# Patient Record
Sex: MALE | Race: BLACK OR AFRICAN AMERICAN | Employment: FULL TIME | URBAN - METROPOLITAN AREA
[De-identification: names, ages, dates, MRNs, and addresses within clinical notes are randomized per-mention and may not be internally consistent; named-entity substitution may affect disease eponyms.]

---

## 2017-01-09 ENCOUNTER — OFFICE VISIT (OUTPATIENT)
Dept: FAMILY MEDICINE CLINIC | Age: 57
End: 2017-01-09

## 2017-01-09 VITALS
WEIGHT: 294.4 LBS | SYSTOLIC BLOOD PRESSURE: 122 MMHG | OXYGEN SATURATION: 98 % | BODY MASS INDEX: 34.91 KG/M2 | RESPIRATION RATE: 20 BRPM | DIASTOLIC BLOOD PRESSURE: 80 MMHG | TEMPERATURE: 98.4 F | HEART RATE: 82 BPM

## 2017-01-09 DIAGNOSIS — I10 ESSENTIAL HYPERTENSION, BENIGN: ICD-10-CM

## 2017-01-09 DIAGNOSIS — K21.9 GASTROESOPHAGEAL REFLUX DISEASE, ESOPHAGITIS PRESENCE NOT SPECIFIED: ICD-10-CM

## 2017-01-09 DIAGNOSIS — M1A.09X0 IDIOPATHIC CHRONIC GOUT OF MULTIPLE SITES WITHOUT TOPHUS: ICD-10-CM

## 2017-01-09 DIAGNOSIS — N18.30 CRI (CHRONIC RENAL INSUFFICIENCY), STAGE 3 (MODERATE) (HCC): ICD-10-CM

## 2017-01-09 DIAGNOSIS — R07.89 OTHER CHEST PAIN: Primary | ICD-10-CM

## 2017-01-09 PROCEDURE — 99214 OFFICE O/P EST MOD 30 MIN: CPT | Performed by: FAMILY MEDICINE

## 2017-01-09 RX ORDER — RANITIDINE 150 MG/1
150 TABLET ORAL 2 TIMES DAILY
Qty: 60 TABLET | Refills: 5 | Status: SHIPPED | OUTPATIENT
Start: 2017-01-09 | End: 2017-07-03 | Stop reason: SDUPTHER

## 2017-01-09 RX ORDER — OXYCODONE HYDROCHLORIDE AND ACETAMINOPHEN 5; 325 MG/1; MG/1
1 TABLET ORAL EVERY 6 HOURS PRN
Qty: 90 TABLET | Refills: 0 | Status: SHIPPED | OUTPATIENT
Start: 2017-01-09 | End: 2017-02-15 | Stop reason: SDUPTHER

## 2017-01-18 ENCOUNTER — OFFICE VISIT (OUTPATIENT)
Dept: FAMILY MEDICINE CLINIC | Age: 57
End: 2017-01-18

## 2017-01-18 VITALS
SYSTOLIC BLOOD PRESSURE: 96 MMHG | TEMPERATURE: 97.8 F | BODY MASS INDEX: 34.82 KG/M2 | RESPIRATION RATE: 20 BRPM | HEART RATE: 88 BPM | DIASTOLIC BLOOD PRESSURE: 68 MMHG | WEIGHT: 293.6 LBS

## 2017-01-18 DIAGNOSIS — N18.30 CRI (CHRONIC RENAL INSUFFICIENCY), STAGE 3 (MODERATE) (HCC): ICD-10-CM

## 2017-01-18 DIAGNOSIS — K57.92 ACUTE DIVERTICULITIS: Primary | ICD-10-CM

## 2017-01-18 DIAGNOSIS — R73.9 HYPERGLYCEMIA: ICD-10-CM

## 2017-01-18 DIAGNOSIS — R10.84 ABDOMINAL PAIN, DIFFUSE: ICD-10-CM

## 2017-01-18 DIAGNOSIS — I10 ESSENTIAL HYPERTENSION, BENIGN: ICD-10-CM

## 2017-01-18 PROCEDURE — 99214 OFFICE O/P EST MOD 30 MIN: CPT | Performed by: FAMILY MEDICINE

## 2017-01-18 RX ORDER — AMOXICILLIN AND CLAVULANATE POTASSIUM 875; 125 MG/1; MG/1
1 TABLET, FILM COATED ORAL 2 TIMES DAILY
Qty: 20 TABLET | Refills: 0 | Status: SHIPPED | OUTPATIENT
Start: 2017-01-18 | End: 2017-01-28

## 2017-02-09 ENCOUNTER — TELEPHONE (OUTPATIENT)
Dept: FAMILY MEDICINE CLINIC | Age: 57
End: 2017-02-09

## 2017-02-15 ENCOUNTER — OFFICE VISIT (OUTPATIENT)
Dept: FAMILY MEDICINE CLINIC | Age: 57
End: 2017-02-15

## 2017-02-15 VITALS
DIASTOLIC BLOOD PRESSURE: 84 MMHG | TEMPERATURE: 98.5 F | BODY MASS INDEX: 34.01 KG/M2 | RESPIRATION RATE: 20 BRPM | HEART RATE: 92 BPM | SYSTOLIC BLOOD PRESSURE: 120 MMHG | WEIGHT: 286.8 LBS

## 2017-02-15 DIAGNOSIS — I10 ESSENTIAL HYPERTENSION, BENIGN: Primary | ICD-10-CM

## 2017-02-15 DIAGNOSIS — M1A.09X0 IDIOPATHIC CHRONIC GOUT OF MULTIPLE SITES WITHOUT TOPHUS: ICD-10-CM

## 2017-02-15 DIAGNOSIS — M25.511 CHRONIC PAIN IN RIGHT SHOULDER: ICD-10-CM

## 2017-02-15 DIAGNOSIS — N18.30 CRI (CHRONIC RENAL INSUFFICIENCY), STAGE 3 (MODERATE) (HCC): ICD-10-CM

## 2017-02-15 DIAGNOSIS — R73.9 HYPERGLYCEMIA: ICD-10-CM

## 2017-02-15 DIAGNOSIS — G89.29 CHRONIC PAIN IN RIGHT SHOULDER: ICD-10-CM

## 2017-02-15 PROCEDURE — 99214 OFFICE O/P EST MOD 30 MIN: CPT | Performed by: FAMILY MEDICINE

## 2017-02-15 RX ORDER — OMEPRAZOLE 40 MG/1
40 CAPSULE, DELAYED RELEASE ORAL DAILY
Qty: 30 CAPSULE | Refills: 5 | Status: SHIPPED | OUTPATIENT
Start: 2017-02-15 | End: 2017-09-05 | Stop reason: SDUPTHER

## 2017-02-15 RX ORDER — OXYCODONE HYDROCHLORIDE AND ACETAMINOPHEN 5; 325 MG/1; MG/1
1 TABLET ORAL EVERY 6 HOURS PRN
Qty: 90 TABLET | Refills: 0 | Status: SHIPPED | OUTPATIENT
Start: 2017-02-15 | End: 2017-03-13 | Stop reason: SDUPTHER

## 2017-03-07 LAB
ANION GAP SERPL CALCULATED.3IONS-SCNC: 18 MMOL/L (ref 3–16)
BUN BLDV-MCNC: 20 MG/DL (ref 7–20)
CALCIUM SERPL-MCNC: 8.9 MG/DL (ref 8.3–10.6)
CHLORIDE BLD-SCNC: 98 MMOL/L (ref 99–110)
CO2: 24 MMOL/L (ref 21–32)
CREAT SERPL-MCNC: 1.4 MG/DL (ref 0.9–1.3)
GFR AFRICAN AMERICAN: >60
GFR NON-AFRICAN AMERICAN: 52
GLUCOSE BLD-MCNC: 144 MG/DL (ref 70–99)
POTASSIUM SERPL-SCNC: 3.7 MMOL/L (ref 3.5–5.1)
SODIUM BLD-SCNC: 140 MMOL/L (ref 136–145)
URIC ACID, SERUM: 9.7 MG/DL (ref 3.5–7.2)

## 2017-03-08 LAB
ESTIMATED AVERAGE GLUCOSE: 154.2 MG/DL
HBA1C MFR BLD: 7 %

## 2017-03-09 DIAGNOSIS — M25.511 CHRONIC PAIN IN RIGHT SHOULDER: ICD-10-CM

## 2017-03-09 DIAGNOSIS — G89.29 CHRONIC PAIN IN RIGHT SHOULDER: ICD-10-CM

## 2017-03-13 ENCOUNTER — OFFICE VISIT (OUTPATIENT)
Dept: FAMILY MEDICINE CLINIC | Age: 57
End: 2017-03-13

## 2017-03-13 VITALS
WEIGHT: 289.9 LBS | BODY MASS INDEX: 34.38 KG/M2 | TEMPERATURE: 97.5 F | RESPIRATION RATE: 20 BRPM | SYSTOLIC BLOOD PRESSURE: 116 MMHG | DIASTOLIC BLOOD PRESSURE: 74 MMHG | OXYGEN SATURATION: 93 % | HEART RATE: 91 BPM

## 2017-03-13 DIAGNOSIS — I10 ESSENTIAL HYPERTENSION, BENIGN: ICD-10-CM

## 2017-03-13 DIAGNOSIS — G89.29 CHRONIC RIGHT SHOULDER PAIN: ICD-10-CM

## 2017-03-13 DIAGNOSIS — F10.10 ALCOHOL USE DISORDER, MILD, ABUSE: ICD-10-CM

## 2017-03-13 DIAGNOSIS — M1A.09X0 IDIOPATHIC CHRONIC GOUT OF MULTIPLE SITES WITHOUT TOPHUS: ICD-10-CM

## 2017-03-13 DIAGNOSIS — M75.41 IMPINGEMENT SYNDROME, SHOULDER, RIGHT: ICD-10-CM

## 2017-03-13 DIAGNOSIS — N18.30 CRI (CHRONIC RENAL INSUFFICIENCY), STAGE 3 (MODERATE) (HCC): ICD-10-CM

## 2017-03-13 DIAGNOSIS — M25.511 CHRONIC RIGHT SHOULDER PAIN: ICD-10-CM

## 2017-03-13 DIAGNOSIS — E11.9 TYPE 2 DIABETES MELLITUS WITHOUT COMPLICATION, WITHOUT LONG-TERM CURRENT USE OF INSULIN (HCC): Primary | ICD-10-CM

## 2017-03-13 DIAGNOSIS — E78.1 HYPERTRIGLYCERIDEMIA: ICD-10-CM

## 2017-03-13 PROCEDURE — 99214 OFFICE O/P EST MOD 30 MIN: CPT | Performed by: FAMILY MEDICINE

## 2017-03-13 RX ORDER — ALLOPURINOL 300 MG/1
300 TABLET ORAL DAILY
Qty: 30 TABLET | Refills: 5 | Status: SHIPPED | OUTPATIENT
Start: 2017-03-13 | End: 2017-10-08 | Stop reason: SDUPTHER

## 2017-03-13 RX ORDER — COLCHICINE 0.6 MG/1
0.6 TABLET ORAL DAILY PRN
Qty: 30 TABLET | Refills: 5 | Status: SHIPPED | OUTPATIENT
Start: 2017-03-13 | End: 2017-04-24 | Stop reason: SDUPTHER

## 2017-03-13 RX ORDER — OXYCODONE HYDROCHLORIDE AND ACETAMINOPHEN 5; 325 MG/1; MG/1
1 TABLET ORAL EVERY 6 HOURS PRN
Qty: 90 TABLET | Refills: 0 | Status: SHIPPED | OUTPATIENT
Start: 2017-03-13 | End: 2017-07-03 | Stop reason: SDUPTHER

## 2017-03-15 ENCOUNTER — TELEPHONE (OUTPATIENT)
Dept: FAMILY MEDICINE CLINIC | Age: 57
End: 2017-03-15

## 2017-03-27 ENCOUNTER — TELEPHONE (OUTPATIENT)
Dept: FAMILY MEDICINE CLINIC | Age: 57
End: 2017-03-27

## 2017-04-17 ENCOUNTER — TELEPHONE (OUTPATIENT)
Dept: FAMILY MEDICINE CLINIC | Age: 57
End: 2017-04-17

## 2017-04-24 DIAGNOSIS — M1A.09X0 IDIOPATHIC CHRONIC GOUT OF MULTIPLE SITES WITHOUT TOPHUS: ICD-10-CM

## 2017-04-24 RX ORDER — COLCHICINE 0.6 MG/1
0.6 TABLET ORAL DAILY PRN
Qty: 30 TABLET | Refills: 5 | Status: SHIPPED | OUTPATIENT
Start: 2017-04-24 | End: 2018-01-01 | Stop reason: SDUPTHER

## 2017-07-03 ENCOUNTER — TELEPHONE (OUTPATIENT)
Dept: FAMILY MEDICINE CLINIC | Age: 57
End: 2017-07-03

## 2017-07-03 ENCOUNTER — OFFICE VISIT (OUTPATIENT)
Dept: FAMILY MEDICINE CLINIC | Age: 57
End: 2017-07-03

## 2017-07-03 VITALS
BODY MASS INDEX: 35.55 KG/M2 | RESPIRATION RATE: 16 BRPM | WEIGHT: 299.8 LBS | DIASTOLIC BLOOD PRESSURE: 67 MMHG | HEART RATE: 102 BPM | SYSTOLIC BLOOD PRESSURE: 107 MMHG | OXYGEN SATURATION: 94 %

## 2017-07-03 DIAGNOSIS — R10.13 ABDOMINAL PAIN, CHRONIC, EPIGASTRIC: Primary | ICD-10-CM

## 2017-07-03 DIAGNOSIS — M79.89 LEG SWELLING: ICD-10-CM

## 2017-07-03 DIAGNOSIS — M1A.09X0 IDIOPATHIC CHRONIC GOUT OF MULTIPLE SITES WITHOUT TOPHUS: ICD-10-CM

## 2017-07-03 DIAGNOSIS — K42.9 UMBILICAL HERNIA WITHOUT OBSTRUCTION AND WITHOUT GANGRENE: ICD-10-CM

## 2017-07-03 DIAGNOSIS — N18.30 CRI (CHRONIC RENAL INSUFFICIENCY), STAGE 3 (MODERATE) (HCC): ICD-10-CM

## 2017-07-03 DIAGNOSIS — G89.29 CHRONIC RIGHT SHOULDER PAIN: ICD-10-CM

## 2017-07-03 DIAGNOSIS — E11.9 TYPE 2 DIABETES MELLITUS WITHOUT COMPLICATION, WITHOUT LONG-TERM CURRENT USE OF INSULIN (HCC): ICD-10-CM

## 2017-07-03 DIAGNOSIS — M25.511 CHRONIC RIGHT SHOULDER PAIN: ICD-10-CM

## 2017-07-03 DIAGNOSIS — G89.29 ABDOMINAL PAIN, CHRONIC, EPIGASTRIC: Primary | ICD-10-CM

## 2017-07-03 DIAGNOSIS — K40.90 INGUINAL HERNIA, LEFT: ICD-10-CM

## 2017-07-03 DIAGNOSIS — I10 ESSENTIAL HYPERTENSION, BENIGN: ICD-10-CM

## 2017-07-03 PROCEDURE — 99214 OFFICE O/P EST MOD 30 MIN: CPT | Performed by: FAMILY MEDICINE

## 2017-07-03 RX ORDER — OLMESARTAN MEDOXOMIL 40 MG/1
TABLET ORAL
Qty: 30 TABLET | Refills: 5 | Status: SHIPPED | OUTPATIENT
Start: 2017-07-03 | End: 2018-10-11 | Stop reason: SDUPTHER

## 2017-07-03 RX ORDER — OXYCODONE HYDROCHLORIDE AND ACETAMINOPHEN 5; 325 MG/1; MG/1
1 TABLET ORAL EVERY 6 HOURS PRN
Qty: 90 TABLET | Refills: 0 | Status: SHIPPED | OUTPATIENT
Start: 2017-07-03 | End: 2017-08-24 | Stop reason: SDUPTHER

## 2017-07-03 RX ORDER — NIFEDIPINE 90 MG/1
90 TABLET, FILM COATED, EXTENDED RELEASE ORAL DAILY
Qty: 30 TABLET | Refills: 5 | Status: SHIPPED | OUTPATIENT
Start: 2017-07-03 | End: 2018-10-11 | Stop reason: SDUPTHER

## 2017-07-03 RX ORDER — CARVEDILOL 6.25 MG/1
6.25 TABLET ORAL 2 TIMES DAILY
Qty: 60 TABLET | Refills: 5 | Status: SHIPPED | OUTPATIENT
Start: 2017-07-03 | End: 2018-10-11 | Stop reason: SDUPTHER

## 2017-07-03 RX ORDER — CHLORTHALIDONE 25 MG/1
25 TABLET ORAL DAILY
Qty: 30 TABLET | Refills: 5 | Status: SHIPPED | OUTPATIENT
Start: 2017-07-03 | End: 2018-10-11 | Stop reason: SDUPTHER

## 2017-07-03 RX ORDER — RANITIDINE 150 MG/1
150 TABLET ORAL 2 TIMES DAILY
Qty: 60 TABLET | Refills: 5 | Status: SHIPPED | OUTPATIENT
Start: 2017-07-03 | End: 2018-02-07 | Stop reason: SDUPTHER

## 2017-07-03 RX ORDER — TERAZOSIN 2 MG/1
CAPSULE ORAL
Qty: 30 CAPSULE | Refills: 5 | Status: SHIPPED | OUTPATIENT
Start: 2017-07-03 | End: 2018-10-11 | Stop reason: SDUPTHER

## 2017-07-14 ENCOUNTER — OFFICE VISIT (OUTPATIENT)
Dept: FAMILY MEDICINE CLINIC | Age: 57
End: 2017-07-14

## 2017-07-14 VITALS
SYSTOLIC BLOOD PRESSURE: 130 MMHG | DIASTOLIC BLOOD PRESSURE: 82 MMHG | HEART RATE: 99 BPM | OXYGEN SATURATION: 97 % | WEIGHT: 300 LBS | HEIGHT: 77 IN | BODY MASS INDEX: 35.42 KG/M2

## 2017-07-14 DIAGNOSIS — N18.30 CRI (CHRONIC RENAL INSUFFICIENCY), STAGE 3 (MODERATE) (HCC): ICD-10-CM

## 2017-07-14 DIAGNOSIS — M1A.09X0 IDIOPATHIC CHRONIC GOUT OF MULTIPLE SITES WITHOUT TOPHUS: ICD-10-CM

## 2017-07-14 DIAGNOSIS — G47.33 OSA (OBSTRUCTIVE SLEEP APNEA): ICD-10-CM

## 2017-07-14 DIAGNOSIS — I10 ESSENTIAL HYPERTENSION, BENIGN: ICD-10-CM

## 2017-07-14 DIAGNOSIS — M79.672 FOOT PAIN, LEFT: Primary | ICD-10-CM

## 2017-07-14 DIAGNOSIS — E11.9 TYPE 2 DIABETES MELLITUS WITHOUT COMPLICATION, WITHOUT LONG-TERM CURRENT USE OF INSULIN (HCC): ICD-10-CM

## 2017-07-14 PROCEDURE — 99214 OFFICE O/P EST MOD 30 MIN: CPT | Performed by: FAMILY MEDICINE

## 2017-07-14 RX ORDER — METHYLPREDNISOLONE 4 MG/1
TABLET ORAL
Qty: 21 TABLET | Refills: 0 | Status: SHIPPED | OUTPATIENT
Start: 2017-07-14 | End: 2017-08-24

## 2017-08-24 ENCOUNTER — OFFICE VISIT (OUTPATIENT)
Dept: FAMILY MEDICINE CLINIC | Age: 57
End: 2017-08-24

## 2017-08-24 ENCOUNTER — HOSPITAL ENCOUNTER (OUTPATIENT)
Dept: OTHER | Age: 57
Discharge: HOME | End: 2017-08-24
Attending: FAMILY MEDICINE | Admitting: FAMILY MEDICINE

## 2017-08-24 VITALS
SYSTOLIC BLOOD PRESSURE: 142 MMHG | HEART RATE: 108 BPM | OXYGEN SATURATION: 93 % | TEMPERATURE: 97.4 F | DIASTOLIC BLOOD PRESSURE: 86 MMHG | WEIGHT: 304.6 LBS | BODY MASS INDEX: 36.12 KG/M2

## 2017-08-24 DIAGNOSIS — M1A.09X0 IDIOPATHIC CHRONIC GOUT OF MULTIPLE SITES WITHOUT TOPHUS: ICD-10-CM

## 2017-08-24 DIAGNOSIS — R10.13 EPIGASTRIC ABDOMINAL PAIN: ICD-10-CM

## 2017-08-24 DIAGNOSIS — E11.9 TYPE 2 DIABETES MELLITUS WITHOUT COMPLICATION, WITHOUT LONG-TERM CURRENT USE OF INSULIN (HCC): ICD-10-CM

## 2017-08-24 DIAGNOSIS — K62.5 BRBPR (BRIGHT RED BLOOD PER RECTUM): ICD-10-CM

## 2017-08-24 DIAGNOSIS — K64.9 BLEEDING HEMORRHOIDS: ICD-10-CM

## 2017-08-24 DIAGNOSIS — R42 DIZZINESS: ICD-10-CM

## 2017-08-24 DIAGNOSIS — N18.30 CRI (CHRONIC RENAL INSUFFICIENCY), STAGE 3 (MODERATE) (HCC): Primary | ICD-10-CM

## 2017-08-24 DIAGNOSIS — I10 ESSENTIAL HYPERTENSION, BENIGN: ICD-10-CM

## 2017-08-24 LAB
A/G RATIO: 1.6 (ref 1.1–2.2)
ALBUMIN SERPL-MCNC: 4.3 G/DL (ref 3.4–5)
ALP BLD-CCNC: 56 U/L (ref 40–129)
ALT SERPL-CCNC: 23 U/L (ref 10–40)
ANION GAP SERPL CALCULATED.3IONS-SCNC: 15 MMOL/L (ref 3–16)
AST SERPL-CCNC: 21 U/L (ref 15–37)
BILIRUB SERPL-MCNC: 0.3 MG/DL (ref 0–1)
BUN BLDV-MCNC: 22 MG/DL (ref 7–20)
CALCIUM SERPL-MCNC: 8.8 MG/DL (ref 8.3–10.6)
CHLORIDE BLD-SCNC: 100 MMOL/L (ref 99–110)
CHOLESTEROL, TOTAL: 207 MG/DL (ref 0–199)
CO2: 27 MMOL/L (ref 21–32)
CREAT SERPL-MCNC: 1.6 MG/DL (ref 0.9–1.3)
CREATININE URINE: 140 MG/DL (ref 39–259)
ESTIMATED AVERAGE GLUCOSE: 151.3 MG/DL
GFR AFRICAN AMERICAN: 54
GFR NON-AFRICAN AMERICAN: 45
GLOBULIN: 2.7 G/DL
GLUCOSE BLD-MCNC: 101 MG/DL (ref 70–99)
HBA1C MFR BLD: 6.9 %
HCT VFR BLD CALC: 40.9 % (ref 40.5–52.5)
HDLC SERPL-MCNC: 47 MG/DL (ref 40–60)
HEMOGLOBIN: 13.4 G/DL (ref 13.5–17.5)
LDL CHOLESTEROL CALCULATED: 131 MG/DL
MCH RBC QN AUTO: 27.6 PG (ref 26–34)
MCHC RBC AUTO-ENTMCNC: 32.8 G/DL (ref 31–36)
MCV RBC AUTO: 84.3 FL (ref 80–100)
MICROALBUMIN UR-MCNC: 6.5 MG/DL
MICROALBUMIN/CREAT UR-RTO: 46.4 MG/G (ref 0–30)
PDW BLD-RTO: 14.6 % (ref 12.4–15.4)
PLATELET # BLD: 169 K/UL (ref 135–450)
PMV BLD AUTO: 9.1 FL (ref 5–10.5)
POTASSIUM SERPL-SCNC: 3.9 MMOL/L (ref 3.5–5.1)
RBC # BLD: 4.85 M/UL (ref 4.2–5.9)
SODIUM BLD-SCNC: 142 MMOL/L (ref 136–145)
TOTAL PROTEIN: 7 G/DL (ref 6.4–8.2)
TRIGL SERPL-MCNC: 145 MG/DL (ref 0–150)
VLDLC SERPL CALC-MCNC: 29 MG/DL
WBC # BLD: 5.3 K/UL (ref 4–11)

## 2017-08-24 PROCEDURE — 99214 OFFICE O/P EST MOD 30 MIN: CPT | Performed by: FAMILY MEDICINE

## 2017-08-24 RX ORDER — OXYCODONE HYDROCHLORIDE AND ACETAMINOPHEN 5; 325 MG/1; MG/1
1 TABLET ORAL EVERY 6 HOURS PRN
Qty: 90 TABLET | Refills: 0 | Status: SHIPPED | OUTPATIENT
Start: 2017-08-24 | End: 2017-10-09 | Stop reason: SDUPTHER

## 2017-08-30 ENCOUNTER — TELEPHONE (OUTPATIENT)
Dept: FAMILY MEDICINE CLINIC | Age: 57
End: 2017-08-30

## 2017-08-31 ENCOUNTER — OFFICE VISIT (OUTPATIENT)
Dept: BARIATRICS/WEIGHT MGMT | Age: 57
End: 2017-08-31

## 2017-08-31 ENCOUNTER — TELEPHONE (OUTPATIENT)
Dept: FAMILY MEDICINE CLINIC | Age: 57
End: 2017-08-31

## 2017-08-31 VITALS
DIASTOLIC BLOOD PRESSURE: 68 MMHG | BODY MASS INDEX: 36.18 KG/M2 | SYSTOLIC BLOOD PRESSURE: 118 MMHG | WEIGHT: 306.4 LBS | HEIGHT: 77 IN

## 2017-08-31 DIAGNOSIS — K40.20 NON-RECURRENT BILATERAL INGUINAL HERNIA WITHOUT OBSTRUCTION OR GANGRENE: ICD-10-CM

## 2017-08-31 DIAGNOSIS — K21.9 GASTROESOPHAGEAL REFLUX DISEASE WITHOUT ESOPHAGITIS: Primary | ICD-10-CM

## 2017-08-31 DIAGNOSIS — K42.9 UMBILICAL HERNIA WITHOUT OBSTRUCTION AND WITHOUT GANGRENE: ICD-10-CM

## 2017-08-31 DIAGNOSIS — E66.01 MORBID OBESITY DUE TO EXCESS CALORIES (HCC): ICD-10-CM

## 2017-08-31 PROCEDURE — 99204 OFFICE O/P NEW MOD 45 MIN: CPT | Performed by: SURGERY

## 2017-08-31 RX ORDER — ATORVASTATIN CALCIUM 20 MG/1
20 TABLET, FILM COATED ORAL DAILY
Qty: 30 TABLET | Refills: 5 | Status: SHIPPED | OUTPATIENT
Start: 2017-08-31 | End: 2018-03-08 | Stop reason: SDUPTHER

## 2017-08-31 RX ORDER — DICYCLOMINE HYDROCHLORIDE 10 MG/1
10 CAPSULE ORAL 2 TIMES DAILY
Qty: 2 CAPSULE | Refills: 0 | Status: SHIPPED | OUTPATIENT
Start: 2017-08-31 | End: 2018-07-02

## 2017-09-05 RX ORDER — OMEPRAZOLE 40 MG/1
CAPSULE, DELAYED RELEASE ORAL
Qty: 30 CAPSULE | Refills: 5 | Status: SHIPPED | OUTPATIENT
Start: 2017-09-05 | End: 2018-03-08 | Stop reason: SDUPTHER

## 2017-09-08 DIAGNOSIS — K21.9 GASTROESOPHAGEAL REFLUX DISEASE, ESOPHAGITIS PRESENCE NOT SPECIFIED: Primary | ICD-10-CM

## 2017-09-15 ENCOUNTER — HOSPITAL ENCOUNTER (OUTPATIENT)
Dept: ENDOSCOPY | Age: 57
Discharge: HOME | End: 2017-09-15
Attending: SURGERY | Admitting: SURGERY

## 2017-09-15 VITALS
HEART RATE: 81 BPM | TEMPERATURE: 97.2 F | WEIGHT: 302 LBS | HEIGHT: 77 IN | RESPIRATION RATE: 16 BRPM | DIASTOLIC BLOOD PRESSURE: 87 MMHG | SYSTOLIC BLOOD PRESSURE: 147 MMHG | BODY MASS INDEX: 35.66 KG/M2 | OXYGEN SATURATION: 98 %

## 2017-09-15 PROCEDURE — 43239 EGD BIOPSY SINGLE/MULTIPLE: CPT | Performed by: SURGERY

## 2017-09-15 RX ORDER — SUCRALFATE 1 G/1
1 TABLET ORAL 4 TIMES DAILY
Qty: 120 TABLET | Refills: 3 | Status: SHIPPED | OUTPATIENT
Start: 2017-09-15 | End: 2018-07-02

## 2017-10-08 DIAGNOSIS — M1A.09X0 IDIOPATHIC CHRONIC GOUT OF MULTIPLE SITES WITHOUT TOPHUS: ICD-10-CM

## 2017-10-09 ENCOUNTER — OFFICE VISIT (OUTPATIENT)
Dept: FAMILY MEDICINE CLINIC | Age: 57
End: 2017-10-09

## 2017-10-09 VITALS
SYSTOLIC BLOOD PRESSURE: 120 MMHG | DIASTOLIC BLOOD PRESSURE: 77 MMHG | WEIGHT: 302 LBS | OXYGEN SATURATION: 94 % | HEART RATE: 96 BPM | BODY MASS INDEX: 35.81 KG/M2

## 2017-10-09 DIAGNOSIS — E11.9 TYPE 2 DIABETES MELLITUS WITHOUT COMPLICATION, WITHOUT LONG-TERM CURRENT USE OF INSULIN (HCC): ICD-10-CM

## 2017-10-09 DIAGNOSIS — I10 ESSENTIAL HYPERTENSION, BENIGN: ICD-10-CM

## 2017-10-09 DIAGNOSIS — F10.10 ALCOHOL USE DISORDER, MILD, ABUSE: ICD-10-CM

## 2017-10-09 DIAGNOSIS — S80.811A ABRASION OF RIGHT LEG, INITIAL ENCOUNTER: Primary | ICD-10-CM

## 2017-10-09 DIAGNOSIS — K29.80 DUODENITIS: ICD-10-CM

## 2017-10-09 DIAGNOSIS — M25.511 CHRONIC PAIN OF BOTH SHOULDERS: ICD-10-CM

## 2017-10-09 DIAGNOSIS — M25.512 CHRONIC PAIN OF BOTH SHOULDERS: ICD-10-CM

## 2017-10-09 DIAGNOSIS — G89.29 CHRONIC PAIN OF BOTH SHOULDERS: ICD-10-CM

## 2017-10-09 DIAGNOSIS — M1A.09X0 IDIOPATHIC CHRONIC GOUT OF MULTIPLE SITES WITHOUT TOPHUS: ICD-10-CM

## 2017-10-09 DIAGNOSIS — N18.30 CRI (CHRONIC RENAL INSUFFICIENCY), STAGE 3 (MODERATE) (HCC): ICD-10-CM

## 2017-10-09 DIAGNOSIS — G47.33 OSA (OBSTRUCTIVE SLEEP APNEA): ICD-10-CM

## 2017-10-09 PROCEDURE — 99214 OFFICE O/P EST MOD 30 MIN: CPT | Performed by: FAMILY MEDICINE

## 2017-10-09 RX ORDER — ALLOPURINOL 300 MG/1
300 TABLET ORAL DAILY
Qty: 30 TABLET | Refills: 5 | Status: SHIPPED | OUTPATIENT
Start: 2017-10-09 | End: 2018-04-21 | Stop reason: SDUPTHER

## 2017-10-09 RX ORDER — OXYCODONE HYDROCHLORIDE AND ACETAMINOPHEN 5; 325 MG/1; MG/1
1 TABLET ORAL EVERY 6 HOURS PRN
Qty: 90 TABLET | Refills: 0 | Status: SHIPPED | OUTPATIENT
Start: 2017-10-09 | End: 2017-11-03 | Stop reason: SDUPTHER

## 2017-10-09 ASSESSMENT — PATIENT HEALTH QUESTIONNAIRE - PHQ9
2. FEELING DOWN, DEPRESSED OR HOPELESS: 0
SUM OF ALL RESPONSES TO PHQ QUESTIONS 1-9: 0
SUM OF ALL RESPONSES TO PHQ9 QUESTIONS 1 & 2: 0
1. LITTLE INTEREST OR PLEASURE IN DOING THINGS: 0

## 2017-10-09 NOTE — PROGRESS NOTES
Prescriptions   Medication Sig Dispense Refill    oxyCODONE-acetaminophen (PERCOCET) 5-325 MG per tablet Take 1 tablet by mouth every 6 hours as needed for Pain . Earliest Fill Date: 10/9/17 90 tablet 0    sucralfate (CARAFATE) 1 GM tablet Take 1 tablet by mouth 4 times daily 120 tablet 3    omeprazole (PRILOSEC) 40 MG delayed release capsule TAKE 1 CAPSULE BY MOUTH DAILY 30 capsule 5    dicyclomine (BENTYL) 10 MG capsule Take 1 capsule by mouth 2 times daily 2 capsule 0    atorvastatin (LIPITOR) 20 MG tablet Take 1 tablet by mouth daily 30 tablet 5    hydrocortisone (ANUSOL-HC) 2.5 % rectal cream Place rectally 2 times daily. 1 Tube 2    carvedilol (COREG) 6.25 MG tablet Take 1 tablet by mouth 2 times daily 60 tablet 5    chlorthalidone (HYGROTON) 25 MG tablet Take 1 tablet by mouth daily 30 tablet 5    NIFEdipine (ADALAT CC) 90 MG extended release tablet Take 1 tablet by mouth daily 30 tablet 5    olmesartan (BENICAR) 40 MG tablet TAKE 1 TABLET BY MOUTH EVERY DAY 30 tablet 5    ranitidine (ZANTAC) 150 MG tablet Take 1 tablet by mouth 2 times daily 60 tablet 5    terazosin (HYTRIN) 2 MG capsule TAKE 1 CAPSULE BY MOUTH EVERY EVENING 30 capsule 5    colchicine (COLCRYS) 0.6 MG tablet Take 1 tablet by mouth daily as needed for Pain 30 tablet 5    aspirin 81 MG tablet Take 81 mg by mouth daily      allopurinol (ZYLOPRIM) 300 MG tablet TAKE 1 TABLET BY MOUTH DAILY 30 tablet 5     No current facility-administered medications for this visit. ASSESSMENT / PLAN:    1. Idiopathic chronic gout of multiple sites without tophus  Stable w/o flare of sx  - oxyCODONE-acetaminophen (PERCOCET) 5-325 MG per tablet; Take 1 tablet by mouth every 6 hours as needed for Pain . Earliest Fill Date: 10/9/17  Dispense: 90 tablet; Refill: 0  Controlled Substances Monitoring:     Attestation: The Prescription Monitoring Report for this patient was reviewed today.  Ambreen Schofield MD)  Documentation: Possible medication side effects, risk of tolerance and/or dependence, and alternative treatments discussed., Obtaining appropriate analgesic effect of treatment., No signs of potential drug abuse or diversion identified. Raza Rodrigues MD)       2. Abrasion of right leg, initial encounter  S/p injury  Exam shows moderate swelling, tx with rest, elevation, ice to area  Hold NSAIDS d/t CRI    3. Type 2 diabetes mellitus without complication, without long-term current use of insulin ([address]  Working on improving diet/exercise  Refer to DM education for eval  Due f/u bloodwork 6 weeks  - Diabetic Education-[facility]    4. Duodenitis  Resolving with PPI therapy and carafate  Reviewed results of EGD  F/u with GI as scheduled, likely will need f/u EGD to ensure resolution    5. CELSA (obstructive sleep apnea)  Never sought treatment despite severe CELSA on prior study  Weight has increased since  Aware risk of untreated CELSA  Agreeable to set up f/u appt with Dr. Jeremias Romeo, likely will need to consider Ralf Meneses MD    6. Essential hypertension, benign  blood pressure stable    7. CRI (chronic renal insufficiency), stage 3 (moderate)  Stable w/o flare  Reviewed recent bloodwork     8. Chronic pain of both shoulders  Exam c/w rotator cuff inflammation w/o tear  Monitor with range of motion exercises, Exercise handout given. Instructed on use, benefits. 9. Alcohol use disorder, mild, abuse  Doing well to decrease usage, encourage to quit completely           Follow-up appointment:   Call or return to clinic prn if these symptoms worsen or fail to improve as anticipated. / 6wks/prn    Discussed use, benefit, and side effects of all prescribed medications. Barriers to medication compliance addressed. All patient questions answered. Pt voiced understanding. When applicable, patient's outside records were reviewed through [facility].   The patient has signed appropriate paperworks/consents. Dragon dictation software was used for parts of this progress note. All attempts were made to correct any errors and ensure accuracy.

## 2017-10-12 ENCOUNTER — TELEPHONE (OUTPATIENT)
Dept: FAMILY MEDICINE CLINIC | Age: 57
End: 2017-10-12

## 2017-10-12 NOTE — TELEPHONE ENCOUNTER
Pt's wife called stating the foot is swollen along with leg and that he will be headed to a medical facility to have it checked. He is in [place] and would like to know which facility would be best to go to so that there is some compatibility with systems and record transfer would be easier. Pts wife would like a call back at [phone] Please advise. Thanks.

## 2017-10-13 NOTE — TELEPHONE ENCOUNTER
Spoke to Ayesha Mark, pt is working in [address] the next 10 days and his leg and foot are swollen.  Pt. Already plans on going to hospital today in [address] and she will have them fax us the records

## 2017-10-17 ENCOUNTER — TELEPHONE (OUTPATIENT)
Dept: FAMILY MEDICINE CLINIC | Age: 57
End: 2017-10-17

## 2017-10-18 NOTE — TELEPHONE ENCOUNTER
ON CALL NOTE  Time of call:  8:10 pm  Informant:  Wife of patient    Reason for call: At ER at [facility] in [place]. Works for [employer]. Is in [place] right now. Went first to urgent care. Informed that the swelling and infection was beyond their care. Wife is concerned. She spoke to [facility] AT [address] [place] [address] and she understands that orthopedics has been consulted. She is contemplating finding an airplane ticket to fly [facility] to [place]. Advice given:    --I informed Deb Khan there is not much I can do. I explained to her that perhaps orthopedics is being consulted to evaluate the wound and see if bone is involved. --I encouraged her to call back her  and have the doctors in [facility] ([facility]) chart in [place] if they have questions about recent tests.         Vik Lozano [address]  [facility]

## 2017-10-20 ENCOUNTER — OFFICE VISIT (OUTPATIENT)
Dept: FAMILY MEDICINE CLINIC | Age: 57
End: 2017-10-20

## 2017-10-20 VITALS
HEIGHT: 77 IN | TEMPERATURE: 97.8 F | SYSTOLIC BLOOD PRESSURE: 107 MMHG | HEART RATE: 89 BPM | DIASTOLIC BLOOD PRESSURE: 70 MMHG | BODY MASS INDEX: 36.42 KG/M2 | OXYGEN SATURATION: 96 % | RESPIRATION RATE: 17 BRPM | WEIGHT: 308.5 LBS

## 2017-10-20 DIAGNOSIS — M79.604 LEG PAIN, RIGHT: ICD-10-CM

## 2017-10-20 DIAGNOSIS — E11.9 TYPE 2 DIABETES MELLITUS WITHOUT COMPLICATION, WITHOUT LONG-TERM CURRENT USE OF INSULIN (HCC): ICD-10-CM

## 2017-10-20 DIAGNOSIS — S80.11XA HEMATOMA OF RIGHT LOWER EXTREMITY, INITIAL ENCOUNTER: Primary | ICD-10-CM

## 2017-10-20 PROCEDURE — 99214 OFFICE O/P EST MOD 30 MIN: CPT | Performed by: FAMILY MEDICINE

## 2017-10-20 NOTE — PROGRESS NOTES
lower extremity, initial encounter  Reviewed results from ER and results of ultrasound  C/w hematoma. No evidence of compartment syndrome  Will tx with rest, elevation, warm compress  D/w pt/wife that this will improve albeit slowly. Monitor  Case d/w ortho, they agree that with sx for 1 wks and no skin irritation, ok to monitor    2. Type 2 diabetes mellitus without complication, without long-term current use of insulin (HCC)  Stable, due for f/u bloodwork 3-4 wks    3. Leg pain, right  As above, monitor with current therapy           Follow-up appointment:   Call or return to clinic prn if these symptoms worsen or fail to improve as anticipated. Discussed use, benefit, and side effects of all prescribed medications. Barriers to medication compliance addressed. All patient questions answered. Pt voiced understanding. When applicable, patient's outside records were [facility]. The patient has signed appropriate paperworks/consents. Dragon dictation software was used for parts of this progress note. All attempts were made to correct any errors and ensure accuracy.

## 2017-11-03 ENCOUNTER — OFFICE VISIT (OUTPATIENT)
Dept: FAMILY MEDICINE CLINIC | Age: 57
End: 2017-11-03

## 2017-11-03 VITALS
DIASTOLIC BLOOD PRESSURE: 85 MMHG | HEART RATE: 103 BPM | TEMPERATURE: 98.3 F | SYSTOLIC BLOOD PRESSURE: 131 MMHG | BODY MASS INDEX: 36.24 KG/M2 | OXYGEN SATURATION: 94 % | RESPIRATION RATE: 14 BRPM | WEIGHT: 305.6 LBS

## 2017-11-03 DIAGNOSIS — G89.29 CHRONIC PAIN OF RIGHT LOWER EXTREMITY: ICD-10-CM

## 2017-11-03 DIAGNOSIS — M79.604 CHRONIC PAIN OF RIGHT LOWER EXTREMITY: ICD-10-CM

## 2017-11-03 DIAGNOSIS — M1A.09X0 IDIOPATHIC CHRONIC GOUT OF MULTIPLE SITES WITHOUT TOPHUS: ICD-10-CM

## 2017-11-03 DIAGNOSIS — S90.01XD TRAUMATIC HEMATOMA OF RIGHT ANKLE, SUBSEQUENT ENCOUNTER: Primary | ICD-10-CM

## 2017-11-03 DIAGNOSIS — M79.89 RIGHT LEG SWELLING: ICD-10-CM

## 2017-11-03 LAB
A/G RATIO: 1.7 (ref 1.1–2.2)
ALBUMIN SERPL-MCNC: 4.8 G/DL (ref 3.4–5)
ALP BLD-CCNC: 73 U/L (ref 40–129)
ALT SERPL-CCNC: 34 U/L (ref 10–40)
ANION GAP SERPL CALCULATED.3IONS-SCNC: 13 MMOL/L (ref 3–16)
AST SERPL-CCNC: 22 U/L (ref 15–37)
BILIRUB SERPL-MCNC: 0.3 MG/DL (ref 0–1)
BUN BLDV-MCNC: 17 MG/DL (ref 7–20)
CALCIUM SERPL-MCNC: 9.8 MG/DL (ref 8.3–10.6)
CHLORIDE BLD-SCNC: 99 MMOL/L (ref 99–110)
CO2: 32 MMOL/L (ref 21–32)
CREAT SERPL-MCNC: 1.6 MG/DL (ref 0.9–1.3)
D DIMER: 261 NG/ML DDU (ref 0–229)
GFR AFRICAN AMERICAN: 54
GFR NON-AFRICAN AMERICAN: 44
GLOBULIN: 2.9 G/DL
GLUCOSE BLD-MCNC: 107 MG/DL (ref 70–99)
HCT VFR BLD CALC: 39.5 % (ref 40.5–52.5)
HEMOGLOBIN: 13 G/DL (ref 13.5–17.5)
MCH RBC QN AUTO: 27.9 PG (ref 26–34)
MCHC RBC AUTO-ENTMCNC: 33 G/DL (ref 31–36)
MCV RBC AUTO: 84.7 FL (ref 80–100)
PDW BLD-RTO: 14.2 % (ref 12.4–15.4)
PLATELET # BLD: 225 K/UL (ref 135–450)
PMV BLD AUTO: 8.6 FL (ref 5–10.5)
POTASSIUM SERPL-SCNC: 3.8 MMOL/L (ref 3.5–5.1)
RBC # BLD: 4.66 M/UL (ref 4.2–5.9)
SODIUM BLD-SCNC: 144 MMOL/L (ref 136–145)
TOTAL PROTEIN: 7.7 G/DL (ref 6.4–8.2)
WBC # BLD: 6.8 K/UL (ref 4–11)

## 2017-11-03 PROCEDURE — 99214 OFFICE O/P EST MOD 30 MIN: CPT | Performed by: FAMILY MEDICINE

## 2017-11-03 RX ORDER — FUROSEMIDE 20 MG/1
20 TABLET ORAL DAILY
Qty: 30 TABLET | Refills: 3 | Status: SHIPPED | OUTPATIENT
Start: 2017-11-03 | End: 2018-03-08 | Stop reason: SDUPTHER

## 2017-11-03 RX ORDER — OXYCODONE HYDROCHLORIDE AND ACETAMINOPHEN 5; 325 MG/1; MG/1
1 TABLET ORAL EVERY 6 HOURS PRN
Qty: 90 TABLET | Refills: 0 | Status: SHIPPED | OUTPATIENT
Start: 2017-11-03 | End: 2017-12-04 | Stop reason: SDUPTHER

## 2017-11-03 NOTE — PROGRESS NOTES
Here for f/u of foot pain, swelling and hematoma. Pt has had complicated course after injury where he was in the hospital in [place] to r/o compartment syndrome. Pt did have negative ultrasound x 2. Pt did come here for f/u and we discussed case with ortho, who agreed with conservative therapy. Pt does feel that it is getting better, but 3-4 x a day gets a random 'shock' of pain into foot. Sx quick and gone in a few second, does not linger. Sx seemed to start over the past few days. Sx for3-4d, noticed when pain in foot decreased and swelling decreased. Pt does continue to have moderate swelling, although is better. No fever, chills, but can feel warm. Pt can ambulate ok, does feel walking better overall. Pt currently keeping area elevated, iced and that has been helpful. Sx would flare after being on feet for a few hours. Minimal neuropathy. Pt taking usual pain meds and has been helpful. No chest pain, shortness of breath      Except as noted above in the history of present illness, the review of systems is  negative for headache, vision changes, chest pain, shortness of breath, abdominal pain, urinary sx, bowel changes. Past medical, surgical, and social history reviewed and updated  Medications and allergies reviewed and updated         O: /85   Pulse 103   Temp 98.3 °F (36.8 °C) (Oral)   Resp 14   Wt (!) 305 lb 9.6 oz (138.6 kg)   SpO2 94%   BMI 36.24 kg/m²   GEN: No acute distress, cooperative, well nourished, alert. HEENT: PEERLA, EOMI , normocephalic/atraumatic, nares and oropharynx clear. Mucus membranes normal, Tympanic membranes clear bilaterally. Neck: soft, supple, no thyromegaly, mass, no Lymphadenopathy  CV: Regular rate and rhythm, no murmur, rubs, gallops. No edema. Resp: Clear to auscultation bilaterally good air entry bilaterally  No crackles, wheeze. Breathing comfortably. Ext: RLE with improved RLE swelling, no calf pain and no palpable cords. reviewed today. Jose Zamora MD)  Documentation: Possible medication side effects, risk of tolerance and/or dependence, and alternative treatments discussed., Obtaining appropriate analgesic effect of treatment., No signs of potential drug abuse or diversion identified. Jose Zamora MD)   - oxyCODONE-acetaminophen (PERCOCET) 5-325 MG per tablet; Take 1 tablet by mouth every 6 hours as needed for Pain . Earliest Fill Date: 11/3/17  Dispense: 90 tablet; Refill: 0    2. Traumatic hematoma of right ankle, subsequent encounter  Improving with symptomatic tx  Cont to monitor with rest/elevation as discussed  Pt does feel moderately improved from prior. Will monitor    3. Chronic pain of right lower extremity  D/t resolving hematoma  No evidence of DVT, will check bloodwork as below to ensure  Suspect mild increase in DDimer from hematoma  - CBC  - Comprehensive Metabolic Panel  - D-DIMER, QUANTITATIVE    4. Right leg swelling  As above  - Comprehensive Metabolic Panel  - D-DIMER, QUANTITATIVE           Follow-up appointment:   Pending bloodwork results    Discussed use, benefit, and side effects of all prescribed medications. Barriers to medication compliance addressed. All patient questions answered. Pt voiced understanding. When applicable, patient's outside records were reviewed through [facility]. The patient has signed appropriate paperworks/consents. Dragon dictation software was used for parts of this progress note. All attempts were made to correct any errors and ensure accuracy.

## 2017-11-09 ENCOUNTER — OFFICE VISIT (OUTPATIENT)
Dept: BARIATRICS/WEIGHT MGMT | Age: 57
End: 2017-11-09

## 2017-11-09 VITALS
HEART RATE: 91 BPM | WEIGHT: 310.4 LBS | BODY MASS INDEX: 36.65 KG/M2 | SYSTOLIC BLOOD PRESSURE: 170 MMHG | HEIGHT: 77 IN | DIASTOLIC BLOOD PRESSURE: 126 MMHG

## 2017-11-09 DIAGNOSIS — K21.9 GASTROESOPHAGEAL REFLUX DISEASE WITHOUT ESOPHAGITIS: ICD-10-CM

## 2017-11-09 DIAGNOSIS — K40.20 NON-RECURRENT BILATERAL INGUINAL HERNIA WITHOUT OBSTRUCTION OR GANGRENE: ICD-10-CM

## 2017-11-09 DIAGNOSIS — K42.9 UMBILICAL HERNIA WITHOUT OBSTRUCTION AND WITHOUT GANGRENE: ICD-10-CM

## 2017-11-09 DIAGNOSIS — K26.9 DUODENAL ULCER DISEASE: Primary | ICD-10-CM

## 2017-11-09 PROCEDURE — 99214 OFFICE O/P EST MOD 30 MIN: CPT | Performed by: SURGERY

## 2017-11-09 NOTE — Clinical Note
Seems like his GERD has improved significantly with alcohol cessation and dietary modifications. Not interested in having his umbilical/inguinal hernias repaired at this time. Thanks!   Valorie Jean

## 2017-11-09 NOTE — PROGRESS NOTES
negative  Hematological and Lymphatic ROS: negative  Endocrine ROS: negative  Respiratory ROS: negative  Cardiovascular ROS: negative  Gastrointestinal ROS: Heartburn  Genito-Urinary ROS: negative  Musculoskeletal ROS: Right lower extremity pain  Skin ROS: negative        Physical Exam   Constitutional: Patient is oriented to person, place, and time. Vital signs are normal. Patient  appears well-developed and well-nourished. Patient  is active and cooperative. Non-toxic appearance. No distress. HENT:   Head: Normocephalic and atraumatic. Head is without laceration. Right Ear: External ear normal. No lacerations. No drainage, swelling or tenderness. Left Ear: External ear normal. No lacerations. No drainage, swelling or tenderness. Nose: Nose normal. No nose lacerations or nasal deformity. Mouth/Throat: Uvula is midline, oropharynx is clear and moist and mucous membranes are normal. No oropharyngeal exudate. Eyes: Conjunctivae, EOM and lids are normal. Pupils are equal, round, and reactive to light. Right eye exhibits no discharge. No foreign body present in the right eye. Left eye exhibits no discharge. No foreign body present in the left eye. No scleral icterus. Neck: Trachea normal and normal range of motion. Neck supple. No JVD present. No tracheal tenderness present. Carotid bruit is not present. No rigidity. No tracheal deviation and no edema present. No thyromegaly present. Cardiovascular: Normal rate, regular rhythm, normal heart sounds, intact distal pulses and normal pulses. Pulmonary/Chest: Effort normal and breath sounds normal. No stridor. No respiratory distress. Patient  has no wheezes. Patient has no rales. Patient exhibits no tenderness and no crepitus. Abdominal: Soft. Normal appearance and bowel sounds are normal. Patient exhibits no distension, no abdominal bruit, no ascites and no mass. There is no hepatosplenomegaly. There is no tenderness.  There is no rigidity, no rebound, no guarding and no CVA tenderness. Hernia confirmed  in the ventral/inguinal area. Musculoskeletal: Normal range of motion. Patient exhibits right lower extremity erythema and tenderness, but no fluctuance. Lymphadenopathy:        Head (right side): No submental, no submandibular, no preauricular, no posterior auricular and no occipital adenopathy present. Head (left side): No submental, no submandibular, no preauricular, no posterior auricular and no occipital adenopathy present. Patient  has no cervical adenopathy. Right: No supraclavicular adenopathy present. Left: No supraclavicular adenopathy present. Neurological: Patient is alert and oriented to person, place, and time. Patient has normal strength. Coordination and gait normal. GCS eye subscore is 4. GCS verbal subscore is 5. GCS motor subscore is 6. Skin: Skin is warm and dry. No abrasion and no rash noted. Patient  is not diaphoretic. No cyanosis or erythema. Psychiatric: Patient has a normal mood and affect. speech is normal and behavior is normal. Cognition and memory are normal.           A/P:  Stevie An is a very pleasant 60 y.o. male with persistent but improving GERD. To reduce Heartburn/Reflux:  1- Small frequent meals  2- Avoid carbonated beverages, caffeine and spicy food, Do not use straw. 3- No meals after 6 PM  4- Head of bed up more than 30 degrees  5- Weight loss     Patient does not wish to have abdominal wall hernias repaired at this time. Told what signs/symptoms to look out for that would indicate obstruction. I spent about 30 minutes on this consultation, with >50% time spent face-to-face counseling and coordinating care.     Mell Edmondson,

## 2017-11-10 ENCOUNTER — TELEPHONE (OUTPATIENT)
Dept: BARIATRICS/WEIGHT MGMT | Age: 57
End: 2017-11-10

## 2017-11-10 NOTE — TELEPHONE ENCOUNTER
I just saw in office in yesterday and he seemed fine  His stomach pain had been improving  Nothing else I can offer for pain  Needs to see PCP or go to ED

## 2017-11-29 ENCOUNTER — OFFICE VISIT (OUTPATIENT)
Dept: BARIATRICS/WEIGHT MGMT | Age: 57
End: 2017-11-29

## 2017-11-29 VITALS
HEIGHT: 77 IN | HEART RATE: 99 BPM | BODY MASS INDEX: 36.14 KG/M2 | SYSTOLIC BLOOD PRESSURE: 104 MMHG | WEIGHT: 306.1 LBS | DIASTOLIC BLOOD PRESSURE: 74 MMHG

## 2017-11-29 DIAGNOSIS — K26.9 DUODENAL ULCER: Primary | ICD-10-CM

## 2017-11-29 PROCEDURE — 99212 OFFICE O/P EST SF 10 MIN: CPT | Performed by: SURGERY

## 2017-12-04 ENCOUNTER — OFFICE VISIT (OUTPATIENT)
Dept: FAMILY MEDICINE CLINIC | Age: 57
End: 2017-12-04

## 2017-12-04 VITALS
OXYGEN SATURATION: 93 % | SYSTOLIC BLOOD PRESSURE: 136 MMHG | BODY MASS INDEX: 36.4 KG/M2 | TEMPERATURE: 96.9 F | DIASTOLIC BLOOD PRESSURE: 77 MMHG | HEART RATE: 103 BPM | WEIGHT: 307 LBS

## 2017-12-04 DIAGNOSIS — S80.11XD HEMATOMA OF RIGHT LOWER EXTREMITY, SUBSEQUENT ENCOUNTER: ICD-10-CM

## 2017-12-04 DIAGNOSIS — G89.29 CHRONIC PAIN OF BOTH SHOULDERS: Primary | ICD-10-CM

## 2017-12-04 DIAGNOSIS — R10.13 EPIGASTRIC ABDOMINAL PAIN: ICD-10-CM

## 2017-12-04 DIAGNOSIS — M25.511 CHRONIC PAIN OF BOTH SHOULDERS: Primary | ICD-10-CM

## 2017-12-04 DIAGNOSIS — M1A.09X0 IDIOPATHIC CHRONIC GOUT OF MULTIPLE SITES WITHOUT TOPHUS: ICD-10-CM

## 2017-12-04 DIAGNOSIS — M25.512 CHRONIC PAIN OF BOTH SHOULDERS: Primary | ICD-10-CM

## 2017-12-04 DIAGNOSIS — K26.9 DUODENAL ULCER DISEASE: ICD-10-CM

## 2017-12-04 DIAGNOSIS — I10 ESSENTIAL HYPERTENSION, BENIGN: ICD-10-CM

## 2017-12-04 DIAGNOSIS — N18.30 CHRONIC RENAL IMPAIRMENT, STAGE 3 (MODERATE) (HCC): ICD-10-CM

## 2017-12-04 DIAGNOSIS — E11.9 TYPE 2 DIABETES MELLITUS WITHOUT COMPLICATION, WITHOUT LONG-TERM CURRENT USE OF INSULIN (HCC): ICD-10-CM

## 2017-12-04 PROCEDURE — 99214 OFFICE O/P EST MOD 30 MIN: CPT | Performed by: FAMILY MEDICINE

## 2017-12-04 RX ORDER — OXYCODONE HYDROCHLORIDE AND ACETAMINOPHEN 5; 325 MG/1; MG/1
1 TABLET ORAL EVERY 6 HOURS PRN
Qty: 90 TABLET | Refills: 0 | Status: SHIPPED | OUTPATIENT
Start: 2017-12-04 | End: 2018-01-15 | Stop reason: SDUPTHER

## 2017-12-04 NOTE — PROGRESS NOTES
colchicine (COLCRYS) 0.6 MG tablet Take 1 tablet by mouth daily as needed for Pain 30 tablet 5    aspirin 81 MG tablet Take 81 mg by mouth daily      furosemide (LASIX) 20 MG tablet Take 1 tablet by mouth daily 30 tablet 3     No current facility-administered medications for this visit. Lab Results   Component Value Date    CREATININE 1.3 11/10/2017    BUN 18 11/10/2017     (L) 11/10/2017    K 3.4 (L) 11/10/2017    CL 94 (L) 11/10/2017    CO2 29 11/10/2017           ASSESSMENT / PLAN:    1. Idiopathic chronic gout of multiple sites without tophus  Chronic sx w/o flare of sx  Will continue to monitor with symptomatic therapy, percocet prn  Controlled Substances Monitoring:     Attestation: The Prescription Monitoring Report for this patient was reviewed today. Jimbo Moore MD)  Documentation: Possible medication side effects, risk of tolerance and/or dependence, and alternative treatments discussed., Obtaining appropriate analgesic effect of treatment., No signs of potential drug abuse or diversion identified. Jimbo Moore MD)   - oxyCODONE-acetaminophen (PERCOCET) 5-325 MG per tablet; Take 1 tablet by mouth every 6 hours as needed for Pain . Earliest Fill Date: 12/4/17  Dispense: 90 tablet; Refill: 0    2. Hematoma of right lower extremity, subsequent encounter  Persistent sx, with moderate discomfort  Neg homans  Will check doppler RLE  Management pending results. - US DOPPLER VENOUS LEG RIGHT    3. Chronic renal impairment, stage 3 (moderate)  Stable w/o flare  Reviewed recent bloodwork     4. Chronic pain of both shoulders  Exam and hx c/w persistent rotator cuff pathology  Did not start range of motion exercises as discussed  Given handout on exercises and will monitor with range of motion exercises, f/u persistent or increased sx  Consider imaging for persistent or increased sx    5. Essential hypertension, benign  Stable @ goal    6.  Type 2 diabetes mellitus without complication, without long-term current use of insulin ([facility] [address]  Monitoring with diet/exercise and will recheck bloodwork 3 months    7. Duodenal ulcer disease  S/p EGD 9/2017, cont PPI therapy  F/u with GI    8. Epigastric abdominal pain  Persistent sx, will f/u with GI  Reviewed eval by dr. Varsha Tesfaye           Follow-up appointment:   Call or return to clinic prn if these symptoms worsen or fail to improve as anticipated. Discussed use, benefit, and side effects of all prescribed medications. Barriers to medication compliance addressed. All patient questions answered. Pt voiced understanding. When applicable, patient's outside records were reviewed through [facility]. The patient has signed appropriate paperworks/consents. Dragon dictation software was used for parts of this progress note. All attempts were made to correct any errors and ensure accuracy.

## 2017-12-05 ENCOUNTER — TELEPHONE (OUTPATIENT)
Dept: FAMILY MEDICINE CLINIC | Age: 57
End: 2017-12-05

## 2017-12-05 DIAGNOSIS — M79.661 RIGHT CALF PAIN: Primary | ICD-10-CM

## 2017-12-05 DIAGNOSIS — M79.89 CALF SWELLING: ICD-10-CM

## 2017-12-05 NOTE — TELEPHONE ENCOUNTER
Dennise from [address] [place] [address] called stating the dx Hematoma of right lower extremity, subsequent encounter S80.11XD does not qualify for [facility] order for US Doppler Venous Leg Right. They are requesting a new order with an updated dx code. Please advise. Thanks. Please call Ed Wright when the new order is in [facility] [address]. Thanks.   [phone]

## 2017-12-06 ENCOUNTER — HOSPITAL ENCOUNTER (OUTPATIENT)
Dept: VASCULAR LAB | Age: 57
Discharge: HOME | End: 2017-12-06
Attending: FAMILY MEDICINE | Admitting: FAMILY MEDICINE

## 2017-12-06 DIAGNOSIS — S80.11XD CONTUSION OF RIGHT LOWER LEG, SUBSEQUENT ENCOUNTER: ICD-10-CM

## 2017-12-06 DIAGNOSIS — M79.661 RIGHT CALF PAIN: ICD-10-CM

## 2017-12-06 DIAGNOSIS — M79.89 CALF SWELLING: Primary | ICD-10-CM

## 2017-12-07 ENCOUNTER — HOSPITAL ENCOUNTER (OUTPATIENT)
Dept: DIABETES SERVICES | Age: 57
Discharge: HOME | End: 2017-12-31
Attending: FAMILY MEDICINE | Admitting: FAMILY MEDICINE

## 2017-12-07 DIAGNOSIS — E11.9 TYPE 2 DIABETES MELLITUS WITHOUT COMPLICATIONS (HCC): ICD-10-CM

## 2017-12-07 RX ORDER — CYCLOBENZAPRINE HCL 10 MG
10 TABLET ORAL 3 TIMES DAILY PRN
COMMUNITY
End: 2018-07-02 | Stop reason: SDUPTHER

## 2017-12-07 ASSESSMENT — PATIENT HEALTH QUESTIONNAIRE - PHQ9
SUM OF ALL RESPONSES TO PHQ QUESTIONS 1-9: 0
1. LITTLE INTEREST OR PLEASURE IN DOING THINGS: 0
SUM OF ALL RESPONSES TO PHQ9 QUESTIONS 1 & 2: 0
2. FEELING DOWN, DEPRESSED OR HOPELESS: 0

## 2017-12-14 ENCOUNTER — HOSPITAL ENCOUNTER (OUTPATIENT)
Dept: ENDOSCOPY | Age: 57
Discharge: HOME | End: 2017-12-14
Attending: INTERNAL MEDICINE | Admitting: INTERNAL MEDICINE

## 2017-12-14 DIAGNOSIS — E11.9 TYPE 2 DIABETES MELLITUS WITHOUT COMPLICATIONS (HCC): ICD-10-CM

## 2017-12-14 LAB
ANION GAP SERPL CALCULATED.3IONS-SCNC: 13 MMOL/L (ref 3–16)
BUN BLDV-MCNC: 19 MG/DL (ref 7–20)
CALCIUM SERPL-MCNC: 9.1 MG/DL (ref 8.3–10.6)
CHLORIDE BLD-SCNC: 98 MMOL/L (ref 99–110)
CO2: 26 MMOL/L (ref 21–32)
CREAT SERPL-MCNC: 1.4 MG/DL (ref 0.9–1.3)
GFR AFRICAN AMERICAN: >60
GFR NON-AFRICAN AMERICAN: 52
GLUCOSE BLD-MCNC: 152 MG/DL (ref 70–99)
POTASSIUM SERPL-SCNC: 3.9 MMOL/L (ref 3.5–5.1)
SODIUM BLD-SCNC: 137 MMOL/L (ref 136–145)

## 2017-12-14 NOTE — ANESTHESIA POST-OP
Anesthesia Post-op Note    Patient: Marla Glover  MRN: [id number]  YOB: 1960  Date of evaluation: 12/14/2017  Time:  11:05 AM     Procedure(s) Performed:     Last Vitals: There were no vitals taken for this visit.     Johny Phase I:      Johny Phase II:      Anesthesia Post Evaluation    Final anesthesia type: MAC and TIVA  Patient location during evaluation: PACU  Level of consciousness: awake and alert  Complications: no  Respiratory status: acceptable  Hydration status: euvolemic        Carolynn Freire MD  11:05 AM

## 2017-12-14 NOTE — ANESTHESIA PRE-OP
BY MOUTH EVERY EVENING 7/3/17   Amena Reyes MD   colchicine (COLCRYS) 0.6 MG tablet Take 1 tablet by mouth daily as needed for Pain 4/24/17   Amena Reyes MD   aspirin 81 MG tablet Take 81 mg by mouth daily    Historical Provider, MD       Current medications:    Current Outpatient Prescriptions   Medication Sig Dispense Refill    cyclobenzaprine (FLEXERIL) 10 MG tablet Take 10 mg by mouth 3 times daily as needed for Muscle spasms      oxyCODONE-acetaminophen (PERCOCET) 5-325 MG per tablet Take 1 tablet by mouth every 6 hours as needed for Pain . Earliest Fill Date: 12/4/17 90 tablet 0    furosemide (LASIX) 20 MG tablet Take 1 tablet by mouth daily 30 tablet 3    allopurinol (ZYLOPRIM) 300 MG tablet TAKE 1 TABLET BY MOUTH DAILY 30 tablet 5    sucralfate (CARAFATE) 1 GM tablet Take 1 tablet by mouth 4 times daily 120 tablet 3    omeprazole (PRILOSEC) 40 MG delayed release capsule TAKE 1 CAPSULE BY MOUTH DAILY 30 capsule 5    dicyclomine (BENTYL) 10 MG capsule Take 1 capsule by mouth 2 times daily 2 capsule 0    atorvastatin (LIPITOR) 20 MG tablet Take 1 tablet by mouth daily 30 tablet 5    hydrocortisone (ANUSOL-HC) 2.5 % rectal cream Place rectally 2 times daily.  1 Tube 2    carvedilol (COREG) 6.25 MG tablet Take 1 tablet by mouth 2 times daily 60 tablet 5    chlorthalidone (HYGROTON) 25 MG tablet Take 1 tablet by mouth daily 30 tablet 5    NIFEdipine (ADALAT CC) 90 MG extended release tablet Take 1 tablet by mouth daily 30 tablet 5    olmesartan (BENICAR) 40 MG tablet TAKE 1 TABLET BY MOUTH EVERY DAY 30 tablet 5    ranitidine (ZANTAC) 150 MG tablet Take 1 tablet by mouth 2 times daily 60 tablet 5    terazosin (HYTRIN) 2 MG capsule TAKE 1 CAPSULE BY MOUTH EVERY EVENING 30 capsule 5    colchicine (COLCRYS) 0.6 MG tablet Take 1 tablet by mouth daily as needed for Pain 30 tablet 5    aspirin 81 MG tablet Take 81 mg by mouth daily       No current facility-administered medications for this encounter. Allergies:  No Known Allergies    Problem List:    Patient Active Problem List   Diagnosis Code    Atrial fibrillation (HCC) I48.91    CRI (chronic renal insufficiency) N18.9    Gout M10.9    Erectile dysfunction N52.9    Elevated PSA R97.20    Essential hypertension, benign I10    Headache R51    Diverticulosis K57.90    Arthralgia of multiple joints L55.69    Umbilical hernia without obstruction and without gangrene K42.9    Knee pain, bilateral M25.561, M25.562    Alcohol use disorder, mild, abuse F10.10    Type 2 diabetes mellitus without complication, without long-term current use of insulin ([facility]) E11.9    CELSA (obstructive sleep apnea) G47.33    Duodenal ulcer disease K26.9    Duodenitis K29.80       Past Medical History:        Diagnosis Date    Arthralgia of multiple joints 10/27/2015    Arthritis     Atrial fibrillation (HCC)     CRI (chronic renal insufficiency)     Diverticulosis     Elevated PSA     Erectile dysfunction     Gout     Gout     Hemrrhoid NOS w/ complication NEC     History of MRSA infection     Hypertension     CELSA (obstructive sleep apnea) 7/14/2017    Type 2 diabetes mellitus without complication, without long-term current use of insulin ([facility] [address] 6/63/4074    Umbilical hernia without obstruction and without gangrene 2/2/2016       Past Surgical History:        Procedure Laterality Date    COLONOSCOPY      DILATATION, ESOPHAGUS      HEMICOLECTOMY      UPPER GASTROINTESTINAL ENDOSCOPY  5/28/16    biopsies, multiple small gastric ulcers    UPPER GASTROINTESTINAL ENDOSCOPY  09/12/2016       Social History:    Social History   Substance Use Topics    Smoking status: Never Smoker    Smokeless tobacco: Never Used    Alcohol use 6.0 oz/week     12 Standard drinks or equivalent per week      Comment: occ                                Counseling given: Not Answered      Vital Signs (Current): There were no vitals filed for this visit.

## 2017-12-15 ENCOUNTER — TELEPHONE (OUTPATIENT)
Dept: FAMILY MEDICINE CLINIC | Age: 57
End: 2017-12-15

## 2017-12-15 DIAGNOSIS — M79.89 LEG SWELLING: Primary | ICD-10-CM

## 2017-12-18 ENCOUNTER — OFFICE VISIT (OUTPATIENT)
Dept: SLEEP MEDICINE | Age: 57
End: 2017-12-18

## 2017-12-18 ENCOUNTER — HOSPITAL ENCOUNTER (OUTPATIENT)
Dept: NUCLEAR MEDICINE | Age: 57
Discharge: HOME | End: 2017-12-18
Attending: INTERNAL MEDICINE | Admitting: INTERNAL MEDICINE

## 2017-12-18 VITALS
SYSTOLIC BLOOD PRESSURE: 127 MMHG | WEIGHT: 310 LBS | DIASTOLIC BLOOD PRESSURE: 83 MMHG | HEIGHT: 77 IN | HEART RATE: 90 BPM | BODY MASS INDEX: 36.6 KG/M2 | OXYGEN SATURATION: 96 %

## 2017-12-18 DIAGNOSIS — G47.33 OBSTRUCTIVE SLEEP APNEA SYNDROME: Primary | ICD-10-CM

## 2017-12-18 DIAGNOSIS — E11.9 TYPE 2 DIABETES MELLITUS WITHOUT COMPLICATION, WITHOUT LONG-TERM CURRENT USE OF INSULIN (HCC): Chronic | ICD-10-CM

## 2017-12-18 DIAGNOSIS — R10.84 GENERALIZED ABDOMINAL PAIN: ICD-10-CM

## 2017-12-18 DIAGNOSIS — E66.9 NON MORBID OBESITY, UNSPECIFIED OBESITY TYPE: Chronic | ICD-10-CM

## 2017-12-18 DIAGNOSIS — R10.9 ABDOMINAL PAIN: ICD-10-CM

## 2017-12-18 DIAGNOSIS — I10 ESSENTIAL HYPERTENSION, BENIGN: Chronic | ICD-10-CM

## 2017-12-18 DIAGNOSIS — I48.91 ATRIAL FIBRILLATION, UNSPECIFIED TYPE (HCC): Chronic | ICD-10-CM

## 2017-12-18 PROCEDURE — 99214 OFFICE O/P EST MOD 30 MIN: CPT | Performed by: INTERNAL MEDICINE

## 2017-12-18 RX ADMIN — Medication 500 MICRO CURIE: at 09:55

## 2017-12-18 ASSESSMENT — SLEEP AND FATIGUE QUESTIONNAIRES
HOW LIKELY ARE YOU TO NOD OFF OR FALL ASLEEP IN A CAR, WHILE STOPPED FOR A FEW MINUTES IN TRAFFIC: 0
HOW LIKELY ARE YOU TO NOD OFF OR FALL ASLEEP WHILE SITTING QUIETLY AFTER LUNCH WITHOUT ALCOHOL: 0
HOW LIKELY ARE YOU TO NOD OFF OR FALL ASLEEP WHILE SITTING AND TALKING TO SOMEONE: 0
HOW LIKELY ARE YOU TO NOD OFF OR FALL ASLEEP WHILE SITTING AND READING: 0
HOW LIKELY ARE YOU TO NOD OFF OR FALL ASLEEP WHILE SITTING INACTIVE IN A PUBLIC PLACE: 0
HOW LIKELY ARE YOU TO NOD OFF OR FALL ASLEEP WHEN YOU ARE A PASSENGER IN A CAR FOR AN HOUR WITHOUT A BREAK: 0
HOW LIKELY ARE YOU TO NOD OFF OR FALL ASLEEP WHILE WATCHING TV: 1
HOW LIKELY ARE YOU TO NOD OFF OR FALL ASLEEP WHILE LYING DOWN TO REST IN THE AFTERNOON WHEN CIRCUMSTANCES PERMIT: 1
ESS TOTAL SCORE: 2

## 2017-12-18 ASSESSMENT — ENCOUNTER SYMPTOMS
SINUS PRESSURE: 0
STRIDOR: 0
ABDOMINAL DISTENTION: 0
PHOTOPHOBIA: 0
APNEA: 1
NAUSEA: 0
CHEST TIGHTNESS: 0
EYE PAIN: 0
SHORTNESS OF BREATH: 0
ABDOMINAL PAIN: 0

## 2017-12-18 NOTE — LETTER
[facility] Sleep Medicine  [address] [place] [id number]  Phone: [phone]  Fax: [phone]    December 18, 2017       Patient: Katerin Ortega   MR Number: [id number]   YOB: 1960   Date of Visit: 12/18/2017       Floresita Stephens was seen for a follow up visit today. Here is my assessment and plan as well as an attached copy of his visit today:      ASSESSMENT:  Ernie Soria was seen today for sleep apnea. Diagnoses and all orders for this visit:    Obstructive sleep apnea syndrome  Comments:  New problem, needs Tx    Atrial fibrillation, unspecified type ([address]    Essential hypertension, benign    Type 2 diabetes mellitus without complication, without long-term current use of insulin (HCC)    Non morbid obesity, unspecified obesity type        Plan:     Reviewed sleep study with patient as well as the pros and cons of multiple treatment options. At this time he would like to proceed with CPAP titration which is the most effective treatment for CELSA but his insurance company will not allow a titration till a trial of Auto CPAP despite it being medically indicated and the standard care. Instructed him not to drive when drowsy and unless he has worn his machine at least 4 hrs the night before. Continue medications per his PCP and other physicians. Will send an order to his DME company of choice for AutoCPAP trial (Pmin-8 Pmax-18 Flex-3 with heated humidification and mask of choice) per his insurance company. If he fails AutoCPAP trial then will need a formal in-lab titration. 8 wk follow up in the office. The chronic medical conditions listed are directly related to the primary diagnosis listed above. The management of the primary diagnosis affects the secondary diagnosis and vice versa. Cont meds for a fib, HTN, and DM. If you have questions or concerns, please do not hesitate to call me. I look forward to following Jorden along with you.     Sincerely,

## 2017-12-18 NOTE — PROGRESS NOTES
palpitations and leg swelling. Gastrointestinal: Negative for abdominal distention, abdominal pain and nausea. Genitourinary: Positive for frequency. Musculoskeletal: Negative for neck pain and neck stiffness. Neurological: Negative for seizures, light-headedness and headaches. Psychiatric/Behavioral: Positive for sleep disturbance. Vitals:  Weight BMI   Wt Readings from Last 3 Encounters:   12/18/17 (!) 310 lb (140.6 kg)   12/12/17 (!) 307 lb (139.3 kg)   12/04/17 (!) 307 lb (139.3 kg)    Body mass index is 36.76 kg/m². BP HR SaO2   BP Readings from Last 3 Encounters:   12/18/17 127/83   12/04/17 136/77   11/29/17 104/74    Pulse Readings from Last 3 Encounters:   12/18/17 90   12/04/17 103   11/29/17 99    SpO2 Readings from Last 3 Encounters:   12/18/17 96%   12/04/17 93%   11/10/17 99%        Assessment:      1. Obstructive sleep apnea syndrome      New problem, needs Tx   2. Atrial fibrillation, unspecified type ([address] Stable    3. Essential hypertension, benign Stable    4. Type 2 diabetes mellitus without complication, without long-term current use of insulin (HCC) Stable    5. Non morbid obesity, unspecified obesity type Stable           Plan:      Reviewed sleep study with patient as well as the pros and cons of multiple treatment options. At this time he would like to proceed with CPAP titration which is the most effective treatment for CELSA but his insurance company will not allow a titration till a trial of Auto CPAP despite it being medically indicated and the standard care. Instructed him not to drive when drowsy and unless he has worn his machine at least 4 hrs the night before. Continue medications per his PCP and other physicians. Will send an order to his DME company of choice for AutoCPAP trial (Pmin-8 Pmax-18 Flex-3 with heated humidification and mask of choice) per his insurance company. If he fails AutoCPAP trial then will need a formal in-lab titration.   8 wk follow up in the office. The chronic medical conditions listed are directly related to the primary diagnosis listed above. The management of the primary diagnosis affects the secondary diagnosis and vice versa. Cont meds for a fib, HTN, and DM.          Merly Nettles MD, Dayami Diop, [facility]  Medical Director  [id number] [facility]

## 2018-01-01 ENCOUNTER — HOSPITAL ENCOUNTER (OUTPATIENT)
Dept: OTHER | Age: 58
Discharge: HOME | End: 2018-01-31
Attending: FAMILY MEDICINE | Admitting: FAMILY MEDICINE

## 2018-01-15 ENCOUNTER — OFFICE VISIT (OUTPATIENT)
Dept: FAMILY MEDICINE CLINIC | Age: 58
End: 2018-01-15

## 2018-01-15 VITALS
TEMPERATURE: 98.3 F | BODY MASS INDEX: 36.76 KG/M2 | SYSTOLIC BLOOD PRESSURE: 113 MMHG | OXYGEN SATURATION: 95 % | WEIGHT: 310 LBS | DIASTOLIC BLOOD PRESSURE: 80 MMHG | HEART RATE: 101 BPM

## 2018-01-15 DIAGNOSIS — G89.29 CHRONIC PAIN OF BOTH KNEES: ICD-10-CM

## 2018-01-15 DIAGNOSIS — G89.29 CHRONIC PAIN OF BOTH SHOULDERS: Primary | ICD-10-CM

## 2018-01-15 DIAGNOSIS — E11.9 TYPE 2 DIABETES MELLITUS WITHOUT COMPLICATION, WITHOUT LONG-TERM CURRENT USE OF INSULIN (HCC): ICD-10-CM

## 2018-01-15 DIAGNOSIS — I10 ESSENTIAL HYPERTENSION, BENIGN: ICD-10-CM

## 2018-01-15 DIAGNOSIS — M25.561 CHRONIC PAIN OF BOTH KNEES: ICD-10-CM

## 2018-01-15 DIAGNOSIS — M25.511 CHRONIC PAIN OF BOTH SHOULDERS: Primary | ICD-10-CM

## 2018-01-15 DIAGNOSIS — M25.512 CHRONIC PAIN OF BOTH SHOULDERS: Primary | ICD-10-CM

## 2018-01-15 DIAGNOSIS — N18.30 CHRONIC RENAL IMPAIRMENT, STAGE 3 (MODERATE) (HCC): ICD-10-CM

## 2018-01-15 DIAGNOSIS — M25.562 CHRONIC PAIN OF BOTH KNEES: ICD-10-CM

## 2018-01-15 DIAGNOSIS — M25.50 ARTHRALGIA OF MULTIPLE JOINTS: ICD-10-CM

## 2018-01-15 DIAGNOSIS — M1A.09X0 IDIOPATHIC CHRONIC GOUT OF MULTIPLE SITES WITHOUT TOPHUS: ICD-10-CM

## 2018-01-15 DIAGNOSIS — G47.33 OSA (OBSTRUCTIVE SLEEP APNEA): ICD-10-CM

## 2018-01-15 DIAGNOSIS — M79.89 RIGHT LEG SWELLING: ICD-10-CM

## 2018-01-15 PROCEDURE — 99214 OFFICE O/P EST MOD 30 MIN: CPT | Performed by: FAMILY MEDICINE

## 2018-01-15 RX ORDER — OXYCODONE HYDROCHLORIDE AND ACETAMINOPHEN 5; 325 MG/1; MG/1
1 TABLET ORAL EVERY 6 HOURS PRN
Qty: 90 TABLET | Refills: 0 | Status: SHIPPED | OUTPATIENT
Start: 2018-01-15 | End: 2018-02-21 | Stop reason: SDUPTHER

## 2018-01-15 NOTE — PROGRESS NOTES
capsule 5    aspirin 81 MG tablet Take 81 mg by mouth daily       No current facility-administered medications for this visit. ASSESSMENT / PLAN:    1. Chronic pain of both shoulders  Persistent sx with failure of conservative therapy  Discussed tx options. Will monitor with range of motion exercises and refer to Dr. Cazares Rater for eval  Management pending results.   - Angeline Burton MD (Orthopedic Surgery)    2. Type 2 diabetes mellitus without complication, without long-term current use of insulin (HCC)  Doing well with lifestyle, mildly limited due to recent knee issues and leg issues  Check f/u bloodwork/urine as below  Management pending results. - Hemoglobin A1C; Future  - Comprehensive Metabolic Panel; Future  - CBC; Future  - Lipid Panel; Future  - Microalbumin / Creatinine Urine Ratio; Future    3. Chronic pain of both knees  Monitor with range of motion exercise, f/u with ortho    4. CELSA (obstructive sleep apnea)  Cont CPAP therapy    5. Chronic renal impairment, stage 3 (moderate)  Stable, await results of bloodwork as above    6. Essential hypertension, benign  blood pressure stable @ goal    7. Right leg swelling  Hematoma s/p trauma  Improving, continue symptomatic tx  Neg scans/dopplers in the past    8. Arthralgia of multiple joints  Await eval by ortho    9. Class 2 obesity with serious comorbidity and body mass index (BMI) of 36.0 to 36.9 in adult, unspecified obesity type  Monitor with attempt at improvement in diet/exercise as tolerated    10. Idiopathic chronic gout of multiple sites without tophus  Controlled Substances Monitoring:     Attestation: The Prescription Monitoring Report for this patient was reviewed today.  Airam Bai MD)  Documentation: Possible medication side effects, risk of tolerance and/or dependence, and alternative treatments discussed., Obtaining appropriate analgesic effect of treatment., No signs of potential drug abuse or diversion

## 2018-01-17 ENCOUNTER — HOSPITAL ENCOUNTER (OUTPATIENT)
Dept: OTHER | Age: 58
Discharge: HOME | End: 2018-01-17
Attending: FAMILY MEDICINE | Admitting: FAMILY MEDICINE

## 2018-01-17 DIAGNOSIS — E11.9 TYPE 2 DIABETES MELLITUS WITHOUT COMPLICATION, WITHOUT LONG-TERM CURRENT USE OF INSULIN (HCC): ICD-10-CM

## 2018-01-17 LAB
A/G RATIO: 1.6 (ref 1.1–2.2)
ALBUMIN SERPL-MCNC: 4.5 G/DL (ref 3.4–5)
ALP BLD-CCNC: 62 U/L (ref 40–129)
ALT SERPL-CCNC: 21 U/L (ref 10–40)
ANION GAP SERPL CALCULATED.3IONS-SCNC: 12 MMOL/L (ref 3–16)
AST SERPL-CCNC: 24 U/L (ref 15–37)
BILIRUB SERPL-MCNC: 0.3 MG/DL (ref 0–1)
BUN BLDV-MCNC: 21 MG/DL (ref 7–20)
CALCIUM SERPL-MCNC: 9 MG/DL (ref 8.3–10.6)
CHLORIDE BLD-SCNC: 99 MMOL/L (ref 99–110)
CHOLESTEROL, TOTAL: 133 MG/DL (ref 0–199)
CO2: 30 MMOL/L (ref 21–32)
CREAT SERPL-MCNC: 1.6 MG/DL (ref 0.9–1.3)
CREATININE URINE: 184.9 MG/DL (ref 39–259)
ESTIMATED AVERAGE GLUCOSE: 151.3 MG/DL
GFR AFRICAN AMERICAN: 54
GFR NON-AFRICAN AMERICAN: 44
GLOBULIN: 2.8 G/DL
GLUCOSE BLD-MCNC: 100 MG/DL (ref 70–99)
HBA1C MFR BLD: 6.9 %
HCT VFR BLD CALC: 40.4 % (ref 40.5–52.5)
HDLC SERPL-MCNC: 51 MG/DL (ref 40–60)
HEMOGLOBIN: 13.1 G/DL (ref 13.5–17.5)
LDL CHOLESTEROL CALCULATED: 64 MG/DL
MCH RBC QN AUTO: 27.3 PG (ref 26–34)
MCHC RBC AUTO-ENTMCNC: 32.3 G/DL (ref 31–36)
MCV RBC AUTO: 84.6 FL (ref 80–100)
MICROALBUMIN UR-MCNC: 7.7 MG/DL
MICROALBUMIN/CREAT UR-RTO: 41.6 MG/G (ref 0–30)
PDW BLD-RTO: 15 % (ref 12.4–15.4)
PLATELET # BLD: 189 K/UL (ref 135–450)
PMV BLD AUTO: 8.6 FL (ref 5–10.5)
POTASSIUM SERPL-SCNC: 3.9 MMOL/L (ref 3.5–5.1)
RBC # BLD: 4.78 M/UL (ref 4.2–5.9)
SODIUM BLD-SCNC: 141 MMOL/L (ref 136–145)
TOTAL PROTEIN: 7.3 G/DL (ref 6.4–8.2)
TRIGL SERPL-MCNC: 90 MG/DL (ref 0–150)
VLDLC SERPL CALC-MCNC: 18 MG/DL
WBC # BLD: 5.7 K/UL (ref 4–11)

## 2018-01-31 ENCOUNTER — OFFICE VISIT (OUTPATIENT)
Dept: ORTHOPEDIC SURGERY | Age: 58
End: 2018-01-31

## 2018-01-31 VITALS
BODY MASS INDEX: 36.6 KG/M2 | DIASTOLIC BLOOD PRESSURE: 81 MMHG | WEIGHT: 310 LBS | HEIGHT: 77 IN | HEART RATE: 88 BPM | SYSTOLIC BLOOD PRESSURE: 125 MMHG

## 2018-01-31 DIAGNOSIS — M25.512 LEFT SHOULDER PAIN, UNSPECIFIED CHRONICITY: ICD-10-CM

## 2018-01-31 DIAGNOSIS — M54.2 NECK PAIN: ICD-10-CM

## 2018-01-31 DIAGNOSIS — M25.511 RIGHT SHOULDER PAIN, UNSPECIFIED CHRONICITY: Primary | ICD-10-CM

## 2018-01-31 PROCEDURE — 99214 OFFICE O/P EST MOD 30 MIN: CPT | Performed by: ORTHOPAEDIC SURGERY

## 2018-01-31 RX ORDER — MELOXICAM 15 MG/1
TABLET ORAL
Qty: 30 TABLET | Refills: 3 | Status: SHIPPED | OUTPATIENT
Start: 2018-01-31 | End: 2018-05-29 | Stop reason: SDUPTHER

## 2018-01-31 NOTE — PROGRESS NOTES
Review of Systems   Respiratory:        Difficulty breathing     Cardiovascular: Positive for leg swelling. HBP     Musculoskeletal: Positive for joint pain. Neurological: Positive for dizziness. Chronic shoulder pain     All other systems reviewed and are negative.

## 2018-02-02 RX ORDER — CHLORTHALIDONE 25 MG/1
TABLET ORAL
Qty: 30 TABLET | Refills: 5 | Status: SHIPPED | OUTPATIENT
Start: 2018-02-02 | End: 2018-07-02

## 2018-02-02 RX ORDER — OLMESARTAN MEDOXOMIL 40 MG/1
TABLET ORAL
Qty: 30 TABLET | Refills: 5 | Status: SHIPPED | OUTPATIENT
Start: 2018-02-02 | End: 2018-07-02

## 2018-02-02 RX ORDER — CARVEDILOL 6.25 MG/1
6.25 TABLET ORAL 2 TIMES DAILY
Qty: 60 TABLET | Refills: 5 | Status: SHIPPED | OUTPATIENT
Start: 2018-02-02 | End: 2018-07-02

## 2018-02-02 RX ORDER — NIFEDIPINE 90 MG/1
TABLET, FILM COATED, EXTENDED RELEASE ORAL
Qty: 30 TABLET | Refills: 5 | Status: SHIPPED | OUTPATIENT
Start: 2018-02-02 | End: 2018-07-02

## 2018-02-05 NOTE — PROGRESS NOTES
Chief Complaint    Shoulder Pain (NP, bilateral shoulder pain)      History of Present Illness:  Lynwood Aase is a 59 y.o. male here for consultation, evaluation of his bilateral shoulder pain. He is right hand dominant and reports that his left side is more painful than his right shoulder. He has been having bilateral shoulder pain for almost a year and it gotten worse over the last 8-9 months. He reports that his range of motion has decreased and he has trouble with overhead movements. His biggest complaint is the pain and discomfort which also wakes him up at nighttime. He denies any specific injuries to his shoulders. He denies any numbness or tingling going down his arms. He reports having stiffness in his neck but not necessarily pain. Thus far he has treated his shoulders with cryotherapy and over-the-counter anti-inflammatories. Otherwise he denies having any injections, physical therapy or an MRI performed. Medical History:    Review of Systems   Respiratory:        Difficulty breathing     Cardiovascular: Positive for leg swelling. HBP     Musculoskeletal: Positive for joint pain. Neurological: Positive for dizziness. Chronic shoulder pain     All other systems reviewed and are negative. Patient's medications, allergies, past medical, surgical, social and family histories were reviewed and updated as appropriate.     Past Medical History:   Diagnosis Date    Arthralgia of multiple joints 10/27/2015    Arthritis     Atrial fibrillation (HCC)     CRI (chronic renal insufficiency)     Diverticulosis     Elevated PSA     Erectile dysfunction     Gout     Gout     Hemrrhoid NOS w/ complication NEC     History of MRSA infection     Hypertension     CLESA (obstructive sleep apnea) 7/14/2017    Type 2 diabetes mellitus without complication, without long-term current use of insulin ([facility] [address] 0/10/8524    Umbilical hernia without obstruction and without gangrene olmesartan (BENICAR) 40 MG tablet TAKE 1 TABLET BY MOUTH EVERY DAY 30 tablet 5    ranitidine (ZANTAC) 150 MG tablet Take 1 tablet by mouth 2 times daily 60 tablet 5    terazosin (HYTRIN) 2 MG capsule TAKE 1 CAPSULE BY MOUTH EVERY EVENING 30 capsule 5    aspirin 81 MG tablet Take 81 mg by mouth daily       No current facility-administered medications on file prior to visit. Review of Systems  A 14 point review of systems was completed by the patient on 1/31/18 and is available in the media section of the scanned medical record and was reviewed on 1/31/18. The review is negative with the exception of those things mentioned in the HPI and Past Medical History    Vital Signs:  Vitals:    01/31/18 0915   BP: 125/81   Pulse: 88       General Exam:   Constitutional: Patient is adequately groomed with no evidence of malnutrition  DTRs: Deep tendon reflexes are intact  Mental Status: The patient is oriented to time, place and person. The patient's mood and affect are appropriate. Lymphatic: The lymphatic examination bilaterally reveals all areas to be without enlargement or induration. Vascular: Examination reveals no swelling or calf tenderness. Peripheral pulses are palpable and 2+. Neurological: The patient has good coordination. There is no weakness or sensory deficit. Right Shoulder Examination: Right hand dominant    Inspection:  No gross deformities. No signs of infection. No effusion    Palpation:  He has tenderness over the rotator cuff, a.c. joint. He has no tenderness over the posterior joint line. He has mild subacromial crepitus present. Active Range of Motion: Forward elevation 120° with pain past 90°, abduction of 110°, internal rotation to the back is to the to L3    Passive Range of Motion: Forward elevation to 145°, external rotation to 40°    Strength:  4/5 external rotation with resistance, 4/5 supraspinatus.     Special Tests:  He has anterior shoulder pain with crossarm

## 2018-02-07 RX ORDER — RANITIDINE 150 MG/1
TABLET ORAL
Qty: 60 TABLET | Refills: 4 | Status: SHIPPED | OUTPATIENT
Start: 2018-02-07 | End: 2018-08-02 | Stop reason: SDUPTHER

## 2018-02-15 ENCOUNTER — HOSPITAL ENCOUNTER (OUTPATIENT)
Dept: OTHER | Age: 58
Discharge: HOME | End: 2018-02-28

## 2018-02-15 ENCOUNTER — HOSPITAL ENCOUNTER (OUTPATIENT)
Dept: DIABETES SERVICES | Age: 58
Discharge: HOME | End: 2018-02-16

## 2018-02-15 DIAGNOSIS — E11.9 TYPE 2 DIABETES MELLITUS WITHOUT COMPLICATIONS (HCC): ICD-10-CM

## 2018-02-21 ENCOUNTER — OFFICE VISIT (OUTPATIENT)
Dept: FAMILY MEDICINE CLINIC | Age: 58
End: 2018-02-21

## 2018-02-21 VITALS
WEIGHT: 307.5 LBS | BODY MASS INDEX: 36.46 KG/M2 | HEART RATE: 93 BPM | RESPIRATION RATE: 20 BRPM | OXYGEN SATURATION: 98 % | TEMPERATURE: 97.4 F | DIASTOLIC BLOOD PRESSURE: 100 MMHG | SYSTOLIC BLOOD PRESSURE: 140 MMHG

## 2018-02-21 DIAGNOSIS — N18.30 CHRONIC RENAL IMPAIRMENT, STAGE 3 (MODERATE) (HCC): ICD-10-CM

## 2018-02-21 DIAGNOSIS — I10 ESSENTIAL HYPERTENSION, BENIGN: ICD-10-CM

## 2018-02-21 DIAGNOSIS — M1A.09X0 IDIOPATHIC CHRONIC GOUT OF MULTIPLE SITES WITHOUT TOPHUS: ICD-10-CM

## 2018-02-21 DIAGNOSIS — M79.89 RIGHT LEG SWELLING: Primary | ICD-10-CM

## 2018-02-21 DIAGNOSIS — E11.9 TYPE 2 DIABETES MELLITUS WITHOUT COMPLICATION, WITHOUT LONG-TERM CURRENT USE OF INSULIN (HCC): ICD-10-CM

## 2018-02-21 DIAGNOSIS — F43.9 STRESS: ICD-10-CM

## 2018-02-21 DIAGNOSIS — K64.9 BLEEDING HEMORRHOIDS: ICD-10-CM

## 2018-02-21 PROCEDURE — 99214 OFFICE O/P EST MOD 30 MIN: CPT | Performed by: FAMILY MEDICINE

## 2018-02-21 RX ORDER — OXYCODONE HYDROCHLORIDE AND ACETAMINOPHEN 5; 325 MG/1; MG/1
1 TABLET ORAL EVERY 6 HOURS PRN
Qty: 90 TABLET | Refills: 0 | Status: SHIPPED | OUTPATIENT
Start: 2018-02-21 | End: 2018-03-26 | Stop reason: SDUPTHER

## 2018-03-01 ENCOUNTER — HOSPITAL ENCOUNTER (OUTPATIENT)
Dept: OTHER | Age: 58
Discharge: HOME | End: 2018-03-31

## 2018-03-26 ENCOUNTER — OFFICE VISIT (OUTPATIENT)
Dept: FAMILY MEDICINE CLINIC | Age: 58
End: 2018-03-26

## 2018-03-26 VITALS
SYSTOLIC BLOOD PRESSURE: 128 MMHG | BODY MASS INDEX: 37.09 KG/M2 | RESPIRATION RATE: 16 BRPM | OXYGEN SATURATION: 96 % | TEMPERATURE: 98 F | DIASTOLIC BLOOD PRESSURE: 78 MMHG | WEIGHT: 312.8 LBS | HEART RATE: 100 BPM

## 2018-03-26 DIAGNOSIS — N18.30 CHRONIC RENAL IMPAIRMENT, STAGE 3 (MODERATE) (HCC): ICD-10-CM

## 2018-03-26 DIAGNOSIS — M1A.09X0 IDIOPATHIC CHRONIC GOUT OF MULTIPLE SITES WITHOUT TOPHUS: ICD-10-CM

## 2018-03-26 DIAGNOSIS — M25.561 CHRONIC PAIN OF BOTH KNEES: ICD-10-CM

## 2018-03-26 DIAGNOSIS — M25.562 CHRONIC PAIN OF BOTH KNEES: ICD-10-CM

## 2018-03-26 DIAGNOSIS — G89.29 CHRONIC PAIN OF BOTH KNEES: ICD-10-CM

## 2018-03-26 DIAGNOSIS — E11.9 TYPE 2 DIABETES MELLITUS WITHOUT COMPLICATION, WITHOUT LONG-TERM CURRENT USE OF INSULIN (HCC): ICD-10-CM

## 2018-03-26 DIAGNOSIS — G47.33 OSA (OBSTRUCTIVE SLEEP APNEA): ICD-10-CM

## 2018-03-26 DIAGNOSIS — M25.562 ACUTE PAIN OF LEFT KNEE: Primary | ICD-10-CM

## 2018-03-26 DIAGNOSIS — I10 ESSENTIAL HYPERTENSION, BENIGN: ICD-10-CM

## 2018-03-26 PROCEDURE — 99214 OFFICE O/P EST MOD 30 MIN: CPT | Performed by: FAMILY MEDICINE

## 2018-03-26 RX ORDER — OXYCODONE HYDROCHLORIDE AND ACETAMINOPHEN 5; 325 MG/1; MG/1
1 TABLET ORAL EVERY 6 HOURS PRN
Qty: 90 TABLET | Refills: 0 | Status: SHIPPED | OUTPATIENT
Start: 2018-03-26 | End: 2018-07-02 | Stop reason: SDUPTHER

## 2018-03-26 NOTE — PROGRESS NOTES
auscultation bilaterally good air entry bilaterally  No crackles, wheeze. Breathing comfortably. musc: full range of motion bilateral lower extremities. L knee without tender to palpation. No pain with varus/valgus strain. Negative anterior/posterior drawer. No instability          Current Outpatient Prescriptions   Medication Sig Dispense Refill    oxyCODONE-acetaminophen (PERCOCET) 5-325 MG per tablet Take 1 tablet by mouth every 6 hours as needed for Pain for up to 30 days. Earliest Fill Date: 3/26/18 90 tablet 0    omeprazole (PRILOSEC) 40 MG delayed release capsule TAKE 1 CAPSULE BY MOUTH DAILY 30 capsule 5    atorvastatin (LIPITOR) 20 MG tablet TAKE 1 TABLET BY MOUTH EVERY DAY 30 tablet 5    furosemide (LASIX) 20 MG tablet TAKE 1 TABLET BY MOUTH DAILY 30 tablet 5    hydrocortisone (ANUSOL-HC) 2.5 % rectal cream Place rectally 2 times daily.  1 Tube 2    ranitidine (ZANTAC) 150 MG tablet TAKE 1 TABLET BY MOUTH TWICE A DAY 60 tablet 4    meloxicam (MOBIC) 15 MG tablet 1 po qday 30 tablet 3    colchicine (COLCRYS) 0.6 MG tablet TAKE 1 TABLET BY MOUTH DAILY AS NEEDED FOR PAIN 30 tablet 5    cyclobenzaprine (FLEXERIL) 10 MG tablet Take 10 mg by mouth 3 times daily as needed for Muscle spasms      allopurinol (ZYLOPRIM) 300 MG tablet TAKE 1 TABLET BY MOUTH DAILY 30 tablet 5    sucralfate (CARAFATE) 1 GM tablet Take 1 tablet by mouth 4 times daily 120 tablet 3    dicyclomine (BENTYL) 10 MG capsule Take 1 capsule by mouth 2 times daily 2 capsule 0    carvedilol (COREG) 6.25 MG tablet Take 1 tablet by mouth 2 times daily 60 tablet 5    chlorthalidone (HYGROTON) 25 MG tablet Take 1 tablet by mouth daily 30 tablet 5    NIFEdipine (ADALAT CC) 90 MG extended release tablet Take 1 tablet by mouth daily 30 tablet 5    olmesartan (BENICAR) 40 MG tablet TAKE 1 TABLET BY MOUTH EVERY DAY 30 tablet 5    terazosin (HYTRIN) 2 MG capsule TAKE 1 CAPSULE BY MOUTH EVERY EVENING 30 capsule 5    aspirin 81 MG

## 2018-04-01 ENCOUNTER — HOSPITAL ENCOUNTER (OUTPATIENT)
Dept: OTHER | Age: 58
Discharge: HOME | End: 2018-04-30
Attending: FAMILY MEDICINE | Admitting: FAMILY MEDICINE

## 2018-04-05 ENCOUNTER — HOSPITAL ENCOUNTER (OUTPATIENT)
Dept: DIABETES SERVICES | Age: 58
Discharge: HOME | End: 2018-04-06
Admitting: FAMILY MEDICINE

## 2018-04-05 ENCOUNTER — HOSPITAL ENCOUNTER (OUTPATIENT)
Dept: DIABETES SERVICES | Age: 58
Discharge: HOME | End: 2018-03-31
Admitting: FAMILY MEDICINE

## 2018-04-05 DIAGNOSIS — E11.9 TYPE 2 DIABETES MELLITUS WITHOUT COMPLICATION, WITHOUT LONG-TERM CURRENT USE OF INSULIN (HCC): Primary | ICD-10-CM

## 2018-04-09 ENCOUNTER — TELEPHONE (OUTPATIENT)
Dept: FAMILY MEDICINE CLINIC | Age: 58
End: 2018-04-09

## 2018-04-21 DIAGNOSIS — M1A.09X0 IDIOPATHIC CHRONIC GOUT OF MULTIPLE SITES WITHOUT TOPHUS: ICD-10-CM

## 2018-04-23 RX ORDER — ALLOPURINOL 300 MG/1
300 TABLET ORAL DAILY
Qty: 30 TABLET | Refills: 5 | Status: SHIPPED | OUTPATIENT
Start: 2018-04-23 | End: 2018-10-27 | Stop reason: SDUPTHER

## 2018-05-01 ENCOUNTER — HOSPITAL ENCOUNTER (OUTPATIENT)
Dept: OTHER | Age: 58
Discharge: HOME | End: 2018-05-31
Attending: FAMILY MEDICINE | Admitting: FAMILY MEDICINE

## 2018-05-01 ENCOUNTER — OFFICE VISIT (OUTPATIENT)
Dept: FAMILY MEDICINE CLINIC | Age: 58
End: 2018-05-01

## 2018-05-01 ENCOUNTER — HOSPITAL ENCOUNTER (OUTPATIENT)
Dept: OTHER | Age: 58
Discharge: HOME | End: 2018-05-01
Attending: FAMILY MEDICINE | Admitting: FAMILY MEDICINE

## 2018-05-01 VITALS
DIASTOLIC BLOOD PRESSURE: 78 MMHG | WEIGHT: 303.5 LBS | TEMPERATURE: 96.6 F | RESPIRATION RATE: 20 BRPM | SYSTOLIC BLOOD PRESSURE: 131 MMHG | OXYGEN SATURATION: 96 % | BODY MASS INDEX: 35.99 KG/M2 | HEART RATE: 101 BPM

## 2018-05-01 DIAGNOSIS — E11.9 TYPE 2 DIABETES MELLITUS WITHOUT COMPLICATION, WITHOUT LONG-TERM CURRENT USE OF INSULIN (HCC): ICD-10-CM

## 2018-05-01 DIAGNOSIS — M79.672 LEFT FOOT PAIN: ICD-10-CM

## 2018-05-01 DIAGNOSIS — M1A.09X0 IDIOPATHIC CHRONIC GOUT OF MULTIPLE SITES WITHOUT TOPHUS: ICD-10-CM

## 2018-05-01 DIAGNOSIS — M79.672 LEFT FOOT PAIN: Primary | ICD-10-CM

## 2018-05-01 DIAGNOSIS — I10 ESSENTIAL HYPERTENSION, BENIGN: ICD-10-CM

## 2018-05-01 DIAGNOSIS — N18.30 CHRONIC RENAL IMPAIRMENT, STAGE 3 (MODERATE) (HCC): ICD-10-CM

## 2018-05-01 LAB
CHOLESTEROL, TOTAL: 139 MG/DL (ref 0–199)
CREATININE URINE: 209.3 MG/DL (ref 39–259)
ESTIMATED AVERAGE GLUCOSE: 142.7 MG/DL
HBA1C MFR BLD: 6.6 %
HDLC SERPL-MCNC: 63 MG/DL (ref 40–60)
LDL CHOLESTEROL CALCULATED: 58 MG/DL
MICROALBUMIN UR-MCNC: 97.6 MG/DL
MICROALBUMIN/CREAT UR-RTO: 466.3 MG/G (ref 0–30)
TRIGL SERPL-MCNC: 92 MG/DL (ref 0–150)
URIC ACID, SERUM: 6.1 MG/DL (ref 3.5–7.2)
VLDLC SERPL CALC-MCNC: 18 MG/DL

## 2018-05-01 PROCEDURE — 99214 OFFICE O/P EST MOD 30 MIN: CPT | Performed by: FAMILY MEDICINE

## 2018-05-01 RX ORDER — OXYCODONE HYDROCHLORIDE AND ACETAMINOPHEN 5; 325 MG/1; MG/1
1 TABLET ORAL EVERY 6 HOURS PRN
Qty: 90 TABLET | Refills: 0 | Status: SHIPPED | OUTPATIENT
Start: 2018-05-01 | End: 2018-07-02 | Stop reason: SDUPTHER

## 2018-05-01 RX ORDER — PREDNISONE 20 MG/1
TABLET ORAL
Qty: 18 TABLET | Refills: 0 | Status: SHIPPED | OUTPATIENT
Start: 2018-05-01 | End: 2018-05-11

## 2018-05-03 ENCOUNTER — TELEPHONE (OUTPATIENT)
Dept: ORTHOPEDIC SURGERY | Age: 58
End: 2018-05-03

## 2018-05-04 RX ORDER — LISINOPRIL 10 MG/1
10 TABLET ORAL DAILY
Qty: 30 TABLET | Refills: 5 | Status: SHIPPED | OUTPATIENT
Start: 2018-05-04 | End: 2018-10-11 | Stop reason: SDUPTHER

## 2018-05-09 ENCOUNTER — OFFICE VISIT (OUTPATIENT)
Dept: ORTHOPEDIC SURGERY | Age: 58
End: 2018-05-09

## 2018-05-09 VITALS
HEART RATE: 97 BPM | HEIGHT: 77 IN | BODY MASS INDEX: 35.78 KG/M2 | SYSTOLIC BLOOD PRESSURE: 184 MMHG | WEIGHT: 303 LBS | DIASTOLIC BLOOD PRESSURE: 118 MMHG

## 2018-05-09 DIAGNOSIS — M25.511 RIGHT SHOULDER PAIN, UNSPECIFIED CHRONICITY: Primary | ICD-10-CM

## 2018-05-09 DIAGNOSIS — M75.121 COMPLETE TEAR OF RIGHT ROTATOR CUFF: ICD-10-CM

## 2018-05-09 PROCEDURE — 99213 OFFICE O/P EST LOW 20 MIN: CPT | Performed by: ORTHOPAEDIC SURGERY

## 2018-05-30 RX ORDER — MELOXICAM 15 MG/1
TABLET ORAL
Qty: 30 TABLET | Refills: 3 | Status: SHIPPED | OUTPATIENT
Start: 2018-05-30 | End: 2018-09-30 | Stop reason: SDUPTHER

## 2018-06-20 ENCOUNTER — TELEPHONE (OUTPATIENT)
Dept: FAMILY MEDICINE CLINIC | Age: 58
End: 2018-06-20

## 2018-07-02 ENCOUNTER — OFFICE VISIT (OUTPATIENT)
Dept: FAMILY MEDICINE CLINIC | Age: 58
End: 2018-07-02

## 2018-07-02 VITALS
BODY MASS INDEX: 35.95 KG/M2 | SYSTOLIC BLOOD PRESSURE: 119 MMHG | TEMPERATURE: 97.5 F | WEIGHT: 303.2 LBS | DIASTOLIC BLOOD PRESSURE: 70 MMHG | OXYGEN SATURATION: 94 % | HEART RATE: 91 BPM | RESPIRATION RATE: 14 BRPM

## 2018-07-02 DIAGNOSIS — M79.672 BILATERAL FOOT PAIN: Primary | ICD-10-CM

## 2018-07-02 DIAGNOSIS — Z12.11 SCREEN FOR COLON CANCER: ICD-10-CM

## 2018-07-02 DIAGNOSIS — M1A.09X0 IDIOPATHIC CHRONIC GOUT OF MULTIPLE SITES WITHOUT TOPHUS: ICD-10-CM

## 2018-07-02 DIAGNOSIS — R10.9 RIGHT FLANK PAIN: ICD-10-CM

## 2018-07-02 DIAGNOSIS — N18.30 CHRONIC RENAL IMPAIRMENT, STAGE 3 (MODERATE) (HCC): ICD-10-CM

## 2018-07-02 DIAGNOSIS — I10 ESSENTIAL HYPERTENSION, BENIGN: ICD-10-CM

## 2018-07-02 DIAGNOSIS — M79.671 BILATERAL FOOT PAIN: Primary | ICD-10-CM

## 2018-07-02 DIAGNOSIS — E11.9 TYPE 2 DIABETES MELLITUS WITHOUT COMPLICATION, WITHOUT LONG-TERM CURRENT USE OF INSULIN (HCC): ICD-10-CM

## 2018-07-02 LAB
BILIRUBIN, POC: NORMAL
BLOOD URINE, POC: NORMAL
CLARITY, POC: CLEAR
COLOR, POC: NORMAL
GLUCOSE URINE, POC: NORMAL
KETONES, POC: NORMAL
LEUKOCYTE EST, POC: NORMAL
NITRITE, POC: NORMAL
PH, POC: 5
PROTEIN, POC: NORMAL
SPECIFIC GRAVITY, POC: 1.01
UROBILINOGEN, POC: NORMAL

## 2018-07-02 PROCEDURE — 81002 URINALYSIS NONAUTO W/O SCOPE: CPT | Performed by: FAMILY MEDICINE

## 2018-07-02 PROCEDURE — 99214 OFFICE O/P EST MOD 30 MIN: CPT | Performed by: FAMILY MEDICINE

## 2018-07-02 RX ORDER — CYCLOBENZAPRINE HCL 10 MG
10 TABLET ORAL 3 TIMES DAILY PRN
Qty: 30 TABLET | Refills: 5 | Status: SHIPPED | OUTPATIENT
Start: 2018-07-02 | End: 2018-10-11 | Stop reason: SDUPTHER

## 2018-07-02 RX ORDER — OXYCODONE HYDROCHLORIDE AND ACETAMINOPHEN 5; 325 MG/1; MG/1
1 TABLET ORAL EVERY 6 HOURS PRN
Qty: 90 TABLET | Refills: 0 | Status: SHIPPED | OUTPATIENT
Start: 2018-08-01 | End: 2018-10-11 | Stop reason: SDUPTHER

## 2018-07-02 RX ORDER — OXYCODONE HYDROCHLORIDE AND ACETAMINOPHEN 5; 325 MG/1; MG/1
1 TABLET ORAL EVERY 6 HOURS PRN
Qty: 90 TABLET | Refills: 0 | Status: SHIPPED | OUTPATIENT
Start: 2018-07-02 | End: 2018-08-01

## 2018-07-02 RX ORDER — OXYCODONE HYDROCHLORIDE AND ACETAMINOPHEN 5; 325 MG/1; MG/1
1 TABLET ORAL EVERY 6 HOURS PRN
Qty: 90 TABLET | Refills: 0 | Status: SHIPPED | OUTPATIENT
Start: 2018-08-31 | End: 2018-09-30

## 2018-07-02 NOTE — PROGRESS NOTES
lower extremities. No erythema, no tender to palpation to feet bilaterally. 2+ pulses bilateral lower extremities. Neg straight leg-raise bilaterally. Minimal tender to palpation lumbar paraspinal R lumbar. Neg straight leg-raise bilaterally          Current Outpatient Prescriptions   Medication Sig Dispense Refill    cyclobenzaprine (FLEXERIL) 10 MG tablet Take 1 tablet by mouth 3 times daily as needed for Muscle spasms 30 tablet 5    [START ON 8/1/2018] oxyCODONE-acetaminophen (PERCOCET) 5-325 MG per tablet Take 1 tablet by mouth every 6 hours as needed for Pain for up to 60 days. [facility] Date: 8/1/18 90 tablet 0    [START ON 8/31/2018] oxyCODONE-acetaminophen (PERCOCET) 5-325 MG per tablet Take 1 tablet by mouth every 6 hours as needed for Pain for up to 30 days. [facility] Date: 8/31/18 90 tablet 0    oxyCODONE-acetaminophen (PERCOCET) 5-325 MG per tablet Take 1 tablet by mouth every 6 hours as needed for Pain for up to 30 days. [facility] Date: 7/2/18 90 tablet 0    meloxicam (MOBIC) 15 MG tablet TAKE 1 TABLET BY MOUTH EVERYDAY 30 tablet 3    lisinopril (PRINIVIL;ZESTRIL) 10 MG tablet Take 1 tablet by mouth daily 30 tablet 5    allopurinol (ZYLOPRIM) 300 MG tablet TAKE 1 TABLET BY MOUTH DAILY 30 tablet 5    omeprazole (PRILOSEC) 40 MG delayed release capsule TAKE 1 CAPSULE BY MOUTH DAILY 30 capsule 5    atorvastatin (LIPITOR) 20 MG tablet TAKE 1 TABLET BY MOUTH EVERY DAY 30 tablet 5    hydrocortisone (ANUSOL-HC) 2.5 % rectal cream Place rectally 2 times daily.  1 Tube 2    ranitidine (ZANTAC) 150 MG tablet TAKE 1 TABLET BY MOUTH TWICE A DAY 60 tablet 4    colchicine (COLCRYS) 0.6 MG tablet TAKE 1 TABLET BY MOUTH DAILY AS NEEDED FOR PAIN 30 tablet 5    carvedilol (COREG) 6.25 MG tablet Take 1 tablet by mouth 2 times daily 60 tablet 5    chlorthalidone (HYGROTON) 25 MG tablet Take 1 tablet by mouth daily 30 tablet 5    NIFEdipine (ADALAT CC) 90 MG extended release tablet Take 1 tablet by mouth daily 30 tablet 5    olmesartan (BENICAR) 40 MG tablet TAKE 1 TABLET BY MOUTH EVERY DAY 30 tablet 5    terazosin (HYTRIN) 2 MG capsule TAKE 1 CAPSULE BY MOUTH EVERY EVENING 30 capsule 5    aspirin 81 MG tablet Take 81 mg by mouth daily       No current facility-administered medications for this visit. Lab Results   Component Value Date    LABA1C 6.6 05/01/2018     Lab Results   Component Value Date    .7 05/01/2018        Lab Results   Component Value Date    CREATININE 1.6 (H) 01/17/2018    BUN 21 (H) 01/17/2018     01/17/2018    K 3.9 01/17/2018    CL 99 01/17/2018    CO2 30 01/17/2018         Lab Results   Component Value Date    WBC 5.7 01/17/2018    HGB 13.1 (L) 01/17/2018    HCT 40.4 (L) 01/17/2018    MCV 84.6 01/17/2018     01/17/2018           ASSESSMENT / PLAN:    1. Idiopathic chronic gout of multiple sites without tophus  Stable w/o flare of sx  Cont current therapy as below  See [facility] 5/2018  Pt aware of need for every 3 month medication followup appointments, and that medication refills for benzodiazepines, narcotics and/or stimulants will only be given at appointment. - oxyCODONE-acetaminophen (PERCOCET) 5-325 MG per tablet; Take 1 tablet by mouth every 6 hours as needed for Pain for up to 60 days. Louetta Marines Date: 8/1/18  Dispense: 90 tablet; Refill: 0  - oxyCODONE-acetaminophen (PERCOCET) 5-325 MG per tablet; Take 1 tablet by mouth every 6 hours as needed for Pain for up to 30 days. [facility] Date: 8/31/18  Dispense: 90 tablet; Refill: 0  - oxyCODONE-acetaminophen (PERCOCET) 5-325 MG per tablet; Take 1 tablet by mouth every 6 hours as needed for Pain for up to 30 days. Louetta [facility] Date: 7/2/18  Dispense: 90 tablet; Refill: 0    2. Bilateral foot pain  Muscular, resolved  Encouraged     3.  Right flank pain  Muscular, sx improving  Cont symptomatic tx, and monitor for increased or breakthru sx  UA with minimal/trace blood, otherwise negative  -

## 2018-07-03 ENCOUNTER — TELEPHONE (OUTPATIENT)
Dept: FAMILY MEDICINE CLINIC | Age: 58
End: 2018-07-03

## 2018-08-02 RX ORDER — RANITIDINE 150 MG/1
TABLET ORAL
Qty: 60 TABLET | Refills: 4 | Status: SHIPPED | OUTPATIENT
Start: 2018-08-02 | End: 2018-12-17 | Stop reason: SDUPTHER

## 2018-08-14 ENCOUNTER — HOSPITAL ENCOUNTER (OUTPATIENT)
Dept: CT IMAGING | Age: 58
Discharge: HOME | End: 2018-08-14
Attending: INTERNAL MEDICINE | Admitting: INTERNAL MEDICINE

## 2018-08-14 DIAGNOSIS — R10.9 ABDOMINAL PAIN, UNSPECIFIED ABDOMINAL LOCATION: ICD-10-CM

## 2018-09-17 ENCOUNTER — OFFICE VISIT (OUTPATIENT)
Dept: FAMILY MEDICINE CLINIC | Age: 58
End: 2018-09-17

## 2018-09-17 VITALS
OXYGEN SATURATION: 94 % | SYSTOLIC BLOOD PRESSURE: 144 MMHG | HEART RATE: 106 BPM | RESPIRATION RATE: 18 BRPM | DIASTOLIC BLOOD PRESSURE: 96 MMHG

## 2018-09-17 DIAGNOSIS — E11.9 TYPE 2 DIABETES MELLITUS WITHOUT COMPLICATION, WITHOUT LONG-TERM CURRENT USE OF INSULIN (HCC): ICD-10-CM

## 2018-09-17 DIAGNOSIS — M77.12 LATERAL EPICONDYLITIS OF LEFT ELBOW: ICD-10-CM

## 2018-09-17 DIAGNOSIS — R07.89 CHEST WALL PAIN: ICD-10-CM

## 2018-09-17 DIAGNOSIS — R20.0 NUMBNESS OF LEFT HAND: ICD-10-CM

## 2018-09-17 DIAGNOSIS — I10 ESSENTIAL HYPERTENSION, BENIGN: ICD-10-CM

## 2018-09-17 DIAGNOSIS — E11.9 TYPE 2 DIABETES MELLITUS WITHOUT COMPLICATION, WITHOUT LONG-TERM CURRENT USE OF INSULIN (HCC): Primary | ICD-10-CM

## 2018-09-17 LAB
A/G RATIO: 1.8 (ref 1.1–2.2)
ALBUMIN SERPL-MCNC: 4.5 G/DL (ref 3.4–5)
ALP BLD-CCNC: 72 U/L (ref 40–129)
ALT SERPL-CCNC: 22 U/L (ref 10–40)
ANION GAP SERPL CALCULATED.3IONS-SCNC: 14 MMOL/L (ref 3–16)
AST SERPL-CCNC: 21 U/L (ref 15–37)
BILIRUB SERPL-MCNC: <0.2 MG/DL (ref 0–1)
BUN BLDV-MCNC: 17 MG/DL (ref 7–20)
CALCIUM SERPL-MCNC: 9.3 MG/DL (ref 8.3–10.6)
CHLORIDE BLD-SCNC: 99 MMOL/L (ref 99–110)
CHOLESTEROL, TOTAL: 136 MG/DL (ref 0–199)
CO2: 28 MMOL/L (ref 21–32)
CREAT SERPL-MCNC: 1.5 MG/DL (ref 0.9–1.3)
GFR AFRICAN AMERICAN: 58
GFR NON-AFRICAN AMERICAN: 48
GLOBULIN: 2.5 G/DL
GLUCOSE BLD-MCNC: 129 MG/DL (ref 70–99)
HCT VFR BLD CALC: 40.1 % (ref 40.5–52.5)
HDLC SERPL-MCNC: 42 MG/DL (ref 40–60)
HEMOGLOBIN: 13 G/DL (ref 13.5–17.5)
LDL CHOLESTEROL CALCULATED: 53 MG/DL
MCH RBC QN AUTO: 27.4 PG (ref 26–34)
MCHC RBC AUTO-ENTMCNC: 32.5 G/DL (ref 31–36)
MCV RBC AUTO: 84.4 FL (ref 80–100)
PDW BLD-RTO: 14.7 % (ref 12.4–15.4)
PLATELET # BLD: 222 K/UL (ref 135–450)
PMV BLD AUTO: 8.6 FL (ref 5–10.5)
POTASSIUM SERPL-SCNC: 3.6 MMOL/L (ref 3.5–5.1)
RBC # BLD: 4.75 M/UL (ref 4.2–5.9)
SODIUM BLD-SCNC: 141 MMOL/L (ref 136–145)
TOTAL PROTEIN: 7 G/DL (ref 6.4–8.2)
TRIGL SERPL-MCNC: 205 MG/DL (ref 0–150)
VLDLC SERPL CALC-MCNC: 41 MG/DL
WBC # BLD: 5.9 K/UL (ref 4–11)

## 2018-09-17 PROCEDURE — 99214 OFFICE O/P EST MOD 30 MIN: CPT | Performed by: FAMILY MEDICINE

## 2018-09-17 PROCEDURE — 93000 ELECTROCARDIOGRAM COMPLETE: CPT | Performed by: FAMILY MEDICINE

## 2018-09-17 ASSESSMENT — PATIENT HEALTH QUESTIONNAIRE - PHQ9
2. FEELING DOWN, DEPRESSED OR HOPELESS: 0
1. LITTLE INTEREST OR PLEASURE IN DOING THINGS: 0
SUM OF ALL RESPONSES TO PHQ QUESTIONS 1-9: 0
SUM OF ALL RESPONSES TO PHQ9 QUESTIONS 1 & 2: 0
SUM OF ALL RESPONSES TO PHQ QUESTIONS 1-9: 0

## 2018-09-17 NOTE — PROGRESS NOTES
Here for eval of some tingling in to L hand thumb and 2 middle fingers, blood pressure, chest pressure    Sx have been going on and off for a month, random during the day. Sx are only on L hand no sx on R side. Sx are totally random, not associate with activity. Pt not noticing any issues related to exertion. No exertional chest pain, shortness of breath. No sx with night, but usually during the day. Not dropping anything. Pt is R handed. range of motion neck is fine, no issues with decrease in range of motion and no pain with activity. No chest pain or shoulder pain issues. Pt here for follow up of blood pressure. Pt states doing fair with adherence to therapy and feels well. Does miss every other day, on average. Pt just does not like taking pills. No issues of chest pain, shortness of breath. No vision changes, headache, swelling in legs. Pt did notice a knot show up on hand as well in distal hand. Except as noted above in the history of present illness, the review of systems is  negative for headache, vision changes, chest pain, shortness of breath, abdominal pain, urinary sx, bowel changes. Past medical, surgical, and social history reviewed and updated  Medications and allergies reviewed and updated         O: BP (!) 144/96   Pulse 106   Resp 18   SpO2 94%   GEN: No acute distress, cooperative, well nourished, alert. HEENT: PEERLA, EOMI , normocephalic/atraumatic, nares and oropharynx clear. Mucus membranes normal, Tympanic membranes clear bilaterally. Neck: soft, supple, no thyromegaly, mass, no Lymphadenopathy  CV: Regular rate and rhythm, no murmur, rubs, gallops. No edema. Resp: Clear to auscultation bilaterally good air entry bilaterally  No crackles, wheeze. Breathing comfortably. Ext: .full range of motion bilateral upper extremities, bilateral lower extremities. Mild tender to palpation to L elbow over lateral epicondyle. Strength 5/5.   Neg tinels/phalens 2 diabetes mellitus without complication, without long-term current use of insulin (HCC)  Pt states he was not aware of dx of diabetes mellitus despite telling him of elevation of blood sugars and dx of diabetes mellitus for over 1 year. Pt with wife, acknowledges dx and aware of risk. Cont to monitor with diet/exercise and check f/u bloodwork as below  Management pending results. - EKG 12 Lead  - CBC; Future  - Comprehensive Metabolic Panel; Future  - Lipid Panel; Future  - Microalbumin / Creatinine Urine Ratio; Future    2. Essential hypertension, benign  blood pressure elevated, pt admits to poor adherence to meds as well as diet/exercise  Aware risk of persistent elevation of blood sugars   Will monitor    3. Chest wall pain  Exam nonfocal, asymptomatic currently  EKG unchanged  - EKG 12 Lead    4. Numbness of left hand  Suspect d/t some mild tennis elbow. Monitor with range of motion exercises, tylenol and f/u increased sx. Consider EMG  Exercise handout given. Instructed on use, benefits. 5. Lateral epicondylitis of left elbow  As above           Follow-up appointment:   Call or return to clinic prn if these symptoms worsen or fail to improve as anticipated. / pending bloodwork results    Discussed use, benefit, and side effects of all prescribed medications. Barriers to medication compliance addressed. All patient questions answered. Pt voiced understanding. When applicable, patient's [facility] The patient has signed appropriate paperworks/consents. Dragon dictation software was used for parts of this progress note. All attempts were made to correct any errors and ensure accuracy.

## 2018-09-18 LAB
CREATININE URINE: 23.7 MG/DL (ref 39–259)
MICROALBUMIN UR-MCNC: 8.2 MG/DL
MICROALBUMIN/CREAT UR-RTO: 346 MG/G (ref 0–30)

## 2018-09-19 DIAGNOSIS — E11.9 TYPE 2 DIABETES MELLITUS WITHOUT COMPLICATION, WITHOUT LONG-TERM CURRENT USE OF INSULIN (HCC): Primary | ICD-10-CM

## 2018-09-19 LAB
ESTIMATED AVERAGE GLUCOSE: 157.1 MG/DL
HBA1C MFR BLD: 7.1 %

## 2018-10-01 RX ORDER — MELOXICAM 15 MG/1
TABLET ORAL
Qty: 30 TABLET | Refills: 2 | Status: SHIPPED | OUTPATIENT
Start: 2018-10-01 | End: 2018-12-19 | Stop reason: SDUPTHER

## 2018-10-01 RX ORDER — OMEPRAZOLE 40 MG/1
CAPSULE, DELAYED RELEASE ORAL
Qty: 30 CAPSULE | Refills: 5 | Status: SHIPPED | OUTPATIENT
Start: 2018-10-01 | End: 2019-04-15 | Stop reason: SDUPTHER

## 2018-10-01 RX ORDER — ATORVASTATIN CALCIUM 20 MG/1
TABLET, FILM COATED ORAL
Qty: 30 TABLET | Refills: 5 | Status: SHIPPED | OUTPATIENT
Start: 2018-10-01 | End: 2018-10-11 | Stop reason: SDUPTHER

## 2018-10-11 ENCOUNTER — OFFICE VISIT (OUTPATIENT)
Dept: FAMILY MEDICINE CLINIC | Age: 58
End: 2018-10-11
Payer: COMMERCIAL

## 2018-10-11 VITALS
SYSTOLIC BLOOD PRESSURE: 146 MMHG | WEIGHT: 302.2 LBS | RESPIRATION RATE: 12 BRPM | TEMPERATURE: 97.5 F | HEART RATE: 96 BPM | DIASTOLIC BLOOD PRESSURE: 92 MMHG | BODY MASS INDEX: 35.84 KG/M2 | OXYGEN SATURATION: 97 %

## 2018-10-11 DIAGNOSIS — I10 ESSENTIAL HYPERTENSION, BENIGN: ICD-10-CM

## 2018-10-11 DIAGNOSIS — E11.21 TYPE 2 DIABETES MELLITUS WITH DIABETIC NEPHROPATHY, WITHOUT LONG-TERM CURRENT USE OF INSULIN (HCC): ICD-10-CM

## 2018-10-11 DIAGNOSIS — M1A.09X0 IDIOPATHIC CHRONIC GOUT OF MULTIPLE SITES WITHOUT TOPHUS: Primary | ICD-10-CM

## 2018-10-11 DIAGNOSIS — E11.21 DIABETIC NEPHROPATHY ASSOCIATED WITH TYPE 2 DIABETES MELLITUS (HCC): ICD-10-CM

## 2018-10-11 DIAGNOSIS — N18.30 CHRONIC RENAL IMPAIRMENT, STAGE 3 (MODERATE) (HCC): ICD-10-CM

## 2018-10-11 PROBLEM — E11.9 TYPE 2 DIABETES MELLITUS WITHOUT COMPLICATION, WITHOUT LONG-TERM CURRENT USE OF INSULIN (HCC): Status: RESOLVED | Noted: 2017-03-13 | Resolved: 2018-10-11

## 2018-10-11 PROCEDURE — 99214 OFFICE O/P EST MOD 30 MIN: CPT | Performed by: FAMILY MEDICINE

## 2018-10-11 RX ORDER — LISINOPRIL 10 MG/1
10 TABLET ORAL DAILY
Qty: 30 TABLET | Refills: 5 | Status: SHIPPED | OUTPATIENT
Start: 2018-10-11 | End: 2019-03-14 | Stop reason: SDUPTHER

## 2018-10-11 RX ORDER — CYCLOBENZAPRINE HCL 10 MG
10 TABLET ORAL 3 TIMES DAILY PRN
Qty: 30 TABLET | Refills: 5 | Status: SHIPPED | OUTPATIENT
Start: 2018-10-11 | End: 2019-04-17 | Stop reason: SDUPTHER

## 2018-10-11 RX ORDER — TERAZOSIN 2 MG/1
CAPSULE ORAL
Qty: 30 CAPSULE | Refills: 5 | Status: SHIPPED | OUTPATIENT
Start: 2018-10-11 | End: 2019-05-01 | Stop reason: SDUPTHER

## 2018-10-11 RX ORDER — ATORVASTATIN CALCIUM 20 MG/1
TABLET, FILM COATED ORAL
Qty: 30 TABLET | Refills: 5 | Status: SHIPPED | OUTPATIENT
Start: 2018-10-11 | End: 2020-02-26

## 2018-10-11 RX ORDER — OXYCODONE HYDROCHLORIDE AND ACETAMINOPHEN 5; 325 MG/1; MG/1
1 TABLET ORAL EVERY 6 HOURS PRN
Qty: 90 TABLET | Refills: 0 | Status: SHIPPED | OUTPATIENT
Start: 2018-10-11 | End: 2018-11-08 | Stop reason: SDUPTHER

## 2018-10-11 RX ORDER — CARVEDILOL 6.25 MG/1
6.25 TABLET ORAL 2 TIMES DAILY
Qty: 60 TABLET | Refills: 5 | Status: SHIPPED | OUTPATIENT
Start: 2018-10-11 | End: 2019-05-01 | Stop reason: SDUPTHER

## 2018-10-11 RX ORDER — OLMESARTAN MEDOXOMIL 40 MG/1
TABLET ORAL
Qty: 30 TABLET | Refills: 5 | Status: SHIPPED | OUTPATIENT
Start: 2018-10-11 | End: 2019-05-01 | Stop reason: SDUPTHER

## 2018-10-11 RX ORDER — NIFEDIPINE 90 MG/1
90 TABLET, FILM COATED, EXTENDED RELEASE ORAL DAILY
Qty: 30 TABLET | Refills: 5 | Status: SHIPPED | OUTPATIENT
Start: 2018-10-11 | End: 2019-05-01 | Stop reason: SDUPTHER

## 2018-10-11 RX ORDER — CHLORTHALIDONE 25 MG/1
25 TABLET ORAL DAILY
Qty: 30 TABLET | Refills: 5 | Status: SHIPPED | OUTPATIENT
Start: 2018-10-11 | End: 2019-05-01 | Stop reason: SDUPTHER

## 2018-10-11 NOTE — PROGRESS NOTES
6 hours as needed for Pain for up to 60 days. Luisito Muta Date: 10/11/18 90 tablet 0    cyclobenzaprine (FLEXERIL) 10 MG tablet Take 1 tablet by mouth 3 times daily as needed for Muscle spasms 30 tablet 5    lisinopril (PRINIVIL;ZESTRIL) 10 MG tablet Take 1 tablet by mouth daily 30 tablet 5    carvedilol (COREG) 6.25 MG tablet Take 1 tablet by mouth 2 times daily 60 tablet 5    chlorthalidone (HYGROTON) 25 MG tablet Take 1 tablet by mouth daily 30 tablet 5    NIFEdipine (ADALAT CC) 90 MG extended release tablet Take 1 tablet by mouth daily 30 tablet 5    olmesartan (BENICAR) 40 MG tablet TAKE 1 TABLET BY MOUTH EVERY DAY 30 tablet 5    terazosin (HYTRIN) 2 MG capsule TAKE 1 CAPSULE BY MOUTH EVERY EVENING 30 capsule 5    omeprazole (PRILOSEC) 40 MG delayed release capsule TAKE 1 CAPSULE BY MOUTH DAILY 30 capsule 5    meloxicam (MOBIC) 15 MG tablet TAKE 1 TABLET BY MOUTH EVERYDAY 30 tablet 2    ranitidine (ZANTAC) 150 MG tablet TAKE 1 TABLET BY MOUTH TWICE A DAY 60 tablet 4    allopurinol (ZYLOPRIM) 300 MG tablet TAKE 1 TABLET BY MOUTH DAILY 30 tablet 5    hydrocortisone (ANUSOL-HC) 2.5 % rectal cream Place rectally 2 times daily. 1 Tube 2    colchicine (COLCRYS) 0.6 MG tablet TAKE 1 TABLET BY MOUTH DAILY AS NEEDED FOR PAIN 30 tablet 5    aspirin 81 MG tablet Take 81 mg by mouth daily       No current facility-administered medications for this visit. .  Lab Results   Component Value Date    LABA1C 7.1 09/17/2018     Lab Results   Component Value Date    .1 09/17/2018          ASSESSMENT / PLAN:    1. Idiopathic chronic gout of multiple sites without tophus  chroinc stable sx w/o flare  Cont symptomatic tx and cont percocet QID prn  refills given as below  See [facility]  - oxyCODONE-acetaminophen (PERCOCET) 5-325 MG per tablet; Take 1 tablet by mouth every 6 hours as needed for Pain for up to 60 days. Luisito Muta Date: 10/11/18  Dispense: 90 tablet; Refill: 0    2.  Chronic renal

## 2018-10-27 DIAGNOSIS — M1A.09X0 IDIOPATHIC CHRONIC GOUT OF MULTIPLE SITES WITHOUT TOPHUS: ICD-10-CM

## 2018-10-29 RX ORDER — LISINOPRIL 10 MG/1
TABLET ORAL
Qty: 30 TABLET | Refills: 5 | Status: SHIPPED | OUTPATIENT
Start: 2018-10-29 | End: 2019-03-14

## 2018-10-29 RX ORDER — ALLOPURINOL 300 MG/1
300 TABLET ORAL DAILY
Qty: 30 TABLET | Refills: 5 | Status: SHIPPED | OUTPATIENT
Start: 2018-10-29 | End: 2019-04-15 | Stop reason: SDUPTHER

## 2018-11-08 ENCOUNTER — OFFICE VISIT (OUTPATIENT)
Dept: FAMILY MEDICINE CLINIC | Age: 58
End: 2018-11-08
Payer: COMMERCIAL

## 2018-11-08 DIAGNOSIS — R06.02 SOB (SHORTNESS OF BREATH): ICD-10-CM

## 2018-11-08 DIAGNOSIS — M1A.09X0 IDIOPATHIC CHRONIC GOUT OF MULTIPLE SITES WITHOUT TOPHUS: ICD-10-CM

## 2018-11-08 DIAGNOSIS — E11.21 DIABETIC NEPHROPATHY ASSOCIATED WITH TYPE 2 DIABETES MELLITUS (HCC): Primary | ICD-10-CM

## 2018-11-08 DIAGNOSIS — M77.11 LATERAL EPICONDYLITIS OF RIGHT ELBOW: ICD-10-CM

## 2018-11-08 DIAGNOSIS — N18.30 CHRONIC RENAL IMPAIRMENT, STAGE 3 (MODERATE) (HCC): ICD-10-CM

## 2018-11-08 DIAGNOSIS — I10 ESSENTIAL HYPERTENSION, BENIGN: ICD-10-CM

## 2018-11-08 PROCEDURE — 99214 OFFICE O/P EST MOD 30 MIN: CPT | Performed by: FAMILY MEDICINE

## 2018-11-08 RX ORDER — OXYCODONE HYDROCHLORIDE AND ACETAMINOPHEN 5; 325 MG/1; MG/1
1 TABLET ORAL EVERY 6 HOURS PRN
Qty: 90 TABLET | Refills: 0 | Status: SHIPPED | OUTPATIENT
Start: 2018-11-08 | End: 2018-12-14 | Stop reason: SDUPTHER

## 2018-11-10 VITALS
SYSTOLIC BLOOD PRESSURE: 132 MMHG | DIASTOLIC BLOOD PRESSURE: 82 MMHG | BODY MASS INDEX: 35.34 KG/M2 | TEMPERATURE: 96.9 F | HEART RATE: 104 BPM | WEIGHT: 298 LBS | OXYGEN SATURATION: 95 % | RESPIRATION RATE: 12 BRPM

## 2018-12-12 ENCOUNTER — OFFICE VISIT (OUTPATIENT)
Dept: FAMILY MEDICINE CLINIC | Age: 58
End: 2018-12-12
Payer: COMMERCIAL

## 2018-12-12 ENCOUNTER — TELEPHONE (OUTPATIENT)
Dept: FAMILY MEDICINE CLINIC | Age: 58
End: 2018-12-12

## 2018-12-12 VITALS
WEIGHT: 299.2 LBS | DIASTOLIC BLOOD PRESSURE: 73 MMHG | BODY MASS INDEX: 35.48 KG/M2 | OXYGEN SATURATION: 97 % | HEART RATE: 105 BPM | SYSTOLIC BLOOD PRESSURE: 118 MMHG | RESPIRATION RATE: 16 BRPM | TEMPERATURE: 97.5 F

## 2018-12-12 DIAGNOSIS — M54.50 CHRONIC BILATERAL LOW BACK PAIN WITHOUT SCIATICA: ICD-10-CM

## 2018-12-12 DIAGNOSIS — G89.29 CHRONIC BILATERAL LOW BACK PAIN WITHOUT SCIATICA: ICD-10-CM

## 2018-12-12 DIAGNOSIS — M77.11 LATERAL EPICONDYLITIS OF RIGHT ELBOW: Primary | ICD-10-CM

## 2018-12-12 DIAGNOSIS — M25.561 PATELLOFEMORAL ARTHRALGIA OF RIGHT KNEE: ICD-10-CM

## 2018-12-12 PROCEDURE — 99214 OFFICE O/P EST MOD 30 MIN: CPT | Performed by: FAMILY MEDICINE

## 2018-12-12 RX ORDER — OXYCODONE HYDROCHLORIDE AND ACETAMINOPHEN 5; 325 MG/1; MG/1
1 TABLET ORAL EVERY 6 HOURS PRN
Qty: 90 TABLET | Refills: 0 | Status: CANCELLED | OUTPATIENT
Start: 2018-12-12 | End: 2019-02-10

## 2018-12-12 NOTE — TELEPHONE ENCOUNTER
X 10 days, pain in arm, back, shoulder, screaming out in pain  Cold sweat a couple times, pain takes his breath away  Started in elbow on/off, has progressed to shoulder  No known cause   Spouse denies nausea, indigestion, heart burn, abdominal pain, fatigue, lightheaded or dizziness  appt scheduled for today

## 2018-12-12 NOTE — PROGRESS NOTES
tablet by mouth 2 times daily 60 tablet 5    chlorthalidone (HYGROTON) 25 MG tablet Take 1 tablet by mouth daily 30 tablet 5    NIFEdipine (ADALAT CC) 90 MG extended release tablet Take 1 tablet by mouth daily 30 tablet 5    olmesartan (BENICAR) 40 MG tablet TAKE 1 TABLET BY MOUTH EVERY DAY 30 tablet 5    terazosin (HYTRIN) 2 MG capsule TAKE 1 CAPSULE BY MOUTH EVERY EVENING 30 capsule 5    omeprazole (PRILOSEC) 40 MG delayed release capsule TAKE 1 CAPSULE BY MOUTH DAILY 30 capsule 5    meloxicam (MOBIC) 15 MG tablet TAKE 1 TABLET BY MOUTH EVERYDAY 30 tablet 2    ranitidine (ZANTAC) 150 MG tablet TAKE 1 TABLET BY MOUTH TWICE A DAY 60 tablet 4    hydrocortisone (ANUSOL-HC) 2.5 % rectal cream Place rectally 2 times daily.  1 Tube 2    colchicine (COLCRYS) 0.6 MG tablet TAKE 1 TABLET BY MOUTH DAILY AS NEEDED FOR PAIN 30 tablet 5    aspirin 81 MG tablet Take 81 mg by mouth daily          No Known Allergies    Patient Active Problem List   Diagnosis    Atrial fibrillation (HCC)    CRI (chronic renal insufficiency)    Gout    Erectile dysfunction    Elevated PSA    Essential hypertension, benign    Headache    Diverticulosis    Arthralgia of multiple joints    Umbilical hernia without obstruction and without gangrene    Knee pain, bilateral    Alcohol use disorder, mild, abuse    CELSA (obstructive sleep apnea)    Duodenal ulcer disease    Diabetic nephropathy associated with type 2 diabetes mellitus ([facility] [address]    Type 2 diabetes mellitus with diabetic nephropathy, without long-term current use of insulin (HCC)       Past Medical History:   Diagnosis Date    Arthralgia of multiple joints 10/27/2015    Arthritis     Atrial fibrillation ([address]     CRI (chronic renal insufficiency)     Diabetic nephropathy associated with type 2 diabetes mellitus ([facility] [address] 10/11/2018    Diverticulosis     Elevated PSA     Erectile dysfunction     Gout     Gout     Hemrrhoid NOS w/ complication NEC     History of MRSA

## 2018-12-12 NOTE — PATIENT INSTRUCTIONS
Patient Education   Patient Education        Low Back Pain: Exercises  Your Care Instructions  Here are some examples of typical rehabilitation exercises for your condition. Start each exercise slowly. Ease off the exercise if you start to have pain. Your doctor or physical therapist will tell you when you can start these exercises and which ones will work best for you. How to do the exercises  Press-up    1. Lie on your stomach, supporting your body with your forearms. 2. Press your elbows down into the floor to raise your upper back. As you do this, relax your stomach muscles and allow your back to arch without using your back muscles. As your press up, do not let your hips or pelvis come off the floor. 3. Hold for 15 to 30 seconds, then relax. 4. Repeat 2 to 4 times. Alternate arm and leg (bird dog) exercise    1. Start on the floor, on your hands and knees. 2. Tighten your belly muscles. 3. Raise one leg off the floor, and hold it straight out behind you. Be careful not to let your hip drop down, because that will twist your trunk. 4. Hold for about 6 seconds, then lower your leg and switch to the other leg. 5. Repeat 8 to 12 times on each leg. 6. Over time, work up to holding for 10 to 30 seconds each time. 7. If you feel stable and secure with your leg raised, try raising the opposite arm straight out in front of you at the same time. Knee-to-chest exercise    1. Lie on your back with your knees bent and your feet flat on the floor. 2. Bring one knee to your chest, keeping the other foot flat on the floor (or keeping the other leg straight, whichever feels better on your lower back). 3. Keep your lower back pressed to the floor. Hold for at least 15 to 30 seconds. 4. Relax, and lower the knee to the starting position. 5. Repeat with the other leg. Repeat 2 to 4 times with each leg.   6. To get more stretch, put your other leg flat on the floor while pulling your knee to your chest.    Curl-ups    1. Lie on the floor on your back with your knees bent at a 90-degree angle. Your feet should be flat on the floor, about 12 inches from your buttocks. 2. Cross your arms over your chest. If this bothers your neck, try putting your hands behind your neck (not your head), with your elbows spread apart. 3. Slowly tighten your belly muscles and raise your shoulder blades off the floor. 4. Keep your head in line with your body, and do not press your chin to your chest.  5. Hold this position for 1 or 2 seconds, then slowly lower yourself back down to the floor. 6. Repeat 8 to 12 times. Pelvic tilt exercise    1. Lie on your back with your knees bent. 2. \"Brace\" your stomach. This means to tighten your muscles by pulling in and imagining your belly button moving toward your spine. You should feel like your back is pressing to the floor and your hips and pelvis are rocking back. 3. Hold for about 6 seconds while you breathe smoothly. 4. Repeat 8 to 12 times. Heel dig bridging    1. Lie on your back with both knees bent and your ankles bent so that only your heels are digging into the floor. Your knees should be bent about 90 degrees. 2. Then push your heels into the floor, squeeze your buttocks, and lift your hips off the floor until your shoulders, hips, and knees are all in a straight line. 3. Hold for about 6 seconds as you continue to breathe normally, and then slowly lower your hips back down to the floor and rest for up to 10 seconds. 4. Do 8 to 12 repetitions. Hamstring stretch in doorway    1. Lie on your back in a doorway, with one leg through the open door. 2. Slide your leg up the wall to straighten your knee. You should feel a gentle stretch down the back of your leg. 3. Hold the stretch for at least 15 to 30 seconds. Do not arch your back, point your toes, or bend either knee. Keep one heel touching the floor and the other heel touching the wall.   4. Repeat with your ball between your knees. Then slowly slide down the wall until your knees are bent about 20 to 30 degrees. 3. Tighten your thigh muscles by squeezing the ball between your knees. Hold that position for about 10 seconds, then stop squeezing. Rest for up to 10 seconds between repetitions. 4. Repeat 8 to 12 times. Follow-up care is a key part of your treatment and safety. Be sure to make and go to all appointments, and call your doctor if you are having problems. It's also a good idea to know your test results and keep a list of the medicines you take. Where can you learn more? Go to [email or web address] and sign in to your [employer] account. Enter A404 in the Laser View box to learn more about \"Patellofemoral Pain Syndrome (Runner's Knee): Exercises. \"     If you do not have an account, please click on the \"Sign Up Now\" link. Current as of: November 29, 2017  Content Version: 11.8  © 3091-6421 [employer], [employer]. Care instructions adapted under license by [facility] ([facility]). If you have questions about a medical condition or this instruction, always ask your healthcare professional. [place] [address] any warranty or liability for your use of this information.

## 2018-12-14 DIAGNOSIS — M1A.09X0 IDIOPATHIC CHRONIC GOUT OF MULTIPLE SITES WITHOUT TOPHUS: ICD-10-CM

## 2018-12-14 RX ORDER — OXYCODONE HYDROCHLORIDE AND ACETAMINOPHEN 5; 325 MG/1; MG/1
1 TABLET ORAL EVERY 6 HOURS PRN
Qty: 90 TABLET | Refills: 0 | Status: SHIPPED | OUTPATIENT
Start: 2018-12-14 | End: 2019-01-18

## 2018-12-17 RX ORDER — RANITIDINE 150 MG/1
TABLET ORAL
Qty: 60 TABLET | Refills: 4 | Status: SHIPPED | OUTPATIENT
Start: 2018-12-17 | End: 2019-06-18 | Stop reason: SDUPTHER

## 2018-12-20 RX ORDER — MELOXICAM 15 MG/1
TABLET ORAL
Qty: 30 TABLET | Refills: 2 | Status: SHIPPED | OUTPATIENT
Start: 2018-12-20 | End: 2019-03-16 | Stop reason: SDUPTHER

## 2019-01-03 ENCOUNTER — OFFICE VISIT (OUTPATIENT)
Dept: PULMONOLOGY | Age: 59
End: 2019-01-03
Payer: COMMERCIAL

## 2019-01-03 VITALS
SYSTOLIC BLOOD PRESSURE: 155 MMHG | HEART RATE: 102 BPM | BODY MASS INDEX: 35.19 KG/M2 | OXYGEN SATURATION: 97 % | WEIGHT: 298 LBS | DIASTOLIC BLOOD PRESSURE: 105 MMHG | HEIGHT: 77 IN

## 2019-01-03 DIAGNOSIS — E11.21 TYPE 2 DIABETES MELLITUS WITH DIABETIC NEPHROPATHY, WITHOUT LONG-TERM CURRENT USE OF INSULIN (HCC): Chronic | ICD-10-CM

## 2019-01-03 DIAGNOSIS — G47.33 OSA (OBSTRUCTIVE SLEEP APNEA): Primary | Chronic | ICD-10-CM

## 2019-01-03 DIAGNOSIS — E66.9 NON MORBID OBESITY, UNSPECIFIED OBESITY TYPE: ICD-10-CM

## 2019-01-03 DIAGNOSIS — I48.91 ATRIAL FIBRILLATION, UNSPECIFIED TYPE (HCC): Chronic | ICD-10-CM

## 2019-01-03 DIAGNOSIS — I10 ESSENTIAL HYPERTENSION, BENIGN: Chronic | ICD-10-CM

## 2019-01-03 PROCEDURE — 99214 OFFICE O/P EST MOD 30 MIN: CPT | Performed by: INTERNAL MEDICINE

## 2019-01-03 ASSESSMENT — SLEEP AND FATIGUE QUESTIONNAIRES
HOW LIKELY ARE YOU TO NOD OFF OR FALL ASLEEP WHILE SITTING INACTIVE IN A PUBLIC PLACE: 0
HOW LIKELY ARE YOU TO NOD OFF OR FALL ASLEEP WHILE LYING DOWN TO REST IN THE AFTERNOON WHEN CIRCUMSTANCES PERMIT: 1
HOW LIKELY ARE YOU TO NOD OFF OR FALL ASLEEP IN A CAR, WHILE STOPPED FOR A FEW MINUTES IN TRAFFIC: 0
HOW LIKELY ARE YOU TO NOD OFF OR FALL ASLEEP WHILE SITTING QUIETLY AFTER LUNCH WITHOUT ALCOHOL: 0
HOW LIKELY ARE YOU TO NOD OFF OR FALL ASLEEP WHEN YOU ARE A PASSENGER IN A CAR FOR AN HOUR WITHOUT A BREAK: 1
HOW LIKELY ARE YOU TO NOD OFF OR FALL ASLEEP WHILE SITTING AND TALKING TO SOMEONE: 0
HOW LIKELY ARE YOU TO NOD OFF OR FALL ASLEEP WHILE SITTING AND READING: 0
ESS TOTAL SCORE: 3
HOW LIKELY ARE YOU TO NOD OFF OR FALL ASLEEP WHILE WATCHING TV: 1

## 2019-01-03 ASSESSMENT — ENCOUNTER SYMPTOMS
CHEST TIGHTNESS: 0
PHOTOPHOBIA: 0
STRIDOR: 0
EYE PAIN: 0
SINUS PRESSURE: 0
SHORTNESS OF BREATH: 1

## 2019-01-18 ENCOUNTER — HOSPITAL ENCOUNTER (EMERGENCY)
Age: 59
Discharge: HOME | End: 2019-01-18

## 2019-01-18 VITALS
SYSTOLIC BLOOD PRESSURE: 182 MMHG | TEMPERATURE: 98.8 F | RESPIRATION RATE: 14 BRPM | BODY MASS INDEX: 35.19 KG/M2 | OXYGEN SATURATION: 100 % | HEART RATE: 100 BPM | WEIGHT: 298 LBS | HEIGHT: 77 IN | DIASTOLIC BLOOD PRESSURE: 106 MMHG

## 2019-01-18 DIAGNOSIS — Z91.199 MEDICALLY NONCOMPLIANT: ICD-10-CM

## 2019-01-18 DIAGNOSIS — M1A.09X0 IDIOPATHIC CHRONIC GOUT OF MULTIPLE SITES WITHOUT TOPHUS: ICD-10-CM

## 2019-01-18 DIAGNOSIS — I10 ACCELERATED HYPERTENSION: ICD-10-CM

## 2019-01-18 DIAGNOSIS — M25.521 RIGHT ELBOW PAIN: ICD-10-CM

## 2019-01-18 DIAGNOSIS — G89.29 CHRONIC RIGHT SHOULDER PAIN: Primary | ICD-10-CM

## 2019-01-18 DIAGNOSIS — M25.511 CHRONIC RIGHT SHOULDER PAIN: Primary | ICD-10-CM

## 2019-01-18 PROCEDURE — 99282 EMERGENCY DEPT VISIT SF MDM: CPT

## 2019-01-18 PROCEDURE — 6370000000 HC RX 637 (ALT 250 FOR IP): Performed by: PHYSICIAN ASSISTANT

## 2019-01-18 RX ORDER — LIDOCAINE 4 G/G
1 PATCH TOPICAL ONCE
Status: DISCONTINUED | OUTPATIENT
Start: 2019-01-18 | End: 2019-01-18 | Stop reason: HOSPADM

## 2019-01-18 RX ORDER — FUROSEMIDE 20 MG/1
20 TABLET ORAL 2 TIMES DAILY
COMMUNITY
End: 2020-05-05 | Stop reason: ALTCHOICE

## 2019-01-18 RX ORDER — OXYCODONE HYDROCHLORIDE AND ACETAMINOPHEN 5; 325 MG/1; MG/1
1 TABLET ORAL EVERY 6 HOURS PRN
Qty: 7 TABLET | Refills: 0 | Status: SHIPPED | OUTPATIENT
Start: 2019-01-18 | End: 2019-01-21

## 2019-01-18 RX ORDER — LOSARTAN POTASSIUM 25 MG/1
100 TABLET ORAL DAILY
Status: DISCONTINUED | OUTPATIENT
Start: 2019-01-18 | End: 2019-01-18 | Stop reason: HOSPADM

## 2019-01-18 RX ORDER — NIFEDIPINE 30 MG/1
90 TABLET, EXTENDED RELEASE ORAL ONCE
Status: COMPLETED | OUTPATIENT
Start: 2019-01-18 | End: 2019-01-18

## 2019-01-18 RX ORDER — CARVEDILOL 3.12 MG/1
6.25 TABLET ORAL ONCE
Status: COMPLETED | OUTPATIENT
Start: 2019-01-18 | End: 2019-01-18

## 2019-01-18 RX ORDER — LIDOCAINE 50 MG/G
1 PATCH TOPICAL DAILY
Qty: 30 PATCH | Refills: 0 | Status: SHIPPED | OUTPATIENT
Start: 2019-01-18 | End: 2020-02-23

## 2019-01-18 RX ORDER — OLMESARTAN MEDOXOMIL 20 MG/1
40 TABLET ORAL ONCE
Status: DISCONTINUED | OUTPATIENT
Start: 2019-01-18 | End: 2019-01-18 | Stop reason: CLARIF

## 2019-01-18 RX ORDER — OXYCODONE HYDROCHLORIDE AND ACETAMINOPHEN 5; 325 MG/1; MG/1
1 TABLET ORAL ONCE
Status: COMPLETED | OUTPATIENT
Start: 2019-01-18 | End: 2019-01-18

## 2019-01-18 RX ORDER — SUCRALFATE 1 G/1
1 TABLET ORAL 4 TIMES DAILY
COMMUNITY
End: 2019-03-14

## 2019-01-18 RX ORDER — FUROSEMIDE 40 MG/1
20 TABLET ORAL ONCE
Status: COMPLETED | OUTPATIENT
Start: 2019-01-18 | End: 2019-01-18

## 2019-01-18 RX ADMIN — LOSARTAN POTASSIUM 100 MG: 25 TABLET ORAL at 17:51

## 2019-01-18 RX ADMIN — FUROSEMIDE 20 MG: 40 TABLET ORAL at 17:51

## 2019-01-18 RX ADMIN — OXYCODONE AND ACETAMINOPHEN 1 TABLET: 5; 325 TABLET ORAL at 17:51

## 2019-01-18 RX ADMIN — NIFEDIPINE 90 MG: 30 TABLET, FILM COATED, EXTENDED RELEASE ORAL at 17:51

## 2019-01-18 RX ADMIN — CARVEDILOL 6.25 MG: 3.12 TABLET, FILM COATED ORAL at 17:51

## 2019-01-18 ASSESSMENT — ENCOUNTER SYMPTOMS
COUGH: 0
WHEEZING: 0
COLOR CHANGE: 0
DIARRHEA: 0
CONSTIPATION: 0
VOMITING: 0
ABDOMINAL DISTENTION: 0
NAUSEA: 0
BACK PAIN: 0
STRIDOR: 0
SHORTNESS OF BREATH: 0
ABDOMINAL PAIN: 0

## 2019-01-18 ASSESSMENT — PAIN DESCRIPTION - LOCATION: LOCATION: ARM

## 2019-01-18 ASSESSMENT — PAIN SCALES - GENERAL
PAINLEVEL_OUTOF10: 7
PAINLEVEL_OUTOF10: 7

## 2019-01-18 ASSESSMENT — PAIN DESCRIPTION - ORIENTATION: ORIENTATION: RIGHT

## 2019-01-22 ENCOUNTER — TELEPHONE (OUTPATIENT)
Dept: FAMILY MEDICINE CLINIC | Age: 59
End: 2019-01-22

## 2019-01-25 ENCOUNTER — OFFICE VISIT (OUTPATIENT)
Dept: FAMILY MEDICINE CLINIC | Age: 59
End: 2019-01-25
Payer: COMMERCIAL

## 2019-01-25 VITALS
HEART RATE: 102 BPM | OXYGEN SATURATION: 96 % | HEIGHT: 77 IN | WEIGHT: 302 LBS | SYSTOLIC BLOOD PRESSURE: 132 MMHG | BODY MASS INDEX: 35.66 KG/M2 | TEMPERATURE: 98.1 F | DIASTOLIC BLOOD PRESSURE: 84 MMHG

## 2019-01-25 DIAGNOSIS — E11.21 TYPE 2 DIABETES MELLITUS WITH DIABETIC NEPHROPATHY, WITHOUT LONG-TERM CURRENT USE OF INSULIN (HCC): Chronic | ICD-10-CM

## 2019-01-25 DIAGNOSIS — G89.29 CHRONIC RIGHT SHOULDER PAIN: ICD-10-CM

## 2019-01-25 DIAGNOSIS — M25.511 CHRONIC RIGHT SHOULDER PAIN: ICD-10-CM

## 2019-01-25 DIAGNOSIS — Z12.5 SCREENING FOR PROSTATE CANCER: ICD-10-CM

## 2019-01-25 DIAGNOSIS — M1A.09X0 IDIOPATHIC CHRONIC GOUT OF MULTIPLE SITES WITHOUT TOPHUS: ICD-10-CM

## 2019-01-25 DIAGNOSIS — M25.521 RIGHT ELBOW PAIN: Primary | ICD-10-CM

## 2019-01-25 LAB
A/G RATIO: 2 (ref 1.1–2.2)
ALBUMIN SERPL-MCNC: 4.8 G/DL (ref 3.4–5)
ALP BLD-CCNC: 76 U/L (ref 40–129)
ALT SERPL-CCNC: 25 U/L (ref 10–40)
ANION GAP SERPL CALCULATED.3IONS-SCNC: 14 MMOL/L (ref 3–16)
AST SERPL-CCNC: 21 U/L (ref 15–37)
BILIRUB SERPL-MCNC: <0.2 MG/DL (ref 0–1)
BUN BLDV-MCNC: 23 MG/DL (ref 7–20)
CALCIUM SERPL-MCNC: 8.9 MG/DL (ref 8.3–10.6)
CHLORIDE BLD-SCNC: 100 MMOL/L (ref 99–110)
CHOLESTEROL, TOTAL: 155 MG/DL (ref 0–199)
CO2: 25 MMOL/L (ref 21–32)
CREAT SERPL-MCNC: 1.5 MG/DL (ref 0.8–1.3)
GFR AFRICAN AMERICAN: 58
GFR NON-AFRICAN AMERICAN: 48
GLOBULIN: 2.4 G/DL
GLUCOSE BLD-MCNC: 176 MG/DL (ref 70–99)
HDLC SERPL-MCNC: 50 MG/DL (ref 40–60)
LDL CHOLESTEROL CALCULATED: 74 MG/DL
POTASSIUM SERPL-SCNC: 3.8 MMOL/L (ref 3.5–5.1)
PROSTATE SPECIFIC ANTIGEN: 6.66 NG/ML (ref 0–4)
SODIUM BLD-SCNC: 139 MMOL/L (ref 136–145)
TOTAL PROTEIN: 7.2 G/DL (ref 6.4–8.2)
TRIGL SERPL-MCNC: 155 MG/DL (ref 0–150)
VLDLC SERPL CALC-MCNC: 31 MG/DL

## 2019-01-25 PROCEDURE — 99214 OFFICE O/P EST MOD 30 MIN: CPT | Performed by: FAMILY MEDICINE

## 2019-01-25 PROCEDURE — 20605 DRAIN/INJ JOINT/BURSA W/O US: CPT | Performed by: FAMILY MEDICINE

## 2019-01-25 RX ORDER — OXYCODONE HYDROCHLORIDE AND ACETAMINOPHEN 5; 325 MG/1; MG/1
1 TABLET ORAL EVERY 6 HOURS PRN
Qty: 90 TABLET | Refills: 0 | Status: SHIPPED | OUTPATIENT
Start: 2019-01-25 | End: 2019-03-14 | Stop reason: SDUPTHER

## 2019-01-26 LAB
ESTIMATED AVERAGE GLUCOSE: 148.5 MG/DL
HBA1C MFR BLD: 6.8 %

## 2019-01-28 DIAGNOSIS — R97.20 ELEVATED PSA: Primary | ICD-10-CM

## 2019-01-28 RX ORDER — DOXYCYCLINE HYCLATE 100 MG
100 TABLET ORAL 2 TIMES DAILY
Qty: 20 TABLET | Refills: 0 | Status: SHIPPED | OUTPATIENT
Start: 2019-01-28 | End: 2019-02-07

## 2019-03-14 ENCOUNTER — OFFICE VISIT (OUTPATIENT)
Dept: FAMILY MEDICINE CLINIC | Age: 59
End: 2019-03-14
Payer: MEDICARE

## 2019-03-14 VITALS
RESPIRATION RATE: 20 BRPM | OXYGEN SATURATION: 95 % | SYSTOLIC BLOOD PRESSURE: 154 MMHG | TEMPERATURE: 97.3 F | DIASTOLIC BLOOD PRESSURE: 88 MMHG | HEART RATE: 96 BPM | BODY MASS INDEX: 35.81 KG/M2 | WEIGHT: 302 LBS

## 2019-03-14 DIAGNOSIS — G44.52 NEW DAILY PERSISTENT HEADACHE: Primary | ICD-10-CM

## 2019-03-14 DIAGNOSIS — E11.21 TYPE 2 DIABETES MELLITUS WITH DIABETIC NEPHROPATHY, WITHOUT LONG-TERM CURRENT USE OF INSULIN (HCC): ICD-10-CM

## 2019-03-14 DIAGNOSIS — I10 ESSENTIAL HYPERTENSION, BENIGN: ICD-10-CM

## 2019-03-14 DIAGNOSIS — R97.20 ELEVATED PSA: ICD-10-CM

## 2019-03-14 DIAGNOSIS — M1A.09X0 IDIOPATHIC CHRONIC GOUT OF MULTIPLE SITES WITHOUT TOPHUS: ICD-10-CM

## 2019-03-14 LAB — PROSTATE SPECIFIC ANTIGEN: 6.36 NG/ML (ref 0–4)

## 2019-03-14 PROCEDURE — 99214 OFFICE O/P EST MOD 30 MIN: CPT | Performed by: FAMILY MEDICINE

## 2019-03-14 RX ORDER — BUTALBITAL, ACETAMINOPHEN AND CAFFEINE 50; 325; 40 MG/1; MG/1; MG/1
1 TABLET ORAL EVERY 6 HOURS PRN
Qty: 30 TABLET | Refills: 0 | Status: SHIPPED | OUTPATIENT
Start: 2019-03-14 | End: 2019-05-01 | Stop reason: SDUPTHER

## 2019-03-14 RX ORDER — LISINOPRIL 20 MG/1
20 TABLET ORAL DAILY
Qty: 30 TABLET | Refills: 5 | Status: SHIPPED | OUTPATIENT
Start: 2019-03-14 | End: 2019-08-24 | Stop reason: SDUPTHER

## 2019-03-14 RX ORDER — OXYCODONE HYDROCHLORIDE AND ACETAMINOPHEN 5; 325 MG/1; MG/1
1 TABLET ORAL EVERY 6 HOURS PRN
Qty: 90 TABLET | Refills: 0 | Status: SHIPPED | OUTPATIENT
Start: 2019-03-14 | End: 2019-05-01 | Stop reason: SDUPTHER

## 2019-03-17 ENCOUNTER — APPOINTMENT (OUTPATIENT)
Dept: CT IMAGING | Age: 59
End: 2019-03-17
Payer: MEDICARE

## 2019-03-17 ENCOUNTER — HOSPITAL ENCOUNTER (EMERGENCY)
Age: 59
Discharge: HOME | End: 2019-03-17
Attending: EMERGENCY MEDICINE
Payer: MEDICARE

## 2019-03-17 ENCOUNTER — APPOINTMENT (OUTPATIENT)
Dept: GENERAL RADIOLOGY | Age: 59
End: 2019-03-17
Payer: MEDICARE

## 2019-03-17 VITALS
BODY MASS INDEX: 35.57 KG/M2 | WEIGHT: 300 LBS | TEMPERATURE: 98.8 F | HEART RATE: 97 BPM | RESPIRATION RATE: 18 BRPM | SYSTOLIC BLOOD PRESSURE: 190 MMHG | OXYGEN SATURATION: 95 % | DIASTOLIC BLOOD PRESSURE: 109 MMHG

## 2019-03-17 DIAGNOSIS — R10.13 ABDOMINAL PAIN, EPIGASTRIC: ICD-10-CM

## 2019-03-17 DIAGNOSIS — R07.9 CHEST PAIN, UNSPECIFIED TYPE: Primary | ICD-10-CM

## 2019-03-17 DIAGNOSIS — R91.1 LUNG NODULE: ICD-10-CM

## 2019-03-17 LAB
A/G RATIO: 1.4 (ref 1.1–2.2)
ALBUMIN SERPL-MCNC: 4.4 G/DL (ref 3.4–5)
ALP BLD-CCNC: 83 U/L (ref 40–129)
ALT SERPL-CCNC: 27 U/L (ref 10–40)
ANION GAP SERPL CALCULATED.3IONS-SCNC: 13 MMOL/L (ref 3–16)
APTT: 34.2 SEC (ref 26–36)
AST SERPL-CCNC: 27 U/L (ref 15–37)
BASOPHILS ABSOLUTE: 0 K/UL (ref 0–0.2)
BASOPHILS RELATIVE PERCENT: 0.5 %
BILIRUB SERPL-MCNC: 0.3 MG/DL (ref 0–1)
BUN BLDV-MCNC: 18 MG/DL (ref 7–20)
CALCIUM SERPL-MCNC: 8.8 MG/DL (ref 8.3–10.6)
CHLORIDE BLD-SCNC: 98 MMOL/L (ref 99–110)
CO2: 26 MMOL/L (ref 21–32)
CREAT SERPL-MCNC: 1.4 MG/DL (ref 0.8–1.3)
EKG ATRIAL RATE: 104 BPM
EKG DIAGNOSIS: NORMAL
EKG P AXIS: 55 DEGREES
EKG P-R INTERVAL: 146 MS
EKG Q-T INTERVAL: 398 MS
EKG QRS DURATION: 104 MS
EKG QTC CALCULATION (BAZETT): 523 MS
EKG R AXIS: -46 DEGREES
EKG T AXIS: 61 DEGREES
EKG VENTRICULAR RATE: 104 BPM
EOSINOPHILS ABSOLUTE: 0.3 K/UL (ref 0–0.6)
EOSINOPHILS RELATIVE PERCENT: 5.1 %
GFR AFRICAN AMERICAN: >60
GFR NON-AFRICAN AMERICAN: 52
GLOBULIN: 3.1 G/DL
GLUCOSE BLD-MCNC: 175 MG/DL (ref 70–99)
HCT VFR BLD CALC: 43 % (ref 40.5–52.5)
HEMOGLOBIN: 13.6 G/DL (ref 13.5–17.5)
INR BLD: 0.99 (ref 0.86–1.14)
LIPASE: 29 U/L (ref 13–60)
LYMPHOCYTES ABSOLUTE: 1.7 K/UL (ref 1–5.1)
LYMPHOCYTES RELATIVE PERCENT: 31.8 %
MCH RBC QN AUTO: 27.6 PG (ref 26–34)
MCHC RBC AUTO-ENTMCNC: 31.7 G/DL (ref 31–36)
MCV RBC AUTO: 87.1 FL (ref 80–100)
MONOCYTES ABSOLUTE: 0.4 K/UL (ref 0–1.3)
MONOCYTES RELATIVE PERCENT: 8.3 %
NEUTROPHILS ABSOLUTE: 3 K/UL (ref 1.7–7.7)
NEUTROPHILS RELATIVE PERCENT: 54.3 %
PDW BLD-RTO: 15.4 % (ref 12.4–15.4)
PLATELET # BLD: 165 K/UL (ref 135–450)
PMV BLD AUTO: 8.3 FL (ref 5–10.5)
POTASSIUM SERPL-SCNC: 3.3 MMOL/L (ref 3.5–5.1)
PROTHROMBIN TIME: 11.3 SEC (ref 9.8–13)
RBC # BLD: 4.93 M/UL (ref 4.2–5.9)
SODIUM BLD-SCNC: 137 MMOL/L (ref 136–145)
TOTAL PROTEIN: 7.5 G/DL (ref 6.4–8.2)
TROPONIN: <0.01 NG/ML
TROPONIN: <0.01 NG/ML
WBC # BLD: 5.4 K/UL (ref 4–11)

## 2019-03-17 PROCEDURE — 71045 X-RAY EXAM CHEST 1 VIEW: CPT

## 2019-03-17 PROCEDURE — 96374 THER/PROPH/DIAG INJ IV PUSH: CPT

## 2019-03-17 PROCEDURE — 93010 ELECTROCARDIOGRAM REPORT: CPT | Performed by: INTERNAL MEDICINE

## 2019-03-17 PROCEDURE — 74174 CTA ABD&PLVS W/CONTRAST: CPT

## 2019-03-17 PROCEDURE — 83690 ASSAY OF LIPASE: CPT

## 2019-03-17 PROCEDURE — 2580000003 HC RX 258: Performed by: EMERGENCY MEDICINE

## 2019-03-17 PROCEDURE — 71260 CT THORAX DX C+: CPT

## 2019-03-17 PROCEDURE — 6360000004 HC RX CONTRAST MEDICATION: Performed by: EMERGENCY MEDICINE

## 2019-03-17 PROCEDURE — 99285 EMERGENCY DEPT VISIT HI MDM: CPT

## 2019-03-17 PROCEDURE — 2500000003 HC RX 250 WO HCPCS: Performed by: EMERGENCY MEDICINE

## 2019-03-17 PROCEDURE — 84484 ASSAY OF TROPONIN QUANT: CPT

## 2019-03-17 PROCEDURE — 85610 PROTHROMBIN TIME: CPT

## 2019-03-17 PROCEDURE — 80053 COMPREHEN METABOLIC PANEL: CPT

## 2019-03-17 PROCEDURE — 6370000000 HC RX 637 (ALT 250 FOR IP): Performed by: EMERGENCY MEDICINE

## 2019-03-17 PROCEDURE — 85025 COMPLETE CBC W/AUTO DIFF WBC: CPT

## 2019-03-17 PROCEDURE — 93005 ELECTROCARDIOGRAM TRACING: CPT | Performed by: EMERGENCY MEDICINE

## 2019-03-17 PROCEDURE — 96361 HYDRATE IV INFUSION ADD-ON: CPT

## 2019-03-17 PROCEDURE — 85730 THROMBOPLASTIN TIME PARTIAL: CPT

## 2019-03-17 RX ORDER — 0.9 % SODIUM CHLORIDE 0.9 %
1000 INTRAVENOUS SOLUTION INTRAVENOUS ONCE
Status: COMPLETED | OUTPATIENT
Start: 2019-03-17 | End: 2019-03-17

## 2019-03-17 RX ADMIN — IOPAMIDOL 100 ML: 755 INJECTION, SOLUTION INTRAVENOUS at 09:50

## 2019-03-17 RX ADMIN — FAMOTIDINE 20 MG: 10 INJECTION, SOLUTION INTRAVENOUS at 06:04

## 2019-03-17 RX ADMIN — SODIUM CHLORIDE 1000 ML: 9 INJECTION, SOLUTION INTRAVENOUS at 06:04

## 2019-03-17 RX ADMIN — LIDOCAINE HYDROCHLORIDE: 20 SOLUTION ORAL; TOPICAL at 07:54

## 2019-03-17 ASSESSMENT — PAIN SCALES - GENERAL: PAINLEVEL_OUTOF10: 5

## 2019-03-17 ASSESSMENT — HEART SCORE: ECG: 0

## 2019-03-18 LAB
EKG ATRIAL RATE: 103 BPM
EKG DIAGNOSIS: NORMAL
EKG P AXIS: 43 DEGREES
EKG P-R INTERVAL: 166 MS
EKG Q-T INTERVAL: 374 MS
EKG QRS DURATION: 104 MS
EKG QTC CALCULATION (BAZETT): 489 MS
EKG R AXIS: -38 DEGREES
EKG T AXIS: 44 DEGREES
EKG VENTRICULAR RATE: 103 BPM

## 2019-03-18 PROCEDURE — 93010 ELECTROCARDIOGRAM REPORT: CPT | Performed by: INTERNAL MEDICINE

## 2019-03-21 ENCOUNTER — OFFICE VISIT (OUTPATIENT)
Dept: PULMONOLOGY | Age: 59
End: 2019-03-21
Payer: MEDICARE

## 2019-03-21 VITALS
DIASTOLIC BLOOD PRESSURE: 70 MMHG | WEIGHT: 302.4 LBS | HEIGHT: 76 IN | OXYGEN SATURATION: 97 % | HEART RATE: 97 BPM | SYSTOLIC BLOOD PRESSURE: 112 MMHG | BODY MASS INDEX: 36.83 KG/M2

## 2019-03-21 DIAGNOSIS — E11.21 TYPE 2 DIABETES MELLITUS WITH DIABETIC NEPHROPATHY, WITHOUT LONG-TERM CURRENT USE OF INSULIN (HCC): Chronic | ICD-10-CM

## 2019-03-21 DIAGNOSIS — I10 ESSENTIAL HYPERTENSION, BENIGN: Chronic | ICD-10-CM

## 2019-03-21 DIAGNOSIS — I48.91 ATRIAL FIBRILLATION, UNSPECIFIED TYPE (HCC): Chronic | ICD-10-CM

## 2019-03-21 DIAGNOSIS — E66.9 NON MORBID OBESITY, UNSPECIFIED OBESITY TYPE: Chronic | ICD-10-CM

## 2019-03-21 DIAGNOSIS — G47.33 OSA (OBSTRUCTIVE SLEEP APNEA): Primary | Chronic | ICD-10-CM

## 2019-03-21 PROCEDURE — G8417 CALC BMI ABV UP PARAM F/U: HCPCS | Performed by: NURSE PRACTITIONER

## 2019-03-21 PROCEDURE — 3017F COLORECTAL CA SCREEN DOC REV: CPT | Performed by: NURSE PRACTITIONER

## 2019-03-21 PROCEDURE — 99214 OFFICE O/P EST MOD 30 MIN: CPT | Performed by: NURSE PRACTITIONER

## 2019-03-21 PROCEDURE — G8484 FLU IMMUNIZE NO ADMIN: HCPCS | Performed by: NURSE PRACTITIONER

## 2019-03-21 PROCEDURE — 3044F HG A1C LEVEL LT 7.0%: CPT | Performed by: NURSE PRACTITIONER

## 2019-03-21 PROCEDURE — 1036F TOBACCO NON-USER: CPT | Performed by: NURSE PRACTITIONER

## 2019-03-21 PROCEDURE — G8427 DOCREV CUR MEDS BY ELIG CLIN: HCPCS | Performed by: NURSE PRACTITIONER

## 2019-03-21 PROCEDURE — 2022F DILAT RTA XM EVC RTNOPTHY: CPT | Performed by: NURSE PRACTITIONER

## 2019-03-21 RX ORDER — MELOXICAM 15 MG/1
TABLET ORAL
Qty: 30 TABLET | Refills: 2 | Status: SHIPPED | OUTPATIENT
Start: 2019-03-21 | End: 2019-05-01 | Stop reason: SDUPTHER

## 2019-03-21 ASSESSMENT — ENCOUNTER SYMPTOMS
COUGH: 0
RHINORRHEA: 0
APNEA: 0
ABDOMINAL DISTENTION: 0
SINUS PRESSURE: 0
ABDOMINAL PAIN: 0
SHORTNESS OF BREATH: 0
EYE REDNESS: 0
EYE PAIN: 0

## 2019-03-21 ASSESSMENT — SLEEP AND FATIGUE QUESTIONNAIRES
HOW LIKELY ARE YOU TO NOD OFF OR FALL ASLEEP WHEN YOU ARE A PASSENGER IN A CAR FOR AN HOUR WITHOUT A BREAK: 1
HOW LIKELY ARE YOU TO NOD OFF OR FALL ASLEEP WHILE SITTING QUIETLY AFTER LUNCH WITHOUT ALCOHOL: 1
HOW LIKELY ARE YOU TO NOD OFF OR FALL ASLEEP IN A CAR, WHILE STOPPED FOR A FEW MINUTES IN TRAFFIC: 0
HOW LIKELY ARE YOU TO NOD OFF OR FALL ASLEEP WHILE LYING DOWN TO REST IN THE AFTERNOON WHEN CIRCUMSTANCES PERMIT: 1
HOW LIKELY ARE YOU TO NOD OFF OR FALL ASLEEP WHILE SITTING INACTIVE IN A PUBLIC PLACE: 1
HOW LIKELY ARE YOU TO NOD OFF OR FALL ASLEEP WHILE WATCHING TV: 1
HOW LIKELY ARE YOU TO NOD OFF OR FALL ASLEEP WHILE SITTING AND TALKING TO SOMEONE: 0
HOW LIKELY ARE YOU TO NOD OFF OR FALL ASLEEP WHILE SITTING AND READING: 1
ESS TOTAL SCORE: 6

## 2019-04-15 DIAGNOSIS — M1A.09X0 IDIOPATHIC CHRONIC GOUT OF MULTIPLE SITES WITHOUT TOPHUS: ICD-10-CM

## 2019-04-15 RX ORDER — OMEPRAZOLE 40 MG/1
CAPSULE, DELAYED RELEASE ORAL
Qty: 30 CAPSULE | Refills: 5 | Status: SHIPPED | OUTPATIENT
Start: 2019-04-15 | End: 2019-10-30 | Stop reason: SDUPTHER

## 2019-04-15 RX ORDER — ATORVASTATIN CALCIUM 20 MG/1
TABLET, FILM COATED ORAL
Qty: 30 TABLET | Refills: 5 | Status: SHIPPED | OUTPATIENT
Start: 2019-04-15 | End: 2019-11-11 | Stop reason: SDUPTHER

## 2019-04-15 RX ORDER — ALLOPURINOL 300 MG/1
300 TABLET ORAL DAILY
Qty: 30 TABLET | Refills: 4 | Status: SHIPPED | OUTPATIENT
Start: 2019-04-15 | End: 2019-09-26 | Stop reason: SDUPTHER

## 2019-04-17 RX ORDER — CYCLOBENZAPRINE HCL 10 MG
TABLET ORAL
Qty: 30 TABLET | Refills: 0 | Status: SHIPPED | OUTPATIENT
Start: 2019-04-17 | End: 2019-05-01 | Stop reason: SDUPTHER

## 2019-04-18 DIAGNOSIS — M1A.09X0 IDIOPATHIC CHRONIC GOUT OF MULTIPLE SITES WITHOUT TOPHUS: ICD-10-CM

## 2019-04-18 RX ORDER — COLCHICINE 0.6 MG/1
0.6 TABLET ORAL DAILY PRN
Qty: 30 TABLET | Refills: 5 | Status: SHIPPED | OUTPATIENT
Start: 2019-04-18 | End: 2019-11-11 | Stop reason: SDUPTHER

## 2019-04-18 NOTE — TELEPHONE ENCOUNTER
Spoke with pharmacist, patient has no current refills.   Medication pending   Please advise  Thank you

## 2019-04-18 NOTE — TELEPHONE ENCOUNTER
Mr Isiah Duarte called wanting to get his prescription refilled for :       colchicine (COLCRYS) 0.6 MG tablet    Last ov 3/14/19    [id number]- [place], [address] - [place] [phone]

## 2019-04-26 ENCOUNTER — TELEPHONE (OUTPATIENT)
Dept: PAIN MANAGEMENT | Age: 59
End: 2019-04-26

## 2019-04-30 NOTE — TELEPHONE ENCOUNTER
Received APPROVAL for Olmesartan Medoxomil 40MG OR TABS through 04/29/2020. Please advise patient. Thank you.

## 2019-05-01 ENCOUNTER — OFFICE VISIT (OUTPATIENT)
Dept: FAMILY MEDICINE CLINIC | Age: 59
End: 2019-05-01
Payer: MEDICAID

## 2019-05-01 VITALS
DIASTOLIC BLOOD PRESSURE: 78 MMHG | WEIGHT: 298 LBS | OXYGEN SATURATION: 93 % | SYSTOLIC BLOOD PRESSURE: 132 MMHG | HEART RATE: 104 BPM | RESPIRATION RATE: 8 BRPM | HEIGHT: 76 IN | BODY MASS INDEX: 36.29 KG/M2 | TEMPERATURE: 98.2 F

## 2019-05-01 DIAGNOSIS — G47.33 OSA (OBSTRUCTIVE SLEEP APNEA): ICD-10-CM

## 2019-05-01 DIAGNOSIS — M1A.09X0 IDIOPATHIC CHRONIC GOUT OF MULTIPLE SITES WITHOUT TOPHUS: ICD-10-CM

## 2019-05-01 DIAGNOSIS — L91.8 INFLAMED SKIN TAG: ICD-10-CM

## 2019-05-01 DIAGNOSIS — N18.30 CHRONIC RENAL IMPAIRMENT, STAGE 3 (MODERATE) (HCC): ICD-10-CM

## 2019-05-01 DIAGNOSIS — E11.21 DIABETIC NEPHROPATHY ASSOCIATED WITH TYPE 2 DIABETES MELLITUS (HCC): ICD-10-CM

## 2019-05-01 DIAGNOSIS — Z12.11 SCREEN FOR COLON CANCER: ICD-10-CM

## 2019-05-01 DIAGNOSIS — E66.9 NON MORBID OBESITY, UNSPECIFIED OBESITY TYPE: ICD-10-CM

## 2019-05-01 DIAGNOSIS — K26.9 DUODENAL ULCER DISEASE: ICD-10-CM

## 2019-05-01 DIAGNOSIS — I10 ESSENTIAL HYPERTENSION, BENIGN: ICD-10-CM

## 2019-05-01 DIAGNOSIS — E11.21 TYPE 2 DIABETES MELLITUS WITH DIABETIC NEPHROPATHY, WITHOUT LONG-TERM CURRENT USE OF INSULIN (HCC): Primary | ICD-10-CM

## 2019-05-01 DIAGNOSIS — E11.21 TYPE 2 DIABETES MELLITUS WITH DIABETIC NEPHROPATHY, WITHOUT LONG-TERM CURRENT USE OF INSULIN (HCC): ICD-10-CM

## 2019-05-01 LAB
A/G RATIO: 1.8 (ref 1.1–2.2)
ALBUMIN SERPL-MCNC: 4.4 G/DL (ref 3.4–5)
ALP BLD-CCNC: 79 U/L (ref 40–129)
ALT SERPL-CCNC: 31 U/L (ref 10–40)
ANION GAP SERPL CALCULATED.3IONS-SCNC: 13 MMOL/L (ref 3–16)
AST SERPL-CCNC: 24 U/L (ref 15–37)
BILIRUB SERPL-MCNC: <0.2 MG/DL (ref 0–1)
BUN BLDV-MCNC: 17 MG/DL (ref 7–20)
CALCIUM SERPL-MCNC: 8.6 MG/DL (ref 8.3–10.6)
CHLORIDE BLD-SCNC: 98 MMOL/L (ref 99–110)
CHOLESTEROL, TOTAL: 145 MG/DL (ref 0–199)
CO2: 27 MMOL/L (ref 21–32)
CREAT SERPL-MCNC: 1.8 MG/DL (ref 0.8–1.3)
CREATININE URINE: 137.5 MG/DL (ref 39–259)
GFR AFRICAN AMERICAN: 47
GFR NON-AFRICAN AMERICAN: 39
GLOBULIN: 2.5 G/DL
GLUCOSE BLD-MCNC: 192 MG/DL (ref 70–99)
HDLC SERPL-MCNC: 55 MG/DL (ref 40–60)
LDL CHOLESTEROL CALCULATED: 72 MG/DL
MICROALBUMIN UR-MCNC: 32.3 MG/DL
MICROALBUMIN/CREAT UR-RTO: 234.9 MG/G (ref 0–30)
POTASSIUM SERPL-SCNC: 3.6 MMOL/L (ref 3.5–5.1)
SODIUM BLD-SCNC: 138 MMOL/L (ref 136–145)
TOTAL PROTEIN: 6.9 G/DL (ref 6.4–8.2)
TRIGL SERPL-MCNC: 88 MG/DL (ref 0–150)
TSH SERPL DL<=0.05 MIU/L-ACNC: 1.11 UIU/ML (ref 0.27–4.2)
URIC ACID, SERUM: 6.8 MG/DL (ref 3.5–7.2)
VLDLC SERPL CALC-MCNC: 18 MG/DL

## 2019-05-01 PROCEDURE — 3017F COLORECTAL CA SCREEN DOC REV: CPT | Performed by: FAMILY MEDICINE

## 2019-05-01 PROCEDURE — G8417 CALC BMI ABV UP PARAM F/U: HCPCS | Performed by: FAMILY MEDICINE

## 2019-05-01 PROCEDURE — 1036F TOBACCO NON-USER: CPT | Performed by: FAMILY MEDICINE

## 2019-05-01 PROCEDURE — 3044F HG A1C LEVEL LT 7.0%: CPT | Performed by: FAMILY MEDICINE

## 2019-05-01 PROCEDURE — G8427 DOCREV CUR MEDS BY ELIG CLIN: HCPCS | Performed by: FAMILY MEDICINE

## 2019-05-01 PROCEDURE — 99214 OFFICE O/P EST MOD 30 MIN: CPT | Performed by: FAMILY MEDICINE

## 2019-05-01 PROCEDURE — 2022F DILAT RTA XM EVC RTNOPTHY: CPT | Performed by: FAMILY MEDICINE

## 2019-05-01 RX ORDER — CYCLOBENZAPRINE HCL 10 MG
TABLET ORAL
Qty: 30 TABLET | Refills: 0 | Status: SHIPPED | OUTPATIENT
Start: 2019-05-01 | End: 2019-10-08 | Stop reason: SDUPTHER

## 2019-05-01 RX ORDER — MELOXICAM 15 MG/1
TABLET ORAL
Qty: 30 TABLET | Refills: 2 | Status: SHIPPED | OUTPATIENT
Start: 2019-05-01 | End: 2019-10-09 | Stop reason: SDUPTHER

## 2019-05-01 RX ORDER — CHLORTHALIDONE 25 MG/1
25 TABLET ORAL DAILY
Qty: 30 TABLET | Refills: 5 | Status: SHIPPED | OUTPATIENT
Start: 2019-05-01 | End: 2019-11-11 | Stop reason: SDUPTHER

## 2019-05-01 RX ORDER — TERAZOSIN 2 MG/1
CAPSULE ORAL
Qty: 30 CAPSULE | Refills: 5 | Status: SHIPPED | OUTPATIENT
Start: 2019-05-01 | End: 2019-11-11 | Stop reason: SDUPTHER

## 2019-05-01 RX ORDER — CARVEDILOL 6.25 MG/1
6.25 TABLET ORAL 2 TIMES DAILY
Qty: 60 TABLET | Refills: 5 | Status: SHIPPED | OUTPATIENT
Start: 2019-05-01 | End: 2019-11-11 | Stop reason: SDUPTHER

## 2019-05-01 RX ORDER — OLMESARTAN MEDOXOMIL 40 MG/1
TABLET ORAL
Qty: 30 TABLET | Refills: 5 | Status: SHIPPED | OUTPATIENT
Start: 2019-05-01 | End: 2019-05-21

## 2019-05-01 RX ORDER — BUTALBITAL, ACETAMINOPHEN AND CAFFEINE 50; 325; 40 MG/1; MG/1; MG/1
1 TABLET ORAL EVERY 6 HOURS PRN
Qty: 30 TABLET | Refills: 0 | Status: SHIPPED | OUTPATIENT
Start: 2019-05-01 | End: 2019-07-08 | Stop reason: SDUPTHER

## 2019-05-01 RX ORDER — OXYCODONE HYDROCHLORIDE AND ACETAMINOPHEN 5; 325 MG/1; MG/1
1 TABLET ORAL EVERY 6 HOURS PRN
Qty: 90 TABLET | Refills: 0 | Status: SHIPPED | OUTPATIENT
Start: 2019-05-11 | End: 2019-05-01 | Stop reason: SDUPTHER

## 2019-05-01 RX ORDER — SULFAMETHOXAZOLE AND TRIMETHOPRIM 800; 160 MG/1; MG/1
1 TABLET ORAL 2 TIMES DAILY
Qty: 14 TABLET | Refills: 0 | Status: SHIPPED | OUTPATIENT
Start: 2019-05-01 | End: 2019-05-08

## 2019-05-01 RX ORDER — NIFEDIPINE 90 MG/1
90 TABLET, FILM COATED, EXTENDED RELEASE ORAL DAILY
Qty: 30 TABLET | Refills: 5 | Status: SHIPPED | OUTPATIENT
Start: 2019-05-01 | End: 2019-11-11 | Stop reason: SDUPTHER

## 2019-05-01 RX ORDER — OXYCODONE HYDROCHLORIDE AND ACETAMINOPHEN 5; 325 MG/1; MG/1
1 TABLET ORAL EVERY 6 HOURS PRN
Qty: 90 TABLET | Refills: 0 | Status: SHIPPED | OUTPATIENT
Start: 2019-05-01 | End: 2019-06-06 | Stop reason: SDUPTHER

## 2019-05-02 LAB
ESTIMATED AVERAGE GLUCOSE: 159.9 MG/DL
HBA1C MFR BLD: 7.2 %

## 2019-05-03 ENCOUNTER — TELEPHONE (OUTPATIENT)
Dept: PAIN MANAGEMENT | Age: 59
End: 2019-05-03

## 2019-05-20 NOTE — TELEPHONE ENCOUNTER
Spoke to Shine at pharmacy.   All doses of olmesartan are on back order  Alternatives are losartan, valsartan and lisinopril    Please advise

## 2019-05-21 ENCOUNTER — TELEPHONE (OUTPATIENT)
Dept: FAMILY MEDICINE CLINIC | Age: 59
End: 2019-05-21

## 2019-05-21 RX ORDER — LOSARTAN POTASSIUM 100 MG/1
100 TABLET ORAL DAILY
Qty: 30 TABLET | Refills: 5 | Status: SHIPPED | OUTPATIENT
Start: 2019-05-21 | End: 2019-11-11 | Stop reason: SDUPTHER

## 2019-05-21 NOTE — TELEPHONE ENCOUNTER
Insurance is requiring prior auth to be done. Per pharmacist since they are in the same class of medication.   Will send to prior authorization department to start a PA     Fyi sent to the physician as well

## 2019-05-21 NOTE — TELEPHONE ENCOUNTER
He is taking both  Insurance should pay for it regardless  He has very tough-to-treat blood pressure and would be high risk to stop  thx

## 2019-05-29 NOTE — TELEPHONE ENCOUNTER
Received DENIAL for Losartan Potassium 100MG tablets. Denial letter attached. Please advise patient. Thank you.

## 2019-06-06 ENCOUNTER — OFFICE VISIT (OUTPATIENT)
Dept: FAMILY MEDICINE CLINIC | Age: 59
End: 2019-06-06
Payer: MEDICAID

## 2019-06-06 ENCOUNTER — NURSE TRIAGE (OUTPATIENT)
Dept: OTHER | Facility: CLINIC | Age: 59
End: 2019-06-06

## 2019-06-06 ENCOUNTER — HOSPITAL ENCOUNTER (OUTPATIENT)
Dept: VASCULAR LAB | Age: 59
Discharge: HOME | End: 2019-06-06
Payer: MEDICAID

## 2019-06-06 ENCOUNTER — TELEPHONE (OUTPATIENT)
Dept: FAMILY MEDICINE CLINIC | Age: 59
End: 2019-06-06

## 2019-06-06 VITALS
BODY MASS INDEX: 36.46 KG/M2 | OXYGEN SATURATION: 96 % | SYSTOLIC BLOOD PRESSURE: 152 MMHG | DIASTOLIC BLOOD PRESSURE: 100 MMHG | RESPIRATION RATE: 20 BRPM | HEART RATE: 111 BPM | WEIGHT: 299.5 LBS | TEMPERATURE: 97.9 F

## 2019-06-06 DIAGNOSIS — M1A.09X0 IDIOPATHIC CHRONIC GOUT OF MULTIPLE SITES WITHOUT TOPHUS: ICD-10-CM

## 2019-06-06 DIAGNOSIS — Z12.11 SCREEN FOR COLON CANCER: ICD-10-CM

## 2019-06-06 DIAGNOSIS — M25.522 LEFT ELBOW PAIN: ICD-10-CM

## 2019-06-06 DIAGNOSIS — M79.89 RIGHT LEG SWELLING: ICD-10-CM

## 2019-06-06 DIAGNOSIS — E11.21 TYPE 2 DIABETES MELLITUS WITH DIABETIC NEPHROPATHY, WITHOUT LONG-TERM CURRENT USE OF INSULIN (HCC): Primary | ICD-10-CM

## 2019-06-06 DIAGNOSIS — M79.661 RIGHT CALF PAIN: ICD-10-CM

## 2019-06-06 PROCEDURE — G8427 DOCREV CUR MEDS BY ELIG CLIN: HCPCS | Performed by: FAMILY MEDICINE

## 2019-06-06 PROCEDURE — 1036F TOBACCO NON-USER: CPT | Performed by: FAMILY MEDICINE

## 2019-06-06 PROCEDURE — 2022F DILAT RTA XM EVC RTNOPTHY: CPT | Performed by: FAMILY MEDICINE

## 2019-06-06 PROCEDURE — 93971 EXTREMITY STUDY: CPT

## 2019-06-06 PROCEDURE — G8417 CALC BMI ABV UP PARAM F/U: HCPCS | Performed by: FAMILY MEDICINE

## 2019-06-06 PROCEDURE — 3045F PR MOST RECENT HEMOGLOBIN A1C LEVEL 7.0-9.0%: CPT | Performed by: FAMILY MEDICINE

## 2019-06-06 PROCEDURE — 99214 OFFICE O/P EST MOD 30 MIN: CPT | Performed by: FAMILY MEDICINE

## 2019-06-06 PROCEDURE — 3017F COLORECTAL CA SCREEN DOC REV: CPT | Performed by: FAMILY MEDICINE

## 2019-06-06 RX ORDER — OXYCODONE HYDROCHLORIDE AND ACETAMINOPHEN 5; 325 MG/1; MG/1
1 TABLET ORAL EVERY 6 HOURS PRN
Qty: 90 TABLET | Refills: 0 | Status: SHIPPED | OUTPATIENT
Start: 2019-06-06 | End: 2019-09-03 | Stop reason: SDUPTHER

## 2019-06-06 NOTE — PROGRESS NOTES
Here for f/u and recheck of some leg pain/cramping    Pt was up in [place] a few weeks for work, and when he was up there, noted some fullness and discomfort around the knee. After being inactive, has to work to bend. When he walks for the first few steps, gets pain in the area and pain down into the calf. Pt does have sx for a few weeks. No injury or trauma. Pt does get some on and off swelling, relates to prior trauma 1 year ago when he hit leg with headboard. No chest pain shortness of breath with sx. Pt with some discomfort to L elbow. Sx occurrs randomly when she puts down on a table. Gets stabbing pain intermittently. .range of motion is normal and feels superficial in elbow. Does have some intermittent tingling in hand but not at same time. Pts strength is normal, and not dropping anything. Sx occur for a brief second and then go away. No sx on R. No weakness. Pt here for follow up of blood pressure. Pt states doing great with adherence to therapy and feels well. No issues of chest pain, shortness of breath. No vision changes, headache, swelling in legs. Has not taken meds for the past 2-3d with recent travel      Except as noted above in the history of present illness, the review of systems is  negative for headache, vision changes, chest pain, shortness of breath, abdominal pain, urinary sx, bowel changes. Past medical, surgical, and social history reviewed and updated  Medications and allergies reviewed and updated         O: BP (!) 152/100   Pulse 111   Temp 97.9 °F (36.6 °C) (Oral)   Resp 20   Wt 299 lb 8 oz (135.9 kg)   SpO2 96%   BMI 36.46 kg/m²   GEN: No acute distress, cooperative, well nourished, alert. HEENT: PEERLA, EOMI , normocephalic/atraumatic, nares and oropharynx clear. Mucous membranes normal, Tympanic membranes clear bilaterally. Neck: soft, supple, no thyromegaly, mass, no Lymphadenopathy  CV: Regular rate and rhythm, no murmur, rubs, gallops. No edema. Resp: Clear to auscultation bilaterally good air entry bilaterally  No crackles, wheeze. Breathing comfortably. Ext: RLE with swelling from foot to knee. Pain with range of motion, + calf pain w/ no homans. No palpable mass. Fullness behind ear. No erythema  full range of motion bilateral upper extremities, no tender to palpation or restriction in range of motion L elbow,  Hands/wrist      Current Outpatient Medications   Medication Sig Dispense Refill    oxyCODONE-acetaminophen (PERCOCET) 5-325 MG per tablet Take 1 tablet by mouth every 6 hours as needed for Pain for up to 30 days.  90 tablet 0    losartan (COZAAR) 100 MG tablet Take 1 tablet by mouth daily 30 tablet 5    butalbital-acetaminophen-caffeine (FIORICET, ESGIC) -40 MG per tablet Take 1 tablet by mouth every 6 hours as needed for Headaches 30 tablet 0    carvedilol (COREG) 6.25 MG tablet Take 1 tablet by mouth 2 times daily 60 tablet 5    chlorthalidone (HYGROTON) 25 MG tablet Take 1 tablet by mouth daily 30 tablet 5    cyclobenzaprine (FLEXERIL) 10 MG tablet TAKE 1 TABLET BY MOUTH THREE TIMES DAILY AS NEEDED FOR MUSCLE SPASMS 30 tablet 0    meloxicam (MOBIC) 15 MG tablet TAKE 1 TABLET BY MOUTH EVERYDAY 30 tablet 2    NIFEdipine (ADALAT CC) 90 MG extended release tablet Take 1 tablet by mouth daily 30 tablet 5    terazosin (HYTRIN) 2 MG capsule TAKE 1 CAPSULE BY MOUTH EVERY EVENING 30 capsule 5    colchicine (COLCRYS) 0.6 MG tablet Take 1 tablet by mouth daily as needed for Pain 30 tablet 5    omeprazole (PRILOSEC) 40 MG delayed release capsule TAKE 1 CAPSULE BY MOUTH DAILY 30 capsule 5    allopurinol (ZYLOPRIM) 300 MG tablet TAKE 1 TABLET BY MOUTH DAILY 30 tablet 4    lisinopril (PRINIVIL;ZESTRIL) 20 MG tablet Take 1 tablet by mouth daily 30 tablet 5    ranitidine (ZANTAC) 150 MG tablet TAKE 1 TABLET BY MOUTH TWICE A DAY 60 tablet 4    atorvastatin (LIPITOR) 20 MG tablet TAKE 1 TABLET BY MOUTH EVERY DAY 30 tablet 5  hydrocortisone (ANUSOL-HC) 2.5 % rectal cream Place rectally 2 times daily. 1 Tube 2    aspirin 81 MG tablet Take 81 mg by mouth daily      atorvastatin (LIPITOR) 20 MG tablet TAKE 1 TABLET BY MOUTH EVERY DAY 30 tablet 5    furosemide (LASIX) 20 MG tablet Take 20 mg by mouth 2 times daily      lidocaine (LIDODERM) 5 % Place 1 patch onto the skin daily 12 hours on, 12 hours off. 30 patch 0    Elastic Bandages & Supports (TENNIS ELBOW STRAP/SUPPORT PAD) MISC 1 Units by Does not apply route daily 1 each 0     No current facility-administered medications for this visit. ASSESSMENT / PLAN:    1. Idiopathic chronic gout of multiple sites without tophus  Stable w/o flare  Cont prn percocet  See [facility]  Pt aware of need for every 3 month medication followup appointments, and that medication refills for benzodiazepines, narcotics and/or stimulants will only be given at appointment. - oxyCODONE-acetaminophen (PERCOCET) 5-325 MG per tablet; Take 1 tablet by mouth every 6 hours as needed for Pain for up to 30 days. Dispense: 90 tablet; Refill: 0    2. Right leg swelling  Suspect bakers cyst but with risk factors for DVT  Check STAT doppler, Management pending results. - US DOPPLER VENOUS LEG RIGHT    3. Right calf pain  As above  - US DOPPLER VENOUS LEG RIGHT    4. Screen for colon cancer  Due colonoscopy, pt aware, working on setting up    5. Left elbow pain  Muscular  Exam nonofocal  The patient is reassured that these symptoms do not appear to represent a serious or threatening condition. 6. Type 2 diabetes mellitus with diabetic nephropathy, without long-term current use of insulin (HCC)  Mildly increased a1c, ok to monitor with diet/exercise attempts. Monitor and recheck 3 months           Follow-up appointment:   Call or return to clinic prn if these symptoms worsen or fail to improve as anticipated. Discussed use, benefit, and side effects of all prescribed medications.   Barriers to medication compliance addressed. All patient questions answered. Pt voiced understanding. When applicable, patient's outside [facility]. The patient has signed appropriate paperworks/consents.

## 2019-06-10 ENCOUNTER — TELEPHONE (OUTPATIENT)
Dept: FAMILY MEDICINE CLINIC | Age: 59
End: 2019-06-10

## 2019-06-10 DIAGNOSIS — M79.661 RIGHT CALF PAIN: ICD-10-CM

## 2019-06-10 DIAGNOSIS — M79.89 RIGHT LEG SWELLING: Primary | ICD-10-CM

## 2019-06-10 NOTE — TELEPHONE ENCOUNTER
Please advise?   I see a referral in chart to Dr Gia Harris for shoulder but do not see a referral for leg swelling

## 2019-06-18 RX ORDER — RANITIDINE 150 MG/1
TABLET ORAL
Qty: 60 TABLET | Refills: 5 | Status: SHIPPED | OUTPATIENT
Start: 2019-06-18 | End: 2019-12-23

## 2019-07-09 RX ORDER — BUTALBITAL, ACETAMINOPHEN AND CAFFEINE 50; 325; 40 MG/1; MG/1; MG/1
1 TABLET ORAL EVERY 6 HOURS PRN
Qty: 30 TABLET | Refills: 1 | Status: SHIPPED | OUTPATIENT
Start: 2019-07-09 | End: 2019-10-18 | Stop reason: SDUPTHER

## 2019-08-12 RX ORDER — CYCLOBENZAPRINE HCL 10 MG
TABLET ORAL
Qty: 30 TABLET | Refills: 5 | Status: SHIPPED | OUTPATIENT
Start: 2019-08-12 | End: 2020-02-23

## 2019-08-26 RX ORDER — LISINOPRIL 20 MG/1
TABLET ORAL
Qty: 30 TABLET | Refills: 5 | Status: SHIPPED | OUTPATIENT
Start: 2019-08-26 | End: 2020-03-16

## 2019-08-27 ENCOUNTER — TELEPHONE (OUTPATIENT)
Dept: FAMILY MEDICINE CLINIC | Age: 59
End: 2019-08-27

## 2019-08-27 NOTE — TELEPHONE ENCOUNTER
Pt is having headaches. He will be in town on 08/31 and requesting an appt? Can you please contact pt with scheduling information?

## 2019-09-03 ENCOUNTER — OFFICE VISIT (OUTPATIENT)
Dept: FAMILY MEDICINE CLINIC | Age: 59
End: 2019-09-03
Payer: MEDICAID

## 2019-09-03 VITALS
SYSTOLIC BLOOD PRESSURE: 128 MMHG | WEIGHT: 298 LBS | BODY MASS INDEX: 37.05 KG/M2 | HEART RATE: 112 BPM | OXYGEN SATURATION: 97 % | DIASTOLIC BLOOD PRESSURE: 82 MMHG | HEIGHT: 75 IN

## 2019-09-03 DIAGNOSIS — E11.21 TYPE 2 DIABETES MELLITUS WITH DIABETIC NEPHROPATHY, WITHOUT LONG-TERM CURRENT USE OF INSULIN (HCC): Primary | ICD-10-CM

## 2019-09-03 DIAGNOSIS — E11.21 TYPE 2 DIABETES MELLITUS WITH DIABETIC NEPHROPATHY, WITHOUT LONG-TERM CURRENT USE OF INSULIN (HCC): ICD-10-CM

## 2019-09-03 DIAGNOSIS — Z13.6 SCREENING, ISCHEMIC HEART DISEASE: ICD-10-CM

## 2019-09-03 DIAGNOSIS — I10 ESSENTIAL HYPERTENSION, BENIGN: ICD-10-CM

## 2019-09-03 DIAGNOSIS — M1A.09X0 IDIOPATHIC CHRONIC GOUT OF MULTIPLE SITES WITHOUT TOPHUS: ICD-10-CM

## 2019-09-03 DIAGNOSIS — R06.02 SOB (SHORTNESS OF BREATH): ICD-10-CM

## 2019-09-03 DIAGNOSIS — N18.30 CHRONIC RENAL IMPAIRMENT, STAGE 3 (MODERATE) (HCC): ICD-10-CM

## 2019-09-03 LAB
A/G RATIO: 1.7 (ref 1.1–2.2)
ALBUMIN SERPL-MCNC: 4.3 G/DL (ref 3.4–5)
ALP BLD-CCNC: 79 U/L (ref 40–129)
ALT SERPL-CCNC: 48 U/L (ref 10–40)
ANION GAP SERPL CALCULATED.3IONS-SCNC: 15 MMOL/L (ref 3–16)
AST SERPL-CCNC: 50 U/L (ref 15–37)
BILIRUB SERPL-MCNC: 0.3 MG/DL (ref 0–1)
BUN BLDV-MCNC: 21 MG/DL (ref 7–20)
CALCIUM SERPL-MCNC: 8.8 MG/DL (ref 8.3–10.6)
CHLORIDE BLD-SCNC: 103 MMOL/L (ref 99–110)
CHOLESTEROL, TOTAL: 174 MG/DL (ref 0–199)
CO2: 25 MMOL/L (ref 21–32)
CREAT SERPL-MCNC: 1.8 MG/DL (ref 0.8–1.3)
GFR AFRICAN AMERICAN: 47
GFR NON-AFRICAN AMERICAN: 39
GLOBULIN: 2.5 G/DL
GLUCOSE BLD-MCNC: 115 MG/DL (ref 70–99)
HDLC SERPL-MCNC: 59 MG/DL (ref 40–60)
LDL CHOLESTEROL CALCULATED: 78 MG/DL
POTASSIUM SERPL-SCNC: 3.8 MMOL/L (ref 3.5–5.1)
SODIUM BLD-SCNC: 143 MMOL/L (ref 136–145)
TOTAL PROTEIN: 6.8 G/DL (ref 6.4–8.2)
TRIGL SERPL-MCNC: 187 MG/DL (ref 0–150)
TSH SERPL DL<=0.05 MIU/L-ACNC: 1.89 UIU/ML (ref 0.27–4.2)
VLDLC SERPL CALC-MCNC: 37 MG/DL

## 2019-09-03 PROCEDURE — 1036F TOBACCO NON-USER: CPT | Performed by: FAMILY MEDICINE

## 2019-09-03 PROCEDURE — 3045F PR MOST RECENT HEMOGLOBIN A1C LEVEL 7.0-9.0%: CPT | Performed by: FAMILY MEDICINE

## 2019-09-03 PROCEDURE — 99214 OFFICE O/P EST MOD 30 MIN: CPT | Performed by: FAMILY MEDICINE

## 2019-09-03 PROCEDURE — 3017F COLORECTAL CA SCREEN DOC REV: CPT | Performed by: FAMILY MEDICINE

## 2019-09-03 PROCEDURE — 2022F DILAT RTA XM EVC RTNOPTHY: CPT | Performed by: FAMILY MEDICINE

## 2019-09-03 PROCEDURE — G8417 CALC BMI ABV UP PARAM F/U: HCPCS | Performed by: FAMILY MEDICINE

## 2019-09-03 PROCEDURE — G8427 DOCREV CUR MEDS BY ELIG CLIN: HCPCS | Performed by: FAMILY MEDICINE

## 2019-09-03 RX ORDER — OXYCODONE HYDROCHLORIDE AND ACETAMINOPHEN 5; 325 MG/1; MG/1
1 TABLET ORAL EVERY 6 HOURS PRN
Qty: 90 TABLET | Refills: 0 | Status: SHIPPED | OUTPATIENT
Start: 2019-09-03 | End: 2019-11-11 | Stop reason: SDUPTHER

## 2019-09-03 ASSESSMENT — PATIENT HEALTH QUESTIONNAIRE - PHQ9
SUM OF ALL RESPONSES TO PHQ QUESTIONS 1-9: 0
SUM OF ALL RESPONSES TO PHQ9 QUESTIONS 1 & 2: 0
2. FEELING DOWN, DEPRESSED OR HOPELESS: 0
SUM OF ALL RESPONSES TO PHQ QUESTIONS 1-9: 0
1. LITTLE INTEREST OR PLEASURE IN DOING THINGS: 0

## 2019-09-04 LAB
ESTIMATED AVERAGE GLUCOSE: 125.5 MG/DL
HBA1C MFR BLD: 6 %

## 2019-09-26 DIAGNOSIS — M1A.09X0 IDIOPATHIC CHRONIC GOUT OF MULTIPLE SITES WITHOUT TOPHUS: ICD-10-CM

## 2019-09-27 RX ORDER — ALLOPURINOL 300 MG/1
300 TABLET ORAL DAILY
Qty: 30 TABLET | Refills: 4 | Status: SHIPPED | OUTPATIENT
Start: 2019-09-27 | End: 2020-03-16

## 2019-10-08 ENCOUNTER — TELEPHONE (OUTPATIENT)
Dept: FAMILY MEDICINE CLINIC | Age: 59
End: 2019-10-08

## 2019-10-08 RX ORDER — CYCLOBENZAPRINE HCL 10 MG
TABLET ORAL
Qty: 90 TABLET | Refills: 0 | Status: SHIPPED | OUTPATIENT
Start: 2019-10-08 | End: 2020-02-23

## 2019-10-09 RX ORDER — MELOXICAM 15 MG/1
TABLET ORAL
Qty: 30 TABLET | Refills: 5 | Status: SHIPPED | OUTPATIENT
Start: 2019-10-09 | End: 2020-02-27

## 2019-10-18 RX ORDER — BUTALBITAL, ACETAMINOPHEN AND CAFFEINE 50; 325; 40 MG/1; MG/1; MG/1
1 TABLET ORAL EVERY 6 HOURS PRN
Qty: 30 TABLET | Refills: 1 | Status: SHIPPED | OUTPATIENT
Start: 2019-10-18 | End: 2020-01-03 | Stop reason: SDUPTHER

## 2019-10-29 ENCOUNTER — TELEPHONE (OUTPATIENT)
Dept: FAMILY MEDICINE CLINIC | Age: 59
End: 2019-10-29

## 2019-10-30 RX ORDER — ATORVASTATIN CALCIUM 20 MG/1
TABLET, FILM COATED ORAL
Qty: 30 TABLET | Refills: 5 | Status: SHIPPED | OUTPATIENT
Start: 2019-10-30 | End: 2020-02-26

## 2019-10-30 RX ORDER — OMEPRAZOLE 40 MG/1
CAPSULE, DELAYED RELEASE ORAL
Qty: 30 CAPSULE | Refills: 5 | Status: SHIPPED | OUTPATIENT
Start: 2019-10-30 | End: 2020-06-08

## 2019-11-11 ENCOUNTER — OFFICE VISIT (OUTPATIENT)
Dept: FAMILY MEDICINE CLINIC | Age: 59
End: 2019-11-11
Payer: MEDICAID

## 2019-11-11 VITALS
BODY MASS INDEX: 34.83 KG/M2 | TEMPERATURE: 97.6 F | HEART RATE: 110 BPM | OXYGEN SATURATION: 96 % | RESPIRATION RATE: 10 BRPM | WEIGHT: 295 LBS | HEIGHT: 77 IN | DIASTOLIC BLOOD PRESSURE: 94 MMHG | SYSTOLIC BLOOD PRESSURE: 148 MMHG

## 2019-11-11 DIAGNOSIS — R10.9 RIGHT FLANK PAIN: ICD-10-CM

## 2019-11-11 DIAGNOSIS — E11.21 DIABETIC NEPHROPATHY ASSOCIATED WITH TYPE 2 DIABETES MELLITUS (HCC): Primary | ICD-10-CM

## 2019-11-11 DIAGNOSIS — E11.21 TYPE 2 DIABETES MELLITUS WITH DIABETIC NEPHROPATHY, WITHOUT LONG-TERM CURRENT USE OF INSULIN (HCC): ICD-10-CM

## 2019-11-11 DIAGNOSIS — N18.30 CHRONIC RENAL IMPAIRMENT, STAGE 3 (MODERATE) (HCC): ICD-10-CM

## 2019-11-11 DIAGNOSIS — M1A.09X0 IDIOPATHIC CHRONIC GOUT OF MULTIPLE SITES WITHOUT TOPHUS: ICD-10-CM

## 2019-11-11 DIAGNOSIS — I10 ESSENTIAL HYPERTENSION, BENIGN: ICD-10-CM

## 2019-11-11 DIAGNOSIS — G89.29 CHRONIC RIGHT SHOULDER PAIN: ICD-10-CM

## 2019-11-11 DIAGNOSIS — M25.511 CHRONIC RIGHT SHOULDER PAIN: ICD-10-CM

## 2019-11-11 DIAGNOSIS — Z12.11 SCREEN FOR COLON CANCER: ICD-10-CM

## 2019-11-11 LAB
BILIRUBIN, POC: NEGATIVE
BLOOD URINE, POC: ABNORMAL
CLARITY, POC: CLEAR
COLOR, POC: YELLOW
GLUCOSE URINE, POC: NEGATIVE
KETONES, POC: NEGATIVE
LEUKOCYTE EST, POC: NEGATIVE
NITRITE, POC: NEGATIVE
PH, POC: 6
PROTEIN, POC: ABNORMAL
SPECIFIC GRAVITY, POC: 1.02
UROBILINOGEN, POC: 0.2

## 2019-11-11 PROCEDURE — G8484 FLU IMMUNIZE NO ADMIN: HCPCS | Performed by: FAMILY MEDICINE

## 2019-11-11 PROCEDURE — G8427 DOCREV CUR MEDS BY ELIG CLIN: HCPCS | Performed by: FAMILY MEDICINE

## 2019-11-11 PROCEDURE — 2022F DILAT RTA XM EVC RTNOPTHY: CPT | Performed by: FAMILY MEDICINE

## 2019-11-11 PROCEDURE — 3044F HG A1C LEVEL LT 7.0%: CPT | Performed by: FAMILY MEDICINE

## 2019-11-11 PROCEDURE — G8417 CALC BMI ABV UP PARAM F/U: HCPCS | Performed by: FAMILY MEDICINE

## 2019-11-11 PROCEDURE — 99214 OFFICE O/P EST MOD 30 MIN: CPT | Performed by: FAMILY MEDICINE

## 2019-11-11 PROCEDURE — 1036F TOBACCO NON-USER: CPT | Performed by: FAMILY MEDICINE

## 2019-11-11 PROCEDURE — 3017F COLORECTAL CA SCREEN DOC REV: CPT | Performed by: FAMILY MEDICINE

## 2019-11-11 PROCEDURE — 81002 URINALYSIS NONAUTO W/O SCOPE: CPT | Performed by: FAMILY MEDICINE

## 2019-11-11 RX ORDER — OXYCODONE HYDROCHLORIDE AND ACETAMINOPHEN 5; 325 MG/1; MG/1
1 TABLET ORAL EVERY 6 HOURS PRN
Qty: 90 TABLET | Refills: 0 | Status: SHIPPED | OUTPATIENT
Start: 2019-11-11 | End: 2020-02-26 | Stop reason: SDUPTHER

## 2019-11-11 RX ORDER — METHYLPREDNISOLONE 4 MG/1
TABLET ORAL
Qty: 21 TABLET | Refills: 0 | Status: SHIPPED | OUTPATIENT
Start: 2019-11-11 | End: 2020-02-26

## 2019-11-11 RX ORDER — CHLORTHALIDONE 25 MG/1
25 TABLET ORAL DAILY
Qty: 30 TABLET | Refills: 5 | Status: SHIPPED | OUTPATIENT
Start: 2019-11-11 | End: 2020-05-05 | Stop reason: ALTCHOICE

## 2019-11-11 RX ORDER — NIFEDIPINE 90 MG/1
90 TABLET, FILM COATED, EXTENDED RELEASE ORAL DAILY
Qty: 30 TABLET | Refills: 5 | Status: SHIPPED | OUTPATIENT
Start: 2019-11-11 | End: 2020-08-27 | Stop reason: SDUPTHER

## 2019-11-11 RX ORDER — TERAZOSIN 2 MG/1
CAPSULE ORAL
Qty: 30 CAPSULE | Refills: 5 | Status: SHIPPED | OUTPATIENT
Start: 2019-11-11 | End: 2020-08-25 | Stop reason: SDUPTHER

## 2019-11-11 RX ORDER — COLCHICINE 0.6 MG/1
0.6 TABLET ORAL DAILY PRN
Qty: 30 TABLET | Refills: 5 | Status: SHIPPED | OUTPATIENT
Start: 2019-11-11 | End: 2020-05-09 | Stop reason: SDUPTHER

## 2019-11-11 RX ORDER — ATORVASTATIN CALCIUM 20 MG/1
TABLET, FILM COATED ORAL
Qty: 30 TABLET | Refills: 5 | Status: SHIPPED | OUTPATIENT
Start: 2019-11-11 | End: 2020-12-21

## 2019-11-11 RX ORDER — CARVEDILOL 6.25 MG/1
6.25 TABLET ORAL 2 TIMES DAILY
Qty: 60 TABLET | Refills: 5 | Status: SHIPPED | OUTPATIENT
Start: 2019-11-11 | End: 2020-08-26 | Stop reason: SDUPTHER

## 2019-11-11 RX ORDER — LOSARTAN POTASSIUM 100 MG/1
100 TABLET ORAL DAILY
Qty: 30 TABLET | Refills: 5 | Status: SHIPPED | OUTPATIENT
Start: 2019-11-11 | End: 2020-08-27 | Stop reason: SDUPTHER

## 2019-11-25 ENCOUNTER — TELEPHONE (OUTPATIENT)
Dept: FAMILY MEDICINE CLINIC | Age: 59
End: 2019-11-25

## 2019-11-27 ENCOUNTER — TELEPHONE (OUTPATIENT)
Dept: FAMILY MEDICINE CLINIC | Age: 59
End: 2019-11-27

## 2019-12-23 RX ORDER — RANITIDINE 150 MG/1
TABLET ORAL
Qty: 60 TABLET | Refills: 0 | Status: SHIPPED | OUTPATIENT
Start: 2019-12-23 | End: 2020-02-06

## 2020-01-03 RX ORDER — BUTALBITAL, ACETAMINOPHEN AND CAFFEINE 50; 325; 40 MG/1; MG/1; MG/1
1 TABLET ORAL EVERY 6 HOURS PRN
Qty: 30 TABLET | Refills: 1 | Status: SHIPPED | OUTPATIENT
Start: 2020-01-03 | End: 2020-03-24

## 2020-02-07 ENCOUNTER — TELEPHONE (OUTPATIENT)
Dept: ORTHOPEDIC SURGERY | Age: 60
End: 2020-02-07

## 2020-02-23 ENCOUNTER — HOSPITAL ENCOUNTER (EMERGENCY)
Age: 60
Discharge: HOME | End: 2020-02-23
Attending: EMERGENCY MEDICINE
Payer: MEDICAID

## 2020-02-23 ENCOUNTER — APPOINTMENT (OUTPATIENT)
Dept: GENERAL RADIOLOGY | Age: 60
End: 2020-02-23
Payer: MEDICAID

## 2020-02-23 ENCOUNTER — APPOINTMENT (OUTPATIENT)
Dept: CT IMAGING | Age: 60
End: 2020-02-23
Payer: MEDICAID

## 2020-02-23 VITALS
HEART RATE: 87 BPM | DIASTOLIC BLOOD PRESSURE: 83 MMHG | SYSTOLIC BLOOD PRESSURE: 149 MMHG | RESPIRATION RATE: 17 BRPM | OXYGEN SATURATION: 94 % | TEMPERATURE: 98.2 F

## 2020-02-23 LAB
A/G RATIO: 1.4 (ref 1.1–2.2)
ALBUMIN SERPL-MCNC: 3.9 G/DL (ref 3.4–5)
ALP BLD-CCNC: 87 U/L (ref 40–129)
ALT SERPL-CCNC: 24 U/L (ref 10–40)
ANION GAP SERPL CALCULATED.3IONS-SCNC: 11 MMOL/L (ref 3–16)
AST SERPL-CCNC: 22 U/L (ref 15–37)
BASOPHILS ABSOLUTE: 0 K/UL (ref 0–0.2)
BASOPHILS RELATIVE PERCENT: 0.7 %
BILIRUB SERPL-MCNC: 0.3 MG/DL (ref 0–1)
BUN BLDV-MCNC: 22 MG/DL (ref 7–20)
CALCIUM SERPL-MCNC: 8.3 MG/DL (ref 8.3–10.6)
CHLORIDE BLD-SCNC: 102 MMOL/L (ref 99–110)
CO2: 24 MMOL/L (ref 21–32)
CREAT SERPL-MCNC: 2.2 MG/DL (ref 0.8–1.3)
EOSINOPHILS ABSOLUTE: 0.1 K/UL (ref 0–0.6)
EOSINOPHILS RELATIVE PERCENT: 2.4 %
GFR AFRICAN AMERICAN: 37
GFR NON-AFRICAN AMERICAN: 31
GLOBULIN: 2.7 G/DL
GLUCOSE BLD-MCNC: 151 MG/DL (ref 70–99)
HCT VFR BLD CALC: 37.4 % (ref 40.5–52.5)
HEMOGLOBIN: 12.3 G/DL (ref 13.5–17.5)
LIPASE: 86 U/L (ref 13–60)
LYMPHOCYTES ABSOLUTE: 1.1 K/UL (ref 1–5.1)
LYMPHOCYTES RELATIVE PERCENT: 30.2 %
MCH RBC QN AUTO: 28.6 PG (ref 26–34)
MCHC RBC AUTO-ENTMCNC: 32.9 G/DL (ref 31–36)
MCV RBC AUTO: 87 FL (ref 80–100)
MONOCYTES ABSOLUTE: 0.3 K/UL (ref 0–1.3)
MONOCYTES RELATIVE PERCENT: 7.1 %
NEUTROPHILS ABSOLUTE: 2.2 K/UL (ref 1.7–7.7)
NEUTROPHILS RELATIVE PERCENT: 59.6 %
PDW BLD-RTO: 14.3 % (ref 12.4–15.4)
PLATELET # BLD: 151 K/UL (ref 135–450)
PMV BLD AUTO: 8.8 FL (ref 5–10.5)
POTASSIUM SERPL-SCNC: 3.5 MMOL/L (ref 3.5–5.1)
RBC # BLD: 4.3 M/UL (ref 4.2–5.9)
SODIUM BLD-SCNC: 137 MMOL/L (ref 136–145)
TOTAL PROTEIN: 6.6 G/DL (ref 6.4–8.2)
TROPONIN: <0.01 NG/ML
WBC # BLD: 3.7 K/UL (ref 4–11)

## 2020-02-23 PROCEDURE — 85025 COMPLETE CBC W/AUTO DIFF WBC: CPT

## 2020-02-23 PROCEDURE — 84484 ASSAY OF TROPONIN QUANT: CPT

## 2020-02-23 PROCEDURE — 99284 EMERGENCY DEPT VISIT MOD MDM: CPT

## 2020-02-23 PROCEDURE — 74176 CT ABD & PELVIS W/O CONTRAST: CPT

## 2020-02-23 PROCEDURE — 36415 COLL VENOUS BLD VENIPUNCTURE: CPT

## 2020-02-23 PROCEDURE — 83690 ASSAY OF LIPASE: CPT

## 2020-02-23 PROCEDURE — 80053 COMPREHEN METABOLIC PANEL: CPT

## 2020-02-23 PROCEDURE — 93005 ELECTROCARDIOGRAM TRACING: CPT | Performed by: PHYSICIAN ASSISTANT

## 2020-02-23 PROCEDURE — 71046 X-RAY EXAM CHEST 2 VIEWS: CPT

## 2020-02-23 PROCEDURE — 6370000000 HC RX 637 (ALT 250 FOR IP): Performed by: PHYSICIAN ASSISTANT

## 2020-02-23 PROCEDURE — 73030 X-RAY EXAM OF SHOULDER: CPT

## 2020-02-23 RX ORDER — HYDROCODONE BITARTRATE AND ACETAMINOPHEN 5; 325 MG/1; MG/1
1 TABLET ORAL ONCE
Status: COMPLETED | OUTPATIENT
Start: 2020-02-23 | End: 2020-02-23

## 2020-02-23 RX ORDER — LIDOCAINE 50 MG/G
1 PATCH TOPICAL DAILY
Qty: 30 PATCH | Refills: 0 | Status: SHIPPED | OUTPATIENT
Start: 2020-02-23 | End: 2020-05-09

## 2020-02-23 RX ORDER — CYCLOBENZAPRINE HCL 10 MG
10 TABLET ORAL ONCE
Status: COMPLETED | OUTPATIENT
Start: 2020-02-23 | End: 2020-02-23

## 2020-02-23 RX ORDER — CYCLOBENZAPRINE HCL 10 MG
10 TABLET ORAL 3 TIMES DAILY PRN
Qty: 20 TABLET | Refills: 0 | Status: SHIPPED | OUTPATIENT
Start: 2020-02-23 | End: 2020-05-09

## 2020-02-23 RX ORDER — HYDROCODONE BITARTRATE AND ACETAMINOPHEN 5; 325 MG/1; MG/1
1 TABLET ORAL EVERY 6 HOURS PRN
Qty: 10 TABLET | Refills: 0 | Status: SHIPPED | OUTPATIENT
Start: 2020-02-23 | End: 2020-02-26

## 2020-02-23 RX ADMIN — CYCLOBENZAPRINE 10 MG: 10 TABLET, FILM COATED ORAL at 12:23

## 2020-02-23 RX ADMIN — HYDROCODONE BITARTRATE AND ACETAMINOPHEN 1 TABLET: 5; 325 TABLET ORAL at 12:23

## 2020-02-23 ASSESSMENT — PAIN SCALES - GENERAL
PAINLEVEL_OUTOF10: 9
PAINLEVEL_OUTOF10: 9

## 2020-02-23 ASSESSMENT — ENCOUNTER SYMPTOMS
COUGH: 0
SHORTNESS OF BREATH: 0
WHEEZING: 0
ABDOMINAL DISTENTION: 0
BACK PAIN: 1
CONSTIPATION: 0
NAUSEA: 0
STRIDOR: 0
VOMITING: 0
DIARRHEA: 0
ABDOMINAL PAIN: 0
COLOR CHANGE: 0

## 2020-02-23 NOTE — ED PROVIDER NOTES
[facility]        Pt Name: Destiny Parsons  MRN: [id number]  [place] 1960  Date of evaluation: 2/23/2020  Provider: Carolina Hernadez PA-C  PCP: Louise Quintanilla MD    This patient was seen and evaluated by the attending physician Dr Dannielle Muniz       Chief Complaint   Patient presents with    Flank Pain     pt c/o pain to his right shoulder. pt also having right flank pain that radiates across the entire lower part of back. pt denies any cp or sob.  Shoulder Pain       HISTORY OF PRESENT ILLNESS   (Location, Timing/Onset, Context/Setting, Quality, Duration, Modifying Factors, Severity, Associated Signs and Symptoms)  Note limiting factors. Destiny Parsons is a 62 y.o. male who presents to the emergency department complaining of chronic right shoulder pain and chronic right low back pain for 3 to 4 months. He is unsure if the 2 are related. Earlier today, he reports that the flank pain feels different and now encompasses the entire low back. He denies chest pain, shortness of breath, palpitations, hemoptysis, abdominal pain or distention, nausea, vomiting, diarrhea, constipation or acute urinary symptoms. Does not report any fever or chills or recent instrumentation of the spine. He did see orthopedist once for the right shoulder several months ago and was placed on medication which did not help much. His wife became concerned about the right shoulder pain and just wanted to make sure that it was not cardiac. Nursing Notes were all reviewed and agreed with or any disagreements were addressed in the HPI. REVIEW OF SYSTEMS    (2-9 systems for level 4, 10 or more for level 5)     Review of Systems   Constitutional: Negative for chills and fever. Eyes: Negative for visual disturbance. Respiratory: Negative for cough, shortness of breath, wheezing and stridor.     Cardiovascular: Negative for chest pain, palpitations and leg swelling. Gastrointestinal: Negative for abdominal distention, abdominal pain, constipation, diarrhea, nausea and vomiting. Endocrine: Negative. Genitourinary: Positive for flank pain. Negative for decreased urine volume, difficulty urinating, discharge, dysuria, frequency, hematuria, penile pain, penile swelling, scrotal swelling, testicular pain and urgency. Musculoskeletal: Positive for arthralgias, back pain and myalgias. Negative for gait problem, joint swelling, neck pain and neck stiffness. Skin: Negative for color change, pallor, rash and wound. Neurological: Negative for dizziness, tremors, seizures, syncope, facial asymmetry, speech difficulty, weakness, light-headedness, numbness and headaches. Psychiatric/Behavioral: Negative for confusion. All other systems reviewed and are negative. Positives and Pertinent negatives as per HPI. Except as noted above in the ROS, all other systems were reviewed and negative.        PAST MEDICAL HISTORY     Past Medical History:   Diagnosis Date    Arthralgia of multiple joints 10/27/2015    Arthritis     [facility]rial fibrillation [facility]     CRI (chronic renal insufficiency)     Diabetic nephropathy associated with type 2 diabetes mellitus ([facility] [address] 10/11/2018    Diverticulosis     Elevated PSA     Erectile dysfunction     Gout     Gout     Hemrrhoid NOS w/ complication NEC     History of MRSA infection     Hypertension     CELSA (obstructive sleep apnea) 7/14/2017    Type 2 diabetes mellitus without complication, without long-term current use of insulin ([address] 4/87/3460    Umbilical hernia without obstruction and without gangrene 2/2/2016         SURGICAL HISTORY     Past Surgical History:   Procedure Laterality Date    COLONOSCOPY      DILATATION, ESOPHAGUS      HEMICOLECTOMY      UPPER GASTROINTESTINAL ENDOSCOPY  5/28/16    biopsies, multiple small gastric ulcers    UPPER GASTROINTESTINAL ENDOSCOPY range of motion (increased pain with abduction) and tenderness. He exhibits no bony tenderness, no swelling, no effusion, no crepitus and no deformity. Right elbow: Normal.     Right wrist: Normal.      Cervical back: Normal.      Thoracic back: Normal.      Lumbar back: He exhibits decreased range of motion, tenderness (bilateral musculature, right greater than the left) and spasm. He exhibits no bony tenderness, no swelling, no edema, no deformity and no laceration. Right upper arm: Normal.      Right forearm: Normal.      Right hand: Normal. Normal sensation noted. Normal strength noted. Comments: No midline vertebral tenderness or step-off deformity. Negative straight leg raise. No saddle paresthesia or foot drop. Able to heel and toe walk without difficulty or deficit. 5/5 strength in all four extremities without focal weakness, paresthesia or radiculopathy   Skin:     General: Skin is warm and dry. Capillary Refill: Capillary refill takes less than 2 seconds. Coloration: Skin is not jaundiced or pale. Findings: No bruising, erythema or lesion. Neurological:      General: No focal deficit present. Mental Status: He is alert and oriented to person, place, and time. Cranial Nerves: No cranial nerve deficit. Sensory: No sensory deficit. Motor: No weakness.       Gait: Gait normal.      Deep Tendon Reflexes: Reflexes normal.   Psychiatric:         Mood and Affect: Mood normal.         Behavior: Behavior normal.         DIAGNOSTIC RESULTS   LABS:    Labs Reviewed   CBC WITH AUTO DIFFERENTIAL - Abnormal; Notable for the following components:       Result Value    WBC 3.7 (*)     Hemoglobin 12.3 (*)     Hematocrit 37.4 (*)     All other components within normal limits    Narrative:     Performed at:  [facility] [address], [place], [facility] [address]   Phone [phone]   COMPREHENSIVE METABOLIC PANEL - Abnormal; Notable for the following components:    Glucose 151 (*)     BUN 22 (*)     CREATININE 2.2 (*)     GFR Non- 31 (*)     GFR  37 (*)     All other components within normal limits    Narrative:     Performed at:  [facility] [address] [facility], [facility] [employer]   Phone [phone]   LIPASE - Abnormal; Notable for the following components:    Lipase 86.0 (*)     All other components within normal limits    Narrative:     Performed at:  [facility] [address] [facility], [address] [employer]   Phone [phone]   TROPONIN    Narrative:     Performed at:  [facility] [address] [facility], [facility] [employer]   Phone [phone]   URINE RT REFLEX TO CULTURE       All other labs were within normal range or not returned as of this dictation. EKG: All EKG's are interpreted by the Emergency Department Physician in the absence of a cardiologist.  Please see their note for interpretation of EKG. RADIOLOGY:   Non-plain film images such as CT, Ultrasound and MRI are read by the radiologist. Plain radiographic images are visualized and preliminarily interpreted by the ED Provider with the below findings:        Interpretation per the Radiologist below, if available at the time of this note:    XR CHEST STANDARD (2 VW)   Final Result   No active cardiopulmonary disease         XR SHOULDER RIGHT (MIN 2 VIEWS)   Final Result   No acute abnormality. CT ABDOMEN PELVIS WO CONTRAST Additional Contrast? None   Final Result   Diverticulosis.                   PROCEDURES   Unless otherwise noted below, none     Procedures    CRITICAL CARE TIME   N/A    CONSULTS:  None      EMERGENCY DEPARTMENT COURSE and DIFFERENTIAL DIAGNOSIS/MDM:   Vitals:    Vitals:    02/23/20 1126 02/23/20 1128   BP: (!) 146/89    Pulse: 87    Resp: 17    Temp:  98.2 °F (36.8 °C)   SpO2: 97%        Patient was given the following medications:  Medications   HYDROcodone-acetaminophen (NORCO) 5-325 MG per tablet 1 tablet (1 tablet Oral Given 2/23/20 1223)   cyclobenzaprine (FLEXERIL) tablet 10 mg (10 mg Oral Given 2/23/20 1223)       This patient presents to the emergency department with complaints of chronic right shoulder pain and chronic right low back pain. He has worsening low back pain on both sides now. He has no radicular symptoms at this time. Abdomen is soft and nontender without pulsatile mass or CVA tenderness. He does get some improvement with medication here. X-rays and CT scan appear stable. He does have mild worsening of his chronic renal insufficiency. Advised to drink more fluids and follow-up with PCP regarding this finding. He was given strict return precautions.   My suspicion is low for pneumonia, pneumothorax, avascular necrosis,subungual hematoma, paronychia, eponychia, felon, flexor tenosynovitis,  thoracic outlet obstruction, SVC syndrome, foreign body, tendon rupture, compartment syndrome, acute fracture, dislocation, DVT, arterial compromise or occlusion, limb ischemia, gout, septic joint, abscess, cellulitis, osteomyelitis, ACS, PE, myocarditis, pericarditis, endocarditis, acute pulmonary edema, pleural effusion, pericardial effusion, cardiac tamponade, cardiomyopathy, CHF exacerbation, thoracic aortic dissection, esophageal rupture, other life-threatening arrhythmia, hypertensive urgency or emergency, hemothorax, pulmonary contusion, subcutaneous emphysema, flail chest, pneumo mediastinum, rib fracture, spine fracture or dislocation, epidural abscess or hematoma, discitis, meningitis, encephalitis, transverse myelitis,  shingles, cauda equina, central cord syndrome,acute surgical abdomen, obstruction, perforation, abscess, mesenteric ischemia, AAA, dissection, cholecystitis, cholangitis, pancreatitis, appendicitis, C. diff colitis, diverticulitis, volvulus, incarcerated hernia, necrotizing fasciitis,testicular torsion, epididymitis, varicocele, hydrocele, orchitis, incarcerated hernia, Radha gangrene, pyelonephritis, perinephric abscess, kidney stone, urosepsis, fistula, intussusception, or other concerning pathology. FINAL IMPRESSION      1. Chronic right shoulder pain    2. Bilateral low back pain without sciatica, unspecified chronicity    3. Chronic renal failure, unspecified CKD stage          DISPOSITION/PLAN   DISPOSITION Decision To Discharge 02/23/2020 01:44:37 PM      PATIENT REFERREDTO:  Cliff Proctor MD  [place] [address]  [phone]    In 3 days      [facility] Emergency Department  [address] [facility]  [phone]    If symptoms worsen    Juliet Rowell MD  [address] [facility], [address] [facility] [address]  [phone]      for nephrology consultation    see your orthopedist            DISCHARGE MEDICATIONS:  New Prescriptions    CYCLOBENZAPRINE (FLEXERIL) 10 MG TABLET    Take 1 tablet by mouth 3 times daily as needed for Muscle spasms    HYDROCODONE-ACETAMINOPHEN (NORCO) 5-325 MG PER TABLET    Take 1 tablet by mouth every 6 hours as needed for Pain for up to 3 days. LIDOCAINE (LIDODERM) 5 %    Place 1 patch onto the skin daily 12 hours on, 12 hours off. DISCONTINUED MEDICATIONS:  Discontinued Medications    CYCLOBENZAPRINE (FLEXERIL) 10 MG TABLET    TAKE 1 TABLET BY MOUTH THREE TIMES A DAY AS NEEDED FOR MUSCLE SPASMS    CYCLOBENZAPRINE (FLEXERIL) 10 MG TABLET    TAKE 1 TABLET BY MOUTH THREE TIMES DAILY AS NEEDED FOR MUSCLE SPASMS    LIDOCAINE (LIDODERM) 5 %    Place 1 patch onto the skin daily 12 hours on, 12 hours off.               (Please note that portions of this note were completed with a voice recognition program.  Efforts were made to edit the dictations but occasionally words are mis-transcribed.)    Allyson Hurley PA-C (electronically signed)           Alli Weinberg Brianda Espinal PA-C  02/23/20 CARLITO Conte  02/23/20 1405

## 2020-02-24 LAB
EKG ATRIAL RATE: 89 BPM
EKG DIAGNOSIS: NORMAL
EKG P AXIS: 49 DEGREES
EKG P-R INTERVAL: 154 MS
EKG Q-T INTERVAL: 402 MS
EKG QRS DURATION: 100 MS
EKG QTC CALCULATION (BAZETT): 489 MS
EKG R AXIS: -42 DEGREES
EKG T AXIS: 120 DEGREES
EKG VENTRICULAR RATE: 89 BPM

## 2020-02-24 PROCEDURE — 93010 ELECTROCARDIOGRAM REPORT: CPT | Performed by: INTERNAL MEDICINE

## 2020-02-26 ENCOUNTER — HOSPITAL ENCOUNTER (OUTPATIENT)
Dept: GENERAL RADIOLOGY | Age: 60
Discharge: HOME | End: 2020-02-26
Payer: MEDICAID

## 2020-02-26 ENCOUNTER — OFFICE VISIT (OUTPATIENT)
Dept: FAMILY MEDICINE CLINIC | Age: 60
End: 2020-02-26
Payer: MEDICAID

## 2020-02-26 ENCOUNTER — HOSPITAL ENCOUNTER (OUTPATIENT)
Age: 60
Discharge: HOME | End: 2020-02-26
Payer: MEDICAID

## 2020-02-26 VITALS
OXYGEN SATURATION: 96 % | BODY MASS INDEX: 35.19 KG/M2 | HEIGHT: 76 IN | SYSTOLIC BLOOD PRESSURE: 132 MMHG | TEMPERATURE: 98.3 F | HEART RATE: 100 BPM | RESPIRATION RATE: 12 BRPM | DIASTOLIC BLOOD PRESSURE: 89 MMHG | WEIGHT: 289 LBS

## 2020-02-26 DIAGNOSIS — E11.21 TYPE 2 DIABETES MELLITUS WITH DIABETIC NEPHROPATHY, WITHOUT LONG-TERM CURRENT USE OF INSULIN (HCC): ICD-10-CM

## 2020-02-26 DIAGNOSIS — N18.30 CHRONIC RENAL IMPAIRMENT, STAGE 3 (MODERATE) (HCC): ICD-10-CM

## 2020-02-26 LAB
A/G RATIO: 2 (ref 1.1–2.2)
ALBUMIN SERPL-MCNC: 4.3 G/DL (ref 3.4–5)
ALP BLD-CCNC: 82 U/L (ref 40–129)
ALT SERPL-CCNC: 22 U/L (ref 10–40)
ANION GAP SERPL CALCULATED.3IONS-SCNC: 12 MMOL/L (ref 3–16)
AST SERPL-CCNC: 18 U/L (ref 15–37)
BILIRUB SERPL-MCNC: <0.2 MG/DL (ref 0–1)
BUN BLDV-MCNC: 19 MG/DL (ref 7–20)
CALCIUM SERPL-MCNC: 8.6 MG/DL (ref 8.3–10.6)
CHLORIDE BLD-SCNC: 104 MMOL/L (ref 99–110)
CHOLESTEROL, TOTAL: 173 MG/DL (ref 0–199)
CO2: 27 MMOL/L (ref 21–32)
CREAT SERPL-MCNC: 2.3 MG/DL (ref 0.8–1.3)
ESTIMATED AVERAGE GLUCOSE: 142.7 MG/DL
GFR AFRICAN AMERICAN: 35
GFR NON-AFRICAN AMERICAN: 29
GLOBULIN: 2.1 G/DL
GLUCOSE BLD-MCNC: 138 MG/DL (ref 70–99)
HBA1C MFR BLD: 6.6 %
HDLC SERPL-MCNC: 60 MG/DL (ref 40–60)
LDL CHOLESTEROL CALCULATED: 89 MG/DL
POTASSIUM SERPL-SCNC: 3.7 MMOL/L (ref 3.5–5.1)
SODIUM BLD-SCNC: 143 MMOL/L (ref 136–145)
TOTAL PROTEIN: 6.4 G/DL (ref 6.4–8.2)
TRIGL SERPL-MCNC: 121 MG/DL (ref 0–150)
VLDLC SERPL CALC-MCNC: 24 MG/DL

## 2020-02-26 PROCEDURE — G8427 DOCREV CUR MEDS BY ELIG CLIN: HCPCS | Performed by: FAMILY MEDICINE

## 2020-02-26 PROCEDURE — G8484 FLU IMMUNIZE NO ADMIN: HCPCS | Performed by: FAMILY MEDICINE

## 2020-02-26 PROCEDURE — 72100 X-RAY EXAM L-S SPINE 2/3 VWS: CPT

## 2020-02-26 PROCEDURE — 99214 OFFICE O/P EST MOD 30 MIN: CPT | Performed by: FAMILY MEDICINE

## 2020-02-26 PROCEDURE — 1036F TOBACCO NON-USER: CPT | Performed by: FAMILY MEDICINE

## 2020-02-26 PROCEDURE — 2022F DILAT RTA XM EVC RTNOPTHY: CPT | Performed by: FAMILY MEDICINE

## 2020-02-26 PROCEDURE — G8417 CALC BMI ABV UP PARAM F/U: HCPCS | Performed by: FAMILY MEDICINE

## 2020-02-26 PROCEDURE — 3046F HEMOGLOBIN A1C LEVEL >9.0%: CPT | Performed by: FAMILY MEDICINE

## 2020-02-26 PROCEDURE — 3017F COLORECTAL CA SCREEN DOC REV: CPT | Performed by: FAMILY MEDICINE

## 2020-02-26 RX ORDER — OXYCODONE HYDROCHLORIDE AND ACETAMINOPHEN 5; 325 MG/1; MG/1
1 TABLET ORAL EVERY 8 HOURS PRN
Qty: 90 TABLET | Refills: 0 | Status: SHIPPED | OUTPATIENT
Start: 2020-02-26 | End: 2020-07-19 | Stop reason: SDUPTHER

## 2020-02-26 ASSESSMENT — PATIENT HEALTH QUESTIONNAIRE - PHQ9
SUM OF ALL RESPONSES TO PHQ QUESTIONS 1-9: 0
2. FEELING DOWN, DEPRESSED OR HOPELESS: 0
1. LITTLE INTEREST OR PLEASURE IN DOING THINGS: 0
SUM OF ALL RESPONSES TO PHQ9 QUESTIONS 1 & 2: 0
SUM OF ALL RESPONSES TO PHQ QUESTIONS 1-9: 0

## 2020-02-26 NOTE — PROGRESS NOTES
Here for f/u of some persistent lower back pain and side pain. Pt was evaluated in ER a few days ago due to some increased flank and lower back. Pt went over to [facility], and was evaluated, did have some xrays done and CT abd/pelvis, as well as CXR. xrays looked normal and CT was fine with exception of diverticulosis. Of note, his creatinine level was a little bit higher than prior at 2.2. Pt is not having any urinary sx, no hesitancy, stream seems ok and no change in urine output. Pt did see ortho in the past for shoulder pain, that was a few years ago. Pt does want to get back in to see him, but he is traveling frequently for work. Pt is in [place], back and forth but will be down there for up to 6 weeks. Pt has a new job as he was let go from prior job, and is working with new company now. Pt does enjoy the new job, but at this time is traveling frequently. Pt here for follow up of blood pressure. Pt states doing great with adherence to therapy and feels well. No issues of chest pain, shortness of breath. No vision changes, headache, swelling in legs. Except as noted above in the history of present illness, the review of systems is  negative for headache, vision changes, chest pain, shortness of breath, abdominal pain, urinary sx, bowel changes. Past medical, surgical, and social history reviewed and updated  Medications and allergies reviewed and updated         O: /89   Pulse 100   Temp 98.3 °F (36.8 °C) (Oral)   Resp 12   Ht 6' 4\" (1.93 m)   Wt 289 lb (131.1 kg)   SpO2 96%   BMI 35.18 kg/m²   GEN: No acute distress, cooperative, well nourished, alert. HEENT: PEERLA, EOMI , normocephalic/atraumatic, nares and oropharynx clear. Mucous membranes normal, Tympanic membranes clear bilaterally. Neck: soft, supple, no thyromegaly, mass, no Lymphadenopathy  CV: Regular rate and rhythm, no murmur, rubs, gallops. No edema.   Resp: Clear to auscultation bilaterally lidocaine (LIDODERM) 5 % Place 1 patch onto the skin daily 12 hours on, 12 hours off. (Patient not taking: Reported on 2/26/2020) 30 patch 0     No current facility-administered medications for this visit. ASSESSMENT / PLAN:    1. Idiopathic chronic gout of multiple sites without tophus  Stable w/o flare of sx  Cont symptomatic tx, allopurinol, colchicine and prn percocet  refills given as below  See CSM  - oxyCODONE-acetaminophen (PERCOCET) 5-325 MG per tablet; Take 1 tablet by mouth every 8 hours as needed for Pain for up to 30 days. Dispense: 90 tablet; Refill: 0    2. Chronic renal impairment, stage 3 (moderate) ([facility])  Increase in creatinine to 2.2 in ER  Will check bloodwork today, likely will need to stop mobic  CT shows normal kidneys  - Comprehensive Metabolic Panel; Future    3. Essential hypertension, benign  blood pressure stable @ goal, controlled    4. Type 2 diabetes mellitus with diabetic nephropathy, without long-term current use of insulin ([facility])  - Hemoglobin A1C; Future  - Lipid Panel; Future    5. Atrial fibrillation, unspecified type ([address]  In NSR  monitor    6. Right flank pain  Suspect related to degenerative disc disease lumbar spine  Check xray, Management pending results. CT negative in ER  - XR LUMBAR SPINE (2-3 VIEWS); Future    7. Chronic right shoulder pain  Encouraged f/u with ortho  Xray in ER normal    8. DDD (degenerative disc disease), lumbar  As above, check xray  Management pending results. - XR LUMBAR SPINE (2-3 VIEWS); Future           Follow-up appointment:   Pending results of bloodwork / xray    Discussed use, benefit, and side effects of all prescribed medications. Barriers to medication compliance addressed. All patient questions answered. Pt voiced understanding. When applicable, patient's outside records were reviewed through [facility]. The patient has signed appropriate paperworks/consents.

## 2020-02-27 ENCOUNTER — TELEPHONE (OUTPATIENT)
Dept: ORTHOPEDIC SURGERY | Age: 60
End: 2020-02-27

## 2020-02-29 NOTE — TELEPHONE ENCOUNTER
Received APPROVAL for oxyCODONE-Acetaminophen 5-325MG tablets from 02/28/2020 through 05/28/2020. Approval letter attached. Please advise patient. Thank you.

## 2020-03-26 RX ORDER — BUTALBITAL, ACETAMINOPHEN AND CAFFEINE 50; 325; 40 MG/1; MG/1; MG/1
1 TABLET ORAL EVERY 6 HOURS PRN
Qty: 30 TABLET | Refills: 1 | Status: SHIPPED | OUTPATIENT
Start: 2020-03-26 | End: 2020-05-09

## 2020-04-21 ENCOUNTER — TELEPHONE (OUTPATIENT)
Dept: FAMILY MEDICINE CLINIC | Age: 60
End: 2020-04-21

## 2020-04-24 ENCOUNTER — NURSE TRIAGE (OUTPATIENT)
Dept: OTHER | Facility: CLINIC | Age: 60
End: 2020-04-24

## 2020-04-24 NOTE — TELEPHONE ENCOUNTER
Reason for Disposition   Caller has cancelled the call before the first contact    Protocols used: NO CONTACT OR DUPLICATE CONTACT CALL-ADULT-    Received call from [facility]. [facility] called to have me triage patient who is in [facility] with abdominal pain. Caller was not on the line when transferred. Attempted to return call to patient with no answer. Call soft transferred to [address] to schedule appointment. Please do not reply to the triage nurse through this encounter. Any subsequent communication should be directly with the patient.

## 2020-05-07 ENCOUNTER — VIRTUAL VISIT (OUTPATIENT)
Dept: FAMILY MEDICINE CLINIC | Age: 60
End: 2020-05-07
Payer: MEDICAID

## 2020-05-07 PROCEDURE — 3044F HG A1C LEVEL LT 7.0%: CPT | Performed by: FAMILY MEDICINE

## 2020-05-07 PROCEDURE — 99214 OFFICE O/P EST MOD 30 MIN: CPT | Performed by: FAMILY MEDICINE

## 2020-05-07 PROCEDURE — 3017F COLORECTAL CA SCREEN DOC REV: CPT | Performed by: FAMILY MEDICINE

## 2020-05-07 PROCEDURE — 2022F DILAT RTA XM EVC RTNOPTHY: CPT | Performed by: FAMILY MEDICINE

## 2020-05-07 PROCEDURE — G8427 DOCREV CUR MEDS BY ELIG CLIN: HCPCS | Performed by: FAMILY MEDICINE

## 2020-05-07 RX ORDER — OXYCODONE HYDROCHLORIDE AND ACETAMINOPHEN 5; 325 MG/1; MG/1
1 TABLET ORAL EVERY 6 HOURS PRN
Qty: 90 TABLET | Refills: 0 | Status: SHIPPED | OUTPATIENT
Start: 2020-05-07 | End: 2020-09-12 | Stop reason: SDUPTHER

## 2020-05-07 NOTE — PROGRESS NOTES
Alcohol/week: 10.0 standard drinks     Types: 12 Standard drinks or equivalent per week     Comment: occ    Drug use: No        Current Outpatient Medications   Medication Sig Dispense Refill    oxyCODONE-acetaminophen (PERCOCET) 5-325 MG per tablet Take 1 tablet by mouth every 6 hours as needed for Pain for up to 30 days. 90 tablet 0    butalbital-acetaminophen-caffeine (FIORICET, ESGIC) -40 MG per tablet TAKE 1 TABLET BY MOUTH EVERY 6 HOURS AS NEEDED FOR HEADACHES 30 tablet 1    allopurinol (ZYLOPRIM) 300 MG tablet TAKE 1 TABLET BY MOUTH DAILY 30 tablet 5    cyclobenzaprine (FLEXERIL) 10 MG tablet Take 1 tablet by mouth 3 times daily as needed for Muscle spasms 20 tablet 0    lidocaine (LIDODERM) 5 % Place 1 patch onto the skin daily 12 hours on, 12 hours off. (Patient not taking: Reported on 2/26/2020) 30 patch 0    losartan (COZAAR) 100 MG tablet Take 1 tablet by mouth daily 30 tablet 5    carvedilol (COREG) 6.25 MG tablet Take 1 tablet by mouth 2 times daily 60 tablet 5    NIFEdipine (ADALAT CC) 90 MG extended release tablet Take 1 tablet by mouth daily 30 tablet 5    terazosin (HYTRIN) 2 MG capsule TAKE 1 CAPSULE BY MOUTH EVERY EVENING 30 capsule 5    atorvastatin (LIPITOR) 20 MG tablet TAKE 1 TABLET BY MOUTH EVERY DAY 30 tablet 5    colchicine (COLCRYS) 0.6 MG tablet Take 1 tablet by mouth daily as needed for Pain 30 tablet 5    omeprazole (PRILOSEC) 40 MG delayed release capsule TAKE 1 CAPSULE BY MOUTH DAILY 30 capsule 5    Elastic Bandages & Supports (TENNIS ELBOW STRAP/SUPPORT PAD) MISC 1 Units by Does not apply route daily 1 each 0    hydrocortisone (ANUSOL-HC) 2.5 % rectal cream Place rectally 2 times daily. 1 Tube 2    aspirin 81 MG tablet Take 81 mg by mouth daily       No current facility-administered medications for this visit.          No Known Allergies     PHYSICAL EXAMINATION:    All boxes checked are findings on exam, empty boxes are negative or not evaluated during the examination  Limitation in exam due to use of video conferencing software, virtual visits    Vital Signs: (As obtained by patient/caregiver or practitioner observation)  BP (!) 142/82   Pulse 72   Temp 97.4 °F (36.3 °C)   Resp 12      Constitutional: [x] Appears well-developed and well-nourished [x] No apparent distress      [] Abnormal-   Mental status  [x] Alert and awake  [x] Oriented to person/place/time []Able to follow commands      Eyes:  EOM    []  Normal  [] Abnormal-  Sclera  []  Normal  [] Abnormal -         Discharge []  None visible  [] Abnormal -    HENT:   [x] Normocephalic, atraumatic. [] Abnormal   [] Mouth/Throat: Mucous membranes are moist.     External Ears [] Normal  [] Abnormal-     Neck: [x] No visualized mass     Pulmonary/Chest: [x] Respiratory effort normal.  [x] No visualized signs of difficulty breathing or respiratory distress        [] Abnormal-      Musculoskeletal:   [] Normal gait with no signs of ataxia         [] Normal range of motion of neck        [] Abnormal-       Neurological:        [] No Facial Asymmetry (Cranial nerve 7 motor function) (limited exam to video visit)          [] No gaze palsy        [] Abnormal-         Skin:        [] No significant exanthematous lesions or discoloration noted on facial skin         [] Abnormal-            Psychiatric:       [x] Normal Affect [x] No Hallucinations        [] Abnormal-     Other pertinent observable physical exam findings-     Due to this being a TeleHealth encounter, evaluation of the following organ systems is limited: Vitals/Constitutional/EENT/Resp/CV/GI//MS/Neuro/Skin/Heme-Lymph-Imm. ASSESSMENT/PLAN:      1. Chronic renal impairment, stage 3 (moderate) (HCC)  Reviewed results of nephrology consult  Appreciate input  Hold all NSAIDs   Additional bloodwork/imaging ordered  Management pending results.      2. Type 2 diabetes mellitus with diabetic nephropathy, without long-term current use of insulin

## 2020-05-08 VITALS
DIASTOLIC BLOOD PRESSURE: 82 MMHG | SYSTOLIC BLOOD PRESSURE: 142 MMHG | RESPIRATION RATE: 12 BRPM | TEMPERATURE: 97.4 F | HEART RATE: 72 BPM

## 2020-05-09 ENCOUNTER — HOSPITAL ENCOUNTER (EMERGENCY)
Age: 60
Discharge: HOME | End: 2020-05-09
Attending: EMERGENCY MEDICINE
Payer: COMMERCIAL

## 2020-05-09 ENCOUNTER — APPOINTMENT (OUTPATIENT)
Dept: GENERAL RADIOLOGY | Age: 60
End: 2020-05-09
Payer: COMMERCIAL

## 2020-05-09 VITALS
HEART RATE: 92 BPM | TEMPERATURE: 98.1 F | SYSTOLIC BLOOD PRESSURE: 169 MMHG | DIASTOLIC BLOOD PRESSURE: 108 MMHG | BODY MASS INDEX: 34.69 KG/M2 | WEIGHT: 285 LBS | OXYGEN SATURATION: 96 % | RESPIRATION RATE: 16 BRPM

## 2020-05-09 PROCEDURE — 99283 EMERGENCY DEPT VISIT LOW MDM: CPT

## 2020-05-09 PROCEDURE — 73030 X-RAY EXAM OF SHOULDER: CPT

## 2020-05-09 RX ORDER — HYDROCODONE BITARTRATE AND ACETAMINOPHEN 5; 325 MG/1; MG/1
1 TABLET ORAL EVERY 6 HOURS PRN
Qty: 10 TABLET | Refills: 0 | Status: SHIPPED | OUTPATIENT
Start: 2020-05-09 | End: 2020-05-12

## 2020-05-09 RX ORDER — COLCHICINE 0.6 MG/1
0.6 TABLET ORAL DAILY PRN
Qty: 30 TABLET | Refills: 0 | Status: SHIPPED | OUTPATIENT
Start: 2020-05-09 | End: 2021-05-28 | Stop reason: ALTCHOICE

## 2020-05-09 ASSESSMENT — PAIN DESCRIPTION - ORIENTATION: ORIENTATION: LEFT

## 2020-05-09 ASSESSMENT — PAIN SCALES - GENERAL: PAINLEVEL_OUTOF10: 10

## 2020-05-09 ASSESSMENT — PAIN DESCRIPTION - LOCATION: LOCATION: SHOULDER

## 2020-07-06 ENCOUNTER — HOSPITAL ENCOUNTER (OUTPATIENT)
Age: 60
Discharge: HOME | End: 2020-07-06
Payer: COMMERCIAL

## 2020-07-06 ENCOUNTER — HOSPITAL ENCOUNTER (OUTPATIENT)
Dept: ULTRASOUND IMAGING | Age: 60
Discharge: HOME | End: 2020-07-06
Payer: COMMERCIAL

## 2020-07-06 LAB
ALBUMIN SERPL-MCNC: 4 G/DL (ref 3.4–5)
ANION GAP SERPL CALCULATED.3IONS-SCNC: 15 MMOL/L (ref 3–16)
BUN BLDV-MCNC: 33 MG/DL (ref 7–20)
CALCIUM SERPL-MCNC: 7.7 MG/DL (ref 8.3–10.6)
CHLORIDE BLD-SCNC: 102 MMOL/L (ref 99–110)
CO2: 24 MMOL/L (ref 21–32)
CREAT SERPL-MCNC: 3.6 MG/DL (ref 0.8–1.3)
CREATININE URINE: 204.1 MG/DL (ref 39–259)
GFR AFRICAN AMERICAN: 21
GFR NON-AFRICAN AMERICAN: 17
GLUCOSE BLD-MCNC: 101 MG/DL (ref 70–99)
MICROALBUMIN UR-MCNC: 227.1 MG/DL
MICROALBUMIN/CREAT UR-RTO: 1112.7 MG/G (ref 0–30)
PHOSPHORUS: 3.6 MG/DL (ref 2.5–4.9)
POTASSIUM SERPL-SCNC: 3.2 MMOL/L (ref 3.5–5.1)
SODIUM BLD-SCNC: 141 MMOL/L (ref 136–145)
URIC ACID, SERUM: 6.6 MG/DL (ref 3.5–7.2)

## 2020-07-06 PROCEDURE — 82570 ASSAY OF URINE CREATININE: CPT

## 2020-07-06 PROCEDURE — 83883 ASSAY NEPHELOMETRY NOT SPEC: CPT

## 2020-07-06 PROCEDURE — 76770 US EXAM ABDO BACK WALL COMP: CPT

## 2020-07-06 PROCEDURE — 36415 COLL VENOUS BLD VENIPUNCTURE: CPT

## 2020-07-06 PROCEDURE — 84244 ASSAY OF RENIN: CPT

## 2020-07-06 PROCEDURE — 84550 ASSAY OF BLOOD/URIC ACID: CPT

## 2020-07-06 PROCEDURE — 80069 RENAL FUNCTION PANEL: CPT

## 2020-07-06 PROCEDURE — 82088 ASSAY OF ALDOSTERONE: CPT

## 2020-07-06 PROCEDURE — 82043 UR ALBUMIN QUANTITATIVE: CPT

## 2020-07-07 LAB
KAPPA, FREE LIGHT CHAINS, SERUM: 44.24 MG/L (ref 3.3–19.4)
KAPPA/LAMBDA RATIO: 1.9 (ref 0.26–1.65)
KAPPA/LAMBDA TEST COMMENT: ABNORMAL
LAMBDA, FREE LIGHT CHAINS, SERUM: 23.26 MG/L (ref 5.71–26.3)

## 2020-07-08 LAB — ALDOSTERONE: 7.5 NG/DL

## 2020-07-09 LAB — RENIN ACTIVITY: 2.9 NG/ML/HR

## 2020-07-17 ENCOUNTER — TELEPHONE (OUTPATIENT)
Dept: FAMILY MEDICINE CLINIC | Age: 60
End: 2020-07-17

## 2020-07-17 NOTE — TELEPHONE ENCOUNTER
Requested Prescriptions     Pending Prescriptions Disp Refills    oxyCODONE-acetaminophen (PERCOCET) 5-325 MG per tablet 90 tablet 0     Sig: Take 1 tablet by mouth every 8 hours as needed for Pain for up to 30 days.      Last ov 5/7/20

## 2020-07-17 NOTE — TELEPHONE ENCOUNTER
Medication name:oxyCODONE-acetaminophen (PERCOCET) 5-325 MG per tablet       Medication dose:  Frequency:Take 1 tablet by mouth every 6 hours as needed for Pain for up to 30 days  Quantity[de-identified] 90 tablet  Pharmacy name:[facility] [address] Marilyn number:  Last OV:  Last Labs:

## 2020-07-19 RX ORDER — OXYCODONE HYDROCHLORIDE AND ACETAMINOPHEN 5; 325 MG/1; MG/1
1 TABLET ORAL EVERY 8 HOURS PRN
Qty: 90 TABLET | Refills: 0 | Status: SHIPPED | OUTPATIENT
Start: 2020-07-19 | End: 2020-08-18

## 2020-07-23 ENCOUNTER — TELEPHONE (OUTPATIENT)
Dept: FAMILY MEDICINE CLINIC | Age: 60
End: 2020-07-23

## 2020-07-23 NOTE — TELEPHONE ENCOUNTER
----- Message from Bao Marte sent at 7/23/2020 11:49 AM EDT -----  Subject: Message to Provider    QUESTIONS  Information for Provider? Patient would like to discuss urgent results   from the kidney doctor. ---------------------------------------------------------------------------  --------------  Cliff ENGLE  What is the best way for the office to contact you? OK to leave message on   voicemail  Preferred Call Back Phone Number? [phone]  ---------------------------------------------------------------------------  --------------  SCRIPT ANSWERS  Relationship to Patient? Other  Representative Name? Alexis Oquendo  Is the Representative on the appropriate HIPAA document in Epic?  Yes

## 2020-07-23 NOTE — TELEPHONE ENCOUNTER
Please advise    Pt is wanting pcp to call and discuss urgent results from Kidney doctor. (Charlie). Results are in pt chart from 7/6/20. Can you call pt or would you like him to schedule an appt.  First available is  7/27/20

## 2020-07-24 ENCOUNTER — TELEPHONE (OUTPATIENT)
Dept: FAMILY MEDICINE CLINIC | Age: 60
End: 2020-07-24

## 2020-07-24 NOTE — TELEPHONE ENCOUNTER
----- Message from Ariadne Baeza sent at 7/24/2020  9:31 AM EDT -----  Subject: Message to Provider    QUESTIONS  Information for Provider? wanting to speak with pcp regarding kidney   issues  ---------------------------------------------------------------------------  --------------  CALL BACK INFO  What is the best way for the office to contact you? OK to leave message on   voicemail  Preferred Call Back Phone Number? [phone]  ---------------------------------------------------------------------------  --------------  SCRIPT ANSWERS  Relationship to Patient? Other  Representative Name? wanda  Is the Representative on the appropriate HIPAA document in Epic?  Yes

## 2020-07-27 NOTE — TELEPHONE ENCOUNTER
Amanda Sandoval said Kelley Gustafson has lost about 30 pounds in the last 3 months. She is really concerned about him. please call alea [phone]

## 2020-08-25 RX ORDER — TERAZOSIN 5 MG/1
5 CAPSULE ORAL NIGHTLY
Qty: 60 CAPSULE | Refills: 5 | Status: SHIPPED | OUTPATIENT
Start: 2020-08-25 | End: 2020-09-04 | Stop reason: SDUPTHER

## 2020-08-26 RX ORDER — CARVEDILOL 12.5 MG/1
12.5 TABLET ORAL 2 TIMES DAILY
Qty: 60 TABLET | Refills: 5 | Status: SHIPPED | OUTPATIENT
Start: 2020-08-26 | End: 2020-09-04 | Stop reason: SDUPTHER

## 2020-08-26 RX ORDER — FUROSEMIDE 20 MG/1
20 TABLET ORAL DAILY
Qty: 60 TABLET | Refills: 3 | Status: SHIPPED | OUTPATIENT
Start: 2020-08-26 | End: 2020-08-26

## 2020-08-26 RX ORDER — CARVEDILOL 12.5 MG/1
12.5 TABLET ORAL 2 TIMES DAILY
Qty: 60 TABLET | Refills: 5 | Status: SHIPPED | OUTPATIENT
Start: 2020-08-26 | End: 2020-08-26

## 2020-08-26 RX ORDER — FUROSEMIDE 20 MG/1
20 TABLET ORAL DAILY
Qty: 60 TABLET | Refills: 3 | Status: SHIPPED | OUTPATIENT
Start: 2020-08-26 | End: 2020-08-27 | Stop reason: SDUPTHER

## 2020-08-27 RX ORDER — NIFEDIPINE 90 MG/1
90 TABLET, FILM COATED, EXTENDED RELEASE ORAL DAILY
Qty: 30 TABLET | Refills: 5 | Status: SHIPPED | OUTPATIENT
Start: 2020-08-27 | End: 2020-09-04 | Stop reason: SDUPTHER

## 2020-08-27 RX ORDER — FUROSEMIDE 20 MG/1
20 TABLET ORAL DAILY
Qty: 60 TABLET | Refills: 3 | Status: SHIPPED | OUTPATIENT
Start: 2020-08-27 | End: 2020-09-04 | Stop reason: ALTCHOICE

## 2020-08-27 RX ORDER — LOSARTAN POTASSIUM 100 MG/1
100 TABLET ORAL DAILY
Qty: 30 TABLET | Refills: 5 | Status: SHIPPED | OUTPATIENT
Start: 2020-08-27 | End: 2020-09-04 | Stop reason: SDUPTHER

## 2020-09-04 ENCOUNTER — OFFICE VISIT (OUTPATIENT)
Dept: FAMILY MEDICINE CLINIC | Age: 60
End: 2020-09-04
Payer: COMMERCIAL

## 2020-09-04 VITALS
HEIGHT: 76 IN | BODY MASS INDEX: 34.58 KG/M2 | OXYGEN SATURATION: 97 % | DIASTOLIC BLOOD PRESSURE: 106 MMHG | HEART RATE: 93 BPM | RESPIRATION RATE: 18 BRPM | WEIGHT: 284 LBS | TEMPERATURE: 97.6 F | SYSTOLIC BLOOD PRESSURE: 168 MMHG

## 2020-09-04 DIAGNOSIS — N18.4 CKD (CHRONIC KIDNEY DISEASE) STAGE 4, GFR 15-29 ML/MIN (HCC): ICD-10-CM

## 2020-09-04 DIAGNOSIS — I10 ESSENTIAL HYPERTENSION, BENIGN: ICD-10-CM

## 2020-09-04 DIAGNOSIS — E11.21 TYPE 2 DIABETES MELLITUS WITH DIABETIC NEPHROPATHY, WITHOUT LONG-TERM CURRENT USE OF INSULIN (HCC): ICD-10-CM

## 2020-09-04 DIAGNOSIS — R97.20 ELEVATED PSA: ICD-10-CM

## 2020-09-04 DIAGNOSIS — N18.30 CHRONIC RENAL IMPAIRMENT, STAGE 3 (MODERATE) (HCC): ICD-10-CM

## 2020-09-04 LAB
A/G RATIO: 2.2 (ref 1.1–2.2)
ALBUMIN SERPL-MCNC: 4.1 G/DL (ref 3.4–5)
ALP BLD-CCNC: 85 U/L (ref 40–129)
ALT SERPL-CCNC: 16 U/L (ref 10–40)
ANION GAP SERPL CALCULATED.3IONS-SCNC: 13 MMOL/L (ref 3–16)
AST SERPL-CCNC: 20 U/L (ref 15–37)
BILIRUB SERPL-MCNC: <0.2 MG/DL (ref 0–1)
BUN BLDV-MCNC: 29 MG/DL (ref 7–20)
CALCIUM SERPL-MCNC: 8 MG/DL (ref 8.3–10.6)
CHLORIDE BLD-SCNC: 107 MMOL/L (ref 99–110)
CHOLESTEROL, TOTAL: 172 MG/DL (ref 0–199)
CO2: 23 MMOL/L (ref 21–32)
CREAT SERPL-MCNC: 3.8 MG/DL (ref 0.8–1.3)
GFR AFRICAN AMERICAN: 20
GFR NON-AFRICAN AMERICAN: 16
GLOBULIN: 1.9 G/DL
GLUCOSE BLD-MCNC: 111 MG/DL (ref 70–99)
HCT VFR BLD CALC: 33.4 % (ref 40.5–52.5)
HDLC SERPL-MCNC: 47 MG/DL (ref 40–60)
HEMOGLOBIN: 11.1 G/DL (ref 13.5–17.5)
LDL CHOLESTEROL CALCULATED: 92 MG/DL
MCH RBC QN AUTO: 28 PG (ref 26–34)
MCHC RBC AUTO-ENTMCNC: 33.1 G/DL (ref 31–36)
MCV RBC AUTO: 84.6 FL (ref 80–100)
PDW BLD-RTO: 14.6 % (ref 12.4–15.4)
PHOSPHORUS: 3.5 MG/DL (ref 2.5–4.9)
PLATELET # BLD: 154 K/UL (ref 135–450)
PMV BLD AUTO: 8.9 FL (ref 5–10.5)
POTASSIUM SERPL-SCNC: 4.1 MMOL/L (ref 3.5–5.1)
RBC # BLD: 3.95 M/UL (ref 4.2–5.9)
SODIUM BLD-SCNC: 143 MMOL/L (ref 136–145)
TOTAL PROTEIN: 6 G/DL (ref 6.4–8.2)
TRIGL SERPL-MCNC: 164 MG/DL (ref 0–150)
VLDLC SERPL CALC-MCNC: 33 MG/DL
WBC # BLD: 3.9 K/UL (ref 4–11)

## 2020-09-04 PROCEDURE — 99214 OFFICE O/P EST MOD 30 MIN: CPT | Performed by: FAMILY MEDICINE

## 2020-09-04 PROCEDURE — 2022F DILAT RTA XM EVC RTNOPTHY: CPT | Performed by: FAMILY MEDICINE

## 2020-09-04 PROCEDURE — G8427 DOCREV CUR MEDS BY ELIG CLIN: HCPCS | Performed by: FAMILY MEDICINE

## 2020-09-04 PROCEDURE — 3017F COLORECTAL CA SCREEN DOC REV: CPT | Performed by: FAMILY MEDICINE

## 2020-09-04 PROCEDURE — 1036F TOBACCO NON-USER: CPT | Performed by: FAMILY MEDICINE

## 2020-09-04 PROCEDURE — 3044F HG A1C LEVEL LT 7.0%: CPT | Performed by: FAMILY MEDICINE

## 2020-09-04 PROCEDURE — G8417 CALC BMI ABV UP PARAM F/U: HCPCS | Performed by: FAMILY MEDICINE

## 2020-09-04 NOTE — PROGRESS NOTES
Here for f/u and recheck of blood pressure, CRI. Pt states that there has been a lot of stress at work, works for a [employer] and it is challenging. Pt does feel he is micromanaged significantly, and it is very stressful. Pt does feel that he can manage at this time. Pt was doing some counseling with his wife and that was helpful. Pt does struggle due to frequent travel    Pt following with dr. Latesha Weston for issues related to CRI and progressive increase in creatinine. Pt did have formal evaluation with them and most recent testing was 7/2020. Pt is doing ok to stay adherent to his blood pressure meds. Pt is going to see dr. Lucas Bentley today for f/u. Pt does urinate ok, does urinate more frequently at night. No decrease UOP. Pt here for follow up of blood pressure. Pt states doing great with adherence to therapy and feels well. No issues of chest pain, shortness of breath. No vision changes, headache, swelling in legs. Except as noted above in the history of present illness, the review of systems is  negative for headache, vision changes, chest pain, shortness of breath, abdominal pain, urinary sx, bowel changes. Past medical, surgical, and social history reviewed and updated  Medications and allergies reviewed and updated         O: BP (!) 168/106 (Site: Right Upper Arm, Position: Sitting, Cuff Size: Medium Adult)   Pulse 93   Temp 97.6 °F (36.4 °C) (Temporal)   Resp 18   Ht 6' 4\" (1.93 m)   Wt 284 lb (128.8 kg)   SpO2 97%   BMI 34.57 kg/m²   GEN: No acute distress, cooperative, well nourished, alert. HEENT: PEERLA, EOMI , normocephalic/atraumatic, nares and oropharynx clear. Mucous membranes normal, Tympanic membranes clear bilaterally. Neck: soft, supple, no thyromegaly, mass, no Lymphadenopathy  CV: Regular rate and rhythm, no murmur, rubs, gallops. No edema. Resp: Clear to auscultation bilaterally good air entry bilaterally  No crackles, wheeze.  Breathing order in may, has not set up  reprinted    5. DDD (degenerative disc disease), lumbar  Stable w/o flare    6. Chronic abdominal pain  Negative CT scan 5/2020  Refer GI for f/u and colonoscopy   - KATINA Coffey MD, Gastroenterology, [facility]    7. Urinary frequency  psa trended up  Was referred to urology but didn't set up  Check f/u psa and consider referral accordingly  Management pending results. 8. Elevated PSA  - PSA, Prostatic Specific Antigen; Future    9. Screen for colon cancer  - KATINA Coffey MD, Gastroenterology, [facility]           Follow-up appointment:   Pending bloodwork eval    Discussed use, benefit, and side effects of all prescribed medications. Barriers to medication compliance addressed. All patient questions answered. Pt voiced understanding. When applicable, patient's outside records were reviewed through [facility]. The patient has signed appropriate paperworks/consents.

## 2020-09-05 ENCOUNTER — TELEPHONE (OUTPATIENT)
Dept: FAMILY MEDICINE CLINIC | Age: 60
End: 2020-09-05

## 2020-09-05 LAB
CREATININE URINE: 183.3 MG/DL (ref 39–259)
ESTIMATED AVERAGE GLUCOSE: 157.1 MG/DL
HBA1C MFR BLD: 7.1 %
MICROALBUMIN UR-MCNC: 250.7 MG/DL
MICROALBUMIN/CREAT UR-RTO: 1367.7 MG/G (ref 0–30)
PROSTATE SPECIFIC ANTIGEN: 9.99 NG/ML (ref 0–4)

## 2020-09-05 NOTE — TELEPHONE ENCOUNTER
Wife want mri of shoulder changed to stat so he can get this done on weekend  (out of town during week)--canceled your test and reordered(not sure it will be done anyway)

## 2020-09-08 ENCOUNTER — TELEPHONE (OUTPATIENT)
Dept: FAMILY MEDICINE CLINIC | Age: 60
End: 2020-09-08

## 2020-09-08 DIAGNOSIS — M25.511 CHRONIC RIGHT SHOULDER PAIN: Primary | ICD-10-CM

## 2020-09-08 DIAGNOSIS — G89.29 CHRONIC RIGHT SHOULDER PAIN: Primary | ICD-10-CM

## 2020-09-11 ENCOUNTER — TELEPHONE (OUTPATIENT)
Dept: FAMILY MEDICINE CLINIC | Age: 60
End: 2020-09-11

## 2020-09-11 NOTE — TELEPHONE ENCOUNTER
Requested Prescriptions     Pending Prescriptions Disp Refills    oxyCODONE-acetaminophen (PERCOCET) 5-325 MG per tablet 90 tablet 0     Sig: Take 1 tablet by mouth every 6 hours as needed for Pain for up to 30 days.      Last ov 9/4/20  Last labs 9/4/20

## 2020-09-12 RX ORDER — OXYCODONE HYDROCHLORIDE AND ACETAMINOPHEN 5; 325 MG/1; MG/1
1 TABLET ORAL EVERY 6 HOURS PRN
Qty: 90 TABLET | Refills: 0 | Status: SHIPPED | OUTPATIENT
Start: 2020-09-12 | End: 2020-11-20 | Stop reason: SDUPTHER

## 2020-09-15 ENCOUNTER — TELEPHONE (OUTPATIENT)
Dept: FAMILY MEDICINE CLINIC | Age: 60
End: 2020-09-15

## 2020-09-15 NOTE — TELEPHONE ENCOUNTER
----- Message from Sandy Henley sent at 9/14/2020 11:22 AM EDT -----  Subject: Message to Provider    QUESTIONS  Information for Provider? Patient wife Carlo Han called in and stated that   the MRI was denied. They would like to know what the next steps should be   to ensure that he can get the procedure done. She states that he is in a   lot of pain.   ---------------------------------------------------------------------------  --------------  CALL BACK INFO  What is the best way for the office to contact you? OK to leave message on   voicemail  Preferred Call Back Phone Number? [phone]  ---------------------------------------------------------------------------  --------------  SCRIPT ANSWERS  Relationship to Patient? Other  Representative Name? Talon Laboy   Is the Representative on the appropriate HIPAA document in Epic?  Yes

## 2020-09-15 NOTE — TELEPHONE ENCOUNTER
Pt wife advise pt will be out of town for a month or two. Pt wife was speaking to ins company as we were on the phone. Pt wife also stated she will probably be giving  a call.

## 2020-09-15 NOTE — TELEPHONE ENCOUNTER
Pt wife would like referral to Yun Mondragon in Urology.  Ghanshyam #[phone]  Office #[phone]

## 2020-09-18 ENCOUNTER — TELEPHONE (OUTPATIENT)
Dept: FAMILY MEDICINE CLINIC | Age: 60
End: 2020-09-18

## 2020-09-18 NOTE — TELEPHONE ENCOUNTER
Per patient's wife, patient noticed two days ago that he had multiple bruises on one of his arms. Patient has not fallen. Patient is not taking a blood thinner. Patient started taking nifedipine and carvedilol about a month and a half ago - no other medication changes. No apparent explanation for bruising. Patient could be available for a virtual visit if needed (he is in [facility] currently).      Please contact patient @ # [phone]

## 2020-09-21 RX ORDER — LISINOPRIL 20 MG/1
TABLET ORAL
Qty: 30 TABLET | Refills: 5 | Status: SHIPPED | OUTPATIENT
Start: 2020-09-21 | End: 2021-04-01 | Stop reason: ALTCHOICE

## 2020-10-16 ENCOUNTER — TELEPHONE (OUTPATIENT)
Dept: FAMILY MEDICINE CLINIC | Age: 60
End: 2020-10-16

## 2020-11-18 NOTE — TELEPHONE ENCOUNTER
Patient calling requesting medication refill, please advise.      : 1 tablet  Route: Oral  Frequency: EVERY 6 HOURS PRN for Pain    Note to Pharmacy:    Reduce doses taken as pain becomes manageable         Dispense Quantity: 90 tablet  Refills: 0  Fills remaining: --             Sig: Take 1 tablet by mouth every 6 hours as needed for Pain for up to 30 days.            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

## 2020-11-20 RX ORDER — OXYCODONE HYDROCHLORIDE AND ACETAMINOPHEN 5; 325 MG/1; MG/1
1 TABLET ORAL EVERY 6 HOURS PRN
Qty: 90 TABLET | Refills: 0 | Status: SHIPPED | OUTPATIENT
Start: 2020-11-20 | End: 2021-01-25 | Stop reason: SDUPTHER

## 2020-11-24 ENCOUNTER — TELEPHONE (OUTPATIENT)
Dept: FAMILY MEDICINE CLINIC | Age: 60
End: 2020-11-24

## 2020-11-24 ENCOUNTER — OFFICE VISIT (OUTPATIENT)
Dept: FAMILY MEDICINE CLINIC | Age: 60
End: 2020-11-24
Payer: COMMERCIAL

## 2020-11-24 VITALS
TEMPERATURE: 97 F | OXYGEN SATURATION: 97 % | HEIGHT: 76 IN | HEART RATE: 70 BPM | SYSTOLIC BLOOD PRESSURE: 113 MMHG | BODY MASS INDEX: 35.73 KG/M2 | DIASTOLIC BLOOD PRESSURE: 70 MMHG | WEIGHT: 293.4 LBS

## 2020-11-24 DIAGNOSIS — N18.4 CHRONIC RENAL IMPAIRMENT, STAGE 4 (SEVERE) (HCC): ICD-10-CM

## 2020-11-24 DIAGNOSIS — I10 ESSENTIAL HYPERTENSION, BENIGN: Chronic | ICD-10-CM

## 2020-11-24 DIAGNOSIS — M79.89 LEG SWELLING: ICD-10-CM

## 2020-11-24 LAB
ANION GAP SERPL CALCULATED.3IONS-SCNC: 13 MMOL/L (ref 3–16)
BUN BLDV-MCNC: 38 MG/DL (ref 7–20)
CALCIUM SERPL-MCNC: 8.1 MG/DL (ref 8.3–10.6)
CHLORIDE BLD-SCNC: 103 MMOL/L (ref 99–110)
CO2: 25 MMOL/L (ref 21–32)
CREAT SERPL-MCNC: 3.4 MG/DL (ref 0.8–1.3)
GFR AFRICAN AMERICAN: 22
GFR NON-AFRICAN AMERICAN: 18
GLUCOSE BLD-MCNC: 113 MG/DL (ref 70–99)
HCT VFR BLD CALC: 35 % (ref 40.5–52.5)
HEMOGLOBIN: 11.5 G/DL (ref 13.5–17.5)
MCH RBC QN AUTO: 28.4 PG (ref 26–34)
MCHC RBC AUTO-ENTMCNC: 32.8 G/DL (ref 31–36)
MCV RBC AUTO: 86.5 FL (ref 80–100)
PDW BLD-RTO: 14.5 % (ref 12.4–15.4)
PLATELET # BLD: 155 K/UL (ref 135–450)
PMV BLD AUTO: 8.8 FL (ref 5–10.5)
POTASSIUM SERPL-SCNC: 3.9 MMOL/L (ref 3.5–5.1)
PRO-BNP: 359 PG/ML (ref 0–124)
RBC # BLD: 4.04 M/UL (ref 4.2–5.9)
SODIUM BLD-SCNC: 141 MMOL/L (ref 136–145)
WBC # BLD: 4.4 K/UL (ref 4–11)

## 2020-11-24 PROCEDURE — G8484 FLU IMMUNIZE NO ADMIN: HCPCS | Performed by: FAMILY MEDICINE

## 2020-11-24 PROCEDURE — 1036F TOBACCO NON-USER: CPT | Performed by: FAMILY MEDICINE

## 2020-11-24 PROCEDURE — G8417 CALC BMI ABV UP PARAM F/U: HCPCS | Performed by: FAMILY MEDICINE

## 2020-11-24 PROCEDURE — 3051F HG A1C>EQUAL 7.0%<8.0%: CPT | Performed by: FAMILY MEDICINE

## 2020-11-24 PROCEDURE — 2022F DILAT RTA XM EVC RTNOPTHY: CPT | Performed by: FAMILY MEDICINE

## 2020-11-24 PROCEDURE — 99214 OFFICE O/P EST MOD 30 MIN: CPT | Performed by: FAMILY MEDICINE

## 2020-11-24 PROCEDURE — G8427 DOCREV CUR MEDS BY ELIG CLIN: HCPCS | Performed by: FAMILY MEDICINE

## 2020-11-24 PROCEDURE — 3017F COLORECTAL CA SCREEN DOC REV: CPT | Performed by: FAMILY MEDICINE

## 2020-11-24 NOTE — PROGRESS NOTES
Here for f/u and recheck of some bilateral lower extremities swelling. Pt states that when he is on his feet, sx seem to be worse. Pt is usually sitting in a desk a lot at work, and in the AM is improved. Pt does get some discomfort when they swell. pts tates that in the past few weeks to 1 month, pt did suffer a fall while at work (in [place]). Pt went to urgent care for eval and was evaluated, did have some xrays and was told nothing was injured. Pt went back due to increased swelling and they were not concerned. It is being processed through workers comp and is waiting to see their physician. Pt is UTD on f/u with nephrology. Pts last visit was 9/2020 and the bloodwork showed increase in creaetinine at 3.8. Pt has not had any changes in meds at this time. Pt is currently off the lasix as far as he knows. Breathing seems to be ok, no shortness of breath or wheezing. No PND symptoms. Pt here for follow up of blood pressure. Pt states doing great with adherence to therapy and feels well. No issues of chest pain, shortness of breath. No vision changes, headache, swelling in legs. Except as noted above in the history of present illness, the review of systems is  negative for headache, vision changes, chest pain, shortness of breath, abdominal pain, urinary sx, bowel changes. Past medical, surgical, and social history reviewed and updated  Medications and allergies reviewed and updated         O: /70   Pulse 70   Temp 97 °F (36.1 °C) (Temporal)   Ht 6' 4\" (1.93 m)   Wt 293 lb 6.4 oz (133.1 kg)   SpO2 97%   BMI 35.71 kg/m²   GEN: No acute distress, cooperative, well nourished, alert. HEENT: PEERLA, EOMI , normocephalic/atraumatic, nares and oropharynx clear. Mucous membranes normal, Tympanic membranes clear bilaterally. Neck: soft, supple, no thyromegaly, mass, no Lymphadenopathy  CV: Regular rate and rhythm, no murmur, rubs, gallops. No edema.   Resp: Clear to auscultation bilaterally good air entry bilaterally  No crackles, wheeze. Breathing comfortably. Ext: 1-2+ edema bilateral lower extremities, pitting. Trace discomfort with pressure. No calf pain bilaterally, neg homans. Current Outpatient Medications   Medication Sig Dispense Refill    oxyCODONE-acetaminophen (PERCOCET) 5-325 MG per tablet Take 1 tablet by mouth every 6 hours as needed for Pain for up to 30 days. 90 tablet 0    lisinopril (PRINIVIL;ZESTRIL) 20 MG tablet TAKE 1 TABLET BY MOUTH EVERY DAY 30 tablet 5    losartan (COZAAR) 100 MG tablet Take 1 tablet by mouth daily 30 tablet 5    NIFEdipine (ADALAT CC) 90 MG extended release tablet Take 1 tablet by mouth daily 30 tablet 5    carvedilol (COREG) 12.5 MG tablet Take 1 tablet by mouth 2 times daily 60 tablet 5    terazosin (HYTRIN) 5 MG capsule Take 1 capsule by mouth nightly TAKE 1 CAPSULE BY MOUTH EVERY EVENING 60 capsule 5    omeprazole (PRILOSEC) 40 MG delayed release capsule Take 1 capsule by mouth daily 30 capsule 5    allopurinol (ZYLOPRIM) 300 MG tablet Take 1 tablet by mouth daily 30 tablet 5    furosemide (LASIX) 20 MG tablet Take 1 tablet by mouth daily 60 tablet 3    colchicine (COLCRYS) 0.6 MG tablet Take 1 tablet by mouth daily as needed for Pain 30 tablet 0    atorvastatin (LIPITOR) 20 MG tablet TAKE 1 TABLET BY MOUTH EVERY DAY 30 tablet 5    aspirin 81 MG tablet Take 81 mg by mouth daily       No current facility-administered medications for this visit. ASSESSMENT / PLAN:    1. Leg swelling  Pt states he is off lasix, but list says he is taking. Will check @ home and check bloodwork as below, doppler  Suspect related to CRI vs venous insufficiency  Hold diuretic at this time pending results of bloodwork  Check doppler bilateral lower extremities  - Brain Natriuretic Peptide; Future  - Basic Metabolic Panel; Future  - CBC; Future  - US DOPPLER VENOUS LEGS B/L    2.  Essential hypertension, benign  blood pressure stable, check f/u bloodwork for:  - Brain Natriuretic Peptide; Future  - Basic Metabolic Panel; Future  - CBC; Future    3. Chronic renal impairment, stage 4 (severe) (HCC)  Due f/u  Will call with update on meds he is currently taking  Check bloodwork as below  - Brain Natriuretic Peptide; Future  - Basic Metabolic Panel; Future  - CBC; Future    4. Type 2 diabetes mellitus with diabetic nephropathy, without long-term current use of insulin (HCC)  Stable @ goal    5. Elevated PSA  Has not seen urology, aware need to get set up  PSA was up at almost 10 with last check    6. Chronic right shoulder pain  - MRI SHOULDER RIGHT WO CONTRAST; Future           Follow-up appointment:   Pending bloodwork / imaging    Discussed use, benefit, and side effects of all prescribed medications. Barriers to medication compliance addressed. All patient questions answered. Pt voiced understanding. When applicable, patient's outside records were [facility]. The patient has signed appropriate paperworks/consents.

## 2020-11-24 NOTE — TELEPHONE ENCOUNTER
----- Message from Hao Beltran sent at 11/24/2020  1:51 PM EST -----  Subject: Message to Provider    QUESTIONS  Information for Provider? pt wife Deliliah Councilman is calling in to talk to nurse   to get something resolved she said and needs a call back as soon as   possible [phone] pt works out of town please call to further assist   ---------------------------------------------------------------------------  --------------  [address]  What is the best way for the office to contact you? OK to leave message on   voicemail  Preferred Call Back Phone Number? [phone]  ---------------------------------------------------------------------------  --------------  SCRIPT ANSWERS  Relationship to Patient? Other  Representative Name? wife  Is the Representative on the appropriate HIPAA document in Epic?  Yes

## 2020-11-29 ENCOUNTER — APPOINTMENT (OUTPATIENT)
Dept: CT IMAGING | Age: 60
End: 2020-11-29
Payer: COMMERCIAL

## 2020-11-29 ENCOUNTER — APPOINTMENT (OUTPATIENT)
Dept: GENERAL RADIOLOGY | Age: 60
End: 2020-11-29
Payer: COMMERCIAL

## 2020-11-29 ENCOUNTER — HOSPITAL ENCOUNTER (EMERGENCY)
Age: 60
Discharge: HOME | End: 2020-11-29
Attending: EMERGENCY MEDICINE
Payer: COMMERCIAL

## 2020-11-29 VITALS
HEIGHT: 77 IN | DIASTOLIC BLOOD PRESSURE: 95 MMHG | OXYGEN SATURATION: 96 % | WEIGHT: 300 LBS | BODY MASS INDEX: 35.42 KG/M2 | HEART RATE: 87 BPM | TEMPERATURE: 97.7 F | SYSTOLIC BLOOD PRESSURE: 152 MMHG | RESPIRATION RATE: 21 BRPM

## 2020-11-29 LAB
A/G RATIO: 1.6 (ref 1.1–2.2)
ALBUMIN SERPL-MCNC: 4.4 G/DL (ref 3.4–5)
ALP BLD-CCNC: 97 U/L (ref 40–129)
ALT SERPL-CCNC: 16 U/L (ref 10–40)
ANION GAP SERPL CALCULATED.3IONS-SCNC: 12 MMOL/L (ref 3–16)
AST SERPL-CCNC: 17 U/L (ref 15–37)
BASOPHILS ABSOLUTE: 0 K/UL (ref 0–0.2)
BASOPHILS RELATIVE PERCENT: 0.7 %
BILIRUB SERPL-MCNC: <0.2 MG/DL (ref 0–1)
BILIRUBIN URINE: NEGATIVE
BLOOD, URINE: ABNORMAL
BUN BLDV-MCNC: 34 MG/DL (ref 7–20)
CALCIUM SERPL-MCNC: 8.3 MG/DL (ref 8.3–10.6)
CHLORIDE BLD-SCNC: 101 MMOL/L (ref 99–110)
CLARITY: CLEAR
CO2: 24 MMOL/L (ref 21–32)
COLOR: YELLOW
CREAT SERPL-MCNC: 3.3 MG/DL (ref 0.8–1.3)
EKG ATRIAL RATE: 87 BPM
EKG ATRIAL RATE: 87 BPM
EKG DIAGNOSIS: NORMAL
EKG DIAGNOSIS: NORMAL
EKG P AXIS: 41 DEGREES
EKG P AXIS: 50 DEGREES
EKG P-R INTERVAL: 148 MS
EKG P-R INTERVAL: 154 MS
EKG Q-T INTERVAL: 394 MS
EKG Q-T INTERVAL: 400 MS
EKG QRS DURATION: 114 MS
EKG QRS DURATION: 98 MS
EKG QTC CALCULATION (BAZETT): 474 MS
EKG QTC CALCULATION (BAZETT): 481 MS
EKG R AXIS: -22 DEGREES
EKG R AXIS: -29 DEGREES
EKG T AXIS: 33 DEGREES
EKG T AXIS: 35 DEGREES
EKG VENTRICULAR RATE: 87 BPM
EKG VENTRICULAR RATE: 87 BPM
EOSINOPHILS ABSOLUTE: 0.1 K/UL (ref 0–0.6)
EOSINOPHILS RELATIVE PERCENT: 3 %
EPITHELIAL CELLS, UA: 0 /HPF (ref 0–5)
GFR AFRICAN AMERICAN: 23
GFR NON-AFRICAN AMERICAN: 19
GLOBULIN: 2.8 G/DL
GLUCOSE BLD-MCNC: 118 MG/DL (ref 70–99)
GLUCOSE URINE: NEGATIVE MG/DL
HCT VFR BLD CALC: 34.8 % (ref 40.5–52.5)
HEMOGLOBIN: 11.3 G/DL (ref 13.5–17.5)
HYALINE CASTS: 0 /LPF (ref 0–8)
KETONES, URINE: NEGATIVE MG/DL
LACTIC ACID: 0.9 MMOL/L (ref 0.4–2)
LEUKOCYTE ESTERASE, URINE: NEGATIVE
LIPASE: 38 U/L (ref 13–60)
LYMPHOCYTES ABSOLUTE: 1.4 K/UL (ref 1–5.1)
LYMPHOCYTES RELATIVE PERCENT: 30.1 %
MAGNESIUM: 1.8 MG/DL (ref 1.8–2.4)
MCH RBC QN AUTO: 28 PG (ref 26–34)
MCHC RBC AUTO-ENTMCNC: 32.4 G/DL (ref 31–36)
MCV RBC AUTO: 86.6 FL (ref 80–100)
MICROSCOPIC EXAMINATION: YES
MONOCYTES ABSOLUTE: 0.4 K/UL (ref 0–1.3)
MONOCYTES RELATIVE PERCENT: 9.2 %
NEUTROPHILS ABSOLUTE: 2.7 K/UL (ref 1.7–7.7)
NEUTROPHILS RELATIVE PERCENT: 57 %
NITRITE, URINE: NEGATIVE
PDW BLD-RTO: 14.6 % (ref 12.4–15.4)
PH UA: 6 (ref 5–8)
PLATELET # BLD: 161 K/UL (ref 135–450)
PMV BLD AUTO: 8.2 FL (ref 5–10.5)
POTASSIUM REFLEX MAGNESIUM: 3.5 MMOL/L (ref 3.5–5.1)
PROTEIN UA: 100 MG/DL
RBC # BLD: 4.02 M/UL (ref 4.2–5.9)
RBC UA: 0 /HPF (ref 0–4)
SODIUM BLD-SCNC: 137 MMOL/L (ref 136–145)
SPECIFIC GRAVITY UA: 1.01 (ref 1–1.03)
TOTAL PROTEIN: 7.2 G/DL (ref 6.4–8.2)
TROPONIN: <0.01 NG/ML
TROPONIN: <0.01 NG/ML
URINE REFLEX TO CULTURE: ABNORMAL
URINE TYPE: ABNORMAL
UROBILINOGEN, URINE: 1 E.U./DL
WBC # BLD: 4.7 K/UL (ref 4–11)
WBC UA: 1 /HPF (ref 0–5)

## 2020-11-29 PROCEDURE — 71046 X-RAY EXAM CHEST 2 VIEWS: CPT

## 2020-11-29 PROCEDURE — 85025 COMPLETE CBC W/AUTO DIFF WBC: CPT

## 2020-11-29 PROCEDURE — 96374 THER/PROPH/DIAG INJ IV PUSH: CPT

## 2020-11-29 PROCEDURE — 93010 ELECTROCARDIOGRAM REPORT: CPT | Performed by: INTERNAL MEDICINE

## 2020-11-29 PROCEDURE — 84484 ASSAY OF TROPONIN QUANT: CPT

## 2020-11-29 PROCEDURE — 36415 COLL VENOUS BLD VENIPUNCTURE: CPT

## 2020-11-29 PROCEDURE — 96375 TX/PRO/DX INJ NEW DRUG ADDON: CPT

## 2020-11-29 PROCEDURE — 81001 URINALYSIS AUTO W/SCOPE: CPT

## 2020-11-29 PROCEDURE — 74176 CT ABD & PELVIS W/O CONTRAST: CPT

## 2020-11-29 PROCEDURE — 6370000000 HC RX 637 (ALT 250 FOR IP): Performed by: EMERGENCY MEDICINE

## 2020-11-29 PROCEDURE — 80053 COMPREHEN METABOLIC PANEL: CPT

## 2020-11-29 PROCEDURE — 83735 ASSAY OF MAGNESIUM: CPT

## 2020-11-29 PROCEDURE — 6360000002 HC RX W HCPCS: Performed by: EMERGENCY MEDICINE

## 2020-11-29 PROCEDURE — 99283 EMERGENCY DEPT VISIT LOW MDM: CPT

## 2020-11-29 PROCEDURE — 2500000003 HC RX 250 WO HCPCS: Performed by: EMERGENCY MEDICINE

## 2020-11-29 PROCEDURE — 83605 ASSAY OF LACTIC ACID: CPT

## 2020-11-29 PROCEDURE — 83690 ASSAY OF LIPASE: CPT

## 2020-11-29 PROCEDURE — 93005 ELECTROCARDIOGRAM TRACING: CPT | Performed by: EMERGENCY MEDICINE

## 2020-11-29 RX ORDER — KETOROLAC TROMETHAMINE 30 MG/ML
15 INJECTION, SOLUTION INTRAMUSCULAR; INTRAVENOUS ONCE
Status: COMPLETED | OUTPATIENT
Start: 2020-11-29 | End: 2020-11-29

## 2020-11-29 RX ORDER — PANTOPRAZOLE SODIUM 40 MG/1
40 TABLET, DELAYED RELEASE ORAL
Status: DISCONTINUED | OUTPATIENT
Start: 2020-11-29 | End: 2020-11-29 | Stop reason: HOSPADM

## 2020-11-29 RX ADMIN — KETOROLAC TROMETHAMINE 15 MG: 30 INJECTION, SOLUTION INTRAMUSCULAR at 07:56

## 2020-11-29 RX ADMIN — LIDOCAINE HYDROCHLORIDE: 20 SOLUTION ORAL; TOPICAL at 05:44

## 2020-11-29 RX ADMIN — FAMOTIDINE 20 MG: 10 INJECTION INTRAVENOUS at 05:44

## 2020-11-29 RX ADMIN — PANTOPRAZOLE SODIUM 40 MG: 40 TABLET, DELAYED RELEASE ORAL at 06:29

## 2020-11-29 ASSESSMENT — PAIN DESCRIPTION - LOCATION: LOCATION: ABDOMEN

## 2020-11-29 ASSESSMENT — PAIN SCALES - GENERAL
PAINLEVEL_OUTOF10: 10
PAINLEVEL_OUTOF10: 10

## 2020-11-29 ASSESSMENT — PAIN DESCRIPTION - ONSET: ONSET: AWAKENED FROM SLEEP

## 2020-11-29 ASSESSMENT — PAIN DESCRIPTION - FREQUENCY: FREQUENCY: CONTINUOUS

## 2020-11-29 NOTE — ED NOTES
Pt's wife came out of room x 2 and spoke with an x-ray tech and another RN stating he now has chest pain. Dr. Erlinda Ruano made aware. See new orders.      Dirk Singh, [address] [facility]  11/29/20 [id number]

## 2020-11-29 NOTE — ED NOTES
PT TO RADIOLOGY AT [facility], [address] [place] [address]  11/29/20 [id number]

## 2020-11-29 NOTE — ED NOTES
PT PLACED CALL LIGHT ON, PT CO NEW CHEST PAIN LOCATED ON THE UPPER RIGHT SIDE. PT STATES THE PAIN IS DIFFERENT THAN THE \"STOMACH PAIN, THIS FEELS LIKE SOMEONE IS PINCHING ME\". SPOKE WITH PROVIDER REGARDING PTS NEW PAIN. NO NEW ORDERS AT THIS TIME. WILL CONTINUE TO MONITOR.       Jailyn Grove RN  11/29/20 [address], RN  11/29/20 [id number]

## 2020-11-29 NOTE — ED NOTES
Pt stating he continues to have right shoulder/arm pain as reported by the night shift nurse during handoff. Dr. Lesli Padilla aware and has discussed it with the pt.      Itzel Muir [facility]  11/29/20 9437

## 2020-11-29 NOTE — ED PROVIDER NOTES
[address] [facility]        Pt Name: Kim Sanchez  MRN: [id number]  [place] 1960  Date of evaluation: 11/29/2020  Provider: Lacey Kaba MD  PCP: Arley Paige MD    This patient was seen and evaluated by the attending physician Lacey Kaba MD.      [address] [place] [address]       Chief Complaint   Patient presents with    Abdominal Pain     pt in from home c/o abd pain. pt states he has had abd pain for a while but tonight it is much worse        HISTORY OF PRESENT ILLNESS   (Location/Symptom, Timing/Onset, Context/Setting, Quality, Duration, Modifying Factors, Severity)  Note limiting factors. Kim Sanchez is a 62 y.o. male who was signed out to me this morning is epigastric nominal pain complaining of some right upper quadrant and right upper chest wall pain. States GI cocktail and Protonix for stomach pain out denies having chest pains. No dyspnea no palpitations. Distant history of A. fib 20 years ago. History tachycardia diabetes and some chronic renal insufficiency. Nursing Notes were all reviewed and agreed with or any disagreements were addressed  in the HPI. REVIEW OF SYSTEMS    (2-9 systems for level 4, 10 or more for level 5)     Review of Systems    Positives and Pertinent negatives as per HPI. Except as noted abovein the ROS, all other systems were reviewed and negative.        PAST MEDICAL HISTORY     Past Medical History:   Diagnosis Date    Arthralgia of multiple joints 10/27/2015    Arthritis     Atrial fibrillation ([address]     CRI (chronic renal insufficiency)     Diabetic nephropathy associated with type 2 diabetes mellitus ([address] 10/11/2018    Diverticulosis     Elevated PSA     Erectile dysfunction     Gout     Gout     Hemrrhoid NOS w/ complication NEC     History of MRSA infection     Hypertension     CELSA (obstructive sleep apnea) 7/14/2017    Type 2 diabetes mellitus without complication, without long-term current use of insulin ([facility] 1/62/8450    Umbilical hernia without obstruction and without gangrene 2/2/2016         SURGICAL HISTORY     Past Surgical History:   Procedure Laterality Date    COLONOSCOPY      DILATATION, ESOPHAGUS      HEMICOLECTOMY      UPPER GASTROINTESTINAL ENDOSCOPY  5/28/16    biopsies, multiple small gastric ulcers    UPPER GASTROINTESTINAL ENDOSCOPY  09/12/2016         CURRENTMEDICATIONS       Previous Medications    ALLOPURINOL (ZYLOPRIM) 300 MG TABLET    Take 1 tablet by mouth daily    ASPIRIN 81 MG TABLET    Take 81 mg by mouth daily    ATORVASTATIN (LIPITOR) 20 MG TABLET    TAKE 1 TABLET BY MOUTH EVERY DAY    CARVEDILOL (COREG) 12.5 MG TABLET    Take 1 tablet by mouth 2 times daily    COLCHICINE (COLCRYS) 0.6 MG TABLET    Take 1 tablet by mouth daily as needed for Pain    FUROSEMIDE (LASIX) 20 MG TABLET    Take 1 tablet by mouth daily    LISINOPRIL (PRINIVIL;ZESTRIL) 20 MG TABLET    TAKE 1 TABLET BY MOUTH EVERY DAY    LOSARTAN (COZAAR) 100 MG TABLET    Take 1 tablet by mouth daily    NIFEDIPINE (ADALAT CC) 90 MG EXTENDED RELEASE TABLET    Take 1 tablet by mouth daily    OMEPRAZOLE (PRILOSEC) 40 MG DELAYED RELEASE CAPSULE    Take 1 capsule by mouth daily    OXYCODONE-ACETAMINOPHEN (PERCOCET) 5-325 MG PER TABLET    Take 1 tablet by mouth every 6 hours as needed for Pain for up to 30 days. TERAZOSIN (HYTRIN) 5 MG CAPSULE    Take 1 capsule by mouth nightly TAKE 1 CAPSULE BY MOUTH EVERY EVENING         ALLERGIES     Patient has no known allergies.     FAMILYHISTORY       Family History   Problem Relation Age of Onset    Hypertension Other     Breast Cancer Mother     Colon Cancer Father     Diabetes Maternal Grandmother     Coronary Art Dis Brother           SOCIAL HISTORY       Social History     Socioeconomic History    Marital status:      Spouse name: None    Number of children: 5    Years of education: None    Highest education level: None   Occupational History    Occupation: Senior Field Cost Admin. Employer: [employer]   Social Needs    Financial resource strain: None    Food insecurity     Worry: None     Inability: None    Transportation needs     Medical: None     Non-medical: None   Tobacco Use    Smoking status: Never Smoker    Smokeless tobacco: Never Used   Substance and Sexual Activity    Alcohol use: Yes     Alcohol/week: 10.0 standard drinks     Types: 12 Standard drinks or equivalent per week     Comment: occ    Drug use: No    Sexual activity: Yes     Partners: Female   Lifestyle    Physical activity     Days per week: None     Minutes per session: None    Stress: None   Relationships    Social connections     Talks on phone: None     Gets together: None     Attends Voodoo service: None     Active member of club or organization: None     Attends meetings of clubs or organizations: None     Relationship status: None    Intimate partner violence     Fear of current or ex partner: None     Emotionally abused: None     Physically abused: None     Forced sexual activity: None   Other Topics Concern    None   Social History Narrative    ** Merged History Encounter **            SCREENINGS             PHYSICAL EXAM    (up to 7 for level 4, 8 or more for level 5)     ED Triage Vitals [11/29/20 0419]   BP Temp Temp src Pulse Resp SpO2 Height Weight   (!) 172/74 97.7 °F (36.5 °C) -- 97 18 98 % 6' 5\" (1.956 m) 300 lb (136.1 kg)       Physical Exam     General Appearance:  Alert, cooperative, no distress, appears stated age. Head:  Normocephalic, without obvious abnormality, atraumatic. Eyes:  conjunctiva/corneas clear, EOM's intact. Sclera anicteric. ENT: Mucous membranes moist.   Neck: Supple, symmetrical, trachea midline, no adenopathy. No jugular venous distention. Lungs:   No Respiratory Distress.    Chest Wall:  Ataumatic   Heart:  RRR no m/c/g/r   Abdomen:   Soft, NT, ND Extremities:  Full range of motion. Pulses: Symmetric x4   Skin:  No rashes or lesions to exposed skin. Neurologic: Alert and oriented X 3. Motor grossly normal.  Speech clear.           DIAGNOSTIC RESULTS   LABS:    Labs Reviewed   CBC WITH AUTO DIFFERENTIAL - Abnormal; Notable for the following components:       Result Value    RBC 4.02 (*)     Hemoglobin 11.3 (*)     Hematocrit 34.8 (*)     All other components within normal limits    Narrative:     Performed at:  [facility]  [address] [employer],  [place], [address]   Phone [phone]   COMPREHENSIVE METABOLIC PANEL W/ REFLEX TO MG FOR LOW K - Abnormal; Notable for the following components:    Glucose 118 (*)     BUN 34 (*)     CREATININE 3.3 (*)     GFR Non- 19 (*)     GFR  23 (*)     All other components within normal limits    Narrative:     Performed at:  [facility] [address] [employer],  [place], [address]   Phone [phone]   URINE RT REFLEX TO CULTURE - Abnormal; Notable for the following components:    Blood, Urine SMALL (*)     Protein,  (*)     All other components within normal limits    Narrative:     Performed at:  [facility] [address] [employer],  [place], [address]   Phone [phone]   LIPASE    Narrative:     Performed at:  [facility]  [address] [employer],  [place], [address]   Phone [phone]   LACTIC ACID, PLASMA    Narrative:     Performed at:  [facility] [address] [employer],  [place], [address]   Phone [phone]   TROPONIN    Narrative:     Performed at:  [facility] [address] [employer],  [place], [address]   Phone [phone]   MAGNESIUM    Narrative:     Performed at:  [facility] [address] [employer],  [place], [address]   Phone [phone]   MICROSCOPIC URINALYSIS    Narrative:     Performed at:  [facility]  [address] [facility]  [place], [address] [facility]   Phone [phone]   TROPONIN    Narrative:     Performed at:  [facility]  [address] [facility]  [place], [address]   Phone [phone]       All other labs were within normal range or not returned as of this dictation. EKG: All EKG's are interpreted by the Emergency Department Physician in the absence of a cardiologist.  Please see their note for interpretation of EKG. EKG was reviewed by myself. Dated today. Nonischemic. Sinus rhythm. Rate 87. No change from prior. EKG #2 reviewed by myself. Dated today. Unchanged #1. RADIOLOGY:   Non-plain film images such as CT, Ultrasound and MRI are read by the radiologist. Plain radiographic images are visualized andpreliminarily interpreted by the  ED Provider with the below findings:        Interpretation perthe Radiologist below, if available at the time of this note:    CT ABDOMEN PELVIS WO CONTRAST Additional Contrast? None   Final Result   No acute abdominopelvic findings. No upper abdominal from a colby changes. Colonic diverticulosis. XR CHEST (2 VW)   Final Result   No acute cardiopulmonary findings. Ct Abdomen Pelvis Wo Contrast Additional Contrast? None    Result Date: 11/29/2020  EXAMINATION: CT OF THE ABDOMEN AND PELVIS WITHOUT CONTRAST 11/29/2020 5:39 am TECHNIQUE: CT of the abdomen and pelvis was performed without the administration of intravenous contrast. Multiplanar reformatted images are provided for review. Dose modulation, iterative reconstruction, and/or weight based adjustment of the mA/kV was utilized to reduce the radiation dose to as low as reasonably achievable.  COMPARISON: CT abdomen pelvis 02/23/2020 HISTORY: ORDERING SYSTEM PROVIDED HISTORY: EPIGASTRIC AB PAIN TECHNOLOGIST PROVIDED HISTORY: Reason for exam:->EPIGASTRIC AB PAIN Additional Contrast?->None Reason for Exam: Abdominal Pain (pt in from home c/o abd pain. pt states he has had abd pain for a while but tonight it is much worse ) Acuity: Acute Type of Exam: Initial FINDINGS: LOWER CHEST Lung bases: Clear lung bases. Normal included heart and pericardium. ABDOMEN Liver: Normal liver size, contour and parenchymal attenuation. Scattered hepatic hypodensities, not substantially changed, some too small to characterize and others compatible with cysts. Gallbladder: Normal. Biliary: No intra or extrahepatic bile duct dilatation. Pancreas: Normal. Spleen: Normal. Adrenals: Stable benign adenomatous hypertrophy of the left adrenal gland. Normal right adrenal gland, possibly with a myelolipoma, volume averaged but unchanged. These do not require imaging follow up. Kidneys: Kidneys are symmetric in size. No hydronephrosis or nephrolithiasis. GI Tract: Normal distal esophagus, stomach and duodenal sweep. No apparent bowel wall thickening or obstruction. Normal appendix. Diffuse colonic diverticulosis. Peritoneum/Retroperitoneum: No enlarged lymph nodes, free air or free fluid. Vascular: Normal caliber abdominal aorta and IVC. PELVIS Genitourinary: Normal urinary bladder. Normal reproductive organs. Other: No free fluid. No enlarged lymph nodes. MUSCULOSKELETAL Bones and Soft Tissues: Fat containing inguinal hernias and wide-based fat containing umbilical hernia. No acute soft tissue or osseous abnormality. No acute abdominopelvic findings. No upper abdominal from a colby changes. Colonic diverticulosis. Xr Chest (2 Vw)    Result Date: 11/29/2020  EXAMINATION: TWO XRAY VIEWS OF THE CHEST 11/29/2020 5:29 am COMPARISON: Chest radiographs 02/23/2020, 06/13/2019 HISTORY: ORDERING SYSTEM PROVIDED HISTORY: EPIGASTRIC PAIN TECHNOLOGIST PROVIDED HISTORY: Reason for exam:->EPIGASTRIC PAIN FINDINGS: No pneumothorax, pleural effusion or consolidative airspace disease.   Normal heart size and mediastinal contours. No acute osseous abnormality. No acute cardiopulmonary findings. PROCEDURES   Unless otherwise noted below, none     Procedures    CRITICAL CARE TIME   N/A    CONSULTS:  None      EMERGENCY DEPARTMENT COURSE and DIFFERENTIAL DIAGNOSIS/MDM:   Vitals:    Vitals:    11/29/20 0500 11/29/20 0600 11/29/20 0630 11/29/20 0730   BP: (!) 157/76 (!) 157/83 (!) 171/84 (!) 144/80   Pulse: 89 91 85 87   Resp: 15 23 18 17   Temp:       SpO2: 97% 97% 95% 97%   Weight:       Height:           Patient was given thefollowing medications:  Medications   pantoprazole (PROTONIX) tablet 40 mg (40 mg Oral Given 11/29/20 0629)   famotidine (PEPCID) injection 20 mg (20 mg Intravenous Given 11/29/20 0544)   aluminum & magnesium hydroxide-simethicone (MAALOX) 30 mL, lidocaine viscous hcl (XYLOCAINE) 5 mL (GI COCKTAIL) ( Oral Given 11/29/20 0544)   ketorolac (TORADOL) injection 15 mg (15 mg Intravenous Given 11/29/20 0756)       76-year-old male with right upper quadrant abdominal and chest pain. Benign work-up so far. I will check 1 more cardiac enzyme on given the chest pain after GI cocktail. 2nd cardiac enzyme normal.  Repeat EKG was unchanged from #1  Given above, gestalt for noncardiac. DC on PPI. FINAL IMPRESSION      1.  Abdominal pain, epigastric          DISPOSITION/PLAN   DISPOSITION Decision To Discharge 11/29/2020 08:10:56 AM      PATIENT REFERREDTO:  Marcos Mccoy MD  [address] [place] [address]          [place] [address]  [address]  [phone]    Schedule an appointment as soon as possible for a visit         DISCHARGE MEDICATIONS:  New Prescriptions    No medications on file       DISCONTINUED MEDICATIONS:  Discontinued Medications    No medications on file              (Please note that portions ofthis note were completed with a voice recognition program.  Efforts were made to edit the dictations but occasionally

## 2020-11-29 NOTE — ED PROVIDER NOTES
[address] [facility]  eMERGENCY dEPARTMENTeNCOUnter      Pt Name: Natanael Swift  MRN: [id number]  [place] 1960  Date of evaluation: 11/29/2020  Provider: Elton Xie MD    CHIEF COMPLAINT       Chief Complaint   Patient presents with    Abdominal Pain     pt in from home c/o abd pain. pt states he has had abd pain for a while but tonight it is much worse          HISTORY OF PRESENT ILLNESS   (Location/Symptom, Timing/Onset,Context/Setting, Quality, Duration, Modifying Factors, Severity)  Note limiting factors. Natanael Swift is a 62 y.o. male who presents to the emergency department for acute onset of epigastric abdominal pain with no radiation that started approximately 2 hours prior to being seen. He has had this pain in the past but it has not been this severe. It is usually associated with drinking. He did have some beers yesterday. No nausea no vomiting. No changes to his stools. No chest pain. No shortness of breath. Patient has had this pain for several years. He has seen gastroenterology and he did scopes and nothing was found. He states that he takes omeprazole daily but he did not take any this morning. Pain is rated at a 10. Standing up and walking does help his symptoms. Nursing notes were reviewed. REVIEW OF SYSTEMS    (2-9 systems for level 4, 10 or more for level 5)     Review of Systems    Positive and pertinent negative as per HPI. Except as noted above in the ROS, all other systems were reviewed and were negative.     PAST MEDICAL HISTORY     Past Medical History:   Diagnosis Date    Arthralgia of multiple joints 10/27/2015    Arthritis     Atrial fibrillation ([address]     CRI (chronic renal insufficiency)     Diabetic nephropathy associated with type 2 diabetes mellitus ([address] 10/11/2018    Diverticulosis     Elevated PSA     Erectile dysfunction     Gout     Gout     Hemrrhoid NOS w/ complication NEC     History of MRSA infection     Hypertension     CELSA (obstructive sleep apnea) 7/14/2017    Type 2 diabetes mellitus without complication, without long-term current use of insulin ([facility] 7/55/4943    Umbilical hernia without obstruction and without gangrene 2/2/2016         SURGICALHISTORY       Past Surgical History:   Procedure Laterality Date    COLONOSCOPY      DILATATION, ESOPHAGUS      HEMICOLECTOMY      UPPER GASTROINTESTINAL ENDOSCOPY  5/28/16    biopsies, multiple small gastric ulcers    UPPER GASTROINTESTINAL ENDOSCOPY  09/12/2016         CURRENT MEDICATIONS       Previous Medications    ALLOPURINOL (ZYLOPRIM) 300 MG TABLET    Take 1 tablet by mouth daily    ASPIRIN 81 MG TABLET    Take 81 mg by mouth daily    ATORVASTATIN (LIPITOR) 20 MG TABLET    TAKE 1 TABLET BY MOUTH EVERY DAY    CARVEDILOL (COREG) 12.5 MG TABLET    Take 1 tablet by mouth 2 times daily    COLCHICINE (COLCRYS) 0.6 MG TABLET    Take 1 tablet by mouth daily as needed for Pain    FUROSEMIDE (LASIX) 20 MG TABLET    Take 1 tablet by mouth daily    LISINOPRIL (PRINIVIL;ZESTRIL) 20 MG TABLET    TAKE 1 TABLET BY MOUTH EVERY DAY    LOSARTAN (COZAAR) 100 MG TABLET    Take 1 tablet by mouth daily    NIFEDIPINE (ADALAT CC) 90 MG EXTENDED RELEASE TABLET    Take 1 tablet by mouth daily    OMEPRAZOLE (PRILOSEC) 40 MG DELAYED RELEASE CAPSULE    Take 1 capsule by mouth daily    OXYCODONE-ACETAMINOPHEN (PERCOCET) 5-325 MG PER TABLET    Take 1 tablet by mouth every 6 hours as needed for Pain for up to 30 days. TERAZOSIN (HYTRIN) 5 MG CAPSULE    Take 1 capsule by mouth nightly TAKE 1 CAPSULE BY MOUTH EVERY EVENING       ALLERGIES     Patient has no known allergies.     FAMILY HISTORY       Family History   Problem Relation Age of Onset    Hypertension Other     Breast Cancer Mother     Colon Cancer Father     Diabetes Maternal Grandmother     Coronary Art Dis Brother           SOCIAL HISTORY       Social History     Socioeconomic History    Marital status:      Spouse name: None    Number of children: 5    Years of education: None    Highest education level: None   Occupational History    Occupation: Senior Field Cost Admin. Employer: [employer]   Social Needs    Financial resource strain: None    Food insecurity     Worry: None     Inability: None    Transportation needs     Medical: None     Non-medical: None   Tobacco Use    Smoking status: Never Smoker    Smokeless tobacco: Never Used   Substance and Sexual Activity    Alcohol use: Yes     Alcohol/week: 10.0 standard drinks     Types: 12 Standard drinks or equivalent per week     Comment: occ    Drug use: No    Sexual activity: Yes     Partners: Female   Lifestyle    Physical activity     Days per week: None     Minutes per session: None    Stress: None   Relationships    Social connections     Talks on phone: None     Gets together: None     Attends Caodaism service: None     Active member of club or organization: None     Attends meetings of clubs or organizations: None     Relationship status: None    Intimate partner violence     Fear of current or ex partner: None     Emotionally abused: None     Physically abused: None     Forced sexual activity: None   Other Topics Concern    None   Social History Narrative    ** Merged History Encounter **            SCREENINGS             PHYSICAL EXAM    (up to 7 for level 4, 8 or more for level 5)     ED Triage Vitals [11/29/20 0419]   BP Temp Temp src Pulse Resp SpO2 Height Weight   (!) 172/74 97.7 °F (36.5 °C) -- 97 18 98 % 6' 5\" (1.956 m) 300 lb (136.1 kg)       Physical Exam  Vitals signs and nursing note reviewed. Constitutional:       Appearance: Normal appearance. He is well-developed. He is not ill-appearing. HENT:      Head: Normocephalic and atraumatic. Right Ear: External ear normal.      Left Ear: External ear normal.      Nose: Nose normal.   Eyes:      General: No scleral icterus.         Right eye: No discharge. Left eye: No discharge. Conjunctiva/sclera: Conjunctivae normal.   Neck:      Musculoskeletal: Neck supple. Cardiovascular:      Rate and Rhythm: Normal rate and regular rhythm. Heart sounds: Normal heart sounds. Pulmonary:      Effort: Pulmonary effort is normal. No respiratory distress. Breath sounds: Normal breath sounds. No wheezing or rales. Abdominal:      General: Bowel sounds are normal. There is no distension. Palpations: Abdomen is soft. Tenderness: There is abdominal tenderness in the epigastric area. There is no guarding or rebound. Hernia: A hernia is present. Hernia is present in the umbilical area. Skin:     Coloration: Skin is not pale. Neurological:      Mental Status: He is alert. Psychiatric:         Mood and Affect: Mood normal.         Behavior: Behavior normal.             DIAGNOSTIC RESULTS     EKG: All EKG's are interpreted by the Emergency Department Physician who either signs or Co-signs this chart in the absence of a cardiologist.    12 lead EKG shows normal sinus rhythm at a rate of 87 bpm, NJ interval QTC normal.  Widened QRS. No acute ischemic findings. No significant changes when compared to the February 2020. RADIOLOGY:   Non-plain film images such as CT, Ultrasound and MRI are read by the radiologist. Plain radiographic images are visualized and preliminarily interpreted by the emergency physician with the below findings:        Interpretation per the Radiologist below, if available at the time of this note:    CT ABDOMEN PELVIS WO CONTRAST Additional Contrast? None   Final Result   No acute abdominopelvic findings. No upper abdominal from a colby changes. Colonic diverticulosis. XR CHEST (2 VW)   Final Result   No acute cardiopulmonary findings.                ED BEDSIDE ULTRASOUND:   Performed by ED Physician - none    LABS:  Labs Reviewed   CBC WITH AUTO DIFFERENTIAL - Abnormal; Notable for the following components:       Result Value    RBC 4.02 (*)     Hemoglobin 11.3 (*)     Hematocrit 34.8 (*)     All other components within normal limits    Narrative:     Performed at:  [facility] [address]. [place]   Phone [phone]   COMPREHENSIVE METABOLIC PANEL W/ REFLEX TO MG FOR LOW K - Abnormal; Notable for the following components:    Glucose 118 (*)     BUN 34 (*)     CREATININE 3.3 (*)     GFR Non- 19 (*)     GFR  23 (*)     All other components within normal limits    Narrative:     Performed at:  [facility] [address], [place]   Phone [phone]   URINE RT REFLEX TO CULTURE - Abnormal; Notable for the following components:    Blood, Urine SMALL (*)     Protein,  (*)     All other components within normal limits    Narrative:     Performed at:  [facility] [address] [place], [place]   Phone [phone]   LIPASE    Narrative:     Performed at:  [facility] [address] [place], [place]   Phone [phone]   LACTIC ACID, PLASMA    Narrative:     Performed at:  [facility] [address], [place]   Phone [phone]   TROPONIN    Narrative:     Performed at:  [facility] [address], [place]   Phone [phone]   MAGNESIUM    Narrative:     Performed at:  [facility] [address], [place]   Phone [phone]   MICROSCOPIC URINALYSIS    Narrative:     Performed at:  [facility] [address], [place]   Phone [phone]       All other labs were within normal range or not returned as of this dictation.     EMERGENCY DEPARTMENT COURSE and DIFFERENTIAL DIAGNOSIS/MDM: Vitals:    Vitals:    11/29/20 0419 11/29/20 0500 11/29/20 0600   BP: (!) 172/74 (!) 157/76 (!) 157/83   Pulse: 97 89 91   Resp: 18 15 23   Temp: 97.7 °F (36.5 °C)     SpO2: 98% 97% 97%   Weight: 300 lb (136.1 kg)     Height: 6' 5\" (1.956 m)         Adult male who comes in with epigastric abdominal pain. Patient's chart is reviewed he did have upper GI endoscopy in 2017 and 2018 that does not show any abnormality however his PCP listed duodenal ulcers in 2019 at a diagnosis uncertain whether the patient had any additional studies that showed this. Patient is given a GI cocktail and Pepcid. Laboratory studies and CT scan ordered. Laboratory studies showed no acute process. He does have redemonstration of his chronic kidney disease. CT scan shows no acute intra-abdominal pelvic process. The patient is reassessed after the medications and his pain is trending down but still not resolved. Patient has mild tenderness but improved on palpation. He is given pantoprazole here in the emergency room. Based on history physical exam and diagnostic work-up as well as chart review I have little suspicion for serious or life-threatening conditions. This is discussed with the patient and his wife. I recommend follow-up with his primary care provider with regards to his kidney disease and a referral is again given for gastroenterology with regards to his epigastric pain. Patient is counseled against alcohol use as this tends to be a trigger for his symptoms. Patient expresses understanding and is agreeable with the treatment plan. CRITICAL CARE TIME   None       CONSULTS:  None    PROCEDURES:  Unless otherwise noted above, none     Procedures    FINAL IMPRESSION      1.  Abdominal pain, epigastric          DISPOSITION/PLAN   DISPOSITION Discharge - Pending Orders Complete 11/29/2020 06:19:27 AM      PATIENT REFERREDTO:  Lisa Holland MD  [address] [place] [address] [address] [facility]. [address]  [address]  [phone]    Schedule an appointment as soon as possible for a visit         DISCHARGEMEDICATIONS:  New Prescriptions    No medications on file          (Please note that portions of this note were completed with a voice recognition program.  Efforts were made to edit the dictations but occasionally words are mis-transcribed.)    Meet Alvarez MD (electronically signed)  Attending Emergency Physician        Meet Alvarez MD  11/29/20 5168

## 2020-12-04 ENCOUNTER — TELEPHONE (OUTPATIENT)
Dept: FAMILY MEDICINE CLINIC | Age: 60
End: 2020-12-04

## 2020-12-04 NOTE — TELEPHONE ENCOUNTER
Amanda Sandoval sent a message through [employer] on her chart. Please advise      Dr. Kaylie Marks here is a list of my  Krista Smith ) medications you needed so that you may coordinate and get his meds straightened out I pray!!    ** All of these medications are to be taken in the AM **   1. Omeprazole magnesium (prilosec OTC oral ( 40 mg)   2. Furosemide Oral ( 20 mg)   3. Asprin ( 81 mg)   4. Nifedipine Oral ( 90 mg)   5. Carvedilol Oral (12.5 mg)   6. Allopurinol (300 mg)   7. Oxycodone HCI ( 5-325 As needed   8. Atorvastain ( 20mg)   9. Lisinopril ( 20 mg)   10. Terazosin (5 mg)   11. Losartan ( 100 mg )     My  does not have a mychart set up yet.  Please forward info to me. Billy Muhammadaw you so much Dr. Kaylie Marks!!!

## 2020-12-07 NOTE — TELEPHONE ENCOUNTER
Verify he is taking lasix 20mg qd and if so, we could try to increase that and see if it helps swelling.   We would need to keep a close eye on his creatinine level, so if he does increase we will need f/u bloodwork in 1 week

## 2020-12-08 RX ORDER — FUROSEMIDE 20 MG/1
20 TABLET ORAL DAILY
Qty: 60 TABLET | Refills: 3 | Status: SHIPPED | OUTPATIENT
Start: 2020-12-08 | End: 2020-12-10 | Stop reason: SDUPTHER

## 2020-12-08 NOTE — TELEPHONE ENCOUNTER
Requested Prescriptions     Pending Prescriptions Disp Refills    furosemide (LASIX) 20 MG tablet 60 tablet 3     Sig: Take 1 tablet by mouth daily   Arturo Fleischer  [phone] (home)   Pt stated he has not taking Lasix 20 mg in quite a while and does not have none left can you please send in to pharmacy. Please advise.

## 2020-12-10 RX ORDER — FUROSEMIDE 20 MG/1
40 TABLET ORAL DAILY
Qty: 60 TABLET | Refills: 5 | Status: SHIPPED | OUTPATIENT
Start: 2020-12-10 | End: 2021-11-30 | Stop reason: ALTCHOICE

## 2020-12-10 NOTE — TELEPHONE ENCOUNTER
Jorden's spouse called stating Jose Muñiz went to the [facility] in [facility] and his script for Lasix was not there. I confirmed the address with her. She wants to know if the script can be sent in or called in again. Please advise. Thanks.         Medication name: Lasix  Medication dose: 20 mg  Frequency: 20 mg  Quantity: Take 1 tablet by mouth daily   Pharmacy name: [facility] [address] [phone] - F [phone]    Last ov: 11/24/2020  Last labs: 11/24/2020      Jose Muñiz [phone] (home)

## 2020-12-16 ENCOUNTER — TELEPHONE (OUTPATIENT)
Dept: FAMILY MEDICINE CLINIC | Age: 60
End: 2020-12-16

## 2020-12-16 NOTE — TELEPHONE ENCOUNTER
Pt wife Mike Brunolloyd want some advise on if Rosey Matias ok to travel by plane to [place] on 12/21. His legs are swollen from his calf to his toes. He is taking lasix and the swelling has gone down some.  Please call [phone] (H Unknown

## 2020-12-21 RX ORDER — ATORVASTATIN CALCIUM 20 MG/1
TABLET, FILM COATED ORAL
Qty: 30 TABLET | Refills: 5 | Status: SHIPPED | OUTPATIENT
Start: 2020-12-21 | End: 2021-03-16 | Stop reason: SDUPTHER

## 2021-01-04 ENCOUNTER — HOSPITAL ENCOUNTER (OUTPATIENT)
Dept: MRI IMAGING | Age: 61
Discharge: HOME | End: 2021-01-04
Payer: COMMERCIAL

## 2021-01-04 DIAGNOSIS — M25.511 CHRONIC RIGHT SHOULDER PAIN: ICD-10-CM

## 2021-01-04 DIAGNOSIS — G89.29 CHRONIC RIGHT SHOULDER PAIN: ICD-10-CM

## 2021-01-04 PROCEDURE — 73221 MRI JOINT UPR EXTREM W/O DYE: CPT

## 2021-01-04 RX ORDER — OMEPRAZOLE 40 MG/1
CAPSULE, DELAYED RELEASE ORAL
Qty: 30 CAPSULE | Refills: 2 | OUTPATIENT
Start: 2021-01-04

## 2021-01-06 DIAGNOSIS — G89.29 CHRONIC RIGHT SHOULDER PAIN: Primary | ICD-10-CM

## 2021-01-06 DIAGNOSIS — M25.511 CHRONIC RIGHT SHOULDER PAIN: Primary | ICD-10-CM

## 2021-01-06 DIAGNOSIS — M75.121 NONTRAUMATIC COMPLETE TEAR OF RIGHT ROTATOR CUFF: ICD-10-CM

## 2021-01-25 DIAGNOSIS — M1A.09X0 IDIOPATHIC CHRONIC GOUT OF MULTIPLE SITES WITHOUT TOPHUS: ICD-10-CM

## 2021-01-25 RX ORDER — OMEPRAZOLE 40 MG/1
CAPSULE, DELAYED RELEASE ORAL
Qty: 30 CAPSULE | Refills: 5 | Status: SHIPPED | OUTPATIENT
Start: 2021-01-25 | End: 2021-07-19

## 2021-01-25 NOTE — TELEPHONE ENCOUNTER
Requested Prescriptions     Pending Prescriptions Disp Refills    omeprazole (PRILOSEC) 40 MG delayed release capsule [Pharmacy Med Name: OMEPRAZOLE DR 40 MG CAPSULE] 30 capsule 5     Sig: TAKE 1 CAPSULE BY MOUTH EVERY DAY     Last ov 11/24/20  Last labs 11/29/20

## 2021-01-26 RX ORDER — OXYCODONE HYDROCHLORIDE AND ACETAMINOPHEN 5; 325 MG/1; MG/1
1 TABLET ORAL EVERY 6 HOURS PRN
Qty: 90 TABLET | Refills: 0 | Status: SHIPPED | OUTPATIENT
Start: 2021-01-26 | End: 2021-01-27 | Stop reason: SDUPTHER

## 2021-01-27 RX ORDER — OXYCODONE HYDROCHLORIDE AND ACETAMINOPHEN 5; 325 MG/1; MG/1
1 TABLET ORAL EVERY 6 HOURS PRN
Qty: 90 TABLET | Refills: 0 | Status: SHIPPED | OUTPATIENT
Start: 2021-01-27 | End: 2021-04-13 | Stop reason: SDUPTHER

## 2021-01-27 NOTE — TELEPHONE ENCOUNTER
Patient calling stating this medication has been sent over to the incorrect pharmacy he requested this to go to     [address], [place] [phone]  Please advise.

## 2021-01-27 NOTE — TELEPHONE ENCOUNTER
Please advise   New rx pending with correct pharmacy   I will call and cancel current rx with [facility] gonsalo elizalde

## 2021-02-26 ENCOUNTER — OFFICE VISIT (OUTPATIENT)
Dept: FAMILY MEDICINE CLINIC | Age: 61
End: 2021-02-26
Payer: COMMERCIAL

## 2021-02-26 VITALS
WEIGHT: 285.4 LBS | SYSTOLIC BLOOD PRESSURE: 144 MMHG | TEMPERATURE: 98.3 F | BODY MASS INDEX: 33.84 KG/M2 | OXYGEN SATURATION: 96 % | DIASTOLIC BLOOD PRESSURE: 78 MMHG | RESPIRATION RATE: 14 BRPM | HEART RATE: 82 BPM

## 2021-02-26 DIAGNOSIS — N18.4 CHRONIC RENAL IMPAIRMENT, STAGE 4 (SEVERE) (HCC): ICD-10-CM

## 2021-02-26 DIAGNOSIS — M79.89 LEG SWELLING: ICD-10-CM

## 2021-02-26 DIAGNOSIS — R97.20 ELEVATED PSA: ICD-10-CM

## 2021-02-26 DIAGNOSIS — M75.121 NONTRAUMATIC COMPLETE TEAR OF RIGHT ROTATOR CUFF: ICD-10-CM

## 2021-02-26 DIAGNOSIS — G89.29 CHRONIC RIGHT SHOULDER PAIN: ICD-10-CM

## 2021-02-26 DIAGNOSIS — I10 ESSENTIAL HYPERTENSION, BENIGN: Chronic | ICD-10-CM

## 2021-02-26 DIAGNOSIS — E11.21 TYPE 2 DIABETES MELLITUS WITH DIABETIC NEPHROPATHY, WITHOUT LONG-TERM CURRENT USE OF INSULIN (HCC): Chronic | ICD-10-CM

## 2021-02-26 DIAGNOSIS — E11.21 DIABETIC NEPHROPATHY ASSOCIATED WITH TYPE 2 DIABETES MELLITUS (HCC): ICD-10-CM

## 2021-02-26 DIAGNOSIS — R06.09 DOE (DYSPNEA ON EXERTION): Primary | ICD-10-CM

## 2021-02-26 DIAGNOSIS — R79.89 ELEVATED BRAIN NATRIURETIC PEPTIDE (BNP) LEVEL: ICD-10-CM

## 2021-02-26 DIAGNOSIS — M25.511 CHRONIC RIGHT SHOULDER PAIN: ICD-10-CM

## 2021-02-26 LAB
HCT VFR BLD CALC: 37.4 % (ref 40.5–52.5)
HEMOGLOBIN: 12.2 G/DL (ref 13.5–17.5)
MCH RBC QN AUTO: 28.1 PG (ref 26–34)
MCHC RBC AUTO-ENTMCNC: 32.6 G/DL (ref 31–36)
MCV RBC AUTO: 86.3 FL (ref 80–100)
PDW BLD-RTO: 14.5 % (ref 12.4–15.4)
PLATELET # BLD: 166 K/UL (ref 135–450)
PMV BLD AUTO: 9 FL (ref 5–10.5)
RBC # BLD: 4.33 M/UL (ref 4.2–5.9)
WBC # BLD: 4.9 K/UL (ref 4–11)

## 2021-02-26 PROCEDURE — G8484 FLU IMMUNIZE NO ADMIN: HCPCS | Performed by: FAMILY MEDICINE

## 2021-02-26 PROCEDURE — G8417 CALC BMI ABV UP PARAM F/U: HCPCS | Performed by: FAMILY MEDICINE

## 2021-02-26 PROCEDURE — G8427 DOCREV CUR MEDS BY ELIG CLIN: HCPCS | Performed by: FAMILY MEDICINE

## 2021-02-26 PROCEDURE — 3044F HG A1C LEVEL LT 7.0%: CPT | Performed by: FAMILY MEDICINE

## 2021-02-26 PROCEDURE — 99214 OFFICE O/P EST MOD 30 MIN: CPT | Performed by: FAMILY MEDICINE

## 2021-02-26 PROCEDURE — 2022F DILAT RTA XM EVC RTNOPTHY: CPT | Performed by: FAMILY MEDICINE

## 2021-02-26 PROCEDURE — 3017F COLORECTAL CA SCREEN DOC REV: CPT | Performed by: FAMILY MEDICINE

## 2021-02-26 PROCEDURE — 1036F TOBACCO NON-USER: CPT | Performed by: FAMILY MEDICINE

## 2021-02-26 ASSESSMENT — PATIENT HEALTH QUESTIONNAIRE - PHQ9
SUM OF ALL RESPONSES TO PHQ QUESTIONS 1-9: 0

## 2021-02-26 NOTE — PROGRESS NOTES
Here for f/u and recheck of chronic pain issues, shoulder pain issues and some issues of bilateral lower extremity edema. Pt states that he has noted some issues of shortness of breath with exertion. When he goes to airport, a lot of walking causes him to get shortness of breath. Feels winded but no shortness of breath. Pt doesn't stop but feels that he could. Pt states sx have been going on for the past few months, and seems to be present most of the time. No issues with going up flight of stairs. Can lay flat w/o any issues of shortness of breath, PND. No issues with breathing at rest.  Did have doppler that was negative for DVT 11/2020. Pt did get in to see dr. Baudilio Morrison for his chroinc shoulder pain. Pt was given cortisone injection and they did discuss surgery, but pt is limited in terms of timing, as he is working in [address] for the time being    pts last visit with nephrology was 9/2020 and things were stable, with moderate creatinine elevation at 3.3. Except as noted above in the history of present illness, the review of systems is  negative for headache, vision changes, chest pain, abdominal pain, urinary sx, bowel changes. Past medical, surgical, and social history reviewed and updated  Medications and allergies reviewed and updated         O: BP (!) 144/78   Pulse 82   Temp 98.3 °F (36.8 °C) (Temporal)   Resp 14   Wt 285 lb 6.4 oz (129.5 kg)   SpO2 96%   BMI 33.84 kg/m²   GEN: No acute distress, cooperative, well nourished, alert. HEENT: PEERLA, EOMI , normocephalic/atraumatic, nares and oropharynx clear. Mucous membranes normal, Tympanic membranes clear bilaterally. Neck: soft, supple, no thyromegaly, mass, no Lymphadenopathy  CV: Regular rate and rhythm, no murmur, rubs, gallops. No edema. Resp: Clear to auscultation bilaterally good air entry bilaterally  No crackles, wheeze. Breathing comfortably.        Current Outpatient Medications   Medication Sig Dispense Refill  omeprazole (PRILOSEC) 40 MG delayed release capsule TAKE 1 CAPSULE BY MOUTH EVERY DAY 30 capsule 5    atorvastatin (LIPITOR) 20 MG tablet TAKE 1 TABLET BY MOUTH EVERY DAY 30 tablet 5    furosemide (LASIX) 20 MG tablet Take 2 tablets by mouth daily 60 tablet 5    lisinopril (PRINIVIL;ZESTRIL) 20 MG tablet TAKE 1 TABLET BY MOUTH EVERY DAY 30 tablet 5    losartan (COZAAR) 100 MG tablet Take 1 tablet by mouth daily 30 tablet 5    NIFEdipine (ADALAT CC) 90 MG extended release tablet Take 1 tablet by mouth daily 30 tablet 5    carvedilol (COREG) 12.5 MG tablet Take 1 tablet by mouth 2 times daily 60 tablet 5    terazosin (HYTRIN) 5 MG capsule Take 1 capsule by mouth nightly TAKE 1 CAPSULE BY MOUTH EVERY EVENING 60 capsule 5    allopurinol (ZYLOPRIM) 300 MG tablet Take 1 tablet by mouth daily 30 tablet 5    colchicine (COLCRYS) 0.6 MG tablet Take 1 tablet by mouth daily as needed for Pain 30 tablet 0    aspirin 81 MG tablet Take 81 mg by mouth daily       No current facility-administered medications for this visit. ASSESSMENT / PLAN:    1. BECKMAN (dyspnea on exertion)  Persistent sx severity  Did have order for echo d/t shortness of breath and elev bnp, but never set up  Check stress echo  Management pending results. - ECHO Stress Test    2. Elevated brain natriuretic peptide (BNP) level  As above  - ECHO Stress Test    3. Type 2 diabetes mellitus with diabetic nephropathy, without long-term current use of insulin (HCC)  Stable @ goal, controlled  Check bloodwork for:  - CBC; Future  - Basic Metabolic Panel; Future  - Hemoglobin A1C; Future    4. Leg swelling  As above  Check echo    5. Chronic right shoulder pain  Reviewed MRI showing rotator cuff tear  Set for surgery  F/u with ortho    6. Nontraumatic complete tear of right rotator cuff  F/u with ortho    7. Chronic renal impairment, stage 4 (severe) ([address]  Cont f/u with nephrology, encouraged adherence to med therapy    8.  Essential hypertension,

## 2021-02-27 LAB
ANION GAP SERPL CALCULATED.3IONS-SCNC: 9 MMOL/L (ref 3–16)
BUN BLDV-MCNC: 34 MG/DL (ref 7–20)
CALCIUM SERPL-MCNC: 8.7 MG/DL (ref 8.3–10.6)
CHLORIDE BLD-SCNC: 102 MMOL/L (ref 99–110)
CO2: 27 MMOL/L (ref 21–32)
CREAT SERPL-MCNC: 3.6 MG/DL (ref 0.8–1.3)
ESTIMATED AVERAGE GLUCOSE: 142.7 MG/DL
GFR AFRICAN AMERICAN: 21
GFR NON-AFRICAN AMERICAN: 17
GLUCOSE BLD-MCNC: 111 MG/DL (ref 70–99)
HBA1C MFR BLD: 6.6 %
POTASSIUM SERPL-SCNC: 4.5 MMOL/L (ref 3.5–5.1)
PROSTATE SPECIFIC ANTIGEN: 9.15 NG/ML (ref 0–4)
SODIUM BLD-SCNC: 138 MMOL/L (ref 136–145)

## 2021-03-16 ENCOUNTER — TELEPHONE (OUTPATIENT)
Dept: FAMILY MEDICINE CLINIC | Age: 61
End: 2021-03-16

## 2021-03-16 RX ORDER — ATORVASTATIN CALCIUM 20 MG/1
TABLET, FILM COATED ORAL
Qty: 30 TABLET | Refills: 5 | Status: SHIPPED | OUTPATIENT
Start: 2021-03-16 | End: 2021-03-24 | Stop reason: SDUPTHER

## 2021-03-16 NOTE — TELEPHONE ENCOUNTER
Last OV: 2/26/2021  Last labs: 2/26/2021  Next appointment: None scheduled (follow-up pending echo stress test)

## 2021-03-16 NOTE — TELEPHONE ENCOUNTER
Please refill:     atorvastatin (LIPITOR) 20 MG tablet [[id number]]     Order Details  Dose, Route, Frequency: As Directed   Dispense Quantity: 30 tablet Refills: 5          Sig: TAKE 1 TABLET BY MOUTH EVERY DAY         Start Date: 12/21/20 End Date: --   Written Date: 12/21/20 Expiration Date: 12/21/21   Original Order:  atorvastatin (LIPITOR) 20 MG tablet [[id number]]   Providers    Authorizing Provider: Anne-Marie Milton MD NPI: [id number]   Ordering User:  Anne-Marie Milton MD        Pharmacy    [facility]/pharmacy [facility] [address] [phone] - F [phone]      Last OV: 2/26/2021  Last labs: 2/26/2021  Next appointment: None scheduled (follow-up pending echo stress test)     Call patient at # [phone] if there are any problems with refill.

## 2021-03-24 RX ORDER — ATORVASTATIN CALCIUM 20 MG/1
TABLET, FILM COATED ORAL
Qty: 30 TABLET | Refills: 1 | Status: SHIPPED | OUTPATIENT
Start: 2021-03-24 | End: 2021-05-28 | Stop reason: SDUPTHER

## 2021-03-24 NOTE — TELEPHONE ENCOUNTER
Requested Prescriptions     Pending Prescriptions Disp Refills    atorvastatin (LIPITOR) 20 MG tablet 30 tablet 5     Sig: TAKE 1 TABLET BY MOUTH EVERY DAY     Prescription was sent to [facility] 3/16/21.   Pt is in [facility] and needs prescription sent to [facility] [address], [place] [phone]    Prescription is pending to correct pharmacy

## 2021-03-24 NOTE — TELEPHONE ENCOUNTER
Pt needs medication refill of lipitor. Please advise. Thanks.  (Written by Annette Pretty)        Medication name: Lipitor  Medication dose: 20 MG  Frequency: TAKE 1 TABLET BY MOUTH EVERY DAY  Quantity: 200 Edgar name:[facility]/PHARMACY [address] [phone] - F [phone]    Last ov: 2/26/2021  Last labs: 2/26/2021

## 2021-04-12 DIAGNOSIS — M1A.09X0 IDIOPATHIC CHRONIC GOUT OF MULTIPLE SITES WITHOUT TOPHUS: ICD-10-CM

## 2021-04-12 NOTE — TELEPHONE ENCOUNTER
Pt called, requesting pain medication be sent to the [facility] in [facility]. Please advise. Thanks.       Medication name:oxyCODONE-acetaminophen (PERCOCET)  Medication dose:5-325 MG   Quantity:90 tablet  Frequency:Take 1 tablet by mouth every 6 hours as needed for Pain for up to 30 days      Pharmacy name:[facility]/pharmacy [facility] [place] [address] [place] [phone]   [address] [place] [address]   Phone:  [phone]  Fax:  [phone]  Last ov: 2/26/2021  Last lab: 2/26/2021

## 2021-04-12 NOTE — TELEPHONE ENCOUNTER
Requested Prescriptions     Pending Prescriptions Disp Refills    oxyCODONE-acetaminophen (PERCOCET) 5-325 MG per tablet 90 tablet 0     Sig: Take 1 tablet by mouth every 6 hours as needed for Pain for up to 30 days.      Last ov: 2/26/2021  Last lab: 2/26/2021

## 2021-04-13 RX ORDER — OXYCODONE HYDROCHLORIDE AND ACETAMINOPHEN 5; 325 MG/1; MG/1
1 TABLET ORAL EVERY 6 HOURS PRN
Qty: 90 TABLET | Refills: 0 | Status: SHIPPED | OUTPATIENT
Start: 2021-04-13 | End: 2021-05-28 | Stop reason: SDUPTHER

## 2021-05-06 RX ORDER — LISINOPRIL 20 MG/1
TABLET ORAL
Qty: 30 TABLET | Refills: 5 | Status: SHIPPED | OUTPATIENT
Start: 2021-05-06 | End: 2021-09-02 | Stop reason: ALTCHOICE

## 2021-05-06 NOTE — TELEPHONE ENCOUNTER
Requested Prescriptions     Pending Prescriptions Disp Refills    lisinopril (PRINIVIL;ZESTRIL) 20 MG tablet [Pharmacy Med Name: LISINOPRIL 20 MG TABLET] 30 tablet 5     Sig: TAKE 1 TABLET BY MOUTH EVERY DAY     Last ov 2/26/21  Last labs 2/26/21

## 2021-05-28 ENCOUNTER — HOSPITAL ENCOUNTER (OUTPATIENT)
Dept: NON INVASIVE DIAGNOSTICS | Age: 61
Discharge: HOME | End: 2021-05-28
Payer: COMMERCIAL

## 2021-05-28 ENCOUNTER — OFFICE VISIT (OUTPATIENT)
Dept: FAMILY MEDICINE CLINIC | Age: 61
End: 2021-05-28
Payer: COMMERCIAL

## 2021-05-28 VITALS
HEART RATE: 81 BPM | OXYGEN SATURATION: 98 % | RESPIRATION RATE: 16 BRPM | DIASTOLIC BLOOD PRESSURE: 100 MMHG | BODY MASS INDEX: 34.25 KG/M2 | WEIGHT: 288.8 LBS | SYSTOLIC BLOOD PRESSURE: 152 MMHG | TEMPERATURE: 98.2 F

## 2021-05-28 DIAGNOSIS — N18.4 CHRONIC RENAL IMPAIRMENT, STAGE 4 (SEVERE) (HCC): ICD-10-CM

## 2021-05-28 DIAGNOSIS — R79.89 ELEVATED BRAIN NATRIURETIC PEPTIDE (BNP) LEVEL: ICD-10-CM

## 2021-05-28 DIAGNOSIS — E11.21 TYPE 2 DIABETES MELLITUS WITH DIABETIC NEPHROPATHY, WITHOUT LONG-TERM CURRENT USE OF INSULIN (HCC): Chronic | ICD-10-CM

## 2021-05-28 DIAGNOSIS — M79.89 LEG SWELLING: ICD-10-CM

## 2021-05-28 DIAGNOSIS — M1A.09X0 IDIOPATHIC CHRONIC GOUT OF MULTIPLE SITES WITHOUT TOPHUS: ICD-10-CM

## 2021-05-28 DIAGNOSIS — I10 ESSENTIAL HYPERTENSION, BENIGN: Primary | Chronic | ICD-10-CM

## 2021-05-28 DIAGNOSIS — R06.02 SOB (SHORTNESS OF BREATH): ICD-10-CM

## 2021-05-28 LAB
HCT VFR BLD CALC: 38.8 % (ref 40.5–52.5)
HEMOGLOBIN: 12.7 G/DL (ref 13.5–17.5)
LV EF: 48 %
LVEF MODALITY: NORMAL
MCH RBC QN AUTO: 28.4 PG (ref 26–34)
MCHC RBC AUTO-ENTMCNC: 32.7 G/DL (ref 31–36)
MCV RBC AUTO: 87.1 FL (ref 80–100)
PDW BLD-RTO: 14.8 % (ref 12.4–15.4)
PLATELET # BLD: 155 K/UL (ref 135–450)
PMV BLD AUTO: 9 FL (ref 5–10.5)
RBC # BLD: 4.45 M/UL (ref 4.2–5.9)
WBC # BLD: 4.8 K/UL (ref 4–11)

## 2021-05-28 PROCEDURE — 1036F TOBACCO NON-USER: CPT | Performed by: FAMILY MEDICINE

## 2021-05-28 PROCEDURE — G8427 DOCREV CUR MEDS BY ELIG CLIN: HCPCS | Performed by: FAMILY MEDICINE

## 2021-05-28 PROCEDURE — G8417 CALC BMI ABV UP PARAM F/U: HCPCS | Performed by: FAMILY MEDICINE

## 2021-05-28 PROCEDURE — 3017F COLORECTAL CA SCREEN DOC REV: CPT | Performed by: FAMILY MEDICINE

## 2021-05-28 PROCEDURE — 3044F HG A1C LEVEL LT 7.0%: CPT | Performed by: FAMILY MEDICINE

## 2021-05-28 PROCEDURE — 2022F DILAT RTA XM EVC RTNOPTHY: CPT | Performed by: FAMILY MEDICINE

## 2021-05-28 PROCEDURE — 99214 OFFICE O/P EST MOD 30 MIN: CPT | Performed by: FAMILY MEDICINE

## 2021-05-28 PROCEDURE — 93306 TTE W/DOPPLER COMPLETE: CPT

## 2021-05-28 RX ORDER — ATORVASTATIN CALCIUM 20 MG/1
TABLET, FILM COATED ORAL
Qty: 30 TABLET | Refills: 5 | Status: SHIPPED | OUTPATIENT
Start: 2021-05-28 | End: 2021-11-30 | Stop reason: SDUPTHER

## 2021-05-28 RX ORDER — OXYCODONE HYDROCHLORIDE AND ACETAMINOPHEN 5; 325 MG/1; MG/1
1 TABLET ORAL EVERY 6 HOURS PRN
Qty: 90 TABLET | Refills: 0 | Status: SHIPPED | OUTPATIENT
Start: 2021-05-28 | End: 2021-08-05 | Stop reason: SDUPTHER

## 2021-05-28 RX ORDER — OXYCODONE HYDROCHLORIDE AND ACETAMINOPHEN 5; 325 MG/1; MG/1
1 TABLET ORAL EVERY 6 HOURS PRN
COMMUNITY
Start: 2020-09-12 | End: 2021-10-01 | Stop reason: SDUPTHER

## 2021-05-28 RX ORDER — OXYCODONE HYDROCHLORIDE AND ACETAMINOPHEN 5; 325 MG/1; MG/1
1 TABLET ORAL EVERY 6 HOURS PRN
Status: CANCELLED | OUTPATIENT
Start: 2021-05-28

## 2021-05-28 SDOH — ECONOMIC STABILITY: FOOD INSECURITY: WITHIN THE PAST 12 MONTHS, THE FOOD YOU BOUGHT JUST DIDN'T LAST AND YOU DIDN'T HAVE MONEY TO GET MORE.: NEVER TRUE

## 2021-05-28 NOTE — PROGRESS NOTES
Here for f/u and recheck of CRI, blood pressure    Pt here for follow up of blood pressure. Pt states doing great with adherence to therapy and feels well. No issues of chest pain, shortness of breath. No vision changes, headache, swelling in legs. Pt states that he has not taken his blood pressure meds today. Pt does want to get COVID vaccine and wants guidance on to which vaccine he can get. Pt working with dr. Tammie Bran for f/u of his chronic shoulder issues. Pt did get cortisone injection and overall did well, was told that down the road will need to consider surgery. Pt wants to defer/delay surgery to the end of the year d/t work issues. Pt does have desire to consider group home after surgery. Pt does get frustrated a lot by work, by the people he works with and also with the travel. He is currently in [place] most of the time    Pt does continue to get some issues of bilateral lower extremities swelling. Sx come and go, and is better by the next AM.  No chest pain, shortness of breath. Pt is not on his feet as much, usually sitting @ [place] most of the time. Sx are L > R. No issues of shortness of breath, no chest pain. Except as noted above in the history of present illness, the review of systems is  negative for headache, vision changes, chest pain, shortness of breath, abdominal pain, urinary sx, bowel changes. Past medical, surgical, and social history reviewed and updated  Medications and allergies reviewed and updated        O: BP (!) 152/100   Pulse 81   Temp 98.2 °F (36.8 °C) (Temporal)   Resp 16   Wt 288 lb 12.8 oz (131 kg)   SpO2 98%   BMI 34.25 kg/m²   GEN: No acute distress, cooperative, well nourished, alert. HEENT: PEERLA, EOMI , normocephalic/atraumatic, nares and oropharynx clear. Mucous membranes normal, Tympanic membranes clear bilaterally. Neck: soft, supple, no thyromegaly, mass, no Lymphadenopathy  CV: Regular rate and rhythm, no murmur, rubs, gallops.  No edema.  Resp: Clear to auscultation bilaterally good air entry bilaterally  No crackles, wheeze. Breathing comfortably. Psych: mood stable, No suicidal thoughts or ideation   Ext: bilateral lower extremities swelling w/o calf pain, no pitting. Neurovascularly intact        ASSESSMENT / PLAN:    1. Idiopathic chronic gout of multiple sites without tophus  Stable w/o progressive sx  Cont prn percocet, use intermittent  See CSM  Pt aware of need for every 3 month medication followup appointments, and that medication refills for benzodiazepines, narcotics and/or stimulants will only be given at appointment. - oxyCODONE-acetaminophen (PERCOCET) 5-325 MG per tablet; Take 1 tablet by mouth every 6 hours as needed for Pain for up to 30 days. Dispense: 90 tablet; Refill: 0    2. Essential hypertension, benign  blood pressure elevated, continues to struggle with adherence to meds  Aware risk of persistent elevation of blood pressure and complications, including progressive renal dz    3. Type 2 diabetes mellitus with diabetic nephropathy, without long-term current use of insulin ([facility])  - CBC; Future  - Comprehensive Metabolic Panel; Future  - Hemoglobin A1C; Future  - PTH, INTACT; Future  - Microalbumin / Creatinine Urine Ratio; Future    4. Chronic renal impairment, stage 4 (severe) ([facility])  Aware risk of persistent issues with elevation of blood pressure and not taking meds consistently  F/u with nephrology  Check bloodwork as below  - ECHO Complete 2D W Doppler W Color  - CBC; Future  - Comprehensive Metabolic Panel; Future  - Hemoglobin A1C; Future  - PTH, INTACT; Future  - Microalbumin / Creatinine Urine Ratio; Future    5. Leg swelling  Suspect d/t chronic venous insufficiency and CRI, check echo  Management pending results. - ECHO Complete 2D W Doppler W Color    6. Elevated brain natriuretic peptide (BNP) level  Check echo  Management pending results. - ECHO Complete 2D W Doppler W Color    7.  SOB (shortness of breath)  Persistent sx, exam nonfocal  EKG normal, check echo  Management pending results. - ECHO Complete 2D W Doppler W Color           Follow-up appointment:   Pending bloodwork, echo results    Discussed use, benefit, and side effects of all prescribed medications. Barriers to medication compliance addressed. All patient questions answered. Pt voiced understanding. When applicable, patient's outside records were reviewed through [facility]. The patient has signed appropriate paperworks/consents.

## 2021-05-29 LAB
A/G RATIO: 1.8 (ref 1.1–2.2)
ALBUMIN SERPL-MCNC: 4.6 G/DL (ref 3.4–5)
ALP BLD-CCNC: 98 U/L (ref 40–129)
ALT SERPL-CCNC: 14 U/L (ref 10–40)
ANION GAP SERPL CALCULATED.3IONS-SCNC: 14 MMOL/L (ref 3–16)
AST SERPL-CCNC: 14 U/L (ref 15–37)
BILIRUB SERPL-MCNC: 0.3 MG/DL (ref 0–1)
BUN BLDV-MCNC: 27 MG/DL (ref 7–20)
CALCIUM SERPL-MCNC: 8.6 MG/DL (ref 8.3–10.6)
CHLORIDE BLD-SCNC: 104 MMOL/L (ref 99–110)
CO2: 26 MMOL/L (ref 21–32)
CREAT SERPL-MCNC: 2.9 MG/DL (ref 0.8–1.3)
CREATININE URINE: 153.3 MG/DL (ref 39–259)
ESTIMATED AVERAGE GLUCOSE: 139.9 MG/DL
GFR AFRICAN AMERICAN: 27
GFR NON-AFRICAN AMERICAN: 22
GLOBULIN: 2.5 G/DL
GLUCOSE BLD-MCNC: 82 MG/DL (ref 70–99)
HBA1C MFR BLD: 6.5 %
MICROALBUMIN UR-MCNC: 126.7 MG/DL
MICROALBUMIN/CREAT UR-RTO: 826.5 MG/G (ref 0–30)
PARATHYROID HORMONE INTACT: 446.1 PG/ML (ref 14–72)
POTASSIUM SERPL-SCNC: 4.1 MMOL/L (ref 3.5–5.1)
SODIUM BLD-SCNC: 144 MMOL/L (ref 136–145)
TOTAL PROTEIN: 7.1 G/DL (ref 6.4–8.2)

## 2021-07-14 ENCOUNTER — TELEPHONE (OUTPATIENT)
Dept: FAMILY MEDICINE CLINIC | Age: 61
End: 2021-07-14

## 2021-07-14 NOTE — TELEPHONE ENCOUNTER
Patient would like to know if he can get a script for [employer] sent to [facility] [address], [place] [facility] [phone]

## 2021-07-19 RX ORDER — OMEPRAZOLE 40 MG/1
CAPSULE, DELAYED RELEASE ORAL
Qty: 30 CAPSULE | Refills: 3 | Status: SHIPPED | OUTPATIENT
Start: 2021-07-19 | End: 2021-12-16

## 2021-07-19 NOTE — TELEPHONE ENCOUNTER
Requested Prescriptions     Pending Prescriptions Disp Refills    omeprazole (PRILOSEC) 40 MG delayed release capsule [Pharmacy Med Name: OMEPRAZOLE DR 40 MG CAPSULE] 30 capsule 3     Sig: TAKE 1 CAPSULE BY MOUTH EVERY DAY     Last ov 5/28/21  Last labs 5/28/21

## 2021-08-04 ENCOUNTER — TELEPHONE (OUTPATIENT)
Dept: FAMILY MEDICINE CLINIC | Age: 61
End: 2021-08-04

## 2021-08-04 DIAGNOSIS — M1A.09X0 IDIOPATHIC CHRONIC GOUT OF MULTIPLE SITES WITHOUT TOPHUS: ICD-10-CM

## 2021-08-04 NOTE — TELEPHONE ENCOUNTER
Requested Prescriptions     Pending Prescriptions Disp Refills    oxyCODONE-acetaminophen (PERCOCET) 5-325 MG per tablet 90 tablet 0     Sig: Take 1 tablet by mouth every 6 hours as needed for Pain for up to 30 days.      Last ov 5/28/21

## 2021-08-04 NOTE — TELEPHONE ENCOUNTER
Medication name:oxyCODONE-acetaminophen (PERCOCET) 5-325 MG per tablet       Medication dose:  Frequency:Take 1 tablet by mouth every 6 hours as needed.   Quantity:90 tablets  Pharmacy name:/[facility]   [address] [address]   Phone:  [phone]  Fax:  [phone]  Pharmacy number:  Last OV:5/28/21  Last Labs:  PATIENT IS LIVING IN [facility] CURRENTLY SAYS HE WILL BE HOME NEXT WEEK I have reviewed and confirmed nurses' notes for patient's medications, allergies, medical history, and surgical history.

## 2021-08-05 RX ORDER — OXYCODONE HYDROCHLORIDE AND ACETAMINOPHEN 5; 325 MG/1; MG/1
1 TABLET ORAL EVERY 6 HOURS PRN
Qty: 90 TABLET | Refills: 0 | Status: SHIPPED | OUTPATIENT
Start: 2021-08-05 | End: 2021-09-02

## 2021-08-16 ENCOUNTER — OFFICE VISIT (OUTPATIENT)
Dept: FAMILY MEDICINE CLINIC | Age: 61
End: 2021-08-16
Payer: COMMERCIAL

## 2021-08-16 VITALS
SYSTOLIC BLOOD PRESSURE: 138 MMHG | TEMPERATURE: 97.1 F | HEART RATE: 86 BPM | WEIGHT: 287.6 LBS | OXYGEN SATURATION: 98 % | RESPIRATION RATE: 12 BRPM | DIASTOLIC BLOOD PRESSURE: 72 MMHG | BODY MASS INDEX: 34.1 KG/M2

## 2021-08-16 DIAGNOSIS — M79.89 LEG SWELLING: ICD-10-CM

## 2021-08-16 DIAGNOSIS — I10 ESSENTIAL HYPERTENSION, BENIGN: Primary | Chronic | ICD-10-CM

## 2021-08-16 DIAGNOSIS — N52.9 ERECTILE DYSFUNCTION, UNSPECIFIED ERECTILE DYSFUNCTION TYPE: ICD-10-CM

## 2021-08-16 DIAGNOSIS — R79.89 ELEVATED BRAIN NATRIURETIC PEPTIDE (BNP) LEVEL: ICD-10-CM

## 2021-08-16 DIAGNOSIS — N18.4 CHRONIC RENAL IMPAIRMENT, STAGE 4 (SEVERE) (HCC): ICD-10-CM

## 2021-08-16 DIAGNOSIS — I50.22 CHRONIC SYSTOLIC CONGESTIVE HEART FAILURE (HCC): ICD-10-CM

## 2021-08-16 DIAGNOSIS — E11.21 TYPE 2 DIABETES MELLITUS WITH DIABETIC NEPHROPATHY, WITHOUT LONG-TERM CURRENT USE OF INSULIN (HCC): Chronic | ICD-10-CM

## 2021-08-16 PROCEDURE — 1036F TOBACCO NON-USER: CPT | Performed by: FAMILY MEDICINE

## 2021-08-16 PROCEDURE — G8417 CALC BMI ABV UP PARAM F/U: HCPCS | Performed by: FAMILY MEDICINE

## 2021-08-16 PROCEDURE — 99214 OFFICE O/P EST MOD 30 MIN: CPT | Performed by: FAMILY MEDICINE

## 2021-08-16 PROCEDURE — 2022F DILAT RTA XM EVC RTNOPTHY: CPT | Performed by: FAMILY MEDICINE

## 2021-08-16 PROCEDURE — G8427 DOCREV CUR MEDS BY ELIG CLIN: HCPCS | Performed by: FAMILY MEDICINE

## 2021-08-16 PROCEDURE — 3044F HG A1C LEVEL LT 7.0%: CPT | Performed by: FAMILY MEDICINE

## 2021-08-16 PROCEDURE — 3017F COLORECTAL CA SCREEN DOC REV: CPT | Performed by: FAMILY MEDICINE

## 2021-08-16 RX ORDER — SILDENAFIL 100 MG/1
100 TABLET, FILM COATED ORAL PRN
Qty: 30 TABLET | Refills: 3 | Status: SHIPPED | OUTPATIENT
Start: 2021-08-16 | End: 2022-09-01 | Stop reason: SDUPTHER

## 2021-08-16 NOTE — PROGRESS NOTES
Here for f/u and recheck of bilateral lower extremities. Pt states that he has been doing ok, dealing with some moderate stress related to some health issues in family. Pt is managing the stress ok at this time. Pt is leaving for [place] tomorrow and anticipates will be down there for a few weeks. Pt has noted some bilateral lower extremities swelling, L > R. Sx are worse when he is more active but sttaes that he is sitting down a lot and still gets sx. Sx are not painful unless it gets tight, but otherwise just present. No issues of chest pain, shortness of breath. Pt has not noticed any exertional chest pain. Pt did have echo done at hospital 5/2021. Pt here for follow up of blood pressure. Pt states doing great with adherence to therapy and feels well. No issues of chest pain, shortness of breath. No vision changes, headache, swelling in legs. Except as noted above in the history of present illness, the review of systems is  negative for headache, vision changes, chest pain, shortness of breath, abdominal pain, urinary sx, bowel changes. Past medical, surgical, and social history reviewed and updated  Medications and allergies reviewed and updated        O: /72   Pulse 86   Temp 97.1 °F (36.2 °C) (Temporal)   Resp 12   Wt 287 lb 9.6 oz (130.5 kg)   SpO2 98%   BMI 34.10 kg/m²   GEN: No acute distress, cooperative, well nourished, alert. HEENT: PEERLA, EOMI , normocephalic/atraumatic, nares and oropharynx clear. Mucous membranes normal, Tympanic membranes clear bilaterally. Neck: soft, supple, no thyromegaly, mass, no Lymphadenopathy  CV: Regular rate and rhythm, no murmur, rubs, gallops. No edema. Resp: Clear to auscultation bilaterally good air entry bilaterally  No crackles, wheeze. Breathing comfortably.    Psych: mood stable, No suicidal thoughts or ideation   Ext: 1-2+ pitting edema bilateral lower extremities       Current Outpatient Medications   Medication Sig Dispense Refill    sildenafil (VIAGRA) 100 MG tablet Take 1 tablet by mouth as needed for Erectile Dysfunction 30 tablet 3    oxyCODONE-acetaminophen (PERCOCET) 5-325 MG per tablet Take 1 tablet by mouth every 6 hours as needed for Pain for up to 30 days. 90 tablet 0    omeprazole (PRILOSEC) 40 MG delayed release capsule TAKE 1 CAPSULE BY MOUTH EVERY DAY 30 capsule 3    oxyCODONE-acetaminophen (PERCOCET) 5-325 MG per tablet Take 1 tablet by mouth every 6 hours as needed.  atorvastatin (LIPITOR) 20 MG tablet TAKE 1 TABLET BY MOUTH EVERY DAY 30 tablet 5    lisinopril (PRINIVIL;ZESTRIL) 20 MG tablet TAKE 1 TABLET BY MOUTH EVERY DAY 30 tablet 5    NIFEdipine (ADALAT CC) 90 MG extended release tablet TAKE 1 TABLET BY MOUTH EVERY DAY 30 tablet 5    metoprolol succinate (TOPROL XL) 50 MG extended release tablet Take 1 tablet by mouth daily 30 tablet 3    losartan (COZAAR) 100 MG tablet Take 1 tablet by mouth daily 30 tablet 5    allopurinol (ZYLOPRIM) 300 MG tablet Take 1 tablet by mouth daily 30 tablet 5    furosemide (LASIX) 20 MG tablet Take 2 tablets by mouth daily 60 tablet 5    terazosin (HYTRIN) 5 MG capsule Take 1 capsule by mouth nightly TAKE 1 CAPSULE BY MOUTH EVERY EVENING 60 capsule 5    aspirin 81 MG tablet Take 81 mg by mouth daily       No current facility-administered medications for this visit. ASSESSMENT / PLAN:    1. Essential hypertension, benign  blood pressure stable @ goal, doing well    2. Chronic renal impairment, stage 4 (severe) (HCC)  Due f/u bloodwork  Check bmp  Management pending results. Cont f/u with dr. Glenna Rizzo    3. Elevated brain natriuretic peptide (BNP) level  Reviewed echo showing decrease in EF 45%  Did not see cardiology as was recommended  Check f/u bloodwork as below  Refer cardiology for eval  - Comprehensive Metabolic Panel; Future  - Brain Natriuretic Peptide; Future  - TSH without Reflex; Future  - CBC; Future    4.  Leg swelling  Suspect d/t CHF  Cont lasix, hold increase d/t hx of CRI  Await f/u bloodwork as above    5. Type 2 diabetes mellitus with diabetic nephropathy, without long-term current use of insulin (HCC)  Stable @ goal    6. Chronic systolic congestive heart failure ([address]  As above  - Comprehensive Metabolic Panel; Future  - Brain Natriuretic Peptide; Future  - TSH without Reflex; Future  - CBC; Future  - Yolanda Gramajo MD, Cardiology, [facility]    7. Erectile dysfunction, unspecified erectile dysfunction type  viagra 100mg 1/2 to 1 tab qd prn  Discussed use, benefit, and side effects of prescribed medications. Barriers to medication compliance addressed. All patient questions answered. Pt voiced understanding. Follow-up appointment:   Pending bloodwork, cardiology eval    Discussed use, benefit, and side effects of all prescribed medications. Barriers to medication compliance addressed. All patient questions answered. Pt voiced understanding. When applicable, patient's outside records were reviewed through [facility]. The patient has signed appropriate paperworks/consents.

## 2021-09-02 ENCOUNTER — OFFICE VISIT (OUTPATIENT)
Dept: CARDIOLOGY CLINIC | Age: 61
End: 2021-09-02
Payer: COMMERCIAL

## 2021-09-02 VITALS
HEART RATE: 84 BPM | HEIGHT: 77 IN | DIASTOLIC BLOOD PRESSURE: 68 MMHG | WEIGHT: 286.8 LBS | BODY MASS INDEX: 33.86 KG/M2 | SYSTOLIC BLOOD PRESSURE: 120 MMHG

## 2021-09-02 DIAGNOSIS — I50.22 CHRONIC SYSTOLIC HEART FAILURE (HCC): ICD-10-CM

## 2021-09-02 DIAGNOSIS — I10 ESSENTIAL HYPERTENSION, BENIGN: Primary | ICD-10-CM

## 2021-09-02 PROCEDURE — 1036F TOBACCO NON-USER: CPT | Performed by: INTERNAL MEDICINE

## 2021-09-02 PROCEDURE — 93000 ELECTROCARDIOGRAM COMPLETE: CPT | Performed by: INTERNAL MEDICINE

## 2021-09-02 PROCEDURE — 99204 OFFICE O/P NEW MOD 45 MIN: CPT | Performed by: INTERNAL MEDICINE

## 2021-09-02 PROCEDURE — G8427 DOCREV CUR MEDS BY ELIG CLIN: HCPCS | Performed by: INTERNAL MEDICINE

## 2021-09-02 PROCEDURE — 3017F COLORECTAL CA SCREEN DOC REV: CPT | Performed by: INTERNAL MEDICINE

## 2021-09-02 PROCEDURE — G8417 CALC BMI ABV UP PARAM F/U: HCPCS | Performed by: INTERNAL MEDICINE

## 2021-09-02 ASSESSMENT — ENCOUNTER SYMPTOMS
WHEEZING: 0
SHORTNESS OF BREATH: 0
FACIAL SWELLING: 0
ABDOMINAL DISTENTION: 0
CHEST TIGHTNESS: 0
COLOR CHANGE: 0
BLOOD IN STOOL: 0
ABDOMINAL PAIN: 0
EYE DISCHARGE: 0
COUGH: 0
VOMITING: 0
BACK PAIN: 0

## 2021-09-02 NOTE — ASSESSMENT & PLAN NOTE
New diagnosis, likely has combined diastolic and systolic LV dysfunction given longstanding hypertension and CKD. Discontinue losartan. Start intermediate dose Entresto. (Discussed with nephrology)  Recheck renal panel a few days after.   No prior ischemic work-up, given risk factors plan for stress test.

## 2021-09-02 NOTE — PROGRESS NOTES
[facility]     Outpatient Cardiology         Chief Complaint   Patient presents with    Congestive Heart Failure    Edema     BLLE for past 6-7 months    Bleeding/Bruising     bruises easily, on ASA 81 mg       HPI     Jorden Freeman a 56 y.o. male here to establish care for HFrEF. Referred by PCP. Hx of HTN, DM II, Afib now in normal sinus rhythm, CKD stage 4, CELSA. Chronic systolic heart failure, this is a new diagnosis, mildly reduced LV function, no prior ischemic work-up. He does complain of chronic lower extremity edema although no PND orthopnea. He is tolerating well his current medications including losartan and metoprolol. He also has chronic kidney disease stage IV, I personally discussed with the nephrologist today if it is okay to switch him to [facility]. A. fib, happened many years back, not on anticoagulation. Today normal sinus rhythm. Hypertension, elevated today we will need to adjust medications accordingly.     PMH  Past Medical History:   Diagnosis Date    Arthralgia of multiple joints 10/27/2015    Arthritis     Atrial fibrillation (HCC)     Chronic systolic congestive heart failure ([address] 8/16/2021    CRI (chronic renal insufficiency)     Diabetic nephropathy associated with type 2 diabetes mellitus ([address] 10/11/2018    Diverticulosis     Elevated PSA     Erectile dysfunction     Gout     Gout     Hemrrhoid NOS w/ complication NEC     History of MRSA infection     Hypertension     CELSA (obstructive sleep apnea) 7/14/2017    Type 2 diabetes mellitus without complication, without long-term current use of insulin ([address] 0/66/4930    Umbilical hernia without obstruction and without gangrene 2/2/2016       PSH  Past Surgical History:   Procedure Laterality Date    COLONOSCOPY      DILATATION, ESOPHAGUS      HEMICOLECTOMY      UPPER GASTROINTESTINAL ENDOSCOPY  5/28/16    biopsies, multiple small gastric ulcers    UPPER GASTROINTESTINAL ENDOSCOPY Provider, MD        Review of Systems   Constitutional: Negative for activity change, appetite change, diaphoresis, fatigue, fever and unexpected weight change. HENT: Negative for congestion, facial swelling, mouth sores and nosebleeds. Eyes: Negative for discharge and visual disturbance. Respiratory: Negative for cough, chest tightness, shortness of breath and wheezing. Cardiovascular: Negative for chest pain, palpitations and leg swelling. Gastrointestinal: Negative for abdominal distention, abdominal pain, blood in stool and vomiting. Endocrine: Negative for cold intolerance, heat intolerance and polyuria. Genitourinary: Negative for difficulty urinating, dysuria, frequency and hematuria. Musculoskeletal: Negative for back pain, joint swelling, myalgias and neck pain. Skin: Negative for color change, pallor and rash. Allergic/Immunologic: Negative for immunocompromised state. Neurological: Negative for dizziness, syncope, weakness, light-headedness, numbness and headaches. Hematological: Negative for adenopathy. Does not bruise/bleed easily. Psychiatric/Behavioral: Negative for behavioral problems, confusion, decreased concentration and suicidal ideas. The patient is not nervous/anxious. Vitals:    09/02/21 1346   BP: 120/68   Pulse: 84    Weight: 286 lb 12.8 oz (130.1 kg)       Vitals:    09/02/21 1346   BP: 120/68   Site: Left Upper Arm   Position: Sitting   Cuff Size: Medium Adult   Pulse: 84   Weight: 286 lb 12.8 oz (130.1 kg)   Height: 6' 5\" (1.956 m)       BP Readings from Last 3 Encounters:   09/02/21 120/68   08/16/21 138/72   05/28/21 (!) 152/100       Wt Readings from Last 3 Encounters:   09/02/21 286 lb 12.8 oz (130.1 kg)   08/16/21 287 lb 9.6 oz (130.5 kg)   05/28/21 288 lb 12.8 oz (131 kg)       Physical Exam  Constitutional:       General: He is not in acute distress. Appearance: He is well-developed. He is not diaphoretic.    HENT:      Head: Normocephalic and atraumatic. Eyes:      Pupils: Pupils are equal, round, and reactive to light. Neck:      Thyroid: No thyromegaly. Vascular: No JVD. Cardiovascular:      Rate and Rhythm: Normal rate and regular rhythm. Chest Wall: PMI is not displaced. Heart sounds: Normal heart sounds, S1 normal and S2 normal. No murmur heard. No friction rub. No gallop. Pulmonary:      Effort: Pulmonary effort is normal. No respiratory distress. Breath sounds: Normal breath sounds. No stridor. No wheezing or rales. Chest:      Chest wall: No tenderness. Abdominal:      General: Bowel sounds are normal. There is no distension. Palpations: Abdomen is soft. Tenderness: There is no abdominal tenderness. There is no guarding or rebound. Musculoskeletal:         General: No tenderness. Normal range of motion. Cervical back: Normal range of motion. Lymphadenopathy:      Cervical: No cervical adenopathy. Skin:     General: Skin is warm and dry. Findings: No erythema or rash. Neurological:      Mental Status: He is alert and oriented to person, place, and time. Coordination: Coordination normal.   Psychiatric:         Behavior: Behavior normal.         Thought Content:  Thought content normal.         Judgment: Judgment normal.         Labs:       Lab Results   Component Value Date    WBC 4.6 08/16/2021    HGB 13.0 (L) 08/16/2021    HCT 38.7 (L) 08/16/2021    MCV 86.1 08/16/2021     08/16/2021     Lab Results   Component Value Date     08/16/2021    K 3.6 08/16/2021     08/16/2021    CO2 25 08/16/2021    BUN 24 (H) 08/16/2021    CREATININE 3.3 (H) 08/16/2021    GLUCOSE 141 (H) 08/16/2021    CALCIUM 8.0 (L) 08/16/2021    PROT 7.1 08/16/2021    LABALBU 4.5 08/16/2021    BILITOT 0.3 08/16/2021    ALKPHOS 94 08/16/2021    AST 16 08/16/2021    ALT 15 08/16/2021    LABGLOM 19 (A) 08/16/2021    GFRAA 23 (A) 08/16/2021    AGRATIO 1.7 08/16/2021    GLOB 2.6 08/16/2021         Lab Results   Component Value Date    CHOL 172 09/04/2020    CHOL 173 02/26/2020    CHOL 174 09/03/2019     Lab Results   Component Value Date    TRIG 164 (H) 09/04/2020    TRIG 121 02/26/2020    TRIG 187 (H) 09/03/2019     Lab Results   Component Value Date    HDL 47 09/04/2020    HDL 60 02/26/2020    HDL 59 09/03/2019     Lab Results   Component Value Date    LDLCALC 92 09/04/2020    LDLCALC 89 02/26/2020    LDLCALC 78 09/03/2019     Lab Results   Component Value Date    LABVLDL 33 09/04/2020    LABVLDL 24 02/26/2020    LABVLDL 37 09/03/2019     Lab Results   Component Value Date    CHOLHDLRATIO 3.8 04/14/2011       Lab Results   Component Value Date    INR 0.99 03/17/2019    INR 1.08 05/28/2016    PROTIME 11.3 03/17/2019    PROTIME 12.3 05/28/2016       The 10-year ASCVD risk score (Hector Batista et al., 2013) is: 22.6%    Values used to calculate the score:      Age: 56 years      Sex: Male      Is Non- : Yes      Diabetic: Yes      Tobacco smoker: No      Systolic Blood Pressure: 540 mmHg      Is BP treated: Yes      HDL Cholesterol: 47 mg/dL      Total Cholesterol: 172 mg/dL      Imaging:       Last ECG (if available): Personally interpreted  Normal sinus rhythm, IVCD. Last Monitor/Holter    Last Stress (if available):    Last Cath (if available):    Last TTE/MONY(if available): 5/28/21  Summary   Left ventricular cavity size is normal. There is moderate concentric left   ventricular hypertrophy. Overall left ventricular systolic function appears   borderline mildly reduced with an ejection fraction of 45-50%. Normal diastolic function. Global strain is -9.4%. Estimated pulmonary artery systolic pressure is at 30 mmHg assuming a right   atrial pressure of 3 mmHg. The right ventricle is normal in size and function. Last CMR  (if available):      Assessment / Plan:     Essential hypertension, benign  Elevated, will adjust medications, start Entresto DC losartan.     Chronic systolic congestive heart failure (HCC)  New diagnosis, likely has combined diastolic and systolic LV dysfunction given longstanding hypertension and CKD. Discontinue losartan. Start intermediate dose Entresto. (Discussed with nephrology)  Recheck renal panel a few days after. No prior ischemic work-up, given risk factors plan for stress test.      Follow up in 1 months. I had the opportunity to review the clinical symptoms and presentation of Jorden Woods. Patient's allergies and medications were reviewed and updated. Patient's past medical, surgical, social and family history were reviewed and updated. Patient's testing including laboratory, ECGs, monitor, imaging (TTE,MONY,CMR,cath) were reviewed. Tobacco use was discussed with the patient and educated on the negative effects. I have asked the patient to not utilize these agents. All questions and concerns were addressed to the patient/family. Alternatives to my treatment were discussed. The note was completed using EMR. Every effort wasmade to ensure accuracy; however, inadvertent computerized transcription errors may be present. Thank you for allowing me to participate in thecare or [employer].  Yamilet Quintero MD, [facility], [facility]

## 2021-09-03 ENCOUNTER — TELEPHONE (OUTPATIENT)
Dept: CARDIOLOGY CLINIC | Age: 61
End: 2021-09-03

## 2021-10-01 DIAGNOSIS — M1A.09X0 IDIOPATHIC CHRONIC GOUT OF MULTIPLE SITES WITHOUT TOPHUS: ICD-10-CM

## 2021-10-01 DIAGNOSIS — N18.4 CHRONIC RENAL IMPAIRMENT, STAGE 4 (SEVERE) (HCC): Primary | ICD-10-CM

## 2021-10-01 NOTE — TELEPHONE ENCOUNTER
Called to request we send his medication to a [facility] in [place].    Last ov: 08/16/2021  Last labs: 08/16/2021      Requested Prescriptions     Pending Prescriptions Disp Refills    oxyCODONE-acetaminophen (PERCOCET) 5-325 MG per tablet 90 tablet 0     Sig: Take 1 tablet by mouth every 6 hours as needed for up to 30 days.

## 2021-10-01 NOTE — TELEPHONE ENCOUNTER
Called to request we send his medication to a [facility] in [place].        Medication name: Oxycodone-acetaminophen (percocet)  Medication dose: 5-325mg  Frequency: Take 1 tablet by mouth every 6 hours as needed  Quantity: 1 (?)  Pharmacy name: [id number] [address] - [address] [phone] - F [phone]  Last ov: 08/16/2021  Last labs: 08/16/2021

## 2021-10-02 RX ORDER — OXYCODONE HYDROCHLORIDE AND ACETAMINOPHEN 5; 325 MG/1; MG/1
1 TABLET ORAL EVERY 8 HOURS PRN
Qty: 90 TABLET | Refills: 0 | Status: SHIPPED | OUTPATIENT
Start: 2021-10-02 | End: 2021-11-30 | Stop reason: SDUPTHER

## 2021-10-11 NOTE — TELEPHONE ENCOUNTER
Requested Prescriptions     Pending Prescriptions Disp Refills    sacubitril-valsartan (ENTRESTO) 49-51 MG per tablet 60 tablet 3     Sig: Take 1 tablet by mouth 2 times daily     Last ov 8/16/2021  Last labs 8/16/21

## 2021-10-11 NOTE — TELEPHONE ENCOUNTER
Patient would like a script for [facility] sent to [id number]- [place] [address] [facility] [phone]. .he s given samples by Dr Kade Thomas never had a script in the computer

## 2021-10-26 ENCOUNTER — TELEPHONE (OUTPATIENT)
Dept: FAMILY MEDICINE CLINIC | Age: 61
End: 2021-10-26

## 2021-10-26 NOTE — TELEPHONE ENCOUNTER
Pt spouse called in stating he needs a new CPAP machine - this is older and it has stopped working in the last few days. Please advise, thank you!

## 2021-10-26 NOTE — TELEPHONE ENCOUNTER
If he hasn't had recent sleep study, he should get in touch with pulmonary Dr. Barbra Caballero ([phone]  to get sleep study set up  Thoughts?

## 2021-10-27 NOTE — TELEPHONE ENCOUNTER
[facility] received a call from:    Name of Caller: Lorna Perjosy     Relationship to patient:Spouse    Organization name: N/a    Best contact [phone]    Reason for call: Pts wife Duey Myron called and was following up on this encounter. She said she didn't get a message or a call and she has been calling over all day and will try again later.

## 2021-10-29 ENCOUNTER — TELEPHONE (OUTPATIENT)
Dept: FAMILY MEDICINE CLINIC | Age: 61
End: 2021-10-29

## 2021-10-29 NOTE — TELEPHONE ENCOUNTER
Prior authorization for Entresto 49 mg-51 mg tablet has been scanned in and sent to the PA Department via .

## 2021-11-01 NOTE — TELEPHONE ENCOUNTER
Submitted PA for Entresto 49-51MG tablets, Key: [id number]. Medication has been DENIED. See attached denial letter. Please notify patient. Thank you.

## 2021-11-03 NOTE — TELEPHONE ENCOUNTER
Entresto 49-51MG tablets medication has been DENIED by pt insurance.  See attached denial letter    Please advise  Thanks

## 2021-11-09 ENCOUNTER — VIRTUAL VISIT (OUTPATIENT)
Dept: PULMONOLOGY | Age: 61
End: 2021-11-09
Payer: COMMERCIAL

## 2021-11-09 DIAGNOSIS — I50.22 CHRONIC SYSTOLIC CONGESTIVE HEART FAILURE (HCC): ICD-10-CM

## 2021-11-09 DIAGNOSIS — G47.33 OSA (OBSTRUCTIVE SLEEP APNEA): Primary | Chronic | ICD-10-CM

## 2021-11-09 DIAGNOSIS — E66.9 NON MORBID OBESITY, UNSPECIFIED OBESITY TYPE: Chronic | ICD-10-CM

## 2021-11-09 DIAGNOSIS — I48.0 PAROXYSMAL ATRIAL FIBRILLATION (HCC): Chronic | ICD-10-CM

## 2021-11-09 DIAGNOSIS — E11.21 TYPE 2 DIABETES MELLITUS WITH DIABETIC NEPHROPATHY, WITHOUT LONG-TERM CURRENT USE OF INSULIN (HCC): Chronic | ICD-10-CM

## 2021-11-09 DIAGNOSIS — I10 ESSENTIAL HYPERTENSION, BENIGN: Chronic | ICD-10-CM

## 2021-11-09 PROCEDURE — 99214 OFFICE O/P EST MOD 30 MIN: CPT | Performed by: NURSE PRACTITIONER

## 2021-11-09 ASSESSMENT — SLEEP AND FATIGUE QUESTIONNAIRES
HOW LIKELY ARE YOU TO NOD OFF OR FALL ASLEEP WHILE SITTING AND READING: 0
HOW LIKELY ARE YOU TO NOD OFF OR FALL ASLEEP WHEN YOU ARE A PASSENGER IN A CAR FOR AN HOUR WITHOUT A BREAK: 0
HOW LIKELY ARE YOU TO NOD OFF OR FALL ASLEEP IN A CAR, WHILE STOPPED FOR A FEW MINUTES IN TRAFFIC: 0
HOW LIKELY ARE YOU TO NOD OFF OR FALL ASLEEP WHILE LYING DOWN TO REST IN THE AFTERNOON WHEN CIRCUMSTANCES PERMIT: 0
HOW LIKELY ARE YOU TO NOD OFF OR FALL ASLEEP WHILE SITTING QUIETLY AFTER LUNCH WITHOUT ALCOHOL: 0
HOW LIKELY ARE YOU TO NOD OFF OR FALL ASLEEP WHILE WATCHING TV: 0
HOW LIKELY ARE YOU TO NOD OFF OR FALL ASLEEP WHILE SITTING INACTIVE IN A PUBLIC PLACE: 0
ESS TOTAL SCORE: 0
HOW LIKELY ARE YOU TO NOD OFF OR FALL ASLEEP WHILE SITTING AND TALKING TO SOMEONE: 0

## 2021-11-09 NOTE — PROGRESS NOTES
Nely Gaxiola MD, FAASM, [facility]  Rupa Choi, MSN, RN, [address] [address]  Dept: [phone]  Dept Fax: [phone]  Loc: [phone]    Subjective:     Patient ID: Jen Keita is a 56 y.o. male. Chief Complaint   Patient presents with    Sleep Apnea       HPI:     Sleep Medicine Telephone Visit    Pursuant to the emergency declaration under the [address] [place] [address], [facility] waiver authority and the [employer] Act this Telephone Visit was insisted, with patient's consent, to reduce the patient's risk of exposure to COVID-19 and provide continuity of care for an established patient. Services were provided through a synchronous discussion over a telephone chat to substitute for in-person clinic visit, and coded as such. While patient is at home. Machine Modem/Download Info:                                        PAP Mask  Mask Type: Nasal mask  Clinically Relevant Leak: No     [place] - Total score: 0    Follow-up :     Last Visit : March 2019      Subjective Health Changes: None        Over Night Oximetry: [] Yes  [] No  [x] NA [] WNL   Using O2: [] Yes  [] No  [x] NA   Patient is compliant with the machine  [x] Yes  [] No [] Per patient (machine quit 2 weeks)   Feeling rested when using the machine   [x] Yes  [] No     Pressure is comfortable with inspiration and expiration  [x] Yes  [] No   ([x] NA   [] Feeling of suffocation  [] Feeling like not enough air    [] To much pressure)     Noticed changes in pressure  [x] NA  [] Yes    [] No     Mask is fitting well  [x] Yes  [] No   Noting Mask Air Leak  [] Yes  [x] No   Having painful Aerophagia  [] Yes  [x] No   Nocturia   0-5  per night.    Having  HA upon waking  [] Yes  [x] No   Dry mouth upon waking   Dry Nose  Dry Eyes  [x] Yes  [] No   Congestion upon waking   [] Yes [x] No    Nose Bleeds  [] Yes  [x] No   Using Sleep Aides  [x] NA  [] OTC  [] Per our office   [] Per another provider   Understands how to change humidification and/or tubing temperature for comfort while at home  [x] Yes  [] No     Difficulties falling asleep  [] Yes  [x] No   Difficulties staying asleep  [] Yes  [x] No   Approximate time to bed  10-11pm   Approximate wake time  5:30-6am   Taking Naps  no   If taking naps usual length  [x] NA   If taking naps using the machine [x] NA  [] Yes    [] No    [] With and With out    Drowsy when driving  [] Yes  [x] No     Does patient carry a DOT/CDL  [] Yes  [x] No     Does patient carry FAA/[employer] License   [] Yes  [x] No      Any concerns noted with the machine at this time  [x] Yes  [] No    Patient ordered a new machine:   Concerns with the machine:     loss of power or shutting off    Won't power on  Increased symptoms include but are not limited to   Dryness  Frequent waking  Waking un refreshed  Daytime sleepiness   Machine is greater then 11years old    Yes  Patient was benefiting from use prior to the start of the malfunctioning of the machine. Assessment/Plan:   1. CELSA (obstructive sleep apnea)  Assessment & Plan:  After downloading data and reviewing  Reviewed compliance download with pt. Supplies and parts as needed for his machine. These are medically necessary. Continue medications per his PCP and other physicians. Limit caffeine use after 3pm.    The chronic medical conditions listed are directly related to the primary diagnosis listed above. The management of the primary diagnosis affects the secondary diagnosis and vice versa    Patient is complaint encouraged to maintain compliance to aide on controlling other stated healthcare concerns. 2. Type 2 diabetes mellitus with diabetic nephropathy, without long-term current use of insulin (HCC)  Assessment & Plan:  Chronic- stable.       After speaking with patient:    Agree with current plan, and would agree to continue this plan per prescribing and managing physician. 3. Non morbid obesity, unspecified obesity type  Assessment & Plan:  Patient encouraged to work on maintaining a healthy weight per height. Achievable with diet restriction/modifications and exercise (may consult primary care if unsure of any restrictions or concerns). Weight management directly correlates to risk of control and maintenance of Obstructive Sleep Apnea. 4. Essential hypertension, benign  Assessment & Plan:  Chronic- stable. After speaking with patient:    Agree with current plan, and would agree to continue this plan per prescribing and managing physician. 5. Chronic systolic congestive heart failure (HCC)  Assessment & Plan:  Chronic- stable. After speaking with patient:    Agree with current plan, and would agree to continue this plan per prescribing and managing physician. 6. Paroxysmal atrial fibrillation (HCC)  Assessment & Plan:  Chronic- stable. After speaking with patient:    Agree with current plan, and would agree to continue this plan per prescribing and managing physician. - After pulling data and reviewing it   - Reviewed compliance download with patient    -Medically necessary supplies and parts as needed for his machine.   - Continue medications per his primary care provider and other physicians.   - Encouraged to limit caffeine use after 3pm.    - Encouraged him to work on weight loss through diet and exercise  - Educated not to drive when feeling sleepy   - Patient using [employer]  - Education on  Obtaining a new machine   Office locations  Paying for supplies  Compliance  Follow up  The risk of undercontrolling Obstructive sleep apnea  After speaking to the patient, patient is currently stable.  We will continue with the current machine settings  New machine ordered: Machine pressure settings Pmin 10 Pmax 20      PATIENT FREQUENTLY INTERRUPTS AND IS INTERRUPTED BY FAMILY IN THE BACK GROUND OF THE PHONE      Education given to spouse and patient on multiple occassions, prior to patient ending the call     - 21 min spent with the patient, prepping for the appointment time and education     The chronic medical conditions listed are directly related to the primary diagnosis listed above. The management of the primary diagnosis affects the secondary diagnosis and vice versa.     - Will follow up in off in 3 months     Electronically signed by SPIKE Wyatt, RN, CNP on 11/9/2021 at 2:35 PM

## 2021-11-09 NOTE — PROGRESS NOTES
Diagnosis: [x] CELSA (G47.33) [] CSA (G47.31) [] Apnea (G47.30)   Length of Need: [x] 12 Months [] 99 Months [] Other:   Machine (TERRENCE!): [] Respironics Dream Station   2   Auto [x] ResMed AirSense     Auto 11 [] Other:     [x]  CPAP () [] Bilevel ()   Mode: [x] Auto [] Spontaneous    Mode: [] Auto [] Spontaneous        APAP - Settings  Pressure Min: 10 cmH2O  Pressure Max: 20 cmH2O                Comfort Settings: Ramp Pressure: 5 cmH2O  Ramp time: 15 min  Flex/EPR - 3 full time                                  For ResMed Bileve (TiMax-4 sec   TiMin- 0.2 sec)     Humidifier: [] Heated ()        [x] Water chamber replacement ()/ 1 per 6 months        Mask:   [x] Nasal () /1 per 3 months [] Full Face () /1 per 3 months   [x] Patient choice -Size and fit mask [] Patient Choice - Size and fit mask   [] Dispense: [] Dispense:   [x] Headgear () / 1 per 3 months [] Headgear () / 1 per 3 months   [x] Replacement Nasal Cushion ()/2 per month [] Interface Replacement ()/1 per month   [] Replacement Nasal Pillows ()/2 per month         Tubing: [x] Heated ()/1 per 3 months    [] Standard ()/1 per 3 months [] Other:           Filters: [x] Non-disposable ()/1 per 6 months     [x] Ultra-Fine, Disposable ()/2 per month        Miscellaneous: [] Chin Strap ()/ 1 per 6 months [] O2 bleed-in:        LPM   [] Oxymetry on CPAP/Bilevel []  Other:         Start Order Date: 11/09/21    MEDICAL JUSTIFICATION:  I, the undersigned, certify that the above prescribed supplies are medically necessary for this patients wellbeing. In my opinion, the supplies are both reasonable and necessary in reference to accepted standards of medicalpractice in treatment of this patients condition.     Danyel Dougherty CNP     NPI: [id number]      Order Signed Date: 11/09/21    Ceci Hearn Chuck  1960  [address]  [phone] (home)   [phone] (mobile)      Insurance Info (confirm with patient if correct):  Payor/Plan Subscr  Sex Relation Sub.  Ins. ID Effective Group Num

## 2021-11-30 ENCOUNTER — TELEPHONE (OUTPATIENT)
Dept: PULMONOLOGY | Age: 61
End: 2021-11-30

## 2021-11-30 ENCOUNTER — OFFICE VISIT (OUTPATIENT)
Dept: FAMILY MEDICINE CLINIC | Age: 61
End: 2021-11-30
Payer: COMMERCIAL

## 2021-11-30 VITALS
BODY MASS INDEX: 33.42 KG/M2 | HEART RATE: 111 BPM | RESPIRATION RATE: 14 BRPM | SYSTOLIC BLOOD PRESSURE: 142 MMHG | DIASTOLIC BLOOD PRESSURE: 88 MMHG | OXYGEN SATURATION: 98 % | TEMPERATURE: 97.5 F | WEIGHT: 281.8 LBS

## 2021-11-30 DIAGNOSIS — I50.22 CHRONIC SYSTOLIC CONGESTIVE HEART FAILURE (HCC): ICD-10-CM

## 2021-11-30 DIAGNOSIS — G47.33 OSA (OBSTRUCTIVE SLEEP APNEA): Chronic | ICD-10-CM

## 2021-11-30 DIAGNOSIS — R97.20 ELEVATED PSA: ICD-10-CM

## 2021-11-30 DIAGNOSIS — M1A.09X0 IDIOPATHIC CHRONIC GOUT OF MULTIPLE SITES WITHOUT TOPHUS: Primary | ICD-10-CM

## 2021-11-30 DIAGNOSIS — E11.21 TYPE 2 DIABETES MELLITUS WITH DIABETIC NEPHROPATHY, WITHOUT LONG-TERM CURRENT USE OF INSULIN (HCC): Chronic | ICD-10-CM

## 2021-11-30 DIAGNOSIS — G89.29 CHRONIC RIGHT SHOULDER PAIN: ICD-10-CM

## 2021-11-30 DIAGNOSIS — R25.2 LEG CRAMPS: ICD-10-CM

## 2021-11-30 DIAGNOSIS — M75.121 NONTRAUMATIC COMPLETE TEAR OF RIGHT ROTATOR CUFF: ICD-10-CM

## 2021-11-30 DIAGNOSIS — F43.9 STRESS: ICD-10-CM

## 2021-11-30 DIAGNOSIS — I10 ESSENTIAL HYPERTENSION, BENIGN: Chronic | ICD-10-CM

## 2021-11-30 DIAGNOSIS — M25.511 CHRONIC RIGHT SHOULDER PAIN: ICD-10-CM

## 2021-11-30 DIAGNOSIS — N18.4 CHRONIC RENAL IMPAIRMENT, STAGE 4 (SEVERE) (HCC): ICD-10-CM

## 2021-11-30 LAB
A/G RATIO: 1.6 (ref 1.1–2.2)
ALBUMIN SERPL-MCNC: 4.4 G/DL (ref 3.4–5)
ALP BLD-CCNC: 89 U/L (ref 40–129)
ALT SERPL-CCNC: 17 U/L (ref 10–40)
ANION GAP SERPL CALCULATED.3IONS-SCNC: 18 MMOL/L (ref 3–16)
AST SERPL-CCNC: 16 U/L (ref 15–37)
BILIRUB SERPL-MCNC: 0.3 MG/DL (ref 0–1)
BUN BLDV-MCNC: 41 MG/DL (ref 7–20)
CALCIUM SERPL-MCNC: 6.2 MG/DL (ref 8.3–10.6)
CHLORIDE BLD-SCNC: 99 MMOL/L (ref 99–110)
CHOLESTEROL, TOTAL: 216 MG/DL (ref 0–199)
CO2: 25 MMOL/L (ref 21–32)
CREAT SERPL-MCNC: 5 MG/DL (ref 0.8–1.3)
GFR AFRICAN AMERICAN: 14
GFR NON-AFRICAN AMERICAN: 12
GLUCOSE BLD-MCNC: 141 MG/DL (ref 70–99)
HCT VFR BLD CALC: 39.5 % (ref 40.5–52.5)
HDLC SERPL-MCNC: 59 MG/DL (ref 40–60)
HEMOGLOBIN: 12.6 G/DL (ref 13.5–17.5)
LDL CHOLESTEROL CALCULATED: 129 MG/DL
MAGNESIUM: 1.5 MG/DL (ref 1.8–2.4)
MCH RBC QN AUTO: 27.8 PG (ref 26–34)
MCHC RBC AUTO-ENTMCNC: 32 G/DL (ref 31–36)
MCV RBC AUTO: 87.1 FL (ref 80–100)
PDW BLD-RTO: 14 % (ref 12.4–15.4)
PHOSPHORUS: 3.4 MG/DL (ref 2.5–4.9)
PLATELET # BLD: 199 K/UL (ref 135–450)
PMV BLD AUTO: 8.2 FL (ref 5–10.5)
POTASSIUM SERPL-SCNC: 3.4 MMOL/L (ref 3.5–5.1)
PRO-BNP: 1043 PG/ML (ref 0–124)
PROSTATE SPECIFIC ANTIGEN: 20.25 NG/ML (ref 0–4)
RBC # BLD: 4.54 M/UL (ref 4.2–5.9)
SEDIMENTATION RATE, ERYTHROCYTE: 32 MM/HR (ref 0–20)
SODIUM BLD-SCNC: 142 MMOL/L (ref 136–145)
TOTAL CK: 1360 U/L (ref 39–308)
TOTAL PROTEIN: 7.2 G/DL (ref 6.4–8.2)
TRIGL SERPL-MCNC: 142 MG/DL (ref 0–150)
VLDLC SERPL CALC-MCNC: 28 MG/DL
WBC # BLD: 6 K/UL (ref 4–11)

## 2021-11-30 PROCEDURE — 3044F HG A1C LEVEL LT 7.0%: CPT | Performed by: FAMILY MEDICINE

## 2021-11-30 PROCEDURE — 1036F TOBACCO NON-USER: CPT | Performed by: FAMILY MEDICINE

## 2021-11-30 PROCEDURE — 3017F COLORECTAL CA SCREEN DOC REV: CPT | Performed by: FAMILY MEDICINE

## 2021-11-30 PROCEDURE — G8427 DOCREV CUR MEDS BY ELIG CLIN: HCPCS | Performed by: FAMILY MEDICINE

## 2021-11-30 PROCEDURE — G8417 CALC BMI ABV UP PARAM F/U: HCPCS | Performed by: FAMILY MEDICINE

## 2021-11-30 PROCEDURE — 81002 URINALYSIS NONAUTO W/O SCOPE: CPT | Performed by: FAMILY MEDICINE

## 2021-11-30 PROCEDURE — 99214 OFFICE O/P EST MOD 30 MIN: CPT | Performed by: FAMILY MEDICINE

## 2021-11-30 PROCEDURE — 2022F DILAT RTA XM EVC RTNOPTHY: CPT | Performed by: FAMILY MEDICINE

## 2021-11-30 PROCEDURE — G8484 FLU IMMUNIZE NO ADMIN: HCPCS | Performed by: FAMILY MEDICINE

## 2021-11-30 RX ORDER — METOPROLOL SUCCINATE 50 MG/1
TABLET, EXTENDED RELEASE ORAL
Qty: 30 TABLET | Refills: 3 | Status: SHIPPED | OUTPATIENT
Start: 2021-11-30 | End: 2022-01-25 | Stop reason: SDUPTHER

## 2021-11-30 RX ORDER — ALLOPURINOL 300 MG/1
TABLET ORAL
Qty: 30 TABLET | Refills: 5 | Status: SHIPPED | OUTPATIENT
Start: 2021-11-30 | End: 2022-01-25 | Stop reason: ALTCHOICE

## 2021-11-30 RX ORDER — NIFEDIPINE 90 MG/1
TABLET, FILM COATED, EXTENDED RELEASE ORAL
Qty: 30 TABLET | Refills: 5 | Status: SHIPPED | OUTPATIENT
Start: 2021-11-30 | End: 2022-01-25 | Stop reason: ALTCHOICE

## 2021-11-30 RX ORDER — ATORVASTATIN CALCIUM 20 MG/1
TABLET, FILM COATED ORAL
Qty: 30 TABLET | Refills: 5 | Status: SHIPPED | OUTPATIENT
Start: 2021-11-30 | End: 2022-01-25 | Stop reason: SDUPTHER

## 2021-11-30 RX ORDER — OXYCODONE HYDROCHLORIDE AND ACETAMINOPHEN 5; 325 MG/1; MG/1
1 TABLET ORAL EVERY 8 HOURS PRN
Qty: 90 TABLET | Refills: 0 | Status: SHIPPED | OUTPATIENT
Start: 2021-11-30 | End: 2022-01-10 | Stop reason: SDUPTHER

## 2021-11-30 RX ORDER — TERAZOSIN 5 MG/1
5 CAPSULE ORAL NIGHTLY
Qty: 60 CAPSULE | Refills: 5 | Status: SHIPPED | OUTPATIENT
Start: 2021-11-30 | End: 2022-01-25 | Stop reason: ALTCHOICE

## 2021-11-30 NOTE — PROGRESS NOTES
Here for f/u and recheck of chroinc low back pain, blood pressure, CRI. Pt is dealing with some moderate stressors, related to his wife's medical issues. Pt is out of town  Frequently for work, and his wife ended up getting admitted with COVID infection with complications of acute CVA. Pt was out of town for work at [employer] time. Pt is now back in town for a little bit and did talk with wife and daughter in terms of plan going forward. Pt does have plan to go back to [place] for now, and in the next few weeks anticipates that he wants to start with some FMLA. Pt did talk with wife about his plan. Pt states that he was completely unprepared for this and is managing as well as he can. Pt does have some feeling of mild depression. Pt does have chronic issues with his shoulder and is working with dr. Rubina Gonzalez and they are considering surgery down the road. Pt was told that his treatment was going to need to be surgical in nature. Pt does get cortisone injections that temporarily help sx but eventually will need to have operated on. He is trying to plan that based on his schedule. Pt has been having some significant cramps in legs, hands. Sx are not related to exertion, can be random. Sx are in muscles moreso than joints    Pt here for follow up of blood pressure. Pt states doing great with adherence to therapy and feels well. No issues of chest pain, shortness of breath. No vision changes, headache, swelling in legs. Except as noted above in the history of present illness, the review of systems is  negative for headache, vision changes, chest pain, shortness of breath, abdominal pain, urinary sx, bowel changes.     Past medical, surgical, and social history reviewed and updated  Medications and allergies reviewed and updated      O: BP (!) 142/88   Pulse 111   Temp 97.5 °F (36.4 °C) (Temporal)   Resp 14   Wt 281 lb 12.8 oz (127.8 kg)   SpO2 98%   BMI 33.42 kg/m²   GEN: No acute distress, cooperative, well nourished, alert. HEENT: PEERLA, EOMI , normocephalic/atraumatic, nares and oropharynx clear. Mucous membranes normal, Tympanic membranes clear bilaterally. Neck: soft, supple, no thyromegaly, mass, no Lymphadenopathy  CV: Regular rate and rhythm, no murmur, rubs, gallops. No edema. Resp: Clear to auscultation bilaterally good air entry bilaterally  No crackles, wheeze. Breathing comfortably. Psych: mood stable, No suicidal thoughts or ideation  Moderate stressors      Current Outpatient Medications   Medication Sig Dispense Refill    allopurinol (ZYLOPRIM) 300 MG tablet TAKE 1 TABLET BY MOUTH EVERY DAY 30 tablet 5    sacubitril-valsartan (ENTRESTO) 49-51 MG per tablet Take 1 tablet by mouth 2 times daily 60 tablet 3    terazosin (HYTRIN) 5 MG capsule Take 1 capsule by mouth nightly TAKE 1 CAPSULE BY MOUTH EVERY EVENING 60 capsule 5    metoprolol succinate (TOPROL XL) 50 MG extended release tablet TAKE 1 TABLET BY MOUTH EVERY DAY 30 tablet 3    atorvastatin (LIPITOR) 20 MG tablet TAKE 1 TABLET BY MOUTH EVERY DAY 30 tablet 5    NIFEdipine (ADALAT CC) 90 MG extended release tablet TAKE 1 TABLET BY MOUTH EVERY DAY 30 tablet 5    oxyCODONE-acetaminophen (PERCOCET) 5-325 MG per tablet Take 1 tablet by mouth every 8 hours as needed for Pain for up to 30 days. 90 tablet 0    sildenafil (VIAGRA) 100 MG tablet Take 1 tablet by mouth as needed for Erectile Dysfunction 30 tablet 3    omeprazole (PRILOSEC) 40 MG delayed release capsule TAKE 1 CAPSULE BY MOUTH EVERY DAY 30 capsule 3    aspirin 81 MG tablet Take 81 mg by mouth daily      furosemide (LASIX) 20 MG tablet Take 2 tablets by mouth daily (Patient not taking: Reported on 11/30/2021) 60 tablet 5     No current facility-administered medications for this visit. ASSESSMENT / PLAN:    1.  Idiopathic chronic gout of multiple sites without tophus  Stable w/o flare  Cont allopurinol and prn percocet  Use intermittent  See CSM  - allopurinol (ZYLOPRIM) 300 MG tablet; TAKE 1 TABLET BY MOUTH EVERY DAY  Dispense: 30 tablet; Refill: 5  - oxyCODONE-acetaminophen (PERCOCET) 5-325 MG per tablet; Take 1 tablet by mouth every 8 hours as needed for Pain for up to 30 days. Dispense: 90 tablet; Refill: 0    2. Type 2 diabetes mellitus with diabetic nephropathy, without long-term current use of insulin (HCC)  Stable, due f/u bloodwork  Check as below  - CBC; Future  - Comprehensive Metabolic Panel; Future  - Hemoglobin A1C; Future    3. Essential hypertension, benign  Blood pressure doing ok, stable  Has had chronic issues with high blood pressure  F/u with nephrology  Check bloodwork for cmp, lipids  - Lipid Panel; Future    4. Chronic systolic congestive heart failure (HCC)  - Brain Natriuretic Peptide; Future    5. Chronic renal impairment, stage 4 (severe) (HCC)  F/uw with dr. Coretta Mclain  Encouraged adherence to meds  Check f/u bloodwork   - POCT Urinalysis no Micro  - Microalbumin / Creatinine Urine Ratio; Future    6. Chronic right shoulder pain  F/u with ortho  Eventually will need to have shoulder surgery    7. Nontraumatic complete tear of right rotator cuff  F/u with ortho    8. Elevated PSA  Did not get in to see urology  Check f/u psa  Management pending results. - PSA, Prostatic Specific Antigen; Future    9. Stress  Moderate stressors  Agree with need for FMLA, goal to start 12/20    10. Leg cramps  Exam nonfocal  Check bloodwork   - CK; Future  - MAGNESIUM; Future  - PHOSPHORUS; Future  - Sedimentation Rate; Future    11. CELSA (obstructive sleep apnea)  Cont CPAP therapy  F/u with pulmonary           Follow-up appointment:   Pending bloodwork results    Discussed use, benefit, and side effects of all prescribed medications. Barriers to medication compliance addressed. All patient questions answered. Pt voiced understanding. When applicable, patient's outside records were reviewed through [facility].   The patient has signed appropriate paperworks/consents.

## 2021-11-30 NOTE — TELEPHONE ENCOUNTER
Pt in with unit given to him from his daughter. . Pressures were set as ordered on ResMed unit @ P min 10 cmH2O  and P,max @20 cmH2O. New unit has been ordered for pt and F/U scheduled.

## 2021-11-30 NOTE — PROGRESS NOTES
Diagnosis: [x] CELSA (G47.33) [] CSA (G47.31) [] Apnea (G47.30)   Length of Need: [x] 12 Months [] 99 Months [] Other:   Machine (TERRENCE!): [] Respironics Dream Station   2   Auto [x] ResMed AirSense     Auto 11 [] Other:     [x]  CPAP () [] Bilevel ()   Mode: [x] Auto [] Spontaneous    Mode: [] Auto [] Spontaneous        APAP - Settings  Pressure Min: 10 cmH2O  Pressure Max: 20 cmH2O                Comfort Settings: Ramp Pressure: 5 cmH2O  Ramp time: 15 min  Flex/EPR - 3 full time                                  For ResMed Bileve (TiMax-4 sec   TiMin- 0.2 sec)     Humidifier: [] Heated ()        [x] Water chamber replacement ()/ 1 per 6 months        Mask:   [] Nasal () /1 per 3 months [] Full Face () /1 per 3 months   [] Patient choice -Size and fit mask [] Patient Choice - Size and fit mask   [] Dispense: [] Dispense:   [] Headgear () / 1 per 3 months [] Headgear () / 1 per 3 months   [] Replacement Nasal Cushion ()/2 per month [] Interface Replacement ()/1 per month   [] Replacement Nasal Pillows ()/2 per month         Tubing: [x] Heated ()/1 per 3 months    [] Standard ()/1 per 3 months [] Other:           Filters: [x] Non-disposable ()/1 per 6 months     [x] Ultra-Fine, Disposable ()/2 per month        Miscellaneous: [] Chin Strap ()/ 1 per 6 months [] O2 bleed-in:        LPM   [] Oxymetry on CPAP/Bilevel []  Other:         Start Order Date: 11/30/21    MEDICAL JUSTIFICATION:  I, the undersigned, certify that the above prescribed supplies are medically necessary for this patients wellbeing. In my opinion, the supplies are both reasonable and necessary in reference to accepted standards of medicalpractice in treatment of this patients condition.     Artis Becerra CNP     NPI: [id number]      Order Signed Date: 11/30/21    Libby Wilson Woods  1960  [address]  [phone] ([place])   [phone] (mobile)      Insurance Info (confirm with patient if correct):  Payor/Plan Subscr  Sex Relation Sub.  Ins. ID Effective Group Num

## 2021-11-30 NOTE — TELEPHONE ENCOUNTER
Spoke with Rosalina and  the unit would  come on but would not stay on. New order was sent today. Spouse to bring unit given to her by her daughter to see if it can be set for pt.

## 2021-11-30 NOTE — TELEPHONE ENCOUNTER
Orders for a new machine have been sent to DME patient will need to call back for follow up 31-90 days after obtaining the new unit

## 2021-11-30 NOTE — TELEPHONE ENCOUNTER
Patient's wife called and said patient's unit quit working. She could not specify what wasn't working, but he isn't using it due to that and is snoring, gasping, stops breathing, and has been talking in his sleep. She would like a new machine ordered for patient. Please call patient [phone].

## 2021-11-30 NOTE — PATIENT INSTRUCTIONS
1) get bloodwork done when able to do so  2) call HR department at work and ask about paperwork for [facility] for wife's illness.   Send that paperwork to me and let me know the starting date that you will be off (likely 12/20)

## 2021-12-01 LAB
CREATININE URINE: 216.7 MG/DL (ref 39–259)
ESTIMATED AVERAGE GLUCOSE: 142.7 MG/DL
HBA1C MFR BLD: 6.6 %
MICROALBUMIN UR-MCNC: 753.9 MG/DL
MICROALBUMIN/CREAT UR-RTO: 3479 MG/G (ref 0–30)

## 2021-12-06 ENCOUNTER — FOLLOWUP TELEPHONE ENCOUNTER (OUTPATIENT)
Dept: NEPHROLOGY | Age: 61
End: 2021-12-06

## 2021-12-06 NOTE — TELEPHONE ENCOUNTER
Pt had Polina   Asked him to stop entresto   Dec lasix     D/w Dr Coleen Lewis notified me about the labs       Hernandez Wilson MD

## 2021-12-09 ENCOUNTER — TELEPHONE (OUTPATIENT)
Dept: FAMILY MEDICINE CLINIC | Age: 61
End: 2021-12-09

## 2021-12-09 NOTE — TELEPHONE ENCOUNTER
PT called stating he was having severe cramps still and states he would like to know if the labs have showed what he should be doing. States the cramps are still no better.      Please advise, thank you

## 2021-12-16 ENCOUNTER — TELEPHONE (OUTPATIENT)
Dept: PULMONOLOGY | Age: 61
End: 2021-12-16

## 2021-12-16 RX ORDER — OMEPRAZOLE 40 MG/1
CAPSULE, DELAYED RELEASE ORAL
Qty: 30 CAPSULE | Refills: 1 | Status: SHIPPED | OUTPATIENT
Start: 2021-12-16

## 2021-12-16 NOTE — TELEPHONE ENCOUNTER
Patient's wife called and wanted to speak to [facility]. She wants to get another machine for her 's recalled machine. I explained that they are not available from the DMEs for patient's on the recall list, but she still wanted to talk to [facility].   [phone]

## 2021-12-16 NOTE — TELEPHONE ENCOUNTER
Requested Prescriptions     Pending Prescriptions Disp Refills    omeprazole (PRILOSEC) 40 MG delayed release capsule [Pharmacy Med Name: OMEPRAZOLE DR 40 MG CAPSULE] 30 capsule 1     Sig: TAKE 1 CAPSULE BY MOUTH EVERY DAY     11/30/2021  Last ov 11/30/2021

## 2021-12-16 NOTE — TELEPHONE ENCOUNTER
Spoke with pt and spouse letting them know that due to the unit pt is using being on recall insurance will not pay or a new unit. Pt has been using a unit that was given to him from his daughter and is has stopped working. DME was left a message asking if the code on the unit could be fixed. No response at this time from DME. Spouse is concerned about pt's issues he is having by not using pap therapy. Dr. Angela Zambrano notified.

## 2021-12-16 NOTE — TELEPHONE ENCOUNTER
Pt spouse called in stating pt is stil have severe cramping not only in his abdomen now - eyes are even cramping. Dr. Janak Canada changed some of his medication but pt spouse says she needs to find out about the cramping.      Please advise, thank you

## 2021-12-17 ENCOUNTER — TELEPHONE (OUTPATIENT)
Dept: PULMONOLOGY | Age: 61
End: 2021-12-17

## 2021-12-17 NOTE — TELEPHONE ENCOUNTER
Appears Naz did write for a new machine but sent to [facility]. We can also try sending to [address] [place] [address] to see who gets a machine first.  His old machine may be over 11years old.     Breanna Hidalgo MD

## 2021-12-17 NOTE — TELEPHONE ENCOUNTER
Pt's wife, Ethan Salinas, called request to speak with Loly Cruz. She states she has some questions for her.   Please call her at [phone]

## 2021-12-21 ENCOUNTER — APPOINTMENT (OUTPATIENT)
Dept: MRI IMAGING | Age: 61
DRG: 682 | End: 2021-12-21
Payer: COMMERCIAL

## 2021-12-21 ENCOUNTER — APPOINTMENT (OUTPATIENT)
Dept: GENERAL RADIOLOGY | Age: 61
DRG: 682 | End: 2021-12-21
Payer: COMMERCIAL

## 2021-12-21 ENCOUNTER — APPOINTMENT (OUTPATIENT)
Dept: CT IMAGING | Age: 61
DRG: 682 | End: 2021-12-21
Payer: COMMERCIAL

## 2021-12-21 ENCOUNTER — APPOINTMENT (OUTPATIENT)
Dept: ULTRASOUND IMAGING | Age: 61
DRG: 682 | End: 2021-12-21
Payer: COMMERCIAL

## 2021-12-21 ENCOUNTER — HOSPITAL ENCOUNTER (INPATIENT)
Age: 61
LOS: 3 days | Discharge: HOME | DRG: 682 | End: 2021-12-24
Attending: EMERGENCY MEDICINE | Admitting: INTERNAL MEDICINE
Payer: COMMERCIAL

## 2021-12-21 DIAGNOSIS — N17.9 ACUTE RENAL FAILURE, UNSPECIFIED ACUTE RENAL FAILURE TYPE (HCC): Primary | ICD-10-CM

## 2021-12-21 DIAGNOSIS — E83.51 HYPOCALCEMIA: ICD-10-CM

## 2021-12-21 PROBLEM — N18.30 ACUTE RENAL FAILURE SUPERIMPOSED ON STAGE 3 CHRONIC KIDNEY DISEASE (HCC): Status: ACTIVE | Noted: 2021-12-21

## 2021-12-21 PROBLEM — E11.9 DMII (DIABETES MELLITUS, TYPE 2) (HCC): Status: ACTIVE | Noted: 2021-12-21

## 2021-12-21 PROBLEM — E66.01 MORBID OBESITY DUE TO EXCESS CALORIES (HCC): Status: ACTIVE | Noted: 2019-01-03

## 2021-12-21 PROBLEM — E78.5 HYPERLIPIDEMIA: Status: ACTIVE | Noted: 2021-12-21

## 2021-12-21 PROBLEM — R25.2 MUSCLE CRAMPS: Status: ACTIVE | Noted: 2021-12-21

## 2021-12-21 PROBLEM — E83.42 HYPOMAGNESEMIA: Status: ACTIVE | Noted: 2021-12-21

## 2021-12-21 PROBLEM — R29.90 STROKE-LIKE SYMPTOMS: Status: ACTIVE | Noted: 2021-12-21

## 2021-12-21 PROBLEM — E87.6 HYPOKALEMIA: Status: ACTIVE | Noted: 2021-12-21

## 2021-12-21 LAB
AMPHETAMINE SCREEN, URINE: ABNORMAL
ANION GAP SERPL CALCULATED.3IONS-SCNC: 15 MMOL/L (ref 3–16)
BACTERIA: ABNORMAL /HPF
BARBITURATE SCREEN URINE: ABNORMAL
BASE EXCESS VENOUS: 2.9 MMOL/L (ref -2–3)
BASOPHILS ABSOLUTE: 0 K/UL (ref 0–0.2)
BASOPHILS RELATIVE PERCENT: 0.7 %
BENZODIAZEPINE SCREEN, URINE: ABNORMAL
BILIRUBIN URINE: NEGATIVE
BLOOD, URINE: ABNORMAL
BUN BLDV-MCNC: 45 MG/DL (ref 7–20)
CALCIUM SERPL-MCNC: 5.5 MG/DL (ref 8.3–10.6)
CANNABINOID SCREEN URINE: ABNORMAL
CARBOXYHEMOGLOBIN: 0.8 % (ref 0–1.5)
CHLORIDE BLD-SCNC: 100 MMOL/L (ref 99–110)
CLARITY: CLEAR
CO2: 25 MMOL/L (ref 21–32)
COCAINE METABOLITE SCREEN URINE: ABNORMAL
COLOR: YELLOW
CORTISOL TOTAL: 6.1 UG/DL
CREAT SERPL-MCNC: 6.4 MG/DL (ref 0.8–1.3)
EKG ATRIAL RATE: 84 BPM
EKG DIAGNOSIS: NORMAL
EKG P AXIS: 49 DEGREES
EKG P-R INTERVAL: 152 MS
EKG Q-T INTERVAL: 422 MS
EKG QRS DURATION: 90 MS
EKG QTC CALCULATION (BAZETT): 498 MS
EKG R AXIS: -2 DEGREES
EKG T AXIS: 36 DEGREES
EKG VENTRICULAR RATE: 84 BPM
EOSINOPHILS ABSOLUTE: 0.2 K/UL (ref 0–0.6)
EOSINOPHILS RELATIVE PERCENT: 3.2 %
GFR AFRICAN AMERICAN: 11
GFR NON-AFRICAN AMERICAN: 9
GLUCOSE BLD-MCNC: 134 MG/DL (ref 70–99)
GLUCOSE URINE: NEGATIVE MG/DL
HCO3 VENOUS: 28.8 MMOL/L (ref 24–28)
HCT VFR BLD CALC: 33.1 % (ref 40.5–52.5)
HEMOGLOBIN, VEN, REDUCED: 9.5 %
HEMOGLOBIN: 10.9 G/DL (ref 13.5–17.5)
KETONES, URINE: NEGATIVE MG/DL
LEUKOCYTE ESTERASE, URINE: NEGATIVE
LV EF: 48 %
LVEF MODALITY: NORMAL
LYMPHOCYTES ABSOLUTE: 1.4 K/UL (ref 1–5.1)
LYMPHOCYTES RELATIVE PERCENT: 27.8 %
Lab: ABNORMAL
MAGNESIUM: 1.3 MG/DL (ref 1.8–2.4)
MCH RBC QN AUTO: 28.6 PG (ref 26–34)
MCHC RBC AUTO-ENTMCNC: 33.1 G/DL (ref 31–36)
MCV RBC AUTO: 86.2 FL (ref 80–100)
METHADONE SCREEN, URINE: ABNORMAL
METHEMOGLOBIN VENOUS: 0.4 % (ref 0–1.5)
MICROSCOPIC EXAMINATION: YES
MONOCYTES ABSOLUTE: 0.4 K/UL (ref 0–1.3)
MONOCYTES RELATIVE PERCENT: 8.6 %
NEUTROPHILS ABSOLUTE: 3 K/UL (ref 1.7–7.7)
NEUTROPHILS RELATIVE PERCENT: 59.7 %
NITRITE, URINE: NEGATIVE
O2 SAT, VEN: 90 %
OPIATE SCREEN URINE: ABNORMAL
OSMOLALITY URINE: 334 MOSM/KG (ref 390–1070)
OXYCODONE URINE: POSITIVE
PCO2, VEN: 49.1 MMHG (ref 41–51)
PDW BLD-RTO: 13.5 % (ref 12.4–15.4)
PH UA: 6.5
PH UA: 6.5 (ref 5–8)
PH VENOUS: 7.38 (ref 7.35–7.45)
PHENCYCLIDINE SCREEN URINE: ABNORMAL
PHOSPHORUS: 4.7 MG/DL (ref 2.5–4.9)
PLATELET # BLD: 167 K/UL (ref 135–450)
PMV BLD AUTO: 8.7 FL (ref 5–10.5)
PO2, VEN: 58.9 MMHG (ref 25–40)
POTASSIUM REFLEX MAGNESIUM: 3.4 MMOL/L (ref 3.5–5.1)
PRO-BNP: 1017 PG/ML (ref 0–124)
PROPOXYPHENE SCREEN: ABNORMAL
PROTEIN UA: >=300 MG/DL
RBC # BLD: 3.83 M/UL (ref 4.2–5.9)
RBC UA: ABNORMAL /HPF (ref 0–4)
SODIUM BLD-SCNC: 140 MMOL/L (ref 136–145)
SODIUM URINE: 59 MMOL/L
SPECIFIC GRAVITY UA: 1.02 (ref 1–1.03)
TCO2 CALC VENOUS: 30 MMOL/L
TROPONIN: 0.04 NG/ML
URIC ACID, SERUM: 7.3 MG/DL (ref 3.5–7.2)
URINE TYPE: ABNORMAL
UROBILINOGEN, URINE: 0.2 E.U./DL
WBC # BLD: 5 K/UL (ref 4–11)
WBC UA: ABNORMAL /HPF (ref 0–5)

## 2021-12-21 PROCEDURE — 99285 EMERGENCY DEPT VISIT HI MDM: CPT

## 2021-12-21 PROCEDURE — 84443 ASSAY THYROID STIM HORMONE: CPT

## 2021-12-21 PROCEDURE — 80048 BASIC METABOLIC PNL TOTAL CA: CPT

## 2021-12-21 PROCEDURE — 99223 1ST HOSP IP/OBS HIGH 75: CPT | Performed by: HOSPITALIST

## 2021-12-21 PROCEDURE — APPNB60 APP NON BILLABLE TIME 46-60 MINS: Performed by: NURSE PRACTITIONER

## 2021-12-21 PROCEDURE — 83735 ASSAY OF MAGNESIUM: CPT

## 2021-12-21 PROCEDURE — 6360000002 HC RX W HCPCS: Performed by: EMERGENCY MEDICINE

## 2021-12-21 PROCEDURE — 84300 ASSAY OF URINE SODIUM: CPT

## 2021-12-21 PROCEDURE — 87205 SMEAR GRAM STAIN: CPT

## 2021-12-21 PROCEDURE — 84550 ASSAY OF BLOOD/URIC ACID: CPT

## 2021-12-21 PROCEDURE — 82306 VITAMIN D 25 HYDROXY: CPT

## 2021-12-21 PROCEDURE — 76770 US EXAM ABDO BACK WALL COMP: CPT

## 2021-12-21 PROCEDURE — 70551 MRI BRAIN STEM W/O DYE: CPT

## 2021-12-21 PROCEDURE — 6370000000 HC RX 637 (ALT 250 FOR IP): Performed by: INTERNAL MEDICINE

## 2021-12-21 PROCEDURE — 6360000002 HC RX W HCPCS: Performed by: PSYCHIATRY & NEUROLOGY

## 2021-12-21 PROCEDURE — 83036 HEMOGLOBIN GLYCOSYLATED A1C: CPT

## 2021-12-21 PROCEDURE — 6360000002 HC RX W HCPCS: Performed by: INTERNAL MEDICINE

## 2021-12-21 PROCEDURE — 70544 MR ANGIOGRAPHY HEAD W/O DYE: CPT

## 2021-12-21 PROCEDURE — 82803 BLOOD GASES ANY COMBINATION: CPT

## 2021-12-21 PROCEDURE — 93005 ELECTROCARDIOGRAM TRACING: CPT | Performed by: EMERGENCY MEDICINE

## 2021-12-21 PROCEDURE — 6370000000 HC RX 637 (ALT 250 FOR IP): Performed by: HOSPITALIST

## 2021-12-21 PROCEDURE — C8929 TTE W OR WO FOL WCON,DOPPLER: HCPCS

## 2021-12-21 PROCEDURE — 81001 URINALYSIS AUTO W/SCOPE: CPT

## 2021-12-21 PROCEDURE — 1200000000 HC SEMI PRIVATE

## 2021-12-21 PROCEDURE — 70450 CT HEAD/BRAIN W/O DYE: CPT

## 2021-12-21 PROCEDURE — 71045 X-RAY EXAM CHEST 1 VIEW: CPT

## 2021-12-21 PROCEDURE — 83935 ASSAY OF URINE OSMOLALITY: CPT

## 2021-12-21 PROCEDURE — 82533 TOTAL CORTISOL: CPT

## 2021-12-21 PROCEDURE — 80061 LIPID PANEL: CPT

## 2021-12-21 PROCEDURE — 84484 ASSAY OF TROPONIN QUANT: CPT

## 2021-12-21 PROCEDURE — 84100 ASSAY OF PHOSPHORUS: CPT

## 2021-12-21 PROCEDURE — 82570 ASSAY OF URINE CREATININE: CPT

## 2021-12-21 PROCEDURE — 83880 ASSAY OF NATRIURETIC PEPTIDE: CPT

## 2021-12-21 PROCEDURE — 2580000003 HC RX 258: Performed by: EMERGENCY MEDICINE

## 2021-12-21 PROCEDURE — 83516 IMMUNOASSAY NONANTIBODY: CPT

## 2021-12-21 PROCEDURE — 70547 MR ANGIOGRAPHY NECK W/O DYE: CPT

## 2021-12-21 PROCEDURE — 80307 DRUG TEST PRSMV CHEM ANLYZR: CPT

## 2021-12-21 PROCEDURE — 85025 COMPLETE CBC W/AUTO DIFF WBC: CPT

## 2021-12-21 PROCEDURE — 36415 COLL VENOUS BLD VENIPUNCTURE: CPT

## 2021-12-21 RX ORDER — POLYETHYLENE GLYCOL 3350 17 G/17G
17 POWDER, FOR SOLUTION ORAL DAILY PRN
Status: DISCONTINUED | OUTPATIENT
Start: 2021-12-21 | End: 2021-12-24 | Stop reason: HOSPADM

## 2021-12-21 RX ORDER — ATORVASTATIN CALCIUM 40 MG/1
80 TABLET, FILM COATED ORAL NIGHTLY
Status: DISCONTINUED | OUTPATIENT
Start: 2021-12-21 | End: 2021-12-24 | Stop reason: HOSPADM

## 2021-12-21 RX ORDER — ACETAMINOPHEN 325 MG/1
650 TABLET ORAL EVERY 4 HOURS PRN
Status: DISCONTINUED | OUTPATIENT
Start: 2021-12-21 | End: 2021-12-24 | Stop reason: HOSPADM

## 2021-12-21 RX ORDER — METOPROLOL SUCCINATE 50 MG/1
1 TABLET, EXTENDED RELEASE ORAL DAILY
Status: DISCONTINUED | OUTPATIENT
Start: 2021-12-21 | End: 2021-12-21

## 2021-12-21 RX ORDER — ASPIRIN 300 MG/1
300 SUPPOSITORY RECTAL DAILY
Status: DISCONTINUED | OUTPATIENT
Start: 2021-12-21 | End: 2021-12-24 | Stop reason: HOSPADM

## 2021-12-21 RX ORDER — METOPROLOL SUCCINATE 100 MG/1
100 TABLET, EXTENDED RELEASE ORAL DAILY
Status: DISCONTINUED | OUTPATIENT
Start: 2021-12-21 | End: 2021-12-24 | Stop reason: HOSPADM

## 2021-12-21 RX ORDER — LORAZEPAM 2 MG/ML
1 INJECTION INTRAMUSCULAR
Status: COMPLETED | OUTPATIENT
Start: 2021-12-21 | End: 2021-12-21

## 2021-12-21 RX ORDER — NIFEDIPINE 30 MG/1
90 TABLET, FILM COATED, EXTENDED RELEASE ORAL DAILY
Status: DISCONTINUED | OUTPATIENT
Start: 2021-12-21 | End: 2021-12-24 | Stop reason: HOSPADM

## 2021-12-21 RX ORDER — ALLOPURINOL 100 MG/1
100 TABLET ORAL DAILY
Status: DISCONTINUED | OUTPATIENT
Start: 2021-12-22 | End: 2021-12-24 | Stop reason: HOSPADM

## 2021-12-21 RX ORDER — POTASSIUM CHLORIDE 1.5 G/1.77G
40 POWDER, FOR SOLUTION ORAL ONCE
Status: COMPLETED | OUTPATIENT
Start: 2021-12-21 | End: 2021-12-21

## 2021-12-21 RX ORDER — ASPIRIN 81 MG/1
81 TABLET ORAL DAILY
Status: DISCONTINUED | OUTPATIENT
Start: 2021-12-21 | End: 2021-12-24 | Stop reason: HOSPADM

## 2021-12-21 RX ORDER — DOXAZOSIN MESYLATE 4 MG/1
4 TABLET ORAL DAILY
Status: DISCONTINUED | OUTPATIENT
Start: 2021-12-21 | End: 2021-12-24 | Stop reason: HOSPADM

## 2021-12-21 RX ORDER — HEPARIN SODIUM 5000 [USP'U]/ML
5000 INJECTION, SOLUTION INTRAVENOUS; SUBCUTANEOUS EVERY 8 HOURS SCHEDULED
Status: DISCONTINUED | OUTPATIENT
Start: 2021-12-21 | End: 2021-12-24 | Stop reason: HOSPADM

## 2021-12-21 RX ORDER — ONDANSETRON 2 MG/ML
4 INJECTION INTRAMUSCULAR; INTRAVENOUS EVERY 6 HOURS PRN
Status: DISCONTINUED | OUTPATIENT
Start: 2021-12-21 | End: 2021-12-24 | Stop reason: HOSPADM

## 2021-12-21 RX ORDER — ALLOPURINOL 300 MG/1
1 TABLET ORAL DAILY
Status: DISCONTINUED | OUTPATIENT
Start: 2021-12-21 | End: 2021-12-21

## 2021-12-21 RX ORDER — PANTOPRAZOLE SODIUM 40 MG/1
40 TABLET, DELAYED RELEASE ORAL
Status: DISCONTINUED | OUTPATIENT
Start: 2021-12-22 | End: 2021-12-24 | Stop reason: HOSPADM

## 2021-12-21 RX ORDER — LORAZEPAM 2 MG/ML
1 INJECTION INTRAMUSCULAR ONCE
Status: COMPLETED | OUTPATIENT
Start: 2021-12-21 | End: 2021-12-21

## 2021-12-21 RX ORDER — DEXTROSE MONOHYDRATE 25 G/50ML
50 INJECTION, SOLUTION INTRAVENOUS ONCE
Status: DISCONTINUED | OUTPATIENT
Start: 2021-12-21 | End: 2021-12-21

## 2021-12-21 RX ORDER — COLCHICINE 0.6 MG/1
0.6 TABLET ORAL DAILY PRN
COMMUNITY
End: 2022-01-10 | Stop reason: SDUPTHER

## 2021-12-21 RX ORDER — ONDANSETRON 4 MG/1
4 TABLET, ORALLY DISINTEGRATING ORAL EVERY 8 HOURS PRN
Status: DISCONTINUED | OUTPATIENT
Start: 2021-12-21 | End: 2021-12-24 | Stop reason: HOSPADM

## 2021-12-21 RX ORDER — MAGNESIUM SULFATE IN WATER 40 MG/ML
2000 INJECTION, SOLUTION INTRAVENOUS ONCE
Status: COMPLETED | OUTPATIENT
Start: 2021-12-21 | End: 2021-12-21

## 2021-12-21 RX ADMIN — LORAZEPAM 1 MG: 2 INJECTION INTRAMUSCULAR; INTRAVENOUS at 18:52

## 2021-12-21 RX ADMIN — METOPROLOL SUCCINATE 100 MG: 100 TABLET, EXTENDED RELEASE ORAL at 21:46

## 2021-12-21 RX ADMIN — ASPIRIN 81 MG: 81 TABLET, COATED ORAL at 17:41

## 2021-12-21 RX ADMIN — CALCIUM GLUCONATE 1000 MG: 98 INJECTION, SOLUTION INTRAVENOUS at 06:49

## 2021-12-21 RX ADMIN — POTASSIUM CHLORIDE 40 MEQ: 1.5 FOR SOLUTION ORAL at 10:11

## 2021-12-21 RX ADMIN — HEPARIN SODIUM 5000 UNITS: 5000 INJECTION INTRAVENOUS; SUBCUTANEOUS at 17:52

## 2021-12-21 RX ADMIN — MAGNESIUM SULFATE HEPTAHYDRATE 2000 MG: 2 INJECTION, SOLUTION INTRAVENOUS at 10:05

## 2021-12-21 RX ADMIN — ATORVASTATIN CALCIUM 80 MG: 40 TABLET, FILM COATED ORAL at 21:05

## 2021-12-21 RX ADMIN — LORAZEPAM 1 MG: 2 INJECTION INTRAMUSCULAR; INTRAVENOUS at 11:29

## 2021-12-21 ASSESSMENT — PAIN SCALES - GENERAL
PAINLEVEL_OUTOF10: 7
PAINLEVEL_OUTOF10: 7

## 2021-12-21 ASSESSMENT — PAIN DESCRIPTION - LOCATION: LOCATION: FOOT;HEAD

## 2021-12-21 ASSESSMENT — PAIN DESCRIPTION - ONSET: ONSET: ON-GOING

## 2021-12-21 ASSESSMENT — PAIN DESCRIPTION - FREQUENCY: FREQUENCY: CONTINUOUS

## 2021-12-21 ASSESSMENT — PAIN DESCRIPTION - ORIENTATION: ORIENTATION: LEFT

## 2021-12-21 ASSESSMENT — PAIN DESCRIPTION - PAIN TYPE: TYPE: ACUTE PAIN

## 2021-12-21 NOTE — PROGRESS NOTES
Home medications were reviewed. Current list is up to date. Patient states he takes all of the pills in the morning, even the evening pills because it's easy to remember that way. Patient was educated about the importance of taking medications as prescribed.

## 2021-12-21 NOTE — H&P
Hospital Medicine  History and Physical    PCP: Benjamin Hamilton MD  Patient Name: Meghan Celestin    Date of Service: Pt seen/examined on 12/21/21 and admitted to Inpatient with expected LOS greater than two midnights due to medical therapy    CHIEF COMPLAINT:  Pt's wife called EMS because he was slurring his words, she had a concern for possible CVA. Pt c/o headache, fatigue and bilateral leg swelling. HISTORY OF PRESENT ILLNESS: Pt is an 56y.o. year-old male with a history of hypertension, hyperlipidemia, diabetes mellitus, chronic systolic congestive heart failure, atrial fibrillation, CKD 3 and morbid obesity. He presents to the emergency room for evaluation after his wife states that he was slurring his words. She was concerned that he was having a possible stroke. I spoke with her at bedside. She states that his CPAP machine quit working and when she called the company she found out that it was on recall. They have been waiting a prolonged period of time for the machine to be replaced. Patient also reports that he has had severe muscle cramping. He was found to have multiple electrolyte deficiencies including hypomagnesemia, hypocalcemia and hypokalemia. In the emergency room at CT of his head had no acute findings. A CTA of his head and neck w/wo contrast could not be performed due to his renal failure so an MRA of his head and neck have been ordered. During the course of his evaluation he was found to have worsening renal failure. He is being admitted for further evaluation and treatment.       Past Medical History:        Diagnosis Date    Arthralgia of multiple joints 10/27/2015    Arthritis     Atrial fibrillation (HCC)     Chronic kidney disease     stage 4    Chronic systolic congestive heart failure ([address] 8/16/2021    CRI (chronic renal insufficiency)     Diabetic nephropathy associated with type 2 diabetes mellitus ([address] 10/11/2018    Diverticulosis     Elevated PSA     Erectile dysfunction     Gout     Gout     Hemrrhoid NOS w/ complication NEC     History of MRSA infection     Hypertension     CELSA (obstructive sleep apnea) 07/14/2017    uses C-pap machine    Type 2 diabetes mellitus without complication, without long-term current use of insulin ([facility] 3/85/8822    Umbilical hernia without obstruction and without gangrene 2/2/2016       Past Surgical History:        Procedure Laterality Date    COLONOSCOPY      DILATATION, ESOPHAGUS      HEMICOLECTOMY      UPPER GASTROINTESTINAL ENDOSCOPY  5/28/16    biopsies, multiple small gastric ulcers    UPPER GASTROINTESTINAL ENDOSCOPY  09/12/2016       Allergies:  Patient has no known allergies. Medications Prior to Admission:    Prior to Admission medications    Medication Sig Start Date End Date Taking? Authorizing Provider   omeprazole (PRILOSEC) 40 MG delayed release capsule TAKE 1 CAPSULE BY MOUTH EVERY DAY 12/16/21   Svetlana Sanchez MD   allopurinol (ZYLOPRIM) 300 MG tablet TAKE 1 TABLET BY MOUTH EVERY DAY 11/30/21   Svetlana Sanchez MD   terazosin (HYTRIN) 5 MG capsule Take 1 capsule by mouth nightly TAKE 1 CAPSULE BY MOUTH EVERY EVENING 11/30/21   Svetlana Sanchez MD   metoprolol succinate (TOPROL XL) 50 MG extended release tablet TAKE 1 TABLET BY MOUTH EVERY DAY 11/30/21   Svetlana Sanchez MD   atorvastatin (LIPITOR) 20 MG tablet TAKE 1 TABLET BY MOUTH EVERY DAY 11/30/21   Svetlana Sanchez MD   NIFEdipine (ADALAT CC) 90 MG extended release tablet TAKE 1 TABLET BY MOUTH EVERY DAY 11/30/21   Svetlana Sanchez MD   oxyCODONE-acetaminophen (PERCOCET) 5-325 MG per tablet Take 1 tablet by mouth every 8 hours as needed for Pain for up to 30 days.  11/30/21 12/30/21  Svetlana Sanchez MD   sildenafil (VIAGRA) 100 MG tablet Take 1 tablet by mouth as needed for Erectile Dysfunction 8/16/21   Svetlana Sanchez MD   aspirin 81 MG tablet Take 81 mg by mouth daily    Historical Provider, MD       Family History:       Problem Relation insight    CBC:   Recent Labs     12/21/21 0449   WBC 5.0   HGB 10.9*        BMP:    Recent Labs     12/21/21 0449      K 3.4*      CO2 25   BUN 45*   CREATININE 6.4*   GLUCOSE 134*     Troponin:   Recent Labs     12/21/21 0449   TROPONINI 0.04*     PT/INR:  No results found for: PTINR  U/A:    Lab Results   Component Value Date    LEUKOCYTESUR Negative 11/29/2020    NITRITE Negative 11/11/2019    RBCUA 0 11/29/2020    SPECGRAV 1.011 11/29/2020    UROBILINOGEN 1.0 11/29/2020    BILIRUBINUR Negative 11/29/2020    BILIRUBINUR Negative 11/11/2019    BLOODU SMALL 11/29/2020    GLUCOSEU Negative 11/29/2020    PROTEINU 100 11/29/2020         RAD:   I have independently reviewed and interpreted the imaging studies below and based my recommendations to the patient on those findings. CT Head WO Contrast    Result Date: 12/21/2021  CT head without contrast HISTORY: Slurred speech COMPARISON: 12/30/2010 CONTRAST:  none  TECHNIQUE: Individualized dose optimization technique was used in order to meet ALARA standards for radiation dose reduction. In addition to vendor specific dose reduction algorithms, the dose reduction techniques vary based on the specific scanner utilized but frequently include automated exposure control, adjustment of the mA and/or kV according to patient size, and use of iterative reconstruction technique. COMMENTS: There is mild periventricular microangiopathy. There is no evidence of acute territorial infarction, hemorrhage, hydrocephalus, or mass. Paranasal sinuses are clear. Mild right mastoid fluid. No osseous destruction. No acute intracranial abnormality. Right mastoid disease. XR CHEST PORTABLE    Result Date: 12/21/2021  Chest portable 0459 HISTORY: Dyspnea     Cardiomegaly. Clear lungs.         EKG:   Read by ER in the absence of a Cardiologist shows normal sinus rhythm, normal axis, prolonged QT       Assessment:   Principal Problem:    Acute renal failure superimposed on stage 3 chronic kidney disease ([facility]  Active Problems:    Atrial fibrillation ([facility]    Essential hypertension, benign    Morbid obesity due to excess calories (HCC)    Chronic systolic congestive heart failure (HCC)    Stroke-like symptoms    DMII (diabetes mellitus, type 2) (HCC)    Hyperlipidemia    Hypocalcemia    Hypomagnesemia    Hypokalemia    Muscle cramps  Resolved Problems:    * No resolved hospital problems. *      Plan:       Acute renal failure superimposed on stage 3 chronic kidney disease ([facility] - the cause of acute decompensation of his renal function is unclear. We will check renal studies and consult his nephrologist.  · Urinalysis with Micro  · Urine Culture  · FEna (Urine Sodium, Urine Creatinine)  · Cortisol (total)  · Uric Acid  · TSH with Reflex   · Urine Osmolality  · Renal ultrasound    Hypokalemia - replace Potassium and recheck in the am    Hypomagnesemia - replace magnesium    Hypocalcemia - ER ordered Calcium replacement  - I will check a 25 hydroxy Vitamin D level    Muscle cramps - likely associated with his electrolyte deficiencies. Monitor for improvement with replacement of electrolytes    Atrial fibrillation (HCC) - currently in NSR    CHF - chronic systolic dysfunction. A 2D Echocardiogram on 05/28/2021 shows an EF of 45-50%. Monitor strict I&Os and daily weights. Stroke-like symptoms which include slurred speech and per his wife, \"talking out of his head when he sleeps. \"  This may be related to poor sleep associated with lack of his needed CPAP. However, we will complete a stroke work-up.   · CT of the head is negative for acute findings  · CTA of the Head and Neck w/wo contrast can not be completed due to renal failure  · MRA of the Head and Neck have been ordered  · MRI of the brain has been ordered  · Echocardiogram with bubble study ordered  · Stroke risk factors panel: A1C, lipid panel, EKG  · Monitor for arrhythmias on Telemetry  · Serial neurological checks will be preformed  · Aspirin is being given  · Will start high-dose statins and check LDL  · Lipid profile ordered  · PT/OT has been consulted  · No obvious dysphagia, so swallow screen by nursing before diet and oral medications started  · Neurology has been consulted    Diabetes mellitus II - Controlled without need for aggressive monitoring or intervention    Essential (primary) hypertension - continue home meds and monitor blood pressure    Hyperlipidemia - No current evidence of Rhabdomyolysis or other adverse effects. Continue statin therapy while in the hospital    Morbid obesity due to excess calories (Body mass index is 35.4 kg/m². ) - Complicating assessment and treatment. Placing patient at risk for multiple co-morbidities as well as early death and contributing to the patient's presentation.  on weight loss when appropriate. DVT Prophylaxis: Heparin  Diet: No diet orders on file  Code Status: Prior  (Advanced care planning has been discussed with patient and/or responsible family member and is reflected in the code status.  Further orders associated with this have been entered if appropriate)    Disposition: Anticipate that patient will remain in the hospital for 2 to 3+ days depending on further evaluation and clinical course    Please note that over 50 minutes was spent in evaluating the patient, review of records and results, discussion with staff/family, etc.    Jorden Bojorquez MD

## 2021-12-21 NOTE — CONSULTS
Consult received  Full note to follow    Anita Guzmán MD  12/21/2021    [facility] [address]  Office : [phone]  Fax :[phone]

## 2021-12-21 NOTE — PROGRESS NOTES
Received report. Patient returned form echo to room C29. Oriented to room. Cardiac monitor resumed. Patient alert and oriented. Reporting dizziness when standing. Speech is raspy and slightly slurred. Wife states this is not his norm. Wife at bedside. Lunch ordered. Awaiting speech eval. Prior to eating.

## 2021-12-21 NOTE — ED NOTES
MRI called and states pt needs Ativan or something to complete the scan.  Admitting team paged     Morena Shepherd RN  12/21/21 [id number]

## 2021-12-21 NOTE — PROGRESS NOTES
Attempted to scan patient, pt squeezed ball when first going into MRI. Patient was very claustrophobic-- waiting for meds to be ordered by doctor but patient refused and did not want to do MRI even with meds. MALDONADO Garces made aware and patient sent back to ER.

## 2021-12-21 NOTE — CONSULTS
Coronary Art Dis Brother      Social History     Tobacco Use   Smoking Status Never Smoker   Smokeless Tobacco Never Used     Social History     Substance and Sexual Activity   Drug Use No     Social History     Substance and Sexual Activity   Alcohol Use Yes    Comment: drinks etoh on weekends- a 12 pk or so       ROS:  Constitutional- No weight loss or fevers  Eyes- No diplopia. No photophobia. Ears/nose/throat- No dysphagia. No Dysarthria  Cardiovascular- No palpitations. No chest pain  Respiratory- No dyspnea. No Cough  Gastrointestinal- No Abdominal pain. No Vomiting. Genitourinary- No incontinence. No urinary retention  Musculoskeletal- No myalgia. No arthralgia  Skin- No rash. No easy bruising. Psychiatric- No depression. No anxiety  Endocrine- No diabetes. No thyroid issues. Hematologic- No bleeding difficulty. No fatigue  Neurologic- No weakness. No Headache. Exam:  Blood pressure (!) 175/111, pulse 87, temperature 97.8 °F (36.6 °C), temperature source Oral, resp. rate 14, height 6' 5\" (1.956 m), weight 298 lb 8 oz (135.4 kg), SpO2 95 %. Constitutional    Vital signs: BP, HR, and RR reviewed   General Alert, no distress, well-nourished  Eyes: unable to visualize fundi   Cardiovascular: pulses symmetric in all 4 extremities. No peripheral edema. Psychiatric: cooperative with examination, no  psychotic behavior noted.   Neurologic  Mental status:   orientation to person and place, month, and year   General fund of knowledge grossly intact   Memory grossly intact   Attention intact as able to attend well to the exam   Language fluent in conversation   Comprehension intact; follows simple commands and  Cranial nerves:   CN2: Visual Fields full w/o extinction on confrontational testing  CN 3,4,6: extraocular muscles intact, pupils equal round and reactive  CN5: facial sensation symmetric   CN7: face symmetric with raspy dysarthria  CN8: hearing grossly intact  CN9: palate elevated symmetrically  CN11: trap full strength on shoulder shrug  CN12: tongue midline with protrusion  Strength: No prontator drift. Good strength in all 4 extremities   Deep tendon reflexes: normal in all 4 extremities  Sensory: light touch intact in all 4 extremities. No sensory extinction on double simultaneous stimulation  Cerebellar/coordination: finger nose finger normal without ataxia  Tone: normal in all 4 extremities  Gait: deferred for safety    Labs  A1c 6.6%   mg/dL  Creatinine 6.4 mg/dL    Studies  Echo  Normal left ventricle size with moderate concentric left ventricular   hypertrophy. Overall left ventricular systolic function appears mildly reduced with an   ejection fraction of 45-50%. Diastolic filling parameters suggest grade I diastolic dysfunction. A bubble study was performed and fails to show evidence of right to left   shunting. MR-A neck: 1. No evidence of flow limiting arterial occlusive disease in the neck. Study is degraded by patient motion. MR-A head: 1. Motion degraded images. No definite large vessel intracranial arterial occlusive disease detected. Scheduled Medications   doxazosin  4 mg Oral Daily    [START ON 12/22/2021] pantoprazole  40 mg Oral QAM AC    NIFEdipine  1 tablet Oral Daily    metoprolol succinate  1 tablet Oral Daily    allopurinol  1 tablet Oral Daily    aspirin  81 mg Oral Daily    Or    aspirin  300 mg Rectal Daily    atorvastatin  80 mg Oral Nightly    heparin (porcine)  5,000 Units SubCUTAneous 3 times per day     Impression:  Nora Tran is a 56 y.o. male who presented with acute onset of raspy voice, dysarthria, dizziness and imbalance most worrisome for medullary stroke. MR-A and echo unrevealing. Recommendations:  - MRI brain in 48-72 hours as strokes in posterior circulation can be DWI negative in 1st 72 hours  - NPO until passes swallow eval - d/w ER RN.   - Allow BP to autoregulate, treat SBP > 220 with labetalol   - Continue asa.  May consider DAPT with 30 days of Plavix 75 mg daily - will discuss with attending neurologist  - Continue statin, euglycemia  - Telemetry monitoring    A copy of this note was provided for Dr Michelle Clement MD. Assessment and planning including emergent interventions necessary were discussed with RN 3:00 PM     Tomasa Ocampo NP  Neurology and Neurocritical care  [facility] Neurology line: [phone]  [employer]: [facility] Neurology & Neurocritical Care NPs    I spent 60 minutes in the care of this patient. Over 50% of that time was in face-to-face counseling regarding disease process, diagnostic testing, preventative measures, and answering patient and family questions.

## 2021-12-21 NOTE — ED NOTES
Pt back from MRI, will go to echo.  Pt unable to provide enough urine for urine specimen at this time     Lawrence Thapa RN  12/21/21 [id number]

## 2021-12-21 NOTE — PROGRESS NOTES
Diagnosis: [x]? CELSA (G47.33) []? CSA (G47.31) []? Apnea (G47.30)   Length of Need: [x]? 12 Months []? 99 Months []? Other:   Machine (TERRENCE!): []? Respironics Dream Station   2   Auto [x]? ResMed AirSense     Auto 11 []? Other:      [x]? CPAP () []? Bilevel ()   Mode: [x]? Auto []? Spontaneous    Mode: []? Auto []? Spontaneous        APAP - Settings  Pressure Min: 10 cmH2O  Pressure Max: 20 cmH2O                 Comfort Settings: Ramp Pressure: 5 cmH2O  Ramp time: 15 min  Flex/EPR - 3 full time                                  For ResMed Bileve (TiMax-4 sec   TiMin- 0.2 sec)      Humidifier: []? Heated ()        [x]? Water chamber replacement ()/ 1 per 6 months         Mask:   []? Nasal () /1 per 3 months []? Full Face () /1 per 3 months   []? Patient choice -Size and fit mask []? Patient Choice - Size and fit mask   []? Dispense: []? Dispense:   []? Headgear () / 1 per 3 months []? Headgear () / 1 per 3 months   []? Replacement Nasal Cushion ()/2 per month []? Interface Replacement ()/1 per month   []? Replacement Nasal Pillows ()/2 per month           Tubing: [x]? Heated ()/1 per 3 months    []? Standard ()/1 per 3 months []? Other:            Filters: [x]? Non-disposable ()/1 per 6 months     [x]? Ultra-Fine, Disposable ()/2 per month               Miscellaneous: []? Chin Strap ()/ 1 per 6 months []? O2 bleed-in:        LPM   []? Oxymetry on CPAP/Bilevel []? Other:         Above order is copied from pulmonology note/order on 11/30/2021 written by Rakan Solomon CNP. Patient is in need of a CPAP to use in house during his hospital stay.

## 2021-12-21 NOTE — ED PROVIDER NOTES
[address] [facility]          ATTENDING PHYSICIAN NOTE       Date of evaluation: 12/21/2021    Chief Complaint     Headache (forehead headache ), Fatigue (states not sleeping well for last 3 months or more d/t c-pap machine recalled and not using it), and Leg Swelling (bilateral- left having gout flare up)      History of Present Illness     Osmar Menjivar is a 56 y.o. male who presents to the ED today due to concern for slurred speech, lethargy and feeling unwell. Pt's wife states she has noted that he has been unsteady on his feet for the last few days and she has had more trouble understanding his speech. Pt states he feels fine, reports his L ankle has been bothering him and states this is typical of his gout flares. Denies recent N/V/D, cough or URI symptoms, reports no abdominal pain. Reports some nocturnal urinary frequency but denies urgency or sensation of incomplete voiding. States he has been compliant with all medications except his CPAP which he has not worn in several months. Review of Systems     Review of Systems  All systems were reviewed with the patient/family and negative except as otherwise documented in the HPI. Past Medical, Surgical, Family, and Social History     He has a past medical history of Arthralgia of multiple joints, Arthritis, Atrial fibrillation ([address] Chronic systolic congestive heart failure ([address] CRI (chronic renal insufficiency), Diabetic nephropathy associated with type 2 diabetes mellitus ([address] Diverticulosis, Elevated PSA, Erectile dysfunction, Gout, Gout, Hemrrhoid NOS w/ complication NEC, History of MRSA infection, Hypertension, CELSA (obstructive sleep apnea), Type 2 diabetes mellitus without complication, without long-term current use of insulin ([address] and Umbilical hernia without obstruction and without gangrene. He has a past surgical history that includes hemicolectomy;  Upper gastrointestinal endoscopy (5/28/16); Colonoscopy; Dilatation, esophagus; and Upper gastrointestinal endoscopy (09/12/2016). His family history includes Breast Cancer in his mother; Colon Cancer in his father; Coronary Art Dis in his brother; Diabetes in his maternal grandmother; Hypertension in an other family member. He reports that he has never smoked. He has never used smokeless tobacco. He reports current alcohol use. He reports that he does not use drugs. Medications     Previous Medications    ALLOPURINOL (ZYLOPRIM) 300 MG TABLET    TAKE 1 TABLET BY MOUTH EVERY DAY    ASPIRIN 81 MG TABLET    Take 81 mg by mouth daily    ATORVASTATIN (LIPITOR) 20 MG TABLET    TAKE 1 TABLET BY MOUTH EVERY DAY    METOPROLOL SUCCINATE (TOPROL XL) 50 MG EXTENDED RELEASE TABLET    TAKE 1 TABLET BY MOUTH EVERY DAY    NIFEDIPINE (ADALAT CC) 90 MG EXTENDED RELEASE TABLET    TAKE 1 TABLET BY MOUTH EVERY DAY    OMEPRAZOLE (PRILOSEC) 40 MG DELAYED RELEASE CAPSULE    TAKE 1 CAPSULE BY MOUTH EVERY DAY    OXYCODONE-ACETAMINOPHEN (PERCOCET) 5-325 MG PER TABLET    Take 1 tablet by mouth every 8 hours as needed for Pain for up to 30 days. SILDENAFIL (VIAGRA) 100 MG TABLET    Take 1 tablet by mouth as needed for Erectile Dysfunction    TERAZOSIN (HYTRIN) 5 MG CAPSULE    Take 1 capsule by mouth nightly TAKE 1 CAPSULE BY MOUTH EVERY EVENING       Allergies     He has No Known Allergies. Physical Exam     INITIAL VITALS: BP: (!) 150/83, Temp: 97.6 °F (36.4 °C), Pulse: 87, Resp: 13, SpO2: 98 %   Physical Exam  Constitutional:  Well nourished elderly male, sitting up on the strethcer in no distress   Eyes:  Sclera anicteric. PERRLA, EOMI  HEENT:  Normocephalic, atraumatic, oropharynx moist.   Neck: normal range of motion, supple, no jvd.   Respiratory:  Even and non-labored, no distress, cta bilat, no wheezes   Cardiovascular:  Extremities warm, well perfused, no m/r/g, equal pulses in all extremities  GI:  Soft, nondistended, non-tender   Musculoskeletal:  L>R bilat pitting edema to the calf, L ankle is warm to the touch, not red, normal active ROM, no deformities. Skin:  No rash, no lesions, no pallor   Neurologic:  Awake and A&Ox4, symmetrical smile and facial muscle movements, reports normal sensation. Speech is perhaps slightly slurred, no appreciable expressive or receptive aphasia is noted. Moving all extremities purposefully and with grossly normal coordination. Psychiatric:  Normal mood. Behavior appropriate.       Diagnostic Results     EKG   Normal sinus rhythm, normal axis, prolonged QT   RADIOLOGY:  CT Head WO Contrast    (Results Pending)   XR CHEST PORTABLE    (Results Pending)       LABS:   Results for orders placed or performed during the hospital encounter of 12/21/21   CBC Auto Differential   Result Value Ref Range    WBC 5.0 4.0 - 11.0 K/uL    RBC 3.83 (L) 4.20 - 5.90 M/uL    Hemoglobin 10.9 (L) 13.5 - 17.5 g/dL    Hematocrit 33.1 (L) 40.5 - 52.5 %    MCV 86.2 80.0 - 100.0 fL    MCH 28.6 26.0 - 34.0 pg    MCHC 33.1 31.0 - 36.0 g/dL    RDW 13.5 12.4 - 15.4 %    Platelets 280 195 - 918 K/uL    MPV 8.7 5.0 - 10.5 fL    Neutrophils % 59.7 %    Lymphocytes % 27.8 %    Monocytes % 8.6 %    Eosinophils % 3.2 %    Basophils % 0.7 %    Neutrophils Absolute 3.0 1.7 - 7.7 K/uL    Lymphocytes Absolute 1.4 1.0 - 5.1 K/uL    Monocytes Absolute 0.4 0.0 - 1.3 K/uL    Eosinophils Absolute 0.2 0.0 - 0.6 K/uL    Basophils Absolute 0.0 0.0 - 0.2 K/uL   Basic Metabolic Panel w/ Reflex to MG   Result Value Ref Range    Sodium 140 136 - 145 mmol/L    Potassium reflex Magnesium 3.4 (L) 3.5 - 5.1 mmol/L    Chloride 100 99 - 110 mmol/L    CO2 25 21 - 32 mmol/L    Anion Gap 15 3 - 16    Glucose 134 (H) 70 - 99 mg/dL    BUN 45 (H) 7 - 20 mg/dL    CREATININE 6.4 (HH) 0.8 - 1.3 mg/dL    GFR Non-African American 9 (A) >60    GFR  11 (A) >60    Calcium 5.5 (LL) 8.3 - 10.6 mg/dL   Blood Gas, Venous   Result Value Ref Range    pH, Gallo 7.376 7.350 - 7.450    pCO2, Gallo 49.1 41.0 - 51.0 mmHg    pO2, Gallo 58.9 (H) 25.0 - 40.0 mmHg    HCO3, Venous 28.8 (H) 24.0 - 28.0 mmol/L    Base Excess, Gallo 2.9 -2.0 - 3.0 mmol/L    O2 Sat, Gallo 90 Not established %    Carboxyhemoglobin 0.8 0.0 - 1.5 %    MetHgb, Gallo 0.4 0.0 - 1.5 %    TC02 (Calc), Gallo 30 mmol/L    Hemoglobin, Gallo, Reduced 9.50 %   Troponin   Result Value Ref Range    Troponin 0.04 (H) <0.01 ng/mL   Brain Natriuretic Peptide   Result Value Ref Range    Pro-BNP 1,017 (H) 0 - 124 pg/mL   Magnesium   Result Value Ref Range    Magnesium 1.30 (L) 1.80 - 2.40 mg/dL       ED BEDSIDE ULTRASOUND:  Bladder volume of 100cc    RECENT VITALS:  BP: (!) 150/83,Temp: 97.6 °F (36.4 °C), Pulse: 87, Resp: 13, SpO2: 98 %     Procedures         ED Course     Nursing Notes, Past Medical Hx, Past Surgical Hx, Social Hx,Allergies, and Family Hx were reviewed. patient was given the following medications:  Orders Placed This Encounter   Medications    calcium gluconate 1,000 mg in dextrose 5 % 100 mL IVPB    insulin regular (HUMULIN R;NOVOLIN R) injection 5 Units    dextrose 50 % IV solution       CONSULTS:  None    MEDICAL DECISIONMAKING / ASSESSMENT / Milena Bradshaw is a 56 y.o. male with a past medical history of A. fib, CKD, type 2 diabetes, congestive heart failure and gout who presents to the emergency room today due to concern for slurred speech and lethargy. On arrival the patient is hypertensive with otherwise normal vital signs and afebrile. On arrival patient's presenting NIH stroke scale is perhaps 1 for some barely perceptible dysarthria which is been present for several days putting him well outside any window for TPA or endovascular therapy. Overall my suspicion is more for other medical ability to treat his overall lethargy and so IV access was established and screening labs were sent to evaluate for hypercarbia infectious or metabolic derangements that might be causing his current symptoms.   He has some left ankle pain but reports a history of gout with similar symptoms, no exam findings immediately concerning for septic arthritis, he is afebrile he displays normal active range of motion of the right ankle without substantial discomfort. Screening labs are overall unremarkable, patient does have an acute elevation in his creatinine concerning for acute renal failure as well as hypocalcemia which could be contributing to his lethargy and confusion. Patient calcium was supplemented given his prolonged QT on EKG. At this time I discussed the patient's case with the hospitalist and he will be admitted to the telemetry service for ongoing monitoring and further work-up. He remained hemodynamically stable for duration of my care. Clinical Impression     1. Acute renal failure, unspecified acute renal failure type ([address]    2. Hypocalcemia        Disposition     PATIENT REFERRED TO:  No follow-up provider specified.     DISCHARGE MEDICATIONS:  New Prescriptions    No medications on file       DISPOSITION Decision To Admit 12/21/2021 05:44:24 AM       Jermaine Gonzalez MD  12/22/21 9080

## 2021-12-22 LAB
ANION GAP SERPL CALCULATED.3IONS-SCNC: 11 MMOL/L (ref 3–16)
BASOPHILS ABSOLUTE: 0 K/UL (ref 0–0.2)
BASOPHILS RELATIVE PERCENT: 0.7 %
BUN BLDV-MCNC: 42 MG/DL (ref 7–20)
CALCIUM SERPL-MCNC: 5.9 MG/DL (ref 8.3–10.6)
CHLORIDE BLD-SCNC: 103 MMOL/L (ref 99–110)
CO2: 26 MMOL/L (ref 21–32)
CREAT SERPL-MCNC: 5.8 MG/DL (ref 0.8–1.3)
CREATININE URINE: 117.5 MG/DL (ref 39–259)
CREATININE URINE: 88.8 MG/DL (ref 39–259)
EOSINOPHIL,URINE: NORMAL
EOSINOPHILS ABSOLUTE: 0.1 K/UL (ref 0–0.6)
EOSINOPHILS RELATIVE PERCENT: 2.8 %
GFR AFRICAN AMERICAN: 12
GFR NON-AFRICAN AMERICAN: 10
GLUCOSE BLD-MCNC: 89 MG/DL (ref 70–99)
HCT VFR BLD CALC: 31.3 % (ref 40.5–52.5)
HEMOGLOBIN: 10.6 G/DL (ref 13.5–17.5)
LYMPHOCYTES ABSOLUTE: 1.1 K/UL (ref 1–5.1)
LYMPHOCYTES RELATIVE PERCENT: 28.2 %
MCH RBC QN AUTO: 29.1 PG (ref 26–34)
MCHC RBC AUTO-ENTMCNC: 33.8 G/DL (ref 31–36)
MCV RBC AUTO: 86 FL (ref 80–100)
MONOCYTES ABSOLUTE: 0.4 K/UL (ref 0–1.3)
MONOCYTES RELATIVE PERCENT: 9.8 %
NEUTROPHILS ABSOLUTE: 2.3 K/UL (ref 1.7–7.7)
NEUTROPHILS RELATIVE PERCENT: 58.5 %
PDW BLD-RTO: 13.2 % (ref 12.4–15.4)
PLATELET # BLD: 157 K/UL (ref 135–450)
PMV BLD AUTO: 8.6 FL (ref 5–10.5)
POTASSIUM REFLEX MAGNESIUM: 3.6 MMOL/L (ref 3.5–5.1)
PROTEIN PROTEIN: 463 MG/DL
PROTEIN/CREAT RATIO: 5.2 MG/DL
RBC # BLD: 3.63 M/UL (ref 4.2–5.9)
SODIUM BLD-SCNC: 140 MMOL/L (ref 136–145)
SODIUM URINE: 75 MMOL/L
URIC ACID, SERUM: 7.3 MG/DL (ref 3.5–7.2)
VITAMIN D 25-HYDROXY: 5.9 NG/ML
WBC # BLD: 3.9 K/UL (ref 4–11)

## 2021-12-22 PROCEDURE — 36415 COLL VENOUS BLD VENIPUNCTURE: CPT

## 2021-12-22 PROCEDURE — 6370000000 HC RX 637 (ALT 250 FOR IP): Performed by: INTERNAL MEDICINE

## 2021-12-22 PROCEDURE — 84550 ASSAY OF BLOOD/URIC ACID: CPT

## 2021-12-22 PROCEDURE — 6370000000 HC RX 637 (ALT 250 FOR IP): Performed by: HOSPITALIST

## 2021-12-22 PROCEDURE — 85025 COMPLETE CBC W/AUTO DIFF WBC: CPT

## 2021-12-22 PROCEDURE — 94660 CPAP INITIATION&MGMT: CPT

## 2021-12-22 PROCEDURE — 99232 SBSQ HOSP IP/OBS MODERATE 35: CPT

## 2021-12-22 PROCEDURE — 84300 ASSAY OF URINE SODIUM: CPT

## 2021-12-22 PROCEDURE — 92523 SPEECH SOUND LANG COMPREHEN: CPT

## 2021-12-22 PROCEDURE — 6360000002 HC RX W HCPCS: Performed by: INTERNAL MEDICINE

## 2021-12-22 PROCEDURE — 97165 OT EVAL LOW COMPLEX 30 MIN: CPT

## 2021-12-22 PROCEDURE — 97535 SELF CARE MNGMENT TRAINING: CPT

## 2021-12-22 PROCEDURE — 80048 BASIC METABOLIC PNL TOTAL CA: CPT

## 2021-12-22 PROCEDURE — 1200000000 HC SEMI PRIVATE

## 2021-12-22 PROCEDURE — 84156 ASSAY OF PROTEIN URINE: CPT

## 2021-12-22 PROCEDURE — 82306 VITAMIN D 25 HYDROXY: CPT

## 2021-12-22 PROCEDURE — 97161 PT EVAL LOW COMPLEX 20 MIN: CPT

## 2021-12-22 PROCEDURE — 97530 THERAPEUTIC ACTIVITIES: CPT

## 2021-12-22 PROCEDURE — 97116 GAIT TRAINING THERAPY: CPT

## 2021-12-22 PROCEDURE — 82570 ASSAY OF URINE CREATININE: CPT

## 2021-12-22 PROCEDURE — 99232 SBSQ HOSP IP/OBS MODERATE 35: CPT | Performed by: HOSPITALIST

## 2021-12-22 PROCEDURE — 2580000003 HC RX 258: Performed by: HOSPITALIST

## 2021-12-22 RX ORDER — SODIUM CHLORIDE 9 MG/ML
INJECTION, SOLUTION INTRAVENOUS CONTINUOUS
Status: ACTIVE | OUTPATIENT
Start: 2021-12-22 | End: 2021-12-22

## 2021-12-22 RX ORDER — LOPERAMIDE HYDROCHLORIDE 2 MG/1
2 CAPSULE ORAL
Status: ACTIVE | OUTPATIENT
Start: 2021-12-22 | End: 2021-12-22

## 2021-12-22 RX ORDER — TRAMADOL HYDROCHLORIDE 50 MG/1
50 TABLET ORAL EVERY 6 HOURS PRN
Status: DISCONTINUED | OUTPATIENT
Start: 2021-12-22 | End: 2021-12-24 | Stop reason: HOSPADM

## 2021-12-22 RX ADMIN — HEPARIN SODIUM 5000 UNITS: 5000 INJECTION INTRAVENOUS; SUBCUTANEOUS at 09:23

## 2021-12-22 RX ADMIN — HEPARIN SODIUM 5000 UNITS: 5000 INJECTION INTRAVENOUS; SUBCUTANEOUS at 19:23

## 2021-12-22 RX ADMIN — METOPROLOL SUCCINATE 100 MG: 100 TABLET, EXTENDED RELEASE ORAL at 07:09

## 2021-12-22 RX ADMIN — ASPIRIN 81 MG: 81 TABLET, COATED ORAL at 07:09

## 2021-12-22 RX ADMIN — ACETAMINOPHEN 650 MG: 325 TABLET ORAL at 09:23

## 2021-12-22 RX ADMIN — PANTOPRAZOLE SODIUM 40 MG: 40 TABLET, DELAYED RELEASE ORAL at 06:13

## 2021-12-22 RX ADMIN — DOXAZOSIN 4 MG: 4 TABLET ORAL at 07:09

## 2021-12-22 RX ADMIN — NIFEDIPINE 90 MG: 30 TABLET, EXTENDED RELEASE ORAL at 07:09

## 2021-12-22 RX ADMIN — ALLOPURINOL 100 MG: 100 TABLET ORAL at 07:09

## 2021-12-22 RX ADMIN — HEPARIN SODIUM 5000 UNITS: 5000 INJECTION INTRAVENOUS; SUBCUTANEOUS at 02:48

## 2021-12-22 RX ADMIN — SODIUM CHLORIDE: 9 INJECTION, SOLUTION INTRAVENOUS at 13:50

## 2021-12-22 RX ADMIN — ATORVASTATIN CALCIUM 80 MG: 40 TABLET, FILM COATED ORAL at 20:44

## 2021-12-22 RX ADMIN — TRAMADOL HYDROCHLORIDE 50 MG: 50 TABLET, COATED ORAL at 20:45

## 2021-12-22 ASSESSMENT — PAIN SCALES - GENERAL
PAINLEVEL_OUTOF10: 3
PAINLEVEL_OUTOF10: 0
PAINLEVEL_OUTOF10: 7
PAINLEVEL_OUTOF10: 0
PAINLEVEL_OUTOF10: 7

## 2021-12-22 ASSESSMENT — PAIN DESCRIPTION - LOCATION: LOCATION: HEAD

## 2021-12-22 ASSESSMENT — PAIN DESCRIPTION - PAIN TYPE: TYPE: ACUTE PAIN

## 2021-12-22 ASSESSMENT — PAIN DESCRIPTION - ORIENTATION: ORIENTATION: MID

## 2021-12-22 NOTE — TELEPHONE ENCOUNTER
Patient is now in [facility]. and wife is wanting to know if his machine can be expedited due to his issue could be related to not using machine. Settings from old machine and newly ordered machine faxed to [facility] for machine use in hospital.  Please call wife at [phone].

## 2021-12-22 NOTE — CONSULTS
Other     Breast Cancer Mother     Colon Cancer Father     Diabetes Maternal Grandmother     Coronary Art Dis Brother         reports that he has never smoked. He has never used smokeless tobacco. He reports current alcohol use. He reports that he does not use drugs. Medications: Allergies:  Patient has no known allergies.     Scheduled Meds:   doxazosin  4 mg Oral Daily    [START ON 12/22/2021] pantoprazole  40 mg Oral QAM AC    NIFEdipine  1 tablet Oral Daily    metoprolol succinate  1 tablet Oral Daily    allopurinol  1 tablet Oral Daily    aspirin  81 mg Oral Daily    Or    aspirin  300 mg Rectal Daily    atorvastatin  80 mg Oral Nightly    heparin (porcine)  5,000 Units SubCUTAneous 3 times per day     Continuous Infusions:    Labs:  CBC:   Recent Labs     12/21/21  0449   WBC 5.0   HGB 10.9*        Ca/Mg/Phos:   Recent Labs     12/21/21  0449   CALCIUM 5.5*   MG 1.30*     UA:  Recent Labs     12/21/21  1834   COLORU Yellow   CLARITYU Clear   GLUCOSEU Negative   BILIRUBINUR Negative   KETUA Negative   SPECGRAV 1.020   BLOODU MODERATE*   PHUR 6.5  6.5   PROTEINU >=300*   UROBILINOGEN 0.2   NITRU Negative   LEUKOCYTESUR Negative   LABMICR YES   URINETYPE NotGiven      Urine Microscopic:   Recent Labs     12/21/21  1834   BACTERIA Rare*   WBCUA None seen   RBCUA 3-4     Urine Chemistry:   Recent Labs     12/21/21  1834   NAUR 59         ROS:     All systems reviewed and negative except as in HPI    Objective:     Vitals: BP (!) 187/118   Pulse 88   Temp 97.5 °F (36.4 °C) (Oral)   Resp 16   Ht 6' 5\" (1.956 m)   Wt 298 lb 8 oz (135.4 kg)   SpO2 97%   BMI 35.40 kg/m²    Wt Readings from Last 3 Encounters:   12/21/21 298 lb 8 oz (135.4 kg)   11/30/21 283 lb 3.2 oz (128.5 kg)   11/30/21 281 lb 12.8 oz (127.8 kg)      24HR INTAKE/OUTPUT:      Intake/Output Summary (Last 24 hours) at 12/21/2021 2014  Last data filed at 12/21/2021 1200  Gross per 24 hour   Intake 150 ml   Output --   Net 150 ml     Constitutional:  awake, NAD  HEENT:  MMM, No icterus  Neck: no bruits, No JVD  Cardiovascular:  reg rhythm  Respiratory: CTA, no crackles  Abdomen:  +BS, soft, NT, ND  Ext: trace lower extremity edema  Psychiatric: mood and affect appropriate  Skin: dry/intact  CNS: alert, no agitation    IMAGING:  MRI brain without contrast   Final Result      1. No acute intracranial abnormality. 2.  Mild chronic small vessel ischemic disease. 3.  Small right mastoid effusion representing retained secretions versus inflammatory debris. MRA NECK WO CONTRAST   Final Result   1. No evidence of flow limiting arterial occlusive disease in the neck. Study is degraded by patient motion. MR angiography head findings:      Images are degraded by patient motion. The anterior circulation demonstrates patency of the bilateral internal carotid arteries as well as the bilateral anterior and middle cerebral arteries. Posterior circulation demonstrates patency of the distal vertebral arteries and the basilar artery as well as bilateral posterior cerebral arteries. IMPRESSION:   1. Motion degraded images. No definite large vessel intracranial arterial occlusive disease detected. MRA HEAD WO CONTRAST   Final Result   1. No evidence of flow limiting arterial occlusive disease in the neck. Study is degraded by patient motion. MR angiography head findings:      Images are degraded by patient motion. The anterior circulation demonstrates patency of the bilateral internal carotid arteries as well as the bilateral anterior and middle cerebral arteries. Posterior circulation demonstrates patency of the distal vertebral arteries and the basilar artery as well as bilateral posterior cerebral arteries. IMPRESSION:   1. Motion degraded images. No definite large vessel intracranial arterial occlusive disease detected. US RENAL COMPLETE   Final Result   Impression:   1.   Markedly increased renal echogenicity consistent with medical renal disease. 2.  No hydronephrosis. XR CHEST PORTABLE   Final Result      Cardiomegaly. Clear lungs. CT Head WO Contrast   Final Result      No acute intracranial abnormality. Right mastoid disease. Assessment :     1. ANTONIO on CKD4  -Non-Oliguric  -Baseline creat: 2.9-3.3 Now 6.4  -UA: prot +++  -renal US : no hydro  -Volume: Hypervolemic   -Electrolytes: Hypokalemia and Hypomagnesemia  -Acid-Base: respiratory acidosis, AGMA and Metabolic alkalosis    Recent Labs     12/21/21  0449   BUN 45*   CREATININE 6.4*     Recent Labs     12/21/21  0449      K 3.4*   CO2 25   MG 1.30*         2. HTN  -Blood pressure high    BP Readings from Last 1 Encounters:   12/21/21 (!) 187/118       3. DM    4. CHF  - TTE (2021): LVEF 45-50%, G1DD    Plan:     - optimize BP meds  - UPC  - dec Allopurinol  - diuretics based on vol status  - replace K, Mg  - 25 Vit D  - no absolute indication of RRT tonight, but pt likely has CKD progression  - Monitor BMP    -Monitor I/O, UOP  -Maintain MAP>65  -Avoid nephrotoxin, if able. -Dose meds to current eGFR    Thank you for allowing us to participate in care of [address] [place] [address] . We will continue to follow. Feel free to contact me with any questions.       Rashad Farah MD  12/21/2021    [facility] [address] [place] [address] S  Office : [phone]  Fax :[phone]

## 2021-12-22 NOTE — PROGRESS NOTES
Occupational Therapy   Occupational Therapy Initial Assessment and Treatment Note   Date: 2021   Patient Name: Jes Yadav  MRN: [id number]     : 1960    Date of Service: 2021    Discharge Recommendations:Jorden Woods scored a  on the AM-PAC ADL Inpatient form. At this time, no further OT is recommended upon discharge. Recommend patient returns to prior setting with prior services. OT Equipment Recommendations  Equipment Needed: No    Assessment   Assessment: Pt from home with spouse ( pt is her caregiver after recent CVA). Pt demo independent with functional mobility / transfers and simple ADLs. Pt edu on home safety and self care ( relaxation / stress relief etc). No acute OT needs. No DME indicated. Will sign off from OT services. Pt plans for home with family at d/c. Prognosis: Good  Decision Making: Low Complexity  OT Education: OT Role;Plan of Care;IADL Safety  Patient Education: verb understanding  No Skilled OT: Safe to return home; No OT goals identified; Independent with ADL's  REQUIRES OT FOLLOW UP: No  Activity Tolerance  Activity Tolerance: Patient Tolerated treatment well  Activity Tolerance: Minimal activity in room / bathroom - no BECKMAN  Safety Devices  Safety Devices in place: Yes  Type of devices: Call light within reach;Nurse notified; Bed alarm in place; Left in bed (pt denies sitting up in chair -)           Patient Diagnosis(es): The primary encounter diagnosis was Acute renal failure, unspecified acute renal failure type ([address] A diagnosis of Hypocalcemia was also pertinent to this visit.      has a past medical history of Arthralgia of multiple joints, Arthritis, Atrial fibrillation (HCC), Chronic kidney disease, Chronic systolic congestive heart failure ([address] CRI (chronic renal insufficiency), Diabetic nephropathy associated with type 2 diabetes mellitus ([address] Diverticulosis, Elevated PSA, Erectile dysfunction, Gout, Gout, Hemrrhoid NOS w/ complication NEC, History of MRSA infection, Hypertension, CELSA (obstructive sleep apnea), Type 2 diabetes mellitus without complication, without long-term current use of insulin ([facility] and Umbilical hernia without obstruction and without gangrene. has a past surgical history that includes hemicolectomy; Upper gastrointestinal endoscopy (5/28/16); Colonoscopy; Dilatation, esophagus; and Upper gastrointestinal endoscopy (09/12/2016). Restrictions  Position Activity Restriction  Other position/activity restrictions: up as tolerated    Subjective   General  Chart Reviewed: Yes  Additional Pertinent Hx: Admit 12/21 with Hyperkalemia/ ARF with concerns for slurred speech         CT head - neg, Cxray -CM, MRI Brain /Neck - Mild chronic small vessel ischemic disease, no flow abnormality                                                                                  PMHX: A. fib, CKD, type 2 diabetes, congestive heart failure and gout  Family / Caregiver Present: No  Diagnosis: Hyperkalemia / ARF  Subjective  Subjective: \" I feel better \" pt reports wife made him come to get checked out.   \" I guess my kidneys are flared up' pt found resting in bed and agreeable for OOB/OT eval and tx  Patient Currently in Pain: Yes (L ankle / wrist pain-- Gout related -- not rated -- RN aware)  Vital Signs  Patient Currently in Pain: Yes (L ankle / wrist pain-- Gout related -- not rated -- RN aware)    Social/Functional History  Social/Functional History  Lives With: Spouse  Type of Home: House  Home Layout: Multi-level (bi-level; 8-9 steps with rail up or down; bed/bath upstairs)  Home Access: Stairs to enter with rails  Entrance Stairs - Number of Steps: 2 MICHAEL  Bathroom Shower/Tub: Tub/Shower unit  Bathroom Equipment: Shower chair  Home Equipment:  (none)  ADL Assistance: Independent  Homemaking Assistance: Independent  Ambulation Assistance: Independent  Transfer Assistance: Independent  Active : Yes  Occupation: Full time ( gout type ) -  AROM - WFL  RUE AROM (degrees)  RUE AROM : WFL  Right Hand AROM (degrees)  Right Hand AROM: WFL        Hand Dominance  Hand Dominance: Right     Plan D/c Acute OT services      AM-PAC Score  AM-PAC Inpatient Daily Activity Raw Score: 24 (12/22/21 1458)  AM-PAC Inpatient ADL T-Scale Score : 57.54 (12/22/21 1458)  ADL Inpatient CMS 0-100% Score: 0 (12/22/21 1458)  ADL Inpatient CMS G-Code Modifier : CH (12/22/21 1458)    Therapy Time   Individual Concurrent Group Co-treatment   Time In 1438         Time Out 1502         Minutes 24              Timed Code Treatment Minutes:   10 mins     Total Treatment Minutes:  24 mins       Rich Cons, OT

## 2021-12-22 NOTE — PROGRESS NOTES
Stroke Admission    I agree as the admission nurse that I have completed a thorough stroke assessment and completed the admission on the patient. ALL assessment areas listed below have been addressed and completed. Presentation: TIA    Handoff assessment completed with Daniela Mosqueda. Current NIHSS 1.     [x]   Education Assessment  [x]   Individualized Stroke/TIA Education template added, including patient specific risk factors: Hypertension  [x]   Individualized Stroke/TIA Care Plan template added  [x]   Bedside swallow screen completed using the [facility] Protocol, and documented PRIOR to any PO meds, food or drink: Pass  [x]   VTE Prophylaxis: SCDs ordered/addressed; SCDs: On           (As a reminder, ASA, Plavix and TPA are not VTE prophylaxis.)  [x]   Stroke education booklet given, and education initiated with patient and/or caregiver      Nurse eSignature: Electronically signed by Madison Ruggiero RN on 12/21/21 at 8:06 PM EST

## 2021-12-22 NOTE — PROGRESS NOTES
Office: [phone]       Fax: [phone]      Nephrology Progress Note        Patient's Name: María Perrin Date: 12/21/2021  Date of Visit: 12/22/2021    Reason for Consult:   ANTONIO  Requesting Physician:  Macrina Metz MD  PCP: Yoana Jensen MD    Chief Complaint:  slurring     History of Present Illness:      Jossie Pinto is a 56 y.o. male with PMHx of hypertension, CKD, gout, atrial fibrillation, DM, CHF, CELSA who was hospitalized on 12/21/2021 with complaints of slurred speech, dizziness  No recent diarrhea, nausea or vomiting   Denies dysuria  Creat up from baseline  NSAID use: Denies history of NSAID use  IV contrast: None recent   Home meds reviewed         Interval History :    Serum cr trending down  Sitting in bed , not in acute distress  No diarrhea or vomiting  No fever          Past Medical History:   Diagnosis Date    Arthralgia of multiple joints 10/27/2015    Arthritis     Atrial fibrillation ([address]     Chronic kidney disease     stage 4    Chronic systolic congestive heart failure ([address] 8/16/2021    CRI (chronic renal insufficiency)     Diabetic nephropathy associated with type 2 diabetes mellitus ([address] 10/11/2018    Diverticulosis     Elevated PSA     Erectile dysfunction     Gout     Gout     Hemrrhoid NOS w/ complication NEC     History of MRSA infection     Hypertension     CELSA (obstructive sleep apnea) 07/14/2017    uses C-pap machine    Type 2 diabetes mellitus without complication, without long-term current use of insulin ([address] 9/75/1383    Umbilical hernia without obstruction and without gangrene 2/2/2016       Past Surgical History:   Procedure Laterality Date    COLONOSCOPY      DILATATION, ESOPHAGUS      HEMICOLECTOMY      UPPER GASTROINTESTINAL ENDOSCOPY  5/28/16    biopsies, multiple small gastric ulcers    UPPER GASTROINTESTINAL ENDOSCOPY  09/12/2016       Family History   Problem Relation Age of Onset    Hypertension Other     Breast Cancer Mother     Colon Cancer Father     Diabetes Maternal Grandmother     Coronary Art Dis Brother         reports that he has never smoked. He has never used smokeless tobacco. He reports current alcohol use. He reports that he does not use drugs. Medications: Allergies:  Patient has no known allergies.     Scheduled Meds:   doxazosin  4 mg Oral Daily    pantoprazole  40 mg Oral QAM AC    NIFEdipine  90 mg Oral Daily    aspirin  81 mg Oral Daily    Or    aspirin  300 mg Rectal Daily    atorvastatin  80 mg Oral Nightly    heparin (porcine)  5,000 Units SubCUTAneous 3 times per day    allopurinol  100 mg Oral Daily    metoprolol succinate  100 mg Oral Daily     Continuous Infusions:   sodium chloride         Labs:  CBC:   Recent Labs     12/21/21  0449 12/22/21  0726   WBC 5.0 3.9*   HGB 10.9* 10.6*    157     Ca/Mg/Phos:   Recent Labs     12/21/21  0449 12/22/21  0726   CALCIUM 5.5* 5.9*   MG 1.30*  --    PHOS 4.7  --      UA:  Recent Labs     12/21/21  1834   COLORU Yellow   CLARITYU Clear   GLUCOSEU Negative   BILIRUBINUR Negative   KETUA Negative   SPECGRAV 1.020   BLOODU MODERATE*   PHUR 6.5  6.5   PROTEINU >=300*   UROBILINOGEN 0.2   NITRU Negative   LEUKOCYTESUR Negative   LABMICR YES   URINETYPE NotGiven      Urine Microscopic:   Recent Labs     12/21/21  1834   BACTERIA Rare*   WBCUA None seen   RBCUA 3-4     Urine Chemistry:   Recent Labs     12/21/21  1834 12/22/21  1035   NAUR 59 75         ROS:     All systems reviewed and negative except as in HPI    Objective:     Vitals: /75   Pulse 91   Temp 98.2 °F (36.8 °C) (Oral)   Resp 18   Ht 6' 5\" (1.956 m)   Wt 298 lb 8 oz (135.4 kg)   SpO2 95%   BMI 35.40 kg/m²    Wt Readings from Last 3 Encounters:   12/21/21 298 lb 8 oz (135.4 kg)   11/30/21 283 lb 3.2 oz (128.5 kg) 11/30/21 281 lb 12.8 oz (127.8 kg)      24HR INTAKE/OUTPUT:    No intake or output data in the 24 hours ending 12/22/21 1301  Constitutional:  awake, NAD  HEENT:  MMM, No icterus  Neck: no bruits, No JVD  Cardiovascular:  reg rhythm  Respiratory: CTA, no crackles  Abdomen:  +BS, soft, NT, ND  Ext: trace lower extremity edema  Psychiatric: mood and affect appropriate  Skin: dry/intact  CNS: alert, no agitation    IMAGING:  MRI brain without contrast   Final Result      1. No acute intracranial abnormality. 2.  Mild chronic small vessel ischemic disease. 3.  Small right mastoid effusion representing retained secretions versus inflammatory debris. MRA NECK WO CONTRAST   Final Result   1. No evidence of flow limiting arterial occlusive disease in the neck. Study is degraded by patient motion. MR angiography head findings:      Images are degraded by patient motion. The anterior circulation demonstrates patency of the bilateral internal carotid arteries as well as the bilateral anterior and middle cerebral arteries. Posterior circulation demonstrates patency of the distal vertebral arteries and the basilar artery as well as bilateral posterior cerebral arteries. IMPRESSION:   1. Motion degraded images. No definite large vessel intracranial arterial occlusive disease detected. MRA HEAD WO CONTRAST   Final Result   1. No evidence of flow limiting arterial occlusive disease in the neck. Study is degraded by patient motion. MR angiography head findings:      Images are degraded by patient motion. The anterior circulation demonstrates patency of the bilateral internal carotid arteries as well as the bilateral anterior and middle cerebral arteries. Posterior circulation demonstrates patency of the distal vertebral arteries and the basilar artery as well as bilateral posterior cerebral arteries. IMPRESSION:   1. Motion degraded images.  No definite large vessel intracranial arterial occlusive disease detected. US RENAL COMPLETE   Final Result   Impression:   1. Markedly increased renal echogenicity consistent with medical renal disease. 2.  No hydronephrosis. XR CHEST PORTABLE   Final Result      Cardiomegaly. Clear lungs. CT Head WO Contrast   Final Result      No acute intracranial abnormality. Right mastoid disease. Assessment :     1. ANTONIO on CKD4  -Non-Oliguric  -Baseline creat: 2.9-3.3 Now 6.4  -UA: prot +++  -renal US : no hydro  -Volume: Hypervolemic   -Electrolytes: Hypokalemia and Hypomagnesemia  -Acid-Base: respiratory acidosis, AGMA and Metabolic alkalosis    Recent Labs     12/21/21  0449 12/22/21  0726   BUN 45* 42*   CREATININE 6.4* 5.8*     Recent Labs     12/21/21  0449 12/22/21  0726    140   K 3.4* 3.6   CO2 25 26   MG 1.30*  --          2. HTN  -Blood pressure high    BP Readings from Last 1 Encounters:   12/22/21 130/75       3. DM    4. CHF  - TTE (2021): LVEF 45-50%, G1DD    Plan:     - ANTONIO on CKD improving  - Gentle IV hydration with IVF NS upto 1 L on 12/22  - optimize BP meds  - UPC  - dec Allopurinol  - diuretics based on vol status  - replace K, Mg  - 25 Vit D  - no absolute indication of RRT tonight, but pt likely has CKD progression  - Monitor BMP    -Monitor I/O, UOP  -Maintain MAP>65  -Avoid nephrotoxin, if able. -Dose meds to current eGFR    Thank you for allowing us to participate in care of [address] [place] [address] . We will continue to follow. Feel free to contact me with any questions.       Seth Gaona MD  12/22/2021    [facility] [address] [place] [address]  Office : [phone]  Fax :[phone]

## 2021-12-22 NOTE — PLAN OF CARE
Completed speech evaluation. Please refer to EMR. [place], [place], [address] [facility], [phone]  Pg.  # [id number]

## 2021-12-22 NOTE — CARE COORDINATION
Case Management Assessment           Initial Evaluation                Date / Time of Evaluation: 12/22/2021 2:57 PM                 Assessment Completed by: ZURI Ford    Patient Name: Jodi Mckeon     YOB: 1960  Diagnosis: Hyperkalemia [E87.5]  Acute renal failure, unspecified acute renal failure type ([facility] [N17.9]  Acute renal failure superimposed on stage 3 chronic kidney disease, unspecified acute renal failure type, unspecified whether stage 3a or 3b CKD ([address] [N17.9, N18.30]     Date / Time: 12/21/2021  4:17 AM    Patient Admission Status: Inpatient    If patient is discharged prior to next notation, then this note serves as note for discharge by case management.      Current PCP: Julian Brown MD  Clinic Patient: No    Chart Reviewed: Yes  Patient/ Family Interviewed: Yes    Initial assessment completed at bedside with: Patient     Hospitalization in the last 30 days: No    Emergency Contacts:  Extended Emergency Contact Information  Primary Emergency Contact: Omari Daugherty  Address: [address] Phone: [phone]  Mobile Phone: [phone]  Relation: Spouse    Advance Directives:   Code Status: Full [address]: No    Financial:  Payor: Leisa Shepard / Plan: Sher Disla / Product Type: *No Product type* /     Pre-cert required for SNF: Yes    Pharmacy:    [facility] [address] [place], [place] [id number] - F [phone]  [address] [place] [address]  Phone: [phone] Fax: [address] [phone]  [address] [place] [id number]  Phone: [phone] Fax: [phone]    [id number] Roge Valentine [address] [facility]  Orville Klein [address]  Phone: [phone] Fax: [phone]    [facility] [place] [place] [phone]  Sadia Winslow [address]  Phone: [phone] Fax: [phone]    [id number] [id number] [facility], [address] - . [address] [phone] Fabricio Garcia [phone]  [address] [place] [address] [address] [address]  Phone: [phone] Fax: [phone]      Potential assistance Purchasing Medications: Potential Assistance Purchasing Medications: No  Does Patient want to participate in local refill/ meds to beds program?:      Meds To Beds General Rules:  1. Can ONLY be done Monday- Friday between 8:30am-5pm  2. Prescription(s) must be in pharmacy by 3pm to be filled same day  3. Copy of patient's insurance/ prescription drug card and patient face sheet must be sent along with the prescription(s)  4. Cost of Rx cannot be added to hospital bill. If financial assistance is needed, please contact unit  or ;  or  CANNOT provide pharmacy voucher for patients co-pays  5.  Patients can then  the prescription on their way out of the hospital at discharge, or pharmacy can deliver to the bedside if staff is available. (payment due at time of pick-up or delivery - cash, check, or card accepted)     Able to afford home medications/ co-pay costs: Yes    ADLS:  Support Systems: Family Members,Spouse/Significant Other    PT AM-PAC: 22 /24  OT AM-PAC:   /24    Housing:  Home Environment: Home with spouse   Steps: yes     Plans to RETURN to current housing: Yes  Barrier(s) to RETURNING to current housing: None     Home Care Information:  Currently ACTIVE with [address]: No      Durable Medical Equipment:  DME Provider: None   Equipment: N/A     Home Oxygen and Respiratory Equipment:  Has HOME OXYGEN prior to admission: No    Dialysis:  Active with HD/PD prior to admission: No    DISCHARGE PLAN:  Disposition: Home    Transportation PLAN for discharge: family     Factors facilitating achievement of predicted outcomes: Family support, Motivated, Cooperative and Pleasant    Barriers to discharge: Medical Clearance     Additional Case Management Notes:   SW met with patient at bedside on this date. Patient is from home where he is independent at baseline. Patient does not use any DME. Patient is the primary caregiver for his wife who had a stroke about 2 months ago. Patient was able to work with therapy today. Per therapy team, he is cleared to go home with no recommendations. Patient denies any needs at this time. SW provided contact information to patient/family and placed information on white board in room should needs arise. No other needs noted at this time. SW to sign off at this time. Reconsult should needs arise.      The Plan for Transition of Care is related to the following treatment goals Hyperkalemia [E87.5]  Acute renal failure, unspecified acute renal failure type ([address] [N17.9]  Acute renal failure superimposed on stage 3 chronic kidney disease, unspecified acute renal failure type, unspecified whether stage 3a or 3b CKD ([address] [N17.9, N18.30]      The Patient and/or patient representative was provided with a choice of provider and agrees with the discharge plan Yes      Care Transition patient: No    ZURI Chapman  Social Work  [facility]  Ph: [phone]

## 2021-12-22 NOTE — PROGRESS NOTES
Initial Chief Complaint/Reason for Consult: \"stroke like symptoms, slurred speech\"    Interval History:  Patient states that he is feeling well today. States he feels his speech is back to normal, but he did not note the changes in his speech on presentation. History of Present Illness:  Winter Greenberg is a 56 y.o. male who presents with acute onset of severe speech abnormalities noted by his wife at 2 AM. His symptoms have continued into today. Associated with this he felt imbalanced and dizzy (not vertiginous, however). He has never had this happen before.     He has hypertension and hyperlipidemia. He has borderline diabetes. He has no history of smoking. He is COVID-19 Vaccinated. Has CPAP for CELSA but it is broken. Review of Systems:   Constitutional- No weight loss or fevers   Eyes- No diplopia. No photophobia. Ears/nose/throat- No dysphagia. No Dysarthria   Cardiovascular- No palpitations. No chest pain   Respiratory- No dyspnea. No Cough   Gastrointestinal- No Abdominal pain. No Vomiting. Genitourinary- No incontinence. No urinary retention   Musculoskeletal- No myalgia. No arthralgia   Skin- No rash. No easy bruising. Psychiatric- No depression. No anxiety   Endocrine- No diabetes. No thyroid issues. Hematologic- No bleeding difficulty. No fatigue   Neurologic- No weakness. No Headache.     Current Medications:    Current Facility-Administered Medications:     0.9 % sodium chloride infusion, , IntraVENous, Continuous, Mariana Linares MD    doxazosin (CARDURA) tablet 4 mg, 4 mg, Oral, Daily, Genevieve Pollard MD, 4 mg at 12/22/21 0709    pantoprazole (PROTONIX) tablet 40 mg, 40 mg, Oral, QAM AC, Genevieve Pollard MD, 40 mg at 12/22/21 4266    NIFEdipine (ADALAT CC) extended release tablet 90 mg, 90 mg, Oral, Daily, Genevieve Pollard MD, 90 mg at 12/22/21 0709    ondansetron (ZOFRAN-ODT) disintegrating tablet 4 mg, 4 mg, Oral, Q8H PRN **OR** ondansetron (ZOFRAN) injection 4 mg, 4 mg, IntraVENous, Q6H PRN, Chris Boyd MD    polyethylene glycol [facility]) packet 17 g, 17 g, Oral, Daily PRN, Chris Boyd MD    aspirin EC tablet 81 mg, 81 mg, Oral, Daily, 81 mg at 12/22/21 0709 **OR** aspirin suppository 300 mg, 300 mg, Rectal, Daily, Chris Boyd MD    acetaminophen (TYLENOL) tablet 650 mg, 650 mg, Oral, Q4H PRN, Chris Boyd MD, 650 mg at 12/22/21 3249    atorvastatin (LIPITOR) tablet 80 mg, 80 mg, Oral, Nightly, Chris Boyd MD, 80 mg at 12/21/21 2105    heparin (porcine) injection 5,000 Units, 5,000 Units, SubCUTAneous, 3 times per day, Chris Boyd MD, 5,000 Units at 12/22/21 6168    allopurinol (ZYLOPRIM) tablet 100 mg, 100 mg, Oral, Daily, Angela Bustillo MD, 100 mg at 12/22/21 0709    metoprolol succinate (TOPROL XL) extended release tablet 100 mg, 100 mg, Oral, Daily, Angela Bustillo MD, 100 mg at 12/22/21 0709      Physical Exam  Constitutional  /75   Pulse 91   Temp 98.2 °F (36.8 °C) (Oral)   Resp 18   Ht 6' 5\" (1.956 m)   Wt 298 lb 8 oz (135.4 kg)   SpO2 95%   BMI 35.40 kg/m²     General: Alert, no distress, well-nourished  Respiratory: Respirations unlabored, no accessory muscle use  Cardiovascular: Rate regular. Warm and well perfused  Psychiatric: cooperative with examination, no  psychotic behavior noted.     Neurologic  Mental status:   orientation to person, place, time and situation   Attention intact as able to attend well to the exam     Language fluent in conversation   Comprehension intact; follows simple commands    Cranial nerves:   CN2: Visual Fields full w/o extinction on confrontational testing,   CN 3,4,6: pupils equal and reactive to light, extraocular muscles intact  CN5: facial sensation symmetric   CN7:face symmetric with some raspy dysarthria  CN8: hearing symmetric to voice  CN9: palate elevated symmetrically  CN11: trap full strength on shoulder shrug  CN12: tongue midline with protrusion    Motor Exam:  No pronator drift, full strength in all extremities    Sensory: light touch intact and symmetric in all 4 extremities. No sensory extinction on double simultaneous stimulation  Cerebellar/coordination: finger nose finger normal without ataxia  Gait: deferred for safety    Images:   CT Head WO Contrast:  Impression       No acute intracranial abnormality.       Right mastoid disease. MRA Head and Neck WO Contrast:  Impression   1. No evidence of flow limiting arterial occlusive disease in the neck. Study is degraded by patient motion.       MR angiography head findings:       Images are degraded by patient motion.       The anterior circulation demonstrates patency of the bilateral internal carotid arteries as well as the bilateral anterior and middle cerebral arteries.       Posterior circulation demonstrates patency of the distal vertebral arteries and the basilar artery as well as bilateral posterior cerebral arteries.       IMPRESSION:   1. Motion degraded images. No definite large vessel intracranial arterial occlusive disease detected. MRI Brain WO Contrast:  Impression       1.  No acute intracranial abnormality. 2.  Mild chronic small vessel ischemic disease. 3.  Small right mastoid effusion representing retained secretions versus inflammatory debris. Pertinent Labs:   A1c 6.6%  LDL: 129  Cr: 5.8      Assessment:  Jen Keita is a 56 y.o. male who presented with acute onset of raspy voice, dysarthria, dizziness and imbalance most worrisome for medullary stroke. MR-A and echo unrevealing. MRI with no acute findings.      Plan:  - PT/OT/SLP  - Continue aspirin  - Continue statin  - Recommend ENT consult  - Would recommend repeating MRI if no ENT cause of raspy voice, no stroke seen on initial MRI but posterior circulation strokes can be DWI negative in the first 72 hours  - Telemetry while inpatient, notify neurology of any afib  - Would recommend long term cardiac monitor at discharge    Piotr Dry, APRN-CNP  Neurology & Neurocritical Care   Neurology Line: [phone]  [employer]: [facility] NPs  12/22/21

## 2021-12-22 NOTE — PLAN OF CARE
Problem: Falls - Risk of:  Goal: Will remain free from falls  Description: Will remain free from falls  12/22/2021 0916 by Vale Villagran RN  Outcome: Ongoing  Pt is a high fall risk. High fall precautions are in place: nonskid socks, yellow blanket, fall wristband, call light in reach, bed is locked and in lowest position, bed alarm engaged. Problem: Pain:  Goal: Pain level will decrease  Description: Pain level will decrease  12/22/2021 0916 by Vale Villagran RN  Outcome: Ongoing  Pt reports mild HA pain. Will administer PRN tylenol. Will continue to assess pain on 0-10 scale for appropriate pain management.

## 2021-12-22 NOTE — PROGRESS NOTES
Physical Therapy    Facility/Department: [facility] 5T ORTHO/NEURO  Initial Assessment / treatment / discharge    NAME: Carlos A Garcia  : 1960  MRN: [id number]    Date of Service: 2021    Discharge Recommendations: Carlos A Garcia scored a  on the AM-PAC short mobility form. At this time, no further PT is recommended upon discharge. Recommend patient returns to prior setting with prior services. PT Equipment Recommendations  Equipment Needed: No    Assessment   Assessment: Pt is 56 y.o. male admit from home with slurred speech and fatigue. 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 Pt is typically very indep and states his gait quality today is near his baseline - only limitation is L ankle pain which he attributes to gout flare. No acute PT needs. Pt denies concerns with returning home. Will sign off. Prognosis: Good  Decision Making: Low Complexity  PT Education: PT Role  Patient Education: pt demos understanding  REQUIRES PT FOLLOW UP: No       Patient Diagnosis(es): The primary encounter diagnosis was Acute renal failure, unspecified acute renal failure type ([address] A diagnosis of Hypocalcemia was also pertinent to this visit. has a past medical history of Arthralgia of multiple joints, Arthritis, Atrial fibrillation (HCC), Chronic kidney disease, Chronic systolic congestive heart failure ([address] CRI (chronic renal insufficiency), Diabetic nephropathy associated with type 2 diabetes mellitus ([address] Diverticulosis, Elevated PSA, Erectile dysfunction, Gout, Gout, Hemrrhoid NOS w/ complication NEC, History of MRSA infection, Hypertension, CELSA (obstructive sleep apnea), Type 2 diabetes mellitus without complication, without long-term current use of insulin ([address] and Umbilical hernia without obstruction and without gangrene. has a past surgical history that includes hemicolectomy; Upper gastrointestinal endoscopy (16);  Colonoscopy; Dilatation, esophagus; and Upper gastrointestinal endoscopy (09/12/2016). Restrictions  Position Activity Restriction  Other position/activity restrictions: up as tolerated  Vision/Hearing  Vision: Impaired  Vision Exceptions: Wears glasses at all times (relies heavily on glasses)  Hearing: Within functional limits     Subjective  General  Chart Reviewed: Yes  Additional Pertinent Hx: Admit 12/21 with slurred speech, HA, fatigue; head MRI: (-); neuro consulted; PMHx: arthritis, CHF, DM, HTN, a-fib, CKD, gout, hemicolectomy  Referring Practitioner: Tosha Warren MD  Diagnosis: hyperkalemia  Subjective  Subjective: Pt found supine in bed. Alert and agreeable to PT. Speech slightly slurred per conversation. Pt report 5/10 pain L wrist and ankle which he attributes to gout flare. RN aware. Orientation  Orientation  Overall Orientation Status: Within Normal Limits  Social/Functional History  Social/Functional History  Lives With: Spouse  Type of Home: House  Home Layout: Multi-level (bi-level; 8-9 steps with rail up or down; bed/bath upstairs)  Home Access: Stairs to enter with rails  Entrance Stairs - Number of Steps: 2 MICHAEL  Bathroom Shower/Tub: Tub/Shower unit  Bathroom Equipment: Shower chair  Home Equipment:  (none)  ADL Assistance: Independent  Homemaking Assistance: Independent  Ambulation Assistance: Independent  Transfer Assistance: Independent  Active : Yes  Occupation: Full time employment  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 Pt off work for a few weeks to care for his wife. Pt's dtr had a CVA June 2021 and remains hospitalized.   Cognition        Objective          AROM RLE (degrees)  RLE AROM: WFL  AROM LLE (degrees)  LLE AROM : WFL           Bed mobility  Supine to Sit: Supervision (HOB flat)  Scooting: Supervision  Transfers  Sit to Stand: Supervision  Stand to sit: Supervision  Ambulation  Ambulation?: Yes  Ambulation 1  Device: No Device  Assistance: Stand by assistance  Quality of Gait: increased lateral sway and slightly decreased stance time L LE which pt attributes to gout flare, appropriate sara, steady overall  Distance: 150 ft  Stairs/Curb  Stairs?: Yes (negotiated 1 flight of stairs with SBA; glasses fell off when descending and pt sat down on steps 2* unable to proceed without glasses - glasses retrieved and pt able to continue)     Balance  Sitting - Static: Good  Sitting - Dynamic: Good  Standing - Static: Good  Standing - Dynamic: Good      Treatment included gait and transfer training, pt education.   Plan   Plan  Times per week: d/c acute PT  Safety Devices  Type of devices: Call light within reach,Chair alarm in place,Nurse notified,Gait belt,Left in chair    G-Code       OutComes Score                                                  AM-PAC Score  AM-PAC Inpatient Mobility Raw Score : 22 (12/22/21 1132)  AM-PAC Inpatient T-Scale Score : 53.28 (12/22/21 1132)  Mobility Inpatient CMS 0-100% Score: 20.91 (12/22/21 1132)  Mobility Inpatient CMS G-Code Modifier : CJ (12/22/21 1132)          Goals          Therapy Time   Individual Concurrent Group Co-treatment   Time In 1027         Time Out 1107         Minutes 40                 Timed Code Treatment Minutes:  25    Total Treatment Minutes:  [id number] [address]  [id number]

## 2021-12-22 NOTE — PROGRESS NOTES
Speech Language Pathology  Facility/Department: [facility] 5T ORTHO/NEURO  Initial Speech/Language/Cognitive Assessment    NAME: Soraya Mcgarry  : 1960   MRN: [id number]  ADMISSION DATE: 2021  ADMITTING DIAGNOSIS: has Atrial fibrillation ([address] CRI (chronic renal insufficiency); Gout; Erectile dysfunction; Elevated PSA; Essential hypertension, benign; Headache; Diverticulosis; Arthralgia of multiple joints; Umbilical hernia without obstruction and without gangrene; Knee pain, bilateral; Alcohol use disorder, mild, abuse; CELSA (obstructive sleep apnea); Duodenal ulcer disease; Diabetic nephropathy associated with type 2 diabetes mellitus ([address] Type 2 diabetes mellitus with diabetic nephropathy, without long-term current use of insulin ([address] Lateral epicondylitis of right elbow; Chronic bilateral low back pain without sciatica; Morbid obesity due to excess calories ([address] Chronic systolic congestive heart failure ([address] Acute renal failure superimposed on stage 3 chronic kidney disease ([address] Stroke-like symptoms; DMII (diabetes mellitus, type 2) ([address] Hyperlipidemia; Hypocalcemia; Hypomagnesemia; Hypokalemia; and Muscle cramps on their problem list.  DATE ONSET: 2021    Date of Eval: 2021   Evaluating Therapist: MARVEL Kam    Recent Head CT (2021)  Impression       No acute intracranial abnormality.       Right mastoid disease. Recent MRI Brain: (2021)  Impression       1.  No acute intracranial abnormality. 2.  Mild chronic small vessel ischemic disease. 3.  Small right mastoid effusion representing retained secretions versus inflammatory debris.      Primary Complaint: pt endorsing fatigue and stress    Pain:  Pain Assessment  Pain Assessment: 0-10  Pain Level: 3  Patient's Stated Pain Goal: No pain  Pain Type: Acute pain  Pain Location: Head (headache)  Pain Orientation: Mid  Pain Descriptors: Discomfort,Headache,Aching,Burning  Pain Frequency: Continuous  Pain Onset: On-going    Assessment:  Cognitive Diagnosis: Cognitive-linguistic impairment  Speech Diagnosis: Dysphonia  Communication Diagnosis: Reduced speech intelligibility   Diagnosis: Patient presents with moderate cognitive-linguistic impairment, scoring 19/30 on [address] ([place]) Examination; specific areas of difficulty noted include short term recall, attention, and math calculations. Receptive/expressive language appeared grossly WFL with only single instance word finding difficulty noted at conversational level at start of session. Speech disturbance noted, with some speech hesitancy; however, primary component appears to be a dysphonia vs dysarthria, with voice sounding strained/strangled/hoarse. Per patient report, his wife thought his talking sounded different, but he himself hadn't/hasn't noticed any change. 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 All of these factors have been contributing to poor sleep, which was further exacerbated by ongoing issues with his CPAP machine. Pt tearful; provided emotional support. Per imaging, no acute CVA noted, but mild chronic small vessel ischemic disease, which could potentially be contributing to his cognitive-linguistic difficulties. However given multiple sources of extreme stress and fatigue (with session ending this am so patient could rest), suspect those may be contributing to cognitive linguistic difficulties as well as possible dysphonia. Recommend speech follow-up/re-assess. Also consider ENT consult for voice. Dysphagia note:  Per chart review, pt report, and nursing, pt has been tolerating regular texture diet well w/o complication. Recent chest xray clear. Recommend continue diet as tolerated. Recommendations:  Requires SLP Intervention: Yes  Duration/Frequency of Treatment: 3-4x/wk for LOS  D/C Recommendations: Home with intermittent assistance  Referral To: ENT (dysphonia - strained, strangled, hoarse voice)    Plan:   Goals:  Short-term Goals  Timeframe for Short-term Goals: 1-2 wks or LOS  Goal 1: Patient will participate in further assessment of cognitive-linguistic skills and speech/voice. Goal 2: Patient will demonstrate improved recall with 90% accuracy given min cues for graded tasks. Goal 3: Patient will complete problem solving/math calculation tasks with 90% accuracy given min cues. Patient/family involved in developing goals and treatment plan: patient    Subjective:   Previous level of function and limitations: Independent  General  Chart Reviewed: Yes  Family / Caregiver Present: No  General Comment  Comments: Per admitting H&P (12/22/2021): 'Pt is an 56y.o. year-old male with a history of hypertension, hyperlipidemia, diabetes mellitus, chronic systolic congestive heart failure, atrial fibrillation, CKD 3 and morbid obesity. He presents to the emergency room for evaluation after his wife states that he was slurring his words. She was concerned that he was having a possible stroke. I spoke with her at bedside. She states that his CPAP machine quit working and when she called the company she found out that it was on recall. They have been waiting a prolonged period of time for the machine to be replaced. Patient also reports that he has had severe muscle cramping. He was found to have multiple electrolyte deficiencies including hypomagnesemia, hypocalcemia and hypokalemia. In the emergency room at CT of his head had no acute findings. A CTA of his head and neck w/wo contrast could not be performed due to his renal failure so an MRA of his head and neck have been ordered.   During the course of his evaluation he was found to have worsening renal failure. He is being admitted for further evaluation and treatment.'  Subjective  Subjective: Received pt awake, alert, resting in bed, on room air. 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 Vision  Vision: Impaired  Vision Exceptions: Wears glasses at all times  Hearing  Hearing: Within functional limits     Objective:  Oral/Motor  Oral Motor: Within functional limits    Auditory Comprehension  Comprehension: Within Functional Limits    Expression  Primary Mode of Expression: Verbal    Verbal Expression  Verbal Expression: Exceptions to functional limits  Initiation: Mild   Conversation: Mild    Motor Speech  Motor Speech: Exceptions to [facility]  Intelligibility: Mild    Pragmatics/Social Functioning  Pragmatics: Within functional limits    Cognition:   Orientation  Overall Orientation Status: Within Functional Limits  Attention  Attention: Exceptions to [facility]  Alternating Attention: Mild  Sustained Attention: Mild  Memory  Memory: Exceptions to [facility]  Short-term Memory: Moderate  Working Memory: Mild  Problem Solving  Problem Solving: Exceptions to [facility]  Managing Finances: Moderate  Numeric Reasoning  Numeric Reasoning: Exceptions to [facility]   Calculations: Moderate  Money Management: Moderate    Additional Assessments:  Voice Evaluation  Vocal Quality: Exceptions to [facility]  Hoarse: Mild  Spastic: Moderate  Dysphonic: Moderate     Prognosis:  Speech Therapy Prognosis  Prognosis: Fair  Individuals consulted  Consulted and agree with results and recommendations: Patient    Education:  Patient Education: Educated pt to purpose of visit, role of SLP, results, recommendations.   Patient Education Response: Coco understanding;Needs reinforcement  Safety Devices in place: Yes  Type of devices: All fall risk precautions in place; Bed alarm in place;Call light within reach    Therapy Time:   Individual Concurrent Group Co-treatment   Time In 0840         Time Out 0920         Minutes 40            Timed Code Treatment Minutes: 0 Minutes  Total Treatment Time: 4865 [address]  12/22/2021 12:59 PM

## 2021-12-22 NOTE — PROGRESS NOTES
Hospitalist Progress Note      PCP: Julián Ruiz MD    Date of Admission: 12/21/2021    Chief Complaint: Weakness, slurred speech    Hospital Course: Presented with slurred speech. MRI ruled out CVA. Diagnosed with acute renal failure. Already has CKD stage III. Being followed by nephrology. Creatinine still elevated at 5.8, less than yesterday 6.4. Patient feels subjectively better. Subjective: No chest pain, no shortness of breath, no nausea or vomiting. Medications:  Reviewed    Infusion Medications    sodium chloride       Scheduled Medications    doxazosin  4 mg Oral Daily    pantoprazole  40 mg Oral QAM AC    NIFEdipine  90 mg Oral Daily    aspirin  81 mg Oral Daily    Or    aspirin  300 mg Rectal Daily    atorvastatin  80 mg Oral Nightly    heparin (porcine)  5,000 Units SubCUTAneous 3 times per day    allopurinol  100 mg Oral Daily    metoprolol succinate  100 mg Oral Daily     PRN Meds: ondansetron **OR** ondansetron, polyethylene glycol, acetaminophen    No intake or output data in the 24 hours ending 12/22/21 1410    Physical Exam Performed:    /75   Pulse 91   Temp 98.2 °F (36.8 °C) (Oral)   Resp 18   Ht 6' 5\" (1.956 m)   Wt 298 lb 8 oz (135.4 kg)   SpO2 95%   BMI 35.40 kg/m²     General appearance: No apparent distress, appears stated age and cooperative. HEENT: Pupils equal, round, and reactive to light. Conjunctivae/corneas clear. Neck: Supple, with full range of motion. No jugular venous distention. Trachea midline. Respiratory:  Normal respiratory effort. Clear to auscultation, bilaterally without Rales/Wheezes/Rhonchi. Cardiovascular: Regular rate and rhythm with normal S1/S2 without murmurs, rubs or gallops. Abdomen: Soft, non-tender, non-distended with normal bowel sounds. Musculoskeletal: No clubbing, cyanosis or edema bilaterally. Full range of motion without deformity.   Skin: Skin color, texture, turgor normal.  No rashes or occlusive disease detected. MRA HEAD WO CONTRAST   Final Result   1. No evidence of flow limiting arterial occlusive disease in the neck. Study is degraded by patient motion. MR angiography head findings:      Images are degraded by patient motion. The anterior circulation demonstrates patency of the bilateral internal carotid arteries as well as the bilateral anterior and middle cerebral arteries. Posterior circulation demonstrates patency of the distal vertebral arteries and the basilar artery as well as bilateral posterior cerebral arteries. IMPRESSION:   1. Motion degraded images. No definite large vessel intracranial arterial occlusive disease detected. US RENAL COMPLETE   Final Result   Impression:   1. Markedly increased renal echogenicity consistent with medical renal disease. 2.  No hydronephrosis. XR CHEST PORTABLE   Final Result      Cardiomegaly. Clear lungs. CT Head WO Contrast   Final Result      No acute intracranial abnormality. Right mastoid disease. Assessment/Plan:    Active Hospital Problems    Diagnosis     Acute renal failure superimposed on stage 3 chronic kidney disease ([facility] [N17.9, N18.30]     Stroke-like symptoms [R29.90]     DMII (diabetes mellitus, type 2) ([facility] [E11.9]     Hyperlipidemia [E78.5]     Hypocalcemia [E83.51]     Hypomagnesemia [E83.42]     Hypokalemia [E87.6]     Muscle cramps [R25.2]     Chronic systolic congestive heart failure (HCC) [I50.22]     Morbid obesity due to excess calories ([facility] [E66.01]     Essential hypertension, benign [I10]     Atrial fibrillation (HCC) [I48.91]      PLAN:    Acute renal failure on CKD stage III  Admitted with creatinine of 6.4.   Today's creatinine is down to 5.8, still elevated  Nephrology following  Hydration as indicated  Monitor basic metabolic panel  Not on nephrotoxins    Slurred speech  Expressive aphasia secondary to CVA was preliminarily diagnosed. However, patient's MRI of the brain is unremarkable. No stroke. Slurred speech most likely was secondary to uremic encephalopathy caused by acute kidney injury. Dyslipidemia  Continue statin at home dose. No muscle pains or aches. Hypertension  Currently controlled. Continue same management. Blood pressure stable    Chronic systolic congestive heart failure  Current problem is hypovolemia. Monitor. If indicated, IV fluids to be given cautiously under nephrology guidance. Discussed with the patient. Questions answered    DVT Prophylaxis: Subcutaneous heparin  Diet: ADULT DIET;  Regular  Code Status: Full Code    PT/OT Eval Status: Ordered    Dispo -inpatient stay, unclear duration, pending improvement in renal function    Taylor Mueller MD

## 2021-12-22 NOTE — PROGRESS NOTES
Patient admitted to room 5521. Patient is AAOX4 . VSS. Patient has trouble finding words at this time. Patient does have dizziness when standing. Patient states he is dealing with gout in his left leg and left upper arm. Patient is resting in bed at this time with all fall precautions in place.  Will continue to monitor

## 2021-12-22 NOTE — PLAN OF CARE
Problem: Falls - Risk of:  Goal: Will remain free from falls  Description: Will remain free from falls  Outcome: Ongoing   Patient is at risk for falls. Patient is in bed with bed alarm on, fall risk ID, Nonskid socks, Bed in lowest position. Call light and bedside table are within reach. Patient calls out approprietly. Will continue to monitor. Problem: Pain:  Goal: Pain level will decrease  Description: Pain level will decrease  Outcome: Ongoing   Patient complains of pain, patient medicated per mar, will continue to monitor.

## 2021-12-23 ENCOUNTER — TELEPHONE (OUTPATIENT)
Dept: PULMONOLOGY | Age: 61
End: 2021-12-23

## 2021-12-23 ENCOUNTER — APPOINTMENT (OUTPATIENT)
Dept: MRI IMAGING | Age: 61
DRG: 682 | End: 2021-12-23
Payer: COMMERCIAL

## 2021-12-23 LAB
ANION GAP SERPL CALCULATED.3IONS-SCNC: 13 MMOL/L (ref 3–16)
BASOPHILS ABSOLUTE: 0 K/UL (ref 0–0.2)
BASOPHILS RELATIVE PERCENT: 0.6 %
BUN BLDV-MCNC: 49 MG/DL (ref 7–20)
C DIFF TOXIN/ANTIGEN: NORMAL
CALCIUM SERPL-MCNC: 5.6 MG/DL (ref 8.3–10.6)
CHLORIDE BLD-SCNC: 105 MMOL/L (ref 99–110)
CHOLESTEROL, TOTAL: 160 MG/DL (ref 0–199)
CO2: 22 MMOL/L (ref 21–32)
CREAT SERPL-MCNC: 6.5 MG/DL (ref 0.8–1.3)
EOSINOPHILS ABSOLUTE: 0.1 K/UL (ref 0–0.6)
EOSINOPHILS RELATIVE PERCENT: 2.8 %
ESTIMATED AVERAGE GLUCOSE: 142.7 MG/DL
GFR AFRICAN AMERICAN: 11
GFR NON-AFRICAN AMERICAN: 9
GLUCOSE BLD-MCNC: 92 MG/DL (ref 70–99)
HBA1C MFR BLD: 6.6 %
HCT VFR BLD CALC: 30 % (ref 40.5–52.5)
HDLC SERPL-MCNC: 41 MG/DL (ref 40–60)
HEMOGLOBIN: 10 G/DL (ref 13.5–17.5)
LDL CHOLESTEROL CALCULATED: 91 MG/DL
LYMPHOCYTES ABSOLUTE: 1.3 K/UL (ref 1–5.1)
LYMPHOCYTES RELATIVE PERCENT: 29.8 %
MAGNESIUM: 1.4 MG/DL (ref 1.8–2.4)
MCH RBC QN AUTO: 28.7 PG (ref 26–34)
MCHC RBC AUTO-ENTMCNC: 33.2 G/DL (ref 31–36)
MCV RBC AUTO: 86.3 FL (ref 80–100)
MONOCYTES ABSOLUTE: 0.4 K/UL (ref 0–1.3)
MONOCYTES RELATIVE PERCENT: 9.4 %
NEUTROPHILS ABSOLUTE: 2.5 K/UL (ref 1.7–7.7)
NEUTROPHILS RELATIVE PERCENT: 57.4 %
PDW BLD-RTO: 13.3 % (ref 12.4–15.4)
PLATELET # BLD: 146 K/UL (ref 135–450)
PMV BLD AUTO: 8.4 FL (ref 5–10.5)
POTASSIUM REFLEX MAGNESIUM: 3.5 MMOL/L (ref 3.5–5.1)
RBC # BLD: 3.48 M/UL (ref 4.2–5.9)
SODIUM BLD-SCNC: 140 MMOL/L (ref 136–145)
TRIGL SERPL-MCNC: 141 MG/DL (ref 0–150)
TSH REFLEX: 2.05 UIU/ML (ref 0.27–4.2)
VLDLC SERPL CALC-MCNC: 28 MG/DL
WBC # BLD: 4.3 K/UL (ref 4–11)

## 2021-12-23 PROCEDURE — 80048 BASIC METABOLIC PNL TOTAL CA: CPT

## 2021-12-23 PROCEDURE — 6370000000 HC RX 637 (ALT 250 FOR IP): Performed by: INTERNAL MEDICINE

## 2021-12-23 PROCEDURE — 6360000002 HC RX W HCPCS: Performed by: INTERNAL MEDICINE

## 2021-12-23 PROCEDURE — 87449 NOS EACH ORGANISM AG IA: CPT

## 2021-12-23 PROCEDURE — 2580000003 HC RX 258: Performed by: INTERNAL MEDICINE

## 2021-12-23 PROCEDURE — 1200000000 HC SEMI PRIVATE

## 2021-12-23 PROCEDURE — 99222 1ST HOSP IP/OBS MODERATE 55: CPT | Performed by: OTOLARYNGOLOGY

## 2021-12-23 PROCEDURE — 70551 MRI BRAIN STEM W/O DYE: CPT

## 2021-12-23 PROCEDURE — 85025 COMPLETE CBC W/AUTO DIFF WBC: CPT

## 2021-12-23 PROCEDURE — 87324 CLOSTRIDIUM AG IA: CPT

## 2021-12-23 PROCEDURE — 6360000002 HC RX W HCPCS: Performed by: HOSPITALIST

## 2021-12-23 PROCEDURE — 83735 ASSAY OF MAGNESIUM: CPT

## 2021-12-23 PROCEDURE — 99232 SBSQ HOSP IP/OBS MODERATE 35: CPT | Performed by: HOSPITALIST

## 2021-12-23 PROCEDURE — 99232 SBSQ HOSP IP/OBS MODERATE 35: CPT

## 2021-12-23 PROCEDURE — 36415 COLL VENOUS BLD VENIPUNCTURE: CPT

## 2021-12-23 PROCEDURE — 6370000000 HC RX 637 (ALT 250 FOR IP): Performed by: HOSPITALIST

## 2021-12-23 PROCEDURE — 94660 CPAP INITIATION&MGMT: CPT

## 2021-12-23 PROCEDURE — 6360000002 HC RX W HCPCS

## 2021-12-23 PROCEDURE — 31575 DIAGNOSTIC LARYNGOSCOPY: CPT | Performed by: OTOLARYNGOLOGY

## 2021-12-23 RX ORDER — MAGNESIUM SULFATE IN WATER 40 MG/ML
2000 INJECTION, SOLUTION INTRAVENOUS ONCE
Status: COMPLETED | OUTPATIENT
Start: 2021-12-23 | End: 2021-12-23

## 2021-12-23 RX ORDER — LORAZEPAM 2 MG/ML
1 INJECTION INTRAMUSCULAR
Status: COMPLETED | OUTPATIENT
Start: 2021-12-23 | End: 2021-12-23

## 2021-12-23 RX ADMIN — HEPARIN SODIUM 5000 UNITS: 5000 INJECTION INTRAVENOUS; SUBCUTANEOUS at 18:05

## 2021-12-23 RX ADMIN — NIFEDIPINE 90 MG: 30 TABLET, EXTENDED RELEASE ORAL at 09:31

## 2021-12-23 RX ADMIN — METOPROLOL SUCCINATE 100 MG: 100 TABLET, EXTENDED RELEASE ORAL at 09:34

## 2021-12-23 RX ADMIN — HEPARIN SODIUM 5000 UNITS: 5000 INJECTION INTRAVENOUS; SUBCUTANEOUS at 05:02

## 2021-12-23 RX ADMIN — PANTOPRAZOLE SODIUM 40 MG: 40 TABLET, DELAYED RELEASE ORAL at 09:41

## 2021-12-23 RX ADMIN — ASPIRIN 81 MG: 81 TABLET, COATED ORAL at 09:34

## 2021-12-23 RX ADMIN — LORAZEPAM 1 MG: 2 INJECTION INTRAMUSCULAR; INTRAVENOUS at 20:27

## 2021-12-23 RX ADMIN — DOXAZOSIN 4 MG: 4 TABLET ORAL at 09:34

## 2021-12-23 RX ADMIN — ATORVASTATIN CALCIUM 80 MG: 40 TABLET, FILM COATED ORAL at 20:09

## 2021-12-23 RX ADMIN — ALLOPURINOL 100 MG: 100 TABLET ORAL at 09:34

## 2021-12-23 RX ADMIN — TRAMADOL HYDROCHLORIDE 50 MG: 50 TABLET, COATED ORAL at 09:41

## 2021-12-23 RX ADMIN — HEPARIN SODIUM 5000 UNITS: 5000 INJECTION INTRAVENOUS; SUBCUTANEOUS at 09:34

## 2021-12-23 RX ADMIN — MAGNESIUM SULFATE HEPTAHYDRATE 2000 MG: 2 INJECTION, SOLUTION INTRAVENOUS at 13:09

## 2021-12-23 RX ADMIN — CALCIUM GLUCONATE 1000 MG: 98 INJECTION, SOLUTION INTRAVENOUS at 11:50

## 2021-12-23 ASSESSMENT — PAIN SCALES - GENERAL
PAINLEVEL_OUTOF10: 5
PAINLEVEL_OUTOF10: 4
PAINLEVEL_OUTOF10: 0
PAINLEVEL_OUTOF10: 0
PAINLEVEL_OUTOF10: 3

## 2021-12-23 ASSESSMENT — PAIN DESCRIPTION - DESCRIPTORS: DESCRIPTORS: ACHING;DISCOMFORT;BURNING

## 2021-12-23 ASSESSMENT — PAIN DESCRIPTION - LOCATION: LOCATION: LEG

## 2021-12-23 ASSESSMENT — PAIN DESCRIPTION - PAIN TYPE: TYPE: ACUTE PAIN

## 2021-12-23 ASSESSMENT — PAIN - FUNCTIONAL ASSESSMENT: PAIN_FUNCTIONAL_ASSESSMENT: PREVENTS OR INTERFERES SOME ACTIVE ACTIVITIES AND ADLS

## 2021-12-23 ASSESSMENT — PAIN DESCRIPTION - FREQUENCY: FREQUENCY: INTERMITTENT

## 2021-12-23 ASSESSMENT — PAIN DESCRIPTION - ONSET: ONSET: ON-GOING

## 2021-12-23 ASSESSMENT — PAIN DESCRIPTION - PROGRESSION: CLINICAL_PROGRESSION: GRADUALLY WORSENING

## 2021-12-23 NOTE — PROGRESS NOTES
non-distended with normal bowel sounds. Musculoskeletal: No clubbing, cyanosis or edema bilaterally. Full range of motion without deformity. Skin: Skin color, texture, turgor normal.  No rashes or lesions. Neurologic:  Neurovascularly intact without any focal sensory/motor deficits. Cranial nerves: II-XII intact, grossly non-focal.  Psychiatric: Alert and oriented, thought content appropriate, normal insight  Capillary Refill: Brisk,3 seconds, normal   Peripheral Pulses: +2 palpable, equal bilaterally     I examined the patient today (12/23/21). Physical exam is similar to yesterday (12/22). Labs:   Recent Labs     12/21/21 0449 12/22/21  0726 12/23/21  0631   WBC 5.0 3.9* 4.3   HGB 10.9* 10.6* 10.0*   HCT 33.1* 31.3* 30.0*    157 146     Recent Labs     12/21/21 0449 12/22/21 0726 12/23/21  0631    140 140   K 3.4* 3.6 3.5    103 105   CO2 25 26 22   BUN 45* 42* 49*   CREATININE 6.4* 5.8* 6.5*   CALCIUM 5.5* 5.9* 5.6*   PHOS 4.7  --   --      No results for input(s): AST, ALT, BILIDIR, BILITOT, ALKPHOS in the last 72 hours. No results for input(s): INR in the last 72 hours. Recent Labs     12/21/21 0449   TROPONINI 0.04*       Urinalysis:      Lab Results   Component Value Date    NITRU Negative 12/21/2021    WBCUA None seen 12/21/2021    BACTERIA Rare 12/21/2021    RBCUA 3-4 12/21/2021    BLOODU MODERATE 12/21/2021    SPECGRAV 1.020 12/21/2021    GLUCOSEU Negative 12/21/2021       Radiology:  MRI brain without contrast   Final Result      1. No acute intracranial abnormality. 2.  Mild chronic small vessel ischemic disease. 3.  Small right mastoid effusion representing retained secretions versus inflammatory debris. MRA NECK WO CONTRAST   Final Result   1. No evidence of flow limiting arterial occlusive disease in the neck. Study is degraded by patient motion. MR angiography head findings:      Images are degraded by patient motion.       The anterior circulation demonstrates patency of the bilateral internal carotid arteries as well as the bilateral anterior and middle cerebral arteries. Posterior circulation demonstrates patency of the distal vertebral arteries and the basilar artery as well as bilateral posterior cerebral arteries. IMPRESSION:   1. Motion degraded images. No definite large vessel intracranial arterial occlusive disease detected. MRA HEAD WO CONTRAST   Final Result   1. No evidence of flow limiting arterial occlusive disease in the neck. Study is degraded by patient motion. MR angiography head findings:      Images are degraded by patient motion. The anterior circulation demonstrates patency of the bilateral internal carotid arteries as well as the bilateral anterior and middle cerebral arteries. Posterior circulation demonstrates patency of the distal vertebral arteries and the basilar artery as well as bilateral posterior cerebral arteries. IMPRESSION:   1. Motion degraded images. No definite large vessel intracranial arterial occlusive disease detected. US RENAL COMPLETE   Final Result   Impression:   1. Markedly increased renal echogenicity consistent with medical renal disease. 2.  No hydronephrosis. XR CHEST PORTABLE   Final Result      Cardiomegaly. Clear lungs. CT Head WO Contrast   Final Result      No acute intracranial abnormality. Right mastoid disease.                     Assessment/Plan:    Active Hospital Problems    Diagnosis     Acute renal failure superimposed on stage 3 chronic kidney disease ([address] [N17.9, N18.30]     Stroke-like symptoms [R29.90]     DMII (diabetes mellitus, type 2) ([address] [E11.9]     Hyperlipidemia [E78.5]     Hypocalcemia [E83.51]     Hypomagnesemia [E83.42]     Hypokalemia [E87.6]     Muscle cramps [R25.2]     Chronic systolic congestive heart failure (HCC) [I50.22]     Morbid obesity due to excess calories ([address] [E66.01]     Essential hypertension, benign [I10]     Atrial fibrillation (HCC) [I48.91]      PLAN:    Acute renal failure on CKD stage III  Creatinine 6.4 on admission, 6.5 today. Nephrology following  Hydration as indicated  Monitor basic metabolic panel  Not on nephrotoxins  Could be progression of patient's underlying chronic kidney disease. No indication of emergency dialysis, but may need dialysis eventually. Slurred speech  Expressive aphasia secondary to CVA was preliminarily diagnosed. However, patient's MRI of the brain is unremarkable. No stroke. Slurred speech most likely was secondary to acute metabolic encephalopathy caused by acute kidney injury. Mental status better today. Also noted some hoarseness, evaluated by ENT and everything was found unremarkable. Dyslipidemia  Continue statin at home dose. No muscle pains or aches. Continue same    Hypertension  Acceptable blood pressure. Hypotension would be more worrisome than current mild elevation of the blood pressure. Chronic systolic congestive heart failure  Current problem is hypovolemia. Monitor. If indicated, IV fluids to be given cautiously under nephrology guidance. Discussed with nephrology. IV fluids to be given today. Discussed with the patient. Questions answered    DVT Prophylaxis: Subcutaneous heparin  Diet: ADULT DIET;  Regular; Low Fat/Low Chol/High Fiber/2 gm Na  Code Status: Full Code    PT/OT Eval Status: Ordered    Dispo -inpatient stay, unclear duration, pending improvement in renal function or institution of dialysis plans    Reji Avila MD

## 2021-12-23 NOTE — PROGRESS NOTES
Pt is A&O, VSS, pain is being managed with pain medicine per MAR. NIH is 1, no any neuro changes. All fall precaution is in place. Call light is within the reach.

## 2021-12-23 NOTE — PROGRESS NOTES
No acute events today. NIH remains stable. VS WDL. Pt SBA to BR and ambulating with steady gait. Call light is in reach. Chair alarm is engaged.

## 2021-12-23 NOTE — PROGRESS NOTES
Physician Progress Note      PATIENTBlease Left  CSN #:                  [id number]  :                       1960  ADMIT DATE:       2021 4:17 AM  DISCH DATE:  RESPONDING  PROVIDER #:        Keyla Long MD          QUERY TEXT:    Pt admitted with  and has encephalopathy documented. If possible, please   document in progress notes and discharge summary further specificity regarding   the type of encephalopathy:    The medical record reflects the following:  Risk Factors: 55 y/o with ANTONIO on CKD  Clinical Indicators:  PN: \"Presented with slurred speech. MRI ruled out   CVA; Slurred speech  Expressive aphasia secondary to CVA was preliminarily diagnosed. However,   patient's MRI of the brain is unremarkable. No stroke. Slurred speech most   likely was secondary to uremic encephalopathy caused by acute kidney injury. \"  Treatment: Lab monitoring, Renal consult , Bed alarm, safety checks, Gentle IV   hydration with IVF NS upto 1 L on   Options provided:  -- Acute Metabolic encephalopathy due to ANTONIO  -- Other - I will add my own diagnosis  -- Disagree - Not applicable / Not valid  -- Disagree - Clinically unable to determine / Unknown  -- Refer to Clinical Documentation Reviewer    PROVIDER RESPONSE TEXT:    This patient has Acute metabolic encephalopathy due to ANTONIO.     Query created by: Griselda Harvey on 2021 12:41 PM      Electronically signed by:  Keyla Long MD 2021 12:43 PM

## 2021-12-23 NOTE — PROGRESS NOTES
Initial Chief Complaint/Reason for Consult: \"stroke like symptoms, slurred speech\"     Interval History:  Patient reports feeling well today. I spoke to his wife and she states that while his speech is not as slurred as it was, his voice still sounds strained.      History of Present Illness:  Mary Woods is a 56 y. o. male who presents with acute onset of severe speech abnormalities noted by his wife at 2 AM. His symptoms have continued into today. Associated with this he felt imbalanced and dizzy (not vertiginous, however). He has never had this happen before.     He has hypertension and hyperlipidemia. He has borderline diabetes. He has no history of smoking. He is COVID-19 Vaccinated. Has CPAP for CELSA but it is broken. Review of Systems:   Constitutional- No weight loss or fevers   Eyes- No diplopia. No photophobia. Ears/nose/throat- No dysphagia. No Dysarthria   Cardiovascular- No palpitations. No chest pain   Respiratory- No dyspnea. No Cough   Gastrointestinal- No Abdominal pain. No Vomiting. Genitourinary- No incontinence. No urinary retention   Musculoskeletal- No myalgia. No arthralgia   Skin- No rash. No easy bruising. Psychiatric- No depression. No anxiety   Endocrine- No diabetes. No thyroid issues. Hematologic- No bleeding difficulty. No fatigue   Neurologic- No weakness. No Headache.     Current Medications:    Current Facility-Administered Medications:     calcium gluconate 1,000 mg in dextrose 5 % 100 mL IVPB, 1,000 mg, IntraVENous, Once, Jason Kwon MD    traMADol (ULTRAM) tablet 50 mg, 50 mg, Oral, Q6H PRN, Mckayla Velazquez MD, 50 mg at 12/23/21 0941    doxazosin (CARDURA) tablet 4 mg, 4 mg, Oral, Daily, Mary Kothari MD, 4 mg at 12/23/21 0934    pantoprazole (PROTONIX) tablet 40 mg, 40 mg, Oral, QAM AC, Mary Kothari MD, 40 mg at 12/23/21 0941    NIFEdipine (ADALAT CC) extended release tablet 90 mg, 90 mg, Oral, Daily, Mary Kothari MD, 90 mg at 12/23/21 0931   dysarthria  CN8: hearing symmetric to voice  CN9: palate elevated symmetrically  CN11: trap full strength on shoulder shrug  CN12: tongue midline with protrusion     Motor Exam:  No pronator drift, full strength in all extremities     Sensory: light touch intact and symmetric in all 4 extremities. No sensory extinction on double simultaneous stimulation  Cerebellar/coordination: finger nose finger normal without ataxia  Gait: deferred for safety     Images:   CT Head WO Contrast:  Impression       No acute intracranial abnormality.       Right mastoid disease.      MRA Head and Neck WO Contrast:  Impression   1. No evidence of flow limiting arterial occlusive disease in the neck. Study is degraded by patient motion.       MR angiography head findings:       Images are degraded by patient motion.       The anterior circulation demonstrates patency of the bilateral internal carotid arteries as well as the bilateral anterior and middle cerebral arteries.       Posterior circulation demonstrates patency of the distal vertebral arteries and the basilar artery as well as bilateral posterior cerebral arteries.       IMPRESSION:   1. Motion degraded images. No definite large vessel intracranial arterial occlusive disease detected.      MRI Brain WO Contrast:  Impression       1.  No acute intracranial abnormality. 2.  Mild chronic small vessel ischemic disease. 3.  Small right mastoid effusion representing retained secretions versus inflammatory debris.         Pertinent Labs:   A1c 6.6%  LDL: 129  Cr: 6.5        Assessment:  Jorden Woods is a 56 y. o. male who presented with acute onset of raspy voice, dysarthria, dizziness and imbalance most worrisome for medullary stroke. MR-A and echo unrevealing. MRI with no acute findings.  ENT consulted for hoarse/strained voice, found some mild vocal cord edema but no abnormal pathology to explain symptoms.     Plan:  - PT/OT/SLP  - Continue aspirin  - Continue statin  - Plan to repeat MRI Brain WO contrast for possibility of posterior circulation stroke that was DWI negative on first MRI  - Telemetry while inpatient, notify neurology of any afib  - Would recommend long term cardiac monitor at discharge        MIGUEL Hernandez-CNP  Neurology & Neurocritical Care   Neurology Line: [phone]  [employer]: [facility] NPs  12/23/21

## 2021-12-23 NOTE — PLAN OF CARE
Problem: Falls - Risk of:  Goal: Will remain free from falls  Description: Will remain free from falls  Outcome: Ongoing   Fall risk precaution in place. Bed is locked and in lowest position. 2/4 side rails are up. Call light with in reach. Fall risk bracelet in place, non slip socks on.frequent check on patient. free from falls at this time. will continue to monitor.        Problem: Pain:  Goal: Pain level will decrease  Description: Pain level will decrease  Outcome: Ongoing     Problem: HEMODYNAMIC STATUS  Goal: Patient has stable vital signs and fluid balance  Outcome: Ongoing     Problem: ACTIVITY INTOLERANCE/IMPAIRED MOBILITY  Goal: Mobility/activity is maintained at optimum level for patient  Outcome: Ongoing     Problem: COMMUNICATION IMPAIRMENT  Goal: Ability to express needs and understand communication  Outcome: Ongoing     Problem: OXYGENATION/RESPIRATORY FUNCTION  Goal: Patient will maintain patent airway  Outcome: Ongoing     Problem: FLUID AND ELECTROLYTE IMBALANCE  Goal: Fluid and electrolyte balance are achieved/maintained  Outcome: Ongoing

## 2021-12-23 NOTE — TELEPHONE ENCOUNTER
Patient's wife called. She said she spoke w/the  and she would like the order sent to [facility]. I faxed the order and related documents.

## 2021-12-23 NOTE — PROGRESS NOTES
GASTROINTESTINAL ENDOSCOPY  5/28/16    biopsies, multiple small gastric ulcers    UPPER GASTROINTESTINAL ENDOSCOPY  09/12/2016       Family History   Problem Relation Age of Onset    Hypertension Other     Breast Cancer Mother     Colon Cancer Father     Diabetes Maternal Grandmother     Coronary Art Dis Brother         reports that he has never smoked. He has never used smokeless tobacco. He reports current alcohol use. He reports that he does not use drugs. Medications: Allergies:  Patient has no known allergies.     Scheduled Meds:   doxazosin  4 mg Oral Daily    pantoprazole  40 mg Oral QAM AC    NIFEdipine  90 mg Oral Daily    aspirin  81 mg Oral Daily    Or    aspirin  300 mg Rectal Daily    atorvastatin  80 mg Oral Nightly    heparin (porcine)  5,000 Units SubCUTAneous 3 times per day    allopurinol  100 mg Oral Daily    metoprolol succinate  100 mg Oral Daily     Continuous Infusions:      Labs:  CBC:   Recent Labs     12/21/21 0449 12/22/21  0726 12/23/21  0631   WBC 5.0 3.9* 4.3   HGB 10.9* 10.6* 10.0*    157 146     Ca/Mg/Phos:   Recent Labs     12/21/21 0449 12/22/21  0726 12/23/21  0631   CALCIUM 5.5* 5.9* 5.6*   MG 1.30*  --  1.40*   PHOS 4.7  --   --      UA:  Recent Labs     12/21/21  1834   COLORU Yellow   CLARITYU Clear   GLUCOSEU Negative   BILIRUBINUR Negative   KETUA Negative   SPECGRAV 1.020   BLOODU MODERATE*   PHUR 6.5  6.5   PROTEINU >=300*   UROBILINOGEN 0.2   NITRU Negative   LEUKOCYTESUR Negative   LABMICR YES   URINETYPE NotGiven      Urine Microscopic:   Recent Labs     12/21/21  1834   BACTERIA Rare*   WBCUA None seen   RBCUA 3-4     Urine Chemistry:   Recent Labs     12/21/21  1834 12/22/21  0816 12/22/21  1035   LABCREA 117.5 88.8  --    PROTEINUR  --  463.00*  --    NAUR 59  --  75         ROS:     All systems reviewed and negative except as in HPI    Objective:     Vitals: /64   Pulse 87   Temp 97.6 °F (36.4 °C) (Oral)   Resp 16   Ht 6' 5\" (1.956 m)   Wt 298 lb 8 oz (135.4 kg)   SpO2 96%   BMI 35.40 kg/m²    Wt Readings from Last 3 Encounters:   12/21/21 298 lb 8 oz (135.4 kg)   11/30/21 283 lb 3.2 oz (128.5 kg)   11/30/21 281 lb 12.8 oz (127.8 kg)      24HR INTAKE/OUTPUT:      Intake/Output Summary (Last 24 hours) at 12/23/2021 1544  Last data filed at 12/23/2021 1014  Gross per 24 hour   Intake 822 ml   Output --   Net 822 ml     Constitutional:  awake, NAD  HEENT:  MMM, No icterus  Neck: no bruits, No JVD  Cardiovascular:  reg rhythm  Respiratory: CTA, no crackles  Abdomen:  +BS, soft, NT, ND  Ext: trace lower extremity edema  Psychiatric: mood and affect appropriate  Skin: dry/intact  CNS: alert, no agitation    IMAGING:  MRI brain without contrast   Final Result      1. No acute intracranial abnormality. 2.  Mild chronic small vessel ischemic disease. 3.  Small right mastoid effusion representing retained secretions versus inflammatory debris. MRA NECK WO CONTRAST   Final Result   1. No evidence of flow limiting arterial occlusive disease in the neck. Study is degraded by patient motion. MR angiography head findings:      Images are degraded by patient motion. The anterior circulation demonstrates patency of the bilateral internal carotid arteries as well as the bilateral anterior and middle cerebral arteries. Posterior circulation demonstrates patency of the distal vertebral arteries and the basilar artery as well as bilateral posterior cerebral arteries. IMPRESSION:   1. Motion degraded images. No definite large vessel intracranial arterial occlusive disease detected. MRA HEAD WO CONTRAST   Final Result   1. No evidence of flow limiting arterial occlusive disease in the neck. Study is degraded by patient motion. MR angiography head findings:      Images are degraded by patient motion.       The anterior circulation demonstrates patency of the bilateral internal carotid arteries as well as the bilateral anterior and middle cerebral arteries. Posterior circulation demonstrates patency of the distal vertebral arteries and the basilar artery as well as bilateral posterior cerebral arteries. IMPRESSION:   1. Motion degraded images. No definite large vessel intracranial arterial occlusive disease detected. US RENAL COMPLETE   Final Result   Impression:   1. Markedly increased renal echogenicity consistent with medical renal disease. 2.  No hydronephrosis. XR CHEST PORTABLE   Final Result      Cardiomegaly. Clear lungs. CT Head WO Contrast   Final Result      No acute intracranial abnormality. Right mastoid disease. MRI BRAIN WO CONTRAST    (Results Pending)       Assessment :     1. ANTONIO on CKD4  -Non-Oliguric  -Baseline creat: 2.9-3.3 Now 6.4  -UA: prot +++  -renal US : no hydro  -Volume: Hypervolemic   -Electrolytes: Hypokalemia and Hypomagnesemia  -Acid-Base: respiratory acidosis, AGMA and Metabolic alkalosis    Recent Labs     12/21/21  0449 12/22/21  0726 12/23/21  0631   BUN 45* 42* 49*   CREATININE 6.4* 5.8* 6.5*     Recent Labs     12/21/21  0449 12/22/21  0726 12/23/21  0631    140 140   K 3.4* 3.6 3.5   CO2 25 26 22   MG 1.30*  --  1.40*         2. HTN  -Blood pressure high    BP Readings from Last 1 Encounters:   12/23/21 115/64       3. DM    4. CHF  - TTE (2021): LVEF 45-50%, G1DD    Plan:     - ANTONIO on CKD  : seems progression of underlying CKD      - Gentle IV hydration with IVF NS upto 1 L on 12/22  - optimize BP meds  - UPC  - dec Allopurinol  - diuretics based on vol status  - replace K, Mg  - 25 Vit D  - no absolute indication of RRT tonight, but pt likely has CKD progression  - Monitor BMP    Replace Mg iv    -Monitor I/O, UOP  -Maintain MAP>65  -Avoid nephrotoxin, if able. -Dose meds to current eGFR    Thank you for allowing us to participate in care of [address] [place] [address] . We will continue to follow.   Feel free to contact me with any questions.       Dana Williamson MD  12/23/2021    [facility]  [address]  Office : [phone]  Fax :[phone]

## 2021-12-23 NOTE — PROGRESS NOTES
Pt had explosive diarrhea earlier this shift x2. MD updated. Waiting to send c-diff culture with next BM. Pt's wife would also appreciate assistance with obtaining a home CPAP machine for . VSS Will continue with current plan of care.

## 2021-12-23 NOTE — CARE COORDINATION
SW received a phone call from patient's from wife. Patient has a broken or non-work [employer] machine at home (They were recalled in April). She was asking for help. On getting a CPAP. [facility] was called they have plenty of CPAP machines in stock and if proper testing and order submitted from patient's provider they can assist.      Dr. Rosalino Larios is Pulmonologist.     SW placed call to wife and provided update. She will reach out to Dr. Siddiqi Resides office today.      Florentino Poole, MSW, LSW  [facility] Work   Ph: [phone]

## 2021-12-23 NOTE — CONSULTS
BARRON RIVAS M.D. Consult Note      CHIEF COMPLAINT: Hoarseness    HISTORY OF PRESENT ILLNESS:      The patient is a 56 y.o. male who presented to the emergency department with some slurred speech. He was admitted for evaluation of a CVA without any evidence of this on imaging of his head neck. He was incidentally found to be in worsening renal failure. There was a concern that the patient's voice had changed although the patient states that he thinks his voice has been all right. He has no upper airway concerns that he swallows well. Current Medications:   Current Facility-Administered Medications: calcium gluconate 1,000 mg in dextrose 5 % 100 mL IVPB, 1,000 mg, IntraVENous, Once  magnesium sulfate 2000 mg in 50 mL IVPB premix, 2,000 mg, IntraVENous, Once  traMADol (ULTRAM) tablet 50 mg, 50 mg, Oral, Q6H PRN  doxazosin (CARDURA) tablet 4 mg, 4 mg, Oral, Daily  pantoprazole (PROTONIX) tablet 40 mg, 40 mg, Oral, QAM AC  NIFEdipine (ADALAT CC) extended release tablet 90 mg, 90 mg, Oral, Daily  ondansetron (ZOFRAN-ODT) disintegrating tablet 4 mg, 4 mg, Oral, Q8H PRN **OR** ondansetron (ZOFRAN) injection 4 mg, 4 mg, IntraVENous, Q6H PRN  polyethylene glycol (GLYCOLAX) packet 17 g, 17 g, Oral, Daily PRN  aspirin EC tablet 81 mg, 81 mg, Oral, Daily **OR** aspirin suppository 300 mg, 300 mg, Rectal, Daily  acetaminophen (TYLENOL) tablet 650 mg, 650 mg, Oral, Q4H PRN  atorvastatin (LIPITOR) tablet 80 mg, 80 mg, Oral, Nightly  heparin (porcine) injection 5,000 Units, 5,000 Units, SubCUTAneous, 3 times per day  allopurinol (ZYLOPRIM) tablet 100 mg, 100 mg, Oral, Daily  metoprolol succinate (TOPROL XL) extended release tablet 100 mg, 100 mg, Oral, Daily  Allergies:  Patient has no known allergies.     Social History     Tobacco Use   Smoking Status Never Smoker   Smokeless Tobacco Never Used     Social History     Substance and Sexual Activity   Alcohol Use Yes    Comment: drinks etoh on weekends- a 12 pk or so       Current Facility-Administered Medications:     calcium gluconate 1,000 mg in dextrose 5 % 100 mL IVPB, 1,000 mg, IntraVENous, Once, Stoney Hensley MD    magnesium sulfate 2000 mg in 50 mL IVPB premix, 2,000 mg, IntraVENous, Once, Feng Moreno MD    traMADol (ULTRAM) tablet 50 mg, 50 mg, Oral, Q6H PRN, Viji Burroughs MD, 50 mg at 12/23/21 0941    doxazosin (CARDURA) tablet 4 mg, 4 mg, Oral, Daily, Melonie Garcia MD, 4 mg at 12/23/21 0934    pantoprazole (PROTONIX) tablet 40 mg, 40 mg, Oral, QAM AC, Melonie Garcia MD, 40 mg at 12/23/21 0941    NIFEdipine (ADALAT CC) extended release tablet 90 mg, 90 mg, Oral, Daily, Melonie Garcia MD, 90 mg at 12/23/21 0931    ondansetron (ZOFRAN-ODT) disintegrating tablet 4 mg, 4 mg, Oral, Q8H PRN **OR** ondansetron (ZOFRAN) injection 4 mg, 4 mg, IntraVENous, Q6H PRN, Melonie Garcia MD    polyethylene glycol [facility]) packet 17 g, 17 g, Oral, Daily PRN, Melonie Garcia MD    aspirin EC tablet 81 mg, 81 mg, Oral, Daily, 81 mg at 12/23/21 0934 **OR** aspirin suppository 300 mg, 300 mg, Rectal, Daily, Melonie Garcia MD    acetaminophen (TYLENOL) tablet 650 mg, 650 mg, Oral, Q4H PRN, Melonie Garcia MD, 650 mg at 12/22/21 0163    atorvastatin (LIPITOR) tablet 80 mg, 80 mg, Oral, Nightly, Melonie Garcia MD, 80 mg at 12/22/21 2044    heparin (porcine) injection 5,000 Units, 5,000 Units, SubCUTAneous, 3 times per day, Melonie Garcia MD, 5,000 Units at 12/23/21 0934    allopurinol (ZYLOPRIM) tablet 100 mg, 100 mg, Oral, Daily, Scotty Pugh MD, 100 mg at 12/23/21 2041    metoprolol succinate (TOPROL XL) extended release tablet 100 mg, 100 mg, Oral, Daily, Scotty Pugh MD, 100 mg at 12/23/21 1017  Past Medical History:   Diagnosis Date    Arthralgia of multiple joints 10/27/2015    Arthritis     Atrial fibrillation (HCC)     Chronic kidney disease     stage 4    Chronic systolic congestive heart failure ([facility] [address] 8/16/2021    CRI (chronic renal insufficiency)     Diabetic nephropathy associated with type 2 diabetes mellitus ([address] 10/11/2018    Diverticulosis     Elevated PSA     Erectile dysfunction     Gout     Gout     Hemrrhoid NOS w/ complication NEC     History of MRSA infection     Hypertension     CELSA (obstructive sleep apnea) 07/14/2017    uses C-pap machine    Type 2 diabetes mellitus without complication, without long-term current use of insulin ([facility] [address] 3/90/6412    Umbilical hernia without obstruction and without gangrene 2/2/2016     Past Surgical History:   Procedure Laterality Date    COLONOSCOPY      DILATATION, ESOPHAGUS      HEMICOLECTOMY      UPPER GASTROINTESTINAL ENDOSCOPY  5/28/16    biopsies, multiple small gastric ulcers    UPPER GASTROINTESTINAL ENDOSCOPY  09/12/2016       FAMILY HISTORY:  Reviewed. No significant family history. REVIEW OF SYSTEMS: All pertinent positive and negative findings have been reviewed in HPI. Otherwise, all all systems are reviewed and are negative. PHYSICAL EXAM:    VITALS:  BP (!) 157/88   Pulse 86   Temp 98.5 °F (36.9 °C) (Oral)   Resp 16   Ht 6' 5\" (1.956 m)   Wt 298 lb 8 oz (135.4 kg)   SpO2 96%   BMI 35.40 kg/m²   WELL DEVELOPED WELL NOURISHED. NO PERIPHERAL EDEMA. NO ACUTE RESPIRATORY DISTRESS. ORIENTED X 3.  VOICE: Normal  HEARING BY CONFRONTATION IS NORMAL. HEAD AND FACE: Normal  EXTERNAL EARS AND NOSE : Normal  EXTRAOCULAR MUSCLES:  Intact  FACIAL MUSCLES: Normal strength  FACE PALPATION: Normal  OTOSCOPY: Normal tympanic membranes and middle ears  INTRANASAL: Septum midline. meati clear. Turbinates normal.  LIPS, TEETH, GINGIVA: Normal  BUCCAL MUCOSA: Normal  PHARYNX: Normal  NECK: No masses or adenopathy  SALIVARY GLANDS: Normal  THYROID: Normal  NASOPHARYNX, HYPOPHARYNX, LARYNX: As the patient has symptoms suggestive of disease in the larynx or hypopharynx, fiberoptic laryngoscopy is performed.   FIBEROPTIC LARYNGOSCOPY:  Nares topically anaesthetized with lidocaine spray. Fiberoptic scope passed per naris into nasopharynx and hypopharyrnx and larynx visualized. Normal tongue base                                                          Normal epiglottis                                                          Edema of the true vocal cords. Normal pyriform sinuses  IMPRESSION: No pathology of the vocal cords, specifically vocal cord mobility is good on both sides. He has no lesions of the cords. He has mild vocal cord edema. No ENT intervention is indicated. RECOMMENDATIONS: We will be available for follow-up as needed.

## 2021-12-24 VITALS
DIASTOLIC BLOOD PRESSURE: 83 MMHG | HEART RATE: 83 BPM | RESPIRATION RATE: 16 BRPM | SYSTOLIC BLOOD PRESSURE: 158 MMHG | WEIGHT: 298.5 LBS | BODY MASS INDEX: 35.25 KG/M2 | HEIGHT: 77 IN | OXYGEN SATURATION: 98 % | TEMPERATURE: 97.7 F

## 2021-12-24 LAB
ANION GAP SERPL CALCULATED.3IONS-SCNC: 14 MMOL/L (ref 3–16)
BASOPHILS ABSOLUTE: 0 K/UL (ref 0–0.2)
BASOPHILS RELATIVE PERCENT: 0.6 %
BUN BLDV-MCNC: 45 MG/DL (ref 7–20)
CALCIUM SERPL-MCNC: 6.1 MG/DL (ref 8.3–10.6)
CHLORIDE BLD-SCNC: 101 MMOL/L (ref 99–110)
CO2: 23 MMOL/L (ref 21–32)
CREAT SERPL-MCNC: 6 MG/DL (ref 0.8–1.3)
EOSINOPHILS ABSOLUTE: 0.1 K/UL (ref 0–0.6)
EOSINOPHILS RELATIVE PERCENT: 3.1 %
GFR AFRICAN AMERICAN: 12
GFR NON-AFRICAN AMERICAN: 10
GLUCOSE BLD-MCNC: 94 MG/DL (ref 70–99)
HCT VFR BLD CALC: 30.2 % (ref 40.5–52.5)
HEMOGLOBIN: 10 G/DL (ref 13.5–17.5)
LYMPHOCYTES ABSOLUTE: 1.4 K/UL (ref 1–5.1)
LYMPHOCYTES RELATIVE PERCENT: 32.2 %
MAGNESIUM: 1.6 MG/DL (ref 1.8–2.4)
MCH RBC QN AUTO: 28.5 PG (ref 26–34)
MCHC RBC AUTO-ENTMCNC: 33 G/DL (ref 31–36)
MCV RBC AUTO: 86.2 FL (ref 80–100)
MONOCYTES ABSOLUTE: 0.4 K/UL (ref 0–1.3)
MONOCYTES RELATIVE PERCENT: 9 %
NEUTROPHILS ABSOLUTE: 2.3 K/UL (ref 1.7–7.7)
NEUTROPHILS RELATIVE PERCENT: 55.1 %
PDW BLD-RTO: 13.3 % (ref 12.4–15.4)
PLATELET # BLD: 147 K/UL (ref 135–450)
PMV BLD AUTO: 8.6 FL (ref 5–10.5)
POTASSIUM REFLEX MAGNESIUM: 3.5 MMOL/L (ref 3.5–5.1)
RBC # BLD: 3.51 M/UL (ref 4.2–5.9)
SODIUM BLD-SCNC: 138 MMOL/L (ref 136–145)
WBC # BLD: 4.3 K/UL (ref 4–11)

## 2021-12-24 PROCEDURE — 6370000000 HC RX 637 (ALT 250 FOR IP): Performed by: INTERNAL MEDICINE

## 2021-12-24 PROCEDURE — 6360000002 HC RX W HCPCS: Performed by: INTERNAL MEDICINE

## 2021-12-24 PROCEDURE — 6360000002 HC RX W HCPCS: Performed by: HOSPITALIST

## 2021-12-24 PROCEDURE — 99232 SBSQ HOSP IP/OBS MODERATE 35: CPT | Performed by: HOSPITALIST

## 2021-12-24 PROCEDURE — 94660 CPAP INITIATION&MGMT: CPT

## 2021-12-24 PROCEDURE — 83735 ASSAY OF MAGNESIUM: CPT

## 2021-12-24 PROCEDURE — 80048 BASIC METABOLIC PNL TOTAL CA: CPT

## 2021-12-24 PROCEDURE — 6370000000 HC RX 637 (ALT 250 FOR IP): Performed by: HOSPITALIST

## 2021-12-24 PROCEDURE — 85025 COMPLETE CBC W/AUTO DIFF WBC: CPT

## 2021-12-24 PROCEDURE — 36415 COLL VENOUS BLD VENIPUNCTURE: CPT

## 2021-12-24 RX ORDER — MAGNESIUM SULFATE IN WATER 40 MG/ML
2000 INJECTION, SOLUTION INTRAVENOUS ONCE
Status: COMPLETED | OUTPATIENT
Start: 2021-12-24 | End: 2021-12-24

## 2021-12-24 RX ADMIN — ASPIRIN 81 MG: 81 TABLET, COATED ORAL at 08:40

## 2021-12-24 RX ADMIN — TRAMADOL HYDROCHLORIDE 50 MG: 50 TABLET, COATED ORAL at 09:19

## 2021-12-24 RX ADMIN — PANTOPRAZOLE SODIUM 40 MG: 40 TABLET, DELAYED RELEASE ORAL at 08:39

## 2021-12-24 RX ADMIN — DOXAZOSIN 4 MG: 4 TABLET ORAL at 08:39

## 2021-12-24 RX ADMIN — HEPARIN SODIUM 5000 UNITS: 5000 INJECTION INTRAVENOUS; SUBCUTANEOUS at 03:16

## 2021-12-24 RX ADMIN — NIFEDIPINE 90 MG: 30 TABLET, EXTENDED RELEASE ORAL at 08:40

## 2021-12-24 RX ADMIN — HEPARIN SODIUM 5000 UNITS: 5000 INJECTION INTRAVENOUS; SUBCUTANEOUS at 08:40

## 2021-12-24 RX ADMIN — MAGNESIUM SULFATE HEPTAHYDRATE 2000 MG: 2 INJECTION, SOLUTION INTRAVENOUS at 09:48

## 2021-12-24 RX ADMIN — ALLOPURINOL 100 MG: 100 TABLET ORAL at 08:40

## 2021-12-24 RX ADMIN — METOPROLOL SUCCINATE 100 MG: 100 TABLET, EXTENDED RELEASE ORAL at 08:39

## 2021-12-24 ASSESSMENT — PAIN DESCRIPTION - FREQUENCY: FREQUENCY: INTERMITTENT

## 2021-12-24 ASSESSMENT — PAIN DESCRIPTION - DESCRIPTORS: DESCRIPTORS: ACHING;DISCOMFORT;BURNING

## 2021-12-24 ASSESSMENT — PAIN DESCRIPTION - ONSET: ONSET: ON-GOING

## 2021-12-24 ASSESSMENT — PAIN DESCRIPTION - ORIENTATION: ORIENTATION: LEFT

## 2021-12-24 ASSESSMENT — PAIN SCALES - GENERAL
PAINLEVEL_OUTOF10: 5
PAINLEVEL_OUTOF10: 3

## 2021-12-24 ASSESSMENT — PAIN DESCRIPTION - LOCATION: LOCATION: FOOT

## 2021-12-24 ASSESSMENT — PAIN - FUNCTIONAL ASSESSMENT: PAIN_FUNCTIONAL_ASSESSMENT: ACTIVITIES ARE NOT PREVENTED

## 2021-12-24 ASSESSMENT — PAIN DESCRIPTION - PAIN TYPE: TYPE: ACUTE PAIN

## 2021-12-24 ASSESSMENT — PAIN DESCRIPTION - PROGRESSION: CLINICAL_PROGRESSION: GRADUALLY WORSENING

## 2021-12-24 NOTE — CONSULTS
Clinical Pharmacy Progress Note  Medication History     Admit Date: 12/21    Asked to verify home medications by Dr. Shurki Isaac. List of of current medications patient is taking is complete. Home Medication list in EPIC updated to reflect changes noted below. Source of information: RN med rec with pt and wife, Mikki Bonilla history, PCP notes    Changes made to medication list:   · RN completed med rec with patient and wife. Med rec completed matches patient's fill history and PCP notes exactly. No changes made to med rec. Complete Home Medication List:  Current Outpatient Medications   Medication Instructions    allopurinol (ZYLOPRIM) 300 MG tablet TAKE 1 TABLET BY MOUTH EVERY DAY    aspirin 81 mg, Oral, DAILY    atorvastatin (LIPITOR) 20 MG tablet TAKE 1 TABLET BY MOUTH EVERY DAY    colchicine (COLCRYS) 0.6 mg, Oral, DAILY PRN    metoprolol succinate (TOPROL XL) 50 MG extended release tablet TAKE 1 TABLET BY MOUTH EVERY DAY    NIFEdipine (ADALAT CC) 90 MG extended release tablet TAKE 1 TABLET BY MOUTH EVERY DAY    omeprazole (PRILOSEC) 40 MG delayed release capsule TAKE 1 CAPSULE BY MOUTH EVERY DAY    oxyCODONE-acetaminophen (PERCOCET) 5-325 MG per tablet 1 tablet, Oral, EVERY 8 HOURS PRN    sildenafil (VIAGRA) 100 mg, Oral, PRN    terazosin (HYTRIN) 5 mg, Oral, NIGHTLY, TAKE 1 CAPSULE BY MOUTH EVERY EVENING     Please call with questions!     Yony Kaur, PharmD  Main Pharmacy: [id number]  12/24/2021 11:08 AM

## 2021-12-24 NOTE — DISCHARGE SUMMARY
Discharge Summary    Date:12/24/2021        Patient Name:Jorden Woods     YOB: 1960     Age:61 y.o. Admit Date:12/21/2021   Admission Condition:poor   Discharged Condition:good  Discharge Date: 12/24/21    Discharge Diagnoses   Principal Problem:    Acute renal failure superimposed on stage 3 chronic kidney disease (HCC)  Active Problems:    Atrial fibrillation (HCC)    Essential hypertension, benign    Morbid obesity due to excess calories (HCC)    Chronic systolic congestive heart failure (HCC)    Stroke-like symptoms    DMII (diabetes mellitus, type 2) (HCC)    Hyperlipidemia    Hypocalcemia    Hypomagnesemia    Hypokalemia    Muscle cramps  Resolved Problems:    * No resolved hospital problems. [facility] Stay   Narrative of Hospital Course:     Acute renal failure on CKD stage III  Creatinine 6.4 on admission, 6.0 today. Nephrology following  Discussed with the nephrology, they are okay with the discharge with outpatient follow-up.       Slurred speech  Expressive aphasia secondary to CVA was preliminarily diagnosed. However, patient's MRI of the brain is unremarkable. No stroke. Slurred speech most likely was secondary to acute metabolic encephalopathy caused by acute kidney injury. Mental status better today. Also noted some hoarseness, evaluated by ENT and everything was found unremarkable.     Dyslipidemia  Continue statin at home dose. No muscle pains or aches. Continue same     Hypertension  Acceptable blood pressure. Hypotension would be more worrisome than current mild elevation of the blood pressure.     Chronic systolic congestive heart failure  Discussed with nephrology, they have suggested Lasix as needed, discussed with the patient, he has a prescription of Lasix with him. On dc Patient denies any CP,SOB,Denies any nausea, vomiting, abdominal pain. Denies any diarrhea. Patient denies any palpitations, lightheadedness, orthopnea, PND.   Patient doesn't appear to be in any distress on discharge . Physical exam   Vitals:    12/24/21 0317 12/24/21 0634 12/24/21 0835 12/24/21 1032   BP: (!) 152/98 (!) 159/100 (!) 167/98 (!) 158/83   Pulse: 80 79 88 83   Resp: 16 18  16   Temp: 97.8 °F (36.6 °C) 97.8 °F (36.6 °C)  97.7 °F (36.5 °C)   TempSrc: Oral Oral  Oral   SpO2:  99%  98%   Weight:       Height:               Physical Exam  Constitutional:       Appearance: Normal appearance. HENT:      Head: Normocephalic and atraumatic. Cardiovascular:      Rate and Rhythm: Normal rate and regular rhythm. Pulses: Normal pulses. Heart sounds: Normal heart sounds. Pulmonary:      Effort: Pulmonary effort is normal.      Breath sounds: Normal breath sounds. Abdominal:      General: Abdomen is flat. Palpations: Abdomen is soft. Musculoskeletal:         General: Normal range of motion. Skin:     General: Skin is warm and dry. Capillary Refill: Capillary refill takes less than 2 seconds. Neurological:      General: No focal deficit present. Mental Status: He is alert and oriented to person, place, and time. Psychiatric:         Mood and Affect: Mood normal.         Behavior: Behavior normal.           Consultants:   IP CONSULT TO HOSPITALIST  IP CONSULT TO NEPHROLOGY  IP CONSULT TO NEUROLOGY  IP CONSULT TO RESPIRATORY CARE  IP CONSULT TO OTOLARYNGOLOGY  IP CONSULT TO PHARMACY    Time Spent on Discharge:  45 minutes were spent in patient examination, evaluation, counseling as well as medication reconciliation, prescriptions for required medications, discharge plan and follow up.       Surgeries/Procedures Performed:   none        Significant Diagnostic Studies:   Recent Labs:  CBC:   Recent Labs     12/22/21  0726 12/23/21  0631 12/24/21  0607   WBC 3.9* 4.3 4.3   HGB 10.6* 10.0* 10.0*   HCT 31.3* 30.0* 30.2*   MCV 86.0 86.3 86.2    146 147     BMP:   Recent Labs     12/22/21  0726 12/23/21  0631 12/24/21  0607    140 138   K 3.6 3.5 3.5 middle cerebral arteries. Posterior circulation demonstrates patency of the distal vertebral arteries and the basilar artery as well as bilateral posterior cerebral arteries. IMPRESSION:   1. Motion degraded images. No definite large vessel intracranial arterial occlusive disease detected. US RENAL COMPLETE   Final Result   Impression:   1. Markedly increased renal echogenicity consistent with medical renal disease. 2.  No hydronephrosis. XR CHEST PORTABLE   Final Result      Cardiomegaly. Clear lungs. CT Head WO Contrast   Final Result      No acute intracranial abnormality. Right mastoid disease. Discharge Plan   Disposition: Home    Provider Follow-Up:   Franco Redmond MD  [address] [place] [address]  [phone]    Schedule an appointment as soon as possible for a visit  follow up        Hospital/Incidental Findings Requiring Follow-Up:  MRI BRAIN WO CONTRAST   Final Result      1. No acute intracranial abnormality. MRI brain without contrast   Final Result      1. No acute intracranial abnormality. 2.  Mild chronic small vessel ischemic disease. 3.  Small right mastoid effusion representing retained secretions versus inflammatory debris. MRA NECK WO CONTRAST   Final Result   1. No evidence of flow limiting arterial occlusive disease in the neck. Study is degraded by patient motion. MR angiography head findings:      Images are degraded by patient motion. The anterior circulation demonstrates patency of the bilateral internal carotid arteries as well as the bilateral anterior and middle cerebral arteries. Posterior circulation demonstrates patency of the distal vertebral arteries and the basilar artery as well as bilateral posterior cerebral arteries. IMPRESSION:   1. Motion degraded images. No definite large vessel intracranial arterial occlusive disease detected.       MRA HEAD WO CONTRAST Final Result   1. No evidence of flow limiting arterial occlusive disease in the neck. Study is degraded by patient motion. MR angiography head findings:      Images are degraded by patient motion. The anterior circulation demonstrates patency of the bilateral internal carotid arteries as well as the bilateral anterior and middle cerebral arteries. Posterior circulation demonstrates patency of the distal vertebral arteries and the basilar artery as well as bilateral posterior cerebral arteries. IMPRESSION:   1. Motion degraded images. No definite large vessel intracranial arterial occlusive disease detected. US RENAL COMPLETE   Final Result   Impression:   1. Markedly increased renal echogenicity consistent with medical renal disease. 2.  No hydronephrosis. XR CHEST PORTABLE   Final Result      Cardiomegaly. Clear lungs. CT Head WO Contrast   Final Result      No acute intracranial abnormality. Right mastoid disease. Discharge Medications         Medication List      CONTINUE taking these medications    allopurinol 300 MG tablet  Commonly known as: ZYLOPRIM  TAKE 1 TABLET BY MOUTH EVERY DAY     aspirin 81 MG tablet     atorvastatin 20 MG tablet  Commonly known as: LIPITOR  TAKE 1 TABLET BY MOUTH EVERY DAY     colchicine 0.6 MG tablet  Commonly known as: COLCRYS     metoprolol succinate 50 MG extended release tablet  Commonly known as: TOPROL XL  TAKE 1 TABLET BY MOUTH EVERY DAY     NIFEdipine 90 MG extended release tablet  Commonly known as: ADALAT CC  TAKE 1 TABLET BY MOUTH EVERY DAY     omeprazole 40 MG delayed release capsule  Commonly known as: PRILOSEC  TAKE 1 CAPSULE BY MOUTH EVERY DAY     oxyCODONE-acetaminophen 5-325 MG per tablet  Commonly known as: PERCOCET  Take 1 tablet by mouth every 8 hours as needed for Pain for up to 30 days.      sildenafil 100 MG tablet  Commonly known as: Viagra  Take 1 tablet by mouth as needed for Erectile Dysfunction     terazosin 5 MG capsule  Commonly known as: HYTRIN  Take 1 capsule by mouth nightly TAKE 1 CAPSULE BY MOUTH EVERY EVENING            Electronically signed by Fleta Claude, MD on 12/24/21 at 1:31 PM EST

## 2021-12-24 NOTE — PROGRESS NOTES
Initial Chief Complaint/Reason for Consult: \"stroke like symptoms, slurred speech\"     Interval History:  Patient reports feeling well today. I spoke to his wife and she states that while his speech is not as slurred as it was, his voice still sounds strained.      History of Present Illness:  Lenward Curling Price is a 56 y. o. male who presents with acute onset of severe speech abnormalities noted by his wife at 2 AM. His symptoms have continued into today. Associated with this he felt imbalanced and dizzy (not vertiginous, however). He has never had this happen before.     He has hypertension and hyperlipidemia. He has borderline diabetes. He has no history of smoking. He is COVID-19 Vaccinated. Has CPAP for CELSA but it is broken.       Current Medications:    Current Facility-Administered Medications:     magnesium sulfate 2000 mg in 50 mL IVPB premix, 2,000 mg, IntraVENous, Once, Jessica Lester MD, Last Rate: 25 mL/hr at 12/24/21 0948, 2,000 mg at 12/24/21 0948    traMADol (ULTRAM) tablet 50 mg, 50 mg, Oral, Q6H PRN, Emilie Yusuf MD, 50 mg at 12/24/21 0919    doxazosin (CARDURA) tablet 4 mg, 4 mg, Oral, Daily, Lorena Sims MD, 4 mg at 12/24/21 0839    pantoprazole (PROTONIX) tablet 40 mg, 40 mg, Oral, QAM AC, Lorena Sims MD, 40 mg at 12/24/21 0839    NIFEdipine (ADALAT CC) extended release tablet 90 mg, 90 mg, Oral, Daily, Lorena Sims MD, 90 mg at 12/24/21 0840    ondansetron (ZOFRAN-ODT) disintegrating tablet 4 mg, 4 mg, Oral, Q8H PRN **OR** ondansetron (ZOFRAN) injection 4 mg, 4 mg, IntraVENous, Q6H PRN, Lorena Sims MD    polyethylene glycol [facility]) packet 17 g, 17 g, Oral, Daily PRN, Lorena Sims MD    aspirin EC tablet 81 mg, 81 mg, Oral, Daily, 81 mg at 12/24/21 0840 **OR** aspirin suppository 300 mg, 300 mg, Rectal, Daily, Lorena Sims MD    acetaminophen (TYLENOL) tablet 650 mg, 650 mg, Oral, Q4H PRN, Lorena Sims MD, 650 mg at 12/22/21 0923    atorvastatin (LIPITOR) tablet 80 mg, 80 mg, Oral, Nightly, Ronny Mitchell MD, 80 mg at 12/23/21 2009    heparin (porcine) injection 5,000 Units, 5,000 Units, SubCUTAneous, 3 times per day, Ronny Mitchell MD, 5,000 Units at 12/24/21 0840    allopurinol (ZYLOPRIM) tablet 100 mg, 100 mg, Oral, Daily, Jessica Padilla MD, 100 mg at 12/24/21 0840    metoprolol succinate (TOPROL XL) extended release tablet 100 mg, 100 mg, Oral, Daily, Jessica Padilla MD, 100 mg at 12/24/21 8756         Images:   MRI brain, 12/23/2021:  No acute intracranial abnormality. CT Head WO Contrast:  Impression       No acute intracranial abnormality.       Right mastoid disease.      MRA Head and Neck WO Contrast:  Impression   1. No evidence of flow limiting arterial occlusive disease in the neck. Study is degraded by patient motion.       MR angiography head findings:       Images are degraded by patient motion.       The anterior circulation demonstrates patency of the bilateral internal carotid arteries as well as the bilateral anterior and middle cerebral arteries.       Posterior circulation demonstrates patency of the distal vertebral arteries and the basilar artery as well as bilateral posterior cerebral arteries.       IMPRESSION:   1. Motion degraded images. No definite large vessel intracranial arterial occlusive disease detected.      MRI Brain WO Contrast:  Impression       1.  No acute intracranial abnormality. 2.  Mild chronic small vessel ischemic disease. 3.  Small right mastoid effusion representing retained secretions versus inflammatory debris.         Pertinent Labs:   A1c 6.6%  LDL: 129  Cr: 6.5        Assessment:  Jorden Woods is a 56 y. o. male who presented with acute onset of raspy voice, dysarthria, dizziness and imbalance most worrisome for medullary stroke. MR-A and echo unrevealing. MRI x2 with no acute findings.  ENT consulted for hoarse/strained voice, found some mild vocal cord edema but no abnormal pathology to explain symptoms. Suspect it was secondary to an acute metabolic encephalopathy and ANTONIO.     Plan:  - PT/OT/SLP  - Continue aspirin and statin   - No further inpatient neurologic workup, we will sign off      MIGUEL Mora-CNP  Neurology & Neurocritical Care   Neurology Line: [phone]  [employer]: [facility] Neurology & Neuro Critical Care NPs

## 2021-12-24 NOTE — PROGRESS NOTES
Office: [phone]       Fax: [phone]      Nephrology Progress Note        Patient's Name: Tracey Chan Date: 12/21/2021  Date of Visit: 12/24/2021    Reason for Consult:   ANTONIO  Requesting Physician:  Shekhar Harper MD  PCP: Kaushal Chavarria MD    Chief Complaint:  slurring     History of Present Illness:      Therese Becerra is a 59 y.o. male with PMHx of hypertension, CKD, gout, atrial fibrillation, DM, CHF, CELSA who was hospitalized on 12/21/2021 with complaints of slurred speech, dizziness  No recent diarrhea, nausea or vomiting   Denies dysuria  Creat up from baseline  NSAID use: Denies history of NSAID use  IV contrast: None recent   Home meds reviewed         Interval History :    No sob or chest pain or dysuria or nausea    No symptoms of uremia    Serum cr high , trending down        Mg 1.6  Serum cr trending up , s/p IVF on 12/22  Sitting in bed , not in acute distress  No diarrhea or vomiting  No fever          Past Medical History:   Diagnosis Date    Arthralgia of multiple joints 10/27/2015    Arthritis     Atrial fibrillation ([address]     Chronic kidney disease     stage 4    Chronic systolic congestive heart failure ([address] 8/16/2021    CRI (chronic renal insufficiency)     Diabetic nephropathy associated with type 2 diabetes mellitus ([address] 10/11/2018    Diverticulosis     Elevated PSA     Erectile dysfunction     Gout     Gout     Hemrrhoid NOS w/ complication NEC     History of MRSA infection     Hypertension     CELSA (obstructive sleep apnea) 07/14/2017    uses C-pap machine    Type 2 diabetes mellitus without complication, without long-term current use of insulin ([address] 0/82/8788    Umbilical hernia without obstruction and without gangrene 2/2/2016       Past Surgical History:   Procedure Laterality Date    COLONOSCOPY  DILATATION, ESOPHAGUS      HEMICOLECTOMY      UPPER GASTROINTESTINAL ENDOSCOPY  5/28/16    biopsies, multiple small gastric ulcers    UPPER GASTROINTESTINAL ENDOSCOPY  09/12/2016       Family History   Problem Relation Age of Onset    Hypertension Other     Breast Cancer Mother     Colon Cancer Father     Diabetes Maternal Grandmother     Coronary Art Dis Brother         reports that he has never smoked. He has never used smokeless tobacco. He reports current alcohol use. He reports that he does not use drugs. Medications: Allergies:  Patient has no known allergies.     Scheduled Meds:   doxazosin  4 mg Oral Daily    pantoprazole  40 mg Oral QAM AC    NIFEdipine  90 mg Oral Daily    aspirin  81 mg Oral Daily    Or    aspirin  300 mg Rectal Daily    atorvastatin  80 mg Oral Nightly    heparin (porcine)  5,000 Units SubCUTAneous 3 times per day    allopurinol  100 mg Oral Daily    metoprolol succinate  100 mg Oral Daily     Continuous Infusions:      Labs:  CBC:   Recent Labs     12/22/21  0726 12/23/21  0631 12/24/21  0607   WBC 3.9* 4.3 4.3   HGB 10.6* 10.0* 10.0*    146 147     Ca/Mg/Phos:   Recent Labs     12/22/21  0726 12/23/21  0631 12/24/21  0607   CALCIUM 5.9* 5.6* 6.1*   MG  --  1.40* 1.60*     UA:  Recent Labs     12/21/21  1834   COLORU Yellow   CLARITYU Clear   GLUCOSEU Negative   BILIRUBINUR Negative   KETUA Negative   SPECGRAV 1.020   BLOODU MODERATE*   PHUR 6.5  6.5   PROTEINU >=300*   UROBILINOGEN 0.2   NITRU Negative   LEUKOCYTESUR Negative   LABMICR YES   URINETYPE NotGiven      Urine Microscopic:   Recent Labs     12/21/21  1834   BACTERIA Rare*   WBCUA None seen   RBCUA 3-4     Urine Chemistry:   Recent Labs     12/21/21  1834 12/22/21  0816 12/22/21  1035   LABCREA 117.5 88.8  --    PROTEINUR  --  463.00*  --    NAUR 59  --  75         ROS:     All systems reviewed and negative except as in HPI    Objective:     Vitals: BP (!) 158/83   Pulse 83   Temp 97.7 °F (36.5 °C) (Oral)   Resp 16   Ht 6' 5\" (1.956 m)   Wt 298 lb 8 oz (135.4 kg)   SpO2 98%   BMI 35.40 kg/m²    Wt Readings from Last 3 Encounters:   12/21/21 298 lb 8 oz (135.4 kg)   11/30/21 283 lb 3.2 oz (128.5 kg)   11/30/21 281 lb 12.8 oz (127.8 kg)      24HR INTAKE/OUTPUT:      Intake/Output Summary (Last 24 hours) at 12/24/2021 1439  Last data filed at 12/24/2021 1205  Gross per 24 hour   Intake 480 ml   Output 1 ml   Net 479 ml     Constitutional:  awake, NAD  HEENT:  MMM, No icterus  Neck: no bruits, No JVD  Cardiovascular:  reg rhythm  Respiratory: CTA, no crackles  Abdomen:  +BS, soft, NT, ND  Ext: trace lower extremity edema  Psychiatric: mood and affect appropriate  Skin: dry/intact  CNS: alert, no agitation    IMAGING:  MRI BRAIN WO CONTRAST   Final Result      1. No acute intracranial abnormality. MRI brain without contrast   Final Result      1. No acute intracranial abnormality. 2.  Mild chronic small vessel ischemic disease. 3.  Small right mastoid effusion representing retained secretions versus inflammatory debris. MRA NECK WO CONTRAST   Final Result   1. No evidence of flow limiting arterial occlusive disease in the neck. Study is degraded by patient motion. MR angiography head findings:      Images are degraded by patient motion. The anterior circulation demonstrates patency of the bilateral internal carotid arteries as well as the bilateral anterior and middle cerebral arteries. Posterior circulation demonstrates patency of the distal vertebral arteries and the basilar artery as well as bilateral posterior cerebral arteries. IMPRESSION:   1. Motion degraded images. No definite large vessel intracranial arterial occlusive disease detected. MRA HEAD WO CONTRAST   Final Result   1. No evidence of flow limiting arterial occlusive disease in the neck. Study is degraded by patient motion.       MR angiography head findings:      Images are degraded by patient motion. The anterior circulation demonstrates patency of the bilateral internal carotid arteries as well as the bilateral anterior and middle cerebral arteries. Posterior circulation demonstrates patency of the distal vertebral arteries and the basilar artery as well as bilateral posterior cerebral arteries. IMPRESSION:   1. Motion degraded images. No definite large vessel intracranial arterial occlusive disease detected. US RENAL COMPLETE   Final Result   Impression:   1. Markedly increased renal echogenicity consistent with medical renal disease. 2.  No hydronephrosis. XR CHEST PORTABLE   Final Result      Cardiomegaly. Clear lungs. CT Head WO Contrast   Final Result      No acute intracranial abnormality. Right mastoid disease. Assessment :     1. ANTONIO on CKD4  -Non-Oliguric  -Baseline creat: 2.9-3.3 Now 6.4  -UA: prot +++  -renal US : no hydro  -Volume: Hypervolemic   -Electrolytes: Hypokalemia and Hypomagnesemia  -Acid-Base: respiratory acidosis, AGMA and Metabolic alkalosis    Recent Labs     12/22/21  0726 12/23/21  0631 12/24/21  0607   BUN 42* 49* 45*   CREATININE 5.8* 6.5* 6.0*     Recent Labs     12/22/21  0726 12/23/21  0631 12/24/21  0607    140 138   K 3.6 3.5 3.5   CO2 26 22 23   MG  --  1.40* 1.60*         2. HTN  -Blood pressure high    BP Readings from Last 1 Encounters:   12/24/21 (!) 158/83       3. DM    4.  CHF  - TTE (2021): LVEF 45-50%, G1DD    Plan:     - ANTONIO on CKD  : seems progression of underlying CKD        - patient wants to go home, he agrees to have Follow up with his Nephrologist Dr Allie Perez in 1 week and get BMP lab     recommendation is to use lasix prn only for now     Discussed with hospitalist        - Gentle IV hydration with IVF NS upto 1 L on 12/22  - optimize BP meds  - UPC  - dec Allopurinol  - diuretics based on vol status  - replace K, Mg  - 25 Vit D  - no absolute indication of RRT tonight, but pt likely has CKD progression  - Monitor BMP    Replace Mg iv        -Monitor I/O, UOP  -Maintain MAP>65  -Avoid nephrotoxin, if able. -Dose meds to current eGFR    Thank you for allowing us to participate in care of [address] [place] [address] . We will continue to follow. Feel free to contact me with any questions.       Gil Fernandez MD  12/24/2021    [facility] [address] [place] [address]  Office : [phone]  Fax :[phone]

## 2021-12-24 NOTE — PLAN OF CARE
Problem: Falls - Risk of:  Goal: Will remain free from falls  Description: Will remain free from falls  12/24/2021 0906 by Ravi Ha RN  Outcome: Ongoing   All fall precautions in place. Bed locked and in lowest position with alarm on. Over-bed table and personal belongings within reach. Patient instructed to use call light for assistance. Non-skids socks on. Will continue to monitor. Problem: HEMODYNAMIC STATUS  Goal: Patient has stable vital signs and fluid balance  Outcome: Ongoing   Patient w/ stable vital signs on room air w/ exception to elevated BP. Scheduled BP meds administered. Tolerating PO fluids and voiding w/o difficulties.

## 2021-12-24 NOTE — CARE COORDINATION
Case Management Assessment            Discharge Note                    Date / Time of Note: 12/24/2021 1:44 PM                  Discharge Note Completed by: Bairon Blanton RN    Patient Name: Pete Wilkerson   YOB: 1960  Diagnosis: Hyperkalemia [E87.5]  Acute renal failure, unspecified acute renal failure type [facility]) [N17.9]  Acute renal failure superimposed on stage 3 chronic kidney disease, unspecified acute renal failure type, unspecified whether stage 3a or 3b CKD ([address] [N17.9, N18.30]   Date / Time: 12/21/2021  4:17 AM    Current PCP: Svetlana Sanchez MD  Clinic patient: No    Hospitalization in the last 30 days: No    Advance Directives:  Code Status: Full Code  [place] DNR form completed and on chart: Not Indicated    Financial:  Payor: Tino Thornton / Plan: Woodrow Limon / Product Type: *No Product type* /      Pharmacy:    [id number] [address] - F [phone]  [address] [place] [address]  Phone: [phone] [facility]: [facility] [address] [place] [address]  Phone: [phone] Fax: [phone]    [id number] [phone]  [facility] [place] [address]  Phone: [phone] Fax: [phone]    [id number] [address], [address] [facility] [phone] - f [phone]  [address] [address]  Phone: [phone] Fax: [phone]    [id number] [address] [facility] - [address] [phone] [facility] [place] [phone]  [address] [place] [address]  Phone: [phone] Fax: [phone]      Assistance purchasing medications?: Potential Assistance Purchasing Medications: No  Assistance provided by Case Management: None at this time    Does patient want to participate in local refill/ meds to beds program?:      Meds To Beds General Rules:  1. Can ONLY be done Monday- Friday between 8:30am-5pm  2. Prescription(s) must be in pharmacy by 3pm to be filled same day  3. Copy of patient's insurance/ prescription drug card and patient face sheet must be sent along with the prescription(s)  4. Cost of Rx cannot be added to hospital bill. If financial assistance is needed, please contact unit  or ;  or  CANNOT provide pharmacy voucher for patients co-pays  5. Patients can then  the prescription on their way out of the hospital at discharge, or pharmacy can deliver to the bedside if staff is available. (payment due at time of pick-up or delivery - cash, check, or card accepted)     Able to afford home medications/ co-pay costs: Yes    ADLS:  Current PT AM-PAC Score: 22 /24  Current OT AM-PAC Score: 24 /24      DISCHARGE Disposition: Home- No Services Needed    LOC at discharge: Not Applicable  ELISA Completed: Not Indicated    Notification completed in HENS/PAS?:  Not Applicable    IMM Completed:   Not Indicated    Transportation:  Transportation PLAN for discharge: family   Mode of Transport: Private Car    The Plan for Transition of Care is related to the following treatment goals of Hyperkalemia [E87.5]  Acute renal failure, unspecified acute renal failure type ([address] [N17.9]  Acute renal failure superimposed on stage 3 chronic kidney disease, unspecified acute renal failure type, unspecified whether stage 3a or 3b CKD ([address] [N17.9, N18.30]    The Patient and/or patient representative Jorden and his family were provided with a choice of provider and agrees with the discharge plan Not Indicated    Freedom of choice list was provided with basic dialogue that supports the patient's individualized plan of care/goals and shares the quality data associated with the providers.  Not Indicated    Care Transitions patient: Tammy Chang RN  [facility]  Case Management Department  Ph: [phone]  Fax: [phone]

## 2021-12-24 NOTE — PROGRESS NOTES
Patient discharged. All discharge instructions reviewed with patient and via phone with patient's wife. PIV x1 removed w/o complication. All personal belongings gathered and patient dressed in his own clothing. Patient walked off unit downstairs to wife's vehicle.

## 2021-12-24 NOTE — CARE COORDINATION
SW received a call from Pt's wife requesting for SW to check on CPAP orders. SW called [employer] [phone] to check on the orders and LVM.     Electronically signed by ZURI Kinney, ATIF on 12/24/2021 at 3:38 PM   [phone]

## 2021-12-27 ENCOUNTER — TELEPHONE (OUTPATIENT)
Dept: PULMONOLOGY | Age: 61
End: 2021-12-27

## 2021-12-27 ENCOUNTER — TELEPHONE (OUTPATIENT)
Dept: FAMILY MEDICINE CLINIC | Age: 61
End: 2021-12-27

## 2021-12-27 NOTE — TELEPHONE ENCOUNTER
Returning spouse's call and letting her know the order was faxed to [address] [place] [address] by Yolis Jaime on 12/23/2021

## 2021-12-27 NOTE — TELEPHONE ENCOUNTER
----- Message from Benjamin Senior sent at 12/27/2021  8:40 AM EST -----  Subject: Appointment Request    Reason for Call: Routine Hospital Follow Up    QUESTIONS  Type of Appointment? Established Patient  Reason for appointment request? Available appointments did not meet   patient need  Additional Information for Provider? [facility]   [facility] with Stroke symptoms and discharged on 12/24. Patient screened   green. ---------------------------------------------------------------------------  --------------  Fortino ENGLE  What is the best way for the office to contact you? OK to leave message on   voicemail  Preferred Call Back Phone Number? [phone]  ---------------------------------------------------------------------------  --------------  SCRIPT ANSWERS  Relationship to Patient? Other  Representative Name? Lisa Crespo  Additional information verified (besides Name and Date of Birth)? Address  (Patient requests to see provider urgently. )? No  (Has the patient been discharged from the hospital within 2 business days   AND does not have a Telephone Encounter  Follow Up From [address] [place] [address]   documented in [address]?)? No  Do you have any questions for your primary care provider that need to be   answered prior to your appointment? (Use RN Triage if question pertains to   anything on the red flag list)? No  (Patient needs follow up visit after hospital discharge) Book first   available appointment within 7 days OF DISCHARGE, if no appt, proceed to   book the next available time slot within 14 days OF DISCHARGE AND Send   Message to Provider. [facility] Follow Up appointment cannot be booked   beyond 14 Days and should result in a Message to Provider. ? Yes   Have you been diagnosed with, awaiting test results for, or told that you   are suspected of having COVID-19 (Coronavirus)?  (If patient has tested   negative or was tested as a requirement for work, school, or travel and   not based on symptoms, answer no)? No  Within the past two weeks have you developed any of the following symptoms   (answer no if symptoms have been present longer than 2 weeks or began   more than 2 weeks ago)? Fever or Chills, Cough, Shortness of breath or   difficulty breathing, Loss of taste or smell, Sore throat, Nasal   congestion, Sneezing or runny nose, Fatigue or generalized body aches   (answer no if pain is specific to a body part e.g. back pain), Diarrhea,   Headache? No  Have you had close contact with someone with COVID-19 in the last 14 days? No  (Service Expert  click yes below to proceed with [facility] As Usual   Scheduling)?  Yes

## 2022-01-03 ENCOUNTER — TELEPHONE (OUTPATIENT)
Dept: PULMONOLOGY | Age: 62
End: 2022-01-03

## 2022-01-03 NOTE — TELEPHONE ENCOUNTER
Patient tries to use the machine but we would not recommend it since these are untested machines and a lot of them have popped up in the market that are unknown companies. There is really been no good validation of their protocols and often we cannot even get data out of the machine to see what is doing. Ultimately it is the patient's choice and were happy to send a prescription if they want to go down that route but they would just need to know that we may not be able to support that machine very effectively since it is an unknown brand and often a lot of the supports software is unavailable.     Aditya Dave MD

## 2022-01-03 NOTE — TELEPHONE ENCOUNTER
Patient's wife called because patient still does not have CPAP yet. She found an APAP Resdenti Breeze from the [facility] and can get that in a couple of days. Can patient use this instead of the CPAP we ordered. Please call wife at [phone].

## 2022-01-04 ENCOUNTER — TELEPHONE (OUTPATIENT)
Dept: PULMONOLOGY | Age: 62
End: 2022-01-04

## 2022-01-04 NOTE — TELEPHONE ENCOUNTER
Spouse left a message asking if a different unit can be ordered. LM for spouse to call back. Spouse to be given the names of companies that have the units. Spouse is concerned that pt is deteriorating and she has found a cpap unit she can purchase out of pocket however it can not be set at the ordered pressures.

## 2022-01-06 ENCOUNTER — TELEPHONE (OUTPATIENT)
Dept: INTERNAL MEDICINE CLINIC | Age: 62
End: 2022-01-06

## 2022-01-06 NOTE — TELEPHONE ENCOUNTER
----- Message from Ben Tapia LPN sent at 4/9/0109 10:33 AM EST -----  Subject: Message to Provider    QUESTIONS  Information for Provider? Wife called stating he is not sleeping at all   and would like to know if something could be prescribed, he doesn't have a   cpap do to the recall that he had on it about 4months ago.   ---------------------------------------------------------------------------  --------------  [address]  What is the best way for the office to contact you? OK to leave message on   voicemail  Preferred Call Back Phone Number? [phone]  ---------------------------------------------------------------------------  --------------  SCRIPT ANSWERS  Relationship to Patient? Other  Representative Name? wanda  Is the Representative on the appropriate HIPAA document in Epic?  Yes

## 2022-01-07 NOTE — TELEPHONE ENCOUNTER
There is caution on use of meds with his kidney disease  Has he tried any over the counter therapies such as melatonin

## 2022-01-10 ENCOUNTER — TELEPHONE (OUTPATIENT)
Dept: PULMONOLOGY | Age: 62
End: 2022-01-10

## 2022-01-10 ENCOUNTER — OFFICE VISIT (OUTPATIENT)
Dept: FAMILY MEDICINE CLINIC | Age: 62
End: 2022-01-10
Payer: COMMERCIAL

## 2022-01-10 VITALS
HEART RATE: 100 BPM | TEMPERATURE: 97.7 F | OXYGEN SATURATION: 99 % | SYSTOLIC BLOOD PRESSURE: 130 MMHG | RESPIRATION RATE: 12 BRPM | DIASTOLIC BLOOD PRESSURE: 76 MMHG | BODY MASS INDEX: 33.87 KG/M2 | WEIGHT: 285.6 LBS

## 2022-01-10 DIAGNOSIS — G47.33 OSA (OBSTRUCTIVE SLEEP APNEA): ICD-10-CM

## 2022-01-10 DIAGNOSIS — N18.4 CHRONIC RENAL IMPAIRMENT, STAGE 4 (SEVERE) (HCC): ICD-10-CM

## 2022-01-10 DIAGNOSIS — R97.20 ELEVATED PSA: ICD-10-CM

## 2022-01-10 DIAGNOSIS — M79.10 MYALGIA: ICD-10-CM

## 2022-01-10 DIAGNOSIS — R53.81 MALAISE: ICD-10-CM

## 2022-01-10 DIAGNOSIS — I10 ESSENTIAL HYPERTENSION, BENIGN: Primary | Chronic | ICD-10-CM

## 2022-01-10 DIAGNOSIS — R25.2 LEG CRAMPING: ICD-10-CM

## 2022-01-10 DIAGNOSIS — I48.0 PAROXYSMAL ATRIAL FIBRILLATION (HCC): Chronic | ICD-10-CM

## 2022-01-10 DIAGNOSIS — E11.21 TYPE 2 DIABETES MELLITUS WITH DIABETIC NEPHROPATHY, WITHOUT LONG-TERM CURRENT USE OF INSULIN (HCC): Chronic | ICD-10-CM

## 2022-01-10 DIAGNOSIS — M1A.09X0 IDIOPATHIC CHRONIC GOUT OF MULTIPLE SITES WITHOUT TOPHUS: ICD-10-CM

## 2022-01-10 LAB
C-REACTIVE PROTEIN: 6 MG/L (ref 0–5.1)
CREATININE URINE: 207.2 MG/DL (ref 39–259)
HCT VFR BLD CALC: 32.9 % (ref 40.5–52.5)
HEMOGLOBIN: 11 G/DL (ref 13.5–17.5)
MAGNESIUM: 1.6 MG/DL (ref 1.8–2.4)
MCH RBC QN AUTO: 28.1 PG (ref 26–34)
MCHC RBC AUTO-ENTMCNC: 33.5 G/DL (ref 31–36)
MCV RBC AUTO: 83.8 FL (ref 80–100)
MICROALBUMIN UR-MCNC: 593 MG/DL
MICROALBUMIN/CREAT UR-RTO: 2862 MG/G (ref 0–30)
PDW BLD-RTO: 13.5 % (ref 12.4–15.4)
PHOSPHORUS: 5.8 MG/DL (ref 2.5–4.9)
PLATELET # BLD: 191 K/UL (ref 135–450)
PMV BLD AUTO: 8.4 FL (ref 5–10.5)
RBC # BLD: 3.92 M/UL (ref 4.2–5.9)
SEDIMENTATION RATE, ERYTHROCYTE: 52 MM/HR (ref 0–20)
TOTAL CK: 1520 U/L (ref 39–308)
VITAMIN D 25-HYDROXY: 23 NG/ML
WBC # BLD: 4.6 K/UL (ref 4–11)

## 2022-01-10 PROCEDURE — 2022F DILAT RTA XM EVC RTNOPTHY: CPT | Performed by: FAMILY MEDICINE

## 2022-01-10 PROCEDURE — 3017F COLORECTAL CA SCREEN DOC REV: CPT | Performed by: FAMILY MEDICINE

## 2022-01-10 PROCEDURE — 1111F DSCHRG MED/CURRENT MED MERGE: CPT | Performed by: FAMILY MEDICINE

## 2022-01-10 PROCEDURE — G8427 DOCREV CUR MEDS BY ELIG CLIN: HCPCS | Performed by: FAMILY MEDICINE

## 2022-01-10 PROCEDURE — 1036F TOBACCO NON-USER: CPT | Performed by: FAMILY MEDICINE

## 2022-01-10 PROCEDURE — G8417 CALC BMI ABV UP PARAM F/U: HCPCS | Performed by: FAMILY MEDICINE

## 2022-01-10 PROCEDURE — 99214 OFFICE O/P EST MOD 30 MIN: CPT | Performed by: FAMILY MEDICINE

## 2022-01-10 PROCEDURE — G8484 FLU IMMUNIZE NO ADMIN: HCPCS | Performed by: FAMILY MEDICINE

## 2022-01-10 PROCEDURE — 3046F HEMOGLOBIN A1C LEVEL >9.0%: CPT | Performed by: FAMILY MEDICINE

## 2022-01-10 RX ORDER — OXYCODONE HYDROCHLORIDE AND ACETAMINOPHEN 5; 325 MG/1; MG/1
1 TABLET ORAL EVERY 8 HOURS PRN
Qty: 90 TABLET | Refills: 0 | Status: SHIPPED | OUTPATIENT
Start: 2022-01-10 | End: 2022-02-09 | Stop reason: ALTCHOICE

## 2022-01-10 RX ORDER — COLCHICINE 0.6 MG/1
0.6 TABLET ORAL DAILY PRN
Qty: 30 TABLET | Refills: 5 | Status: SHIPPED | OUTPATIENT
Start: 2022-01-10 | End: 2022-01-25 | Stop reason: SDUPTHER

## 2022-01-10 NOTE — TELEPHONE ENCOUNTER
Spoke  With representative from [employer] ask about expediting order due to issue pt is having. [employer] will need a reason for the unit to be expedited and the serial number from the unit. Spoke with spouse and she will  Call with serial number when she is home.

## 2022-01-10 NOTE — Clinical Note
Hey man. Mr. Mercy Cortes struggling wtihout CPAP therapy. Any advice I can give him on getting a new machine, as his recalled machine has not been replaced yet.   He has a TON going on with renal failure, Afib, DM, CHF    Happy Monday  BP

## 2022-01-10 NOTE — PROGRESS NOTES
Here for f/u and recheck of blood pressure, CRI    Pt states that at this time, he is starting 8 weeks off work to help get his health and his wife's meron. Pt has been off since 12/13 and at this time is staying home to focus on his health. Pt ended up in the hospital for about 3 days due to acute on chronic CKD, ARF. Pt was admitted for concerns of CVA but that workup was negative. Pt did see Dr. Kamari Guzman a few days ago and will continue to avoid NSAIDs, and wants to monitor his blood pressure regularly / consistently. Doing better with adherence to meds at this time. His last creatinine was 6 and GFR is down at 12    Pt here for follow up of blood pressure. Pt states doing great with adherence to therapy and feels well. No issues of chest pain, shortness of breath. No vision changes, headache, swelling in legs. Pt is monitoring closely     Pt does have CPAP machine, but it is broken and has not been able to use for 6 months. His machine was recalled and they have not been able to get replacement yet from the company. Wife has been looking at alternative options for other treatment options, but dr. Arleen Beltran office can not recommend as they can not validate. Sleep continues to be poor. Pt has some diffuse cramping to body, sx are 'all the time' and feels that it is in the muscles. Pt feels that sx are diffuse, in arms/ shoulders. Also can be in fingers, and a lot of sx in legs. Sx are almost all the time. Does not seem to be related to diet, hydration, etc      Except as noted above in the history of present illness, the review of systems is  negative for headache, vision changes, chest pain, shortness of breath, abdominal pain, urinary sx, bowel changes.     Past medical, surgical, and social history reviewed and updated  Medications and allergies reviewed and updated        O: /76   Pulse 100   Temp 97.7 °F (36.5 °C) (Temporal)   Resp 12   Wt 285 lb 9.6 oz (129.5 kg)   SpO2 99% BMI 33.87 kg/m²   GEN: No acute distress, cooperative, well nourished, alert. HEENT: PEERLA, EOMI , normocephalic/atraumatic, nares and oropharynx clear. Mucous membranes normal, Tympanic membranes clear bilaterally. Neck: soft, supple, no thyromegaly, mass, no Lymphadenopathy  CV: Regular rate and rhythm, no murmur, rubs, gallops. No edema. Resp: Clear to auscultation bilaterally good air entry bilaterally  No crackles, wheeze. Breathing comfortably. Psych: mood stable, No suicidal thoughts or ideation   Ext: no clubbing, cyanosis, edema        Current Outpatient Medications   Medication Sig Dispense Refill    colchicine (COLCRYS) 0.6 MG tablet Take 1 tablet by mouth daily as needed for Pain (as needed for pain related to gout) 30 tablet 5    oxyCODONE-acetaminophen (PERCOCET) 5-325 MG per tablet Take 1 tablet by mouth every 8 hours as needed for Pain for up to 30 days. 90 tablet 0    vitamin D (ERGOCALCIFEROL) 1.25 MG (16399 UT) CAPS capsule Take 1 capsule by mouth once a week 12 capsule 1    calcitRIOL (ROCALTROL) 0.25 MCG capsule Take 1 capsule by mouth daily 30 capsule 3    omeprazole (PRILOSEC) 40 MG delayed release capsule TAKE 1 CAPSULE BY MOUTH EVERY DAY 30 capsule 1    allopurinol (ZYLOPRIM) 300 MG tablet TAKE 1 TABLET BY MOUTH EVERY DAY 30 tablet 5    terazosin (HYTRIN) 5 MG capsule Take 1 capsule by mouth nightly TAKE 1 CAPSULE BY MOUTH EVERY EVENING 60 capsule 5    metoprolol succinate (TOPROL XL) 50 MG extended release tablet TAKE 1 TABLET BY MOUTH EVERY DAY 30 tablet 3    atorvastatin (LIPITOR) 20 MG tablet TAKE 1 TABLET BY MOUTH EVERY DAY 30 tablet 5    NIFEdipine (ADALAT CC) 90 MG extended release tablet TAKE 1 TABLET BY MOUTH EVERY DAY 30 tablet 5    sildenafil (VIAGRA) 100 MG tablet Take 1 tablet by mouth as needed for Erectile Dysfunction 30 tablet 3    aspirin 81 MG tablet Take 81 mg by mouth daily       No current facility-administered medications for this visit.          Lab Results   Component Value Date    CREATININE 6.0 (HH) 12/24/2021    BUN 45 (H) 12/24/2021     12/24/2021    K 3.5 12/24/2021     12/24/2021    CO2 23 12/24/2021       Lab Results   Component Value Date    WBC 4.3 12/24/2021    HGB 10.0 (L) 12/24/2021    HCT 30.2 (L) 12/24/2021    MCV 86.2 12/24/2021     12/24/2021           ASSESSMENT / PLAN:    1. Idiopathic chronic gout of multiple sites without tophus  Stable w/o flare  Cont colchicine  Cont percocet as below  - oxyCODONE-acetaminophen (PERCOCET) 5-325 MG per tablet; Take 1 tablet by mouth every 8 hours as needed for Pain for up to 30 days. Dispense: 90 tablet; Refill: 0    2. Essential hypertension, benign  blood pressure stable, doing better with adherence to meds    3. Type 2 diabetes mellitus with diabetic nephropathy, without long-term current use of insulin ([facility])  Doing well, controlled    4. Chronic renal impairment, stage 4 (severe) ([facility])  Progressive sx with creatinine at 6.0  Cont supportive thearpy, f/u with nephrology  - Microalbumin / Creatinine Urine Ratio; Future  - Vitamin D 25 Hydroxy; Future    5. CELSA (obstructive sleep apnea)  Off CPAP, needs to get set back up for machine, but on back order  Will d/w pulm    6. Paroxysmal atrial fibrillation (HCC)  In NSR    7. Malaise  Check bloodwork    8. Leg cramping  - MAGNESIUM; Future  - PHOSPHORUS; Future  - CBC; Future    9. Myalgia  ? PMR  Check bloodwork as below  - Sedimentation Rate; Future  - C-Reactive Protein; Future  - Comprehensive Metabolic Panel; Future  - CK; Future    10. Elevated PSA  Has not seen urology despite sig increase in PSA to 20+  Again encouraged him to call to set up  - Vona Cushing, MD, Urology, [facility]           Follow-up appointment:   Pending bloodwork results    Discussed use, benefit, and side effects of all prescribed medications. Barriers to medication compliance addressed. All patient questions answered. Pt voiced understanding. When applicable, patient's outside records were reviewed through [facility]. The patient has signed appropriate paperworks/consents.

## 2022-01-10 NOTE — PROGRESS NOTES
Please call pts wife and let them know I discussed CPAP with dr. Giovanna Don and he encourages use of the old machine while waiting for new machine to be available.   Better option then getting one online  thanks

## 2022-01-11 ENCOUNTER — APPOINTMENT (OUTPATIENT)
Dept: GENERAL RADIOLOGY | Age: 62
DRG: 660 | End: 2022-01-11
Payer: COMMERCIAL

## 2022-01-11 ENCOUNTER — TELEPHONE (OUTPATIENT)
Dept: ADMINISTRATIVE | Age: 62
End: 2022-01-11

## 2022-01-11 ENCOUNTER — TELEPHONE (OUTPATIENT)
Dept: PULMONOLOGY | Age: 62
End: 2022-01-11

## 2022-01-11 ENCOUNTER — HOSPITAL ENCOUNTER (INPATIENT)
Age: 62
LOS: 7 days | Discharge: HOME | DRG: 660 | End: 2022-01-18
Attending: EMERGENCY MEDICINE | Admitting: INTERNAL MEDICINE
Payer: COMMERCIAL

## 2022-01-11 DIAGNOSIS — R06.02 SHORTNESS OF BREATH: ICD-10-CM

## 2022-01-11 DIAGNOSIS — N18.6 END STAGE RENAL DISEASE (HCC): Primary | ICD-10-CM

## 2022-01-11 PROBLEM — N17.9 AKI (ACUTE KIDNEY INJURY) (HCC): Status: ACTIVE | Noted: 2022-01-11

## 2022-01-11 LAB
A/G RATIO: 1.5 (ref 1.1–2.2)
ALBUMIN SERPL-MCNC: 3.9 G/DL (ref 3.4–5)
ALP BLD-CCNC: 91 U/L (ref 40–129)
ALT SERPL-CCNC: 17 U/L (ref 10–40)
ANION GAP SERPL CALCULATED.3IONS-SCNC: 17 MMOL/L (ref 3–16)
ANION GAP SERPL CALCULATED.3IONS-SCNC: 19 MMOL/L (ref 3–16)
AST SERPL-CCNC: 15 U/L (ref 15–37)
BASOPHILS ABSOLUTE: 0.1 K/UL (ref 0–0.2)
BASOPHILS RELATIVE PERCENT: 0.7 %
BILIRUB SERPL-MCNC: <0.2 MG/DL (ref 0–1)
BUN BLDV-MCNC: 48 MG/DL (ref 7–20)
BUN BLDV-MCNC: 51 MG/DL (ref 7–20)
CALCIUM SERPL-MCNC: 5.6 MG/DL (ref 8.3–10.6)
CALCIUM SERPL-MCNC: 5.6 MG/DL (ref 8.3–10.6)
CHLORIDE BLD-SCNC: 101 MMOL/L (ref 99–110)
CHLORIDE BLD-SCNC: 99 MMOL/L (ref 99–110)
CO2: 22 MMOL/L (ref 21–32)
CO2: 23 MMOL/L (ref 21–32)
CREAT SERPL-MCNC: 7.7 MG/DL (ref 0.8–1.3)
CREAT SERPL-MCNC: 8.4 MG/DL (ref 0.8–1.3)
EKG ATRIAL RATE: 104 BPM
EKG DIAGNOSIS: NORMAL
EKG P AXIS: 55 DEGREES
EKG P-R INTERVAL: 146 MS
EKG Q-T INTERVAL: 396 MS
EKG QRS DURATION: 80 MS
EKG QTC CALCULATION (BAZETT): 520 MS
EKG R AXIS: -24 DEGREES
EKG T AXIS: 51 DEGREES
EKG VENTRICULAR RATE: 104 BPM
EOSINOPHILS ABSOLUTE: 0.2 K/UL (ref 0–0.6)
EOSINOPHILS RELATIVE PERCENT: 3 %
GFR AFRICAN AMERICAN: 8
GFR AFRICAN AMERICAN: 9
GFR NON-AFRICAN AMERICAN: 6
GFR NON-AFRICAN AMERICAN: 7
GLUCOSE BLD-MCNC: 143 MG/DL (ref 70–99)
GLUCOSE BLD-MCNC: 151 MG/DL (ref 70–99)
GLUCOSE BLD-MCNC: 192 MG/DL (ref 70–99)
HCT VFR BLD CALC: 34.5 % (ref 40.5–52.5)
HEMOGLOBIN: 11.3 G/DL (ref 13.5–17.5)
LYMPHOCYTES ABSOLUTE: 1.4 K/UL (ref 1–5.1)
LYMPHOCYTES RELATIVE PERCENT: 19.3 %
MAGNESIUM: 1.4 MG/DL (ref 1.8–2.4)
MCH RBC QN AUTO: 28.4 PG (ref 26–34)
MCHC RBC AUTO-ENTMCNC: 32.8 G/DL (ref 31–36)
MCV RBC AUTO: 86.3 FL (ref 80–100)
MONOCYTES ABSOLUTE: 0.5 K/UL (ref 0–1.3)
MONOCYTES RELATIVE PERCENT: 6.8 %
NEUTROPHILS ABSOLUTE: 5 K/UL (ref 1.7–7.7)
NEUTROPHILS RELATIVE PERCENT: 70.2 %
PDW BLD-RTO: 13.5 % (ref 12.4–15.4)
PERFORMED ON: ABNORMAL
PLATELET # BLD: 205 K/UL (ref 135–450)
PMV BLD AUTO: 8.7 FL (ref 5–10.5)
POTASSIUM REFLEX MAGNESIUM: 3.4 MMOL/L (ref 3.5–5.1)
POTASSIUM SERPL-SCNC: 3.5 MMOL/L (ref 3.5–5.1)
PRO-BNP: 710 PG/ML (ref 0–124)
RBC # BLD: 4 M/UL (ref 4.2–5.9)
SODIUM BLD-SCNC: 140 MMOL/L (ref 136–145)
SODIUM BLD-SCNC: 141 MMOL/L (ref 136–145)
TOTAL PROTEIN: 6.5 G/DL (ref 6.4–8.2)
TROPONIN: 0.04 NG/ML
TROPONIN: 0.04 NG/ML
WBC # BLD: 7.1 K/UL (ref 4–11)

## 2022-01-11 PROCEDURE — 80048 BASIC METABOLIC PNL TOTAL CA: CPT

## 2022-01-11 PROCEDURE — 6360000002 HC RX W HCPCS: Performed by: INTERNAL MEDICINE

## 2022-01-11 PROCEDURE — 73110 X-RAY EXAM OF WRIST: CPT

## 2022-01-11 PROCEDURE — 36415 COLL VENOUS BLD VENIPUNCTURE: CPT

## 2022-01-11 PROCEDURE — 84484 ASSAY OF TROPONIN QUANT: CPT

## 2022-01-11 PROCEDURE — 93005 ELECTROCARDIOGRAM TRACING: CPT | Performed by: EMERGENCY MEDICINE

## 2022-01-11 PROCEDURE — 83036 HEMOGLOBIN GLYCOSYLATED A1C: CPT

## 2022-01-11 PROCEDURE — 71046 X-RAY EXAM CHEST 2 VIEWS: CPT

## 2022-01-11 PROCEDURE — 99284 EMERGENCY DEPT VISIT MOD MDM: CPT

## 2022-01-11 PROCEDURE — 73610 X-RAY EXAM OF ANKLE: CPT

## 2022-01-11 PROCEDURE — 1200000000 HC SEMI PRIVATE

## 2022-01-11 PROCEDURE — 6360000002 HC RX W HCPCS: Performed by: EMERGENCY MEDICINE

## 2022-01-11 PROCEDURE — 6370000000 HC RX 637 (ALT 250 FOR IP): Performed by: INTERNAL MEDICINE

## 2022-01-11 PROCEDURE — 83735 ASSAY OF MAGNESIUM: CPT

## 2022-01-11 PROCEDURE — 6370000000 HC RX 637 (ALT 250 FOR IP): Performed by: EMERGENCY MEDICINE

## 2022-01-11 PROCEDURE — 83880 ASSAY OF NATRIURETIC PEPTIDE: CPT

## 2022-01-11 PROCEDURE — 85025 COMPLETE CBC W/AUTO DIFF WBC: CPT

## 2022-01-11 PROCEDURE — 2580000003 HC RX 258: Performed by: INTERNAL MEDICINE

## 2022-01-11 RX ORDER — ALLOPURINOL 100 MG/1
100 TABLET ORAL DAILY
Status: DISCONTINUED | OUTPATIENT
Start: 2022-01-12 | End: 2022-01-18 | Stop reason: HOSPADM

## 2022-01-11 RX ORDER — NORTRIPTYLINE HYDROCHLORIDE 10 MG/1
10 CAPSULE ORAL NIGHTLY
COMMUNITY
Start: 2021-12-15 | End: 2022-08-31

## 2022-01-11 RX ORDER — METOPROLOL SUCCINATE 50 MG/1
50 TABLET, EXTENDED RELEASE ORAL DAILY
Status: DISCONTINUED | OUTPATIENT
Start: 2022-01-12 | End: 2022-01-18 | Stop reason: HOSPADM

## 2022-01-11 RX ORDER — OXYCODONE HYDROCHLORIDE AND ACETAMINOPHEN 5; 325 MG/1; MG/1
1 TABLET ORAL ONCE
Status: COMPLETED | OUTPATIENT
Start: 2022-01-11 | End: 2022-01-11

## 2022-01-11 RX ORDER — ATORVASTATIN CALCIUM 20 MG/1
20 TABLET, FILM COATED ORAL DAILY
Status: DISCONTINUED | OUTPATIENT
Start: 2022-01-12 | End: 2022-01-18 | Stop reason: HOSPADM

## 2022-01-11 RX ORDER — SODIUM CHLORIDE 0.9 % (FLUSH) 0.9 %
10 SYRINGE (ML) INJECTION EVERY 12 HOURS SCHEDULED
Status: DISCONTINUED | OUTPATIENT
Start: 2022-01-11 | End: 2022-01-18 | Stop reason: HOSPADM

## 2022-01-11 RX ORDER — ONDANSETRON 2 MG/ML
4 INJECTION INTRAMUSCULAR; INTRAVENOUS EVERY 6 HOURS PRN
Status: DISCONTINUED | OUTPATIENT
Start: 2022-01-11 | End: 2022-01-18 | Stop reason: HOSPADM

## 2022-01-11 RX ORDER — OXYCODONE HYDROCHLORIDE AND ACETAMINOPHEN 5; 325 MG/1; MG/1
1 TABLET ORAL EVERY 8 HOURS PRN
Status: DISCONTINUED | OUTPATIENT
Start: 2022-01-11 | End: 2022-01-12

## 2022-01-11 RX ORDER — INSULIN LISPRO 100 [IU]/ML
0-6 INJECTION, SOLUTION INTRAVENOUS; SUBCUTANEOUS NIGHTLY
Status: DISCONTINUED | OUTPATIENT
Start: 2022-01-11 | End: 2022-01-18 | Stop reason: HOSPADM

## 2022-01-11 RX ORDER — INSULIN LISPRO 100 [IU]/ML
0-12 INJECTION, SOLUTION INTRAVENOUS; SUBCUTANEOUS
Status: DISCONTINUED | OUTPATIENT
Start: 2022-01-11 | End: 2022-01-18 | Stop reason: HOSPADM

## 2022-01-11 RX ORDER — DOXAZOSIN MESYLATE 4 MG/1
4 TABLET ORAL DAILY
Refills: 5 | Status: DISCONTINUED | OUTPATIENT
Start: 2022-01-12 | End: 2022-01-18 | Stop reason: HOSPADM

## 2022-01-11 RX ORDER — NICOTINE POLACRILEX 4 MG
15 LOZENGE BUCCAL PRN
Status: DISCONTINUED | OUTPATIENT
Start: 2022-01-11 | End: 2022-01-18 | Stop reason: HOSPADM

## 2022-01-11 RX ORDER — DEXTROSE MONOHYDRATE 25 G/50ML
12.5 INJECTION, SOLUTION INTRAVENOUS PRN
Status: DISCONTINUED | OUTPATIENT
Start: 2022-01-11 | End: 2022-01-14 | Stop reason: CLARIF

## 2022-01-11 RX ORDER — HEPARIN SODIUM 5000 [USP'U]/ML
5000 INJECTION, SOLUTION INTRAVENOUS; SUBCUTANEOUS EVERY 8 HOURS SCHEDULED
Status: DISCONTINUED | OUTPATIENT
Start: 2022-01-11 | End: 2022-01-18 | Stop reason: HOSPADM

## 2022-01-11 RX ORDER — ACETAMINOPHEN 650 MG/1
650 SUPPOSITORY RECTAL EVERY 6 HOURS PRN
Status: DISCONTINUED | OUTPATIENT
Start: 2022-01-11 | End: 2022-01-18 | Stop reason: HOSPADM

## 2022-01-11 RX ORDER — DEXTROSE MONOHYDRATE 50 MG/ML
100 INJECTION, SOLUTION INTRAVENOUS PRN
Status: DISCONTINUED | OUTPATIENT
Start: 2022-01-11 | End: 2022-01-18 | Stop reason: HOSPADM

## 2022-01-11 RX ORDER — MORPHINE SULFATE 2 MG/ML
2 INJECTION, SOLUTION INTRAMUSCULAR; INTRAVENOUS ONCE
Status: COMPLETED | OUTPATIENT
Start: 2022-01-11 | End: 2022-01-11

## 2022-01-11 RX ORDER — ASPIRIN 81 MG/1
81 TABLET, CHEWABLE ORAL DAILY
Status: DISCONTINUED | OUTPATIENT
Start: 2022-01-12 | End: 2022-01-18 | Stop reason: HOSPADM

## 2022-01-11 RX ORDER — NIFEDIPINE 90 MG/1
90 TABLET, EXTENDED RELEASE ORAL DAILY
Status: DISCONTINUED | OUTPATIENT
Start: 2022-01-12 | End: 2022-01-18 | Stop reason: HOSPADM

## 2022-01-11 RX ORDER — CALCITRIOL 0.25 UG/1
0.25 CAPSULE, LIQUID FILLED ORAL DAILY
Status: DISCONTINUED | OUTPATIENT
Start: 2022-01-12 | End: 2022-01-18 | Stop reason: HOSPADM

## 2022-01-11 RX ORDER — SODIUM CHLORIDE 0.9 % (FLUSH) 0.9 %
10 SYRINGE (ML) INJECTION PRN
Status: DISCONTINUED | OUTPATIENT
Start: 2022-01-11 | End: 2022-01-18 | Stop reason: HOSPADM

## 2022-01-11 RX ORDER — ONDANSETRON 4 MG/1
4 TABLET, ORALLY DISINTEGRATING ORAL EVERY 8 HOURS PRN
Status: DISCONTINUED | OUTPATIENT
Start: 2022-01-11 | End: 2022-01-18 | Stop reason: HOSPADM

## 2022-01-11 RX ORDER — ACETAMINOPHEN 325 MG/1
650 TABLET ORAL EVERY 6 HOURS PRN
Status: DISCONTINUED | OUTPATIENT
Start: 2022-01-11 | End: 2022-01-18 | Stop reason: HOSPADM

## 2022-01-11 RX ORDER — SENNA AND DOCUSATE SODIUM 50; 8.6 MG/1; MG/1
1 TABLET, FILM COATED ORAL 2 TIMES DAILY
Status: DISCONTINUED | OUTPATIENT
Start: 2022-01-11 | End: 2022-01-18 | Stop reason: HOSPADM

## 2022-01-11 RX ORDER — SODIUM CHLORIDE 9 MG/ML
25 INJECTION, SOLUTION INTRAVENOUS PRN
Status: DISCONTINUED | OUTPATIENT
Start: 2022-01-11 | End: 2022-01-18 | Stop reason: HOSPADM

## 2022-01-11 RX ADMIN — OXYCODONE HYDROCHLORIDE AND ACETAMINOPHEN 1 TABLET: 5; 325 TABLET ORAL at 15:19

## 2022-01-11 RX ADMIN — MORPHINE SULFATE 2 MG: 2 INJECTION, SOLUTION INTRAMUSCULAR; INTRAVENOUS at 16:36

## 2022-01-11 RX ADMIN — HEPARIN SODIUM 5000 UNITS: 5000 INJECTION INTRAVENOUS; SUBCUTANEOUS at 22:04

## 2022-01-11 RX ADMIN — SENNOSIDES AND DOCUSATE SODIUM 1 TABLET: 50; 8.6 TABLET ORAL at 22:05

## 2022-01-11 RX ADMIN — OXYCODONE HYDROCHLORIDE AND ACETAMINOPHEN 1 TABLET: 5; 325 TABLET ORAL at 22:05

## 2022-01-11 RX ADMIN — SODIUM CHLORIDE, PRESERVATIVE FREE 10 ML: 5 INJECTION INTRAVENOUS at 23:17

## 2022-01-11 ASSESSMENT — PAIN DESCRIPTION - ORIENTATION
ORIENTATION: LEFT
ORIENTATION: LEFT;RIGHT

## 2022-01-11 ASSESSMENT — PAIN SCALES - GENERAL
PAINLEVEL_OUTOF10: 6
PAINLEVEL_OUTOF10: 4
PAINLEVEL_OUTOF10: 10
PAINLEVEL_OUTOF10: 5
PAINLEVEL_OUTOF10: 9
PAINLEVEL_OUTOF10: 10
PAINLEVEL_OUTOF10: 6

## 2022-01-11 ASSESSMENT — ENCOUNTER SYMPTOMS
ABDOMINAL PAIN: 0
EYE DISCHARGE: 0
FACIAL SWELLING: 0
SHORTNESS OF BREATH: 1
CHOKING: 0
EYE PAIN: 0
VOMITING: 0
RHINORRHEA: 0
NAUSEA: 0
DIARRHEA: 0
CHEST TIGHTNESS: 0

## 2022-01-11 ASSESSMENT — PAIN DESCRIPTION - LOCATION
LOCATION: WRIST;FOOT
LOCATION: WRIST

## 2022-01-11 ASSESSMENT — PAIN DESCRIPTION - PAIN TYPE: TYPE: CHRONIC PAIN

## 2022-01-11 ASSESSMENT — PAIN SCALES - WONG BAKER
WONGBAKER_NUMERICALRESPONSE: 2
WONGBAKER_NUMERICALRESPONSE: 6

## 2022-01-11 ASSESSMENT — PAIN DESCRIPTION - DESCRIPTORS: DESCRIPTORS: ACHING

## 2022-01-11 NOTE — TELEPHONE ENCOUNTER
Submitted PA for Colchicine 0.6MG tablets. Key: [id number]. \"Please advise the dispensing pharmacy to contact the [address] for assistance. \"

## 2022-01-11 NOTE — ED TRIAGE NOTES
Patient arrives c/o dizziness and gout pain in his left wrist and right heel. Patient states that his kidney doctor also wanted him to be admitted because: \"My kidney level was 8 last week and my doctor wanted it there or higher this week, but it wasn't. \"

## 2022-01-11 NOTE — H&P
Hospital Medicine History & Physical      PCP: Melodie Meyers MD    Date of Admission: 1/11/2022    Date of Service: 1/11/2022    Pt seen/examined on 1/11/2022    Admitted to Inpatient with expected LOS greater than two midnights due to medical therapy. Chief Complaint:     Chief Complaint   Patient presents with    Dizziness    Abnormal Lab     \"Kidney levels off\"    Gout     L wrist, right heel       History Of Present Illness:      59 y.o. male who presented to [facility]. with abnormal creatinine that began several years ago and has become progressively worse recently. This is now a/w generalized fatigue and occasional dizziness. Cr found to be significantly elevated    Also has L wrist and R heel pain from gout that has been flaring recently. He has CELSA on CPAP but machine is broke and replacement is backordered so not used for 6 months.     Past Medical History:      Reviewed and non-contributory except as noted below      Diagnosis Date    Arthralgia of multiple joints 10/27/2015    Arthritis     Atrial fibrillation (HCC)     Chronic kidney disease     stage 4    Chronic systolic congestive heart failure ([address] 8/16/2021    CRI (chronic renal insufficiency)     Diabetic nephropathy associated with type 2 diabetes mellitus ([address] 10/11/2018    Diverticulosis     Elevated PSA     Erectile dysfunction     Gout     Gout     Hemrrhoid NOS w/ complication NEC     History of MRSA infection     Hypertension     CELSA (obstructive sleep apnea) 07/14/2017    uses C-pap machine    Type 2 diabetes mellitus without complication, without long-term current use of insulin ([address] 8/24/5450    Umbilical hernia without obstruction and without gangrene 2/2/2016       Past Surgical History:      Reviewed and non-contributory except as noted below      Procedure Laterality Date    COLONOSCOPY      DILATATION, ESOPHAGUS      HEMICOLECTOMY      UPPER GASTROINTESTINAL ENDOSCOPY  5/28/16 biopsies, multiple small gastric ulcers    UPPER GASTROINTESTINAL ENDOSCOPY  09/12/2016       Medications Prior to Admission:      Reviewed and non-contributory except as noted below  Prior to Admission medications    Medication Sig Start Date End Date Taking? Authorizing Provider   colchicine (COLCRYS) 0.6 MG tablet Take 1 tablet by mouth daily as needed for Pain (as needed for pain related to gout) 1/10/22   Melodie Meyers MD   oxyCODONE-acetaminophen (PERCOCET) 5-325 MG per tablet Take 1 tablet by mouth every 8 hours as needed for Pain for up to 30 days. 1/10/22 2/9/22  Melodie Meyers MD   vitamin D (ERGOCALCIFEROL) 1.25 MG (24887 UT) CAPS capsule Take 1 capsule by mouth once a week 12/28/21   Hamilton Beltran MD   calcitRIOL (ROCALTROL) 0.25 MCG capsule Take 1 capsule by mouth daily 12/28/21   Hamilton Beltran MD   omeprazole (PRILOSEC) 40 MG delayed release capsule TAKE 1 CAPSULE BY MOUTH EVERY DAY 12/16/21   Melodie Meyers MD   allopurinol (ZYLOPRIM) 300 MG tablet TAKE 1 TABLET BY MOUTH EVERY DAY 11/30/21   Melodie Meyers MD   terazosin (HYTRIN) 5 MG capsule Take 1 capsule by mouth nightly TAKE 1 CAPSULE BY MOUTH EVERY EVENING 11/30/21   Melodie Meyers MD   metoprolol succinate (TOPROL XL) 50 MG extended release tablet TAKE 1 TABLET BY MOUTH EVERY DAY 11/30/21   Melodie Meyers MD   atorvastatin (LIPITOR) 20 MG tablet TAKE 1 TABLET BY MOUTH EVERY DAY 11/30/21   Melodie Meyers MD   NIFEdipine (ADALAT CC) 90 MG extended release tablet TAKE 1 TABLET BY MOUTH EVERY DAY 11/30/21   Melodie Meyers MD   sildenafil (VIAGRA) 100 MG tablet Take 1 tablet by mouth as needed for Erectile Dysfunction 8/16/21   Melodie Meyers MD   aspirin 81 MG tablet Take 81 mg by mouth daily    Historical Provider, MD       Allergies:     Reviewed and non-contributory except as noted below   Patient has no known allergies.     Social History:      Reviewed and non-contributory except as noted below    TOBACCO: reports that he has never smoked. He has never used smokeless tobacco.  ETOH:   reports current alcohol use. Family History:      Reviewed and non-contributory except as noted below        Problem Relation Age of Onset    Hypertension Other     Breast Cancer Mother     Colon Cancer Father     Diabetes Maternal Grandmother     Coronary Art Dis Brother        REVIEW OF SYSTEMS:   Pertinent positives and negatives as noted in the HPI. All other systems reviewed and negative. PHYSICAL EXAM PERFORMED:    BP (!) 157/94   Pulse 98   Temp 98.2 °F (36.8 °C) (Oral)   Resp 16   SpO2 97%     General appearance:  No acute distress, appears stated age  Eyes: Pupils equal, round, reactive to light, conjunctiva/corneas clear  Ears/Nose/Mouth/Throat: No external lesions or scars, hearing intact to voice  Neck: Trachea midline, no masses noted, no thyromegaly  Respiratory:  Non-labored breathing, clear to auscultation bilaterally  Cardiovascular: Regular rate and rhythm, no murmurs, gallops, or rubs  Abdomen: soft, non-tender, non-distended  Musculoskeletal: Warm, well perfused, no cyanosis or edema  Skin: normal color, no wounds noted  Psychiatric: A&Ox4, good insight and judgment    Labs:     Recent Labs     01/10/22  1006 01/11/22  1441   WBC 4.6 7.1   HGB 11.0* 11.3*   HCT 32.9* 34.5*    205     Recent Labs     01/10/22  1006 01/11/22  1441    141   K 3.5 3.4*   CL 99 101   CO2 22 23   BUN 48* 51*   CREATININE 8.4* 7.7*   CALCIUM 5.6* 5.6*   PHOS 5.8*  --      Recent Labs     01/10/22  1006   AST 15   ALT 17   BILITOT <0.2   ALKPHOS 91     No results for input(s): INR in the last 72 hours.   Recent Labs     01/10/22  1006 01/11/22  1441   CKTOTAL 1,520*  --    TROPONINI  --  0.04*       Urinalysis:      Lab Results   Component Value Date    NITRU Negative 12/21/2021    WBCUA None seen 12/21/2021    BACTERIA Rare 12/21/2021    RBCUA 3-4 12/21/2021    BLOODU MODERATE 12/21/2021    SPECGRAV 1.020 12/21/2021    GLUCOSEU Negative 12/21/2021       Radiology:     XR WRIST LEFT (MIN 3 VIEWS)   Final Result      No acute bony pathology      XR ANKLE RIGHT (MIN 3 VIEWS)   Final Result      No acute bony pathology. Heel spur      XR CHEST (2 VW)   Final Result      No acute pulmonary pathology or significant change from the prior exam                      ASSESSMENT:    Active Hospital Problems    Diagnosis Date Noted    ANTONIO (acute kidney injury) ([facility] [address] [N17.9] 01/11/2022       PLAN:    # ANTONIO on CKD  -nephrology consulted for management  -likely to need dialysis soon    # T2DM  -SSI, accuchecks    # HTN  -monitor    # Gout  -reduced dose of allopurinol for worsening kidney function    # CELSA on CPAP  # pAF  # Chronic systolic HF  # HLD  -home management except as above    DVT Prophylaxis: Heparin  Diet: No diet orders on file  Code Status: Prior    PT/OT Eval Status: Ongoing    Dispo: Yevgeniy Archuleta pending clinical improvement    Sabrina Atkinson MD    Thank you Cheryl Ordaz MD for the opportunity to be involved in this patient's care. If you have any questions or concerns please feel free to contact me at [phone].

## 2022-01-11 NOTE — ED PROVIDER NOTES
[address] ENCOUNTER          ATTENDING PHYSICIAN NOTE       Date of evaluation: 1/11/2022    Chief Complaint     Dizziness, Abnormal Lab (\"Kidney levels off\"), and Gout (L wrist, right heel)      History of Present Illness     Darline Field is a 59 y.o. male who presents chief complaint of dizziness, abnormal lab, gout. Patient states he was sent in by his kidney doctors for work-up of his abnormal kidney numbers. On chart review it appears his GFR has been low less than 30 for almost a year, but recently it dropped to less than 10, and it appears from the note that kidney doctor wanted him worked up for this. Patient states he is still urinating but over the last few months has been feeling short of breath particularly with exertion. He is also occasionally been feeling dizzy. Per the note patient has a history of EF of 45 to 50% as well. His wife is concerned because he has been unable to use his CPAP for the last month because his been broken but she is working with the company to get a new CPAP but she does want his \"respiratory problems \"addressed. On review of systems patient also states he feels like he is having gout in his left wrist and right heel. States he has had symptoms similar to this before and it feels like gout. He takes allopurinol daily and for the last few days has been doing colchicine to help with his pain which has been helping but he ran out of his colchicine today. Denies fevers, chest pain, abdominal pain    Review of Systems     Review of Systems   Constitutional: Negative for activity change, appetite change, fatigue and fever. HENT: Negative for ear pain, facial swelling, nosebleeds and rhinorrhea. Eyes: Negative for pain, discharge and visual disturbance. Respiratory: Positive for shortness of breath. Negative for choking and chest tightness. Cardiovascular: Negative for chest pain and palpitations.    Gastrointestinal: Negative for abdominal pain, diarrhea, nausea and vomiting. Endocrine: Negative for cold intolerance, heat intolerance and polyuria. Genitourinary: Negative for dysuria, flank pain and hematuria. Musculoskeletal: Positive for arthralgias. Negative for joint swelling and myalgias. Skin: Negative for rash and wound. Allergic/Immunologic: Negative for environmental allergies. Neurological: Positive for dizziness. Negative for seizures, syncope, weakness and light-headedness. Hematological: Does not bruise/bleed easily. Psychiatric/Behavioral: Negative for confusion and hallucinations. Past Medical, Surgical, Family, and Social History     He has a past medical history of Arthralgia of multiple joints, Arthritis, Atrial fibrillation ([address] Chronic kidney disease, Chronic systolic congestive heart failure ([address] CRI (chronic renal insufficiency), Diabetic nephropathy associated with type 2 diabetes mellitus ([address] Diverticulosis, Elevated PSA, Erectile dysfunction, Gout, Gout, Hemrrhoid NOS w/ complication NEC, History of MRSA infection, Hypertension, CELSA (obstructive sleep apnea), Type 2 diabetes mellitus without complication, without long-term current use of insulin ([facility] [address] and Umbilical hernia without obstruction and without gangrene. He has a past surgical history that includes hemicolectomy; Upper gastrointestinal endoscopy (5/28/16); Colonoscopy; Dilatation, esophagus; and Upper gastrointestinal endoscopy (09/12/2016). His family history includes Breast Cancer in his mother; Colon Cancer in his father; Coronary Art Dis in his brother; Diabetes in his maternal grandmother; Hypertension in an other family member. He reports that he has never smoked. He has never used smokeless tobacco. He reports current alcohol use. He reports that he does not use drugs.     Medications     Previous Medications    ALLOPURINOL (ZYLOPRIM) 300 MG TABLET    TAKE 1 TABLET BY MOUTH EVERY DAY    ASPIRIN 81 MG TABLET    Take 81 mg by mouth daily    ATORVASTATIN (LIPITOR) 20 MG TABLET    TAKE 1 TABLET BY MOUTH EVERY DAY    CALCITRIOL (ROCALTROL) 0.25 MCG CAPSULE    Take 1 capsule by mouth daily    COLCHICINE (COLCRYS) 0.6 MG TABLET    Take 1 tablet by mouth daily as needed for Pain (as needed for pain related to gout)    METOPROLOL SUCCINATE (TOPROL XL) 50 MG EXTENDED RELEASE TABLET    TAKE 1 TABLET BY MOUTH EVERY DAY    NIFEDIPINE (ADALAT CC) 90 MG EXTENDED RELEASE TABLET    TAKE 1 TABLET BY MOUTH EVERY DAY    OMEPRAZOLE (PRILOSEC) 40 MG DELAYED RELEASE CAPSULE    TAKE 1 CAPSULE BY MOUTH EVERY DAY    OXYCODONE-ACETAMINOPHEN (PERCOCET) 5-325 MG PER TABLET    Take 1 tablet by mouth every 8 hours as needed for Pain for up to 30 days. SILDENAFIL (VIAGRA) 100 MG TABLET    Take 1 tablet by mouth as needed for Erectile Dysfunction    TERAZOSIN (HYTRIN) 5 MG CAPSULE    Take 1 capsule by mouth nightly TAKE 1 CAPSULE BY MOUTH EVERY EVENING    VITAMIN D (ERGOCALCIFEROL) 1.25 MG (36640 UT) CAPS CAPSULE    Take 1 capsule by mouth once a week       Allergies     He has No Known Allergies. Physical Exam     INITIAL VITALS: BP: (!) 164/98, Temp: 98.2 °F (36.8 °C), Pulse: (S) 106, Resp: 18, SpO2: 99 %   Physical Exam  Constitutional:       General: He is not in acute distress. Appearance: He is well-developed. He is not diaphoretic. HENT:      Head: Normocephalic and atraumatic. Mouth/Throat:      Pharynx: No oropharyngeal exudate. Eyes:      General:         Right eye: No discharge. Left eye: No discharge. Conjunctiva/sclera: Conjunctivae normal.      Pupils: Pupils are equal, round, and reactive to light. Neck:      Thyroid: No thyromegaly. Vascular: No JVD. Trachea: No tracheal deviation. Cardiovascular:      Rate and Rhythm: Normal rate and regular rhythm. Heart sounds: Normal heart sounds. No murmur heard. No friction rub. No gallop.     Pulmonary:      Effort: Pulmonary effort is normal. No respiratory distress. Breath sounds: Normal breath sounds. No stridor. No wheezing or rales. Chest:      Chest wall: No tenderness. Abdominal:      General: Bowel sounds are normal. There is no distension. Palpations: Abdomen is soft. Tenderness: There is no abdominal tenderness. There is no guarding or rebound. Musculoskeletal:         General: No tenderness or deformity. Normal range of motion. Cervical back: Normal range of motion and neck supple. Comments: Tenderness to palpation of right medial ankle and left wrist ulnar side, decreased range of motion of wrist and ankle. No obvious significant swelling or erythema. Good pulses. Skin:     General: Skin is warm and dry. Capillary Refill: Capillary refill takes less than 2 seconds. Findings: No erythema or rash. Neurological:      General: No focal deficit present. Mental Status: He is alert and oriented to person, place, and time. Cranial Nerves: No cranial nerve deficit. Motor: No abnormal muscle tone. Coordination: Coordination normal.   Psychiatric:         Behavior: Behavior normal.         Diagnostic Results     EKG   Indication: Shortness of breath. Heart rate 104, intervals normal, axis almost left deviated but does not meet criteria yet. No signs of ST elevation or depression, no T wave inversions. Comparison to prior EKG with no changes. Impression: Sinus tachycardia with no active ischemia. RADIOLOGY:  XR WRIST LEFT (MIN 3 VIEWS)   Final Result      No acute bony pathology      XR ANKLE RIGHT (MIN 3 VIEWS)   Final Result      No acute bony pathology.       Heel spur      XR CHEST (2 VW)   Final Result      No acute pulmonary pathology or significant change from the prior exam                      LABS:   Results for orders placed or performed during the hospital encounter of 19/43/42   Basic Metabolic Panel w/ Reflex to MG   Result Value Ref Range    Sodium 141 136 - 145 mmol/L Potassium reflex Magnesium 3.4 (L) 3.5 - 5.1 mmol/L    Chloride 101 99 - 110 mmol/L    CO2 23 21 - 32 mmol/L    Anion Gap 17 (H) 3 - 16    Glucose 143 (H) 70 - 99 mg/dL    BUN 51 (H) 7 - 20 mg/dL    CREATININE 7.7 (HH) 0.8 - 1.3 mg/dL    GFR Non-African American 7 (A) >60    GFR  9 (A) >60    Calcium 5.6 (LL) 8.3 - 10.6 mg/dL   CBC auto differential   Result Value Ref Range    WBC 7.1 4.0 - 11.0 K/uL    RBC 4.00 (L) 4.20 - 5.90 M/uL    Hemoglobin 11.3 (L) 13.5 - 17.5 g/dL    Hematocrit 34.5 (L) 40.5 - 52.5 %    MCV 86.3 80.0 - 100.0 fL    MCH 28.4 26.0 - 34.0 pg    MCHC 32.8 31.0 - 36.0 g/dL    RDW 13.5 12.4 - 15.4 %    Platelets 504 551 - 162 K/uL    MPV 8.7 5.0 - 10.5 fL    Neutrophils % 70.2 %    Lymphocytes % 19.3 %    Monocytes % 6.8 %    Eosinophils % 3.0 %    Basophils % 0.7 %    Neutrophils Absolute 5.0 1.7 - 7.7 K/uL    Lymphocytes Absolute 1.4 1.0 - 5.1 K/uL    Monocytes Absolute 0.5 0.0 - 1.3 K/uL    Eosinophils Absolute 0.2 0.0 - 0.6 K/uL    Basophils Absolute 0.1 0.0 - 0.2 K/uL   Brain Natriuretic Peptide   Result Value Ref Range    Pro- (H) 0 - 124 pg/mL   Troponin   Result Value Ref Range    Troponin 0.04 (H) <0.01 ng/mL   EKG 12 Lead   Result Value Ref Range    Ventricular Rate 104 BPM    Atrial Rate 104 BPM    P-R Interval 146 ms    QRS Duration 80 ms    Q-T Interval 396 ms    QTc Calculation (Bazett) 520 ms    P Axis 55 degrees    R Axis -24 degrees    T Axis 51 degrees    Diagnosis       EKG performed in ER and to be interpreted by ER physician. Confirmed by MD, ER ([id number]),  Vikki Price ([phone]) on 1/11/2022 2:50:28 PM       ED BEDSIDE ULTRASOUND:  none    RECENT VITALS:  BP: (!) 157/94,Temp: 98.2 °F (36.8 °C), Pulse: 98, Resp: 16, SpO2: 97 %     Procedures     none    ED Course     Nursing Notes, Past Medical Hx, Past Surgical Hx, Social Hx,Allergies, and Family Hx were reviewed.     patient was given the following medications:  Orders Placed This Encounter   Medications  oxyCODONE-acetaminophen (PERCOCET) 5-325 MG per tablet 1 tablet    morphine (PF) injection 2 mg       CONSULTS:  IP CONSULT TO NEPHROLOGY  IP CONSULT TO HOSPITALIST    MEDICAL DECISIONMAKING / ASSESSMENT / Tayler Zuniga is a 59 y.o. male who presents with a chief complaint of dizziness, abnormal labs, gout pain. Initial exam reveals a well-appearing male in no acute distress with normal vitals, afebrile. Physical exam remarkable for mild tenderness to palpation in left wrist and right ankle, otherwise normal exam, normal heart and lungs, normal gait from the waiting room all the way back to his room. I was able to speak with patient's nephrologist who wants the patient admitted to try and \"recover some of his GFR \"but the nephrologist thinks that the patient will likely end up on dialysis prior to the end of his admission. Patient's labs with creatinine of 7.7, no findings of hyperkalemia, troponin slightly elevated at 0.04, EKG nonischemic, BNP elevated at 710. Chest x-ray without pulmonary edema. Wrist and ankle x-rays unremarkable. Patient given Percocet with no improvement in pain, therefore morphine ordered. Patient will be admitted to hospitalist for further work-up. Dr. Caitlin Sheffield to follow along for plan      Clinical Impression     1. End stage renal disease ([facility] [address]    2. Shortness of breath        Disposition     PATIENT REFERRED TO:  No follow-up provider specified.     DISCHARGE MEDICATIONS:  New Prescriptions    No medications on file       DISPOSITION  - admit to hospitalist       Nikos Berger MD  01/11/22 8519

## 2022-01-12 PROBLEM — N18.9 ACUTE KIDNEY INJURY SUPERIMPOSED ON CKD (HCC): Status: ACTIVE | Noted: 2021-12-21

## 2022-01-12 LAB
ANION GAP SERPL CALCULATED.3IONS-SCNC: 17 MMOL/L (ref 3–16)
BUN BLDV-MCNC: 52 MG/DL (ref 7–20)
CALCIUM SERPL-MCNC: 5.7 MG/DL (ref 8.3–10.6)
CHLORIDE BLD-SCNC: 99 MMOL/L (ref 99–110)
CO2: 22 MMOL/L (ref 21–32)
CREAT SERPL-MCNC: 8.7 MG/DL (ref 0.8–1.3)
ESTIMATED AVERAGE GLUCOSE: 154.2 MG/DL
GFR AFRICAN AMERICAN: 8
GFR NON-AFRICAN AMERICAN: 6
GLUCOSE BLD-MCNC: 147 MG/DL (ref 70–99)
GLUCOSE BLD-MCNC: 196 MG/DL (ref 70–99)
GLUCOSE BLD-MCNC: 247 MG/DL (ref 70–99)
HBA1C MFR BLD: 7 %
HCT VFR BLD CALC: 33.5 % (ref 40.5–52.5)
HEMOGLOBIN: 10.6 G/DL (ref 13.5–17.5)
MCH RBC QN AUTO: 28 PG (ref 26–34)
MCHC RBC AUTO-ENTMCNC: 31.8 G/DL (ref 31–36)
MCV RBC AUTO: 88.1 FL (ref 80–100)
PDW BLD-RTO: 13.8 % (ref 12.4–15.4)
PERFORMED ON: ABNORMAL
PERFORMED ON: ABNORMAL
PLATELET # BLD: 174 K/UL (ref 135–450)
PMV BLD AUTO: 8.7 FL (ref 5–10.5)
POTASSIUM REFLEX MAGNESIUM: 4.2 MMOL/L (ref 3.5–5.1)
RBC # BLD: 3.8 M/UL (ref 4.2–5.9)
SARS-COV-2, NAAT: NOT DETECTED
SODIUM BLD-SCNC: 138 MMOL/L (ref 136–145)
TROPONIN: 0.03 NG/ML
TROPONIN: 0.03 NG/ML
TROPONIN: 0.04 NG/ML
TROPONIN: 0.04 NG/ML
URIC ACID, SERUM: 6.1 MG/DL (ref 3.5–7.2)
WBC # BLD: 4.1 K/UL (ref 4–11)

## 2022-01-12 PROCEDURE — 97162 PT EVAL MOD COMPLEX 30 MIN: CPT

## 2022-01-12 PROCEDURE — 80048 BASIC METABOLIC PNL TOTAL CA: CPT

## 2022-01-12 PROCEDURE — 6360000002 HC RX W HCPCS: Performed by: INTERNAL MEDICINE

## 2022-01-12 PROCEDURE — 97116 GAIT TRAINING THERAPY: CPT

## 2022-01-12 PROCEDURE — 6370000000 HC RX 637 (ALT 250 FOR IP): Performed by: INTERNAL MEDICINE

## 2022-01-12 PROCEDURE — 2580000003 HC RX 258: Performed by: INTERNAL MEDICINE

## 2022-01-12 PROCEDURE — 84550 ASSAY OF BLOOD/URIC ACID: CPT

## 2022-01-12 PROCEDURE — 2060000000 HC ICU INTERMEDIATE R&B

## 2022-01-12 PROCEDURE — 87635 SARS-COV-2 COVID-19 AMP PRB: CPT

## 2022-01-12 PROCEDURE — 36415 COLL VENOUS BLD VENIPUNCTURE: CPT

## 2022-01-12 PROCEDURE — 97530 THERAPEUTIC ACTIVITIES: CPT

## 2022-01-12 PROCEDURE — 99223 1ST HOSP IP/OBS HIGH 75: CPT | Performed by: INTERNAL MEDICINE

## 2022-01-12 PROCEDURE — 84484 ASSAY OF TROPONIN QUANT: CPT

## 2022-01-12 PROCEDURE — 85027 COMPLETE CBC AUTOMATED: CPT

## 2022-01-12 PROCEDURE — 97165 OT EVAL LOW COMPLEX 30 MIN: CPT

## 2022-01-12 RX ORDER — PREDNISONE 20 MG/1
40 TABLET ORAL ONCE
Status: COMPLETED | OUTPATIENT
Start: 2022-01-12 | End: 2022-01-12

## 2022-01-12 RX ORDER — CYCLOBENZAPRINE HCL 10 MG
5 TABLET ORAL 2 TIMES DAILY PRN
Status: DISCONTINUED | OUTPATIENT
Start: 2022-01-12 | End: 2022-01-18 | Stop reason: HOSPADM

## 2022-01-12 RX ORDER — PREDNISONE 20 MG/1
20 TABLET ORAL DAILY
Status: COMPLETED | OUTPATIENT
Start: 2022-01-12 | End: 2022-01-14

## 2022-01-12 RX ORDER — OXYCODONE HYDROCHLORIDE AND ACETAMINOPHEN 5; 325 MG/1; MG/1
1 TABLET ORAL EVERY 6 HOURS PRN
Status: DISCONTINUED | OUTPATIENT
Start: 2022-01-12 | End: 2022-01-13

## 2022-01-12 RX ORDER — OXYCODONE HYDROCHLORIDE AND ACETAMINOPHEN 5; 325 MG/1; MG/1
1 TABLET ORAL ONCE
Status: COMPLETED | OUTPATIENT
Start: 2022-01-12 | End: 2022-01-12

## 2022-01-12 RX ADMIN — HEPARIN SODIUM 5000 UNITS: 5000 INJECTION INTRAVENOUS; SUBCUTANEOUS at 14:18

## 2022-01-12 RX ADMIN — CYCLOBENZAPRINE 5 MG: 10 TABLET, FILM COATED ORAL at 14:18

## 2022-01-12 RX ADMIN — ASPIRIN 81 MG: 81 TABLET, CHEWABLE ORAL at 08:47

## 2022-01-12 RX ADMIN — ATORVASTATIN CALCIUM 20 MG: 20 TABLET, FILM COATED ORAL at 08:47

## 2022-01-12 RX ADMIN — PREDNISONE 20 MG: 20 TABLET ORAL at 08:49

## 2022-01-12 RX ADMIN — DOXAZOSIN 4 MG: 4 TABLET ORAL at 08:47

## 2022-01-12 RX ADMIN — METOPROLOL SUCCINATE 50 MG: 50 TABLET, FILM COATED, EXTENDED RELEASE ORAL at 08:47

## 2022-01-12 RX ADMIN — PREDNISONE 40 MG: 20 TABLET ORAL at 01:32

## 2022-01-12 RX ADMIN — HEPARIN SODIUM 5000 UNITS: 5000 INJECTION INTRAVENOUS; SUBCUTANEOUS at 21:50

## 2022-01-12 RX ADMIN — HEPARIN SODIUM 5000 UNITS: 5000 INJECTION INTRAVENOUS; SUBCUTANEOUS at 05:48

## 2022-01-12 RX ADMIN — OXYCODONE HYDROCHLORIDE AND ACETAMINOPHEN 1 TABLET: 5; 325 TABLET ORAL at 01:32

## 2022-01-12 RX ADMIN — CALCITRIOL CAPSULES 0.25 MCG 0.25 MCG: 0.25 CAPSULE ORAL at 08:47

## 2022-01-12 RX ADMIN — OXYCODONE HYDROCHLORIDE AND ACETAMINOPHEN 1 TABLET: 5; 325 TABLET ORAL at 16:25

## 2022-01-12 RX ADMIN — SODIUM CHLORIDE, PRESERVATIVE FREE 10 ML: 5 INJECTION INTRAVENOUS at 08:48

## 2022-01-12 RX ADMIN — OXYCODONE HYDROCHLORIDE AND ACETAMINOPHEN 1 TABLET: 5; 325 TABLET ORAL at 08:21

## 2022-01-12 RX ADMIN — ALLOPURINOL 100 MG: 100 TABLET ORAL at 08:47

## 2022-01-12 RX ADMIN — NIFEDIPINE 90 MG: 90 TABLET, FILM COATED, EXTENDED RELEASE ORAL at 08:47

## 2022-01-12 RX ADMIN — OXYCODONE HYDROCHLORIDE AND ACETAMINOPHEN 1 TABLET: 5; 325 TABLET ORAL at 21:50

## 2022-01-12 RX ADMIN — SODIUM CHLORIDE, PRESERVATIVE FREE 10 ML: 5 INJECTION INTRAVENOUS at 21:52

## 2022-01-12 RX ADMIN — SENNOSIDES AND DOCUSATE SODIUM 1 TABLET: 50; 8.6 TABLET ORAL at 08:47

## 2022-01-12 ASSESSMENT — PAIN DESCRIPTION - ONSET
ONSET: ON-GOING

## 2022-01-12 ASSESSMENT — PAIN SCALES - GENERAL
PAINLEVEL_OUTOF10: 4
PAINLEVEL_OUTOF10: 7
PAINLEVEL_OUTOF10: 7
PAINLEVEL_OUTOF10: 4
PAINLEVEL_OUTOF10: 5
PAINLEVEL_OUTOF10: 0
PAINLEVEL_OUTOF10: 4

## 2022-01-12 ASSESSMENT — PAIN DESCRIPTION - ORIENTATION
ORIENTATION: LEFT
ORIENTATION: LEFT
ORIENTATION: RIGHT;LEFT
ORIENTATION: LEFT

## 2022-01-12 ASSESSMENT — PAIN - FUNCTIONAL ASSESSMENT
PAIN_FUNCTIONAL_ASSESSMENT: ACTIVITIES ARE NOT PREVENTED

## 2022-01-12 ASSESSMENT — PAIN DESCRIPTION - LOCATION
LOCATION: WRIST
LOCATION: ARM
LOCATION: WRIST
LOCATION: ARM

## 2022-01-12 ASSESSMENT — PAIN DESCRIPTION - FREQUENCY
FREQUENCY: CONTINUOUS
FREQUENCY: CONTINUOUS
FREQUENCY: INTERMITTENT
FREQUENCY: CONTINUOUS

## 2022-01-12 ASSESSMENT — PAIN DESCRIPTION - PAIN TYPE
TYPE: ACUTE PAIN
TYPE: CHRONIC PAIN
TYPE: ACUTE PAIN

## 2022-01-12 ASSESSMENT — PAIN DESCRIPTION - PROGRESSION
CLINICAL_PROGRESSION: NOT CHANGED

## 2022-01-12 ASSESSMENT — PAIN DESCRIPTION - DESCRIPTORS
DESCRIPTORS: ACHING
DESCRIPTORS: ACHING
DESCRIPTORS: ACHING;CRAMPING
DESCRIPTORS: ACHING

## 2022-01-12 NOTE — PROGRESS NOTES
Occupational Therapy   Occupational Therapy Initial Assessment, Tx, DC  Date: 2022   Patient Name: Fish Castano  MRN: [id number]     : 1960    Date of Service: 2022    Discharge Recommendations: Fish Castano scored a  on the AM-PAC ADL Inpatient form. Current research shows that an AM-PAC score of 18 or greater is typically associated with a discharge to the patient's home setting. Based on the patient's AM-PAC score, and their current ADL deficits, it is recommended that the patient have 2-3 sessions per week of Occupational Therapy at d/c to increase the patient's independence. At this time, this patient demonstrates the endurance and safety to discharge home with (home vs OP services) and a follow up treatment frequency of 2-3x/wk. Please see assessment section for further patient specific details. If patient discharges prior to next session this note will serve as a discharge summary. Please see below for the latest assessment towards goals. 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 Pt admit with SOB and abnormal kidney levels. Pt moving up ad sravanthi this date. Pt moving with SPVN to IND. To and from bathroom, stance at sink with SPVN to IND. Pt with no needs. DC acute OT. Decision Making: Low Complexity  OT Education: OT Role;Plan of Care;ADL Adaptive Strategies;Transfer Training  Patient Education: verb understanding  REQUIRES OT FOLLOW UP: No  Activity Tolerance  Activity Tolerance: Patient Tolerated treatment well  Safety Devices  Safety Devices in place: Yes  Type of devices: All fall risk precautions in place;Call light within reach; Left in bed;Nurse notified           Patient Diagnosis(es): The primary encounter diagnosis was End stage renal disease ([facility] [address] A diagnosis of Shortness of breath was also pertinent to this visit.      has a past medical history of Arthralgia of multiple joints, Arthritis, Atrial fibrillation (HCC), Chronic kidney disease, Chronic systolic congestive heart failure ([facility] [address] CRI (chronic renal insufficiency), Diabetic nephropathy associated with type 2 diabetes mellitus ([facility] [address] Diverticulosis, Elevated PSA, Erectile dysfunction, Gout, Gout, Hemrrhoid NOS w/ complication NEC, History of MRSA infection, Hypertension, CELSA (obstructive sleep apnea), Type 2 diabetes mellitus without complication, without long-term current use of insulin ([facility] [address] and Umbilical hernia without obstruction and without gangrene. has a past surgical history that includes hemicolectomy; Upper gastrointestinal endoscopy (5/28/16); Colonoscopy; Dilatation, esophagus; and Upper gastrointestinal endoscopy (09/12/2016). Restrictions  Position Activity Restriction  Other position/activity restrictions: Up as tolerated    Subjective   General  Chart Reviewed: Yes  Additional Pertinent Hx: 59 y.o. male who presented [facility]. with abnormal creatinine. This is now a/w generalized fatigue and occasional dizziness. .Also has L wrist and R heel pain from gout that has been flaring recently. Referring Practitioner: Pili Ernst MD  Diagnosis: SOB  Subjective  Subjective:  In bed agreeable to session, reporting up ad sravanthi  Patient Currently in Pain: Denies  Vital Signs  Temp: 97.8 °F (36.6 °C)  Temp Source: Oral  Pulse: 96  Heart Rate Source: Monitor  Resp: 16  BP: (!) 152/88  BP Location: Left upper arm  Patient Position: Semi fowlers  Patient Currently in Pain: Denies  Oxygen Therapy  SpO2: 96 %  O2 Device: None (Room air)  Social/Functional History  Social/Functional History  Lives With: Spouse  Type of Home: House (Bi-level home)  Home Layout: Two level,Bed/Bath upstairs  Home Access: Stairs to enter with rails (4-5)  Bathroom Shower/Tub: Tub/Shower unit  Bathroom Toilet: Standard  Bathroom Equipment: Shower chair  Home Equipment:  (None)  ADL Assistance: Independent  Homemaking

## 2022-01-12 NOTE — PLAN OF CARE
Problem: Pain:  Goal: Pain level will decrease  Description: Pain level will decrease  Outcome: Ongoing  Note: Patient now rating pain 4/10 when previously rated pain 7/10. Patient received prn percocet for pain. Reported relief. Problem: OXYGENATION/RESPIRATORY FUNCTION  Goal: Patient will achieve/maintain normal respiratory rate/effort  Description: Respiratory rate and effort will be within normal limits for the patient  Outcome: Ongoing  Note: Patient respiratory rate regular and O2 sat 96% at last check. On room air.

## 2022-01-12 NOTE — PROGRESS NOTES
Physical Therapy    Facility/Department: [facility] [address] SURGERY  Initial Assessment and DISCHARGE    NAME: Sima Rowell  : 1960  MRN: [id number]    Date of Service: 2022    Discharge Recommendations:  Sima Rowell scored a  on the AM-PAC short mobility form. At this time, no further PT is recommended upon discharge. Recommend patient returns to prior setting with prior services. PT Equipment Recommendations  Equipment Needed: No    Assessment   Assessment: Pt from home with shortness of breath. Pt doing well from PT standpoint, demonstrating independence with transfers and steady gait. Pt is up ad sravanthi in room. No further PT needs identified in place. Will sign off and defer continued activity/ambulation to RN staff for remainder of stay. Decision Making: Medium Complexity  Patient Education: Role of PT. Pt verbalized understanding. REQUIRES PT FOLLOW UP: No         Restrictions  Up as tolerated     Vision/Hearing  Vision: Within Functional Limits (wears glasses)  Hearing: Within functional limits       Subjective  Chart Reviewed: Yes  Additional Pertinent Hx: Pt to ED  with dizziness, abnormal lab, and gout (L wrist, R heel). Pt admitted for shortness of breath. CXR (-).  R ankle XR (-). L wrist XR (-). PMH:  CHF, DM, HTN, Afib, CKD, diverticulosis, elevated PSA, gout, CELSA  Diagnosis: Shortness of breath    Subjective  Subjective: Pt found supine in bed. Pt reports no mobility issues and up ad sravanthi. \"You guys must not have much to do if you're seeing me. \"  Pain Screening  Patient Currently in Pain: Denies    Orientation  Within Functional Limits    Social/Functional History  Lives With: Spouse  Type of Home: House (Bi-level home)  Home Layout: Two level,Bed/Bath upstairs  Home Access: Stairs to enter with rails (4-5)  Bathroom Shower/Tub: Tub/Shower unit  Bathroom Toilet: Standard  Bathroom Equipment: Shower chair  Home Equipment:  (None)  ADL Assistance: Independent  Homemaking Assistance: Independent  Ambulation Assistance: Independent  Transfer Assistance: Independent  Active : Yes  Occupation: Full time employment  Additional Comments: Pt currently off work to assist wife due to CVA 2 months ago. Objective  Strength  Strength RLE: WFL  Strength LLE: WFL    Bed mobility  Supine to Sit: Independent  Sit to Supine: Independent     Transfers  Sit to Stand: Independent  Stand to sit:  Independent     Ambulation  Device: No Device  Assistance: Independent  Quality of Gait: Steady gait  Distance: 40ft    Balance  Sitting - Static: Good  Sitting - Dynamic: Good  Standing - Static: Good  Standing - Dynamic: Good    Treatment included gait and transfer training with patient education     Plan: Discharge acute PT      Safety Devices  Type of devices: Bed alarm in place,Call light within reach,Nurse notified      AM-PAC Score  AM-PAC Inpatient Mobility Raw Score : 24 (01/12/22 1101)  AM-PAC Inpatient T-Scale Score : 61.14 (01/12/22 1101)  Mobility Inpatient CMS 0-100% Score: 0 (01/12/22 1101)  Mobility Inpatient CMS G-Code Modifier : CH (01/12/22 1101)      Therapy Time   Individual Concurrent Group Co-treatment   Time In 0952         Time Out 1015         Minutes 23         Timed Code Treatment Minutes:  10  Total Treatment Minutes:  [address], PT

## 2022-01-12 NOTE — PROGRESS NOTES
Attempted to use our Resmed CPAP but the patient stated that it made them feel claustrophobic. Patient's home unit at home has been broken for awhile and said he just wants to try without it.

## 2022-01-12 NOTE — PROGRESS NOTES
Patient A&Ox4. VSS. Patient reporting pain in left wrist and received prn percocet per orders with relief. Patient voiding clear yellow urine. On tele. Patient refused insulin coverage because he says he does not use it at home. Patient said cpap made him feel claustrophobic. On room air during the night and last check was 96%. SCDs on. Bed in lowest setting and call light is within reach. No needs at this time.

## 2022-01-12 NOTE — PROGRESS NOTES
BP elevated. Given home meds this am for BP. A&Ox4. Up independently to BR. tolerating meals. Spoke and updated patient's wife. Patient complaining of muscle spasms/cramps, MD ordered flexeril prn. IV normal saline locked. Door closed for comfort. Spoke to  Respiratory to change out face mask for CPAP at night. Call light within reach. All needs met at this time.  Will cont to  monitor Patient called and stated he would like a callback regarding his biopsy order being placed and to be scheduled.       [phone]

## 2022-01-12 NOTE — CONSULTS
Nephrology Consult Note                                                                                                                                                                                                                                                                                                                                                               Office : [phone]     Fax :[phone]              Patient's Name: Verlee Gottron  1/12/2022    Reason for Consult: ANTONIO   Requesting Physician:  Naima Torres MD      Chief Complaint:  ANTONIO      History of Present Ilness:    59 y.o. male who presented to [facility], [facility]. with abnormal creatinine that began several years ago and has become progressively worse recently.    He was seen in my office and was not feeling well   Has Ac Gout      Also has L wrist and R heel pain from gout that has been flaring recently.     He has CELSA on CPAP     Pt may need RRT during this admission     Past Medical History:   Diagnosis Date    Arthralgia of multiple joints 10/27/2015    Arthritis     Atrial fibrillation (HCC)     Chronic kidney disease     stage 4    Chronic systolic congestive heart failure ([address] 8/16/2021    CRI (chronic renal insufficiency)     Diabetic nephropathy associated with type 2 diabetes mellitus ([address] 10/11/2018    Diverticulosis     Elevated PSA     Erectile dysfunction     Gout     Gout     Hemrrhoid NOS w/ complication NEC     History of MRSA infection     Hypertension     CELSA (obstructive sleep apnea) 07/14/2017    uses C-pap machine    Type 2 diabetes mellitus without complication, without long-term current use of insulin ([address] 8/32/5046    Umbilical hernia without obstruction and without gangrene 2/2/2016       Past Surgical History:   Procedure Laterality Date    COLONOSCOPY      DILATATION, ESOPHAGUS      HEMICOLECTOMY      UPPER GASTROINTESTINAL ENDOSCOPY  5/28/16    biopsies, multiple small gastric ulcers    UPPER GASTROINTESTINAL ENDOSCOPY  09/12/2016       Family History   Problem Relation Age of Onset    Hypertension Other     Breast Cancer Mother     Colon Cancer Father     Diabetes Maternal Grandmother     Coronary Art Dis Brother         reports that he has never smoked. He has never used smokeless tobacco. He reports current alcohol use. He reports that he does not use drugs. Allergies:  Patient has no known allergies.     Current Medications:    predniSONE (DELTASONE) tablet 20 mg, Daily  cyclobenzaprine (FLEXERIL) tablet 5 mg, BID PRN  oxyCODONE-acetaminophen (PERCOCET) 5-325 MG per tablet 1 tablet, Q6H PRN  aspirin chewable tablet 81 mg, Daily  atorvastatin (LIPITOR) tablet 20 mg, Daily  calcitRIOL (ROCALTROL) capsule 0.25 mcg, Daily  metoprolol succinate (TOPROL XL) extended release tablet 50 mg, Daily  NIFEdipine (PROCARDIA XL) extended release tablet 90 mg, Daily  allopurinol (ZYLOPRIM) tablet 100 mg, Daily  doxazosin (CARDURA) tablet 4 mg, Daily  glucose (GLUTOSE) 40 % oral gel 15 g, PRN  dextrose 50 % IV solution, PRN  glucagon (rDNA) injection 1 mg, PRN  dextrose 5 % solution, PRN  sodium chloride flush 0.9 % injection 10 mL, 2 times per day  sodium chloride flush 0.9 % injection 10 mL, PRN  0.9 % sodium chloride infusion, PRN  ondansetron (ZOFRAN-ODT) disintegrating tablet 4 mg, Q8H PRN   Or  ondansetron (ZOFRAN) injection 4 mg, Q6H PRN  sennosides-docusate sodium (SENOKOT-S) 8.6-50 MG tablet 1 tablet, BID  magnesium hydroxide (MILK OF MAGNESIA) 400 MG/5ML suspension 30 mL, Daily PRN  acetaminophen (TYLENOL) tablet 650 mg, Q6H PRN   Or  acetaminophen (TYLENOL) suppository 650 mg, Q6H PRN  heparin (porcine) injection 5,000 Units, 3 times per day  insulin lispro (1 Unit Dial) 0-12 Units, TID WC  insulin lispro (1 Unit Dial) 0-6 Units, Nightly        Review of Systems:   14 point ROS obtained but were negative except mentioned in HPI      Physical exam:     Vitals:  BP (!) 142/94   Pulse 99   Temp 98.1 °F (36.7 °C) (Oral)   Resp 16   Ht 6' 5\" (1.956 m)   Wt 282 lb 3 oz (128 kg)   SpO2 98%   BMI 33.46 kg/m²   Constitutional:  OAA X3 NAD  Skin: no rash, turgor wnl  Heent:  eomi, mmm  Neck: no bruits or jvd noted  Cardiovascular:  S1, S2 without m/r/g  Respiratory: CTA B without w/r/r  Abdomen:  +bs, soft, nt, nd  Ext: ++ lower extremity edema  Psychiatric: mood and affect appropriate  Musculoskeletal:  Rom, muscular strength intact    Data:   Labs:  CBC:   Recent Labs     01/10/22  1006 01/11/22  1441 01/12/22  1124   WBC 4.6 7.1 4.1   HGB 11.0* 11.3* 10.6*    205 174     BMP:    Recent Labs     01/10/22  1006 01/11/22  1441 01/12/22  1124    141 138   K 3.5 3.4* 4.2   CL 99 101 99   CO2 22 23 22   BUN 48* 51* 52*   CREATININE 8.4* 7.7* 8.7*   GLUCOSE 151* 143* 196*     Ca/Mg/Phos:   Recent Labs     01/10/22  1006 01/11/22  1441 01/12/22  1124   CALCIUM 5.6* 5.6* 5.7*   MG 1.60* 1.40*  --    PHOS 5.8*  --   --      Hepatic:   Recent Labs     01/10/22  1006   AST 15   ALT 17   BILITOT <0.2   ALKPHOS 91     Troponin:   Recent Labs     01/12/22  0141 01/12/22  1124 01/12/22  1923   TROPONINI 0.04* 0.03*  0.03* 0.04*     BNP: No results for input(s): BNP in the last 72 hours. Lipids: No results for input(s): CHOL, TRIG, HDL, LDLCALC, LABVLDL in the last 72 hours. ABGs: No results for input(s): PHART, PO2ART, CSK5MPF in the last 72 hours. INR: No results for input(s): INR in the last 72 hours. UA:  Recent Labs     01/10/22  1316   LABMICR 593.00*      Urine Microscopic: No results for input(s): LABCAST, BACTERIA, COMU, HYALCAST, WBCUA, RBCUA, EPIU in the last 72 hours. Urine Culture: No results for input(s): LABURIN in the last 72 hours. Urine Chemistry:   Recent Labs     01/10/22  1316   LABCREA 207.2             IMAGING:  XR WRIST LEFT (MIN 3 VIEWS)   Final Result      No acute bony pathology      XR ANKLE RIGHT (MIN 3 VIEWS)   Final Result      No acute bony pathology. Heel spur      XR CHEST (2 VW)   Final Result      No acute pulmonary pathology or significant change from the prior exam                      Assessment/Plan   1. ANTONIO on CKD 5     2. HTN    3. Anemia    4. Acid- base/ Electrolyte imbalance     5. Gout     6.  CHF     Plan   - renal function slightly better   - if cr better will hold RRT   - if cr worse will start RRT   - labs in am   - Ur studies  - No need for IVF   - Steroids for ac gout     Recommend to dose adjust all medications  based on renal functions  Maintain SBP> 90 mmHg   Daily weights   AVOID NSAIDs  Avoid Nephrotoxins  Monitor Intake/Output  Call if significant decrease in urine output                   Thank you for allowing us to participate in care of Carlos Villanueva MD  Feel free to contact me   [facility] [address]  Office : [phone]  Fax :[phone]

## 2022-01-12 NOTE — PROGRESS NOTES
Progress Note  Admit Date: 1/11/2022       Overnight Events: slept better than at home    CC: F/U for ANTONIO/CKD  Interval History: Pt states he is feeling better today in that he typically has muscle cramps frequently but did not last night. Otherwise feeling well, no complaints.  predniSONE  20 mg Oral Daily    aspirin  81 mg Oral Daily    atorvastatin  20 mg Oral Daily    calcitRIOL  0.25 mcg Oral Daily    metoprolol succinate  50 mg Oral Daily    NIFEdipine  90 mg Oral Daily    allopurinol  100 mg Oral Daily    doxazosin  4 mg Oral Daily    sodium chloride flush  10 mL IntraVENous 2 times per day    sennosides-docusate sodium  1 tablet Oral BID    heparin (porcine)  5,000 Units SubCUTAneous 3 times per day    insulin lispro  0-12 Units SubCUTAneous TID WC    insulin lispro  0-6 Units SubCUTAneous Nightly      PRN Medications: oxyCODONE-acetaminophen, glucose, dextrose, glucagon (rDNA), dextrose, sodium chloride flush, sodium chloride, ondansetron **OR** ondansetron, magnesium hydroxide, acetaminophen **OR** acetaminophen  Diet: ADULT DIET;  Regular; Low Phosphorus (Less than 1000 mg)  Continuous Infusions:   dextrose      sodium chloride       PHYSICAL EXAM:  BP (!) 153/100   Pulse 94   Temp 97.9 °F (36.6 °C) (Oral)   Resp 17   Ht 6' 5\" (1.956 m)   Wt 282 lb 3 oz (128 kg)   SpO2 97%   BMI 33.46 kg/m²   Recent Labs     01/11/22  2156 01/12/22  0822   POCGLU 192* 147*       Intake/Output Summary (Last 24 hours) at 1/12/2022 1056  Last data filed at 1/12/2022 0859  Gross per 24 hour   Intake 970 ml   Output 550 ml   Net 420 ml          General appearance:  No acute distress, appears stated age  Eyes: Pupils equal, round, reactive to light, conjunctiva/corneas clear  Ears/Nose/Mouth/Throat: No external lesions or scars, hearing intact to voice  Neck: Trachea midline, no masses noted, no thyromegaly  Respiratory:  Non-labored breathing, clear to auscultation bilaterally  Cardiovascular: Regular rate and rhythm, no murmurs, gallops, or rubs  Abdomen: soft, non-tender, non-distended  Musculoskeletal: Warm, well perfused, no cyanosis or edema  Skin: normal color, no wounds noted  Psychiatric: A&Ox4, good insight and judgment     LABS:  Recent Labs     01/10/22  1006 01/11/22  1441   WBC 4.6 7.1   HGB 11.0* 11.3*   HCT 32.9* 34.5*    205                                                                    Recent Labs     01/10/22  1006 01/11/22  1441    141   K 3.5 3.4*   CL 99 101   CO2 22 23   BUN 48* 51*   CREATININE 8.4* 7.7*   GLUCOSE 151* 143*     Recent Labs     01/10/22  1006   AST 15   ALT 17   BILITOT <0.2   ALKPHOS 91     Recent Labs     01/11/22  1441 01/11/22  2208 01/12/22  0141   TROPONINI 0.04* 0.04* 0.04*     Assessment & Plan:    Patient Active Problem List:         # ANTONIO on CKD  -nephrology consulted for management  -likely to need dialysis soon     # T2DM  -SSI, accuchecks     # HTN  -monitor     # Gout  -reduced dose of allopurinol for worsening kidney function     # CELSA on CPAP - pt asking if we can try nasal instead of full mask as he was not able to tolelrate last night. # pAF stable, appears to be in SR on exam.   # Chronic systolic HF - compensated monitor. # HLD  -home management except as above     DVT Prophylaxis: Heparin  Diet: No diet orders on file  Code Status: Prior     PT/OT Eval Status: Ongoing     Dispo: Inpt, dispo pending clinical improvement           The patient and / or the family were informed of the results of any tests, a time was given to answer questions, a plan was proposed and they agreed with plan. Disposition: possibly tomorrow if cr stable.    Full Code

## 2022-01-12 NOTE — CARE COORDINATION
Cm following, pt from home with wife plans to Dc home. Nephro consulted Creat 87 ca+ 5.7 trop 0.03 trending labs. Poss need for new HD if creat doesn't trend down.   Electronically signed by Ben Palafox RN on 1/12/2022 at 2:22 [phone]

## 2022-01-13 LAB
ANION GAP SERPL CALCULATED.3IONS-SCNC: 18 MMOL/L (ref 3–16)
BUN BLDV-MCNC: 64 MG/DL (ref 7–20)
CALCIUM SERPL-MCNC: 5.7 MG/DL (ref 8.3–10.6)
CHLORIDE BLD-SCNC: 97 MMOL/L (ref 99–110)
CO2: 19 MMOL/L (ref 21–32)
CREAT SERPL-MCNC: 8.3 MG/DL (ref 0.8–1.3)
GFR AFRICAN AMERICAN: 8
GFR NON-AFRICAN AMERICAN: 7
GLUCOSE BLD-MCNC: 140 MG/DL (ref 70–99)
GLUCOSE BLD-MCNC: 152 MG/DL (ref 70–99)
GLUCOSE BLD-MCNC: 160 MG/DL (ref 70–99)
GLUCOSE BLD-MCNC: 167 MG/DL (ref 70–99)
GLUCOSE BLD-MCNC: 256 MG/DL (ref 70–99)
HCT VFR BLD CALC: 31.1 % (ref 40.5–52.5)
HEMOGLOBIN: 10.2 G/DL (ref 13.5–17.5)
MCH RBC QN AUTO: 28.5 PG (ref 26–34)
MCHC RBC AUTO-ENTMCNC: 32.7 G/DL (ref 31–36)
MCV RBC AUTO: 87 FL (ref 80–100)
PDW BLD-RTO: 13.8 % (ref 12.4–15.4)
PERFORMED ON: ABNORMAL
PLATELET # BLD: 170 K/UL (ref 135–450)
PMV BLD AUTO: 8.6 FL (ref 5–10.5)
POTASSIUM REFLEX MAGNESIUM: 3.9 MMOL/L (ref 3.5–5.1)
RBC # BLD: 3.58 M/UL (ref 4.2–5.9)
SODIUM BLD-SCNC: 134 MMOL/L (ref 136–145)
TOTAL CK: 866 U/L (ref 39–308)
TROPONIN: 0.03 NG/ML
TROPONIN: 0.03 NG/ML
TROPONIN: 0.04 NG/ML
WBC # BLD: 7.9 K/UL (ref 4–11)

## 2022-01-13 PROCEDURE — 6370000000 HC RX 637 (ALT 250 FOR IP): Performed by: INTERNAL MEDICINE

## 2022-01-13 PROCEDURE — 80048 BASIC METABOLIC PNL TOTAL CA: CPT

## 2022-01-13 PROCEDURE — 0HBRXZZ EXCISION OF TOE NAIL, EXTERNAL APPROACH: ICD-10-PCS | Performed by: PODIATRIST

## 2022-01-13 PROCEDURE — 85027 COMPLETE CBC AUTOMATED: CPT

## 2022-01-13 PROCEDURE — 6360000002 HC RX W HCPCS: Performed by: INTERNAL MEDICINE

## 2022-01-13 PROCEDURE — 36415 COLL VENOUS BLD VENIPUNCTURE: CPT

## 2022-01-13 PROCEDURE — 94660 CPAP INITIATION&MGMT: CPT

## 2022-01-13 PROCEDURE — 2060000000 HC ICU INTERMEDIATE R&B

## 2022-01-13 PROCEDURE — 2580000003 HC RX 258: Performed by: INTERNAL MEDICINE

## 2022-01-13 PROCEDURE — 82550 ASSAY OF CK (CPK): CPT

## 2022-01-13 PROCEDURE — 84484 ASSAY OF TROPONIN QUANT: CPT

## 2022-01-13 PROCEDURE — 99233 SBSQ HOSP IP/OBS HIGH 50: CPT | Performed by: INTERNAL MEDICINE

## 2022-01-13 RX ORDER — OXYCODONE HYDROCHLORIDE AND ACETAMINOPHEN 5; 325 MG/1; MG/1
2 TABLET ORAL EVERY 6 HOURS PRN
Status: DISCONTINUED | OUTPATIENT
Start: 2022-01-13 | End: 2022-01-18 | Stop reason: HOSPADM

## 2022-01-13 RX ORDER — CALCIUM GLUCONATE 20 MG/ML
2000 INJECTION, SOLUTION INTRAVENOUS ONCE
Status: COMPLETED | OUTPATIENT
Start: 2022-01-13 | End: 2022-01-13

## 2022-01-13 RX ORDER — CALCIUM ACETATE 667 MG/1
2 CAPSULE ORAL
Status: DISCONTINUED | OUTPATIENT
Start: 2022-01-14 | End: 2022-01-18 | Stop reason: HOSPADM

## 2022-01-13 RX ADMIN — OXYCODONE HYDROCHLORIDE AND ACETAMINOPHEN 1 TABLET: 5; 325 TABLET ORAL at 18:21

## 2022-01-13 RX ADMIN — DOXAZOSIN 4 MG: 4 TABLET ORAL at 08:32

## 2022-01-13 RX ADMIN — OXYCODONE HYDROCHLORIDE AND ACETAMINOPHEN 2 TABLET: 5; 325 TABLET ORAL at 22:07

## 2022-01-13 RX ADMIN — ASPIRIN 81 MG: 81 TABLET, CHEWABLE ORAL at 08:32

## 2022-01-13 RX ADMIN — SODIUM CHLORIDE, PRESERVATIVE FREE 10 ML: 5 INJECTION INTRAVENOUS at 08:34

## 2022-01-13 RX ADMIN — METOPROLOL SUCCINATE 50 MG: 50 TABLET, FILM COATED, EXTENDED RELEASE ORAL at 08:31

## 2022-01-13 RX ADMIN — HEPARIN SODIUM 5000 UNITS: 5000 INJECTION INTRAVENOUS; SUBCUTANEOUS at 13:45

## 2022-01-13 RX ADMIN — HEPARIN SODIUM 5000 UNITS: 5000 INJECTION INTRAVENOUS; SUBCUTANEOUS at 05:29

## 2022-01-13 RX ADMIN — OXYCODONE HYDROCHLORIDE AND ACETAMINOPHEN 1 TABLET: 5; 325 TABLET ORAL at 12:11

## 2022-01-13 RX ADMIN — ALLOPURINOL 100 MG: 100 TABLET ORAL at 08:32

## 2022-01-13 RX ADMIN — CYCLOBENZAPRINE 5 MG: 10 TABLET, FILM COATED ORAL at 13:51

## 2022-01-13 RX ADMIN — PREDNISONE 20 MG: 20 TABLET ORAL at 08:32

## 2022-01-13 RX ADMIN — SODIUM CHLORIDE, PRESERVATIVE FREE 10 ML: 5 INJECTION INTRAVENOUS at 20:13

## 2022-01-13 RX ADMIN — NIFEDIPINE 90 MG: 90 TABLET, FILM COATED, EXTENDED RELEASE ORAL at 08:31

## 2022-01-13 RX ADMIN — ATORVASTATIN CALCIUM 20 MG: 20 TABLET, FILM COATED ORAL at 08:32

## 2022-01-13 RX ADMIN — CALCIUM GLUCONATE 2000 MG: 20 INJECTION, SOLUTION INTRAVENOUS at 10:12

## 2022-01-13 RX ADMIN — CALCITRIOL CAPSULES 0.25 MCG 0.25 MCG: 0.25 CAPSULE ORAL at 08:32

## 2022-01-13 ASSESSMENT — PAIN DESCRIPTION - ORIENTATION
ORIENTATION: DISTAL
ORIENTATION_2: LEFT
ORIENTATION: DISTAL

## 2022-01-13 ASSESSMENT — PAIN DESCRIPTION - PROGRESSION
CLINICAL_PROGRESSION: NOT CHANGED
CLINICAL_PROGRESSION: GRADUALLY IMPROVING
CLINICAL_PROGRESSION: GRADUALLY WORSENING
CLINICAL_PROGRESSION_2: NOT CHANGED
CLINICAL_PROGRESSION: GRADUALLY WORSENING

## 2022-01-13 ASSESSMENT — PAIN DESCRIPTION - ONSET
ONSET: ON-GOING
ONSET: ON-GOING
ONSET_2: ON-GOING
ONSET: PROGRESSIVE
ONSET: ON-GOING

## 2022-01-13 ASSESSMENT — PAIN DESCRIPTION - LOCATION
LOCATION_2: ARM
LOCATION: TOE (COMMENT WHICH ONE)

## 2022-01-13 ASSESSMENT — PAIN DESCRIPTION - INTENSITY: RATING_2: 5

## 2022-01-13 ASSESSMENT — PAIN DESCRIPTION - FREQUENCY
FREQUENCY: CONTINUOUS

## 2022-01-13 ASSESSMENT — PAIN DESCRIPTION - PAIN TYPE
TYPE: ACUTE PAIN
TYPE_2: CHRONIC PAIN

## 2022-01-13 ASSESSMENT — PAIN DESCRIPTION - DESCRIPTORS
DESCRIPTORS: SORE
DESCRIPTORS: SORE
DESCRIPTORS_2: SORE
DESCRIPTORS: SORE
DESCRIPTORS: SORE

## 2022-01-13 ASSESSMENT — PAIN SCALES - GENERAL
PAINLEVEL_OUTOF10: 5
PAINLEVEL_OUTOF10: 3
PAINLEVEL_OUTOF10: 0
PAINLEVEL_OUTOF10: 0
PAINLEVEL_OUTOF10: 5
PAINLEVEL_OUTOF10: 7
PAINLEVEL_OUTOF10: 5
PAINLEVEL_OUTOF10: 5

## 2022-01-13 ASSESSMENT — PAIN DESCRIPTION - DURATION: DURATION_2: INTERMITTENT

## 2022-01-13 ASSESSMENT — PAIN - FUNCTIONAL ASSESSMENT
PAIN_FUNCTIONAL_ASSESSMENT: ACTIVITIES ARE NOT PREVENTED

## 2022-01-13 NOTE — PROGRESS NOTES
Pt requesting pain medication, but his percocet was only ordered q8, not due to be administered yet, and tylenol isn't strong enough. Contacted hospitalist Radha Appiah MD. Orders were placed for q6 percocet and MD gave permission to administer first percocet dose \"now\".

## 2022-01-13 NOTE — PROGRESS NOTES
Progress Note  Admit Date: 1/11/2022       Overnight Events: slept better than at home    CC: F/U for ANTONIO/CKD  Interval History: Pt states he is feeling ok today. Denies SOB, no CP. Emotional about needing to start HD, was hoping he would be able to put it off longer.  predniSONE  20 mg Oral Daily    aspirin  81 mg Oral Daily    atorvastatin  20 mg Oral Daily    calcitRIOL  0.25 mcg Oral Daily    metoprolol succinate  50 mg Oral Daily    NIFEdipine  90 mg Oral Daily    allopurinol  100 mg Oral Daily    doxazosin  4 mg Oral Daily    sodium chloride flush  10 mL IntraVENous 2 times per day    sennosides-docusate sodium  1 tablet Oral BID    heparin (porcine)  5,000 Units SubCUTAneous 3 times per day    insulin lispro  0-12 Units SubCUTAneous TID WC    insulin lispro  0-6 Units SubCUTAneous Nightly      PRN Medications: cyclobenzaprine, oxyCODONE-acetaminophen, glucose, dextrose, glucagon (rDNA), dextrose, sodium chloride flush, sodium chloride, ondansetron **OR** ondansetron, magnesium hydroxide, acetaminophen **OR** acetaminophen  Diet: ADULT DIET;  Regular; Low Phosphorus (Less than 1000 mg)  Continuous Infusions:   dextrose      sodium chloride       PHYSICAL EXAM:  BP (!) 144/90   Pulse 98   Temp 97.8 °F (36.6 °C) (Oral)   Resp 16   Ht 6' 5\" (1.956 m)   Wt 289 lb 1.6 oz (131.1 kg)   SpO2 98%   BMI 34.28 kg/m²   Recent Labs     01/11/22  2156 01/12/22  0822 01/12/22  2112 01/13/22  0655 01/13/22  1203   POCGLU 192* 147* 247* 160* 140*       Intake/Output Summary (Last 24 hours) at 1/13/2022 1555  Last data filed at 1/13/2022 1204  Gross per 24 hour   Intake 1680 ml   Output 400 ml   Net 1280 ml          General appearance:  No acute distress, appears stated age  Eyes: Pupils equal, round, reactive to light, conjunctiva/corneas clear  Ears/Nose/Mouth/Throat: No external lesions or scars, hearing intact to voice  Neck: Trachea midline, no masses noted, no thyromegaly  Respiratory: Non-labored breathing, clear to auscultation bilaterally  Cardiovascular: Regular rate and rhythm, no murmurs, gallops, or rubs  Abdomen: soft, non-tender, non-distended  Musculoskeletal: Warm, well perfused, no cyanosis or edema  Skin: normal color, no wounds noted  Psychiatric: A&Ox4, good insight and judgment     LABS:  Recent Labs     01/11/22  1441 01/12/22  1124 01/13/22  0508   WBC 7.1 4.1 7.9   HGB 11.3* 10.6* 10.2*   HCT 34.5* 33.5* 31.1*    174 170                                                                    Recent Labs     01/11/22  1441 01/12/22  1124 01/13/22  0508    138 134*   K 3.4* 4.2 3.9    99 97*   CO2 23 22 19*   BUN 51* 52* 64*   CREATININE 7.7* 8.7* 8.3*   GLUCOSE 143* 196* 152*     No results for input(s): AST, ALT, ALB, BILITOT, ALKPHOS in the last 72 hours. Recent Labs     01/13/22  0115 01/13/22  0508 01/13/22  1321   TROPONINI 0.03* 0.03* 0.04*     Assessment & Plan:    Patient Active Problem List:         # ANTONIO on CKD  -nephrology consulted for management  -minimal change in cr. Spoke with Dr. Dar Goodson, plan for initiation of HD tomorrow.      # T2DM  -SSI, accuchecks     # HTN  -monitor     # Gout  -reduced dose of allopurinol for worsening kidney function     # CELSA on CPAP - pt asking if we can try nasal instead of full mask as he was not able to tolelrate last night. # pAF stable, appears to be in SR on exam.   # Chronic systolic HF - compensated monitor. # HLD  -home management except as above     #ingrown toenail - pod consulted per nephrology. Appreciate input. DVT Prophylaxis: Heparin  Diet: No diet orders on file  Code Status: Prior     PT/OT Eval Status: Ongoing     Dispo: Inpt, dispo pending clinical improvement           The patient and / or the family were informed of the results of any tests, a time was given to answer questions, a plan was proposed and they agreed with plan. Disposition: possibly tomorrow if cr stable.    Full Code

## 2022-01-13 NOTE — FLOWSHEET NOTE
01/13/22 1123   Encounter Summary   Services provided to: Patient   Referral/Consult From: Rounding   Continue Visiting   (es 1/13)   Complexity of Encounter Low   Length of Encounter 15 minutes   Routine   Type Initial   Assessment Calm; Approachable   Intervention Active listening   Outcome Refused/declined

## 2022-01-13 NOTE — CONSULTS
Department of Podiatry Consult Note  Resident       Reason for Consult: Left ingrown toenail  Requesting Physician:  Yue Wharton MD    CHIEF COMPLAINT: Left second digit nail pain    HISTORY OF PRESENT ILLNESS:                The patient is a 59 y.o. male with significant past medical history as listed below. Podiatry was consulted for left ingrown toenail. Patient reports pain beginning to his left second digit starting yesterday. Rates the pain as 7/10 currently. Denies any trauma to the area. Denies any drainage from the digit. Believes the nail was growing in as his nail is slightly curved. Patient denies fever, chills, nausea, vomiting, shortness of breath, chest pain. Patient has no other pedal complaints at this time. Past Medical History:        Diagnosis Date    Arthralgia of multiple joints 10/27/2015    Arthritis     Atrial fibrillation (HCC)     Chronic kidney disease     stage 4    Chronic systolic congestive heart failure ([address] 8/16/2021    CRI (chronic renal insufficiency)     Diabetic nephropathy associated with type 2 diabetes mellitus ([address] 10/11/2018    Diverticulosis     Elevated PSA     Erectile dysfunction     Gout     Gout     Hemrrhoid NOS w/ complication NEC     History of MRSA infection     Hypertension     CELSA (obstructive sleep apnea) 07/14/2017    uses C-pap machine    Type 2 diabetes mellitus without complication, without long-term current use of insulin ([address] 2/69/6086    Umbilical hernia without obstruction and without gangrene 2/2/2016       Past Surgical History:        Procedure Laterality Date    COLONOSCOPY      DILATATION, ESOPHAGUS      HEMICOLECTOMY      UPPER GASTROINTESTINAL ENDOSCOPY  5/28/16    biopsies, multiple small gastric ulcers    UPPER GASTROINTESTINAL ENDOSCOPY  09/12/2016       Allergies:   Patient has no known allergies. Medications:   Home Meds  No current facility-administered medications on file prior to encounter. Current Outpatient Medications on File Prior to Encounter   Medication Sig Dispense Refill    nortriptyline (PAMELOR) 10 MG capsule nightly       colchicine (COLCRYS) 0.6 MG tablet Take 1 tablet by mouth daily as needed for Pain (as needed for pain related to gout) 30 tablet 5    oxyCODONE-acetaminophen (PERCOCET) 5-325 MG per tablet Take 1 tablet by mouth every 8 hours as needed for Pain for up to 30 days.  90 tablet 0    vitamin D (ERGOCALCIFEROL) 1.25 MG (23801 UT) CAPS capsule Take 1 capsule by mouth once a week 12 capsule 1    calcitRIOL (ROCALTROL) 0.25 MCG capsule Take 1 capsule by mouth daily 30 capsule 3    omeprazole (PRILOSEC) 40 MG delayed release capsule TAKE 1 CAPSULE BY MOUTH EVERY DAY 30 capsule 1    allopurinol (ZYLOPRIM) 300 MG tablet TAKE 1 TABLET BY MOUTH EVERY DAY 30 tablet 5    terazosin (HYTRIN) 5 MG capsule Take 1 capsule by mouth nightly TAKE 1 CAPSULE BY MOUTH EVERY EVENING 60 capsule 5    metoprolol succinate (TOPROL XL) 50 MG extended release tablet TAKE 1 TABLET BY MOUTH EVERY DAY 30 tablet 3    atorvastatin (LIPITOR) 20 MG tablet TAKE 1 TABLET BY MOUTH EVERY DAY 30 tablet 5    NIFEdipine (ADALAT CC) 90 MG extended release tablet TAKE 1 TABLET BY MOUTH EVERY DAY 30 tablet 5    sildenafil (VIAGRA) 100 MG tablet Take 1 tablet by mouth as needed for Erectile Dysfunction 30 tablet 3    aspirin 81 MG tablet Take 81 mg by mouth daily         Current Meds  predniSONE (DELTASONE) tablet 20 mg, Daily  cyclobenzaprine (FLEXERIL) tablet 5 mg, BID PRN  oxyCODONE-acetaminophen (PERCOCET) 5-325 MG per tablet 1 tablet, Q6H PRN  aspirin chewable tablet 81 mg, Daily  atorvastatin (LIPITOR) tablet 20 mg, Daily  calcitRIOL (ROCALTROL) capsule 0.25 mcg, Daily  metoprolol succinate (TOPROL XL) extended release tablet 50 mg, Daily  NIFEdipine (PROCARDIA XL) extended release tablet 90 mg, Daily  allopurinol (ZYLOPRIM) tablet 100 mg, Daily  doxazosin (CARDURA) tablet 4 mg, Daily  glucose b/l.    NEUROLOGIC: Gross and epicritic sensation is diminished b/l. Protective sensation is diminished at all pedal sites b/l. DERMATOLOGIC: Nails 1-5 b/l are thickened and discolored, but within normal limits of length. Pincer deformity noted to the nails 2 through 4 bilateral.  Left second digit medial nail border ingrowing into medial fold. No erythema, fluctuance, crepitus, drainage, or malodor noted. No signs of acute infection noted. Webspaces 1-4 b/l are clean, dry, and intact. No hyperkeratosis noted. No open wounds noted. No subcutaneous nodules, rashes, or other skin lesions noted. MUSCULOSKELETAL: Muscle strength is 5/5 for all pedal groups tested. Tenderness with palpation of the medial border of the left second nail. Ankle joint ROM is decreased in dorsiflexion with the knee extended. No obvious biomechanical abnormalities. IMPRESSION/RECOMMENDATIONS:      -Ingrowing toenail with paranychia, left second digit  -Diabetes mellitus type 2 with peripheral neuropathy    -Patient examined and evaluated at the bedside   -Hypertensive, otherwise VSS. No leukocytosis noted. -CRP 6.0  -Discussed treatment options for ingrown toenail with the patient, including permanent and temporary procedures. We agreed upon a slant back procedure. Educated patient that if slant back procedure was not successful that a total nail avulsion would be necessary. Patient understands and wishes for total nail avulsion to be permanent. Discussed with patient that we do not have all necessities to perform the permanent avulsion in house and that he could follow-up outpatient for a permanent total nail avulsion. Patient understands and wishes to proceed with a slant back procedure. For informed consent, the more common risks, benefits, and alternatives to the procedure were thoroughly discussed with the patient. No guarantees were given regarding the outcome.  Patient desired to have the procedure performed today, and verbal consent was obtained. The digit was prepped with alcohol. Utilizing sterile nail nippers, a slant back was performed of the left second digit medial nail border. No blood loss noted. No open wound noted following removal of distal medial aspect of left second nail. Patient tolerated procedure well. -Antibiotics not indicated from podiatric standpoint  -Weightbearing as tolerated bilateral lower extremity      DISPO: Ingrowing toenail, left second digit medial aspect. Slant back procedure performed today. No signs of infection noted. Will reassess tomorrow. - The patient will be staffed with Dr. Alana Srivastava DPM.    Amor Castillo DPM  01/13/22  12:00 PM    Patient was seen and evaluated at bedside. Agree with residents assessment and treatment plan.   Alana Srivastava DPM

## 2022-01-13 NOTE — PLAN OF CARE
Problem: Pain:  Goal: Control of acute pain  Description: Control of acute pain  Note: Pt continues to complain of pain in left arm. RN contacted MD to get orders for increased frequency of PRN pain medication. Pt resting well after administering medication. Problem: OXYGENATION/RESPIRATORY FUNCTION  Goal: Patient will maintain patent airway  Note: Pt wearing CPAP at night to help manage sleep apnea. Problem: ACTIVITY INTOLERANCE/IMPAIRED MOBILITY  Goal: Mobility/activity is maintained at optimum level for patient  Note: Pt ambulating around room as tolerated. Ortho negative, steady gait. Problem: Falls - Risk of:  Goal: Will remain free from falls  Description: Will remain free from falls  Note: Orthostatic vital signs obtained at start of shift - see flowsheet for details. Pt does not meet criteria for orthostasis. Pt is a Med fall risk. See Luis Eduardo Monson Fall Score and ABCDS Injury Risk assessments. Pt bed is in low position, side rails up, call light and belongings are in reach. Pt encouraged to call for assistance as needed. Will continue with hourly rounds for PO intake, pain needs, toileting and repositioning as needed.

## 2022-01-13 NOTE — PLAN OF CARE
Problem: HEMODYNAMIC STATUS  Goal: Patient has stable vital signs and fluid balance  Outcome: Ongoing     Vitals stable, watching I &O. BUN and crt continue to be elevated,  is talking to pt about dialysis at this time. Problem: Pain:  Description: Pain management should include both nonpharmacologic and pharmacologic interventions. Goal: Control of acute pain  Description: Control of acute pain  Outcome: Ongoing     Pt complaints of acute pain related to gout and ingrown toenail. Pt given PRN medications. Will continue to monitor. Problem: Falls - Risk of:  Goal: Will remain free from falls  Description: Will remain free from falls  Outcome: Ongoing   Orthostatic vital signs obtained at start of shift - see flowsheet for details. Pt does not meet criteria for orthostasis. Pt is a Med fall risk. See Alonna Landsberg Fall Score and ABCDS Injury Risk assessments. - Screening for Orthostasis AND not a [place] Risk per MG/ABCDS: Pt bed is in low position, side rails up, call light and belongings are in reach. Fall risk light is on outside pts room. Pt encouraged to call for assistance as needed. Will continue with hourly rounds for PO intake, pain needs, toileting and repositioning as needed.

## 2022-01-14 ENCOUNTER — APPOINTMENT (OUTPATIENT)
Dept: INTERVENTIONAL RADIOLOGY/VASCULAR | Age: 62
DRG: 660 | End: 2022-01-14
Payer: COMMERCIAL

## 2022-01-14 PROBLEM — I51.9 LV DYSFUNCTION: Status: ACTIVE | Noted: 2022-01-14

## 2022-01-14 PROBLEM — Z01.818 PRE-OP EVALUATION: Status: ACTIVE | Noted: 2022-01-14

## 2022-01-14 LAB
ANION GAP SERPL CALCULATED.3IONS-SCNC: 15 MMOL/L (ref 3–16)
BUN BLDV-MCNC: 71 MG/DL (ref 7–20)
CALCIUM SERPL-MCNC: 5.5 MG/DL (ref 8.3–10.6)
CHLORIDE BLD-SCNC: 100 MMOL/L (ref 99–110)
CO2: 21 MMOL/L (ref 21–32)
CREAT SERPL-MCNC: 8.8 MG/DL (ref 0.8–1.3)
GFR AFRICAN AMERICAN: 7
GFR NON-AFRICAN AMERICAN: 6
GLUCOSE BLD-MCNC: 112 MG/DL (ref 70–99)
GLUCOSE BLD-MCNC: 120 MG/DL (ref 70–99)
GLUCOSE BLD-MCNC: 94 MG/DL (ref 70–99)
HCT VFR BLD CALC: 28.7 % (ref 40.5–52.5)
HEMOGLOBIN: 9.5 G/DL (ref 13.5–17.5)
INR BLD: 0.93 (ref 0.88–1.12)
INR BLD: 1.1 (ref 0.86–1.14)
MCH RBC QN AUTO: 28.4 PG (ref 26–34)
MCHC RBC AUTO-ENTMCNC: 33.1 G/DL (ref 31–36)
MCV RBC AUTO: 85.9 FL (ref 80–100)
PDW BLD-RTO: 13.9 % (ref 12.4–15.4)
PERFORMED ON: ABNORMAL
PERFORMED ON: ABNORMAL
PLATELET # BLD: 163 K/UL (ref 135–450)
PMV BLD AUTO: 8.8 FL (ref 5–10.5)
POTASSIUM REFLEX MAGNESIUM: 3.7 MMOL/L (ref 3.5–5.1)
PROTHROMBIN TIME: 10.5 SEC (ref 9.9–12.7)
RBC # BLD: 3.34 M/UL (ref 4.2–5.9)
SODIUM BLD-SCNC: 136 MMOL/L (ref 136–145)
WBC # BLD: 8.2 K/UL (ref 4–11)

## 2022-01-14 PROCEDURE — 0JH63XZ INSERTION OF TUNNELED VASCULAR ACCESS DEVICE INTO CHEST SUBCUTANEOUS TISSUE AND FASCIA, PERCUTANEOUS APPROACH: ICD-10-PCS | Performed by: INTERNAL MEDICINE

## 2022-01-14 PROCEDURE — 2500000003 HC RX 250 WO HCPCS

## 2022-01-14 PROCEDURE — 02HV33Z INSERTION OF INFUSION DEVICE INTO SUPERIOR VENA CAVA, PERCUTANEOUS APPROACH: ICD-10-PCS | Performed by: INTERNAL MEDICINE

## 2022-01-14 PROCEDURE — 99222 1ST HOSP IP/OBS MODERATE 55: CPT | Performed by: INTERNAL MEDICINE

## 2022-01-14 PROCEDURE — 6370000000 HC RX 637 (ALT 250 FOR IP): Performed by: INTERNAL MEDICINE

## 2022-01-14 PROCEDURE — 85027 COMPLETE CBC AUTOMATED: CPT

## 2022-01-14 PROCEDURE — 77001 FLUOROGUIDE FOR VEIN DEVICE: CPT

## 2022-01-14 PROCEDURE — 85610 PROTHROMBIN TIME: CPT

## 2022-01-14 PROCEDURE — 99233 SBSQ HOSP IP/OBS HIGH 50: CPT | Performed by: INTERNAL MEDICINE

## 2022-01-14 PROCEDURE — 2500000003 HC RX 250 WO HCPCS: Performed by: INTERNAL MEDICINE

## 2022-01-14 PROCEDURE — 2580000003 HC RX 258: Performed by: INTERNAL MEDICINE

## 2022-01-14 PROCEDURE — 94660 CPAP INITIATION&MGMT: CPT

## 2022-01-14 PROCEDURE — 6360000002 HC RX W HCPCS

## 2022-01-14 PROCEDURE — 86704 HEP B CORE ANTIBODY TOTAL: CPT

## 2022-01-14 PROCEDURE — 36558 INSERT TUNNELED CV CATH: CPT

## 2022-01-14 PROCEDURE — 5A1D70Z PERFORMANCE OF URINARY FILTRATION, INTERMITTENT, LESS THAN 6 HOURS PER DAY: ICD-10-PCS | Performed by: INTERNAL MEDICINE

## 2022-01-14 PROCEDURE — 90935 HEMODIALYSIS ONE EVALUATION: CPT

## 2022-01-14 PROCEDURE — C1881 DIALYSIS ACCESS SYSTEM: HCPCS

## 2022-01-14 PROCEDURE — 99152 MOD SED SAME PHYS/QHP 5/>YRS: CPT

## 2022-01-14 PROCEDURE — 6360000002 HC RX W HCPCS: Performed by: INTERNAL MEDICINE

## 2022-01-14 PROCEDURE — 2060000000 HC ICU INTERMEDIATE R&B

## 2022-01-14 PROCEDURE — 87340 HEPATITIS B SURFACE AG IA: CPT

## 2022-01-14 PROCEDURE — 86706 HEP B SURFACE ANTIBODY: CPT

## 2022-01-14 PROCEDURE — C1894 INTRO/SHEATH, NON-LASER: HCPCS

## 2022-01-14 PROCEDURE — 80048 BASIC METABOLIC PNL TOTAL CA: CPT

## 2022-01-14 PROCEDURE — 36415 COLL VENOUS BLD VENIPUNCTURE: CPT

## 2022-01-14 RX ORDER — CALCIUM GLUCONATE 20 MG/ML
2000 INJECTION, SOLUTION INTRAVENOUS ONCE
Status: COMPLETED | OUTPATIENT
Start: 2022-01-14 | End: 2022-01-14

## 2022-01-14 RX ORDER — CALCIUM GLUCONATE 94 MG/ML
2000 INJECTION, SOLUTION INTRAVENOUS ONCE
Status: DISCONTINUED | OUTPATIENT
Start: 2022-01-14 | End: 2022-01-14

## 2022-01-14 RX ADMIN — METOPROLOL SUCCINATE 50 MG: 50 TABLET, FILM COATED, EXTENDED RELEASE ORAL at 07:57

## 2022-01-14 RX ADMIN — NIFEDIPINE 90 MG: 90 TABLET, FILM COATED, EXTENDED RELEASE ORAL at 07:57

## 2022-01-14 RX ADMIN — CALCIUM GLUCONATE 2000 MG: 20 INJECTION, SOLUTION INTRAVENOUS at 09:44

## 2022-01-14 RX ADMIN — ATORVASTATIN CALCIUM 20 MG: 20 TABLET, FILM COATED ORAL at 07:57

## 2022-01-14 RX ADMIN — OXYCODONE HYDROCHLORIDE AND ACETAMINOPHEN 2 TABLET: 5; 325 TABLET ORAL at 07:57

## 2022-01-14 RX ADMIN — OXYCODONE HYDROCHLORIDE AND ACETAMINOPHEN 2 TABLET: 5; 325 TABLET ORAL at 23:54

## 2022-01-14 RX ADMIN — CALCITRIOL CAPSULES 0.25 MCG 0.25 MCG: 0.25 CAPSULE ORAL at 07:57

## 2022-01-14 RX ADMIN — SODIUM CHLORIDE, PRESERVATIVE FREE 10 ML: 5 INJECTION INTRAVENOUS at 08:03

## 2022-01-14 RX ADMIN — SENNOSIDES AND DOCUSATE SODIUM 1 TABLET: 50; 8.6 TABLET ORAL at 07:58

## 2022-01-14 RX ADMIN — OXYCODONE HYDROCHLORIDE AND ACETAMINOPHEN 2 TABLET: 5; 325 TABLET ORAL at 18:00

## 2022-01-14 RX ADMIN — CALCIUM ACETATE 1334 MG: 667 CAPSULE ORAL at 07:57

## 2022-01-14 RX ADMIN — CALCIUM ACETATE 1334 MG: 667 CAPSULE ORAL at 18:00

## 2022-01-14 RX ADMIN — ASPIRIN 81 MG: 81 TABLET, CHEWABLE ORAL at 07:58

## 2022-01-14 RX ADMIN — ALLOPURINOL 100 MG: 100 TABLET ORAL at 07:58

## 2022-01-14 RX ADMIN — PREDNISONE 20 MG: 20 TABLET ORAL at 07:58

## 2022-01-14 RX ADMIN — DOXAZOSIN 4 MG: 4 TABLET ORAL at 07:58

## 2022-01-14 ASSESSMENT — PAIN DESCRIPTION - ONSET
ONSET: ON-GOING
ONSET: ON-GOING
ONSET_2: PROGRESSIVE
ONSET: ON-GOING
ONSET: ON-GOING

## 2022-01-14 ASSESSMENT — PAIN DESCRIPTION - ORIENTATION
ORIENTATION: DISTAL
ORIENTATION_2: RIGHT

## 2022-01-14 ASSESSMENT — PAIN DESCRIPTION - FREQUENCY
FREQUENCY: CONTINUOUS

## 2022-01-14 ASSESSMENT — PAIN - FUNCTIONAL ASSESSMENT
PAIN_FUNCTIONAL_ASSESSMENT: ACTIVITIES ARE NOT PREVENTED

## 2022-01-14 ASSESSMENT — PAIN DESCRIPTION - PAIN TYPE
TYPE: ACUTE PAIN
TYPE_2: SURGICAL
TYPE: SURGICAL PAIN
TYPE: ACUTE PAIN
TYPE: ACUTE PAIN

## 2022-01-14 ASSESSMENT — PAIN DESCRIPTION - INTENSITY: RATING_2: 5

## 2022-01-14 ASSESSMENT — PAIN DESCRIPTION - LOCATION
LOCATION_2: CHEST
LOCATION: TOE (COMMENT WHICH ONE)
LOCATION: TOE (COMMENT WHICH ONE)
LOCATION: CHEST
LOCATION: TOE (COMMENT WHICH ONE)

## 2022-01-14 ASSESSMENT — PAIN SCALES - GENERAL
PAINLEVEL_OUTOF10: 5
PAINLEVEL_OUTOF10: 8
PAINLEVEL_OUTOF10: 2
PAINLEVEL_OUTOF10: 6
PAINLEVEL_OUTOF10: 4
PAINLEVEL_OUTOF10: 8
PAINLEVEL_OUTOF10: 7

## 2022-01-14 ASSESSMENT — PAIN DESCRIPTION - PROGRESSION
CLINICAL_PROGRESSION: NOT CHANGED
CLINICAL_PROGRESSION: NOT CHANGED
CLINICAL_PROGRESSION_2: NOT CHANGED
CLINICAL_PROGRESSION: NOT CHANGED
CLINICAL_PROGRESSION: NOT CHANGED

## 2022-01-14 ASSESSMENT — PAIN DESCRIPTION - DIRECTION: RADIATING_TOWARDS_2: ARM

## 2022-01-14 ASSESSMENT — PAIN DESCRIPTION - DESCRIPTORS
DESCRIPTORS: SORE
DESCRIPTORS: SORE
DESCRIPTORS: ACHING;SORE
DESCRIPTORS: SORE
DESCRIPTORS_2: SORE

## 2022-01-14 ASSESSMENT — PAIN DESCRIPTION - DURATION: DURATION_2: INTERMITTENT

## 2022-01-14 ASSESSMENT — PAIN SCALES - WONG BAKER: WONGBAKER_NUMERICALRESPONSE: 2

## 2022-01-14 NOTE — FLOWSHEET NOTE
Pt tolerated tx. Meds Given: none    Net Fdl removed : 1000 mls    Pre WT: 131 kg  Post WT: 130 kg    R TDC w/good blood flow. Report given to RN.  Copy of tx sheets in chart for scanning into EMR.       01/14/22 1445 01/14/22 1648   Vital Signs   BP (!) 156/82 (!) 162/86   Temp 98.1 °F (36.7 °C) 98.1 °F (36.7 °C)   Pulse 83 81   Resp 16 16   Weight 289 lb (131.1 kg) 288 lb (130.6 kg)   Weight Method Bed scale;Estimated Bed scale

## 2022-01-14 NOTE — PROGRESS NOTES
Podiatric Surgery Daily Progress Note  Jorden TOYIN Chuck      Subjective :   Patient seen and examined this am at the bedside. Patient reports he accidentally kicked the end of the bed as the bed is too short for him and caused pain to the left foot overnight. Patient reports pain was well controlled with oral pain medications and that he is currently not in pain. Patient reports they have ordered a bed extender for him. Patient reports left second digit toenail pain much improved following the slant back procedure yesterday. Patient denies N/V/F/C/SOB. Patient denies calf pain, thigh pain, chest pain. Review of Systems: A 12 point review of symptoms is unremarkable with the exception of the chief complaint. Patient specifically denies nausea, fever, vomiting, chills, shortness of breath, chest pain, abdominal pain, constipation or difficulty urinating. Objective     BP (!) 141/81   Pulse 90   Temp 97.5 °F (36.4 °C) (Oral)   Resp 14   Ht 6' 5\" (1.956 m)   Wt 289 lb 1.6 oz (131.1 kg)   SpO2 95%   BMI 34.28 kg/m²      I/O:    Intake/Output Summary (Last 24 hours) at 1/14/2022 0751  Last data filed at 1/13/2022 1949  Gross per 24 hour   Intake 1440 ml   Output 1500 ml   Net -60 ml              Wt Readings from Last 3 Encounters:   01/13/22 289 lb 1.6 oz (131.1 kg)   01/11/22 282 lb 12.8 oz (128.3 kg)   01/10/22 285 lb 9.6 oz (129.5 kg)       LABS:    Recent Labs     01/13/22  0508 01/14/22  0437   WBC 7.9 8.2   HGB 10.2* 9.5*   HCT 31.1* 28.7*    163        Recent Labs     01/14/22  0437      K 3.7      CO2 21   BUN 71*   CREATININE 8.8*      No results for input(s): PROT, INR, APTT in the last 72 hours. LOWER EXTREMITY EXAMINATION    VASCULAR: DP and PT pulses palpable 1/4 bilateral. CFT is brisk to the digits of the foot b/l. Skin temperature is warm to cool from proximal to distal with no focal calor noted. No edema noted.  No pain with calf compression b/l.     NEUROLOGIC: Gross and epicritic sensation is diminished b/l. Protective sensation is diminished at all pedal sites b/l.     DERMATOLOGIC: Nails 1-5 b/l are thickened and discolored, but within normal limits of length. Pincer deformity noted to the nails 2 through 4 bilateral.    Hyperkeratotic tissue noted to left second digit medial nail fold. No erythema, fluctuance, crepitus, drainage, or malodor noted. No signs of acute infection noted. Webspaces 1-4 b/l are clean, dry, and intact. No hyperkeratosis noted. No open wounds noted. No subcutaneous nodules, rashes, or other skin lesions noted.      MUSCULOSKELETAL: Muscle strength is 5/5 for all pedal groups tested. Tenderness with palpation of the medial border of the left second nail. Ankle joint ROM is decreased in dorsiflexion with the knee extended. No obvious biomechanical abnormalities.        ASSESSMENT/PLAN     -S/p slant back procedure 2/2 ingrowing toenail with paranychia, left second digit  -Diabetes mellitus type 2 with peripheral neuropathy  -History of gout     -Patient examined and evaluated at the bedside this AM  -Hypertensive, otherwise VSS on PAP. No leukocytosis noted. -CRP 6.0  -Antibiotics not indicated from podiatric standpoint  -Weightbearing as tolerated bilateral lower extremity  -Patient with much improved symptoms to left second digit nail following slant back procedure. Discussed with patient to follow-up as an outpatient with Dr. Stacey Verdin for permanent nail avulsion for left second digit. Patient understands and is agreeable with this plan. DISPO: S/p slant back procedure 2/2 ingrowing toenail, left second digit medial aspect. No signs of infection noted. Will sign off at this time. Please reconsult us as needed. Patient examined and evaluated at the bedside with Dr. Stacey Verdin, GUNNAR.    Thank you for the opportunity to take part in this patient's care. Please reconsult us as needed for any foot or ankle issues.     Roxanne Gee, GUNNAR  01/14/22  7:51 AM    Patient was seen and evaluated at bedside. Agree with residents assessment and treatment plan.   Stacey Verdin DPM

## 2022-01-14 NOTE — CONSULTS
Bariatric Surgery  Resident Consult Note    Reason for consult: PD cath placement    Chief Complaint: ANTONIO/CKD    History obtained from: patient/EMR    Nephrologist: Dr. Laureen Montejo    History of Present Illness :    Autry Leventhal is a 59 y.o. male with history of of A-fib, CKD stage IV, T2DM with diabetic nephropathy, umbilical hernia, and hemorrhoidectomy, who presented to [facility] with concern of dizziness, abnormal labs, and gout, and for whom general surgery is consulted for PD catheter placement for initiation of dialysis. He has tunneled HD line that was placed today. He prefers PD in the long term due to his busy work schedule. He lives at home with his wife. Hx of CHF with EF of 45-50%. Stress test was ordered by Dr. Ronny Bang, however pt has not had it done yet. Denies hx of intraabdominal surgery. Past Medical History:        Diagnosis Date    Arthralgia of multiple joints 10/27/2015    Arthritis     Atrial fibrillation (HCC)     Chronic kidney disease     stage 4    Chronic systolic congestive heart failure ([address] 8/16/2021    CRI (chronic renal insufficiency)     Diabetic nephropathy associated with type 2 diabetes mellitus ([address] 10/11/2018    Diverticulosis     Elevated PSA     Erectile dysfunction     Gout     Gout     Hemrrhoid NOS w/ complication NEC     History of MRSA infection     Hypertension     CELSA (obstructive sleep apnea) 07/14/2017    uses C-pap machine    Type 2 diabetes mellitus without complication, without long-term current use of insulin ([address] 9/72/2746    Umbilical hernia without obstruction and without gangrene 2/2/2016       Past Surgical History:           Procedure Laterality Date    COLONOSCOPY      DILATATION, ESOPHAGUS      HEMICOLECTOMY      UPPER GASTROINTESTINAL ENDOSCOPY  5/28/16    biopsies, multiple small gastric ulcers    UPPER GASTROINTESTINAL ENDOSCOPY  09/12/2016       Allergies:  Patient has no known allergies.     Medications:   Home Meds  No current facility-administered medications on file prior to encounter. Current Outpatient Medications on File Prior to Encounter   Medication Sig Dispense Refill    nortriptyline (PAMELOR) 10 MG capsule nightly       colchicine (COLCRYS) 0.6 MG tablet Take 1 tablet by mouth daily as needed for Pain (as needed for pain related to gout) 30 tablet 5    oxyCODONE-acetaminophen (PERCOCET) 5-325 MG per tablet Take 1 tablet by mouth every 8 hours as needed for Pain for up to 30 days.  90 tablet 0    vitamin D (ERGOCALCIFEROL) 1.25 MG (79231 UT) CAPS capsule Take 1 capsule by mouth once a week 12 capsule 1    calcitRIOL (ROCALTROL) 0.25 MCG capsule Take 1 capsule by mouth daily 30 capsule 3    omeprazole (PRILOSEC) 40 MG delayed release capsule TAKE 1 CAPSULE BY MOUTH EVERY DAY 30 capsule 1    allopurinol (ZYLOPRIM) 300 MG tablet TAKE 1 TABLET BY MOUTH EVERY DAY 30 tablet 5    terazosin (HYTRIN) 5 MG capsule Take 1 capsule by mouth nightly TAKE 1 CAPSULE BY MOUTH EVERY EVENING 60 capsule 5    metoprolol succinate (TOPROL XL) 50 MG extended release tablet TAKE 1 TABLET BY MOUTH EVERY DAY 30 tablet 3    atorvastatin (LIPITOR) 20 MG tablet TAKE 1 TABLET BY MOUTH EVERY DAY 30 tablet 5    NIFEdipine (ADALAT CC) 90 MG extended release tablet TAKE 1 TABLET BY MOUTH EVERY DAY 30 tablet 5    sildenafil (VIAGRA) 100 MG tablet Take 1 tablet by mouth as needed for Erectile Dysfunction 30 tablet 3    aspirin 81 MG tablet Take 81 mg by mouth daily       Current Meds  oxyCODONE-acetaminophen (PERCOCET) 5-325 MG per tablet 2 tablet, Q6H PRN  calcium acetate (PHOSLO) capsule 1,334 mg, TID WC  cyclobenzaprine (FLEXERIL) tablet 5 mg, BID PRN  aspirin chewable tablet 81 mg, Daily  atorvastatin (LIPITOR) tablet 20 mg, Daily  calcitRIOL (ROCALTROL) capsule 0.25 mcg, Daily  metoprolol succinate (TOPROL XL) extended release tablet 50 mg, Daily  NIFEdipine (PROCARDIA XL) extended release tablet 90 mg, Daily  allopurinol (ZYLOPRIM) tablet 100 mg, Daily  doxazosin (CARDURA) tablet 4 mg, Daily  glucose (GLUTOSE) 40 % oral gel 15 g, PRN  dextrose 50 % IV solution, PRN  glucagon (rDNA) injection 1 mg, PRN  dextrose 5 % solution, PRN  sodium chloride flush 0.9 % injection 10 mL, 2 times per day  sodium chloride flush 0.9 % injection 10 mL, PRN  0.9 % sodium chloride infusion, PRN  ondansetron (ZOFRAN-ODT) disintegrating tablet 4 mg, Q8H PRN   Or  ondansetron (ZOFRAN) injection 4 mg, Q6H PRN  sennosides-docusate sodium (SENOKOT-S) 8.6-50 MG tablet 1 tablet, BID  magnesium hydroxide (MILK OF MAGNESIA) 400 MG/5ML suspension 30 mL, Daily PRN  acetaminophen (TYLENOL) tablet 650 mg, Q6H PRN   Or  acetaminophen (TYLENOL) suppository 650 mg, Q6H PRN  [Held by provider] heparin (porcine) injection 5,000 Units, 3 times per day  insulin lispro (1 Unit Dial) 0-12 Units, TID WC  insulin lispro (1 Unit Dial) 0-6 Units, Nightly        Family History:   Family History   Problem Relation Age of Onset    Hypertension Other     Breast Cancer Mother     Colon Cancer Father     Diabetes Maternal Grandmother     Coronary Art Dis Brother        Social History:   TOBACCO:   reports that he has never smoked. He has never used smokeless tobacco.  ETOH:   reports current alcohol use. DRUGS:   reports no history of drug use.     Review of Systems:   A 14 point review of systems was conducted, significant findings as noted in HPI      Physical exam:    Vitals:    01/13/22 1949 01/13/22 2211 01/14/22 0450 01/14/22 0753   BP: (!) 149/94 (!) 163/93 (!) 141/81 (!) 140/90   Pulse: 103 97 90 93   Resp: 14 16 14 16   Temp: 98 °F (36.7 °C) 97.5 °F (36.4 °C) 97.5 °F (36.4 °C) 98.1 °F (36.7 °C)   TempSrc: Oral Oral Oral Oral   SpO2: 96% 96% 95% 97%   Weight:       Height:           General appearance: alert, resting in bed  Neuro: A&Ox3, no focal deficits  HENT: trachea midline, no JVD, no LAD  Eyes: PERRLA, no scleral icterus  Chest/Lungs: normal respiratory effort, symmetric chest rise, on RA  Cardiovascular: RRR  Abdomen: obese, non-tender, non-distended. ~3.6 cm umbilical hernia noted, easily reducible. Skin: warm and dry  Extremities: no edema , no cyanosis    Labs:    CBC:   Recent Labs     01/12/22  1124 01/13/22  0508 01/14/22  0437   WBC 4.1 7.9 8.2   HGB 10.6* 10.2* 9.5*   HCT 33.5* 31.1* 28.7*   MCV 88.1 87.0 85.9    170 163     BMP:   Recent Labs     01/12/22  1124 01/13/22  0508 01/14/22  0437    134* 136   K 4.2 3.9 3.7   CL 99 97* 100   CO2 22 19* 21   BUN 52* 64* 71*   CREATININE 8.7* 8.3* 8.8*     PT/INR: No results for input(s): PROTIME, INR in the last 72 hours. APTT: No results for input(s): APTT in the last 72 hours. Liver Profile:  Lab Results   Component Value Date    AST 15 01/10/2022    ALT 17 01/10/2022    BILITOT <0.2 01/10/2022    ALKPHOS 91 01/10/2022     Lab Results   Component Value Date    CHOL 160 12/21/2021    HDL 41 12/21/2021    TRIG 141 12/21/2021     UA:   Lab Results   Component Value Date    NITRITE Negative 11/11/2019    COLORU Yellow 12/21/2021    PHUR 6.5 12/21/2021    PHUR 6.5 12/21/2021    WBCUA None seen 12/21/2021    RBCUA 3-4 12/21/2021    BACTERIA Rare 12/21/2021    CLARITYU Clear 12/21/2021    SPECGRAV 1.020 12/21/2021    LEUKOCYTESUR Negative 12/21/2021    UROBILINOGEN 0.2 12/21/2021    BILIRUBINUR Negative 12/21/2021    BILIRUBINUR Negative 11/11/2019    BLOODU MODERATE 12/21/2021    GLUCOSEU Negative 12/21/2021       Imaging:   XR WRIST LEFT (MIN 3 VIEWS)   Final Result      No acute bony pathology      XR ANKLE RIGHT (MIN 3 VIEWS)   Final Result      No acute bony pathology. Heel spur      XR CHEST (2 VW)   Final Result      No acute pulmonary pathology or significant change from the prior exam                         Assessment/Plan: This is a 59 y.o. male with Hx of ESRD requiring dialysis. Pt prefers PD over HD.  Due to worsening kidney function, THD was inserted and pt will be started on HD per his nephrologist. Will plan for laparoscopic PD cath placement, omentopexy and primary umbilical hernia repair early next week. Will consult cardiology to determine the need for further testing for risk stratification. Hold aspirin. Discussed with Dr. Marla Hernandez pt and his wife.     Jennifer Membreno MD  General Surgery Resident  01/14/22 1:54 PM  [phone]

## 2022-01-14 NOTE — CARE COORDINATION
3:27 PM  Patient will likely now require HD placement outpatient. He had an HD line placed today. He will be started on HD today.  left at [address] [facility] to initiate coordinating HD outpatient. Anticipating no other needs at discharge.

## 2022-01-14 NOTE — PLAN OF CARE
Problem: Pain:  Goal: Control of acute pain  Description: Control of acute pain  Note: Pt complaining of pain in toe on left foot where ingrown toenail procedure was done. New orders for pain medicine placed overnight to better manage discomfort. Problem: OXYGENATION/RESPIRATORY FUNCTION  Goal: Patient will maintain patent airway  Note: Pt wearing CPAP at night to help manage sleep apnea. Problem: ACTIVITY INTOLERANCE/IMPAIRED MOBILITY  Goal: Mobility/activity is maintained at optimum level for patient  Note: Pt ambulating around room as tolerated, steady gait     Problem: Falls - Risk of:  Goal: Will remain free from falls  Description: Will remain free from falls  Note: Orthostatic vital signs obtained at start of shift. Pt is a Med fall risk. See Raymona Hoose Fall Score and ABCDS Injury Risk assessments. Pt bed is in low position, side rails up, call light and belongings are in reach. Pt encouraged to call for assistance as needed. Will continue with hourly rounds for PO intake, pain needs, toileting and repositioning as needed.

## 2022-01-14 NOTE — PROGRESS NOTES
Nephrology  Note                                                                                                                                                                                                                                                                                                                                                               Office : [phone]     Fax :[phone]              Patient's Name: Lazaro Torres  1/13/2022    Reason for Consult: ANTONIO   Requesting Physician:  Issac Hughes MD      Had long discussion with pt and wife multiple times  Need to start HD as GFR very poor     Pt interested in PD long term        Past Medical History:   Diagnosis Date    Arthralgia of multiple joints 10/27/2015    Arthritis     Atrial fibrillation (HCC)     Chronic kidney disease     stage 4    Chronic systolic congestive heart failure ([address] 8/16/2021    CRI (chronic renal insufficiency)     Diabetic nephropathy associated with type 2 diabetes mellitus ([address] 10/11/2018    Diverticulosis     Elevated PSA     Erectile dysfunction     Gout     Gout     Hemrrhoid NOS w/ complication NEC     History of MRSA infection     Hypertension     CELSA (obstructive sleep apnea) 07/14/2017    uses C-pap machine    Type 2 diabetes mellitus without complication, without long-term current use of insulin ([address] 2/99/3567    Umbilical hernia without obstruction and without gangrene 2/2/2016       Past Surgical History:   Procedure Laterality Date    COLONOSCOPY      DILATATION, ESOPHAGUS      HEMICOLECTOMY      UPPER GASTROINTESTINAL ENDOSCOPY  5/28/16    biopsies, multiple small gastric ulcers    UPPER GASTROINTESTINAL ENDOSCOPY  09/12/2016       Family History   Problem Relation Age of Onset    Hypertension Other     Breast Cancer Mother     Colon Cancer Father     Diabetes Maternal Grandmother     Coronary Art Dis Brother         reports that he has never smoked.  He has never used smokeless tobacco. He reports current alcohol use. He reports that he does not use drugs. Allergies:  Patient has no known allergies.     Current Medications:    oxyCODONE-acetaminophen (PERCOCET) 5-325 MG per tablet 2 tablet, Q6H PRN  predniSONE (DELTASONE) tablet 20 mg, Daily  cyclobenzaprine (FLEXERIL) tablet 5 mg, BID PRN  aspirin chewable tablet 81 mg, Daily  atorvastatin (LIPITOR) tablet 20 mg, Daily  calcitRIOL (ROCALTROL) capsule 0.25 mcg, Daily  metoprolol succinate (TOPROL XL) extended release tablet 50 mg, Daily  NIFEdipine (PROCARDIA XL) extended release tablet 90 mg, Daily  allopurinol (ZYLOPRIM) tablet 100 mg, Daily  doxazosin (CARDURA) tablet 4 mg, Daily  glucose (GLUTOSE) 40 % oral gel 15 g, PRN  dextrose 50 % IV solution, PRN  glucagon (rDNA) injection 1 mg, PRN  dextrose 5 % solution, PRN  sodium chloride flush 0.9 % injection 10 mL, 2 times per day  sodium chloride flush 0.9 % injection 10 mL, PRN  0.9 % sodium chloride infusion, PRN  ondansetron (ZOFRAN-ODT) disintegrating tablet 4 mg, Q8H PRN   Or  ondansetron (ZOFRAN) injection 4 mg, Q6H PRN  sennosides-docusate sodium (SENOKOT-S) 8.6-50 MG tablet 1 tablet, BID  magnesium hydroxide (MILK OF MAGNESIA) 400 MG/5ML suspension 30 mL, Daily PRN  acetaminophen (TYLENOL) tablet 650 mg, Q6H PRN   Or  acetaminophen (TYLENOL) suppository 650 mg, Q6H PRN  [Held by provider] heparin (porcine) injection 5,000 Units, 3 times per day  insulin lispro (1 Unit Dial) 0-12 Units, TID WC  insulin lispro (1 Unit Dial) 0-6 Units, Nightly        Review of Systems:   14 point ROS obtained but were negative except mentioned in HPI      Physical exam:     Vitals:  BP (!) 163/93   Pulse 97   Temp 97.5 °F (36.4 °C) (Oral)   Resp 16   Ht 6' 5\" (1.956 m)   Wt 289 lb 1.6 oz (131.1 kg)   SpO2 96%   BMI 34.28 kg/m²   Constitutional:  OAA X3 NAD  Skin: no rash, turgor wnl  Heent:  eomi, mmm  Neck: no bruits or jvd noted  Cardiovascular:  S1, S2 without m/r/g  Respiratory: CTA B without w/r/r  Abdomen:  +bs, soft, nt, nd  Ext: ++ lower extremity edema  Psychiatric: mood and affect appropriate  Musculoskeletal:  Rom, muscular strength intact    Data:   Labs:  CBC:   Recent Labs     01/11/22  1441 01/12/22  1124 01/13/22  0508   WBC 7.1 4.1 7.9   HGB 11.3* 10.6* 10.2*    174 170     BMP:    Recent Labs     01/11/22  1441 01/12/22  1124 01/13/22  0508    138 134*   K 3.4* 4.2 3.9    99 97*   CO2 23 22 19*   BUN 51* 52* 64*   CREATININE 7.7* 8.7* 8.3*   GLUCOSE 143* 196* 152*     Ca/Mg/Phos:   Recent Labs     01/11/22  1441 01/12/22  1124 01/13/22  0508   CALCIUM 5.6* 5.7* 5.7*   MG 1.40*  --   --      Hepatic:   No results for input(s): AST, ALT, ALB, BILITOT, ALKPHOS in the last 72 hours. Troponin:   Recent Labs     01/13/22  0115 01/13/22  0508 01/13/22  1321   TROPONINI 0.03* 0.03* 0.04*     BNP: No results for input(s): BNP in the last 72 hours. Lipids: No results for input(s): CHOL, TRIG, HDL, LDLCALC, LABVLDL in the last 72 hours. ABGs: No results for input(s): PHART, PO2ART, HEP6FJC in the last 72 hours. INR: No results for input(s): INR in the last 72 hours. UA:  No results for input(s): Shemar Brisk, GLUCOSEU, BILIRUBINUR, KETUA, SPECGRAV, BLOODU, PHUR, PROTEINU, UROBILINOGEN, NITRU, LEUKOCYTESUR, Deborah Lade in the last 72 hours. Urine Microscopic: No results for input(s): LABCAST, BACTERIA, COMU, HYALCAST, WBCUA, RBCUA, EPIU in the last 72 hours. Urine Culture: No results for input(s): LABURIN in the last 72 hours. Urine Chemistry:   No results for input(s): Bonne Lincoln, PROTEINUR, NAUR in the last 72 hours. IMAGING:  XR WRIST LEFT (MIN 3 VIEWS)   Final Result      No acute bony pathology      XR ANKLE RIGHT (MIN 3 VIEWS)   Final Result      No acute bony pathology.       Heel spur      XR CHEST (2 VW)   Final Result      No acute pulmonary pathology or significant change from the prior exam Assessment/Plan   1. ANTONIO on CKD 5     2. HTN    3. Anemia    4. Acid- base/ Electrolyte imbalance     5. Gout     6.  CHF     Plan   - renal function poor   - need HD started in am   - [facility] placement     - sx consult for PD cath placement   - labs in am   - Ur studies  - No need for IVF   - Steroids for ac gout     Recommend to dose adjust all medications  based on renal functions  Maintain SBP> 90 mmHg   Daily weights   AVOID NSAIDs  Avoid Nephrotoxins  Monitor Intake/Output  Call if significant decrease in urine output                   Thank you for allowing us to participate in care of Pee Sarkar MD  Feel free to contact me   [facility] [address]  Office : [phone]  Fax :[phone]

## 2022-01-14 NOTE — DISCHARGE INSTR - COC
Continuity of Care Form    Patient Name: Rosey Godinez   :  1960  MRN:  [id number]    Admit date:  2022  Discharge date:  ***    Code Status Order: Full Code   Advance Directives:      Admitting Physician:  Avelina Boyd MD  PCP: Margarita Palma MD    Discharging Nurse: [facility] Unit/Room#: 3616/1026-23  Discharging Unit Phone Number: ***    Emergency Contact:   Extended Emergency Contact Information  Primary Emergency Contact: Tamiko Woods  Address: [address] Phone: [phone]  Mobile Phone: [phone]  Relation: Spouse    Past Surgical History:  Past Surgical History:   Procedure Laterality Date    COLONOSCOPY      DILATATION, ESOPHAGUS      HEMICOLECTOMY      UPPER GASTROINTESTINAL ENDOSCOPY  16    biopsies, multiple small gastric ulcers    UPPER GASTROINTESTINAL ENDOSCOPY  2016       Immunization History:   Immunization History   Administered Date(s) Administered    COVID-19, [place], Primary or Immunocompromised, PF, 100mcg/0.5mL 2021, 2021       Active Problems:  Patient Active Problem List   Diagnosis Code    Atrial fibrillation ([facility] [address] I48.91    CRI (chronic renal insufficiency) N18.9    Gout M10.9    Erectile dysfunction N52.9    Elevated PSA R97.20    Essential hypertension, benign I10    Headache R51.9    Diverticulosis K57.90    Arthralgia of multiple joints Z02.83    Umbilical hernia without obstruction and without gangrene K42.9    Knee pain, bilateral M25.561, M25.562    Alcohol use disorder, mild, abuse F10.10    CELSA (obstructive sleep apnea) G47.33    Duodenal ulcer disease K26.9    Diabetic nephropathy associated with type 2 diabetes mellitus (HCC) E11.21    Type 2 diabetes mellitus with diabetic nephropathy, without long-term current use of insulin (HCC) E11.21    Lateral epicondylitis of right elbow M77.11    Chronic bilateral low back pain without sciatica M54.50, G89.29    Morbid obesity due to excess calories ([facility]) E66.01    Chronic systolic congestive heart failure (HCC) I50.22    Acute kidney injury superimposed on CKD ([facility]) N17.9, N18.9    Stroke-like symptoms R29.90    DMII (diabetes mellitus, type 2) ([facility]) E11.9    Hyperlipidemia E78.5    Hypocalcemia E83.51    Hypomagnesemia E83.42    Hypokalemia E87.6    Muscle cramps R25.2    ANTONIO (acute kidney injury) ([facility] [address] N17.9    Shortness of breath R06.02    Electrolyte imbalance E87.8    Anemia due to stage 5 chronic kidney disease (HCC) N18.5, D63.1    Congestive heart failure (HCC) I50.9       Isolation/Infection:   Isolation            No Isolation          Patient Infection Status       Infection Onset Added Last Indicated Last Indicated By Review Planned Expiration Resolved Resolved By    None active    Resolved    C-diff Rule Out 12/22/21 12/22/21 12/23/21 Clostridium difficile toxin/antigen (Ordered)   12/23/21 Rule-Out Test Resulted            Nurse Assessment:  Last Vital Signs: BP (!) 141/81   Pulse 90   Temp 97.5 °F (36.4 °C) (Oral)   Resp 14   Ht 6' 5\" (1.956 m)   Wt 289 lb 1.6 oz (131.1 kg)   SpO2 95%   BMI 34.28 kg/m²     Last documented pain score (0-10 scale): Pain Level: 5  Last Weight:   Wt Readings from Last 1 Encounters:   01/13/22 289 lb 1.6 oz (131.1 kg)     Mental Status:  {IP PT MENTAL STATUS:94376}    IV Access:  {[facility] ACCESS:640948931}    Nursing Mobility/ADLs:  Walking   {[facility] DME MYHX:816165561}  Transfer  {[facility] DME VXQV:046187026}  Bathing  {[facility] DME RCIS:977990565}  Dressing  {[facility] DME SPRM:310150376}  Toileting  {[facility] DME NLMY:470364814}  Feeding  {[facility] DME FIPH:852231641}  Med Admin  {[facility] DME TJSR:398429135}  Med Delivery   {[facility] MED Delivery:178468264}    Wound Care Documentation and Therapy:        Elimination:  Continence:    Bowel: {YES / DF:96817}  Bladder: {YES / WD:92845}  Urinary Catheter: {Urinary Catheter:698505212}   Colostomy/Ileostomy/Ileal Conduit: {YES / DS:09130}       Date of Last BM: ***    Intake/Output Summary (Last 24 hours) at 2022 0634  Last data filed at 2022 1949  Gross per 24 hour   Intake 1440 ml   Output 1500 ml   Net -60 ml     I/O last 3 completed shifts:   In: 2170 [P.O.:2160; I.V.:10]  Out: 400 [Urine:400]    Safety Concerns:     [address] [employer] Safety Concerns:115290123}    Impairments/Disabilities:      [facility] Impairments/Disabilities:128040905}    Nutrition Therapy:  Current Nutrition Therapy:   [facility] ELISA Diet List:096443660}    Routes of Feeding: {CHP DME Other Feedings:548095553}  Liquids: {Slp liquid thickness:18705}  Daily Fluid Restriction: {CHP DME Yes amt example:563428701}  Last Modified Barium Swallow with Video (Video Swallowing Test): {Done Not Done MFLP:005501300}    Treatments at the Time of Hospital Discharge:   Respiratory Treatments: ***  Oxygen Therapy:  {Therapy; copd oxygen:15362}  Ventilator:    {[facility] Vent DZSK:528071519}    Rehab Therapies: {THERAPEUTIC INTERVENTION:5930183348}  Weight Bearing Status/Restrictions: [facility] Weight Bearin}  Other Medical Equipment (for information only, NOT a DME order):  {EQUIPMENT:530103783}  Other Treatments: ***    Patient's personal belongings (please select all that are sent with patient):  {[facility] DME Belongings:703429928}    RN SIGNATURE:  {Esignature:816909702}    CASE MANAGEMENT/SOCIAL WORK SECTION    Inpatient Status Date: ***    Readmission Risk Assessment Score:  Readmission Risk              Risk of Unplanned Readmission:  27           Discharging to Facility/ Agency   Name:   Address:  Phone:  Fax:    Dialysis Facility (if applicable)   Name:  Address:  Dialysis Schedule:  Phone:  Fax:    / signature: {Esignature:553994216}    PHYSICIAN SECTION    Prognosis: {Prognosis:4467311825}    Condition at Discharge: [address] Patient Condition:851722290}    Rehab Potential (if transferring to Rehab): {Prognosis:9648015775}    Recommended Labs or Other Treatments After Discharge: ***    Podiatry Discharge Instructions  Please check feet daily for skin breakdown and wounds. Please follow-up with Dr. Pete SANDHU within 1 week of hospital discharge for foot care and ingrown toenail. Physician Certification: I certify the above information and transfer of Clifford Jackson  is necessary for the continuing treatment of the diagnosis listed and that he requires {Admit to Appropriate Level of Care:70938} for {GREATER/LESS:744796831} 30 days.      Update Admission H&P: {CHP DME Changes in KEVYO:179424975}    PHYSICIAN SIGNATURE:  {Esignature:164823203}

## 2022-01-14 NOTE — PROGRESS NOTES
Progress Note  Admit Date: 1/11/2022       Overnight Events: slept better than at home    CC: F/U for ANTONIO/CKD  Interval History: Doing well over all, awaiting dialysis catheter            calcium acetate  2 capsule Oral TID     aspirin  81 mg Oral Daily    atorvastatin  20 mg Oral Daily    calcitRIOL  0.25 mcg Oral Daily    metoprolol succinate  50 mg Oral Daily    NIFEdipine  90 mg Oral Daily    allopurinol  100 mg Oral Daily    doxazosin  4 mg Oral Daily    sodium chloride flush  10 mL IntraVENous 2 times per day    sennosides-docusate sodium  1 tablet Oral BID    [Held by provider] heparin (porcine)  5,000 Units SubCUTAneous 3 times per day    insulin lispro  0-12 Units SubCUTAneous TID WC    insulin lispro  0-6 Units SubCUTAneous Nightly      PRN Medications: oxyCODONE-acetaminophen, cyclobenzaprine, glucose, dextrose, glucagon (rDNA), dextrose, sodium chloride flush, sodium chloride, ondansetron **OR** ondansetron, magnesium hydroxide, acetaminophen **OR** acetaminophen  Diet: Diet NPO  Continuous Infusions:   dextrose      sodium chloride       PHYSICAL EXAM:  BP (!) 140/90   Pulse 93   Temp 98.1 °F (36.7 °C) (Oral)   Resp 16   Ht 6' 5\" (1.956 m)   Wt 289 lb 1.6 oz (131.1 kg)   SpO2 97%   BMI 34.28 kg/m²   Recent Labs     01/13/22  0655 01/13/22  1203 01/13/22  1629 01/13/22  2001 01/14/22  0639   POCGLU 160* 140* 167* 256* 112*       Intake/Output Summary (Last 24 hours) at 1/14/2022 1104  Last data filed at 1/13/2022 1949  Gross per 24 hour   Intake 480 ml   Output 1100 ml   Net -620 ml          General appearance:  No acute distress, appears stated age  Eyes: Pupils equal, round, reactive to light, conjunctiva/corneas clear  Ears/Nose/Mouth/Throat: No external lesions or scars, hearing intact to voice  Neck: Trachea midline, no masses noted, no thyromegaly  Respiratory:  Non-labored breathing, clear to auscultation bilaterally  Cardiovascular: Regular rate and rhythm, no murmurs, gallops, or rubs  Abdomen: soft, non-tender, non-distended  Musculoskeletal: Warm, well perfused, no cyanosis or edema  Skin: normal color, no wounds noted  Psychiatric: A&Ox4, good insight and judgment     LABS:  Recent Labs     01/12/22  1124 01/13/22  0508 01/14/22  0437   WBC 4.1 7.9 8.2   HGB 10.6* 10.2* 9.5*   HCT 33.5* 31.1* 28.7*    170 163                                                                    Recent Labs     01/12/22  1124 01/13/22  0508 01/14/22  0437    134* 136   K 4.2 3.9 3.7   CL 99 97* 100   CO2 22 19* 21   BUN 52* 64* 71*   CREATININE 8.7* 8.3* 8.8*   GLUCOSE 196* 152* 94     No results for input(s): AST, ALT, ALB, BILITOT, ALKPHOS in the last 72 hours. Recent Labs     01/13/22  0115 01/13/22  0508 01/13/22  1321   TROPONINI 0.03* 0.03* 0.04*     Assessment & Plan:    Patient Active Problem List:         # ANTONIO on CKD may be going toward ESRD  -nephrology consulted for management  -likely to need dialysis soon  - HD catheter pending  - will need HD today and tomorrow  - discussed with nephro may also consider PD catheter placement     # T2DM  -SSI, accuchecks     # HTN  -monitor     # Gout  -reduced dose of allopurinol for worsening kidney function     # CELSA on CPAP - pt asking if we can try nasal instead of full mask as he was not able to tolelrate last night. # pAF stable, appears to be in SR on exam.   # Chronic systolic HF - compensated monitor. # HLD  -home management except as above     DVT Prophylaxis: Heparin  Diet: No diet orders on file  Code Status: Prior     PT/OT Eval Status: Ongoing     Dispo: Inpt, dispo pending clinical improvement           The patient and / or the family were informed of the results of any tests, a time was given to answer questions, a plan was proposed and they agreed with plan. Disposition: possibly tomorrow if cr stable.    Full Code

## 2022-01-14 NOTE — H&P
Patient:  Lulu Mckeon   :   1960      Relevant clinical history, particularly as it involves the pending procedure, was reviewed and discussed. The procedure including risks, benefits, and alternatives was discussed at length with the patient (or designated family member) and all questions were answered. Informed consent to proceed with the procedure was given. Vital signs were monitored and documented by the Radiology nurse. Targeted physical examination  Heart : regular rate and rhythm  Lungs : clear, breathing easily  Condition : stable    [facility] nurses notes reviewed and agreed. Past Medical History:        Diagnosis Date    Arthralgia of multiple joints 10/27/2015    Arthritis     Atrial fibrillation (HCC)     Chronic kidney disease     stage 4    Chronic systolic congestive heart failure ([address] 2021    CRI (chronic renal insufficiency)     Diabetic nephropathy associated with type 2 diabetes mellitus ([address] 10/11/2018    Diverticulosis     Elevated PSA     Erectile dysfunction     Gout     Gout     Hemrrhoid NOS w/ complication NEC     History of MRSA infection     Hypertension     CELSA (obstructive sleep apnea) 2017    uses C-pap machine    Type 2 diabetes mellitus without complication, without long-term current use of insulin ([facility] [address]     Umbilical hernia without obstruction and without gangrene 2016       Past Surgical History:           Procedure Laterality Date    COLONOSCOPY      DILATATION, ESOPHAGUS      HEMICOLECTOMY      UPPER GASTROINTESTINAL ENDOSCOPY  16    biopsies, multiple small gastric ulcers    UPPER GASTROINTESTINAL ENDOSCOPY  2016       Allergies:  Patient has no known allergies. Medications:   Home Meds  No current facility-administered medications on file prior to encounter.      Current Outpatient Medications on File Prior to Encounter   Medication Sig Dispense Refill    nortriptyline (PAMELOR) 10 MG capsule nightly       colchicine (COLCRYS) 0.6 MG tablet Take 1 tablet by mouth daily as needed for Pain (as needed for pain related to gout) 30 tablet 5    oxyCODONE-acetaminophen (PERCOCET) 5-325 MG per tablet Take 1 tablet by mouth every 8 hours as needed for Pain for up to 30 days.  90 tablet 0    vitamin D (ERGOCALCIFEROL) 1.25 MG (84447 UT) CAPS capsule Take 1 capsule by mouth once a week 12 capsule 1    calcitRIOL (ROCALTROL) 0.25 MCG capsule Take 1 capsule by mouth daily 30 capsule 3    omeprazole (PRILOSEC) 40 MG delayed release capsule TAKE 1 CAPSULE BY MOUTH EVERY DAY 30 capsule 1    allopurinol (ZYLOPRIM) 300 MG tablet TAKE 1 TABLET BY MOUTH EVERY DAY 30 tablet 5    terazosin (HYTRIN) 5 MG capsule Take 1 capsule by mouth nightly TAKE 1 CAPSULE BY MOUTH EVERY EVENING 60 capsule 5    metoprolol succinate (TOPROL XL) 50 MG extended release tablet TAKE 1 TABLET BY MOUTH EVERY DAY 30 tablet 3    atorvastatin (LIPITOR) 20 MG tablet TAKE 1 TABLET BY MOUTH EVERY DAY 30 tablet 5    NIFEdipine (ADALAT CC) 90 MG extended release tablet TAKE 1 TABLET BY MOUTH EVERY DAY 30 tablet 5    sildenafil (VIAGRA) 100 MG tablet Take 1 tablet by mouth as needed for Erectile Dysfunction 30 tablet 3    aspirin 81 MG tablet Take 81 mg by mouth daily         Current Meds  epoetin rae-epbx (RETACRIT) injection 4,000 Units, Q MWF  oxyCODONE-acetaminophen (PERCOCET) 5-325 MG per tablet 2 tablet, Q6H PRN  calcium acetate (PHOSLO) capsule 1,334 mg, TID WC  cyclobenzaprine (FLEXERIL) tablet 5 mg, BID PRN  aspirin chewable tablet 81 mg, Daily  atorvastatin (LIPITOR) tablet 20 mg, Daily  calcitRIOL (ROCALTROL) capsule 0.25 mcg, Daily  metoprolol succinate (TOPROL XL) extended release tablet 50 mg, Daily  NIFEdipine (PROCARDIA XL) extended release tablet 90 mg, Daily  allopurinol (ZYLOPRIM) tablet 100 mg, Daily  doxazosin (CARDURA) tablet 4 mg, Daily  glucose (GLUTOSE) 40 % oral gel 15 g, PRN  dextrose 50 % IV solution, PRN  glucagon (rDNA) injection 1 mg, PRN  dextrose 5 % solution, PRN  sodium chloride flush 0.9 % injection 10 mL, 2 times per day  sodium chloride flush 0.9 % injection 10 mL, PRN  0.9 % sodium chloride infusion, PRN  ondansetron (ZOFRAN-ODT) disintegrating tablet 4 mg, Q8H PRN   Or  ondansetron (ZOFRAN) injection 4 mg, Q6H PRN  sennosides-docusate sodium (SENOKOT-S) 8.6-50 MG tablet 1 tablet, BID  magnesium hydroxide (MILK OF MAGNESIA) 400 MG/5ML suspension 30 mL, Daily PRN  acetaminophen (TYLENOL) tablet 650 mg, Q6H PRN   Or  acetaminophen (TYLENOL) suppository 650 mg, Q6H PRN  [Held by provider] heparin (porcine) injection 5,000 Units, 3 times per day  insulin lispro (1 Unit Dial) 0-12 Units, TID WC  insulin lispro (1 Unit Dial) 0-6 Units, Nightly          ASA 3 - Patient with moderate systemic disease with functional limitations    III (soft palate, base of uvula visible)    Activity:  2 - Able to move 4 extremities voluntarily on command  Respiration:  2 - Able to breathe deeply and cough freely  Circulation:  2 - BP+/- 20mmHg of normal  Consciousness:  2 - Fully awake  Oxygen Saturation (color):  2 - Able to maintain oxygen saturation >92% on room air    Sedation : History of sleep apnea, cautious moderate sedation planned    HPI / Treatment plan : Tunneled dialysis catheter placement

## 2022-01-14 NOTE — CONSULTS
Reason for Consultation/Chief Complaint: Pre OP/ LV dysfunction. History of Present Illness:  Vira George is a 59 y.o. patient whom we were asked to see pre op PD catheter. Hx LV dysfunction. Saw Dr Filippo Ly who recommended stress. He did not obtain. Now in need of PD catheter. Breathing reasonable. No hx chest pain. No pnd or orthopnea. No palp/tachy/syncope. No hx MI. Recently some edema but also has had worsening renal function. Past Medical History:   has a past medical history of Arthralgia of multiple joints, Arthritis, Atrial fibrillation ([address] Chronic kidney disease, Chronic systolic congestive heart failure ([address] CRI (chronic renal insufficiency), Diabetic nephropathy associated with type 2 diabetes mellitus ([address] Diverticulosis, Elevated PSA, Erectile dysfunction, Gout, Gout, Hemrrhoid NOS w/ complication NEC, History of MRSA infection, Hypertension, CELSA (obstructive sleep apnea), Type 2 diabetes mellitus without complication, without long-term current use of insulin ([address] and Umbilical hernia without obstruction and without gangrene. Surgical History:   has a past surgical history that includes Upper gastrointestinal endoscopy (05/28/2016); Colonoscopy; Dilatation, esophagus; Upper gastrointestinal endoscopy (09/12/2016); and IR TUNNELED CVC PLACE WO SQ PORT/PUMP > 5 YEARS (01/14/2022). Social History:   reports that he has never smoked. He has never used smokeless tobacco. He reports current alcohol use. He reports that he does not use drugs. Family History:  No evidence for sudden cardiac death or premature CAD    Home Medications:  Were reviewed and are listed in nursing record. and/or listed below  Prior to Admission medications    Medication Sig Start Date End Date Taking?  Authorizing Provider   nortriptyline (PAMELOR) 10 MG capsule nightly  12/15/21  Yes Historical Provider, MD   colchicine (COLCRYS) 0.6 MG tablet Take 1 tablet by mouth daily as needed for Pain (as needed for pain related to gout) 1/10/22  Yes Deepa Zavala MD   oxyCODONE-acetaminophen (PERCOCET) 5-325 MG per tablet Take 1 tablet by mouth every 8 hours as needed for Pain for up to 30 days. 1/10/22 2/9/22 Yes Deepa Zavala MD   vitamin D (ERGOCALCIFEROL) 1.25 MG (20176 UT) CAPS capsule Take 1 capsule by mouth once a week 12/28/21  Yes Henry Tavares MD   calcitRIOL (ROCALTROL) 0.25 MCG capsule Take 1 capsule by mouth daily 12/28/21  Yes Henry Tavares MD   omeprazole (PRILOSEC) 40 MG delayed release capsule TAKE 1 CAPSULE BY MOUTH EVERY DAY 12/16/21  Yes Deepa Zavala MD   allopurinol (ZYLOPRIM) 300 MG tablet TAKE 1 TABLET BY MOUTH EVERY DAY 11/30/21  Yes Deepa Zavala MD   terazosin (HYTRIN) 5 MG capsule Take 1 capsule by mouth nightly TAKE 1 CAPSULE BY MOUTH EVERY EVENING 11/30/21  Yes Deepa Zavala MD   metoprolol succinate (TOPROL XL) 50 MG extended release tablet TAKE 1 TABLET BY MOUTH EVERY DAY 11/30/21  Yes Deepa Zavala MD   atorvastatin (LIPITOR) 20 MG tablet TAKE 1 TABLET BY MOUTH EVERY DAY 11/30/21  Yes Deepa Zavala MD   NIFEdipine (ADALAT CC) 90 MG extended release tablet TAKE 1 TABLET BY MOUTH EVERY DAY 11/30/21  Yes Deepa Zavala MD   sildenafil (VIAGRA) 100 MG tablet Take 1 tablet by mouth as needed for Erectile Dysfunction 8/16/21  Yes Deepa Zavala MD   aspirin 81 MG tablet Take 81 mg by mouth daily   Yes Historical Provider, MD        Allergies:  Patient has no known allergies. Review of Systems:       · Constitutional: there has been no unanticipated weight loss. There's been no change in energy level, sleep pattern, or activity level. · Eyes: No visual changes or diplopia. No scleral icterus. · ENT: No Headaches, hearing loss or vertigo. No mouth sores or sore throat. · Cardiovascular: No loss of consciousness. No hemoptysis, pleuritic pain, or phlebitis. · Respiratory: No cough or wheezing, no sputum production. No hematemesis. · Gastrointestinal: No abdominal pain, appetite loss, blood in stools. No change in bowel or bladder habits. · Genitourinary: No dysuria, trouble voiding, or hematuria. · Musculoskeletal:  No gait disturbance, weakness or joint complaints. · Integumentary: No rash or pruritis. · All other systems reviewed negative as done.        Physical Examination:    Vitals:    01/14/22 1445   BP: (!) 156/82   Pulse: 83   Resp: 16   Temp: 98.1 °F (36.7 °C)   SpO2: 99%    Weight: 289 lb (131.1 kg)         General Appearance:  Alert, cooperative, no distress, appears stated age   Head:  Normocephalic, without obvious abnormality, atraumatic   Eyes:  PERRL, conjunctiva/corneas clear       Nose: Nares normal, no drainage or sinus tenderness   Throat: Lips, mucosa, and tongue normal   Neck: Supple, symmetrical, trachea midline       Lungs:   Clear to auscultation bilaterally, respirations unlabored   Chest Wall:  No tenderness or deformity   Heart:  Regular rate and rhythm, S1, S2 normal, no murmur, rub or gallop   Abdomen:   Soft, non-tender, bowel sounds active all four quadrants           Extremities: Extremities normal, atraumatic, no cyanosis or edema       Skin: Skin color, texture, turgor normal, no rashes or lesions   Pysch: Normal mood and affect   Neurologic: Normal gross motor and sensory exam.         Labs  CBC:   Lab Results   Component Value Date    WBC 8.2 01/14/2022    RBC 3.34 01/14/2022    HGB 9.5 01/14/2022    HCT 28.7 01/14/2022    MCV 85.9 01/14/2022    RDW 13.9 01/14/2022     01/14/2022     CMP:    Lab Results   Component Value Date     01/14/2022    K 3.7 01/14/2022     01/14/2022    CO2 21 01/14/2022    BUN 71 01/14/2022    CREATININE 8.8 01/14/2022    GFRAA 7 01/14/2022    GFRAA >60 05/24/2013    AGRATIO 1.5 01/10/2022    LABGLOM 6 01/14/2022    LABGLOM 56 04/14/2011    GLUCOSE 94 01/14/2022    PROT 6.5 01/10/2022    PROT 7.5 10/03/2012    CALCIUM 5.5 01/14/2022    BILITOT <0.2 01/10/2022    ALKPHOS 91 01/10/2022    AST 15 01/10/2022    ALT 17 01/10/2022     PT/INR:  No results found for: PTINR  Lab Results   Component Value Date    MQMWFGS 919 (H) 01/13/2022    TROPONINI 0.04 (H) 01/13/2022       EKG:  I have reviewed EKG with the following interpretation:  Impression:  NSR, LAD, NSSTTW changes. Assessment  Patient Active Problem List   Diagnosis    Atrial fibrillation ([address]    CRI (chronic renal insufficiency)    Gout    Erectile dysfunction    Elevated PSA    Essential hypertension, benign    Headache    Diverticulosis    Arthralgia of multiple joints    Umbilical hernia without obstruction and without gangrene    Knee pain, bilateral    Alcohol use disorder, mild, abuse    CELSA (obstructive sleep apnea)    Duodenal ulcer disease    Diabetic nephropathy associated with type 2 diabetes mellitus ([address]    Type 2 diabetes mellitus with diabetic nephropathy, without long-term current use of insulin ([facility]    Lateral epicondylitis of right elbow    Chronic bilateral low back pain without sciatica    Morbid obesity due to excess calories ([facility])    Chronic systolic congestive heart failure (HCC)    Acute kidney injury superimposed on CKD ([facility])    Stroke-like symptoms    DMII (diabetes mellitus, type 2) ([facility])    Hyperlipidemia    Hypocalcemia    Hypomagnesemia    Hypokalemia    Muscle cramps    ANTONIO (acute kidney injury) ([address]    Shortness of breath    Electrolyte imbalance    Anemia due to stage 5 chronic kidney disease (HCC)    Congestive heart failure (HCC)    Pre-op evaluation    LV dysfunction         Plan:    LV dysfunction noted. Dr Hubbard Prost rec stress but never obtained. Will have lexiscan. Prior to surgery. No angina however. Volume status reasonable currently. On dialysis.

## 2022-01-15 LAB
ANION GAP SERPL CALCULATED.3IONS-SCNC: 16 MMOL/L (ref 3–16)
BUN BLDV-MCNC: 56 MG/DL (ref 7–20)
CALCIUM SERPL-MCNC: 6.4 MG/DL (ref 8.3–10.6)
CHLORIDE BLD-SCNC: 98 MMOL/L (ref 99–110)
CO2: 23 MMOL/L (ref 21–32)
CREAT SERPL-MCNC: 7 MG/DL (ref 0.8–1.3)
GFR AFRICAN AMERICAN: 10
GFR NON-AFRICAN AMERICAN: 8
GLUCOSE BLD-MCNC: 148 MG/DL (ref 70–99)
GLUCOSE BLD-MCNC: 92 MG/DL (ref 70–99)
HBV SURFACE AB TITR SER: <3.5 MIU/ML
HCT VFR BLD CALC: 33.5 % (ref 40.5–52.5)
HEMOGLOBIN: 10.9 G/DL (ref 13.5–17.5)
HEPATITIS B SURFACE ANTIGEN INTERPRETATION: NORMAL
LV EF: 65 %
LVEF MODALITY: NORMAL
MCH RBC QN AUTO: 28.2 PG (ref 26–34)
MCHC RBC AUTO-ENTMCNC: 32.6 G/DL (ref 31–36)
MCV RBC AUTO: 86.5 FL (ref 80–100)
PDW BLD-RTO: 13.9 % (ref 12.4–15.4)
PERFORMED ON: ABNORMAL
PLATELET # BLD: 214 K/UL (ref 135–450)
PMV BLD AUTO: 8.2 FL (ref 5–10.5)
POTASSIUM REFLEX MAGNESIUM: 4.1 MMOL/L (ref 3.5–5.1)
RBC # BLD: 3.87 M/UL (ref 4.2–5.9)
SODIUM BLD-SCNC: 137 MMOL/L (ref 136–145)
WBC # BLD: 10.4 K/UL (ref 4–11)

## 2022-01-15 PROCEDURE — 2580000003 HC RX 258: Performed by: INTERNAL MEDICINE

## 2022-01-15 PROCEDURE — 6370000000 HC RX 637 (ALT 250 FOR IP): Performed by: INTERNAL MEDICINE

## 2022-01-15 PROCEDURE — 6360000002 HC RX W HCPCS: Performed by: INTERNAL MEDICINE

## 2022-01-15 PROCEDURE — 80048 BASIC METABOLIC PNL TOTAL CA: CPT

## 2022-01-15 PROCEDURE — 2500000003 HC RX 250 WO HCPCS: Performed by: INTERNAL MEDICINE

## 2022-01-15 PROCEDURE — 93017 CV STRESS TEST TRACING ONLY: CPT

## 2022-01-15 PROCEDURE — A9502 TC99M TETROFOSMIN: HCPCS | Performed by: INTERNAL MEDICINE

## 2022-01-15 PROCEDURE — 99233 SBSQ HOSP IP/OBS HIGH 50: CPT | Performed by: INTERNAL MEDICINE

## 2022-01-15 PROCEDURE — 85027 COMPLETE CBC AUTOMATED: CPT

## 2022-01-15 PROCEDURE — 78452 HT MUSCLE IMAGE SPECT MULT: CPT

## 2022-01-15 PROCEDURE — 2060000000 HC ICU INTERMEDIATE R&B

## 2022-01-15 PROCEDURE — 3430000000 HC RX DIAGNOSTIC RADIOPHARMACEUTICAL: Performed by: INTERNAL MEDICINE

## 2022-01-15 PROCEDURE — 90935 HEMODIALYSIS ONE EVALUATION: CPT

## 2022-01-15 PROCEDURE — 94660 CPAP INITIATION&MGMT: CPT

## 2022-01-15 RX ORDER — SODIUM CHLORIDE 0.9 % (FLUSH) 0.9 %
10 SYRINGE (ML) INJECTION 2 TIMES DAILY
Status: DISCONTINUED | OUTPATIENT
Start: 2022-01-15 | End: 2022-01-18 | Stop reason: HOSPADM

## 2022-01-15 RX ADMIN — CALCIUM ACETATE 1334 MG: 667 CAPSULE ORAL at 16:07

## 2022-01-15 RX ADMIN — OXYCODONE HYDROCHLORIDE AND ACETAMINOPHEN 2 TABLET: 5; 325 TABLET ORAL at 18:33

## 2022-01-15 RX ADMIN — CYCLOBENZAPRINE 5 MG: 10 TABLET, FILM COATED ORAL at 16:07

## 2022-01-15 RX ADMIN — CALCITRIOL CAPSULES 0.25 MCG 0.25 MCG: 0.25 CAPSULE ORAL at 10:52

## 2022-01-15 RX ADMIN — METOPROLOL SUCCINATE 50 MG: 50 TABLET, FILM COATED, EXTENDED RELEASE ORAL at 10:52

## 2022-01-15 RX ADMIN — OXYCODONE HYDROCHLORIDE AND ACETAMINOPHEN 2 TABLET: 5; 325 TABLET ORAL at 12:23

## 2022-01-15 RX ADMIN — DOXAZOSIN 4 MG: 4 TABLET ORAL at 10:52

## 2022-01-15 RX ADMIN — ATORVASTATIN CALCIUM 20 MG: 20 TABLET, FILM COATED ORAL at 10:52

## 2022-01-15 RX ADMIN — ALLOPURINOL 100 MG: 100 TABLET ORAL at 10:53

## 2022-01-15 RX ADMIN — OXYCODONE HYDROCHLORIDE AND ACETAMINOPHEN 2 TABLET: 5; 325 TABLET ORAL at 05:59

## 2022-01-15 RX ADMIN — CALCIUM ACETATE 1334 MG: 667 CAPSULE ORAL at 10:52

## 2022-01-15 RX ADMIN — NIFEDIPINE 90 MG: 90 TABLET, FILM COATED, EXTENDED RELEASE ORAL at 10:52

## 2022-01-15 RX ADMIN — TETROFOSMIN 10 MILLICURIE: 1.38 INJECTION, POWDER, LYOPHILIZED, FOR SOLUTION INTRAVENOUS at 07:47

## 2022-01-15 RX ADMIN — REGADENOSON 0.4 MG: 0.08 INJECTION, SOLUTION INTRAVENOUS at 09:14

## 2022-01-15 RX ADMIN — TETROFOSMIN 30 MILLICURIE: 1.38 INJECTION, POWDER, LYOPHILIZED, FOR SOLUTION INTRAVENOUS at 09:14

## 2022-01-15 RX ADMIN — SODIUM CHLORIDE, PRESERVATIVE FREE 10 ML: 5 INJECTION INTRAVENOUS at 09:15

## 2022-01-15 RX ADMIN — SODIUM CHLORIDE, PRESERVATIVE FREE 10 ML: 5 INJECTION INTRAVENOUS at 07:47

## 2022-01-15 ASSESSMENT — PAIN DESCRIPTION - PROGRESSION
CLINICAL_PROGRESSION: NOT CHANGED

## 2022-01-15 ASSESSMENT — PAIN SCALES - WONG BAKER
WONGBAKER_NUMERICALRESPONSE: 2

## 2022-01-15 ASSESSMENT — PAIN SCALES - GENERAL
PAINLEVEL_OUTOF10: 2
PAINLEVEL_OUTOF10: 6
PAINLEVEL_OUTOF10: 0
PAINLEVEL_OUTOF10: 8
PAINLEVEL_OUTOF10: 0
PAINLEVEL_OUTOF10: 6
PAINLEVEL_OUTOF10: 0
PAINLEVEL_OUTOF10: 2

## 2022-01-15 NOTE — PLAN OF CARE
Problem: Pain:  Description: Pain management should include both nonpharmacologic and pharmacologic interventions. Goal: Control of acute pain  Description: Control of acute pain  Outcome: Ongoing  Pt complaining of pain at surgical site, pain on toe, and pain related to gout. Will continue to give PRN medication, offer non pharm alternatives and continue to monitor. Problem: HEMODYNAMIC STATUS  Goal: Patient has stable vital signs and fluid balance  Outcome: Ongoing     Pt had line placed today for HD, went to dialysis , will go again tomorrow. Pt tolerated ok. VSS back in room. Problem: Falls - Risk of:  Goal: Will remain free from falls  Description: Will remain free from falls  Outcome: Ongoing   Orthostatic vital signs obtained at start of shift - see flowsheet for details. Pt is a Med fall risk. See Tyron Bottoms Fall Score and ABCDS Injury Risk assessments. - Screening for Orthostasis AND not a Steptoe Risk per MG/ABCDS: Pt bed is in low position, side rails up, call light and belongings are in reach. Fall risk light is on outside pts room. Pt encouraged to call for assistance as needed. Will continue with hourly rounds for PO intake, pain needs, toileting and repositioning as needed.

## 2022-01-15 NOTE — PROGRESS NOTES
Coronary Art Dis Brother         reports that he has never smoked. He has never used smokeless tobacco. He reports current alcohol use. He reports that he does not use drugs. Allergies:  Patient has no known allergies.     Current Medications:    sodium chloride flush 0.9 % injection 10 mL, BID  epoetin rae-epbx (RETACRIT) injection 4,000 Units, Q MWF  dextrose bolus (hypoglycemia) 10% 125 mL, PRN   Or  dextrose bolus (hypoglycemia) 10% 250 mL, PRN  oxyCODONE-acetaminophen (PERCOCET) 5-325 MG per tablet 2 tablet, Q6H PRN  calcium acetate (PHOSLO) capsule 1,334 mg, TID WC  cyclobenzaprine (FLEXERIL) tablet 5 mg, BID PRN  [Held by provider] aspirin chewable tablet 81 mg, Daily  atorvastatin (LIPITOR) tablet 20 mg, Daily  calcitRIOL (ROCALTROL) capsule 0.25 mcg, Daily  metoprolol succinate (TOPROL XL) extended release tablet 50 mg, Daily  NIFEdipine (PROCARDIA XL) extended release tablet 90 mg, Daily  allopurinol (ZYLOPRIM) tablet 100 mg, Daily  doxazosin (CARDURA) tablet 4 mg, Daily  glucose (GLUTOSE) 40 % oral gel 15 g, PRN  glucagon (rDNA) injection 1 mg, PRN  dextrose 5 % solution, PRN  sodium chloride flush 0.9 % injection 10 mL, 2 times per day  sodium chloride flush 0.9 % injection 10 mL, PRN  0.9 % sodium chloride infusion, PRN  ondansetron (ZOFRAN-ODT) disintegrating tablet 4 mg, Q8H PRN   Or  ondansetron (ZOFRAN) injection 4 mg, Q6H PRN  sennosides-docusate sodium (SENOKOT-S) 8.6-50 MG tablet 1 tablet, BID  magnesium hydroxide (MILK OF MAGNESIA) 400 MG/5ML suspension 30 mL, Daily PRN  acetaminophen (TYLENOL) tablet 650 mg, Q6H PRN   Or  acetaminophen (TYLENOL) suppository 650 mg, Q6H PRN  [Held by provider] heparin (porcine) injection 5,000 Units, 3 times per day  insulin lispro (1 Unit Dial) 0-12 Units, TID WC  insulin lispro (1 Unit Dial) 0-6 Units, Nightly      Physical exam:     Vitals:  BP (!) 147/83   Pulse 91   Temp 98.4 °F (36.9 °C) (Oral)   Resp 16   Ht 6' 5\" (1.956 m)   Wt 288 lb (130.6 kg) SpO2 100%   BMI 34.15 kg/m²   Constitutional:  OAA X3 NAD  Skin: no rash, turgor wnl  Heent:  eomi, mmm  Neck: no bruits or jvd noted  Cardiovascular:  S1, S2 without m/r/g  Respiratory: CTA B without w/r/r  Abdomen:  +bs, soft, nt, nd  Ext: ++ lower extremity edema  Psychiatric: mood and affect appropriate  Musculoskeletal:  Rom, muscular strength intact    Data:   Labs:  CBC:   Recent Labs     01/13/22  0508 01/14/22  0437 01/15/22  0503   WBC 7.9 8.2 10.4   HGB 10.2* 9.5* 10.9*    163 214     BMP:    Recent Labs     01/13/22  0508 01/14/22  0437 01/15/22  0503   * 136 137   K 3.9 3.7 4.1   CL 97* 100 98*   CO2 19* 21 23   BUN 64* 71* 56*   CREATININE 8.3* 8.8* 7.0*   GLUCOSE 152* 94 92     Ca/Mg/Phos:   Recent Labs     01/13/22  0508 01/14/22  0437 01/15/22  0503   CALCIUM 5.7* 5.5* 6.4*     Hepatic:   No results for input(s): AST, ALT, ALB, BILITOT, ALKPHOS in the last 72 hours. Troponin:   Recent Labs     01/13/22  0115 01/13/22  0508 01/13/22  1321   TROPONINI 0.03* 0.03* 0.04*     BNP: No results for input(s): BNP in the last 72 hours. Lipids: No results for input(s): CHOL, TRIG, HDL, LDLCALC, LABVLDL in the last 72 hours. ABGs: No results for input(s): PHART, PO2ART, ZKW6UER in the last 72 hours. INR:   Recent Labs     01/14/22  1044 01/14/22  1359   INR 1.10 0.93     UA:  No results for input(s): Stephany Ear, GLUCOSEU, BILIRUBINUR, KETUA, SPECGRAV, BLOODU, PHUR, PROTEINU, UROBILINOGEN, NITRU, LEUKOCYTESUR, Vickye Janis in the last 72 hours. Urine Microscopic: No results for input(s): LABCAST, BACTERIA, COMU, HYALCAST, WBCUA, RBCUA, EPIU in the last 72 hours. Urine Culture: No results for input(s): LABURIN in the last 72 hours. Urine Chemistry:   No results for input(s): Garrel [place], PROTEINUR, NAUR in the last 72 hours.           IMAGING:  NM MYOCARDIAL SPECT REST EXERCISE OR RX   Final Result      IR TUNNELED CVC PLACE WO SQ PORT/PUMP > 5 YEARS   Final Result IMPRESSION :      Limited ultrasound examination demonstrates a patent right internal jugular vein. Placement of tunnelled right jugular dialysis catheter. Fluoroscopy time : 0.4 minutes   Number of exposures obtained : 1   Moderate sedation was provided and monitored by an independent trained observer. Moderate sedation start time : 1241   Moderate sedation end time : 1253   Blood loss : minimal (less than 5 cc)   Specimens removed : none               XR WRIST LEFT (MIN 3 VIEWS)   Final Result      No acute bony pathology      XR ANKLE RIGHT (MIN 3 VIEWS)   Final Result      No acute bony pathology. Heel spur      XR CHEST (2 VW)   Final Result      No acute pulmonary pathology or significant change from the prior exam                      Assessment/Plan   1. ANTONIO on CKD 5     2. HTN    3. Anemia    4. Acid- base/ Electrolyte imbalance     5. Gout     6.  CHF     Plan   - renal function poor   - HD today   -TDC placed  - stress test - low risk     - sx consult for PD cath placement   - labs in am   - No need for IVF   - Steroids for ac gout     Recommend to dose adjust all medications  based on renal functions  Maintain SBP> 90 mmHg   Daily weights   AVOID NSAIDs  Avoid Nephrotoxins  Monitor Intake/Output  Call if significant decrease in urine output                   Thank you for allowing us to participate in care of Sharlyn Peabody, MD  Feel free to contact me   [facility] [address]  Office : [phone]  Fax :[phone]

## 2022-01-15 NOTE — PROGRESS NOTES
Nephrology  Note                                                                                                                                                                                                                                                                                                                                                               Office : [phone]     Fax :[phone]              Patient's Name: Verlee Gottron  1/14/2022    Reason for Consult:   ANTONIO   Requesting Physician:  Naima Torres MD      HD today   U[facility]   BP elevated          Past Medical History:   Diagnosis Date    Arthralgia of multiple joints 10/27/2015    Arthritis     Atrial fibrillation (HCC)     Chronic kidney disease     stage 4    Chronic systolic congestive heart failure ([address] 8/16/2021    CRI (chronic renal insufficiency)     Diabetic nephropathy associated with type 2 diabetes mellitus ([address] 10/11/2018    Diverticulosis     Elevated PSA     Erectile dysfunction     Gout     Gout     Hemrrhoid NOS w/ complication NEC     History of MRSA infection     Hypertension     CELSA (obstructive sleep apnea) 07/14/2017    uses C-pap machine    Type 2 diabetes mellitus without complication, without long-term current use of insulin ([facility] 7/02/7638    Umbilical hernia without obstruction and without gangrene 2/2/2016       Past Surgical History:   Procedure Laterality Date    COLONOSCOPY      DILATATION, ESOPHAGUS      IR TUNNELED CATHETER PLACEMENT GREATER THAN 5 YEARS  01/14/2022    IR TUNNELED CATHETER PLACEMENT GREATER THAN 5 YEARS 1/14/2022 [facility]    UPPER GASTROINTESTINAL ENDOSCOPY  05/28/2016    biopsies, multiple small gastric ulcers    UPPER GASTROINTESTINAL ENDOSCOPY  09/12/2016       Family History   Problem Relation Age of Onset    Hypertension Other     Breast Cancer Mother     Colon Cancer Father     Diabetes Maternal Grandmother     Coronary Art Dis Brother reports that he has never smoked. He has never used smokeless tobacco. He reports current alcohol use. He reports that he does not use drugs. Allergies:  Patient has no known allergies.     Current Medications:    epoetin rae-epbx (RETACRIT) injection 4,000 Units, Q MWF  dextrose bolus (hypoglycemia) 10% 125 mL, PRN   Or  dextrose bolus (hypoglycemia) 10% 250 mL, PRN  oxyCODONE-acetaminophen (PERCOCET) 5-325 MG per tablet 2 tablet, Q6H PRN  calcium acetate (PHOSLO) capsule 1,334 mg, TID WC  cyclobenzaprine (FLEXERIL) tablet 5 mg, BID PRN  [Held by provider] aspirin chewable tablet 81 mg, Daily  atorvastatin (LIPITOR) tablet 20 mg, Daily  calcitRIOL (ROCALTROL) capsule 0.25 mcg, Daily  metoprolol succinate (TOPROL XL) extended release tablet 50 mg, Daily  NIFEdipine (PROCARDIA XL) extended release tablet 90 mg, Daily  allopurinol (ZYLOPRIM) tablet 100 mg, Daily  doxazosin (CARDURA) tablet 4 mg, Daily  glucose (GLUTOSE) 40 % oral gel 15 g, PRN  glucagon (rDNA) injection 1 mg, PRN  dextrose 5 % solution, PRN  sodium chloride flush 0.9 % injection 10 mL, 2 times per day  sodium chloride flush 0.9 % injection 10 mL, PRN  0.9 % sodium chloride infusion, PRN  ondansetron (ZOFRAN-ODT) disintegrating tablet 4 mg, Q8H PRN   Or  ondansetron (ZOFRAN) injection 4 mg, Q6H PRN  sennosides-docusate sodium (SENOKOT-S) 8.6-50 MG tablet 1 tablet, BID  magnesium hydroxide (MILK OF MAGNESIA) 400 MG/5ML suspension 30 mL, Daily PRN  acetaminophen (TYLENOL) tablet 650 mg, Q6H PRN   Or  acetaminophen (TYLENOL) suppository 650 mg, Q6H PRN  [Held by provider] heparin (porcine) injection 5,000 Units, 3 times per day  insulin lispro (1 Unit Dial) 0-12 Units, TID WC  insulin lispro (1 Unit Dial) 0-6 Units, Nightly        Review of Systems:   14 point ROS obtained but were negative except mentioned in HPI      Physical exam:     Vitals:  BP (!) 163/92   Pulse 96   Temp 97.7 °F (36.5 °C) (Oral)   Resp 19   Ht 6' 5\" (1.956 m)   Wt demonstrates a patent right internal jugular vein. Placement of tunnelled right jugular dialysis catheter. Fluoroscopy time : 0.4 minutes   Number of exposures obtained : 1   Moderate sedation was provided and monitored by an independent trained observer. Moderate sedation start time : 1241   Moderate sedation end time : 1253   Blood loss : minimal (less than 5 cc)   Specimens removed : none               XR WRIST LEFT (MIN 3 VIEWS)   Final Result      No acute bony pathology      XR ANKLE RIGHT (MIN 3 VIEWS)   Final Result      No acute bony pathology. Heel spur      XR CHEST (2 VW)   Final Result      No acute pulmonary pathology or significant change from the prior exam                  NM MYOCARDIAL SPECT REST EXERCISE OR RX    (Results Pending)       Assessment/Plan   1. ANTONIO on CKD 5     2. HTN    3. Anemia    4. Acid- base/ Electrolyte imbalance     5. Gout     6.  CHF     Plan   - renal function poor   - HD today and in am   -TDC placed    - sx consult for PD cath placement   - labs in am   - Ur studies  - No need for IVF   - Steroids for ac gout     Recommend to dose adjust all medications  based on renal functions  Maintain SBP> 90 mmHg   Daily weights   AVOID NSAIDs  Avoid Nephrotoxins  Monitor Intake/Output  Call if significant decrease in urine output                   Thank you for allowing us to participate in care of Casper Ellis MD  Feel free to contact me   [facility] [address]  Office : [phone]  Fax :[phone]

## 2022-01-15 NOTE — PROGRESS NOTES
Cardiology Consult Service  Daily Progress Note        Admit Date:  1/11/2022  Primary cardiologist: Dr Fransisco Betancourt    Reason for Consultation/Chief Complaint: Preop evaluation for PD catheter placement    Subjective:     Patient is a 77-year-old man with history of HFrEF (EF 45-50%), ESRD on hemodialysis. He is scheduled to have peritoneal dialysis catheter placement early next week. Cardiology was consulted for preop evaluation. Lexiscan 1/15/2022: Normal    Interval history:  Patient reports no complaints. There were no overnight events.     Objective:     Medications:   sodium chloride flush  10 mL IntraVENous BID    epoetin rae-epbx  4,000 Units IntraVENous Q MWF    calcium acetate  2 capsule Oral TID WC    [Held by provider] aspirin  81 mg Oral Daily    atorvastatin  20 mg Oral Daily    calcitRIOL  0.25 mcg Oral Daily    metoprolol succinate  50 mg Oral Daily    NIFEdipine  90 mg Oral Daily    allopurinol  100 mg Oral Daily    doxazosin  4 mg Oral Daily    sodium chloride flush  10 mL IntraVENous 2 times per day    sennosides-docusate sodium  1 tablet Oral BID    [Held by provider] heparin (porcine)  5,000 Units SubCUTAneous 3 times per day    insulin lispro  0-12 Units SubCUTAneous TID WC    insulin lispro  0-6 Units SubCUTAneous Nightly       IV drips:   dextrose      sodium chloride         PRN:  dextrose bolus (hypoglycemia) **OR** dextrose bolus (hypoglycemia), oxyCODONE-acetaminophen, cyclobenzaprine, glucose, glucagon (rDNA), dextrose, sodium chloride flush, sodium chloride, ondansetron **OR** ondansetron, magnesium hydroxide, acetaminophen **OR** acetaminophen    Vitals:    01/15/22 0000 01/15/22 0130 01/15/22 0400 01/15/22 1049   BP: (!) 137/94  (!) 155/100 (!) 147/83   Pulse: 93  88 91   Resp: 14 15 10 16   Temp: 98.2 °F (36.8 °C)  98.4 °F (36.9 °C) 98.4 °F (36.9 °C)   TempSrc: Oral  Oral Oral   SpO2: 100% 98% 100%    Weight:       Height:           Intake/Output Summary (Last 24 hours) at 1/15/2022 1554  Last data filed at 1/15/2022 0000  Gross per 24 hour   Intake 1600 ml   Output 1900 ml   Net -300 ml     I/O last 3 completed shifts: In: 1660 [P.O.:1260]  Out: 3400 [Urine:2000]  Wt Readings from Last 3 Encounters:   01/14/22 288 lb (130.6 kg)   01/11/22 282 lb 12.8 oz (128.3 kg)   01/10/22 285 lb 9.6 oz (129.5 kg)       Admit Wt: Weight: 282 lb (127.9 kg)   Todays Wt: Weight: 288 lb (130.6 kg)    TELEMETRY personally reviewed: Sinus     Physical Exam:         General Appearance:  Alert, cooperative, no distress, appears stated age Appropriate weight   Head:  Normocephalic, without obvious abnormality, atraumatic   Eyes:  PERRL, conjunctiva/corneas clear EOM intact  Ears normal   Throat no lesions       Nose: Nares normal, no drainage or sinus tenderness   Throat: Lips, mucosa, and tongue normal   Neck: Supple, symmetrical, trachea midline, no adenopathy, thyroid: not enlarged, symmetric, no tenderness/mass/nodules, no carotid bruit. Lungs:   Normal respiratory rate, lungs clear to auscultation without any wheezes, rubs or ronchi bilaterally. Chest Wall:  No tenderness or deformity   Heart:  Regular rhythm, rate is controlled, S1, S2 normal, there is no murmur, there is no rub or gallop, cannot assess jvd, no bilateral lower extremity edema   Abdomen:   Soft, non-tender, bowel sounds active all four quadrants,  no masses, no organomegaly       Extremities: Extremities normal, atraumatic, no cyanosis.     Pulses: 2+ and symmetric   Skin: Skin color, texture, turgor normal, no rashes or lesions   Pysch: Normal mood and affect   Neurologic: Normal gross motor and sensory exam.  Cranial nerves intact       Labs:   Recent Labs     01/13/22  0508 01/14/22  0437 01/15/22  0503   * 136 137   K 3.9 3.7 4.1   BUN 64* 71* 56*   CREATININE 8.3* 8.8* 7.0*   CL 97* 100 98*   CO2 19* 21 23   GLUCOSE 152* 94 92   CALCIUM 5.7* 5.5* 6.4*     Recent Labs     01/13/22  0508 01/13/22  7253 01/14/22  0437 01/14/22  0437 01/15/22  0503   WBC 7.9  --  8.2  --  10.4   HGB 10.2*  --  9.5*  --  10.9*   HCT 31.1*  --  28.7*  --  33.5*     --  163  --  214   MCV 87.0   < > 85.9   < > 86.5    < > = values in this interval not displayed. No results for input(s): CHOLTOT, TRIG, HDL, LDL in the last 72 hours. Invalid input(s): [address] [facility] [facility], [facility] Jaden Carmine Jonas  Recent Labs     01/14/22  1044 01/14/22  1359   INR 1.10 0.93     Recent Labs     01/12/22  1923 01/13/22  0115 01/13/22  0508 01/13/22  1321   CKTOTAL  --   --  866*  --    TROPONINI 0.04* 0.03* 0.03* 0.04*     No results for input(s): BNP in the last 72 hours. No results for input(s): NTPROBNP in the last 72 hours. No results for input(s): TSH in the last 72 hours. Imaging:       Assessment & Plan:     1. Chronic HFrEF, not in exacerbation. 2.  ESRD on hemodialysis  3. Diabetes mellitus  4. Hypertension  5. Hyperlipidemia        - Patient is intermediate risk for any perioperative cardiovascular complications (in view of mild systolic heart failure) and may proceed to surgery without any further testing. There are no further recommendations at this time and hence we will now sign off. Patient may follow up in our clinic once discharged from the hospital for further cardiac care. I have spent 35 minutes of face to face time with the patient with more than 50% spent counseling and coordinating care. I have personally reviewed the reports and images of labs, radiological studies, cardiac studies including ECG's and telemetry, current and old medical records. The note was completed using EMR and Dragon dictation system. Every effort was made to ensure accuracy; however, inadvertent computerized transcription errors may be present. All questions and concerns were addressed to the patient/family. Alternatives to my treatment were discussed. Thank you for allowing to us to participate in the [address]. Please call our service with questions.     Jan Dunham MD, [facility], [address]  The [address] [address]  Ph: [phone]  Fax: [phone]

## 2022-01-15 NOTE — PROGRESS NOTES
Hospitalist Progress Note      PCP: Dangelo Kaba MD    Date of Admission: 1/11/2022    Chief Complaint:     Chief Complaint   Patient presents with    Dizziness    Abnormal Lab     \"Kidney levels off\"    Gout     L wrist, right heel       Subjective:  Patient seen and examined at the bedside. No complaints at this time.   No acute events overnight  Tunneled HD line placed yesterday, HD yesterday, will get HD today  Stress test this AM negative  Plan for surgical PD cath placement early next week    PFHS: reviewed as documented 1/11/2022, no changes unless noted above    Medications:  Reviewed    Infusion Medications    dextrose      sodium chloride       Scheduled Medications    sodium chloride flush  10 mL IntraVENous BID    epoetin rae-epbx  4,000 Units IntraVENous Q MWF    calcium acetate  2 capsule Oral TID WC    [Held by provider] aspirin  81 mg Oral Daily    atorvastatin  20 mg Oral Daily    calcitRIOL  0.25 mcg Oral Daily    metoprolol succinate  50 mg Oral Daily    NIFEdipine  90 mg Oral Daily    allopurinol  100 mg Oral Daily    doxazosin  4 mg Oral Daily    sodium chloride flush  10 mL IntraVENous 2 times per day    sennosides-docusate sodium  1 tablet Oral BID    [Held by provider] heparin (porcine)  5,000 Units SubCUTAneous 3 times per day    insulin lispro  0-12 Units SubCUTAneous TID WC    insulin lispro  0-6 Units SubCUTAneous Nightly     PRN Meds: technetium tetrofosmin, regadenoson, dextrose bolus (hypoglycemia) **OR** dextrose bolus (hypoglycemia), oxyCODONE-acetaminophen, cyclobenzaprine, glucose, glucagon (rDNA), dextrose, sodium chloride flush, sodium chloride, ondansetron **OR** ondansetron, magnesium hydroxide, acetaminophen **OR** acetaminophen      Intake/Output Summary (Last 24 hours) at 1/15/2022 0842  Last data filed at 1/15/2022 0000  Gross per 24 hour   Intake 1600 ml   Output 1900 ml   Net -300 ml       Physical Exam    BP (!) 155/100   Pulse 88   Temp 98.4 °F (36.9 °C) (Oral)   Resp 10   Ht 6' 5\" (1.956 m)   Wt 288 lb (130.6 kg)   SpO2 100%   BMI 34.15 kg/m²     General appearance:  No acute distress, appears stated age  Eyes: Pupils equal, round, reactive to light, conjunctiva/corneas clear  Ears/Nose/Mouth/Throat: No external lesions or scars, hearing intact to voice  Neck: Trachea midline, no masses noted, no thyromegaly  Respiratory:  Non-labored breathing, clear to auscultation bilaterally  Cardiovascular: Regular rate and rhythm, no murmurs, gallops, or rubs  Abdomen: soft, non-tender, non-distended  Musculoskeletal: Warm, well perfused, no cyanosis or edema  Skin: Tunneled HD cath in R chest w/ dressing, non-tender, no signs of infection  Psychiatric: A&Ox4, good insight and judgment    Labs:   Recent Labs     01/13/22  0508 01/14/22  0437 01/15/22  0503   WBC 7.9 8.2 10.4   HGB 10.2* 9.5* 10.9*   HCT 31.1* 28.7* 33.5*    163 214     Recent Labs     01/13/22  0508 01/14/22  0437 01/15/22  0503   * 136 137   K 3.9 3.7 4.1   CL 97* 100 98*   CO2 19* 21 23   BUN 64* 71* 56*   CREATININE 8.3* 8.8* 7.0*   CALCIUM 5.7* 5.5* 6.4*     No results for input(s): AST, ALT, BILIDIR, BILITOT, ALKPHOS in the last 72 hours. Recent Labs     01/14/22  1044 01/14/22  1359   INR 1.10 0.93     Recent Labs     01/13/22  0115 01/13/22  0508 01/13/22  1321   CKTOTAL  --  866*  --    TROPONINI 0.03* 0.03* 0.04*       Urinalysis:      Lab Results   Component Value Date    NITRU Negative 12/21/2021    WBCUA None seen 12/21/2021    BACTERIA Rare 12/21/2021    RBCUA 3-4 12/21/2021    BLOODU MODERATE 12/21/2021    SPECGRAV 1.020 12/21/2021    GLUCOSEU Negative 12/21/2021       Radiology:  IR TUNNELED CVC PLACE WO SQ PORT/PUMP > 5 YEARS   Final Result   IMPRESSION :      Limited ultrasound examination demonstrates a patent right internal jugular vein. Placement of tunnelled right jugular dialysis catheter.          Fluoroscopy time : 0.4 minutes   Number of exposures obtained : 1   Moderate sedation was provided and monitored by an independent trained observer. Moderate sedation start time : 1241   Moderate sedation end time : 1253   Blood loss : minimal (less than 5 cc)   Specimens removed : none               XR WRIST LEFT (MIN 3 VIEWS)   Final Result      No acute bony pathology      XR ANKLE RIGHT (MIN 3 VIEWS)   Final Result      No acute bony pathology. Heel spur      XR CHEST (2 VW)   Final Result      No acute pulmonary pathology or significant change from the prior exam                  NM MYOCARDIAL SPECT REST EXERCISE OR RX    (Results Pending)       Assessment/Plan:    Active Hospital Problems    Diagnosis Date Noted    Pre-op evaluation [Z01.818] 01/14/2022    LV dysfunction [I51.9] 01/14/2022    Shortness of breath [R06.02]     Electrolyte imbalance [E87.8]     Anemia due to stage 5 chronic kidney disease ([facility] [N18.5, D63.1]     Congestive heart failure (HCC) [I50.9]     ANTONIO (acute kidney injury) ([facility] [N17.9] 01/11/2022    Acute kidney injury superimposed on CKD ([facility] [N17.9, N18.9] 12/21/2021       Plan:    # ANTONIO on CKD  -tunneled HD cath placed 1/14, HD 1/14, will get 1/15  -next week will get laparoscopic PD cath placement, omentopexy and primary umbilical hernia repair  -stress test negative 1/15    # T2DM  -sugars controlled  -continue current regimen    # HTN    # Gout  -dose reduced allopurinol for ANTONIO    # pAF  -stable    # Chronic systolic HF    # HLD    DVT Prophylaxis: Heparin  Diet: ADULT DIET;  Regular  Code Status: Full Code    PT/OT Eval Status: Ongoing    Dispo: Dotty Fan pending clinical improvement    Jermaine Miguel MD

## 2022-01-16 LAB
ANION GAP SERPL CALCULATED.3IONS-SCNC: 10 MMOL/L (ref 3–16)
BUN BLDV-MCNC: 42 MG/DL (ref 7–20)
CALCIUM SERPL-MCNC: 6.8 MG/DL (ref 8.3–10.6)
CHLORIDE BLD-SCNC: 101 MMOL/L (ref 99–110)
CO2: 24 MMOL/L (ref 21–32)
CREAT SERPL-MCNC: 5.1 MG/DL (ref 0.8–1.3)
GFR AFRICAN AMERICAN: 14
GFR NON-AFRICAN AMERICAN: 12
GLUCOSE BLD-MCNC: 116 MG/DL (ref 70–99)
GLUCOSE BLD-MCNC: 116 MG/DL (ref 70–99)
GLUCOSE BLD-MCNC: 133 MG/DL (ref 70–99)
GLUCOSE BLD-MCNC: 136 MG/DL (ref 70–99)
GLUCOSE BLD-MCNC: 140 MG/DL (ref 70–99)
GLUCOSE BLD-MCNC: 143 MG/DL (ref 70–99)
HCT VFR BLD CALC: 28.6 % (ref 40.5–52.5)
HEMOGLOBIN: 9.5 G/DL (ref 13.5–17.5)
HEPATITIS B CORE TOTAL ANTIBODY: NEGATIVE
MCH RBC QN AUTO: 29 PG (ref 26–34)
MCHC RBC AUTO-ENTMCNC: 33.2 G/DL (ref 31–36)
MCV RBC AUTO: 87.3 FL (ref 80–100)
PDW BLD-RTO: 14.1 % (ref 12.4–15.4)
PERFORMED ON: ABNORMAL
PLATELET # BLD: 174 K/UL (ref 135–450)
PMV BLD AUTO: 7.9 FL (ref 5–10.5)
POTASSIUM REFLEX MAGNESIUM: 3.6 MMOL/L (ref 3.5–5.1)
RBC # BLD: 3.28 M/UL (ref 4.2–5.9)
SODIUM BLD-SCNC: 135 MMOL/L (ref 136–145)
WBC # BLD: 6.8 K/UL (ref 4–11)

## 2022-01-16 PROCEDURE — 6370000000 HC RX 637 (ALT 250 FOR IP): Performed by: INTERNAL MEDICINE

## 2022-01-16 PROCEDURE — 2580000003 HC RX 258: Performed by: INTERNAL MEDICINE

## 2022-01-16 PROCEDURE — 2500000003 HC RX 250 WO HCPCS: Performed by: INTERNAL MEDICINE

## 2022-01-16 PROCEDURE — 99233 SBSQ HOSP IP/OBS HIGH 50: CPT | Performed by: INTERNAL MEDICINE

## 2022-01-16 PROCEDURE — 80048 BASIC METABOLIC PNL TOTAL CA: CPT

## 2022-01-16 PROCEDURE — 2060000000 HC ICU INTERMEDIATE R&B

## 2022-01-16 PROCEDURE — 85027 COMPLETE CBC AUTOMATED: CPT

## 2022-01-16 RX ADMIN — SENNOSIDES AND DOCUSATE SODIUM 1 TABLET: 50; 8.6 TABLET ORAL at 00:26

## 2022-01-16 RX ADMIN — OXYCODONE HYDROCHLORIDE AND ACETAMINOPHEN 2 TABLET: 5; 325 TABLET ORAL at 20:10

## 2022-01-16 RX ADMIN — SODIUM CHLORIDE, PRESERVATIVE FREE 10 ML: 5 INJECTION INTRAVENOUS at 20:11

## 2022-01-16 RX ADMIN — METOPROLOL SUCCINATE 50 MG: 50 TABLET, FILM COATED, EXTENDED RELEASE ORAL at 08:10

## 2022-01-16 RX ADMIN — ATORVASTATIN CALCIUM 20 MG: 20 TABLET, FILM COATED ORAL at 08:10

## 2022-01-16 RX ADMIN — CALCIUM ACETATE 1334 MG: 667 CAPSULE ORAL at 11:20

## 2022-01-16 RX ADMIN — OXYCODONE HYDROCHLORIDE AND ACETAMINOPHEN 2 TABLET: 5; 325 TABLET ORAL at 08:05

## 2022-01-16 RX ADMIN — CYCLOBENZAPRINE 5 MG: 10 TABLET, FILM COATED ORAL at 08:06

## 2022-01-16 RX ADMIN — CALCIUM ACETATE 1334 MG: 667 CAPSULE ORAL at 07:58

## 2022-01-16 RX ADMIN — SODIUM CHLORIDE, PRESERVATIVE FREE 10 ML: 5 INJECTION INTRAVENOUS at 08:15

## 2022-01-16 RX ADMIN — CALCIUM ACETATE 1334 MG: 667 CAPSULE ORAL at 16:31

## 2022-01-16 RX ADMIN — OXYCODONE HYDROCHLORIDE AND ACETAMINOPHEN 2 TABLET: 5; 325 TABLET ORAL at 14:17

## 2022-01-16 RX ADMIN — SENNOSIDES AND DOCUSATE SODIUM 1 TABLET: 50; 8.6 TABLET ORAL at 08:10

## 2022-01-16 RX ADMIN — SODIUM CHLORIDE, PRESERVATIVE FREE 10 ML: 5 INJECTION INTRAVENOUS at 08:14

## 2022-01-16 RX ADMIN — ALLOPURINOL 100 MG: 100 TABLET ORAL at 08:11

## 2022-01-16 RX ADMIN — NIFEDIPINE 90 MG: 90 TABLET, FILM COATED, EXTENDED RELEASE ORAL at 08:10

## 2022-01-16 RX ADMIN — OXYCODONE HYDROCHLORIDE AND ACETAMINOPHEN 2 TABLET: 5; 325 TABLET ORAL at 00:26

## 2022-01-16 RX ADMIN — DOXAZOSIN 4 MG: 4 TABLET ORAL at 08:11

## 2022-01-16 RX ADMIN — CALCITRIOL CAPSULES 0.25 MCG 0.25 MCG: 0.25 CAPSULE ORAL at 08:11

## 2022-01-16 ASSESSMENT — PAIN DESCRIPTION - ONSET
ONSET: ON-GOING

## 2022-01-16 ASSESSMENT — PAIN DESCRIPTION - PROGRESSION
CLINICAL_PROGRESSION: GRADUALLY IMPROVING

## 2022-01-16 ASSESSMENT — PAIN SCALES - GENERAL
PAINLEVEL_OUTOF10: 0
PAINLEVEL_OUTOF10: 0
PAINLEVEL_OUTOF10: 5
PAINLEVEL_OUTOF10: 4
PAINLEVEL_OUTOF10: 5
PAINLEVEL_OUTOF10: 0
PAINLEVEL_OUTOF10: 6
PAINLEVEL_OUTOF10: 7

## 2022-01-16 ASSESSMENT — PAIN DESCRIPTION - DESCRIPTORS
DESCRIPTORS: ACHING;SORE

## 2022-01-16 ASSESSMENT — PAIN DESCRIPTION - ORIENTATION
ORIENTATION: RIGHT

## 2022-01-16 ASSESSMENT — PAIN - FUNCTIONAL ASSESSMENT
PAIN_FUNCTIONAL_ASSESSMENT: ACTIVITIES ARE NOT PREVENTED

## 2022-01-16 ASSESSMENT — PAIN DESCRIPTION - PAIN TYPE
TYPE: SURGICAL PAIN
TYPE: SURGICAL PAIN;CHRONIC PAIN
TYPE: SURGICAL PAIN
TYPE: SURGICAL PAIN

## 2022-01-16 ASSESSMENT — PAIN DESCRIPTION - FREQUENCY
FREQUENCY: CONTINUOUS

## 2022-01-16 ASSESSMENT — PAIN DESCRIPTION - LOCATION
LOCATION: SHOULDER

## 2022-01-16 NOTE — PROGRESS NOTES
PRE-OP NOTE  Department of Surgery      Chief Complaint or Reason for Surgery: PD catheter placement    Procedure: laparoscopic PD catheter placement; umbilical hernia repair  Expected time: 1:00pm; 1/17/22    Plan  1. Diet: NPO at midnight  2. IVF: none  3. Antibiotics: IV Vancomycin OCTOR  4. Labs to be drawn: Type and Screen, INR  5. Anesthesia: to see patient  6. Consent: This procedure has been fully reviewed with the patient and written informed consent has been obtained. 7. Pulmonary: CXR (1/11): No acute pulmonary pathology or significant change from the prior exam  8.  Cardiac: Lexiscan (1/15) reviewed    Elina Valle DO  PGY-1, General Surgery  01/16/22  7:17 AM  [phone]

## 2022-01-16 NOTE — PROGRESS NOTES
Nephrology  Note                                                                                                                                                                                                                                                                                                                                                               Office : [phone]     Fax :[phone]              Patient's Name: Gaurav Guaman  1/16/2022    Reason for Consult:   ANTONIO   Requesting Physician:  Josefina Mcgill MD      HD sat  [facility]   BP better   Stress test - low risk     PD cath placement tomorrow          Past Medical History:   Diagnosis Date    Arthralgia of multiple joints 10/27/2015    Arthritis     Atrial fibrillation (HCC)     Chronic kidney disease     stage 4    Chronic systolic congestive heart failure ([address] 8/16/2021    CRI (chronic renal insufficiency)     Diabetic nephropathy associated with type 2 diabetes mellitus ([address] 10/11/2018    Diverticulosis     Elevated PSA     Erectile dysfunction     Gout     Gout     Hemrrhoid NOS w/ complication NEC     History of MRSA infection     Hypertension     CELSA (obstructive sleep apnea) 07/14/2017    uses C-pap machine    Type 2 diabetes mellitus without complication, without long-term current use of insulin ([facility] 3/74/2569    Umbilical hernia without obstruction and without gangrene 2/2/2016       Past Surgical History:   Procedure Laterality Date    COLONOSCOPY      DILATATION, ESOPHAGUS      IR TUNNELED CATHETER PLACEMENT GREATER THAN 5 YEARS  01/14/2022    IR TUNNELED CATHETER PLACEMENT GREATER THAN 5 YEARS 1/14/2022 [facility]    UPPER GASTROINTESTINAL ENDOSCOPY  05/28/2016    biopsies, multiple small gastric ulcers    UPPER GASTROINTESTINAL ENDOSCOPY  09/12/2016       Family History   Problem Relation Age of Onset    Hypertension Other     Breast Cancer Mother     Colon Cancer Father     Diabetes Maternal Grandmother     Coronary Art Dis Brother         reports that he has never smoked. He has never used smokeless tobacco. He reports current alcohol use. He reports that he does not use drugs. Allergies:  Patient has no known allergies.     Current Medications:    sodium chloride flush 0.9 % injection 10 mL, BID  epoetin rae-epbx (RETACRIT) injection 4,000 Units, Q MWF  dextrose bolus (hypoglycemia) 10% 125 mL, PRN   Or  dextrose bolus (hypoglycemia) 10% 250 mL, PRN  oxyCODONE-acetaminophen (PERCOCET) 5-325 MG per tablet 2 tablet, Q6H PRN  calcium acetate (PHOSLO) capsule 1,334 mg, TID WC  cyclobenzaprine (FLEXERIL) tablet 5 mg, BID PRN  [Held by provider] aspirin chewable tablet 81 mg, Daily  atorvastatin (LIPITOR) tablet 20 mg, Daily  calcitRIOL (ROCALTROL) capsule 0.25 mcg, Daily  metoprolol succinate (TOPROL XL) extended release tablet 50 mg, Daily  NIFEdipine (PROCARDIA XL) extended release tablet 90 mg, Daily  allopurinol (ZYLOPRIM) tablet 100 mg, Daily  doxazosin (CARDURA) tablet 4 mg, Daily  glucose (GLUTOSE) 40 % oral gel 15 g, PRN  glucagon (rDNA) injection 1 mg, PRN  dextrose 5 % solution, PRN  sodium chloride flush 0.9 % injection 10 mL, 2 times per day  sodium chloride flush 0.9 % injection 10 mL, PRN  0.9 % sodium chloride infusion, PRN  ondansetron (ZOFRAN-ODT) disintegrating tablet 4 mg, Q8H PRN   Or  ondansetron (ZOFRAN) injection 4 mg, Q6H PRN  sennosides-docusate sodium (SENOKOT-S) 8.6-50 MG tablet 1 tablet, BID  magnesium hydroxide (MILK OF MAGNESIA) 400 MG/5ML suspension 30 mL, Daily PRN  acetaminophen (TYLENOL) tablet 650 mg, Q6H PRN   Or  acetaminophen (TYLENOL) suppository 650 mg, Q6H PRN  [Held by provider] heparin (porcine) injection 5,000 Units, 3 times per day  insulin lispro (1 Unit Dial) 0-12 Units, TID WC  insulin lispro (1 Unit Dial) 0-6 Units, Nightly      Physical exam:     Vitals:  /89   Pulse 94   Temp 98.1 °F (36.7 °C) (Oral)   Resp 17   Ht 6' 5\" (1.956 m) Wt 288 lb 9.3 oz (130.9 kg)   SpO2 99%   BMI 34.22 kg/m²   Constitutional:  OAA X3 NAD  Skin: no rash, turgor wnl  Heent:  eomi, mmm  Neck: no bruits or jvd noted  Cardiovascular:  S1, S2 without m/r/g  Respiratory: CTA B without w/r/r  Abdomen:  +bs, soft, nt, nd  Ext: ++ lower extremity edema  Psychiatric: mood and affect appropriate  Musculoskeletal:  Rom, muscular strength intact    Data:   Labs:  CBC:   Recent Labs     01/14/22  0437 01/15/22  0503 01/16/22  0524   WBC 8.2 10.4 6.8   HGB 9.5* 10.9* 9.5*    214 174     BMP:    Recent Labs     01/14/22  0437 01/15/22  0503 01/16/22  0523    137 135*   K 3.7 4.1 3.6    98* 101   CO2 21 23 24   BUN 71* 56* 42*   CREATININE 8.8* 7.0* 5.1*   GLUCOSE 94 92 136*     Ca/Mg/Phos:   Recent Labs     01/14/22  0437 01/15/22  0503 01/16/22  0523   CALCIUM 5.5* 6.4* 6.8*     Hepatic:   No results for input(s): AST, ALT, ALB, BILITOT, ALKPHOS in the last 72 hours. Troponin:   Recent Labs     01/13/22  1321   TROPONINI 0.04*     BNP: No results for input(s): BNP in the last 72 hours. Lipids: No results for input(s): CHOL, TRIG, HDL, LDLCALC, LABVLDL in the last 72 hours. ABGs: No results for input(s): PHART, PO2ART, [id number] in the last 72 hours. INR:   Recent Labs     01/14/22  1044 01/14/22  1359   INR 1.10 0.93     UA:  No results for input(s): Robyne Lang, GLUCOSEU, BILIRUBINUR, KETUA, SPECGRAV, BLOODU, PHUR, PROTEINU, UROBILINOGEN, NITRU, LEUKOCYTESUR, Remonia Maiers in the last 72 hours. Urine Microscopic: No results for input(s): LABCAST, BACTERIA, COMU, HYALCAST, WBCUA, RBCUA, EPIU in the last 72 hours. Urine Culture: No results for input(s): LABURIN in the last 72 hours. Urine Chemistry:   No results for input(s): Jerene Query, PROTEINUR, NAUR in the last 72 hours.           IMAGING:  NM MYOCARDIAL SPECT REST EXERCISE OR RX   Final Result      IR TUNNELED CVC PLACE WO SQ PORT/PUMP > 5 YEARS   Final Result   IMPRESSION :      Limited ultrasound examination demonstrates a patent right internal jugular vein. Placement of tunnelled right jugular dialysis catheter. Fluoroscopy time : 0.4 minutes   Number of exposures obtained : 1   Moderate sedation was provided and monitored by an independent trained observer. Moderate sedation start time : 1241   Moderate sedation end time : 1253   Blood loss : minimal (less than 5 cc)   Specimens removed : none               XR WRIST LEFT (MIN 3 VIEWS)   Final Result      No acute bony pathology      XR ANKLE RIGHT (MIN 3 VIEWS)   Final Result      No acute bony pathology. Heel spur      XR CHEST (2 VW)   Final Result      No acute pulmonary pathology or significant change from the prior exam                      Assessment/Plan   1. ANTONIO on CKD 5     2. HTN    3. Anemia    4. Acid- base/ Electrolyte imbalance     5. Gout     6.  CHF     Plan   - renal function poor   - HD sat - next HD Tuesday   -TDC placed  - stress test - low risk     - sx consult for PD cath placement - Monday   - labs in am   - No need for IVF       Recommend to dose adjust all medications  based on renal functions  Maintain SBP> 90 mmHg   Daily weights   AVOID NSAIDs  Avoid Nephrotoxins  Monitor Intake/Output  Call if significant decrease in urine output                   Thank you for allowing us to participate in care of Dorothea Wilkerson MD  Feel free to contact me   [facility] [address]  Office : [phone]  Fax :[phone]

## 2022-01-16 NOTE — PLAN OF CARE
Problem: Serum Glucose Level - Abnormal:  Goal: Ability to maintain appropriate glucose levels has stabilized  Description: Ability to maintain appropriate glucose levels has stabilized  Outcome: Ongoing   Pt continues on blood sugar checks with insulin coverage. Pt refuses insulin when needed. Will monitor.

## 2022-01-16 NOTE — PROGRESS NOTES
Hospitalist Progress Note      PCP: Vera Upton MD    Date of Admission: 1/11/2022    Chief Complaint:     Chief Complaint   Patient presents with    Dizziness    Abnormal Lab     \"Kidney levels off\"    Gout     L wrist, right heel       Subjective:  Patient seen and examined at the bedside. No complaints at this time.   No acute events overnight  Plan for OR tomorrow for lap PD cath placement and umbilical hernia repair    PFHS: reviewed as documented 1/11/2022, no changes unless noted above    Medications:  Reviewed    Infusion Medications    dextrose      sodium chloride       Scheduled Medications    sodium chloride flush  10 mL IntraVENous BID    epoetin rae-epbx  4,000 Units IntraVENous Q MWF    calcium acetate  2 capsule Oral TID WC    [Held by provider] aspirin  81 mg Oral Daily    atorvastatin  20 mg Oral Daily    calcitRIOL  0.25 mcg Oral Daily    metoprolol succinate  50 mg Oral Daily    NIFEdipine  90 mg Oral Daily    allopurinol  100 mg Oral Daily    doxazosin  4 mg Oral Daily    sodium chloride flush  10 mL IntraVENous 2 times per day    sennosides-docusate sodium  1 tablet Oral BID    [Held by provider] heparin (porcine)  5,000 Units SubCUTAneous 3 times per day    insulin lispro  0-12 Units SubCUTAneous TID WC    insulin lispro  0-6 Units SubCUTAneous Nightly     PRN Meds: dextrose bolus (hypoglycemia) **OR** dextrose bolus (hypoglycemia), oxyCODONE-acetaminophen, cyclobenzaprine, glucose, glucagon (rDNA), dextrose, sodium chloride flush, sodium chloride, ondansetron **OR** ondansetron, magnesium hydroxide, acetaminophen **OR** acetaminophen      Intake/Output Summary (Last 24 hours) at 1/16/2022 0951  Last data filed at 1/16/2022 0000  Gross per 24 hour   Intake 740 ml   Output 1600 ml   Net -860 ml       Physical Exam    BP (!) 145/87   Pulse 94   Temp 97.5 °F (36.4 °C) (Oral)   Resp 17   Ht 6' 5\" (1.956 m)   Wt 288 lb 9.3 oz (130.9 kg)   SpO2 100%   BMI 34.22 sedation start time : 1241   Moderate sedation end time : 1253   Blood loss : minimal (less than 5 cc)   Specimens removed : none               XR WRIST LEFT (MIN 3 VIEWS)   Final Result      No acute bony pathology      XR ANKLE RIGHT (MIN 3 VIEWS)   Final Result      No acute bony pathology. Heel spur      XR CHEST (2 VW)   Final Result      No acute pulmonary pathology or significant change from the prior exam                      Assessment/Plan:    Active Hospital Problems    Diagnosis Date Noted    Pre-op evaluation [B15.049] 01/14/2022    LV dysfunction [I51.9] 01/14/2022    Shortness of breath [R06.02]     Electrolyte imbalance [E87.8]     Anemia due to stage 5 chronic kidney disease ([address] [N18.5, D63.1]     Congestive heart failure (HCC) [I50.9]     ANTONIO (acute kidney injury) ([address] [N17.9] 01/11/2022    Acute kidney injury superimposed on CKD ([address] [N17.9, N18.9] 12/21/2021       Plan:    # ANTONIO on CKD  -tunneled HD cath placed 1/14, HD 1/14, will get 1/15  -stress test negative 1/15  -1/17: plan for lap PD cath placement w/ umbilical hernia repair    # T2DM  -sugars controlled  -continue current regimen     # HTN     # Gout  -dose reduced allopurinol for ANTONIO     # pAF  -stable     # Chronic systolic HF     # HLD    DVT Prophylaxis: Heparin  Diet: ADULT DIET;  Regular  Code Status: Full Code    PT/OT Eval Status: Ongoing    Dispo: Mireya Rodriguez pending clinical improvement    Miguel Phillips MD

## 2022-01-16 NOTE — PROGRESS NOTES
Treatment time: 3 hours  Net UF: 1000 ml     Pre weight: 131.8 kg  Post weight:130.9 kg  EDW: ANTONIO kg  Crit Line Used: Yes Ending Profile: A  Refill Present: Yes     01/15/22 2045 01/16/22 0000   Vital Signs   BP (!) 160/89 (!) 151/94   Temp 98.1 °F (36.7 °C) 97.9 °F (36.6 °C)   Pulse 100 92   Resp 18 18   SpO2 100 % 100 %   Height 6' 5\" (1.956 m) 6' 5\" (1.956 m)   Weight 290 lb 9.1 oz (131.8 kg) 288 lb 9.3 oz (130.9 kg)   Weight Method Bed scale Standing scale   Percent Weight Change 0.89 -0.68     Access used: R TDC    Access function: Well with  ml/min     Medications or blood products given: n/a     Regular outpatient schedule: ANTONIO     Summary of response to treatment: Patient tolerated treatment well and without any complications. Patient remained stable throughout entire treatment and upon exiting the dialysis suite via RN transport. [facility] and copy of dialysis treatment record placed in chart, to be scanned into EMR.

## 2022-01-16 NOTE — PLAN OF CARE
Problem: Pain:  Goal: Pain level will decrease  Description: Pain level will decrease  Outcome: Ongoing   Patient verbalizes decrease level of pain from his post surgical site. Problem: OXYGENATION/RESPIRATORY FUNCTION  Goal: Patient will maintain patent airway  Outcome: Ongoing   Patient has no episodes of respiratory distress within the shift. Problem: HEMODYNAMIC STATUS  Goal: Patient has stable vital signs and fluid balance  Outcome: Ongoing  Patient maintained vital signs on acceptable normal limits. Problem: ACTIVITY INTOLERANCE/IMPAIRED MOBILITY  Goal: Mobility/activity is maintained at optimum level for patient  Outcome: Ongoing   Patient tolerated well ambulation independently. Problem: Falls - Risk of:  Goal: Will remain free from falls  Description: Will remain free from falls  Outcome: Ongoing  No occurrence of fall and patient is free from any injury within the shift.        Problem: Fluid Volume:  Goal: Will show no signs or symptoms of fluid imbalance  Description: Will show no signs or symptoms of fluid imbalance  Outcome: Ongoing

## 2022-01-16 NOTE — PLAN OF CARE
Problem: Pain:  Goal: Pain level will decrease  Description: Pain level will decrease  1/16/2022 0247 by Kelly Alarcon RN  Outcome: Ongoing  Note: Pt with complaints of mild pain r/t surgical site for HD catheter placement. Pt given PRN oxycodone x1 for pain. Will continue to monitor. Problem: OXYGENATION/RESPIRATORY FUNCTION  Goal: Patient will maintain patent airway  Outcome: Ongoing  Note: Pt sat % on room air with o2 spot checks. Will continue to monitor. Problem: HEMODYNAMIC STATUS  Goal: Patient has stable vital signs and fluid balance  Outcome: Ongoing     Problem: FLUID AND ELECTROLYTE IMBALANCE  Goal: Fluid and electrolyte balance are achieved/maintained  Outcome: Ongoing     Problem: ACTIVITY INTOLERANCE/IMPAIRED MOBILITY  Goal: Mobility/activity is maintained at optimum level for patient  Outcome: Ongoing  Note: Pt up ad sravanthi. Seen ambulating around in room during this shift. Will continue to encourage ambulation when pt is awake. Problem: Falls - Risk of:  Goal: Will remain free from falls  Description: Will remain free from falls  Outcome: Ongoing  Note: Orthostatic vital signs obtained at start of shift - see flowsheet for details. Pt meets criteria for orthostasis. Pt is a Med fall risk. See Sincere Inches Fall Score and ABCDS Injury Risk assessments. - Screening for Orthostasis AND not a [place] Risk per MG/ABCDS: Pt bed is in low position, side rails up, call light and belongings are in reach. Fall risk light is on outside pts room. Pt encouraged to call for assistance as needed. Will continue with hourly rounds for PO intake, pain needs, toileting and repositioning as needed.        Problem: Coping:  Goal: Ability to cope will improve  Description: Ability to cope will improve  Outcome: Ongoing

## 2022-01-17 ENCOUNTER — ANESTHESIA EVENT (OUTPATIENT)
Dept: OPERATING ROOM | Age: 62
DRG: 660 | End: 2022-01-17
Payer: COMMERCIAL

## 2022-01-17 ENCOUNTER — ANESTHESIA (OUTPATIENT)
Dept: OPERATING ROOM | Age: 62
DRG: 660 | End: 2022-01-17
Payer: COMMERCIAL

## 2022-01-17 ENCOUNTER — TELEPHONE (OUTPATIENT)
Dept: PULMONOLOGY | Age: 62
End: 2022-01-17

## 2022-01-17 VITALS — OXYGEN SATURATION: 98 % | DIASTOLIC BLOOD PRESSURE: 109 MMHG | TEMPERATURE: 97.5 F | SYSTOLIC BLOOD PRESSURE: 164 MMHG

## 2022-01-17 LAB
ABO/RH: NORMAL
ANION GAP SERPL CALCULATED.3IONS-SCNC: 14 MMOL/L (ref 3–16)
ANTIBODY SCREEN: NORMAL
BUN BLDV-MCNC: 53 MG/DL (ref 7–20)
CALCIUM SERPL-MCNC: 6.7 MG/DL (ref 8.3–10.6)
CHLORIDE BLD-SCNC: 102 MMOL/L (ref 99–110)
CO2: 23 MMOL/L (ref 21–32)
CREAT SERPL-MCNC: 5.9 MG/DL (ref 0.8–1.3)
GFR AFRICAN AMERICAN: 12
GFR NON-AFRICAN AMERICAN: 10
GLUCOSE BLD-MCNC: 103 MG/DL (ref 70–99)
GLUCOSE BLD-MCNC: 114 MG/DL (ref 70–99)
GLUCOSE BLD-MCNC: 154 MG/DL (ref 70–99)
GLUCOSE BLD-MCNC: 86 MG/DL (ref 70–99)
GLUCOSE BLD-MCNC: 98 MG/DL (ref 70–99)
HCT VFR BLD CALC: 28.2 % (ref 40.5–52.5)
HEMOGLOBIN: 9.3 G/DL (ref 13.5–17.5)
INR BLD: 0.98 (ref 0.88–1.12)
MAGNESIUM: 1.6 MG/DL (ref 1.8–2.4)
MCH RBC QN AUTO: 28.7 PG (ref 26–34)
MCHC RBC AUTO-ENTMCNC: 33.2 G/DL (ref 31–36)
MCV RBC AUTO: 86.5 FL (ref 80–100)
PDW BLD-RTO: 13.9 % (ref 12.4–15.4)
PERFORMED ON: ABNORMAL
PERFORMED ON: NORMAL
PHOSPHORUS: 5.8 MG/DL (ref 2.5–4.9)
PLATELET # BLD: 164 K/UL (ref 135–450)
PMV BLD AUTO: 8.2 FL (ref 5–10.5)
POTASSIUM REFLEX MAGNESIUM: 4 MMOL/L (ref 3.5–5.1)
PROTHROMBIN TIME: 11.1 SEC (ref 9.9–12.7)
RBC # BLD: 3.26 M/UL (ref 4.2–5.9)
SODIUM BLD-SCNC: 139 MMOL/L (ref 136–145)
WBC # BLD: 6.5 K/UL (ref 4–11)

## 2022-01-17 PROCEDURE — 6360000002 HC RX W HCPCS: Performed by: STUDENT IN AN ORGANIZED HEALTH CARE EDUCATION/TRAINING PROGRAM

## 2022-01-17 PROCEDURE — 6360000002 HC RX W HCPCS: Performed by: ANESTHESIOLOGY

## 2022-01-17 PROCEDURE — 6360000002 HC RX W HCPCS: Performed by: INTERNAL MEDICINE

## 2022-01-17 PROCEDURE — 0WQF4ZZ REPAIR ABDOMINAL WALL, PERCUTANEOUS ENDOSCOPIC APPROACH: ICD-10-PCS | Performed by: SURGERY

## 2022-01-17 PROCEDURE — 2500000003 HC RX 250 WO HCPCS: Performed by: SURGERY

## 2022-01-17 PROCEDURE — 90935 HEMODIALYSIS ONE EVALUATION: CPT

## 2022-01-17 PROCEDURE — 3600000004 HC SURGERY LEVEL 4 BASE: Performed by: SURGERY

## 2022-01-17 PROCEDURE — 94761 N-INVAS EAR/PLS OXIMETRY MLT: CPT

## 2022-01-17 PROCEDURE — 80048 BASIC METABOLIC PNL TOTAL CA: CPT

## 2022-01-17 PROCEDURE — C1750 CATH, HEMODIALYSIS,LONG-TERM: HCPCS | Performed by: SURGERY

## 2022-01-17 PROCEDURE — 49435 INSERT SUBQ EXTEN TO IP CATH: CPT | Performed by: SURGERY

## 2022-01-17 PROCEDURE — 2700000000 HC OXYGEN THERAPY PER DAY

## 2022-01-17 PROCEDURE — 94660 CPAP INITIATION&MGMT: CPT

## 2022-01-17 PROCEDURE — 2500000003 HC RX 250 WO HCPCS: Performed by: NURSE ANESTHETIST, CERTIFIED REGISTERED

## 2022-01-17 PROCEDURE — 7100000000 HC PACU RECOVERY - FIRST 15 MIN: Performed by: SURGERY

## 2022-01-17 PROCEDURE — 6360000002 HC RX W HCPCS: Performed by: NURSE ANESTHETIST, CERTIFIED REGISTERED

## 2022-01-17 PROCEDURE — 6360000002 HC RX W HCPCS: Performed by: SURGERY

## 2022-01-17 PROCEDURE — 83735 ASSAY OF MAGNESIUM: CPT

## 2022-01-17 PROCEDURE — 2580000003 HC RX 258

## 2022-01-17 PROCEDURE — 85027 COMPLETE CBC AUTOMATED: CPT

## 2022-01-17 PROCEDURE — 3600000014 HC SURGERY LEVEL 4 ADDTL 15MIN: Performed by: SURGERY

## 2022-01-17 PROCEDURE — 84100 ASSAY OF PHOSPHORUS: CPT

## 2022-01-17 PROCEDURE — 49653 PR LAP, VENTRAL HERNIA REPAIR,INCARCERATED: CPT | Performed by: SURGERY

## 2022-01-17 PROCEDURE — 2580000003 HC RX 258: Performed by: NURSE ANESTHETIST, CERTIFIED REGISTERED

## 2022-01-17 PROCEDURE — 86901 BLOOD TYPING SEROLOGIC RH(D): CPT

## 2022-01-17 PROCEDURE — 6370000000 HC RX 637 (ALT 250 FOR IP): Performed by: INTERNAL MEDICINE

## 2022-01-17 PROCEDURE — 2580000003 HC RX 258: Performed by: INTERNAL MEDICINE

## 2022-01-17 PROCEDURE — 3700000001 HC ADD 15 MINUTES (ANESTHESIA): Performed by: SURGERY

## 2022-01-17 PROCEDURE — 2500000003 HC RX 250 WO HCPCS: Performed by: INTERNAL MEDICINE

## 2022-01-17 PROCEDURE — 2720000010 HC SURG SUPPLY STERILE: Performed by: SURGERY

## 2022-01-17 PROCEDURE — 49324 LAP INSERT TUNNEL IP CATH: CPT | Performed by: SURGERY

## 2022-01-17 PROCEDURE — 2060000000 HC ICU INTERMEDIATE R&B

## 2022-01-17 PROCEDURE — 36415 COLL VENOUS BLD VENIPUNCTURE: CPT

## 2022-01-17 PROCEDURE — 86900 BLOOD TYPING SEROLOGIC ABO: CPT

## 2022-01-17 PROCEDURE — 0WHG43Z INSERTION OF INFUSION DEVICE INTO PERITONEAL CAVITY, PERCUTANEOUS ENDOSCOPIC APPROACH: ICD-10-PCS | Performed by: SURGERY

## 2022-01-17 PROCEDURE — 85610 PROTHROMBIN TIME: CPT

## 2022-01-17 PROCEDURE — 3700000000 HC ANESTHESIA ATTENDED CARE: Performed by: SURGERY

## 2022-01-17 PROCEDURE — 86850 RBC ANTIBODY SCREEN: CPT

## 2022-01-17 PROCEDURE — 2709999900 HC NON-CHARGEABLE SUPPLY: Performed by: SURGERY

## 2022-01-17 PROCEDURE — 6360000002 HC RX W HCPCS

## 2022-01-17 PROCEDURE — 99233 SBSQ HOSP IP/OBS HIGH 50: CPT | Performed by: INTERNAL MEDICINE

## 2022-01-17 PROCEDURE — 0DQU4ZZ REPAIR OMENTUM, PERCUTANEOUS ENDOSCOPIC APPROACH: ICD-10-PCS | Performed by: SURGERY

## 2022-01-17 PROCEDURE — 7100000001 HC PACU RECOVERY - ADDTL 15 MIN: Performed by: SURGERY

## 2022-01-17 PROCEDURE — 6370000000 HC RX 637 (ALT 250 FOR IP): Performed by: SURGERY

## 2022-01-17 PROCEDURE — 49326 LAP W/OMENTOPEXY ADD-ON: CPT | Performed by: SURGERY

## 2022-01-17 RX ORDER — ONDANSETRON 2 MG/ML
INJECTION INTRAMUSCULAR; INTRAVENOUS PRN
Status: DISCONTINUED | OUTPATIENT
Start: 2022-01-17 | End: 2022-01-17 | Stop reason: SDUPTHER

## 2022-01-17 RX ORDER — MAGNESIUM CARB/ALUMINUM HYDROX 105-160MG
TABLET,CHEWABLE ORAL PRN
Status: DISCONTINUED | OUTPATIENT
Start: 2022-01-17 | End: 2022-01-17 | Stop reason: ALTCHOICE

## 2022-01-17 RX ORDER — HEPARIN SODIUM 1000 [USP'U]/ML
3200 INJECTION, SOLUTION INTRAVENOUS; SUBCUTANEOUS PRN
Status: DISCONTINUED | OUTPATIENT
Start: 2022-01-17 | End: 2022-01-18 | Stop reason: HOSPADM

## 2022-01-17 RX ORDER — DEXAMETHASONE SODIUM PHOSPHATE 4 MG/ML
INJECTION, SOLUTION INTRA-ARTICULAR; INTRALESIONAL; INTRAMUSCULAR; INTRAVENOUS; SOFT TISSUE PRN
Status: DISCONTINUED | OUTPATIENT
Start: 2022-01-17 | End: 2022-01-17 | Stop reason: SDUPTHER

## 2022-01-17 RX ORDER — ONDANSETRON 2 MG/ML
4 INJECTION INTRAMUSCULAR; INTRAVENOUS
Status: DISCONTINUED | OUTPATIENT
Start: 2022-01-17 | End: 2022-01-17 | Stop reason: HOSPADM

## 2022-01-17 RX ORDER — SODIUM CHLORIDE 9 MG/ML
INJECTION, SOLUTION INTRAVENOUS CONTINUOUS PRN
Status: DISCONTINUED | OUTPATIENT
Start: 2022-01-17 | End: 2022-01-17 | Stop reason: SDUPTHER

## 2022-01-17 RX ORDER — SUCCINYLCHOLINE/SOD CL,ISO/PF 200MG/10ML
SYRINGE (ML) INTRAVENOUS PRN
Status: DISCONTINUED | OUTPATIENT
Start: 2022-01-17 | End: 2022-01-17 | Stop reason: SDUPTHER

## 2022-01-17 RX ORDER — 0.9 % SODIUM CHLORIDE 0.9 %
500 INTRAVENOUS SOLUTION INTRAVENOUS
Status: DISCONTINUED | OUTPATIENT
Start: 2022-01-17 | End: 2022-01-17 | Stop reason: HOSPADM

## 2022-01-17 RX ORDER — FENTANYL CITRATE 50 UG/ML
INJECTION, SOLUTION INTRAMUSCULAR; INTRAVENOUS PRN
Status: DISCONTINUED | OUTPATIENT
Start: 2022-01-17 | End: 2022-01-17 | Stop reason: SDUPTHER

## 2022-01-17 RX ORDER — GLYCOPYRROLATE 1 MG/5 ML
SYRINGE (ML) INTRAVENOUS PRN
Status: DISCONTINUED | OUTPATIENT
Start: 2022-01-17 | End: 2022-01-17 | Stop reason: SDUPTHER

## 2022-01-17 RX ORDER — MAGNESIUM SULFATE IN WATER 40 MG/ML
4000 INJECTION, SOLUTION INTRAVENOUS ONCE
Status: COMPLETED | OUTPATIENT
Start: 2022-01-17 | End: 2022-01-17

## 2022-01-17 RX ORDER — HYDROMORPHONE HCL 110MG/55ML
PATIENT CONTROLLED ANALGESIA SYRINGE INTRAVENOUS PRN
Status: DISCONTINUED | OUTPATIENT
Start: 2022-01-17 | End: 2022-01-17 | Stop reason: SDUPTHER

## 2022-01-17 RX ORDER — HEPARIN SODIUM (PORCINE) LOCK FLUSH IV SOLN 100 UNIT/ML 100 UNIT/ML
SOLUTION INTRAVENOUS PRN
Status: DISCONTINUED | OUTPATIENT
Start: 2022-01-17 | End: 2022-01-17 | Stop reason: ALTCHOICE

## 2022-01-17 RX ORDER — BUPIVACAINE HYDROCHLORIDE 5 MG/ML
INJECTION, SOLUTION EPIDURAL; INTRACAUDAL PRN
Status: DISCONTINUED | OUTPATIENT
Start: 2022-01-17 | End: 2022-01-17 | Stop reason: ALTCHOICE

## 2022-01-17 RX ORDER — LIDOCAINE HYDROCHLORIDE 20 MG/ML
INJECTION, SOLUTION INTRAVENOUS PRN
Status: DISCONTINUED | OUTPATIENT
Start: 2022-01-17 | End: 2022-01-17 | Stop reason: SDUPTHER

## 2022-01-17 RX ORDER — ROCURONIUM BROMIDE 10 MG/ML
INJECTION, SOLUTION INTRAVENOUS PRN
Status: DISCONTINUED | OUTPATIENT
Start: 2022-01-17 | End: 2022-01-17 | Stop reason: SDUPTHER

## 2022-01-17 RX ORDER — HYDRALAZINE HYDROCHLORIDE 20 MG/ML
5 INJECTION INTRAMUSCULAR; INTRAVENOUS EVERY 10 MIN PRN
Status: DISCONTINUED | OUTPATIENT
Start: 2022-01-17 | End: 2022-01-17 | Stop reason: HOSPADM

## 2022-01-17 RX ORDER — PROPOFOL 10 MG/ML
INJECTION, EMULSION INTRAVENOUS PRN
Status: DISCONTINUED | OUTPATIENT
Start: 2022-01-17 | End: 2022-01-17 | Stop reason: SDUPTHER

## 2022-01-17 RX ORDER — DIPHENHYDRAMINE HYDROCHLORIDE 50 MG/ML
12.5 INJECTION INTRAMUSCULAR; INTRAVENOUS
Status: DISCONTINUED | OUTPATIENT
Start: 2022-01-17 | End: 2022-01-17 | Stop reason: HOSPADM

## 2022-01-17 RX ORDER — HYDROCODONE BITARTRATE AND ACETAMINOPHEN 5; 325 MG/1; MG/1
1 TABLET ORAL
Status: DISCONTINUED | OUTPATIENT
Start: 2022-01-17 | End: 2022-01-17 | Stop reason: HOSPADM

## 2022-01-17 RX ADMIN — PHENYLEPHRINE HYDROCHLORIDE 150 MCG: 10 INJECTION, SOLUTION INTRAMUSCULAR; INTRAVENOUS; SUBCUTANEOUS at 13:26

## 2022-01-17 RX ADMIN — OXYCODONE HYDROCHLORIDE AND ACETAMINOPHEN 2 TABLET: 5; 325 TABLET ORAL at 23:35

## 2022-01-17 RX ADMIN — OXYCODONE HYDROCHLORIDE AND ACETAMINOPHEN 2 TABLET: 5; 325 TABLET ORAL at 17:26

## 2022-01-17 RX ADMIN — ROCURONIUM BROMIDE 10 MG: 10 INJECTION INTRAVENOUS at 13:04

## 2022-01-17 RX ADMIN — HYDROMORPHONE HYDROCHLORIDE 0.5 MG: 1 INJECTION, SOLUTION INTRAMUSCULAR; INTRAVENOUS; SUBCUTANEOUS at 23:35

## 2022-01-17 RX ADMIN — VANCOMYCIN HYDROCHLORIDE 1000 MG: 10 INJECTION, POWDER, LYOPHILIZED, FOR SOLUTION INTRAVENOUS at 13:16

## 2022-01-17 RX ADMIN — OXYCODONE HYDROCHLORIDE AND ACETAMINOPHEN 2 TABLET: 5; 325 TABLET ORAL at 06:23

## 2022-01-17 RX ADMIN — EPOETIN ALFA-EPBX 4000 UNITS: 4000 INJECTION, SOLUTION INTRAVENOUS; SUBCUTANEOUS at 07:58

## 2022-01-17 RX ADMIN — HYDROMORPHONE HYDROCHLORIDE 0.5 MG: 1 INJECTION, SOLUTION INTRAMUSCULAR; INTRAVENOUS; SUBCUTANEOUS at 15:11

## 2022-01-17 RX ADMIN — HYDROMORPHONE HYDROCHLORIDE 0.4 MG: 2 INJECTION, SOLUTION INTRAMUSCULAR; INTRAVENOUS; SUBCUTANEOUS at 13:40

## 2022-01-17 RX ADMIN — HYDROMORPHONE HYDROCHLORIDE 0.2 MG: 2 INJECTION, SOLUTION INTRAMUSCULAR; INTRAVENOUS; SUBCUTANEOUS at 13:45

## 2022-01-17 RX ADMIN — NIFEDIPINE 90 MG: 90 TABLET, FILM COATED, EXTENDED RELEASE ORAL at 17:26

## 2022-01-17 RX ADMIN — DEXAMETHASONE SODIUM PHOSPHATE 4 MG: 4 INJECTION, SOLUTION INTRAMUSCULAR; INTRAVENOUS at 13:24

## 2022-01-17 RX ADMIN — PROPOFOL 150 MG: 10 INJECTION, EMULSION INTRAVENOUS at 13:04

## 2022-01-17 RX ADMIN — FENTANYL CITRATE 50 MCG: 50 INJECTION, SOLUTION INTRAMUSCULAR; INTRAVENOUS at 13:09

## 2022-01-17 RX ADMIN — ONDANSETRON 4 MG: 2 INJECTION INTRAMUSCULAR; INTRAVENOUS at 13:24

## 2022-01-17 RX ADMIN — PHENYLEPHRINE HYDROCHLORIDE 100 MCG: 10 INJECTION, SOLUTION INTRAMUSCULAR; INTRAVENOUS; SUBCUTANEOUS at 13:21

## 2022-01-17 RX ADMIN — ROCURONIUM BROMIDE 40 MG: 10 INJECTION INTRAVENOUS at 13:13

## 2022-01-17 RX ADMIN — SODIUM CHLORIDE, PRESERVATIVE FREE 10 ML: 5 INJECTION INTRAVENOUS at 10:53

## 2022-01-17 RX ADMIN — ROCURONIUM BROMIDE 5 MG: 10 INJECTION INTRAVENOUS at 13:59

## 2022-01-17 RX ADMIN — SENNOSIDES AND DOCUSATE SODIUM 1 TABLET: 50; 8.6 TABLET ORAL at 20:39

## 2022-01-17 RX ADMIN — METOPROLOL SUCCINATE 50 MG: 50 TABLET, FILM COATED, EXTENDED RELEASE ORAL at 17:27

## 2022-01-17 RX ADMIN — Medication 0.1 MG: at 13:30

## 2022-01-17 RX ADMIN — HEPARIN SODIUM 5000 UNITS: 5000 INJECTION INTRAVENOUS; SUBCUTANEOUS at 23:35

## 2022-01-17 RX ADMIN — SUGAMMADEX 200 MG: 100 INJECTION, SOLUTION INTRAVENOUS at 14:17

## 2022-01-17 RX ADMIN — HEPARIN SODIUM 5000 UNITS: 5000 INJECTION INTRAVENOUS; SUBCUTANEOUS at 06:22

## 2022-01-17 RX ADMIN — FAMOTIDINE 20 MG: 10 INJECTION, SOLUTION INTRAVENOUS at 12:57

## 2022-01-17 RX ADMIN — SODIUM CHLORIDE: 9 INJECTION, SOLUTION INTRAVENOUS at 12:22

## 2022-01-17 RX ADMIN — CALCIUM ACETATE 1334 MG: 667 CAPSULE ORAL at 17:26

## 2022-01-17 RX ADMIN — HYDROMORPHONE HYDROCHLORIDE 0.5 MG: 1 INJECTION, SOLUTION INTRAMUSCULAR; INTRAVENOUS; SUBCUTANEOUS at 15:28

## 2022-01-17 RX ADMIN — PHENYLEPHRINE HYDROCHLORIDE 100 MCG: 10 INJECTION, SOLUTION INTRAMUSCULAR; INTRAVENOUS; SUBCUTANEOUS at 13:29

## 2022-01-17 RX ADMIN — Medication 100 MG: at 13:05

## 2022-01-17 RX ADMIN — SODIUM CHLORIDE, PRESERVATIVE FREE 10 ML: 5 INJECTION INTRAVENOUS at 20:41

## 2022-01-17 RX ADMIN — LIDOCAINE HYDROCHLORIDE 50 MG: 20 INJECTION, SOLUTION INTRAVENOUS at 13:04

## 2022-01-17 RX ADMIN — HYDROMORPHONE HYDROCHLORIDE 0.5 MG: 1 INJECTION, SOLUTION INTRAMUSCULAR; INTRAVENOUS; SUBCUTANEOUS at 20:32

## 2022-01-17 RX ADMIN — FENTANYL CITRATE 50 MCG: 50 INJECTION, SOLUTION INTRAMUSCULAR; INTRAVENOUS at 13:04

## 2022-01-17 RX ADMIN — MAGNESIUM SULFATE HEPTAHYDRATE 4000 MG: 4 INJECTION, SOLUTION INTRAVENOUS at 10:51

## 2022-01-17 RX ADMIN — EPOETIN ALFA-EPBX 4000 UNITS: 4000 INJECTION, SOLUTION INTRAVENOUS; SUBCUTANEOUS at 11:35

## 2022-01-17 ASSESSMENT — PULMONARY FUNCTION TESTS
PIF_VALUE: 27
PIF_VALUE: 23
PIF_VALUE: 25
PIF_VALUE: 18
PIF_VALUE: 17
PIF_VALUE: 21
PIF_VALUE: 1
PIF_VALUE: 21
PIF_VALUE: 17
PIF_VALUE: 24
PIF_VALUE: 27
PIF_VALUE: 27
PIF_VALUE: 23
PIF_VALUE: 15
PIF_VALUE: 22
PIF_VALUE: 27
PIF_VALUE: 1
PIF_VALUE: 28
PIF_VALUE: 22
PIF_VALUE: 21
PIF_VALUE: 21
PIF_VALUE: 27
PIF_VALUE: 21
PIF_VALUE: 23
PIF_VALUE: 27
PIF_VALUE: 25
PIF_VALUE: 21
PIF_VALUE: 27
PIF_VALUE: 1
PIF_VALUE: 27
PIF_VALUE: 23
PIF_VALUE: 22
PIF_VALUE: 1
PIF_VALUE: 9
PIF_VALUE: 20
PIF_VALUE: 22
PIF_VALUE: 23
PIF_VALUE: 15
PIF_VALUE: 23
PIF_VALUE: 21
PIF_VALUE: 15
PIF_VALUE: 22
PIF_VALUE: 23
PIF_VALUE: 15
PIF_VALUE: 21
PIF_VALUE: 20
PIF_VALUE: 27
PIF_VALUE: 21
PIF_VALUE: 27
PIF_VALUE: 22
PIF_VALUE: 22
PIF_VALUE: 23
PIF_VALUE: 15
PIF_VALUE: 8
PIF_VALUE: 27
PIF_VALUE: 15
PIF_VALUE: 23
PIF_VALUE: 5
PIF_VALUE: 21
PIF_VALUE: 27
PIF_VALUE: 28
PIF_VALUE: 1
PIF_VALUE: 23
PIF_VALUE: 21
PIF_VALUE: 23
PIF_VALUE: 28
PIF_VALUE: 27
PIF_VALUE: 15
PIF_VALUE: 1
PIF_VALUE: 26
PIF_VALUE: 20
PIF_VALUE: 28
PIF_VALUE: 18
PIF_VALUE: 26
PIF_VALUE: 25
PIF_VALUE: 16
PIF_VALUE: 23
PIF_VALUE: 15
PIF_VALUE: 28
PIF_VALUE: 25
PIF_VALUE: 20
PIF_VALUE: 24
PIF_VALUE: 15
PIF_VALUE: 20
PIF_VALUE: 22
PIF_VALUE: 27
PIF_VALUE: 1
PIF_VALUE: 22
PIF_VALUE: 27
PIF_VALUE: 15
PIF_VALUE: 21
PIF_VALUE: 27
PIF_VALUE: 0
PIF_VALUE: 16
PIF_VALUE: 21
PIF_VALUE: 20
PIF_VALUE: 20
PIF_VALUE: 1

## 2022-01-17 ASSESSMENT — PAIN DESCRIPTION - FREQUENCY
FREQUENCY: CONTINUOUS

## 2022-01-17 ASSESSMENT — PAIN DESCRIPTION - LOCATION
LOCATION: ABDOMEN
LOCATION: SHOULDER
LOCATION: ABDOMEN
LOCATION: ABDOMEN

## 2022-01-17 ASSESSMENT — PAIN DESCRIPTION - PROGRESSION
CLINICAL_PROGRESSION: NOT CHANGED
CLINICAL_PROGRESSION: GRADUALLY IMPROVING
CLINICAL_PROGRESSION: GRADUALLY IMPROVING
CLINICAL_PROGRESSION: NOT CHANGED

## 2022-01-17 ASSESSMENT — LIFESTYLE VARIABLES: SMOKING_STATUS: 0

## 2022-01-17 ASSESSMENT — PAIN DESCRIPTION - PAIN TYPE
TYPE: ACUTE PAIN
TYPE: CHRONIC PAIN
TYPE_2: SURGICAL
TYPE: SURGICAL PAIN
TYPE: SURGICAL PAIN;ACUTE PAIN

## 2022-01-17 ASSESSMENT — PAIN SCALES - GENERAL
PAINLEVEL_OUTOF10: 6
PAINLEVEL_OUTOF10: 9
PAINLEVEL_OUTOF10: 8
PAINLEVEL_OUTOF10: 10
PAINLEVEL_OUTOF10: 0
PAINLEVEL_OUTOF10: 8
PAINLEVEL_OUTOF10: 10
PAINLEVEL_OUTOF10: 0
PAINLEVEL_OUTOF10: 0
PAINLEVEL_OUTOF10: 8
PAINLEVEL_OUTOF10: 0

## 2022-01-17 ASSESSMENT — PAIN DESCRIPTION - ORIENTATION
ORIENTATION: RIGHT;LEFT;MID
ORIENTATION: RIGHT
ORIENTATION: RIGHT;ANTERIOR;LEFT;MID

## 2022-01-17 ASSESSMENT — PAIN DESCRIPTION - DESCRIPTORS
DESCRIPTORS: ACHING
DESCRIPTORS: ACHING
DESCRIPTORS: ACHING;CONSTANT;DISCOMFORT
DESCRIPTORS: ACHING;CONSTANT;DISCOMFORT

## 2022-01-17 ASSESSMENT — PAIN DESCRIPTION - ONSET
ONSET: ON-GOING

## 2022-01-17 ASSESSMENT — PAIN - FUNCTIONAL ASSESSMENT
PAIN_FUNCTIONAL_ASSESSMENT: PREVENTS OR INTERFERES SOME ACTIVE ACTIVITIES AND ADLS
PAIN_FUNCTIONAL_ASSESSMENT: ACTIVITIES ARE NOT PREVENTED

## 2022-01-17 ASSESSMENT — PAIN SCALES - WONG BAKER
WONGBAKER_NUMERICALRESPONSE: 2
WONGBAKER_NUMERICALRESPONSE: 0

## 2022-01-17 ASSESSMENT — PAIN DESCRIPTION - INTENSITY: RATING_2: 10

## 2022-01-17 NOTE — PROGRESS NOTES
Nephrology  Note                                                                                                                                                                                                                                                                                                                                                               Office : [phone]     Fax :[phone]              Patient's Name: Belle Thakkar  1/17/2022    Reason for Consult:   ANTONIO   Requesting Physician:  José Cheney MD      S/p HD today   [facility]   PD cath placement          Past Medical History:   Diagnosis Date    Arthralgia of multiple joints 10/27/2015    Arthritis     Atrial fibrillation ([address]     Chronic kidney disease     stage 4    Chronic systolic congestive heart failure ([address] 8/16/2021    CRI (chronic renal insufficiency)     Diabetic nephropathy associated with type 2 diabetes mellitus ([address] 10/11/2018    Diverticulosis     Elevated PSA     Erectile dysfunction     Gout     Gout     Hemrrhoid NOS w/ complication NEC     History of MRSA infection     Hypertension     CELSA (obstructive sleep apnea) 07/14/2017    uses C-pap machine    Type 2 diabetes mellitus without complication, without long-term current use of insulin ([address] 9/70/8462    Umbilical hernia without obstruction and without gangrene 2/2/2016       Past Surgical History:   Procedure Laterality Date    COLONOSCOPY      DIALYSIS CATHETER INSERTION N/A 1/17/2022    LAPAROSCOPIC PERITONEAL DIALYSIS CATHETER PLACEMENT and omentopexy performed by Hussain Crook DO at [address] [place] [address], ESOPHAGUS      IR TUNNELED CATHETER PLACEMENT GREATER THAN 5 YEARS  01/14/2022    IR TUNNELED CATHETER PLACEMENT GREATER THAN 5 YEARS 1/14/2022 [facility]    UPPER GASTROINTESTINAL ENDOSCOPY  05/28/2016    biopsies, multiple small gastric ulcers    UPPER GASTROINTESTINAL ENDOSCOPY  09/12/2016    VENTRAL HERNIA REPAIR N/A 1/17/2022    LAPAROSCOPIC incarcerated VENTRAL HERNIA REPAIR performed by Phil Ackerman DO at [facility] OR       Family History   Problem Relation Age of Onset    Hypertension Other     Breast Cancer Mother     Colon Cancer Father     Diabetes Maternal Grandmother     Coronary Art Dis Brother         reports that he has never smoked. He has never used smokeless tobacco. He reports current alcohol use. He reports that he does not use drugs. Allergies:  Patient has no known allergies.     Current Medications:    heparin (porcine) injection 3,200 Units, PRN  sodium chloride flush 0.9 % injection 10 mL, BID  epoetin rae-epbx (RETACRIT) injection 4,000 Units, Q MWF  dextrose bolus (hypoglycemia) 10% 125 mL, PRN   Or  dextrose bolus (hypoglycemia) 10% 250 mL, PRN  oxyCODONE-acetaminophen (PERCOCET) 5-325 MG per tablet 2 tablet, Q6H PRN  calcium acetate (PHOSLO) capsule 1,334 mg, TID WC  cyclobenzaprine (FLEXERIL) tablet 5 mg, BID PRN  [Held by provider] aspirin chewable tablet 81 mg, Daily  atorvastatin (LIPITOR) tablet 20 mg, Daily  calcitRIOL (ROCALTROL) capsule 0.25 mcg, Daily  metoprolol succinate (TOPROL XL) extended release tablet 50 mg, Daily  NIFEdipine (PROCARDIA XL) extended release tablet 90 mg, Daily  allopurinol (ZYLOPRIM) tablet 100 mg, Daily  doxazosin (CARDURA) tablet 4 mg, Daily  glucose (GLUTOSE) 40 % oral gel 15 g, PRN  glucagon (rDNA) injection 1 mg, PRN  dextrose 5 % solution, PRN  sodium chloride flush 0.9 % injection 10 mL, 2 times per day  sodium chloride flush 0.9 % injection 10 mL, PRN  0.9 % sodium chloride infusion, PRN  ondansetron (ZOFRAN-ODT) disintegrating tablet 4 mg, Q8H PRN   Or  ondansetron (ZOFRAN) injection 4 mg, Q6H PRN  sennosides-docusate sodium (SENOKOT-S) 8.6-50 MG tablet 1 tablet, BID  magnesium hydroxide (MILK OF MAGNESIA) 400 MG/5ML suspension 30 mL, Daily PRN  acetaminophen (TYLENOL) tablet 650 mg, Q6H PRN   Or  acetaminophen (TYLENOL) suppository 650 mg, Q6H PRN  heparin (porcine) injection 5,000 Units, 3 times per day  insulin lispro (1 Unit Dial) 0-12 Units, TID WC  insulin lispro (1 Unit Dial) 0-6 Units, Nightly      Physical exam:     Vitals:  BP (!) 162/105   Pulse 94   Temp 98.8 °F (37.1 °C) (Temporal)   Resp 20   Ht 6' 5\" (1.956 m)   Wt 288 lb 5.8 oz (130.8 kg)   SpO2 96%   BMI 34.19 kg/m²   Constitutional:  OAA X3 NAD  Skin: no rash, turgor wnl  Heent:  eomi, mmm  Neck: no bruits or jvd noted  Cardiovascular:  S1, S2 without m/r/g  Respiratory: CTA B without w/r/r  Abdomen:  +bs, soft, nt, nd  Ext: ++ lower extremity edema  Psychiatric: mood and affect appropriate  Musculoskeletal:  Rom, muscular strength intact    Data:   Labs:  CBC:   Recent Labs     01/15/22  0503 01/16/22  0524 01/17/22  0440   WBC 10.4 6.8 6.5   HGB 10.9* 9.5* 9.3*    174 164     BMP:    Recent Labs     01/15/22  0503 01/16/22  0523 01/17/22  0440    135* 139   K 4.1 3.6 4.0   CL 98* 101 102   CO2 23 24 23   BUN 56* 42* 53*   CREATININE 7.0* 5.1* 5.9*   GLUCOSE 92 136* 98     Ca/Mg/Phos:   Recent Labs     01/15/22  0503 01/16/22  0523 01/17/22  0440   CALCIUM 6.4* 6.8* 6.7*   MG  --   --  1.60*   PHOS  --   --  5.8*     Hepatic:   No results for input(s): AST, ALT, ALB, BILITOT, ALKPHOS in the last 72 hours. Troponin:   No results for input(s): TROPONINI in the last 72 hours. BNP: No results for input(s): BNP in the last 72 hours. Lipids: No results for input(s): CHOL, TRIG, HDL, LDLCALC, LABVLDL in the last 72 hours. ABGs: No results for input(s): PHART, PO2ART, [id number] in the last 72 hours. INR:   Recent Labs     01/17/22  0440   INR 0.98     UA:  No results for input(s): COLORU, CLARITYU, GLUCOSEU, BILIRUBINUR, KETUA, SPECGRAV, BLOODU, PHUR, PROTEINU, UROBILINOGEN, NITRU, LEUKOCYTESUR, Kathlynn Latch in the last 72 hours. Urine Microscopic: No results for input(s): LABCAST, BACTERIA, COMU, HYALCAST, WBCUA, RBCUA, EPIU in the last 72 hours.   Urine Culture: No results for input(s): LABURIN in the last 72 hours. Urine Chemistry:   No results for input(s): Eudelia Moots, PROTEINUR, NAUR in the last 72 hours. IMAGING:  NM MYOCARDIAL SPECT REST EXERCISE OR RX   Final Result      IR TUNNELED CVC PLACE WO SQ PORT/PUMP > 5 YEARS   Final Result   IMPRESSION :      Limited ultrasound examination demonstrates a patent right internal jugular vein. Placement of tunnelled right jugular dialysis catheter. Fluoroscopy time : 0.4 minutes   Number of exposures obtained : 1   Moderate sedation was provided and monitored by an independent trained observer. Moderate sedation start time : 1241   Moderate sedation end time : 1253   Blood loss : minimal (less than 5 cc)   Specimens removed : none               XR WRIST LEFT (MIN 3 VIEWS)   Final Result      No acute bony pathology      XR ANKLE RIGHT (MIN 3 VIEWS)   Final Result      No acute bony pathology. Heel spur      XR CHEST (2 VW)   Final Result      No acute pulmonary pathology or significant change from the prior exam                      Assessment/Plan   1. ANTONIO on CKD 5     2. HTN    3. Anemia    4. Acid- base/ Electrolyte imbalance     5. Gout     6.  CHF     Plan   - renal function poor   - HD today   - stress test - low risk     - sx consult for PD cath placement - today   - labs in am   - No need for IVF       Recommend to dose adjust all medications  based on renal functions  Maintain SBP> 90 mmHg   Daily weights   AVOID NSAIDs  Avoid Nephrotoxins  Monitor Intake/Output  Call if significant decrease in urine output                   Thank you for allowing us to participate in care of Sofia Hernandes MD  Feel free to contact me   [facility] [address]  Office : [phone]  Fax :[phone]

## 2022-01-17 NOTE — ANESTHESIA PRE PROCEDURE
Department of Anesthesiology  Preprocedure Note       Name:  Gaurav Guaman   Age:  59 y.o.  :  1960                                          MRN:  [id number]         Date:  2022      Surgeon: Ari Peters): Henri Deshpande DO    Procedure: Procedure(s):  LAPAROSCOPIC PERITONEAL DIALYSIS CATHETER PLACEMENT  LAPAROSCOPIC VENTRAL HERNIA REPAIR    Medications prior to admission:   Prior to Admission medications    Medication Sig Start Date End Date Taking? Authorizing Provider   nortriptyline (PAMELOR) 10 MG capsule nightly  12/15/21  Yes Historical Provider, MD   colchicine (COLCRYS) 0.6 MG tablet Take 1 tablet by mouth daily as needed for Pain (as needed for pain related to gout) 1/10/22  Yes Josefina Mcgill MD   oxyCODONE-acetaminophen (PERCOCET) 5-325 MG per tablet Take 1 tablet by mouth every 8 hours as needed for Pain for up to 30 days.  1/10/22 2/9/22 Yes Josefina Mcgill MD   vitamin D (ERGOCALCIFEROL) 1.25 MG (91602 UT) CAPS capsule Take 1 capsule by mouth once a week 21  Yes Lima Muir MD   calcitRIOL (ROCALTROL) 0.25 MCG capsule Take 1 capsule by mouth daily 21  Yes Lima Muir MD   omeprazole (PRILOSEC) 40 MG delayed release capsule TAKE 1 CAPSULE BY MOUTH EVERY DAY 21  Yes Josefina Mcgill MD   allopurinol (ZYLOPRIM) 300 MG tablet TAKE 1 TABLET BY MOUTH EVERY DAY 21  Yes Josefina Mcgill MD   terazosin (HYTRIN) 5 MG capsule Take 1 capsule by mouth nightly TAKE 1 CAPSULE BY MOUTH EVERY EVENING 21  Yes Josefina Mcgill MD   metoprolol succinate (TOPROL XL) 50 MG extended release tablet TAKE 1 TABLET BY MOUTH EVERY DAY 21  Yes Josefina Mcgill MD   atorvastatin (LIPITOR) 20 MG tablet TAKE 1 TABLET BY MOUTH EVERY DAY 21  Yes Josefina Mcgill MD   NIFEdipine (ADALAT CC) 90 MG extended release tablet TAKE 1 TABLET BY MOUTH EVERY DAY 21  Yes Josefina Mcgill MD   sildenafil (VIAGRA) 100 MG tablet Take 1 tablet by mouth as needed for Erectile Dysfunction 8/16/21  Yes Remberto Razo MD   aspirin 81 MG tablet Take 81 mg by mouth daily   Yes Historical Provider, MD       Current medications:    Current Facility-Administered Medications   Medication Dose Route Frequency Provider Last Rate Last Admin    magnesium sulfate 4000 mg in 100 mL IVPB premix  4,000 mg IntraVENous Once Enrique Larios, DO 25 mL/hr at 01/17/22 1051 4,000 mg at 01/17/22 1051    heparin (porcine) injection 3,200 Units  3,200 Units IntraCATHeter PRN Ailin Machado MD        vancomycin (VANCOCIN) 1,000 mg in dextrose 5 % 250 mL IVPB  1,000 mg IntraVENous On Call to [facility],         sodium chloride flush 0.9 % injection 10 mL  10 mL IntraVENous BID Viridiana Rayo MD   10 mL at 01/17/22 1053    epoetin rae-epbx (RETACRIT) injection 4,000 Units  4,000 Units IntraVENous Q MWF Ailin Machado MD   4,000 Units at 01/17/22 1135    dextrose bolus (hypoglycemia) 10% 125 mL  125 mL IntraVENous PRN Stevie Field MD        Or    dextrose bolus (hypoglycemia) 10% 250 mL  250 mL IntraVENous PRN Stevie Field MD        oxyCODONE-acetaminophen (PERCOCET) 5-325 MG per tablet 2 tablet  2 tablet Oral Q6H PRN Shun Arriola MD   2 tablet at 01/17/22 3961    calcium acetate (PHOSLO) capsule 1,334 mg  2 capsule Oral TID WC Ailin Machado MD   1,334 mg at 01/16/22 1631    cyclobenzaprine (FLEXERIL) tablet 5 mg  5 mg Oral BID PRN Shun Arriola MD   5 mg at 01/16/22 0806    [Held by provider] aspirin chewable tablet 81 mg  81 mg Oral Daily Stevie Field MD   81 mg at 01/14/22 0758    atorvastatin (LIPITOR) tablet 20 mg  20 mg Oral Daily Stevie Field MD   20 mg at 01/16/22 0810    calcitRIOL (ROCALTROL) capsule 0.25 mcg  0.25 mcg Oral Daily Stevie Field MD   0.25 mcg at 01/16/22 0298    metoprolol succinate (TOPROL XL) extended release tablet 50 mg  50 mg Oral Daily Stevie Field MD   50 mg at 01/16/22 0810    NIFEdipine (PROCARDIA XL) extended release tablet 90 mg  90 mg Oral Daily Addie Grigsby MD   90 mg at 01/16/22 0810    allopurinol (ZYLOPRIM) tablet 100 mg  100 mg Oral Daily Addie Grigsby MD   100 mg at 01/16/22 0054    doxazosin (CARDURA) tablet 4 mg  4 mg Oral Daily Addie Grigsby MD   4 mg at 01/16/22 0811    glucose (GLUTOSE) 40 % oral gel 15 g  15 g Oral PRN Addie Grigsby MD        glucagon (rDNA) injection 1 mg  1 mg IntraMUSCular PRN Addie Grigsby MD        dextrose 5 % solution  100 mL/hr IntraVENous PRN Addie Grigsby MD        sodium chloride flush 0.9 % injection 10 mL  10 mL IntraVENous 2 times per day Addie Grigsby MD   10 mL at 01/17/22 1053    sodium chloride flush 0.9 % injection 10 mL  10 mL IntraVENous PRN Addie Grigsby MD        0.9 % sodium chloride infusion  25 mL IntraVENous DAVN Addie Grigsby MD        ondansetron (ZOFRAN-ODT) disintegrating tablet 4 mg  4 mg Oral Q8H PRN Addie Grigsby MD        Or    ondansetron [facility]) injection 4 mg  4 mg IntraVENous Q6H PRN Addie Grigsby MD        sennosides-docusate sodium (SENOKOT-S) 8.6-50 MG tablet 1 tablet  1 tablet Oral BID Addie Grigsby MD   1 tablet at 01/16/22 0810    magnesium hydroxide (MILK OF MAGNESIA) 400 MG/5ML suspension 30 mL  30 mL Oral Daily PRN Addie Grigsby MD        acetaminophen (TYLENOL) tablet 650 mg  650 mg Oral Q6H PRN MD Jp Edwards acetaminophen (TYLENOL) suppository 650 mg  650 mg Rectal Q6H PRN Addie Grigsby MD        heparin (porcine) injection 5,000 Units  5,000 Units SubCUTAneous 3 times per day Addie Grigsby MD   5,000 Units at 01/17/22 0622    insulin lispro (1 Unit Dial) 0-12 Units  0-12 Units SubCUTAneous TID WC Addie Grigsby MD        insulin lispro (1 Unit Dial) 0-6 Units  0-6 Units SubCUTAneous Nightly Addie Grigsby MD           Allergies:  No Known Allergies    Problem List:    Patient Active Problem List   Diagnosis Code    Atrial fibrillation (HCC) I48.91    CRI (chronic renal insufficiency) N18.9    Gout M10.9    Erectile dysfunction N52.9    Elevated PSA R97.20    Essential hypertension, benign I10    Headache R51.9    Diverticulosis K57.90    Arthralgia of multiple joints B86.03    Umbilical hernia without obstruction and without gangrene K42.9    Knee pain, bilateral M25.561, M25.562    Alcohol use disorder, mild, abuse F10.10    CELSA (obstructive sleep apnea) G47.33    Duodenal ulcer disease K26.9    Diabetic nephropathy associated with type 2 diabetes mellitus ([facility]) E11.21    Type 2 diabetes mellitus with diabetic nephropathy, without long-term current use of insulin ([facility]) E11.21    Lateral epicondylitis of right elbow M77.11    Chronic bilateral low back pain without sciatica M54.50, G89.29    Morbid obesity due to excess calories ([facility]) E66.01    Chronic systolic congestive heart failure ([facility]) I50.22    Acute kidney injury superimposed on CKD ([facility]) N17.9, N18.9    Stroke-like symptoms R29.90    DMII (diabetes mellitus, type 2) ([facility]) E11.9    Hyperlipidemia E78.5    Hypocalcemia E83.51    Hypomagnesemia E83.42    Hypokalemia E87.6    Muscle cramps R25.2    ANTONIO (acute kidney injury) ([facility]) N17.9    Shortness of breath R06.02    Electrolyte imbalance E87.8    Anemia due to stage 5 chronic kidney disease ([facility]) N18.5, D63.1    Congestive heart failure ([facility]) I50.9    Pre-op evaluation Z01.818    LV dysfunction I51.9       Past Medical History:        Diagnosis Date    Arthralgia of multiple joints 10/27/2015    Arthritis     Atrial fibrillation (HCC)     Chronic kidney disease     stage 4    Chronic systolic congestive heart failure ([facility] 8/16/2021    CRI (chronic renal insufficiency)     Diabetic nephropathy associated with type 2 diabetes mellitus ([facility] 10/11/2018    Diverticulosis     Elevated PSA     Erectile dysfunction     Gout     Gout     Hemrrhoid NOS w/ complication NEC     History of MRSA infection     Hypertension     CELSA (obstructive sleep apnea) 07/14/2017    uses C-pap machine    Type 2 diabetes mellitus without complication, without long-term current use of insulin ([facility] 7/36/2464    Umbilical hernia without obstruction and without gangrene 2/2/2016       Past Surgical History:        Procedure Laterality Date    COLONOSCOPY      DILATATION, ESOPHAGUS      IR TUNNELED CATHETER PLACEMENT GREATER THAN 5 YEARS  01/14/2022    IR TUNNELED CATHETER PLACEMENT GREATER THAN 5 YEARS 1/14/2022 [facility] SPECIAL PROCEDURES    UPPER GASTROINTESTINAL ENDOSCOPY  05/28/2016    biopsies, multiple small gastric ulcers    UPPER GASTROINTESTINAL ENDOSCOPY  09/12/2016       Social History:    Social History     Tobacco Use    Smoking status: Never Smoker    Smokeless tobacco: Never Used   Substance Use Topics    Alcohol use: Yes     Comment: drinks etoh on weekends- a 12 pk or so                                Counseling given: Not Answered      Vital Signs (Current):   Vitals:    01/17/22 0647 01/17/22 0951 01/17/22 1030 01/17/22 1222   BP: (!) 146/91 (!) 161/85 (!) 164/90 (!) 157/99   Pulse: 96 86 90 88   Resp: 16 16 16 13   Temp: 98.6 °F (37 °C) 98.7 °F (37.1 °C) 98.1 °F (36.7 °C) 97.7 °F (36.5 °C)   TempSrc:   Oral Temporal   SpO2:   97% 96%   Weight: 291 lb 0.1 oz (132 kg) 288 lb 5.8 oz (130.8 kg)     Height:                                                  BP Readings from Last 3 Encounters:   01/17/22 (!) 157/99   01/11/22 (!) 134/90   01/10/22 130/76       NPO Status: Time of last liquid consumption: 0000                        Time of last solid consumption: 0000                        Date of last liquid consumption: 01/17/22                        Date of last solid food consumption: 01/17/22    BMI:   Wt Readings from Last 3 Encounters:   01/17/22 288 lb 5.8 oz (130.8 kg)   01/11/22 282 lb 12.8 oz (128.3 kg)   01/10/22 285 lb 9.6 oz (129.5 kg)     Body mass index is 34.19 kg/m².     CBC:   Lab Results   Component Value Date    WBC 6.5 01/17/2022    RBC 3.26 01/17/2022    HGB 9.3 01/17/2022    HCT 28.2 01/17/2022    MCV 86.5 01/17/2022    RDW 13.9 01/17/2022     01/17/2022       CMP:   Lab Results   Component Value Date     01/17/2022    K 4.0 01/17/2022     01/17/2022    CO2 23 01/17/2022    BUN 53 01/17/2022    CREATININE 5.9 01/17/2022    GFRAA 12 01/17/2022    GFRAA >60 05/24/2013    AGRATIO 1.5 01/10/2022    LABGLOM 10 01/17/2022    LABGLOM 56 04/14/2011    GLUCOSE 98 01/17/2022    PROT 6.5 01/10/2022    PROT 7.5 10/03/2012    CALCIUM 6.7 01/17/2022    BILITOT <0.2 01/10/2022    ALKPHOS 91 01/10/2022    AST 15 01/10/2022    ALT 17 01/10/2022       POC Tests:   Recent Labs     01/17/22  1220   POCGLU 86       Coags:   Lab Results   Component Value Date    PROTIME 11.1 01/17/2022    INR 0.98 01/17/2022    APTT 34.2 03/17/2019       HCG (If Applicable): No results found for: PREGTESTUR, PREGSERUM, HCG, HCGQUANT     ABGs: No results found for: PHART, PO2ART, FHS3LPK, SGS1HUY, BEART, W4BSTTHN     Type & Screen (If Applicable):  No results found for: LABABO, LABRH    Drug/Infectious Status (If Applicable):  No results found for: HIV, HEPCAB    COVID-19 Screening (If Applicable):   Lab Results   Component Value Date    COVID19 Not Detected 01/12/2022           Anesthesia Evaluation  Patient summary reviewed and Nursing notes reviewed no history of anesthetic complications:   Airway: Mallampati: II  TM distance: >3 FB   Neck ROM: full  Mouth opening: > = 3 FB Dental: normal exam         Pulmonary: breath sounds clear to auscultation  (+) sleep apnea: on CPAP,      (-) not a current smoker (never)                           Cardiovascular:  Exercise tolerance: good (>4 METS),   (+) hypertension:, dysrhythmias: atrial fibrillation, CHF:,     (-) past MI    NYHA Classification: II    Rhythm: regular  Rate: normal           Beta Blocker:  Dose within 24 Hrs      ROS comment: Results for orders placed or performed during the hospital encounter of 05/28/21  -Echo Complete:        Result                      Value             Ref Range           Left Ventricular Eject*     48                                    LVEF MODALITY               ECHO                                    Neuro/Psych:               GI/Hepatic/Renal:   (+) renal disease (dialysis since 1/14/22    secondary to htn ): ESRD, morbid obesity     (-) GERD      ROS comment: K+  4.0. Endo/Other:    (+) Diabetes (BS  86)Type II DM, well controlled, , .                 Abdominal:   (+) obese,           Vascular: negative vascular ROS. Other Findings:             Anesthesia Plan      general     ASA 3       Induction: intravenous. MIPS: Postoperative opioids intended and Prophylactic antiemetics administered. Anesthetic plan and risks discussed with patient. Plan discussed with CRNA.     Attending anesthesiologist reviewed and agrees with Preprocedure content              Grace Sapp DO   1/17/2022

## 2022-01-17 NOTE — TELEPHONE ENCOUNTER
Pt's spouse calling to ask about pap set up. Explained to her I have messaged the representative from [employer] again and have not heard back yet today. Spouse to be notified if any new information arrives. Spouse is talking about paying out of pocket for a new unit. Explained to spouse that we can send an order to a DME of her choice however there is a shortage of units and it can still take some time. Spouse to call back with where she would like to have the order sent.

## 2022-01-17 NOTE — FLOWSHEET NOTE
Pt tolerated tx. Meds Given: Retacrit 4,000 units    Net Fld removed: 1000 mls    Pre WT: 132kg  Post WT:130.8 kg (standing scale)  EDW: TBD    R CW TDC w/good blood flow. Report given to RN.  Copy of tx sheets on chart for scanning into EMR.       01/17/22 0647 01/17/22 0951   Vital Signs   BP (!) 146/91 (!) 161/85   Temp 98.6 °F (37 °C) 98.7 °F (37.1 °C)   Pulse 96 86   Resp 16 16   Weight 291 lb 0.1 oz (132 kg) 288 lb 5.8 oz (130.8 kg)   Weight Method Standing scale Standing scale

## 2022-01-17 NOTE — CARE COORDINATION
3:59 PM  [facility]. Faxed referral. They will arrange to see the patient on January 24th at 10am for a dressing change and to initiate PD teachings. Attempted to meet with patient and spouse at bedside, but the patient's spouse was not in the room. Patient's spouse had questions regarding a CPAP machine. Will follow-up with the patient and his spouse later today or tomorrow to answer their questions and review information.

## 2022-01-17 NOTE — FLOWSHEET NOTE
Dr. Angélica Tillman and OR staff at bedside to see. Anesthesia consent signed. Taken to OR per OR staff.

## 2022-01-17 NOTE — PROGRESS NOTES
Patient to pacu 10 s/p LAPAROSCOPIC PERITONEAL DIALYSIS CATHETER PLACEMENT and omentopexy  General   LAPAROSCOPIC incarcerated VENTRAL HERNIA REPAIR, report received from CRNA, reported hemodynamically stable intra op.  All vitals stable upon arrival.

## 2022-01-17 NOTE — BRIEF OP NOTE
Brief Postoperative Note      Patient: Hang Membreno  YOB: 1960  MRN: [id number]    Date of Procedure: 1/17/2022    Pre-Op Diagnosis: ESRD (end stage renal disease) ([facility] [N18.6]    Post-Op Diagnosis: Same, umbilical hernia, incarcerated       Procedure(s):  LAPAROSCOPIC PERITONEAL DIALYSIS CATHETER PLACEMENT and omentopexy  LAPAROSCOPIC incarcerated VENTRAL HERNIA REPAIR    Surgeon(s): Jose Mejia DO    Assistant:  Resident: Henry Thakkar MD    Anesthesia: General    Estimated Blood Loss (mL): Minimal    Complications: None    Specimens:   * No specimens in log *    Implants:  * No implants in log *      Drains: * No LDAs found *    Findings: incarcerated umbilical hernia with omentum, repaired primarily. PD cath functioning well.     Electronically signed by Henry Thakkar MD on 1/17/2022 at 2:42 PM

## 2022-01-17 NOTE — OP NOTE
DATE OF PROCEDURE:  01/17/22     PREOPERATIVE DIAGNOSIS: CKD stage 5 requiring dialysis, Obesity BMI 34.19, Ventral Hernia     POSTOPERATIVE DIAGNOSIS: Same as pre-op     PROCEDURES PERFORMED:  1. Laparoscopic peritoneal dialysis catheter placement. 2. Laparoscopic omentopexy. 3. Subcutaneous Extension  4. Laparoscopic Incarcerated ventral hernia repair     SURGEON: Magalie Ramos DO     ASSISTANT: Madhav     ANESTHESIA:  General endotracheal.     COMPLICATIONS:  None.     CONDITION:  Stable.     EBL: 10 cc     INDICATIONS FOR PROCEDURE:  The patient is a 59year old obese male consented for PD catheter placement/hernia repair. Patient was explained all risks, benefits, alternatives and decided to proceed with operation.     DESCRIPTION OF PROCEDURE:  The patient was brought to the operating suite, laid in supine position with arms outstretched, and after induction of general endotracheal anesthesia; tube was confirmed to be in place with end-tidal CO2, and secured.  Guerrero catheter was placed with clear return of urine.  The patient was prepped and draped in usual sterile manner with Ksenia Getting placed on top of the skin.     A small incision was made at the left midclavicular line using the Veress needle.  Abdomen was entered and pneumoperitoneum established with 12 mm of CO2.  A 5-mm 30-degree camera housed in a 5-mm Optiview trocar was used to enter the abdomen under direct visualization in the left upper quadrant. Once inside the abdomen, an additional 5-mm trocar was placed on the right side.     Attention was then paid to a 7.0HC infra-umbilical hernia with incarcerated omentum that was at risk for getting larger with peritoneal dialysate leaking from it. Decision was made to repair this. Omentum as completely reduced using blunt and sharp technique. Local anesthetic was injected and a separate stab incision made into the hernia defect.  Multiple 0-vicryl sutures were used to repair the hernia using a suture passer device. Tension free closure was accomplished and picture taken after the repair. Attention was paid to the omentum which was seen to be long and draping down into the pelvis. This was grasped with a laparoscopic grasper and brought up to the upper left abdomen above just above umbilicus.  A suture passer device with #0 Vicryl suture was then used to create an omentopexy and picture was taken after this was accomplished.  Hemostasis was maintained.     After this, a 62 cm dual-cuff coil-tipped catheter was then  introduced into the left rectus muscle under direct visualization. Deep cuff was positioned in the rectus muscle.     Due to the patients BMI of 34.19 with large abdomen a subcutaneous extension was then performed and catheter was tunneled superiorly and to the right to give easier access. Titaneum luerlocks were applied.     At that point, 2 liters of fluid was instilled into the abdomen and removed without difficulty.  One last look laparoscopically was performed after catheter was clipped, clamped, and heparin locked. The catheter was seen to be appropriately going down in the pelvis and omentopexy was seen to be hemostatic.  Pneumoperitoneum was evacuated.      The patient tolerated the procedure well and was brought to the postanesthesia care unit in stable condition.     All sponge, needle, and instrument counts were correct x2.     I personally spoke to the patient's family at the end of the procedure.

## 2022-01-17 NOTE — PLAN OF CARE
Problem: Pain:  Goal: Pain level will decrease  Description: Pain level will decrease  1/17/2022 0456 by Kaylyn Sinha RN  Outcome: Ongoing  Note: Pt with complaints of mild pain r/t surgical placement of dialysis catheter. Pt given prn Percocet x1, will continue to monitor. Problem: OXYGENATION/RESPIRATORY FUNCTION  Goal: Patient will maintain patent airway  1/17/2022 0456 by Kaylyn Sinha RN  Outcome: Ongoing     Problem: HEMODYNAMIC STATUS  Goal: Patient has stable vital signs and fluid balance  1/17/2022 0456 by Kaylyn Sinha RN  Outcome: Ongoing     Problem: FLUID AND ELECTROLYTE IMBALANCE  Goal: Fluid and electrolyte balance are achieved/maintained  1/17/2022 0456 by Kaylyn Sinha RN  Outcome: Ongoing     Problem: ACTIVITY INTOLERANCE/IMPAIRED MOBILITY  Goal: Mobility/activity is maintained at optimum level for patient  1/17/2022 0456 by Kaylyn Sinha RN  Outcome: Ongoing     Problem: Falls - Risk of:  Goal: Will remain free from falls  Description: Will remain free from falls  1/17/2022 0456 by Kaylyn Sinha RN  Outcome: Ongoing  Note:  Pt is a Med fall risk. See Marlyce Gander Fall Score and ABCDS Injury Risk assessments. - Screening for Orthostasis AND not a [place] Risk per MG/ABCDS: Pt bed is in low position, side rails up, call light and belongings are in reach. Fall risk light is on outside pts room. Pt encouraged to call for assistance as needed. Will continue with hourly rounds for PO intake, pain needs, toileting and repositioning as needed.        Problem: Coping:  Goal: Ability to cope will improve  Description: Ability to cope will improve  1/17/2022 0456 by Kaylyn Sinha RN  Outcome: Ongoing     Problem: Fluid Volume:  Goal: Will show no signs or symptoms of fluid imbalance  Description: Will show no signs or symptoms of fluid imbalance  1/17/2022 0456 by Kaylyn Sinha RN  Outcome: Ongoing

## 2022-01-17 NOTE — PROGRESS NOTES
Hospitalist Progress Note      PCP: Jeyson Gil MD    Date of Admission: 1/11/2022    Chief Complaint:     Chief Complaint   Patient presents with    Dizziness    Abnormal Lab     \"Kidney levels off\"    Gout     L wrist, right heel       Subjective: No acute events reported overnight. Patient doing okay this morning.   Awaiting PD cath placement today    PFHS: reviewed as documented 1/11/2022, no changes unless noted above    Medications:  Reviewed    Infusion Medications    dextrose      sodium chloride       Scheduled Medications    magnesium sulfate  4,000 mg IntraVENous Once    vancomycin  1,000 mg IntraVENous On Call to OR    sodium chloride flush  10 mL IntraVENous BID    epoetin rae-epbx  4,000 Units IntraVENous Q MWF    calcium acetate  2 capsule Oral TID WC    [Held by provider] aspirin  81 mg Oral Daily    atorvastatin  20 mg Oral Daily    calcitRIOL  0.25 mcg Oral Daily    metoprolol succinate  50 mg Oral Daily    NIFEdipine  90 mg Oral Daily    allopurinol  100 mg Oral Daily    doxazosin  4 mg Oral Daily    sodium chloride flush  10 mL IntraVENous 2 times per day    sennosides-docusate sodium  1 tablet Oral BID    heparin (porcine)  5,000 Units SubCUTAneous 3 times per day    insulin lispro  0-12 Units SubCUTAneous TID WC    insulin lispro  0-6 Units SubCUTAneous Nightly     PRN Meds: heparin (porcine), dextrose bolus (hypoglycemia) **OR** dextrose bolus (hypoglycemia), oxyCODONE-acetaminophen, cyclobenzaprine, glucose, glucagon (rDNA), dextrose, sodium chloride flush, sodium chloride, ondansetron **OR** ondansetron, magnesium hydroxide, acetaminophen **OR** acetaminophen      Intake/Output Summary (Last 24 hours) at 1/17/2022 1246  Last data filed at 1/17/2022 0951  Gross per 24 hour   Intake 1290 ml   Output 1400 ml   Net -110 ml       Physical Exam    BP (!) 157/99   Pulse 88   Temp 97.7 °F (36.5 °C) (Temporal)   Resp 13   Ht 6' 5\" (1.956 m)   Wt 288 lb 5.8 oz (130.8 kg)   SpO2 96%   BMI 34.19 kg/m²     General appearance:  No acute distress, appears stated age  Eyes: Pupils equal, round, reactive to light, conjunctiva/corneas clear  Ears/Nose/Mouth/Throat: No external lesions or scars, hearing intact to voice  Neck: Trachea midline, no masses noted, no thyromegaly  Respiratory:  Non-labored breathing, clear to auscultation bilaterally  Cardiovascular: Regular rate and rhythm, no murmurs, gallops, or rubs  Abdomen: soft, non-tender, non-distended  Musculoskeletal: Warm, well perfused, no cyanosis or edema  Skin: Tunneled HD cath in R chest w/ dressing, non-tender, no signs of infection  Psychiatric: A&Ox4, good insight and judgment    Labs:   Recent Labs     01/15/22  0503 01/16/22  0524 01/17/22  0440   WBC 10.4 6.8 6.5   HGB 10.9* 9.5* 9.3*   HCT 33.5* 28.6* 28.2*    174 164     Recent Labs     01/15/22  0503 01/16/22  0523 01/17/22  0440    135* 139   K 4.1 3.6 4.0   CL 98* 101 102   CO2 23 24 23   BUN 56* 42* 53*   CREATININE 7.0* 5.1* 5.9*   CALCIUM 6.4* 6.8* 6.7*   PHOS  --   --  5.8*     No results for input(s): AST, ALT, BILIDIR, BILITOT, ALKPHOS in the last 72 hours. Recent Labs     01/14/22  1359 01/17/22  0440   INR 0.93 0.98     No results for input(s): Nik Snuffer in the last 72 hours. Urinalysis:      Lab Results   Component Value Date    NITRU Negative 12/21/2021    WBCUA None seen 12/21/2021    BACTERIA Rare 12/21/2021    RBCUA 3-4 12/21/2021    BLOODU MODERATE 12/21/2021    SPECGRAV 1.020 12/21/2021    GLUCOSEU Negative 12/21/2021       Radiology:  NM MYOCARDIAL SPECT REST EXERCISE OR RX   Final Result      IR TUNNELED CVC PLACE WO SQ PORT/PUMP > 5 YEARS   Final Result   IMPRESSION :      Limited ultrasound examination demonstrates a patent right internal jugular vein. Placement of tunnelled right jugular dialysis catheter.          Fluoroscopy time : 0.4 minutes   Number of exposures obtained : 1   Moderate sedation was provided and monitored by an independent trained observer. Moderate sedation start time : 1241   Moderate sedation end time : 1253   Blood loss : minimal (less than 5 cc)   Specimens removed : none               XR WRIST LEFT (MIN 3 VIEWS)   Final Result      No acute bony pathology      XR ANKLE RIGHT (MIN 3 VIEWS)   Final Result      No acute bony pathology. Heel spur      XR CHEST (2 VW)   Final Result      No acute pulmonary pathology or significant change from the prior exam                      Assessment/Plan:    Active Hospital Problems    Diagnosis Date Noted    Pre-op evaluation [F56.396] 01/14/2022    LV dysfunction [I51.9] 01/14/2022    Shortness of breath [R06.02]     Electrolyte imbalance [E87.8]     Anemia due to stage 5 chronic kidney disease ([facility] [N18.5, D63.1]     Congestive heart failure (HCC) [I50.9]     ANTONIO (acute kidney injury) ([facility] [N17.9] 01/11/2022    Acute kidney injury superimposed on CKD ([facility] [address] [facility] [N17.9, N18.9] 12/21/2021      ANTONIO on CKD  -tunneled HD cath placed 1/14, HD 1/14, will get 1/15  -stress test negative 1/15  -1/17: plan for lap PD cath placement w/ umbilical hernia repair  -Nephrology following    T2DM  -sugars controlled  -continue current regimen     HTN  -Continue metoprolol, Procardia, Cardura     Gout  -Continue allopurinol     Paroxysmal atrial fibrillation  -stable     Chronic systolic HF  -Continue home meds    DVT Prophylaxis: Heparin  Diet: Diet NPO  Code Status: Full Code    PT/OT Eval Status: Ongoing    Dispo: PD catheter placement today.   Discharge likely in 1 to 2 days    Deborah Ortiz MD

## 2022-01-17 NOTE — ANESTHESIA POSTPROCEDURE EVALUATION
Department of Anesthesiology  Postprocedure Note    Patient: Nathalie Krishna  MRN: [id number]  YOB: 1960  Date of evaluation: 1/17/2022  Time:  3:34 PM     Procedure Summary     Date: 01/17/22 Room / Location: [facility] [address]    Anesthesia Start: 1300 Anesthesia Stop: 1452    Procedures:       [address] and omentopexy (N/A )      LAPAROSCOPIC incarcerated VENTRAL HERNIA REPAIR (N/A ) Diagnosis:       ESRD (end stage renal disease) ([facility] [address]      (ESRD (end stage renal disease) ([facility] [address] [N18.6])    Surgeons: Blanca Avila DO Responsible Provider: Tania Oliav DO    Anesthesia Type: general ASA Status: 3          Anesthesia Type: general    Johny Phase I: Johny Score: 8    Johny Phase II:      Last vitals: Reviewed and per EMR flowsheets.        Anesthesia Post Evaluation    Patient location during evaluation: PACU  Patient participation: complete - patient participated  Level of consciousness: awake and alert  Pain score: 0  Airway patency: patent  Nausea & Vomiting: no nausea and no vomiting  Complications: no  Cardiovascular status: hemodynamically stable  Respiratory status: acceptable  Hydration status: euvolemic

## 2022-01-18 VITALS
BODY MASS INDEX: 34.05 KG/M2 | OXYGEN SATURATION: 97 % | WEIGHT: 288.36 LBS | HEART RATE: 92 BPM | TEMPERATURE: 97.6 F | RESPIRATION RATE: 16 BRPM | HEIGHT: 77 IN | DIASTOLIC BLOOD PRESSURE: 102 MMHG | SYSTOLIC BLOOD PRESSURE: 127 MMHG

## 2022-01-18 LAB
ALBUMIN SERPL-MCNC: 3.6 G/DL (ref 3.4–5)
ANION GAP SERPL CALCULATED.3IONS-SCNC: 10 MMOL/L (ref 3–16)
BASOPHILS ABSOLUTE: 0 K/UL (ref 0–0.2)
BASOPHILS RELATIVE PERCENT: 0.4 %
BUN BLDV-MCNC: 40 MG/DL (ref 7–20)
CALCIUM SERPL-MCNC: 6.6 MG/DL (ref 8.3–10.6)
CHLORIDE BLD-SCNC: 101 MMOL/L (ref 99–110)
CO2: 22 MMOL/L (ref 21–32)
CREAT SERPL-MCNC: 5.3 MG/DL (ref 0.8–1.3)
EOSINOPHILS ABSOLUTE: 0 K/UL (ref 0–0.6)
EOSINOPHILS RELATIVE PERCENT: 0.4 %
GFR AFRICAN AMERICAN: 13
GFR NON-AFRICAN AMERICAN: 11
GLUCOSE BLD-MCNC: 118 MG/DL (ref 70–99)
GLUCOSE BLD-MCNC: 142 MG/DL (ref 70–99)
HCT VFR BLD CALC: 28.2 % (ref 40.5–52.5)
HEMOGLOBIN: 9.4 G/DL (ref 13.5–17.5)
LYMPHOCYTES ABSOLUTE: 0.6 K/UL (ref 1–5.1)
LYMPHOCYTES RELATIVE PERCENT: 8.2 %
MAGNESIUM: 2.3 MG/DL (ref 1.8–2.4)
MCH RBC QN AUTO: 28.8 PG (ref 26–34)
MCHC RBC AUTO-ENTMCNC: 33.1 G/DL (ref 31–36)
MCV RBC AUTO: 87 FL (ref 80–100)
MONOCYTES ABSOLUTE: 0.6 K/UL (ref 0–1.3)
MONOCYTES RELATIVE PERCENT: 7.2 %
NEUTROPHILS ABSOLUTE: 6.4 K/UL (ref 1.7–7.7)
NEUTROPHILS RELATIVE PERCENT: 83.8 %
PDW BLD-RTO: 14.1 % (ref 12.4–15.4)
PERFORMED ON: ABNORMAL
PHOSPHORUS: 3.2 MG/DL (ref 2.5–4.9)
PLATELET # BLD: 159 K/UL (ref 135–450)
PMV BLD AUTO: 8 FL (ref 5–10.5)
POTASSIUM SERPL-SCNC: 5.3 MMOL/L (ref 3.5–5.1)
RBC # BLD: 3.24 M/UL (ref 4.2–5.9)
SODIUM BLD-SCNC: 133 MMOL/L (ref 136–145)
WBC # BLD: 7.7 K/UL (ref 4–11)

## 2022-01-18 PROCEDURE — 99233 SBSQ HOSP IP/OBS HIGH 50: CPT | Performed by: INTERNAL MEDICINE

## 2022-01-18 PROCEDURE — 6370000000 HC RX 637 (ALT 250 FOR IP): Performed by: INTERNAL MEDICINE

## 2022-01-18 PROCEDURE — 80069 RENAL FUNCTION PANEL: CPT

## 2022-01-18 PROCEDURE — 2500000003 HC RX 250 WO HCPCS: Performed by: INTERNAL MEDICINE

## 2022-01-18 PROCEDURE — 2580000003 HC RX 258: Performed by: INTERNAL MEDICINE

## 2022-01-18 PROCEDURE — 6360000002 HC RX W HCPCS: Performed by: INTERNAL MEDICINE

## 2022-01-18 PROCEDURE — 83735 ASSAY OF MAGNESIUM: CPT

## 2022-01-18 PROCEDURE — 85025 COMPLETE CBC W/AUTO DIFF WBC: CPT

## 2022-01-18 PROCEDURE — 6360000002 HC RX W HCPCS: Performed by: STUDENT IN AN ORGANIZED HEALTH CARE EDUCATION/TRAINING PROGRAM

## 2022-01-18 RX ORDER — CALCIUM ACETATE 667 MG/1
2 CAPSULE ORAL
Qty: 180 CAPSULE | Refills: 0 | Status: SHIPPED | OUTPATIENT
Start: 2022-01-18 | End: 2022-06-10 | Stop reason: ALTCHOICE

## 2022-01-18 RX ADMIN — CALCIUM ACETATE 1334 MG: 667 CAPSULE ORAL at 08:00

## 2022-01-18 RX ADMIN — METOPROLOL SUCCINATE 50 MG: 50 TABLET, FILM COATED, EXTENDED RELEASE ORAL at 08:00

## 2022-01-18 RX ADMIN — CALCIUM ACETATE 1334 MG: 667 CAPSULE ORAL at 15:30

## 2022-01-18 RX ADMIN — DOXAZOSIN 4 MG: 4 TABLET ORAL at 08:01

## 2022-01-18 RX ADMIN — HYDROMORPHONE HYDROCHLORIDE 0.5 MG: 1 INJECTION, SOLUTION INTRAMUSCULAR; INTRAVENOUS; SUBCUTANEOUS at 10:06

## 2022-01-18 RX ADMIN — HYDROMORPHONE HYDROCHLORIDE 0.5 MG: 1 INJECTION, SOLUTION INTRAMUSCULAR; INTRAVENOUS; SUBCUTANEOUS at 02:35

## 2022-01-18 RX ADMIN — NIFEDIPINE 90 MG: 90 TABLET, FILM COATED, EXTENDED RELEASE ORAL at 08:00

## 2022-01-18 RX ADMIN — SODIUM CHLORIDE, PRESERVATIVE FREE 10 ML: 5 INJECTION INTRAVENOUS at 08:07

## 2022-01-18 RX ADMIN — HEPARIN SODIUM 5000 UNITS: 5000 INJECTION INTRAVENOUS; SUBCUTANEOUS at 15:29

## 2022-01-18 RX ADMIN — ALLOPURINOL 100 MG: 100 TABLET ORAL at 08:00

## 2022-01-18 RX ADMIN — CALCIUM ACETATE 1334 MG: 667 CAPSULE ORAL at 12:05

## 2022-01-18 RX ADMIN — HYDROMORPHONE HYDROCHLORIDE 0.25 MG: 1 INJECTION, SOLUTION INTRAMUSCULAR; INTRAVENOUS; SUBCUTANEOUS at 06:39

## 2022-01-18 RX ADMIN — SODIUM CHLORIDE, PRESERVATIVE FREE 10 ML: 5 INJECTION INTRAVENOUS at 08:06

## 2022-01-18 RX ADMIN — SENNOSIDES AND DOCUSATE SODIUM 1 TABLET: 50; 8.6 TABLET ORAL at 08:00

## 2022-01-18 RX ADMIN — HYDROMORPHONE HYDROCHLORIDE 0.5 MG: 1 INJECTION, SOLUTION INTRAMUSCULAR; INTRAVENOUS; SUBCUTANEOUS at 15:30

## 2022-01-18 RX ADMIN — OXYCODONE HYDROCHLORIDE AND ACETAMINOPHEN 2 TABLET: 5; 325 TABLET ORAL at 12:40

## 2022-01-18 RX ADMIN — ATORVASTATIN CALCIUM 20 MG: 20 TABLET, FILM COATED ORAL at 08:01

## 2022-01-18 RX ADMIN — HEPARIN SODIUM 5000 UNITS: 5000 INJECTION INTRAVENOUS; SUBCUTANEOUS at 06:39

## 2022-01-18 RX ADMIN — OXYCODONE HYDROCHLORIDE AND ACETAMINOPHEN 2 TABLET: 5; 325 TABLET ORAL at 06:39

## 2022-01-18 RX ADMIN — CALCITRIOL CAPSULES 0.25 MCG 0.25 MCG: 0.25 CAPSULE ORAL at 08:01

## 2022-01-18 ASSESSMENT — PAIN SCALES - GENERAL
PAINLEVEL_OUTOF10: 7
PAINLEVEL_OUTOF10: 5
PAINLEVEL_OUTOF10: 8
PAINLEVEL_OUTOF10: 7

## 2022-01-18 ASSESSMENT — PAIN DESCRIPTION - PAIN TYPE
TYPE: ACUTE PAIN

## 2022-01-18 ASSESSMENT — PAIN DESCRIPTION - DESCRIPTORS
DESCRIPTORS: BURNING;CONSTANT
DESCRIPTORS: BURNING;CONSTANT

## 2022-01-18 ASSESSMENT — PAIN DESCRIPTION - LOCATION
LOCATION: ABDOMEN
LOCATION: ABDOMEN

## 2022-01-18 ASSESSMENT — PAIN DESCRIPTION - ONSET
ONSET: ON-GOING
ONSET: GRADUAL

## 2022-01-18 ASSESSMENT — PAIN DESCRIPTION - PROGRESSION
CLINICAL_PROGRESSION: NOT CHANGED

## 2022-01-18 ASSESSMENT — PAIN DESCRIPTION - FREQUENCY
FREQUENCY: CONTINUOUS
FREQUENCY: CONTINUOUS

## 2022-01-18 ASSESSMENT — PAIN DESCRIPTION - ORIENTATION
ORIENTATION: INNER
ORIENTATION: INNER

## 2022-01-18 NOTE — PROGRESS NOTES
Department of Surgery:  Post-op Note    CC: Renal failure    Subjective:   Endorses pain at insertion site of catheter, denies nausea or vomiting. Voiding.     Objective:  Anesthesia type: General      I/O    Intra op    Post op     Fluids  400 mL Oral intake     EBL Minimal 0 mL     Urine 0 mL 325 mL     Physical Exam:  Vitals:    01/17/22 1515 01/17/22 1530 01/17/22 1700 01/17/22 2030   BP: (!) 162/95 (!) 160/94 (!) 162/105 (!) 163/92   Pulse: 88 89 94 98   Resp: 12 10 20 19   Temp:   98.8 °F (37.1 °C) 97.8 °F (36.6 °C)   TempSrc:   Temporal Oral   SpO2:   96% 96%   Weight:       Height:           General appearance: alert, no acute distress, grooming appropriate  Chest/Lungs: symmetrical chest ride, normal effort  Cardiovascular: RRR  Abdomen: soft, appropriately tender, non-distended, incisions c/d/i, sterile dressing over catheter in place  Extremities: no edema, no cyanosis  Neuro: A&Ox3, no focal deficits, sensation intact    Assessment and Plan  This is a 59y.o. year old male with a diagnosis of ESRD s/p lap PD catheter insertion  POD #0    Pain management: oxycodone and dilaudid  Cardiovascular: RRR, no issues  Respiratory: extubated, encourage hourly incentive spirometry and deep breathing  FEN:  Fluids: none, Diet: renal  : Urine output is adequate  Wound: local care, sterile dressing to stay in place over catheter  Ambulation: OOB to chair, encourage ambulation  Prophylaxis: SCDs, heparin    Leslie Palomino DO  01/17/22  9:58 PM

## 2022-01-18 NOTE — CARE COORDINATION
Case Management Assessment            Discharge Note                    Date / Time of Note: 1/18/2022 3:43 PM                  Discharge Note Completed by: ATIF Avila    Patient Name: Sima Rowell   YOB: 1960  Diagnosis: Shortness of breath [R06.02]  End stage renal disease ([facility] [address] [N18.6]  ANTONIO (acute kidney injury) ([facility] [address] [facility] [N17.9]   Date / Time: 1/11/2022  2:53 PM    Current PCP: Jeyson Gil MD  Clinic patient: No    Hospitalization in the last 30 days: Yes    Advance Directives:  Code Status: Full Code  [place] DNR form completed and on chart: No    Financial:  Payor: Delfina Goodman / Plan: Richard Reach / Product Type: *No Product type* /      Pharmacy:    [address] - F [phone]  [address] [place] [address]  Phone: [phone] [facility]ax: [facility] [address] [place] [address] 53  [address] [place] [address]  Phone: [phone] Fax: [phone]    [id number] [place] - [address] [facility] [address]  Phone: [phone] Fax: [phone]    [id number]- [address], [address] [facility] [phone] - F [phone]  [address] [place] [address]  Phone: [phone] Fax: [phone]    [id number]- [address] [facility] - . [address] [phone] [address] [phone]  [address] [address]  Phone: [phone] Fax: [phone]      Assistance purchasing medications?: Potential Assistance Purchasing Medications: No  Assistance provided by Case Management: None at this time    Does patient want to participate in local refill/ meds to beds program?:      Meds To Beds General Rules:  1. Can ONLY be done Monday- Friday between 8:30am-5pm  2.  Prescription(s) must be in pharmacy by 3pm to be filled same day  3. Copy of patient's insurance/ prescription drug card and patient face sheet must be sent along with the prescription(s)  4. Cost of Rx cannot be added to hospital bill. If financial assistance is needed, please contact unit  or ;  or  CANNOT provide pharmacy voucher for patients co-pays  5. Patients can then  the prescription on their way out of the hospital at discharge, or pharmacy can deliver to the bedside if staff is available. (payment due at time of pick-up or delivery - cash, check, or card accepted)     Able to afford home medications/ co-pay costs: Yes    ADLS:  Current PT AM-PAC Score: 24 /24  Current OT AM-PAC Score: 24 /24      DISCHARGE Disposition: Home- No Services Needed    LOC at discharge: Not Applicable  ELISA Completed: No    Notification completed in HENS/PAS?:  Not Applicable    IMM Completed:   No    Transportation:  Transportation PLAN for discharge: family   Mode of Transport: Private Car  Reason for medical transport: Not Applicable  Name of [employer]: Not Applicable  Time of Transport: afternoon    Transport form completed: No    Home Care:  [address] ordered at discharge: No  [address]: Not Applicable  Orders faxed: No    Durable Medical Equipment:  DME Provider: none  Equipment obtained during hospitalization: none    Home Oxygen and Respiratory Equipment:  Oxygen needed at discharge?: No  [address]: Not Applicable  Portable tank available for discharge?: No    Dialysis:  Dialysis patient: No    Dialysis Center:  Not Applicable    Hospice Services:  Location: Not Applicable  Agency: Not Applicable    Consents signed: No    Referrals made at [facility] for outpatient continued care:  Not Applicable    Additional CM Notes:  Patient to discharge home with no needs today. Confirmed that patient will follow-up with [facility] for PD.  Contacted Patricia and was informed that the patient remains on the waiting list for a CPAP machine. CPAP machines are currently on backorder and are backordered with several DME companies. Communicated this information to the patient. The Plan for Transition of Care is related to the following treatment goals of Shortness of breath [R06.02]  End stage renal disease (HCC) [N18.6]  ANTONIO (acute kidney injury) ([facility] [address] [facility] [N17.9]    The Patient and/or patient representative Jorden and his family were provided with a choice of provider and agrees with the discharge plan Yes    Freedom of choice list was provided with basic dialogue that supports the patient's individualized plan of care/goals and shares the quality data associated with the providers.  Yes    Care Transitions patient: No    Hawa Pearson [address]   Case Management Department  Ph: [phone]  Fax: [phone]

## 2022-01-18 NOTE — PROGRESS NOTES
Surgery Daily Progress Note      CC: renal failure    SUBJECTIVE:  Pain is improving. Tolerated diet     ROS:   A 14 point review of systems was conducted, significant findings as noted above. All other systems negative. OBJECTIVE:    PHYSICAL EXAM:  Vitals:    01/17/22 1700 01/17/22 2030 01/17/22 2333 01/18/22 0457   BP: (!) 162/105 (!) 163/92 (!) 160/102 (!) 154/91   Pulse: 94 98 79 77   Resp: 20 19 20 14   Temp: 98.8 °F (37.1 °C) 97.8 °F (36.6 °C) 97.5 °F (36.4 °C) 98.6 °F (37 °C)   TempSrc: Temporal Oral Oral Oral   SpO2: 96% 96% 98% 98%   Weight:       Height:           General appearance: alert, no acute distress, grooming appropriate  Chest/Lungs: symmetrical chest ride, normal effort  Cardiovascular: RRR  Abdomen: soft, appropriately tender, non-distended, incisions c/d/i, sterile dressing over catheter in place, slight erythema in the umbilicus, stable. Extremities: no edema, no cyanosis  Neuro: A&Ox3, no focal deficits, sensation intact      ASSESSMENT & PLAN:   This is a 59y.o. year old male with a diagnosis of ESRD s/p lap PD catheter insertion (1/17) POD #1.    - Pt will need PD cath teaching per nephrology, keep sterile dressing until then  - Resume ASA tomorrow    Please call us for further questions or concerns.       Patrick Rajan MD  General Surgery Resident  01/18/22 7:19 AM  [phone]

## 2022-01-18 NOTE — PLAN OF CARE
Problem: Pain:  Goal: Pain level will decrease  Description: Pain level will decrease  1/18/2022 0135 by Chente Parsons RN  Outcome: Ongoing  Note: Pt with complaints of breakthrough abdominal pain 10/10 upon returning from surgery. MD reddy served q3 dilaudid ordered. Pain controlled with PRN dilaudid. Will continue to monitor. Problem: OXYGENATION/RESPIRATORY FUNCTION  Goal: Patient will maintain patent airway  1/18/2022 0135 by Chente Parsons RN  Outcome: Ongoing     Problem: HEMODYNAMIC STATUS  Goal: Patient has stable vital signs and fluid balance  1/18/2022 0135 by Chente Parsons RN  Outcome: Ongoing  Note: Vitals signs stable. Will continue to monitor. Problem: FLUID AND ELECTROLYTE IMBALANCE  Goal: Fluid and electrolyte balance are achieved/maintained  1/18/2022 0135 by Chente Parsons RN  Outcome: Ongoing     Problem: ACTIVITY INTOLERANCE/IMPAIRED MOBILITY  Goal: Mobility/activity is maintained at optimum level for patient  1/18/2022 0135 by Chente Parsons RN  Outcome: Ongoing  Note: Pt with limited movement due to pain in abdomen with movement. Encouraged OOB and ambulation when awake. Will continue to monitor. Problem: Falls - Risk of:  Goal: Will remain free from falls  Description: Will remain free from falls  1/18/2022 0135 by Chente Parsons RN  Outcome: Ongoing  Note: Orthostatic vital signs obtained at start of shift - see flowsheet for details. Pt meets criteria for orthostasis. Pt is a Med fall risk. See Dora Longest Fall Score and ABCDS Injury Risk assessments. - Screening for Orthostasis AND not a [place] Risk per MG/ABCDS: Pt bed is in low position, side rails up, call light and belongings are in reach. Fall risk light is on outside pts room. Pt encouraged to call for assistance as needed. Will continue with hourly rounds for PO intake, pain needs, toileting and repositioning as needed.        Problem: Coping:  Goal: Ability to cope will improve  Description: Ability to cope will improve  1/18/2022 0135 by Anne Marie Banks RN  Outcome: Ongoing     Problem: Serum Glucose Level - Abnormal:  Goal: Ability to maintain appropriate glucose levels has stabilized  Description: Ability to maintain appropriate glucose levels has stabilized  1/18/2022 0135 by Anne Marie Banks RN  Outcome: Ongoing  Note: Pt with slightly elevated glucose levels with [facility] checks. Pt refusing insulin at this time. Will continue to monitor.

## 2022-01-18 NOTE — DISCHARGE SUMMARY
Hospitalist Discharge Summary    Patient ID:  Clifford Jackson  [id number]  59 y.o.  1960    Admit date: 1/11/2022    Discharge date: 1/18/2022    Disposition: home    Admission Diagnoses:   Patient Active Problem List   Diagnosis    Atrial fibrillation ([facility]    CRI (chronic renal insufficiency)    Gout    Erectile dysfunction    Elevated PSA    Essential hypertension, benign    Headache    Diverticulosis    Arthralgia of multiple joints    Umbilical hernia without obstruction and without gangrene    Knee pain, bilateral    Alcohol use disorder, mild, abuse    CELSA (obstructive sleep apnea)    Duodenal ulcer disease    Diabetic nephropathy associated with type 2 diabetes mellitus ([facility]    Type 2 diabetes mellitus with diabetic nephropathy, without long-term current use of insulin ([facility])    Lateral epicondylitis of right elbow    Chronic bilateral low back pain without sciatica    Morbid obesity due to excess calories ([facility])    Chronic systolic congestive heart failure (HCC)    Acute kidney injury superimposed on CKD ([facility]    Stroke-like symptoms    DMII (diabetes mellitus, type 2) ([facility])    Hyperlipidemia    Hypocalcemia    Hypomagnesemia    Hypokalemia    Muscle cramps    ANTONIO (acute kidney injury) ([facility]    Shortness of breath    Electrolyte imbalance    Anemia due to stage 5 chronic kidney disease (HCC)    Congestive heart failure (HCC)    Pre-op evaluation    LV dysfunction       Discharge Diagnoses: Active Problems:    Acute kidney injury superimposed on CKD ([facility])    ANTONIO (acute kidney injury) ([facility]    Shortness of breath    Electrolyte imbalance    Anemia due to stage 5 chronic kidney disease (HCC)    Congestive heart failure (HCC)    Pre-op evaluation    LV dysfunction  Resolved Problems:    * No resolved hospital problems. *      Code Status:  Full Code    Condition:  Stable    Discharge Diet: Diet:  ADULT DIET;  Regular; Low Potassium (Less than 3000 mg/day); Low Phosphorus (Less than 1000 mg)    PCP to do list: Follow-up for improvement of symptoms    Hospital Course: As per HPI:61 y.o. male who presented to [facility]. with abnormal creatinine that began several years ago and has become progressively worse recently. This is now a/w generalized fatigue and occasional dizziness. Cr found to be significantly elevated     Also has L wrist and R heel pain from gout that has been flaring recently.     He has CELSA on CPAP but machine is broke and replacement is backordered so not used for 6 months. Patient was admitted to the hospital following problems were addressed    ANTONIO on CKD  -tunneled HD cath placed 1/14, HD 1/14, will get 1/15  -stress test negative 1/15  -PD catheter was placed and patient has been cleared by nephrology for discharge home     T2DM  -sugars controlled  -continue current regimen     HTN  -Continue metoprolol, Procardia, Cardura     Gout  -Continue allopurinol     Paroxysmal atrial fibrillation  -stable     Chronic systolic HF  -Continue home meds    Discharge Medications:   Current Discharge Medication List      START taking these medications    Details   calcium acetate (PHOSLO) 667 MG CAPS capsule Take 2 capsules by mouth 3 times daily (with meals)  Qty: 180 capsule, Refills: 0           Current Discharge Medication List        Current Discharge Medication List      CONTINUE these medications which have NOT CHANGED    Details   nortriptyline (PAMELOR) 10 MG capsule nightly       colchicine (COLCRYS) 0.6 MG tablet Take 1 tablet by mouth daily as needed for Pain (as needed for pain related to gout)  Qty: 30 tablet, Refills: 5      oxyCODONE-acetaminophen (PERCOCET) 5-325 MG per tablet Take 1 tablet by mouth every 8 hours as needed for Pain for up to 30 days.   Qty: 90 tablet, Refills: 0    Comments: Reduce doses taken as pain becomes manageable  Associated Diagnoses: Idiopathic chronic gout of multiple sites without tophus vitamin D (ERGOCALCIFEROL) 1.25 MG (61618 UT) CAPS capsule Take 1 capsule by mouth once a week  Qty: 12 capsule, Refills: 1      calcitRIOL (ROCALTROL) 0.25 MCG capsule Take 1 capsule by mouth daily  Qty: 30 capsule, Refills: 3      omeprazole (PRILOSEC) 40 MG delayed release capsule TAKE 1 CAPSULE BY MOUTH EVERY DAY  Qty: 30 capsule, Refills: 1      allopurinol (ZYLOPRIM) 300 MG tablet TAKE 1 TABLET BY MOUTH EVERY DAY  Qty: 30 tablet, Refills: 5    Associated Diagnoses: Idiopathic chronic gout of multiple sites without tophus      terazosin (HYTRIN) 5 MG capsule Take 1 capsule by mouth nightly TAKE 1 CAPSULE BY MOUTH EVERY EVENING  Qty: 60 capsule, Refills: 5      metoprolol succinate (TOPROL XL) 50 MG extended release tablet TAKE 1 TABLET BY MOUTH EVERY DAY  Qty: 30 tablet, Refills: 3      atorvastatin (LIPITOR) 20 MG tablet TAKE 1 TABLET BY MOUTH EVERY DAY  Qty: 30 tablet, Refills: 5      NIFEdipine (ADALAT CC) 90 MG extended release tablet TAKE 1 TABLET BY MOUTH EVERY DAY  Qty: 30 tablet, Refills: 5      sildenafil (VIAGRA) 100 MG tablet Take 1 tablet by mouth as needed for Erectile Dysfunction  Qty: 30 tablet, Refills: 3      aspirin 81 MG tablet Take 81 mg by mouth daily    Associated Diagnoses: Type 2 diabetes mellitus without complication, without long-term current use of insulin (HCC)           Current Discharge Medication List              Procedures: PD catheter placement    Assessment on Discharge: Stable, improved     Discharge ROS:  A complete review of systems was asked and negative except for none    Discharge Exam:  /86   Pulse 90   Temp 98.1 °F (36.7 °C) (Oral)   Resp 19   Ht 6' 5\" (1.956 m)   Wt 288 lb 5.8 oz (130.8 kg)   SpO2 100%   BMI 34.19 kg/m²     Gen: NAD  HEENT: NC/AT, moist mucous membranes, no oropharyngeal erythema or exudate  Neck: supple, trachea midline, no anterior cervical or SC LAD  Heart:  Normal s1/s2, RRR, no murmurs, gallops, or rubs.  no leg edema  Lungs:  CTA bilaterally, no wheeze,no rales or rhonchi, no use of accessory muscles  Abd: Dressings in place  Extrem:  No clubbing, cyanosis,  no edema  Skin: no lesion or masses  Psych:  A & O x3  Neuro: grossly intact, moves all four extremities    Pertinent Studies During Hospital Stay:  Radiology:  ECHO Complete With Bubble Study    Result Date: 12/21/2021  Transthoracic Echocardiography Report (TTE)  Demographics   Patient Name       Peter DEAL   Date of Study      12/21/2021         Gender              Male   Patient Number     [id number]         Date of Birth       1960   Visit Number       [id number]          Age                 56 year(s)   Accession Number   [id number]         Room Number         C29   Corporate ID       [id number]           [facility]   Ordering Physician Pal Rizzo MD        Physician           Radha Booth MD  Procedure Type of Study   TTE procedure:ECHOCARDIOGRAM COMPLETE 2D W DOPPLER W COLOR. Procedure Date Date: 12/21/2021 Start: 01:33 PM Study Location: [address] Echo Lab Technical Quality: Adequate visualization Additional Indications:CVA like symptoms. Patient Status: Routine Contrast Medium: Bubble Study. Amount - 10 ml Height: 77 inches Weight: 298.01 pounds BSA: 2.65 m2 BMI: 35.34 kg/m2 BP: 158/101 mmHg  Conclusions   Summary  Normal left ventricle size with moderate concentric left ventricular  hypertrophy. Overall left ventricular systolic function appears mildly reduced with an  ejection fraction of 45-50%. Diastolic filling parameters suggest grade I diastolic dysfunction. A bubble study was performed and fails to show evidence of right to left  shunting.    Signature   ------------------------------------------------------------------  Electronically signed by Radha Booth MD (Interpreting physician) on 12/21/2021 at 02:35 PM  ------------------------------------------------------------------   Findings   Left Ventricle  Normal left ventricle size. There is moderate concentric left ventricular  hypertrophy. Overall left ventricular systolic function appears mildly  reduced with an ejection fraction of 45-50%. Diastolic filling parameters  suggest grade I diastolic dysfunction. Mitral Valve  Mitral valve is structurally normal.  Trace mitral regurgitation is present. No evidence of mitral valve stenosis. Left Atrium  Left atrium is of normal size. A bubble study was performed and fails to show evidence of right to left  shunting. Aortic Valve  The aortic valve appears tricuspid. The aortic valve is structurally normal.  Trace aortic valve regurgitation. No evidence of aortic valve stenosis. Aorta  The aortic root is normal in size. Right Ventricle  Normal right ventricular size and function. TAPSE 2.09 cm  RV S velocity 12.8 cm/s   Tricuspid Valve  Tricuspid valve is structurally normal.  Trivial tricuspid regurgitation. Estimated pulmonary artery systolic pressure is 28 mmHg assuming a right  atrial pressure of 3 mmHg. No evidence of tricuspid stenosis. Right Atrium  The right atrium is mildly dilated. Pulmonic Valve  The pulmonic valve is not well visualized. Mild pulmonic regurgitation present. No evidence of pulmonic valve stenosis. Pericardial Effusion  No evidence of any pericardial effusion. Pleural Effusion  There is no pleural effusion.    Miscellaneous  Inferior vena cava appears normal .  M-Mode/2D Measurements (cm)   LV Diastolic Dimension: 5.82 cm LV Systolic Dimension: 8.30 cm  LV Septum Diastolic: 4.17 cm    LV Septum Systolic: 2.2 cm  LV PW Diastolic: 4.45 cm        LV PW Systolic: 5.44 cm  RV Diastolic Dimension: 8.81 cm AO Root Dimension: 3.3 cm                                  LA Dimension: 5.1 cm                                  LA Area: 23.5 cm2 LA volume/Index: 70.1 ml /26 ml/m2  Doppler Measurements                                 MV Peak E-Wave: 54.8 cm/s                                MV Peak A-Wave: 84.8 cm/s                                MV E/A Ratio: 0.65  TR Velocity:248 cm/s  TR Gradient:24.6 mmHg  Estimated RAP:3 mmHg  Estimated RVSP: 28 mmHg  E' Septal Velocity: 5.22 cm/s MV Deceleration Time: 232 msec  E' Lateral Velocity: 5 cm/s  E/E' ratio: 11  PV Peak Velocity: 128 cm/s  PV Peak Gradient: 6.55 mmHg  Aorta   Aortic Root: 3.3 cm      XR CHEST (2 VW)    Result Date: 1/11/2022  EXAM: PA AND LATERAL CHEST X-RAY ON 1/11/2022 INDICATION: Shortness of breath COMPARISON: Portable chest 12/21/2021 FINDINGS: LIMITATIONS:None LINES/TUBES:None HEART / MEDIASTINUM: Heart is enlarged. The trachea is midline. PLEURAL SPACES: The costophrenic angles are clear. There is no pneumothorax. LUNGS: There is no acute consolidation. BONES / SOFT TISSUES: No significant abnormality. OTHER: None. No acute pulmonary pathology or significant change from the prior exam     XR WRIST LEFT (MIN 3 VIEWS)    Result Date: 1/11/2022  LEFT WRIST ON 1/11/2022 HISTORY: Pain. COMPARISON: None. FINDINGS: 3 views of the left wrist show no soft tissue abnormality. No fracture or acute bony pathology is seen. Alignment is anatomic. Mild degenerative changes are noted at the base of the metacarpals. No acute bony pathology    XR ANKLE RIGHT (MIN 3 VIEWS)    Result Date: 1/11/2022  RIGHT ANKLE ON 1/11/2022 HISTORY: Pain. COMPARISON: Right ankle ON 3/23/2016. FINDINGS: 3 views of the right ankle show mild generalized soft tissue swelling. No fracture or acute bony pathology is seen. Alignment is anatomic. Joint spaces appear normal. Large plantar and Achilles enthesophytes are noted. No acute bony pathology.  Heel spur    CT Head WO Contrast    Result Date: 12/21/2021  CT head without contrast HISTORY: Slurred speech COMPARISON: 12/30/2010 CONTRAST:  none TECHNIQUE: Individualized dose optimization technique was used in order to meet ALARA standards for radiation dose reduction. In addition to vendor specific dose reduction algorithms, the dose reduction techniques vary based on the specific scanner utilized but frequently include automated exposure control, adjustment of the mA and/or kV according to patient size, and use of iterative reconstruction technique. COMMENTS: There is mild periventricular microangiopathy. There is no evidence of acute territorial infarction, hemorrhage, hydrocephalus, or mass. Paranasal sinuses are clear. Mild right mastoid fluid. No osseous destruction. No acute intracranial abnormality. Right mastoid disease. MRA HEAD WO CONTRAST    Result Date: 12/21/2021  MR angiography neck and MR angiography head, both performed without intravenous contrast on 12/21/2021 Clinical history: Stroke symptoms. Comparisons: Head CT on 12/21/2021. PROCEDURE: Axial time-of-flight imaging through the head and neck performed. Imaging is subsequently post process, creating rotating three-dimensional maximum intensity projection images for purposes of MR angiography head and MR angiography neck. No intravenous contrast. MR angiography neck findings: Images are significantly degraded by patient motion. Antegrade flow in the bilateral carotid arteries which appear patent bilaterally. There is patency of the common, internal and external carotid arteries. Antegrade flow in the bilateral vertebral arteries which are symmetric in caliber. Left vertebral artery appears to arise directly from the thoracic aorta between the left common carotid and left subclavian artery origins. 1. No evidence of flow limiting arterial occlusive disease in the neck. Study is degraded by patient motion. MR angiography head findings: Images are degraded by patient motion.  The anterior circulation demonstrates patency of the bilateral internal carotid arteries as well as the bilateral anterior and middle cerebral arteries. Posterior circulation demonstrates patency of the distal vertebral arteries and the basilar artery as well as bilateral posterior cerebral arteries. IMPRESSION: 1. Motion degraded images. No definite large vessel intracranial arterial occlusive disease detected. US RENAL COMPLETE    Result Date: 12/21/2021  Exam: US RENAL COMPLETE Indication: Renal Failure, elevated creatinine Comparison: CT abdomen pelvis 11/29/2020, renal ultrasound 7/6/2020 Findings: Right kidney: Length: 10.6 cm. Markedly increased cortical echogenicity. No hydronephrosis. No mass or cyst. No shadowing renal calculus. Left kidney: Length: 10.6 cm. Markedly increased cortical echogenicity. No hydronephrosis. No renal cyst or mass. No shadowing renal calculus. Urinary Bladder: Normal. Other findings: None. Impression: 1. Markedly increased renal echogenicity consistent with medical renal disease. 2.  No hydronephrosis. XR CHEST PORTABLE    Result Date: 12/21/2021  Chest portable 0459 HISTORY: Dyspnea     Cardiomegaly. Clear lungs. IR TUNNELED CVC PLACE WO SQ PORT/PUMP > 5 YEARS    Result Date: 1/14/2022  Tunnelled dialysis catheter placement History : ESRD PROCEDURE : The patient's right neck and upper chest were prepped and draped in sterile fashion. Full maximum sterile field/barrier technique was followed (with hand hygiene, cap and mask, gloves and sterile gown, as well as broad field sterile drapes). Local disinfection was performed with broad field application of blue dyed chloroprep solution, which was allowed to dry fully before the initiation of the procedure. Sterile field was maintained at all times. Sterile ultrasound probe cover and sterile gel were utilized. Moderate sedation was administered. The right internal jugular vein was accessed under ultrasound guidance with a 21 gauge needle ; a permanent sonographic image of the vein was obtained.  A mandril wire was advanced through the needle into the right atrium. The wire was used to measure appropriate catheter length. A stiff wire was advanced through the exchange dilator into the inferior vena cava. Additional 2% lidocaine with epinephrine was infused into the right chest . A 24 cm (total length) dialysis catheter was tunneled to the access site using blunt dissection. A peel-away sheath was advanced over the wire. The catheter was advanced through the sheath under fluoroscopic guidance and the tips were positioned at the cavoatrial junction. A final frontal chest radiograph was obtained to document catheter position. The hub was sutured to the skin. The access site was sutured with  4-0 Vicryl and Dermabond was applied. Each lumen was flushed with heparin. The patient tolerated the procedure well and there were no complications. IMPRESSION : Limited ultrasound examination demonstrates a patent right internal jugular vein. Placement of tunnelled right jugular dialysis catheter. Fluoroscopy time : 0.4 minutes Number of exposures obtained : 1 Moderate sedation was provided and monitored by an independent trained observer. Moderate sedation start time : 1241 Moderate sedation end time : 1253 Blood loss : minimal (less than 5 cc) Specimens removed : none     MRA NECK WO CONTRAST    Result Date: 12/21/2021  MR angiography neck and MR angiography head, both performed without intravenous contrast on 12/21/2021 Clinical history: Stroke symptoms. Comparisons: Head CT on 12/21/2021. PROCEDURE: Axial time-of-flight imaging through the head and neck performed. Imaging is subsequently post process, creating rotating three-dimensional maximum intensity projection images for purposes of MR angiography head and MR angiography neck. No intravenous contrast. MR angiography neck findings: Images are significantly degraded by patient motion. Antegrade flow in the bilateral carotid arteries which appear patent bilaterally.  There is patency of the common, internal and external carotid arteries. Antegrade flow in the bilateral vertebral arteries which are symmetric in caliber. Left vertebral artery appears to arise directly from the thoracic aorta between the left common carotid and left subclavian artery origins. 1. No evidence of flow limiting arterial occlusive disease in the neck. Study is degraded by patient motion. MR angiography head findings: Images are degraded by patient motion. The anterior circulation demonstrates patency of the bilateral internal carotid arteries as well as the bilateral anterior and middle cerebral arteries. Posterior circulation demonstrates patency of the distal vertebral arteries and the basilar artery as well as bilateral posterior cerebral arteries. IMPRESSION: 1. Motion degraded images. No definite large vessel intracranial arterial occlusive disease detected. MRI BRAIN WO CONTRAST    Result Date: 12/23/2021  PROCEDURE: Magnetic resonance imaging (MRI) of the brain without contrast INDICATION: Hoarse voice, dysarthria, concern for posterior circulation stroke COMPARISON: 12/21/2021 TECHNIQUE: MRI of the brain was performed without contrast according to standard protocol. FINDINGS: No evidence of acute or chronic hemorrhage is identified. No evidence of acute cerebral infarction is seen. The ventricles are of normal size, shape, and morphology. No mass effect or midline shift is seen. Mild periventricular white matter FLAIR hyperintensities likely represent sequelae of chronic small vessel ischemic disease. The corpus callosum and sella appear normal. The posterior fossa, brainstem, and craniocervical junction appear normal. A small right mastoid effusion is redemonstrated. The orbits and paranasal sinuses appear normal. Normal flow voids are demonstrated in the carotid arteries and basilar artery. The calvarium and visualized cervical spine appear normal.     1.  No acute intracranial abnormality.      MRI brain without contrast    Result Date: 12/21/2021  PROCEDURE: Magnetic resonance imaging (MRI) of the brain without contrast INDICATION: Stroke like symptoms COMPARISON: 12/21/2020 TECHNIQUE: MRI of the brain was performed without contrast according to standard protocol. FINDINGS: The examination is mildly degraded due to motion artifact. No evidence of acute or chronic hemorrhage is identified. No evidence of acute cerebral infarction is seen. The ventricles are of normal size, shape, and morphology. No mass effect or midline shift is seen. Mild periventricular white matter FLAIR hyperintensities likely represent sequelae of chronic small vessel ischemic disease. The corpus callosum and sella appear normal. The posterior fossa, brainstem, and craniocervical junction appear normal. The orbits and paranasal sinuses appear normal. There is a small right mastoid effusion. Normal flow voids are demonstrated in the carotid arteries and basilar artery. The calvarium and visualized cervical spine appear normal.     1.  No acute intracranial abnormality. 2.  Mild chronic small vessel ischemic disease. 3.  Small right mastoid effusion representing retained secretions versus inflammatory debris.      NM MYOCARDIAL SPECT REST EXERCISE OR RX    Result Date: 1/15/2022  Cardiac Perfusion Imaging  Demographics   Patient Name       Kuldip DEAL   Date of Study      01/15/2022         Gender               Male   Patient Number     [id number]         Date of Birth        1960   Visit Number       [id number]          Age                  59 year(s)   Accession Number   [id number]         Room Number          1061   Corporate ID       [id number]           NM Technician        Naima Serranoo, MALDONADO Saunders Interpreting         [facility]                                        Physician            Barb Segal MD   Ordering Physician Pee Bañuelos MD  Procedure Procedure Type:   Nuclear Stress Test:NM MYOCARDIAL SPECT REST EXERCISE OR RX   Study location: [facility] - Nuclear Medicine   Indications: Pre-op clearance. Hospital Status: Inpatient. Height: 77 inches Weight: 288 pounds  Conclusions   Summary  There is normal isotope uptake at stress and rest. There is no evidence of  myocardial ischemia or scar. Normal LV size and systolic function. Left ventricular ejection fraction of 65 %. There are no regional wall motion abnormalities. Overall findings represent a low risk scan. Stress Protocols   Resting ECG  Normal sinus rhythm. Normal ECG. Resting HR:89 bpm  Resting BP:161/93 mmHg  Stress Protocol:Pharmacologic - Lexiscan's  Peak HR:111 bpm                  HR/BP product:[id number]  Peak BP:168/92 mmHg  Predicted HR: 157 bpm  % of predicted HR: 71  Test duration: 1 min  Reason for termination:Completed   ECG Findings  Normal sinus rhythm. Normal ECG. Arrhythmias  No significant rhythm abnormality. Symptoms  No chest pain. Complications  Procedure complication was none. Stress Interpretation  Normal EKG response. Imaging Protocols   - One Day   Rest                          Stress   Isotope:Myoview/Tetrofosmin   Isotope: Myoview/Tetrofosmin  Isotope dose:11.9 mCi         Isotope dose:36 mCi  Administration Route:I.V.      Administration Route:I.V.  Date:01/15/2022 00:00         Date:01/15/2022 00:00                                 Technique:      Gated  Imaging Results    Stress ejection    Ejection fraction:65 %    EDV :147 ml    ESV :51 ml    Stroke volume :96 ml    LV mass :174 gr  Medical History  Signatures   ------------------------------------------------------------------  Electronically signed by Mary Anne Patel MD (Interpreting  physician) on 01/15/2022 at 11:45  ------------------------------------------------------------------            Last Labs on Discharge:     Recent Results (from the past 24 hour(s))   TYPE AND SCREEN    Collection Time: 01/17/22 11:58 AM   Result Value Ref Range    ABO/Rh O POS     Antibody Screen NEG    POCT Glucose    Collection Time: 01/17/22 12:20 PM   Result Value Ref Range    POC Glucose 86 70 - 99 mg/dl    Performed on ACCU-CHEK    POCT Glucose    Collection Time: 01/17/22  3:26 PM   Result Value Ref Range    POC Glucose 114 (H) 70 - 99 mg/dl    Performed on ACCU-CHEK    POCT Glucose    Collection Time: 01/17/22 11:31 PM   Result Value Ref Range    POC Glucose 154 (H) 70 - 99 mg/dl    Performed on ACCU-CHEK    CBC Auto Differential    Collection Time: 01/18/22  5:01 AM   Result Value Ref Range    WBC 7.7 4.0 - 11.0 K/uL    RBC 3.24 (L) 4.20 - 5.90 M/uL    Hemoglobin 9.4 (L) 13.5 - 17.5 g/dL    Hematocrit 28.2 (L) 40.5 - 52.5 %    MCV 87.0 80.0 - 100.0 fL    MCH 28.8 26.0 - 34.0 pg    MCHC 33.1 31.0 - 36.0 g/dL    RDW 14.1 12.4 - 15.4 %    Platelets 307 465 - 278 K/uL    MPV 8.0 5.0 - 10.5 fL    Neutrophils % 83.8 %    Lymphocytes % 8.2 %    Monocytes % 7.2 %    Eosinophils % 0.4 %    Basophils % 0.4 %    Neutrophils Absolute 6.4 1.7 - 7.7 K/uL    Lymphocytes Absolute 0.6 (L) 1.0 - 5.1 K/uL    Monocytes Absolute 0.6 0.0 - 1.3 K/uL    Eosinophils Absolute 0.0 0.0 - 0.6 K/uL    Basophils Absolute 0.0 0.0 - 0.2 K/uL   Renal Function Panel    Collection Time: 01/18/22  5:01 AM   Result Value Ref Range    Sodium 133 (L) 136 - 145 mmol/L    Potassium 5.3 (H) 3.5 - 5.1 mmol/L    Chloride 101 99 - 110 mmol/L    CO2 22 21 - 32 mmol/L    Anion Gap 10 3 - 16    Glucose 142 (H) 70 - 99 mg/dL    BUN 40 (H) 7 - 20 mg/dL    CREATININE 5.3 (HH) 0.8 - 1.3 mg/dL    GFR Non- 11 (A) >60    GFR  13 (A) >60    Calcium 6.6 (L) 8.3 - 10.6 mg/dL    Phosphorus 3.2 2.5 - 4.9 mg/dL    Albumin 3.6 3.4 - 5.0 g/dL   Magnesium    Collection Time: 01/18/22  5:01 AM   Result Value Ref Range    Magnesium 2.30 1.80 - 2.40 mg/dL   POCT Glucose    Collection Time: 01/18/22  6:33 AM   Result Value Ref Range    POC Glucose 118 (H) 70 - 99 mg/dl    Performed on ACCU-CHEK          Follow up: with Jona Quiroz MD    Note that over 30 minutes was spent in preparing discharge papers, discussing discharge with patient, medication review, etc.    Thank you Jona Quiroz MD for the opportunity to be involved in this patient's care. If you have any questions or concerns please feel free to contact me at [phone].     Electronically signed by Piotr Silverman MD on 1/18/2022 at 11:48 AM

## 2022-01-18 NOTE — PLAN OF CARE
Problem: Pain:  Goal: Pain level will decrease  Description: Pain level will decrease  Outcome: Ongoing   Pt reported shoulder pain rated at 2 and abdominal pain rated at 10 after surgery. Prn pain meds given as ordered. Pt asleep in response. Problem: OXYGENATION/RESPIRATORY FUNCTION  Goal: Patient will maintain patent airway  Outcome: Ongoing   Pt did not require O2 after return to unit. Pt on room air with stable O2 sats and resp. Problem: HEMODYNAMIC STATUS  Goal: Patient has stable vital signs and fluid balance  Outcome: Ongoing   Pt encouraged to drink fluids after being NPO. Problem: FLUID AND ELECTROLYTE IMBALANCE  Goal: Fluid and electrolyte balance are achieved/maintained  Outcome: Ongoing  Pt received mag replacement this shift. Problem: ACTIVITY INTOLERANCE/IMPAIRED MOBILITY  Goal: Mobility/activity is maintained at optimum level for patient  Outcome: Ongoing   Pt encouraged to get up and walk post op. Pt reminded to splint when coughing and to not engage abdominal muscles. Problem: Falls - Risk of:  Goal: Will remain free from falls  Description: Will remain free from falls  Outcome: Ongoing    - Screening for Orthostasis AND not a [place] Risk per MG/ABCDS: Pt bed is in low position, side rails up, call light and belongings are in reach. Fall risk light is on outside pts room. Pt encouraged to call for assistance as needed. Will continue with hourly rounds for PO intake, pain needs, toileting and repositioning as needed. Problem: Coping:  Goal: Ability to cope will improve  Description: Ability to cope will improve  Outcome: Ongoing     Problem: Fluid Volume:  Goal: Will show no signs or symptoms of fluid imbalance  Description: Will show no signs or symptoms of fluid imbalance  Outcome: Ongoing   Pt shows no signs of fluid imbalance this shift.        Problem: Serum Glucose Level - Abnormal:  Goal: Ability to maintain appropriate glucose levels has stabilized  Description: Ability to maintain appropriate glucose levels has stabilized  Outcome: Ongoing  Pt NPO this shift. Glucose within normal range.

## 2022-01-18 NOTE — PLAN OF CARE
Problem: Pain:  Goal: Pain level will decrease  Description: Pain level will decrease  1/18/2022 1056 by Veronica Rios RN  Note: Clients pain controled with PRN pain meds per MD orders     Problem: Falls - Risk of:  Goal: Will remain free from falls  Description: Will remain free from falls  1/18/2022 1056 by Veronica Rios RN  Note: Orthostatic vital signs obtained at start of shift - see flowsheet for details. Pt does not meet criteria for orthostasis. Pt is a Med fall risk. See Nolon Dago Fall Score and ABCDS Injury Risk assessments.

## 2022-01-18 NOTE — PROGRESS NOTES
Nephrology  Note                                                                                                                                                                                                                                                                                                                                                               Office : [phone]     Fax :[phone]              Patient's Name: Sharon Carlson  1/18/2022    Reason for Consult:   ANTONIO   Requesting Physician:  Melodie Meyers MD      PD cath placed   HD again today          Past Medical History:   Diagnosis Date    Arthralgia of multiple joints 10/27/2015    Arthritis     Atrial fibrillation ([address]     Chronic kidney disease     stage 4    Chronic systolic congestive heart failure ([address] 8/16/2021    CRI (chronic renal insufficiency)     Diabetic nephropathy associated with type 2 diabetes mellitus ([address] 10/11/2018    Diverticulosis     Elevated PSA     Erectile dysfunction     Gout     Gout     Hemrrhoid NOS w/ complication NEC     History of MRSA infection     Hypertension     CELSA (obstructive sleep apnea) 07/14/2017    uses C-pap machine    Type 2 diabetes mellitus without complication, without long-term current use of insulin ([address] 4/00/7036    Umbilical hernia without obstruction and without gangrene 2/2/2016       Past Surgical History:   Procedure Laterality Date    COLONOSCOPY      DIALYSIS CATHETER INSERTION N/A 1/17/2022    LAPAROSCOPIC PERITONEAL DIALYSIS CATHETER PLACEMENT and omentopexy performed by Vishnu Vinson DO at [address] [place] [address], [facility]      IR TUNNELED CATHETER PLACEMENT GREATER THAN 5 YEARS  01/14/2022    IR TUNNELED CATHETER PLACEMENT GREATER THAN 5 YEARS 1/14/2022 [address] SPECIAL PROCEDURES    UPPER GASTROINTESTINAL ENDOSCOPY  05/28/2016    biopsies, multiple small gastric ulcers    UPPER GASTROINTESTINAL ENDOSCOPY  09/12/2016    VENTRAL HERNIA REPAIR N/A 1/17/2022    LAPAROSCOPIC incarcerated VENTRAL HERNIA REPAIR performed by Anel Cook DO at [facility]       Family History   Problem Relation Age of Onset    Hypertension Other     Breast Cancer Mother     Colon Cancer Father     Diabetes Maternal Grandmother     Coronary Art Dis Brother         reports that he has never smoked. He has never used smokeless tobacco. He reports current alcohol use. He reports that he does not use drugs. Allergies:  Patient has no known allergies.     Current Medications:    heparin (porcine) injection 3,200 Units, PRN  HYDROmorphone (DILAUDID) injection 0.25 mg, Q3H PRN   Or  HYDROmorphone (DILAUDID) injection 0.5 mg, Q3H PRN  sodium chloride flush 0.9 % injection 10 mL, BID  epoetin rae-epbx (RETACRIT) injection 4,000 Units, Q MWF  dextrose bolus (hypoglycemia) 10% 125 mL, PRN   Or  dextrose bolus (hypoglycemia) 10% 250 mL, PRN  oxyCODONE-acetaminophen (PERCOCET) 5-325 MG per tablet 2 tablet, Q6H PRN  calcium acetate (PHOSLO) capsule 1,334 mg, TID WC  cyclobenzaprine (FLEXERIL) tablet 5 mg, BID PRN  [Held by provider] aspirin chewable tablet 81 mg, Daily  atorvastatin (LIPITOR) tablet 20 mg, Daily  calcitRIOL (ROCALTROL) capsule 0.25 mcg, Daily  metoprolol succinate (TOPROL XL) extended release tablet 50 mg, Daily  NIFEdipine (PROCARDIA XL) extended release tablet 90 mg, Daily  allopurinol (ZYLOPRIM) tablet 100 mg, Daily  doxazosin (CARDURA) tablet 4 mg, Daily  glucose (GLUTOSE) 40 % oral gel 15 g, PRN  glucagon (rDNA) injection 1 mg, PRN  dextrose 5 % solution, PRN  sodium chloride flush 0.9 % injection 10 mL, 2 times per day  sodium chloride flush 0.9 % injection 10 mL, PRN  0.9 % sodium chloride infusion, PRN  ondansetron (ZOFRAN-ODT) disintegrating tablet 4 mg, Q8H PRN   Or  ondansetron (ZOFRAN) injection 4 mg, Q6H PRN  sennosides-docusate sodium (SENOKOT-S) 8.6-50 MG tablet 1 tablet, BID  magnesium hydroxide (MILK OF MAGNESIA) 400 MG/5ML suspension 30 mL, Daily PRN  acetaminophen (TYLENOL) tablet 650 mg, Q6H PRN   Or  acetaminophen (TYLENOL) suppository 650 mg, Q6H PRN  heparin (porcine) injection 5,000 Units, 3 times per day  insulin lispro (1 Unit Dial) 0-12 Units, TID WC  insulin lispro (1 Unit Dial) 0-6 Units, Nightly      Physical exam:     Vitals:  /86   Pulse 90   Temp 98.1 °F (36.7 °C) (Oral)   Resp 19   Ht 6' 5\" (1.956 m)   Wt 288 lb 5.8 oz (130.8 kg)   SpO2 100%   BMI 34.19 kg/m²   Constitutional:  OAA X3 NAD  Skin: no rash, turgor wnl  Heent:  eomi, mmm  Neck: no bruits or jvd noted  Cardiovascular:  S1, S2 without m/r/g  Respiratory: CTA B without w/r/r  Abdomen:  +bs, soft, nt, nd  Ext: ++ lower extremity edema  Psychiatric: mood and affect appropriate  Musculoskeletal:  Rom, muscular strength intact    Data:   Labs:  CBC:   Recent Labs     01/16/22  0524 01/17/22  0440 01/18/22  0501   WBC 6.8 6.5 7.7   HGB 9.5* 9.3* 9.4*    164 159     BMP:    Recent Labs     01/16/22  0523 01/17/22  0440 01/18/22  0501   * 139 133*   K 3.6 4.0 5.3*    102 101   CO2 24 23 22   BUN 42* 53* 40*   CREATININE 5.1* 5.9* 5.3*   GLUCOSE 136* 98 142*     Ca/Mg/Phos:   Recent Labs     01/16/22  0523 01/17/22  0440 01/18/22  0501   CALCIUM 6.8* 6.7* 6.6*   MG  --  1.60* 2.30   PHOS  --  5.8* 3.2     Hepatic:   No results for input(s): AST, ALT, ALB, BILITOT, ALKPHOS in the last 72 hours. Troponin:   No results for input(s): TROPONINI in the last 72 hours. BNP: No results for input(s): BNP in the last 72 hours. Lipids: No results for input(s): CHOL, TRIG, HDL, LDLCALC, LABVLDL in the last 72 hours. ABGs: No results for input(s): PHART, PO2ART, DKG7ODU in the last 72 hours. INR:   Recent Labs     01/17/22  0440   INR 0.98     UA:  No results for input(s): COLORU, CLARITYU, GLUCOSEU, BILIRUBINUR, KETUA, SPECGRAV, BLOODU, PHUR, PROTEINU, UROBILINOGEN, NITRU, LEUKOCYTESUR, [place] in the last 72 hours.    Urine Microscopic: No results for input(s): LABCAST, BACTERIA, COMU, HYALCAST, WBCUA, RBCUA, EPIU in the last 72 hours. Urine Culture: No results for input(s): LABURIN in the last 72 hours. Urine Chemistry:   No results for input(s): Sandra Morgan, PROTEINUR, NAUR in the last 72 hours. IMAGING:  NM MYOCARDIAL SPECT REST EXERCISE OR RX   Final Result      IR TUNNELED CVC PLACE WO SQ PORT/PUMP > 5 YEARS   Final Result   IMPRESSION :      Limited ultrasound examination demonstrates a patent right internal jugular vein. Placement of tunnelled right jugular dialysis catheter. Fluoroscopy time : 0.4 minutes   Number of exposures obtained : 1   Moderate sedation was provided and monitored by an independent trained observer. Moderate sedation start time : 1241   Moderate sedation end time : 1253   Blood loss : minimal (less than 5 cc)   Specimens removed : none               XR WRIST LEFT (MIN 3 VIEWS)   Final Result      No acute bony pathology      XR ANKLE RIGHT (MIN 3 VIEWS)   Final Result      No acute bony pathology. Heel spur      XR CHEST (2 VW)   Final Result      No acute pulmonary pathology or significant change from the prior exam                      Assessment/Plan   1. ANTONIO on CKD 5     2. HTN    3. Anemia    4. Acid- base/ Electrolyte imbalance     5. Gout     6.  CHF     Plan   - HD today and home today     - PD cath placed   - BP control   - Anemia management   - MBD management       Recommend to dose adjust all medications  based on renal functions  Maintain SBP> 90 mmHg   Daily weights   AVOID NSAIDs  Avoid Nephrotoxins  Monitor Intake/Output  Call if significant decrease in urine output                   Thank you for allowing us to participate in care of Angela Barth MD  Feel free to contact me   [facility] [address]  Office : [phone]  Fax :[phone]

## 2022-01-19 ENCOUNTER — HOSPITAL ENCOUNTER (EMERGENCY)
Age: 62
Discharge: HOME | End: 2022-01-19
Attending: EMERGENCY MEDICINE
Payer: COMMERCIAL

## 2022-01-19 VITALS
RESPIRATION RATE: 18 BRPM | WEIGHT: 288 LBS | DIASTOLIC BLOOD PRESSURE: 89 MMHG | HEIGHT: 77 IN | BODY MASS INDEX: 34 KG/M2 | TEMPERATURE: 97.6 F | OXYGEN SATURATION: 96 % | HEART RATE: 94 BPM | SYSTOLIC BLOOD PRESSURE: 162 MMHG

## 2022-01-19 DIAGNOSIS — R10.9 ABDOMINAL PAIN, UNSPECIFIED ABDOMINAL LOCATION: Primary | ICD-10-CM

## 2022-01-19 LAB
ALBUMIN SERPL-MCNC: 4 G/DL (ref 3.4–5)
ALP BLD-CCNC: 82 U/L (ref 40–129)
ALT SERPL-CCNC: 12 U/L (ref 10–40)
ANION GAP SERPL CALCULATED.3IONS-SCNC: 14 MMOL/L (ref 3–16)
AST SERPL-CCNC: 19 U/L (ref 15–37)
BASOPHILS ABSOLUTE: 0 K/UL (ref 0–0.2)
BASOPHILS RELATIVE PERCENT: 0.2 %
BILIRUB SERPL-MCNC: <0.2 MG/DL (ref 0–1)
BILIRUBIN DIRECT: <0.2 MG/DL (ref 0–0.3)
BILIRUBIN, INDIRECT: NORMAL MG/DL (ref 0–1)
BUN BLDV-MCNC: 47 MG/DL (ref 7–20)
CALCIUM SERPL-MCNC: 6.9 MG/DL (ref 8.3–10.6)
CHLORIDE BLD-SCNC: 100 MMOL/L (ref 99–110)
CO2: 22 MMOL/L (ref 21–32)
CREAT SERPL-MCNC: 6.6 MG/DL (ref 0.8–1.3)
EOSINOPHILS ABSOLUTE: 0.3 K/UL (ref 0–0.6)
EOSINOPHILS RELATIVE PERCENT: 3.5 %
GFR AFRICAN AMERICAN: 10
GFR NON-AFRICAN AMERICAN: 9
GLUCOSE BLD-MCNC: 154 MG/DL (ref 70–99)
HCT VFR BLD CALC: 29.8 % (ref 40.5–52.5)
HEMOGLOBIN: 10 G/DL (ref 13.5–17.5)
LACTIC ACID: 1 MMOL/L (ref 0.4–2)
LIPASE: 56 U/L (ref 13–60)
LYMPHOCYTES ABSOLUTE: 1.4 K/UL (ref 1–5.1)
LYMPHOCYTES RELATIVE PERCENT: 15.8 %
MCH RBC QN AUTO: 29.2 PG (ref 26–34)
MCHC RBC AUTO-ENTMCNC: 33.5 G/DL (ref 31–36)
MCV RBC AUTO: 87 FL (ref 80–100)
MONOCYTES ABSOLUTE: 0.6 K/UL (ref 0–1.3)
MONOCYTES RELATIVE PERCENT: 6.9 %
NEUTROPHILS ABSOLUTE: 6.6 K/UL (ref 1.7–7.7)
NEUTROPHILS RELATIVE PERCENT: 73.6 %
PDW BLD-RTO: 14.2 % (ref 12.4–15.4)
PLATELET # BLD: 186 K/UL (ref 135–450)
PMV BLD AUTO: 8 FL (ref 5–10.5)
POTASSIUM SERPL-SCNC: 4.5 MMOL/L (ref 3.5–5.1)
RBC # BLD: 3.42 M/UL (ref 4.2–5.9)
SODIUM BLD-SCNC: 136 MMOL/L (ref 136–145)
TOTAL PROTEIN: 6.5 G/DL (ref 6.4–8.2)
WBC # BLD: 8.9 K/UL (ref 4–11)

## 2022-01-19 PROCEDURE — 80076 HEPATIC FUNCTION PANEL: CPT

## 2022-01-19 PROCEDURE — 6360000002 HC RX W HCPCS: Performed by: EMERGENCY MEDICINE

## 2022-01-19 PROCEDURE — 85025 COMPLETE CBC W/AUTO DIFF WBC: CPT

## 2022-01-19 PROCEDURE — 83605 ASSAY OF LACTIC ACID: CPT

## 2022-01-19 PROCEDURE — 96374 THER/PROPH/DIAG INJ IV PUSH: CPT

## 2022-01-19 PROCEDURE — 80048 BASIC METABOLIC PNL TOTAL CA: CPT

## 2022-01-19 PROCEDURE — 83690 ASSAY OF LIPASE: CPT

## 2022-01-19 PROCEDURE — 99284 EMERGENCY DEPT VISIT MOD MDM: CPT

## 2022-01-19 PROCEDURE — 6370000000 HC RX 637 (ALT 250 FOR IP): Performed by: EMERGENCY MEDICINE

## 2022-01-19 RX ORDER — OXYCODONE HYDROCHLORIDE 5 MG/1
5 TABLET ORAL EVERY 6 HOURS PRN
Qty: 20 TABLET | Refills: 0 | Status: SHIPPED | OUTPATIENT
Start: 2022-01-19 | End: 2022-01-20

## 2022-01-19 RX ORDER — DIAZEPAM 2 MG/1
2 TABLET ORAL EVERY 6 HOURS PRN
Status: DISCONTINUED | OUTPATIENT
Start: 2022-01-19 | End: 2022-01-19 | Stop reason: HOSPADM

## 2022-01-19 RX ORDER — OXYCODONE HYDROCHLORIDE AND ACETAMINOPHEN 5; 325 MG/1; MG/1
1 TABLET ORAL ONCE
Status: COMPLETED | OUTPATIENT
Start: 2022-01-19 | End: 2022-01-19

## 2022-01-19 RX ORDER — DIAZEPAM 2 MG/1
2 TABLET ORAL EVERY 8 HOURS PRN
Qty: 10 TABLET | Refills: 1 | Status: SHIPPED | OUTPATIENT
Start: 2022-01-19 | End: 2022-01-22

## 2022-01-19 RX ORDER — MORPHINE SULFATE 4 MG/ML
4 INJECTION, SOLUTION INTRAMUSCULAR; INTRAVENOUS ONCE
Status: COMPLETED | OUTPATIENT
Start: 2022-01-19 | End: 2022-01-19

## 2022-01-19 RX ADMIN — MORPHINE SULFATE 4 MG: 4 INJECTION INTRAVENOUS at 06:39

## 2022-01-19 RX ADMIN — OXYCODONE HYDROCHLORIDE AND ACETAMINOPHEN 1 TABLET: 5; 325 TABLET ORAL at 10:24

## 2022-01-19 ASSESSMENT — PAIN SCALES - GENERAL: PAINLEVEL_OUTOF10: 10

## 2022-01-19 ASSESSMENT — ENCOUNTER SYMPTOMS
CONSTIPATION: 0
NAUSEA: 0
VOMITING: 0
DIARRHEA: 0
ABDOMINAL PAIN: 1
RESPIRATORY NEGATIVE: 1
EYES NEGATIVE: 1

## 2022-01-19 ASSESSMENT — PAIN DESCRIPTION - LOCATION: LOCATION: ABDOMEN

## 2022-01-19 NOTE — ED PROVIDER NOTES
15 - 37 U/L    Total Bilirubin <0.2 0.0 - 1.0 mg/dL    Bilirubin, Direct <0.2 0.0 - 0.3 mg/dL    Bilirubin, Indirect see below 0.0 - 1.0 mg/dL   Lipase   Result Value Ref Range    Lipase 56.0 13.0 - 60.0 U/L   Lactic Acid, Plasma   Result Value Ref Range    Lactic Acid 1.0 0.4 - 2.0 mmol/L       RECENT VITALS:  BP: (!) 162/89, Temp: 97.6 °F (36.4 °C), Pulse: 94, Resp: 18, SpO2: 96 %     Procedures     none    ED Course     The patient was given the following medications:  Orders Placed This Encounter   Medications    morphine injection 4 mg    diazePAM (VALIUM) tablet 2 mg    oxyCODONE-acetaminophen (PERCOCET) 5-325 MG per tablet 1 tablet    diazePAM (VALIUM) 2 MG tablet     Sig: Take 1 tablet by mouth every 8 hours as needed for Anxiety (Pain/Spasm) for up to 10 doses. Dispense:  10 tablet     Refill:  1    oxyCODONE (ROXICODONE) 5 MG immediate release tablet     Sig: Take 1 tablet by mouth every 6 hours as needed for Pain for up to 1 day. WARNING:  May cause drowsiness. May impair ability to operate vehicles or machinery. Do not use in combination with alcohol. Dispense:  20 tablet     Refill:  0       CONSULTS:  C/ [address] [facility] / ASSESSMENT / Kelly Melva is a 59 y.o. male who presented with a chief complaint of abdominal pain. Initial exam done by previous provider. I was asked to follow-up on labs and surgery recommendations. Labs with elevated creatinine but normal potassium, normal LFTs and lipase, normal white count of 8.9. Surgery feels that this is likely muscle spasm and superficial skin pain after dialysis catheter placement. They did not feel there was any indication for imaging at this time. They want the patient discharged with Valium as well as oxycodone prescription (instead of the Percocet he was already prescribed) -so that he can take larger doses of Tylenol. Patient discharged home in good condition.       Clinical Impression     1. Abdominal pain, unspecified abdominal location        Disposition     [facility]:  [facility]  [address]  [address]  [phone]  In 2 days  As needed, If symptoms worsen      DISCHARGE MEDICATIONS:  New Prescriptions    DIAZEPAM (VALIUM) 2 MG TABLET    Take 1 tablet by mouth every 8 hours as needed for Anxiety (Pain/Spasm) for up to 10 doses. OXYCODONE (ROXICODONE) 5 MG IMMEDIATE RELEASE TABLET    Take 1 tablet by mouth every 6 hours as needed for Pain for up to 1 day. WARNING:  May cause drowsiness. May impair ability to operate vehicles or machinery. Do not use in combination with alcohol.        DISPOSITION Decision To Discharge 01/19/2022 10:19:21 AM       Joe Srivastava MD  01/19/22 3011

## 2022-01-19 NOTE — ED PROVIDER NOTES
[address] [facility] ENCOUNTER          ATTENDING PHYSICIAN NOTE       Date of evaluation: 1/19/2022    Chief Complaint     Abdominal Pain (post-op pain s/p dialysis port)      History of Present Illness     Clifford Jackson is a 59 y.o. male who presents to the emergency department complaining of abdominal pain. Patient has a history of chronic renal insufficiency that his transition to end-stage renal disease. He had a peritoneal dialysis catheter placed 2 days ago by Dr. Bhargavi Jansen in preparation to start peritoneal dialysis. He also had repair of a ventral hernia performed at that time. He states he was discharged home yesterday but developed worsening abdominal pain since time of discharge. He describes aching pain that starts in the center of his abdomen without radiation. He denies any fevers or chills. He denies any nausea, vomiting, or diarrhea. He states he still does make urine and denies any burning or pain with urination. He is also concerned because he last received hemodialysis 2 days ago and was under the impression he was supposed to have additional dialysis yesterday prior to discharge but this did not happen. He tried taking oxycodone at home without improvement of his symptoms so came to the emergency department. Review of Systems     Review of Systems   Constitutional: Negative. HENT: Negative. Eyes: Negative. Respiratory: Negative. Cardiovascular: Negative. Gastrointestinal: Positive for abdominal pain. Negative for constipation, diarrhea, nausea and vomiting. Genitourinary: Negative. Musculoskeletal: Negative. Neurological: Negative. All other systems reviewed and are negative.       Past Medical, Surgical, Family, and Social History     He has a past medical history of Arthralgia of multiple joints, Arthritis, Atrial fibrillation ([address] Chronic kidney disease, Chronic systolic congestive heart failure ([address] CRI (chronic renal insufficiency), Diabetic nephropathy associated with type 2 diabetes mellitus ([facility] Diverticulosis, Elevated PSA, Erectile dysfunction, Gout, Gout, Hemrrhoid NOS w/ complication NEC, History of MRSA infection, Hypertension, CELAS (obstructive sleep apnea), Type 2 diabetes mellitus without complication, without long-term current use of insulin ([facility] and Umbilical hernia without obstruction and without gangrene. He has a past surgical history that includes Upper gastrointestinal endoscopy (05/28/2016); Colonoscopy; Dilatation, esophagus; Upper gastrointestinal endoscopy (09/12/2016); IR TUNNELED CVC PLACE WO SQ PORT/PUMP > 5 YEARS (01/14/2022); Dialysis Catheter Insertion (N/A, 1/17/2022); and ventral hernia repair (N/A, 1/17/2022). His family history includes Breast Cancer in his mother; Colon Cancer in his father; Coronary Art Dis in his brother; Diabetes in his maternal grandmother; Hypertension in an other family member. He reports that he has never smoked. He has never used smokeless tobacco. He reports current alcohol use. He reports that he does not use drugs.     Medications     Previous Medications    ALLOPURINOL (ZYLOPRIM) 300 MG TABLET    TAKE 1 TABLET BY MOUTH EVERY DAY    ASPIRIN 81 MG TABLET    Take 81 mg by mouth daily    ATORVASTATIN (LIPITOR) 20 MG TABLET    TAKE 1 TABLET BY MOUTH EVERY DAY    CALCITRIOL (ROCALTROL) 0.25 MCG CAPSULE    Take 1 capsule by mouth daily    CALCIUM ACETATE (PHOSLO) 667 MG CAPS CAPSULE    Take 2 capsules by mouth 3 times daily (with meals)    COLCHICINE (COLCRYS) 0.6 MG TABLET    Take 1 tablet by mouth daily as needed for Pain (as needed for pain related to gout)    METOPROLOL SUCCINATE (TOPROL XL) 50 MG EXTENDED RELEASE TABLET    TAKE 1 TABLET BY MOUTH EVERY DAY    NIFEDIPINE (ADALAT CC) 90 MG EXTENDED RELEASE TABLET    TAKE 1 TABLET BY MOUTH EVERY DAY    NORTRIPTYLINE (PAMELOR) 10 MG CAPSULE    nightly     OMEPRAZOLE (PRILOSEC) 40 MG DELAYED RELEASE CAPSULE    TAKE 1 CAPSULE BY MOUTH EVERY DAY    OXYCODONE-ACETAMINOPHEN (PERCOCET) 5-325 MG PER TABLET    Take 1 tablet by mouth every 8 hours as needed for Pain for up to 30 days. SILDENAFIL (VIAGRA) 100 MG TABLET    Take 1 tablet by mouth as needed for Erectile Dysfunction    TERAZOSIN (HYTRIN) 5 MG CAPSULE    Take 1 capsule by mouth nightly TAKE 1 CAPSULE BY MOUTH EVERY EVENING    VITAMIN D (ERGOCALCIFEROL) 1.25 MG (25219 UT) CAPS CAPSULE    Take 1 capsule by mouth once a week       Allergies     He has No Known Allergies. Physical Exam     INITIAL VITALS: BP: (!) 162/89, Temp: 97.6 °F (36.4 °C), Pulse: 94, Resp: 18, SpO2: 96 %   Physical Exam  Vitals and nursing note reviewed. Constitutional:       General: He is not in acute distress. Appearance: He is obese. HENT:      Head: Normocephalic and atraumatic. Mouth/Throat:      Mouth: Mucous membranes are moist.      Pharynx: No oropharyngeal exudate. Eyes:      General: No scleral icterus. Extraocular Movements: Extraocular movements intact. Conjunctiva/sclera: Conjunctivae normal.      Pupils: Pupils are equal, round, and reactive to light. Cardiovascular:      Rate and Rhythm: Normal rate and regular rhythm. Heart sounds: Normal heart sounds. Pulmonary:      Effort: Pulmonary effort is normal.      Breath sounds: Normal breath sounds. No wheezing, rhonchi or rales. Abdominal:      Comments: Patient has protuberant abdomen. He does have a dressing overlying his peritoneal dialysis catheter with no evidence of drainage. He does have mild erythema overlying the umbilicus. Musculoskeletal:         General: Normal range of motion. Cervical back: Normal range of motion and neck supple. Right lower leg: Edema present. Left lower leg: Edema present. Skin:     General: Skin is warm and dry. Neurological:      General: No focal deficit present. Mental Status: He is alert and oriented to person, place, and time. Cranial Nerves: No cranial nerve deficit. Motor: No weakness. Coordination: Coordination normal.         Diagnostic Results     RADIOLOGY:  No orders to display       LABS:   No results found for this visit on 01/19/22. RECENT VITALS:  BP: (!) 162/89,Temp: 97.6 °F (36.4 °C), Pulse: 94, Resp: 18, SpO2: 96 %     Procedures     N/A    ED Course     Nursing Notes, Past Medical Hx, Past Surgical Hx, Social Hx,Allergies, and Family Hx were reviewed. patient was given the following medications:  Orders Placed This Encounter   Medications    morphine injection 4 mg       CONSULTS:  [address] [facility] DECISIONMAKING / ASSESSMENT / Josemanuel Camila is a 59 y.o. male who was recently diagnosed with end-stage renal disease and is in preparation to initiate peritoneal dialysis with dialysis catheter and hernia repair performed 2 days ago presents complaining of abdominal pain. Patient is crampy pain that started after his discharge that did not respond to oxycodone. On arrival, patient is hypertensive but otherwise hemodynamically stable. He has an abdominal binder overlying his hernia repair and the dressing for his peritoneal Dialysis catheter which is clean, dry, and intact. Patient was written for laboratory studies. Consult has been placed to surgery to evaluate his abdomen. At this time, care of the patient will be turned over to the oncoming provider who complete the patient's work-up and disposition pending results of testing and surgical consultation. Clinical Impression     No diagnosis found. Disposition     PATIENT REFERRED TO:  No follow-up provider specified.     DISCHARGE MEDICATIONS:  New Prescriptions    No medications on file       [address] Kelechi Brandt MD  01/19/22 0380

## 2022-01-19 NOTE — ED NOTES
Family requesting update again. Patient informed that multiple calls have been sent out to Surgery team for a patient updated. Patient' wife states the surgery team was in prior to 7AM. Patient and family informed that it likely was the resident that was in to see the patient and pending official plan of care by attending and team at this time.  Patient and family offered apologizes for delay     Michelle Wells RN  01/19/22 8391

## 2022-01-20 ENCOUNTER — TELEPHONE (OUTPATIENT)
Dept: PULMONOLOGY | Age: 62
End: 2022-01-20

## 2022-01-20 LAB
GLUCOSE BLD-MCNC: 131 MG/DL (ref 70–99)
PERFORMED ON: ABNORMAL

## 2022-01-20 NOTE — TELEPHONE ENCOUNTER
Pt called stating that he purchased a machine and would like to get the pressure set by RT Black. Pt asking for return call.

## 2022-01-21 ENCOUNTER — TELEPHONE (OUTPATIENT)
Dept: PULMONOLOGY | Age: 62
End: 2022-01-21

## 2022-01-21 NOTE — PROGRESS NOTES
Physician Progress Note      Kemal Andre  CSN #:                  [id number]  :                       1960  ADMIT DATE:       2022 2:53 PM  [address] DATE:        2022 7:20 PM  RESPONDING  PROVIDER #:        Eloisa Garcia MD          QUERY TEXT:    Patient admitted  with ANTONIO on CKD 5, requiring tunneled HD line placement,   PD catheter placement and hemodialysis. Creatinine trend: 8.4- 5.3, GFR:   8-13. If possible, please document in progress notes if pt has progressed to   ESRD on admission or if ANTONIO on CKD5 is confirmed on admission: The medical record reflects the following:  Risk Factors: NSAID use and HTN uncontrolled  Clinical Indicators: Creatinine trend admission to discharge: 8.4- 5.3, GFR:   8-13. Per Nephrology c/s  \"may need RRT during this admission; ANTONIO on CKD   5\". Urine studies: Microalbumin Creatinine Ratio: 2862 (3479 on 2021),   Microalbumin, Random Urine: 593 (753.9 on 2021). Per Attending  \"ANTONIO   on CKD may be going toward ESRD\". Per Cardiology 1/15 \"ESRD on hemodialysis\"  Treatment: Tunneled HD line placement, PD catheter placement and hemodialysis   started on , Urine studies  Options provided:  -- ANTONIO on CKD Stage 5 GFR<15 confirmed, without ESRD  -- ESRD confirmed present on admission, ANTONIO ruled out  -- Other - I will add my own diagnosis  -- Disagree - Not applicable / Not valid  -- Disagree - Clinically unable to determine / Unknown  -- Refer to Clinical Documentation Reviewer    PROVIDER RESPONSE TEXT:    This patient has ANTONIO on CKD Stage 5 confirmed, without ESRD. Query created by:  Marshall Bush on 2022 1:58 PM      Electronically signed by:  Eloisa Garcia MD 2022 10:33 AM

## 2022-01-25 ENCOUNTER — TELEPHONE (OUTPATIENT)
Dept: FAMILY MEDICINE CLINIC | Age: 62
End: 2022-01-25

## 2022-01-25 NOTE — TELEPHONE ENCOUNTER
Cecilio Mejía called with concerns for Jorden who is experiencing swelling in both legs so much that the swelling is hanging over his shoes. It's been that way for a couple of days. He is currently at dialysis. Since I wasn't able to reach the clinical staff I suggested that she take him to the ER afterwards. Please advise, thanks. Cecilio Mejía can be reached at [phone].

## 2022-02-09 ENCOUNTER — APPOINTMENT (OUTPATIENT)
Dept: GENERAL RADIOLOGY | Age: 62
End: 2022-02-09
Payer: COMMERCIAL

## 2022-02-09 ENCOUNTER — HOSPITAL ENCOUNTER (EMERGENCY)
Age: 62
Discharge: HOME | End: 2022-02-09
Attending: EMERGENCY MEDICINE
Payer: COMMERCIAL

## 2022-02-09 VITALS
WEIGHT: 285 LBS | HEIGHT: 77 IN | SYSTOLIC BLOOD PRESSURE: 184 MMHG | DIASTOLIC BLOOD PRESSURE: 101 MMHG | BODY MASS INDEX: 33.65 KG/M2 | OXYGEN SATURATION: 100 % | RESPIRATION RATE: 19 BRPM | TEMPERATURE: 98.2 F | HEART RATE: 99 BPM

## 2022-02-09 DIAGNOSIS — M25.561 ACUTE PAIN OF RIGHT KNEE: Primary | ICD-10-CM

## 2022-02-09 PROCEDURE — 6370000000 HC RX 637 (ALT 250 FOR IP): Performed by: EMERGENCY MEDICINE

## 2022-02-09 PROCEDURE — 99283 EMERGENCY DEPT VISIT LOW MDM: CPT

## 2022-02-09 PROCEDURE — 73562 X-RAY EXAM OF KNEE 3: CPT

## 2022-02-09 RX ORDER — HYDROCODONE BITARTRATE AND ACETAMINOPHEN 5; 325 MG/1; MG/1
2 TABLET ORAL ONCE
Status: COMPLETED | OUTPATIENT
Start: 2022-02-09 | End: 2022-02-09

## 2022-02-09 RX ORDER — OXYCODONE HYDROCHLORIDE AND ACETAMINOPHEN 5; 325 MG/1; MG/1
1 TABLET ORAL EVERY 6 HOURS PRN
Qty: 16 TABLET | Refills: 0 | Status: SHIPPED | OUTPATIENT
Start: 2022-02-09 | End: 2022-02-13

## 2022-02-09 RX ADMIN — HYDROCODONE BITARTRATE AND ACETAMINOPHEN 2 TABLET: 5; 325 TABLET ORAL at 15:52

## 2022-02-09 ASSESSMENT — PAIN SCALES - GENERAL
PAINLEVEL_OUTOF10: 10
PAINLEVEL_OUTOF10: 10

## 2022-02-09 NOTE — ED PROVIDER NOTES
[address] [facility]  Chief Complaint   Patient presents with    Knee Pain     patinet feel onto right knee on the ice yesterday, today he states that he is in pain and the knee is sore to move     HISTORY  No Kuakini  is a 59 y.o. male who presents to the ED complaining of slipping and falling on the ice landing on his right knee yesterday causing him significant discomfort there. He has some comfort at flexed 90 degree position. Hurts more to fully extend it. Pain is mostly over the anterior knee joint line and over the patella. He denies any posterior knee pain. Denies any shin calf ankle or foot pain on the right, denies any right hip or thigh pain otherwise, denies any left lower extremity injuries or any area anywhere else including his head or neck chest abdomen and pelvis and upper extremities bilaterally. Fall was mechanical, no dizziness or syncope/LOC. No other complaints, modifying factors or associated symptoms. Nursing notes reviewed.    Past Medical History:   Diagnosis Date    Arthralgia of multiple joints 10/27/2015    Arthritis     Atrial fibrillation (HCC)     Chronic kidney disease     stage 4    Chronic systolic congestive heart failure ([address] 8/16/2021    CRI (chronic renal insufficiency)     Diabetic nephropathy associated with type 2 diabetes mellitus ([address] 10/11/2018    Diverticulosis     Elevated PSA     Erectile dysfunction     Gout     Gout     Hemrrhoid NOS w/ complication NEC     History of MRSA infection     Hypertension     CELSA (obstructive sleep apnea) 07/14/2017    uses C-pap machine    Type 2 diabetes mellitus without complication, without long-term current use of insulin ([address] 5/59/1983    Umbilical hernia without obstruction and without gangrene 2/2/2016     Past Surgical History:   Procedure Laterality Date    COLONOSCOPY      DIALYSIS CATHETER INSERTION N/A 1/17/2022 LAPAROSCOPIC PERITONEAL DIALYSIS CATHETER PLACEMENT and omentopexy performed by Pradeep Domingo DO at [facility]      IR TUNNELED CATHETER PLACEMENT GREATER THAN 5 YEARS  01/14/2022    IR TUNNELED CATHETER PLACEMENT GREATER THAN 5 YEARS 1/14/2022 [address] SPECIAL PROCEDURES    UPPER GASTROINTESTINAL ENDOSCOPY  05/28/2016    biopsies, multiple small gastric ulcers    UPPER GASTROINTESTINAL ENDOSCOPY  09/12/2016    VENTRAL HERNIA REPAIR N/A 1/17/2022    LAPAROSCOPIC incarcerated VENTRAL HERNIA REPAIR performed by Pradeep Domingo DO at [address] OR     Family History   Problem Relation Age of Onset    Hypertension Other     Breast Cancer Mother     Colon Cancer Father     Diabetes Maternal Grandmother     Coronary Art Dis Brother      Social History     Socioeconomic History    Marital status:      Spouse name: Not on file    Number of children: 11    Years of education: Not on file    Highest education level: Not on file   Occupational History    Occupation: Senior Field Cost Admin. Employer: [employer]   Tobacco Use    Smoking status: Never Smoker    Smokeless tobacco: Never Used   Vaping Use    Vaping Use: Never used   Substance and Sexual Activity    Alcohol use: Yes     Comment: drinks etoh on weekends- a 12 pk or so    Drug use: No    Sexual activity: Yes     Partners: Female     Comment:    Other Topics Concern    Not on file   Social History Narrative    ** Merged History Encounter **          Social Determinants of Health     Financial Resource Strain: Low Risk     Difficulty of Paying Living Expenses: Not hard at all   Food Insecurity: No Food Insecurity    Worried About Running Out of Food in the Last Year: Never true    Gisselle of Food in the Last Year: Never true   Transportation Needs:     Lack of Transportation (Medical): Not on file    Lack of Transportation (Non-Medical):  Not on file   Physical Activity:     Days of Exercise per Week: Not on file    Minutes of Exercise per Session: Not on file   Stress:     Feeling of Stress : Not on file   Social Connections:     Frequency of Communication with Friends and Family: Not on file    Frequency of Social Gatherings with Friends and Family: Not on file    Attends Religion Services: Not on file    Active Member of Clubs or Organizations: Not on file    Attends Club or Organization Meetings: Not on file    Marital Status: Not on file   Intimate Partner Violence:     Fear of Current or Ex-Partner: Not on file    Emotionally Abused: Not on file    Physically Abused: Not on file    Sexually Abused: Not on file   Housing Stability:     Unable to Pay for Housing in the Last Year: Not on file    Number of [place] in the Last Year: Not on file    Unstable Housing in the Last Year: Not on file     No current facility-administered medications for this encounter. Current Outpatient Medications   Medication Sig Dispense Refill    oxyCODONE-acetaminophen (PERCOCET) 5-325 MG per tablet Take 1 tablet by mouth every 6 hours as needed for Pain (CAUTION: Can cause dizziness, don't drive while taking.) for up to 4 days.  16 tablet 0    NIFEdipine (PROCARDIA XL) 60 MG extended release tablet TAKE 1 TABLET BY MOUTH EVERYDAY AT BEDTIME 30 tablet 0    polyethylene glycol (GLYCOLAX) 17 GM/SCOOP powder Take 17 g by mouth 2 times daily 1530 g 1    bisacodyl (BISACODYL) 5 MG EC tablet Take 1 tablet by mouth daily as needed for Constipation 30 tablet 5    spironolactone (ALDACTONE) 50 MG tablet Take 1 tablet by mouth at bedtime 30 tablet 3    metoprolol succinate (TOPROL XL) 50 MG extended release tablet Take 1 tablet by mouth at bedtime TAKE 1 TABLET BY MOUTH EVERY DAY 30 tablet 3    atorvastatin (LIPITOR) 20 MG tablet TAKE 1 TABLET BY MOUTH EVERY DAY 30 tablet 5    torsemide (DEMADEX) 100 MG tablet Take 1 tablet by mouth daily 30 tablet 3    colchicine (COLCRYS) 0.6 MG tablet Take 1 tablet by mouth daily as needed for Pain (as needed for pain related to gout) 30 tablet 5    vitamin D (ERGOCALCIFEROL) 1.25 MG (78420 UT) CAPS capsule Take 1 capsule by mouth once a week 12 capsule 1    calcitRIOL (ROCALTROL) 0.25 MCG capsule Take 1 capsule by mouth daily 30 capsule 3    calcium acetate (PHOSLO) 667 MG CAPS capsule Take 2 capsules by mouth 3 times daily (with meals) 180 capsule 0    nortriptyline (PAMELOR) 10 MG capsule nightly       omeprazole (PRILOSEC) 40 MG delayed release capsule TAKE 1 CAPSULE BY MOUTH EVERY DAY 30 capsule 1    sildenafil (VIAGRA) 100 MG tablet Take 1 tablet by mouth as needed for Erectile Dysfunction 30 tablet 3    aspirin 81 MG tablet Take 81 mg by mouth daily       No Known Allergies    REVIEW OF SYSTEMS  6 systems reviewed, pertinent positives per HPI otherwise noted to be negative    PHYSICAL EXAM   BP (!) 184/101   Pulse 99   Temp 98.2 °F (36.8 °C) (Oral)   Resp 19   Ht 6' 5\" (1.956 m)   Wt 285 lb (129.3 kg)   SpO2 100%   BMI 33.80 kg/m²    GENERAL APPEARANCE: Awake and alert. Cooperative. No acute distress. HEAD: Normocephalic. Atraumatic. EYES: PERRL. EOM's grossly intact. ENT: Mucous membranes are moist.   NECK: Supple. Normal ROM. CHEST: Equal symmetric chest rise. LUNGS: Breathing is unlabored. Speaking comfortably in full sentences. ABDOMEN: Nondistended, nontender  SKIN: Warm and dry. No acute rashes. NEUROLOGICAL: Alert and oriented. Strength is 5/5 in all extremities and sensation is intact. MUSCULOSKELETAL:  RUE: No tenderness. 2+ radial pulse. Brisk cap refill x5 digits. Sensation and motor function fully intact in the radial, ulnar, and median nerve distribution. Full range of motion of all major joints. Cardinal movements of hand fully intact. No erythema, bruising, or lacerations. Comparments are soft. LUE:  No tenderness. 2+ radial pulse. Brisk cap refill x5 digits.   Sensation and motor function fully intact in the radial, ulnar, and median nerve distribution. Full range of motion of all major joints. Cardinal movements of hand fully intact. No erythema, bruising, or lacerations. Comparments are soft. RLE: Moderate tenderness over the anterior knee mostly over the joint line and less so over the patella. There is some suprapatellar mild tenderness as well. There is no tenderness over the mid or distal shin, and around the calf or popliteal fossa, ankle, foot, thigh or hip. There is no joint laxity, negative anterior and posterior drawer signs. 2+ DP and PT. Sensation and motor function fully intact. Full range of motion of all major joints. No erythema, bruising, or lacerations. Compartments are soft. 2+ patellar reflex. Achilles nontender and intact. Gingerly able to bear weight. No joint swelling or effusions are noted. LLE: No tenderness. 2+ DP and PT. Sensation and motor function fully intact. Full range of motion of all major joints. No erythema, bruising, or lacerations. Compartments are soft. 2+ patellar reflex. Achilles nontender and intact. Able to bear weight. No joint swelling or effusions are noted. RADIOLOGY    XR KNEE RIGHT (3 VIEWS)    Result Date: 2/9/2022  EXAMINATION: THREE XRAY VIEWS OF THE RIGHT KNEE 2/9/2022 3:31 pm COMPARISON: None. HISTORY: ORDERING SYSTEM PROVIDED HISTORY: pain trauma TECHNOLOGIST PROVIDED HISTORY: Reason for exam:->pain trauma Reason for Exam: Knee Pain (ryannet feel onto right knee on the ice yesterday, today he states that he is in pain and the knee is sore to move) FINDINGS: Frontal, lateral, and oblique view radiographs of the rightknee were obtained. Bone mineralization is normal.  The osseous structures are intact without acute fracture or destructive abnormality. Joint relationships are maintained. No significant degenerative findings. No joint effusion. No appreciable soft tissue abnormality. Unremarkable right knee.      ED COURSE/MDM  Differential worsen      This chart was created using Dragon dictation software. Efforts were made by me to ensure accuracy, however some errors may be present due to limitations of this technology.        Leonila Melendez MD  02/09/22 [id number]

## 2022-02-10 ENCOUNTER — TELEPHONE (OUTPATIENT)
Dept: ORTHOPEDIC SURGERY | Age: 62
End: 2022-02-10

## 2022-02-13 PROBLEM — Z01.818 PRE-OP EVALUATION: Status: RESOLVED | Noted: 2022-01-14 | Resolved: 2022-02-13

## 2022-02-18 ENCOUNTER — HOSPITAL ENCOUNTER (OUTPATIENT)
Dept: GENERAL RADIOLOGY | Age: 62
Discharge: HOME | End: 2022-02-18
Payer: COMMERCIAL

## 2022-02-18 ENCOUNTER — HOSPITAL ENCOUNTER (OUTPATIENT)
Age: 62
Discharge: HOME | End: 2022-02-18
Payer: COMMERCIAL

## 2022-02-18 DIAGNOSIS — T85.621A MALPOSITION OF PERITONEAL DIALYSIS CATHETER (HCC): ICD-10-CM

## 2022-02-18 PROCEDURE — 74018 RADEX ABDOMEN 1 VIEW: CPT

## 2022-02-24 ENCOUNTER — OFFICE VISIT (OUTPATIENT)
Dept: FAMILY MEDICINE CLINIC | Age: 62
End: 2022-02-24
Payer: COMMERCIAL

## 2022-02-24 VITALS
SYSTOLIC BLOOD PRESSURE: 120 MMHG | WEIGHT: 271 LBS | DIASTOLIC BLOOD PRESSURE: 80 MMHG | HEART RATE: 111 BPM | OXYGEN SATURATION: 96 % | RESPIRATION RATE: 16 BRPM | BODY MASS INDEX: 32.14 KG/M2 | TEMPERATURE: 97.8 F

## 2022-02-24 DIAGNOSIS — F43.9 STRESS: ICD-10-CM

## 2022-02-24 DIAGNOSIS — R25.2 MUSCLE CRAMPS: ICD-10-CM

## 2022-02-24 DIAGNOSIS — N18.6 ESRD (END STAGE RENAL DISEASE) ON DIALYSIS (HCC): ICD-10-CM

## 2022-02-24 DIAGNOSIS — I50.22 CHRONIC SYSTOLIC CONGESTIVE HEART FAILURE (HCC): ICD-10-CM

## 2022-02-24 DIAGNOSIS — Z99.2 ESRD (END STAGE RENAL DISEASE) ON DIALYSIS (HCC): ICD-10-CM

## 2022-02-24 DIAGNOSIS — E11.21 TYPE 2 DIABETES MELLITUS WITH DIABETIC NEPHROPATHY, WITHOUT LONG-TERM CURRENT USE OF INSULIN (HCC): Primary | Chronic | ICD-10-CM

## 2022-02-24 DIAGNOSIS — I10 ESSENTIAL HYPERTENSION, BENIGN: ICD-10-CM

## 2022-02-24 DIAGNOSIS — E83.42 HYPOMAGNESEMIA: ICD-10-CM

## 2022-02-24 DIAGNOSIS — M25.561 ACUTE PAIN OF RIGHT KNEE: ICD-10-CM

## 2022-02-24 DIAGNOSIS — D63.1 ANEMIA DUE TO STAGE 5 CHRONIC KIDNEY DISEASE (HCC): ICD-10-CM

## 2022-02-24 DIAGNOSIS — M1A.09X0 IDIOPATHIC CHRONIC GOUT OF MULTIPLE SITES WITHOUT TOPHUS: ICD-10-CM

## 2022-02-24 DIAGNOSIS — N18.5 ANEMIA DUE TO STAGE 5 CHRONIC KIDNEY DISEASE (HCC): ICD-10-CM

## 2022-02-24 DIAGNOSIS — E87.6 HYPOKALEMIA: ICD-10-CM

## 2022-02-24 LAB
A/G RATIO: 1.6 (ref 1.1–2.2)
ALBUMIN SERPL-MCNC: 4 G/DL (ref 3.4–5)
ALP BLD-CCNC: 104 U/L (ref 40–129)
ALT SERPL-CCNC: 19 U/L (ref 10–40)
ANION GAP SERPL CALCULATED.3IONS-SCNC: 17 MMOL/L (ref 3–16)
AST SERPL-CCNC: 14 U/L (ref 15–37)
BILIRUB SERPL-MCNC: 0.3 MG/DL (ref 0–1)
BUN BLDV-MCNC: 39 MG/DL (ref 7–20)
CALCIUM SERPL-MCNC: 8 MG/DL (ref 8.3–10.6)
CHLORIDE BLD-SCNC: 96 MMOL/L (ref 99–110)
CO2: 26 MMOL/L (ref 21–32)
CREAT SERPL-MCNC: 7.6 MG/DL (ref 0.8–1.3)
GFR AFRICAN AMERICAN: 9
GFR NON-AFRICAN AMERICAN: 7
GLUCOSE BLD-MCNC: 129 MG/DL (ref 70–99)
HCT VFR BLD CALC: 36 % (ref 40.5–52.5)
HEMOGLOBIN: 11.7 G/DL (ref 13.5–17.5)
MAGNESIUM: 1.6 MG/DL (ref 1.8–2.4)
MCH RBC QN AUTO: 27.8 PG (ref 26–34)
MCHC RBC AUTO-ENTMCNC: 32.5 G/DL (ref 31–36)
MCV RBC AUTO: 85.4 FL (ref 80–100)
PDW BLD-RTO: 14.7 % (ref 12.4–15.4)
PHOSPHORUS: 3.7 MG/DL (ref 2.5–4.9)
PLATELET # BLD: 155 K/UL (ref 135–450)
PMV BLD AUTO: 8.7 FL (ref 5–10.5)
POTASSIUM SERPL-SCNC: 3.3 MMOL/L (ref 3.5–5.1)
RBC # BLD: 4.22 M/UL (ref 4.2–5.9)
SODIUM BLD-SCNC: 139 MMOL/L (ref 136–145)
TOTAL PROTEIN: 6.5 G/DL (ref 6.4–8.2)
WBC # BLD: 4.8 K/UL (ref 4–11)

## 2022-02-24 PROCEDURE — 2022F DILAT RTA XM EVC RTNOPTHY: CPT | Performed by: FAMILY MEDICINE

## 2022-02-24 PROCEDURE — G8427 DOCREV CUR MEDS BY ELIG CLIN: HCPCS | Performed by: FAMILY MEDICINE

## 2022-02-24 PROCEDURE — 99214 OFFICE O/P EST MOD 30 MIN: CPT | Performed by: FAMILY MEDICINE

## 2022-02-24 PROCEDURE — G8484 FLU IMMUNIZE NO ADMIN: HCPCS | Performed by: FAMILY MEDICINE

## 2022-02-24 PROCEDURE — 3051F HG A1C>EQUAL 7.0%<8.0%: CPT | Performed by: FAMILY MEDICINE

## 2022-02-24 PROCEDURE — 1036F TOBACCO NON-USER: CPT | Performed by: FAMILY MEDICINE

## 2022-02-24 PROCEDURE — 3017F COLORECTAL CA SCREEN DOC REV: CPT | Performed by: FAMILY MEDICINE

## 2022-02-24 PROCEDURE — G8417 CALC BMI ABV UP PARAM F/U: HCPCS | Performed by: FAMILY MEDICINE

## 2022-02-24 RX ORDER — OXYCODONE HYDROCHLORIDE AND ACETAMINOPHEN 5; 325 MG/1; MG/1
1 TABLET ORAL EVERY 6 HOURS PRN
Qty: 16 TABLET | Refills: 0 | Status: CANCELLED | OUTPATIENT
Start: 2022-02-24 | End: 2022-02-28

## 2022-02-24 RX ORDER — OXYCODONE HYDROCHLORIDE AND ACETAMINOPHEN 5; 325 MG/1; MG/1
1 TABLET ORAL EVERY 8 HOURS PRN
Qty: 90 TABLET | Refills: 0 | Status: SHIPPED | OUTPATIENT
Start: 2022-02-24 | End: 2022-04-19 | Stop reason: SDUPTHER

## 2022-02-24 NOTE — PROGRESS NOTES
Here for f/u of ESRD, blood pressure    Pt states that he has been doing ok, has been learning to adjust to starting peritoneal dialysis for his ESRD. Pt has been off work from mid/end of December for family issues but now anticipates that he will be progressing to short term disability. Pt feels that he is overwhelmed a bit by everything going on. Pt is doing the dialysis at night, but has had to do manual dialysis as there was some concerns of flow. Pt will be restarted on nightly PD tonight and then will go back and see them again tomorrow. Pt feels that he is managing but is stressed with work and ability to perform, and that he doesn't feel that he is able to resume. Nephrology agrees that he will need to apply for STD    Pt here for follow up of blood pressure. Pt states doing great with adherence to therapy and feels well. No issues of chest pain, shortness of breath. No vision changes, headache, swelling in legs. Pt has had some persistent issues of random cramping around the body, sx are all over body and random. They seem to just randomly show up and is not associated with any activity or related to dialysis. Pt states these sx were before going on dialysis, and does not seem that it is much different afterward. Pt did have some recent bloodwork and his magnesium was low but now improved. Except as noted above in the history of present illness, the review of systems is  negative for headache, vision changes, chest pain, shortness of breath, abdominal pain, urinary sx, bowel changes. Past medical, surgical, and social history reviewed and updated  Medications and allergies reviewed and updated        O: /80   Pulse 111   Temp 97.8 °F (36.6 °C)   Resp 16   Wt 271 lb (122.9 kg)   SpO2 96%   BMI 32.14 kg/m²   GEN: No acute distress, cooperative, well nourished, alert. HEENT: PEERLA, EOMI , normocephalic/atraumatic, nares and oropharynx clear.   Mucous membranes normal, Tympanic membranes clear bilaterally. Neck: soft, supple, no thyromegaly, mass, no Lymphadenopathy  CV: Regular rate and rhythm, no murmur, rubs, gallops. No edema. Resp: Clear to auscultation bilaterally good air entry bilaterally  No crackles, wheeze. Breathing comfortably. Psych: mod stressors, No suicidal thoughts or ideation        Current Outpatient Medications   Medication Sig Dispense Refill    oxyCODONE-acetaminophen (PERCOCET) 5-325 MG per tablet Take 1 tablet by mouth every 8 hours as needed for Pain for up to 30 days.  90 tablet 0    NIFEdipine (PROCARDIA XL) 60 MG extended release tablet TAKE 1 TABLET BY MOUTH EVERYDAY AT BEDTIME 30 tablet 0    polyethylene glycol (GLYCOLAX) 17 GM/SCOOP powder Take 17 g by mouth 2 times daily 1530 g 1    bisacodyl (BISACODYL) 5 MG EC tablet Take 1 tablet by mouth daily as needed for Constipation 30 tablet 5    spironolactone (ALDACTONE) 50 MG tablet Take 1 tablet by mouth at bedtime 30 tablet 3    metoprolol succinate (TOPROL XL) 50 MG extended release tablet Take 1 tablet by mouth at bedtime TAKE 1 TABLET BY MOUTH EVERY DAY 30 tablet 3    atorvastatin (LIPITOR) 20 MG tablet TAKE 1 TABLET BY MOUTH EVERY DAY 30 tablet 5    colchicine (COLCRYS) 0.6 MG tablet Take 1 tablet by mouth daily as needed for Pain (as needed for pain related to gout) 30 tablet 5    vitamin D (ERGOCALCIFEROL) 1.25 MG (81381 UT) CAPS capsule Take 1 capsule by mouth once a week 12 capsule 1    calcitRIOL (ROCALTROL) 0.25 MCG capsule Take 1 capsule by mouth daily 30 capsule 3    nortriptyline (PAMELOR) 10 MG capsule nightly       omeprazole (PRILOSEC) 40 MG delayed release capsule TAKE 1 CAPSULE BY MOUTH EVERY DAY 30 capsule 1    sildenafil (VIAGRA) 100 MG tablet Take 1 tablet by mouth as needed for Erectile Dysfunction 30 tablet 3    aspirin 81 MG tablet Take 81 mg by mouth daily      torsemide (DEMADEX) 100 MG tablet Take 1 tablet by mouth daily (Patient not taking: Reported on 2/24/2022) 30 tablet 3    calcium acetate (PHOSLO) 667 MG CAPS capsule Take 2 capsules by mouth 3 times daily (with meals) 180 capsule 0     No current facility-administered medications for this visit. ASSESSMENT / PLAN:      2. Type 2 diabetes mellitus with diabetic nephropathy, without long-term current use of insulin (HCC)  Stable w/o progressive sx  Cont supportive therapy  Reviewed recent bloodwork     3. Muscle cramps  Chronic, exam nonfocal  Check f/u bloodwork as below  Management pending results. - CBC; Future  - Comprehensive Metabolic Panel; Future  - Magnesium; Future  - Phosphorus; Future    4. Essential hypertension, benign  Stable @ goal  Doing well  Monitor  Check bloodwork  - CBC; Future  - Comprehensive Metabolic Panel; Future  - Magnesium; Future  - Phosphorus; Future    5. Chronic systolic congestive heart failure (HCC)  Stable w/o flare  Cont to monitor weights, especially in light of ESRD/PD statuso    6. ESRD (end stage renal disease) on dialysis [facility])  On PD  F/u with nephrology  - CBC; Future  - Comprehensive Metabolic Panel; Future  - Magnesium; Future  - Phosphorus; Future    7. Anemia due to stage 5 chronic kidney disease (HCC)  stable    8. Hypomagnesemia  Check f/ bloodwork as above    9. Hypokalemia  Check f/u bloodwork     10. Idiopathic chronic gout of multiple sites without tophus  Chronic w/o breakthru sx  Cont prn percocet, use intermittent  See [facility]  - oxyCODONE-acetaminophen (PERCOCET) 5-325 MG per tablet; Take 1 tablet by mouth every 8 hours as needed for Pain for up to 30 days. Dispense: 90 tablet; Refill: 0    11. Stress  Moderate stressors, with increased sx d/t new onset ESRD and dialysis  Consider therapy  Await eval as above  Pt will consider           Follow-up appointment:   Pending bloodwork results    Discussed use, benefit, and side effects of all prescribed medications. Barriers to medication compliance addressed. All patient questions answered.   Pt voiced understanding. When applicable, patient's outside records were reviewed through Wendy. The patient has signed appropriate paperworks/consents.

## 2022-03-08 ENCOUNTER — TELEPHONE (OUTPATIENT)
Dept: FAMILY MEDICINE CLINIC | Age: 62
End: 2022-03-08

## 2022-03-08 DIAGNOSIS — Z11.59 SCREENING FOR VIRAL DISEASE: Primary | ICD-10-CM

## 2022-03-10 RX ORDER — BUSPIRONE HYDROCHLORIDE 5 MG/1
5 TABLET ORAL 2 TIMES DAILY
Qty: 60 TABLET | Refills: 0 | Status: SHIPPED | OUTPATIENT
Start: 2022-03-10 | End: 2022-04-05

## 2022-03-10 NOTE — PROGRESS NOTES
Patient ___ reached   ___X__not reached-preop instructions left on voice bbee____[phone]_________      DATE__3/18/22______ TIME_0900_______ARRIVAL____0730  FEC_____      Nothing to eat or drink after midnight the night before,except for what the prep instructions call for. If you do not have the instructions or do not understand them please contact your doctors office. Follow any instructions your doctors office has given you including what medications to take the AM of your procedure and which ones to hold. You may use your inhalers. If you take a long acting insulin the vega prior please cut the dose in half and take no diabetic medications that AM.Follow specific doctors office instructions regarding blood thinners and if they want you to hold and for how long. If you are on a blood thinner and have no instructions please contact the office and ask. Dress comfortably,bring your insurance card,picture ID,and a complete list of medications, including supplements. You must have a responsible adult to stay with you during the procedure,drive you home and stay with you. [facility] phone number [phone] for any questions. OTHER INTRUCTIONS(if applicable)_________________________________________________________          COVID TESTING          _X__ Covid test to be done 3-5 days prior to scheduled surgery -patient aware they are REQUIRED to bring a copy of the negative result DOS-if they receive a positive result to notify their surgeon. If known- indicate where patient is getting covid test done________________________________________________________________________    ___ [facility] - DOS    ___ Other _____________________________________      Mayelin Watters POLICY(subject to change)    There is a one visitor policy at [facility] for all surgeries and endoscopies. Whether the visitor can stay or will be asked to wait in the car will depend on the current policy and if social distancing can be maintained. The policy is subject to change at any time. Please make sure the visitor has a cell phone that is on,charged and able to accept calls, as this may be the way that the staff communicates with them. Pain management is NO VISITOR policyThe patients ride is expected to remain in the car with a cell phone for communication. If the ride is leaving the hospital grounds please make sure they are back in time for pickup. Have the patient inform the staff on arrival what their rides plans are while the patient is in the facility. At the MAIN there is one visitor allowed. Please note that the visitor policy is subject to change.

## 2022-03-18 ENCOUNTER — ANESTHESIA EVENT (OUTPATIENT)
Dept: ENDOSCOPY | Age: 62
End: 2022-03-18
Payer: COMMERCIAL

## 2022-03-18 ENCOUNTER — ANESTHESIA (OUTPATIENT)
Dept: ENDOSCOPY | Age: 62
End: 2022-03-18
Payer: COMMERCIAL

## 2022-03-18 ENCOUNTER — HOSPITAL ENCOUNTER (OUTPATIENT)
Age: 62
Setting detail: OUTPATIENT SURGERY
Discharge: HOME | End: 2022-03-18
Attending: INTERNAL MEDICINE | Admitting: INTERNAL MEDICINE
Payer: COMMERCIAL

## 2022-03-18 VITALS
OXYGEN SATURATION: 97 % | HEART RATE: 94 BPM | SYSTOLIC BLOOD PRESSURE: 134 MMHG | RESPIRATION RATE: 16 BRPM | TEMPERATURE: 97 F | DIASTOLIC BLOOD PRESSURE: 98 MMHG | WEIGHT: 280 LBS | BODY MASS INDEX: 33.06 KG/M2 | HEIGHT: 77 IN

## 2022-03-18 VITALS
SYSTOLIC BLOOD PRESSURE: 113 MMHG | RESPIRATION RATE: 18 BRPM | OXYGEN SATURATION: 98 % | DIASTOLIC BLOOD PRESSURE: 79 MMHG

## 2022-03-18 LAB
ANION GAP SERPL CALCULATED.3IONS-SCNC: 16 MMOL/L (ref 3–16)
BUN BLDV-MCNC: 34 MG/DL (ref 7–20)
CALCIUM SERPL-MCNC: 8.2 MG/DL (ref 8.3–10.6)
CHLORIDE BLD-SCNC: 97 MMOL/L (ref 99–110)
CO2: 25 MMOL/L (ref 21–32)
CREAT SERPL-MCNC: 7.6 MG/DL (ref 0.8–1.3)
GFR AFRICAN AMERICAN: 9
GFR NON-AFRICAN AMERICAN: 7
GLUCOSE BLD-MCNC: 131 MG/DL (ref 70–99)
GLUCOSE BLD-MCNC: 91 MG/DL (ref 70–99)
PERFORMED ON: NORMAL
POTASSIUM SERPL-SCNC: 3.5 MMOL/L (ref 3.5–5.1)
SODIUM BLD-SCNC: 138 MMOL/L (ref 136–145)

## 2022-03-18 PROCEDURE — 36415 COLL VENOUS BLD VENIPUNCTURE: CPT

## 2022-03-18 PROCEDURE — 6370000000 HC RX 637 (ALT 250 FOR IP): Performed by: INTERNAL MEDICINE

## 2022-03-18 PROCEDURE — 80048 BASIC METABOLIC PNL TOTAL CA: CPT

## 2022-03-18 PROCEDURE — 2709999900 HC NON-CHARGEABLE SUPPLY: Performed by: INTERNAL MEDICINE

## 2022-03-18 PROCEDURE — 7100000010 HC PHASE II RECOVERY - FIRST 15 MIN: Performed by: INTERNAL MEDICINE

## 2022-03-18 PROCEDURE — 7100000011 HC PHASE II RECOVERY - ADDTL 15 MIN: Performed by: INTERNAL MEDICINE

## 2022-03-18 PROCEDURE — 3700000001 HC ADD 15 MINUTES (ANESTHESIA): Performed by: INTERNAL MEDICINE

## 2022-03-18 PROCEDURE — 3609010600 HC COLONOSCOPY POLYPECTOMY SNARE/COLD BIOPSY: Performed by: INTERNAL MEDICINE

## 2022-03-18 PROCEDURE — 3609017100 HC EGD: Performed by: INTERNAL MEDICINE

## 2022-03-18 PROCEDURE — 2500000003 HC RX 250 WO HCPCS: Performed by: NURSE ANESTHETIST, CERTIFIED REGISTERED

## 2022-03-18 PROCEDURE — 2580000003 HC RX 258: Performed by: ANESTHESIOLOGY

## 2022-03-18 PROCEDURE — 3700000000 HC ANESTHESIA ATTENDED CARE: Performed by: INTERNAL MEDICINE

## 2022-03-18 PROCEDURE — 6360000002 HC RX W HCPCS: Performed by: NURSE ANESTHETIST, CERTIFIED REGISTERED

## 2022-03-18 RX ORDER — GLYCOPYRROLATE 0.2 MG/ML
INJECTION INTRAMUSCULAR; INTRAVENOUS PRN
Status: DISCONTINUED | OUTPATIENT
Start: 2022-03-18 | End: 2022-03-18 | Stop reason: SDUPTHER

## 2022-03-18 RX ORDER — LIDOCAINE HYDROCHLORIDE 20 MG/ML
INJECTION, SOLUTION EPIDURAL; INFILTRATION; INTRACAUDAL; PERINEURAL PRN
Status: DISCONTINUED | OUTPATIENT
Start: 2022-03-18 | End: 2022-03-18 | Stop reason: SDUPTHER

## 2022-03-18 RX ORDER — METHOXY POLYETHYLENE GLYCOL-EPOETIN BETA 50 UG/.3ML
INJECTION, SOLUTION INTRAVENOUS
COMMUNITY
End: 2022-08-31

## 2022-03-18 RX ORDER — SODIUM CHLORIDE 9 MG/ML
INJECTION, SOLUTION INTRAVENOUS CONTINUOUS
Status: DISCONTINUED | OUTPATIENT
Start: 2022-03-18 | End: 2022-03-18 | Stop reason: HOSPADM

## 2022-03-18 RX ORDER — PROPOFOL 10 MG/ML
INJECTION, EMULSION INTRAVENOUS PRN
Status: DISCONTINUED | OUTPATIENT
Start: 2022-03-18 | End: 2022-03-18 | Stop reason: SDUPTHER

## 2022-03-18 RX ADMIN — PROPOFOL 30 MG: 10 INJECTION, EMULSION INTRAVENOUS at 09:27

## 2022-03-18 RX ADMIN — PROPOFOL 20 MG: 10 INJECTION, EMULSION INTRAVENOUS at 09:04

## 2022-03-18 RX ADMIN — PROPOFOL 20 MG: 10 INJECTION, EMULSION INTRAVENOUS at 08:56

## 2022-03-18 RX ADMIN — LIDOCAINE HYDROCHLORIDE 50 MG: 20 INJECTION, SOLUTION EPIDURAL; INFILTRATION; INTRACAUDAL; PERINEURAL at 08:54

## 2022-03-18 RX ADMIN — GLYCOPYRROLATE 0.2 MG: 0.2 INJECTION, SOLUTION INTRAMUSCULAR; INTRAVENOUS at 08:54

## 2022-03-18 RX ADMIN — PROPOFOL 20 MG: 10 INJECTION, EMULSION INTRAVENOUS at 09:01

## 2022-03-18 RX ADMIN — PROPOFOL 30 MG: 10 INJECTION, EMULSION INTRAVENOUS at 09:11

## 2022-03-18 RX ADMIN — PROPOFOL 100 MG: 10 INJECTION, EMULSION INTRAVENOUS at 08:54

## 2022-03-18 RX ADMIN — PROPOFOL 20 MG: 10 INJECTION, EMULSION INTRAVENOUS at 09:07

## 2022-03-18 RX ADMIN — PROPOFOL 20 MG: 10 INJECTION, EMULSION INTRAVENOUS at 08:59

## 2022-03-18 RX ADMIN — PROPOFOL 30 MG: 10 INJECTION, EMULSION INTRAVENOUS at 09:17

## 2022-03-18 RX ADMIN — SODIUM CHLORIDE: 9 INJECTION, SOLUTION INTRAVENOUS at 08:30

## 2022-03-18 ASSESSMENT — PAIN SCALES - GENERAL
PAINLEVEL_OUTOF10: 0

## 2022-03-18 ASSESSMENT — PAIN - FUNCTIONAL ASSESSMENT: PAIN_FUNCTIONAL_ASSESSMENT: 0-10

## 2022-03-18 ASSESSMENT — LIFESTYLE VARIABLES: SMOKING_STATUS: 0

## 2022-03-18 NOTE — BRIEF OP NOTE
Brief Postoperative Note - Full Note in Chart Review/Procedures tab       Patient: Yinka Krishnamurthy  YOB: 1960  MRN: [id number]    Date of Procedure: 3/18/2022    Pre-Op Diagnosis: ABDOMINAL PAIN R10.9    Post-Op Diagnosis: Same       Procedure(s):  COLONOSCOPY POLYPECTOMY SNARE/COLD BIOPSY  EGD DIAGNOSTIC ONLY    Surgeon(s):  Rene Kulkarni MD    Assistant:  * No surgical staff found *    Anesthesia: Monitor Anesthesia Care    Estimated Blood Loss (mL): Minimal    Complications: None    Specimens:   ID Type Source Tests Collected by Time Destination   A : Cecal polyp Tissue Colon SURGICAL PATHOLOGY Rene Kulkarni MD 3/18/2022 0914    B : Ascending colon polyps x 2 Tissue Colon SURGICAL PATHOLOGY Rene Kulkarni MD 3/18/2022 0785    C : Transverse colon polyps x 4 Tissue Colon [address] Ewing Galeazzi, MD 3/18/2022 3515    D : Splenic flexure polyps x 3 Tissue Colon [address] Ewing Galeazzi, MD 3/18/2022 0227    E : Descending colon polyps x 1 Tissue Colon [address] Ewing Galeazzi, MD 3/18/2022 3506        Implants:  * No implants in log *      Drains: * No LDAs found *    Findings:  1) Normal Esophagogastroduodenoscopy     2) Moderate pancolonic diverticulosls    3) Eleven polyps 5 mm to 10 mm in size removed with hot and cold snare. Rec:  Resume diet  Continue present treatment. Await pathology results. F/U WITH ME in 3 months  Schedule RUQ US if abd pain occurs/persists.   Colonoscopy in 1 year  Followup with referring physician as previously instucted    Electronically signed by Rene Kulkarni MD on 3/18/2022 at 9:48 AM

## 2022-03-18 NOTE — H&P
[address] [place] [address]   Pre-operative History and Physical    Patient: Darline Field  : 1960  CSN:     History Obtained From: patient and/or guardian. HISTORY OF PRESENT ILLNESS:    The patient is a 59 y.o. male with multiple medical problems, history of colon polyps and epigastric pain here for EGD and colonoscopy.      Past Medical History:        Diagnosis Date    Arthralgia of multiple joints 10/27/2015    Arthritis     Atrial fibrillation (HCC)     Chronic kidney disease     stage 4    Chronic systolic congestive heart failure ([address] 2021    CRI (chronic renal insufficiency)     Diabetic nephropathy associated with type 2 diabetes mellitus ([address] 10/11/2018    Diverticulosis     Elevated PSA     Erectile dysfunction     ESRD (end stage renal disease) on dialysis ([address] 2022    Gout     Gout     Hemrrhoid NOS w/ complication NEC     History of MRSA infection     Hypertension     CELSA (obstructive sleep apnea) 2017    uses C-pap machine    Type 2 diabetes mellitus without complication, without long-term current use of insulin ([facility]     Umbilical hernia without obstruction and without gangrene 2016     Past Surgical History:        Procedure Laterality Date    COLONOSCOPY      CT INSERT PERITONEAL CATHETER      DIALYSIS CATHETER INSERTION N/A 2022    LAPAROSCOPIC PERITONEAL DIALYSIS CATHETER PLACEMENT and omentopexy performed by Susana Pearson DO at [facility], [facility]      IR TUNNELED [facility] 5 YEARS  2022    IR TUNNELED CATHETER PLACEMENT GREATER THAN 5 YEARS 2022 [facility] SPECIAL PROCEDURES    UPPER GASTROINTESTINAL ENDOSCOPY  2016    biopsies, multiple small gastric ulcers    UPPER GASTROINTESTINAL ENDOSCOPY  2016    VENTRAL HERNIA REPAIR N/A 2022    LAPAROSCOPIC incarcerated VENTRAL HERNIA REPAIR performed by Susana Pearson DO at [address]     Medications Prior to Admission:   No current facility-administered medications on file prior to encounter.      Current Outpatient Medications on File Prior to Encounter   Medication Sig Dispense Refill    B Complex-C-Folic Acid (DIALYVITE 780 PO) Take by mouth daily      Hepatitis B Vac Recombinant (ENGERIX-B) 20 MCG/ML INJ Inject 40 mcg into the muscle Every 4 weeks      Methoxy PEG-Epoetin Beta (MIRCERA) 50 MCG/0.3ML SOSY Inject as directed Twice a  month      Iron Sucrose (VENOFER IV) Infuse intravenously every 30 days      NIFEdipine (PROCARDIA XL) 60 MG extended release tablet TAKE 1 TABLET BY MOUTH EVERYDAY AT BEDTIME (Patient taking differently: 90 mg ) 30 tablet 0    bisacodyl (BISACODYL) 5 MG EC tablet Take 1 tablet by mouth daily as needed for Constipation 30 tablet 5    spironolactone (ALDACTONE) 50 MG tablet Take 1 tablet by mouth at bedtime 30 tablet 3    metoprolol succinate (TOPROL XL) 50 MG extended release tablet Take 1 tablet by mouth at bedtime TAKE 1 TABLET BY MOUTH EVERY DAY 30 tablet 3    atorvastatin (LIPITOR) 20 MG tablet TAKE 1 TABLET BY MOUTH EVERY DAY 30 tablet 5    torsemide (DEMADEX) 100 MG tablet Take 1 tablet by mouth daily (Patient not taking: Reported on 2/24/2022) 30 tablet 3    colchicine (COLCRYS) 0.6 MG tablet Take 1 tablet by mouth daily as needed for Pain (as needed for pain related to gout) 30 tablet 5    vitamin D (ERGOCALCIFEROL) 1.25 MG (87423 UT) CAPS capsule Take 1 capsule by mouth once a week 12 capsule 1    calcitRIOL (ROCALTROL) 0.25 MCG capsule Take 1 capsule by mouth daily (Patient taking differently: Take 0.5 mcg by mouth daily ) 30 capsule 3    calcium acetate (PHOSLO) 667 MG CAPS capsule Take 2 capsules by mouth 3 times daily (with meals) 180 capsule 0    nortriptyline (PAMELOR) 10 MG capsule nightly       omeprazole (PRILOSEC) 40 MG delayed release capsule TAKE 1 CAPSULE BY MOUTH EVERY DAY 30 capsule 1    sildenafil (VIAGRA) 100 MG tablet Take 1 tablet by mouth as needed for Erectile Dysfunction 30 tablet 3    aspirin 81 MG tablet Take 81 mg by mouth daily       Allergies:  Patient has no known allergies. Social History:   Social History     Tobacco Use    Smoking status: Never Smoker    Smokeless tobacco: Never Used   Substance Use Topics    Alcohol use: Yes     Comment: drinks etoh on weekends- a 12 pk or so     Family History:   Family History   Problem Relation Age of Onset    Hypertension Other     Breast Cancer Mother     Colon Cancer Father     Diabetes Maternal Grandmother     Coronary Art Dis Brother        PHYSICAL EXAM:      BP (!) 142/102   Pulse 100   Temp 97.3 °F (36.3 °C) (Temporal)   Resp 12   Ht 6' 5\" (1.956 m)   Wt 280 lb (127 kg)   SpO2 100%   BMI 33.20 kg/m²  I        Heart:  RRR,  Normal S1S2    Lungs:  CTA Bilat, normal effort    Abdomen:  ND NT      ASA Grade:  ASA 4 - Patient with severe systemic disease that is a constant threat to life    Mallampati Class:  Class I: Soft palate, uvula, fauces, pillars visible  __________  Class II: Soft palate, uvula, fauces visible  __________   Class III: Soft palate, base of uvula visible  ____X_____  Class IV: Hard palate only visible   __________      ASSESSMENT AND PLAN:    1. Patient is a 59 y.o. male here for EGD and colonoscopy with anesthesia  2. Procedure options, risks and benefits reviewed with patient. Patient expresses understanding.      Jean-Pierre Archuleta, [address] [place] [address]  3/18/2022

## 2022-03-18 NOTE — ANESTHESIA PRE PROCEDURE
Department of Anesthesiology  Preprocedure Note       Name:  Mynor Rodriguez   Age:  59 y.o.  :  1960                                          MRN:  [id number]         Date:  3/18/2022      Surgeon: Aaliyah Gunter):  Dedra Pabon MD    Procedure: Procedure(s):  COLONOSCOPY DIAGNOSTIC  EGD DIAGNOSTIC ONLY    Medications prior to admission:   Prior to Admission medications    Medication Sig Start Date End Date Taking? Authorizing Provider   busPIRone (BUSPAR) 5 MG tablet Take 1 tablet by mouth 2 times daily 3/10/22 4/9/22  Fitz Zhang MD   oxyCODONE-acetaminophen (PERCOCET) 5-325 MG per tablet Take 1 tablet by mouth every 8 hours as needed for Pain for up to 30 days.  2/24/22 3/26/22  Alexa Rowland MD   NIFEdipine (PROCARDIA XL) 60 MG extended release tablet TAKE 1 TABLET BY MOUTH EVERYDAY AT BEDTIME 22   Fitz Zhang MD   bisacodyl (BISACODYL) 5 MG EC tablet Take 1 tablet by mouth daily as needed for Constipation 22   Fitz Zhang MD   spironolactone (ALDACTONE) 50 MG tablet Take 1 tablet by mouth at bedtime 22   Fitz Zhang MD   metoprolol succinate (TOPROL XL) 50 MG extended release tablet Take 1 tablet by mouth at bedtime TAKE 1 TABLET BY MOUTH EVERY DAY 22   Fitz Zhang MD   atorvastatin (LIPITOR) 20 MG tablet TAKE 1 TABLET BY MOUTH EVERY DAY 22   Fitz Zhang MD   torsemide (DEMADEX) 100 MG tablet Take 1 tablet by mouth daily  Patient not taking: Reported on 2022   Fitz Zhang MD   colchicine (COLCRYS) 0.6 MG tablet Take 1 tablet by mouth daily as needed for Pain (as needed for pain related to gout) 22   Fitz Zhang MD   vitamin D (ERGOCALCIFEROL) 1.25 MG (02751 UT) CAPS capsule Take 1 capsule by mouth once a week 22   Fitz Zhang MD   calcitRIOL (ROCALTROL) 0.25 MCG capsule Take 1 capsule by mouth daily 22   Fitz Zhang MD   calcium acetate (PHOSLO) 667 MG CAPS capsule Take 2 capsules by mouth 3 times daily (with meals) 1/18/22 2/17/22  Jyoti Tate MD   nortriptyline [facility]) 10 MG capsule nightly  12/15/21   Historical Provider, MD   omeprazole (PRILOSEC) 40 MG delayed release capsule TAKE 1 CAPSULE BY MOUTH EVERY DAY 12/16/21   Hilda Correa MD   sildenafil (VIAGRA) 100 MG tablet Take 1 tablet by mouth as needed for Erectile Dysfunction 8/16/21   Hilda Correa MD   aspirin 81 MG tablet Take 81 mg by mouth daily    Historical Provider, MD       Current medications:    Current Facility-Administered Medications   Medication Dose Route Frequency Provider Last Rate Last Admin    0.9 % sodium chloride infusion   IntraVENous Continuous Latrice Foster MD           Allergies:  No Known Allergies    Problem List:    Patient Active Problem List   Diagnosis Code    Atrial fibrillation ([facility] I48.91    CRI (chronic renal insufficiency) N18.9    Gout M10.9    Erectile dysfunction N52.9    Elevated PSA R97.20    Essential hypertension, benign I10    Headache R51.9    Diverticulosis K57.90    Arthralgia of multiple joints C42.22    Umbilical hernia without obstruction and without gangrene K42.9    Knee pain, bilateral M25.561, M25.562    Alcohol use disorder, mild, abuse F10.10    CELSA (obstructive sleep apnea) G47.33    Duodenal ulcer disease K26.9    Diabetic nephropathy associated with type 2 diabetes mellitus ([facility]) E11.21    Type 2 diabetes mellitus with diabetic nephropathy, without long-term current use of insulin ([facility]) E11.21    Lateral epicondylitis of right elbow M77.11    Chronic bilateral low back pain without sciatica M54.50, G89.29    Morbid obesity due to excess calories ([facility]) E66.01    Chronic systolic congestive heart failure ([facility]) I50.22    Acute kidney injury superimposed on CKD ([facility]) N17.9, N18.9    Stroke-like symptoms R29.90    DMII (diabetes mellitus, type 2) ([facility]) E11.9    Hyperlipidemia E78.5    Hypocalcemia E83.51    Hypomagnesemia E83.42    Hypokalemia E87.6  Muscle cramps R25.2    ANTONIO (acute kidney injury) ([address] N17.9    Electrolyte imbalance E87.8    Anemia due to stage 5 chronic kidney disease (HCC) N18.5, D63.1    Congestive heart failure (HCC) I50.9    LV dysfunction I51.9    ESRD (end stage renal disease) on dialysis (HCC) N18.6, Z99.2       Past Medical History:        Diagnosis Date    Arthralgia of multiple joints 10/27/2015    Arthritis     Atrial fibrillation (HCC)     Chronic kidney disease     stage 4    Chronic systolic congestive heart failure ([address] 8/16/2021    CRI (chronic renal insufficiency)     Diabetic nephropathy associated with type 2 diabetes mellitus ([address] 10/11/2018    Diverticulosis     Elevated PSA     Erectile dysfunction     ESRD (end stage renal disease) on dialysis ([address] 2/24/2022    Gout     Gout     Hemrrhoid NOS w/ complication NEC     History of MRSA infection     Hypertension     CELSA (obstructive sleep apnea) 07/14/2017    uses C-pap machine    Type 2 diabetes mellitus without complication, without long-term current use of insulin ([facility] 5/74/1722    Umbilical hernia without obstruction and without gangrene 2/2/2016       Past Surgical History:        Procedure Laterality Date    COLONOSCOPY      DIALYSIS CATHETER INSERTION N/A 1/17/2022    LAPAROSCOPIC PERITONEAL DIALYSIS CATHETER PLACEMENT and omentopexy performed by Hussain Crook DO at [address], [facility]      IR TUNNELED [address] 5 YEARS  01/14/2022    IR TUNNELED CATHETER PLACEMENT GREATER THAN 5 YEARS 1/14/2022 [facility] [place] SPECIAL PROCEDURES    UPPER GASTROINTESTINAL ENDOSCOPY  05/28/2016    biopsies, multiple small gastric ulcers    UPPER GASTROINTESTINAL ENDOSCOPY  09/12/2016    VENTRAL HERNIA REPAIR N/A 1/17/2022    LAPAROSCOPIC incarcerated VENTRAL HERNIA REPAIR performed by Hussain Crook DO at [address] History:    Social History     Tobacco Use    Smoking status: Never Smoker    Smokeless tobacco: Never Used   Substance Use Topics    Alcohol use: Yes     Comment: drinks etoh on weekends- a 12 pk or so                                Counseling given: Not Answered      Vital Signs (Current): There were no vitals filed for this visit. BP Readings from Last 3 Encounters:   02/24/22 120/80   02/09/22 (!) 184/101   01/19/22 (!) 162/89       NPO Status:                                                                                 BMI:   Wt Readings from Last 3 Encounters:   02/24/22 271 lb (122.9 kg)   02/09/22 285 lb (129.3 kg)   01/19/22 288 lb (130.6 kg)     There is no height or weight on file to calculate BMI.    CBC:   Lab Results   Component Value Date    WBC 4.8 02/24/2022    RBC 4.22 02/24/2022    HGB 11.7 02/24/2022    HCT 36.0 02/24/2022    MCV 85.4 02/24/2022    RDW 14.7 02/24/2022     02/24/2022       CMP:   Lab Results   Component Value Date     02/24/2022    K 3.3 02/24/2022    K 4.0 01/17/2022    CL 96 02/24/2022    CO2 26 02/24/2022    BUN 39 02/24/2022    CREATININE 7.6 02/24/2022    GFRAA 9 02/24/2022    GFRAA >60 05/24/2013    AGRATIO 1.6 02/24/2022    LABGLOM 7 02/24/2022    LABGLOM 56 04/14/2011    GLUCOSE 129 02/24/2022    PROT 6.5 02/24/2022    PROT 7.5 10/03/2012    CALCIUM 8.0 02/24/2022    BILITOT 0.3 02/24/2022    ALKPHOS 104 02/24/2022    AST 14 02/24/2022    ALT 19 02/24/2022       POC Tests: No results for input(s): POCGLU, POCNA, POCK, POCCL, POCBUN, POCHEMO, POCHCT in the last 72 hours.     Coags:   Lab Results   Component Value Date    PROTIME 11.1 01/17/2022    INR 0.98 01/17/2022    APTT 34.2 03/17/2019       HCG (If Applicable): No results found for: PREGTESTUR, PREGSERUM, HCG, HCGQUANT     ABGs: No results found for: PHART, PO2ART, NED5HTA, GUW8ECT, BEART, O3VTHTDY     Type & Screen (If Applicable):  No results found for: LABABO, LABRH    Drug/Infectious Status (If Applicable):  No results found for: HIV, HEPCAB    COVID-19 Screening (If Applicable):   Lab Results   Component Value Date    COVID19 Not Detected 01/12/2022           Anesthesia Evaluation  Patient summary reviewed and Nursing notes reviewed no history of anesthetic complications:   Airway: Mallampati: III  TM distance: >3 FB   Neck ROM: full  Mouth opening: > = 3 FB Dental: normal exam         Pulmonary:normal exam  breath sounds clear to auscultation  (+) sleep apnea: on CPAP,      (-) COPD, asthma and not a current smoker                           Cardiovascular:    (+) hypertension:, dysrhythmias (remote hx per pt, no current issues): atrial fibrillation, CHF (echo 2021 EF 45-50 gr 1 dd; stable/compensated, no s/s vol overload): systolic, hyperlipidemia    (-) past MI, CAD (neg stress test nml EF 1/2022) and CABG/stent    ECG reviewed  Rhythm: regular  Rate: normal  Echocardiogram reviewed  Stress test reviewed       Beta Blocker:  Dose within 24 Hrs         Neuro/Psych:   (+) neuromuscular disease (chronic lbp):, headaches:, depression/anxiety    (-) seizures, TIA and CVA           GI/Hepatic/Renal:   (+) GERD:, PUD, renal disease (PD at night): ESRD and dialysis,           Endo/Other:    (+) Diabetes (a1c 7)Type II DM, , : arthritis (Gout):., .    (-) hypothyroidism, hyperthyroidism               Abdominal:             Vascular: Other Findings:           Anesthesia Plan      MAC     ASA 4       Induction: intravenous. Anesthetic plan and risks discussed with patient. Plan discussed with CRNA.                   Camacho Nunez MD   3/18/2022

## 2022-03-18 NOTE — ANESTHESIA POSTPROCEDURE EVALUATION
Department of Anesthesiology  Postprocedure Note    Patient: Gaurav Guaman  MRN: [id number]  YOB: 1960  Date of evaluation: 3/18/2022  Time:  9:59 AM     Procedure Summary     Date: 03/18/22 Room / Location: [address] [place] [address]    Anesthesia Start: [address] Anesthesia Stop: 9302    Procedures:       COLONOSCOPY POLYPECTOMY SNARE/COLD BIOPSY (N/A )      EGD DIAGNOSTIC ONLY (N/A Abdomen) Diagnosis: (ABDOMINAL PAIN R10.9)    Surgeons: Shonda Bose MD Responsible Provider: Karen Munguia MD    Anesthesia Type: MAC ASA Status: 4          Anesthesia Type: MAC    Johny Phase I: Johny Score: 10    Johny Phase II: Johny Score: 9    Last vitals: Reviewed and per EMR flowsheets.        Anesthesia Post Evaluation    Patient location during evaluation: PACU  Patient participation: complete - patient participated  Level of consciousness: awake and alert  Airway patency: patent  Nausea & Vomiting: no vomiting and no nausea  Complications: no  Cardiovascular status: hemodynamically stable  Respiratory status: acceptable  Hydration status: stable

## 2022-03-28 ENCOUNTER — TELEPHONE (OUTPATIENT)
Dept: PULMONOLOGY | Age: 62
End: 2022-03-28

## 2022-03-28 NOTE — TELEPHONE ENCOUNTER
Patient's wife called and says they received the new machine and set it up, but it is making a weird bubbling noise and is causing some dry mouth. She would like to know what needs to be changed to fix it.

## 2022-04-04 ENCOUNTER — TELEPHONE (OUTPATIENT)
Dept: BARIATRICS/WEIGHT MGMT | Age: 62
End: 2022-04-04

## 2022-04-04 NOTE — TELEPHONE ENCOUNTER
Marcos Morrissey from Dialysis is calling, concerns for pt PD Cath. Pt is not getting completely drains. Is constantly getting low drain messages. Marcos Morrissey did do Xray. Pt is not constipation, no clog in line. She is not sure if cath needs to be repositioned or an extension added.      Please advise

## 2022-04-04 NOTE — TELEPHONE ENCOUNTER
Spoke to provider, will do a removal and replacement of PD cath on 4/15/22. Called and spoke to dialysis nurse Mustapha Kwon. She stated that she will relay message to pt. .         Called an spoke to pt about his upcoming surgery on 4/15/22

## 2022-04-07 ENCOUNTER — TELEPHONE (OUTPATIENT)
Dept: BARIATRICS/WEIGHT MGMT | Age: 62
End: 2022-04-07

## 2022-04-07 NOTE — PROGRESS NOTES
PRE-OP INSTRUCTIONS FOR THE SURGICAL PATIENT YOU ARE UNABLE TO MAKE CONTACT FOR AN INTERVIEW:        1. Follow all instructions provided to you from your surgeons office, including your ARRIVAL TIME. 2. Enter the MAIN entrance located on [address] and report to the desk. 3. Bring your insurance & photo ID with you. You may also be asked to pay a co-pay, as you may want to bring a check or credit card with you. 4. Leave all other valuables at home. 5. Arrange for someone to drive you home and be with you for the first 24 hours after discharge. 6. Bring your medication list with you day of surgery with doses and frequency listed (including over the counter medications)  7. You must contact your surgeon for Instructions regarding:              - ALL medication instructions, especially if taking blood thinners, aspirin, or diabetic medication.         -Bariatric patients call surgeon if on diabetic medications as some need to be stopped 1 week preop  - IF  there is a change in your physical condition such as a cold, fever, rash, cuts, sores or any other infection, especially near your surgical site. 8. A Pre-op History and Physical for surgery MUST be completed by your Physician or an Urgent Care within 30 days of your procedure date. Please bring a copy with you on the day of your procedure and along with any other testing performed. 9. DO NOT EAT ANYTHING eight hours prior to arrival for surgery. May have up to 8 ounces of water 4 hours prior to arrival for surgery. NOTE: ALL Gastric, Bariatric and Bowel surgery patients MUST follow their surgeon's instructions. 10. No gum, candy, mints, or ice chips day of procedure. 11. Please refrain from drinking alcohol the day before or day of your procedure. 12. Please do not smoke the day of your procedure. 13. Dress in loose, comfortable clothing appropriate for redressing after your procedure.  Do not wear jewelry (including body piercings), make-up, fingernail polish, lotion, powders or metal hairclips. 15. Contacts will need to be removed prior to surgery. You may want to bring your eye glasses to wear immediately before and after surgery. 14. Dentures will need to be removed before your procedure. 13. Bring cases for your glasses, contacts, dentures, or hearing aids to protect them while you are in surgery. 16. If you use a CPAP, please bring it with you on the day of your procedure. 17. Do not shave or wax for 72 hours prior to procedure near your operative site  18. FOR WOMAN OF CHILDBEARING AGE ONLY- please make sure we can collect a urine sample on arrival.     If you have further questions, you may contact your surgeon's office or us at [phone]     Left instructions on patient's voicemail.     Gerson Wright RN.4/7/2022 .11:04 AM

## 2022-04-07 NOTE — TELEPHONE ENCOUNTER
Pt was previously given surgery information details from the medical assistant and his dialysis nurse was notified as well. Received call from PAT that pt is cancelling his surgery because his PD catheter doesn't need replaced. Phone call to pt to verify this. Left message.

## 2022-04-07 NOTE — PROGRESS NOTES
Place patient label inside box (if no patient label, complete below)  Name:  :  MR#:   Chandni Speaks / PROCEDURE  I (we), Gerry Cho  (Patient Name) authorize DR. Kalyan Jenkins  (Provider / Maite Chavez) and/or such assistants as may be selected by him/her, to perform the following operation/procedure(s): ROBOTIC, RECURRENT INCISIONAL HERNIA REPAIR WITH BIOSYNTHETIC MESH;BILATERAL OPEN INGUINAL HERNIA REPAIR    1. Note: If unable to obtain consent prior to an emergent procedure, document the emergent reason in the medical record. This procedure has been explained to my (our) satisfaction and included in the explanation was:  A) The intended benefit, nature, and extent of the procedure to be performed;  B) The significant risks involved and the probability of success;  C) Alternative procedures and methods of treatment;  D) The dangers and probable consequences of such alternatives (including no procedure or treatment); E) The expected consequences of the procedure on my future health;  F) Whether other qualified individuals would be performing important surgical tasks and/or whether  would be present to advise or support the procedure. I (we) understand that there are other risks of infection and other serious complications in the pre-operative/procedural and postoperative/procedural stages of my (our) care. I (we) have asked all of the questions which I (we) thought were important in deciding whether or not to undergo treatment or diagnosis. These questions have been answered to my (our) satisfaction. I (we) understand that no assurance can be given that the procedure will be a success, and no guarantee or warranty of success has been given to me (us).     2. It has been explained to me (us) that during the course of the operation/procedure, unforeseen conditions may be revealed that necessitate extension of the original procedure(s) or different procedure(s) than those set forth in Paragraph 1. I (we) authorize and request that the above-named physician, his/her assistants or his/her designees, perform procedures as necessary and desirable if deemed to be in my (our) best interest.     Revised 8/2/2021                                                                          Page 1 of 2       3. I acknowledge that health care personnel may be observing this procedure for the purpose of medical education or other specified purposes as may be necessary as requested and/or approved by my (our) physician. 4. I (we) consent to the disposal by the hospital Pathologist of the removed tissue, parts or organs in accordance with hospital policy. 5. I do ____ do not ____ consent to the use of a local infiltration pain blocking agent that will be used by my provider/surgical provider to help alleviate pain during my procedure. 6. I do ____ do not ____ consent to an emergent blood transfusion in the case of a life-threatening situation that requires blood components to be administered. This consent is valid for 24 hours from the beginning of the procedure. 7. This patient does ____ or does not ____ currently have a DNR status/order. If DNR order is in place, obtain Addendum to the Surgical Consent for ALL Patients with a DNR Order to address margarita-operative status for limited intervention or DNR suspension.      8. I have read and fully understand the above Consent for Operation/Procedure and that all blanks were completed before I signed the consent.   _____________________________       _____________________      ____/____am/pm  Signature of Patient or legal representative      Printed Name / Relationship            Date / Time   ____________________________       _____________________      ____/____am/pm  Witness to Signature                                    Printed Name                    Date / Time     If patient is unable to sign or is a minor, complete the following)  Patient is a minor, ____ years of age, or unable to sign because:   ______________________________________________________________________________________________    Jannell Outhouse If a phone consent is obtained, consent will be documented by using two health care professionals, each affirming that the consenting party has no questions and gives consent for the procedure discussed with the physician/provider.   _____________________          ____________________       _____/_____am/pm   2nd witness to phone consent        Printed name           Date / Time    Informed Consent:  I have provided the explanation described above in section 1 to the patient and/or legal representative.  I have provided the patient and/or legal representative with an opportunity to ask any questions about the proposed operation/procedure.   ___________________________          ____________________         ____/____am/pm  Provider / Proceduralist                            Printed name            Date / Time  Revised 8/2/2021                                                                      Page 2 of 2

## 2022-04-07 NOTE — PROGRESS NOTES
Patient  states he was told he would not have to replace cath and so states he is not coming on 4/15 for replacement Notified Tiny Isael, at surgeon office, via Teams message.  Shilpi Rodriguez

## 2022-04-12 ENCOUNTER — HOSPITAL ENCOUNTER (EMERGENCY)
Age: 62
Discharge: HOME | End: 2022-04-13
Attending: EMERGENCY MEDICINE
Payer: COMMERCIAL

## 2022-04-12 DIAGNOSIS — Z99.2 PERITONEAL DIALYSIS STATUS (HCC): ICD-10-CM

## 2022-04-12 DIAGNOSIS — K40.21 BILATERAL RECURRENT INGUINAL HERNIA WITHOUT OBSTRUCTION OR GANGRENE: Primary | ICD-10-CM

## 2022-04-12 LAB
BASOPHILS ABSOLUTE: 0 K/UL (ref 0–0.2)
BASOPHILS RELATIVE PERCENT: 0.6 %
EOSINOPHILS ABSOLUTE: 0.3 K/UL (ref 0–0.6)
EOSINOPHILS RELATIVE PERCENT: 4.7 %
HCT VFR BLD CALC: 40.1 % (ref 40.5–52.5)
HEMOGLOBIN: 12.6 G/DL (ref 13.5–17.5)
LACTIC ACID: 0.8 MMOL/L (ref 0.4–2)
LYMPHOCYTES ABSOLUTE: 1.4 K/UL (ref 1–5.1)
LYMPHOCYTES RELATIVE PERCENT: 25 %
MCH RBC QN AUTO: 26.7 PG (ref 26–34)
MCHC RBC AUTO-ENTMCNC: 31.4 G/DL (ref 31–36)
MCV RBC AUTO: 85 FL (ref 80–100)
MONOCYTES ABSOLUTE: 0.6 K/UL (ref 0–1.3)
MONOCYTES RELATIVE PERCENT: 10.7 %
NEUTROPHILS ABSOLUTE: 3.3 K/UL (ref 1.7–7.7)
NEUTROPHILS RELATIVE PERCENT: 59 %
PDW BLD-RTO: 15.9 % (ref 12.4–15.4)
PLATELET # BLD: 192 K/UL (ref 135–450)
PMV BLD AUTO: 8.1 FL (ref 5–10.5)
RBC # BLD: 4.71 M/UL (ref 4.2–5.9)
WBC # BLD: 5.6 K/UL (ref 4–11)

## 2022-04-12 PROCEDURE — 99285 EMERGENCY DEPT VISIT HI MDM: CPT

## 2022-04-12 PROCEDURE — 83605 ASSAY OF LACTIC ACID: CPT

## 2022-04-12 PROCEDURE — 80048 BASIC METABOLIC PNL TOTAL CA: CPT

## 2022-04-12 PROCEDURE — 85025 COMPLETE CBC W/AUTO DIFF WBC: CPT

## 2022-04-12 PROCEDURE — 36415 COLL VENOUS BLD VENIPUNCTURE: CPT

## 2022-04-12 PROCEDURE — 96376 TX/PRO/DX INJ SAME DRUG ADON: CPT

## 2022-04-12 PROCEDURE — 96374 THER/PROPH/DIAG INJ IV PUSH: CPT

## 2022-04-12 RX ORDER — MORPHINE SULFATE 4 MG/ML
4 INJECTION, SOLUTION INTRAMUSCULAR; INTRAVENOUS ONCE
Status: COMPLETED | OUTPATIENT
Start: 2022-04-12 | End: 2022-04-13

## 2022-04-12 ASSESSMENT — PAIN SCALES - GENERAL: PAINLEVEL_OUTOF10: 8

## 2022-04-12 ASSESSMENT — ENCOUNTER SYMPTOMS
EYES NEGATIVE: 1
RESPIRATORY NEGATIVE: 1
GASTROINTESTINAL NEGATIVE: 1

## 2022-04-12 ASSESSMENT — PAIN - FUNCTIONAL ASSESSMENT: PAIN_FUNCTIONAL_ASSESSMENT: 0-10

## 2022-04-12 ASSESSMENT — PAIN DESCRIPTION - LOCATION: LOCATION: OTHER (COMMENT)

## 2022-04-12 ASSESSMENT — PAIN DESCRIPTION - PAIN TYPE: TYPE: ACUTE PAIN

## 2022-04-12 ASSESSMENT — PAIN DESCRIPTION - FREQUENCY: FREQUENCY: CONTINUOUS

## 2022-04-12 NOTE — TELEPHONE ENCOUNTER
Spoke with pt's wife who confirmed they no longer wish to proceed with the scheduled lap PD cath removal and replacement at this time.

## 2022-04-12 NOTE — TELEPHONE ENCOUNTER
Patient's wife LM returning Sofia's call, she said the machine is still having issues, would like to set up a time to bring it into the office to be looked at/adjusted. [phone].

## 2022-04-13 ENCOUNTER — APPOINTMENT (OUTPATIENT)
Dept: CT IMAGING | Age: 62
End: 2022-04-13
Payer: COMMERCIAL

## 2022-04-13 ENCOUNTER — TELEPHONE (OUTPATIENT)
Dept: BARIATRICS/WEIGHT MGMT | Age: 62
End: 2022-04-13

## 2022-04-13 VITALS
OXYGEN SATURATION: 97 % | RESPIRATION RATE: 16 BRPM | TEMPERATURE: 98.4 F | DIASTOLIC BLOOD PRESSURE: 95 MMHG | HEART RATE: 89 BPM | BODY MASS INDEX: 33.06 KG/M2 | HEIGHT: 77 IN | WEIGHT: 280 LBS | SYSTOLIC BLOOD PRESSURE: 141 MMHG

## 2022-04-13 LAB
ANION GAP SERPL CALCULATED.3IONS-SCNC: 17 MMOL/L (ref 3–16)
BUN BLDV-MCNC: 36 MG/DL (ref 7–20)
CALCIUM SERPL-MCNC: 6.1 MG/DL (ref 8.3–10.6)
CHLORIDE BLD-SCNC: 99 MMOL/L (ref 99–110)
CO2: 23 MMOL/L (ref 21–32)
CREAT SERPL-MCNC: 6.1 MG/DL (ref 0.8–1.3)
GFR AFRICAN AMERICAN: 11
GFR NON-AFRICAN AMERICAN: 9
GLUCOSE BLD-MCNC: 126 MG/DL (ref 70–99)
POTASSIUM SERPL-SCNC: 3.3 MMOL/L (ref 3.5–5.1)
SODIUM BLD-SCNC: 139 MMOL/L (ref 136–145)

## 2022-04-13 PROCEDURE — 99284 EMERGENCY DEPT VISIT MOD MDM: CPT | Performed by: INTERNAL MEDICINE

## 2022-04-13 PROCEDURE — 6370000000 HC RX 637 (ALT 250 FOR IP): Performed by: EMERGENCY MEDICINE

## 2022-04-13 PROCEDURE — 6360000002 HC RX W HCPCS: Performed by: EMERGENCY MEDICINE

## 2022-04-13 PROCEDURE — 99284 EMERGENCY DEPT VISIT MOD MDM: CPT | Performed by: SURGERY

## 2022-04-13 PROCEDURE — 74176 CT ABD & PELVIS W/O CONTRAST: CPT

## 2022-04-13 RX ORDER — MORPHINE SULFATE 4 MG/ML
4 INJECTION, SOLUTION INTRAMUSCULAR; INTRAVENOUS ONCE
Status: COMPLETED | OUTPATIENT
Start: 2022-04-13 | End: 2022-04-13

## 2022-04-13 RX ORDER — OXYCODONE HYDROCHLORIDE AND ACETAMINOPHEN 5; 325 MG/1; MG/1
1 TABLET ORAL ONCE
Status: COMPLETED | OUTPATIENT
Start: 2022-04-13 | End: 2022-04-13

## 2022-04-13 RX ORDER — OXYCODONE HYDROCHLORIDE AND ACETAMINOPHEN 5; 325 MG/1; MG/1
1 TABLET ORAL EVERY 6 HOURS PRN
Qty: 12 TABLET | Refills: 0 | Status: SHIPPED | OUTPATIENT
Start: 2022-04-13 | End: 2022-04-16

## 2022-04-13 RX ADMIN — MORPHINE SULFATE 4 MG: 4 INJECTION INTRAVENOUS at 00:11

## 2022-04-13 RX ADMIN — MORPHINE SULFATE 4 MG: 4 INJECTION INTRAVENOUS at 04:28

## 2022-04-13 RX ADMIN — OXYCODONE HYDROCHLORIDE AND ACETAMINOPHEN 1 TABLET: 5; 325 TABLET ORAL at 09:51

## 2022-04-13 ASSESSMENT — PAIN SCALES - GENERAL
PAINLEVEL_OUTOF10: 6
PAINLEVEL_OUTOF10: 4
PAINLEVEL_OUTOF10: 5
PAINLEVEL_OUTOF10: 5

## 2022-04-13 ASSESSMENT — PAIN DESCRIPTION - LOCATION: LOCATION: GROIN

## 2022-04-13 ASSESSMENT — PAIN DESCRIPTION - PAIN TYPE: TYPE: ACUTE PAIN

## 2022-04-13 NOTE — CONSULTS
General Surgery   Resident Consult Note    Reason for consult: Concern for inguinal hernias    Chief Complaint: Scrotal swelling    History obtained from: Patient    History of Present Illness :    Otilia Alfonso is a 59 y.o. male with a history as listed below who presents with scrotal swelling. Patient has ESRD on PD and had PD catheter placed by Dr. Vandana Rubi on 1/17/22. He follows with Dr. Ricardo Kelly. Patient states that he did PD yesterday without any issue. He went to the bathroom last evening and noticed significant scrotal swelling and some discomfort. He had never experienced this before. He recently was having pain with PD and was started on antibiotics yesterday for concern for PD peritonitis. Patient denies any obstructive symptoms such as N/V or obstipation.      Past Medical History:        Diagnosis Date    Arthralgia of multiple joints 10/27/2015    Arthritis     Atrial fibrillation (HCC)     Chronic kidney disease     stage 4    Chronic systolic congestive heart failure ([address] 8/16/2021    CRI (chronic renal insufficiency)     Diabetic nephropathy associated with type 2 diabetes mellitus ([address] 10/11/2018    Diverticulosis     Elevated PSA     Erectile dysfunction     ESRD (end stage renal disease) on dialysis ([address] 2/24/2022    Gout     Gout     Hemrrhoid NOS w/ complication NEC     History of MRSA infection     Hypertension     CELSA (obstructive sleep apnea) 07/14/2017    uses C-pap machine    Type 2 diabetes mellitus without complication, without long-term current use of insulin ([address] 8/67/2146    Umbilical hernia without obstruction and without gangrene 2/2/2016       Past Surgical History:           Procedure Laterality Date    COLONOSCOPY      COLONOSCOPY N/A 3/18/2022    COLONOSCOPY POLYPECTOMY SNARE/COLD BIOPSY performed by Ana Laura Segal MD at [address] [facility] INSERT PERITONEAL CATHETER      DIALYSIS CATHETER INSERTION N/A 1/17/2022    LAPAROSCOPIC PERITONEAL DIALYSIS CATHETER PLACEMENT and omentopexy performed by Regino Mercer DO at [address] [facility], ESOPHAGUS      IR TUNNELED CATHETER PLACEMENT GREATER THAN 5 YEARS  01/14/2022    IR TUNNELED CATHETER PLACEMENT GREATER THAN 5 YEARS 1/14/2022 [facility]    UPPER GASTROINTESTINAL ENDOSCOPY  05/28/2016    biopsies, multiple small gastric ulcers    UPPER GASTROINTESTINAL ENDOSCOPY  09/12/2016    UPPER GASTROINTESTINAL ENDOSCOPY N/A 3/18/2022    EGD DIAGNOSTIC ONLY performed by Anel Bhandari MD at [place] [address] N/A 1/17/2022    LAPAROSCOPIC incarcerated VENTRAL HERNIA REPAIR performed by Regino Mercer DO at [address]:  Patient has no known allergies. Medications:   Home Meds  No current facility-administered medications on file prior to encounter.      Current Outpatient Medications on File Prior to Encounter   Medication Sig Dispense Refill    busPIRone (BUSPAR) 5 MG tablet TAKE 1 TABLET BY MOUTH TWICE A DAY 60 tablet 0    B Complex-C-Folic Acid (DIALYVITE 044 PO) Take by mouth daily      Hepatitis B Vac Recombinant (ENGERIX-B) 20 MCG/ML INJ Inject 40 mcg into the muscle Every 4 weeks      Methoxy PEG-Epoetin Beta (MIRCERA) 50 MCG/0.3ML SOSY Inject as directed Twice a  month      Iron Sucrose (VENOFER IV) Infuse intravenously every 30 days      NIFEdipine (PROCARDIA XL) 60 MG extended release tablet TAKE 1 TABLET BY MOUTH EVERYDAY AT BEDTIME (Patient taking differently: 90 mg ) 30 tablet 0    bisacodyl (BISACODYL) 5 MG EC tablet Take 1 tablet by mouth daily as needed for Constipation 30 tablet 5    spironolactone (ALDACTONE) 50 MG tablet Take 1 tablet by mouth at bedtime 30 tablet 3    metoprolol succinate (TOPROL XL) 50 MG extended release tablet Take 1 tablet by mouth at bedtime TAKE 1 TABLET BY MOUTH EVERY DAY 30 tablet 3    atorvastatin (LIPITOR) 20 MG tablet TAKE 1 TABLET BY MOUTH EVERY DAY 30 tablet 5    torsemide (DEMADEX) 100 MG tablet Take 1 tablet by mouth daily (Patient not taking: Reported on 2/24/2022) 30 tablet 3    colchicine (COLCRYS) 0.6 MG tablet Take 1 tablet by mouth daily as needed for Pain (as needed for pain related to gout) 30 tablet 5    vitamin D (ERGOCALCIFEROL) 1.25 MG (53520 UT) CAPS capsule Take 1 capsule by mouth once a week 12 capsule 1    calcitRIOL (ROCALTROL) 0.25 MCG capsule Take 1 capsule by mouth daily (Patient taking differently: Take 0.5 mcg by mouth daily ) 30 capsule 3    calcium acetate (PHOSLO) 667 MG CAPS capsule Take 2 capsules by mouth 3 times daily (with meals) 180 capsule 0    nortriptyline (PAMELOR) 10 MG capsule nightly       omeprazole (PRILOSEC) 40 MG delayed release capsule TAKE 1 CAPSULE BY MOUTH EVERY DAY 30 capsule 1    sildenafil (VIAGRA) 100 MG tablet Take 1 tablet by mouth as needed for Erectile Dysfunction 30 tablet 3    aspirin 81 MG tablet Take 81 mg by mouth daily       Current Meds  No current facility-administered medications for this encounter. Family History:   Family History   Problem Relation Age of Onset    Hypertension Other     Breast Cancer Mother     Colon Cancer Father     Diabetes Maternal Grandmother     Coronary Art Dis Brother        Social History:   TOBACCO:   reports that he has never smoked. He has never used smokeless tobacco.  ETOH:   reports current alcohol use. DRUGS:   reports no history of drug use. Review of Systems:   A 14 point review of systems was conducted, significant findings as noted in HPI. All other systems negative. Constitutional: Negative for chills and fever. HENT: Negative for congestion, facial swelling, and voice change. Eyes: Negative for photophobia and visual disturbance. Respiratory: Negative for apnea, cough, chest tightness and shortness of breath. Cardiovascular: Negative for chest pain and palpitations. Gastrointestinal: Negative for dysphagia and early satiety.   Genitourinary: Negative for difficulty urinating, dysuria, flank pain, frequency and hematuria. Musculoskeletal: Negative for new gait problem, joint swelling and myalgias. Skin: Negative for color change, pallor and rash. Endocrine: negative for tremors, temperature intolerance or polydipsia. Allergic/Immunologic: Negative for new environmental or food allergies. Neurological: Negative for dizziness, seizures, speech difficulty, numbness. Hematological: Negative for adenopathy. Psychiatric/Behavioral: Negative for agitation and confusion. Physical exam:    Vitals:    04/13/22 0440 04/13/22 0600 04/13/22 0725 04/13/22 0954   BP: (!) 138/92 (!) 158/104 (!) 140/92 (!) 141/95   Pulse: 96 96 95 89   Resp:  16 16 16   Temp:       TempSrc:       SpO2: 94% 96% 95% 97%   Weight:       Height:           General appearance: alert, resting in bed  Neuro: A&Ox3, no focal deficits  HENT: trachea midline, no JVD, no LAD  Eyes: PERRLA, no scleral icterus  Chest/Lungs: CTAB, no crackles/rales, wheezes/rhonchi   Cardiovascular: RRR, no murmurs/gallops/rubs  Abdomen: soft, non-tender, distended, no guarding or rigidity, PD catheter in place with dressing, bilateral scrotal swelling, no obvious hernia defects noted , no overlying skin changes  Skin: warm and dry  Extremities: no edema , no cyanosis    Labs:    CBC:   Recent Labs     04/12/22  2334   WBC 5.6   HGB 12.6*   HCT 40.1*   MCV 85.0        BMP:   Recent Labs     04/12/22  2334      K 3.3*   CL 99   CO2 23   BUN 36*   CREATININE 6.1*     PT/INR: No results for input(s): PROTIME, INR in the last 72 hours. APTT: No results for input(s): APTT in the last 72 hours.   Liver Profile:  Lab Results   Component Value Date    AST 14 02/24/2022    ALT 19 02/24/2022    BILIDIR <0.2 01/19/2022    BILITOT 0.3 02/24/2022    ALKPHOS 104 02/24/2022     Lab Results   Component Value Date    CHOL 160 12/21/2021    HDL 41 12/21/2021    TRIG 141 12/21/2021     UA:   Lab Results   Component Value Date NITRITE Negative 11/11/2019    COLORU Yellow 12/21/2021    PHUR 6.5 12/21/2021    PHUR 6.5 12/21/2021    WBCUA None seen 12/21/2021    RBCUA 3-4 12/21/2021    BACTERIA Rare 12/21/2021    CLARITYU Clear 12/21/2021    SPECGRAV 1.020 12/21/2021    LEUKOCYTESUR Negative 12/21/2021    UROBILINOGEN 0.2 12/21/2021    BILIRUBINUR Negative 12/21/2021    BILIRUBINUR Negative 11/11/2019    BLOODU MODERATE 12/21/2021    GLUCOSEU Negative 12/21/2021       Imaging:   CT ABDOMEN PELVIS WO CONTRAST Additional Contrast? None   Final Result   1. Scrotal edema. Bilateral inguinal hernias containing fat and    ascitic fluid. No bowel involvement. 2.  Small volume ascites. Trace pneumoperitoneum related to the    peritoneal dialysis catheter. 3.  Umbilical hernia. 4.  Bilateral lower lobe pulmonary nodules largest on the left    measuring 8 mm. Consider nonemergent chest CT. Assessment/Plan: This is a 59 y.o. male with ESRD on PD who presents with bilateral scrotal swelling. Patient recently started on antibiotics for concern for PD peritonitis. Patient is afebrile, HDS. Labs with out any evidence of lactic acidosis or leukocytosis. Patient with CT scan from 11/29/2020 showing small fat-containing inguinal hernias.     - No sign of obstruction on CT. Known fat-containing inguinal hernias with some scrotal edema re-demonstrated.   - Patient amenable to continue with HD instead of PD, plan cleared with Nephrology  - OK to be discharged with follow up as outpatient for elective repair of inguinal hernias at a later time    Ceil ,   PGY3, General Surgery  04/13/22

## 2022-04-13 NOTE — ED PROVIDER NOTES
[address] [facility] [address] ENCOUNTER          ATTENDING PHYSICIAN NOTE       Date of evaluation: 4/12/2022    Chief Complaint     Groin Swelling      History of Present Illness     Geeta Escobedo is a 59 y.o. male who presents to the emergency department complaining of swelling to his scrotum. Patient states that approximately an hour prior to presentation he had the urge to go to the bathroom before he hooked himself onto his dialysis machine for peritoneal dialysis. He states when he went to the bathroom he noticed significant swelling to his scrotum. He denies ever having thing like this happen in the past.  He does note pain to the area that he first noticed 2 days ago but was worse this evening. He denies any nausea or vomiting. He states he has had normal bowel movements and has been passing flatus. He denies having anything like this happen him in the past.    Review of Systems     Review of Systems   Constitutional: Negative. HENT: Negative. Eyes: Negative. Respiratory: Negative. Cardiovascular: Negative. Gastrointestinal: Negative. Genitourinary: Positive for scrotal swelling and testicular pain. Musculoskeletal: Negative. Neurological: Negative. All other systems reviewed and are negative.       Past Medical, Surgical, Family, and Social History     He has a past medical history of Arthralgia of multiple joints, Arthritis, Atrial fibrillation ([address] Chronic kidney disease, Chronic systolic congestive heart failure ([address] CRI (chronic renal insufficiency), Diabetic nephropathy associated with type 2 diabetes mellitus ([address] Diverticulosis, Elevated PSA, Erectile dysfunction, ESRD (end stage renal disease) on dialysis ([address] Gout, Gout, Hemrrhoid NOS w/ complication NEC, History of MRSA infection, Hypertension, CELSA (obstructive sleep apnea), Type 2 diabetes mellitus without complication, without long-term current use of insulin ([address] and Umbilical hernia without obstruction and without gangrene. He has a past surgical history that includes Upper gastrointestinal endoscopy (05/28/2016); Colonoscopy; Dilatation, esophagus; Upper gastrointestinal endoscopy (09/12/2016); IR TUNNELED CVC PLACE WO SQ PORT/PUMP > 5 YEARS (01/14/2022); Dialysis Catheter Insertion (N/A, 1/17/2022); ventral hernia repair (N/A, 1/17/2022); CT INSERT PERITONEAL CATHETER; Colonoscopy (N/A, 3/18/2022); and Upper gastrointestinal endoscopy (N/A, 3/18/2022). His family history includes Breast Cancer in his mother; Colon Cancer in his father; Coronary Art Dis in his brother; Diabetes in his maternal grandmother; Hypertension in an other family member. He reports that he has never smoked. He has never used smokeless tobacco. He reports current alcohol use. He reports that he does not use drugs.     Medications     Previous Medications    ASPIRIN 81 MG TABLET    Take 81 mg by mouth daily    ATORVASTATIN (LIPITOR) 20 MG TABLET    TAKE 1 TABLET BY MOUTH EVERY DAY    B COMPLEX-C-FOLIC ACID (DIALYVITE 547 PO)    Take by mouth daily    BISACODYL (BISACODYL) 5 MG EC TABLET    Take 1 tablet by mouth daily as needed for Constipation    BUSPIRONE (BUSPAR) 5 MG TABLET    TAKE 1 TABLET BY MOUTH TWICE A DAY    CALCITRIOL (ROCALTROL) 0.25 MCG CAPSULE    Take 1 capsule by mouth daily    CALCIUM ACETATE (PHOSLO) 667 MG CAPS CAPSULE    Take 2 capsules by mouth 3 times daily (with meals)    COLCHICINE (COLCRYS) 0.6 MG TABLET    Take 1 tablet by mouth daily as needed for Pain (as needed for pain related to gout)    HEPATITIS B VAC RECOMBINANT (ENGERIX-B) 20 MCG/ML INJ    Inject 40 mcg into the muscle Every 4 weeks    IRON SUCROSE (VENOFER IV)    Infuse intravenously every 30 days    METHOXY PEG-EPOETIN BETA (MIRCERA) 50 MCG/0.3ML SOSY    Inject as directed Twice a  month    METOPROLOL SUCCINATE (TOPROL XL) 50 MG EXTENDED RELEASE TABLET    Take 1 tablet by mouth at bedtime TAKE 1 TABLET BY MOUTH EVERY DAY NIFEDIPINE (PROCARDIA XL) 60 MG EXTENDED RELEASE TABLET    TAKE 1 TABLET BY MOUTH EVERYDAY AT BEDTIME    NORTRIPTYLINE (PAMELOR) 10 MG CAPSULE    nightly     OMEPRAZOLE (PRILOSEC) 40 MG DELAYED RELEASE CAPSULE    TAKE 1 CAPSULE BY MOUTH EVERY DAY    SILDENAFIL (VIAGRA) 100 MG TABLET    Take 1 tablet by mouth as needed for Erectile Dysfunction    SPIRONOLACTONE (ALDACTONE) 50 MG TABLET    Take 1 tablet by mouth at bedtime    TORSEMIDE (DEMADEX) 100 MG TABLET    Take 1 tablet by mouth daily    VITAMIN D (ERGOCALCIFEROL) 1.25 MG (69606 UT) CAPS CAPSULE    Take 1 capsule by mouth once a week       Allergies     He has No Known Allergies. Physical Exam     INITIAL VITALS: BP: (!) 168/100, Temp: 98.4 °F (36.9 °C), Pulse: 103, Resp: 16, SpO2: 99 %   Physical Exam  Vitals and nursing note reviewed. Constitutional:       General: He is not in acute distress. Cardiovascular:      Rate and Rhythm: Normal rate and regular rhythm. Heart sounds: Normal heart sounds. Pulmonary:      Effort: Pulmonary effort is normal.      Breath sounds: Normal breath sounds. No wheezing, rhonchi or rales. Abdominal:      General: Bowel sounds are normal.      Palpations: Abdomen is soft. Tenderness: There is no abdominal tenderness. There is no guarding or rebound. Hernia: A hernia is present. Hernia is present in the left inguinal area. Genitourinary:     Penis: Uncircumcised. No phimosis or paraphimosis. Testes: Normal.      Comments: Scrotum is swollen with what appears to be a left umbilical hernia present. There is no significant testicular tenderness on exam.  No evidence of phimosis or paraphimosis on exam.  Neurological:      Mental Status: He is alert. Diagnostic Results     RADIOLOGY:  CT ABDOMEN PELVIS WO CONTRAST Additional Contrast? None   Final Result   1. Scrotal edema. Bilateral inguinal hernias containing fat and    ascitic fluid. No bowel involvement. 2.  Small volume ascites. Trace pneumoperitoneum related to the    peritoneal dialysis catheter. 3.  Umbilical hernia. 4.  Bilateral lower lobe pulmonary nodules largest on the left    measuring 8 mm. Consider nonemergent chest CT. LABS:   Results for orders placed or performed during the hospital encounter of 04/12/22   CBC with Auto Differential   Result Value Ref Range    WBC 5.6 4.0 - 11.0 K/uL    RBC 4.71 4.20 - 5.90 M/uL    Hemoglobin 12.6 (L) 13.5 - 17.5 g/dL    Hematocrit 40.1 (L) 40.5 - 52.5 %    MCV 85.0 80.0 - 100.0 fL    MCH 26.7 26.0 - 34.0 pg    MCHC 31.4 31.0 - 36.0 g/dL    RDW 15.9 (H) 12.4 - 15.4 %    Platelets 617 374 - 879 K/uL    MPV 8.1 5.0 - 10.5 fL    Neutrophils % 59.0 %    Lymphocytes % 25.0 %    Monocytes % 10.7 %    Eosinophils % 4.7 %    Basophils % 0.6 %    Neutrophils Absolute 3.3 1.7 - 7.7 K/uL    Lymphocytes Absolute 1.4 1.0 - 5.1 K/uL    Monocytes Absolute 0.6 0.0 - 1.3 K/uL    Eosinophils Absolute 0.3 0.0 - 0.6 K/uL    Basophils Absolute 0.0 0.0 - 0.2 K/uL   Basic Metabolic Panel   Result Value Ref Range    Sodium 139 136 - 145 mmol/L    Potassium 3.3 (L) 3.5 - 5.1 mmol/L    Chloride 99 99 - 110 mmol/L    CO2 23 21 - 32 mmol/L    Anion Gap 17 (H) 3 - 16    Glucose 126 (H) 70 - 99 mg/dL    BUN 36 (H) 7 - 20 mg/dL    CREATININE 6.1 (HH) 0.8 - 1.3 mg/dL    GFR Non-African American 9 (A) >60    GFR  11 (A) >60    Calcium 6.1 (L) 8.3 - 10.6 mg/dL   Lactic Acid   Result Value Ref Range    Lactic Acid 0.8 0.4 - 2.0 mmol/L     RECENT VITALS:  BP: (!) 138/92,Temp: 98.4 °F (36.9 °C), Pulse: 96, Resp: 16, SpO2: 94 %     Procedures     N/A    ED Course     Nursing Notes, Past Medical Hx, Past Surgical Hx, Social Hx,Allergies, and Family Hx were reviewed.     patient was given the following medications:  Orders Placed This Encounter   Medications    morphine injection 4 mg    morphine injection 4 mg       CONSULTS:  Nicci S Everett Corrales / Mohit Gan / PLAN     Jose Guadalupe Rubi is a 59 y.o. male with history of end-stage renal disease on peritoneal dialysis presents complaining of scrotal pain and swelling. Patient states he noticed it when he was going to the bathroom earlier this evening before cycling his peritoneal dialysis. On arrival, patient is hemodynamically stable. He does have swelling present predominantly in the left inguinal region that is consistent with hernia. I do feel something that is likely bowel on exam.  Laboratory studies are unremarkable. Patient was given pain medication and I did attempt to reduce this without success. General surgery was consulted as a CT scan of the abdomen and pelvis was obtained that does show bilateral fat-containing inguinal hernias along with intraperitoneal fluid in this area. Surgery is going to have discussion with the patient's primary surgeon as well as nephrologist to determine best plan of action. At this time, care of the patient be turned over to the oncoming provider who complete the patient's work-up and disposition pending surgical consultation. Clinical Impression     No diagnosis found. Disposition     PATIENT REFERRED TO:  No follow-up provider specified.     DISCHARGE MEDICATIONS:  New Prescriptions    No medications on file       [id number] Green MD  04/13/22 [id number]

## 2022-04-13 NOTE — CONSULTS
Nephrology Consult Note                                                                                                                                                                                                                                                                                                                                                               Office : [phone]     Fax :[phone]              Patient's Name: Malathi Rossi  9:15 AM  4/13/2022    Reason for Consult:  ESRD   Requesting Physician:  Marlo Duarte MD      Chief Complaint:  Inguinal hernia     History of Present Ilness:    Malathi Rossi is a 59 y.o. male with ESRD  On PD   Came with scrotal swelling   Sx eval noted   Pain 5/10   Swelling better   No N/V/D       Past Medical History:   Diagnosis Date    Arthralgia of multiple joints 10/27/2015    Arthritis     Atrial fibrillation (HCC)     Chronic kidney disease     stage 4    Chronic systolic congestive heart failure ([address] 8/16/2021    CRI (chronic renal insufficiency)     Diabetic nephropathy associated with type 2 diabetes mellitus ([address] 10/11/2018    Diverticulosis     Elevated PSA     Erectile dysfunction     ESRD (end stage renal disease) on dialysis ([address] 2/24/2022    Gout     Gout     Hemrrhoid NOS w/ complication NEC     History of MRSA infection     Hypertension     CELSA (obstructive sleep apnea) 07/14/2017    uses C-pap machine    Type 2 diabetes mellitus without complication, without long-term current use of insulin ([facility] 1/94/7817    Umbilical hernia without obstruction and without gangrene 2/2/2016       Past Surgical History:   Procedure Laterality Date    COLONOSCOPY      COLONOSCOPY N/A 3/18/2022    COLONOSCOPY POLYPECTOMY SNARE/COLD BIOPSY performed by Mg Vale MD at [address] N/A 1/17/2022    [place] and omentopexy performed by Radha Butt DO at [address] [facility], ESOPHAGUS      IR TUNNELED [address] 5 YEARS  01/14/2022    IR TUNNELED CATHETER PLACEMENT GREATER THAN 5 YEARS 1/14/2022 [address] SPECIAL PROCEDURES    UPPER GASTROINTESTINAL ENDOSCOPY  05/28/2016    biopsies, multiple small gastric ulcers    UPPER GASTROINTESTINAL ENDOSCOPY  09/12/2016    UPPER GASTROINTESTINAL ENDOSCOPY N/A 3/18/2022    EGD DIAGNOSTIC ONLY performed by Len Paige MD at [facility] N/A 1/17/2022    LAPAROSCOPIC incarcerated VENTRAL HERNIA REPAIR performed by Radha Butt DO at [address] OR       Family History   Problem Relation Age of Onset    Hypertension Other     Breast Cancer Mother     Colon Cancer Father     Diabetes Maternal Grandmother     Coronary Art Dis Brother         reports that he has never smoked. He has never used smokeless tobacco. He reports current alcohol use. He reports that he does not use drugs. Allergies:  Patient has no known allergies. Current Medications:    No current facility-administered medications for this encounter.       Review of Systems:   14 point ROS obtained but were negative except mentioned in HPI      Physical exam:     Vitals:  BP (!) 140/92   Pulse 95   Temp 98.4 °F (36.9 °C) (Oral)   Resp 16   Ht 6' 5\" (1.956 m)   Wt 280 lb (127 kg)   SpO2 95%   BMI 33.20 kg/m²   Constitutional:  OAA X3 NAD  Skin: no rash, turgor wnl  Heent:  eomi, mmm  Neck: no bruits or jvd noted  Cardiovascular:  S1, S2 without m/r/g  Respiratory: CTA B without w/r/r  Abdomen:  +bs, soft, nt, nd  Ext: + lower extremity edema  Psychiatric: mood and affect appropriate  Musculoskeletal:  Rom, muscular strength intact    Inguinal hernia     Data:   Labs:  CBC:   Recent Labs     04/12/22  2334   WBC 5.6   HGB 12.6*        BMP:    Recent Labs     04/12/22  2334      K 3.3*   CL 99   CO2 23   BUN 36*   CREATININE 6.1*   GLUCOSE 126* Ca/Mg/Phos:   Recent Labs     04/12/22  2334   CALCIUM 6.1*     Hepatic: No results for input(s): AST, ALT, ALB, BILITOT, ALKPHOS in the last 72 hours. Troponin: No results for input(s): TROPONINI in the last 72 hours. BNP: No results for input(s): BNP in the last 72 hours. Lipids: No results for input(s): CHOL, TRIG, HDL, LDLCALC, LABVLDL in the last 72 hours. ABGs: No results for input(s): PHART, PO2ART, DVL5GOS in the last 72 hours. INR: No results for input(s): INR in the last 72 hours. UA:No results for input(s): Darshanena Revering, GLUCOSEU, BILIRUBINUR, Sherian Osgood, BLOODU, PHUR, PROTEINU, UROBILINOGEN, NITRU, LEUKOCYTESUR, LABMICR, URINETYPE in the last 72 hours. Urine Microscopic: No results for input(s): LABCAST, BACTERIA, COMU, HYALCAST, WBCUA, RBCUA, EPIU in the last 72 hours. Urine Culture: No results for input(s): LABURIN in the last 72 hours. Urine Chemistry: No results for input(s): Charlynne Echevarria, PROTEINUR, NAUR in the last 72 hours. IMAGING:  CT ABDOMEN PELVIS WO CONTRAST Additional Contrast? None   Final Result   1. Scrotal edema. Bilateral inguinal hernias containing fat and    ascitic fluid. No bowel involvement. 2.  Small volume ascites. Trace pneumoperitoneum related to the    peritoneal dialysis catheter. 3.  Umbilical hernia. 4.  Bilateral lower lobe pulmonary nodules largest on the left    measuring 8 mm. Consider nonemergent chest CT. Assessment/Plan   1. Inguinal hernia     2. HTN    3. Anemia    4. Acid- base/ Electrolyte imbalance     5.  ESRD     Plan   - Switch to HD for now   - Hernia repair as out pt   - will have to wait 4 weeks   - Rylee Powell to d/c Home today  - Anemia management   - MBD management     Mariana Browne MD                Thank you for allowing us to participate in care of Bishop Carey MD  Feel free to contact me   [facility] [address]  Office : [phone]  Fax :[phone]

## 2022-04-13 NOTE — ED TRIAGE NOTES
Pt came to ED for concern for hernia. Pt states he was going to use the bathroom before he started his peritoneal dialysis and noticed his scrotum was extremely swollen and painful. Pt states he has had symptoms for 2 hours. Pt states the clinic placed him on antibiotics yesterday for peritonitis. Jacque Brown

## 2022-04-13 NOTE — CONSULTS
General Surgery   Resident Consult Note    Reason for consult: Concern for inguinal hernias    Chief Complaint: Scrotal swelling    History obtained from: Patient    History of Present Illness :    Cecil Thomason is a 59 y.o. male with a history as listed below who presents with scrotal swelling. Patient has ESRD on PD and had PD catheter placed by Dr. Bernhard Oppenheim on 1/17/22. He follows with Dr. Gaurav Melvin. Patient states that he did PD yesterday without any issue. He went to the bathroom last evening and noticed significant scrotal swelling and some discomfort. He had never experienced this before. He recently was having pain with PD and was started on antibiotics yesterday for concern for PD peritonitis. Patient denies any obstructive symptoms such as N/V or obstipation.      Past Medical History:        Diagnosis Date    Arthralgia of multiple joints 10/27/2015    Arthritis     Atrial fibrillation (HCC)     Chronic kidney disease     stage 4    Chronic systolic congestive heart failure ([address] 8/16/2021    CRI (chronic renal insufficiency)     Diabetic nephropathy associated with type 2 diabetes mellitus ([address] 10/11/2018    Diverticulosis     Elevated PSA     Erectile dysfunction     ESRD (end stage renal disease) on dialysis ([address] 2/24/2022    Gout     Gout     Hemrrhoid NOS w/ complication NEC     History of MRSA infection     Hypertension     CELSA (obstructive sleep apnea) 07/14/2017    uses C-pap machine    Type 2 diabetes mellitus without complication, without long-term current use of insulin ([address] 5/48/4779    Umbilical hernia without obstruction and without gangrene 2/2/2016       Past Surgical History:           Procedure Laterality Date    COLONOSCOPY      COLONOSCOPY N/A 3/18/2022    COLONOSCOPY POLYPECTOMY SNARE/COLD BIOPSY performed by Stefania Locke MD at [address] [facility] INSERT PERITONEAL CATHETER      DIALYSIS CATHETER INSERTION N/A 1/17/2022    LAPAROSCOPIC PERITONEAL DIALYSIS CATHETER PLACEMENT and omentopexy performed by Jamila Price DO at [facility]      IR TUNNELED CATHETER PLACEMENT GREATER THAN 5 YEARS  01/14/2022    IR TUNNELED CATHETER PLACEMENT GREATER THAN 5 YEARS 1/14/2022 [facility] SPECIAL PROCEDURES    UPPER GASTROINTESTINAL ENDOSCOPY  05/28/2016    biopsies, multiple small gastric ulcers    UPPER GASTROINTESTINAL ENDOSCOPY  09/12/2016    UPPER GASTROINTESTINAL ENDOSCOPY N/A 3/18/2022    EGD DIAGNOSTIC ONLY performed by Agnes Padilla MD at [place] [address] N/A 1/17/2022    LAPAROSCOPIC incarcerated VENTRAL HERNIA REPAIR performed by Jamila Price DO at [address]:  Patient has no known allergies. Medications:   Home Meds  No current facility-administered medications on file prior to encounter.      Current Outpatient Medications on File Prior to Encounter   Medication Sig Dispense Refill    busPIRone (BUSPAR) 5 MG tablet TAKE 1 TABLET BY MOUTH TWICE A DAY 60 tablet 0    B Complex-C-Folic Acid (DIALYVITE 065 PO) Take by mouth daily      Hepatitis B Vac Recombinant (ENGERIX-B) 20 MCG/ML INJ Inject 40 mcg into the muscle Every 4 weeks      Methoxy PEG-Epoetin Beta (MIRCERA) 50 MCG/0.3ML SOSY Inject as directed Twice a  month      Iron Sucrose (VENOFER IV) Infuse intravenously every 30 days      NIFEdipine (PROCARDIA XL) 60 MG extended release tablet TAKE 1 TABLET BY MOUTH EVERYDAY AT BEDTIME (Patient taking differently: 90 mg ) 30 tablet 0    bisacodyl (BISACODYL) 5 MG EC tablet Take 1 tablet by mouth daily as needed for Constipation 30 tablet 5    spironolactone (ALDACTONE) 50 MG tablet Take 1 tablet by mouth at bedtime 30 tablet 3    metoprolol succinate (TOPROL XL) 50 MG extended release tablet Take 1 tablet by mouth at bedtime TAKE 1 TABLET BY MOUTH EVERY DAY 30 tablet 3    atorvastatin (LIPITOR) 20 MG tablet TAKE 1 TABLET BY MOUTH EVERY DAY 30 tablet 5    torsemide (DEMADEX) 100 MG tablet Take 1 tablet by mouth daily (Patient not taking: Reported on 2/24/2022) 30 tablet 3    colchicine (COLCRYS) 0.6 MG tablet Take 1 tablet by mouth daily as needed for Pain (as needed for pain related to gout) 30 tablet 5    vitamin D (ERGOCALCIFEROL) 1.25 MG (16020 UT) CAPS capsule Take 1 capsule by mouth once a week 12 capsule 1    calcitRIOL (ROCALTROL) 0.25 MCG capsule Take 1 capsule by mouth daily (Patient taking differently: Take 0.5 mcg by mouth daily ) 30 capsule 3    calcium acetate (PHOSLO) 667 MG CAPS capsule Take 2 capsules by mouth 3 times daily (with meals) 180 capsule 0    nortriptyline (PAMELOR) 10 MG capsule nightly       omeprazole (PRILOSEC) 40 MG delayed release capsule TAKE 1 CAPSULE BY MOUTH EVERY DAY 30 capsule 1    sildenafil (VIAGRA) 100 MG tablet Take 1 tablet by mouth as needed for Erectile Dysfunction 30 tablet 3    aspirin 81 MG tablet Take 81 mg by mouth daily       Current Meds  No current facility-administered medications for this encounter. Family History:   Family History   Problem Relation Age of Onset    Hypertension Other     Breast Cancer Mother     Colon Cancer Father     Diabetes Maternal Grandmother     Coronary Art Dis Brother        Social History:   TOBACCO:   reports that he has never smoked. He has never used smokeless tobacco.  ETOH:   reports current alcohol use. DRUGS:   reports no history of drug use. Review of Systems:   A 14 point review of systems was conducted, significant findings as noted in HPI. All other systems negative. Constitutional: Negative for chills and fever. HENT: Negative for congestion, facial swelling, and voice change. Eyes: Negative for photophobia and visual disturbance. Respiratory: Negative for apnea, cough, chest tightness and shortness of breath. Cardiovascular: Negative for chest pain and palpitations. Gastrointestinal: Negative for dysphagia and early satiety.   Genitourinary: Negative for difficulty urinating, dysuria, flank pain, frequency and hematuria. Musculoskeletal: Negative for new gait problem, joint swelling and myalgias. Skin: Negative for color change, pallor and rash. Endocrine: negative for tremors, temperature intolerance or polydipsia. Allergic/Immunologic: Negative for new environmental or food allergies. Neurological: Negative for dizziness, seizures, speech difficulty, numbness. Hematological: Negative for adenopathy. Psychiatric/Behavioral: Negative for agitation and confusion. Physical exam:    Vitals:    04/12/22 2215 04/13/22 0000   BP: (!) 168/100 (!) 151/99   Pulse: 103    Resp: 16    Temp: 98.4 °F (36.9 °C)    TempSrc: Oral    SpO2: 99% 96%   Weight: 280 lb (127 kg)    Height: 6' 5\" (1.956 m)        General appearance: alert, resting in bed  Neuro: A&Ox3, no focal deficits  HENT: trachea midline, no JVD, no LAD  Eyes: PERRLA, no scleral icterus  Chest/Lungs: CTAB, no crackles/rales, wheezes/rhonchi   Cardiovascular: RRR, no murmurs/gallops/rubs  Abdomen: soft, non-tender, distended, no guarding or rigidity, PD catheter in place with dressing, bilateral scrotal swelling, no obvious hernia defects noted , no verlying skin changes  Skin: warm and dry  Extremities: no edema , no cyanosis    Labs:    CBC:   Recent Labs     04/12/22  2334   WBC 5.6   HGB 12.6*   HCT 40.1*   MCV 85.0        BMP: No results for input(s): NA, K, CL, CO2, PHOS, BUN, CREATININE, CA in the last 72 hours. PT/INR: No results for input(s): PROTIME, INR in the last 72 hours. APTT: No results for input(s): APTT in the last 72 hours.   Liver Profile:  Lab Results   Component Value Date    AST 14 02/24/2022    ALT 19 02/24/2022    BILIDIR <0.2 01/19/2022    BILITOT 0.3 02/24/2022    ALKPHOS 104 02/24/2022     Lab Results   Component Value Date    CHOL 160 12/21/2021    HDL 41 12/21/2021    TRIG 141 12/21/2021     UA:   Lab Results   Component Value Date    NITRITE Negative 11/11/2019    COLORU Yellow 12/21/2021    PHUR 6.5 12/21/2021    PHUR 6.5 12/21/2021    WBCUA None seen 12/21/2021    RBCUA 3-4 12/21/2021    BACTERIA Rare 12/21/2021    CLARITYU Clear 12/21/2021    SPECGRAV 1.020 12/21/2021    LEUKOCYTESUR Negative 12/21/2021    UROBILINOGEN 0.2 12/21/2021    BILIRUBINUR Negative 12/21/2021    BILIRUBINUR Negative 11/11/2019    BLOODU MODERATE 12/21/2021    GLUCOSEU Negative 12/21/2021       Imaging:   CT ABDOMEN PELVIS WO CONTRAST Additional Contrast? None    (Results Pending)          Assessment/Plan: This is a 59 y.o. male with ESRD on PD who presents with bilateral scrotal swelling. Patient recently started on antibiotics for concern for PD peritonitis. Patient is afebrile, HDS. Labs with out any evidence of lactic acidosis or leukocytosis. Patient with CT scan from 11/29/2020 showing small fat-containing inguinal hernias. Will obtain CT scan and evaluate hernias and for any sign of obstruction.        Rehan Balderas MD PGY-5  04/13/22  12:52 AM  [phone]

## 2022-04-13 NOTE — ED PROVIDER NOTES
[address] [facility] [address] ENCOUNTER          ATTENDING PHYSICIAN NOTE       Date of evaluation: 4/12/2022    ADDENDUM:      Care of this patient was assumed from Dr. Rufino Ann. The patient was seen for Groin Swelling  . The patient's initial evaluation and plan have been discussed with the prior provider who initially evaluated the patient. Nursing Notes, Past Medical Hx, Past Surgical Hx, Social Hx, Allergies, and Family Hx were all reviewed. Diagnostic Results       RADIOLOGY:  CT ABDOMEN PELVIS WO CONTRAST Additional Contrast? None   Final Result   1. Scrotal edema. Bilateral inguinal hernias containing fat and    ascitic fluid. No bowel involvement. 2.  Small volume ascites. Trace pneumoperitoneum related to the    peritoneal dialysis catheter. 3.  Umbilical hernia. 4.  Bilateral lower lobe pulmonary nodules largest on the left    measuring 8 mm. Consider nonemergent chest CT.               LABS:   Results for orders placed or performed during the hospital encounter of 04/12/22   CBC with Auto Differential   Result Value Ref Range    WBC 5.6 4.0 - 11.0 K/uL    RBC 4.71 4.20 - 5.90 M/uL    Hemoglobin 12.6 (L) 13.5 - 17.5 g/dL    Hematocrit 40.1 (L) 40.5 - 52.5 %    MCV 85.0 80.0 - 100.0 fL    MCH 26.7 26.0 - 34.0 pg    MCHC 31.4 31.0 - 36.0 g/dL    RDW 15.9 (H) 12.4 - 15.4 %    Platelets 131 928 - 348 K/uL    MPV 8.1 5.0 - 10.5 fL    Neutrophils % 59.0 %    Lymphocytes % 25.0 %    Monocytes % 10.7 %    Eosinophils % 4.7 %    Basophils % 0.6 %    Neutrophils Absolute 3.3 1.7 - 7.7 K/uL    Lymphocytes Absolute 1.4 1.0 - 5.1 K/uL    Monocytes Absolute 0.6 0.0 - 1.3 K/uL    Eosinophils Absolute 0.3 0.0 - 0.6 K/uL    Basophils Absolute 0.0 0.0 - 0.2 K/uL   Basic Metabolic Panel   Result Value Ref Range    Sodium 139 136 - 145 mmol/L    Potassium 3.3 (L) 3.5 - 5.1 mmol/L    Chloride 99 99 - 110 mmol/L    CO2 23 21 - 32 mmol/L    Anion Gap 17 (H) 3 - 16    Glucose 126 (H) 70 - 99 mg/dL BUN 36 (H) 7 - 20 mg/dL    CREATININE 6.1 (HH) 0.8 - 1.3 mg/dL    GFR Non-African American 9 (A) >60    GFR  11 (A) >60    Calcium 6.1 (L) 8.3 - 10.6 mg/dL   Lactic Acid   Result Value Ref Range    Lactic Acid 0.8 0.4 - 2.0 mmol/L       RECENT VITALS:  BP: (!) 140/92, Temp: 98.4 °F (36.9 °C), Pulse: 95, Resp: 16, SpO2: 95 %     Procedures         ED Course     The patient was given the following medications:  Orders Placed This Encounter   Medications    morphine injection 4 mg    morphine injection 4 mg    oxyCODONE-acetaminophen (PERCOCET) 5-325 MG per tablet 1 tablet    oxyCODONE-acetaminophen (PERCOCET) 5-325 MG per tablet     Sig: Take 1 tablet by mouth every 6 hours as needed for Pain for up to 3 days. Intended supply: 3 days. Take lowest dose possible to manage pain     Dispense:  12 tablet     Refill:  0       CONSULTS:  C/ Max Tellez 19 / ASSESSMENT / Jessica Jairo is a 59 y.o. male with a history of end-stage renal disease on peritoneal dialysis, who presented with groin pain and swelling, and was found to have bilateral inguinal hernias, which the prior ED physician and the general surgery team were unable to reduce. Please see Dr. Munir Carranza initial dictation for details the patient's history, physical examination, and initial emergency department course. The patient's care was given over to me at 07 100 this hour with results of final consultation results from general surgery pending, as they were working on a management plan together with the patient's nephrologist and the surgeon who replaced his peritoneal dialysis catheter. Both Dr. Noah Augustin and Dr. Ernestina Coyle did see and evaluate the patient in the emergency department and have developed a plan for revision of the catheter and repair of the hernias. The patient is a scheduled for surgery 2 days hence, on Friday, April 15.   He has been cleared for discharge in the meantime, and  Teofilo Harman requested that he be given a couple of days of pain medication to see him through until he has his surgical procedure. The patient was given a dose of Percocet in the emergency department prior to discharge, and is being discharged with a 3-day course of Percocet, to follow-up with his surgeon on Friday as scheduled. He was given return precautions for any worsening symptoms or other concerns in the meantime. Clinical Impression     1. Bilateral recurrent inguinal hernia without obstruction or gangrene    2. Peritoneal dialysis status [facility])        Disposition     PATIENT REFERRED TO:  Montrell Goodson DO  [facility] [address] [place] [address]  [phone]    Go on 4/15/2022  your surgery as scheduled      DISCHARGE MEDICATIONS:  New Prescriptions    OXYCODONE-ACETAMINOPHEN (PERCOCET) 5-325 MG PER TABLET    Take 1 tablet by mouth every 6 hours as needed for Pain for up to 3 days. Intended supply: 3 days.  Take lowest dose possible to manage pain       DISPOSITION Decision To Discharge 04/13/2022 09:45:10 AM       Chapis Sandhu MD  04/13/22 0418

## 2022-04-14 ENCOUNTER — TELEPHONE (OUTPATIENT)
Dept: BARIATRICS/WEIGHT MGMT | Age: 62
End: 2022-04-14

## 2022-04-14 ENCOUNTER — ANESTHESIA EVENT (OUTPATIENT)
Dept: OPERATING ROOM | Age: 62
End: 2022-04-14
Payer: COMMERCIAL

## 2022-04-14 NOTE — TELEPHONE ENCOUNTER
Spoke with pt to confirm his 5:45 am arrival for his 7:45 am surgery on 4/15/22. Pt reminded to be NPO.

## 2022-04-15 ENCOUNTER — HOSPITAL ENCOUNTER (OUTPATIENT)
Age: 62
Setting detail: OBSERVATION
Discharge: HOME | End: 2022-04-17
Attending: SURGERY | Admitting: SURGERY
Payer: COMMERCIAL

## 2022-04-15 ENCOUNTER — ANESTHESIA (OUTPATIENT)
Dept: OPERATING ROOM | Age: 62
End: 2022-04-15
Payer: COMMERCIAL

## 2022-04-15 VITALS — SYSTOLIC BLOOD PRESSURE: 183 MMHG | OXYGEN SATURATION: 100 % | TEMPERATURE: 91.4 F | DIASTOLIC BLOOD PRESSURE: 119 MMHG

## 2022-04-15 DIAGNOSIS — K40.21 BILATERAL RECURRENT INGUINAL HERNIA WITHOUT OBSTRUCTION OR GANGRENE: ICD-10-CM

## 2022-04-15 DIAGNOSIS — K43.2 INCISIONAL HERNIA, WITHOUT OBSTRUCTION OR GANGRENE: ICD-10-CM

## 2022-04-15 PROBLEM — K40.20 BILATERAL INGUINAL HERNIA WITHOUT OBSTRUCTION OR GANGRENE: Status: ACTIVE | Noted: 2022-04-15

## 2022-04-15 LAB
A/G RATIO: 1.2 (ref 1.1–2.2)
ALBUMIN SERPL-MCNC: 3.5 G/DL (ref 3.4–5)
ALP BLD-CCNC: 82 U/L (ref 40–129)
ALT SERPL-CCNC: 14 U/L (ref 10–40)
ANION GAP SERPL CALCULATED.3IONS-SCNC: 18 MMOL/L (ref 3–16)
AST SERPL-CCNC: 16 U/L (ref 15–37)
BILIRUB SERPL-MCNC: <0.2 MG/DL (ref 0–1)
BUN BLDV-MCNC: 41 MG/DL (ref 7–20)
CALCIUM IONIZED: 0.69 MMOL/L (ref 1.12–1.32)
CALCIUM SERPL-MCNC: 5.8 MG/DL (ref 8.3–10.6)
CHLORIDE BLD-SCNC: 98 MMOL/L (ref 99–110)
CO2: 22 MMOL/L (ref 21–32)
CREAT SERPL-MCNC: 6.4 MG/DL (ref 0.8–1.3)
GFR AFRICAN AMERICAN: 11
GFR NON-AFRICAN AMERICAN: 9
GLUCOSE BLD-MCNC: 102 MG/DL (ref 70–99)
GLUCOSE BLD-MCNC: 125 MG/DL (ref 70–99)
GLUCOSE BLD-MCNC: 93 MG/DL (ref 70–99)
HCT VFR BLD CALC: 38.6 % (ref 40.5–52.5)
HEMOGLOBIN: 12.3 G/DL (ref 13.5–17.5)
MCH RBC QN AUTO: 27.4 PG (ref 26–34)
MCHC RBC AUTO-ENTMCNC: 31.8 G/DL (ref 31–36)
MCV RBC AUTO: 86.2 FL (ref 80–100)
PDW BLD-RTO: 15.9 % (ref 12.4–15.4)
PERFORMED ON: ABNORMAL
PERFORMED ON: NORMAL
PH VENOUS: 7.36 (ref 7.35–7.45)
PLATELET # BLD: 175 K/UL (ref 135–450)
PMV BLD AUTO: 8.5 FL (ref 5–10.5)
POTASSIUM SERPL-SCNC: 3.2 MMOL/L (ref 3.5–5.1)
RBC # BLD: 4.47 M/UL (ref 4.2–5.9)
SODIUM BLD-SCNC: 138 MMOL/L (ref 136–145)
TOTAL PROTEIN: 6.4 G/DL (ref 6.4–8.2)
WBC # BLD: 9.6 K/UL (ref 4–11)

## 2022-04-15 PROCEDURE — 2580000003 HC RX 258: Performed by: SURGERY

## 2022-04-15 PROCEDURE — 6360000002 HC RX W HCPCS

## 2022-04-15 PROCEDURE — 3600000019 HC SURGERY ROBOT ADDTL 15MIN: Performed by: SURGERY

## 2022-04-15 PROCEDURE — 2580000003 HC RX 258

## 2022-04-15 PROCEDURE — 6360000002 HC RX W HCPCS: Performed by: INTERNAL MEDICINE

## 2022-04-15 PROCEDURE — 36415 COLL VENOUS BLD VENIPUNCTURE: CPT

## 2022-04-15 PROCEDURE — 49657 PR LAP, RECURRENT INCISIONAL HERNIA REPAIR,INCARCERATED: CPT | Performed by: SURGERY

## 2022-04-15 PROCEDURE — 2580000003 HC RX 258: Performed by: INTERNAL MEDICINE

## 2022-04-15 PROCEDURE — A4217 STERILE WATER/SALINE, 500 ML: HCPCS | Performed by: SURGERY

## 2022-04-15 PROCEDURE — 88302 TISSUE EXAM BY PATHOLOGIST: CPT

## 2022-04-15 PROCEDURE — 6360000002 HC RX W HCPCS: Performed by: SURGERY

## 2022-04-15 PROCEDURE — S2900 ROBOTIC SURGICAL SYSTEM: HCPCS | Performed by: SURGERY

## 2022-04-15 PROCEDURE — 80053 COMPREHEN METABOLIC PANEL: CPT

## 2022-04-15 PROCEDURE — 7100000001 HC PACU RECOVERY - ADDTL 15 MIN: Performed by: SURGERY

## 2022-04-15 PROCEDURE — 2580000003 HC RX 258: Performed by: ANESTHESIOLOGY

## 2022-04-15 PROCEDURE — 6370000000 HC RX 637 (ALT 250 FOR IP)

## 2022-04-15 PROCEDURE — 3700000001 HC ADD 15 MINUTES (ANESTHESIA): Performed by: SURGERY

## 2022-04-15 PROCEDURE — 82330 ASSAY OF CALCIUM: CPT

## 2022-04-15 PROCEDURE — 2500000003 HC RX 250 WO HCPCS: Performed by: SURGERY

## 2022-04-15 PROCEDURE — 94150 VITAL CAPACITY TEST: CPT

## 2022-04-15 PROCEDURE — 85027 COMPLETE CBC AUTOMATED: CPT

## 2022-04-15 PROCEDURE — 49325 LAP REVISION PERM IP CATH: CPT | Performed by: SURGERY

## 2022-04-15 PROCEDURE — 2709999900 HC NON-CHARGEABLE SUPPLY: Performed by: SURGERY

## 2022-04-15 PROCEDURE — 3600000009 HC SURGERY ROBOT BASE: Performed by: SURGERY

## 2022-04-15 PROCEDURE — A4216 STERILE WATER/SALINE, 10 ML: HCPCS | Performed by: ANESTHESIOLOGY

## 2022-04-15 PROCEDURE — 49326 LAP W/OMENTOPEXY ADD-ON: CPT | Performed by: SURGERY

## 2022-04-15 PROCEDURE — 2500000003 HC RX 250 WO HCPCS

## 2022-04-15 PROCEDURE — 49505 PRP I/HERN INIT REDUC >5 YR: CPT | Performed by: SURGERY

## 2022-04-15 PROCEDURE — 6360000002 HC RX W HCPCS: Performed by: ANESTHESIOLOGY

## 2022-04-15 PROCEDURE — 96375 TX/PRO/DX INJ NEW DRUG ADDON: CPT

## 2022-04-15 PROCEDURE — C1781 MESH (IMPLANTABLE): HCPCS | Performed by: SURGERY

## 2022-04-15 PROCEDURE — 7100000000 HC PACU RECOVERY - FIRST 15 MIN: Performed by: SURGERY

## 2022-04-15 PROCEDURE — G0378 HOSPITAL OBSERVATION PER HR: HCPCS

## 2022-04-15 PROCEDURE — 3700000000 HC ANESTHESIA ATTENDED CARE: Performed by: SURGERY

## 2022-04-15 DEVICE — MESH HERN W8XL12CM L INGUINAL POLY HYDRPHLC MFIL SELF: Type: IMPLANTABLE DEVICE | Site: INGUINAL | Status: FUNCTIONAL

## 2022-04-15 DEVICE — MESH HERN DIA4.5IN MFIL RESRB RND W/ HYDRGEL BARR SCFLD: Type: IMPLANTABLE DEVICE | Site: INGUINAL | Status: FUNCTIONAL

## 2022-04-15 DEVICE — MESH HERN W3XL4.75IN L PLA PRECUT FLAP STYL PARTIALLY ABSRB: Type: IMPLANTABLE DEVICE | Site: INGUINAL | Status: FUNCTIONAL

## 2022-04-15 RX ORDER — PROPOFOL 10 MG/ML
INJECTION, EMULSION INTRAVENOUS PRN
Status: DISCONTINUED | OUTPATIENT
Start: 2022-04-15 | End: 2022-04-15 | Stop reason: SDUPTHER

## 2022-04-15 RX ORDER — NIFEDIPINE 60 MG/1
60 TABLET, EXTENDED RELEASE ORAL EVERY EVENING
Status: DISCONTINUED | OUTPATIENT
Start: 2022-04-15 | End: 2022-04-15

## 2022-04-15 RX ORDER — DIPHENHYDRAMINE HYDROCHLORIDE 50 MG/ML
12.5 INJECTION INTRAMUSCULAR; INTRAVENOUS
Status: DISCONTINUED | OUTPATIENT
Start: 2022-04-15 | End: 2022-04-15 | Stop reason: HOSPADM

## 2022-04-15 RX ORDER — SODIUM CHLORIDE 0.9 % (FLUSH) 0.9 %
5-40 SYRINGE (ML) INJECTION PRN
Status: DISCONTINUED | OUTPATIENT
Start: 2022-04-15 | End: 2022-04-17 | Stop reason: HOSPADM

## 2022-04-15 RX ORDER — BUSPIRONE HYDROCHLORIDE 5 MG/1
5 TABLET ORAL 2 TIMES DAILY
Status: DISCONTINUED | OUTPATIENT
Start: 2022-04-15 | End: 2022-04-17 | Stop reason: HOSPADM

## 2022-04-15 RX ORDER — FENTANYL CITRATE 50 UG/ML
100 INJECTION, SOLUTION INTRAMUSCULAR; INTRAVENOUS ONCE
Status: COMPLETED | OUTPATIENT
Start: 2022-04-15 | End: 2022-04-15

## 2022-04-15 RX ORDER — SODIUM CHLORIDE 9 MG/ML
25 INJECTION, SOLUTION INTRAVENOUS PRN
Status: DISCONTINUED | OUTPATIENT
Start: 2022-04-15 | End: 2022-04-15 | Stop reason: HOSPADM

## 2022-04-15 RX ORDER — SODIUM CHLORIDE, SODIUM LACTATE, POTASSIUM CHLORIDE, CALCIUM CHLORIDE 600; 310; 30; 20 MG/100ML; MG/100ML; MG/100ML; MG/100ML
INJECTION, SOLUTION INTRAVENOUS CONTINUOUS
Status: DISCONTINUED | OUTPATIENT
Start: 2022-04-15 | End: 2022-04-15

## 2022-04-15 RX ORDER — PANTOPRAZOLE SODIUM 40 MG/1
40 TABLET, DELAYED RELEASE ORAL
Status: DISCONTINUED | OUTPATIENT
Start: 2022-04-16 | End: 2022-04-17 | Stop reason: HOSPADM

## 2022-04-15 RX ORDER — CEFEPIME HYDROCHLORIDE 1 G/1
2000 INJECTION, POWDER, FOR SOLUTION INTRAMUSCULAR; INTRAVENOUS ONCE
Status: DISCONTINUED | OUTPATIENT
Start: 2022-04-15 | End: 2022-04-16

## 2022-04-15 RX ORDER — HEPARIN SODIUM 5000 [USP'U]/ML
5000 INJECTION, SOLUTION INTRAVENOUS; SUBCUTANEOUS EVERY 8 HOURS SCHEDULED
Status: DISCONTINUED | OUTPATIENT
Start: 2022-04-15 | End: 2022-04-17 | Stop reason: HOSPADM

## 2022-04-15 RX ORDER — TORSEMIDE 100 MG/1
100 TABLET ORAL DAILY
Status: DISCONTINUED | OUTPATIENT
Start: 2022-04-15 | End: 2022-04-17 | Stop reason: HOSPADM

## 2022-04-15 RX ORDER — METOPROLOL SUCCINATE 50 MG/1
50 TABLET, EXTENDED RELEASE ORAL NIGHTLY
Status: DISCONTINUED | OUTPATIENT
Start: 2022-04-15 | End: 2022-04-17 | Stop reason: HOSPADM

## 2022-04-15 RX ORDER — SODIUM CHLORIDE 9 MG/ML
INJECTION, SOLUTION INTRAVENOUS PRN
Status: DISCONTINUED | OUTPATIENT
Start: 2022-04-15 | End: 2022-04-15 | Stop reason: HOSPADM

## 2022-04-15 RX ORDER — ESMOLOL HYDROCHLORIDE 10 MG/ML
INJECTION INTRAVENOUS PRN
Status: DISCONTINUED | OUTPATIENT
Start: 2022-04-15 | End: 2022-04-15 | Stop reason: SDUPTHER

## 2022-04-15 RX ORDER — SODIUM CHLORIDE 0.9 % (FLUSH) 0.9 %
5-40 SYRINGE (ML) INJECTION EVERY 12 HOURS SCHEDULED
Status: DISCONTINUED | OUTPATIENT
Start: 2022-04-15 | End: 2022-04-15 | Stop reason: HOSPADM

## 2022-04-15 RX ORDER — HYDRALAZINE HYDROCHLORIDE 20 MG/ML
10 INJECTION INTRAMUSCULAR; INTRAVENOUS
Status: DISCONTINUED | OUTPATIENT
Start: 2022-04-15 | End: 2022-04-15 | Stop reason: HOSPADM

## 2022-04-15 RX ORDER — SODIUM CHLORIDE 9 MG/ML
INJECTION, SOLUTION INTRAVENOUS PRN
Status: DISCONTINUED | OUTPATIENT
Start: 2022-04-15 | End: 2022-04-17 | Stop reason: HOSPADM

## 2022-04-15 RX ORDER — OXYCODONE HYDROCHLORIDE 5 MG/1
5 TABLET ORAL EVERY 6 HOURS PRN
Qty: 28 TABLET | Refills: 0 | Status: SHIPPED | OUTPATIENT
Start: 2022-04-15 | End: 2022-04-22

## 2022-04-15 RX ORDER — MORPHINE SULFATE 2 MG/ML
4 INJECTION, SOLUTION INTRAMUSCULAR; INTRAVENOUS
Status: DISCONTINUED | OUTPATIENT
Start: 2022-04-15 | End: 2022-04-17

## 2022-04-15 RX ORDER — BUPIVACAINE HYDROCHLORIDE 5 MG/ML
INJECTION, SOLUTION EPIDURAL; INTRACAUDAL PRN
Status: DISCONTINUED | OUTPATIENT
Start: 2022-04-15 | End: 2022-04-15 | Stop reason: HOSPADM

## 2022-04-15 RX ORDER — ONDANSETRON 4 MG/1
4 TABLET, ORALLY DISINTEGRATING ORAL EVERY 8 HOURS PRN
Status: DISCONTINUED | OUTPATIENT
Start: 2022-04-15 | End: 2022-04-17 | Stop reason: HOSPADM

## 2022-04-15 RX ORDER — HEPARIN SODIUM (PORCINE) LOCK FLUSH IV SOLN 100 UNIT/ML 100 UNIT/ML
SOLUTION INTRAVENOUS PRN
Status: DISCONTINUED | OUTPATIENT
Start: 2022-04-15 | End: 2022-04-15 | Stop reason: HOSPADM

## 2022-04-15 RX ORDER — TERAZOSIN 5 MG/1
1 CAPSULE ORAL DAILY
Status: ON HOLD | COMMUNITY
Start: 2022-03-22 | End: 2022-06-13 | Stop reason: CLARIF

## 2022-04-15 RX ORDER — NIFEDIPINE 30 MG/1
90 TABLET, FILM COATED, EXTENDED RELEASE ORAL EVERY EVENING
Status: DISCONTINUED | OUTPATIENT
Start: 2022-04-15 | End: 2022-04-17 | Stop reason: HOSPADM

## 2022-04-15 RX ORDER — SODIUM CHLORIDE 0.9 % (FLUSH) 0.9 %
5-40 SYRINGE (ML) INJECTION PRN
Status: DISCONTINUED | OUTPATIENT
Start: 2022-04-15 | End: 2022-04-15 | Stop reason: HOSPADM

## 2022-04-15 RX ORDER — OXYCODONE HYDROCHLORIDE 5 MG/1
10 TABLET ORAL EVERY 4 HOURS PRN
Status: DISCONTINUED | OUTPATIENT
Start: 2022-04-15 | End: 2022-04-17 | Stop reason: HOSPADM

## 2022-04-15 RX ORDER — OXYCODONE HYDROCHLORIDE 5 MG/1
5 TABLET ORAL EVERY 4 HOURS PRN
Status: DISCONTINUED | OUTPATIENT
Start: 2022-04-15 | End: 2022-04-17 | Stop reason: HOSPADM

## 2022-04-15 RX ORDER — SPIRONOLACTONE 50 MG/1
50 TABLET, FILM COATED ORAL NIGHTLY
Status: DISCONTINUED | OUTPATIENT
Start: 2022-04-15 | End: 2022-04-17 | Stop reason: HOSPADM

## 2022-04-15 RX ORDER — MORPHINE SULFATE 2 MG/ML
2 INJECTION, SOLUTION INTRAMUSCULAR; INTRAVENOUS
Status: DISCONTINUED | OUTPATIENT
Start: 2022-04-15 | End: 2022-04-17

## 2022-04-15 RX ORDER — HEPARIN SODIUM 5000 [USP'U]/ML
5000 INJECTION, SOLUTION INTRAVENOUS; SUBCUTANEOUS ONCE
Status: COMPLETED | OUTPATIENT
Start: 2022-04-15 | End: 2022-04-15

## 2022-04-15 RX ORDER — HYDROMORPHONE HCL 110MG/55ML
PATIENT CONTROLLED ANALGESIA SYRINGE INTRAVENOUS PRN
Status: DISCONTINUED | OUTPATIENT
Start: 2022-04-15 | End: 2022-04-15 | Stop reason: SDUPTHER

## 2022-04-15 RX ORDER — GLYCOPYRROLATE 1 MG/5 ML
SYRINGE (ML) INTRAVENOUS PRN
Status: DISCONTINUED | OUTPATIENT
Start: 2022-04-15 | End: 2022-04-15 | Stop reason: SDUPTHER

## 2022-04-15 RX ORDER — PHENYLEPHRINE HYDROCHLORIDE 10 MG/ML
INJECTION INTRAVENOUS PRN
Status: DISCONTINUED | OUTPATIENT
Start: 2022-04-15 | End: 2022-04-15 | Stop reason: SDUPTHER

## 2022-04-15 RX ORDER — MEPERIDINE HYDROCHLORIDE 25 MG/ML
12.5 INJECTION INTRAMUSCULAR; INTRAVENOUS; SUBCUTANEOUS EVERY 5 MIN PRN
Status: DISCONTINUED | OUTPATIENT
Start: 2022-04-15 | End: 2022-04-15 | Stop reason: HOSPADM

## 2022-04-15 RX ORDER — LIDOCAINE HYDROCHLORIDE AND EPINEPHRINE 10; 10 MG/ML; UG/ML
INJECTION, SOLUTION INFILTRATION; PERINEURAL PRN
Status: DISCONTINUED | OUTPATIENT
Start: 2022-04-15 | End: 2022-04-15 | Stop reason: HOSPADM

## 2022-04-15 RX ORDER — NORTRIPTYLINE HYDROCHLORIDE 10 MG/1
10 CAPSULE ORAL NIGHTLY
Status: DISCONTINUED | OUTPATIENT
Start: 2022-04-15 | End: 2022-04-17 | Stop reason: HOSPADM

## 2022-04-15 RX ORDER — DEXMEDETOMIDINE HYDROCHLORIDE 100 UG/ML
INJECTION, SOLUTION INTRAVENOUS PRN
Status: DISCONTINUED | OUTPATIENT
Start: 2022-04-15 | End: 2022-04-15 | Stop reason: SDUPTHER

## 2022-04-15 RX ORDER — FENTANYL CITRATE 50 UG/ML
INJECTION, SOLUTION INTRAMUSCULAR; INTRAVENOUS PRN
Status: DISCONTINUED | OUTPATIENT
Start: 2022-04-15 | End: 2022-04-15 | Stop reason: SDUPTHER

## 2022-04-15 RX ORDER — ONDANSETRON 2 MG/ML
INJECTION INTRAMUSCULAR; INTRAVENOUS PRN
Status: DISCONTINUED | OUTPATIENT
Start: 2022-04-15 | End: 2022-04-15 | Stop reason: SDUPTHER

## 2022-04-15 RX ORDER — ROCURONIUM BROMIDE 10 MG/ML
INJECTION, SOLUTION INTRAVENOUS PRN
Status: DISCONTINUED | OUTPATIENT
Start: 2022-04-15 | End: 2022-04-15 | Stop reason: SDUPTHER

## 2022-04-15 RX ORDER — ATORVASTATIN CALCIUM 20 MG/1
20 TABLET, FILM COATED ORAL DAILY
Status: DISCONTINUED | OUTPATIENT
Start: 2022-04-15 | End: 2022-04-17 | Stop reason: HOSPADM

## 2022-04-15 RX ORDER — ONDANSETRON 2 MG/ML
4 INJECTION INTRAMUSCULAR; INTRAVENOUS
Status: DISCONTINUED | OUTPATIENT
Start: 2022-04-15 | End: 2022-04-15 | Stop reason: HOSPADM

## 2022-04-15 RX ORDER — SODIUM CHLORIDE 0.9 % (FLUSH) 0.9 %
5-40 SYRINGE (ML) INJECTION EVERY 12 HOURS SCHEDULED
Status: DISCONTINUED | OUTPATIENT
Start: 2022-04-15 | End: 2022-04-17 | Stop reason: HOSPADM

## 2022-04-15 RX ORDER — ONDANSETRON 2 MG/ML
4 INJECTION INTRAMUSCULAR; INTRAVENOUS EVERY 6 HOURS PRN
Status: DISCONTINUED | OUTPATIENT
Start: 2022-04-15 | End: 2022-04-17 | Stop reason: HOSPADM

## 2022-04-15 RX ORDER — MAGNESIUM HYDROXIDE 1200 MG/15ML
LIQUID ORAL CONTINUOUS PRN
Status: COMPLETED | OUTPATIENT
Start: 2022-04-15 | End: 2022-04-15

## 2022-04-15 RX ORDER — KETAMINE HYDROCHLORIDE 50 MG/ML
INJECTION, SOLUTION, CONCENTRATE INTRAMUSCULAR; INTRAVENOUS PRN
Status: DISCONTINUED | OUTPATIENT
Start: 2022-04-15 | End: 2022-04-15 | Stop reason: SDUPTHER

## 2022-04-15 RX ORDER — DOCUSATE SODIUM 100 MG/1
100 CAPSULE, LIQUID FILLED ORAL 2 TIMES DAILY
Qty: 20 CAPSULE | Refills: 0 | Status: SHIPPED | OUTPATIENT
Start: 2022-04-15 | End: 2022-08-31

## 2022-04-15 RX ORDER — SODIUM CHLORIDE 9 MG/ML
INJECTION, SOLUTION INTRAVENOUS CONTINUOUS
Status: DISCONTINUED | OUTPATIENT
Start: 2022-04-15 | End: 2022-04-15

## 2022-04-15 RX ADMIN — KETAMINE HYDROCHLORIDE 30 MG: 50 INJECTION, SOLUTION INTRAMUSCULAR; INTRAVENOUS at 08:20

## 2022-04-15 RX ADMIN — ESMOLOL HYDROCHLORIDE 30 MG: 100 INJECTION, SOLUTION INTRAVENOUS at 11:50

## 2022-04-15 RX ADMIN — PROPOFOL 20 MG: 10 INJECTION, EMULSION INTRAVENOUS at 11:29

## 2022-04-15 RX ADMIN — DEXMEDETOMIDINE HYDROCHLORIDE 5 MCG: 100 INJECTION, SOLUTION INTRAVENOUS at 10:42

## 2022-04-15 RX ADMIN — FENTANYL CITRATE 50 MCG: 50 INJECTION, SOLUTION INTRAMUSCULAR; INTRAVENOUS at 08:05

## 2022-04-15 RX ADMIN — CEFAZOLIN SODIUM 3000 MG: 10 INJECTION, POWDER, FOR SOLUTION INTRAVENOUS at 08:11

## 2022-04-15 RX ADMIN — HYDROMORPHONE HYDROCHLORIDE 0.5 MG: 1 INJECTION, SOLUTION INTRAMUSCULAR; INTRAVENOUS; SUBCUTANEOUS at 12:20

## 2022-04-15 RX ADMIN — FENTANYL CITRATE 50 MCG: 50 INJECTION, SOLUTION INTRAMUSCULAR; INTRAVENOUS at 10:42

## 2022-04-15 RX ADMIN — DEXMEDETOMIDINE HYDROCHLORIDE 5 MCG: 100 INJECTION, SOLUTION INTRAVENOUS at 08:31

## 2022-04-15 RX ADMIN — OXYCODONE 10 MG: 5 TABLET ORAL at 16:24

## 2022-04-15 RX ADMIN — HEPARIN SODIUM 5000 UNITS: 5000 INJECTION INTRAVENOUS; SUBCUTANEOUS at 21:54

## 2022-04-15 RX ADMIN — ROCURONIUM BROMIDE 20 MG: 50 INJECTION, SOLUTION INTRAVENOUS at 09:31

## 2022-04-15 RX ADMIN — FENTANYL CITRATE 50 MCG: 50 INJECTION, SOLUTION INTRAMUSCULAR; INTRAVENOUS at 07:58

## 2022-04-15 RX ADMIN — KETAMINE HYDROCHLORIDE 15 MG: 50 INJECTION, SOLUTION INTRAMUSCULAR; INTRAVENOUS at 09:29

## 2022-04-15 RX ADMIN — SODIUM CHLORIDE: 9 INJECTION, SOLUTION INTRAVENOUS at 07:30

## 2022-04-15 RX ADMIN — PROPOFOL 30 MG: 10 INJECTION, EMULSION INTRAVENOUS at 08:02

## 2022-04-15 RX ADMIN — ROCURONIUM BROMIDE 80 MG: 50 INJECTION, SOLUTION INTRAVENOUS at 08:02

## 2022-04-15 RX ADMIN — ATORVASTATIN CALCIUM 20 MG: 20 TABLET, FILM COATED ORAL at 16:24

## 2022-04-15 RX ADMIN — SUGAMMADEX 200 MG: 100 INJECTION, SOLUTION INTRAVENOUS at 11:50

## 2022-04-15 RX ADMIN — HEPARIN SODIUM 5000 UNITS: 5000 INJECTION INTRAVENOUS; SUBCUTANEOUS at 07:21

## 2022-04-15 RX ADMIN — ROCURONIUM BROMIDE 20 MG: 50 INJECTION, SOLUTION INTRAVENOUS at 08:56

## 2022-04-15 RX ADMIN — ONDANSETRON 4 MG: 2 INJECTION INTRAMUSCULAR; INTRAVENOUS at 11:40

## 2022-04-15 RX ADMIN — ALTEPLASE 1 MG: 2.2 INJECTION, POWDER, LYOPHILIZED, FOR SOLUTION INTRAVENOUS at 17:09

## 2022-04-15 RX ADMIN — PROPOFOL 20 MG: 10 INJECTION, EMULSION INTRAVENOUS at 11:34

## 2022-04-15 RX ADMIN — DEXMEDETOMIDINE HYDROCHLORIDE 5 MCG: 100 INJECTION, SOLUTION INTRAVENOUS at 08:28

## 2022-04-15 RX ADMIN — FENTANYL CITRATE 50 MCG: 50 INJECTION, SOLUTION INTRAMUSCULAR; INTRAVENOUS at 10:16

## 2022-04-15 RX ADMIN — SPIRONOLACTONE 50 MG: 50 TABLET ORAL at 20:07

## 2022-04-15 RX ADMIN — PROCHLORPERAZINE EDISYLATE 5 MG: 5 INJECTION, SOLUTION INTRAMUSCULAR; INTRAVENOUS at 12:35

## 2022-04-15 RX ADMIN — ONDANSETRON 4 MG: 2 INJECTION INTRAMUSCULAR; INTRAVENOUS at 07:54

## 2022-04-15 RX ADMIN — PROPOFOL 120 MG: 10 INJECTION, EMULSION INTRAVENOUS at 08:01

## 2022-04-15 RX ADMIN — MORPHINE SULFATE 4 MG: 2 INJECTION, SOLUTION INTRAMUSCULAR; INTRAVENOUS at 18:58

## 2022-04-15 RX ADMIN — DEXMEDETOMIDINE HYDROCHLORIDE 5 MCG: 100 INJECTION, SOLUTION INTRAVENOUS at 10:37

## 2022-04-15 RX ADMIN — ROCURONIUM BROMIDE 15 MG: 50 INJECTION, SOLUTION INTRAVENOUS at 10:41

## 2022-04-15 RX ADMIN — TORSEMIDE 100 MG: 100 TABLET ORAL at 16:24

## 2022-04-15 RX ADMIN — PROPOFOL 20 MG: 10 INJECTION, EMULSION INTRAVENOUS at 08:05

## 2022-04-15 RX ADMIN — MORPHINE SULFATE 4 MG: 2 INJECTION, SOLUTION INTRAMUSCULAR; INTRAVENOUS at 23:11

## 2022-04-15 RX ADMIN — SODIUM CHLORIDE, PRESERVATIVE FREE 10 ML: 5 INJECTION INTRAVENOUS at 20:07

## 2022-04-15 RX ADMIN — BUSPIRONE HYDROCHLORIDE 5 MG: 5 TABLET ORAL at 20:08

## 2022-04-15 RX ADMIN — Medication 0.1 MG: at 08:21

## 2022-04-15 RX ADMIN — HYDROMORPHONE HYDROCHLORIDE 1 MG: 2 INJECTION, SOLUTION INTRAMUSCULAR; INTRAVENOUS; SUBCUTANEOUS at 11:56

## 2022-04-15 RX ADMIN — HYDROMORPHONE HYDROCHLORIDE 1 MG: 2 INJECTION, SOLUTION INTRAMUSCULAR; INTRAVENOUS; SUBCUTANEOUS at 11:54

## 2022-04-15 RX ADMIN — OXYCODONE 10 MG: 5 TABLET ORAL at 20:07

## 2022-04-15 RX ADMIN — DEXMEDETOMIDINE HYDROCHLORIDE 5 MCG: 100 INJECTION, SOLUTION INTRAVENOUS at 10:40

## 2022-04-15 RX ADMIN — VANCOMYCIN HYDROCHLORIDE 1500 MG: 10 INJECTION, POWDER, LYOPHILIZED, FOR SOLUTION INTRAVENOUS at 17:31

## 2022-04-15 RX ADMIN — DEXMEDETOMIDINE HYDROCHLORIDE 5 MCG: 100 INJECTION, SOLUTION INTRAVENOUS at 08:22

## 2022-04-15 RX ADMIN — NIFEDIPINE 90 MG: 30 TABLET, EXTENDED RELEASE ORAL at 17:27

## 2022-04-15 RX ADMIN — DEXMEDETOMIDINE HYDROCHLORIDE 5 MCG: 100 INJECTION, SOLUTION INTRAVENOUS at 08:25

## 2022-04-15 RX ADMIN — METOPROLOL SUCCINATE 50 MG: 50 TABLET, EXTENDED RELEASE ORAL at 20:07

## 2022-04-15 RX ADMIN — PROPOFOL 30 MG: 10 INJECTION, EMULSION INTRAVENOUS at 11:21

## 2022-04-15 RX ADMIN — DEXMEDETOMIDINE HYDROCHLORIDE 5 MCG: 100 INJECTION, SOLUTION INTRAVENOUS at 10:32

## 2022-04-15 RX ADMIN — PHENYLEPHRINE HYDROCHLORIDE 50 MCG: 10 INJECTION INTRAVENOUS at 09:27

## 2022-04-15 RX ADMIN — ROCURONIUM BROMIDE 15 MG: 50 INJECTION, SOLUTION INTRAVENOUS at 09:59

## 2022-04-15 RX ADMIN — FENTANYL CITRATE 50 MCG: 50 INJECTION, SOLUTION INTRAMUSCULAR; INTRAVENOUS at 12:00

## 2022-04-15 RX ADMIN — PHENYLEPHRINE HYDROCHLORIDE 100 MCG: 10 INJECTION INTRAVENOUS at 09:13

## 2022-04-15 RX ADMIN — ALTEPLASE 1 MG: 2.2 INJECTION, POWDER, LYOPHILIZED, FOR SOLUTION INTRAVENOUS at 17:10

## 2022-04-15 RX ADMIN — FENTANYL CITRATE 25 MCG: 50 INJECTION, SOLUTION INTRAMUSCULAR; INTRAVENOUS at 14:15

## 2022-04-15 RX ADMIN — NORTRIPTYLINE HYDROCHLORIDE 10 MG: 10 CAPSULE ORAL at 20:08

## 2022-04-15 RX ADMIN — FENTANYL CITRATE 25 MCG: 50 INJECTION, SOLUTION INTRAMUSCULAR; INTRAVENOUS at 12:35

## 2022-04-15 RX ADMIN — FENTANYL CITRATE 50 MCG: 50 INJECTION, SOLUTION INTRAMUSCULAR; INTRAVENOUS at 11:56

## 2022-04-15 RX ADMIN — HEPARIN SODIUM 5000 UNITS: 5000 INJECTION INTRAVENOUS; SUBCUTANEOUS at 16:24

## 2022-04-15 RX ADMIN — Medication 100 MG: at 08:01

## 2022-04-15 RX ADMIN — KETAMINE HYDROCHLORIDE 10 MG: 50 INJECTION, SOLUTION INTRAMUSCULAR; INTRAVENOUS at 10:20

## 2022-04-15 RX ADMIN — FAMOTIDINE 20 MG: 10 INJECTION, SOLUTION INTRAVENOUS at 07:54

## 2022-04-15 RX ADMIN — PROPOFOL 20 MG: 10 INJECTION, EMULSION INTRAVENOUS at 11:40

## 2022-04-15 RX ADMIN — PHENYLEPHRINE HYDROCHLORIDE 50 MCG: 10 INJECTION INTRAVENOUS at 08:30

## 2022-04-15 ASSESSMENT — PULMONARY FUNCTION TESTS
PIF_VALUE: 28
PIF_VALUE: 28
PIF_VALUE: 21
PIF_VALUE: 21
PIF_VALUE: 22
PIF_VALUE: 21
PIF_VALUE: 22
PIF_VALUE: 21
PIF_VALUE: 22
PIF_VALUE: 28
PIF_VALUE: 21
PIF_VALUE: 1
PIF_VALUE: 20
PIF_VALUE: 23
PIF_VALUE: 22
PIF_VALUE: 22
PIF_VALUE: 21
PIF_VALUE: 22
PIF_VALUE: 23
PIF_VALUE: 22
PIF_VALUE: 28
PIF_VALUE: 22
PIF_VALUE: 21
PIF_VALUE: 0
PIF_VALUE: 22
PIF_VALUE: 22
PIF_VALUE: 21
PIF_VALUE: 22
PIF_VALUE: 23
PIF_VALUE: 22
PIF_VALUE: 27
PIF_VALUE: 28
PIF_VALUE: 21
PIF_VALUE: 22
PIF_VALUE: 24
PIF_VALUE: 0
PIF_VALUE: 21
PIF_VALUE: 22
PIF_VALUE: 28
PIF_VALUE: 21
PIF_VALUE: 21
PIF_VALUE: 24
PIF_VALUE: 29
PIF_VALUE: 28
PIF_VALUE: 22
PIF_VALUE: 22
PIF_VALUE: 23
PIF_VALUE: 22
PIF_VALUE: 21
PIF_VALUE: 29
PIF_VALUE: 21
PIF_VALUE: 23
PIF_VALUE: 22
PIF_VALUE: 21
PIF_VALUE: 22
PIF_VALUE: 28
PIF_VALUE: 21
PIF_VALUE: 21
PIF_VALUE: 29
PIF_VALUE: 21
PIF_VALUE: 22
PIF_VALUE: 25
PIF_VALUE: 22
PIF_VALUE: 0
PIF_VALUE: 28
PIF_VALUE: 28
PIF_VALUE: 23
PIF_VALUE: 24
PIF_VALUE: 28
PIF_VALUE: 21
PIF_VALUE: 22
PIF_VALUE: 26
PIF_VALUE: 28
PIF_VALUE: 22
PIF_VALUE: 21
PIF_VALUE: 21
PIF_VALUE: 23
PIF_VALUE: 22
PIF_VALUE: 21
PIF_VALUE: 22
PIF_VALUE: 28
PIF_VALUE: 22
PIF_VALUE: 21
PIF_VALUE: 22
PIF_VALUE: 22
PIF_VALUE: 21
PIF_VALUE: 22
PIF_VALUE: 21
PIF_VALUE: 28
PIF_VALUE: 15
PIF_VALUE: 22
PIF_VALUE: 22
PIF_VALUE: 23
PIF_VALUE: 21
PIF_VALUE: 22
PIF_VALUE: 28
PIF_VALUE: 29
PIF_VALUE: 24
PIF_VALUE: 28
PIF_VALUE: 27
PIF_VALUE: 21
PIF_VALUE: 22
PIF_VALUE: 21
PIF_VALUE: 22
PIF_VALUE: 23
PIF_VALUE: 16
PIF_VALUE: 21
PIF_VALUE: 24
PIF_VALUE: 38
PIF_VALUE: 24
PIF_VALUE: 22
PIF_VALUE: 21
PIF_VALUE: 22
PIF_VALUE: 21
PIF_VALUE: 28
PIF_VALUE: 29
PIF_VALUE: 28
PIF_VALUE: 22
PIF_VALUE: 28
PIF_VALUE: 22
PIF_VALUE: 28
PIF_VALUE: 21
PIF_VALUE: 22
PIF_VALUE: 21
PIF_VALUE: 27
PIF_VALUE: 24
PIF_VALUE: 27
PIF_VALUE: 22
PIF_VALUE: 29
PIF_VALUE: 22
PIF_VALUE: 21
PIF_VALUE: 22
PIF_VALUE: 22
PIF_VALUE: 28
PIF_VALUE: 21
PIF_VALUE: 29
PIF_VALUE: 27
PIF_VALUE: 1
PIF_VALUE: 24
PIF_VALUE: 22
PIF_VALUE: 15
PIF_VALUE: 22
PIF_VALUE: 22
PIF_VALUE: 0
PIF_VALUE: 21
PIF_VALUE: 29
PIF_VALUE: 21
PIF_VALUE: 21
PIF_VALUE: 22
PIF_VALUE: 25
PIF_VALUE: 21
PIF_VALUE: 21
PIF_VALUE: 27
PIF_VALUE: 28
PIF_VALUE: 22
PIF_VALUE: 21
PIF_VALUE: 28
PIF_VALUE: 28
PIF_VALUE: 22
PIF_VALUE: 21
PIF_VALUE: 23
PIF_VALUE: 21
PIF_VALUE: 1
PIF_VALUE: 21
PIF_VALUE: 27
PIF_VALUE: 23
PIF_VALUE: 28
PIF_VALUE: 28
PIF_VALUE: 29
PIF_VALUE: 28
PIF_VALUE: 15
PIF_VALUE: 28
PIF_VALUE: 23
PIF_VALUE: 21
PIF_VALUE: 22
PIF_VALUE: 21
PIF_VALUE: 22
PIF_VALUE: 28
PIF_VALUE: 21
PIF_VALUE: 22
PIF_VALUE: 22
PIF_VALUE: 2
PIF_VALUE: 21
PIF_VALUE: 22
PIF_VALUE: 21
PIF_VALUE: 22
PIF_VALUE: 28
PIF_VALUE: 28
PIF_VALUE: 19
PIF_VALUE: 27
PIF_VALUE: 28
PIF_VALUE: 8
PIF_VALUE: 22
PIF_VALUE: 1
PIF_VALUE: 22
PIF_VALUE: 27
PIF_VALUE: 24
PIF_VALUE: 23
PIF_VALUE: 24
PIF_VALUE: 23
PIF_VALUE: 28
PIF_VALUE: 21
PIF_VALUE: 27
PIF_VALUE: 22
PIF_VALUE: 21
PIF_VALUE: 21
PIF_VALUE: 22
PIF_VALUE: 26
PIF_VALUE: 28
PIF_VALUE: 22
PIF_VALUE: 22
PIF_VALUE: 28
PIF_VALUE: 24
PIF_VALUE: 21
PIF_VALUE: 24
PIF_VALUE: 22
PIF_VALUE: 22
PIF_VALUE: 1

## 2022-04-15 ASSESSMENT — PAIN SCALES - GENERAL
PAINLEVEL_OUTOF10: 10
PAINLEVEL_OUTOF10: 7
PAINLEVEL_OUTOF10: 9
PAINLEVEL_OUTOF10: 1
PAINLEVEL_OUTOF10: 8
PAINLEVEL_OUTOF10: 9
PAINLEVEL_OUTOF10: 8
PAINLEVEL_OUTOF10: 10
PAINLEVEL_OUTOF10: 4
PAINLEVEL_OUTOF10: 10

## 2022-04-15 ASSESSMENT — PAIN DESCRIPTION - ORIENTATION
ORIENTATION: OTHER (COMMENT)
ORIENTATION: LOWER
ORIENTATION: OTHER (COMMENT)

## 2022-04-15 ASSESSMENT — PAIN DESCRIPTION - ONSET
ONSET: ON-GOING
ONSET: ON-GOING
ONSET: GRADUAL

## 2022-04-15 ASSESSMENT — PAIN DESCRIPTION - DESCRIPTORS
DESCRIPTORS: BURNING
DESCRIPTORS: DULL;SORE
DESCRIPTORS: BURNING

## 2022-04-15 ASSESSMENT — PAIN DESCRIPTION - FREQUENCY
FREQUENCY: CONTINUOUS
FREQUENCY: CONTINUOUS
FREQUENCY: INTERMITTENT

## 2022-04-15 ASSESSMENT — PAIN DESCRIPTION - LOCATION
LOCATION: ABDOMEN
LOCATION: ABDOMEN
LOCATION: GROIN
LOCATION: ABDOMEN;GROIN

## 2022-04-15 ASSESSMENT — PAIN - FUNCTIONAL ASSESSMENT
PAIN_FUNCTIONAL_ASSESSMENT: PREVENTS OR INTERFERES SOME ACTIVE ACTIVITIES AND ADLS
PAIN_FUNCTIONAL_ASSESSMENT: PREVENTS OR INTERFERES SOME ACTIVE ACTIVITIES AND ADLS

## 2022-04-15 ASSESSMENT — PAIN DESCRIPTION - PAIN TYPE
TYPE: SURGICAL PAIN
TYPE: ACUTE PAIN

## 2022-04-15 ASSESSMENT — LIFESTYLE VARIABLES: SMOKING_STATUS: 0

## 2022-04-15 ASSESSMENT — PAIN DESCRIPTION - PROGRESSION
CLINICAL_PROGRESSION: NOT CHANGED
CLINICAL_PROGRESSION: NOT CHANGED

## 2022-04-15 NOTE — H&P
Rahel Macedo    [id number]    [facility] Same Day Surgery Update H & P  Department of General Surgery   Surgical Service   Pre-operative History and Physical  Last H & P within the last 30 days. DIAGNOSIS:   Bilateral recurrent inguinal hernia without obstruction or gangrene [K40.21]  Incisional hernia, without obstruction or gangrene [K43.2]    Procedure(s):  ROBOTIC, RECURRENT INCISIONAL HERNIA REPAIR WITH BIOSYNTHETIC MESH;  BILATERAL OPEN INGUINAL HERNI REPAIR    History obtained from: Patient interview and EHR      HISTORY OF PRESENT ILLNESS:   The patient is a 59 y.o. male with c/o bilateral inguinal hernia and recurrent incisional hernia presents today for repair. Illness Screening: Patient denies fever, chills, worsening cough, or close contact with sick individuals.         Past Medical History:        Diagnosis Date    Arthralgia of multiple joints 10/27/2015    Arthritis     Atrial fibrillation (HCC)     Chronic kidney disease     stage 4    Chronic systolic congestive heart failure ([address] 8/16/2021    CRI (chronic renal insufficiency)     Diabetic nephropathy associated with type 2 diabetes mellitus ([address] 10/11/2018    Diverticulosis     Elevated PSA     Erectile dysfunction     ESRD (end stage renal disease) on dialysis ([address] 2/24/2022    Gout     Gout     Hemrrhoid NOS w/ complication NEC     History of MRSA infection     Hypertension     CELSA (obstructive sleep apnea) 07/14/2017    uses C-pap machine    Type 2 diabetes mellitus without complication, without long-term current use of insulin ([address] 2/50/4399    Umbilical hernia without obstruction and without gangrene 2/2/2016     Past Surgical History:        Procedure Laterality Date    COLONOSCOPY      COLONOSCOPY N/A 3/18/2022    COLONOSCOPY POLYPECTOMY SNARE/COLD BIOPSY performed by Virgilio Rubinstein, MD at [address] [facility] N/A 1/17/2022 LAPAROSCOPIC PERITONEAL DIALYSIS CATHETER PLACEMENT and omentopexy performed by Enedina Chao DO at [address] [facility]      IR TUNNELED CATHETER PLACEMENT GREATER THAN 5 YEARS  01/14/2022    IR TUNNELED CATHETER PLACEMENT GREATER THAN 5 YEARS 1/14/2022 [facility] SPECIAL [facility]EDURES    UPPER GASTROINTESTINAL ENDOSCOPY  05/28/2016    biopsies, multiple small gastric ulcers    UPPER GASTROINTESTINAL ENDOSCOPY  09/12/2016    UPPER GASTROINTESTINAL ENDOSCOPY N/A 3/18/2022    EGD DIAGNOSTIC ONLY performed by Alyse Mcleod MD at [facility] [address] N/A 1/17/2022    LAPAROSCOPIC incarcerated VENTRAL HERNIA REPAIR performed by Enedina Chao DO at [address]       Medications Prior to Admission:      Prior to Admission medications    Medication Sig Start Date End Date Taking? Authorizing Provider   oxyCODONE-acetaminophen (PERCOCET) 5-325 MG per tablet Take 1 tablet by mouth every 6 hours as needed for Pain for up to 3 days. Intended supply: 3 days.  Take lowest dose possible to manage pain 4/13/22 4/16/22  Angela Chirinos MD   busPIRone (BUSPAR) 5 MG tablet TAKE 1 TABLET BY MOUTH TWICE A DAY 4/5/22   Daniela Kolhi MD   B Complex-C-Folic Acid (DIALYVITE 291 PO) Take by mouth daily    Historical Provider, MD   Hepatitis B Vac Recombinant (ENGERIX-B) 20 MCG/ML INJ Inject 40 mcg into the muscle Every 4 weeks    Historical Provider, MD   Methoxy PEG-Epoetin Beta (MIRCERA) 50 MCG/0.3ML SOSY Inject as directed Twice a  month    Historical Provider, MD   Iron Sucrose (VENOFER IV) Infuse intravenously every 30 days    Historical Provider, MD   NIFEdipine (PROCARDIA XL) 60 MG extended release tablet TAKE 1 TABLET BY MOUTH EVERYDAY AT BEDTIME  Patient taking differently: 90 mg  2/1/22   Daniela Kohli MD   bisacodyl (BISACODYL) 5 MG EC tablet Take 1 tablet by mouth daily as needed for Constipation 1/25/22   Daniela Kohli MD   spironolactone (ALDACTONE) 50 MG tablet Take 1 tablet by mouth at bedtime 1/25/22   Christen Betts MD   metoprolol succinate (TOPROL XL) 50 MG extended release tablet Take 1 tablet by mouth at bedtime TAKE 1 TABLET BY MOUTH EVERY DAY 1/25/22   Christen Betts MD   atorvastatin (LIPITOR) 20 MG tablet TAKE 1 TABLET BY MOUTH EVERY DAY 1/25/22   Christen Betts MD   torsemide (DEMADEX) 100 MG tablet Take 1 tablet by mouth daily  Patient not taking: Reported on 2/24/2022 1/25/22   Christen Betts MD   colchicine (COLCRYS) 0.6 MG tablet Take 1 tablet by mouth daily as needed for Pain (as needed for pain related to gout) 1/25/22   Christen Betts MD   vitamin D (ERGOCALCIFEROL) 1.25 MG (82212 UT) CAPS capsule Take 1 capsule by mouth once a week 1/25/22   Christen Betts MD   calcitRIOL (ROCALTROL) 0.25 MCG capsule Take 1 capsule by mouth daily  Patient taking differently: Take 0.5 mcg by mouth daily  1/25/22   Christen Betts MD   calcium acetate (PHOSLO) 667 MG CAPS capsule Take 2 capsules by mouth 3 times daily (with meals) 1/18/22 2/17/22  Whitney Mcardle, MD   nortriptyline (PAMELOR) 10 MG capsule nightly  12/15/21   Historical Provider, MD   omeprazole (PRILOSEC) 40 MG delayed release capsule TAKE 1 CAPSULE BY MOUTH EVERY DAY 12/16/21   Irma Ta MD   sildenafil (VIAGRA) 100 MG tablet Take 1 tablet by mouth as needed for Erectile Dysfunction 8/16/21   Irma Ta MD   aspirin 81 MG tablet Take 81 mg by mouth daily    Historical Provider, MD         Allergies:  Patient has no known allergies.     PHYSICAL EXAM:      BP (!) 166/101   Pulse 99   Temp 98.9 °F (37.2 °C) (Temporal)   Resp 15   Ht 6' 5\" (1.956 m)   Wt 277 lb 8 oz (125.9 kg)   SpO2 98%   BMI 32.91 kg/m²      Airway:  Airway patent with no audible stridor    Heart:  Regular rate and rhythm, No murmur noted    Lungs:  No increased work of breathing, good air exchange, clear to auscultation bilaterally, no crackles or wheezing    Abdomen:  Soft, non-distended, non-tender, no rebound tenderness or guarding, and no masses palpated    ASSESSMENT AND PLAN     Patient is a 59 y.o. male with above specified procedure planned. 1.  The patients history and physical was obtained and signed off by the pre-admission testing department. Patient seen and focused exam done today- no new changes since last physical exam on 4/13/22    2. Access to ancillary services are available per request of the provider.     IMGUEL Hunt - MITA     4/15/2022

## 2022-04-15 NOTE — ANESTHESIA PRE PROCEDURE
MG extended release tablet Take 1 tablet by mouth at bedtime TAKE 1 TABLET BY MOUTH EVERY DAY 1/25/22   Petr Carlson MD   atorvastatin (LIPITOR) 20 MG tablet TAKE 1 TABLET BY MOUTH EVERY DAY 1/25/22   Petr Carlson MD   torsemide (DEMADEX) 100 MG tablet Take 1 tablet by mouth daily  Patient not taking: Reported on 2/24/2022 1/25/22   Petr Carlson MD   colchicine (COLCRYS) 0.6 MG tablet Take 1 tablet by mouth daily as needed for Pain (as needed for pain related to gout)  Patient not taking: Reported on 4/15/2022 1/25/22   Petr Carlson MD   vitamin D (ERGOCALCIFEROL) 1.25 MG (06137 UT) CAPS capsule Take 1 capsule by mouth once a week 1/25/22   Petr Carlson MD   calcitRIOL (ROCALTROL) 0.25 MCG capsule Take 1 capsule by mouth daily  Patient taking differently: Take 0.5 mcg by mouth daily  1/25/22   Petr Carlson MD   calcium acetate (PHOSLO) 667 MG CAPS capsule Take 2 capsules by mouth 3 times daily (with meals) 1/18/22 2/17/22  Thanh Iglesias MD   nortriptyline (PAMELOR) 10 MG capsule nightly  12/15/21   Historical Provider, MD   omeprazole (PRILOSEC) 40 MG delayed release capsule TAKE 1 CAPSULE BY MOUTH EVERY DAY 12/16/21   Edna Quigley MD   sildenafil (VIAGRA) 100 MG tablet Take 1 tablet by mouth as needed for Erectile Dysfunction 8/16/21   Edna Quigley MD   aspirin 81 MG tablet Take 81 mg by mouth daily    Historical Provider, MD       Current medications:    Current Facility-Administered Medications   Medication Dose Route Frequency Provider Last Rate Last Admin    ceFAZolin (ANCEF) 3,000 mg in dextrose 5 % 100 mL IVPB  3,000 mg IntraVENous Once Cheyanne Manley, DO        lactated ringers infusion   IntraVENous Continuous Tano Marcos, DO        lactated ringers infusion   IntraVENous Continuous Jose Oakley MD        sodium chloride flush 0.9 % injection 5-40 mL  5-40 mL IntraVENous 2 times per day Jose Oakley MD        sodium chloride flush 0.9 % injection 5-40 mL  5-40 mL IntraVENous PRN Sofiya Dobson MD        0.9 % sodium chloride infusion  25 mL IntraVENous PRN Sofiya Dobson MD        0.9 % sodium chloride infusion   IntraVENous Continuous Particia Moh, DO 50 mL/hr at 04/15/22 0730 New Bag at 04/15/22 0730       Allergies:  No Known Allergies    Problem List:    Patient Active Problem List   Diagnosis Code    Atrial fibrillation ([facility]) I48.91    CRI (chronic renal insufficiency) N18.9    Gout M10.9    Erectile dysfunction N52.9    Elevated PSA R97.20    Essential hypertension, benign I10    Headache R51.9    Diverticulosis K57.90    Arthralgia of multiple joints O87.41    Umbilical hernia without obstruction and without gangrene K42.9    Knee pain, bilateral M25.561, M25.562    Alcohol use disorder, mild, abuse F10.10    CELSA (obstructive sleep apnea) G47.33    Duodenal ulcer disease K26.9    Diabetic nephropathy associated with type 2 diabetes mellitus ([facility]) E11.21    Type 2 diabetes mellitus with diabetic nephropathy, without long-term current use of insulin ([facility]) E11.21    Lateral epicondylitis of right elbow M77.11    Chronic bilateral low back pain without sciatica M54.50, G89.29    Morbid obesity due to excess calories ([facility]) E66.01    Chronic systolic congestive heart failure ([facility]) I50.22    Acute kidney injury superimposed on CKD ([facility]) N17.9, N18.9    Stroke-like symptoms R29.90    DMII (diabetes mellitus, type 2) ([facility]) E11.9    Hyperlipidemia E78.5    Hypocalcemia E83.51    Hypomagnesemia E83.42    Hypokalemia E87.6    Muscle cramps R25.2    ANTONIO (acute kidney injury) ([facility]) N17.9    Electrolyte imbalance E87.8    Anemia due to stage 5 chronic kidney disease ([facility]) N18.5, D63.1    Congestive heart failure ([facility]) I50.9    LV dysfunction I51.9    ESRD (end stage renal disease) on dialysis ([facility]) N18.6, Z99.2    Bilateral recurrent inguinal hernia without obstruction or gangrene K40.21    Recurrent incisional hernia K43.2       Past Medical History:        Diagnosis Date    Arthralgia of multiple joints 10/27/2015    Arthritis     Atrial fibrillation (HCC)     Chronic kidney disease     stage 4    Chronic systolic congestive heart failure ([facility] 8/16/2021    CRI (chronic renal insufficiency)     Diabetic nephropathy associated with type 2 diabetes mellitus ([address] 10/11/2018    Diverticulosis     Elevated PSA     Erectile dysfunction     ESRD (end stage renal disease) on dialysis ([facility] 2/24/2022    Gout     Gout     Hemrrhoid NOS w/ complication NEC     History of MRSA infection     Hypertension     CELSA (obstructive sleep apnea) 07/14/2017    uses C-pap machine    Type 2 diabetes mellitus without complication, without long-term current use of insulin ([facility] 7/68/6218    Umbilical hernia without obstruction and without gangrene 2/2/2016       Past Surgical History:        Procedure Laterality Date    COLONOSCOPY      COLONOSCOPY N/A 3/18/2022    COLONOSCOPY POLYPECTOMY SNARE/COLD BIOPSY performed by Gil Martinez MD at [address] N/A 1/17/2022    LAPAROSCOPIC PERITONEAL DIALYSIS CATHETER PLACEMENT and omentopexy performed by Debra Lam DO at [address] [facility], ESOPHAGUS      IR TUNNELED [address] 5 YEARS  01/14/2022    IR TUNNELED CATHETER PLACEMENT GREATER THAN 5 YEARS 1/14/2022 [facility] SPECIAL PROCEDURES    UPPER GASTROINTESTINAL ENDOSCOPY  05/28/2016    biopsies, multiple small gastric ulcers    UPPER GASTROINTESTINAL ENDOSCOPY  09/12/2016    UPPER GASTROINTESTINAL ENDOSCOPY N/A 3/18/2022    EGD DIAGNOSTIC ONLY performed by Gil Martinez MD at [place] [address] N/A 1/17/2022    LAPAROSCOPIC incarcerated VENTRAL HERNIA REPAIR performed by Debra Lam DO at [address]  History:    Social History     Tobacco Use    Smoking status: Never Smoker    Smokeless tobacco: Never Used   Substance Use Topics    Alcohol use: Yes     Comment: maybe a beer a weekend                                Counseling given: Not Answered      Vital Signs (Current):   Vitals:    04/07/22 1105 04/15/22 0626   BP:  (!) 166/101   Pulse:  99   Resp:  15   Temp:  98.9 °F (37.2 °C)   TempSrc:  Temporal   SpO2:  98%   Weight: 280 lb (127 kg) 277 lb 8 oz (125.9 kg)   Height: 6' 5\" (1.956 m) 6' 5\" (1.956 m)                                              BP Readings from Last 3 Encounters:   04/15/22 (!) 166/101   04/13/22 (!) 141/95   03/18/22 (!) 134/98       NPO Status: Time of last liquid consumption: 1830                        Time of last solid consumption: 1830                        Date of last liquid consumption: 04/14/22                        Date of last solid food consumption: 04/14/22    BMI:   Wt Readings from Last 3 Encounters:   04/15/22 277 lb 8 oz (125.9 kg)   04/12/22 280 lb (127 kg)   03/18/22 280 lb (127 kg)     Body mass index is 32.91 kg/m².     CBC:   Lab Results   Component Value Date    WBC 5.6 04/12/2022    RBC 4.71 04/12/2022    HGB 12.6 04/12/2022    HCT 40.1 04/12/2022    MCV 85.0 04/12/2022    RDW 15.9 04/12/2022     04/12/2022       CMP:   Lab Results   Component Value Date     04/12/2022    K 3.3 04/12/2022    K 4.0 01/17/2022    CL 99 04/12/2022    CO2 23 04/12/2022    BUN 36 04/12/2022    CREATININE 6.1 04/12/2022    GFRAA 11 04/12/2022    GFRAA >60 05/24/2013    AGRATIO 1.6 02/24/2022    LABGLOM 9 04/12/2022    LABGLOM 56 04/14/2011    GLUCOSE 126 04/12/2022    PROT 6.5 02/24/2022    PROT 7.5 10/03/2012    CALCIUM 6.1 04/12/2022    BILITOT 0.3 02/24/2022    ALKPHOS 104 02/24/2022    AST 14 02/24/2022    ALT 19 02/24/2022       POC Tests:   Recent Labs     04/15/22  0738   POCGLU 93       Coags:   Lab Results   Component Value Date    PROTIME 11.1 01/17/2022    INR 0.98 01/17/2022    APTT 34.2 03/17/2019       HCG (If Applicable): No results found for: PREGTESTUR, PREGSERUM, HCG, HCGQUANT     ABGs: No results found for: PHART, PO2ART, AIH9NLC, BOC1RAJ, BEART, X4KBJYNX     Type & Screen (If Applicable):  No results found for: LABABO, LABRH    Drug/Infectious Status (If Applicable):  No results found for: HIV, HEPCAB    COVID-19 Screening (If Applicable):   Lab Results   Component Value Date    COVID19 Not Detected 01/12/2022           Anesthesia Evaluation  Patient summary reviewed and Nursing notes reviewed no history of anesthetic complications:   Airway: Mallampati: II  TM distance: >3 FB   Neck ROM: full  Mouth opening: > = 3 FB Dental: normal exam         Pulmonary: breath sounds clear to auscultation  (+) sleep apnea: on CPAP,      (-) not a current smoker (never)                           Cardiovascular:  Exercise tolerance: good (>4 METS),   (+) hypertension: moderate, dysrhythmias: atrial fibrillation, CHF: no interval change,       NYHA Classification: II  ECG reviewed  Rhythm: regular  Rate: normal  Echocardiogram reviewed         Beta Blocker:  Dose within 24 Hrs      ROS comment: Results for orders placed or performed during the hospital encounter of 05/28/21  -Echo Complete:        Result                      Value             Ref Range           Left Ventricular Eject*     48                                    LVEF MODALITY               ECHO                                  Neuro/Psych:               GI/Hepatic/Renal:   (+) PUD, renal disease (no peritoneal dialysis today   due to  sx   K+ 3.3 ): ESRD and dialysis, morbid obesity (277 # )          Endo/Other:    (+) DiabetesType II DM, well controlled, , .                 Abdominal:   (+) obese,           Vascular: Other Findings:             Anesthesia Plan      general     ASA 4       Induction: intravenous. MIPS: Postoperative opioids intended and Prophylactic antiemetics administered. Anesthetic plan and risks discussed with patient and spouse.       Plan discussed with CRNA.    Attending anesthesiologist reviewed and agrees with Preprocedure content              Mohit Miguel DO   4/15/2022

## 2022-04-15 NOTE — PROGRESS NOTES
Called out to talk with Dr Shukri Harris about case and what has been given in OR and PACU for pain control    Received order for Fentanyl 25 mcg IV x 4   Compazine 5 mg IV    Unable to give more due to Patient saturation dropping into the low 60's   Dr Shukri Harris suggested calling surgery about case 12:50    Call out to Dr Ata Rowland 12:52  No call back yet

## 2022-04-15 NOTE — PROGRESS NOTES
ROBOTIC, RECURRENT INCISIONAL HERNIA REPAIR WITH BIOSYNTHETIC MESH; LAPAROSCOPIC PERITONEAL DIALYSIS CATHETER REVISION WITH LAPAROSCOPIC OMENTOPEXY  General      Panel 2  General    BILATERAL OPEN INGUINAL HERNI REPAIR     Dr Tracy Ratliff    Current Allergies: Patient has no known allergies. Recent Labs     04/15/22  0738 04/15/22  1215   POCGLU 93 102*       Admitted to PACU bed 16 from OR. Arrived on a stretcher . Attached to PACU monitoring system. Alarms and parameters set. Report received from anesthesia personnel [address] OR staff did not report skin issues that were observed while in OR  No problems reported intraoperatively. Pt arrived with oxygen per non-rebreather face mask with oxygen at 10 liters. Athrombic wraps in place. Removed for discharge    Patient having incredible amount of pain crying out  CRNA gave Dilaudid 2 mg before coming out of OR    Doctors aware of all labs before coming to recovery.

## 2022-04-15 NOTE — PROGRESS NOTES
Patient A&OX4. VSS with exception to higher BP, scheduled nifedipine given. Patient has reported abdominal pain at surgical sites rated 8/10, PRN oxycodone given with benefit, ice pack in place. IVABX infusing per orders. Dialysis nurse came to bedside to place cathflo into PD catheter, expecting to receive PD tomorrow. All patient care needs have been met at this time. Fall precautions in place. Call light and bedside table are within reach if needed. Will continue to monitor.      Electronically signed by Bobie Schwab, RN on 4/15/2022 at 5:49 PM

## 2022-04-15 NOTE — PROGRESS NOTES
Cathflo per protocol both ports. Taped and labeled secure with Cathflo marked. Cathflo to dwell both HD catheter ports overnight per Dr Lori Adkins.

## 2022-04-15 NOTE — PROGRESS NOTES
Pt to \[facility]\"" for ROBOTIC, RECURRENT INCISIONAL HERNIA REPAIR WITH BIOSYNTHETIC MESH. Pt alert and oriented X4; speech clear; breathing easily on RA. Pt reports he walks with steady gait w/o assist. Pt is a dialysis pt and has a right chest vascath which is not working per pt report. Also has a PD catheter which pt states was last used on Monday of this week; Dr. Tracy Ratliff is aware. Despite warming patients arms and many efforts, pt is a difficult stick. This nurse was able to get a #20 IV with brisk blood return in left AC and infusing 0.9% Sodium Chloride; OR aware. Attempted second IV but unsuccessful and OR aware. Heparin 5,000 units given in right upper arm. Dr. Tracy Ratliff stated no BMP necessary so same was canceled. Wife Melissa Harper, [phone]) is present and will be throughout entire procedure. Surgeon and Anestesiology has spoke with pt. Pt now on way to OR with [facility] 3g IVPB.

## 2022-04-15 NOTE — ANESTHESIA POSTPROCEDURE EVALUATION
Department of Anesthesiology  Postprocedure Note    Patient: Hyacinth Rodriguez  MRN: [id number]  YOB: 1960  Date of evaluation: 4/15/2022  Time:  5:16 PM     Procedure Summary     Date: 04/15/22 Room / Location: [address] [place] [address]    Anesthesia Start: 8571 Anesthesia Stop: 1209    Procedures:       ROBOTIC, RECURRENT INCISIONAL HERNIA REPAIR WITH BIOSYNTHETIC MESH; LAPAROSCOPIC PERITONEAL DIALYSIS CATHETER REVISION WITH LAPAROSCOPIC OMENTOPEXY (N/A Abdomen)      BILATERAL OPEN INGUINAL HERNI REPAIR (Bilateral ) Diagnosis:       Bilateral recurrent inguinal hernia without obstruction or gangrene      Incisional hernia, without obstruction or gangrene      (Bilateral recurrent inguinal hernia without obstruction or gangrene [K40.21] Incisional hernia, without obstruction or gangrene [K43.2])    Surgeons: Enedina Chao DO Responsible Provider: Mohit Miguel DO    Anesthesia Type: general ASA Status: 4          Anesthesia Type: general    Johny Phase I: Johny Score: 9    Johny Phase II:      Last vitals: Reviewed and per EMR flowsheets.        Anesthesia Post Evaluation    Patient location during evaluation: PACU  Level of consciousness: awake  Complications: no  Multimodal analgesia pain management approach

## 2022-04-15 NOTE — BRIEF OP NOTE
Brief Postoperative Note      Patient: Ted Groom  YOB: 1960  MRN: [id number]    Date of Procedure: 4/15/2022    Pre-Op Diagnosis: Bilateral recurrent inguinal hernia without obstruction or gangrene [K40.21] Incisional hernia, without obstruction or gangrene [K43.2]    Post-Op Diagnosis: Same       Procedure(s):  ROBOTIC, RECURRENT INCISIONAL HERNIA REPAIR WITH BIOSYNTHETIC MESH; LAPAROSCOPIC PERITONEAL DIALYSIS CATHETER REVISION WITH LAPAROSCOPIC OMENTOPEXY  BILATERAL OPEN INGUINAL HERNI REPAIR    Surgeon(s):   Princess Bey,   Sánchez Munch,   Sánchez MunchDO    Assistant:  Surgical Assistant: Thierry Alfred  Resident: Ankita Nielsen MD    Anesthesia: General    Estimated Blood Loss (mL): less than 50     Complications: None    Specimens:   ID Type Source Tests Collected by Time Destination   A : Incarcerated Hernia Contents Tissue Tissue SURGICAL PATHOLOGY [facility],  4/15/2022 0920    B : RIGHT Cord Contents Tissue Tissue SURGICAL PATHOLOGY [facility],  4/15/2022 1020    C : LEFT Hernia Sac Tissue Tissue SURGICAL PATHOLOGY [facility],  4/15/2022 1052        Implants:  * No implants in log *      Drains:   [REMOVED] Urethral Catheter Straight-tip (Removed)       Findings: fat-containing umbilical hernia; right direct inguinal hernia with hydrocele - sac ligated; left direct and indirect inguinal hernia - sac ligated    Electronically signed by Ankita Nielsen MD on 4/15/2022 at 11:52 AM

## 2022-04-15 NOTE — CARE COORDINATION
Cm following, pt from home ind with wife. Plans to DC home no needs anticipated. POD#0 pend return GI function, pain/nausea control.   Electronically signed by Reji Le RN on 4/15/2022 at 4:50 PM   [phone]

## 2022-04-15 NOTE — PROGRESS NOTES
General Surgery  Post-operative Note      Procedure(s) Performed: bilateral open inguinal hernia repair, robotic recurrent incisional hernia repair with biosynthetic mesh, laparoscopic PD catheter revision with omentopexy    Subjective:   Patient reports is reporting abdominal pain that is not well-controlled and had just received a dose of Roxicodone. Patient denies any nausea or vomiting. Patient is tolerating liquids. Patient resting in bed and has not ambulated. Patient has not voided since surgery. Patient denies any flatus or BMs. Objective:  Anesthesia type: General      I/O    Intra op    Post op     Fluids  0 mL 0 mL     EBL 50 mL 0 mL     Urine 430 mL 0 mL     Vitals:   Vitals:    04/15/22 1515 04/15/22 1530 04/15/22 1604 04/15/22 1618   BP: (!) 154/96  (!) 168/89    Pulse: 103 109 106    Resp: 10  16 16   Temp: 98.8 °F (37.1 °C)  98.5 °F (36.9 °C)    TempSrc:   Oral    SpO2: 99%  98% 98%   Weight:       Height:           Physical Exam:  Post-op vital signs: Tachycardic with some HTN   General appearance: alert, no acute distress, grooming appropriate  Eyes: No scleral icterus, EOM grossly intact  Neck: trachea midline, no JVD, no lymphadenopathy, neck supple  Chest/Lungs: Normal effort with no accessory muscle use on RA; R subclavian tunneled HD line in place with dressing c/d/i  Cardiovascular: RRR, well perfused  Abdomen: Soft, appropriately-tender, incisions c/d/i and well approximated with Dermabond; PD catheter to LUQ with dressing c/d/i  Skin: warm and dry, no rashes;  Extremities: no edema, no cyanosis  Neuro: A&Ox3, no focal deficits, sensation intact    Assessment and Plan  This is a 59y.o. year old male status post bilateral open inguinal hernia repair, robotic recurrent incisional hernia repair with biosynthetic mesh, laparoscopic PD catheter revision with omentopexy secondary to bilateral recurrent inguinal hernia without obstruction, incisional hernia. (4/15) POD0.     Pain management: Morphine pain panel, Roxicodone pain panel,   Cardiovasc: hemodynamically stable, will continue to monitor; home medications restarted  Respiratory:  IS ordered to bedside, encourage hourly IS and deep breathing, oxygen is weaned to RA  Fluids:  SLIV, Diet: General diet; carb control  : Patient remains a void check - will follow up in 4 hours; nephrology on board for HD tomorrow  Ambulation: OOB to chair, encourage ambulation  Prophylaxis: SCDs, SQH  Antibiotics: Ancef given margarita-op; IM Cefepime and IV Vancomycin for HD tomorrow  Wound: Local wound care      Julio Daly DO  PGY1, General Surgery  04/15/22  4:42 PM  [phone]      I have personally examined this patient, reviewed all pertinent labs and radiologic imaging, and agree with the resident's note.     Patient doing well  Resting comfortably  Cathflo to HD catheter    Home after HD tomorrow    Opal Joseph

## 2022-04-15 NOTE — PROGRESS NOTES
PACU Transfer Note    Vitals:    04/15/22 1515   BP: (!) 154/96   Pulse: 103   Resp: 10   Temp: 98.8 °F (37.1 °C)   SpO2: 99%     BP WNL for this patient    In: -   Out: 430 [Urine:430]    Pain assessment:  receiving treatment  Pain Level: 4    Report given to Receiving unit RN.    4/15/2022 3:49 PM

## 2022-04-15 NOTE — PROGRESS NOTES
4 Eyes Admission Assessment     I agree as the admission nurse that 2 RN's have performed a thorough Head to Toe Skin Assessment on the patient. ALL assessment sites listed below have been assessed on admission. Areas assessed by both nurses:   [x]   Head, Face, and Ears   [x]   Shoulders, Back, and Chest  [x]   Arms, Elbows, and Hands   [x]   Coccyx, Sacrum, and Ischium  [x]   Legs, Feet, and Heels        Does the Patient have Skin Breakdown?   No         Roshan Prevention initiated:  No   Wound Care Orders initiated:  No      [facility] nurse consulted for Pressure Injury (Stage 3,4, Unstageable, DTI, NWPT, and Complex wounds) or Roshan score 18 or lower:  No      Nurse 1 eSignature: Electronically signed by Coleman Carroll RN on 4/15/22 at 4:10 PM EDT    **SHARE this note so that the co-signing nurse is able to place an eSignature**    Nurse 2 eSignature: Electronically signed by Naomi Barragan RN on 4/15/22 at 4:28 PM EDT

## 2022-04-16 LAB
ALBUMIN SERPL-MCNC: 3.3 G/DL (ref 3.4–5)
ANION GAP SERPL CALCULATED.3IONS-SCNC: 13 MMOL/L (ref 3–16)
BASOPHILS ABSOLUTE: 0 K/UL (ref 0–0.2)
BASOPHILS RELATIVE PERCENT: 0.4 %
BUN BLDV-MCNC: 44 MG/DL (ref 7–20)
CALCIUM IONIZED: 0.74 MMOL/L (ref 1.12–1.32)
CALCIUM SERPL-MCNC: 5.8 MG/DL (ref 8.3–10.6)
CHLORIDE BLD-SCNC: 97 MMOL/L (ref 99–110)
CO2: 26 MMOL/L (ref 21–32)
CREAT SERPL-MCNC: 7.2 MG/DL (ref 0.8–1.3)
EOSINOPHILS ABSOLUTE: 0 K/UL (ref 0–0.6)
EOSINOPHILS RELATIVE PERCENT: 0.3 %
GFR AFRICAN AMERICAN: 9
GFR NON-AFRICAN AMERICAN: 8
GLUCOSE BLD-MCNC: 148 MG/DL (ref 70–99)
HCT VFR BLD CALC: 35.6 % (ref 40.5–52.5)
HEMOGLOBIN: 11.6 G/DL (ref 13.5–17.5)
LYMPHOCYTES ABSOLUTE: 0.7 K/UL (ref 1–5.1)
LYMPHOCYTES RELATIVE PERCENT: 9.2 %
MAGNESIUM: 1.2 MG/DL (ref 1.8–2.4)
MCH RBC QN AUTO: 27.6 PG (ref 26–34)
MCHC RBC AUTO-ENTMCNC: 32.7 G/DL (ref 31–36)
MCV RBC AUTO: 84.5 FL (ref 80–100)
MONOCYTES ABSOLUTE: 0.5 K/UL (ref 0–1.3)
MONOCYTES RELATIVE PERCENT: 7.2 %
NEUTROPHILS ABSOLUTE: 6.3 K/UL (ref 1.7–7.7)
NEUTROPHILS RELATIVE PERCENT: 82.9 %
PDW BLD-RTO: 15.4 % (ref 12.4–15.4)
PH VENOUS: 7.36 (ref 7.35–7.45)
PHOSPHORUS: 5 MG/DL (ref 2.5–4.9)
PLATELET # BLD: 171 K/UL (ref 135–450)
PMV BLD AUTO: 8.2 FL (ref 5–10.5)
POTASSIUM SERPL-SCNC: 3.1 MMOL/L (ref 3.5–5.1)
RBC # BLD: 4.21 M/UL (ref 4.2–5.9)
SODIUM BLD-SCNC: 136 MMOL/L (ref 136–145)
WBC # BLD: 7.6 K/UL (ref 4–11)

## 2022-04-16 PROCEDURE — 96367 TX/PROPH/DG ADDL SEQ IV INF: CPT

## 2022-04-16 PROCEDURE — 83735 ASSAY OF MAGNESIUM: CPT

## 2022-04-16 PROCEDURE — G0378 HOSPITAL OBSERVATION PER HR: HCPCS

## 2022-04-16 PROCEDURE — 90935 HEMODIALYSIS ONE EVALUATION: CPT | Performed by: INTERNAL MEDICINE

## 2022-04-16 PROCEDURE — 2500000003 HC RX 250 WO HCPCS: Performed by: INTERNAL MEDICINE

## 2022-04-16 PROCEDURE — 85025 COMPLETE CBC W/AUTO DIFF WBC: CPT

## 2022-04-16 PROCEDURE — 2580000003 HC RX 258: Performed by: INTERNAL MEDICINE

## 2022-04-16 PROCEDURE — 36415 COLL VENOUS BLD VENIPUNCTURE: CPT

## 2022-04-16 PROCEDURE — 99223 1ST HOSP IP/OBS HIGH 75: CPT | Performed by: INTERNAL MEDICINE

## 2022-04-16 PROCEDURE — 6370000000 HC RX 637 (ALT 250 FOR IP): Performed by: INTERNAL MEDICINE

## 2022-04-16 PROCEDURE — 99024 POSTOP FOLLOW-UP VISIT: CPT | Performed by: SURGERY

## 2022-04-16 PROCEDURE — 82330 ASSAY OF CALCIUM: CPT

## 2022-04-16 PROCEDURE — 6360000002 HC RX W HCPCS: Performed by: INTERNAL MEDICINE

## 2022-04-16 PROCEDURE — 2580000003 HC RX 258: Performed by: SURGERY

## 2022-04-16 PROCEDURE — 96365 THER/PROPH/DIAG IV INF INIT: CPT

## 2022-04-16 PROCEDURE — 96366 THER/PROPH/DIAG IV INF ADDON: CPT

## 2022-04-16 PROCEDURE — 6360000002 HC RX W HCPCS: Performed by: SURGERY

## 2022-04-16 PROCEDURE — 90935 HEMODIALYSIS ONE EVALUATION: CPT

## 2022-04-16 PROCEDURE — 6370000000 HC RX 637 (ALT 250 FOR IP)

## 2022-04-16 PROCEDURE — 2580000003 HC RX 258

## 2022-04-16 PROCEDURE — 6360000002 HC RX W HCPCS

## 2022-04-16 PROCEDURE — 96376 TX/PRO/DX INJ SAME DRUG ADON: CPT

## 2022-04-16 PROCEDURE — 80069 RENAL FUNCTION PANEL: CPT

## 2022-04-16 RX ORDER — CALCIUM GLUCONATE 20 MG/ML
2000 INJECTION, SOLUTION INTRAVENOUS ONCE
Status: COMPLETED | OUTPATIENT
Start: 2022-04-16 | End: 2022-04-16

## 2022-04-16 RX ORDER — CALCIUM ACETATE 667 MG/1
2 CAPSULE ORAL
Status: DISCONTINUED | OUTPATIENT
Start: 2022-04-16 | End: 2022-04-17 | Stop reason: HOSPADM

## 2022-04-16 RX ORDER — ENALAPRILAT 2.5 MG/2ML
1.25 INJECTION INTRAVENOUS EVERY 6 HOURS PRN
Status: DISCONTINUED | OUTPATIENT
Start: 2022-04-16 | End: 2022-04-17 | Stop reason: HOSPADM

## 2022-04-16 RX ORDER — METHOCARBAMOL 500 MG/1
1000 TABLET, FILM COATED ORAL 4 TIMES DAILY
Status: DISCONTINUED | OUTPATIENT
Start: 2022-04-16 | End: 2022-04-16

## 2022-04-16 RX ORDER — MAGNESIUM SULFATE IN WATER 40 MG/ML
4000 INJECTION, SOLUTION INTRAVENOUS ONCE
Status: COMPLETED | OUTPATIENT
Start: 2022-04-16 | End: 2022-04-16

## 2022-04-16 RX ORDER — ERGOCALCIFEROL 1.25 MG/1
50000 CAPSULE ORAL WEEKLY
Status: DISCONTINUED | OUTPATIENT
Start: 2022-04-16 | End: 2022-04-17 | Stop reason: HOSPADM

## 2022-04-16 RX ORDER — CALCITRIOL 0.25 UG/1
0.5 CAPSULE, LIQUID FILLED ORAL DAILY
Status: DISCONTINUED | OUTPATIENT
Start: 2022-04-16 | End: 2022-04-17 | Stop reason: HOSPADM

## 2022-04-16 RX ORDER — METHOCARBAMOL 500 MG/1
1000 TABLET, FILM COATED ORAL 4 TIMES DAILY
Status: DISCONTINUED | OUTPATIENT
Start: 2022-04-16 | End: 2022-04-17 | Stop reason: HOSPADM

## 2022-04-16 RX ADMIN — SPIRONOLACTONE 50 MG: 50 TABLET ORAL at 20:20

## 2022-04-16 RX ADMIN — MORPHINE SULFATE 4 MG: 2 INJECTION, SOLUTION INTRAMUSCULAR; INTRAVENOUS at 18:41

## 2022-04-16 RX ADMIN — TORSEMIDE 100 MG: 100 TABLET ORAL at 11:38

## 2022-04-16 RX ADMIN — NORTRIPTYLINE HYDROCHLORIDE 10 MG: 10 CAPSULE ORAL at 20:21

## 2022-04-16 RX ADMIN — ERGOCALCIFEROL 50000 UNITS: 1.25 CAPSULE ORAL at 18:06

## 2022-04-16 RX ADMIN — HEPARIN SODIUM 5000 UNITS: 5000 INJECTION INTRAVENOUS; SUBCUTANEOUS at 06:14

## 2022-04-16 RX ADMIN — VANCOMYCIN HYDROCHLORIDE 1000 MG: 10 INJECTION, POWDER, LYOPHILIZED, FOR SOLUTION INTRAVENOUS at 09:37

## 2022-04-16 RX ADMIN — ATORVASTATIN CALCIUM 20 MG: 20 TABLET, FILM COATED ORAL at 11:38

## 2022-04-16 RX ADMIN — BUSPIRONE HYDROCHLORIDE 5 MG: 5 TABLET ORAL at 11:37

## 2022-04-16 RX ADMIN — MORPHINE SULFATE 2 MG: 2 INJECTION, SOLUTION INTRAMUSCULAR; INTRAVENOUS at 06:13

## 2022-04-16 RX ADMIN — MAGNESIUM SULFATE 4000 MG: 4 INJECTION INTRAVENOUS at 11:37

## 2022-04-16 RX ADMIN — CEFEPIME HYDROCHLORIDE 2000 MG: 2 INJECTION, POWDER, FOR SOLUTION INTRAVENOUS at 11:01

## 2022-04-16 RX ADMIN — PANTOPRAZOLE SODIUM 40 MG: 40 TABLET, DELAYED RELEASE ORAL at 06:14

## 2022-04-16 RX ADMIN — HEPARIN SODIUM 5000 UNITS: 5000 INJECTION INTRAVENOUS; SUBCUTANEOUS at 14:21

## 2022-04-16 RX ADMIN — BUSPIRONE HYDROCHLORIDE 5 MG: 5 TABLET ORAL at 20:20

## 2022-04-16 RX ADMIN — OXYCODONE 10 MG: 5 TABLET ORAL at 15:28

## 2022-04-16 RX ADMIN — SODIUM CHLORIDE, PRESERVATIVE FREE 10 ML: 5 INJECTION INTRAVENOUS at 12:12

## 2022-04-16 RX ADMIN — CALCITRIOL CAPSULES 0.25 MCG 0.5 MCG: 0.25 CAPSULE ORAL at 18:06

## 2022-04-16 RX ADMIN — OXYCODONE 10 MG: 5 TABLET ORAL at 11:38

## 2022-04-16 RX ADMIN — NIFEDIPINE 90 MG: 30 TABLET, EXTENDED RELEASE ORAL at 17:04

## 2022-04-16 RX ADMIN — OXYCODONE 10 MG: 5 TABLET ORAL at 20:21

## 2022-04-16 RX ADMIN — MORPHINE SULFATE 4 MG: 2 INJECTION, SOLUTION INTRAMUSCULAR; INTRAVENOUS at 12:25

## 2022-04-16 RX ADMIN — HEPARIN SODIUM 5000 UNITS: 5000 INJECTION INTRAVENOUS; SUBCUTANEOUS at 21:40

## 2022-04-16 RX ADMIN — METHOCARBAMOL TABLETS 1000 MG: 500 TABLET, COATED ORAL at 20:20

## 2022-04-16 RX ADMIN — SODIUM CHLORIDE, PRESERVATIVE FREE 10 ML: 5 INJECTION INTRAVENOUS at 20:20

## 2022-04-16 RX ADMIN — CALCIUM ACETATE 1334 MG: 667 CAPSULE ORAL at 18:28

## 2022-04-16 RX ADMIN — METHOCARBAMOL TABLETS 1000 MG: 500 TABLET, COATED ORAL at 14:21

## 2022-04-16 RX ADMIN — METOPROLOL SUCCINATE 50 MG: 50 TABLET, EXTENDED RELEASE ORAL at 20:20

## 2022-04-16 RX ADMIN — OXYCODONE 10 MG: 5 TABLET ORAL at 02:30

## 2022-04-16 RX ADMIN — CALCIUM GLUCONATE 2000 MG: 98 INJECTION, SOLUTION INTRAVENOUS at 01:36

## 2022-04-16 RX ADMIN — CALCIUM GLUCONATE 2000 MG: 20 INJECTION, SOLUTION INTRAVENOUS at 12:11

## 2022-04-16 ASSESSMENT — PAIN - FUNCTIONAL ASSESSMENT
PAIN_FUNCTIONAL_ASSESSMENT: ACTIVITIES ARE NOT PREVENTED
PAIN_FUNCTIONAL_ASSESSMENT: PREVENTS OR INTERFERES SOME ACTIVE ACTIVITIES AND ADLS
PAIN_FUNCTIONAL_ASSESSMENT: ACTIVITIES ARE NOT PREVENTED

## 2022-04-16 ASSESSMENT — PAIN DESCRIPTION - PAIN TYPE
TYPE: SURGICAL PAIN

## 2022-04-16 ASSESSMENT — PAIN SCALES - GENERAL
PAINLEVEL_OUTOF10: 8
PAINLEVEL_OUTOF10: 6
PAINLEVEL_OUTOF10: 10
PAINLEVEL_OUTOF10: 8
PAINLEVEL_OUTOF10: 7
PAINLEVEL_OUTOF10: 8
PAINLEVEL_OUTOF10: 5
PAINLEVEL_OUTOF10: 9
PAINLEVEL_OUTOF10: 7

## 2022-04-16 ASSESSMENT — PAIN DESCRIPTION - FREQUENCY
FREQUENCY: CONTINUOUS

## 2022-04-16 ASSESSMENT — PAIN DESCRIPTION - ONSET
ONSET: ON-GOING

## 2022-04-16 ASSESSMENT — PAIN DESCRIPTION - DESCRIPTORS
DESCRIPTORS: ACHING

## 2022-04-16 ASSESSMENT — PAIN DESCRIPTION - PROGRESSION
CLINICAL_PROGRESSION: GRADUALLY IMPROVING
CLINICAL_PROGRESSION: GRADUALLY WORSENING
CLINICAL_PROGRESSION: GRADUALLY IMPROVING

## 2022-04-16 ASSESSMENT — PAIN DESCRIPTION - ORIENTATION
ORIENTATION: LOWER;MID;RIGHT;LEFT
ORIENTATION: RIGHT;LEFT;LOWER;MID

## 2022-04-16 ASSESSMENT — PAIN DESCRIPTION - LOCATION
LOCATION: ABDOMEN

## 2022-04-16 NOTE — CONSULTS
Nephrology Consult Note                                                                                                                                                                                                                                                                                                                                                               Office : [phone]     Fax :[phone]              Patient's Name: Nevaeh Ledesma  8:52 AM  4/16/2022    Reason for Consult:  ESRD   Requesting Physician:  Maryuri Arce MD      Chief Complaint:  abd pain     History of Present Ilness:    Nevaeh Ledesma is a 59 y.o. male with ESRD   S/p hernia reapir   Has abd pain   Pain 8/10   Worse with coughing  No ass fever   Pt has PD cath in place     Pt seen on HD carlton well     Past Medical History:   Diagnosis Date    Arthralgia of multiple joints 10/27/2015    Arthritis     Atrial fibrillation ([address]     Chronic kidney disease     stage 4    Chronic systolic congestive heart failure ([address] 8/16/2021    CRI (chronic renal insufficiency)     Diabetic nephropathy associated with type 2 diabetes mellitus ([address] 10/11/2018    Diverticulosis     Elevated PSA     Erectile dysfunction     ESRD (end stage renal disease) on dialysis ([address] 2/24/2022    Gout     Gout     Hemrrhoid NOS w/ complication NEC     History of MRSA infection     Hypertension     CELSA (obstructive sleep apnea) 07/14/2017    uses C-pap machine    Type 2 diabetes mellitus without complication, without long-term current use of insulin ([address] 4/74/6187    Umbilical hernia without obstruction and without gangrene 2/2/2016       Past Surgical History:   Procedure Laterality Date    COLONOSCOPY      COLONOSCOPY N/A 3/18/2022    COLONOSCOPY POLYPECTOMY SNARE/COLD BIOPSY performed by Carmen Gaona MD at [address] N/A 1/17/2022    LAPAROSCOPIC PERITONEAL DIALYSIS CATHETER PLACEMENT and omentopexy performed by Dana Matamoros DO at [facility], ESOPHAGUS     [address] N/A 4/15/2022    ROBOTIC, RECURRENT INCISIONAL HERNIA REPAIR WITH BIOSYNTHETIC MESH; LAPAROSCOPIC PERITONEAL DIALYSIS CATHETER REVISION WITH LAPAROSCOPIC OMENTOPEXY performed by Dana Matamoros DO at [address] Bilateral 4/15/2022    BILATERAL OPEN INGUINAL HERNI REPAIR performed by Dana Matamoros DO at [address] IR TUNNELED [address] 5 YEARS  01/14/2022    IR TUNNELED CATHETER PLACEMENT GREATER THAN 5 YEARS 1/14/2022 [address] SPECIAL PROCEDURES    UPPER GASTROINTESTINAL ENDOSCOPY  05/28/2016    biopsies, multiple small gastric ulcers    UPPER GASTROINTESTINAL ENDOSCOPY  09/12/2016    UPPER GASTROINTESTINAL ENDOSCOPY N/A 3/18/2022    EGD DIAGNOSTIC ONLY performed by Ariel Carlin MD at [facility] [address] N/A 1/17/2022    LAPAROSCOPIC incarcerated VENTRAL HERNIA REPAIR performed by Dana Matamoros DO at [address] OR       Family History   Problem Relation Age of Onset    Hypertension Other     Breast Cancer Mother     Colon Cancer Father     Diabetes Maternal Grandmother     Coronary Art Dis Brother         reports that he has never smoked. He has never used smokeless tobacco. He reports current alcohol use. He reports that he does not use drugs. Allergies:  Patient has no known allergies.     Current Medications:    cefepime (MAXIPIME) 2000 mg IVPB minibag, Once  atorvastatin (LIPITOR) tablet 20 mg, Daily  busPIRone (BUSPAR) tablet 5 mg, BID  torsemide (DEMADEX) tablet 100 mg, Daily  spironolactone (ALDACTONE) tablet 50 mg, Nightly  metoprolol succinate (TOPROL XL) extended release tablet 50 mg, Nightly  nortriptyline (PAMELOR) capsule 10 mg, Nightly  pantoprazole (PROTONIX) tablet 40 mg, QAM AC  sodium chloride flush 0.9 % injection 5-40 mL, 2 times per day  sodium chloride flush 0.9 % injection 5-40 mL, PRN  0.9 % sodium chloride infusion, PRN  ondansetron (ZOFRAN-ODT) disintegrating tablet 4 mg, Q8H PRN   Or  ondansetron (ZOFRAN) injection 4 mg, Q6H PRN  oxyCODONE (ROXICODONE) immediate release tablet 5 mg, Q4H PRN   Or  oxyCODONE (ROXICODONE) immediate release tablet 10 mg, Q4H PRN  morphine (PF) injection 2 mg, Q2H PRN   Or  morphine (PF) injection 4 mg, Q2H PRN  heparin (porcine) injection 5,000 Units, 3 times per day  NIFEdipine (ADALAT CC) extended release tablet 90 mg, QPM        Review of Systems:   14 point ROS obtained but were negative except mentioned in HPI      Physical exam:     Vitals:  BP (!) 148/94   Pulse 99   Temp 99.4 °F (37.4 °C)   Resp 18   Ht 6' 5\" (1.956 m)   Wt 277 lb 9 oz (125.9 kg)   SpO2 91%   BMI 32.91 kg/m²   Constitutional:  OAA X3 NAD  Skin: no rash, turgor wnl  Heent:  eomi, mmm  Neck: no bruits or jvd noted  Cardiovascular:  S1, S2 without m/r/g  Respiratory: CTA B without w/r/r  Abdomen:  +bs, soft, TTP   Ext: +* lower extremity edema  Psychiatric: mood and affect appropriate  Musculoskeletal:  Rom, muscular strength intact    Data:   Labs:  CBC:   Recent Labs     04/15/22  1822 04/16/22  0609   WBC 9.6 7.6   HGB 12.3* 11.6*    171     BMP:    Recent Labs     04/15/22  1823 04/16/22  0609    136   K 3.2* 3.1*   CL 98* 97*   CO2 22 26   BUN 41* 44*   CREATININE 6.4* 7.2*   GLUCOSE 125* 148*     Ca/Mg/Phos:   Recent Labs     04/15/22  1823 04/16/22  0609   CALCIUM 5.8* 5.8*   MG  --  1.20*   PHOS  --  5.0*     Hepatic:   Recent Labs     04/15/22  1823   AST 16   ALT 14   BILITOT <0.2   ALKPHOS 82     Troponin: No results for input(s): TROPONINI in the last 72 hours. BNP: No results for input(s): BNP in the last 72 hours. Lipids: No results for input(s): CHOL, TRIG, HDL, LDLCALC, LABVLDL in the last 72 hours. ABGs: No results for input(s): PHART, PO2ART, CFE9NEL in the last 72 hours. INR: No results for input(s): INR in the last 72 hours.   UA:No results for input(s): Natalya Sarthak, BLOODU, PHUR, PROTEINU, UROBILINOGEN, NITRU, LEUKOCYTESUR, Othelia Meckling in the last 72 hours. Urine Microscopic: No results for input(s): LABCAST, BACTERIA, COMU, HYALCAST, WBCUA, RBCUA, EPIU in the last 72 hours. Urine Culture: No results for input(s): LABURIN in the last 72 hours. Urine Chemistry: No results for input(s): Wilfrido Ozzy, PROTEINUR, NAUR in the last 72 hours. IMAGING:  No orders to display       Assessment/Plan   1. ESRD     2. HTN    3. Anemia    4. Acid- base/ Electrolyte imbalance     5.  Hernia     Plan   - HD today   - UF as carlton   - Bp control   - replace Mag   - add vit D, Calcitriol  - replace K   - pain control                 Thank you for allowing us to participate in care of Lanre Olmos MD  Feel free to contact me   [facility] [address]  Office : [phone]  Fax :[phone]

## 2022-04-16 NOTE — PROGRESS NOTES
Patient alert and oriented x 4. VSS. Patient reporting 8/10 to abdomen and was medicated per orders, patient states pain medication did help. IV antibiotics as ordered. Patient expected to go to hemodialysis tomorrow. Patient has no needs at this time. Bedside table and call light within reach. Bed alarm on. Will continue to monitor.

## 2022-04-16 NOTE — PROGRESS NOTES
Surgery Daily Progress Note      CC: bilateral recurrent inguinal hernia without obstruction, incisional hernia    SUBJECTIVE:  Patient reporting abdominal pain that has improved. Patient denies any nausea or vomiting. Patient is tolerating sips of liquids. AF with some tachycardia but otherwise HDS.     ROS:   A 14 point review of systems was conducted, significant findings as noted above. All other systems negative. OBJECTIVE:    PHYSICAL EXAM:  Vitals:    04/15/22 1618 04/15/22 2003 04/15/22 2338 04/16/22 0235   BP:  (!) 137/92 136/84 (!) 159/95   Pulse:  119 110 106   Resp: 16 16 16 16   Temp:  99.3 °F (37.4 °C) 98.1 °F (36.7 °C) 98.1 °F (36.7 °C)   TempSrc:  Oral Oral Oral   SpO2: 98% 90% 90% 91%   Weight:       Height:           General appearance: alert, no acute distress, grooming appropriate  Eyes: No scleral icterus, EOM grossly intact  Neck: trachea midline, no JVD, no lymphadenopathy, neck supple  Chest/Lungs: Normal effort with no accessory muscle use on RA; R subclavian tunneled HD line in place with dressing c/d/i  Cardiovascular: RRR, well perfused  Abdomen: Soft, appropriately-tender, incisions c/d/i and well approximated with Dermabond; PD catheter to LUQ with dressing c/d/i; abdominal binder in place  Skin: warm and dry, no rashes;  Extremities: no edema, no cyanosis  Neuro: A&Ox3, no focal deficits, sensation intact      ASSESSMENT & PLAN:   This is a 59y.o. year old male status post bilateral open inguinal hernia repair, robotic recurrent incisional hernia repair with biosynthetic mesh, laparoscopic PD catheter revision with omentopexy secondary to bilateral recurrent inguinal hernia without obstruction, incisional hernia.  (4/15) POD1.    - cont general diet  - Cathflo administered to HD ports; plan for HD today  - anticipate discharge home later after dialysis    Guido Wells DO, PGY-1  04/16/22  6:23 AM  [phone]

## 2022-04-16 NOTE — PLAN OF CARE
Problem: Falls - Risk of:  Goal: Will remain free from falls  Outcome: Met This Shift     Problem: Falls - Risk of:  Goal: Absence of physical injury  Outcome: Met This Shift     Problem: Pain:  Goal: Control of acute pain  Outcome: Ongoing

## 2022-04-16 NOTE — FLOWSHEET NOTE
04/16/22 0716 04/16/22 1049   Vital Signs   BP (!) 148/94 (!) 165/102   Temp 99.4 °F (37.4 °C) 99.8 °F (37.7 °C)   Pulse 99 105   Resp 18 18   Weight 277 lb 9 oz (125.9 kg)  --      Treatment time: 3.5 hours  Net UF: 700 ml     Summary of response to treatment: tolerated tx well, no distress noted, Vanc and Cefepime given via HD, MD notified. Copy of dialysis treatment record placed in chart, to be scanned into EMR.

## 2022-04-17 VITALS
HEIGHT: 77 IN | TEMPERATURE: 98.3 F | WEIGHT: 277.56 LBS | OXYGEN SATURATION: 90 % | DIASTOLIC BLOOD PRESSURE: 91 MMHG | BODY MASS INDEX: 32.77 KG/M2 | SYSTOLIC BLOOD PRESSURE: 149 MMHG | HEART RATE: 104 BPM | RESPIRATION RATE: 16 BRPM

## 2022-04-17 LAB
ALBUMIN SERPL-MCNC: 3.2 G/DL (ref 3.4–5)
ANION GAP SERPL CALCULATED.3IONS-SCNC: 15 MMOL/L (ref 3–16)
BASOPHILS ABSOLUTE: 0 K/UL (ref 0–0.2)
BASOPHILS RELATIVE PERCENT: 0.2 %
BUN BLDV-MCNC: 32 MG/DL (ref 7–20)
CALCIUM SERPL-MCNC: 7.7 MG/DL (ref 8.3–10.6)
CHLORIDE BLD-SCNC: 96 MMOL/L (ref 99–110)
CO2: 25 MMOL/L (ref 21–32)
CREAT SERPL-MCNC: 6.2 MG/DL (ref 0.8–1.3)
EOSINOPHILS ABSOLUTE: 0 K/UL (ref 0–0.6)
EOSINOPHILS RELATIVE PERCENT: 0.1 %
GFR AFRICAN AMERICAN: 11
GFR NON-AFRICAN AMERICAN: 9
GLUCOSE BLD-MCNC: 142 MG/DL (ref 70–99)
HCT VFR BLD CALC: 37.3 % (ref 40.5–52.5)
HEMOGLOBIN: 11.9 G/DL (ref 13.5–17.5)
LYMPHOCYTES ABSOLUTE: 0.6 K/UL (ref 1–5.1)
LYMPHOCYTES RELATIVE PERCENT: 6.3 %
MAGNESIUM: 2.2 MG/DL (ref 1.8–2.4)
MCH RBC QN AUTO: 27 PG (ref 26–34)
MCHC RBC AUTO-ENTMCNC: 32 G/DL (ref 31–36)
MCV RBC AUTO: 84.3 FL (ref 80–100)
MONOCYTES ABSOLUTE: 0.9 K/UL (ref 0–1.3)
MONOCYTES RELATIVE PERCENT: 9.7 %
NEUTROPHILS ABSOLUTE: 8.2 K/UL (ref 1.7–7.7)
NEUTROPHILS RELATIVE PERCENT: 83.7 %
PDW BLD-RTO: 15.8 % (ref 12.4–15.4)
PHOSPHORUS: 4.5 MG/DL (ref 2.5–4.9)
PLATELET # BLD: 185 K/UL (ref 135–450)
PMV BLD AUTO: 8.5 FL (ref 5–10.5)
POTASSIUM SERPL-SCNC: 4 MMOL/L (ref 3.5–5.1)
RBC # BLD: 4.42 M/UL (ref 4.2–5.9)
SODIUM BLD-SCNC: 136 MMOL/L (ref 136–145)
WBC # BLD: 9.8 K/UL (ref 4–11)

## 2022-04-17 PROCEDURE — 2580000003 HC RX 258

## 2022-04-17 PROCEDURE — 36415 COLL VENOUS BLD VENIPUNCTURE: CPT

## 2022-04-17 PROCEDURE — 80069 RENAL FUNCTION PANEL: CPT

## 2022-04-17 PROCEDURE — 99233 SBSQ HOSP IP/OBS HIGH 50: CPT | Performed by: INTERNAL MEDICINE

## 2022-04-17 PROCEDURE — 6370000000 HC RX 637 (ALT 250 FOR IP): Performed by: INTERNAL MEDICINE

## 2022-04-17 PROCEDURE — 96376 TX/PRO/DX INJ SAME DRUG ADON: CPT

## 2022-04-17 PROCEDURE — 99024 POSTOP FOLLOW-UP VISIT: CPT | Performed by: SURGERY

## 2022-04-17 PROCEDURE — 2500000003 HC RX 250 WO HCPCS: Performed by: INTERNAL MEDICINE

## 2022-04-17 PROCEDURE — 6360000002 HC RX W HCPCS

## 2022-04-17 PROCEDURE — 6370000000 HC RX 637 (ALT 250 FOR IP): Performed by: STUDENT IN AN ORGANIZED HEALTH CARE EDUCATION/TRAINING PROGRAM

## 2022-04-17 PROCEDURE — 83735 ASSAY OF MAGNESIUM: CPT

## 2022-04-17 PROCEDURE — 6370000000 HC RX 637 (ALT 250 FOR IP)

## 2022-04-17 PROCEDURE — G0378 HOSPITAL OBSERVATION PER HR: HCPCS

## 2022-04-17 PROCEDURE — 85025 COMPLETE CBC W/AUTO DIFF WBC: CPT

## 2022-04-17 RX ORDER — ACETAMINOPHEN 500 MG
1000 TABLET ORAL EVERY 6 HOURS
Status: DISCONTINUED | OUTPATIENT
Start: 2022-04-17 | End: 2022-04-17 | Stop reason: HOSPADM

## 2022-04-17 RX ORDER — METHOCARBAMOL 750 MG/1
750 TABLET, FILM COATED ORAL 3 TIMES DAILY
Qty: 30 TABLET | Refills: 0 | Status: SHIPPED | OUTPATIENT
Start: 2022-04-17 | End: 2022-04-27

## 2022-04-17 RX ORDER — DOCUSATE SODIUM 100 MG/1
100 CAPSULE, LIQUID FILLED ORAL 2 TIMES DAILY
Status: DISCONTINUED | OUTPATIENT
Start: 2022-04-17 | End: 2022-04-17 | Stop reason: HOSPADM

## 2022-04-17 RX ADMIN — ATORVASTATIN CALCIUM 20 MG: 20 TABLET, FILM COATED ORAL at 08:34

## 2022-04-17 RX ADMIN — SODIUM CHLORIDE, PRESERVATIVE FREE 10 ML: 5 INJECTION INTRAVENOUS at 08:35

## 2022-04-17 RX ADMIN — OXYCODONE 5 MG: 5 TABLET ORAL at 01:08

## 2022-04-17 RX ADMIN — MORPHINE SULFATE 2 MG: 2 INJECTION, SOLUTION INTRAMUSCULAR; INTRAVENOUS at 03:32

## 2022-04-17 RX ADMIN — CALCIUM ACETATE 1334 MG: 667 CAPSULE ORAL at 08:34

## 2022-04-17 RX ADMIN — CALCITRIOL CAPSULES 0.25 MCG 0.5 MCG: 0.25 CAPSULE ORAL at 08:34

## 2022-04-17 RX ADMIN — BUSPIRONE HYDROCHLORIDE 5 MG: 5 TABLET ORAL at 08:35

## 2022-04-17 RX ADMIN — PANTOPRAZOLE SODIUM 40 MG: 40 TABLET, DELAYED RELEASE ORAL at 06:28

## 2022-04-17 RX ADMIN — CALCIUM ACETATE 1334 MG: 667 CAPSULE ORAL at 12:42

## 2022-04-17 RX ADMIN — HEPARIN SODIUM 5000 UNITS: 5000 INJECTION INTRAVENOUS; SUBCUTANEOUS at 06:29

## 2022-04-17 RX ADMIN — ACETAMINOPHEN 1000 MG: 500 TABLET ORAL at 06:29

## 2022-04-17 RX ADMIN — METHOCARBAMOL TABLETS 1000 MG: 500 TABLET, COATED ORAL at 12:42

## 2022-04-17 RX ADMIN — METHOCARBAMOL TABLETS 1000 MG: 500 TABLET, COATED ORAL at 08:34

## 2022-04-17 RX ADMIN — DOCUSATE SODIUM 100 MG: 100 CAPSULE, LIQUID FILLED ORAL at 08:34

## 2022-04-17 RX ADMIN — OXYCODONE 10 MG: 5 TABLET ORAL at 08:34

## 2022-04-17 RX ADMIN — OXYCODONE 10 MG: 5 TABLET ORAL at 12:46

## 2022-04-17 RX ADMIN — ACETAMINOPHEN 1000 MG: 500 TABLET ORAL at 11:43

## 2022-04-17 RX ADMIN — TORSEMIDE 100 MG: 100 TABLET ORAL at 08:34

## 2022-04-17 ASSESSMENT — PAIN DESCRIPTION - PROGRESSION
CLINICAL_PROGRESSION: NOT CHANGED
CLINICAL_PROGRESSION: GRADUALLY IMPROVING
CLINICAL_PROGRESSION: NOT CHANGED

## 2022-04-17 ASSESSMENT — PAIN DESCRIPTION - LOCATION
LOCATION: ABDOMEN

## 2022-04-17 ASSESSMENT — PAIN DESCRIPTION - PAIN TYPE
TYPE: SURGICAL PAIN

## 2022-04-17 ASSESSMENT — PAIN DESCRIPTION - ONSET
ONSET: ON-GOING

## 2022-04-17 ASSESSMENT — PAIN SCALES - GENERAL
PAINLEVEL_OUTOF10: 5
PAINLEVEL_OUTOF10: 5
PAINLEVEL_OUTOF10: 7
PAINLEVEL_OUTOF10: 10
PAINLEVEL_OUTOF10: 7
PAINLEVEL_OUTOF10: 5
PAINLEVEL_OUTOF10: 10
PAINLEVEL_OUTOF10: 5

## 2022-04-17 ASSESSMENT — PAIN DESCRIPTION - FREQUENCY
FREQUENCY: CONTINUOUS

## 2022-04-17 ASSESSMENT — PAIN DESCRIPTION - ORIENTATION
ORIENTATION: RIGHT;LEFT;LOWER;MID
ORIENTATION: RIGHT;LEFT;LOWER;MID
ORIENTATION: LOWER

## 2022-04-17 ASSESSMENT — PAIN - FUNCTIONAL ASSESSMENT
PAIN_FUNCTIONAL_ASSESSMENT: PREVENTS OR INTERFERES SOME ACTIVE ACTIVITIES AND ADLS

## 2022-04-17 ASSESSMENT — PAIN DESCRIPTION - DESCRIPTORS
DESCRIPTORS: ACHING
DESCRIPTORS: ACHING
DESCRIPTORS: ACHING;DISCOMFORT

## 2022-04-17 NOTE — PROGRESS NOTES
Nephrology  Note                                                                                                                                                                                                                                                                                                                                                               Office : [phone]     Fax :[phone]              Patient's Name: Stephen Browne  11:48 AM  4/17/2022    Reason for Consult:  ESRD   Requesting Physician:  Nohemi Hook MD      Chief Complaint:  abd pain       S/p Hd yesterday   Pain better       Past Medical History:   Diagnosis Date    Arthralgia of multiple joints 10/27/2015    Arthritis     Atrial fibrillation ([address]     Chronic kidney disease     stage 4    Chronic systolic congestive heart failure ([address] 8/16/2021    CRI (chronic renal insufficiency)     Diabetic nephropathy associated with type 2 diabetes mellitus ([address] 10/11/2018    Diverticulosis     Elevated PSA     Erectile dysfunction     ESRD (end stage renal disease) on dialysis ([address] 2/24/2022    Gout     Gout     Hemrrhoid NOS w/ complication NEC     History of MRSA infection     Hypertension     CELSA (obstructive sleep apnea) 07/14/2017    uses C-pap machine    Type 2 diabetes mellitus without complication, without long-term current use of insulin ([address] 5/85/0373    Umbilical hernia without obstruction and without gangrene 2/2/2016       Past Surgical History:   Procedure Laterality Date    COLONOSCOPY      COLONOSCOPY N/A 3/18/2022    COLONOSCOPY POLYPECTOMY SNARE/COLD BIOPSY performed by Albania Dixon MD at [facility] [address] N/A 1/17/2022    LAPAROSCOPIC PERITONEAL DIALYSIS CATHETER PLACEMENT and omentopexy performed by Joan Vázquez DO at [address] [place] [address] N/A 4/15/2022    ROBOTIC, RECURRENT INCISIONAL HERNIA REPAIR WITH BIOSYNTHETIC MESH; LAPAROSCOPIC PERITONEAL DIALYSIS CATHETER REVISION WITH LAPAROSCOPIC OMENTOPEXY performed by Isidor Babinski, DO at [address] Bilateral 4/15/2022    BILATERAL OPEN INGUINAL HERNI REPAIR performed by Isidor Babinski, DO at [address] IR TUNNELED CATHETER PLACEMENT GREATER THAN 5 YEARS  01/14/2022    IR TUNNELED CATHETER PLACEMENT GREATER THAN 5 YEARS 1/14/2022 [facility] SPECIAL PROCEDURES    UPPER GASTROINTESTINAL ENDOSCOPY  05/28/2016    biopsies, multiple small gastric ulcers    UPPER GASTROINTESTINAL ENDOSCOPY  09/12/2016    UPPER GASTROINTESTINAL ENDOSCOPY N/A 3/18/2022    EGD DIAGNOSTIC ONLY performed by Aman Colunga MD at [facility] N/A 1/17/2022    LAPAROSCOPIC incarcerated VENTRAL HERNIA REPAIR performed by Isidor Babinski, DO at [facility] OR       Family History   Problem Relation Age of Onset    Hypertension Other     Breast Cancer Mother     Colon Cancer Father     Diabetes Maternal Grandmother     Coronary Art Dis Brother         reports that he has never smoked. He has never used smokeless tobacco. He reports current alcohol use. He reports that he does not use drugs. Allergies:  Patient has no known allergies.     Current Medications:    acetaminophen (TYLENOL) tablet 1,000 mg, Q6H  docusate sodium (COLACE) capsule 100 mg, BID  methocarbamol (ROBAXIN) tablet 1,000 mg, 4x Daily  vitamin D (ERGOCALCIFEROL) capsule 50,000 Units, Weekly  calcitRIOL (ROCALTROL) capsule 0.5 mcg, Daily  enalaprilat (VASOTEC) injection 1.25 mg, Q6H PRN  calcium acetate (PHOSLO) capsule 1,334 mg, TID WC  atorvastatin (LIPITOR) tablet 20 mg, Daily  busPIRone (BUSPAR) tablet 5 mg, BID  torsemide (DEMADEX) tablet 100 mg, Daily  spironolactone (ALDACTONE) tablet 50 mg, Nightly  metoprolol succinate (TOPROL XL) extended release tablet 50 mg, Nightly  nortriptyline (PAMELOR) capsule 10 mg, Nightly  pantoprazole (PROTONIX) tablet 40 mg, QAM AC  sodium chloride flush 0.9 % injection 5-40 mL, 2 times per day  sodium chloride flush 0.9 % injection 5-40 mL, PRN  0.9 % sodium chloride infusion, PRN  ondansetron (ZOFRAN-ODT) disintegrating tablet 4 mg, Q8H PRN   Or  ondansetron (ZOFRAN) injection 4 mg, Q6H PRN  oxyCODONE (ROXICODONE) immediate release tablet 5 mg, Q4H PRN   Or  oxyCODONE (ROXICODONE) immediate release tablet 10 mg, Q4H PRN  heparin (porcine) injection 5,000 Units, 3 times per day  NIFEdipine (ADALAT CC) extended release tablet 90 mg, QPM            Physical exam:     Vitals:  BP (!) 149/91   Pulse 104   Temp 98.3 °F (36.8 °C) (Oral)   Resp 16   Ht 6' 5\" (1.956 m)   Wt 277 lb 9 oz (125.9 kg)   SpO2 90%   BMI 32.91 kg/m²   Constitutional:  OAA X3 NAD  Skin: no rash, turgor wnl  Heent:  eomi, mmm  Neck: no bruits or jvd noted  Cardiovascular:  S1, S2 without m/r/g  Respiratory: CTA B without w/r/r  Abdomen:  +bs, soft, TTP   Ext: +* lower extremity edema  Psychiatric: mood and affect appropriate  Musculoskeletal:  Rom, muscular strength intact    Data:   Labs:  CBC:   Recent Labs     04/15/22  1822 04/16/22  0609 04/17/22  0523   WBC 9.6 7.6 9.8   HGB 12.3* 11.6* 11.9*    171 185     BMP:    Recent Labs     04/15/22  1823 04/16/22  0609 04/17/22  0523    136 136   K 3.2* 3.1* 4.0   CL 98* 97* 96*   CO2 22 26 25   BUN 41* 44* 32*   CREATININE 6.4* 7.2* 6.2*   GLUCOSE 125* 148* 142*     Ca/Mg/Phos:   Recent Labs     04/15/22  1823 04/16/22  0609 04/17/22  0523   CALCIUM 5.8* 5.8* 7.7*   MG  --  1.20* 2.20   PHOS  --  5.0* 4.5     Hepatic:   Recent Labs     04/15/22  1823   AST 16   ALT 14   BILITOT <0.2   ALKPHOS 82     Troponin: No results for input(s): TROPONINI in the last 72 hours. BNP: No results for input(s): BNP in the last 72 hours. Lipids: No results for input(s): CHOL, TRIG, HDL, LDLCALC, LABVLDL in the last 72 hours. ABGs: No results for input(s): PHART, PO2ART, UTM9ZWH in the last 72 hours.   INR: No results for input(s): INR in the last 72 hours. UA:No results for input(s): Tram Spatz, GLUCOSEU, BILIRUBINUR, Ladene Birkenhead, BLOODU, PHUR, PROTEINU, UROBILINOGEN, NITRU, LEUKOCYTESUR, LABMICR, URINETYPE in the last 72 hours. Urine Microscopic: No results for input(s): LABCAST, BACTERIA, COMU, HYALCAST, WBCUA, RBCUA, EPIU in the last 72 hours. Urine Culture: No results for input(s): LABURIN in the last 72 hours. Urine Chemistry: No results for input(s): Wilfrido Ozzy, PROTEINUR, NAUR in the last 72 hours. IMAGING:  No orders to display       Assessment/Plan   1. ESRD     2. HTN    3. Anemia    4. Acid- base/ Electrolyte imbalance     5.  Hernia     Plan   - HD TTS   - UF as carlton   - Bp control   - replace Mag   - cont vit D, Calcitriol  - replace K   - pain control                 Thank you for allowing us to participate in care of Lanre Olmos MD  Feel free to contact me   [facility] [address]  Office : [phone]  Fax :[phone]

## 2022-04-17 NOTE — PROGRESS NOTES
Surgery Daily Progress Note      CC: bilateral recurrent inguinal hernia without obstruction, incisional hernia    SUBJECTIVE:  No acute events overnight. Remains afebrile, tachycardic to 112. Post-surgical pain improving. Tolerated HD yesterday without issue. Was able to ambulate yesterday. Feels much better today. ROS:   A 14 point review of systems was conducted, significant findings as noted above. All other systems negative. OBJECTIVE:    PHYSICAL EXAM:  Vitals:    04/16/22 1834 04/16/22 1915 04/17/22 0104 04/17/22 0316   BP: (!) 159/101 (!) 154/95 (!) 155/92 (!) 157/94   Pulse: 112 106 103 101   Resp:  16 16 16   Temp:  100.4 °F (38 °C) 97.8 °F (36.6 °C) 98.3 °F (36.8 °C)   TempSrc:  Oral Oral Oral   SpO2:  90% 91% 93%   Weight:       Height:           General appearance: alert, no acute distress, grooming appropriate  Eyes: No scleral icterus, EOM grossly intact  Neck: trachea midline, no JVD, neck supple  Chest/Lungs: Normal effort with no accessory muscle use on RA; R subclavian tunneled HD line in place with dressing c/d/i  Cardiovascular: Tachycardic, irregularly irregular rhythm, well perfused  Abdomen: Soft, appropriately-tender to palpation, incisions clean/dry/intact; PD catheter to left abdomen with clean/intact dressing; abdominal binder in place  Skin: warm and dry, no rashes  Extremities: no edema, no cyanosis  Neuro: A&Ox3, no focal deficits, sensation intact      ASSESSMENT & PLAN:   Jose Guadalupe Rubi is a 59y.o. year old male status post bilateral open inguinal hernia repair, robotic recurrent incisional hernia repair with biosynthetic mesh, laparoscopic PD catheter revision with omentopexy secondary to bilateral recurrent inguinal hernia without obstruction, incisional hernia. (4/15) POD#2.    - Continue regular diet  - Continue HD per nephrology recommendations; HD yesterday  - Anticipate discharge home today with adequate pain control     Raquel Alcala.  Fifi Haskins MD  PGY-2, General Surgery  04/17/22  6:14 AM  [phone]

## 2022-04-17 NOTE — PROGRESS NOTES
Reviewed discharge instructions. Patient verbalized understanding. Wheelchair transport given to main entrance, where wife waiting to discharge home.

## 2022-04-17 NOTE — PLAN OF CARE
Patient discharge. IV removed. Tolerating general diet. Surgical pain controlled with oral pain medication.

## 2022-04-17 NOTE — CARE COORDINATION
Case Management Assessment            Discharge Note                    Date / Time of Note: 4/17/2022 9:27 AM                  Discharge Note Completed by: Orville Mcgregor RN    Patient Name: Sujit Acuna   YOB: 1960  Diagnosis: Bilateral recurrent inguinal hernia without obstruction or gangrene [K40.21]  Incisional hernia, without obstruction or gangrene [K43.2]  Bilateral inguinal hernia without obstruction or gangrene [K40.20]   Date / Time: 4/15/2022  5:47 AM    Current PCP: Kelsey Alonso MD  Clinic patient: No    Hospitalization in the last 30 days: No    Advance Directives:  Code Status: Full Code  [place] DNR form completed and on chart: Not Indicated    Financial:  Payor: Terrence Sims / Plan: Candelario Henao / Product Type: *No Product type* /      Pharmacy:    [id number] [address] -  [phone]  [address] [place] [address]  Phone: [phone] Fax: [address] [place] [address]  Phone: [phone] Fax: [phone]    [id number] [address] [phone] - f [phone]  [facility] [address]  Phone: [phone] Fax: [phone]    [id number]- [address], [address] [facility] [phone] - F [phone]  [address] [facility] [address]  Phone: [phone] Fax: [phone]    [id number] [address] [facility], [address] - [address]  [address] [phone] Matt [place] [phone]  [address] [place] [address] [address] [address]  Phone: [phone] Fax: [phone]      Assistance purchasing medications?:    Assistance provided by Case Management: None at this time    Does patient want to participate in local refill/ meds to beds program?: Yes    Meds To Beds General Rules:  1.  Can ONLY be done Monday- Friday between 8:30am-5pm  2. Prescription(s) must be in pharmacy by 3pm to be filled same day  3. Copy of patient's insurance/ prescription drug card and patient face sheet must be sent along with the prescription(s)  4. Cost of Rx cannot be added to hospital bill. If financial assistance is needed, please contact unit  or ;  or  CANNOT provide pharmacy voucher for patients co-pays  5.  Patients can then  the prescription on their way out of the hospital at discharge, or pharmacy can deliver to the bedside if staff is available. (payment due at time of pick-up or delivery - cash, check, or card accepted)     Able to afford home medications/ co-pay costs: Yes    ADLS:  Current PT AM-PAC Score:   /24  Current OT AM-PAC Score:   /24      DISCHARGE Disposition: Home- No Services Needed    LOC at discharge: Not Applicable  ELISA Completed: Not Indicated    Notification completed in HENS/PAS?:  Not Applicable    IMM Completed:   Not Indicated    Transportation:  Transportation PLAN for discharge: family  Mode of Transport: [address] [place] [address] Avenue:  [address] ordered at discharge: Not [address]: Not Applicable  Orders faxed: No    Durable Medical Equipment:  DME Provider: none  Equipment obtained during hospitalization:     Home Oxygen and Respiratory Equipment:  Oxygen needed at discharge?: Not [address]: Not Applicable  Portable tank available for discharge?: Not Indicated    Dialysis:  Dialysis patient: No    Dialysis Center:  Not Applicable        Additional CM Notes: Pt form home with wife no needs at DC will follow up OP with surgical team.     The Plan for Transition of Care is related to the following treatment goals of Bilateral recurrent inguinal hernia without obstruction or gangrene [K40.21]  Incisional hernia, without obstruction or gangrene [K43.2]  Bilateral inguinal hernia without obstruction or gangrene [K40.20]    The Patient and/or patient representative Robin Chao and his family were provided with a choice of provider and agrees with the discharge plan Yes    Freedom of choice list was provided with basic dialogue that supports the patient's individualized plan of care/goals and shares the quality data associated with the providers.  Not Indicated    Care Transitions patient: No    Yudelka Baez RN  [facility]  Case Management Department  Ph: [phone]  Fax: [phone]

## 2022-04-17 NOTE — PLAN OF CARE
Problem: Pain:  Goal: Pain level will decrease  4/17/2022 0343 by Sivan Padilla RN  Outcome: Ongoing     Problem: Fluid Volume:  Goal: Will show no signs or symptoms of fluid imbalance  4/17/2022 0343 by Sivan Padilla RN  Outcome: Ongoing     Problem: Falls - Risk of:  Goal: Will remain free from falls  4/17/2022 0343 by Sivan Padilla RN  Outcome: Met This Shift

## 2022-04-17 NOTE — PROGRESS NOTES
Patient returned from dialysis. Alert and oriented, complained of surgical abdominal pain. Oxycodone and morphine given as needed with benefit. Patient made several attempts to stand and walk, but unable to do due to increased pain. Rachel Daying steady used to transfer to chair and then back to bed at end of shift. Surgical incisions glued, meme, cdi. Abdomen rounded, bowel sounds heard. B/p elevated, requested prn, vasotec ordered, but when rechecked it did not meet parameters. Patient tolerating general diet, small amounts. IV LAC saline locked. Call light in reach, report given to pm shift to monitor.

## 2022-04-17 NOTE — PROGRESS NOTES
Patient alert and oriented x 4. VSS. No acute distress noted. Patient back to bed from the chair using a stedy. Patient c/o abdominal pain and was medicated per orders. Surgical incisions glued, VANNESSA, and CDI. Patient tolerating diet. IV flushed per order. Patient has no needs at this time. Bedside table and call light within reach. Will continue to monitor.

## 2022-04-18 ENCOUNTER — TELEPHONE (OUTPATIENT)
Dept: BARIATRICS/WEIGHT MGMT | Age: 62
End: 2022-04-18

## 2022-04-19 ENCOUNTER — OFFICE VISIT (OUTPATIENT)
Dept: FAMILY MEDICINE CLINIC | Age: 62
End: 2022-04-19
Payer: COMMERCIAL

## 2022-04-19 VITALS
BODY MASS INDEX: 33.54 KG/M2 | RESPIRATION RATE: 14 BRPM | DIASTOLIC BLOOD PRESSURE: 70 MMHG | HEART RATE: 110 BPM | TEMPERATURE: 97 F | SYSTOLIC BLOOD PRESSURE: 120 MMHG | OXYGEN SATURATION: 98 % | WEIGHT: 282.8 LBS

## 2022-04-19 DIAGNOSIS — N18.6 ESRD (END STAGE RENAL DISEASE) ON DIALYSIS (HCC): ICD-10-CM

## 2022-04-19 DIAGNOSIS — I50.22 CHRONIC SYSTOLIC CONGESTIVE HEART FAILURE (HCC): ICD-10-CM

## 2022-04-19 DIAGNOSIS — B35.1 ONYCHOMYCOSIS: ICD-10-CM

## 2022-04-19 DIAGNOSIS — K43.2 INCISIONAL HERNIA, WITHOUT OBSTRUCTION OR GANGRENE: ICD-10-CM

## 2022-04-19 DIAGNOSIS — Z99.2 ESRD (END STAGE RENAL DISEASE) ON DIALYSIS (HCC): ICD-10-CM

## 2022-04-19 DIAGNOSIS — I10 ESSENTIAL HYPERTENSION, BENIGN: Chronic | ICD-10-CM

## 2022-04-19 DIAGNOSIS — N18.5 ANEMIA DUE TO STAGE 5 CHRONIC KIDNEY DISEASE (HCC): ICD-10-CM

## 2022-04-19 DIAGNOSIS — K40.20 NON-RECURRENT BILATERAL INGUINAL HERNIA WITHOUT OBSTRUCTION OR GANGRENE: Primary | ICD-10-CM

## 2022-04-19 DIAGNOSIS — M1A.09X0 IDIOPATHIC CHRONIC GOUT OF MULTIPLE SITES WITHOUT TOPHUS: ICD-10-CM

## 2022-04-19 DIAGNOSIS — N18.4 TYPE 2 DIABETES MELLITUS WITH STAGE 4 CHRONIC KIDNEY DISEASE, UNSPECIFIED WHETHER LONG TERM INSULIN USE (HCC): ICD-10-CM

## 2022-04-19 DIAGNOSIS — D63.1 ANEMIA DUE TO STAGE 5 CHRONIC KIDNEY DISEASE (HCC): ICD-10-CM

## 2022-04-19 DIAGNOSIS — E11.22 TYPE 2 DIABETES MELLITUS WITH STAGE 4 CHRONIC KIDNEY DISEASE, UNSPECIFIED WHETHER LONG TERM INSULIN USE (HCC): ICD-10-CM

## 2022-04-19 PROCEDURE — 3051F HG A1C>EQUAL 7.0%<8.0%: CPT | Performed by: FAMILY MEDICINE

## 2022-04-19 PROCEDURE — G8417 CALC BMI ABV UP PARAM F/U: HCPCS | Performed by: FAMILY MEDICINE

## 2022-04-19 PROCEDURE — 99214 OFFICE O/P EST MOD 30 MIN: CPT | Performed by: FAMILY MEDICINE

## 2022-04-19 PROCEDURE — G8427 DOCREV CUR MEDS BY ELIG CLIN: HCPCS | Performed by: FAMILY MEDICINE

## 2022-04-19 PROCEDURE — 2022F DILAT RTA XM EVC RTNOPTHY: CPT | Performed by: FAMILY MEDICINE

## 2022-04-19 PROCEDURE — 1036F TOBACCO NON-USER: CPT | Performed by: FAMILY MEDICINE

## 2022-04-19 PROCEDURE — 3017F COLORECTAL CA SCREEN DOC REV: CPT | Performed by: FAMILY MEDICINE

## 2022-04-19 RX ORDER — OXYCODONE HYDROCHLORIDE AND ACETAMINOPHEN 5; 325 MG/1; MG/1
1 TABLET ORAL EVERY 8 HOURS PRN
Qty: 90 TABLET | Refills: 0 | Status: SHIPPED | OUTPATIENT
Start: 2022-04-19 | End: 2022-05-12

## 2022-04-19 NOTE — PROGRESS NOTES
Here for f/u of ER visit/hospital for abd pain and swelling. Pt noted some significant swelling in groin, with some mild discomfort. Pt ended up going to ER for eval and was eval with CT abd/pelvis showing edema to scrotum and bilateral hernias. Also showed umbilical hernia. Pt was evaluated by surgery and admitted and and due to persistent sx and discomfort, underwent surgery with dr. Carlitos Davey, who did incisional hernia repair and revision of catheter, and bilateral inguinal hernia repair. Pt does continue on Peritoneal dialysis. Pt states that he is adjusting ok to it, and seems it is doing well. Pt doing home kit, does overnight but at this time related to hernia repair. They did feel that his peritoneal dialysis fluid dialysate was cause of his development of inginal hernia. Pt will continue hemodialysis for several weeks until his surgery heals up,      Per pt, they are concerned of her having peritonitis, and was treated with abx and will be tested again at dialysis    Pt states that since being home, he has fallen twice. Pt is getting up and turning, feels that it is related to pain. Pt denies any lightheadedness, dizziness. No chest pain, shortness of breath. Except as noted above in the history of present illness, the review of systems is  negative for headache, vision changes, chest pain, shortness of breath, abdominal pain, urinary sx, bowel changes. Past medical, surgical, and social history reviewed and updated  Medications and allergies reviewed and updated        O: /70   Pulse 110   Temp 97 °F (36.1 °C) (Temporal)   Resp 14   Wt 282 lb 12.8 oz (128.3 kg)   SpO2 98%   BMI 33.54 kg/m²   GEN: No acute distress, cooperative, well nourished, alert. HEENT: PEERLA, EOMI , normocephalic/atraumatic, nares and oropharynx clear. Mucous membranes normal, Tympanic membranes clear bilaterally.   Neck: soft, supple, no thyromegaly, mass, no Lymphadenopathy  CV: Regular rate and rhythm, no murmur, rubs, gallops. No edema. Resp: Clear to auscultation bilaterally good air entry bilaterally  No crackles, wheeze. Breathing comfortably. Psych: mood stable, No suicidal thoughts or ideation   Ext: no clubbing, cyanosis, edema           Current Outpatient Medications   Medication Sig Dispense Refill    oxyCODONE-acetaminophen (PERCOCET) 5-325 MG per tablet Take 1 tablet by mouth every 8 hours as needed for Pain for up to 30 days. 90 tablet 0    methocarbamol (ROBAXIN) 750 MG tablet Take 1 tablet by mouth 3 times daily for 10 days 30 tablet 0    terazosin (HYTRIN) 5 MG capsule Take 1 capsule by mouth daily      oxyCODONE (ROXICODONE) 5 MG immediate release tablet Take 1 tablet by mouth every 6 hours as needed for Pain for up to 7 days. Intended supply: 3 days.  Take lowest dose possible to manage pain 28 tablet 0    docusate sodium (COLACE) 100 MG capsule Take 1 capsule by mouth 2 times daily 20 capsule 0    busPIRone (BUSPAR) 5 MG tablet TAKE 1 TABLET BY MOUTH TWICE A DAY 60 tablet 0    B Complex-C-Folic Acid (DIALYVITE 674 PO) Take 1 tablet by mouth once a week       Methoxy PEG-Epoetin Beta (MIRCERA) 50 MCG/0.3ML SOSY Inject as directed Twice a  month       NIFEdipine (PROCARDIA XL) 60 MG extended release tablet TAKE 1 TABLET BY MOUTH EVERYDAY AT BEDTIME (Patient taking differently: 90 mg ) 30 tablet 0    bisacodyl (BISACODYL) 5 MG EC tablet Take 1 tablet by mouth daily as needed for Constipation 30 tablet 5    spironolactone (ALDACTONE) 50 MG tablet Take 1 tablet by mouth at bedtime 30 tablet 3    metoprolol succinate (TOPROL XL) 50 MG extended release tablet Take 1 tablet by mouth at bedtime TAKE 1 TABLET BY MOUTH EVERY DAY 30 tablet 3    atorvastatin (LIPITOR) 20 MG tablet TAKE 1 TABLET BY MOUTH EVERY DAY 30 tablet 5    torsemide (DEMADEX) 100 MG tablet Take 1 tablet by mouth daily 30 tablet 3    vitamin D (ERGOCALCIFEROL) 1.25 MG (68316 UT) CAPS capsule Take 1 capsule by mouth once a week 12 capsule 1    calcitRIOL (ROCALTROL) 0.25 MCG capsule Take 1 capsule by mouth daily (Patient taking differently: Take 0.5 mcg by mouth daily ) 30 capsule 3    calcium acetate (PHOSLO) 667 MG CAPS capsule Take 2 capsules by mouth 3 times daily (with meals) 180 capsule 0    nortriptyline (PAMELOR) 10 MG capsule nightly       omeprazole (PRILOSEC) 40 MG delayed release capsule TAKE 1 CAPSULE BY MOUTH EVERY DAY 30 capsule 1    sildenafil (VIAGRA) 100 MG tablet Take 1 tablet by mouth as needed for Erectile Dysfunction 30 tablet 3    aspirin 81 MG tablet Take 81 mg by mouth daily      Hepatitis B Vac Recombinant (ENGERIX-B) 20 MCG/ML INJ Inject 40 mcg into the muscle Every 4 weeks (Patient not taking: Reported on 4/15/2022)      Iron Sucrose (VENOFER IV) Infuse intravenously every 30 days (Patient not taking: Reported on 4/15/2022)      colchicine (COLCRYS) 0.6 MG tablet Take 1 tablet by mouth daily as needed for Pain (as needed for pain related to gout) (Patient not taking: Reported on 4/15/2022) 30 tablet 5     No current facility-administered medications for this visit. ASSESSMENT / PLAN:    1. Non-recurrent bilateral inguinal hernia without obstruction or gangrene  Doing well s/p surgery  Cont supportive therapy, lifting restrictions and f/u with dr. Liz Damon    2. Incisional hernia, without obstruction or gangrene  Doing well s/p surgery     3. Onychomycosis  Refer podiatry  - External Referral To Podiatry    4. ESRD (end stage renal disease) on dialysis ([facility]  Cont on dialysis  Currently on HD due to recent abd surgery  F/u with nephrology    5. Anemia due to stage 5 chronic kidney disease ([facility]  Doing well, low-normal hgb  F/u with nephrology    6. Chronic systolic congestive heart failure (HCC)  stable    7. Type 2 diabetes mellitus with stage 4 chronic kidney disease, unspecified whether long term insulin use (HCC)  Stable, cont to monitor    8.  Essential hypertension, benign  blood pressure stable @ goal    9. Idiopathic chronic gout of multiple sites without tophus  Stable w/o flare  Cont prn percocet  Use chronic   See CSM  Pt aware of need for every 3 month medication followup appointments, and that medication refills for benzodiazepines, narcotics and/or stimulants will only be given at appointment. - oxyCODONE-acetaminophen (PERCOCET) 5-325 MG per tablet; Take 1 tablet by mouth every 8 hours as needed for Pain for up to 30 days. Dispense: 90 tablet; Refill: 0           Follow-up appointment:   Call or return to clinic prn if these symptoms worsen or fail to improve as anticipated. Discussed use, benefit, and side effects of all prescribed medications. Barriers to medication compliance addressed. All patient questions answered. Pt voiced understanding. When applicable, patient's outside records were reviewed through [facility]. The patient has signed appropriate paperworks/consents.

## 2022-04-19 NOTE — OP NOTE
DATE OF PROCEDURE:  04/15/2022     PREOPERATIVE DIAGNOSIS:  Recurrent incarcerated incisional hernia, Bilateral inguinal hernias, PD catheter dysfunction     POSTOPERATIVE DIAGNOSES:  Same as pre-op     OPERATION PERFORMED:  1.  Robotic recurrent incarcerated incisional hernia repair with biosynthetic  mesh. 2.  Laparoscopic peritoneal dialysis catheter revision. 3.  Laparoscopic omentopexy  4. Bilateral inguinal hernia repairs     SURGEON:  Radha Butt DO     ASSISTANT:  Luzmaria Stack     OTHER ASSISTANT:  Leslie Haines     ANESTHESIA:  General endotracheal.     COMPLICATIONS:  None.     CONDITION:  Stable.     ESTIMATED BLOOD LOSS:  10 mL.     INDICATION FOR PROCEDURE:  The patient is a 52-year-old gentleman who  had a peritoneal dialysis catheter placed by myself in 2020  approximately two years ago. He at that time had a small ventral hernia  that was repaired primarily with sutures. He developed a recurrence of  his hernia. Also developed scrotal swelling due to bilateral indirect inguinal hernia. He was boarded and consented for repair after  understanding all risks, benefits, and alternatives of the procedure. We discussed use of biosynthetic mesh as the patient has a high risk for  infection due to peritoneal fluid going in and out of the abdominal  cavity.     DESCRIPTION OF PROCEDURE:  The patient was brought to the operating  suite, laid in the supine position with arms outstretched and after  induction of general endotracheal anesthesia, tube was confirmed to be  in place with end-tidal CO2 and secured. A Guerrero catheter was placed  with clear return of urine. The patient was prepped and draped in the  usual sterile manner.     The existing PD catheter was used to establish pneumoperitoneum and this  was established to 12 mm of CO2. A 5-mm 30-degree camera housed in a  5-mm OptiView trocar was used to enter the abdomen under direct  visualization in the left upper quadrant.   Two additional trocars were  placed and the robot was docked.     The small bowel that was incarcerated inside the incisional hernia was  easily reduced using manual manipulation with the assistance of  pneumoperitoneum. Once this was accomplished an approximately 1.5 cm  hernia was appreciated, this incision was closed primarily using #1  Stratafix suture. Once this was accomplished, an 11-cm Phasix ST mesh  trimmed to 9 cm This was placed intraperitoneal and brought up with a  suture passer device to circumferentially give coverage to the hernia  repair, but also being careful not to kink the PD catheter. A 2-0  Stratafix suture was then used circumferentially to fixate the mesh. Several bites from the peritoneum to the abdominal wall to the mesh were  done circumferentially at 3 to 5 mm intervals to ensure that the mesh  was fixated appropriately.       Once this was accomplished, it was evident that the PD catheter was going  into the left paracolic gutter. This was then grasped very gently and  repositioned to the midline and brought to the pelvis below the bladder. This was then fixated by manipulation where the catheter was clearly  seen going down to the pelvis. A 50 mL syringe was used to flush the  catheter multiple times to make sure that no kink or obstruction was  caused by either the mesh or revision of the PD catheter. Catheter  flushed easily and picture was taken both of the catheter revision as  well as the hernia repair. The omentum was seen to be going into the hernia, and omentopexy was done to secure this to abdominal wall. Suture passer device with 0-vicryl suture was used to fixate this to the anterior abdominal wall. Attention was then turned to the right groin, where a transverse incision was  made over the inguinal ligament between the ASIS and pubic tubercle.     Incision was carried down to the skin and subcutaneous tissues so the  external oblique fibers were then identified, grasped and elevated in the  surgical field. They were then incised along the length of their fibers. This then opened up the inguinal canal.  The pubic tubercle, Poupart ligament  and conjoint tendon were all identified circumferentially. The spermatic  cord and its contents were then elevated from the floor of the inguinal  canal.  After elevating the spermatic cord, it was then lassoed using a  Penrose drain. After this, confirm of the vas  deferens, testicular artery and pampiniform plexus was confirmed. There was a large hernia sac, which was then freed from the  spermatic cord and its contents. It was then opened under direct vision and  connected to the abdominal cavity. The hernia sac was then amputated. The sac that was remaining was hemostatic. The suture was then cut, and this then dropped into the abdomen. The mesh patch was then overlaid and tacked to the pubic tubercle,  Poupart ligament and conjoint tendon circumferentially using 0-Ethibond  sutures. The tails of the keyhole mesh were then reapproximated using  0-Ethibond sutures as well. The wound was then irrigated. A Valsalva was  performed. There was no hernia visualized. The spermatid cord was laid into its anatomical position. The external oblique fiber was reapproximated using 0-Ethibond sutures. The wound was once again irrigated. Meron's fascia was closed using 3-0 Vicryl, as well as were the subdermal tissue, and skin was closed using a 4-0 Monocryl and a running subcuticular stitch. Attention was then turned to the left groin, where a transverse incision was  made over the inguinal ligament between the ASIS and pubic tubercle. Incision was carried down to the skin and subcutaneous tissues so the  external oblique fibers were then identified, grasped and elevated in the  surgical field. They were then incised along the length of their fibers.    This then opened up the inguinal canal.  The pubic tubercle, Poupart ligament  and conjoint tendon were all identified circumferentially. The spermatic  cord and its contents were then elevated from the floor of the inguinal  canal.  After elevating the spermatic cord, it was then lassoed using a  Penrose drain. After this, confirm of the vas  deferens, testicular artery and pampiniform plexus was confirmed. There was a large hernia sac, which was then freed from the  spermatic cord and its contents. It was then opened under direct vision and  connected to the abdominal cavity. The hernia sac was then amputated. The sac that was remaining was hemostatic. The suture was then cut, and this then dropped into the abdomen. The mesh patch was then overlaid and tacked to the pubic tubercle,  Poupart ligament and conjoint tendon circumferentially using 0-Ethibond  sutures. The tails of the keyhole mesh were then reapproximated using  0-Ethibond sutures as well. The wound was then irrigated. A Valsalva was  performed. There was no hernia visualized. The spermatid cord was laid into its anatomical position. The external oblique fiber was reapproximated using 0-Ethibond sutures. The wound was once again irrigated. Meron's fascia was closed using 3-0 Vicryl, as well as were the subdermal tissue, and skin was closed using a 4-0 Monocryl and a running subcuticular stitch. The ports were examined and were closed at the  fascial level with 0 Vicryl suture using suture passer device. Pneumoperitoneum was evacuated. Port sites were closed using 4-0  Vicryl suture.   The patient tolerated the procedure well and brought to  the postanesthesia care unit in stable condition.     All sponge, needle, and instrument counts were correct x2.     I personally informed the patient's family of the findings of the  operation.           Braxton Ayoub, DO

## 2022-04-22 ENCOUNTER — TELEPHONE (OUTPATIENT)
Dept: CASE MANAGEMENT | Age: 62
End: 2022-04-22

## 2022-04-22 NOTE — TELEPHONE ENCOUNTER
Dr. Malvin Alfaro,    Pt completed CT Abd/Pelvis during hospitalization at [facility] on 4/13/22. Pt had incidental finding of two pulmonary nodules- 8mm and 6mm. I just wanted to bring to your attention if you thought pt should have follow-up.         Thank you,   Reji ALEXANDRAN, RN   Lung Nodule Navigator  [facility].  [phone]

## 2022-04-25 ENCOUNTER — TELEPHONE (OUTPATIENT)
Dept: BARIATRICS/WEIGHT MGMT | Age: 62
End: 2022-04-25

## 2022-04-25 NOTE — TELEPHONE ENCOUNTER
Spoke to pt wife,this morning at 800am, appt is scheduled for pt. Wife did not let this MA know of any issues stated in this message. She just stated that they needed to speak to [facility] [place] about some stuff. And she would not say. Mery Casper

## 2022-04-25 NOTE — TELEPHONE ENCOUNTER
Pt is having a lot of pain where his incisions are, pain in scrotum and he feels a knot in his incision     His wife will be taking him to a hospital today    Pt had ROBOTIC, [address]; LAPAROSCOPIC PERITONEAL DIALYSIS CATHETER REVISION WITH LAPAROSCOPIC OMENTOPEXY    Pt can be personally reached at [phone]

## 2022-04-25 NOTE — TELEPHONE ENCOUNTER
Spoke to patient   Concerned about inguinal incision being \"hard\"    No fevers/chills/drainage    Explained this is a healing ridge and expected post-operative change    Will see patient in office this Wednesday and may refer to Urology for scrotal swelling based on evaluation

## 2022-04-27 ENCOUNTER — OFFICE VISIT (OUTPATIENT)
Dept: BARIATRICS/WEIGHT MGMT | Age: 62
End: 2022-04-27

## 2022-04-27 VITALS
DIASTOLIC BLOOD PRESSURE: 76 MMHG | HEIGHT: 77 IN | WEIGHT: 273 LBS | SYSTOLIC BLOOD PRESSURE: 128 MMHG | HEART RATE: 102 BPM | BODY MASS INDEX: 32.23 KG/M2

## 2022-04-27 DIAGNOSIS — K40.20 NON-RECURRENT BILATERAL INGUINAL HERNIA WITHOUT OBSTRUCTION OR GANGRENE: ICD-10-CM

## 2022-04-27 DIAGNOSIS — N50.89 SCROTAL SWELLING: Primary | ICD-10-CM

## 2022-04-27 PROCEDURE — 99024 POSTOP FOLLOW-UP VISIT: CPT | Performed by: SURGERY

## 2022-04-27 NOTE — PROGRESS NOTES
Patient doing well overall    Still has scrotal swelling   Incisions C/D/I    S/P bilateral inguinal hernia repair/incisional hernia repair    Elevate scrotum     Will refer to Dr. Candance Long    F/U in 4 weeks    Yudelka Longoria

## 2022-05-02 NOTE — DISCHARGE SUMMARY
Physician Discharge Summary      Patient ID:  Jessica Gregory  [id number]  59 y.o.  1960      Admit date: 4/15/2022        Discharge date and time: 4/17/2022  2:03 PM     Admitting Physician:  Isidor Babinski, DO     Admission Diagnoses: Bilateral recurrent inguinal hernia without obstruction or gangrene [K40.21]  Incisional hernia, without obstruction or gangrene [K43.2]  Bilateral inguinal hernia without obstruction or gangrene [K40.20]      PMH:       Diagnosis Date    Arthralgia of multiple joints 10/27/2015    Arthritis     Atrial fibrillation (HCC)     Chronic kidney disease     stage 4    Chronic systolic congestive heart failure ([facility] 8/16/2021    CRI (chronic renal insufficiency)     Diabetic nephropathy associated with type 2 diabetes mellitus ([facility] 10/11/2018    Diverticulosis     Elevated PSA     Erectile dysfunction     ESRD (end stage renal disease) on dialysis ([facility] 2/24/2022    Gout     Gout     Hemrrhoid NOS w/ complication NEC     History of MRSA infection     Hypertension     CELSA (obstructive sleep apnea) 07/14/2017    uses C-pap machine    Type 2 diabetes mellitus without complication, without long-term current use of insulin ([facility] 7/14/4730    Umbilical hernia without obstruction and without gangrene 2/2/2016        PSH:   Past Surgical History:   Procedure Laterality Date    COLONOSCOPY      COLONOSCOPY N/A 3/18/2022    COLONOSCOPY POLYPECTOMY SNARE/COLD BIOPSY performed by Laurence Arroyo MD at [address] N/A 1/17/2022    LAPAROSCOPIC PERITONEAL DIALYSIS CATHETER PLACEMENT and omentopexy performed by Isidor Babinski, DO at [address] [place] [address], [address] [place] [address] N/A 4/15/2022    ROBOTIC, RECURRENT INCISIONAL HERNIA REPAIR WITH BIOSYNTHETIC MESH; LAPAROSCOPIC PERITONEAL DIALYSIS CATHETER REVISION WITH LAPAROSCOPIC OMENTOPEXY performed by Isidor Babinski, DO at [address] [place] [address]  Bilateral 4/15/2022    BILATERAL OPEN INGUINAL HERNI REPAIR performed by Cheyanne Manley DO at [address] [facility] IR TUNNELED CATHETER PLACEMENT GREATER THAN 5 YEARS  01/14/2022    IR TUNNELED CATHETER PLACEMENT GREATER THAN 5 YEARS 1/14/2022 [facility] SPECIAL PROCEDURES    UPPER GASTROINTESTINAL ENDOSCOPY  05/28/2016    biopsies, multiple small gastric ulcers    UPPER GASTROINTESTINAL ENDOSCOPY  09/12/2016    UPPER GASTROINTESTINAL ENDOSCOPY N/A 3/18/2022    EGD DIAGNOSTIC ONLY performed by Josue Medeiros MD at [place] [address] N/A 1/17/2022    LAPAROSCOPIC incarcerated VENTRAL HERNIA REPAIR performed by Cheyanne Manley DO at [address]: Patient has no known allergies. Admission Condition: good     Discharged Condition: good     Hospital Course: Dennie Ovens is a 59 y.o. male with history of ESRD on PD, CHF, DM, and atrial fibrillation who presented to [facility] on 4/13/22 with a chief complaint of scrotal swelling. This was deemed to be secondary to bilateral inguinal hernias that had progressed since starting his peritoneal dialysis. In desiring to continue PD, he elected to undergo bilateral open inguinal hernia repairs with robotic-assisted laparoscopic incisional hernia repair with PD catheter revision on 4/15. He tolerated the procedure well and following recovery in PACU was transported back to the general surgical floor. He underwent hemodialysis on POD #1 prior to discharge home the following day on 4/17/22.      Discharge Physical Exam:   General appearance: alert, no acute distress, grooming appropriate  Eyes: No scleral icterus, EOM grossly intact  Neck: trachea midline, no JVD, neck supple  Chest/Lungs: Normal effort with no accessory muscle use on RA; R subclavian tunneled HD line in place with dressing c/d/i  Cardiovascular: Tachycardic, irregularly irregular rhythm, well perfused  Abdomen: Soft, appropriately-tender to palpation, incisions clean/dry/intact; PD catheter to left abdomen with clean/intact dressing; abdominal binder in place  Skin: warm and dry, no rashes  Extremities: no edema, no cyanosis  Neuro: A&Ox3, no focal deficits, sensation intact     Consults:  Nephrology - Dr Dewight Severin     Significant Diagnostic Studies:   CT Abdomen/Pelvis - 4/13/22:  1.  Scrotal edema.  Bilateral inguinal hernias containing fat and    ascitic fluid.  No bowel involvement. 2.  Small volume ascites.  Trace pneumoperitoneum related to the    peritoneal dialysis catheter. 3.  Umbilical hernia. 4.  Bilateral lower lobe pulmonary nodules largest on the left    measuring 8 mm.  Consider nonemergent chest CT. Operations/Procedures:   4/15/22 - Dr Joyce Dear - Robotic-assisted recurrent incarcerated incisional hernia repair with biosynthetic mesh, laparoscopic peritoneal dialysis catheter revision, laparoscopic omentopexy, bilateral inguinal hernia repair     Disposition: home     Patient Instructions:   See discharge instruction form. Signed:  [facility].  Criss Pineda MD  PGY-2, General Surgery

## 2022-05-03 NOTE — TELEPHONE ENCOUNTER
Met with pt in infusion for check-in. Used time to hear updates re: finishing treatment including radiation and current plans for her care. Reflected on her treatment process overall and her learning moments. Shared her plan to begin getting back to work, identified areas of worry and betzaida. Reiterated the opportunity to use limited support sessions for survivorship adjustments. Pt shared she still has writer's card and will call if needed. No safety concerns or referrals needed.    Unable to get through to pharmacy will try again tomorrow

## 2022-05-12 ENCOUNTER — APPOINTMENT (OUTPATIENT)
Dept: CT IMAGING | Age: 62
End: 2022-05-12
Payer: COMMERCIAL

## 2022-05-12 ENCOUNTER — HOSPITAL ENCOUNTER (EMERGENCY)
Age: 62
Discharge: HOME | End: 2022-05-13
Attending: EMERGENCY MEDICINE
Payer: COMMERCIAL

## 2022-05-12 ENCOUNTER — TELEPHONE (OUTPATIENT)
Dept: OTHER | Facility: CLINIC | Age: 62
End: 2022-05-12

## 2022-05-12 DIAGNOSIS — K40.21 BILATERAL RECURRENT INGUINAL HERNIA WITHOUT OBSTRUCTION OR GANGRENE: Primary | ICD-10-CM

## 2022-05-12 LAB
ALBUMIN SERPL-MCNC: 4.2 G/DL (ref 3.4–5)
ALP BLD-CCNC: 90 U/L (ref 40–129)
ALT SERPL-CCNC: 13 U/L (ref 10–40)
ANION GAP SERPL CALCULATED.3IONS-SCNC: 15 MMOL/L (ref 3–16)
AST SERPL-CCNC: 14 U/L (ref 15–37)
BASE EXCESS VENOUS: 1.9 MMOL/L (ref -2–3)
BASOPHILS ABSOLUTE: 0.1 K/UL (ref 0–0.2)
BASOPHILS RELATIVE PERCENT: 1.6 %
BILIRUB SERPL-MCNC: <0.2 MG/DL (ref 0–1)
BILIRUBIN DIRECT: <0.2 MG/DL (ref 0–0.3)
BILIRUBIN, INDIRECT: ABNORMAL MG/DL (ref 0–1)
BUN BLDV-MCNC: 16 MG/DL (ref 7–20)
CALCIUM SERPL-MCNC: 8.9 MG/DL (ref 8.3–10.6)
CARBOXYHEMOGLOBIN: 0.9 % (ref 0–1.5)
CHLORIDE BLD-SCNC: 95 MMOL/L (ref 99–110)
CO2: 23 MMOL/L (ref 21–32)
CREAT SERPL-MCNC: 5.7 MG/DL (ref 0.8–1.3)
EOSINOPHILS ABSOLUTE: 0.5 K/UL (ref 0–0.6)
EOSINOPHILS RELATIVE PERCENT: 5.7 %
GFR AFRICAN AMERICAN: 12
GFR NON-AFRICAN AMERICAN: 10
GLUCOSE BLD-MCNC: 162 MG/DL (ref 70–99)
HCO3 VENOUS: 27.2 MMOL/L (ref 24–28)
HCT VFR BLD CALC: 33.2 % (ref 40.5–52.5)
HEMOGLOBIN, VEN, REDUCED: 10 %
HEMOGLOBIN: 10.9 G/DL (ref 13.5–17.5)
LACTIC ACID: 0.7 MMOL/L (ref 0.4–2)
LIPASE: 62 U/L (ref 13–60)
LYMPHOCYTES ABSOLUTE: 1.2 K/UL (ref 1–5.1)
LYMPHOCYTES RELATIVE PERCENT: 14.1 %
MAGNESIUM: 1.7 MG/DL (ref 1.8–2.4)
MCH RBC QN AUTO: 27.4 PG (ref 26–34)
MCHC RBC AUTO-ENTMCNC: 32.7 G/DL (ref 31–36)
MCV RBC AUTO: 83.9 FL (ref 80–100)
METHEMOGLOBIN VENOUS: 0.5 % (ref 0–1.5)
MONOCYTES ABSOLUTE: 0.8 K/UL (ref 0–1.3)
MONOCYTES RELATIVE PERCENT: 9.5 %
NEUTROPHILS ABSOLUTE: 5.9 K/UL (ref 1.7–7.7)
NEUTROPHILS RELATIVE PERCENT: 69.1 %
O2 SAT, VEN: 90 %
PCO2, VEN: 44.8 MMHG (ref 41–51)
PDW BLD-RTO: 16.7 % (ref 12.4–15.4)
PH VENOUS: 7.39 (ref 7.35–7.45)
PLATELET # BLD: 260 K/UL (ref 135–450)
PMV BLD AUTO: 7.8 FL (ref 5–10.5)
PO2, VEN: 58.9 MMHG (ref 25–40)
POTASSIUM REFLEX MAGNESIUM: 3.1 MMOL/L (ref 3.5–5.1)
RBC # BLD: 3.96 M/UL (ref 4.2–5.9)
SODIUM BLD-SCNC: 133 MMOL/L (ref 136–145)
TCO2 CALC VENOUS: 29 MMOL/L
TOTAL PROTEIN: 7.2 G/DL (ref 6.4–8.2)
WBC # BLD: 8.5 K/UL (ref 4–11)

## 2022-05-12 PROCEDURE — 36415 COLL VENOUS BLD VENIPUNCTURE: CPT

## 2022-05-12 PROCEDURE — 83605 ASSAY OF LACTIC ACID: CPT

## 2022-05-12 PROCEDURE — 96376 TX/PRO/DX INJ SAME DRUG ADON: CPT

## 2022-05-12 PROCEDURE — 80048 BASIC METABOLIC PNL TOTAL CA: CPT

## 2022-05-12 PROCEDURE — 99284 EMERGENCY DEPT VISIT MOD MDM: CPT

## 2022-05-12 PROCEDURE — 83690 ASSAY OF LIPASE: CPT

## 2022-05-12 PROCEDURE — 6360000002 HC RX W HCPCS: Performed by: NURSE PRACTITIONER

## 2022-05-12 PROCEDURE — 85025 COMPLETE CBC W/AUTO DIFF WBC: CPT

## 2022-05-12 PROCEDURE — 83735 ASSAY OF MAGNESIUM: CPT

## 2022-05-12 PROCEDURE — 96374 THER/PROPH/DIAG INJ IV PUSH: CPT

## 2022-05-12 PROCEDURE — 82803 BLOOD GASES ANY COMBINATION: CPT

## 2022-05-12 PROCEDURE — 74176 CT ABD & PELVIS W/O CONTRAST: CPT

## 2022-05-12 PROCEDURE — 80076 HEPATIC FUNCTION PANEL: CPT

## 2022-05-12 PROCEDURE — 96375 TX/PRO/DX INJ NEW DRUG ADDON: CPT

## 2022-05-12 RX ORDER — MORPHINE SULFATE 4 MG/ML
4 INJECTION, SOLUTION INTRAMUSCULAR; INTRAVENOUS ONCE
Status: COMPLETED | OUTPATIENT
Start: 2022-05-12 | End: 2022-05-12

## 2022-05-12 RX ORDER — OXYCODONE HYDROCHLORIDE 5 MG/1
5 TABLET ORAL EVERY 6 HOURS PRN
Qty: 15 TABLET | Refills: 0 | Status: SHIPPED | OUTPATIENT
Start: 2022-05-12 | End: 2022-05-17

## 2022-05-12 RX ORDER — ONDANSETRON 2 MG/ML
4 INJECTION INTRAMUSCULAR; INTRAVENOUS ONCE
Status: COMPLETED | OUTPATIENT
Start: 2022-05-12 | End: 2022-05-12

## 2022-05-12 RX ADMIN — ONDANSETRON 4 MG: 2 INJECTION INTRAMUSCULAR; INTRAVENOUS at 21:00

## 2022-05-12 RX ADMIN — MORPHINE SULFATE 4 MG: 4 INJECTION, SOLUTION INTRAMUSCULAR; INTRAVENOUS at 22:40

## 2022-05-12 RX ADMIN — MORPHINE SULFATE 4 MG: 4 INJECTION INTRAVENOUS at 21:01

## 2022-05-12 ASSESSMENT — PAIN SCALES - GENERAL
PAINLEVEL_OUTOF10: 10
PAINLEVEL_OUTOF10: 10

## 2022-05-12 ASSESSMENT — PAIN DESCRIPTION - PAIN TYPE: TYPE: ACUTE PAIN

## 2022-05-12 ASSESSMENT — PAIN - FUNCTIONAL ASSESSMENT: PAIN_FUNCTIONAL_ASSESSMENT: 0-10

## 2022-05-12 ASSESSMENT — PAIN DESCRIPTION - ONSET: ONSET: AWAKENED FROM SLEEP

## 2022-05-12 ASSESSMENT — ENCOUNTER SYMPTOMS
RESPIRATORY NEGATIVE: 1
ABDOMINAL PAIN: 1
NAUSEA: 0
VOMITING: 0
DIARRHEA: 0

## 2022-05-12 ASSESSMENT — PAIN DESCRIPTION - DESCRIPTORS: DESCRIPTORS: BURNING;THROBBING

## 2022-05-12 ASSESSMENT — PAIN DESCRIPTION - LOCATION: LOCATION: ABDOMEN

## 2022-05-12 ASSESSMENT — PAIN DESCRIPTION - ORIENTATION: ORIENTATION: LEFT

## 2022-05-12 ASSESSMENT — PAIN DESCRIPTION - FREQUENCY: FREQUENCY: CONTINUOUS

## 2022-05-13 ENCOUNTER — TELEPHONE (OUTPATIENT)
Dept: BARIATRICS/WEIGHT MGMT | Age: 62
End: 2022-05-13

## 2022-05-13 ENCOUNTER — TELEPHONE (OUTPATIENT)
Dept: OTHER | Facility: CLINIC | Age: 62
End: 2022-05-13

## 2022-05-13 VITALS
SYSTOLIC BLOOD PRESSURE: 141 MMHG | HEART RATE: 84 BPM | DIASTOLIC BLOOD PRESSURE: 90 MMHG | BODY MASS INDEX: 30.7 KG/M2 | RESPIRATION RATE: 17 BRPM | WEIGHT: 260 LBS | HEIGHT: 77 IN | OXYGEN SATURATION: 97 % | TEMPERATURE: 97.4 F

## 2022-05-13 ASSESSMENT — PAIN SCALES - GENERAL: PAINLEVEL_OUTOF10: 3

## 2022-05-13 ASSESSMENT — PAIN DESCRIPTION - LOCATION: LOCATION: ABDOMEN

## 2022-05-13 ASSESSMENT — PAIN - FUNCTIONAL ASSESSMENT: PAIN_FUNCTIONAL_ASSESSMENT: 0-10

## 2022-05-13 NOTE — TELEPHONE ENCOUNTER
[facility] contacted pt regarding ED follow up. Spoke with pt, he stated he called Dr. Jordy Flores office this morning and he slao spoke with Dr. Luisito Gil. Pt stated he will call Dr. Jordy Flores office back to schedule follow up appt.

## 2022-05-13 NOTE — ED PROVIDER NOTES
[address] [facility] [address] ENCOUNTER          NURSE PRACTITIONER NOTE       Date of evaluation: 5/12/2022    Chief Complaint     Abdominal Pain (Patient has hernia surgery 4 weeks ago. Pain is on the left side near the incision site. Patient is a hemo and PD dialysis patient)      History of Present Illness     Gucci Tripp is a 59 y.o. male with past medical history of end-stage renal disease on hemo and peritoneal dialysis, nephropathy, CHF, hypertension, diabetes among others; who presents to the emergency department with a complaint of progressively worsening left lower quadrant/groin pain. Patient had bilateral inguinal hernia repairs on 4/15/2022 with Dr. Mya Alcala, states that he had been doing well. He was experiencing some scrotal swelling, however that has improved with elevation. This morning he woke up with left sided abdominal pain around his incision site, which he states is steadily worsened throughout the day. Not associated with nausea vomiting diarrhea, fevers chills or sweats. Did have a normal bowel movement today. Was able to complete a full round of hemodialysis today. States that he was on hemodialysis versus peritoneal dialysis while he was healing from his inguinal surgery repair, however is scheduled to start peritoneal dialysis again tomorrow. States that he did have his PD catheter flushed at the dialysis center today. Was experiencing this pain prior to this. Does not remember the pain acutely worsening after the catheter was flushed. States it is just been a progressive worsening. Describes it as throbbing pain, 7 out of 10, worse with movement and palpation of the area. Review of Systems     Review of Systems   Constitutional: Negative. HENT: Negative. Respiratory: Negative. Cardiovascular: Negative. Gastrointestinal: Positive for abdominal pain (left lower/inguinal). Negative for diarrhea, nausea and vomiting. Genitourinary: Negative. Musculoskeletal: Negative. Skin: Negative. Allergic/Immunologic: Negative for immunocompromised state. Neurological: Negative for dizziness and headaches. Psychiatric/Behavioral: Negative. Past Medical, Surgical, Family, and Social History     He has a past medical history of Arthralgia of multiple joints, Arthritis, Atrial fibrillation ([address] Chronic kidney disease, Chronic systolic congestive heart failure ([address] CRI (chronic renal insufficiency), Diabetic nephropathy associated with type 2 diabetes mellitus ([facility] Diverticulosis, Elevated PSA, Erectile dysfunction, ESRD (end stage renal disease) on dialysis ([facility] Gout, Gout, Hemrrhoid NOS w/ complication NEC, History of MRSA infection, Hypertension, CELSA (obstructive sleep apnea), Type 2 diabetes mellitus without complication, without long-term current use of insulin ([facility] and Umbilical hernia without obstruction and without gangrene. He has a past surgical history that includes Upper gastrointestinal endoscopy (05/28/2016); Colonoscopy; Dilatation, esophagus; Upper gastrointestinal endoscopy (09/12/2016); IR TUNNELED CVC PLACE WO SQ PORT/PUMP > 5 YEARS (01/14/2022); Dialysis Catheter Insertion (N/A, 1/17/2022); ventral hernia repair (N/A, 1/17/2022); CT INSERT PERITONEAL CATHETER; Colonoscopy (N/A, 3/18/2022); Upper gastrointestinal endoscopy (N/A, 3/18/2022); hernia repair (N/A, 4/15/2022); and hernia repair (Bilateral, 4/15/2022). His family history includes Breast Cancer in his mother; Colon Cancer in his father; Coronary Art Dis in his brother; Diabetes in his maternal grandmother; Hypertension in an other family member. He reports that he has never smoked. He has never used smokeless tobacco. He reports previous alcohol use. He reports that he does not use drugs.     Medications     Previous Medications    ASPIRIN 81 MG TABLET    Take 81 mg by mouth daily    ATORVASTATIN (LIPITOR) 20 MG TABLET    TAKE 1 TABLET BY MOUTH EVERY DAY    B COMPLEX-C-FOLIC ACID (DIALYVITE 953 PO)    Take 1 tablet by mouth once a week     BISACODYL (BISACODYL) 5 MG EC TABLET    Take 1 tablet by mouth daily as needed for Constipation    BUSPIRONE (BUSPAR) 5 MG TABLET    TAKE 1 TABLET BY MOUTH TWICE A DAY    CALCITRIOL (ROCALTROL) 0.25 MCG CAPSULE    Take 1 capsule by mouth daily    CALCIUM ACETATE (PHOSLO) 667 MG CAPS CAPSULE    Take 2 capsules by mouth 3 times daily (with meals)    COLCHICINE (COLCRYS) 0.6 MG TABLET    Take 1 tablet by mouth daily as needed for Pain (as needed for pain related to gout)    DOCUSATE SODIUM (COLACE) 100 MG CAPSULE    Take 1 capsule by mouth 2 times daily    HEPATITIS B VAC RECOMBINANT (ENGERIX-B) 20 MCG/ML INJ    Inject 40 mcg into the muscle Every 4 weeks     IRON SUCROSE (VENOFER IV)    Infuse intravenously every 30 days     METHOXY PEG-EPOETIN BETA (MIRCERA) 50 MCG/0.3ML SOSY    Inject as directed Twice a  month     METOPROLOL SUCCINATE (TOPROL XL) 50 MG EXTENDED RELEASE TABLET    Take 1 tablet by mouth at bedtime TAKE 1 TABLET BY MOUTH EVERY DAY    NIFEDIPINE (PROCARDIA XL) 60 MG EXTENDED RELEASE TABLET    TAKE 1 TABLET BY MOUTH EVERYDAY AT BEDTIME    NORTRIPTYLINE (PAMELOR) 10 MG CAPSULE    nightly     OMEPRAZOLE (PRILOSEC) 40 MG DELAYED RELEASE CAPSULE    TAKE 1 CAPSULE BY MOUTH EVERY DAY    SILDENAFIL (VIAGRA) 100 MG TABLET    Take 1 tablet by mouth as needed for Erectile Dysfunction    SPIRONOLACTONE (ALDACTONE) 50 MG TABLET    Take 1 tablet by mouth at bedtime    TERAZOSIN (HYTRIN) 5 MG CAPSULE    Take 1 capsule by mouth daily    TORSEMIDE (DEMADEX) 100 MG TABLET    Take 1 tablet by mouth daily    VITAMIN D (ERGOCALCIFEROL) 1.25 MG (20384 UT) CAPS CAPSULE    Take 1 capsule by mouth once a week       Allergies     He has No Known Allergies. Physical Exam     INITIAL VITALS: BP: (!) 126/97, Temp: 97.4 °F (36.3 °C), Pulse: 92, Resp: 20, SpO2: 100 % Physical Exam  Vitals and nursing note reviewed.    Constitutional: Appearance: He is well-developed. He is obese. HENT:      Head: Normocephalic and atraumatic. Cardiovascular:      Rate and Rhythm: Normal rate and regular rhythm. Pulses: Normal pulses. Heart sounds: Normal heart sounds. Pulmonary:      Effort: Pulmonary effort is normal. No respiratory distress. Breath sounds: Normal breath sounds. Abdominal:      General: Bowel sounds are normal. There is no distension. Palpations: Abdomen is soft. Tenderness: There is abdominal tenderness (to LLQ, round surgical scar; no fluid shift, no distention, involuntary guarding noted). Musculoskeletal:      Cervical back: Normal range of motion and neck supple. Skin:     General: Skin is warm and dry. Capillary Refill: Capillary refill takes less than 2 seconds. Neurological:      Mental Status: He is alert and oriented to person, place, and time. Psychiatric:         Mood and Affect: Mood normal.         Behavior: Behavior normal.         Diagnostic Results       RADIOLOGY:  CT ABDOMEN PELVIS WO CONTRAST Additional Contrast? None   Final Result      1. Recurrent moderate bilateral inguinal hernias containing fat and extensive stranding. Cannot exclude incarcerated hernias. 2.  Small volume free intraperitoneal air is likely secondary to percutaneous dialysis catheter.              LABS:   Results for orders placed or performed during the hospital encounter of 05/12/22   CBC with Auto Differential   Result Value Ref Range    WBC 8.5 4.0 - 11.0 K/uL    RBC 3.96 (L) 4.20 - 5.90 M/uL    Hemoglobin 10.9 (L) 13.5 - 17.5 g/dL    Hematocrit 33.2 (L) 40.5 - 52.5 %    MCV 83.9 80.0 - 100.0 fL    MCH 27.4 26.0 - 34.0 pg    MCHC 32.7 31.0 - 36.0 g/dL    RDW 16.7 (H) 12.4 - 15.4 %    Platelets 186 066 - 039 K/uL    MPV 7.8 5.0 - 10.5 fL    Neutrophils % 69.1 %    Lymphocytes % 14.1 %    Monocytes % 9.5 %    Eosinophils % 5.7 %    Basophils % 1.6 %    Neutrophils Absolute 5.9 1.7 - 7.7 K/uL Lymphocytes Absolute 1.2 1.0 - 5.1 K/uL    Monocytes Absolute 0.8 0.0 - 1.3 K/uL    Eosinophils Absolute 0.5 0.0 - 0.6 K/uL    Basophils Absolute 0.1 0.0 - 0.2 K/uL   Basic Metabolic Panel w/ Reflex to MG   Result Value Ref Range    Sodium 133 (L) 136 - 145 mmol/L    Potassium reflex Magnesium 3.1 (L) 3.5 - 5.1 mmol/L    Chloride 95 (L) 99 - 110 mmol/L    CO2 23 21 - 32 mmol/L    Anion Gap 15 3 - 16    Glucose 162 (H) 70 - 99 mg/dL    BUN 16 7 - 20 mg/dL    CREATININE 5.7 (HH) 0.8 - 1.3 mg/dL    GFR Non-African American 10 (A) >60    GFR  12 (A) >60    Calcium 8.9 8.3 - 10.6 mg/dL   Hepatic Function Panel   Result Value Ref Range    Total Protein 7.2 6.4 - 8.2 g/dL    Albumin 4.2 3.4 - 5.0 g/dL    Alkaline Phosphatase 90 40 - 129 U/L    ALT 13 10 - 40 U/L    AST 14 (L) 15 - 37 U/L    Total Bilirubin <0.2 0.0 - 1.0 mg/dL    Bilirubin, Direct <0.2 0.0 - 0.3 mg/dL    Bilirubin, Indirect see below 0.0 - 1.0 mg/dL   Lipase   Result Value Ref Range    Lipase 62.0 (H) 13.0 - 60.0 U/L   Magnesium   Result Value Ref Range    Magnesium 1.70 (L) 1.80 - 2.40 mg/dL   Lactic Acid   Result Value Ref Range    Lactic Acid 0.7 0.4 - 2.0 mmol/L   Blood gas, venous (Lab)   Result Value Ref Range    pH, Gallo 7.392 7.350 - 7.450    pCO2, Gallo 44.8 41.0 - 51.0 mmHg    pO2, Gallo 58.9 (H) 25.0 - 40.0 mmHg    HCO3, Venous 27.2 24.0 - 28.0 mmol/L    Base Excess, Gallo 1.9 -2.0 - 3.0 mmol/L    O2 Sat, Gallo 90 Not established %    Carboxyhemoglobin 0.9 0.0 - 1.5 %    MetHgb, Gallo 0.5 0.0 - 1.5 %    TC02 (Calc), Gallo 29 mmol/L    Hemoglobin, Gallo, Reduced 10.00 %         RECENT VITALS:  BP: 134/87, Temp: 97.4 °F (36.3 °C), Pulse: 84, Resp: 17, SpO2: 96 %       ED Course     Nursing Notes, Past Medical Hx, Past Surgical Hx, Social Hx, Allergies, and Family Hx were reviewed.     The patient was given the following medications:  Orders Placed This Encounter   Medications    morphine injection 4 mg    ondansetron (ZOFRAN) injection 4 mg    morphine injection 4 mg    oxyCODONE (ROXICODONE) 5 MG immediate release tablet     Sig: Take 1 tablet by mouth every 6 hours as needed for Pain for up to 5 days. Dispense:  15 tablet     Refill:  0            CONSULTS:  None    MEDICAL DECISION MAKING / ASSESSMENT / Marly Jonathan is a 59 y.o. male who presents with complaints as noted in HPI. Presents to the emerged part with a complaint of left lower quadrant abdominal pain/incisional pain. Patient is approximately 1 month out from bilateral inguinal hernia repairs. He does have significant tenderness to the left lower quadrant, over the incision site, no these hernias palpated however somewhat limited due to patient's pain. He is hemodynamically stable, afebrile, overall well-appearing. Laboratory evaluation including CBC, BMP, LFTs, lipase were obtained; CBC without leukocytosis, stable H&H of 10.9/33.2. BMP with a sodium of 133, potassium 3.1, creatinine of 5.7, GFR of 12 and a stable BUN. Consistent with patient's baseline with known end-stage renal disease. LFTs unremarkable, lipase mildly elevated. Noncon CT of the abdomen and pelvis was done for further evaluation: This shows recurrent moderate bilateral inguinal hernias, left greater than right containing fat and noted extensive stranding. Given non-con study, incarcerated hernias could not be ruled out on CT scan, however VBG and lactic acid noted to be unremarkable making incarcerated hernia unlikely. Patient's pain has been well controlled here, he will be sent home with a short course of oxycodone to take as needed for recurrent pain, will follow up with Dr. Mikael Padron who did his surgery, as well as his nephrologist, Dr. Scott Nicely to discuss peritoneal versus hemodialysis. Patient was given strict return precautions as outlined in the AVS. Patient was agreeable and understanding to this plan of care. This patient was also evaluated by the attending physician.  All care plans were discussed and agreed upon. Clinical Impression     1. Bilateral recurrent inguinal hernia without obstruction or gangrene        Disposition     PATIENT REFERRED TO:  Enedinaleora Chao, [address]  [place] [address]  [phone]      call for follow up appointment    Daniela Kohli MD  [address] [place] [address] Ana Stefanie Hartman Baltazar [place] [address]  [phone]      call for further guidance on peritoneal dialysis    [facility] Emergency Department  [address]  [address]  [phone]    If symptoms worsen      DISCHARGE MEDICATIONS:  New Prescriptions    OXYCODONE (ROXICODONE) 5 MG IMMEDIATE RELEASE TABLET    Take 1 tablet by mouth every 6 hours as needed for Pain for up to 5 days.        DISPOSITION Decision To Discharge 05/12/2022 11:41:18 PM        MIGUEL Coyne - MITA  05/12/22 5140

## 2022-05-13 NOTE — ED NOTES
Pt. In no acute distress. Breathing easy and educated on follow up care and medications. Left ED with family.       Arthur Ricketts RN  05/13/22 2181

## 2022-05-13 NOTE — ED PROVIDER NOTES
ED Attending Attestation Note     Date of evaluation: 5/12/2022    This patient was seen by the advance practice provider. I have seen and examined the patient, agree with the workup, evaluation, management and diagnosis. The care plan has been discussed. My assessment reveals a pleasant 57-year-old male who presents to the emerge department with abdominal pain in the bilateral lower quadrants. Patient states that he recently had bilateral inguinal hernia repairs on 4/15. Earlier today he woke up with pain in his bilateral lower abdomen left greater than right. He states that this is similar to his hernia pain. He denies any fever, nausea, vomiting. The patient denies any chest pain or difficulty breathing. On examination I find an adult male, speaking in complete sentences. No increased work of breathing or accessory muscle use during respiration. Abdomen is soft, nondistended with focal tenderness in the left lower quadrant immediately superior to his incision sites. His wounds are clean dry and intact with no surrounding erythema. We will proceed with laboratory work-up and cross-sectional imaging. Mini Cain MD MPH   Physician.         Jewel Mojica MD  05/12/22 0735

## 2022-05-13 NOTE — TELEPHONE ENCOUNTER
Writer contacted Dr. Dilip Jackson to inform of 30 day readmission risk. Dr. Dilip Jackson informed writer there is No Decision on disposition at this time.     Call Back: If you need to call back to inform of disposition you can contact me at [phone]

## 2022-05-13 NOTE — TELEPHONE ENCOUNTER
Spoke to pt, appt moved up, would like to talk to provider more about the PD Cath. He stated that they are flushing, and that is when it is causing the pain the most.     Not sure which is best for him PD or HD.  Providers are going back and fourth

## 2022-05-13 NOTE — TELEPHONE ENCOUNTER
Pt calling in, s/p hernia and PD cath. . pt went to ER yesterday for side pain. CT was completed, ER told him to follow up with Dr Ruthie Gilmore,  Not sure if another hernia or not, but dialysis did not want him to have a treatment until he saw Brianna Jones,    Please advise. Julio Boss

## 2022-05-15 RX ORDER — ALLOPURINOL 300 MG/1
TABLET ORAL
Qty: 30 TABLET | Refills: 5 | Status: SHIPPED | OUTPATIENT
Start: 2022-05-15 | End: 2022-06-10 | Stop reason: ALTCHOICE

## 2022-05-18 ENCOUNTER — OFFICE VISIT (OUTPATIENT)
Dept: BARIATRICS/WEIGHT MGMT | Age: 62
End: 2022-05-18

## 2022-05-18 VITALS — BODY MASS INDEX: 31.05 KG/M2 | HEIGHT: 77 IN | WEIGHT: 263 LBS

## 2022-05-18 DIAGNOSIS — N18.6 ESRD (END STAGE RENAL DISEASE) (HCC): Primary | ICD-10-CM

## 2022-05-18 PROCEDURE — 99024 POSTOP FOLLOW-UP VISIT: CPT | Performed by: SURGERY

## 2022-05-18 NOTE — PROGRESS NOTES
Patient having minor left sided abd pain   Not associated with flushing PD catheter, but did get worse with PD cath flushin    CT scan reviewed- Consistent with fatty cord    No more scrotal swelling    OK to resume PD as tolerated    Patient will try again and if continued issues may switch to HD and have PD cath removed    Maicol Valentino

## 2022-05-27 ENCOUNTER — APPOINTMENT (OUTPATIENT)
Dept: ULTRASOUND IMAGING | Age: 62
End: 2022-05-27
Payer: COMMERCIAL

## 2022-05-27 ENCOUNTER — HOSPITAL ENCOUNTER (EMERGENCY)
Age: 62
Discharge: HOME | End: 2022-05-27
Attending: EMERGENCY MEDICINE
Payer: COMMERCIAL

## 2022-05-27 VITALS
SYSTOLIC BLOOD PRESSURE: 162 MMHG | RESPIRATION RATE: 16 BRPM | HEIGHT: 77 IN | TEMPERATURE: 98.4 F | WEIGHT: 263.6 LBS | OXYGEN SATURATION: 97 % | HEART RATE: 91 BPM | DIASTOLIC BLOOD PRESSURE: 94 MMHG | BODY MASS INDEX: 31.13 KG/M2

## 2022-05-27 DIAGNOSIS — N43.3 BILATERAL HYDROCELE: Primary | ICD-10-CM

## 2022-05-27 DIAGNOSIS — R10.30 LOWER ABDOMINAL PAIN: ICD-10-CM

## 2022-05-27 LAB
AMORPHOUS: ABNORMAL /HPF
ANION GAP SERPL CALCULATED.3IONS-SCNC: 11 MMOL/L (ref 3–16)
BACTERIA: ABNORMAL /HPF
BASOPHILS ABSOLUTE: 0.1 K/UL (ref 0–0.2)
BASOPHILS RELATIVE PERCENT: 0.9 %
BILIRUBIN URINE: NEGATIVE
BLOOD, URINE: ABNORMAL
BUN BLDV-MCNC: 13 MG/DL (ref 7–20)
CALCIUM SERPL-MCNC: 8.9 MG/DL (ref 8.3–10.6)
CHLORIDE BLD-SCNC: 101 MMOL/L (ref 99–110)
CLARITY: CLEAR
CO2: 25 MMOL/L (ref 21–32)
COLOR: YELLOW
CREAT SERPL-MCNC: 5 MG/DL (ref 0.8–1.3)
EOSINOPHILS ABSOLUTE: 0.2 K/UL (ref 0–0.6)
EOSINOPHILS RELATIVE PERCENT: 2.8 %
GFR AFRICAN AMERICAN: 14
GFR NON-AFRICAN AMERICAN: 12
GLUCOSE BLD-MCNC: 100 MG/DL (ref 70–99)
GLUCOSE URINE: NEGATIVE MG/DL
HCT VFR BLD CALC: 35.9 % (ref 40.5–52.5)
HEMOGLOBIN: 11.8 G/DL (ref 13.5–17.5)
KETONES, URINE: NEGATIVE MG/DL
LEUKOCYTE ESTERASE, URINE: NEGATIVE
LYMPHOCYTES ABSOLUTE: 1.2 K/UL (ref 1–5.1)
LYMPHOCYTES RELATIVE PERCENT: 19.6 %
MCH RBC QN AUTO: 27.6 PG (ref 26–34)
MCHC RBC AUTO-ENTMCNC: 32.7 G/DL (ref 31–36)
MCV RBC AUTO: 84.2 FL (ref 80–100)
MICROSCOPIC EXAMINATION: YES
MONOCYTES ABSOLUTE: 0.5 K/UL (ref 0–1.3)
MONOCYTES RELATIVE PERCENT: 8.3 %
NEUTROPHILS ABSOLUTE: 4.2 K/UL (ref 1.7–7.7)
NEUTROPHILS RELATIVE PERCENT: 68.4 %
NITRITE, URINE: NEGATIVE
PDW BLD-RTO: 18.6 % (ref 12.4–15.4)
PH UA: 6.5 (ref 5–8)
PLATELET # BLD: 194 K/UL (ref 135–450)
PMV BLD AUTO: 7.5 FL (ref 5–10.5)
POTASSIUM REFLEX MAGNESIUM: 3.9 MMOL/L (ref 3.5–5.1)
PROTEIN UA: >=300 MG/DL
RBC # BLD: 4.26 M/UL (ref 4.2–5.9)
RBC UA: ABNORMAL /HPF (ref 0–4)
SODIUM BLD-SCNC: 137 MMOL/L (ref 136–145)
SPECIFIC GRAVITY UA: 1.02 (ref 1–1.03)
URINE TYPE: ABNORMAL
UROBILINOGEN, URINE: 0.2 E.U./DL
WBC # BLD: 6.1 K/UL (ref 4–11)
WBC UA: ABNORMAL /HPF (ref 0–5)

## 2022-05-27 PROCEDURE — 93975 VASCULAR STUDY: CPT

## 2022-05-27 PROCEDURE — 80048 BASIC METABOLIC PNL TOTAL CA: CPT

## 2022-05-27 PROCEDURE — 96376 TX/PRO/DX INJ SAME DRUG ADON: CPT

## 2022-05-27 PROCEDURE — 6370000000 HC RX 637 (ALT 250 FOR IP): Performed by: EMERGENCY MEDICINE

## 2022-05-27 PROCEDURE — 99284 EMERGENCY DEPT VISIT MOD MDM: CPT

## 2022-05-27 PROCEDURE — 6360000002 HC RX W HCPCS: Performed by: EMERGENCY MEDICINE

## 2022-05-27 PROCEDURE — 81001 URINALYSIS AUTO W/SCOPE: CPT

## 2022-05-27 PROCEDURE — 36415 COLL VENOUS BLD VENIPUNCTURE: CPT

## 2022-05-27 PROCEDURE — 96375 TX/PRO/DX INJ NEW DRUG ADDON: CPT

## 2022-05-27 PROCEDURE — 85025 COMPLETE CBC W/AUTO DIFF WBC: CPT

## 2022-05-27 PROCEDURE — 96374 THER/PROPH/DIAG INJ IV PUSH: CPT

## 2022-05-27 PROCEDURE — 76870 US EXAM SCROTUM: CPT

## 2022-05-27 RX ORDER — LIDOCAINE HYDROCHLORIDE 10 MG/ML
5 INJECTION, SOLUTION EPIDURAL; INFILTRATION; INTRACAUDAL; PERINEURAL ONCE
Status: DISCONTINUED | OUTPATIENT
Start: 2022-05-27 | End: 2022-05-27

## 2022-05-27 RX ORDER — MORPHINE SULFATE 4 MG/ML
4 INJECTION, SOLUTION INTRAMUSCULAR; INTRAVENOUS ONCE
Status: COMPLETED | OUTPATIENT
Start: 2022-05-27 | End: 2022-05-27

## 2022-05-27 RX ORDER — LIDOCAINE 4 G/G
1 PATCH TOPICAL DAILY
Status: DISCONTINUED | OUTPATIENT
Start: 2022-05-27 | End: 2022-05-27 | Stop reason: HOSPADM

## 2022-05-27 RX ORDER — OXYCODONE HYDROCHLORIDE 5 MG/1
5 TABLET ORAL EVERY 6 HOURS PRN
Qty: 12 TABLET | Refills: 0 | Status: SHIPPED | OUTPATIENT
Start: 2022-05-27 | End: 2022-05-30

## 2022-05-27 RX ORDER — OXYCODONE HYDROCHLORIDE 5 MG/1
5 TABLET ORAL ONCE
Status: COMPLETED | OUTPATIENT
Start: 2022-05-27 | End: 2022-05-27

## 2022-05-27 RX ORDER — ONDANSETRON 2 MG/ML
4 INJECTION INTRAMUSCULAR; INTRAVENOUS ONCE
Status: COMPLETED | OUTPATIENT
Start: 2022-05-27 | End: 2022-05-27

## 2022-05-27 RX ADMIN — MORPHINE SULFATE 4 MG: 4 INJECTION INTRAVENOUS at 14:58

## 2022-05-27 RX ADMIN — ONDANSETRON 4 MG: 2 INJECTION INTRAMUSCULAR; INTRAVENOUS at 13:16

## 2022-05-27 RX ADMIN — MORPHINE SULFATE 4 MG: 4 INJECTION INTRAVENOUS at 13:15

## 2022-05-27 RX ADMIN — OXYCODONE 5 MG: 5 TABLET ORAL at 17:44

## 2022-05-27 ASSESSMENT — PAIN SCALES - GENERAL
PAINLEVEL_OUTOF10: 10
PAINLEVEL_OUTOF10: 10
PAINLEVEL_OUTOF10: 6
PAINLEVEL_OUTOF10: 6
PAINLEVEL_OUTOF10: 9

## 2022-05-27 ASSESSMENT — PAIN DESCRIPTION - DESCRIPTORS
DESCRIPTORS: SHARP;STABBING
DESCRIPTORS: ACHING

## 2022-05-27 ASSESSMENT — ENCOUNTER SYMPTOMS
DIARRHEA: 0
VOMITING: 0
CONSTIPATION: 0
SHORTNESS OF BREATH: 0
ABDOMINAL PAIN: 1
RESPIRATORY NEGATIVE: 1
EYES NEGATIVE: 1
NAUSEA: 1
COUGH: 0

## 2022-05-27 ASSESSMENT — PAIN DESCRIPTION - ORIENTATION
ORIENTATION: LEFT
ORIENTATION: LEFT;LOWER
ORIENTATION: LEFT;LOWER
ORIENTATION: LEFT

## 2022-05-27 ASSESSMENT — PAIN DESCRIPTION - LOCATION
LOCATION: GROIN;ABDOMEN
LOCATION: ABDOMEN
LOCATION: FLANK
LOCATION: ABDOMEN
LOCATION: ABDOMEN

## 2022-05-27 ASSESSMENT — PAIN - FUNCTIONAL ASSESSMENT: PAIN_FUNCTIONAL_ASSESSMENT: 0-10

## 2022-05-27 ASSESSMENT — PAIN DESCRIPTION - PAIN TYPE: TYPE: ACUTE PAIN

## 2022-05-27 NOTE — CONSULTS
General Surgery   Resident Consult Note    Reason for Consult: Abdominal pain    History of Present Illness:   Saul Goldstein is a 59 y.o. male with history of atrial fibrillation, DM, and ESRD on PD/HD who presents to [facility] ED with continuing left-sided abdominal/suprapubic pain. Since his follow up appointment on 5/18, he reports worsening pain inferior to his left surgical incision that radiates to his groin. Otherwise he denies any chances in symptoms. He is tolerating diet without issue, urinating and passing bowel movements without difficulty. Denies fever, chills.       Past Medical History:        Diagnosis Date    Arthralgia of multiple joints 10/27/2015    Arthritis     Atrial fibrillation (HCC)     Chronic kidney disease     stage 4    Chronic systolic congestive heart failure ([address] 8/16/2021    CRI (chronic renal insufficiency)     Diabetic nephropathy associated with type 2 diabetes mellitus ([address] 10/11/2018    Diverticulosis     Elevated PSA     Erectile dysfunction     ESRD (end stage renal disease) on dialysis ([address] 2/24/2022    Gout     Gout     Hemrrhoid NOS w/ complication NEC     History of MRSA infection     Hypertension     CELSA (obstructive sleep apnea) 07/14/2017    uses C-pap machine    Type 2 diabetes mellitus without complication, without long-term current use of insulin ([address] 3/18/7295    Umbilical hernia without obstruction and without gangrene 2/2/2016       Past Surgical History:        Procedure Laterality Date    COLONOSCOPY      COLONOSCOPY N/A 3/18/2022    COLONOSCOPY POLYPECTOMY SNARE/COLD BIOPSY performed by Viki Gonzales MD at [address] N/A 1/17/2022    LAPAROSCOPIC PERITONEAL DIALYSIS CATHETER PLACEMENT and omentopexy performed by Gaye Bloch, DO at [address] [place] [address] N/A 4/15/2022    ROBOTIC, RECURRENT INCISIONAL HERNIA REPAIR WITH BIOSYNTHETIC MESH; LAPAROSCOPIC PERITONEAL DIALYSIS CATHETER REVISION WITH LAPAROSCOPIC OMENTOPEXY performed by Gaye Bloch, DO at [address]  Bilateral 4/15/2022    BILATERAL OPEN INGUINAL HERNI REPAIR performed by Gaye Bloch, DO at [address] IR TUNNELED [address] 5 YEARS  01/14/2022    IR TUNNELED CATHETER PLACEMENT GREATER THAN 5 YEARS 1/14/2022 [facility] SPECIAL PROCEDURES    UPPER GASTROINTESTINAL ENDOSCOPY  05/28/2016    biopsies, multiple small gastric ulcers    UPPER GASTROINTESTINAL ENDOSCOPY  09/12/2016    UPPER GASTROINTESTINAL ENDOSCOPY N/A 3/18/2022    EGD DIAGNOSTIC ONLY performed by Viki Gonzales MD at [facility] N/A 1/17/2022    LAPAROSCOPIC incarcerated VENTRAL HERNIA REPAIR performed by Gaye Bloch, DO at [address]:  Patient has no known allergies. Medications:   Home Meds  No current facility-administered medications on file prior to encounter.      Current Outpatient Medications on File Prior to Encounter   Medication Sig Dispense Refill    allopurinol (ZYLOPRIM) 300 MG tablet TAKE 1 TABLET BY MOUTH EVERY DAY 30 tablet 5    busPIRone (BUSPAR) 5 MG tablet TAKE 1 TABLET BY MOUTH TWICE A  tablet 1    terazosin (HYTRIN) 5 MG capsule Take 1 capsule by mouth daily       docusate sodium (COLACE) 100 MG capsule Take 1 capsule by mouth 2 times daily 20 capsule 0    B Complex-C-Folic Acid (DIALYVITE 654 PO) Take 1 tablet by mouth once a week       Hepatitis B Vac Recombinant (ENGERIX-B) 20 MCG/ML INJ Inject 40 mcg into the muscle Every 4 weeks       Methoxy PEG-Epoetin Beta (MIRCERA) 50 MCG/0.3ML SOSY Inject as directed Twice a  month       Iron Sucrose (VENOFER IV) Infuse intravenously every 30 days       NIFEdipine (PROCARDIA XL) 60 MG extended release tablet TAKE 1 TABLET BY MOUTH EVERYDAY AT BEDTIME (Patient taking differently: 90 mg ) 30 tablet 0    bisacodyl (BISACODYL) 5 MG EC tablet Take 1 pain.  Genitourinary: Negative. Musculoskeletal: Negative. Skin: Negative. Endocrine: Negative. Allergic/Immunologic: Negative. Neurological: Negative. Hematological: Negative. Psychiatric/Behavioral: Negative. Physical exam:    Vitals:    05/27/22 1459 05/27/22 1500 05/27/22 1530 05/27/22 1600   BP: (!) 153/87 (!) 148/92 (!) 142/89 (!) 143/91   Pulse: 93      Resp: 16      Temp:       TempSrc:       SpO2: 96% 99% 95% 94%   Weight:       Height:           General appearance: Alert, no acute distress, grooming appropriate  HEENT: Normocephalic, atraumatic; EOMI; moist mucous membranes  Neck: Trachea midline, no JVD  Chest/Lungs: Normal inspiratory effort, symmetric chest rise, no accessory muscle use  Cardiovascular: Regular rate and rhythm; perfusing extremities  Abdomen: Soft, non-distended, no guarding/rigidity; bilateral incisions clean/dry/intact; mild tenderness to palpation of the left suprapubic abdomen; no appreciable hernia  Skin: Warm and dry, no rashes  Extremities: No edema, no cyanosis  Neuro: A&Ox3, no gross sensory or motor neuro deficits    Labs:    CBC:   Recent Labs     05/27/22  1321   WBC 6.1   HGB 11.8*   HCT 35.9*   MCV 84.2        BMP:   Recent Labs     05/27/22  1321      K 3.9      CO2 25   BUN 13   CREATININE 5.0*     Liver Profile:   Lab Results   Component Value Date    AST 14 05/12/2022    ALT 13 05/12/2022    BILIDIR <0.2 05/12/2022    BILITOT <0.2 05/12/2022    ALKPHOS 90 05/12/2022     Lab Results   Component Value Date    CHOL 160 12/21/2021    HDL 41 12/21/2021    TRIG 141 12/21/2021     PT/INR: No results for input(s): PROTIME, INR in the last 72 hours. Imaging:   US SCROTUM AND TESTICLES   Final Result   Impression:       No sonographic sequelae of testicular torsion. Complex bilateral hydroceles. Epididymal head cysts bilaterally.             US DUP ABD PEL RETRO SCROT COMPLETE   Final Result   Impression:       No sonographic sequelae of testicular torsion. Complex bilateral hydroceles. Epididymal head cysts bilaterally. Assessment/Plan:  Malathi Rossi is a 59 y.o. male with persisting left-sided suprapubic abdominal pain following bilateral inguinal hernia surgery roughly 1.5 months ago.     - Trial of Tramadol, Lidoderm patch  - Follow up with Dr Dominique Briseno in 2 weeks  - Hold off on restarting PD; continue HD until follow up with Dr Dominique Briseno  - Keep follow up appointment with urologist Dr Carlos Waters  - Patient, plan discussed with surgical team, surgical staff Dr Rachell Glass MD PGY-2  05/27/22  5:14 PM  [phone]

## 2022-05-27 NOTE — ED NOTES
Pt medicated according to orders. Pt identify verified using two patient identifiers. Allergies reviewed prior to medication administration. Pt resp even and unlabored, skin natural in color, no signs of acute distress. Please see MAR for additional information regarding administration.         Ulisses Metcalf RN  05/27/22 2790

## 2022-05-27 NOTE — ED NOTES
Pt medicated according to orders. Pt identify verified using two patient identifiers. Allergies reviewed prior to medication administration. Pt resp even and unlabored, skin natural in color, no signs of acute distress. Please see MAR for additional information regarding administration.         Aliza Gibbons RN  05/27/22 4865

## 2022-05-27 NOTE — ED NOTES
Pt discharged to home with wife. Discharge instructions including 1 prescriptions reviewed with patient. All questions answered, no concerns noted. Pt encouraged to return to emergency department if they have any new or worsening symptoms. Pt alert and oriented, resp even and unlabored, skin natural in color, no signs of acute distress.             Sarahi Xie RN  05/27/22 3414

## 2022-05-27 NOTE — ED PROVIDER NOTES
On  [address] ENCOUNTER          ATTENDING PHYSICIAN NOTE       Date of evaluation: 5/27/2022    Chief Complaint     Post-op Problem (PATIENT HAD HERNIA REPAIR ABOUT A MONTH AGO, YESTERDY STARTED WITH LEFT SIDED ABDOMINAL PAIN INTO GROIN, -N/V, -DYSURIA) and Abdominal Pain      History of Present Illness     Iva Conway is a 59 y.o. male who presents with complaints of worsening/recurrent lower abdominal and groin pain. The patient has had a somewhat complex history of lower abdominal and scrotal discomfort associated with bilateral recurrent inguinal hernias, which has been exacerbated by his peritoneal dialysis. This spring, he has had increasing pain and swelling relating to his inguinal hernias, such that he was placed on hemodialysis for a while, to decrease the pressure on the hernias, and was admitted from April 15 through 17 for surgical repair of the bilateral inguinal hernias. He has had ongoing pain in the lower abdomen and groin, although it did improve somewhat after surgery. He has, however, had significant improvement in his scrotal swelling in the past month. However, he states that as the swelling has improved, the pain and tenderness of his testicles has significantly worsened. He describes the pain as being 10 out of 10 in intensity, sharp, stabbing in character, present in the bilateral testicular areas, particularly when he sits, moves, changes position, or when the scrotal area is palpated, and also present in the low left abdominal quadrant greater than the right low abdomen. He was seen in this emergency department on May 12 for worsening lower abdominal pain, and CT at that time showed recurrent bilateral inguinal hernias with incarcerated fat and some stranding. He followed up with Dr. Norma Awad as an outpatient last week, however, and CT findings were felt to be consistent with fatty cord, and abdominal pain seemed to have improved.   Today the patient states that his pain has significantly worsened starting yesterday, particularly in the left sided abdominal region radiating into the left groin. Review of Systems     Review of Systems   Constitutional: Positive for activity change and appetite change. Negative for fever. HENT: Negative. Eyes: Negative. Respiratory: Negative. Negative for cough and shortness of breath. Cardiovascular: Negative. Negative for chest pain. Gastrointestinal: Positive for abdominal pain and nausea. Negative for constipation, diarrhea and vomiting. Genitourinary: Positive for testicular pain. Negative for difficulty urinating and dysuria. Musculoskeletal: Negative. Skin: Negative. Neurological: Negative. Psychiatric/Behavioral: Negative. Past Medical, Surgical, Family, and Social History     He has a past medical history of Arthralgia of multiple joints, Arthritis, Atrial fibrillation ([address] Chronic kidney disease, Chronic systolic congestive heart failure ([address] CRI (chronic renal insufficiency), Diabetic nephropathy associated with type 2 diabetes mellitus ([address] Diverticulosis, Elevated PSA, Erectile dysfunction, ESRD (end stage renal disease) on dialysis ([address] Gout, Gout, Hemrrhoid NOS w/ complication NEC, History of MRSA infection, Hypertension, CELSA (obstructive sleep apnea), Type 2 diabetes mellitus without complication, without long-term current use of insulin ([address] and Umbilical hernia without obstruction and without gangrene. He has a past surgical history that includes Upper gastrointestinal endoscopy (05/28/2016); Colonoscopy; Dilatation, esophagus; Upper gastrointestinal endoscopy (09/12/2016); IR TUNNELED CVC PLACE WO SQ PORT/PUMP > 5 YEARS (01/14/2022); Dialysis Catheter Insertion (N/A, 1/17/2022); ventral hernia repair (N/A, 1/17/2022); CT INSERT PERITONEAL CATHETER; Colonoscopy (N/A, 3/18/2022);  Upper gastrointestinal endoscopy (N/A, 3/18/2022); hernia repair (N/A, 4/15/2022); and hernia repair (Bilateral, 4/15/2022). His family history includes Breast Cancer in his mother; Colon Cancer in his father; Coronary Art Dis in his brother; Diabetes in his maternal grandmother; Hypertension in an other family member. He reports that he has never smoked. He has never used smokeless tobacco. He reports previous alcohol use. He reports that he does not use drugs.     Medications     Previous Medications    ALLOPURINOL (ZYLOPRIM) 300 MG TABLET    TAKE 1 TABLET BY MOUTH EVERY DAY    ASPIRIN 81 MG TABLET    Take 81 mg by mouth daily    ATORVASTATIN (LIPITOR) 20 MG TABLET    TAKE 1 TABLET BY MOUTH EVERY DAY    B COMPLEX-C-FOLIC ACID (DIALYVITE 525 PO)    Take 1 tablet by mouth once a week     BISACODYL (BISACODYL) 5 MG EC TABLET    Take 1 tablet by mouth daily as needed for Constipation    BUSPIRONE (BUSPAR) 5 MG TABLET    TAKE 1 TABLET BY MOUTH TWICE A DAY    CALCITRIOL (ROCALTROL) 0.25 MCG CAPSULE    Take 1 capsule by mouth daily    CALCIUM ACETATE (PHOSLO) 667 MG CAPS CAPSULE    Take 2 capsules by mouth 3 times daily (with meals)    COLCHICINE (COLCRYS) 0.6 MG TABLET    Take 1 tablet by mouth daily as needed for Pain (as needed for pain related to gout)    DOCUSATE SODIUM (COLACE) 100 MG CAPSULE    Take 1 capsule by mouth 2 times daily    HEPATITIS B VAC RECOMBINANT (ENGERIX-B) 20 MCG/ML INJ    Inject 40 mcg into the muscle Every 4 weeks     IRON SUCROSE (VENOFER IV)    Infuse intravenously every 30 days     METHOXY PEG-EPOETIN BETA (MIRCERA) 50 MCG/0.3ML SOSY    Inject as directed Twice a  month     METOPROLOL SUCCINATE (TOPROL XL) 50 MG EXTENDED RELEASE TABLET    Take 1 tablet by mouth at bedtime TAKE 1 TABLET BY MOUTH EVERY DAY    NIFEDIPINE (PROCARDIA XL) 60 MG EXTENDED RELEASE TABLET    TAKE 1 TABLET BY MOUTH EVERYDAY AT BEDTIME    NORTRIPTYLINE (PAMELOR) 10 MG CAPSULE    nightly     OMEPRAZOLE (PRILOSEC) 40 MG DELAYED RELEASE CAPSULE    TAKE 1 CAPSULE BY MOUTH EVERY DAY    SILDENAFIL (VIAGRA) 100 MG TABLET    Take 1 tablet by mouth as needed for Erectile Dysfunction    SPIRONOLACTONE (ALDACTONE) 50 MG TABLET    Take 1 tablet by mouth at bedtime    TERAZOSIN (HYTRIN) 5 MG CAPSULE    Take 1 capsule by mouth daily     TORSEMIDE (DEMADEX) 100 MG TABLET    Take 1 tablet by mouth daily    VITAMIN D (ERGOCALCIFEROL) 1.25 MG (27677 UT) CAPS CAPSULE    Take 1 capsule by mouth once a week       Allergies     He has No Known Allergies. Physical Exam     INITIAL VITALS: BP: (!) 161/94, Temp: 98.4 °F (36.9 °C), Heart Rate: 98, Resp: 18, SpO2: 100 %     General: Fairly well-nourished, well-developed appearing gentleman. He is very uncomfortable appearing, but in NAD. HEENT: Pupils are equal, round, and reactive to light. Extraocular muscles are intact. Conjunctivae are clear and moist. No redness or drainage from the eyes. Neck: Supple, with full range of motion. Cardiovascular: Normal S1-S2 without murmur rub or gallop. 2+ radial pulses bilaterally. Respiratory: Unlabored breathing with equal chest rise and fall. Lungs are clear to auscultation bilaterally. No adventitious lung sounds heard. Abdomen: Soft and mildly distended. There is tenderness to palpation in the bilateral lower quadrants, much more prominent on the left than the right, without guarding or rebound tenderness. There is notable tenderness to palpation in the groin bilaterally, with palpable bilateral inguinal hernias, very tender to palpation, without overlying skin changes. : Normal-appearing external male genitalia. No significant scrotal edema. The bilateral testicles are quite tender to palpation, without palpable masses. Skin: Warm and dry, without rashes or ecchymoses, lacerations or abrasions. Neuro: Alert and oriented x3. No focal neurologic deficits are noted. Extremities: Warm and well-perfused, without clubbing, cyanosis, or edema. The patient moves all extremities equally. Psych:  The patient's mood and affect are generally within normal limits for their presentation. Diagnostic Results       RADIOLOGY:  US SCROTUM AND TESTICLES   Final Result   Impression:       No sonographic sequelae of testicular torsion. Complex bilateral hydroceles. Epididymal head cysts bilaterally. US DUP ABD PEL RETRO SCROT COMPLETE   Final Result   Impression:       No sonographic sequelae of testicular torsion. Complex bilateral hydroceles. Epididymal head cysts bilaterally.                 LABS:   Results for orders placed or performed during the hospital encounter of 05/27/22   CBC with Auto Differential   Result Value Ref Range    WBC 6.1 4.0 - 11.0 K/uL    RBC 4.26 4.20 - 5.90 M/uL    Hemoglobin 11.8 (L) 13.5 - 17.5 g/dL    Hematocrit 35.9 (L) 40.5 - 52.5 %    MCV 84.2 80.0 - 100.0 fL    MCH 27.6 26.0 - 34.0 pg    MCHC 32.7 31.0 - 36.0 g/dL    RDW 18.6 (H) 12.4 - 15.4 %    Platelets 176 589 - 114 K/uL    MPV 7.5 5.0 - 10.5 fL    Neutrophils % 68.4 %    Lymphocytes % 19.6 %    Monocytes % 8.3 %    Eosinophils % 2.8 %    Basophils % 0.9 %    Neutrophils Absolute 4.2 1.7 - 7.7 K/uL    Lymphocytes Absolute 1.2 1.0 - 5.1 K/uL    Monocytes Absolute 0.5 0.0 - 1.3 K/uL    Eosinophils Absolute 0.2 0.0 - 0.6 K/uL    Basophils Absolute 0.1 0.0 - 0.2 K/uL   Basic Metabolic Panel w/ Reflex to MG   Result Value Ref Range    Sodium 137 136 - 145 mmol/L    Potassium reflex Magnesium 3.9 3.5 - 5.1 mmol/L    Chloride 101 99 - 110 mmol/L    CO2 25 21 - 32 mmol/L    Anion Gap 11 3 - 16    Glucose 100 (H) 70 - 99 mg/dL    BUN 13 7 - 20 mg/dL    CREATININE 5.0 (H) 0.8 - 1.3 mg/dL    GFR Non- 12 (A) >60    GFR  14 (A) >60    Calcium 8.9 8.3 - 10.6 mg/dL   Urinalysis with Microscopic   Result Value Ref Range    Color, UA Yellow Straw/Yellow    Clarity, UA Clear Clear    Glucose, Ur Negative Negative mg/dL    Bilirubin Urine Negative Negative    Ketones, Urine Negative Negative mg/dL Specific Gravity, UA 1.020 1.005 - 1.030    Blood, Urine SMALL (A) Negative    pH, UA 6.5 5.0 - 8.0    Protein, UA >=300 (A) Negative mg/dL    Urobilinogen, Urine 0.2 <2.0 E.U./dL    Nitrite, Urine Negative Negative    Leukocyte Esterase, Urine Negative Negative    Microscopic Examination YES     Urine Type NotGiven        RECENT VITALS:  BP: (!) 153/87, Temp: 98.4 °F (36.9 °C), Heart Rate: 93, Resp: 16, SpO2: 96 %     Procedures       ED Course     Nursing Notes, Past Medical Hx, Past Surgical Hx, Social Hx, Allergies, and Family Hx were reviewed. The patient was given the following medications:  Orders Placed This Encounter   Medications    ondansetron (ZOFRAN) injection 4 mg    morphine injection 4 mg    morphine injection 4 mg       CONSULTS:  IP CONSULT TO Elfego Caro / ANASTASIIA / Donovan Dean is a 59 y.o. male with a somewhat complex surgical abdominal history as described above, who presents with going testicular soreness and tenderness since his scrotal edema has resolved, and recurrence of significant bilateral lower abdominal pain, greater on the left than the right, which feels similar to the pain associated with his recently surgically repaired bilateral inguinal hernias. Patient's serum laboratory evaluation is reassuring. Urinalysis does not show any evidence of infection. Given the patient's bilateral testicular pain and tenderness, scrotal ultrasound was performed, which reveals evidence of bilateral hydroceles, but no other concerning abnormality. This may very well be the source of the patient's scrotal/testicular pain and tenderness, and will need urology follow-up as an outpatient.     However, the more acute return of the lower abdominal pain and tenderness, with recent surgical intervention for inguinal hernias, does warrant discussion with his surgery team, to determine if her current imaging is necessary, and what the appropriate management will be. The patient has been given 2 doses of IV morphine in the emergency department, with only moderate pain control. The patient's care is now being given over the oncoming physician, who will follow up on the result of general surgery consultation, and determine final disposition accordingly. Clinical Impression     1. Bilateral hydrocele    2. Lower abdominal pain        (Please note that portions of this note were completed with voice recognition software.   Efforts were made to edit the dictations but occasionally words are mis-transcribed.)     Mitchel Price MD  05/27/22 [id number]

## 2022-05-27 NOTE — ED PROVIDER NOTES
[address] [facility] [address] ENCOUNTER          ATTENDING PHYSICIAN NOTE       Date of evaluation: 5/27/2022    ADDENDUM:      Care of this patient was assumed from Dr. Jenniffer Rowe. The patient was seen for Post-op Problem (PATIENT HAD HERNIA REPAIR ABOUT A MONTH AGO, YESTERDY STARTED WITH LEFT SIDED ABDOMINAL PAIN INTO GROIN, -N/V, -DYSURIA) and Abdominal Pain  . The patient's initial evaluation and plan have been discussed with the prior provider who initially evaluated the patient. Please see the original HPI and MDM for full details. Nursing Notes, Past Medical Hx, Past Surgical Hx, Social Hx, Allergies, and Family Hx were all reviewed. Diagnostic Results       RADIOLOGY:  US SCROTUM AND TESTICLES   Final Result   Impression:       No sonographic sequelae of testicular torsion. Complex bilateral hydroceles. Epididymal head cysts bilaterally. US DUP ABD PEL RETRO SCROT COMPLETE   Final Result   Impression:       No sonographic sequelae of testicular torsion. Complex bilateral hydroceles. Epididymal head cysts bilaterally.                 LABS:   Results for orders placed or performed during the hospital encounter of 05/27/22   CBC with Auto Differential   Result Value Ref Range    WBC 6.1 4.0 - 11.0 K/uL    RBC 4.26 4.20 - 5.90 M/uL    Hemoglobin 11.8 (L) 13.5 - 17.5 g/dL    Hematocrit 35.9 (L) 40.5 - 52.5 %    MCV 84.2 80.0 - 100.0 fL    MCH 27.6 26.0 - 34.0 pg    MCHC 32.7 31.0 - 36.0 g/dL    RDW 18.6 (H) 12.4 - 15.4 %    Platelets 869 911 - 065 K/uL    MPV 7.5 5.0 - 10.5 fL    Neutrophils % 68.4 %    Lymphocytes % 19.6 %    Monocytes % 8.3 %    Eosinophils % 2.8 %    Basophils % 0.9 %    Neutrophils Absolute 4.2 1.7 - 7.7 K/uL    Lymphocytes Absolute 1.2 1.0 - 5.1 K/uL    Monocytes Absolute 0.5 0.0 - 1.3 K/uL    Eosinophils Absolute 0.2 0.0 - 0.6 K/uL    Basophils Absolute 0.1 0.0 - 0.2 K/uL   Basic Metabolic Panel w/ Reflex to MG   Result Value Ref Range    Sodium 137 136 - 145 mmol/L    Potassium reflex Magnesium 3.9 3.5 - 5.1 mmol/L    Chloride 101 99 - 110 mmol/L    CO2 25 21 - 32 mmol/L    Anion Gap 11 3 - 16    Glucose 100 (H) 70 - 99 mg/dL    BUN 13 7 - 20 mg/dL    CREATININE 5.0 (H) 0.8 - 1.3 mg/dL    GFR Non- 12 (A) >60    GFR  14 (A) >60    Calcium 8.9 8.3 - 10.6 mg/dL   Urinalysis with Microscopic   Result Value Ref Range    Color, UA Yellow Straw/Yellow    Clarity, UA Clear Clear    Glucose, Ur Negative Negative mg/dL    Bilirubin Urine Negative Negative    Ketones, Urine Negative Negative mg/dL    Specific Gravity, UA 1.020 1.005 - 1.030    Blood, Urine SMALL (A) Negative    pH, UA 6.5 5.0 - 8.0    Protein, UA >=300 (A) Negative mg/dL    Urobilinogen, Urine 0.2 <2.0 E.U./dL    Nitrite, Urine Negative Negative    Leukocyte Esterase, Urine Negative Negative    Microscopic Examination YES     Urine Type NotGiven     WBC, UA 0-2 0 - 5 /HPF    RBC, UA 0-2 0 - 4 /HPF    Bacteria, UA Rare (A) None Seen /HPF    Amorphous, UA Rare /HPF       RECENT VITALS:  BP: (!) 162/94, Temp: 98.4 °F (36.9 °C), Heart Rate: 91, Resp: 16, SpO2: 97 %     Procedures   Procedures    ED Course     The patient was given the following medications:  Orders Placed This Encounter   Medications    ondansetron (ZOFRAN) injection 4 mg    morphine injection 4 mg    morphine injection 4 mg    DISCONTD: lidocaine PF 1 % injection 5 mL    oxyCODONE (ROXICODONE) immediate release tablet 5 mg    oxyCODONE (ROXICODONE) 5 MG immediate release tablet     Sig: Take 1 tablet by mouth every 6 hours as needed for Pain for up to 3 days. WARNING:  May cause drowsiness. May impair ability to operate vehicles or machinery. Do not use in combination with alcohol. Dispense:  12 tablet     Refill:  0    DISCONTD: lidocaine 4 % external patch 1 patch       CONSULTS:  C/ Max Tellez [address] / ASSESSMENT / Stephanie Rios is a 59 y.o. male who presenting for abdominal pain after recent bilateral inguinal hernia repair. Please see the original HPI and MDM for full details. Patient pending surgery recommendations at time of signout. General surgery evaluated the patient and did not feel any further imaging or intervention was indicated at this time. They recommended close follow-up with Dr. Dominique Briseno as well as pain management. Patient was prescribed oxycodone and was also given a lidocaine patch on his abdomen. All questions answered to his satisfaction. Encouraged to return if any worsening of pain or other concerns. Discharged in stable condition with written and verbal instructions for which to return to the ED. Clinical Impression     1. Bilateral hydrocele    2. Lower abdominal pain        Disposition     PATIENT REFERRED TO:  No follow-up provider specified. DISCHARGE MEDICATIONS:  Discharge Medication List as of 5/27/2022  6:15 PM      START taking these medications    Details   oxyCODONE (ROXICODONE) 5 MG immediate release tablet Take 1 tablet by mouth every 6 hours as needed for Pain for up to 3 days. WARNING:  May cause drowsiness. May impair ability to operate vehicles or machinery.   Do not use in combination with alcohol., Disp-12 tablet, [id number]             DISPOSITION Decision To Discharge 05/27/2022 05:51:29 PM         Gee Castellanos MD  05/27/22 [id number]

## 2022-05-27 NOTE — ED NOTES
IV placed per orders. Hand hygiene performed prior to placement. Pt tolerated well. IV secured and flushed. Site and dressing clean and dry. Blood drawn, collected and sent to lab per orders. Pt medicated according to orders. Pt identify verified using two patient identifiers. Allergies reviewed prior to medication administration. Pt resp even and unlabored, skin natural in color, no signs of acute distress. Please see MAR for additional information regarding administration. Pt provided with a urinal and asked to provide a urine specimen. US at bedside.       Ulisses Metcalf RN  05/27/22 [id number]

## 2022-06-02 ENCOUNTER — TELEPHONE (OUTPATIENT)
Dept: BARIATRICS/WEIGHT MGMT | Age: 62
End: 2022-06-02

## 2022-06-02 NOTE — TELEPHONE ENCOUNTER
Stef from Dialysis calling in, stating that she is unable to get in fluid in or out of his cath. She has tried some tricks but unable to get fluid in. .    Also, stated that pt complaint of pain from abdomen down to groin, I did let her know that we did and CT and nothing was found    Please advise

## 2022-06-02 NOTE — TELEPHONE ENCOUNTER
I had long discussion with patient    If pain do not use catheter    Can prescribe lidoderm patches as needed

## 2022-06-08 ENCOUNTER — HOSPITAL ENCOUNTER (EMERGENCY)
Age: 62
Discharge: HOME | End: 2022-06-08
Attending: EMERGENCY MEDICINE
Payer: MEDICARE

## 2022-06-08 ENCOUNTER — TELEPHONE (OUTPATIENT)
Dept: FAMILY MEDICINE CLINIC | Age: 62
End: 2022-06-08

## 2022-06-08 ENCOUNTER — APPOINTMENT (OUTPATIENT)
Dept: CT IMAGING | Age: 62
End: 2022-06-08
Payer: MEDICARE

## 2022-06-08 VITALS
SYSTOLIC BLOOD PRESSURE: 157 MMHG | HEIGHT: 77 IN | HEART RATE: 86 BPM | WEIGHT: 280 LBS | RESPIRATION RATE: 16 BRPM | BODY MASS INDEX: 33.06 KG/M2 | DIASTOLIC BLOOD PRESSURE: 92 MMHG | TEMPERATURE: 98.5 F | OXYGEN SATURATION: 98 %

## 2022-06-08 DIAGNOSIS — K40.20 BILATERAL INGUINAL HERNIA WITHOUT OBSTRUCTION OR GANGRENE, RECURRENCE NOT SPECIFIED: ICD-10-CM

## 2022-06-08 DIAGNOSIS — N43.3 HYDROCELE OF TESTIS: Primary | ICD-10-CM

## 2022-06-08 LAB
ANION GAP SERPL CALCULATED.3IONS-SCNC: 15 MMOL/L (ref 3–16)
BACTERIA: ABNORMAL /HPF
BASE EXCESS VENOUS: 2.1 MMOL/L (ref -2–3)
BASOPHILS ABSOLUTE: 0 K/UL (ref 0–0.2)
BASOPHILS RELATIVE PERCENT: 0.8 %
BILIRUBIN URINE: NEGATIVE
BLOOD, URINE: ABNORMAL
BUN BLDV-MCNC: 28 MG/DL (ref 7–20)
CALCIUM SERPL-MCNC: 9.4 MG/DL (ref 8.3–10.6)
CARBOXYHEMOGLOBIN: 0.9 % (ref 0–1.5)
CHLORIDE BLD-SCNC: 103 MMOL/L (ref 99–110)
CLARITY: CLEAR
CO2: 24 MMOL/L (ref 21–32)
COLOR: YELLOW
CREAT SERPL-MCNC: 5.7 MG/DL (ref 0.8–1.3)
EOSINOPHILS ABSOLUTE: 0.3 K/UL (ref 0–0.6)
EOSINOPHILS RELATIVE PERCENT: 6.1 %
EPITHELIAL CELLS, UA: ABNORMAL /HPF (ref 0–5)
GFR AFRICAN AMERICAN: 12
GFR NON-AFRICAN AMERICAN: 10
GLUCOSE BLD-MCNC: 131 MG/DL (ref 70–99)
GLUCOSE URINE: 100 MG/DL
HCO3 VENOUS: 27.8 MMOL/L (ref 24–28)
HCT VFR BLD CALC: 40.1 % (ref 40.5–52.5)
HEMOGLOBIN, VEN, REDUCED: 20.1 %
HEMOGLOBIN: 12.8 G/DL (ref 13.5–17.5)
KETONES, URINE: NEGATIVE MG/DL
LACTIC ACID: 0.7 MMOL/L (ref 0.4–2)
LEUKOCYTE ESTERASE, URINE: NEGATIVE
LYMPHOCYTES ABSOLUTE: 1.3 K/UL (ref 1–5.1)
LYMPHOCYTES RELATIVE PERCENT: 24.5 %
MAGNESIUM: 1.8 MG/DL (ref 1.8–2.4)
MCH RBC QN AUTO: 26.9 PG (ref 26–34)
MCHC RBC AUTO-ENTMCNC: 31.9 G/DL (ref 31–36)
MCV RBC AUTO: 84.1 FL (ref 80–100)
METHEMOGLOBIN VENOUS: 0.3 % (ref 0–1.5)
MICROSCOPIC EXAMINATION: YES
MONOCYTES ABSOLUTE: 0.4 K/UL (ref 0–1.3)
MONOCYTES RELATIVE PERCENT: 6.7 %
NEUTROPHILS ABSOLUTE: 3.3 K/UL (ref 1.7–7.7)
NEUTROPHILS RELATIVE PERCENT: 61.9 %
NITRITE, URINE: NEGATIVE
O2 SAT, VEN: 80 %
PCO2, VEN: 46.6 MMHG (ref 41–51)
PDW BLD-RTO: 17.1 % (ref 12.4–15.4)
PH UA: 7 (ref 5–8)
PH VENOUS: 7.38 (ref 7.35–7.45)
PLATELET # BLD: 171 K/UL (ref 135–450)
PMV BLD AUTO: 7.7 FL (ref 5–10.5)
PO2, VEN: 45.1 MMHG (ref 25–40)
POTASSIUM REFLEX MAGNESIUM: 3.3 MMOL/L (ref 3.5–5.1)
PROTEIN UA: >=300 MG/DL
RBC # BLD: 4.76 M/UL (ref 4.2–5.9)
RBC UA: ABNORMAL /HPF (ref 0–4)
SODIUM BLD-SCNC: 142 MMOL/L (ref 136–145)
SPECIFIC GRAVITY UA: 1.02 (ref 1–1.03)
TCO2 CALC VENOUS: 29 MMOL/L
URINE TYPE: ABNORMAL
UROBILINOGEN, URINE: 0.2 E.U./DL
WBC # BLD: 5.4 K/UL (ref 4–11)
WBC UA: ABNORMAL /HPF (ref 0–5)

## 2022-06-08 PROCEDURE — 85025 COMPLETE CBC W/AUTO DIFF WBC: CPT

## 2022-06-08 PROCEDURE — 96375 TX/PRO/DX INJ NEW DRUG ADDON: CPT

## 2022-06-08 PROCEDURE — 96365 THER/PROPH/DIAG IV INF INIT: CPT

## 2022-06-08 PROCEDURE — 74176 CT ABD & PELVIS W/O CONTRAST: CPT

## 2022-06-08 PROCEDURE — 81001 URINALYSIS AUTO W/SCOPE: CPT

## 2022-06-08 PROCEDURE — 83605 ASSAY OF LACTIC ACID: CPT

## 2022-06-08 PROCEDURE — 99284 EMERGENCY DEPT VISIT MOD MDM: CPT

## 2022-06-08 PROCEDURE — 82803 BLOOD GASES ANY COMBINATION: CPT

## 2022-06-08 PROCEDURE — 36415 COLL VENOUS BLD VENIPUNCTURE: CPT

## 2022-06-08 PROCEDURE — 80048 BASIC METABOLIC PNL TOTAL CA: CPT

## 2022-06-08 PROCEDURE — 6370000000 HC RX 637 (ALT 250 FOR IP): Performed by: STUDENT IN AN ORGANIZED HEALTH CARE EDUCATION/TRAINING PROGRAM

## 2022-06-08 PROCEDURE — 2580000003 HC RX 258: Performed by: STUDENT IN AN ORGANIZED HEALTH CARE EDUCATION/TRAINING PROGRAM

## 2022-06-08 PROCEDURE — 6360000002 HC RX W HCPCS: Performed by: STUDENT IN AN ORGANIZED HEALTH CARE EDUCATION/TRAINING PROGRAM

## 2022-06-08 PROCEDURE — 83735 ASSAY OF MAGNESIUM: CPT

## 2022-06-08 RX ORDER — METHOCARBAMOL 500 MG/1
750 TABLET, FILM COATED ORAL 4 TIMES DAILY
Qty: 42 TABLET | Refills: 0 | Status: SHIPPED | OUTPATIENT
Start: 2022-06-08 | End: 2022-06-15

## 2022-06-08 RX ORDER — LIDOCAINE 4 G/G
1 PATCH TOPICAL ONCE
Status: DISCONTINUED | OUTPATIENT
Start: 2022-06-08 | End: 2022-06-08 | Stop reason: HOSPADM

## 2022-06-08 RX ORDER — TRAMADOL HYDROCHLORIDE 50 MG/1
50 TABLET ORAL EVERY 6 HOURS PRN
Qty: 12 TABLET | Refills: 0 | Status: SHIPPED | OUTPATIENT
Start: 2022-06-08 | End: 2022-06-11

## 2022-06-08 RX ORDER — LIDOCAINE 4 G/G
1 PATCH TOPICAL DAILY
Qty: 7 PATCH | Refills: 0 | Status: SHIPPED | OUTPATIENT
Start: 2022-06-08 | End: 2022-06-10 | Stop reason: ALTCHOICE

## 2022-06-08 RX ORDER — POTASSIUM CHLORIDE 20 MEQ/1
20 TABLET, EXTENDED RELEASE ORAL ONCE
Status: COMPLETED | OUTPATIENT
Start: 2022-06-08 | End: 2022-06-08

## 2022-06-08 RX ORDER — OXYCODONE HYDROCHLORIDE AND ACETAMINOPHEN 5; 325 MG/1; MG/1
1 TABLET ORAL ONCE
Status: COMPLETED | OUTPATIENT
Start: 2022-06-08 | End: 2022-06-08

## 2022-06-08 RX ADMIN — OXYCODONE AND ACETAMINOPHEN 1 TABLET: 5; 325 TABLET ORAL at 14:52

## 2022-06-08 RX ADMIN — METHOCARBAMOL 1000 MG: 100 INJECTION INTRAMUSCULAR; INTRAVENOUS at 16:46

## 2022-06-08 RX ADMIN — POTASSIUM CHLORIDE 20 MEQ: 1500 TABLET, EXTENDED RELEASE ORAL at 16:04

## 2022-06-08 RX ADMIN — HYDROMORPHONE HYDROCHLORIDE 0.25 MG: 1 INJECTION, SOLUTION INTRAMUSCULAR; INTRAVENOUS; SUBCUTANEOUS at 18:16

## 2022-06-08 ASSESSMENT — PAIN DESCRIPTION - LOCATION
LOCATION: ABDOMEN;GROIN

## 2022-06-08 ASSESSMENT — PAIN DESCRIPTION - ORIENTATION
ORIENTATION: LEFT;LOWER
ORIENTATION: LEFT
ORIENTATION: LEFT

## 2022-06-08 ASSESSMENT — ENCOUNTER SYMPTOMS
SHORTNESS OF BREATH: 0
VOMITING: 0
CONSTIPATION: 0
ABDOMINAL PAIN: 1
COUGH: 0
ABDOMINAL DISTENTION: 0
NAUSEA: 0
DIARRHEA: 0

## 2022-06-08 ASSESSMENT — PAIN SCALES - GENERAL
PAINLEVEL_OUTOF10: 10
PAINLEVEL_OUTOF10: 8
PAINLEVEL_OUTOF10: 10

## 2022-06-08 ASSESSMENT — PAIN DESCRIPTION - DESCRIPTORS
DESCRIPTORS: ACHING
DESCRIPTORS: ACHING

## 2022-06-08 ASSESSMENT — PAIN DESCRIPTION - FREQUENCY: FREQUENCY: CONTINUOUS

## 2022-06-08 ASSESSMENT — PAIN - FUNCTIONAL ASSESSMENT: PAIN_FUNCTIONAL_ASSESSMENT: 0-10

## 2022-06-08 ASSESSMENT — PAIN DESCRIPTION - PAIN TYPE: TYPE: ACUTE PAIN

## 2022-06-08 NOTE — CONSULTS
CATHETER      DIALYSIS CATHETER INSERTION N/A 1/17/2022    LAPAROSCOPIC PERITONEAL DIALYSIS CATHETER PLACEMENT and omentopexy performed by Jamila Price DO at [address] [facility], ESOPHAGUS     [address] N/A 4/15/2022    ROBOTIC, RECURRENT INCISIONAL HERNIA REPAIR WITH BIOSYNTHETIC MESH; LAPAROSCOPIC PERITONEAL DIALYSIS CATHETER REVISION WITH LAPAROSCOPIC OMENTOPEXY performed by Jamila Price DO at [address] Bilateral 4/15/2022    BILATERAL OPEN INGUINAL HERNI REPAIR performed by Jamila Price DO at [address] [facility] IR TUNNELED [address] 5 YEARS  01/14/2022    IR TUNNELED CATHETER PLACEMENT GREATER THAN 5 YEARS 1/14/2022 [facility] SPECIAL PROCEDURES    UPPER GASTROINTESTINAL ENDOSCOPY  05/28/2016    biopsies, multiple small gastric ulcers    UPPER GASTROINTESTINAL ENDOSCOPY  09/12/2016    UPPER GASTROINTESTINAL ENDOSCOPY N/A 3/18/2022    EGD DIAGNOSTIC ONLY performed by Xander Valdovinos MD at [facility] [address] N/A 1/17/2022    LAPAROSCOPIC incarcerated VENTRAL HERNIA REPAIR performed by Jamila Price DO at [address]:  Patient has no known allergies. Medications:   Home Meds  No current facility-administered medications on file prior to encounter.      Current Outpatient Medications on File Prior to Encounter   Medication Sig Dispense Refill    allopurinol (ZYLOPRIM) 300 MG tablet TAKE 1 TABLET BY MOUTH EVERY DAY 30 tablet 5    busPIRone (BUSPAR) 5 MG tablet TAKE 1 TABLET BY MOUTH TWICE A  tablet 1    terazosin (HYTRIN) 5 MG capsule Take 1 capsule by mouth daily       docusate sodium (COLACE) 100 MG capsule Take 1 capsule by mouth 2 times daily 20 capsule 0    B Complex-C-Folic Acid (DIALYVITE 988 PO) Take 1 tablet by mouth once a week       Hepatitis B Vac Recombinant (ENGERIX-B) 20 MCG/ML INJ Inject 40 mcg into the muscle Every 4 weeks       Methoxy PEG-Epoetin Beta (MIRCERA) 50 MCG/0.3ML SOSY Inject as directed Twice a month       Iron Sucrose (VENOFER IV) Infuse intravenously every 30 days       NIFEdipine (PROCARDIA XL) 60 MG extended release tablet TAKE 1 TABLET BY MOUTH EVERYDAY AT BEDTIME (Patient taking differently: 90 mg ) 30 tablet 0    bisacodyl (BISACODYL) 5 MG EC tablet Take 1 tablet by mouth daily as needed for Constipation 30 tablet 5    spironolactone (ALDACTONE) 50 MG tablet Take 1 tablet by mouth at bedtime 30 tablet 3    metoprolol succinate (TOPROL XL) 50 MG extended release tablet Take 1 tablet by mouth at bedtime TAKE 1 TABLET BY MOUTH EVERY DAY 30 tablet 3    atorvastatin (LIPITOR) 20 MG tablet TAKE 1 TABLET BY MOUTH EVERY DAY 30 tablet 5    torsemide (DEMADEX) 100 MG tablet Take 1 tablet by mouth daily 30 tablet 3    colchicine (COLCRYS) 0.6 MG tablet Take 1 tablet by mouth daily as needed for Pain (as needed for pain related to gout) 30 tablet 5    vitamin D (ERGOCALCIFEROL) 1.25 MG (48700 UT) CAPS capsule Take 1 capsule by mouth once a week 12 capsule 1    calcitRIOL (ROCALTROL) 0.25 MCG capsule Take 1 capsule by mouth daily (Patient taking differently: Take 0.5 mcg by mouth daily ) 30 capsule 3    calcium acetate (PHOSLO) 667 MG CAPS capsule Take 2 capsules by mouth 3 times daily (with meals) 180 capsule 0    nortriptyline (PAMELOR) 10 MG capsule nightly       omeprazole (PRILOSEC) 40 MG delayed release capsule TAKE 1 CAPSULE BY MOUTH EVERY DAY 30 capsule 1    sildenafil (VIAGRA) 100 MG tablet Take 1 tablet by mouth as needed for Erectile Dysfunction 30 tablet 3    aspirin 81 MG tablet Take 81 mg by mouth daily         Current Meds  No current facility-administered medications for this encounter. Family History:   Family History   Problem Relation Age of Onset    Hypertension Other     Breast Cancer Mother     Colon Cancer Father     Diabetes Maternal Grandmother     Coronary Art Dis Brother        Social History:   TOBACCO:   reports that he has never smoked.  He has never used 05/27/2022    UROBILINOGEN 0.2 05/27/2022    BILIRUBINUR Negative 05/27/2022    BILIRUBINUR Negative 11/11/2019    BLOODU SMALL 05/27/2022    GLUCOSEU Negative 05/27/2022    AMORPHOUS Rare 05/27/2022       Imaging:   No orders to display         Assessment/Plan:  Gucci Tripp is a 59 y.o. male with persisting left-sided suprapubic abdominal pain following bilateral inguinal hernia surgery 2 months ago.     - Patient to follow up with Urology (Dr. Heather De León) for possible pain triggered   - IV robaxin given in the ED with improvement in the past, continue PO robaxin at home  - Lidoderm patches and tramadol for additional relief  - Patient seen with senior resident and discussed with Dr. Jose Luis Garcia DO  06/08/22  3:06 PM

## 2022-06-08 NOTE — ED NOTES
Date of evaluation: 6/8/2022    Chief Complaint   Abdominal Pain (left sided abdominal pain radiates to left groin. has had several visits for same issue, post op hernia repair. followed up with surgery and was told it is just taking extra time to heal. pt states pain comes and goes but today is 10/10.)      Nursing Notes, Past Medical Hx, Past Surgical Hx, Social Hx,Allergies, and Family Hx were reviewed. History of Present Illness     [place] Guadalupe Rubi is a 59 y.o. male hx recent b/l inguinal hernia s/p repair (4/22/22) who presents with L lower abd and groin pain. Was recently here on 5/27 with similar symptoms, was seen by surgery, and found to have bilateral hydroceles which was the suspected etiology of his pain. Was also having some pain with flushing of his PD cath so was recommended to remain on HD for now which he last received yesterday. Developed return of this throbbing LLQ abd pain radiating to his L groin and testicle this morning which prompted him to come in. Denies fevers/chills, bowel/bladder changes. Review of Systems   Review of Systems   Constitutional: Negative for chills, diaphoresis, fatigue and fever. Respiratory: Negative for cough and shortness of breath. Cardiovascular: Negative for chest pain and leg swelling. Gastrointestinal: Positive for abdominal pain. Negative for abdominal distention, constipation, diarrhea, nausea and vomiting. Genitourinary: Negative for difficulty urinating, dysuria, frequency and urgency. Neurological: Negative for dizziness, light-headedness and headaches.          Past Medical, Surgical, Family, and Social History         Diagnosis Date    Arthralgia of multiple joints 10/27/2015    Arthritis     Atrial fibrillation (HCC)     Chronic kidney disease     stage 4    Chronic systolic congestive heart failure ([address] 8/16/2021    CRI (chronic renal insufficiency)     Diabetic nephropathy associated with type 2 diabetes mellitus ([address] 10/11/2018    Diverticulosis     Elevated PSA     Erectile dysfunction     ESRD (end stage renal disease) on dialysis ([facility] 2/24/2022    Gout     Gout     Hemrrhoid NOS w/ complication NEC     History of MRSA infection     Hypertension     CELSA (obstructive sleep apnea) 07/14/2017    uses C-pap machine    Type 2 diabetes mellitus without complication, without long-term current use of insulin ([facility] 9/60/2673    Umbilical hernia without obstruction and without gangrene 2/2/2016         Procedure Laterality Date    COLONOSCOPY      COLONOSCOPY N/A 3/18/2022    COLONOSCOPY POLYPECTOMY SNARE/COLD BIOPSY performed by Gely Blanco MD at [address] N/A 1/17/2022    LAPAROSCOPIC PERITONEAL DIALYSIS CATHETER PLACEMENT and omentopexy performed by Lisa Napier DO at [facility], [address] [place] [address] N/A 4/15/2022    ROBOTIC, RECURRENT INCISIONAL HERNIA REPAIR WITH BIOSYNTHETIC MESH; LAPAROSCOPIC PERITONEAL DIALYSIS CATHETER REVISION WITH LAPAROSCOPIC OMENTOPEXY performed by Lisa Napier DO at [address] [facility] Bilateral 4/15/2022    [address] performed by Lisa Napier DO at [address] IR TUNNELED Eulice Regulus 5 YEARS  01/14/2022    IR TUNNELED CATHETER PLACEMENT GREATER THAN 5 YEARS 1/14/2022 [facility] SPECIAL PROCEDURES    UPPER GASTROINTESTINAL ENDOSCOPY  05/28/2016    biopsies, multiple small gastric ulcers    UPPER GASTROINTESTINAL ENDOSCOPY  09/12/2016    UPPER GASTROINTESTINAL ENDOSCOPY N/A 3/18/2022    EGD DIAGNOSTIC ONLY performed by Gely Blanco MD at [facility] N/A 1/17/2022    LAPAROSCOPIC incarcerated [place] performed by Lisa Napier DO at [address]     His family history includes Breast Cancer in his mother; Colon Cancer in his father; Coronary Art Dis in his brother; Diabetes in his maternal grandmother; Hypertension in an other family member. He reports that he has never smoked. He has never used smokeless tobacco. He reports previous alcohol use. He reports that he does not use drugs.     Medications     Previous Medications    ALLOPURINOL (ZYLOPRIM) 300 MG TABLET    TAKE 1 TABLET BY MOUTH EVERY DAY    ASPIRIN 81 MG TABLET    Take 81 mg by mouth daily    ATORVASTATIN (LIPITOR) 20 MG TABLET    TAKE 1 TABLET BY MOUTH EVERY DAY    B COMPLEX-C-FOLIC ACID (DIALYVITE 169 PO)    Take 1 tablet by mouth once a week     BISACODYL (BISACODYL) 5 MG EC TABLET    Take 1 tablet by mouth daily as needed for Constipation    BUSPIRONE (BUSPAR) 5 MG TABLET    TAKE 1 TABLET BY MOUTH TWICE A DAY    CALCITRIOL (ROCALTROL) 0.25 MCG CAPSULE    Take 1 capsule by mouth daily    CALCIUM ACETATE (PHOSLO) 667 MG CAPS CAPSULE    Take 2 capsules by mouth 3 times daily (with meals)    COLCHICINE (COLCRYS) 0.6 MG TABLET    Take 1 tablet by mouth daily as needed for Pain (as needed for pain related to gout)    DOCUSATE SODIUM (COLACE) 100 MG CAPSULE    Take 1 capsule by mouth 2 times daily    HEPATITIS B VAC RECOMBINANT (ENGERIX-B) 20 MCG/ML INJ    Inject 40 mcg into the muscle Every 4 weeks     IRON SUCROSE (VENOFER IV)    Infuse intravenously every 30 days     METHOXY PEG-EPOETIN BETA (MIRCERA) 50 MCG/0.3ML SOSY    Inject as directed Twice a  month     METOPROLOL SUCCINATE (TOPROL XL) 50 MG EXTENDED RELEASE TABLET    Take 1 tablet by mouth at bedtime TAKE 1 TABLET BY MOUTH EVERY DAY    NIFEDIPINE (PROCARDIA XL) 60 MG EXTENDED RELEASE TABLET    TAKE 1 TABLET BY MOUTH EVERYDAY AT BEDTIME    NORTRIPTYLINE (PAMELOR) 10 MG CAPSULE    nightly     OMEPRAZOLE (PRILOSEC) 40 MG DELAYED RELEASE CAPSULE    TAKE 1 CAPSULE BY MOUTH EVERY DAY    SILDENAFIL (VIAGRA) 100 MG TABLET    Take 1 tablet by mouth as needed for Erectile Dysfunction    SPIRONOLACTONE (ALDACTONE) 50 MG TABLET    Take 1 tablet by mouth at bedtime    TERAZOSIN (HYTRIN) 5 MG CAPSULE    Take 1 capsule by mouth daily     TORSEMIDE (DEMADEX) 100 MG TABLET    Take 1 tablet by mouth daily    VITAMIN D (ERGOCALCIFEROL) 1.25 MG (63904 UT) CAPS CAPSULE    Take 1 capsule by mouth once a week       Allergies     He has No Known Allergies. Physical Exam     INITIAL VITALS: BP (!) 157/92   Pulse 86   Temp 98.5 °F (36.9 °C) (Oral)   Resp 16   Ht 6' 5\" (1.956 m)   Wt 280 lb (127 kg)   SpO2 98%   BMI 33.20 kg/m²      Physical Exam  Constitutional:       General: He is not in acute distress. Appearance: He is well-developed. He is not ill-appearing. Cardiovascular:      Rate and Rhythm: Normal rate and regular rhythm. Heart sounds: Normal heart sounds. Pulmonary:      Effort: Pulmonary effort is normal.      Breath sounds: Normal breath sounds. Abdominal:      General: Abdomen is flat. Bowel sounds are normal.      Palpations: Abdomen is soft. Tenderness: There is abdominal tenderness in the left lower quadrant. There is no guarding or rebound. Comments: Bilateral inguinal hernia incisions healing appropriately   Genitourinary:     Testes:         Right: Tenderness and swelling present. Mass not present. Left: Tenderness and swelling present. Mass not present. Neurological:      General: No focal deficit present. Mental Status: He is alert and oriented to person, place, and time. Diagnostic Results     RADIOLOGY:  CT ABDOMEN PELVIS WO CONTRAST Additional Contrast? None   Final Result      1. Recurrent moderate bilateral inguinal hernias containing fat and extensive stranding, not containing bowel, unchanged compared to 5/12/2022. Cannot exclude incarcerated hernias. 2.  Peritoneal dialysis catheter. 3.  8 mm left lower lobe pulmonary nodule, unchanged compared to 4/13/2022.  Following the Fleischner Society 2017 guidelines for single solid nodules 6-8 mm, if patient is low risk, then CT is suggested at 6-12 months from initial scan with subsequent CT    at 18-24 months. If patient is high risk, then CT is suggested at 6-12 months from initial scan and then at 18-24 months.  Qzxv            LABS:   Labs Reviewed   CBC WITH AUTO DIFFERENTIAL - Abnormal; Notable for the following components:       Result Value    Hemoglobin 12.8 (*)     Hematocrit 40.1 (*)     RDW 17.1 (*)     All other components within normal limits   BASIC METABOLIC PANEL W/ REFLEX TO MG FOR LOW K - Abnormal; Notable for the following components:    Potassium reflex Magnesium 3.3 (*)     Glucose 131 (*)     BUN 28 (*)     CREATININE 5.7 (*)     GFR Non- 10 (*)     GFR  12 (*)     All other components within normal limits    Narrative:     Mariam Gusman tel. [phone],  Chemistry results called to and read back by Sandra Dick RN, 06/08/2022  15:25, by [facility] - Abnormal; Notable for the following components:    Glucose, Ur 100 (*)     Blood, Urine TRACE-INTACT (*)     Protein, UA >=300 (*)     Bacteria, UA Rare (*)     All other components within normal limits   MAGNESIUM    Narrative:     Mariam Gusman tel. [phone],  Chemistry results called to and read back by Sandra Dick RN, 06/08/2022  15:25, by Dorothea Santos   LACTIC ACID   BLOOD GAS, VENOUS       RECENT VITALS:  BP: (!) 157/92, Temp: 98.5 °F (36.9 °C), Heart Rate: 86,Resp: 16     Procedures     None    ED Course     The patient was given the following medications:  Orders Placed This Encounter   Medications    oxyCODONE-acetaminophen (PERCOCET) 5-325 MG per tablet 1 tablet    potassium chloride (KLOR-CON M) extended release tablet 20 mEq    methocarbamol (ROBAXIN) 1,000 mg in dextrose 5 % 100 mL IVPB    lidocaine 4 % external patch 1 patch    HYDROmorphone (DILAUDID) injection 0.25 mg            CONSULTS:  IP CONSULT TO Evangelist Zavaleta is a 59 y.o. male with recent bilateral inguinal hernia repair returning with LLQ, groin, and testicular pain. Suspect this is related to his known hydroceles from prior visit but will want to rule out related post-operative complication. Could potentially have hematoma formation though there is less suspicion for this based on improvement of his scrotal swelling. Low concern for infection given lack of erythema and warmth as well as no systemic symptoms. Basic labs and UA reassuring. Pt seen by general surgery who have very low concern for post-op complication or recurrent hernia. There is also low concern for active urologic infection. This is likely pain related to his hydroceles. Pt given lidoderm patch, robaxin, and opioids. Pain persistent and obtained CT which showed recurrent inguinal hernia not containing bowel stable from prior. Lactate and VBG also wnl. Pt was discharged with lidoderm patch, robaxin, and tramadol with plans for OP urology FU. This patient was also evaluated by the attending physician. All care plans were discussed and agreed upon. Clinical Impression     1.  Hydrocele of testis        Disposition/Plan     PATIENT REFERRED TO:  MD Trudy Romero [facility]  [facility] [address] [place] [address]  [phone]    Schedule an appointment as soon as possible for a visit in 1 week  Testicular pain suspect related to hydrocele      DISCHARGE MEDICATIONS:  New Prescriptions    No medications on file       DISPOSITION         Kasie Hunt MD  Resident  06/08/22 [id number]

## 2022-06-08 NOTE — ED PROVIDER NOTES
ED Attending Attestation Note     Date of evaluation: 6/8/2022    This patient was seen by the resident. I have seen and examined the patient, agree with the workup, evaluation, management and diagnosis. The care plan has been discussed. Patient is a 52-year-old gentleman with history of bilateral inguinal hernia repair on 4/22 presenting with lower abdominal pain radiation into his groin. The patient was recently seen on 5/27 with similar concern. He was found to have bilateral hydroceles thought to be the source of his pain. The patient states that he has had recurrence of his pain that is worse on the left side. He denies any changes in bladder or bowel function. No fevers. On examination find a pleasant adult male, speaking in complete sentences. No increased work of breathing or accessory muscle use during respiration. The patient has mild tenderness to palpation in the left inguinal region. He is noted to have edematous scrotum with normal lie of his testes. He has no tenderness over the epididymis or of the testicles themselves on my examination. No discharge from the meatus. Will discuss with surgery given concern for possible postop complication.     Riccardo Hawkins MD MPH   Physician     Eun Hope MD  06/08/22 [id number]

## 2022-06-08 NOTE — ED NOTES
Pt is alert and oriented times four. Pt here today for left lower abdominal pain that radiates to his left groin. Pt rates his pain a 10 out of 10. Pt denies trouble urinating. Pt has pain with palpation around groin area.       Surinder Mcnamara RN  06/08/22 [id number]

## 2022-06-09 ENCOUNTER — TELEPHONE (OUTPATIENT)
Dept: BARIATRICS/WEIGHT MGMT | Age: 62
End: 2022-06-09

## 2022-06-09 NOTE — TELEPHONE ENCOUNTER
ARTNA for pt to call the office to go over surgery details and time of arrival for his scheduled surgery on 6/13/22.

## 2022-06-10 ENCOUNTER — ANESTHESIA EVENT (OUTPATIENT)
Dept: OPERATING ROOM | Age: 62
DRG: 350 | End: 2022-06-10
Payer: MEDICARE

## 2022-06-10 RX ORDER — SODIUM CHLORIDE, SODIUM LACTATE, POTASSIUM CHLORIDE, CALCIUM CHLORIDE 600; 310; 30; 20 MG/100ML; MG/100ML; MG/100ML; MG/100ML
INJECTION, SOLUTION INTRAVENOUS CONTINUOUS
Status: CANCELLED | OUTPATIENT
Start: 2022-06-10

## 2022-06-10 NOTE — PROGRESS NOTES
Place patient label inside box (if no patient label, complete below)  Name:  :  MR#:     Андрей Sands / PROCEDURE  1. I (we), Kimmy Hanna Authorize DR Celeste Mccord and/or such assistants as may be selected by him/her, to perform the following operation/procedure(s): ROBOTIC RECURRENT BILATERAL INGUINAL HERNIA REPAIR/ PERITONEAL DIALYSIS CATHETER REMOVAL        Note: If unable to obtain consent prior to an emergent procedure, document the emergent reason in the medical record. This procedure has been explained to my (our) satisfaction and included in the explanation was:  A) The intended benefit, nature, and extent of the procedure to be performed;  B) The significant risks involved and the probability of success;  C) Alternative procedures and methods of treatment;  D) The dangers and probable consequences of such alternatives (including no procedure or treatment); E) The expected consequences of the procedure on my future health;  F) Whether other qualified individuals would be performing important surgical tasks and/or whether  would be present to advise or support the procedure. I (we) understand that there are other risks of infection and other serious complications in the pre-operative/procedural and postoperative/procedural stages of my (our) care. I (we) have asked all of the questions which I (we) thought were important in deciding whether or not to undergo treatment or diagnosis. These questions have been answered to my (our) satisfaction. I (we) understand that no assurance can be given that the procedure will be a success, and no guarantee or warranty of success has been given to me (us). 2. It has been explained to me (us) that during the course of the operation/procedure, unforeseen conditions may be revealed that necessitate extension of the original procedure(s) or different procedure(s) than those set forth in Paragraph 1.  I (we) authorize and request that the above-named physician, his/her assistants or his/her designees, perform procedures as necessary and desirable if deemed to be in my (our) best interest.     Revised 8/2/2021                                                                          Page 1 of 2           3. I acknowledge that health care personnel may be observing this procedure for the purpose of medical education or other specified purposes as may be necessary as requested and/or approved by my (our) physician. 4. I (we) consent to the disposal by the hospital Pathologist of the removed tissue, parts or organs in accordance with hospital policy. 5. I do ____ do not ____ consent to the use of a local infiltration pain blocking agent that will be used by my provider/surgical provider to help alleviate pain during my procedure. 6. I do ____ do not ____ consent to an emergent blood transfusion in the case of a life-threatening situation that requires blood components to be administered. This consent is valid for 24 hours from the beginning of the procedure. 7. This patient does ____ or does not ____ currently have a DNR status/order. If DNR order is in place, obtain Addendum to the Surgical Consent for ALL Patients with a DNR Order to address margarita-operative status for limited intervention or DNR suspension.      8. I have read and fully understand the above Consent for Operation/Procedure and that all blanks were completed before I signed the consent.   _____________________________       _____________________      ____/____am/pm  Signature of Patient or legal representative      Printed Name / Relationship            Date / Time   ____________________________       _____________________      ____/____am/pm  Witness to Signature                                    Printed Name                    Date / Time     If patient is unable to sign or is a minor, complete the following)  Patient is a minor, ____ years of age, or unable to sign because:   ______________________________________________________________________________________________    [facility] If a phone consent is obtained, consent will be documented by using two health care professionals, each affirming that the consenting party has no questions and gives consent for the procedure discussed with the physician/provider.   _____________________          ____________________       _____/_____am/pm   2nd witness to phone consent        Printed name           Date / Time    Informed Consent:  I have provided the explanation described above in section 1 to the patient and/or legal representative.  I have provided the patient and/or legal representative with an opportunity to ask any questions about the proposed operation/procedure.   ___________________________          ____________________         ____/____am/pm  Provider / Proceduralist                            Printed name            Date / Time  Revised 8/2/2021                                                                      Page 2 of 2

## 2022-06-10 NOTE — TELEPHONE ENCOUNTER
Spoke with pt. Pt's last dose of 81 ASA was 6/9/22. [id number] Stephanie Jonas per provider to proceed with surgery on 6/13/22. Pt advised he still needs his H&P. Pt mistakenly cancelled his appt with his PCP on 6/13/22. Pt advised to visit any [facility] urgent care to obtain a pre op physical today or tomorrow and fax number to PAT was given if pt unable to get in with PCP. Pt advised of 12:15 pm arrival for his 2:15 pm surgery. Pt reminded to be NPO.

## 2022-06-10 NOTE — PROGRESS NOTES
[facility] PRE-SURGICAL TESTING INSTRUCTIONS                                  PRIOR TO PROCEDURE DATE:        1. PLEASE FOLLOW ANY  GUIDELINES/ INSTRUCTIONS PRIOR TO YOUR PROCEDURE AS ADVISED BY YOUR SURGEON. 2. Arrange for someone to drive you home and be with you for the first 24 hours after discharge for your safety after your procedure for which you received sedation. Ensure it is someone we can share information with regarding your discharge. 3. You must contact your surgeon for instructions IF:   You are taking any blood thinners, aspirin, anti-inflammatory or vitamin E.   There is a change in your physical condition such as a cold, fever, rash, cuts, sores or any other infection, especially near your surgical site. 4. Do not drink alcohol the day before or day of your procedure. 5. A Pre-op History and Physical for surgery MUST be completed by your Physician or Urgent Care within 30 days of your procedure date. Please bring a copy with you on the day of your procedure and along with any other testing performed. THE DAY OF YOUR PROCEDURE:  1. Follow instructions for ARRIVAL TIME as DIRECTED BY YOUR SURGEON. 2. Enter the MAIN entrance from [address] and follow the signs to the free [employer] or [employer] parking (offered free of charge 6am-5pm). 3. Enter the Main Entrance of the hospital (do not enter from the lower level of the parking garage). Upon entrance, check in with the  at the main desk on your left. If no one is available at the desk, proceed into the [facility] Waiting Room and go through the door directly into the [facility]. There is a Check-in desk ACROSS from Room 5 (marked with a sign hanging from the ceiling). The phone number for the surgery center is [phone]. 4. Please call [phone] option #2 option #2 if you have not been preregistered yet.   On the day of your procedure bring your insurance card and photo ID. You will be registered at your bedside once brought back to your room. 5. DO NOT EAT ANYTHING eight hours prior to your arrival for surgery. May have 8 ounces of water 4 hours prior to your arrival for surgery. NOTE: ALL Gastric, Bariatric and Bowel surgery patients MUST follow their surgeon's instructions. 6. MEDICATIONS    Take the following medications with a SMALL sip of water: omeprazole   Bariatric patient's call surgeon if on diabetic medications as some need to be stopped 1 week preop   Use your usual dose of inhalers the morning of surgery. BRING your rescue inhaler with you to hospital.    Anesthesia does NOT want you to take insulin the morning of surgery. They will control your blood sugar while you are at the hospital. Please contact your ordering physician for instructions regarding your insulin the night before your procedure. If you have an insulin pump, please keep it set on basal rate. 7. Do not swallow water when brushing teeth. No gum, candy, mints or ice chips. Refrain from smoking or at least decrease the amount. 8. Dress in loose, comfortable clothing appropriate for redressing after your procedure. Do not wear jewelry (including body piercings), make-up (especially NO eye make-up), fingernail polish (NO toenail polish if foot/leg surgery), lotion, powders or metal hairclips. 9. Dentures, glasses, or contacts will need to be removed before your procedure. Bring cases for your glasses, contacts, dentures, or hearing aids to protect them while you are in surgery. 10. If you use a CPAP, please bring it with you on the day of your procedure. 11. We recommend that valuable personal  belongings such as cash, cell phones, e-tablets or jewelry, be left at home during your stay. The hospital will not be responsible for valuables that are not secured in the hospital safe.  However, if your insurance requires a co-pay, you may want to bring a method of payment, i.e. Check or credit card, if you wish to pay your co-pay the day of surgery. 12. If you are to stay overnight, you may bring a bag with personal items. Please have any large items you may need brought in by your family after your arrival to your hospital room. 15. If you have a Living Will or Durable Power of , please bring a copy on the day of your procedure. 15. With your permission, one family member may accompany you while you are being prepared for surgery. Once you are ready, additional family members may join you. HOW WE KEEP YOU SAFE and WORK TO PREVENT SURGICAL SITE INFECTIONS:  1. Health care workers should always check your ID bracelet to verify your name and birth date. You will be asked many times to state your name, date of birth, and allergies. 2. Health care workers should always clean their hands with soap or alcohol gel before providing care to you. It is okay to ask anyone if they cleaned their hands before they touch you. 3. You will be actively involved in verifying the type of procedure you are having and ensuring the correct surgical site. This will be confirmed multiple times prior to your procedure. Do NOT brian your surgery site UNLESS instructed to by your surgeon. 4. Do not shave or wax for 72 hours prior to procedure near your operative site. Shaving with a razor can irritate your skin and make it easier to develop an infection. On the day of your procedure, any hair that needs to be removed near the surgical site will be clipped by a healthcare worker using a special clippers designed to avoid skin irritation. 5. When you are in the operating room, your surgical site will be cleansed with a special soap, and in most cases, you will be given an antibiotic before the surgery begins. What to expect AFTER YOUR PROCEDURE:  1. Immediately following your procedure, your will be taken to the PACU for the first phase of your recovery.   Your nurse will help you recover from any potential side effects of anesthesia, such as extreme drowsiness, changes in your vital signs or breathing patterns. Nausea, headache, muscle aches, or sore throat may also occur after anesthesia. Your nurse will help you manage these potential side effects. 2. For comfort and safety, arrange to have someone at home with you for the first 24 hours after discharge. 3. You and your family will be given written instructions about your diet, activity, dressing care, medications, and return visits. 4. Once at home, should issues with nausea, pain, or bleeding occur, or should you notice any signs of infection, you should call your surgeon. 5. Always clean your hands before and after caring for your wound. Do not let your family touch your surgery site without cleaning their hands. 6. Narcotic pain medications can cause significant constipation. You may want to add a stool softener to your postoperative medication schedule or speak to your surgeon on how best to manage this SIDE EFFECT. SPECIAL INSTRUCTIONS     Thank you for allowing us to care for you. We strive to exceed your expectations in the delivery of care and service provided to you and your family. If you need to contact the [place] [address] staff for any reason, please call us at [phone]    Instructions reviewed with patient during preadmission testing phone interview.   Kimberli Nicholson RN.6/10/2022 .10:36 AM      ADDITIONAL EDUCATIONAL INFORMATION REVIEWED PER PHONE WITH YOU AND/OR YOUR FAMILY:  No Hibiclens® Bathing Instructions   Yes Antibacterial Soap

## 2022-06-13 ENCOUNTER — HOSPITAL ENCOUNTER (INPATIENT)
Age: 62
LOS: 3 days | Discharge: HOME | DRG: 350 | End: 2022-06-16
Attending: SURGERY | Admitting: SURGERY
Payer: MEDICARE

## 2022-06-13 ENCOUNTER — OFFICE VISIT (OUTPATIENT)
Dept: FAMILY MEDICINE CLINIC | Age: 62
End: 2022-06-13
Payer: MEDICAID

## 2022-06-13 ENCOUNTER — ANESTHESIA (OUTPATIENT)
Dept: OPERATING ROOM | Age: 62
DRG: 350 | End: 2022-06-13
Payer: MEDICARE

## 2022-06-13 VITALS
SYSTOLIC BLOOD PRESSURE: 132 MMHG | RESPIRATION RATE: 12 BRPM | HEART RATE: 72 BPM | BODY MASS INDEX: 31.19 KG/M2 | WEIGHT: 263 LBS | TEMPERATURE: 97.8 F | DIASTOLIC BLOOD PRESSURE: 78 MMHG

## 2022-06-13 DIAGNOSIS — K40.20 NON-RECURRENT BILATERAL INGUINAL HERNIA WITHOUT OBSTRUCTION OR GANGRENE: ICD-10-CM

## 2022-06-13 DIAGNOSIS — Z99.2 ESRD (END STAGE RENAL DISEASE) ON DIALYSIS (HCC): ICD-10-CM

## 2022-06-13 DIAGNOSIS — I10 ESSENTIAL HYPERTENSION, BENIGN: ICD-10-CM

## 2022-06-13 DIAGNOSIS — M1A.09X0 IDIOPATHIC CHRONIC GOUT OF MULTIPLE SITES WITHOUT TOPHUS: ICD-10-CM

## 2022-06-13 DIAGNOSIS — D63.1 ANEMIA DUE TO STAGE 5 CHRONIC KIDNEY DISEASE (HCC): ICD-10-CM

## 2022-06-13 DIAGNOSIS — N18.6 ESRD (END STAGE RENAL DISEASE) ON DIALYSIS (HCC): ICD-10-CM

## 2022-06-13 DIAGNOSIS — K40.21 BILATERAL RECURRENT INGUINAL HERNIA WITHOUT OBSTRUCTION OR GANGRENE: ICD-10-CM

## 2022-06-13 DIAGNOSIS — E11.22 TYPE 2 DIABETES MELLITUS WITH STAGE 4 CHRONIC KIDNEY DISEASE, UNSPECIFIED WHETHER LONG TERM INSULIN USE (HCC): ICD-10-CM

## 2022-06-13 DIAGNOSIS — N18.4 TYPE 2 DIABETES MELLITUS WITH STAGE 4 CHRONIC KIDNEY DISEASE, UNSPECIFIED WHETHER LONG TERM INSULIN USE (HCC): ICD-10-CM

## 2022-06-13 DIAGNOSIS — Z01.810 PREOP CARDIOVASCULAR EXAM: Primary | ICD-10-CM

## 2022-06-13 DIAGNOSIS — I50.22 CHRONIC SYSTOLIC CONGESTIVE HEART FAILURE (HCC): ICD-10-CM

## 2022-06-13 DIAGNOSIS — N18.5 ANEMIA DUE TO STAGE 5 CHRONIC KIDNEY DISEASE (HCC): ICD-10-CM

## 2022-06-13 PROBLEM — Z98.890 STATUS POST BILATERAL HERNIA REPAIR: Status: ACTIVE | Noted: 2022-06-13

## 2022-06-13 PROBLEM — Z87.19 STATUS POST BILATERAL HERNIA REPAIR: Status: ACTIVE | Noted: 2022-06-13

## 2022-06-13 LAB
ANION GAP SERPL CALCULATED.3IONS-SCNC: 16 MMOL/L (ref 3–16)
BUN BLDV-MCNC: 30 MG/DL (ref 7–20)
CALCIUM SERPL-MCNC: 8.7 MG/DL (ref 8.3–10.6)
CHLORIDE BLD-SCNC: 100 MMOL/L (ref 99–110)
CO2: 21 MMOL/L (ref 21–32)
CREAT SERPL-MCNC: 5.7 MG/DL (ref 0.8–1.3)
GFR AFRICAN AMERICAN: 12
GFR NON-AFRICAN AMERICAN: 10
GLUCOSE BLD-MCNC: 101 MG/DL (ref 70–99)
GLUCOSE BLD-MCNC: 108 MG/DL (ref 70–99)
GLUCOSE BLD-MCNC: 97 MG/DL (ref 70–99)
PERFORMED ON: ABNORMAL
PERFORMED ON: NORMAL
POTASSIUM SERPL-SCNC: 6.1 MMOL/L (ref 3.5–5.1)
SODIUM BLD-SCNC: 137 MMOL/L (ref 136–145)

## 2022-06-13 PROCEDURE — 2500000003 HC RX 250 WO HCPCS: Performed by: NURSE ANESTHETIST, CERTIFIED REGISTERED

## 2022-06-13 PROCEDURE — 2580000003 HC RX 258: Performed by: ANESTHESIOLOGY

## 2022-06-13 PROCEDURE — 49422 REMOVE TUNNELED IP CATH: CPT | Performed by: SURGERY

## 2022-06-13 PROCEDURE — 99214 OFFICE O/P EST MOD 30 MIN: CPT | Performed by: FAMILY MEDICINE

## 2022-06-13 PROCEDURE — 3700000001 HC ADD 15 MINUTES (ANESTHESIA): Performed by: SURGERY

## 2022-06-13 PROCEDURE — 7100000000 HC PACU RECOVERY - FIRST 15 MIN: Performed by: SURGERY

## 2022-06-13 PROCEDURE — G0378 HOSPITAL OBSERVATION PER HR: HCPCS

## 2022-06-13 PROCEDURE — 6360000002 HC RX W HCPCS

## 2022-06-13 PROCEDURE — 3051F HG A1C>EQUAL 7.0%<8.0%: CPT | Performed by: FAMILY MEDICINE

## 2022-06-13 PROCEDURE — 88300 SURGICAL PATH GROSS: CPT

## 2022-06-13 PROCEDURE — 1200000000 HC SEMI PRIVATE

## 2022-06-13 PROCEDURE — 2022F DILAT RTA XM EVC RTNOPTHY: CPT | Performed by: FAMILY MEDICINE

## 2022-06-13 PROCEDURE — 3700000000 HC ANESTHESIA ATTENDED CARE: Performed by: SURGERY

## 2022-06-13 PROCEDURE — 96376 TX/PRO/DX INJ SAME DRUG ADON: CPT

## 2022-06-13 PROCEDURE — 3017F COLORECTAL CA SCREEN DOC REV: CPT | Performed by: FAMILY MEDICINE

## 2022-06-13 PROCEDURE — 80048 BASIC METABOLIC PNL TOTAL CA: CPT

## 2022-06-13 PROCEDURE — 2720000010 HC SURG SUPPLY STERILE: Performed by: SURGERY

## 2022-06-13 PROCEDURE — 3600000019 HC SURGERY ROBOT ADDTL 15MIN: Performed by: SURGERY

## 2022-06-13 PROCEDURE — 2580000003 HC RX 258: Performed by: STUDENT IN AN ORGANIZED HEALTH CARE EDUCATION/TRAINING PROGRAM

## 2022-06-13 PROCEDURE — 6360000002 HC RX W HCPCS: Performed by: STUDENT IN AN ORGANIZED HEALTH CARE EDUCATION/TRAINING PROGRAM

## 2022-06-13 PROCEDURE — 2709999900 HC NON-CHARGEABLE SUPPLY: Performed by: SURGERY

## 2022-06-13 PROCEDURE — C1781 MESH (IMPLANTABLE): HCPCS | Performed by: SURGERY

## 2022-06-13 PROCEDURE — 6370000000 HC RX 637 (ALT 250 FOR IP): Performed by: STUDENT IN AN ORGANIZED HEALTH CARE EDUCATION/TRAINING PROGRAM

## 2022-06-13 PROCEDURE — 49651 LAP ING HERNIA REPAIR RECUR: CPT | Performed by: SURGERY

## 2022-06-13 PROCEDURE — S2900 ROBOTIC SURGICAL SYSTEM: HCPCS | Performed by: SURGERY

## 2022-06-13 PROCEDURE — G8417 CALC BMI ABV UP PARAM F/U: HCPCS | Performed by: FAMILY MEDICINE

## 2022-06-13 PROCEDURE — 3600000009 HC SURGERY ROBOT BASE: Performed by: SURGERY

## 2022-06-13 PROCEDURE — 7100000001 HC PACU RECOVERY - ADDTL 15 MIN: Performed by: SURGERY

## 2022-06-13 PROCEDURE — G8427 DOCREV CUR MEDS BY ELIG CLIN: HCPCS | Performed by: FAMILY MEDICINE

## 2022-06-13 PROCEDURE — 6360000002 HC RX W HCPCS: Performed by: ANESTHESIOLOGY

## 2022-06-13 PROCEDURE — 6360000002 HC RX W HCPCS: Performed by: SURGERY

## 2022-06-13 PROCEDURE — 2580000003 HC RX 258: Performed by: NURSE ANESTHETIST, CERTIFIED REGISTERED

## 2022-06-13 PROCEDURE — L0450 TLSO FLEX TRUNK/THOR PRE OTS: HCPCS | Performed by: SURGERY

## 2022-06-13 PROCEDURE — 2500000003 HC RX 250 WO HCPCS: Performed by: SURGERY

## 2022-06-13 PROCEDURE — 2580000003 HC RX 258: Performed by: SURGERY

## 2022-06-13 PROCEDURE — 6360000002 HC RX W HCPCS: Performed by: NURSE ANESTHETIST, CERTIFIED REGISTERED

## 2022-06-13 PROCEDURE — 1036F TOBACCO NON-USER: CPT | Performed by: FAMILY MEDICINE

## 2022-06-13 PROCEDURE — 96374 THER/PROPH/DIAG INJ IV PUSH: CPT

## 2022-06-13 DEVICE — MESH HERN W10XL15CM PET PLA 70% CLLGN 30% GLYC LAP SELF: Type: IMPLANTABLE DEVICE | Site: ABDOMEN | Status: FUNCTIONAL

## 2022-06-13 RX ORDER — SODIUM CHLORIDE 0.9 % (FLUSH) 0.9 %
5-40 SYRINGE (ML) INJECTION PRN
Status: DISCONTINUED | OUTPATIENT
Start: 2022-06-13 | End: 2022-06-16 | Stop reason: HOSPADM

## 2022-06-13 RX ORDER — BUPIVACAINE HYDROCHLORIDE 5 MG/ML
INJECTION, SOLUTION EPIDURAL; INTRACAUDAL PRN
Status: DISCONTINUED | OUTPATIENT
Start: 2022-06-13 | End: 2022-06-13 | Stop reason: HOSPADM

## 2022-06-13 RX ORDER — SODIUM CHLORIDE 9 MG/ML
INJECTION, SOLUTION INTRAVENOUS PRN
Status: DISCONTINUED | OUTPATIENT
Start: 2022-06-13 | End: 2022-06-13 | Stop reason: HOSPADM

## 2022-06-13 RX ORDER — SODIUM CHLORIDE 0.9 % (FLUSH) 0.9 %
5-40 SYRINGE (ML) INJECTION PRN
Status: DISCONTINUED | OUTPATIENT
Start: 2022-06-13 | End: 2022-06-13 | Stop reason: HOSPADM

## 2022-06-13 RX ORDER — PROCHLORPERAZINE EDISYLATE 5 MG/ML
5 INJECTION INTRAMUSCULAR; INTRAVENOUS
Status: DISCONTINUED | OUTPATIENT
Start: 2022-06-13 | End: 2022-06-13 | Stop reason: HOSPADM

## 2022-06-13 RX ORDER — FAMOTIDINE 10 MG/ML
INJECTION, SOLUTION INTRAVENOUS PRN
Status: DISCONTINUED | OUTPATIENT
Start: 2022-06-13 | End: 2022-06-13 | Stop reason: SDUPTHER

## 2022-06-13 RX ORDER — HYDRALAZINE HYDROCHLORIDE 20 MG/ML
10 INJECTION INTRAMUSCULAR; INTRAVENOUS
Status: DISCONTINUED | OUTPATIENT
Start: 2022-06-13 | End: 2022-06-13 | Stop reason: HOSPADM

## 2022-06-13 RX ORDER — LIDOCAINE HYDROCHLORIDE 20 MG/ML
INJECTION, SOLUTION INFILTRATION; PERINEURAL PRN
Status: DISCONTINUED | OUTPATIENT
Start: 2022-06-13 | End: 2022-06-13 | Stop reason: SDUPTHER

## 2022-06-13 RX ORDER — ONDANSETRON 2 MG/ML
4 INJECTION INTRAMUSCULAR; INTRAVENOUS EVERY 6 HOURS PRN
Status: DISCONTINUED | OUTPATIENT
Start: 2022-06-13 | End: 2022-06-16 | Stop reason: HOSPADM

## 2022-06-13 RX ORDER — SODIUM CHLORIDE 9 MG/ML
INJECTION, SOLUTION INTRAVENOUS CONTINUOUS PRN
Status: DISCONTINUED | OUTPATIENT
Start: 2022-06-13 | End: 2022-06-13 | Stop reason: SDUPTHER

## 2022-06-13 RX ORDER — SODIUM CHLORIDE 9 MG/ML
25 INJECTION, SOLUTION INTRAVENOUS PRN
Status: DISCONTINUED | OUTPATIENT
Start: 2022-06-13 | End: 2022-06-16 | Stop reason: HOSPADM

## 2022-06-13 RX ORDER — OXYCODONE HYDROCHLORIDE AND ACETAMINOPHEN 5; 325 MG/1; MG/1
1 TABLET ORAL EVERY 8 HOURS PRN
Qty: 90 TABLET | Refills: 0 | Status: SHIPPED | OUTPATIENT
Start: 2022-06-13 | End: 2022-07-07 | Stop reason: HOSPADM

## 2022-06-13 RX ORDER — SODIUM CHLORIDE 0.9 % (FLUSH) 0.9 %
5-40 SYRINGE (ML) INJECTION EVERY 12 HOURS SCHEDULED
Status: DISCONTINUED | OUTPATIENT
Start: 2022-06-13 | End: 2022-06-13 | Stop reason: HOSPADM

## 2022-06-13 RX ORDER — PROPOFOL 10 MG/ML
INJECTION, EMULSION INTRAVENOUS PRN
Status: DISCONTINUED | OUTPATIENT
Start: 2022-06-13 | End: 2022-06-13 | Stop reason: SDUPTHER

## 2022-06-13 RX ORDER — SODIUM CHLORIDE 9 MG/ML
INJECTION, SOLUTION INTRAVENOUS CONTINUOUS
Status: DISCONTINUED | OUTPATIENT
Start: 2022-06-13 | End: 2022-06-13 | Stop reason: HOSPADM

## 2022-06-13 RX ORDER — HEPARIN SODIUM 5000 [USP'U]/ML
5000 INJECTION, SOLUTION INTRAVENOUS; SUBCUTANEOUS 3 TIMES DAILY
Status: DISCONTINUED | OUTPATIENT
Start: 2022-06-13 | End: 2022-06-16 | Stop reason: HOSPADM

## 2022-06-13 RX ORDER — ONDANSETRON 4 MG/1
4 TABLET, ORALLY DISINTEGRATING ORAL EVERY 8 HOURS PRN
Status: DISCONTINUED | OUTPATIENT
Start: 2022-06-13 | End: 2022-06-16 | Stop reason: HOSPADM

## 2022-06-13 RX ORDER — ONDANSETRON 2 MG/ML
INJECTION INTRAMUSCULAR; INTRAVENOUS PRN
Status: DISCONTINUED | OUTPATIENT
Start: 2022-06-13 | End: 2022-06-13 | Stop reason: SDUPTHER

## 2022-06-13 RX ORDER — BUSPIRONE HYDROCHLORIDE 5 MG/1
5 TABLET ORAL 2 TIMES DAILY
Status: DISCONTINUED | OUTPATIENT
Start: 2022-06-13 | End: 2022-06-16 | Stop reason: HOSPADM

## 2022-06-13 RX ORDER — SODIUM CHLORIDE, SODIUM LACTATE, POTASSIUM CHLORIDE, CALCIUM CHLORIDE 600; 310; 30; 20 MG/100ML; MG/100ML; MG/100ML; MG/100ML
INJECTION, SOLUTION INTRAVENOUS CONTINUOUS
Status: DISCONTINUED | OUTPATIENT
Start: 2022-06-13 | End: 2022-06-13 | Stop reason: HOSPADM

## 2022-06-13 RX ORDER — OXYCODONE HYDROCHLORIDE 5 MG/1
10 TABLET ORAL EVERY 4 HOURS PRN
Status: DISCONTINUED | OUTPATIENT
Start: 2022-06-13 | End: 2022-06-16 | Stop reason: HOSPADM

## 2022-06-13 RX ORDER — ROCURONIUM BROMIDE 10 MG/ML
INJECTION, SOLUTION INTRAVENOUS PRN
Status: DISCONTINUED | OUTPATIENT
Start: 2022-06-13 | End: 2022-06-13 | Stop reason: SDUPTHER

## 2022-06-13 RX ORDER — OXYCODONE HYDROCHLORIDE 5 MG/1
5 TABLET ORAL EVERY 4 HOURS PRN
Status: DISCONTINUED | OUTPATIENT
Start: 2022-06-13 | End: 2022-06-16 | Stop reason: HOSPADM

## 2022-06-13 RX ORDER — ACETAMINOPHEN 500 MG
1000 TABLET ORAL EVERY 8 HOURS SCHEDULED
Status: DISCONTINUED | OUTPATIENT
Start: 2022-06-13 | End: 2022-06-16 | Stop reason: HOSPADM

## 2022-06-13 RX ORDER — ATORVASTATIN CALCIUM 20 MG/1
20 TABLET, FILM COATED ORAL DAILY
Status: DISCONTINUED | OUTPATIENT
Start: 2022-06-14 | End: 2022-06-16 | Stop reason: HOSPADM

## 2022-06-13 RX ORDER — PANTOPRAZOLE SODIUM 40 MG/1
40 TABLET, DELAYED RELEASE ORAL
Status: DISCONTINUED | OUTPATIENT
Start: 2022-06-14 | End: 2022-06-16 | Stop reason: HOSPADM

## 2022-06-13 RX ORDER — DIPHENHYDRAMINE HYDROCHLORIDE 50 MG/ML
12.5 INJECTION INTRAMUSCULAR; INTRAVENOUS
Status: DISCONTINUED | OUTPATIENT
Start: 2022-06-13 | End: 2022-06-13 | Stop reason: HOSPADM

## 2022-06-13 RX ORDER — MAGNESIUM HYDROXIDE 1200 MG/15ML
LIQUID ORAL PRN
Status: DISCONTINUED | OUTPATIENT
Start: 2022-06-13 | End: 2022-06-13 | Stop reason: HOSPADM

## 2022-06-13 RX ORDER — MIDAZOLAM HYDROCHLORIDE 1 MG/ML
INJECTION INTRAMUSCULAR; INTRAVENOUS PRN
Status: DISCONTINUED | OUTPATIENT
Start: 2022-06-13 | End: 2022-06-13 | Stop reason: SDUPTHER

## 2022-06-13 RX ORDER — HYDROMORPHONE HCL 110MG/55ML
PATIENT CONTROLLED ANALGESIA SYRINGE INTRAVENOUS PRN
Status: DISCONTINUED | OUTPATIENT
Start: 2022-06-13 | End: 2022-06-13 | Stop reason: SDUPTHER

## 2022-06-13 RX ORDER — NORTRIPTYLINE HYDROCHLORIDE 10 MG/1
10 CAPSULE ORAL NIGHTLY
Status: DISCONTINUED | OUTPATIENT
Start: 2022-06-13 | End: 2022-06-16 | Stop reason: HOSPADM

## 2022-06-13 RX ORDER — HEPARIN SODIUM 5000 [USP'U]/ML
5000 INJECTION, SOLUTION INTRAVENOUS; SUBCUTANEOUS ONCE
Status: COMPLETED | OUTPATIENT
Start: 2022-06-13 | End: 2022-06-13

## 2022-06-13 RX ORDER — FENTANYL CITRATE 50 UG/ML
INJECTION, SOLUTION INTRAMUSCULAR; INTRAVENOUS PRN
Status: DISCONTINUED | OUTPATIENT
Start: 2022-06-13 | End: 2022-06-13 | Stop reason: SDUPTHER

## 2022-06-13 RX ORDER — SODIUM CHLORIDE, SODIUM LACTATE, POTASSIUM CHLORIDE, AND CALCIUM CHLORIDE .6; .31; .03; .02 G/100ML; G/100ML; G/100ML; G/100ML
IRRIGANT IRRIGATION PRN
Status: DISCONTINUED | OUTPATIENT
Start: 2022-06-13 | End: 2022-06-13 | Stop reason: HOSPADM

## 2022-06-13 RX ORDER — SODIUM CHLORIDE 0.9 % (FLUSH) 0.9 %
5-40 SYRINGE (ML) INJECTION EVERY 12 HOURS SCHEDULED
Status: DISCONTINUED | OUTPATIENT
Start: 2022-06-13 | End: 2022-06-16 | Stop reason: HOSPADM

## 2022-06-13 RX ORDER — ONDANSETRON 2 MG/ML
4 INJECTION INTRAMUSCULAR; INTRAVENOUS
Status: DISCONTINUED | OUTPATIENT
Start: 2022-06-13 | End: 2022-06-13 | Stop reason: HOSPADM

## 2022-06-13 RX ORDER — METOPROLOL SUCCINATE 50 MG/1
50 TABLET, EXTENDED RELEASE ORAL NIGHTLY
Status: DISCONTINUED | OUTPATIENT
Start: 2022-06-13 | End: 2022-06-16 | Stop reason: HOSPADM

## 2022-06-13 RX ADMIN — SODIUM CHLORIDE: 9 INJECTION, SOLUTION INTRAVENOUS at 15:04

## 2022-06-13 RX ADMIN — FENTANYL CITRATE 50 MCG: 50 INJECTION, SOLUTION INTRAMUSCULAR; INTRAVENOUS at 20:21

## 2022-06-13 RX ADMIN — HYDROMORPHONE HYDROCHLORIDE 0.5 MG: 1 INJECTION, SOLUTION INTRAMUSCULAR; INTRAVENOUS; SUBCUTANEOUS at 20:57

## 2022-06-13 RX ADMIN — HYDROMORPHONE HYDROCHLORIDE 0.5 MG: 1 INJECTION, SOLUTION INTRAMUSCULAR; INTRAVENOUS; SUBCUTANEOUS at 21:28

## 2022-06-13 RX ADMIN — FENTANYL CITRATE 50 MCG: 50 INJECTION, SOLUTION INTRAMUSCULAR; INTRAVENOUS at 20:14

## 2022-06-13 RX ADMIN — LIDOCAINE HYDROCHLORIDE 100 MG: 20 INJECTION, SOLUTION INFILTRATION; PERINEURAL at 15:12

## 2022-06-13 RX ADMIN — ONDANSETRON 4 MG: 2 INJECTION INTRAMUSCULAR; INTRAVENOUS at 15:22

## 2022-06-13 RX ADMIN — BUSPIRONE HYDROCHLORIDE 5 MG: 5 TABLET ORAL at 23:22

## 2022-06-13 RX ADMIN — PROPOFOL 100 MG: 10 INJECTION, EMULSION INTRAVENOUS at 15:14

## 2022-06-13 RX ADMIN — HYDROMORPHONE HYDROCHLORIDE 0.5 MG: 1 INJECTION, SOLUTION INTRAMUSCULAR; INTRAVENOUS; SUBCUTANEOUS at 21:05

## 2022-06-13 RX ADMIN — OXYCODONE 10 MG: 5 TABLET ORAL at 22:53

## 2022-06-13 RX ADMIN — HEPARIN SODIUM 5000 UNITS: 5000 INJECTION INTRAVENOUS; SUBCUTANEOUS at 12:46

## 2022-06-13 RX ADMIN — SODIUM CHLORIDE: 9 INJECTION, SOLUTION INTRAVENOUS at 12:41

## 2022-06-13 RX ADMIN — MIDAZOLAM HYDROCHLORIDE 2 MG: 2 INJECTION, SOLUTION INTRAMUSCULAR; INTRAVENOUS at 15:04

## 2022-06-13 RX ADMIN — FENTANYL CITRATE 100 MCG: 50 INJECTION, SOLUTION INTRAMUSCULAR; INTRAVENOUS at 15:04

## 2022-06-13 RX ADMIN — METOPROLOL SUCCINATE 50 MG: 50 TABLET, EXTENDED RELEASE ORAL at 23:21

## 2022-06-13 RX ADMIN — ROCURONIUM BROMIDE 100 MG: 10 INJECTION INTRAVENOUS at 15:14

## 2022-06-13 RX ADMIN — HYDROMORPHONE HYDROCHLORIDE 2 MG: 2 INJECTION, SOLUTION INTRAMUSCULAR; INTRAVENOUS; SUBCUTANEOUS at 15:17

## 2022-06-13 RX ADMIN — SODIUM CHLORIDE: 9 INJECTION, SOLUTION INTRAVENOUS at 12:39

## 2022-06-13 RX ADMIN — SUGAMMADEX 200 MG: 100 INJECTION, SOLUTION INTRAVENOUS at 20:34

## 2022-06-13 RX ADMIN — FAMOTIDINE 20 MG: 10 INJECTION, SOLUTION INTRAVENOUS at 15:22

## 2022-06-13 RX ADMIN — HYDROMORPHONE HYDROCHLORIDE 0.5 MG: 1 INJECTION, SOLUTION INTRAMUSCULAR; INTRAVENOUS; SUBCUTANEOUS at 21:17

## 2022-06-13 RX ADMIN — NORTRIPTYLINE HYDROCHLORIDE 10 MG: 10 CAPSULE ORAL at 23:21

## 2022-06-13 RX ADMIN — SODIUM CHLORIDE, PRESERVATIVE FREE 10 ML: 5 INJECTION INTRAVENOUS at 23:25

## 2022-06-13 RX ADMIN — HEPARIN SODIUM 5000 UNITS: 5000 INJECTION INTRAVENOUS; SUBCUTANEOUS at 23:23

## 2022-06-13 RX ADMIN — ACETAMINOPHEN 1000 MG: 500 TABLET ORAL at 23:20

## 2022-06-13 ASSESSMENT — PAIN DESCRIPTION - FREQUENCY
FREQUENCY: CONTINUOUS

## 2022-06-13 ASSESSMENT — PAIN DESCRIPTION - LOCATION
LOCATION: ABDOMEN

## 2022-06-13 ASSESSMENT — PAIN DESCRIPTION - PAIN TYPE
TYPE: SURGICAL PAIN

## 2022-06-13 ASSESSMENT — PAIN - FUNCTIONAL ASSESSMENT
PAIN_FUNCTIONAL_ASSESSMENT: 0-10
PAIN_FUNCTIONAL_ASSESSMENT: PREVENTS OR INTERFERES SOME ACTIVE ACTIVITIES AND ADLS
PAIN_FUNCTIONAL_ASSESSMENT: PREVENTS OR INTERFERES SOME ACTIVE ACTIVITIES AND ADLS
PAIN_FUNCTIONAL_ASSESSMENT: ACTIVITIES ARE NOT PREVENTED

## 2022-06-13 ASSESSMENT — PAIN DESCRIPTION - DESCRIPTORS
DESCRIPTORS: SHARP;ACHING
DESCRIPTORS: SHARP
DESCRIPTORS: SHARP
DESCRIPTORS: CRAMPING
DESCRIPTORS: SHARP
DESCRIPTORS: THROBBING

## 2022-06-13 ASSESSMENT — PAIN SCALES - GENERAL
PAINLEVEL_OUTOF10: 7
PAINLEVEL_OUTOF10: 10
PAINLEVEL_OUTOF10: 7
PAINLEVEL_OUTOF10: 10
PAINLEVEL_OUTOF10: 8
PAINLEVEL_OUTOF10: 6
PAINLEVEL_OUTOF10: 6
PAINLEVEL_OUTOF10: 7
PAINLEVEL_OUTOF10: 8

## 2022-06-13 ASSESSMENT — LIFESTYLE VARIABLES
HOW OFTEN DO YOU HAVE A DRINK CONTAINING ALCOHOL: NEVER
SMOKING_STATUS: 0

## 2022-06-13 ASSESSMENT — PAIN DESCRIPTION - ORIENTATION
ORIENTATION: MID;LEFT;RIGHT;LOWER
ORIENTATION: MID;LEFT;RIGHT;LOWER
ORIENTATION: MID
ORIENTATION: MID
ORIENTATION: MID;RIGHT;LEFT
ORIENTATION: MID;LEFT;RIGHT

## 2022-06-13 NOTE — ANESTHESIA PRE PROCEDURE
Department of Anesthesiology  Preprocedure Note       Name:  Tilda Snellen   Age:  59 y.o.  :  1960                                          MRN:  [id number]         Date:  2022      Surgeon: Stan Deleon): Opal Joseph DO    Procedure: Procedure(s):  ROBOTIC RECURRENT BILATERAL INGUINAL HERNIA REPAIR/ PERITONEAL DIALYSIS CATHETER REMOVAL    Medications prior to admission:   Prior to Admission medications    Medication Sig Start Date End Date Taking? Authorizing Provider   oxyCODONE-acetaminophen (PERCOCET) 5-325 MG per tablet Take 1 tablet by mouth every 8 hours as needed for Pain for up to 30 days.  22  Ayesha Craven MD   methocarbamol (ROBAXIN) 500 MG tablet Take 1.5 tablets by mouth 4 times daily for 7 days 6/8/22 6/15/22  Wing Kenneth MD   busPIRone (BUSPAR) 5 MG tablet TAKE 1 TABLET BY MOUTH TWICE A DAY 22   Tank Chua MD   terazosin (HYTRIN) 5 MG capsule Take 1 capsule by mouth daily  3/22/22   Historical Provider, MD   docusate sodium (COLACE) 100 MG capsule Take 1 capsule by mouth 2 times daily 4/15/22   Amarilis Omer MD   B Complex-C-Folic Acid (DIALYVITE 395 PO) Take 1 tablet by mouth once a week   Patient not taking: Reported on 6/10/2022    Historical Provider, MD   Methoxy PEG-Epoetin Beta (MIRCERA) 50 MCG/0.3ML SOSY Inject as directed Twice a  month     Historical Provider, MD   Iron Sucrose (VENOFER IV) Infuse intravenously every 30 days     Historical Provider, MD   NIFEdipine (PROCARDIA XL) 60 MG extended release tablet TAKE 1 TABLET BY MOUTH EVERYDAY AT BEDTIME  Patient taking differently: 90 mg  22   Tank Chua MD   bisacodyl (BISACODYL) 5 MG EC tablet Take 1 tablet by mouth daily as needed for Constipation 22   Tank Chua MD   spironolactone (ALDACTONE) 50 MG tablet Take 1 tablet by mouth at bedtime 22   Tank Chua MD   metoprolol succinate (TOPROL XL) 50 MG extended release tablet Take 1 tablet by mouth at bedtime TAKE 1 TABLET BY MOUTH EVERY DAY 1/25/22   Nancy Block MD   atorvastatin (LIPITOR) 20 MG tablet TAKE 1 TABLET BY MOUTH EVERY DAY 1/25/22   Nancy Block MD   nortriptyline (PAMELOR) 10 MG capsule nightly  12/15/21   Historical Provider, MD   omeprazole (PRILOSEC) 40 MG delayed release capsule TAKE 1 CAPSULE BY MOUTH EVERY DAY 12/16/21   Neetu Ospina MD   sildenafil (VIAGRA) 100 MG tablet Take 1 tablet by mouth as needed for Erectile Dysfunction 8/16/21   Neetu Ospina MD   aspirin 81 MG tablet Take 81 mg by mouth daily    Historical Provider, MD       Current medications:    No current outpatient medications on file. No current facility-administered medications for this visit.        Allergies:  No Known Allergies    Problem List:    Patient Active Problem List   Diagnosis Code    Atrial fibrillation ([facility]) I48.91    CRI (chronic renal insufficiency) N18.9    Gout M10.9    Erectile dysfunction N52.9    Elevated PSA R97.20    Essential hypertension, benign I10    Headache R51.9    Diverticulosis K57.90    Arthralgia of multiple joints W92.86    Umbilical hernia without obstruction and without gangrene K42.9    Knee pain, bilateral M25.561, M25.562    Alcohol use disorder, mild, abuse F10.10    CELSA (obstructive sleep apnea) G47.33    Duodenal ulcer disease K26.9    Diabetic nephropathy associated with type 2 diabetes mellitus ([facility]) E11.21    Type 2 diabetes mellitus with diabetic nephropathy, without long-term current use of insulin ([facility]) E11.21    Lateral epicondylitis of right elbow M77.11    Chronic bilateral low back pain without sciatica M54.50, G89.29    Morbid obesity due to excess calories ([facility]) E66.01    Chronic systolic congestive heart failure ([facility]) I50.22    Acute kidney injury superimposed on CKD ([facility]) N17.9, N18.9    Stroke-like symptoms R29.90    DMII (diabetes mellitus, type 2) ([facility]) E11.9    Hyperlipidemia E78.5    Hypocalcemia E83.51    Hypomagnesemia E83.42    Hypokalemia E87.6    Muscle cramps R25.2    ANTONIO (acute kidney injury) ([facility]) N17.9    Electrolyte imbalance E87.8    Anemia due to stage 5 chronic kidney disease (HCC) N18.5, D63.1    Congestive heart failure (HCC) I50.9    LV dysfunction I51.9    ESRD (end stage renal disease) on dialysis ([facility]) N18.6, Z99.2    Bilateral recurrent inguinal hernia without obstruction or gangrene K40.21    Recurrent incisional hernia with incarceration K43.0    Bilateral inguinal hernia without obstruction or gangrene K40.20       Past Medical History:        Diagnosis Date    Arthralgia of multiple joints 10/27/2015    Arthritis     Atrial fibrillation ([facility])     Chronic kidney disease     stage 4    Chronic systolic congestive heart failure ([facility]) 8/16/2021    CRI (chronic renal insufficiency)     Diabetic nephropathy associated with type 2 diabetes mellitus ([facility]) 10/11/2018    Diverticulosis     Elevated PSA     Erectile dysfunction     ESRD (end stage renal disease) on dialysis ([facility]) 2/24/2022    Gout     Gout     Hemrrhoid NOS w/ complication NEC     History of MRSA infection     Hypertension     CELSA (obstructive sleep apnea) 07/14/2017    uses C-pap machine    Type 2 diabetes mellitus without complication, without long-term current use of insulin ([facility]) 5/88/0186    Umbilical hernia without obstruction and without gangrene 2/2/2016       Past Surgical History:        Procedure Laterality Date    COLONOSCOPY      COLONOSCOPY N/A 3/18/2022    COLONOSCOPY POLYPECTOMY SNARE/COLD BIOPSY performed by Nat Zhang MD at [address]      DIALYSIS CATHETER INSERTION N/A 1/17/2022    LAPAROSCOPIC PERITONEAL DIALYSIS CATHETER PLACEMENT and omentopexy [facility] [address] [facility] N/A 4/15/2022    ROBOTIC, RECURRENT INCISIONAL HERNIA REPAIR WITH BIOSYNTHETIC MESH; LAPAROSCOPIC PERITONEAL DIALYSIS CATHETER REVISION WITH LAPAROSCOPIC OMENTOPEXY performed by Yudelka Longoria DO at [address] Bilateral 4/15/2022    BILATERAL OPEN INGUINAL HERNI REPAIR performed by Yudelka Longoria DO at [address] IR TUNNELED CATHETER PLACEMENT GREATER THAN 5 YEARS  01/14/2022    IR TUNNELED CATHETER PLACEMENT GREATER THAN 5 YEARS 1/14/2022 [address] SPECIAL PROCEDURES    UPPER GASTROINTESTINAL ENDOSCOPY  05/28/2016    biopsies, multiple small gastric ulcers    UPPER GASTROINTESTINAL ENDOSCOPY  09/12/2016    UPPER GASTROINTESTINAL ENDOSCOPY N/A 3/18/2022    EGD DIAGNOSTIC ONLY performed by Renae Turpin MD at [facility] [address] N/A 1/17/2022    LAPAROSCOPIC incarcerated VENTRAL HERNIA REPAIR performed by Yudelka Longoria DO at [address] History:    Social History     Tobacco Use    Smoking status: Never Smoker    Smokeless tobacco: Never Used   Substance Use Topics    Alcohol use: Not Currently     Comment: maybe a beer a weekend                                Counseling given: Not Answered      Vital Signs (Current): There were no vitals filed for this visit.                                            BP Readings from Last 3 Encounters:   06/13/22 130/81   06/13/22 132/78   06/08/22 (!) 157/92       NPO Status:                                                                                 BMI:   Wt Readings from Last 3 Encounters:   06/13/22 266 lb 9.6 oz (120.9 kg)   06/13/22 263 lb (119.3 kg)   06/08/22 280 lb (127 kg)     There is no height or weight on file to calculate BMI.    CBC:   Lab Results   Component Value Date    WBC 5.4 06/08/2022    RBC 4.76 06/08/2022    HGB 12.8 06/08/2022    HCT 40.1 06/08/2022    MCV 84.1 06/08/2022    RDW 17.1 06/08/2022     06/08/2022       CMP:   Lab Results   Component Value Date     06/08/2022    K 3.3 06/08/2022     06/08/2022    CO2 24 06/08/2022    BUN 28 06/08/2022    CREATININE 5.7 06/08/2022    GFRAA 12 06/08/2022    GFRAA >60 05/24/2013    AGRATIO 1.2 04/15/2022    LABGLOM 10 06/08/2022    LABGLOM 56 04/14/2011    GLUCOSE 131 06/08/2022    PROT 7.2 05/12/2022    PROT 7.5 10/03/2012    CALCIUM 9.4 06/08/2022    BILITOT <0.2 05/12/2022    ALKPHOS 90 05/12/2022    AST 14 05/12/2022    ALT 13 05/12/2022       POC Tests:   No results for input(s): POCGLU, POCNA, POCK, POCCL, POCBUN, POCHEMO, POCHCT in the last 72 hours.     Coags:   Lab Results   Component Value Date    PROTIME 11.1 01/17/2022    INR 0.98 01/17/2022    APTT 34.2 03/17/2019       HCG (If Applicable): No results found for: PREGTESTUR, PREGSERUM, HCG, HCGQUANT     ABGs: No results found for: PHART, PO2ART, QOY9OTJ, AVN8WAB, BEART, K2TGPQJG     Type & Screen (If Applicable):  No results found for: LABABO, LABRH    Drug/Infectious Status (If Applicable):  No results found for: HIV, HEPCAB    COVID-19 Screening (If Applicable):   Lab Results   Component Value Date    COVID19 Not Detected 01/12/2022           Anesthesia Evaluation  Patient summary reviewed and Nursing notes reviewed no history of anesthetic complications:   Airway: Mallampati: II  TM distance: >3 FB   Neck ROM: full  Mouth opening: > = 3 FB   Dental: normal exam         Pulmonary: breath sounds clear to auscultation  (+) sleep apnea: on CPAP,      (-) not a current smoker (never)                           Cardiovascular:  Exercise tolerance: good (>4 METS),   (+) hypertension: moderate, dysrhythmias: atrial fibrillation, CHF: no interval change, hyperlipidemia      NYHA Classification: II  ECG reviewed  Rhythm: regular  Rate: normal  Echocardiogram reviewed         Beta Blocker:  Dose within 24 Hrs      ROS comment: Results for orders placed or performed during the hospital encounter of 05/28/21  -Echo Complete:        Result                      Value             Ref Range           Left Ventricular Eject*     48                                    LVEF MODALITY               ECHO Neuro/Psych:   (+) headaches:,              ROS comment: Alcohol use GI/Hepatic/Renal:   (+) PUD, renal disease: ESRD and dialysis, morbid obesity          Endo/Other:    (+) DiabetesType II DM, well controlled, , blood dyscrasia: anemia:., electrolyte abnormalities, no malignancy/cancer. (-) hypothyroidism, hyperthyroidism, no malignancy/cancer               Abdominal:             Vascular: Other Findings:             Anesthesia Plan      general     ASA 4       Induction: intravenous. MIPS: Postoperative opioids intended and Prophylactic antiemetics administered. Anesthetic plan and risks discussed with patient. Plan discussed with CRNA.                     Karen Kenney MD   6/13/2022

## 2022-06-13 NOTE — ED PROVIDER NOTES
Date of evaluation: 6/8/2022     Chief Complaint   Abdominal Pain (left sided abdominal pain radiates to left groin. has had several visits for same issue, post op hernia repair. followed up with surgery and was told it is just taking extra time to heal. pt states pain comes and goes but today is 10/10.)        Nursing Notes, Past Medical Hx, Past Surgical Hx, Social Hx,Allergies, and Family Hx were reviewed.     History of Present Illness      Jose Guadalupe Rubi is a 59 y.o. male hx recent b/l inguinal hernia s/p repair (4/22/22) who presents with L lower abd and groin pain. Was recently here on 5/27 with similar symptoms, was seen by surgery, and found to have bilateral hydroceles which was the suspected etiology of his pain. Was also having some pain with flushing of his PD cath so was recommended to remain on HD for now which he last received yesterday. Developed return of this throbbing LLQ abd pain radiating to his L groin and testicle this morning which prompted him to come in. Denies fevers/chills, bowel/bladder changes.     Review of Systems   Review of Systems   Constitutional: Negative for chills, diaphoresis, fatigue and fever. Respiratory: Negative for cough and shortness of breath. Cardiovascular: Negative for chest pain and leg swelling. Gastrointestinal: Positive for abdominal pain. Negative for abdominal distention, constipation, diarrhea, nausea and vomiting. Genitourinary: Negative for difficulty urinating, dysuria, frequency and urgency.    Neurological: Negative for dizziness, light-headedness and headaches.            Past Medical, Surgical, Family, and Social History      Past Medical History             Diagnosis Date    Arthralgia of multiple joints 10/27/2015    Arthritis      Atrial fibrillation (HCC)      Chronic kidney disease       stage 4    Chronic systolic congestive heart failure ([facility] 8/16/2021    CRI (chronic renal insufficiency)      Diabetic nephropathy associated with type 2 diabetes mellitus ([facility] 10/11/2018    Diverticulosis      Elevated PSA      Erectile dysfunction      ESRD (end stage renal disease) on dialysis ([facility] 2/24/2022    Gout      Gout      Hemrrhoid NOS w/ complication NEC      History of MRSA infection      Hypertension      CELSA (obstructive sleep apnea) 07/14/2017     uses C-pap machine    Type 2 diabetes mellitus without complication, without long-term current use of insulin ([facility] 8/43/2820    Umbilical hernia without obstruction and without gangrene 2/2/2016         Past Surgical History             Procedure Laterality Date    COLONOSCOPY        COLONOSCOPY N/A 3/18/2022     COLONOSCOPY POLYPECTOMY SNARE/COLD BIOPSY performed by Levi Gaston MD at [facility] N/A 1/17/2022     LAPAROSCOPIC PERITONEAL DIALYSIS CATHETER PLACEMENT and omentopexy performed by Jose Hart DO at [facility] N/A 4/15/2022     ROBOTIC, RECURRENT INCISIONAL HERNIA REPAIR WITH BIOSYNTHETIC MESH; LAPAROSCOPIC PERITONEAL DIALYSIS CATHETER REVISION WITH LAPAROSCOPIC OMENTOPEXY performed by Jose Hart DO at [address] [place] [address] 4/15/2022     [address] performed by Jose Hart DO at [address] [facility] IR TUNNELED Rosezena Levo 5 YEARS   01/14/2022     IR TUNNELED CATHETER PLACEMENT GREATER THAN 5 YEARS 1/14/2022 [facility] SPECIAL PROCEDURES    UPPER GASTROINTESTINAL ENDOSCOPY   05/28/2016     biopsies, multiple small gastric ulcers    UPPER GASTROINTESTINAL ENDOSCOPY   09/12/2016    UPPER GASTROINTESTINAL ENDOSCOPY N/A 3/18/2022     EGD DIAGNOSTIC ONLY performed by Levi Gaston MD at [place] [address] N/A 1/17/2022     LAPAROSCOPIC incarcerated VENTRAL HERNIA REPAIR performed by Jose Hart DO at [address] [place] [address]  His family history includes Breast Cancer in his mother; Colon Cancer in his father; Coronary Art Dis in his brother; Diabetes in his maternal grandmother; Hypertension in an other family member. He reports that he has never smoked. He has never used smokeless tobacco. He reports previous alcohol use.  He reports that he does not use drugs.     Medications           Previous Medications     ALLOPURINOL (ZYLOPRIM) 300 MG TABLET    TAKE 1 TABLET BY MOUTH EVERY DAY     ASPIRIN 81 MG TABLET    Take 81 mg by mouth daily     ATORVASTATIN (LIPITOR) 20 MG TABLET    TAKE 1 TABLET BY MOUTH EVERY DAY     B COMPLEX-C-FOLIC ACID (DIALYVITE 414 PO)    Take 1 tablet by mouth once a week      BISACODYL (BISACODYL) 5 MG EC TABLET    Take 1 tablet by mouth daily as needed for Constipation     BUSPIRONE (BUSPAR) 5 MG TABLET    TAKE 1 TABLET BY MOUTH TWICE A DAY     CALCITRIOL (ROCALTROL) 0.25 MCG CAPSULE    Take 1 capsule by mouth daily     CALCIUM ACETATE (PHOSLO) 667 MG CAPS CAPSULE    Take 2 capsules by mouth 3 times daily (with meals)     COLCHICINE (COLCRYS) 0.6 MG TABLET    Take 1 tablet by mouth daily as needed for Pain (as needed for pain related to gout)     DOCUSATE SODIUM (COLACE) 100 MG CAPSULE    Take 1 capsule by mouth 2 times daily     HEPATITIS B VAC RECOMBINANT (ENGERIX-B) 20 MCG/ML INJ    Inject 40 mcg into the muscle Every 4 weeks      IRON SUCROSE (VENOFER IV)    Infuse intravenously every 30 days      METHOXY PEG-EPOETIN BETA (MIRCERA) 50 MCG/0.3ML SOSY    Inject as directed Twice a  month      METOPROLOL SUCCINATE (TOPROL XL) 50 MG EXTENDED RELEASE TABLET    Take 1 tablet by mouth at bedtime TAKE 1 TABLET BY MOUTH EVERY DAY     NIFEDIPINE (PROCARDIA XL) 60 MG EXTENDED RELEASE TABLET    TAKE 1 TABLET BY MOUTH EVERYDAY AT BEDTIME     NORTRIPTYLINE (PAMELOR) 10 MG CAPSULE    nightly      OMEPRAZOLE (PRILOSEC) 40 MG DELAYED RELEASE CAPSULE    TAKE 1 CAPSULE BY MOUTH EVERY DAY     SILDENAFIL (VIAGRA) 100 MG TABLET    Take 1 tablet by mouth as needed for Erectile Dysfunction     SPIRONOLACTONE (ALDACTONE) 50 MG TABLET    Take 1 tablet by mouth at bedtime     TERAZOSIN (HYTRIN) 5 MG CAPSULE    Take 1 capsule by mouth daily      TORSEMIDE (DEMADEX) 100 MG TABLET    Take 1 tablet by mouth daily     VITAMIN D (ERGOCALCIFEROL) 1.25 MG (24931 UT) CAPS CAPSULE    Take 1 capsule by mouth once a week         Allergies      He has No Known Allergies.     Physical Exam      INITIAL VITALS: BP (!) 157/92   Pulse 86   Temp 98.5 °F (36.9 °C) (Oral)   Resp 16   Ht 6' 5\" (1.956 m)   Wt 280 lb (127 kg)   SpO2 98%   BMI 33.20 kg/m²       Physical Exam  Constitutional:       General: He is not in acute distress. Appearance: He is well-developed. He is not ill-appearing. Cardiovascular:      Rate and Rhythm: Normal rate and regular rhythm. Heart sounds: Normal heart sounds. Pulmonary:      Effort: Pulmonary effort is normal.      Breath sounds: Normal breath sounds. Abdominal:      General: Abdomen is flat. Bowel sounds are normal.      Palpations: Abdomen is soft. Tenderness: There is abdominal tenderness in the left lower quadrant. There is no guarding or rebound. Comments: Bilateral inguinal hernia incisions healing appropriately   Genitourinary:     Testes:         Right: Tenderness and swelling present. Mass not present. Left: Tenderness and swelling present. Mass not present. Neurological:      General: No focal deficit present. Mental Status: He is alert and oriented to person, place, and time.             Diagnostic Results      RADIOLOGY:  CT ABDOMEN PELVIS WO CONTRAST Additional Contrast? None   Final Result       1. Recurrent moderate bilateral inguinal hernias containing fat and extensive stranding, not containing bowel, unchanged compared to 5/12/2022. Cannot exclude incarcerated hernias. 2.  Peritoneal dialysis catheter. 3.  8 mm left lower lobe pulmonary nodule, unchanged compared to 4/13/2022.  Following the Fleischner Society 2017 guidelines for single solid nodules 6-8 mm, if patient is low risk, then CT is suggested at 6-12 months from initial scan with subsequent CT    at 18-24 months. If patient is high risk, then CT is suggested at 6-12 months from initial scan and then at 18-24 months.  Qzxv                LABS:         Labs Reviewed   CBC WITH AUTO DIFFERENTIAL - Abnormal; Notable for the following components:       Result Value      Hemoglobin 12.8 (*)       Hematocrit 40.1 (*)       RDW 17.1 (*)       All other components within normal limits   BASIC METABOLIC PANEL W/ REFLEX TO MG FOR LOW K - Abnormal; Notable for the following components:     Potassium reflex Magnesium 3.3 (*)       Glucose 131 (*)       BUN 28 (*)       CREATININE 5.7 (*)       GFR Non- 10 (*)       GFR  12 (*)       All other components within normal limits     Narrative:      CALL  Scott GOINS tel. [phone],  Chemistry results called to and read back by Myron Pruett RN, 06/08/2022  15:25, by [address] [place] [address] - Abnormal; Notable for the following components:     Glucose, Ur 100 (*)       Blood, Urine TRACE-INTACT (*)       Protein, UA >=300 (*)       Bacteria, UA Rare (*)       All other components within normal limits   MAGNESIUM     Narrative:      CALL  Chavez  BUSTER tel. [phone],  Chemistry results called to and read back by Myron Pruett RN, 06/08/2022  15:25, by Diane Albarado   LACTIC ACID   BLOOD GAS, VENOUS         RECENT VITALS:  BP: (!) 157/92, Temp: 98.5 °F (36.9 °C), Heart Rate: 86,Resp: 16      Procedures      None     ED Course      The patient was given the following medications:  Encounter Medications        Orders Placed This Encounter   Medications    oxyCODONE-acetaminophen (PERCOCET) 5-325 MG per tablet 1 tablet    potassium chloride (KLOR-CON M) extended release tablet 20 mEq    methocarbamol (ROBAXIN) 1,000 mg in dextrose 5 % 100 mL IVPB    lidocaine 4 % external patch 1 patch  HYDROmorphone (DILAUDID) injection 0.25 mg               CONSULTS:  IP CONSULT TO GENERAL SURGERY     MEDICAL DECISION MAKING      Ted Villa is a 59 y.o. male with recent bilateral inguinal hernia repair returning with LLQ, groin, and testicular pain. Suspect this is related to his known hydroceles from prior visit but will want to rule out related post-operative complication. Could potentially have hematoma formation though there is less suspicion for this based on improvement of his scrotal swelling. Low concern for infection given lack of erythema and warmth as well as no systemic symptoms. Basic labs and UA reassuring. Pt seen by general surgery who have very low concern for post-op complication or recurrent hernia. There is also low concern for active urologic infection. This is likely pain related to his hydroceles. Pt given lidoderm patch, robaxin, and opioids. Pain persistent and obtained CT which showed recurrent inguinal hernia not containing bowel stable from prior. Lactate and VBG also wnl. Pt was discharged with lidoderm patch, robaxin, and tramadol with plans for OP urology FU.     This patient was also evaluated by the attending physician. All care plans were discussed and agreed upon.     Clinical Impression      1.  Hydrocele of testis          Disposition/Plan      PATIENT REFERRED TO:  MD Trudy Salazar 1967  [facility]  [place] [address]  [phone]     Schedule an appointment as soon as possible for a visit in 1 week  Testicular pain suspect related to hydrocele        DISCHARGE MEDICATIONS:  New Prescriptions     No medications on file         DISPOSITION     Home     Camron King MD  Resident  06/08/22 Zina Baez MD  Resident  06/13/22 0100

## 2022-06-13 NOTE — BRIEF OP NOTE
Brief Postoperative Note      Patient: Elver Espinal  YOB: 1960  MRN: [id number]    Date of Procedure: 6/13/2022    Pre-Op Diagnosis: Bilateral recurrent inguinal hernia without obstruction or gangrene [K40.21]    Post-Op Diagnosis: Same       Procedure(s):  ROBOTIC RECURRENT BILATERAL INGUINAL HERNIA REPAIR, LYSIS OF ADHESIONS, PERITONEAL DIALYSIS CATHETER REMOVAL    Surgeon(s): Barbara Herzog DO    Assistant:  Surgical Assistant: Colton Aceves  Resident: Rizwana Valdez MD; Rehana Cortes MD    Anesthesia: General    Estimated Blood Loss (mL):  20 cc    Complications: None      Findings:   Bilateral fat containing inguinal hernias.      Electronically signed by Rizwana Valdez MD on 6/13/2022 at 8:36 PM

## 2022-06-13 NOTE — H&P
Full history and physical exam performed within the last 24 hours. No changes in the interval since H&P completed.     Keshia Johnston, MIGUEL - MITA 6/13/2022

## 2022-06-13 NOTE — PROGRESS NOTES
Subjective:    Chief Complaint:     Deven Worrell is a 59 y.o. male who presents for a preoperative physical examination. He is scheduled to have bilateral inguinal hernia repair and removal of PD catheter  done by Dr. Tracy Ratliff at [facility] today. Pt has been to the ER several times for discomfort in inguinal area, and most recent CT scan done 6/2022 shows bilateral inguinal hernias. Pt has been in touch with dr. Aki Ravi office and they are going to get him set up for bilateral inguinal hernia repair today. Pt did have them done 4/2022 with dr. Tracy Ratliff but need repeat surgery     Pt working with nephrology for ESRD/dialysis. Pt has been on peritoneal dialysis but has transitioned to hemodialysis. Pt will have PD cath removed. Pt working with  transplant clinic for evaluation and consideration of kidney transplant,  Pt currently getting dialysis through catheter in [facility]. Next month, will have some additional testing done at [facility] and then     Pt here for follow up of blood pressure. Pt states doing great with adherence to therapy and feels well. No issues of chest pain, shortness of breath. No vision changes, headache, swelling in legs.        History of Present Illness:          Past Medical History:   Diagnosis Date    Arthralgia of multiple joints 10/27/2015    Arthritis     Atrial fibrillation (HCC)     Chronic kidney disease     stage 4    Chronic systolic congestive heart failure ([address] 8/16/2021    CRI (chronic renal insufficiency)     Diabetic nephropathy associated with type 2 diabetes mellitus ([address] 10/11/2018    Diverticulosis     Elevated PSA     Erectile dysfunction     ESRD (end stage renal disease) on dialysis ([address] 2/24/2022    Gout     Gout     Hemrrhoid NOS w/ complication NEC     History of MRSA infection     Hypertension     CELSA (obstructive sleep apnea) 07/14/2017    uses C-pap machine    Type 2 diabetes mellitus without complication, without long-term current use of insulin ([address] 9/83/9620    Umbilical hernia without obstruction and without gangrene 2/2/2016        Review of patient's past surgical history indicates:     Past Surgical History:   Procedure Laterality Date    COLONOSCOPY      COLONOSCOPY N/A 3/18/2022    COLONOSCOPY POLYPECTOMY SNARE/COLD BIOPSY performed by Gabbi Perez MD at [address] N/A 1/17/2022    LAPAROSCOPIC PERITONEAL DIALYSIS CATHETER PLACEMENT and omentopexy performed by Randall Steiner DO at [facility], [facility] N/A 4/15/2022    ROBOTIC, RECURRENT INCISIONAL HERNIA REPAIR WITH BIOSYNTHETIC MESH; LAPAROSCOPIC PERITONEAL DIALYSIS CATHETER REVISION WITH LAPAROSCOPIC OMENTOPEXY performed by Randall Steiner DO at [address] Bilateral 4/15/2022    BILATERAL OPEN INGUINAL HERNI REPAIR performed by Randall Steiner DO at [address] [facility] IR TUNNELED CATHETER PLACEMENT GREATER THAN 5 YEARS  01/14/2022    IR TUNNELED CATHETER PLACEMENT GREATER THAN 5 YEARS 1/14/2022 [facility] SPECIAL PROCEDURES    UPPER GASTROINTESTINAL ENDOSCOPY  05/28/2016    biopsies, multiple small gastric ulcers    UPPER GASTROINTESTINAL ENDOSCOPY  09/12/2016    UPPER GASTROINTESTINAL ENDOSCOPY N/A 3/18/2022    EGD DIAGNOSTIC ONLY performed by Gabbi Perez MD at [facility] N/A 1/17/2022    LAPAROSCOPIC incarcerated VENTRAL HERNIA REPAIR performed by Randall Steiner DO at [address]                                                   Current Outpatient Medications   Medication Sig Dispense Refill    methocarbamol (ROBAXIN) 500 MG tablet Take 1.5 tablets by mouth 4 times daily for 7 days 42 tablet 0    busPIRone (BUSPAR) 5 MG tablet TAKE 1 TABLET BY MOUTH TWICE A  tablet 1    terazosin (HYTRIN) 5 MG capsule Take 1 capsule by mouth daily       Methoxy PEG-Epoetin Beta (MIRCERA) 50 MCG/0.3ML SOSY Inject as directed Twice a  month       NIFEdipine (PROCARDIA XL) 60 MG extended release tablet TAKE 1 TABLET BY MOUTH EVERYDAY AT BEDTIME (Patient taking differently: 90 mg ) 30 tablet 0    spironolactone (ALDACTONE) 50 MG tablet Take 1 tablet by mouth at bedtime 30 tablet 3    metoprolol succinate (TOPROL XL) 50 MG extended release tablet Take 1 tablet by mouth at bedtime TAKE 1 TABLET BY MOUTH EVERY DAY 30 tablet 3    atorvastatin (LIPITOR) 20 MG tablet TAKE 1 TABLET BY MOUTH EVERY DAY 30 tablet 5    nortriptyline (PAMELOR) 10 MG capsule nightly       omeprazole (PRILOSEC) 40 MG delayed release capsule TAKE 1 CAPSULE BY MOUTH EVERY DAY 30 capsule 1    aspirin 81 MG tablet Take 81 mg by mouth daily      docusate sodium (COLACE) 100 MG capsule Take 1 capsule by mouth 2 times daily 20 capsule 0    B Complex-C-Folic Acid (DIALYVITE 617 PO) Take 1 tablet by mouth once a week  (Patient not taking: Reported on 6/10/2022)      Iron Sucrose (VENOFER IV) Infuse intravenously every 30 days       bisacodyl (BISACODYL) 5 MG EC tablet Take 1 tablet by mouth daily as needed for Constipation 30 tablet 5    sildenafil (VIAGRA) 100 MG tablet Take 1 tablet by mouth as needed for Erectile Dysfunction 30 tablet 3     No current facility-administered medications for this visit.        No Known Allergies    Social History     Tobacco Use    Smoking status: Never Smoker    Smokeless tobacco: Never Used   Vaping Use    Vaping Use: Never used   Substance Use Topics    Alcohol use: Not Currently     Comment: maybe a beer a weekend    Drug use: No        Family History   Problem Relation Age of Onset    Hypertension Other     Breast Cancer Mother     Colon Cancer Father     Diabetes Maternal Grandmother     Coronary Art Dis Brother         Review Of Systems    Skin: no abnormal pigmentation, rash, scaling, itching, masses, hair or nail changes  Eyes: negative  Ears/Nose/Throat: negative  Respiratory: negative  Cardiovascular: negative  Gastrointestinal: negative  Genitourinary: negative  Musculoskeletal: negative  Neurologic: negative  Psychiatric: negative  Hematologic/Lymphatic/Immunologic: negative  Endocrine: negative       Objective: There were no vitals taken for this visit. General appearance - healthy, alert, no distress  Skin - Skin color, texture, turgor normal. No rashes or lesions. Head - Normocephalic. No masses, lesions, tenderness or abnormalities  Eyes - conjunctivae/corneas clear. PERRL, EOM's intact. Ears - External ears normal. Canals clear. TM's normal.  Nose/Sinuses - Nares normal. Septum midline. Mucosa normal. No drainage or sinus tenderness. Oropharynx - Lips, mucosa, and tongue normal. Teeth and gums normal.   Neck - Neck supple. No adenopathy. Thyroid symmetric, normal size,  Back - Back symmetric, no curvature. ROM normal. No CVA tenderness. Lungs - Percussion normal. Good diaphragmatic excursion. Lungs clear  Heart - Regular rate and rhythm, with no rub, murmur or gallop noted. Abdomen - Abdomen soft, non-tender. BS normal. No masses, organomegaly  Extremities - Extremities normal. No deformities, edema, or skin discoloration  Musculoskeletal - Spine ROM normal. Muscular strength intact. Peripheral pulses - radial=4/4,, femoral=4/4, popliteal=4/4, dorsalis pedis=4/4,  Neuro - Gait normal. Reflexes normal and symmetric. Sensation grossly normal.  No focal weakness    EKG: unchanged from previous tracings, normal sinus rhythm, nonspecific ST and T waves changes. Lab Results   Component Value Date    WBC 5.4 06/08/2022    HGB 12.8 (L) 06/08/2022    HCT 40.1 (L) 06/08/2022    MCV 84.1 06/08/2022     06/08/2022       Lab Results   Component Value Date    CREATININE 5.7 (HH) 06/08/2022    BUN 28 (H) 06/08/2022     06/08/2022    K 3.3 (L) 06/08/2022     06/08/2022    CO2 24 06/08/2022         ASSESSMENT / PLAN:    1. Preop cardiovascular exam  Set for surgery  Medically cleared for surgery.       2. Non-recurrent bilateral inguinal hernia without obstruction or gangrene  Reviewed ER visit, imaging  Set for surgery today  Medically cleared for surgery. 3. ESRD (end stage renal disease) on dialysis ([facility] [address]  Cont on hemodilaysis, off PD due to intolerance, fluid buildup in abdomen and pain     4. Anemia due to stage 5 chronic kidney disease ([facility] [address]  Stable  Reviewed bloodwork as above    5. Chronic systolic congestive heart failure (HCC)  Stable w/o flare  Reviewed recent EKG, stress test  [id number] [address] for surgery    6. Type 2 diabetes mellitus with stage 4 chronic kidney disease, unspecified whether long term insulin use (HCC)  stable    7. Essential hypertension, benign  Stable @ goal, doing great           Follow-up appointment:   After surgery  Call or return to clinic prn if these symptoms worsen or fail to improve as anticipated. 3    Discussed use, benefit, and side effects of all prescribed medications. Barriers to medication compliance addressed. All patient questions answered. Pt voiced understanding. When applicable, patient's outside records were reviewed through [facility]. The patient has signed appropriate paperworks/consents. Per encounter diagnoses   He is medically cleared for surgery and anesthesia. Avoid Aspirin, non steroidal anti inflammatory medications, including Motrin, Aleve, Ibuprofen, Advil; multi vitamins, Vitamin E, omega 3 fish oil, and glucosamine chondroitin for the 7 days prior to surgery.

## 2022-06-14 PROBLEM — K40.90 INGUINAL HERNIA, LEFT: Status: ACTIVE | Noted: 2022-06-14

## 2022-06-14 LAB
ANION GAP SERPL CALCULATED.3IONS-SCNC: 15 MMOL/L (ref 3–16)
BASOPHILS ABSOLUTE: 0 K/UL (ref 0–0.2)
BASOPHILS RELATIVE PERCENT: 0.3 %
BUN BLDV-MCNC: 31 MG/DL (ref 7–20)
CALCIUM SERPL-MCNC: 7.8 MG/DL (ref 8.3–10.6)
CHLORIDE BLD-SCNC: 101 MMOL/L (ref 99–110)
CO2: 21 MMOL/L (ref 21–32)
CREAT SERPL-MCNC: 6 MG/DL (ref 0.8–1.3)
EOSINOPHILS ABSOLUTE: 0 K/UL (ref 0–0.6)
EOSINOPHILS RELATIVE PERCENT: 0.4 %
GFR AFRICAN AMERICAN: 12
GFR NON-AFRICAN AMERICAN: 10
GLUCOSE BLD-MCNC: 140 MG/DL (ref 70–99)
HCT VFR BLD CALC: 37.6 % (ref 40.5–52.5)
HEMOGLOBIN: 12 G/DL (ref 13.5–17.5)
LYMPHOCYTES ABSOLUTE: 0.6 K/UL (ref 1–5.1)
LYMPHOCYTES RELATIVE PERCENT: 7.6 %
MAGNESIUM: 1.5 MG/DL (ref 1.8–2.4)
MCH RBC QN AUTO: 26.8 PG (ref 26–34)
MCHC RBC AUTO-ENTMCNC: 31.9 G/DL (ref 31–36)
MCV RBC AUTO: 84.2 FL (ref 80–100)
MONOCYTES ABSOLUTE: 0.4 K/UL (ref 0–1.3)
MONOCYTES RELATIVE PERCENT: 4.7 %
NEUTROPHILS ABSOLUTE: 6.7 K/UL (ref 1.7–7.7)
NEUTROPHILS RELATIVE PERCENT: 87 %
PDW BLD-RTO: 17.3 % (ref 12.4–15.4)
PHOSPHORUS: 3.7 MG/DL (ref 2.5–4.9)
PLATELET # BLD: 197 K/UL (ref 135–450)
PMV BLD AUTO: 8.4 FL (ref 5–10.5)
POTASSIUM REFLEX MAGNESIUM: 3.8 MMOL/L (ref 3.5–5.1)
RBC # BLD: 4.47 M/UL (ref 4.2–5.9)
SODIUM BLD-SCNC: 137 MMOL/L (ref 136–145)
WBC # BLD: 7.7 K/UL (ref 4–11)

## 2022-06-14 PROCEDURE — 90935 HEMODIALYSIS ONE EVALUATION: CPT | Performed by: INTERNAL MEDICINE

## 2022-06-14 PROCEDURE — 8E0W0CZ ROBOTIC ASSISTED PROCEDURE OF TRUNK REGION, OPEN APPROACH: ICD-10-PCS | Performed by: SURGERY

## 2022-06-14 PROCEDURE — 5A1D70Z PERFORMANCE OF URINARY FILTRATION, INTERMITTENT, LESS THAN 6 HOURS PER DAY: ICD-10-PCS | Performed by: INTERNAL MEDICINE

## 2022-06-14 PROCEDURE — 0WPG03Z REMOVAL OF INFUSION DEVICE FROM PERITONEAL CAVITY, OPEN APPROACH: ICD-10-PCS | Performed by: SURGERY

## 2022-06-14 PROCEDURE — 85025 COMPLETE CBC W/AUTO DIFF WBC: CPT

## 2022-06-14 PROCEDURE — 2580000003 HC RX 258: Performed by: STUDENT IN AN ORGANIZED HEALTH CARE EDUCATION/TRAINING PROGRAM

## 2022-06-14 PROCEDURE — 84100 ASSAY OF PHOSPHORUS: CPT

## 2022-06-14 PROCEDURE — 6370000000 HC RX 637 (ALT 250 FOR IP): Performed by: STUDENT IN AN ORGANIZED HEALTH CARE EDUCATION/TRAINING PROGRAM

## 2022-06-14 PROCEDURE — 83735 ASSAY OF MAGNESIUM: CPT

## 2022-06-14 PROCEDURE — 6370000000 HC RX 637 (ALT 250 FOR IP): Performed by: NURSE PRACTITIONER

## 2022-06-14 PROCEDURE — 36415 COLL VENOUS BLD VENIPUNCTURE: CPT

## 2022-06-14 PROCEDURE — 99223 1ST HOSP IP/OBS HIGH 75: CPT | Performed by: INTERNAL MEDICINE

## 2022-06-14 PROCEDURE — 6360000002 HC RX W HCPCS: Performed by: STUDENT IN AN ORGANIZED HEALTH CARE EDUCATION/TRAINING PROGRAM

## 2022-06-14 PROCEDURE — 90935 HEMODIALYSIS ONE EVALUATION: CPT

## 2022-06-14 PROCEDURE — 0YUA0JZ SUPPLEMENT BILATERAL INGUINAL REGION WITH SYNTHETIC SUBSTITUTE, OPEN APPROACH: ICD-10-PCS | Performed by: SURGERY

## 2022-06-14 PROCEDURE — 80048 BASIC METABOLIC PNL TOTAL CA: CPT

## 2022-06-14 PROCEDURE — 1200000000 HC SEMI PRIVATE

## 2022-06-14 RX ORDER — HEPARIN SODIUM 1000 [USP'U]/ML
3200 INJECTION, SOLUTION INTRAVENOUS; SUBCUTANEOUS PRN
Status: DISCONTINUED | OUTPATIENT
Start: 2022-06-14 | End: 2022-06-16 | Stop reason: HOSPADM

## 2022-06-14 RX ORDER — OXYCODONE HYDROCHLORIDE 5 MG/1
5 TABLET ORAL ONCE
Status: COMPLETED | OUTPATIENT
Start: 2022-06-14 | End: 2022-06-14

## 2022-06-14 RX ORDER — LANOLIN ALCOHOL/MO/W.PET/CERES
400 CREAM (GRAM) TOPICAL ONCE
Status: COMPLETED | OUTPATIENT
Start: 2022-06-14 | End: 2022-06-14

## 2022-06-14 RX ADMIN — METOPROLOL SUCCINATE 50 MG: 50 TABLET, EXTENDED RELEASE ORAL at 20:16

## 2022-06-14 RX ADMIN — NORTRIPTYLINE HYDROCHLORIDE 10 MG: 10 CAPSULE ORAL at 20:19

## 2022-06-14 RX ADMIN — HYDROMORPHONE HYDROCHLORIDE 0.5 MG: 1 INJECTION, SOLUTION INTRAMUSCULAR; INTRAVENOUS; SUBCUTANEOUS at 05:12

## 2022-06-14 RX ADMIN — BUSPIRONE HYDROCHLORIDE 5 MG: 5 TABLET ORAL at 20:17

## 2022-06-14 RX ADMIN — BUSPIRONE HYDROCHLORIDE 5 MG: 5 TABLET ORAL at 09:13

## 2022-06-14 RX ADMIN — OXYCODONE HYDROCHLORIDE 5 MG: 5 TABLET ORAL at 12:05

## 2022-06-14 RX ADMIN — PANTOPRAZOLE SODIUM 40 MG: 40 TABLET, DELAYED RELEASE ORAL at 06:38

## 2022-06-14 RX ADMIN — ATORVASTATIN CALCIUM 20 MG: 20 TABLET, FILM COATED ORAL at 09:13

## 2022-06-14 RX ADMIN — HEPARIN SODIUM 5000 UNITS: 5000 INJECTION INTRAVENOUS; SUBCUTANEOUS at 09:14

## 2022-06-14 RX ADMIN — OXYCODONE 10 MG: 5 TABLET ORAL at 03:23

## 2022-06-14 RX ADMIN — OXYCODONE 10 MG: 5 TABLET ORAL at 20:14

## 2022-06-14 RX ADMIN — ACETAMINOPHEN 1000 MG: 500 TABLET ORAL at 16:24

## 2022-06-14 RX ADMIN — OXYCODONE 10 MG: 5 TABLET ORAL at 09:14

## 2022-06-14 RX ADMIN — HEPARIN SODIUM 5000 UNITS: 5000 INJECTION INTRAVENOUS; SUBCUTANEOUS at 20:20

## 2022-06-14 RX ADMIN — HYDROMORPHONE HYDROCHLORIDE 0.5 MG: 1 INJECTION, SOLUTION INTRAMUSCULAR; INTRAVENOUS; SUBCUTANEOUS at 00:09

## 2022-06-14 RX ADMIN — OXYCODONE 10 MG: 5 TABLET ORAL at 16:24

## 2022-06-14 RX ADMIN — Medication 400 MG: at 09:13

## 2022-06-14 RX ADMIN — ACETAMINOPHEN 1000 MG: 500 TABLET ORAL at 20:16

## 2022-06-14 RX ADMIN — SODIUM CHLORIDE, PRESERVATIVE FREE 10 ML: 5 INJECTION INTRAVENOUS at 20:29

## 2022-06-14 RX ADMIN — ACETAMINOPHEN 1000 MG: 500 TABLET ORAL at 06:38

## 2022-06-14 ASSESSMENT — PAIN - FUNCTIONAL ASSESSMENT: PAIN_FUNCTIONAL_ASSESSMENT: ACTIVITIES ARE NOT PREVENTED

## 2022-06-14 ASSESSMENT — PAIN DESCRIPTION - LOCATION
LOCATION: ABDOMEN

## 2022-06-14 ASSESSMENT — PAIN SCALES - GENERAL
PAINLEVEL_OUTOF10: 7
PAINLEVEL_OUTOF10: 10
PAINLEVEL_OUTOF10: 10
PAINLEVEL_OUTOF10: 7
PAINLEVEL_OUTOF10: 10
PAINLEVEL_OUTOF10: 6
PAINLEVEL_OUTOF10: 8
PAINLEVEL_OUTOF10: 7

## 2022-06-14 ASSESSMENT — PAIN DESCRIPTION - ORIENTATION: ORIENTATION: MID

## 2022-06-14 ASSESSMENT — PAIN DESCRIPTION - FREQUENCY: FREQUENCY: CONTINUOUS

## 2022-06-14 ASSESSMENT — PAIN DESCRIPTION - PAIN TYPE: TYPE: SURGICAL PAIN

## 2022-06-14 ASSESSMENT — PAIN DESCRIPTION - DESCRIPTORS: DESCRIPTORS: CRAMPING

## 2022-06-14 NOTE — PROGRESS NOTES
PACU Transfer Note    Vitals:    06/13/22 2145   BP: (!) 157/90   Pulse: 80   Resp: 17   Temp: 97.8 °F (36.6 °C)   SpO2: 100%   Within 20% of pre-op    In: 1295 [I.V.:1295]  Out: 375 [Urine:375]    Pain assessment:  present - adequately treated  Pain Level: 6    Report given to Receiving unit RN - Ari Nguyen.     6/13/2022 9:50 PM

## 2022-06-14 NOTE — PROGRESS NOTES
Bariatric Surgery Daily Progress Note      CC: Bilateral recurrent inguinal hernia without obstruction or gangrene    Subjective :  Afebrile, hemodynamically stable. Pain is now better controlled with current regimen. Tolerating general diet. No N/V. Passing flatus, no bowel function. Objective     Exam:  Vitals:    06/13/22 2145 06/13/22 2200 06/13/22 2305 06/14/22 0317   BP: (!) 157/90 (!) 158/94 (!) 156/91 139/87   Pulse: 80 80 83 82   Resp: 17 16 16 16   Temp: 97.8 °F (36.6 °C) 97.9 °F (36.6 °C) 98.3 °F (36.8 °C) 99.3 °F (37.4 °C)   TempSrc:  Oral Oral Oral   SpO2: 100% 97% 99% 95%   Weight:       Height:           General appearance: alert, no acute distress, grooming appropriate  Lungs: normal effort, on room air   Chest: THC in place with clean dressing  Heart: RRR  Abdomen: soft, appropriately tender; incisions clean dry and intact, well approximated, abdominal binder  Extremities: no edema or cyanosis      ASSESSMENT/PLAN:   This is a 59 y.o. male s/p robotic recurrent bilateral inguinal hernia repair, lysis of adhesion, PD catheter removal secondary to recurrent bilateral inguinal hernias POD 1.     - D/C IV pain meds, transition to PO only  - Home metoprolol was resumed  - HD via THC per nephrology  - FEN: IVF: SLIV, Diet: ADULT DIET; Regular; Low Potassium (Less than 3000 mg/day)  - VTE PPx: Subcutaneous Heparin  and SCDs  - Pulmonary toilet: IS bedside, encourage frequent use (minimum 10x/hr)  - Activity: Encourage frequent ambulation and out of bed to chair.  - Patient to dispo home after HD, already has pain medicine prescribed, no scripts needed. Discussed with patient.      Yari Gutierrez DO, MS  PGY1, General Surgery  06/14/22  6:20 AM  [phone]

## 2022-06-14 NOTE — ANESTHESIA POSTPROCEDURE EVALUATION
Department of Anesthesiology  Postprocedure Note    Patient: Jenny Castillo  MRN: [id number]  YOB: 1960  Date of evaluation: 6/13/2022  Time:  11:50 PM     Procedure Summary     Date: 06/13/22 Room / Location: [address] [place] [address]    Anesthesia Start: 1504 Anesthesia Stop: 2048    Procedure: ROBOTIC RECURRENT BILATERAL INGUINAL HERNIA REPAIR, LYSIS OF ADHESIONS, PERITONEAL DIALYSIS CATHETER REMOVAL (Bilateral ) Diagnosis:       Bilateral recurrent inguinal hernia without obstruction or gangrene      (Bilateral recurrent inguinal hernia without obstruction or gangrene [K40.21])    Surgeons: Lisa Napier DO Responsible Provider: Wallace Burk DO    Anesthesia Type: general ASA Status: 4          Anesthesia Type: No value filed. Johny Phase I: Johny Score: 9    Johny Phase II:      Last vitals: Reviewed and per EMR flowsheets.        Anesthesia Post Evaluation    Patient location during evaluation: PACU  Patient participation: complete - patient participated  Level of consciousness: awake and alert  Pain score: 4  Airway patency: patent  Nausea & Vomiting: no nausea and no vomiting  Complications: no  Cardiovascular status: hemodynamically stable  Respiratory status: acceptable  Hydration status: stable

## 2022-06-14 NOTE — PROGRESS NOTES
4 Eyes Admission Assessment     I agree as the admission nurse that 2 RN's have performed a thorough Head to Toe Skin Assessment on the patient. ALL assessment sites listed below have been assessed on admission. Areas assessed by both nurses:   [x]   Head, Face, and Ears   [x]   Shoulders, Back, and Chest  [x]   Arms, Elbows, and Hands   [x]   Coccyx, Sacrum, and Ischium  [x]   Legs, Feet, and Heels        Does the Patient have Skin Breakdown?   No         Roshan Prevention initiated:  NA   Wound Care Orders initiated:  No      [facility] nurse consulted for Pressure Injury (Stage 3,4, Unstageable, DTI, NWPT, and Complex wounds) or Roshan score 18 or lower:  No      Nurse 1 eSignature: Electronically signed by Sammi Mohamud RN on 6/14/22 at 12:22 AM EDT    **SHARE this note so that the co-signing nurse is able to place an eSignature**    Nurse 2 eSignature: Electronically signed by Argentina Steve RN on 6/14/22 at 12:23 AM EDT

## 2022-06-14 NOTE — PROGRESS NOTES
Pt A&Ox4 VSS. Surgical sites C,D,I. Went to dialysis today. PRN pain medication given per orders. Ambulating as tolerated. Call light within reach.  Will continue to monitor

## 2022-06-14 NOTE — PROGRESS NOTES
Treatment time: 3 hours   Net UF: 2000    Pre weight: 120.4kg  Post weight: 118.4kg  EDW: 118?      Access used: Right TDC  Access function: Well, tolerated 350 BFR    Medications or blood products given: NA    Regular outpatient schedule: [facility]   Summary of response to treatment: tolerated well    Copy of dialysis treatment record placed in chart, to be scanned into EMR.      06/14/22 1350 06/14/22 1651   Treatment   Time On 1350  --    Time Off  --  1651   Treatment Goal 2400  --    Vital Signs   BP (!) 144/88 (!) 159/92   Temp 98.7 °F (37.1 °C) 97.7 °F (36.5 °C)   Heart Rate 89 90   Resp 16 16   Weight 265 lb 6.9 oz (120.4 kg) 261 lb 3.9 oz (118.5 kg)

## 2022-06-14 NOTE — PROGRESS NOTES
2045 Admitted to PACU from OR. Connected to monitor. Report at bedside. . Moaning - Dr Kandice Hart reports very sensitive to pain. Omie Bipine in place. Ice applied. Guerrero d/c'd. To d/c IV fluids when this bag is infused r/t kidney disease.

## 2022-06-14 NOTE — PROGRESS NOTES
Department of Surgery:  Post-op Note      Procedure(s) Performed: ROBOTIC RECURRENT BILATERAL INGUINAL HERNIA REPAIR, LYSIS OF ADHESIONS, PERITONEAL DIALYSIS CATHETER REMOVAL       Subjective:   Patient's pain is semi controlled, denies nausea or vomiting. Tolerating sips and chips. Has not been OOB. Denies flatus or BM at this time.      Objective:  Anesthesia type: General      I/O    Intra op    Post op     Fluids  1200 105     EBL 50 0     Urine 300 0     Exam:BP (!) 156/91   Pulse 83   Temp 98.3 °F (36.8 °C) (Oral)   Resp 16   Ht 6' 5\" (1.956 m)   Wt 266 lb 9.6 oz (120.9 kg)   SpO2 99%   BMI 31.61 kg/m²   Post-op vital signs:  Stable   Exam:General appearance: alert, appears stated age and cooperative  Lungs: normal effort, on 0.5L NC  Heart: regular rate and rhythm, S1, S2 normal  Abdomen: soft, appropriately tender; incisions clean dry and intact, well approximated, abdominal binder  Extremities: no edema or cyanosis    Assessment and Plan  Pt is a 59y.o. year old male Bilateral recurrent inguinal hernia without obstruction or gangrene s/p ROBOTIC RECURRENT BILATERAL INGUINAL HERNIA REPAIR, LYSIS OF ADHESIONS, PERITONEAL DIALYSIS CATHETER REMOVAL POD #0    Pain management: tylenol scheduled, roxicodone, dilaudid pain panel PRN  Cardiovasc: hemodynamically stable, will continue to monitor, home metoprolol restarted  Respiratory:  IS ordered to bedside, encourage hourly IS and deep breathing, wean oxygen as tolerated  FeNa: Fluids  N/A - HD patient , Diet: general   :   void check in 4 hours   Ambulation: OOB to chair, encourage ambulation  Prophylaxis: SCDs, SQH    HD tomorrow then dispo home  nephro consulted       Fran Starkey MD   Gen Surg PGY 4  6/13/2022  11:35 PM  [phone]

## 2022-06-14 NOTE — PLAN OF CARE
Problem: Discharge Planning  Goal: Discharge to home or other facility with appropriate resources  Outcome: Progressing  Flowsheets (Taken 6/14/2022 1659)  Discharge to home or other facility with appropriate resources:   Identify barriers to discharge with patient and caregiver   Identify discharge learning needs (meds, wound care, etc)     Problem: Pain  Goal: Verbalizes/displays adequate comfort level or baseline comfort level  Outcome: Progressing  Flowsheets (Taken 6/14/2022 1659)  Verbalizes/displays adequate comfort level or baseline comfort level:   Encourage patient to monitor pain and request assistance   Assess pain using appropriate pain scale

## 2022-06-14 NOTE — PROGRESS NOTES
Reports did not bring his CPAP. Has not used last couple of nights because fell asleep before applied.

## 2022-06-14 NOTE — OP NOTE
DATE OF PROCEDURE:  06/14/2022     PREOPERATIVE DIAGNOSES:  1.  Recurrent Bilateral inguinal hernia  2. ESRD     POSTOPERATIVE DIAGNOSES:  Same as pre-op     PROCEDURES PERFORMED:  1.  Robotic recurrent bilateral inguinal hernia repair with mesh  2.  PD catheter removal      SURGEON: Crystal Daniel DO     ASSISTANT:  Isac KLINE     OTHER ASSISTANT:  Surinder     ANESTHESIA:  General endotracheal.     COMPLICATIONS:  None.     CONDITION:  Stable.     ESTIMATED BLOOD LOSS:  25 mL.     INDICATIONS FOR PROCEDURE:  The patient is a 78-year-old with a large  symptomatic left groin. PD catheter shifts to left. 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  He was boarded and consented for repair of hernia and removal of PD catheter after understanding all risks, benefits, and alternatives of the procedure.     DESCRIPTION OF PROCEDURE:  The patient was brought to the operating  suite, laid in the supine position with arms outstretched and after  induction of general endotracheal anesthesia, tube was confirmed to be  in place with end-tidal CO2 and secured.  Guerrero catheter was placed with  clear return of urine.  The patient was prepped and draped in the usual  sterile manner.     A small incision was made at the left midclavicular line.  Using a  Veress needle, abdomen was entered and pneumoperitoneum established with  15 mmHg of CO2.  A 5-mm 30-degree camera housed in a 5-mm Optiview  trocar was used to enter the abdomen under direct visualization.  Once  inside the abdomen, three additional trocars were placed and some adhesions from abdominal wall to omentum were taken down using monopolar patrice and robot was docked after the patient was placed in a Trendelenburg position.     Preperitoneal flap on the right was initiated using the ASIS and the  medial umbilical ligament as landmarks and a preperitoneal plane was  developed from around the area of the arcuate line all the way down to  the Yohannes's ligament.  Once this was appreciated, dissection was taken  from medial to lateral and a flap was developed to accommodate a large piece of mesh.  The dissection was done to reduce preperitoneal contents the cord structures including the vas deferens,  testicular artery, and pampiniform plexus were appreciated and  Preserved. Once the dissection was completed, a 15 x 10 ProGrip mesh was introduced into this space and fixated with vistaseal and one stitch medial.  Once this was accomplished, the preperitoneal flap was reapproximated using 2-0 Stratafix suture.     Preperitoneal flap on the left was initiated using the ASIS and the  medial umbilical ligament as landmarks and a preperitoneal plane was  developed from around the area of the arcuate line all the way down to  the Yohannes's ligament.  Once this was appreciated, dissection was taken  from medial to lateral and a flap was developed to accommodate a large piece of mesh.  The dissection was done to reduce preperitoneal contents the cord structures including the vas deferens,  testicular artery, and pampiniform plexus were appreciated and  preserved. Once the dissection was completed, a 15 x 10 ProGrip mesh was introduced into this space and fixated with vistaseal and one stitch medial.  Once this was accomplished, the preperitoneal flap was reapproximated using 2-0 Stratafix suture. Attention was then paid to the PD catheter which was stuck to the left groin peritoneum at beginning of case. Dissection was done at exit site to free both the superficial and deep cuffs. Catheter was removed in entirety.  Irrigation performed and exit site closed in layer using 3-0 and 4-0 vicryl.      The patient tolerated the procedure well and was brought to the  postanesthesia care unit in stable condition.     All sponge, needle, and instrument counts were correct x2.     I personally informed the patient's significant other of the findings of  the operation.        Keith Daily Raegan Bolton

## 2022-06-14 NOTE — CARE COORDINATION
Case Management Assessment           Initial Evaluation                Date / Time of Evaluation: 6/14/2022 12:27 PM                 Assessment Completed by: ZURI Vanegas    Patient Name: Malathi Rossi     YOB: 1960  Diagnosis: Bilateral recurrent inguinal hernia without obstruction or gangrene [K40.21]  Status post bilateral hernia repair [Z98.890, Z87.19]     Date / Time: 6/13/2022 11:54 AM    Patient Admission Status: Observation    If patient is discharged prior to next notation, then this note serves as note for discharge by case management.      Current PCP: Marlo Duarte MD  Clinic Patient: No    Chart Reviewed: Yes  Patient/ Family Interviewed: Yes    Initial assessment completed at bedside with: patient with care team    Hospitalization in the last 30 days: No    Emergency Contacts:  Extended Emergency Contact Information  Primary Emergency Contact: Yazan Bush  Address: [address] [place] [address] Phone: [phone]  Mobile Phone: [phone]  Relation: Spouse    Advance Directives:   Code Status: Full 2021 Rusty Matthewsy: No  Agent:   Contact Number:     Copy present: No     In paper Chart: No    Scanned into EMR No    Financial  Payor: Swati [place] / Plan: Alexy Smith / Product Type: *No Product type* /     Pre-cert required for SNF: Yes    Pharmacy    [id number] [place] [phone] - F [phone]  [address] [place] [address]  Phone: [phone] Fax: Xavier [address] [place] [address] 53  [address] [place] [id number]  Phone: [phone] Fax: [phone]    [id number]- [place]ze  [address] [facility] [address]  Phone: [phone] Fax: [phone] [place], [place] - F [phone]  [address]  Phone: [phone] Fax: [phone]    [id number] [address] [facility] [phone] [place] [phone]  Rebeca Saucedo [facility] [address]  Phone: [phone] Fax: [phone]      Potential assistance Purchasing Medications: Potential Assistance Purchasing Medications: No  Does Patient want to participate in local refill/ meds to beds program?:      Meds To Beds General Rules:  1. Can ONLY be done Monday- Friday between 8:30am-5pm  2. Prescription(s) must be in pharmacy by 3pm to be filled same day  3. Copy of patient's insurance/ prescription drug card and patient face sheet must be sent along with the prescription(s)  4. Cost of Rx cannot be added to hospital bill. If financial assistance is needed, please contact unit  or ;  or  CANNOT provide pharmacy voucher for patients co-pays  5. Patients can then  the prescription on their way out of the hospital at discharge, or pharmacy can deliver to the bedside if staff is available. (payment due at time of pick-up or delivery - cash, check, or card accepted)     Able to afford home medications/ co-pay costs: Yes    ADLS  Support Systems: Spouse/Significant Other    PT AM-PAC:   /24  OT AM-PAC:   /24    [place]: patient lives in a split level home with interior rails. Has 6 steps to enter the home in the front with no handrails.   Steps: split level home & 6 steps to enter the front door    Plans to RETURN to current housing: Yes  Barriers to RETURNING to current housing: pending [address]  Currently ACTIVE with Home Health Care: No  Home Care Agency: Not Applicable    Currently ACTIVE with [place] on Aging: No  Passport/ Waiver: No  Passport/ Waiver Services: Not Applicable    :  Phone: Durable Medical Equipment  DME Provider: n/a  Equipment: n/a    Home Oxygen and Respiratory Equipment  Has HOME OXYGEN prior to admission: No  Oxygen Company: Not Applicable  Other Respiratory Equipment: n/a    Informed of need to bring portable home O2 tank on day of DISCHARGE for nursing to connect prior to leaving: No  Verbalized agreement/Understanding: No  Person to bring portable tank at discharge: n/a    Dialysis  Active with HD/PD prior to admission: Yes  Nephrologist: Dr. Rosie Davis:  [facility]  Address: [address] [place] [address]. [address]  Phone: [phone]    DISCHARGE PLAN:  Disposition: Home- No Services Needed    Transportation PLAN for discharge: family     Factors facilitating achievement of predicted outcomes: Family support, Motivated, Cooperative and Pleasant    Barriers to discharge: pending BM today. Additional Case Management Notes: Patient is from home independently with his wife. Patient has [facility] on T/H/S schedule from 12 to 4 pm. Patient plans to discharge home with a continuation of HD services. Patient reports no other CM needs at discharge. Patient's wife will provide transportation home. The Plan for Transition of Care is related to the following treatment goals of Bilateral recurrent inguinal hernia without obstruction or gangrene [K40.21]  Status post bilateral hernia repair [F07.118, Z87.19]    The Patient and/or patient representative Sanjiv King and his family were provided with a choice of provider and agrees with the discharge plan Not Indicated    Freedom of choice list was provided with basic dialogue that supports the patient's individualized plan of care/goals and shares the quality data associated with the providers.  Not Indicated    Care Transition patient: No    ZURI Chung  [facility]  Case Management Department  Ph: [phone]   Fax: [phone]

## 2022-06-14 NOTE — DISCHARGE INSTR - COC
Continuity of Care Form    Patient Name: Deven Worrell   :  1960  MRN:  [id number]    Admit date:  2022  Discharge date:  ***    Code Status Order: Full Code   Advance Directives:   Advance Care Flowsheet Documentation       Date/Time Healthcare Directive Type of Healthcare Directive Copy in [address] Agent's Name Healthcare Agent's Phone Number    22 1211 No, patient does not have an advance directive for healthcare treatment -- -- -- -- --            Admitting Physician:   Arpan Dyer DO  PCP: Kelley Denson MD    Discharging Nurse: [facility] Unit/Room#: 9426/9958-33  Discharging Unit Phone Number: ***    Emergency Contact:   Extended Emergency Contact Information  Primary Emergency Contact: Lin Pastrana  Address: [address] Phone: [phone]  Mobile Phone: [phone]  Relation: Spouse    Past Surgical History:  Past Surgical History:   Procedure Laterality Date    COLONOSCOPY      COLONOSCOPY N/A 3/18/2022    COLONOSCOPY POLYPECTOMY SNARE/COLD BIOPSY performed by Candida Hashimoto, MD at [facility] N/A 2022    LAPAROSCOPIC PERITONEAL DIALYSIS CATHETER PLACEMENT and omentopexy performed by Arpan Dyer DO at [address] [place] [address], [address] [place] [address] N/A 4/15/2022    ROBOTIC, RECURRENT INCISIONAL HERNIA REPAIR WITH BIOSYNTHETIC MESH; LAPAROSCOPIC PERITONEAL DIALYSIS CATHETER REVISION WITH LAPAROSCOPIC OMENTOPEXY performed by Arpan Dyer DO at [place] [address] Bilateral 4/15/2022    BILATERAL OPEN INGUINAL [place] performed by Arpan Dyer DO at [facility] Bilateral 2022    ROBOTIC RECURRENT BILATERAL INGUINAL HERNIA REPAIR, LYSIS OF ADHESIONS, PERITONEAL DIALYSIS CATHETER REMOVAL performed by Arpan Dyer DO at [place] [address] TUNNELED CATHETER PLACEMENT GREATER THAN 5 YEARS  01/14/2022    IR TUNNELED CATHETER PLACEMENT GREATER THAN 5 YEARS 1/14/2022 [facility] SPECIAL PROCEDURES    UPPER GASTROINTESTINAL ENDOSCOPY  05/28/2016    biopsies, multiple small gastric ulcers    UPPER GASTROINTESTINAL ENDOSCOPY  09/12/2016    UPPER GASTROINTESTINAL ENDOSCOPY N/A 3/18/2022    EGD DIAGNOSTIC ONLY [facility]ks[facility] N/A 1/17/2022    LAPAROSCOPIC incarcerated [place] performed by Rex Webb DO at [address]       Immunization History:   Immunization History   Administered Date(s) Administered    COVID-19, Dwayne Busby, Primary or Immunocompromised, PF, 100mcg/0.5mL 07/17/2021, 08/19/2021       Active Problems:  Patient Active Problem List   Diagnosis Code    Atrial fibrillation ([facility] I48.91    CRI (chronic renal insufficiency) N18.9    Gout M10.9    Erectile dysfunction N52.9    Elevated PSA R97.20    Essential hypertension, benign I10    Headache R51.9    Diverticulosis K57.90    Arthralgia of multiple joints F13.89    Umbilical hernia without obstruction and without gangrene K42.9    Knee pain, bilateral M25.561, M25.562    Alcohol use disorder, mild, abuse F10.10    CELSA (obstructive sleep apnea) G47.33    Duodenal ulcer disease K26.9    Diabetic nephropathy associated with type 2 diabetes mellitus ([facility]) E11.21    Type 2 diabetes mellitus with diabetic nephropathy, without long-term current use of insulin ([facility]) E11.21    Lateral epicondylitis of right elbow M77.11    Chronic bilateral low back pain without sciatica M54.50, G89.29    Morbid obesity due to excess calories ([facility]) E66.01    Chronic systolic congestive heart failure ([facility]) I50.22    Acute kidney injury superimposed on CKD ([facility]) N17.9, N18.9    Stroke-like symptoms R29.90    DMII (diabetes mellitus, type 2) ([facility]) E11.9    Hyperlipidemia E78.5    Hypocalcemia E83.51    Hypomagnesemia E83.42    Hypokalemia E87.6    Muscle cramps R25.2    ANTONIO (acute kidney injury) ([facility] N17.9    Electrolyte imbalance E87.8    Anemia due to stage 5 chronic kidney disease (HCC) N18.5, D63.1    Congestive heart failure (HCC) I50.9    LV dysfunction I51.9    ESRD (end stage renal disease) on dialysis [facility] N18.6, Z99.2    Bilateral recurrent inguinal hernia without obstruction or gangrene K40.21    Recurrent incisional hernia with incarceration K43.0    Bilateral inguinal hernia without obstruction or gangrene K40.20    Status post bilateral hernia repair Z98.890, Z87.19       Isolation/Infection:   Isolation            No Isolation          Patient Infection Status       Infection Onset Added Last Indicated Last Indicated By Review Planned Expiration Resolved Resolved By    None active    Resolved    C-diff Rule Out 12/22/21 12/22/21 12/23/21 Clostridium difficile toxin/antigen (Ordered)   12/23/21 Rule-Out Test Resulted            Nurse Assessment:  Last Vital Signs: /80   Pulse 81   Temp 98 °F (36.7 °C) (Oral)   Resp 18   Ht 6' 5\" (1.956 m)   Wt 266 lb 9.6 oz (120.9 kg)   SpO2 96%   BMI 31.61 kg/m²     Last documented pain score (0-10 scale): Pain Level: 7  Last Weight:   Wt Readings from Last 1 Encounters:   06/13/22 266 lb 9.6 oz (120.9 kg)     Mental Status:  {IP PT MENTAL STATUS:20030}    IV Access:  { ELISA IV ACCESS:402924305}    Nursing Mobility/ADLs:  Walking   {[facility] DME GICL:287677360}  Transfer  {[facility] DME REII:365494977}  Bathing  {[facility] DME MZCC:617407357}  Dressing  {[facility] DME NORC:637410404}  Toileting  {[facility] DME ZTBT:643643756}  Feeding  {[facility] DME UZSQ:691350515}  Med Admin  {[facility] DME PXXV:269447993}  Med Delivery   { ELISA MED Delivery:450935304}    Wound Care Documentation and Therapy:  Incision 06/13/22 Abdomen Medial;Upper (Active)   Dressing Status Clean;Dry; Intact 06/14/22 0912   Dressing/Treatment Surgical glue 06/14/22 0912   Closure Surgical glue 06/14/22 0912   Margins Approximated 06/14/22 0912   Incision Assessment Dry 06/14/22 0912 Drainage Amount None 06/14/22 0912   Odor None 06/14/22 0912   Number of days: 0        Elimination:  Continence: Bowel: {YES / YZ:98650}  Bladder: {YES / BK:22489}  Urinary Catheter: {Urinary Catheter:050156074}   Colostomy/Ileostomy/Ileal Conduit: {YES / OC:63923}       Date of Last BM: ***    Intake/Output Summary (Last 24 hours) at 6/14/2022 1131  Last data filed at 6/14/2022 1034  Gross per 24 hour   Intake 1545 ml   Output 550 ml   Net 995 ml     I/O last 3 completed shifts: In: 6932 [I.V.:1305]  Out: 550 [Urine:550]    Safety Concerns:     [employer] Safety Concerns:281771738}    Impairments/Disabilities:      [employer] Impairments/Disabilities:513640518}    Nutrition Therapy:  Current Nutrition Therapy:   [employer] Diet List:857461892}    Routes of Feeding: {CHP DME Other Feedings:104599540}  Liquids: {Slp liquid thickness:83280}  Daily Fluid Restriction: {CHP DME Yes amt example:307303779}  Last Modified Barium Swallow with Video (Video Swallowing Test): {Done Not Done TAEW:464089164}    Heart Failure Instructions for Daily Management  Patient was treated for chronic systolic heart failure. he  will require the following:    Please weigh daily on the same scale and approximately the same time of day. Report weight gain of 3 pounds/day or 5 pounds/week to : [facility] / Mahesh Macias [phone]. Please use hospital discharge weight as baseline reference. Please monitor for signs and symptoms of and report to MD:  Worsening Heart Failure: sudden weight gain, shortness of breath, lower extremity or general edema/swelling, abdominal bloating/swelling, inability to lie flat, intolerance to usual activity, or cough (especially at night). Report these finding even if no increase in weight. Dehydration:  having difficulty or a decrease in urination, dizziness, worsening fatigue, or new onset/worsening of generalized weakness.      Please continue a LOW SODIUM diet and LIMIT fluid intake to 48 - 64 ounces ( 1.5 - 2 liters) per day. Call [facility] / [facility] [phone] with any questions or concerns. Please continue heart failure education to patient and family/support system. See After Visit Summary for hospital follow up appointment details. Consider spiritual care referral for support and/or completion of advance directives . Consider: [place] [address] telehealth program if patient agreeable and able to participate. Patient's primary cardiologist is DR. RUIZ       Treatments at the Time of Hospital Discharge:   Respiratory Treatments: ***  Oxygen Therapy:  {Therapy; copd oxygen:61616}  Ventilator:    {MH CC Vent HGXX:521422474}    Rehab Therapies: {THERAPEUTIC INTERVENTION:9053557371}  Weight Bearing Status/Restrictions: {MH CC Weight Bearin}  Other Medical Equipment (for information only, NOT a DME order):  {EQUIPMENT:957155804}  Other Treatments: ***    Patient's personal belongings (please select all that are sent with patient):  {CHP DME Belongings:146828485}    RN SIGNATURE:  {Esignature:810908941}    CASE MANAGEMENT/SOCIAL WORK SECTION    Inpatient Status Date: ***    Readmission Risk Assessment Score:  Readmission Risk              Risk of Unplanned Readmission:  0           Discharging to Facility/ Agency   Name:   Address:  Phone:  Fax:    Dialysis Facility (if applicable)   Name: [address] [place] [address] Dialysis  Address: [address] [place] [address].  [facility], [address]  Dialysis Schedule:   Phone: [phone]  Fax:    / signature: Electronically signed by ZURI Null on 22 at 1:02 PM EDT    PHYSICIAN SECTION    Prognosis: {Prognosis:8028118045}    Condition at Discharge: [facility] Patient Condition:453870791}    Rehab Potential (if transferring to Rehab): {Prognosis:2317640256}    Recommended Labs or Other Treatments After Discharge: ***    Physician Certification: I certify the above information and transfer of Jorden H Woods  is necessary for the continuing treatment of the diagnosis listed and that he requires {Admit to Appropriate Level of Care:77687} for {GREATER/LESS:372906471} 30 days.      Update Admission H&P: {CHP DME Changes in KANIR:615513425}    PHYSICIAN SIGNATURE:  {Esignature:539527113}

## 2022-06-14 NOTE — PROGRESS NOTES
Patient alert & oriented x4, vitals stable, afebrile, on RA. Surgical sites clean, dry, and intact, abdominal binder remains in place. Pt c/o surgical pain, given scheduled tylenol, prn oxycodone and IV Dilaudid w/ benefit. Pt OOB and ambulating in hallways, tolerating regular diet well, passing gas, and voiding. Pt returned to bed w/ SCDs in place. Pt has no other needs at this time. Call light and bedside table within reach.

## 2022-06-15 PROBLEM — E44.0 MODERATE MALNUTRITION (HCC): Chronic | Status: ACTIVE | Noted: 2022-06-15

## 2022-06-15 LAB
ALBUMIN SERPL-MCNC: 3.8 G/DL (ref 3.4–5)
ANION GAP SERPL CALCULATED.3IONS-SCNC: 13 MMOL/L (ref 3–16)
BASOPHILS ABSOLUTE: 0 K/UL (ref 0–0.2)
BASOPHILS RELATIVE PERCENT: 0.2 %
BUN BLDV-MCNC: 23 MG/DL (ref 7–20)
CALCIUM SERPL-MCNC: 7.7 MG/DL (ref 8.3–10.6)
CHLORIDE BLD-SCNC: 100 MMOL/L (ref 99–110)
CO2: 25 MMOL/L (ref 21–32)
CREAT SERPL-MCNC: 5.9 MG/DL (ref 0.8–1.3)
EOSINOPHILS ABSOLUTE: 0.3 K/UL (ref 0–0.6)
EOSINOPHILS RELATIVE PERCENT: 5.6 %
GFR AFRICAN AMERICAN: 12
GFR NON-AFRICAN AMERICAN: 10
GLUCOSE BLD-MCNC: 117 MG/DL (ref 70–99)
HCT VFR BLD CALC: 32 % (ref 40.5–52.5)
HEMOGLOBIN: 10.1 G/DL (ref 13.5–17.5)
LYMPHOCYTES ABSOLUTE: 0.7 K/UL (ref 1–5.1)
LYMPHOCYTES RELATIVE PERCENT: 12 %
MAGNESIUM: 1.6 MG/DL (ref 1.8–2.4)
MCH RBC QN AUTO: 26.3 PG (ref 26–34)
MCHC RBC AUTO-ENTMCNC: 31.5 G/DL (ref 31–36)
MCV RBC AUTO: 83.4 FL (ref 80–100)
MONOCYTES ABSOLUTE: 0.5 K/UL (ref 0–1.3)
MONOCYTES RELATIVE PERCENT: 7.6 %
NEUTROPHILS ABSOLUTE: 4.4 K/UL (ref 1.7–7.7)
NEUTROPHILS RELATIVE PERCENT: 74.6 %
PDW BLD-RTO: 16.5 % (ref 12.4–15.4)
PHOSPHORUS: 2.6 MG/DL (ref 2.5–4.9)
PLATELET # BLD: 181 K/UL (ref 135–450)
PMV BLD AUTO: 8.4 FL (ref 5–10.5)
POTASSIUM SERPL-SCNC: 3.2 MMOL/L (ref 3.5–5.1)
RBC # BLD: 3.83 M/UL (ref 4.2–5.9)
SODIUM BLD-SCNC: 138 MMOL/L (ref 136–145)
WBC # BLD: 5.9 K/UL (ref 4–11)

## 2022-06-15 PROCEDURE — 6360000002 HC RX W HCPCS: Performed by: STUDENT IN AN ORGANIZED HEALTH CARE EDUCATION/TRAINING PROGRAM

## 2022-06-15 PROCEDURE — 1200000000 HC SEMI PRIVATE

## 2022-06-15 PROCEDURE — 6370000000 HC RX 637 (ALT 250 FOR IP): Performed by: NURSE PRACTITIONER

## 2022-06-15 PROCEDURE — 99233 SBSQ HOSP IP/OBS HIGH 50: CPT | Performed by: INTERNAL MEDICINE

## 2022-06-15 PROCEDURE — 36415 COLL VENOUS BLD VENIPUNCTURE: CPT

## 2022-06-15 PROCEDURE — 6370000000 HC RX 637 (ALT 250 FOR IP)

## 2022-06-15 PROCEDURE — 2580000003 HC RX 258: Performed by: STUDENT IN AN ORGANIZED HEALTH CARE EDUCATION/TRAINING PROGRAM

## 2022-06-15 PROCEDURE — 83735 ASSAY OF MAGNESIUM: CPT

## 2022-06-15 PROCEDURE — 80069 RENAL FUNCTION PANEL: CPT

## 2022-06-15 PROCEDURE — 6370000000 HC RX 637 (ALT 250 FOR IP): Performed by: STUDENT IN AN ORGANIZED HEALTH CARE EDUCATION/TRAINING PROGRAM

## 2022-06-15 PROCEDURE — 85025 COMPLETE CBC W/AUTO DIFF WBC: CPT

## 2022-06-15 RX ORDER — BISACODYL 10 MG
10 SUPPOSITORY, RECTAL RECTAL ONCE
Status: DISCONTINUED | OUTPATIENT
Start: 2022-06-15 | End: 2022-06-15

## 2022-06-15 RX ORDER — DOCUSATE SODIUM 100 MG/1
100 CAPSULE, LIQUID FILLED ORAL DAILY
Status: DISCONTINUED | OUTPATIENT
Start: 2022-06-15 | End: 2022-06-15

## 2022-06-15 RX ORDER — BISACODYL 10 MG
10 SUPPOSITORY, RECTAL RECTAL ONCE
Status: COMPLETED | OUTPATIENT
Start: 2022-06-15 | End: 2022-06-15

## 2022-06-15 RX ORDER — OXYCODONE HYDROCHLORIDE 15 MG/1
15 TABLET ORAL ONCE
Status: COMPLETED | OUTPATIENT
Start: 2022-06-15 | End: 2022-06-15

## 2022-06-15 RX ORDER — METHOCARBAMOL 750 MG/1
750 TABLET, FILM COATED ORAL 4 TIMES DAILY
Status: DISCONTINUED | OUTPATIENT
Start: 2022-06-15 | End: 2022-06-16 | Stop reason: HOSPADM

## 2022-06-15 RX ORDER — LANOLIN ALCOHOL/MO/W.PET/CERES
400 CREAM (GRAM) TOPICAL ONCE
Status: COMPLETED | OUTPATIENT
Start: 2022-06-15 | End: 2022-06-15

## 2022-06-15 RX ORDER — DOCUSATE SODIUM 100 MG/1
100 CAPSULE, LIQUID FILLED ORAL 2 TIMES DAILY
Status: DISCONTINUED | OUTPATIENT
Start: 2022-06-15 | End: 2022-06-16 | Stop reason: HOSPADM

## 2022-06-15 RX ORDER — POLYETHYLENE GLYCOL 3350 17 G/17G
17 POWDER, FOR SOLUTION ORAL 2 TIMES DAILY
Status: DISCONTINUED | OUTPATIENT
Start: 2022-06-15 | End: 2022-06-16 | Stop reason: HOSPADM

## 2022-06-15 RX ADMIN — METHOCARBAMOL 750 MG: 750 TABLET ORAL at 20:48

## 2022-06-15 RX ADMIN — OXYCODONE 10 MG: 5 TABLET ORAL at 22:31

## 2022-06-15 RX ADMIN — ACETAMINOPHEN 1000 MG: 500 TABLET ORAL at 22:31

## 2022-06-15 RX ADMIN — POLYETHYLENE GLYCOL 3350 17 G: 17 POWDER, FOR SOLUTION ORAL at 09:36

## 2022-06-15 RX ADMIN — HEPARIN SODIUM 5000 UNITS: 5000 INJECTION INTRAVENOUS; SUBCUTANEOUS at 20:50

## 2022-06-15 RX ADMIN — PANTOPRAZOLE SODIUM 40 MG: 40 TABLET, DELAYED RELEASE ORAL at 06:41

## 2022-06-15 RX ADMIN — SODIUM CHLORIDE, PRESERVATIVE FREE 10 ML: 5 INJECTION INTRAVENOUS at 20:53

## 2022-06-15 RX ADMIN — OXYCODONE HYDROCHLORIDE 15 MG: 15 TABLET ORAL at 14:43

## 2022-06-15 RX ADMIN — METHOCARBAMOL 750 MG: 750 TABLET ORAL at 18:32

## 2022-06-15 RX ADMIN — SODIUM CHLORIDE, PRESERVATIVE FREE 10 ML: 5 INJECTION INTRAVENOUS at 09:34

## 2022-06-15 RX ADMIN — POLYETHYLENE GLYCOL 3350 17 G: 17 POWDER, FOR SOLUTION ORAL at 20:50

## 2022-06-15 RX ADMIN — OXYCODONE 10 MG: 5 TABLET ORAL at 00:36

## 2022-06-15 RX ADMIN — OXYCODONE 10 MG: 5 TABLET ORAL at 18:32

## 2022-06-15 RX ADMIN — NORTRIPTYLINE HYDROCHLORIDE 10 MG: 10 CAPSULE ORAL at 20:49

## 2022-06-15 RX ADMIN — DOCUSATE SODIUM 100 MG: 100 CAPSULE, LIQUID FILLED ORAL at 09:34

## 2022-06-15 RX ADMIN — OXYCODONE 10 MG: 5 TABLET ORAL at 09:35

## 2022-06-15 RX ADMIN — OXYCODONE 10 MG: 5 TABLET ORAL at 04:32

## 2022-06-15 RX ADMIN — HEPARIN SODIUM 5000 UNITS: 5000 INJECTION INTRAVENOUS; SUBCUTANEOUS at 14:43

## 2022-06-15 RX ADMIN — ACETAMINOPHEN 1000 MG: 500 TABLET ORAL at 14:42

## 2022-06-15 RX ADMIN — METHOCARBAMOL 750 MG: 750 TABLET ORAL at 12:30

## 2022-06-15 RX ADMIN — BISACODYL 10 MG: 10 SUPPOSITORY RECTAL at 06:47

## 2022-06-15 RX ADMIN — ATORVASTATIN CALCIUM 20 MG: 20 TABLET, FILM COATED ORAL at 09:34

## 2022-06-15 RX ADMIN — BUSPIRONE HYDROCHLORIDE 5 MG: 5 TABLET ORAL at 20:50

## 2022-06-15 RX ADMIN — METOPROLOL SUCCINATE 50 MG: 50 TABLET, EXTENDED RELEASE ORAL at 20:48

## 2022-06-15 RX ADMIN — BUSPIRONE HYDROCHLORIDE 5 MG: 5 TABLET ORAL at 09:36

## 2022-06-15 RX ADMIN — Medication 400 MG: at 09:34

## 2022-06-15 RX ADMIN — DOCUSATE SODIUM 100 MG: 100 CAPSULE, LIQUID FILLED ORAL at 20:48

## 2022-06-15 RX ADMIN — ACETAMINOPHEN 1000 MG: 500 TABLET ORAL at 06:41

## 2022-06-15 ASSESSMENT — PAIN DESCRIPTION - LOCATION
LOCATION: ABDOMEN

## 2022-06-15 ASSESSMENT — PAIN SCALES - GENERAL
PAINLEVEL_OUTOF10: 10
PAINLEVEL_OUTOF10: 9
PAINLEVEL_OUTOF10: 7
PAINLEVEL_OUTOF10: 8
PAINLEVEL_OUTOF10: 9
PAINLEVEL_OUTOF10: 7
PAINLEVEL_OUTOF10: 9
PAINLEVEL_OUTOF10: 6
PAINLEVEL_OUTOF10: 9
PAINLEVEL_OUTOF10: 9
PAINLEVEL_OUTOF10: 6
PAINLEVEL_OUTOF10: 7

## 2022-06-15 ASSESSMENT — PAIN DESCRIPTION - DESCRIPTORS
DESCRIPTORS: CRAMPING;SHARP
DESCRIPTORS: CRAMPING
DESCRIPTORS: ACHING;SHARP
DESCRIPTORS: SHARP
DESCRIPTORS: CRAMPING

## 2022-06-15 ASSESSMENT — PAIN DESCRIPTION - PAIN TYPE
TYPE: SURGICAL PAIN

## 2022-06-15 ASSESSMENT — PAIN - FUNCTIONAL ASSESSMENT
PAIN_FUNCTIONAL_ASSESSMENT: ACTIVITIES ARE NOT PREVENTED
PAIN_FUNCTIONAL_ASSESSMENT: ACTIVITIES ARE NOT PREVENTED

## 2022-06-15 ASSESSMENT — PAIN DESCRIPTION - ORIENTATION
ORIENTATION: MID

## 2022-06-15 ASSESSMENT — PAIN DESCRIPTION - FREQUENCY: FREQUENCY: CONTINUOUS

## 2022-06-15 NOTE — CONSULTS
Comprehensive Nutrition Assessment    RECOMMENDATIONS:  1. PO Diet: Continue regular; cardiac; low K; low phos diet  2. ONS: Start Nepro QD  3. Nutrition Education: Provided renal diet education     NUTRITION ASSESSMENT:   Nutritional summary & status: Pt +screen for weight loss and poor appetite/po intakes. 16.2 lb (5.8%) wt loss noted in the last 2 months. Pt reports decreased appetite and po intakes for the last few months since starting dialysis. RD discussed increased nutrition needs with ESRD on dialysis. Pt agreeable to trying Nepro supplements to increase po. RD provided renal diet education; gave potassium content of food education and phosphorus content of food education. RD to monitor po intakes and nutrition status throughout adm.  Admission/PMH:  recurrent inguinal hernia / ESRD on HD, HTN, anemia    MALNUTRITION ASSESSMENT  Context of Malnutrition: Chronic Illness   Malnutrition Status:  Moderate malnutrition  Findings of the 6 clinical characteristics of malnutrition (Minimum of 2 out of 6 clinical characteristics is required to make the diagnosis of moderate or severe Protein Calorie Malnutrition based on AND/ASPEN Guidelines):  Energy Intake: Less than/equal to 75% of estimated energy requirements    Energy Intake Time: Greater than or equal to 1 month    Weight Loss %: 10% loss or greater    Weight loss Time: Greater than or equal to 1 month      NUTRITION DIAGNOSIS   Moderate malnutrition related to catabolic illness as evidenced by dialysis    [address] and/or Nutrient Delivery:  Continue Current Diet,Start Oral Nutrition Supplement  Nutrition Education/Counseling:  No recommendation at this time   Goals:  Pt will consistently consume >50% of meals and supplements throughout adm         Nutrition Monitoring and Evaluation:   Food/Nutrient Intake Outcomes:  Food and Nutrient Intake,Supplement Intake  Physical Signs/Symptoms Outcomes:  Weight,Biochemical Data     OBJECTIVE DATA: Significant to nutrition assessment  · Nutrition-Focused Physical Findings: mariaelena lbm  · Labs: Reviewed; K 3.2, Mg 1.6  · Meds: Reviewed; glycolax  · Wounds: None       CURRENT NUTRITION THERAPIES  ADULT DIET; Regular; Low Fat/Low Chol/High Fiber/2 gm Na; Low Potassium (Less than 3000 mg/day)     PO Intake: Unable to assess   PO Supplement Intake:Unable to assess  Additional Sources of Calories/IVF:n/a     ANTHROPOMETRICS  Current Height: 6' 5\" (195.6 cm)  Current Weight: 261 lb 3.9 oz (118.5 kg)    Admission weight: 275 lb (124.7 kg)  Ideal Body Weight (IBW): 208 lbs  (95 kg)    Usual Bodyweight  (mariaelena)   Weight Changes -16.2 lbs in 2 months - significant       BMI: 31    Wt Readings from Last 50 Encounters:   06/14/22 261 lb 3.9 oz (118.5 kg)   06/13/22 263 lb (119.3 kg)   06/08/22 280 lb (127 kg)   05/27/22 263 lb 9.6 oz (119.6 kg)   05/18/22 263 lb (119.3 kg)   05/12/22 260 lb (117.9 kg)   04/27/22 273 lb (123.8 kg)   04/19/22 282 lb 12.8 oz (128.3 kg)   04/16/22 277 lb 9 oz (125.9 kg)   04/12/22 280 lb (127 kg)   03/18/22 280 lb (127 kg)   02/24/22 271 lb (122.9 kg)   02/09/22 285 lb (129.3 kg)   01/19/22 288 lb (130.6 kg)   01/17/22 288 lb 5.8 oz (130.8 kg)   01/11/22 282 lb 12.8 oz (128.3 kg)   01/10/22 285 lb 9.6 oz (129.5 kg)   12/28/21 292 lb 6.4 oz (132.6 kg)   12/21/21 298 lb 8 oz (135.4 kg)   11/30/21 283 lb 3.2 oz (128.5 kg)   11/30/21 281 lb 12.8 oz (127.8 kg)   09/07/21 283 lb 9.6 oz (128.6 kg)   09/02/21 286 lb 12.8 oz (130.1 kg)   08/16/21 287 lb 9.6 oz (130.5 kg)       COMPARATIVE STANDARDS  Energy (kcal):  6539-0803 (15-18)     Protein (g):  143-162       Fluid (ml/day):  >1500 ml    The patient will still be monitored per nutrition standards of care. Consult dietitian if nutrition interventions essential to patient care is needed.      Sheng Pham, [address], [address] [place] [address] Drive:  [phone]  Office:  [phone]

## 2022-06-15 NOTE — PROGRESS NOTES
Nephrology  Note                                                                                                                                                                                                                                                                                                                                                               Office : [phone]     Fax :[phone]              Patient's Name: Lori Spangler  6/15/2022    Reason for Consult:  ESRD   Requesting Physician:  Deepti Jones MD      Pain control not great   9/10     No diarrhea           Past Medical History:   Diagnosis Date    Arthralgia of multiple joints 10/27/2015    Arthritis     Atrial fibrillation (HCC)     Chronic kidney disease     stage 4    Chronic systolic congestive heart failure ([address] 8/16/2021    CRI (chronic renal insufficiency)     Diabetic nephropathy associated with type 2 diabetes mellitus ([address] 10/11/2018    Diverticulosis     Elevated PSA     Erectile dysfunction     ESRD (end stage renal disease) on dialysis ([facility] 2/24/2022    Gout     Gout     Hemrrhoid NOS w/ complication NEC     History of MRSA infection     Hypertension     CELSA (obstructive sleep apnea) 07/14/2017    uses C-pap machine    Type 2 diabetes mellitus without complication, without long-term current use of insulin ([facility] 6/11/4857    Umbilical hernia without obstruction and without gangrene 2/2/2016       Past Surgical History:   Procedure Laterality Date    COLONOSCOPY      COLONOSCOPY N/A 3/18/2022    COLONOSCOPY POLYPECTOMY SNARE/COLD BIOPSY performed by Chuck Rice MD at [address] N/A 1/17/2022    LAPAROSCOPIC PERITONEAL DIALYSIS CATHETER PLACEMENT and omentopexy performed by Maicol Valentino DO at [address] [place] [address] N/A 4/15/2022    ROBOTIC, RECURRENT INCISIONAL HERNIA REPAIR WITH BIOSYNTHETIC MESH; LAPAROSCOPIC PERITONEAL DIALYSIS CATHETER REVISION WITH LAPAROSCOPIC OMENTOPEXY performed by Argelia Mcduffie DO at [address]  Bilateral 4/15/2022    BILATERAL OPEN INGUINAL [place] REPAIR performed by Argelia Mcduffie DO at [facility] Bilateral 6/13/2022    ROBOTIC RECURRENT BILATERAL INGUINAL HERNIA REPAIR, LYSIS OF ADHESIONS, PERITONEAL DIALYSIS CATHETER REMOVAL performed by Argelia Mcduffie DO at [address] IR TUNNELED CATHETER PLACEMENT GREATER THAN 5 YEARS  01/14/2022    IR TUNNELED CATHETER PLACEMENT GREATER THAN 5 YEARS 1/14/2022 [facility] SPECIAL PROCEDURES    UPPER GASTROINTESTINAL ENDOSCOPY  05/28/2016    biopsies, multiple small gastric ulcers    UPPER GASTROINTESTINAL ENDOSCOPY  09/12/2016    UPPER GASTROINTESTINAL ENDOSCOPY N/A 3/18/2022    EGD DIAGNOSTIC ONLY performed by Sabas Elena MD at [place] [address] N/A 1/17/2022    LAPAROSCOPIC incarcerated VENTRAL HERNIA REPAIR performed by Argelia Mcduffie DO at [facility] OR       Family History   Problem Relation Age of Onset    Hypertension Other     Breast Cancer Mother     Colon Cancer Father     Diabetes Maternal Grandmother     Coronary Art Dis Brother         reports that he has never smoked. He has never used smokeless tobacco. He reports previous alcohol use. He reports that he does not use drugs. Allergies:  Patient has no known allergies.     Current Medications:    magnesium oxide (MAG-OX) tablet 400 mg, Once  polyethylene glycol (GLYCOLAX) packet 17 g, BID  docusate sodium (COLACE) capsule 100 mg, BID  heparin (porcine) injection 3,200 Units, PRN  atorvastatin (LIPITOR) tablet 20 mg, Daily  busPIRone (BUSPAR) tablet 5 mg, BID  metoprolol succinate (TOPROL XL) extended release tablet 50 mg, Nightly  nortriptyline (PAMELOR) capsule 10 mg, Nightly  pantoprazole (PROTONIX) tablet 40 mg, QAM AC  sodium chloride flush 0.9 % injection 5-40 mL, 2 times per day  sodium chloride flush Justice Esau, NITRU, LEUKOCYTESUR, Shyanne Lo in the last 72 hours. Urine Microscopic: No results for input(s): LABCAST, BACTERIA, COMU, HYALCAST, WBCUA, RBCUA, EPIU in the last 72 hours. Urine Culture: No results for input(s): LABURIN in the last 72 hours. Urine Chemistry: No results for input(s): Mary Ellen Parody, PROTEINUR, NAUR in the last 72 hours. IMAGING:  VL PRE OP VEIN MAPPING    (Results Pending)       Assessment/Plan   1. ESRD     2. HTN    3. Anemia    4. Acid- base/ Electrolyte imbalance     5.  Hernia     Plan  - HD in am   - Vein mapping   - UF as [place]   - Anemia management   - MBD management   - labs in am                 Thank you for allowing us to participate in care of Oskar Xie MD  Feel free to contact me   [facility] [address]  Office : [phone]  Fax :[phone]

## 2022-06-15 NOTE — CARE COORDINATION
CM following: CM saw patient at bedside with care team this AM. Patient plans to discharge home with no needs - will continue with HD with [facility] at discharge. Patient reports no HHC, DME, or CM needs at discharge. Patient's wife will provide transportation home. Patient will have AM labs and vein mapping today.   Electronically signed by ZURI Bowser on 6/15/2022 at 2:24 PM  [phone]

## 2022-06-15 NOTE — ADT AUTH CERT
Patient Demographics    Name Patient ID SSN Gender Identity Birth Date   Isi Hopkins [id number]  Male 56 (61 yrs)     Address Phone Email Employer    [address] [phone] (X) [phone] (M) [email or web address] Race Occupation Emp Status    [employer] [place] [employer] /  -- Full Time      Reg Status PCP Date Last Verified Next Review Date    Verified [place], [place]  [phone] 22      Admission Date Discharge Date Admitting Provider     22 -- Cynda Carrier, DO       Marital Status Lutheran       Nondenominational        Emergency Contact [address] [phone] [address] [place], [place] [address] [phone] (M)   [phone] [facility])       [address] [place] [address] [de-identified]  CVG Subscriber Name/Sex/Relation Subscriber  Subscriber Address/Phone Subscriber Emp/Emp Phone   0. [address] [employer]   [id number] [address] [place] [address] Male   (Self) 1960 [address] [address]   [phone](Z) [facility]      Utilization Reviews         Physician advisor recommends inpatient by Abraham Valdez RN       Review Status Review Entered   In Primary 6/15/2022 10:29      Criteria Review   slr keep in    We recommend that the following pt's current hospitalization under Inpatient status is APPROPRIATE . Name: Cecil Thomason   : 1960   CSN: [id number]   Insurance: [place] Medicaid     Clinical summary s/p robotic recurrent bilateral inguinal hernia repair, lysis of adhesion, PD catheter removal secondary to recurrent bilateral inguinal hernias.  Admitted as OBS, but changed to IP day 2 as patient did not have BM and still required IV pain meds  Vitals stable  Labs and Imaging non acute   UM criteria applies yes  Comments       This chart was reviewed at 10:10 AM 6/15/2022    Tim Hernadez MD   Physician [id number] [facility] : [phone]        Hernia Repair (Non-Hiatal) - Care Day 3 (6/15/2022) by Maribel Kebede RN       Review Status Review Entered   Completed 6/15/2022 10:25      Criteria Review      Care Day: 3 Care Date: 6/15/2022 Level of Care: Inpatient Floor    Guideline Day 2    Clinical Status    (X) * Procedure completed    6/15/2022 10:25 AM EDT by Catia Pump noted    (X) * Hemodynamic stability    6/15/2022 10:25 AM EDT by Aurea Moreno      97.9, 86, 16, 129/83    (X) * No evidence of ileus or bowel obstruction    6/15/2022 10:25 AM EDT by Aurea Moreno      no issues noted    (X) * No evidence of surgical site infection    6/15/2022 10:25 AM EDT by Aurea Moreno      no issues noted    (X) * Pain absent or managed    6/15/2022 10:25 AM EDT by Catia Pump managed    ( ) * Discharge plans and education understood    Activity    (X) * Ambulatory or acceptable for next level of care    6/15/2022 10:25 AM EDT by Catia Pump as carlton    Routes    (X) * Oral hydration    6/15/2022 10:25 AM EDT by Aurea Moreno      PO diet    (X) * Oral medications or regimen acceptable for next level of care    6/15/2022 10:25 AM EDT by Catia Pump noted    (X) * Oral diet or acceptable for next level of care    6/15/2022 10:25 AM EDT by Catia Pump reg    Medications    ( ) * Analgesics absent or arranged for next level of care    6/15/2022 10:25 AM EDT by Aurea Moreno      roxicodone    * Milestone   Additional Notes   6/15/22      VS- temp 97.9, HR 86, RR 16, /83, O2 96%          Labs- K 3.2, BUN 23, Creat 5.9, GFR 10, Mg 1.6, Glucose 117, Ca 7.7, Hgb 10.1,           Nursing notes-   Patient A&Ox4. VSS on RA. Patient reports pain of 6-10/10 during the shift and was given oxycodone with benefit. Surgical sites cdi. Ice pack also provided. UAL to restroom. Minimal urinary output. No significant events overnight.  All care needs addressed with no further needs at this time. Bed is locked and in lowest position. Call light and bedside table within reach. Will continue to monitor per protocol.                General surgery-   Subjective :   Afebrile, hemodynamically stable. Pain is now better controlled with current regimen. Tolerating general diet.  Feels nausea this morning complaining of pain along PD catheter incision site.       ASSESSMENT/PLAN:    This is a 59 y.o. male s/p robotic recurrent bilateral inguinal hernia repair, lysis of adhesion, PD catheter removal secondary to recurrent bilateral inguinal hernias POD 2       - Home metoprolol was resumed   - HD via THC per nephrology, completed yesterday    - Pain controlled with PO meds   - Will give Zofran this AM, continue to monitor   - If patient is feeling better poss discharge later today                   Nephrology-   Pain control not great    9/10        Plan   - HD in am    - Vein mapping   - UF as carlton    - Anemia management    - MBD management    - labs in am              Summary- Patient in Med/surg unit          Patient receiving Tylenol 1000mg PO TID, Lipitor 20mg PO x1, Dulcolax 10mg RE x1, Buspar 5mg PO BID, Colace 100mg PO BID, Heparin 5000units SC TID, Mag ox 400mg PO x1, Toprol 50mg PO daily, Pamelor 10mg PO daily, Protonix 40mg PO x1, Glycolax 17g PO x1, Roxicodone 10mg PO x3

## 2022-06-15 NOTE — CONSULTS
Nephrology Consult Note                                                                                                                                                                                                                                                                                                                                                               Office : [phone]     Fax :[phone]              Patient's Name: Nevaeh Ledesma  6/14/2022    Reason for Consult:  ESRD   Requesting Physician:  Maryuri Arce MD      Chief Complaint:  Abd pain     History of Present Ilness:    Nevaeh Ledesma is a 59 y.o. male with recurrent hernia   S/p repair   On HD TTS   HD today   Pt seen on HD carlton well   abd pain controlled per pt   4/10   No exacerbating factor   No relieving factor   ass nausea       Past Medical History:   Diagnosis Date    Arthralgia of multiple joints 10/27/2015    Arthritis     Atrial fibrillation (HCC)     Chronic kidney disease     stage 4    Chronic systolic congestive heart failure ([address] 8/16/2021    CRI (chronic renal insufficiency)     Diabetic nephropathy associated with type 2 diabetes mellitus ([address] 10/11/2018    Diverticulosis     Elevated PSA     Erectile dysfunction     ESRD (end stage renal disease) on dialysis ([address] 2/24/2022    Gout     Gout     Hemrrhoid NOS w/ complication NEC     History of MRSA infection     Hypertension     CELSA (obstructive sleep apnea) 07/14/2017    uses C-pap machine    Type 2 diabetes mellitus without complication, without long-term current use of insulin ([address] 3/29/7321    Umbilical hernia without obstruction and without gangrene 2/2/2016       Past Surgical History:   Procedure Laterality Date    COLONOSCOPY      COLONOSCOPY N/A 3/18/2022    COLONOSCOPY POLYPECTOMY SNARE/COLD BIOPSY performed by Carmen Gaona MD at [address] 1/17/2022    LAPAROSCOPIC PERITONEAL DIALYSIS CATHETER PLACEMENT and omentopexy performed by Princess Bey DO at [facility], ESOPHAGUS      HERNIA REPAIR N/A 4/15/2022    ROBOTIC, RECURRENT INCISIONAL HERNIA REPAIR WITH BIOSYNTHETIC MESH; LAPAROSCOPIC PERITONEAL DIALYSIS CATHETER REVISION WITH LAPAROSCOPIC OMENTOPEXY performed by Princess Bey DO at [facility] Bilateral 4/15/2022    BILATERAL OPEN INGUINAL HERNI REPAIR performed by Princess Bey DO at [address] [facility] Bilateral 6/13/2022    ROBOTIC RECURRENT BILATERAL INGUINAL HERNIA REPAIR, LYSIS OF ADHESIONS, PERITONEAL DIALYSIS CATHETER REMOVAL performed by Princess Bey DO at [facility] IR TUNNELED CATHETER PLACEMENT GREATER THAN 5 YEARS  01/14/2022    IR TUNNELED CATHETER PLACEMENT GREATER THAN 5 YEARS 1/14/2022 [facility]    UPPER GASTROINTESTINAL ENDOSCOPY  05/28/2016    biopsies, multiple small gastric ulcers    UPPER GASTROINTESTINAL ENDOSCOPY  09/12/2016    UPPER GASTROINTESTINAL ENDOSCOPY N/A 3/18/2022    EGD DIAGNOSTIC ONLY performed by Marcos Mccoy MD at [address] [id number] N/A 1/17/2022    LAPAROSCOPIC incarcerated VENTRAL HERNIA REPAIR performed by Princess Bey DO at [facility]       Family History   Problem Relation Age of Onset    Hypertension Other     Breast Cancer Mother     Colon Cancer Father     Diabetes Maternal Grandmother     Coronary Art Dis Brother         reports that he has never smoked. He has never used smokeless tobacco. He reports previous alcohol use. He reports that he does not use drugs. Allergies:  Patient has no known allergies.     Current Medications:    heparin (porcine) injection 3,200 Units, PRN  atorvastatin (LIPITOR) tablet 20 mg, Daily  busPIRone (BUSPAR) tablet 5 mg, BID  metoprolol succinate (TOPROL XL) extended release tablet 50 mg, Nightly  nortriptyline (PAMELOR) capsule 10 mg, Nightly  pantoprazole (PROTONIX) tablet 40 mg, QAM AC  sodium chloride flush 0.9 % injection 5-40 mL, 2 times per day  sodium chloride flush 0.9 % injection 5-40 mL, PRN  0.9 % sodium chloride infusion, PRN  acetaminophen (TYLENOL) tablet 1,000 mg, 3 times per day  oxyCODONE (ROXICODONE) immediate release tablet 5 mg, Q4H PRN   Or  oxyCODONE (ROXICODONE) immediate release tablet 10 mg, Q4H PRN  ondansetron (ZOFRAN-ODT) disintegrating tablet 4 mg, Q8H PRN   Or  ondansetron (ZOFRAN) injection 4 mg, Q6H PRN  heparin (porcine) injection 5,000 Units, TID        Review of Systems:   14 point ROS obtained but were negative except mentioned in HPI      Physical exam:     Vitals:  /88   Pulse 99   Temp 98.8 °F (37.1 °C) (Oral)   Resp 16   Ht 6' 5\" (1.956 m)   Wt 261 lb 3.9 oz (118.5 kg)   SpO2 96%   BMI 30.98 kg/m²   Constitutional:  OAA X3 NAD  Skin: no rash, turgor wnl  Heent:  eomi, mmm  Neck: no bruits or jvd noted  Cardiovascular:  S1, S2 without m/r/g  Respiratory: CTA B without w/r/r  Abdomen:  +bs, soft, nt, nd  Ext: + lower extremity edema  Psychiatric: mood and affect appropriate  Musculoskeletal:  Rom, muscular strength intact    Data:   Labs:  CBC:   Recent Labs     06/14/22  0514   WBC 7.7   HGB 12.0*        BMP:    Recent Labs     06/13/22  1236 06/14/22  0514    137   K 6.1* 3.8    101   CO2 21 21   BUN 30* 31*   CREATININE 5.7* 6.0*   GLUCOSE 108* 140*     Ca/Mg/Phos:   Recent Labs     06/13/22  1236 06/14/22  0514   CALCIUM 8.7 7.8*   MG  --  1.50*   PHOS  --  3.7     Hepatic: No results for input(s): AST, ALT, ALB, BILITOT, ALKPHOS in the last 72 hours. Troponin: No results for input(s): TROPONINI in the last 72 hours. BNP: No results for input(s): BNP in the last 72 hours. Lipids: No results for input(s): CHOL, TRIG, HDL, LDLCALC, LABVLDL in the last 72 hours. ABGs: No results for input(s): PHART, PO2ART, CRT9XNA in the last 72 hours. INR: No results for input(s): INR in the last 72 hours.   UA:No results for input(s): Chrissie Collar, BLOODU, PHUR, PROTEINU, UROBILINOGEN, NITRU, LEUKOCYTESUR, [place] in the last 72 hours. Urine Microscopic: No results for input(s): LABCAST, BACTERIA, COMU, HYALCAST, WBCUA, RBCUA, EPIU in the last 72 hours. Urine Culture: No results for input(s): LABURIN in the last 72 hours. Urine Chemistry: No results for input(s): Ailey Juniper, PROTEINUR, NAUR in the last 72 hours. IMAGING:  No orders to display       Assessment/Plan   1. ESRD     2. HTN    3. Anemia    4. Acid- base/ Electrolyte imbalance     5.  Hernia     Plan  - HD today   - UF as carlton   - Anemia management   - MBD management   - labs in am                 Thank you for allowing us to participate in care of Richard Streeter MD  Feel free to contact me   [facility] [address]  Office : [phone]  Fax :[phone]

## 2022-06-16 ENCOUNTER — APPOINTMENT (OUTPATIENT)
Dept: VASCULAR LAB | Age: 62
DRG: 350 | End: 2022-06-16
Attending: SURGERY
Payer: MEDICARE

## 2022-06-16 VITALS
HEIGHT: 77 IN | TEMPERATURE: 98 F | OXYGEN SATURATION: 94 % | HEART RATE: 91 BPM | BODY MASS INDEX: 30.64 KG/M2 | WEIGHT: 259.48 LBS | SYSTOLIC BLOOD PRESSURE: 160 MMHG | RESPIRATION RATE: 18 BRPM | DIASTOLIC BLOOD PRESSURE: 92 MMHG

## 2022-06-16 LAB
ALBUMIN SERPL-MCNC: 3.6 G/DL (ref 3.4–5)
ANION GAP SERPL CALCULATED.3IONS-SCNC: 15 MMOL/L (ref 3–16)
BASOPHILS ABSOLUTE: 0 K/UL (ref 0–0.2)
BASOPHILS RELATIVE PERCENT: 0.3 %
BUN BLDV-MCNC: 30 MG/DL (ref 7–20)
CALCIUM SERPL-MCNC: 7.9 MG/DL (ref 8.3–10.6)
CHLORIDE BLD-SCNC: 100 MMOL/L (ref 99–110)
CO2: 24 MMOL/L (ref 21–32)
CREAT SERPL-MCNC: 6.7 MG/DL (ref 0.8–1.3)
EOSINOPHILS ABSOLUTE: 0.6 K/UL (ref 0–0.6)
EOSINOPHILS RELATIVE PERCENT: 10.4 %
GFR AFRICAN AMERICAN: 10
GFR NON-AFRICAN AMERICAN: 8
GLUCOSE BLD-MCNC: 138 MG/DL (ref 70–99)
HCT VFR BLD CALC: 30.2 % (ref 40.5–52.5)
HEMOGLOBIN: 9.9 G/DL (ref 13.5–17.5)
LYMPHOCYTES ABSOLUTE: 1 K/UL (ref 1–5.1)
LYMPHOCYTES RELATIVE PERCENT: 17.8 %
MAGNESIUM: 1.8 MG/DL (ref 1.8–2.4)
MCH RBC QN AUTO: 26.9 PG (ref 26–34)
MCHC RBC AUTO-ENTMCNC: 32.6 G/DL (ref 31–36)
MCV RBC AUTO: 82.4 FL (ref 80–100)
MONOCYTES ABSOLUTE: 0.5 K/UL (ref 0–1.3)
MONOCYTES RELATIVE PERCENT: 9.2 %
NEUTROPHILS ABSOLUTE: 3.4 K/UL (ref 1.7–7.7)
NEUTROPHILS RELATIVE PERCENT: 62.3 %
PDW BLD-RTO: 16.7 % (ref 12.4–15.4)
PHOSPHORUS: 2.9 MG/DL (ref 2.5–4.9)
PLATELET # BLD: 180 K/UL (ref 135–450)
PMV BLD AUTO: 8.2 FL (ref 5–10.5)
POTASSIUM SERPL-SCNC: 3.3 MMOL/L (ref 3.5–5.1)
RBC # BLD: 3.66 M/UL (ref 4.2–5.9)
SODIUM BLD-SCNC: 139 MMOL/L (ref 136–145)
WBC # BLD: 5.4 K/UL (ref 4–11)

## 2022-06-16 PROCEDURE — 6360000002 HC RX W HCPCS: Performed by: STUDENT IN AN ORGANIZED HEALTH CARE EDUCATION/TRAINING PROGRAM

## 2022-06-16 PROCEDURE — 85025 COMPLETE CBC W/AUTO DIFF WBC: CPT

## 2022-06-16 PROCEDURE — 83735 ASSAY OF MAGNESIUM: CPT

## 2022-06-16 PROCEDURE — 6370000000 HC RX 637 (ALT 250 FOR IP): Performed by: STUDENT IN AN ORGANIZED HEALTH CARE EDUCATION/TRAINING PROGRAM

## 2022-06-16 PROCEDURE — 6370000000 HC RX 637 (ALT 250 FOR IP): Performed by: NURSE PRACTITIONER

## 2022-06-16 PROCEDURE — 93985 DUP-SCAN HEMO COMPL BI STD: CPT

## 2022-06-16 PROCEDURE — 2580000003 HC RX 258: Performed by: STUDENT IN AN ORGANIZED HEALTH CARE EDUCATION/TRAINING PROGRAM

## 2022-06-16 PROCEDURE — 80069 RENAL FUNCTION PANEL: CPT

## 2022-06-16 PROCEDURE — 99233 SBSQ HOSP IP/OBS HIGH 50: CPT | Performed by: INTERNAL MEDICINE

## 2022-06-16 PROCEDURE — 6360000002 HC RX W HCPCS

## 2022-06-16 PROCEDURE — 90935 HEMODIALYSIS ONE EVALUATION: CPT

## 2022-06-16 PROCEDURE — 36415 COLL VENOUS BLD VENIPUNCTURE: CPT

## 2022-06-16 RX ORDER — MAGNESIUM SULFATE IN WATER 40 MG/ML
2000 INJECTION, SOLUTION INTRAVENOUS ONCE
Status: COMPLETED | OUTPATIENT
Start: 2022-06-16 | End: 2022-06-16

## 2022-06-16 RX ADMIN — DOCUSATE SODIUM 100 MG: 100 CAPSULE, LIQUID FILLED ORAL at 08:01

## 2022-06-16 RX ADMIN — ACETAMINOPHEN 1000 MG: 500 TABLET ORAL at 06:31

## 2022-06-16 RX ADMIN — OXYCODONE 10 MG: 5 TABLET ORAL at 15:40

## 2022-06-16 RX ADMIN — METHOCARBAMOL 750 MG: 750 TABLET ORAL at 08:01

## 2022-06-16 RX ADMIN — POLYETHYLENE GLYCOL 3350 17 G: 17 POWDER, FOR SOLUTION ORAL at 08:01

## 2022-06-16 RX ADMIN — SODIUM CHLORIDE, PRESERVATIVE FREE 10 ML: 5 INJECTION INTRAVENOUS at 08:02

## 2022-06-16 RX ADMIN — PANTOPRAZOLE SODIUM 40 MG: 40 TABLET, DELAYED RELEASE ORAL at 06:32

## 2022-06-16 RX ADMIN — BUSPIRONE HYDROCHLORIDE 5 MG: 5 TABLET ORAL at 08:02

## 2022-06-16 RX ADMIN — OXYCODONE 5 MG: 5 TABLET ORAL at 02:36

## 2022-06-16 RX ADMIN — ATORVASTATIN CALCIUM 20 MG: 20 TABLET, FILM COATED ORAL at 08:01

## 2022-06-16 RX ADMIN — MAGNESIUM SULFATE HEPTAHYDRATE 2000 MG: 2 INJECTION, SOLUTION INTRAVENOUS at 09:05

## 2022-06-16 RX ADMIN — OXYCODONE 10 MG: 5 TABLET ORAL at 06:31

## 2022-06-16 RX ADMIN — HEPARIN SODIUM 5000 UNITS: 5000 INJECTION INTRAVENOUS; SUBCUTANEOUS at 08:01

## 2022-06-16 ASSESSMENT — PAIN DESCRIPTION - LOCATION
LOCATION: ABDOMEN

## 2022-06-16 ASSESSMENT — PAIN SCALES - GENERAL
PAINLEVEL_OUTOF10: 7
PAINLEVEL_OUTOF10: 7
PAINLEVEL_OUTOF10: 5
PAINLEVEL_OUTOF10: 10

## 2022-06-16 ASSESSMENT — PAIN DESCRIPTION - DESCRIPTORS
DESCRIPTORS: ACHING
DESCRIPTORS: CRAMPING
DESCRIPTORS: ACHING

## 2022-06-16 ASSESSMENT — PAIN DESCRIPTION - PAIN TYPE: TYPE: SURGICAL PAIN

## 2022-06-16 ASSESSMENT — PAIN DESCRIPTION - ORIENTATION
ORIENTATION: MID

## 2022-06-16 NOTE — PROGRESS NOTES
Pt alert & oriented x4, vitals stable, afebrile, on RA. Surgical incision sites CDI, bowel sounds active, abdominal binder remains in place. Pt independent in room w/ steady gait. Pt c/o surgical pain intermittently during shift, pain managed w/ prn oxycodone. Pt has no needs at this time. Call light and bedside table within reach.

## 2022-06-16 NOTE — PROGRESS NOTES
Patient was provided with discharge instructions and verbalized understanding. IV access was removed. All belongings sent home with patient.      Electronically signed by Silva Bradley RN on 6/16/2022 at 4:24 PM

## 2022-06-16 NOTE — CARE COORDINATION
CM following: Patient not in room during rounds this AM. CM spoke with care team. Patient going for vein mapping today. Patient likely to discharge today after HD. Patient will resume HD at [facility] [phone] on his normal T/Th/S schedule after discharge.   Electronically signed by ZURI Bowser on 6/16/2022 at 12:54 PM  [phone]

## 2022-06-16 NOTE — PROGRESS NOTES
Bariatric Surgery Daily Progress Note      CC: Bilateral recurrent inguinal hernia without obstruction or gangrene    Subjective :  No acute events overnight. Patient has remained afebrile and HDS. He is complaining of incisional pain, which is controlled with medications. Passing gas and had a small bowel movement. Tolerating diet, denies nausea/ vomiting. Comfortable going home. Wants HD here.     Objective     Exam:  Vitals:    06/15/22 1928 06/15/22 2045 06/16/22 0004 06/16/22 0233   BP: (!) 153/91 (!) 152/99 (!) 142/91 (!) 145/87   Pulse: 94 (!) 101 99 83   Resp: 18  18 16   Temp: 99.1 °F (37.3 °C)  97.8 °F (36.6 °C) 97.3 °F (36.3 °C)   TempSrc: Oral  Oral Oral   SpO2: 99%  96% 99%   Weight:       Height:           General appearance: alert, no acute distress, grooming appropriate  Lungs: normal effort, on room air   Chest: THC in place with clean dressing  Heart: RRR  Abdomen: soft, appropriately tender; incisions clean dry and intact, well approximated, abdominal binder, minimal erythema at PD catheter incision site  Extremities: no edema or cyanosis    ASSESSMENT/PLAN:   This is a 59 y.o. male s/p robotic recurrent bilateral inguinal hernia repair, lysis of adhesion, PD catheter removal secondary to recurrent bilateral inguinal hernias POD#3    - Home metoprolol was resumed  - HD via THC per nephrology, will need dialysis today  - Pain controlled with PO meds  - If patient is feeling better poss discharge later today follow HD    Wolfgang Frausto DO  PGY1, General Surgery  06/16/22  6:08 AM  [phone]

## 2022-06-16 NOTE — DIALYSIS
Please call I sent in rx Treatment time: 3    Net UF: 800    Pre weight: 118.8  Post weight: 118  EDW: 118    Access used: cvc  Access function: well    Medications or blood products given: none    Regular outpatient schedule: tts    Summary of response to treatment: tolerated well. No issues noted/reported t/o tx. Report called to primary RN. Pt stable upon leaving unit    Copy of dialysis treatment record placed in chart, to be scanned into EMR.

## 2022-06-16 NOTE — CARE COORDINATION
Case Management Assessment            Discharge Note                    Date / Time of Note: 6/16/2022 12:59 PM                  Discharge Note Completed by: ZURI Kothari    Patient Name: Ted Villa   YOB: 1960  Diagnosis: Bilateral recurrent inguinal hernia without obstruction or gangrene [K40.21]  Status post bilateral hernia repair [Z98.890, Z87.19]  Inguinal hernia, left [K40.90]   Date / Time: 6/13/2022 11:54 AM    Current PCP: Royal Ej MD  Clinic patient: No    Hospitalization in the last 30 days: No    Advance Directives:  Code Status: Full Code  [place] DNR form completed and on chart: Not Indicated    Financial:  Payor: [employer] / Plan: Ricky Hernandez / Product Type: *No Product type* /      Pharmacy:    [facility] [address] -  [phone]  [address] [address]  Phone: [phone] Fax: Xavier [address] [place] [address]  Phone: [phone] Fax: [phone]    [id number] [phone]  [facility] [address]  Phone: [phone] Fax: [phone]    [id number] [id number], [address] [facility] [phone] - F [phone]  [address] [place] [address]  Phone: [phone] Fax: [phone]    [id number] - [address] [phone] Darien Medrano [phone]  [address] [place] [address] Ayush [address] [address]  Phone: [phone] Fax: [phone]      Assistance purchasing medications?: Potential Assistance Purchasing Medications: No  Assistance provided by Case Management: None at this time    Does patient want to participate in local refill/ meds to beds program?: Yes    Meds To Beds General Rules:  1. Can ONLY be done Monday- Friday between 8:30am-5pm  2. Prescription(s) must be in pharmacy by 3pm to be filled same day  3. Copy of patient's insurance/ prescription drug card and patient face sheet must be sent along with the prescription(s)  4. Cost of Rx cannot be added to hospital bill. If financial assistance is needed, please contact unit  or ;  or  CANNOT provide pharmacy voucher for patients co-pays  5. Patients can then  the prescription on their way out of the hospital at discharge, or pharmacy can deliver to the bedside if staff is available. (payment due at time of pick-up or delivery - cash, check, or card accepted)     Able to afford home medications/ co-pay costs: Yes    ADLS:  Current PT AM-PAC Score:   /24  Current OT AM-PAC Score:   /24      DISCHARGE Disposition: Home- No Services Needed    LOC at discharge: Not Applicable  ELISA Completed: Yes    Notification completed in HENS/PAS?:  Not Applicable    IMM Completed:   Not Indicated    Transportation:  Transportation PLAN for discharge: family   Mode of Transport: Private Car  Reason for medical transport: Not Applicable  Name of [address] [place] [address], [address]: Not Applicable  Time of Transport: n/a    Transport form completed: Not Indicated    Home Care:  [address] ordered at discharge: Not [address]: Not Applicable  Orders faxed: No    Durable Medical Equipment:  DME Provider: n/a  Equipment obtained during hospitalization: n/a    Home Oxygen and Respiratory Equipment:  Oxygen needed at discharge?: Not [address]: Not Applicable  Portable tank available for discharge?: Not Indicated    Dialysis:  Dialysis patient: Yes    Dialysis Center:  [facility] [address] [facility], [address]  Address: [address]  [facility], [address]  Phone: [phone]    Referrals made at [facility] for outpatient continued care:  Not Applicable    Additional CM Notes: Patient lives independently at home with wife. Patient is active with [facility] T/Th/S schedule. CM informed [facility] of patient's discharge and return to standing chair time. Patient reports no other HHC, DME or CM needs at discharge. Patient's wife will provide transportation home. The Plan for Transition of Care is related to the following treatment goals of Bilateral recurrent inguinal hernia without obstruction or gangrene [K40.21]  Status post bilateral hernia repair [Z37.001, Z87.19]  Inguinal hernia, left [K40.90]    The Patient and/or patient representative Jorden and his family were provided with a choice of provider and agrees with the discharge plan Yes, Not Indicated    Freedom of choice list was provided with basic dialogue that supports the patient's individualized plan of care/goals and shares the quality data associated with the providers.  Yes    Care Transitions patient: No    ZURI Dozier  [facility]  Case Management Department  Ph: [phone]  Fax: [phone]

## 2022-06-16 NOTE — PLAN OF CARE
Problem: Pain  Goal: Verbalizes/displays adequate comfort level or baseline comfort level  Outcome: Progressing  Flowsheets (Taken 6/16/2022 0512)  Verbalizes/displays adequate comfort level or baseline comfort level:   Assess pain using appropriate pain scale   Administer analgesics based on type and severity of pain and evaluate response   Encourage patient to monitor pain and request assistance  Note: Pt c/o surgical pain. Pain managed by prn oxycodone and scheduled Robaxin w/ patient benefit.

## 2022-06-16 NOTE — PROGRESS NOTES
Nephrology  Note                                                                                                                                                                                                                                                                                                                                                               Office : [phone]     Fax :[phone]              Patient's Name: Stephen Browne  6/16/2022    Reason for Consult:  ESRD   Requesting Physician:  Nohemi Hook MD    Pain better   HD today     No diarrhea           Past Medical History:   Diagnosis Date    Arthralgia of multiple joints 10/27/2015    Arthritis     Atrial fibrillation ([address]     Chronic kidney disease     stage 4    Chronic systolic congestive heart failure ([address] 8/16/2021    CRI (chronic renal insufficiency)     Diabetic nephropathy associated with type 2 diabetes mellitus ([address] 10/11/2018    Diverticulosis     Elevated PSA     Erectile dysfunction     ESRD (end stage renal disease) on dialysis ([address] 2/24/2022    Gout     Gout     Hemrrhoid NOS w/ complication NEC     History of MRSA infection     Hypertension     CELSA (obstructive sleep apnea) 07/14/2017    uses C-pap machine    Type 2 diabetes mellitus without complication, without long-term current use of insulin ([address] 7/68/7972    Umbilical hernia without obstruction and without gangrene 2/2/2016       Past Surgical History:   Procedure Laterality Date    COLONOSCOPY      COLONOSCOPY N/A 3/18/2022    COLONOSCOPY POLYPECTOMY SNARE/COLD BIOPSY performed by Triston Williamson MD at [address] N/A 1/17/2022    LAPAROSCOPIC PERITONEAL DIALYSIS CATHETER PLACEMENT and omentopexy performed by Joan Vázquez DO at [address] [place] [address] N/A 4/15/2022    ROBOTIC, RECURRENT INCISIONAL HERNIA REPAIR WITH BIOSYNTHETIC MESH; LAPAROSCOPIC PERITONEAL DIALYSIS CATHETER REVISION WITH LAPAROSCOPIC OMENTOPEXY performed by Odilia Ospina DO at [address]  Bilateral 4/15/2022    BILATERAL OPEN INGUINAL HERNI REPAIR performed by Odilia Ospina DO at [address] Bilateral 6/13/2022    ROBOTIC RECURRENT BILATERAL INGUINAL HERNIA REPAIR, LYSIS OF ADHESIONS, PERITONEAL DIALYSIS CATHETER REMOVAL performed by Odilia Ospina DO at [address] IR TUNNELED [address] 5 YEARS  01/14/2022    IR TUNNELED CATHETER PLACEMENT GREATER THAN 5 YEARS 1/14/2022 [facility] SPECIAL PROCEDURES    UPPER GASTROINTESTINAL ENDOSCOPY  05/28/2016    biopsies, multiple small gastric ulcers    UPPER GASTROINTESTINAL ENDOSCOPY  09/12/2016    UPPER GASTROINTESTINAL ENDOSCOPY N/A 3/18/2022    EGD DIAGNOSTIC ONLY performed by Luisana Nuñez MD at [facility] N/A 1/17/2022    LAPAROSCOPIC incarcerated VENTRAL HERNIA REPAIR performed by Odilia Ospina DO at [facility] OR       Family History   Problem Relation Age of Onset    Hypertension Other     Breast Cancer Mother     Colon Cancer Father     Diabetes Maternal Grandmother     Coronary Art Dis Brother         reports that he has never smoked. He has never used smokeless tobacco. He reports previous alcohol use. He reports that he does not use drugs. Allergies:  Patient has no known allergies.     Current Medications:    magnesium sulfate 2000 mg in 50 mL IVPB premix, Once  polyethylene glycol (GLYCOLAX) packet 17 g, BID  docusate sodium (COLACE) capsule 100 mg, BID  methocarbamol (ROBAXIN) tablet 750 mg, 4x Daily  heparin (porcine) injection 3,200 Units, PRN  atorvastatin (LIPITOR) tablet 20 mg, Daily  busPIRone (BUSPAR) tablet 5 mg, BID  metoprolol succinate (TOPROL XL) extended release tablet 50 mg, Nightly  nortriptyline (PAMELOR) capsule 10 mg, Nightly  pantoprazole (PROTONIX) tablet 40 mg, QAM AC  sodium chloride flush 0.9 % injection 5-40 mL, 2 times per day  sodium chloride flush 0.9 % injection 5-40 mL, PRN  0.9 % sodium chloride infusion, PRN  acetaminophen (TYLENOL) tablet 1,000 mg, 3 times per day  oxyCODONE (ROXICODONE) immediate release tablet 5 mg, Q4H PRN   Or  oxyCODONE (ROXICODONE) immediate release tablet 10 mg, Q4H PRN  ondansetron (ZOFRAN-ODT) disintegrating tablet 4 mg, Q8H PRN   Or  ondansetron (ZOFRAN) injection 4 mg, Q6H PRN  heparin (porcine) injection 5,000 Units, TID        \  Physical exam:     Vitals:  BP (!) 141/88   Pulse 82   Temp 97.5 °F (36.4 °C) (Oral)   Resp 16   Ht 6' 5\" (1.956 m)   Wt 261 lb 3.9 oz (118.5 kg)   SpO2 94%   BMI 30.98 kg/m²   Constitutional:  OAA X3 NAD  Skin: no rash, turgor wnl  Heent:  eomi, mmm  Neck: no bruits or jvd noted  Cardiovascular:  S1, S2 without m/r/g  Respiratory: CTA B without w/r/r  Abdomen:  +bs, soft, nt, nd  Ext: + lower extremity edema  Psychiatric: mood and affect appropriate  Musculoskeletal:  Rom, muscular strength intact    Data:   Labs:  CBC:   Recent Labs     06/14/22  0514 06/15/22  0537 06/16/22  0502   WBC 7.7 5.9 5.4   HGB 12.0* 10.1* 9.9*    181 180     BMP:    Recent Labs     06/14/22  0514 06/15/22  0537 06/16/22  0502    138 139   K 3.8 3.2* 3.3*    100 100   CO2 21 25 24   BUN 31* 23* 30*   CREATININE 6.0* 5.9* 6.7*   GLUCOSE 140* 117* 138*     Ca/Mg/Phos:   Recent Labs     06/14/22  0514 06/15/22  0537 06/16/22  0502   CALCIUM 7.8* 7.7* 7.9*   MG 1.50* 1.60* 1.80   PHOS 3.7 2.6 2.9     Hepatic: No results for input(s): AST, ALT, ALB, BILITOT, ALKPHOS in the last 72 hours. Troponin: No results for input(s): TROPONINI in the last 72 hours. BNP: No results for input(s): BNP in the last 72 hours. Lipids: No results for input(s): CHOL, TRIG, HDL, LDLCALC, LABVLDL in the last 72 hours. ABGs: No results for input(s): PHART, PO2ART, CCA2ZCN in the last 72 hours. INR: No results for input(s): INR in the last 72 hours.   UA:No results for input(s): Jackie Candice, BLOODU, PHUR, PROTEINU, UROBILINOGEN, NITRU, LEUKOCYTESUR, Yolanda Jose in the last 72 hours. Urine Microscopic: No results for input(s): LABCAST, BACTERIA, COMU, HYALCAST, WBCUA, RBCUA, EPIU in the last 72 hours. Urine Culture: No results for input(s): LABURIN in the last 72 hours. Urine Chemistry: No results for input(s): Debbe Ao, PROTEINUR, NAUR in the last 72 hours. IMAGING:  VL PRE OP VEIN MAPPING    (Results Pending)       Assessment/Plan   1. ESRD     2. HTN    3. Anemia    4. Acid- base/ Electrolyte imbalance     5.  Hernia     Plan  - HD today  - Vein mapping   - UF as carlton   - Anemia management   - MBD management   - labs in am                 Thank you for allowing us to participate in care of Barber Cancino MD  Feel free to contact me   [facility] [address]  Office : [phone]  Fax :[phone]

## 2022-06-17 ENCOUNTER — TELEPHONE (OUTPATIENT)
Dept: FAMILY MEDICINE CLINIC | Age: 62
End: 2022-06-17

## 2022-06-17 NOTE — PROGRESS NOTES
Physician Progress Note      Catracho Hernandez  CSN #:                  [id number]  :                       1960  ADMIT DATE:       2022 11:54 AM  DISCH DATE:        2022 4:31 PM  RESPONDING  PROVIDER #:        Lexi Kaur DO          QUERY TEXT:    Patient admitted with Bilateral recurrent inguinal hernia without obstruction;   s/p robotic recurrent bilateral inguinal hernia repair, lysis of adhesion, PD   catheter . If possible, please document in progress notes and discharge   summary if you are evaluating and /or treating any of the following: The medical record reflects the following:  Risk Factors: post op  robotic recurrent bilateral inguinal hernia repair  Clinical Indicators: per dietician: Energy Intake: Less than/equal to 75% of   estimated energy requirements greater than or equal to 1  month;   Weight Loss %: 10% loss or greater greater than or equal to 1 month ;    \"Moderate malnutrition related to catabolic illness as  evidenced by dialysis\"  Treatment: Start Nepro QD; monitoring of PO intakes and nutrition status   throughout stay    ASPEN Criteria:    https://aspenjournals. onlinelibrary. crenshaw. com/doi/full/10.1177/163422032448544  5  Options provided:  -- Protein calorie malnutrition moderate  -- Protein calorie malnutrition mild  -- Underweight with BMI ***  -- Other - I will add my own diagnosis  -- Disagree - Not applicable / Not valid  -- Disagree - Clinically unable to determine / Unknown  -- Refer to Clinical Documentation Reviewer    PROVIDER RESPONSE TEXT:    This patient has mild protein calorie malnutrition.     Query created by: Lex Alonso on 2022 3:19 PM      Electronically signed by:  Lexi Kaur DO 2022 6:54 AM

## 2022-06-17 NOTE — TELEPHONE ENCOUNTER
[facility] Transitions Initial Follow Up Call    Outreach made within 2 business days of discharge: Yes    Patient: Gucci Tripp Patient : 1960   MRN: [id number]  Reason for Admission: There are no discharge diagnoses documented for the most recent discharge. Discharge Date: 22       Spoke with: Arthur Knapp    Discharge department/facility: [facility] Interactive Patient Contact:  Was patient able to fill all prescriptions: Yes, nothing given   Was patient instructed to bring all medications to the follow-up visit: Yes  Is patient taking all medications as directed in the discharge summary?  Yes  Does patient understand their discharge instructions: Yes  Does patient have questions or concerns that need addressed prior to 7-14 day follow up office visit: yes -     Scheduled appointment with PCP within 7-14 days    Follow Up  Future Appointments   Date Time Provider Diego Francois   2022 10:10 AM Davon Jackson MD [address] [place] [address]       Patricia Estevez MA

## 2022-06-17 NOTE — DISCHARGE SUMMARY
Physician Discharge Summary     Patient ID:  Nano Bernal  [id number]  59 y.o.  1960    Admit date: 6/13/2022    Discharge date and time: 6/16/2022  4:31 PM     Admitting Physician:  Lauren Mendez DO     Discharge Physician: same    Admission Diagnoses: Bilateral recurrent inguinal hernia without obstruction or gangrene [K40.21]  Status post bilateral hernia repair [Z98.890, Z87.19]  Inguinal hernia, left [K40.90]  ESRD  Patient Active Problem List   Diagnosis    [facility]    CRI (chronic renal insufficiency)    Gout    Erectile dysfunction    Elevated PSA    Essential hypertension, benign    Headache    Diverticulosis    Arthralgia of multiple joints    Umbilical hernia without obstruction and without gangrene    Knee pain, bilateral    Alcohol use disorder, mild, abuse    CELSA (obstructive sleep apnea)    Duodenal ulcer disease    Diabetic nephropathy associated with type 2 diabetes mellitus ([facility]    Type 2 diabetes mellitus with diabetic nephropathy, without long-term current use of insulin (HCC)    Lateral epicondylitis of right elbow    Chronic bilateral low back pain without sciatica    Morbid obesity due to excess calories ([facility])    Chronic systolic congestive heart failure (HCC)    Acute kidney injury superimposed on CKD (HCC)    Stroke-like symptoms    DMII (diabetes mellitus, type 2) ([facility])    Hyperlipidemia    Hypocalcemia    Hypomagnesemia    Hypokalemia    Muscle cramps    ANTONIO (acute kidney injury) ([address]    Electrolyte imbalance    Anemia due to stage 5 chronic kidney disease (HCC)    Congestive heart failure (HCC)    LV dysfunction    ESRD (end stage renal disease) on dialysis ([address]    Bilateral recurrent inguinal hernia without obstruction or gangrene    Recurrent incisional hernia with incarceration    Bilateral inguinal hernia without obstruction or gangrene    Status post bilateral hernia repair    Inguinal hernia, left    Moderate malnutrition [facility])       Discharge Diagnoses: same    Admission Condition: fair    Discharged Condition: stable    Indication for Admission: surgery    Hospital Course: 59year old male admitted to the hospital with recurrent inguinal hernia without obstruction or gangrene that underwent Robotic recurrent bilateral inguinal hernia repair, lysis of adhesions and peritoneal dialysis catheter removal.  Surgery was uneventful but post operatively he struggled with adequate pain control. He was tolerating a general diet without any associated nausea or vomiting and did have return of GI function prior to discharge. He just continued to have some pain control issues. While here he also received hemodialysis for his end stage renal disease. He also had vein mapping done for evaluation of vessels for possible fistula. Finally in the afternoon of post operative day three his pain was better controlled and he was discharged home in stable condition. Outstanding Order Results     Date and Time Order Name Status Description    6/16/2022 10:33 AM VL VESSEL MAPPING PRIOR TO HEMODIALYSIS ACCESS Preliminary               Disposition: home    Patient Instructions: Activity: no driving while on analgesics and no heavy lifting for 6 weeks  Diet: renal diet  Wound Care: as directed    Follow-up with Dr. Mikal Solorio in two weeks.       Signed:  MIGUEL Stockton CNP  6/17/2022  1:49 PM

## 2022-06-28 PROBLEM — M19.90 ARTHRITIS: Status: ACTIVE | Noted: 2022-04-05

## 2022-06-28 PROBLEM — N40.0 BPH (BENIGN PROSTATIC HYPERPLASIA): Status: ACTIVE | Noted: 2020-10-05

## 2022-06-28 PROBLEM — N40.0 PROSTATISM: Status: ACTIVE | Noted: 2020-09-22

## 2022-06-28 PROBLEM — R31.29 HEMATURIA, MICROSCOPIC: Status: ACTIVE | Noted: 2020-09-22

## 2022-06-28 RX ORDER — POLYETHYLENE GLYCOL 3350 17 G/17G
POWDER, FOR SOLUTION ORAL
COMMUNITY
Start: 2022-03-24 | End: 2022-09-26

## 2022-06-28 RX ORDER — CLOTRIMAZOLE 1 G/ML
SOLUTION TOPICAL
COMMUNITY
Start: 2022-04-27 | End: 2022-08-31

## 2022-07-05 ENCOUNTER — OFFICE VISIT (OUTPATIENT)
Dept: VASCULAR SURGERY | Age: 62
End: 2022-07-05
Payer: MEDICAID

## 2022-07-05 VITALS
DIASTOLIC BLOOD PRESSURE: 99 MMHG | HEIGHT: 77 IN | BODY MASS INDEX: 30.94 KG/M2 | HEART RATE: 82 BPM | WEIGHT: 262 LBS | SYSTOLIC BLOOD PRESSURE: 161 MMHG

## 2022-07-05 DIAGNOSIS — Z99.2 ESRD (END STAGE RENAL DISEASE) ON DIALYSIS (HCC): Primary | ICD-10-CM

## 2022-07-05 DIAGNOSIS — N18.6 ESRD (END STAGE RENAL DISEASE) ON DIALYSIS (HCC): Primary | ICD-10-CM

## 2022-07-05 PROCEDURE — 3017F COLORECTAL CA SCREEN DOC REV: CPT | Performed by: SURGERY

## 2022-07-05 PROCEDURE — G8417 CALC BMI ABV UP PARAM F/U: HCPCS | Performed by: SURGERY

## 2022-07-05 PROCEDURE — 1111F DSCHRG MED/CURRENT MED MERGE: CPT | Performed by: SURGERY

## 2022-07-05 PROCEDURE — 1036F TOBACCO NON-USER: CPT | Performed by: SURGERY

## 2022-07-05 PROCEDURE — 99204 OFFICE O/P NEW MOD 45 MIN: CPT | Performed by: SURGERY

## 2022-07-05 PROCEDURE — G8427 DOCREV CUR MEDS BY ELIG CLIN: HCPCS | Performed by: SURGERY

## 2022-07-05 NOTE — PROGRESS NOTES
[facility] Vascular Surgery  Lindsey Arreola MD, [facility], [address]    DIALYSIS CONSULTATION    Date of Consultation:  7/5/2022    PCP:  Julian Brown MD       Chief Complaint: ESRD    Nephrologist:  Janak Canada     Prior AVaccess:  none    Tunneled Catheters:  R IJ    Dialysis schedule:  [facility]--Tues, Thurs, Sat    Hand Dominance:  Right    History of Present Illness:    Jodi Mckeon is a 59 y.o. male who presents today for evaluation for permanent AV access. He was first started on dialysis in January with peritoneal but unfortunately, developed significant scrotal swelling and was found to have bilateral inguinal hernias. He subsequently underwent hernia repair successfully in April. He has been undergoing hemodialysis by tunneled catheter since then. The tunnel catheter was actually placed in January. No problems currently with dialysis. Continues to make urine. Denies any abdominal, back, or chest pain. 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 Otherwise in fairly good state of health. He does have a history of uncontrolled hypertension. \"Borderline diabetic\" but HgbA1c is 7%.       Personally reviewed images the following study:  VL VESSEL MAPPING PRIOR TO HEMODIALYSIS ACCESS 6/16/22    Past Medical History:  Past Medical History:   Diagnosis Date    Arthralgia of multiple joints 10/27/2015    Arthritis     Atrial fibrillation (HCC)     Chronic kidney disease     stage 4    Chronic systolic congestive heart failure ([address] 8/16/2021    CRI (chronic renal insufficiency)     Diabetic nephropathy associated with type 2 diabetes mellitus ([address] 10/11/2018    Diverticulosis     Elevated PSA     Erectile dysfunction     ESRD (end stage renal disease) on dialysis ([address] 2/24/2022    Gout     Gout     Hemrrhoid NOS w/ complication NEC     History of MRSA infection     Hypertension     CELSA (obstructive sleep apnea) 07/14/2017    uses C-pap machine    Type 2 (LOTRIMIN) 1 % external solution 1 APPLICATION TO NAILS DAILY 4/27/22  Yes Historical Provider, MD   polyethylene glycol (GLYCOLAX) 17 GM/SCOOP powder TAKE 17 G BY MOUTH 2 TIMES DAILY 3/24/22  Yes Historical Provider, MD   NIFEdipine (ADALAT CC) 90 MG extended release tablet Take 90 mg by mouth daily 5/19/22  Yes Historical Provider, MD   vitamin D (ERGOCALCIFEROL) 1.25 MG (65299 UT) CAPS capsule Take 1 capsule by mouth once a week 6/21/22  Yes MIGUEL Hernandez CNP   torsemide [facility]) 100 MG tablet Take 1 tablet by mouth daily 6/21/22  Yes MIGUEL Hernandez CNP   calcitRIOL (ROCALTROL) 0.5 MCG capsule Take 1 capsule by mouth three times a week During HD 6/22/22  Yes MIGUEL Hernandez CNP   oxyCODONE-acetaminophen (PERCOCET) 5-325 MG per tablet Take 1 tablet by mouth every 8 hours as needed for Pain for up to 30 days.  6/13/22 7/13/22 Yes Obdulio Rosales MD   busPIRone (BUSPAR) 5 MG tablet TAKE 1 TABLET BY MOUTH TWICE A DAY 5/2/22  Yes Gracia Chance MD   docusate sodium (COLACE) 100 MG capsule Take 1 capsule by mouth 2 times daily 4/15/22  Yes Mitchel Cote MD   B Complex-C-Folic Acid (DIALYVITE 683 PO) Take 1 tablet by mouth once a week    Yes Historical Provider, MD   Methoxy PEG-Epoetin Beta (MIRCERA) 50 MCG/0.3ML SOSY Inject as directed Twice a  month    Yes Historical Provider, MD   Iron Sucrose (VENOFER IV) Infuse intravenously every 30 days    Yes Historical Provider, MD   bisacodyl (BISACODYL) 5 MG EC tablet Take 1 tablet by mouth daily as needed for Constipation 1/25/22  Yes Gracia Chance MD   spironolactone (ALDACTONE) 50 MG tablet Take 1 tablet by mouth at bedtime 1/25/22  Yes Gracia Chance MD   metoprolol succinate (TOPROL XL) 50 MG extended release tablet Take 1 tablet by mouth at bedtime TAKE 1 TABLET BY MOUTH EVERY DAY 1/25/22  Yes Gracia Chance MD   atorvastatin (LIPITOR) 20 MG tablet TAKE 1 TABLET BY MOUTH EVERY DAY 1/25/22  Yes Gracia Chance MD   nortriptyline (PAMELOR) 10 MG capsule Take 10 mg by mouth nightly  12/15/21  Yes Historical Provider, MD   omeprazole (PRILOSEC) 40 MG delayed release capsule TAKE 1 CAPSULE BY MOUTH EVERY DAY 12/16/21  Yes Benjamin Hamilton MD   sildenafil (VIAGRA) 100 MG tablet Take 1 tablet by mouth as needed for Erectile Dysfunction 8/16/21  Yes Benjamin Hamilton MD   aspirin 81 MG tablet Take 81 mg by mouth daily   Yes Historical Provider, MD        Allergies:  Patient has no known allergies. Social History:    Social History     Socioeconomic History    Marital status:      Spouse name: Not on file    Number of children: 11    Years of education: Not on file    Highest education level: Not on file   Occupational History    Occupation: Senior Field Cost Admin. Employer: [employer]   Tobacco Use    Smoking status: Never Smoker    Smokeless tobacco: Never Used   Vaping Use    Vaping Use: Never used   Substance and Sexual Activity    Alcohol use: Not Currently     Comment: maybe a beer a weekend    Drug use: No    Sexual activity: Yes     Partners: Female     Comment:    Other Topics Concern    Not on file   Social History Narrative    ** Merged History Encounter **          Social Determinants of Health     Financial Resource Strain:     Difficulty of Paying Living Expenses: Not on file   Food Insecurity:     Worried About Running Out of Food in the Last Year: Not on file    Gisselle of Food in the Last Year: Not on file   Transportation Needs:     Lack of Transportation (Medical): Not on file    Lack of Transportation (Non-Medical):  Not on file   Physical Activity:     Days of Exercise per Week: Not on file    Minutes of Exercise per Session: Not on file   Stress:     Feeling of Stress : Not on file   Social Connections:     Frequency of Communication with Friends and Family: Not on file    Frequency of Social Gatherings with Friends and Family: Not on file    Attends Restorationism Services: Not on file  Active Member of Clubs or Organizations: Not on file    Attends Club or Organization Meetings: Not on file    Marital Status: Not on file   Intimate Partner Violence:     Fear of Current or Ex-Partner: Not on file    Emotionally Abused: Not on file    Physically Abused: Not on file    Sexually Abused: Not on file   Housing Stability:     Unable to Pay for Housing in the Last Year: Not on file    Number of [place] in the Last Year: Not on file    Unstable Housing in the Last Year: Not on file       Review of Systems:  Pertinent positive and negativeitems are noted in the HPI. A comprehensive review of all other symptoms is otherwise negative. Physical Examination:    Vitals:    07/05/22 0837   BP: (!) 161/99   Pulse: 82   Weight: 262 lb (118.8 kg)   Height: 6' 5\" (1.956 m)     Body mass index is 31.07 kg/m². Physical Exam  Constitutional:       General: He is awake. Appearance: Normal appearance. He is well-developed, well-groomed and normal weight. Eyes:      General: No scleral icterus. Extraocular Movements: Extraocular movements intact. Conjunctiva/sclera: Conjunctivae normal.      Pupils: Pupils are equal, round, and reactive to light. Neck:      Thyroid: No thyroid mass. Vascular: No carotid bruit or JVD. Comments: R IJ tunneled catheter in place. Cardiovascular:      Rate and Rhythm: Normal rate and regular rhythm. Heart sounds: No murmur heard. No friction rub. No gallop. Pulmonary:      Effort: Pulmonary effort is normal. No respiratory distress. Breath sounds: Normal breath sounds. No stridor. No wheezing, rhonchi or rales. Abdominal:      General: Abdomen is flat. Bowel sounds are normal. There is no distension. Palpations: Abdomen is soft. There is no mass. Tenderness: There is no abdominal tenderness. There is no guarding. Musculoskeletal:         General: No swelling or deformity. Normal range of motion.       Cervical

## 2022-07-06 ENCOUNTER — TELEPHONE (OUTPATIENT)
Dept: OTHER | Facility: CLINIC | Age: 62
End: 2022-07-06

## 2022-07-06 ENCOUNTER — TELEPHONE (OUTPATIENT)
Dept: VASCULAR SURGERY | Age: 62
End: 2022-07-06

## 2022-07-06 ENCOUNTER — HOSPITAL ENCOUNTER (INPATIENT)
Age: 62
LOS: 1 days | Discharge: HOME | DRG: 393 | End: 2022-07-07
Attending: EMERGENCY MEDICINE | Admitting: INTERNAL MEDICINE
Payer: MEDICARE

## 2022-07-06 DIAGNOSIS — R10.32 GROIN PAIN, LEFT: ICD-10-CM

## 2022-07-06 DIAGNOSIS — R10.9 INTRACTABLE ABDOMINAL PAIN: Primary | ICD-10-CM

## 2022-07-06 LAB
ABO/RH: NORMAL
ALBUMIN SERPL-MCNC: 4 G/DL (ref 3.4–5)
ANION GAP SERPL CALCULATED.3IONS-SCNC: 14 MMOL/L (ref 3–16)
ANTIBODY SCREEN: NORMAL
BACTERIA: ABNORMAL /HPF
BASOPHILS ABSOLUTE: 0 K/UL (ref 0–0.2)
BASOPHILS RELATIVE PERCENT: 0.7 %
BILIRUBIN URINE: NEGATIVE
BLOOD, URINE: ABNORMAL
BUN BLDV-MCNC: 19 MG/DL (ref 7–20)
CALCIUM SERPL-MCNC: 8.3 MG/DL (ref 8.3–10.6)
CHLORIDE BLD-SCNC: 103 MMOL/L (ref 99–110)
CLARITY: CLEAR
CO2: 22 MMOL/L (ref 21–32)
COLOR: YELLOW
CREAT SERPL-MCNC: 5.2 MG/DL (ref 0.8–1.3)
EOSINOPHILS ABSOLUTE: 0.2 K/UL (ref 0–0.6)
EOSINOPHILS RELATIVE PERCENT: 4.8 %
EPITHELIAL CELLS, UA: ABNORMAL /HPF (ref 0–5)
GFR AFRICAN AMERICAN: 14
GFR NON-AFRICAN AMERICAN: 11
GLUCOSE BLD-MCNC: 127 MG/DL (ref 70–99)
GLUCOSE URINE: NEGATIVE MG/DL
HCT VFR BLD CALC: 33.4 % (ref 40.5–52.5)
HEMOGLOBIN: 10.8 G/DL (ref 13.5–17.5)
KETONES, URINE: ABNORMAL MG/DL
LACTIC ACID: 1.2 MMOL/L (ref 0.4–2)
LEUKOCYTE ESTERASE, URINE: NEGATIVE
LYMPHOCYTES ABSOLUTE: 1.3 K/UL (ref 1–5.1)
LYMPHOCYTES RELATIVE PERCENT: 27.7 %
MCH RBC QN AUTO: 27.2 PG (ref 26–34)
MCHC RBC AUTO-ENTMCNC: 32.4 G/DL (ref 31–36)
MCV RBC AUTO: 84 FL (ref 80–100)
MICROSCOPIC EXAMINATION: YES
MONOCYTES ABSOLUTE: 0.6 K/UL (ref 0–1.3)
MONOCYTES RELATIVE PERCENT: 13.7 %
MUCUS: ABNORMAL /LPF
NEUTROPHILS ABSOLUTE: 2.5 K/UL (ref 1.7–7.7)
NEUTROPHILS RELATIVE PERCENT: 53.1 %
NITRITE, URINE: NEGATIVE
PDW BLD-RTO: 18.4 % (ref 12.4–15.4)
PH UA: 6.5 (ref 5–8)
PHOSPHORUS: 2 MG/DL (ref 2.5–4.9)
PLATELET # BLD: 178 K/UL (ref 135–450)
PMV BLD AUTO: 7.4 FL (ref 5–10.5)
POTASSIUM SERPL-SCNC: 3.4 MMOL/L (ref 3.5–5.1)
PRO-BNP: 1019 PG/ML (ref 0–124)
PROTEIN UA: >=300 MG/DL
RBC # BLD: 3.97 M/UL (ref 4.2–5.9)
RBC UA: ABNORMAL /HPF (ref 0–4)
SODIUM BLD-SCNC: 139 MMOL/L (ref 136–145)
SPECIFIC GRAVITY UA: 1.02 (ref 1–1.03)
URINE TYPE: ABNORMAL
UROBILINOGEN, URINE: 0.2 E.U./DL
WBC # BLD: 4.6 K/UL (ref 4–11)
WBC UA: ABNORMAL /HPF (ref 0–5)

## 2022-07-06 PROCEDURE — 86900 BLOOD TYPING SEROLOGIC ABO: CPT

## 2022-07-06 PROCEDURE — 86850 RBC ANTIBODY SCREEN: CPT

## 2022-07-06 PROCEDURE — 83880 ASSAY OF NATRIURETIC PEPTIDE: CPT

## 2022-07-06 PROCEDURE — 86901 BLOOD TYPING SEROLOGIC RH(D): CPT

## 2022-07-06 PROCEDURE — 81001 URINALYSIS AUTO W/SCOPE: CPT

## 2022-07-06 PROCEDURE — 6360000002 HC RX W HCPCS: Performed by: STUDENT IN AN ORGANIZED HEALTH CARE EDUCATION/TRAINING PROGRAM

## 2022-07-06 PROCEDURE — 96375 TX/PRO/DX INJ NEW DRUG ADDON: CPT

## 2022-07-06 PROCEDURE — 85025 COMPLETE CBC W/AUTO DIFF WBC: CPT

## 2022-07-06 PROCEDURE — 99284 EMERGENCY DEPT VISIT MOD MDM: CPT

## 2022-07-06 PROCEDURE — 80069 RENAL FUNCTION PANEL: CPT

## 2022-07-06 PROCEDURE — 36415 COLL VENOUS BLD VENIPUNCTURE: CPT

## 2022-07-06 PROCEDURE — 96374 THER/PROPH/DIAG INJ IV PUSH: CPT

## 2022-07-06 PROCEDURE — 2580000003 HC RX 258: Performed by: STUDENT IN AN ORGANIZED HEALTH CARE EDUCATION/TRAINING PROGRAM

## 2022-07-06 PROCEDURE — 83605 ASSAY OF LACTIC ACID: CPT

## 2022-07-06 RX ORDER — SODIUM CHLORIDE, SODIUM LACTATE, POTASSIUM CHLORIDE, CALCIUM CHLORIDE 600; 310; 30; 20 MG/100ML; MG/100ML; MG/100ML; MG/100ML
INJECTION, SOLUTION INTRAVENOUS CONTINUOUS
Status: DISCONTINUED | OUTPATIENT
Start: 2022-07-06 | End: 2022-07-08 | Stop reason: HOSPADM

## 2022-07-06 RX ORDER — KETOROLAC TROMETHAMINE 30 MG/ML
15 INJECTION, SOLUTION INTRAMUSCULAR; INTRAVENOUS ONCE
Status: COMPLETED | OUTPATIENT
Start: 2022-07-06 | End: 2022-07-06

## 2022-07-06 RX ADMIN — KETOROLAC TROMETHAMINE 15 MG: 30 INJECTION, SOLUTION INTRAMUSCULAR at 21:52

## 2022-07-06 RX ADMIN — SODIUM CHLORIDE, SODIUM LACTATE, POTASSIUM CHLORIDE, AND CALCIUM CHLORIDE: .6; .31; .03; .02 INJECTION, SOLUTION INTRAVENOUS at 23:04

## 2022-07-06 RX ADMIN — HYDROMORPHONE HYDROCHLORIDE 0.5 MG: 1 INJECTION, SOLUTION INTRAMUSCULAR; INTRAVENOUS; SUBCUTANEOUS at 23:45

## 2022-07-06 ASSESSMENT — PAIN - FUNCTIONAL ASSESSMENT
PAIN_FUNCTIONAL_ASSESSMENT: 0-10
PAIN_FUNCTIONAL_ASSESSMENT: ACTIVITIES ARE NOT PREVENTED

## 2022-07-06 ASSESSMENT — PAIN DESCRIPTION - PAIN TYPE: TYPE: ACUTE PAIN

## 2022-07-06 ASSESSMENT — PAIN DESCRIPTION - FREQUENCY: FREQUENCY: CONTINUOUS

## 2022-07-06 ASSESSMENT — PAIN SCALES - GENERAL
PAINLEVEL_OUTOF10: 10

## 2022-07-06 ASSESSMENT — PAIN DESCRIPTION - ONSET: ONSET: ON-GOING

## 2022-07-06 ASSESSMENT — PAIN DESCRIPTION - DESCRIPTORS: DESCRIPTORS: SHARP

## 2022-07-06 ASSESSMENT — PAIN DESCRIPTION - ORIENTATION: ORIENTATION: LEFT

## 2022-07-06 ASSESSMENT — PAIN DESCRIPTION - LOCATION: LOCATION: GROIN

## 2022-07-06 NOTE — TELEPHONE ENCOUNTER
Radha Crouch with prior authorization called needing a CPT Code for surgery on 7/29.     Please call: [phone]

## 2022-07-07 ENCOUNTER — APPOINTMENT (OUTPATIENT)
Dept: ULTRASOUND IMAGING | Age: 62
DRG: 393 | End: 2022-07-07
Payer: MEDICARE

## 2022-07-07 VITALS
RESPIRATION RATE: 18 BRPM | SYSTOLIC BLOOD PRESSURE: 163 MMHG | BODY MASS INDEX: 30.7 KG/M2 | DIASTOLIC BLOOD PRESSURE: 97 MMHG | HEART RATE: 81 BPM | TEMPERATURE: 98 F | WEIGHT: 260 LBS | HEIGHT: 77 IN | OXYGEN SATURATION: 97 %

## 2022-07-07 PROBLEM — R10.9 INTRACTABLE ABDOMINAL PAIN: Status: ACTIVE | Noted: 2022-07-07

## 2022-07-07 PROCEDURE — 93975 VASCULAR STUDY: CPT

## 2022-07-07 PROCEDURE — 6360000002 HC RX W HCPCS: Performed by: INTERNAL MEDICINE

## 2022-07-07 PROCEDURE — 96372 THER/PROPH/DIAG INJ SC/IM: CPT

## 2022-07-07 PROCEDURE — 96375 TX/PRO/DX INJ NEW DRUG ADDON: CPT

## 2022-07-07 PROCEDURE — 6360000002 HC RX W HCPCS: Performed by: STUDENT IN AN ORGANIZED HEALTH CARE EDUCATION/TRAINING PROGRAM

## 2022-07-07 PROCEDURE — G0378 HOSPITAL OBSERVATION PER HR: HCPCS

## 2022-07-07 PROCEDURE — 6370000000 HC RX 637 (ALT 250 FOR IP): Performed by: INTERNAL MEDICINE

## 2022-07-07 PROCEDURE — 96365 THER/PROPH/DIAG IV INF INIT: CPT

## 2022-07-07 PROCEDURE — 76870 US EXAM SCROTUM: CPT

## 2022-07-07 PROCEDURE — 1200000000 HC SEMI PRIVATE

## 2022-07-07 PROCEDURE — 99283 EMERGENCY DEPT VISIT LOW MDM: CPT | Performed by: INTERNAL MEDICINE

## 2022-07-07 PROCEDURE — 3E0T3BZ INTRODUCTION OF ANESTHETIC AGENT INTO PERIPHERAL NERVES AND PLEXI, PERCUTANEOUS APPROACH: ICD-10-PCS | Performed by: FAMILY MEDICINE

## 2022-07-07 PROCEDURE — 96376 TX/PRO/DX INJ SAME DRUG ADON: CPT

## 2022-07-07 RX ORDER — ACETAMINOPHEN 650 MG/1
650 SUPPOSITORY RECTAL EVERY 6 HOURS PRN
Status: DISCONTINUED | OUTPATIENT
Start: 2022-07-07 | End: 2022-07-08 | Stop reason: HOSPADM

## 2022-07-07 RX ORDER — LIDOCAINE 50 MG/G
1 PATCH TOPICAL DAILY
Qty: 15 PATCH | Refills: 0 | Status: SHIPPED | OUTPATIENT
Start: 2022-07-07 | End: 2022-07-22

## 2022-07-07 RX ORDER — OXYCODONE HYDROCHLORIDE 5 MG/1
5 TABLET ORAL EVERY 6 HOURS PRN
Qty: 28 TABLET | Refills: 0 | Status: SHIPPED | OUTPATIENT
Start: 2022-07-07 | End: 2022-07-14

## 2022-07-07 RX ORDER — BUPIVACAINE HYDROCHLORIDE 5 MG/ML
INJECTION, SOLUTION EPIDURAL; INTRACAUDAL
Status: DISCONTINUED
Start: 2022-07-07 | End: 2022-07-07 | Stop reason: HOSPADM

## 2022-07-07 RX ORDER — ONDANSETRON 2 MG/ML
4 INJECTION INTRAMUSCULAR; INTRAVENOUS EVERY 6 HOURS PRN
Status: DISCONTINUED | OUTPATIENT
Start: 2022-07-07 | End: 2022-07-08 | Stop reason: HOSPADM

## 2022-07-07 RX ORDER — POLYETHYLENE GLYCOL 3350 17 G/17G
17 POWDER, FOR SOLUTION ORAL DAILY PRN
Status: DISCONTINUED | OUTPATIENT
Start: 2022-07-07 | End: 2022-07-08 | Stop reason: HOSPADM

## 2022-07-07 RX ORDER — ONDANSETRON 4 MG/1
4 TABLET, ORALLY DISINTEGRATING ORAL EVERY 8 HOURS PRN
Status: DISCONTINUED | OUTPATIENT
Start: 2022-07-07 | End: 2022-07-08 | Stop reason: HOSPADM

## 2022-07-07 RX ORDER — SODIUM CHLORIDE 0.9 % (FLUSH) 0.9 %
5-40 SYRINGE (ML) INJECTION EVERY 12 HOURS SCHEDULED
Status: DISCONTINUED | OUTPATIENT
Start: 2022-07-07 | End: 2022-07-08 | Stop reason: HOSPADM

## 2022-07-07 RX ORDER — ACETAMINOPHEN 325 MG/1
650 TABLET ORAL EVERY 6 HOURS PRN
Status: DISCONTINUED | OUTPATIENT
Start: 2022-07-07 | End: 2022-07-08 | Stop reason: HOSPADM

## 2022-07-07 RX ORDER — POTASSIUM CHLORIDE 7.45 MG/ML
10 INJECTION INTRAVENOUS
Status: DISPENSED | OUTPATIENT
Start: 2022-07-07 | End: 2022-07-07

## 2022-07-07 RX ORDER — SODIUM CHLORIDE 0.9 % (FLUSH) 0.9 %
5-40 SYRINGE (ML) INJECTION PRN
Status: DISCONTINUED | OUTPATIENT
Start: 2022-07-07 | End: 2022-07-08 | Stop reason: HOSPADM

## 2022-07-07 RX ORDER — SODIUM CHLORIDE 9 MG/ML
INJECTION, SOLUTION INTRAVENOUS PRN
Status: DISCONTINUED | OUTPATIENT
Start: 2022-07-07 | End: 2022-07-08 | Stop reason: HOSPADM

## 2022-07-07 RX ORDER — OXYCODONE HYDROCHLORIDE 5 MG/1
5 TABLET ORAL EVERY 4 HOURS PRN
Status: DISCONTINUED | OUTPATIENT
Start: 2022-07-07 | End: 2022-07-08 | Stop reason: HOSPADM

## 2022-07-07 RX ORDER — HEPARIN SODIUM 5000 [USP'U]/ML
5000 INJECTION, SOLUTION INTRAVENOUS; SUBCUTANEOUS EVERY 8 HOURS SCHEDULED
Status: DISCONTINUED | OUTPATIENT
Start: 2022-07-07 | End: 2022-07-08 | Stop reason: HOSPADM

## 2022-07-07 RX ADMIN — OXYCODONE 5 MG: 5 TABLET ORAL at 05:20

## 2022-07-07 RX ADMIN — HEPARIN SODIUM 5000 UNITS: 5000 INJECTION INTRAVENOUS; SUBCUTANEOUS at 13:04

## 2022-07-07 RX ADMIN — OXYCODONE 5 MG: 5 TABLET ORAL at 14:25

## 2022-07-07 RX ADMIN — HYDROMORPHONE HYDROCHLORIDE 0.5 MG: 1 INJECTION, SOLUTION INTRAMUSCULAR; INTRAVENOUS; SUBCUTANEOUS at 02:59

## 2022-07-07 RX ADMIN — HYDROMORPHONE HYDROCHLORIDE 0.5 MG: 1 INJECTION, SOLUTION INTRAMUSCULAR; INTRAVENOUS; SUBCUTANEOUS at 10:13

## 2022-07-07 RX ADMIN — HYDROMORPHONE HYDROCHLORIDE 0.5 MG: 1 INJECTION, SOLUTION INTRAMUSCULAR; INTRAVENOUS; SUBCUTANEOUS at 13:31

## 2022-07-07 RX ADMIN — POTASSIUM CHLORIDE 10 MEQ: 7.46 INJECTION, SOLUTION INTRAVENOUS at 06:15

## 2022-07-07 RX ADMIN — HEPARIN SODIUM 5000 UNITS: 5000 INJECTION INTRAVENOUS; SUBCUTANEOUS at 06:17

## 2022-07-07 RX ADMIN — OXYCODONE 5 MG: 5 TABLET ORAL at 09:40

## 2022-07-07 RX ADMIN — HYDROMORPHONE HYDROCHLORIDE 0.5 MG: 1 INJECTION, SOLUTION INTRAMUSCULAR; INTRAVENOUS; SUBCUTANEOUS at 07:40

## 2022-07-07 ASSESSMENT — PAIN DESCRIPTION - ORIENTATION
ORIENTATION: LEFT

## 2022-07-07 ASSESSMENT — PAIN SCALES - GENERAL
PAINLEVEL_OUTOF10: 5
PAINLEVEL_OUTOF10: 6
PAINLEVEL_OUTOF10: 7
PAINLEVEL_OUTOF10: 10
PAINLEVEL_OUTOF10: 5
PAINLEVEL_OUTOF10: 9
PAINLEVEL_OUTOF10: 7
PAINLEVEL_OUTOF10: 8

## 2022-07-07 ASSESSMENT — PAIN DESCRIPTION - DESCRIPTORS
DESCRIPTORS: ACHING;SHARP
DESCRIPTORS: ACHING
DESCRIPTORS: ACHING
DESCRIPTORS: ACHING;DISCOMFORT
DESCRIPTORS: ACHING
DESCRIPTORS: ACHING;DISCOMFORT

## 2022-07-07 ASSESSMENT — PAIN DESCRIPTION - ONSET: ONSET: ON-GOING

## 2022-07-07 ASSESSMENT — PAIN DESCRIPTION - FREQUENCY: FREQUENCY: CONTINUOUS

## 2022-07-07 ASSESSMENT — PAIN DESCRIPTION - PAIN TYPE: TYPE: ACUTE PAIN

## 2022-07-07 ASSESSMENT — PAIN DESCRIPTION - LOCATION
LOCATION: GROIN

## 2022-07-07 NOTE — CONSULTS
 DIALYSIS CATHETER INSERTION N/A 1/17/2022    LAPAROSCOPIC PERITONEAL DIALYSIS CATHETER PLACEMENT and omentopexy performed by Pina Sánchez DO at [facility], ESOPHAGUS     [address] N/A 4/15/2022    ROBOTIC, RECURRENT INCISIONAL HERNIA REPAIR WITH BIOSYNTHETIC MESH; LAPAROSCOPIC PERITONEAL DIALYSIS CATHETER REVISION WITH LAPAROSCOPIC OMENTOPEXY performed by Pina Sánchez DO at [facility] Bilateral 4/15/2022    BILATERAL OPEN INGUINAL HERNI REPAIR performed by Pina Sánchez DO at [facility] Bilateral 6/13/2022    ROBOTIC RECURRENT BILATERAL INGUINAL HERNIA REPAIR, LYSIS OF ADHESIONS, PERITONEAL DIALYSIS CATHETER REMOVAL performed by Pina Sánchez DO at [address] [facility] [address] IR TUNNELED [address] 5 YEARS  01/14/2022    IR TUNNELED CATHETER PLACEMENT GREATER THAN 5 YEARS 1/14/2022 [facility] SPECIAL PROCEDURES    UPPER GASTROINTESTINAL ENDOSCOPY  05/28/2016    biopsies, multiple small gastric ulcers    UPPER GASTROINTESTINAL ENDOSCOPY  09/12/2016    UPPER GASTROINTESTINAL ENDOSCOPY N/A 3/18/2022    EGD DIAGNOSTIC ONLY performed by Sandra Ricardo MD at [place] [address] N/A 1/17/2022    LAPAROSCOPIC incarcerated VENTRAL HERNIA REPAIR performed by Pina Sánchez DO at [facility] OR       Family History   Problem Relation Age of Onset    Hypertension Other     Breast Cancer Mother     Colon Cancer Father     Diabetes Maternal Grandmother     Coronary Art Dis Brother         reports that he has never smoked. He has never used smokeless tobacco. He reports previous alcohol use. He reports that he does not use drugs. Allergies:  Patient has no known allergies.     Current Medications:    oxyCODONE (ROXICODONE) immediate release tablet 5 mg, Q4H PRN  sodium chloride flush 0.9 % injection 5-40 mL, 2 times per day  sodium chloride flush 0.9 % injection 5-40 mL, PRN  0.9 % sodium chloride infusion, PRN  ondansetron (ZOFRAN-ODT) disintegrating tablet 4 mg, Q8H PRN   Or  ondansetron (ZOFRAN) injection 4 mg, Q6H PRN  polyethylene glycol (GLYCOLAX) packet 17 g, Daily PRN  acetaminophen (TYLENOL) tablet 650 mg, Q6H PRN   Or  acetaminophen (TYLENOL) suppository 650 mg, Q6H PRN  heparin (porcine) injection 5,000 Units, 3 times per day  bupivacaine (PF) (MARCAINE) 0.5 % injection,   HYDROmorphone (DILAUDID) injection 0.25 mg, Q3H PRN   Or  HYDROmorphone (DILAUDID) injection 0.5 mg, Q3H PRN  lactated ringers infusion, Continuous        Review of Systems:   14 point ROS obtained but were negative except mentioned in HPI      Physical exam:     Vitals:  BP (!) 163/102   Pulse 98   Temp 98.2 °F (36.8 °C) (Oral)   Resp 16   Ht 6' 5\" (1.956 m)   Wt 260 lb (117.9 kg)   SpO2 98%   BMI 30.83 kg/m²   Constitutional:  OAA X3 NAD  Skin: no rash, turgor wnl  Heent:  eomi, mmm  Neck: no bruits or jvd noted  Cardiovascular:  S1, S2 without m/r/g  Respiratory: CTA B without w/r/r  Abdomen:  +bs, soft, nt, nd  Ext: + lower extremity edema  Psychiatric: mood and affect appropriate  Musculoskeletal:  Rom, muscular strength intact    Data:   Labs:  CBC:   Recent Labs     07/06/22 2117   WBC 4.6   HGB 10.8*        BMP:    Recent Labs     07/06/22 2117      K 3.4*      CO2 22   BUN 19   CREATININE 5.2*   GLUCOSE 127*     Ca/Mg/Phos:   Recent Labs     07/06/22 2117   CALCIUM 8.3   PHOS 2.0*     Hepatic: No results for input(s): AST, ALT, ALB, BILITOT, ALKPHOS in the last 72 hours. Troponin: No results for input(s): TROPONINI in the last 72 hours. BNP: No results for input(s): BNP in the last 72 hours. Lipids: No results for input(s): CHOL, TRIG, HDL, LDLCALC, LABVLDL in the last 72 hours. ABGs: No results for input(s): PHART, PO2ART, ZWL3ANC in the last 72 hours. INR: No results for input(s): INR in the last 72 hours.   UA:  Recent Labs     07/06/22  [address]   CLARITYU Clear   GLUCOSEU Negative   BILIRUBINUR Negative   Cephus Panning TRACE*   SPECGRAV 1.020   BLOODU SMALL*   PHUR 6.5   PROTEINU >=300*   UROBILINOGEN 0.2   NITRU Negative   LEUKOCYTESUR Negative   LABMICR YES   URINETYPE Voided      Urine Microscopic:   Recent Labs     07/06/22  2204   BACTERIA 3+*   WBCUA 0-2   RBCUA 0-2   EPIU 2-5     Urine Culture: No results for input(s): LABURIN in the last 72 hours. Urine Chemistry: No results for input(s): Joann Philip, PROTEINUR, NAUR in the last 72 hours. IMAGING:  US SCROTUM AND TESTICLES   Final Result      1. No evidence of testicular torsion or epididymitis/orchitis. 2.  Small right hydrocele. 3.  Incidental bilateral epididymal cysts. 4.  Suspected fat-containing bilateral inguinal hernias versus residual fat status post prior hernia repair. US DUP ABD PEL RETRO SCROT COMPLETE   Final Result      1. No evidence of testicular torsion or epididymitis/orchitis. 2.  Small right hydrocele. 3.  Incidental bilateral epididymal cysts. 4.  Suspected fat-containing bilateral inguinal hernias versus residual fat status post prior hernia repair. Assessment/Plan   1. ESRD     2. HTN    3. Anemia    4. Acid- base/ Electrolyte imbalance     5.  Abd pain     Plan   - HD today   - UF to dry wt   - monitor BP   - cont BP meds  - pain control   - d/w sx                 Thank you for allowing us to participate in care of Michel Nguyen MD  Feel free to contact me   [facility] [address]  Office : [phone]  Fax :[phone]

## 2022-07-07 NOTE — DISCHARGE SUMMARY
Hospital Discharge Summary    Patient's PCP: Lolita Hays MD  Admit Date: 7/6/2022   Discharge Date: 7/7/2022    Admitting Physician: Dr. Florin Ford MD  Discharge Physician: Dr. Jian Guidry MD   Consults: general surgery and Anaesthesia    HPI: 59 y.o. male with ESRD on HD, DM, HTN, CELSA, A. fib, CHF as well as bilateral recurrent inguinal hernias which were last repaired on 6/13/2022 who presented to the emergency department with left-sided groin pain x 2 to 3 days.      He states that it is worse when he moves around and notes pain from his lower abdomen all the way through his testicle. Tao Sanabria thinks that potentially it is radiating up from his testicle into his abdomen.       He denies known trauma.  Tao Sanabria was just seen at  earlier in the day as part of a kidney transplant work-up and received a CT of the abdomen and pelvis without contrast.  He also had a 6-minute walk test that was extremely painful due to pain in his testicle/abdomen. Tao Sanabria recently was transitioned off of peritoneal dialysis and back onto hemodialysis.  He was seen by Dr. Mara Davidson yesterday for evaluation for possible AV fistula placement term dialysis. He still produces urine and does not think he is having any particular dysuria.     Denies fevers chills nausea vomiting.     CT A/P showed moderate fat-containing inguinal hernias with overlying stranding in the subcutaneous fat.     He was admitted. Brief hospital course:  Given the concern of the patients presentation and the concern of the possible multi-factorial etiology contributing to patients symptomatology.  Patient was admitted and evaluated and found to have:      Discharge Diagnoses:     Bilateral recurrent inguinal hernia without obstruction or gangrene: Status post recent bilateral hernia repair   Inguinal hernia, left   ESRD        Gen surgery consulted  Defer mgt to Gen surgery     For HD today           Chronic problems: stable at this time     Atrial fibrillation ([facility]     Gout     Essential hypertension, benign     Knee pain, bilateral     Alcohol use disorder, mild, abuse     CELSA (obstructive sleep apnea)     Duodenal ulcer disease     Diabetic nephropathy associated with type 2 diabetes mellitus ([facility]     Morbid obesity due to excess calories (HCC)     Chronic systolic congestive heart failure (HCC)     DMII (diabetes mellitus, type 2) (HCC)     Anemia due to stage 5 chronic kidney disease (HCC)     Arthritis     BPH (benign prostatic hyperplasia)         Seen by [facility]  For Left ileoinguinal nerve block  After block :  lidocaine patches and PO pain medications for discharge with referral to follow up with Dr. Santiago Hester PMR pain specializing in groin pain         Physical Exam: BP (!) 163/102   Pulse 98   Temp 98.2 °F (36.8 °C) (Oral)   Resp 16   Ht 6' 5\" (1.956 m)   Wt 260 lb (117.9 kg)   SpO2 98%   BMI 30.83 kg/m²     No results for input(s): POCGLU in the last 72 hours. General appearance: alert, no acute distress, grooming appropriate  Neuro: A&Ox3, no focal deficits, sensation intact  Chest/Lungs: normal effort, no accessory muscle use, on RA  Cardiovascular: RRR  Abdomen: soft, mildly-tender bilateral groin areas, non-distended, no guarding/rigidity, No palpable recurrent hernias. Left testicle remains tender   Extremities: no edema, no cyanosis    LABS:  Recent Labs     07/06/22 2117   WBC 4.6   HGB 10.8*         Recent Labs     07/06/22  2117      K 3.4*      CO2 22   BUN 19   CREATININE 5.2*   GLUCOSE 127*     No results for input(s): INR in the last 72 hours. Discharge Medications:  Current Discharge Medication List           Details   lidocaine (LIDODERM) 5 % Place 1 patch onto the skin daily for 15 days 12 hours on, 12 hours off. Qty: 15 patch, Refills: 0      oxyCODONE (ROXICODONE) 5 MG immediate release tablet Take 1 tablet by mouth every 6 hours as needed for Pain for up to 7 days.   Qty: 28 tablet,

## 2022-07-07 NOTE — ED PROVIDER NOTES
systolic congestive heart failure ([facility] CRI (chronic renal insufficiency), Diabetic nephropathy associated with type 2 diabetes mellitus ([facility] Diverticulosis, Elevated PSA, Erectile dysfunction, ESRD (end stage renal disease) on dialysis ([facility] Gout, Gout, Hemrrhoid NOS w/ complication NEC, History of MRSA infection, Hypertension, CELSA (obstructive sleep apnea), Type 2 diabetes mellitus without complication, without long-term current use of insulin ([facility] and Umbilical hernia without obstruction and without gangrene. He has a past surgical history that includes Upper gastrointestinal endoscopy (05/28/2016); Colonoscopy; Dilatation, esophagus; Upper gastrointestinal endoscopy (09/12/2016); IR TUNNELED CVC PLACE WO SQ PORT/PUMP > 5 YEARS (01/14/2022); Dialysis Catheter Insertion (N/A, 1/17/2022); ventral hernia repair (N/A, 1/17/2022); CT INSERT PERITONEAL CATHETER; Colonoscopy (N/A, 3/18/2022); Upper gastrointestinal endoscopy (N/A, 3/18/2022); hernia repair (N/A, 4/15/2022); hernia repair (Bilateral, 4/15/2022); and Inguinal hernia repair (Bilateral, 6/13/2022). His family history includes Breast Cancer in his mother; Colon Cancer in his father; Coronary Art Dis in his brother; Diabetes in his maternal grandmother; Hypertension in an other family member. He reports that he has never smoked. He has never used smokeless tobacco. He reports previous alcohol use. He reports that he does not use drugs. Review of Systems   A full 10 point review of systems was obtained. Pertinent positives and negatives as documented in the HPI, otherwise all other systems were reviewed and were negative.      Medications     Previous Medications    ASPIRIN 81 MG TABLET    Take 81 mg by mouth daily    ATORVASTATIN (LIPITOR) 20 MG TABLET    TAKE 1 TABLET BY MOUTH EVERY DAY    B COMPLEX-C-FOLIC ACID (DIALYVITE 170 PO)    Take 1 tablet by mouth once a week     BISACODYL (BISACODYL) 5 MG EC TABLET    Take 1 tablet by mouth daily as needed for Constipation    CALCITRIOL (ROCALTROL) 0.5 MCG CAPSULE    Take 1 capsule by mouth three times a week During HD    CLOTRIMAZOLE (LOTRIMIN) 1 % EXTERNAL SOLUTION    1 APPLICATION TO NAILS DAILY    DOCUSATE SODIUM (COLACE) 100 MG CAPSULE    Take 1 capsule by mouth 2 times daily    FOLIC ACID-VIT K2-YXX D73 0.5-5-0.2 MG TABS    Take by mouth    IRON SUCROSE (VENOFER IV)    Infuse intravenously every 30 days     METHOXY PEG-EPOETIN BETA (MIRCERA) 50 MCG/0.3ML SOSY    Inject as directed Twice a  month     METOPROLOL SUCCINATE (TOPROL XL) 50 MG EXTENDED RELEASE TABLET    Take 1 tablet by mouth at bedtime TAKE 1 TABLET BY MOUTH EVERY DAY    NIFEDIPINE (ADALAT CC) 90 MG EXTENDED RELEASE TABLET    Take 90 mg by mouth daily    NORTRIPTYLINE (PAMELOR) 10 MG CAPSULE    Take 10 mg by mouth nightly     OMEPRAZOLE (PRILOSEC) 40 MG DELAYED RELEASE CAPSULE    TAKE 1 CAPSULE BY MOUTH EVERY DAY    OXYCODONE-ACETAMINOPHEN (PERCOCET) 5-325 MG PER TABLET    Take 1 tablet by mouth every 8 hours as needed for Pain for up to 30 days. POLYETHYLENE GLYCOL (GLYCOLAX) 17 GM/SCOOP POWDER    TAKE 17 G BY MOUTH 2 TIMES DAILY    SILDENAFIL (VIAGRA) 100 MG TABLET    Take 1 tablet by mouth as needed for Erectile Dysfunction    SPIRONOLACTONE (ALDACTONE) 50 MG TABLET    Take 1 tablet by mouth at bedtime    TORSEMIDE (DEMADEX) 100 MG TABLET    Take 1 tablet by mouth daily    VITAMIN D (ERGOCALCIFEROL) 1.25 MG (02774 UT) CAPS CAPSULE    Take 1 capsule by mouth once a week       Allergies     He has No Known Allergies. Physical Exam     INITIAL VITALS:   BP: (!) 137/91, Temp: 98.3 °F (36.8 °C), Heart Rate: (!) 106, Resp: 18, SpO2: 99 %     Physical Exam  Constitutional:       General: He is not in acute distress. HENT:      Head: Normocephalic and atraumatic.       Right Ear: External ear normal.      Left Ear: External ear normal.      Nose: Nose normal.      Mouth/Throat:      Mouth: Mucous membranes are moist.   Eyes:      General: No scleral icterus. Extraocular Movements: Extraocular movements intact. Pupils: Pupils are equal, round, and reactive to light. Cardiovascular:      Rate and Rhythm: Regular rhythm. Heart sounds: Normal heart sounds. No murmur heard. No friction rub. No gallop. Pulmonary:      Effort: Pulmonary effort is normal. No respiratory distress. Breath sounds: No wheezing. Abdominal:      Tenderness: There is abdominal tenderness (LLQ, slight). There is no guarding or rebound. Genitourinary:     Comments: Tenderness of the left testicle, left inguinal region, and slight tenderness in the left lower quadrant of the abdomen. There is point tenderness of the left epididymis. There is no abnormal lie of the bilateral testes. There is no evidence of erythema or crepitus. Penis is normal, uncircumcised. No discharge. Musculoskeletal:         General: No deformity. Skin:     General: Skin is warm and dry. Neurological:      General: No focal deficit present. Mental Status: He is alert and oriented to person, place, and time. Psychiatric:         Mood and Affect: Mood normal.         Behavior: Behavior normal.          Diagnostic Results     LABS AND RADIOLOGY  Please see electronic medical record for any tests performed in the ED. All results were reviewed by me during the course of the patient's emergency department stay. EKG   If performed, please see interpretation in MDM below. ED BEDSIDE ULTRASOUND:  If performed, please see interpretation in MDM below and documentation in [facility]. Procedures     none    ED Course   Past Medical Hx, Past Surgical Hx, Social Hx, Allergies, and Family Hx were reviewed.     The patient was given the following medications:  Medications   HYDROmorphone (DILAUDID) injection 0.25 mg ( IntraVENous See Alternative 7/6/22 4100)     Or   HYDROmorphone (DILAUDID) injection 0.5 mg (0.5 mg IntraVENous Given 7/6/22 2345)   lactated ringers infusion ( IntraVENous New Bag 7/6/22 4122)   oxyCODONE (ROXICODONE) immediate release tablet 5 mg (has no administration in time range)   HYDROmorphone (DILAUDID) injection 0.5 mg (has no administration in time range)   ketorolac (TORADOL) injection 15 mg (15 mg IntraVENous Given 7/6/22 5941)       CONSULTS:  [address] TO HOSPITALIST  IP CONSULT TO [address] [facility] alonzo / Osbaldo Poole is a 59 y.o. male with a history and presentation as described above. This patient was also evaluated by the attending physician. All care plans werediscussed and agreed upon. Patient presents with concerns for worsening and more severe left-sided inguinal pain that has been particularly bothersome over the past 2 to 3 days. On exam, he has tenderness from his left testicle all the way through his inguinal canal and in his left lower quadrant of his abdomen as well. He has no obvious abnormalities of the testicle or scrotum including no particular swelling, no overlying erythema, no crepitus. No concern on my examination for Radha's gangrene. No physical exam findings to suggest testicular torsion. No obvious hernia on physical exam either. There is no significant fluid collection noted in the scrotum. Given the patient's recent history of bilateral inguinal hernia repair and now this presentation, concern for possible small incarcerated hernia. Fortunately, patient just received a CT a few hours prior at [facility]. This demonstrated some fat stranding bilaterally but no evidence of incarcerated hernia. Patient's laboratory studies are reassuring with a normal lactate and no leukocytosis. Surgery was consulted to evaluate the patient and they additionally reviewed the imaging and feel that this does not represent an acute surgical problem. They are requesting a testicular ultrasound to further differentiate this from testicular pain that is referred.   This was Medicine    Clinical Impression     1. Groin pain, left        Disposition     PATIENT REFERRED TO:  No follow-up provider specified.     DISCHARGE MEDICATIONS:  New Prescriptions    No medications on file       DISPOSITION Decision To Admit 07/07/2022 02:05:08 AM        Jennifer Pritchard MD  Resident  07/07/22 8340

## 2022-07-07 NOTE — PROGRESS NOTES
Pt still having pain after Epidural, we will continue to monitor Pt and provide medications when readily available.

## 2022-07-07 NOTE — ED NOTES
Urine specimen collected and sent by RN to the lab for testing.      Shawn Flanagan RN  07/06/22 8863

## 2022-07-07 NOTE — DISCHARGE INSTR - DIET

## 2022-07-07 NOTE — ED PROVIDER NOTES
ED Attending Attestation Note     Date of evaluation: 7/6/2022    This patient was seen by the resident. I have seen and examined the patient, agree with the workup, evaluation, management and diagnosis. The care plan has been discussed. My assessment reveals patient who presents with left groin pain. On exam testes are descended bilaterally. He has no significant scrotal soft tissue swelling or obvious edema. No crepitus palpated. His testicles do not seem enlarged. The epididymis does not seem enlarged on my exam.  He does have pain to palpation at the left posterior scrotal sac proximally. There is no erythema warmth or induration noted. He has no pain to palpation of the perineum. CT scan at  earlier today as part of a work-up for renal transplant as he is a dialysis patient showed these findings  IMPRESSION:     Minimal calcific atherosclerosis, predominantly involving the internal iliac arteries. 8.6 x 6.8 x 9.6 cm collection in the right anterior pelvis and 4 x 2.8 x 3.1 cm collection in the left anterior pelvis. Moderate fat-containing inguinal hernias with overlying stranding in the subcutaneous fat.          Bulmaro Fields MD  07/06/22 1534

## 2022-07-07 NOTE — TELEPHONE ENCOUNTER
Writer contacted Dr. Amos Galaviz to inform of 30 day readmission risk. No Decision on disposition at this time.     Call Back: If you need to call back to inform of disposition you can contact me at [phone]

## 2022-07-07 NOTE — CONSULTS
Procedure Laterality Date    COLONOSCOPY      COLONOSCOPY N/A 3/18/2022    COLONOSCOPY POLYPECTOMY SNARE/COLD BIOPSY performed by Susie Gil MD at [address] N/A 1/17/2022    LAPAROSCOPIC PERITONEAL DIALYSIS CATHETER PLACEMENT and omentopexy performed by Martinez Borges DO at [facility] [facility], ESOPHAGUS     [address] [facility] N/A 4/15/2022    ROBOTIC, RECURRENT INCISIONAL HERNIA REPAIR WITH BIOSYNTHETIC MESH; LAPAROSCOPIC PERITONEAL DIALYSIS CATHETER REVISION WITH LAPAROSCOPIC OMENTOPEXY performed by Martinez Borges DO at [address] Bilateral 4/15/2022    BILATERAL OPEN INGUINAL HERNI REPAIR performed by Martinez Borges DO at [address] Bilateral 6/13/2022    ROBOTIC RECURRENT BILATERAL INGUINAL HERNIA REPAIR, LYSIS OF ADHESIONS, PERITONEAL DIALYSIS CATHETER REMOVAL performed by Martinez Borges DO at [address] [facility] IR TUNNELED [address] 5 YEARS  01/14/2022    IR TUNNELED CATHETER PLACEMENT GREATER THAN 5 YEARS 1/14/2022 [facility] SPECIAL PROCEDURES    UPPER GASTROINTESTINAL ENDOSCOPY  05/28/2016    biopsies, multiple small gastric ulcers    UPPER GASTROINTESTINAL ENDOSCOPY  09/12/2016    UPPER GASTROINTESTINAL ENDOSCOPY N/A 3/18/2022    EGD DIAGNOSTIC ONLY performed by Susie Gil MD at [address] [facility] N/A 1/17/2022    LAPAROSCOPIC incarcerated VENTRAL HERNIA REPAIR performed by Martinez Borges DO at [address]:  Patient has no known allergies. Medications:   Home Meds  No current facility-administered medications on file prior to encounter.      Current Outpatient Medications on File Prior to Encounter   Medication Sig Dispense Refill    Folic Acid-Vit S3-CZL L21 0.5-5-0.2 MG TABS Take by mouth      clotrimazole (LOTRIMIN) 1 % external solution 1 APPLICATION TO NAILS DAILY      polyethylene glycol (GLYCOLAX) 17 GM/SCOOP powder TAKE 17 G BY MOUTH 2 TIMES DAILY      NIFEdipine (ADALAT CC) 90 MG extended release tablet Take 90 mg by mouth daily      vitamin D (ERGOCALCIFEROL) 1.25 MG (41823 UT) CAPS capsule Take 1 capsule by mouth once a week 4 capsule 0    torsemide (DEMADEX) 100 MG tablet Take 1 tablet by mouth daily 90 tablet 0    calcitRIOL (ROCALTROL) 0.5 MCG capsule Take 1 capsule by mouth three times a week During HD 30 capsule 3    oxyCODONE-acetaminophen (PERCOCET) 5-325 MG per tablet Take 1 tablet by mouth every 8 hours as needed for Pain for up to 30 days.  90 tablet 0    docusate sodium (COLACE) 100 MG capsule Take 1 capsule by mouth 2 times daily 20 capsule 0    B Complex-C-Folic Acid (DIALYVITE 166 PO) Take 1 tablet by mouth once a week       Methoxy PEG-Epoetin Beta (MIRCERA) 50 MCG/0.3ML SOSY Inject as directed Twice a  month       Iron Sucrose (VENOFER IV) Infuse intravenously every 30 days       bisacodyl (BISACODYL) 5 MG EC tablet Take 1 tablet by mouth daily as needed for Constipation 30 tablet 5    spironolactone (ALDACTONE) 50 MG tablet Take 1 tablet by mouth at bedtime 30 tablet 3    metoprolol succinate (TOPROL XL) 50 MG extended release tablet Take 1 tablet by mouth at bedtime TAKE 1 TABLET BY MOUTH EVERY DAY 30 tablet 3    atorvastatin (LIPITOR) 20 MG tablet TAKE 1 TABLET BY MOUTH EVERY DAY 30 tablet 5    nortriptyline (PAMELOR) 10 MG capsule Take 10 mg by mouth nightly       omeprazole (PRILOSEC) 40 MG delayed release capsule TAKE 1 CAPSULE BY MOUTH EVERY DAY 30 capsule 1    sildenafil (VIAGRA) 100 MG tablet Take 1 tablet by mouth as needed for Erectile Dysfunction 30 tablet 3    aspirin 81 MG tablet Take 81 mg by mouth daily         Current Meds  ketorolac (TORADOL) injection 15 mg, Once        Family History:   Family History   Problem Relation Age of Onset    Hypertension Other     Breast Cancer Mother     Colon Cancer Father     Diabetes Maternal Grandmother     Coronary Art Dis Brother        Social History:   TOBACCO:   reports that he has never smoked. He has never used smokeless tobacco.  ETOH:   reports previous alcohol use. DRUGS:   reports no history of drug use. Review of Systems:   A 14 point review of systems was conducted, significant findings as noted in HPI. All other systems negative. Physical exam:    Vitals:    07/06/22 2032   BP: (!) 137/91   Pulse: (!) 106   Resp: 18   Temp: 98.3 °F (36.8 °C)   TempSrc: Oral   SpO2: 99%   Weight: 260 lb (117.9 kg)   Height: 6' 5\" (1.956 m)       General appearance: alert, no acute distress, grooming appropriate  Eyes: No scleral icterus, EOM grossly intact  Neck: trachea midline, no JVD, no lymphadenopathy, neck supple  Chest/Lungs: , normal effort with no accessory muscle use on RA  Cardiovascular: tachycardic, RR  Abdomen: Soft nontender mildly distended. Left groin tenderness to palpation all the way down into the scrotum with severe tenderness in the left scrotal area. No erythema or edema  Right groin mildly tender to palpation. Both groins are soft. Skin: warm and dry, no rashes  Extremities: no edema, no cyanosis  Genitourinary: Scrotal pain on the left  Neuro: A&Ox3, no focal deficits, sensation intact    Labs:    CBC:   Recent Labs     07/06/22 2117   WBC 4.6   HGB 10.8*   HCT 33.4*   MCV 84.0        BMP: No results for input(s): NA, K, CL, CO2, PHOS, BUN, CREATININE, CA in the last 72 hours. PT/INR: No results for input(s): PROTIME, INR in the last 72 hours. APTT: No results for input(s): APTT in the last 72 hours.   Liver Profile:   Lab Results   Component Value Date/Time    AST 14 05/12/2022 08:51 PM    ALT 13 05/12/2022 08:51 PM    BILIDIR <0.2 05/12/2022 08:51 PM    BILITOT <0.2 05/12/2022 08:51 PM    ALKPHOS 90 05/12/2022 08:51 PM     Lab Results   Component Value Date/Time    CHOL 160 12/21/2021 04:49 AM    HDL 41 12/21/2021 04:49 AM    TRIG 141 12/21/2021 04:49 AM     UA:   Lab Results   Component Value Date/Time    NITRITE Negative 11/11/2019 02:48 PM    COLORU Yellow 06/08/2022 03:10 PM    PHUR 7.0 06/08/2022 03:10 PM    WBCUA 0-2 06/08/2022 03:10 PM    RBCUA 0-2 06/08/2022 03:10 PM    BACTERIA Rare 06/08/2022 03:10 PM    CLARITYU Clear 06/08/2022 03:10 PM    SPECGRAV 1.020 06/08/2022 03:10 PM    LEUKOCYTESUR Negative 06/08/2022 03:10 PM    UROBILINOGEN 0.2 06/08/2022 03:10 PM    BILIRUBINUR Negative 06/08/2022 03:10 PM    BILIRUBINUR Negative 11/11/2019 02:48 PM    BLOODU TRACE-INTACT 06/08/2022 03:10 PM    GLUCOSEU 100 06/08/2022 03:10 PM    AMORPHOUS Rare 05/27/2022 02:47 PM       Imaging:   No orders to display         Assessment/Plan: This is a 59 y.o. male with a complex medical history including recurrent bilateral inguinal hernia repairs that were last repaired on 6/13/2022. He presents with acute onset left scrotal pain radiating up to his left groin that is started yesterday but progressively worsened this morning that brought him to the ED. He is afebrile mildly tachycardic in the ED. Labs show no leukocytosis or lactic acidosis. Panel pending. The scan from today was out contrast being pushed over from . CT scan reports bilateral large anterior pelvic collections ranging from 4 cm on the left and 8 cm on the right, well as moderate sized fat-containing inguinal hernias stranding. Scrotal ultrasound ordered given concern for orchitis versus testicular ischemia versus torsion.    -Given the amount of testicular pain that he is having and concern that it is the primary source of his pain will call in the ultrasound techs to perform a scrotal duplex ultrasound.  -He may need a urology consult  -once CT scan is pushed over Will will review with radiology, read does not appear to have any viscus incarcerated within his bilateral inguinal canals. -F/u Renal panel  - Discussed with Dr. Sarah Mendez who agrees with above plan.     Victor M Torres MD  07/06/22  9:38 PM

## 2022-07-07 NOTE — PROCEDURES
Left ilioinguinal/iliohypogastric nerve block. Requesting service:  Surgery, Dr. Gricel Hernandez  Location:  [facility] 24    Proceduralist:  Clark Carlson MD HANH, anesthesia    Indication:  Post-op pain, left groin, chronic    Informed consent and anesthesia pre-eval performed    Allergies: confirmed    Routine ASA monitors (except no ETCO2) in ER    Sterile technique, US (permanent image recorded for chart)    Procedure:  Left ilioinguinal/iliohypogastric NB w US in standard fashion; 25 ml 0.5% bupi without epi placed in TA/IO plane, excellent spread, no complications; 5 ml cord block. Incremental aspiration every 5 ml. EBL:  None  Complications:  None.     Pre pain:  10/10  Post-pain:  (after 10 minutes) 5/10

## 2022-07-07 NOTE — PROGRESS NOTES
Surgery Daily Progress Note      CC: left groin pain    Subjective :  Patient reports pain tolerable currently. Still present but improved. ROS: A 14 point review of systems was conducted, significant findings as noted in HPI. All other systems negative. Objective     Exam:  Vitals:    07/07/22 0000 07/07/22 0500 07/07/22 0600 07/07/22 0730   BP: (!) 152/92 (!) 160/107 (!) 164/108 (!) 163/102   Pulse: 95   98   Resp:    16   Temp:    98.2 °F (36.8 °C)   TempSrc:    Oral   SpO2: 95% 98% 97% 98%   Weight:       Height:             General appearance: alert, no acute distress, grooming appropriate  Neuro: A&Ox3, no focal deficits, sensation intact  Chest/Lungs: normal effort, no accessory muscle use, on RA  Cardiovascular: RRR  Abdomen: soft, mildly-tender bilateral groin areas, non-distended, no guarding/rigidity, No palpable recurrent hernias. Left testicle remains tender   Extremities: no edema, no cyanosis        ASSESSMENT/PLAN:   This is a 59 y.o. male with complex surgical hx including recurrent hernia repair bilaterally on 6/13, presented with acute left groin pain. - CT reviewed, patient with fatty cord at baseline, do no suspect recurrence  - anesthesia called, will plan for Left ileoinguinal nerve block  - after block will plan on lidocaine patches and PO pain medications for discharge with referral to follow up with Dr. Lilliam Aldridge PMR pain specializing in groin pain  - discussed with patient and Dr. Neto Stark, both in agreement with above plan. Matt Watts MD  Gen.  Surgery Resident PGY5  07/07/22  7:55 AM  [phone]

## 2022-07-07 NOTE — H&P
History and Physical    Admit Date: 7/6/2022    Patient's PCP: Dr. Sara Doe MD     Chief complaint: Groin Pain      HISTORY OF PRESENT ILLNESS:    This is a very pleasant 59 y.o. male with ESRD on HD, DM, HTN, CELSA, A. fib, CHF as well as bilateral recurrent inguinal hernias which were last repaired on 6/13/2022 who presented to the emergency department with left-sided groin pain x 2 to 3 days. He states that it is worse when he moves around and notes pain from his lower abdomen all the way through his testicle. He thinks that potentially it is radiating up from his testicle into his abdomen. He denies known trauma. He was just seen at  earlier in the day as part of a kidney transplant work-up and received a CT of the abdomen and pelvis without contrast.  He also had a 6-minute walk test that was extremely painful due to pain in his testicle/abdomen. He recently was transitioned off of peritoneal dialysis and back onto hemodialysis. He was seen by Dr. Roe Lowe yesterday for evaluation for possible AV fistula placement term dialysis. He still produces urine and does not think he is having any particular dysuria.     Denies fevers chills nausea vomiting. CT A/P showed moderate fat-containing inguinal hernias with overlying stranding in the subcutaneous fat. He was admitted.        Past Medical / Surgical History:    Past Medical History:   Diagnosis Date    Arthralgia of multiple joints 10/27/2015    Arthritis     Atrial fibrillation (HCC)     Chronic kidney disease     stage 4    Chronic systolic congestive heart failure ([address] 8/16/2021    CRI (chronic renal insufficiency)     Diabetic nephropathy associated with type 2 diabetes mellitus ([address] 10/11/2018    Diverticulosis     Elevated PSA     Erectile dysfunction     ESRD (end stage renal disease) on dialysis ([address] 2/24/2022    Gout     Gout     Hemrrhoid NOS w/ complication NEC     History of MRSA infection     Hypertension     CELSA (obstructive sleep apnea) 07/14/2017    uses C-pap machine    Type 2 diabetes mellitus without complication, without long-term current use of insulin ([facility] 4/75/2855    Umbilical hernia without obstruction and without gangrene 2/2/2016       Past Surgical History:   Procedure Laterality Date    COLONOSCOPY      COLONOSCOPY N/A 3/18/2022    COLONOSCOPY POLYPECTOMY SNARE/COLD BIOPSY performed by Eva Adame MD at [address] N/A 1/17/2022    LAPAROSCOPIC PERITONEAL DIALYSIS CATHETER PLACEMENT and omentopexy performed by Tila Hull DO at [facility], [facility] N/A 4/15/2022    ROBOTIC, RECURRENT INCISIONAL HERNIA REPAIR WITH BIOSYNTHETIC MESH; LAPAROSCOPIC PERITONEAL DIALYSIS CATHETER REVISION WITH LAPAROSCOPIC OMENTOPEXY performed by Tila Hull DO at [facility] Bilateral 4/15/2022    [address] performed by Tila Hull DO at [address] [facility] Bilateral 6/13/2022    ROBOTIC RECURRENT [address], LYSIS OF ADHESIONS, PERITONEAL DIALYSIS CATHETER REMOVAL performed by Tila Hull DO at [address] IR TUNNELED [address] 5 YEARS  01/14/2022    IR TUNNELED CATHETER PLACEMENT GREATER THAN 5 YEARS 1/14/2022 [address] SPECIAL PROCEDURES    UPPER GASTROINTESTINAL ENDOSCOPY  05/28/2016    biopsies, multiple small gastric ulcers    UPPER GASTROINTESTINAL ENDOSCOPY  09/12/2016    UPPER GASTROINTESTINAL ENDOSCOPY N/A 3/18/2022    EGD DIAGNOSTIC ONLY performed by Eva Adame MD at [facility] N/A 1/17/2022    LAPAROSCOPIC incarcerated [place] performed by Tila Hull DO at [address]       Medications Prior to Admission:    No current facility-administered medications on file prior to encounter.      Current Outpatient Medications on File Prior to Encounter   Medication Sig Dispense Refill    Folic Acid-Vit N9-VIH D87 0.5-5-0.2 MG TABS Take by mouth      clotrimazole (LOTRIMIN) 1 % external solution 1 APPLICATION TO NAILS DAILY      polyethylene glycol (GLYCOLAX) 17 GM/SCOOP powder TAKE 17 G BY MOUTH 2 TIMES DAILY      NIFEdipine (ADALAT CC) 90 MG extended release tablet Take 90 mg by mouth daily      vitamin D (ERGOCALCIFEROL) 1.25 MG (00896 UT) CAPS capsule Take 1 capsule by mouth once a week 4 capsule 0    torsemide (DEMADEX) 100 MG tablet Take 1 tablet by mouth daily 90 tablet 0    calcitRIOL (ROCALTROL) 0.5 MCG capsule Take 1 capsule by mouth three times a week During HD 30 capsule 3    docusate sodium (COLACE) 100 MG capsule Take 1 capsule by mouth 2 times daily 20 capsule 0    B Complex-C-Folic Acid (DIALYVITE 058 PO) Take 1 tablet by mouth once a week       Methoxy PEG-Epoetin Beta (MIRCERA) 50 MCG/0.3ML SOSY Inject as directed Twice a  month       Iron Sucrose (VENOFER IV) Infuse intravenously every 30 days       bisacodyl (BISACODYL) 5 MG EC tablet Take 1 tablet by mouth daily as needed for Constipation 30 tablet 5    spironolactone (ALDACTONE) 50 MG tablet Take 1 tablet by mouth at bedtime 30 tablet 3    metoprolol succinate (TOPROL XL) 50 MG extended release tablet Take 1 tablet by mouth at bedtime TAKE 1 TABLET BY MOUTH EVERY DAY 30 tablet 3    atorvastatin (LIPITOR) 20 MG tablet TAKE 1 TABLET BY MOUTH EVERY DAY 30 tablet 5    nortriptyline (PAMELOR) 10 MG capsule Take 10 mg by mouth nightly       omeprazole (PRILOSEC) 40 MG delayed release capsule TAKE 1 CAPSULE BY MOUTH EVERY DAY 30 capsule 1    sildenafil (VIAGRA) 100 MG tablet Take 1 tablet by mouth as needed for Erectile Dysfunction 30 tablet 3    aspirin 81 MG tablet Take 81 mg by mouth daily         Allergies:  Patient has no known allergies. Social History:   TOBACCO:   reports that he has never smoked.  He has never used smokeless tobacco.     ETOH:   reports previous alcohol use. Family History:       Problem Relation Age of Onset    Hypertension Other     Breast Cancer Mother     Colon Cancer Father     Diabetes Maternal Grandmother     Coronary Art Dis Brother        ROS: Review of Systems - Negative except as in HPI. .   All other systems reviewed and are negative. PHYSICAL EXAM:  BP (!) 163/102   Pulse 98   Temp 98.2 °F (36.8 °C) (Oral)   Resp 16   Ht 6' 5\" (1.956 m)   Wt 260 lb (117.9 kg)   SpO2 98%   BMI 30.83 kg/m²     No results for input(s): POCGLU in the last 72 hours. General appearance: alert, no acute distress, grooming appropriate  Neuro: A&Ox3, no focal deficits, sensation intact  Chest/Lungs: normal effort, no accessory muscle use, on RA  Cardiovascular: RRR  Abdomen: soft, mildly-tender bilateral groin areas, non-distended, no guarding/rigidity, No palpable recurrent hernias. Left testicle remains tender   Extremities: no edema, no cyanosis       LABS:  Recent Labs     07/06/22 2117   WBC 4.6   HGB 10.8*   HCT 33.4*                                                                     Recent Labs     07/06/22 2117      K 3.4*      CO2 22   BUN 19   CREATININE 5.2*   GLUCOSE 127*     No results for input(s): AST, ALT, ALB, BILITOT, ALKPHOS in the last 72 hours. No results for input(s): TROPONINI in the last 72 hours. No results for input(s): BNP in the last 72 hours. No results found for: PHART, QYW2LUO, PO2ART  No results for input(s): INR in the last 72 hours.   Recent Labs     07/06/22  2204   COLORU Yellow   PHUR 6.5   WBCUA 0-2   RBCUA 0-2   MUCUS 1+*   BACTERIA 3+*   CLARITYU Clear   SPECGRAV 1.020   LEUKOCYTESUR Negative   UROBILINOGEN 0.2   BILIRUBINUR Negative   BLOODU SMALL*   GLUCOSEU Negative          Assessment & Plan:     59 y.o. male with ESRD on HD, DM, HTN, CELSA, A. fib, CHF as well as bilateral recurrent inguinal hernias which were last repaired on 6/13/2022 who presented to the emergency department with left-sided groin pain x 2 to 3 days. Bilateral recurrent inguinal hernia without obstruction or gangrene: Status post recent bilateral hernia repair   Inguinal hernia, left   ESRD      Gen surgery consulted  Defer mgt to Gen surgery    For HD today        Chronic problems: stable at this time     Atrial fibrillation (HCC)     Gout     Essential hypertension, benign     Knee pain, bilateral     Alcohol use disorder, mild, abuse     CELSA (obstructive sleep apnea)     Duodenal ulcer disease     Diabetic nephropathy associated with type 2 diabetes mellitus ([facility]     Morbid obesity due to excess calories (HCC)     Chronic systolic congestive heart failure (HCC)     DMII (diabetes mellitus, type 2) (HCC)     Anemia due to stage 5 chronic kidney disease (HCC)     Arthritis     BPH (benign prostatic hyperplasia)        Seen by [facility]  For Left ileoinguinal nerve block  After block :  lidocaine patches and PO pain medications for discharge with referral to follow up with Dr. Ivelisse Haji PMR pain specializing in groin pain        The patient and / or the family were informed of the results of any tests, a time was given to answer questions, a plan was proposed and they agreed with plan. Thank you Dr. Yoana Jensen MD for the opportunity to be involved in this patients care. If you have any questions or concerns please feel free to contact me at [phone].   Full Code    Saritha Blanco MD

## 2022-07-09 ENCOUNTER — HOSPITAL ENCOUNTER (INPATIENT)
Age: 62
LOS: 4 days | Discharge: HOME | DRG: 862 | End: 2022-07-14
Attending: EMERGENCY MEDICINE | Admitting: INTERNAL MEDICINE
Payer: MEDICARE

## 2022-07-09 DIAGNOSIS — R10.84 GENERALIZED ABDOMINAL PAIN: ICD-10-CM

## 2022-07-09 DIAGNOSIS — A41.9 SEPTICEMIA (HCC): Primary | ICD-10-CM

## 2022-07-09 RX ORDER — SODIUM CHLORIDE, SODIUM GLUCONATE, SODIUM ACETATE, POTASSIUM CHLORIDE AND MAGNESIUM CHLORIDE 526; 502; 368; 37; 30 MG/100ML; MG/100ML; MG/100ML; MG/100ML; MG/100ML
INJECTION, SOLUTION INTRAVENOUS ONCE
Status: DISCONTINUED | OUTPATIENT
Start: 2022-07-10 | End: 2022-07-10

## 2022-07-09 RX ORDER — METRONIDAZOLE 500 MG/100ML
500 INJECTION, SOLUTION INTRAVENOUS ONCE
Status: COMPLETED | OUTPATIENT
Start: 2022-07-10 | End: 2022-07-10

## 2022-07-09 ASSESSMENT — PAIN DESCRIPTION - ONSET: ONSET: PROGRESSIVE

## 2022-07-09 ASSESSMENT — PAIN DESCRIPTION - ORIENTATION: ORIENTATION: ANTERIOR;MID

## 2022-07-09 ASSESSMENT — PAIN DESCRIPTION - PAIN TYPE: TYPE: ACUTE PAIN

## 2022-07-09 ASSESSMENT — PAIN DESCRIPTION - LOCATION: LOCATION: ABDOMEN;SCROTUM

## 2022-07-09 ASSESSMENT — PAIN DESCRIPTION - DESCRIPTORS: DESCRIPTORS: SHARP

## 2022-07-09 ASSESSMENT — PAIN - FUNCTIONAL ASSESSMENT: PAIN_FUNCTIONAL_ASSESSMENT: 0-10

## 2022-07-09 ASSESSMENT — PAIN DESCRIPTION - FREQUENCY: FREQUENCY: CONTINUOUS

## 2022-07-09 ASSESSMENT — PAIN SCALES - GENERAL: PAINLEVEL_OUTOF10: 10

## 2022-07-10 ENCOUNTER — APPOINTMENT (OUTPATIENT)
Dept: CT IMAGING | Age: 62
DRG: 862 | End: 2022-07-10
Payer: MEDICARE

## 2022-07-10 ENCOUNTER — APPOINTMENT (OUTPATIENT)
Dept: INTERVENTIONAL RADIOLOGY/VASCULAR | Age: 62
DRG: 862 | End: 2022-07-10
Payer: MEDICARE

## 2022-07-10 ENCOUNTER — APPOINTMENT (OUTPATIENT)
Dept: GENERAL RADIOLOGY | Age: 62
DRG: 862 | End: 2022-07-10
Payer: MEDICARE

## 2022-07-10 PROBLEM — A41.9 SEPSIS (HCC): Status: ACTIVE | Noted: 2022-07-10

## 2022-07-10 LAB
A/G RATIO: 1.7 (ref 1.1–2.2)
ALBUMIN SERPL-MCNC: 3.9 G/DL (ref 3.4–5)
ALBUMIN SERPL-MCNC: 4.3 G/DL (ref 3.4–5)
ALP BLD-CCNC: 68 U/L (ref 40–129)
ALT SERPL-CCNC: 11 U/L (ref 10–40)
ANION GAP SERPL CALCULATED.3IONS-SCNC: 12 MMOL/L (ref 3–16)
ANION GAP SERPL CALCULATED.3IONS-SCNC: 15 MMOL/L (ref 3–16)
AST SERPL-CCNC: 13 U/L (ref 15–37)
BASE EXCESS VENOUS: 3.1 MMOL/L (ref -2–3)
BASOPHILS ABSOLUTE: 0 K/UL (ref 0–0.2)
BASOPHILS ABSOLUTE: 0 K/UL (ref 0–0.2)
BASOPHILS RELATIVE PERCENT: 0.1 %
BASOPHILS RELATIVE PERCENT: 0.4 %
BILIRUB SERPL-MCNC: 0.4 MG/DL (ref 0–1)
BUN BLDV-MCNC: 16 MG/DL (ref 7–20)
BUN BLDV-MCNC: 19 MG/DL (ref 7–20)
CALCIUM SERPL-MCNC: 8.3 MG/DL (ref 8.3–10.6)
CALCIUM SERPL-MCNC: 8.6 MG/DL (ref 8.3–10.6)
CARBOXYHEMOGLOBIN: 1.6 % (ref 0–1.5)
CHLORIDE BLD-SCNC: 100 MMOL/L (ref 99–110)
CHLORIDE BLD-SCNC: 101 MMOL/L (ref 99–110)
CO2: 21 MMOL/L (ref 21–32)
CO2: 24 MMOL/L (ref 21–32)
CREAT SERPL-MCNC: 4 MG/DL (ref 0.8–1.3)
CREAT SERPL-MCNC: 4.6 MG/DL (ref 0.8–1.3)
EKG ATRIAL RATE: 124 BPM
EKG DIAGNOSIS: NORMAL
EKG P AXIS: 39 DEGREES
EKG P-R INTERVAL: 146 MS
EKG Q-T INTERVAL: 312 MS
EKG QRS DURATION: 90 MS
EKG QTC CALCULATION (BAZETT): 448 MS
EKG R AXIS: -20 DEGREES
EKG T AXIS: 44 DEGREES
EKG VENTRICULAR RATE: 124 BPM
EOSINOPHILS ABSOLUTE: 0 K/UL (ref 0–0.6)
EOSINOPHILS ABSOLUTE: 0 K/UL (ref 0–0.6)
EOSINOPHILS RELATIVE PERCENT: 0 %
EOSINOPHILS RELATIVE PERCENT: 0.4 %
GFR AFRICAN AMERICAN: 16
GFR AFRICAN AMERICAN: 18
GFR NON-AFRICAN AMERICAN: 13
GFR NON-AFRICAN AMERICAN: 15
GLUCOSE BLD-MCNC: 118 MG/DL (ref 70–99)
GLUCOSE BLD-MCNC: 134 MG/DL (ref 70–99)
HCO3 VENOUS: 27.7 MMOL/L (ref 24–28)
HCT VFR BLD CALC: 34.1 % (ref 40.5–52.5)
HCT VFR BLD CALC: 34.7 % (ref 40.5–52.5)
HEMOGLOBIN, VEN, REDUCED: 15.5 %
HEMOGLOBIN: 10.9 G/DL (ref 13.5–17.5)
HEMOGLOBIN: 11.3 G/DL (ref 13.5–17.5)
INFLUENZA A: NOT DETECTED
INFLUENZA B: NOT DETECTED
INR BLD: 1.16 (ref 0.87–1.14)
LACTIC ACID, SEPSIS: 0.7 MMOL/L (ref 0.4–1.9)
LACTIC ACID, SEPSIS: 1.3 MMOL/L (ref 0.4–1.9)
LIPASE: 14 U/L (ref 13–60)
LYMPHOCYTES ABSOLUTE: 0.3 K/UL (ref 1–5.1)
LYMPHOCYTES ABSOLUTE: 0.5 K/UL (ref 1–5.1)
LYMPHOCYTES RELATIVE PERCENT: 4.3 %
LYMPHOCYTES RELATIVE PERCENT: 4.8 %
MAGNESIUM: 1.5 MG/DL (ref 1.8–2.4)
MCH RBC QN AUTO: 27.1 PG (ref 26–34)
MCH RBC QN AUTO: 27.1 PG (ref 26–34)
MCHC RBC AUTO-ENTMCNC: 32.1 G/DL (ref 31–36)
MCHC RBC AUTO-ENTMCNC: 32.6 G/DL (ref 31–36)
MCV RBC AUTO: 83.4 FL (ref 80–100)
MCV RBC AUTO: 84.5 FL (ref 80–100)
METHEMOGLOBIN VENOUS: 0.7 % (ref 0–1.5)
MONOCYTES ABSOLUTE: 0.4 K/UL (ref 0–1.3)
MONOCYTES ABSOLUTE: 0.9 K/UL (ref 0–1.3)
MONOCYTES RELATIVE PERCENT: 6.1 %
MONOCYTES RELATIVE PERCENT: 9.4 %
NEUTROPHILS ABSOLUTE: 5.3 K/UL (ref 1.7–7.7)
NEUTROPHILS ABSOLUTE: 8.6 K/UL (ref 1.7–7.7)
NEUTROPHILS RELATIVE PERCENT: 85.7 %
NEUTROPHILS RELATIVE PERCENT: 88.8 %
O2 SAT, VEN: 84 %
PCO2, VEN: 41.5 MMHG (ref 41–51)
PDW BLD-RTO: 17.5 % (ref 12.4–15.4)
PDW BLD-RTO: 17.6 % (ref 12.4–15.4)
PH VENOUS: 7.43 (ref 7.35–7.45)
PHOSPHORUS: 2.4 MG/DL (ref 2.5–4.9)
PLATELET # BLD: 137 K/UL (ref 135–450)
PLATELET # BLD: 167 K/UL (ref 135–450)
PMV BLD AUTO: 7.8 FL (ref 5–10.5)
PMV BLD AUTO: 7.9 FL (ref 5–10.5)
PO2, VEN: 48.1 MMHG (ref 25–40)
POTASSIUM REFLEX MAGNESIUM: 3.6 MMOL/L (ref 3.5–5.1)
POTASSIUM SERPL-SCNC: 3.7 MMOL/L (ref 3.5–5.1)
POTASSIUM SERPL-SCNC: 5.2 MMOL/L (ref 3.5–5.1)
PROTHROMBIN TIME: 14.8 SEC (ref 11.7–14.5)
RBC # BLD: 4.03 M/UL (ref 4.2–5.9)
RBC # BLD: 4.17 M/UL (ref 4.2–5.9)
REASON FOR REJECTION: NORMAL
REJECTED TEST: NORMAL
SARS-COV-2 RNA, RT PCR: NOT DETECTED
SODIUM BLD-SCNC: 136 MMOL/L (ref 136–145)
SODIUM BLD-SCNC: 137 MMOL/L (ref 136–145)
TCO2 CALC VENOUS: 29 MMOL/L
TOTAL PROTEIN: 6.8 G/DL (ref 6.4–8.2)
VANCOMYCIN RANDOM: 22.9 UG/ML
WBC # BLD: 10.1 K/UL (ref 4–11)
WBC # BLD: 6 K/UL (ref 4–11)

## 2022-07-10 PROCEDURE — 80053 COMPREHEN METABOLIC PANEL: CPT

## 2022-07-10 PROCEDURE — 87636 SARSCOV2 & INF A&B AMP PRB: CPT

## 2022-07-10 PROCEDURE — 6370000000 HC RX 637 (ALT 250 FOR IP): Performed by: STUDENT IN AN ORGANIZED HEALTH CARE EDUCATION/TRAINING PROGRAM

## 2022-07-10 PROCEDURE — 99223 1ST HOSP IP/OBS HIGH 75: CPT | Performed by: INTERNAL MEDICINE

## 2022-07-10 PROCEDURE — 71045 X-RAY EXAM CHEST 1 VIEW: CPT

## 2022-07-10 PROCEDURE — 6360000002 HC RX W HCPCS: Performed by: STUDENT IN AN ORGANIZED HEALTH CARE EDUCATION/TRAINING PROGRAM

## 2022-07-10 PROCEDURE — 87186 SC STD MICRODIL/AGAR DIL: CPT

## 2022-07-10 PROCEDURE — 80202 ASSAY OF VANCOMYCIN: CPT

## 2022-07-10 PROCEDURE — 85025 COMPLETE CBC W/AUTO DIFF WBC: CPT

## 2022-07-10 PROCEDURE — 83690 ASSAY OF LIPASE: CPT

## 2022-07-10 PROCEDURE — 99285 EMERGENCY DEPT VISIT HI MDM: CPT

## 2022-07-10 PROCEDURE — 85610 PROTHROMBIN TIME: CPT

## 2022-07-10 PROCEDURE — 2580000003 HC RX 258: Performed by: STUDENT IN AN ORGANIZED HEALTH CARE EDUCATION/TRAINING PROGRAM

## 2022-07-10 PROCEDURE — C1729 CATH, DRAINAGE: HCPCS

## 2022-07-10 PROCEDURE — 10030 IMG GID FLU COLL DRG SFT TIS: CPT

## 2022-07-10 PROCEDURE — 2500000003 HC RX 250 WO HCPCS

## 2022-07-10 PROCEDURE — 0W9F30Z DRAINAGE OF ABDOMINAL WALL WITH DRAINAGE DEVICE, PERCUTANEOUS APPROACH: ICD-10-PCS | Performed by: UROLOGY

## 2022-07-10 PROCEDURE — 83605 ASSAY OF LACTIC ACID: CPT

## 2022-07-10 PROCEDURE — 96365 THER/PROPH/DIAG IV INF INIT: CPT

## 2022-07-10 PROCEDURE — 82803 BLOOD GASES ANY COMBINATION: CPT

## 2022-07-10 PROCEDURE — 2500000003 HC RX 250 WO HCPCS: Performed by: STUDENT IN AN ORGANIZED HEALTH CARE EDUCATION/TRAINING PROGRAM

## 2022-07-10 PROCEDURE — 99024 POSTOP FOLLOW-UP VISIT: CPT | Performed by: SURGERY

## 2022-07-10 PROCEDURE — 84132 ASSAY OF SERUM POTASSIUM: CPT

## 2022-07-10 PROCEDURE — 74176 CT ABD & PELVIS W/O CONTRAST: CPT

## 2022-07-10 PROCEDURE — 6360000002 HC RX W HCPCS: Performed by: SURGERY

## 2022-07-10 PROCEDURE — 2060000000 HC ICU INTERMEDIATE R&B

## 2022-07-10 PROCEDURE — 87150 DNA/RNA AMPLIFIED PROBE: CPT

## 2022-07-10 PROCEDURE — 83735 ASSAY OF MAGNESIUM: CPT

## 2022-07-10 PROCEDURE — 6360000002 HC RX W HCPCS

## 2022-07-10 PROCEDURE — 2580000003 HC RX 258

## 2022-07-10 PROCEDURE — 93005 ELECTROCARDIOGRAM TRACING: CPT | Performed by: INTERNAL MEDICINE

## 2022-07-10 PROCEDURE — 96375 TX/PRO/DX INJ NEW DRUG ADDON: CPT

## 2022-07-10 PROCEDURE — 36415 COLL VENOUS BLD VENIPUNCTURE: CPT

## 2022-07-10 PROCEDURE — 87040 BLOOD CULTURE FOR BACTERIA: CPT

## 2022-07-10 RX ORDER — METRONIDAZOLE 500 MG/100ML
500 INJECTION, SOLUTION INTRAVENOUS EVERY 8 HOURS
Status: DISCONTINUED | OUTPATIENT
Start: 2022-07-10 | End: 2022-07-12 | Stop reason: ALTCHOICE

## 2022-07-10 RX ORDER — HEPARIN SODIUM 5000 [USP'U]/ML
5000 INJECTION, SOLUTION INTRAVENOUS; SUBCUTANEOUS EVERY 8 HOURS SCHEDULED
Status: COMPLETED | OUTPATIENT
Start: 2022-07-10 | End: 2022-07-11

## 2022-07-10 RX ORDER — METOPROLOL SUCCINATE 50 MG/1
50 TABLET, EXTENDED RELEASE ORAL NIGHTLY
Status: DISCONTINUED | OUTPATIENT
Start: 2022-07-10 | End: 2022-07-14 | Stop reason: HOSPADM

## 2022-07-10 RX ORDER — OXYCODONE HYDROCHLORIDE 5 MG/1
5 TABLET ORAL EVERY 4 HOURS PRN
Status: DISCONTINUED | OUTPATIENT
Start: 2022-07-10 | End: 2022-07-14 | Stop reason: HOSPADM

## 2022-07-10 RX ORDER — ONDANSETRON 2 MG/ML
4 INJECTION INTRAMUSCULAR; INTRAVENOUS EVERY 6 HOURS PRN
Status: DISCONTINUED | OUTPATIENT
Start: 2022-07-10 | End: 2022-07-14 | Stop reason: HOSPADM

## 2022-07-10 RX ORDER — HEPARIN SODIUM 5000 [USP'U]/ML
5000 INJECTION, SOLUTION INTRAVENOUS; SUBCUTANEOUS EVERY 8 HOURS SCHEDULED
Status: DISCONTINUED | OUTPATIENT
Start: 2022-07-10 | End: 2022-07-10

## 2022-07-10 RX ORDER — SPIRONOLACTONE 50 MG/1
50 TABLET, FILM COATED ORAL NIGHTLY
Status: DISCONTINUED | OUTPATIENT
Start: 2022-07-10 | End: 2022-07-14 | Stop reason: HOSPADM

## 2022-07-10 RX ORDER — HYDRALAZINE HYDROCHLORIDE 20 MG/ML
5 INJECTION INTRAMUSCULAR; INTRAVENOUS EVERY 4 HOURS PRN
Status: DISCONTINUED | OUTPATIENT
Start: 2022-07-10 | End: 2022-07-14 | Stop reason: HOSPADM

## 2022-07-10 RX ORDER — ASPIRIN 81 MG/1
81 TABLET ORAL DAILY
Status: DISCONTINUED | OUTPATIENT
Start: 2022-07-10 | End: 2022-07-14 | Stop reason: HOSPADM

## 2022-07-10 RX ORDER — OXYCODONE HYDROCHLORIDE 5 MG/1
10 TABLET ORAL EVERY 4 HOURS PRN
Status: DISCONTINUED | OUTPATIENT
Start: 2022-07-10 | End: 2022-07-14 | Stop reason: HOSPADM

## 2022-07-10 RX ORDER — SODIUM CHLORIDE 0.9 % (FLUSH) 0.9 %
5-40 SYRINGE (ML) INJECTION PRN
Status: DISCONTINUED | OUTPATIENT
Start: 2022-07-10 | End: 2022-07-14 | Stop reason: HOSPADM

## 2022-07-10 RX ORDER — DEXTROSE MONOHYDRATE 50 MG/ML
100 INJECTION, SOLUTION INTRAVENOUS PRN
Status: DISCONTINUED | OUTPATIENT
Start: 2022-07-10 | End: 2022-07-14 | Stop reason: HOSPADM

## 2022-07-10 RX ORDER — SODIUM CHLORIDE 0.9 % (FLUSH) 0.9 %
5-40 SYRINGE (ML) INJECTION EVERY 12 HOURS SCHEDULED
Status: DISCONTINUED | OUTPATIENT
Start: 2022-07-10 | End: 2022-07-14 | Stop reason: HOSPADM

## 2022-07-10 RX ORDER — ACETAMINOPHEN 325 MG/1
650 TABLET ORAL ONCE
Status: COMPLETED | OUTPATIENT
Start: 2022-07-10 | End: 2022-07-10

## 2022-07-10 RX ORDER — ATORVASTATIN CALCIUM 20 MG/1
20 TABLET, FILM COATED ORAL DAILY
Status: DISCONTINUED | OUTPATIENT
Start: 2022-07-10 | End: 2022-07-14 | Stop reason: HOSPADM

## 2022-07-10 RX ORDER — LIDOCAINE 4 G/G
1 PATCH TOPICAL DAILY
Status: DISCONTINUED | OUTPATIENT
Start: 2022-07-10 | End: 2022-07-14 | Stop reason: HOSPADM

## 2022-07-10 RX ORDER — HEPARIN SODIUM 5000 [USP'U]/ML
5000 INJECTION, SOLUTION INTRAVENOUS; SUBCUTANEOUS EVERY 8 HOURS SCHEDULED
Status: ACTIVE | OUTPATIENT
Start: 2022-07-10 | End: 2022-07-11

## 2022-07-10 RX ORDER — POLYETHYLENE GLYCOL 3350 17 G/17G
17 POWDER, FOR SOLUTION ORAL DAILY PRN
Status: DISCONTINUED | OUTPATIENT
Start: 2022-07-10 | End: 2022-07-14 | Stop reason: HOSPADM

## 2022-07-10 RX ORDER — ACETAMINOPHEN 325 MG/1
650 TABLET ORAL EVERY 6 HOURS PRN
Status: DISCONTINUED | OUTPATIENT
Start: 2022-07-10 | End: 2022-07-14 | Stop reason: HOSPADM

## 2022-07-10 RX ORDER — SODIUM CHLORIDE 9 MG/ML
INJECTION, SOLUTION INTRAVENOUS PRN
Status: DISCONTINUED | OUTPATIENT
Start: 2022-07-10 | End: 2022-07-14 | Stop reason: HOSPADM

## 2022-07-10 RX ORDER — ONDANSETRON 4 MG/1
4 TABLET, ORALLY DISINTEGRATING ORAL EVERY 8 HOURS PRN
Status: DISCONTINUED | OUTPATIENT
Start: 2022-07-10 | End: 2022-07-14 | Stop reason: HOSPADM

## 2022-07-10 RX ORDER — ACETAMINOPHEN 650 MG/1
650 SUPPOSITORY RECTAL EVERY 6 HOURS PRN
Status: DISCONTINUED | OUTPATIENT
Start: 2022-07-10 | End: 2022-07-14 | Stop reason: HOSPADM

## 2022-07-10 RX ORDER — MAGNESIUM SULFATE IN WATER 40 MG/ML
4000 INJECTION, SOLUTION INTRAVENOUS ONCE
Status: COMPLETED | OUTPATIENT
Start: 2022-07-10 | End: 2022-07-10

## 2022-07-10 RX ORDER — LABETALOL HYDROCHLORIDE 5 MG/ML
5 INJECTION, SOLUTION INTRAVENOUS EVERY 4 HOURS PRN
Status: DISCONTINUED | OUTPATIENT
Start: 2022-07-10 | End: 2022-07-13 | Stop reason: CLARIF

## 2022-07-10 RX ORDER — NIFEDIPINE 90 MG/1
90 TABLET, EXTENDED RELEASE ORAL DAILY
Status: DISCONTINUED | OUTPATIENT
Start: 2022-07-10 | End: 2022-07-14 | Stop reason: HOSPADM

## 2022-07-10 RX ADMIN — SODIUM PHOSPHATE, MONOBASIC, MONOHYDRATE 10 MMOL: 276; 142 INJECTION, SOLUTION INTRAVENOUS at 11:57

## 2022-07-10 RX ADMIN — NIFEDIPINE 90 MG: 90 TABLET, FILM COATED, EXTENDED RELEASE ORAL at 15:31

## 2022-07-10 RX ADMIN — SODIUM CHLORIDE, PRESERVATIVE FREE 10 ML: 5 INJECTION INTRAVENOUS at 11:58

## 2022-07-10 RX ADMIN — HYDROMORPHONE HYDROCHLORIDE 0.5 MG: 1 INJECTION, SOLUTION INTRAMUSCULAR; INTRAVENOUS; SUBCUTANEOUS at 23:10

## 2022-07-10 RX ADMIN — VANCOMYCIN HYDROCHLORIDE 2000 MG: 10 INJECTION, POWDER, LYOPHILIZED, FOR SOLUTION INTRAVENOUS at 03:57

## 2022-07-10 RX ADMIN — SPIRONOLACTONE 50 MG: 50 TABLET ORAL at 20:55

## 2022-07-10 RX ADMIN — HYDROMORPHONE HYDROCHLORIDE 0.25 MG: 1 INJECTION, SOLUTION INTRAMUSCULAR; INTRAVENOUS; SUBCUTANEOUS at 10:02

## 2022-07-10 RX ADMIN — METOPROLOL SUCCINATE 50 MG: 50 TABLET, EXTENDED RELEASE ORAL at 20:54

## 2022-07-10 RX ADMIN — MAGNESIUM SULFATE HEPTAHYDRATE 4000 MG: 40 INJECTION, SOLUTION INTRAVENOUS at 11:57

## 2022-07-10 RX ADMIN — METRONIDAZOLE 500 MG: 500 INJECTION, SOLUTION INTRAVENOUS at 02:07

## 2022-07-10 RX ADMIN — METRONIDAZOLE 500 MG: 500 INJECTION, SOLUTION INTRAVENOUS at 18:17

## 2022-07-10 RX ADMIN — ACETAMINOPHEN 650 MG: 325 TABLET ORAL at 01:24

## 2022-07-10 RX ADMIN — METRONIDAZOLE 500 MG: 500 INJECTION, SOLUTION INTRAVENOUS at 11:58

## 2022-07-10 RX ADMIN — ACETAMINOPHEN 325MG 650 MG: 325 TABLET ORAL at 15:31

## 2022-07-10 RX ADMIN — HEPARIN SODIUM 5000 UNITS: 5000 INJECTION, SOLUTION INTRAVENOUS; SUBCUTANEOUS at 23:09

## 2022-07-10 RX ADMIN — SODIUM CHLORIDE 25 ML: 9 INJECTION, SOLUTION INTRAVENOUS at 18:16

## 2022-07-10 RX ADMIN — OXYCODONE 10 MG: 5 TABLET ORAL at 20:55

## 2022-07-10 RX ADMIN — OXYCODONE 10 MG: 5 TABLET ORAL at 17:45

## 2022-07-10 RX ADMIN — HYDROMORPHONE HYDROCHLORIDE 0.5 MG: 1 INJECTION, SOLUTION INTRAMUSCULAR; INTRAVENOUS; SUBCUTANEOUS at 19:52

## 2022-07-10 RX ADMIN — HEPARIN SODIUM 5000 UNITS: 5000 INJECTION INTRAVENOUS; SUBCUTANEOUS at 07:00

## 2022-07-10 RX ADMIN — ACETAMINOPHEN 325MG 650 MG: 325 TABLET ORAL at 23:40

## 2022-07-10 RX ADMIN — CEFEPIME HYDROCHLORIDE 2000 MG: 2 INJECTION, POWDER, FOR SOLUTION INTRAVENOUS at 00:45

## 2022-07-10 RX ADMIN — ATORVASTATIN CALCIUM 20 MG: 20 TABLET, FILM COATED ORAL at 15:31

## 2022-07-10 RX ADMIN — ACETAMINOPHEN 325MG 650 MG: 325 TABLET ORAL at 08:11

## 2022-07-10 RX ADMIN — SODIUM CHLORIDE, PRESERVATIVE FREE 5 ML: 5 INJECTION INTRAVENOUS at 20:00

## 2022-07-10 RX ADMIN — HYDROMORPHONE HYDROCHLORIDE 1 MG: 1 INJECTION, SOLUTION INTRAMUSCULAR; INTRAVENOUS; SUBCUTANEOUS at 02:36

## 2022-07-10 ASSESSMENT — PAIN DESCRIPTION - PAIN TYPE
TYPE: ACUTE PAIN

## 2022-07-10 ASSESSMENT — PAIN DESCRIPTION - DESCRIPTORS
DESCRIPTORS: THROBBING
DESCRIPTORS: ACHING
DESCRIPTORS: ACHING;SHARP;SHOOTING
DESCRIPTORS: ACHING
DESCRIPTORS: SHARP
DESCRIPTORS: SHARP

## 2022-07-10 ASSESSMENT — ENCOUNTER SYMPTOMS
ALLERGIC/IMMUNOLOGIC NEGATIVE: 1
ABDOMINAL DISTENTION: 0
DIARRHEA: 0
NAUSEA: 0
ABDOMINAL PAIN: 1
COUGH: 0
VOMITING: 0
EYES NEGATIVE: 1
CHEST TIGHTNESS: 0
ANAL BLEEDING: 0
BLOOD IN STOOL: 0
RECTAL PAIN: 0
BACK PAIN: 0
SHORTNESS OF BREATH: 0
CONSTIPATION: 0
APNEA: 0

## 2022-07-10 ASSESSMENT — PAIN DESCRIPTION - LOCATION
LOCATION: NECK;SCROTUM
LOCATION: ABDOMEN

## 2022-07-10 ASSESSMENT — PAIN DESCRIPTION - ONSET
ONSET: ON-GOING

## 2022-07-10 ASSESSMENT — PAIN DESCRIPTION - FREQUENCY
FREQUENCY: CONTINUOUS

## 2022-07-10 ASSESSMENT — PAIN DESCRIPTION - ORIENTATION
ORIENTATION: ANTERIOR;RIGHT;LEFT;MID
ORIENTATION: RIGHT;ANTERIOR;LOWER;MID
ORIENTATION: RIGHT;LEFT;MID
ORIENTATION: RIGHT;LEFT;MID
ORIENTATION: MID;RIGHT;LEFT
ORIENTATION: LEFT;INNER
ORIENTATION: ANTERIOR;RIGHT;LEFT

## 2022-07-10 ASSESSMENT — PAIN SCALES - GENERAL
PAINLEVEL_OUTOF10: 10
PAINLEVEL_OUTOF10: 7
PAINLEVEL_OUTOF10: 10
PAINLEVEL_OUTOF10: 9
PAINLEVEL_OUTOF10: 7
PAINLEVEL_OUTOF10: 6
PAINLEVEL_OUTOF10: 7
PAINLEVEL_OUTOF10: 7
PAINLEVEL_OUTOF10: 8
PAINLEVEL_OUTOF10: 8
PAINLEVEL_OUTOF10: 9
PAINLEVEL_OUTOF10: 7
PAINLEVEL_OUTOF10: 10
PAINLEVEL_OUTOF10: 6

## 2022-07-10 ASSESSMENT — PAIN - FUNCTIONAL ASSESSMENT
PAIN_FUNCTIONAL_ASSESSMENT: PREVENTS OR INTERFERES SOME ACTIVE ACTIVITIES AND ADLS

## 2022-07-10 NOTE — PROGRESS NOTES
4 Eyes Admission Assessment     I agree as the admission nurse that 2 RN's have performed a thorough Head to Toe Skin Assessment on the patient. ALL assessment sites listed below have been assessed on admission. Areas assessed by both nurses:   Corinne and sable  [x]   Head, Face, and Ears   [x]   Shoulders, Back, and Chest  [x]   Arms, Elbows, and Hands   [x]   Coccyx, Sacrum, and Ischium  [x]   Legs, Feet, and Heels        Does the Patient have Skin Breakdown? Old surgical scarring from hernia repair on abdomen.   Roshan Prevention initiated:  No   Wound Care Orders initiated:  No      [facility] nurse consulted for Pressure Injury (Stage 3,4, Unstageable, DTI, NWPT, and Complex wounds) or Roshan score 18 or lower: no     Nurse 1 eSignature: Electronically signed by Poli Sosa RN on 7/10/22 at 7:35 AM EDT    **SHARE this note so that the co-signing nurse is able to place an eSignature**    Nurse 2 eSignature: Electronically signed by Abi Barnes RN on 7/10/22 at 4:40 AM EDT

## 2022-07-10 NOTE — CONSULTS
Office : [phone]     Fax :[phone]       Nephrology Consult Note      Patient's Name: Raissa Thakkar  11:02 AM  7/10/2022    Reason for Consult:  ESRD management       Requesting Physician:  Gabriel Osullivan MD      Chief Complaint:    Chief Complaint   Patient presents with    Abdominal Pain     started around Noon Today, all over abd pain and Left Testicle, hx of hernia surgery 3 weeks ago by , HDU pt Tue, Thurs, Sat - went today    Fever     103.0 F at ED         History of Present Ilness:    Raissa Thakkar is a 59 y.o. male with h/o ESRD last dialysis yesterday presented with c/o abdominal pain who had recent bilateral hernia repair on 6/13 presents today with abdominal pain.  The patient reports that sometime around this evening just before he went to dialysis he had severe abdominal pain and he managed to get through with his dialysis session but immediately afterwards started feeling feverish after which he decided come to the ED.  In the ED his temperature was 103, respiratory rate was 28, pulse was 124, blood pressure was 170/98.  He was in severe abdominal pain and needed Dilaudid to get relief.  Labs were mostly unremarkable including a lactic acid.  CT without con showed a right and a left fluid collection in the pelvis abutting the right and left hernia repair. He denies any chest pain, shortness of breath, sensory loss, motor weakness, heart palpitations.  He does report that he is able to make urine and denies any dysuria, urgency, frequency. He also reports continued testicular  pain which he has been having for the past week. It is the same in intensity and nature however it is still very uncomfortable  to him        I/O last 3 completed shifts:   In: 503.2 [I.V.:10; IV Piggyback:493.2]  Out: 100 [Urine:100]    Past Medical History:   Diagnosis Date    Arthralgia of multiple joints 10/27/2015    Arthritis     Atrial fibrillation (HCC)     Chronic kidney disease     stage 4    Chronic systolic congestive heart failure ([facility] 8/16/2021    CRI (chronic renal insufficiency)     Diabetic nephropathy associated with type 2 diabetes mellitus ([facility] 10/11/2018    Diverticulosis     Elevated PSA     Erectile dysfunction     ESRD (end stage renal disease) on dialysis ([facility] 2/24/2022    Gout     Gout     Hemrrhoid NOS w/ complication NEC     History of MRSA infection     Hypertension     CELSA (obstructive sleep apnea) 07/14/2017    uses C-pap machine    Type 2 diabetes mellitus without complication, without long-term current use of insulin ([facility] 5/42/6702    Umbilical hernia without obstruction and without gangrene 2/2/2016       Past Surgical History:   Procedure Laterality Date    COLONOSCOPY      COLONOSCOPY N/A 3/18/2022    COLONOSCOPY POLYPECTOMY SNARE/COLD BIOPSY performed by Alec De Paz MD at [address] N/A 1/17/2022    LAPAROSCOPIC PERITONEAL DIALYSIS CATHETER PLACEMENT and omentopexy performed by Semaj Young DO at [address] [facility], [address] [place] [address] N/A 4/15/2022    ROBOTIC, RECURRENT INCISIONAL HERNIA REPAIR WITH BIOSYNTHETIC MESH; LAPAROSCOPIC PERITONEAL DIALYSIS CATHETER REVISION WITH LAPAROSCOPIC OMENTOPEXY performed by Semaj Young DO at [address] [facility] Bilateral 4/15/2022    BILATERAL OPEN INGUINAL HERNI REPAIR performed by Semaj Young DO at [address] [facility] Bilateral 6/13/2022    ROBOTIC RECURRENT BILATERAL INGUINAL HERNIA REPAIR, LYSIS OF ADHESIONS, PERITONEAL DIALYSIS CATHETER REMOVAL performed by Semaj Young DO at [address] [facility] IR TUNNELED CATHETER PLACEMENT GREATER THAN 5 YEARS  01/14/2022    IR TUNNELED CATHETER PLACEMENT GREATER THAN 5 YEARS 1/14/2022 [facility] SPECIAL PROCEDURES    UPPER GASTROINTESTINAL ENDOSCOPY  05/28/2016    biopsies, multiple small gastric ulcers    UPPER GASTROINTESTINAL ENDOSCOPY  09/12/2016    UPPER GASTROINTESTINAL ENDOSCOPY N/A 3/18/2022    EGD DIAGNOSTIC ONLY performed by Alec De Paz MD at [place] [address] N/A 1/17/2022    LAPAROSCOPIC incarcerated VENTRAL HERNIA REPAIR performed by Semaj Young DO at [facility] OR       Family History   Problem Relation Age of Onset    Hypertension Other     Breast Cancer Mother     Colon Cancer Father     Diabetes Maternal Grandmother     Coronary Art Dis Brother         reports that he has never smoked. He has never used smokeless tobacco. He reports previous alcohol use. He reports that he does not use drugs. Allergies:  Patient has no known allergies.     Current Medications:    [Held by provider] aspirin tablet 81 mg, Daily  atorvastatin (LIPITOR) tablet 20 mg, Daily  metoprolol succinate (TOPROL XL) extended release tablet 50 mg, Nightly  NIFEdipine (ADALAT CC) extended release tablet 90 mg, Daily  spironolactone (ALDACTONE) tablet 50 mg, Nightly  sodium chloride flush 0.9 % injection 5-40 mL, 2 times per day  sodium chloride flush 0.9 % injection 5-40 mL, PRN  0.9 % sodium chloride infusion, PRN  ondansetron (ZOFRAN-ODT) disintegrating tablet 4 mg, Q8H PRN   Or  ondansetron (ZOFRAN) injection 4 mg, Q6H PRN  polyethylene glycol (GLYCOLAX) packet 17 g, Daily PRN  acetaminophen (TYLENOL) tablet 650 mg, Q6H PRN   Or  acetaminophen (TYLENOL) suppository 650 mg, Q6H PRN  [START ON 7/11/2022] cefepime (MAXIPIME) 500 mg in dextrose 5 % 50 mL IVPB, Q24H  glucose chewable tablet 16 g, PRN  glucagon (rDNA) injection 1 mg, PRN  dextrose 5 % solution, PRN  vancomycin (VANCOCIN) intermittent dosing (placeholder), RX Placeholder  labetalol (NORMODYNE;TRANDATE) injection 5 mg, Q4H PRN  hydrALAZINE (APRESOLINE) injection 5 mg, Q4H PRN  dextrose bolus 10% 125 mL, PRN   Or  dextrose bolus 10% 250 mL, PRN  metronidazole (FLAGYL) 500 mg in 0.9% NaCl 100 mL IVPB premix, Q8H  sodium phosphate 10 mmol in sodium chloride 0.9 % 100 mL IVPB, Once  magnesium sulfate 4000 mg in 100 mL IVPB premix, Once  lidocaine 4 % external patch 1 patch, Daily  [Held by provider] heparin (porcine) injection 5,000 Units, 3 times per day        Review of Systems:   14 point ROS obtained but were negative except mentioned in HPI      Physical exam:     Vitals:  BP (!) 150/91   Pulse (!) 125   Temp (!) 101 °F (38.3 °C) (Oral)   Resp 28   Ht 6' 5\" (1.956 m)   Wt 253 lb 1.4 oz (114.8 kg)   SpO2 94%   BMI 30.01 kg/m²   Constitutional:  OAA X3 NAD  Skin: no rash, turgor wnl  Heent:  eomi, mmm  Neck: no bruits or jvd noted  Cardiovascular:  S1, S2 without m/r/g  Respiratory: CTA B without w/r/r  Abdomen:  +bs, soft, nt, nd  Ext: no  lower extremity edema  Psychiatric: mood and affect appropriate  Musculoskeletal:  Rom, muscular strength intact    Labs:  CBC:   Recent Labs     07/10/22  0020   WBC 6.0   HGB 11.3*        BMP:    Recent Labs     07/10/22  0020 07/10/22  0528    137   K 3.6 5.2*    101   CO2 24 21   BUN 16 19   CREATININE 4.0* 4.6*   GLUCOSE 118* 134*     Ca/Mg/Phos:   Recent Labs     07/10/22  0020 07/10/22  0528   CALCIUM 8.6 8.3   MG  --  1.50*   PHOS  --  2.4*     Hepatic:   Recent Labs     07/10/22  0020   AST 13*   ALT 11   BILITOT 0.4   ALKPHOS 68     Troponin: No results for input(s): TROPONINI in the last 72 hours. BNP: No results for input(s): BNP in the last 72 hours. Lipids: No results for input(s): CHOL, TRIG, HDL, LDLCALC, LABVLDL in the last 72 hours. ABGs: No results for input(s): PHART, PO2ART, SFL2GPB in the last 72 hours. INR: No results for input(s): INR in the last 72 hours.   UA:No results for input(s): Melva Baird GLUCOSEU, [address] [place] [address], Violet Hanks, KARU, Betsey Deep in the last 72 hours. Urine Microscopic: No results for input(s): LABCAST, BACTERIA, COMU, HYALCAST, WBCUA, RBCUA, EPIU in the last 72 hours. Urine Culture: No results for input(s): LABURIN in the last 72 hours. Urine Chemistry: No results for input(s): Patsie Number, PROTEINUR, NAUR in the last 72 hours. IMAGING:  CT ABDOMEN PELVIS WO CONTRAST Additional Contrast? None   Final Result      1. Noncontrast CT demonstrating interval bilateral inguinal hernia repair with persistent fat with stable stranding in the right and left inguinal canal. Right greater than left fluid collections in the pelvis abutting the right and left hernia repair    mesh may represent postoperative seromas versus abscesses. 2.  Diverticulosis without evidence of diverticulitis. XR CHEST PORTABLE   Final Result      No acute pulmonary disease. US SCROTUM AND TESTICLES    (Results Pending)   CT DRAINAGE HEMATOMA/SEROMA/FLUID COLLECTION    (Results Pending)                       Assessment/Plan :      1. ESRD   HD tue/ thu/ sat   Labs reviewed   No need for HD today     2. HTN  Continue nifedipine XL   spironolactone and metoprolol     3. RLQ seroma vs abscess on CT scan . ecent suregry for hernia   surgery seeing     4. Acid- base disorder. monitor and correct with HD     5. Electrolytes.  Monitor     HD on Tuesday             D/w primary team      Thank you for allowing us to participate in care of Therese Becerra         Electronically signed by: Rio Ordaz MD, 7/10/2022, 11:02 AM      [facility] [address]  Office : [phone]  Fax :[phone]

## 2022-07-10 NOTE — PROGRESS NOTES
Patient:  Carlos A Garcia   :   1960      Relevant clinical history, particularly as it involves the pending procedure, was reviewed and discussed. The procedure including risks and benefits was discussed at length with the patient (or designated family member) and all questions were answered. Informed consent to proceed with the procedure was given. Vital signs were monitored and documented by the Radiology nurse. Targeted physical examination  Heart : regular rate and rhythm  Lungs : clear, breathing easily  Condition : stable    [facility] nurses notes reviewed and agreed.     Past Medical History:        Diagnosis Date    Arthralgia of multiple joints 10/27/2015    Arthritis     Atrial fibrillation (HCC)     Chronic kidney disease     stage 4    Chronic systolic congestive heart failure ([facility] 2021    CRI (chronic renal insufficiency)     Diabetic nephropathy associated with type 2 diabetes mellitus ([address] 10/11/2018    Diverticulosis     Elevated PSA     Erectile dysfunction     ESRD (end stage renal disease) on dialysis ([facility] 2022    Gout     Gout     Hemrrhoid NOS w/ complication NEC     History of MRSA infection     Hypertension     CELSA (obstructive sleep apnea) 2017    uses C-pap machine    Type 2 diabetes mellitus without complication, without long-term current use of insulin ([facility]     Umbilical hernia without obstruction and without gangrene 2016       Past Surgical History:           Procedure Laterality Date    COLONOSCOPY      COLONOSCOPY N/A 3/18/2022    COLONOSCOPY POLYPECTOMY SNARE/COLD BIOPSY performed by Cat White MD at [address] N/A 2022    LAPAROSCOPIC PERITONEAL DIALYSIS CATHETER PLACEMENT and omentopexy performed by Stephen Victoria DO at [address] [place] [address] N/A 4/15/2022    ROBOTIC, RECURRENT INCISIONAL HERNIA REPAIR WITH BIOSYNTHETIC MESH; LAPAROSCOPIC PERITONEAL DIALYSIS CATHETER REVISION WITH LAPAROSCOPIC OMENTOPEXY performed by Jeffry Baez DO at [address] Bilateral 4/15/2022    BILATERAL OPEN INGUINAL HERNI REPAIR performed by Jeffry Baez DO at [address] Bilateral 6/13/2022    ROBOTIC RECURRENT BILATERAL INGUINAL HERNIA REPAIR, LYSIS OF ADHESIONS, PERITONEAL DIALYSIS CATHETER REMOVAL performed by Jeffry Baez DO at [facility] IR TUNNELED [address] 5 YEARS  01/14/2022    IR TUNNELED CATHETER PLACEMENT GREATER THAN 5 YEARS 1/14/2022 [facility] SPECIAL PROCEDURES    UPPER GASTROINTESTINAL ENDOSCOPY  05/28/2016    biopsies, multiple small gastric ulcers    UPPER GASTROINTESTINAL ENDOSCOPY  09/12/2016    UPPER GASTROINTESTINAL ENDOSCOPY N/A 3/18/2022    EGD DIAGNOSTIC ONLY performed by Jim Guillen MD at [place] [id number] N/A 1/17/2022    LAPAROSCOPIC incarcerated VENTRAL HERNIA REPAIR performed by Jeffry Baez DO at [address]:  Patient has no known allergies. Medications:   Home Meds  No current facility-administered medications on file prior to encounter. Current Outpatient Medications on File Prior to Encounter   Medication Sig Dispense Refill    lidocaine (LIDODERM) 5 % Place 1 patch onto the skin daily for 15 days 12 hours on, 12 hours off. 15 patch 0    oxyCODONE (ROXICODONE) 5 MG immediate release tablet Take 1 tablet by mouth every 6 hours as needed for Pain for up to 7 days.  28 tablet 0    Folic Acid-Vit R0-QXR M44 0.5-5-0.2 MG TABS Take by mouth      clotrimazole (LOTRIMIN) 1 % external solution 1 APPLICATION TO NAILS DAILY      polyethylene glycol (GLYCOLAX) 17 GM/SCOOP powder TAKE 17 G BY MOUTH 2 TIMES DAILY      NIFEdipine (ADALAT CC) 90 MG extended release tablet Take 90 mg by mouth daily      vitamin D (ERGOCALCIFEROL) 1.25 MG (82758 UT) CAPS capsule Take 1 capsule by mouth once a week 4 capsule 0    torsemide (DEMADEX) 100 MG tablet Take 1 tablet by mouth daily 90 tablet 0    calcitRIOL (ROCALTROL) 0.5 MCG capsule Take 1 capsule by mouth three times a week During HD 30 capsule 3    docusate sodium (COLACE) 100 MG capsule Take 1 capsule by mouth 2 times daily 20 capsule 0    B Complex-C-Folic Acid (DIALYVITE 039 PO) Take 1 tablet by mouth once a week       Methoxy PEG-Epoetin Beta (MIRCERA) 50 MCG/0.3ML SOSY Inject as directed Twice a  month       Iron Sucrose (VENOFER IV) Infuse intravenously every 30 days       bisacodyl (BISACODYL) 5 MG EC tablet Take 1 tablet by mouth daily as needed for Constipation 30 tablet 5    spironolactone (ALDACTONE) 50 MG tablet Take 1 tablet by mouth at bedtime 30 tablet 3    metoprolol succinate (TOPROL XL) 50 MG extended release tablet Take 1 tablet by mouth at bedtime TAKE 1 TABLET BY MOUTH EVERY DAY 30 tablet 3    atorvastatin (LIPITOR) 20 MG tablet TAKE 1 TABLET BY MOUTH EVERY DAY 30 tablet 5    nortriptyline (PAMELOR) 10 MG capsule Take 10 mg by mouth nightly       omeprazole (PRILOSEC) 40 MG delayed release capsule TAKE 1 CAPSULE BY MOUTH EVERY DAY 30 capsule 1    sildenafil (VIAGRA) 100 MG tablet Take 1 tablet by mouth as needed for Erectile Dysfunction 30 tablet 3    aspirin 81 MG tablet Take 81 mg by mouth daily         Current Meds  [Held by provider] aspirin EC tablet 81 mg, Daily  atorvastatin (LIPITOR) tablet 20 mg, Daily  metoprolol succinate (TOPROL XL) extended release tablet 50 mg, Nightly  NIFEdipine (PROCARDIA XL) extended release tablet 90 mg, Daily  spironolactone (ALDACTONE) tablet 50 mg, Nightly  sodium chloride flush 0.9 % injection 5-40 mL, 2 times per day  sodium chloride flush 0.9 % injection 5-40 mL, PRN  0.9 % sodium chloride infusion, PRN  ondansetron (ZOFRAN-ODT) disintegrating tablet 4 mg, Q8H PRN   Or  ondansetron (ZOFRAN) injection 4 mg, Q6H PRN  polyethylene glycol (GLYCOLAX) packet 17 g, Daily PRN  acetaminophen (TYLENOL) tablet 650 mg, Q6H PRN   Or  acetaminophen (TYLENOL) suppository 650 mg, Q6H PRN  [START ON 7/11/2022] cefepime (MAXIPIME) 500 mg in dextrose 5 % 50 mL IVPB, Q24H  glucose chewable tablet 16 g, PRN  glucagon (rDNA) injection 1 mg, PRN  dextrose 5 % solution, PRN  vancomycin (VANCOCIN) intermittent dosing (placeholder), RX Placeholder  labetalol (NORMODYNE;TRANDATE) injection 5 mg, Q4H PRN  hydrALAZINE (APRESOLINE) injection 5 mg, Q4H PRN  dextrose bolus 10% 125 mL, PRN   Or  dextrose bolus 10% 250 mL, PRN  metronidazole (FLAGYL) 500 mg in 0.9% NaCl 100 mL IVPB premix, Q8H  lidocaine 4 % external patch 1 patch, Daily  [Held by provider] heparin (porcine) injection 5,000 Units, 3 times per day  HYDROmorphone (DILAUDID) injection 0.25 mg, Q3H PRN   Or  HYDROmorphone (DILAUDID) injection 0.5 mg, Q3H PRN  oxyCODONE (ROXICODONE) immediate release tablet 5 mg, Q4H PRN   Or  oxyCODONE (ROXICODONE) immediate release tablet 10 mg, Q4H PRN          ASA 2 - Patient with mild systemic disease with no functional limitations    II (soft palate, uvula, fauces visible)    Activity:  2 - Able to move 4 extremities voluntarily on command  Respiration:  2 - Able to breathe deeply and cough freely  Circulation:  2 - BP+/- 20mmHg of normal  Consciousness:  2 - Fully awake  Oxygen Saturation (color):  2 - Able to maintain oxygen saturation >92% on room air    Sedation : Moderate sedation planned

## 2022-07-10 NOTE — PROGRESS NOTES
Dose:   7/10 22.9 mg/L @ 1122 2000mg @ [id number]   7/11 -- --       Cultures & Sensitivities:    Date Site Micro Susceptibility / Result   7/10 COVID/Flu Negative    7/10 Blood x2 Collected      Labs / Ancillary Data:    No estimate of CrCL, as the patient is ESRD on HD. Recent Labs     07/10/22  0020 07/10/22  0528 07/10/22  1122   CREATININE 4.0* 4.6*  --    BUN 16 19  --    WBC 6.0  --  10.1       Additional Lab Values / Findings of Note:    Procalcitonin: No results for input(s): PROCAL in the last 72 hours.

## 2022-07-10 NOTE — PROGRESS NOTES
Pharmacy Note - Renal Dosing and Extended Infusion Beta-Lactam Adjustment    Cefepime ordered for treatment of Intra-Abdominal Infection. Per [address] Renal Dose Adjustment Policy and Extended Infusion Beta-Lactam Policy, Cefepime will be changed to 500 mg every 24 hours. Estimated Creatinine Clearance: Estimated Creatinine Clearance: 27 mL/min (A) (based on SCr of 4 mg/dL (H)). Dialysis Status, ANTONIO, CKD: HD  BMI: Body mass index is 30.01 kg/m². Rationale for Adjustment: Agent is renally eliminated and demonstrates time-dependent effect on bacterial eradication. Extended-infusion dosing strategy aims to enhance microbiologic and clinical efficacy. Pharmacy will continue to monitor renal function, cultures and sensitivities (where available) and adjust dose as necessary. Please call with any questions.   Charma Kussmaul, PharmD  Main Pharmacy: [id number]

## 2022-07-10 NOTE — PLAN OF CARE
Problem: Discharge Planning  Goal: Discharge to home or other facility with appropriate resources  Outcome: Progressing  Flowsheets  Taken 7/10/2022 0752 by Katina Severe, RN  Discharge to home or other facility with appropriate resources:   Identify barriers to discharge with patient and caregiver   Arrange for needed discharge resources and transportation as appropriate   Identify discharge learning needs (meds, wound care, etc)   Refer to discharge planning if patient needs post-hospital services based on physician order or complex needs related to functional status, cognitive ability or social support system  Taken 7/10/2022 0535 by Marilin Pruett RN  Discharge to home or other facility with appropriate resources:   Identify barriers to discharge with patient and caregiver   Arrange for needed discharge resources and transportation as appropriate   Identify discharge learning needs (meds, wound care, etc)     Problem: Pain  Goal: Verbalizes/displays adequate comfort level or baseline comfort level  Outcome: Progressing  Flowsheets (Taken 7/10/2022 0535)  Verbalizes/displays adequate comfort level or baseline comfort level:   Encourage patient to monitor pain and request assistance   Assess pain using appropriate pain scale   Administer analgesics based on type and severity of pain and evaluate response   Implement non-pharmacological measures as appropriate and evaluate response     Problem: Safety - Adult  Goal: Free from fall injury  Outcome: Progressing  Flowsheets (Taken 7/10/2022 0752)  Free From Fall Injury:   Instruct family/caregiver on patient safety   Based on caregiver fall risk screen, instruct family/caregiver to ask for assistance with transferring infant if caregiver noted to have fall risk factors     Problem: ABCDS Injury Assessment  Goal: Absence of physical injury  Outcome: Progressing  Flowsheets (Taken 7/10/2022 0752)  Absence of Physical Injury: Implement safety measures based on patient assessment no

## 2022-07-10 NOTE — PROGRESS NOTES
Dr. Chau Baker at 7734 for additional pain management. Resident responded quickly but no new orders. Followed up at 1440 and 1540.  No new pain management orders at this time    Electronically signed by Lindy De La Fuente RN on 7/10/2022 at 4:48 PM

## 2022-07-10 NOTE — PROGRESS NOTES
Phone update given to wife at this time    Electronically signed by Zhang Bradley RN on 7/10/2022 at 12:06 PM

## 2022-07-10 NOTE — H&P
Internal Medicine  PGY 3  History & Physical      CC abd pain    History Obtained From:  pt    HISTORY OF PRESENT ILLNESS:  73-year-old male with a past medical history of ESRD on hemodialysis, hypertension, diabetes, recent bilateral hernia repair on 6/13 presents today with abdominal pain.  The patient reports that sometime around this evening just before he went to dialysis he had severe abdominal pain and he managed to get through with his dialysis session but immediately afterwards started feeling feverish after which he decided come to the ED.  In the ED his temperature was 103, respiratory rate was 28, pulse was 124, blood pressure was 170/98.  He was in severe abdominal pain and needed Dilaudid to get relief.  Labs were mostly unremarkable including a lactic acid.  CT without con showed a right and a left fluid collection in the pelvis abutting the right and left hernia repair. He denies any chest pain, shortness of breath, sensory loss, motor weakness, heart palpitations.  He does report that he is able to make urine and denies any dysuria, urgency, frequency. He also reports continued testicular  pain which he has been having for the past week.  It is the same in intensity and nature however it is still very uncomfortable  to him    Past Medical History:        Diagnosis Date    Arthralgia of multiple joints 10/27/2015    Arthritis     Atrial fibrillation (HCC)     Chronic kidney disease     stage 4    Chronic systolic congestive heart failure ([address] 8/16/2021    CRI (chronic renal insufficiency)     Diabetic nephropathy associated with type 2 diabetes mellitus ([address] 10/11/2018    Diverticulosis     Elevated PSA     Erectile dysfunction     ESRD (end stage renal disease) on dialysis ([address] 2/24/2022    Gout     Gout     Hemrrhoid NOS w/ complication NEC     History of MRSA infection     Hypertension     CELSA (obstructive sleep apnea) 07/14/2017    uses C-pap machine    Type 2 diabetes mellitus without complication, without long-term current use of insulin ([facility] [address] 9/94/0048    Umbilical hernia without obstruction and without gangrene 2/2/2016   ·     Past Surgical History:        Procedure Laterality Date    COLONOSCOPY      COLONOSCOPY N/A 3/18/2022    COLONOSCOPY POLYPECTOMY SNARE/COLD BIOPSY performed by Era Frias MD at [address] N/A 1/17/2022    LAPAROSCOPIC PERITONEAL DIALYSIS CATHETER PLACEMENT and omentopexy performed by Eriberto Moya DO at [facility], [facility] N/A 4/15/2022    ROBOTIC, RECURRENT INCISIONAL HERNIA REPAIR WITH BIOSYNTHETIC MESH; LAPAROSCOPIC PERITONEAL DIALYSIS CATHETER REVISION WITH LAPAROSCOPIC OMENTOPEXY performed by Eriberto Moya DO at [facility] Bilateral 4/15/2022    [address] performed by Eriberto Moya DO at [address] Bilateral 6/13/2022    ROBOTIC [address], LYSIS OF ADHESIONS, PERITONEAL DIALYSIS CATHETER REMOVAL performed by Eriberto Moya DO at [address] IR TUNNELED [address] 5 YEARS  01/14/2022    IR TUNNELED [address] 5 YEARS 1/14/2022 [address]    UPPER GASTROINTESTINAL ENDOSCOPY  05/28/2016    biopsies, multiple small gastric ulcers    UPPER GASTROINTESTINAL ENDOSCOPY  09/12/2016    UPPER GASTROINTESTINAL ENDOSCOPY N/A 3/18/2022    EGD DIAGNOSTIC ONLY performed by Era Frias MD at [facility] [address] N/A 1/17/2022    LAPAROSCOPIC incarcerated [address] performed by Eriberto Moya DO at [address]   ·     Medications Priorto Admission:    · Medications Prior to Admission: lidocaine (LIDODERM) 5 %, Place 1 patch onto the skin daily for 15 days 12 hours on, 12 hours off.   · oxyCODONE (ROXICODONE) 5 MG immediate release tablet, Take 1 tablet by mouth every 6 Cancer Mother     Colon Cancer Father     Diabetes Maternal Grandmother     Coronary Art Dis Brother    ·     Review of Systems   Constitutional: Positive for fever. Negative for activity change, chills and diaphoresis. HENT: Negative for congestion, dental problem, drooling and ear pain. Respiratory: Negative for apnea, cough and chest tightness. Cardiovascular: Negative for palpitations and leg swelling. Gastrointestinal: Positive for abdominal pain. Negative for anal bleeding, blood in stool and rectal pain. Genitourinary: Positive for testicular pain. Negative for enuresis, penile discharge and penile swelling. Neurological: Negative for light-headedness and numbness. Psychiatric/Behavioral: Negative for agitation, behavioral problems, confusion and decreased concentration. Physical Exam  Constitutional:       General: He is in acute distress. Appearance: He is obese. He is ill-appearing. HENT:      Head: Normocephalic. Mouth/Throat:      Mouth: Mucous membranes are moist.   Eyes:      Extraocular Movements: Extraocular movements intact. Pupils: Pupils are equal, round, and reactive to light. Cardiovascular:      Rate and Rhythm: Normal rate. Pulses: Normal pulses. Heart sounds: No murmur heard. No gallop. Pulmonary:      Effort: Pulmonary effort is normal. No respiratory distress. Breath sounds: No wheezing or rales. Abdominal:      Palpations: Abdomen is soft. Tenderness: There is abdominal tenderness. Comments: Diffuse abd tenderness   Genitourinary:     Comments: Left testicle tender  Musculoskeletal:         General: No swelling or deformity. Right lower leg: No edema. Left lower leg: No edema. Skin:     General: Skin is warm. Capillary Refill: Capillary refill takes less than 2 seconds. Neurological:      Mental Status: He is alert and oriented to person, place, and time.       Cranial Nerves: No cranial nerve consulted however they do not recommend any acute intervention at this time  -We will  Panculture the patient to look for other sources of infection and start the patient on broad-spectrum antibiotics (vanc/cefepime/flagyl) however if the patient does not get better after 24 hours than he may need drainage of that fluid  -NPO for possible intervention tomorrow       ESRD on hemodialysis  - TTS  -- follows with Dr. Ivon Jean  - Nephrology consult      Hypertension  - Blood pressure initially hypertensive in the ED however got much better as patient's pain was able to be controlled with dilaudid.   - Continue home nifedipine, spironolactone, metoprolol  - Hold home torsemide for now and will restart when appropriate       Will discuss with attending physician Dr. Pope Levo Status:Full code  FEN: NPO  PPX: Heparin  DISPO: Ascencion Fleischer, MD PGY-3  7/10/2022,  4:46 AM

## 2022-07-10 NOTE — PROGRESS NOTES
Clinical Pharmacy Progress Note    Vancomycin - Management by Pharmacy    Consult Date(s): 7/10/22  Consulting Provider(s): MONY Loaiza     Assessment / Plan     Intra-Abdominal Infection- Vancomycin  Concurrent Antimicrobials: cefepime  Day of Vanc Therapy / Ordered Duration: not indicated in consult  Current Dosing Method: Intermittent Dosing by Levels  Therapeutic Goal: 10-15 mg/L  Current Dose / Frequency: 2000 mg given in ED, intermittent dosing  Plan / Rationale: Random level due 7/10 @ 1300  Will continue to monitor clinical condition and make adjustments to regimen as appropriate. Thank you for consulting Pharmacy! Kory Bush, PharmD  Main Pharmacy: [id number]        Subjective/Objective: Mr. Warren Waters is a 59 y.o. male was admitted for sepsis. Pharmacy has been consulted to dose vancomycin. Ht Readings from Last 1 Encounters:   07/09/22 6' 5\" (1.956 m)     Wt Readings from Last 1 Encounters:   07/10/22 253 lb 1.4 oz (114.8 kg)       Current & Prior Antimicrobial Regimen(s):  Cefepime     Level(s) / Doses:    Date Time Dose Level / Type of Level Interpretation                 Note: Serum levels collected for AUC-based dosing may be high if collected in close proximity to the dose administered. This is not necessarily indicative of toxicity. Cultures & Sensitivities:    Date Site Micro Susceptibility / Result                 Labs / Ancillary Data:    Estimated Creatinine Clearance: 27 mL/min (A) (based on SCr of 4 mg/dL (H)). Recent Labs     07/10/22  0020   CREATININE 4.0*   BUN 16   WBC 6.0       Additional Lab Values / Findings of Note:    Procalcitonin: No results for input(s): PROCAL in the last 72 hours.

## 2022-07-10 NOTE — BRIEF OP NOTE
Patient:  Poppy See   :   1960    The procedure including risks and benefits was discussed at length with the patient (or designated family member) and all questions were answered. Informed consent to proceed with the procedure was given.       PROCEDURE : RLQ abscess drain placement    BLOOD LOSS : Minimal  SPECIMENS : None  COMPLICATIONS : None  CONDITION : Stable      Raphael Daniels MD

## 2022-07-10 NOTE — PROGRESS NOTES
Progress Note  PGY-1    Admit Date: 7/9/2022  Day: 2  Diet: Diet NPO     CC: Abdominal pain     Interval history: Patient seen and examined at bedside. Still has severe abdominal pain that is in both lower left and right quadrants, but worse on right side. No guarding or rigidity. Also complains of his ongoing testicular pain. Dilaudid was given once. Patient continues on Cefepime, Vancomycin, and Metronidazole. Consider switching Cefepime to Zosyn. HPI: 75-year-old male with a past medical history of ESRD on hemodialysis, hypertension, diabetes, recent bilateral hernia repair on 6/13 presents today with abdominal pain.  The patient reports that sometime around this evening (day of admittance) just before he went to dialysis he had severe abdominal pain and he managed to get through with his dialysis session but immediately afterwards started feeling feverish after which he decided come to the ED. In the ED his temperature was 103, respiratory rate was 28, pulse was 124, blood pressure was 170/98.  He was in severe abdominal pain and needed Dilaudid to get relief.  Labs were mostly unremarkable including a lactic acid.  CT w/o con showed a right and a left fluid collection in the pelvis abutting the right and left hernia repair. He denies any chest pain, shortness of breath, sensory loss, motor weakness, heart palpitations.  He does report that he is able to make urine and denies any dysuria, urgency, frequency. He also reports continued testicular  pain which he has been having for the past week.  It is the same in intensity and nature however it is still very uncomfortable to him    Medications:     Scheduled Meds:   [Held by provider] aspirin  81 mg Oral Daily    atorvastatin  1 tablet Oral Daily    metoprolol succinate  50 mg Oral Nightly    NIFEdipine  90 mg Oral Daily    spironolactone  50 mg Oral Nightly    sodium chloride flush  5-40 mL IntraVENous 2 times per day    [START ON 7/11/2022] cefepime 500 mg IntraVENous Q24H    vancomycin (VANCOCIN) intermittent dosing (placeholder)   Other RX Placeholder    metroNIDAZOLE  500 mg IntraVENous Q8H    HYDROmorphone  0.25 mg IntraVENous Once    sodium phosphate IVPB  10 mmol IntraVENous 4x Daily    magnesium sulfate  4,000 mg IntraVENous Once    lidocaine  1 patch TransDERmal Daily    heparin (porcine)  5,000 Units SubCUTAneous 3 times per day     Continuous Infusions:   sodium chloride      dextrose       PRN Meds:sodium chloride flush, sodium chloride, ondansetron **OR** ondansetron, polyethylene glycol, acetaminophen **OR** acetaminophen, glucose, glucagon (rDNA), dextrose, labetalol, hydrALAZINE, dextrose bolus **OR** dextrose bolus    Objective:   Vitals:   T-max:  Patient Vitals for the past 8 hrs:   BP Temp Temp src Pulse Resp SpO2 Weight   07/10/22 0752 (!) 150/91 (!) 101 °F (38.3 °C) Oral (!) 125 28 94 % --   07/10/22 0535 (!) 157/88 (!) 101.2 °F (38.4 °C) Oral (!) 121 25 93 % 253 lb 1.4 oz (114.8 kg)   07/10/22 0428 (!) 166/93 99 °F (37.2 °C) Oral (!) 115 20 99 % 253 lb 1.4 oz (114.8 kg)   07/10/22 0405 -- 100.4 °F (38 °C) Oral -- -- -- --   07/10/22 0330 (!) 142/94 -- -- (!) 111 23 93 % --   07/10/22 0315 -- -- -- (!) 111 21 92 % --   07/10/22 0300 126/82 -- -- (!) 110 23 92 % --   07/10/22 0230 (!) 131/93 -- -- (!) 117 23 93 % --   07/10/22 0210 -- 99.2 °F (37.3 °C) Oral -- -- -- --   07/10/22 0200 (!) 157/94 -- -- (!) 124 22 95 % --       Intake/Output Summary (Last 24 hours) at 7/10/2022 0939  Last data filed at 7/10/2022 0535  Gross per 24 hour   Intake 503.22 ml   Output 100 ml   Net 403.22 ml       Review of Systems As noted in the HPI    Constitutional:       General: He is in acute distress. Appearance: He is obese. He is ill-appearing. HENT:      Head: Normocephalic. Mouth/Throat:      Mouth: Mucous membranes are moist.   Eyes:      Extraocular Movements: Extraocular movements intact.       Pupils: Pupils are equal, round, and reactive to light. Cardiovascular:      Rate and Rhythm: Normal rate. Pulses: Normal pulses. Heart sounds: No murmur heard. No gallop. Pulmonary:      Effort: Pulmonary effort is normal. No respiratory distress. Breath sounds: No wheezing or rales. Abdominal:      Palpations: Abdomen is soft. No guarding or rigidity. Tenderness: There is abdominal tenderness. Comments: Diffuse abd tenderness   Genitourinary:     Comments: Left testicle tender, hurts when elevated  Musculoskeletal:         General: No swelling or deformity. Right lower leg: No edema. Left lower leg: No edema. Skin:     General: Skin is warm. Capillary Refill: Capillary refill takes less than 2 seconds. Neurological:      Mental Status: He is alert and oriented to person, place, and time. Cranial Nerves: No cranial nerve deficit. Sensory: No sensory deficit. Motor: No weakness. Psychiatric:         Mood and Affect: Mood normal.         Behavior: Behavior normal.     LABS:    CBC:   Recent Labs     07/10/22  0020   WBC 6.0   HGB 11.3*   HCT 34.7*      MCV 83.4     Renal:    Recent Labs     07/10/22  0020 07/10/22  0528    137   K 3.6 5.2*    101   CO2 24 21   BUN 16 19   CREATININE 4.0* 4.6*   GLUCOSE 118* 134*   CALCIUM 8.6 8.3   MG  --  1.50*   PHOS  --  2.4*   ANIONGAP 12 15     Hepatic:   Recent Labs     07/10/22  0020 07/10/22  0528   AST 13*  --    ALT 11  --    BILITOT 0.4  --    PROT 6.8  --    LABALBU 4.3 3.9   ALKPHOS 68  --      Troponin: No results for input(s): TROPONINI in the last 72 hours. BNP: No results for input(s): BNP in the last 72 hours. Lipids: No results for input(s): CHOL, HDL in the last 72 hours. Invalid input(s): LDLCALCU, TRIGLYCERIDE  ABGs:  No results for input(s): PHART, [id number], PO2ART, ZML2JJN, BEART, THGBART, O8XMWFJP, IER9DSG in the last 72 hours. INR: No results for input(s): INR in the last 72 hours.   Lactate: No results for input(s): LACTATE in the last 72 hours. Cultures:  -----------------------------------------------------------------  RAD:   CT ABDOMEN PELVIS WO CONTRAST Additional Contrast? None   Final Result      1. Noncontrast CT demonstrating interval bilateral inguinal hernia repair with persistent fat with stable stranding in the right and left inguinal canal. Right greater than left fluid collections in the pelvis abutting the right and left hernia repair    mesh may represent postoperative seromas versus abscesses. 2.  Diverticulosis without evidence of diverticulitis. XR CHEST PORTABLE   Final Result      No acute pulmonary disease. US SCROTUM AND TESTICLES    (Results Pending)   CT DRAINAGE HEMATOMA/SEROMA/FLUID COLLECTION    (Results Pending)       Assessment/Plan:   Sepsis  - Patient meets criteria for SIRS and suspected sources is his abdomen as he recently had invasive procedures and imaging shows what appears to be a fluid collection which may be consistent with an abscess  - GEN surge consulted however they do not recommend any acute intervention at this time  -We will  Panculture the patient to look for other sources of infection and start the patient on broad-spectrum antibiotics (vanc/cefepime/flagyl) however if the patient does not get better after 24 hours than he may need drainage of that fluid  -NPO for possible intervention tomorrow       ESRD on hemodialysis  - TTS  -- follows with Dr. Stefany Vincent  - Nephrology consult      Hypertension  - Blood pressure initially hypertensive in the ED however got much better as patient's pain was able to be controlled with dilaudid.   - Continue home nifedipine, spironolactone, metoprolol  - Hold home torsemide for now and will restart when appropriate    Code Status: Full Code   FEN: Diet NPO  PPX: Heparin  DISPO: IP  Barriers to d/c: Fever    Oliver Sinha MD, PGY-1  Internal Medicine Resident  Contact via [address] [place] [address]  07/10/22  9:39 AM    This patient has been staffed and discussed with Bird Drake MD.

## 2022-07-10 NOTE — CONSULTS
General Surgery   Resident Consult Note    Reason for Consult: Abdominal abcess    History of Present Illness:   Kaia Warren is a 59 y.o. male with Hx of afib, HFrEF (45-50%), T2DM, a fib, gout, diverticulosis, ESRD on dialysis s/p b/l inguinal hernia repair and ventral hernia repair (with biosynthetic mesh), peritoneal catheter removal (4/15/22). Patient had later recurrence of inguinal hernia with b/l inguinal hernia repair with Progrip mesh (6/13/22). The patient presented to [facility] ED last week for increasing testicular pain on the left side, at that time he was afebrile and HDS, he did not have a leukocytosis and CT imaging from  demonstrated bilateral fluid collection along surgical field consistent with post-op seroma. The patient represents to the ED this morning with recurrence of pain in the testicles and new onset abdominal pain the lower abdominal quadrants. He admits to fever and chills but denies SOB, dizziness, loss of consciousness, nausea, vomiting, diarrhea or constipation. His last bowel movement was yesterday. He states that the pain in his abdomen is different than the pain he was having last week.      Past Medical History:        Diagnosis Date    Arthralgia of multiple joints 10/27/2015    Arthritis     Atrial fibrillation (HCC)     Chronic kidney disease     stage 4    Chronic systolic congestive heart failure ([address] 8/16/2021    CRI (chronic renal insufficiency)     Diabetic nephropathy associated with type 2 diabetes mellitus ([address] 10/11/2018    Diverticulosis     Elevated PSA     Erectile dysfunction     ESRD (end stage renal disease) on dialysis ([address] 2/24/2022    Gout     Gout     Hemrrhoid NOS w/ complication NEC     History of MRSA infection     Hypertension     CELSA (obstructive sleep apnea) 07/14/2017    uses C-pap machine    Type 2 diabetes mellitus without complication, without long-term current use of insulin ([address] 0/23/9386    Umbilical hernia without obstruction and without gangrene 2/2/2016       Past Surgical History:        Procedure Laterality Date    COLONOSCOPY      COLONOSCOPY N/A 3/18/2022    COLONOSCOPY POLYPECTOMY SNARE/COLD BIOPSY performed by Kris Francois MD at [address] N/A 1/17/2022    LAPAROSCOPIC PERITONEAL DIALYSIS CATHETER PLACEMENT and omentopexy performed by Dannis Brunner, DO at [facility], ESOPHAGUS     [address] N/A 4/15/2022    ROBOTIC, RECURRENT INCISIONAL HERNIA REPAIR WITH BIOSYNTHETIC MESH; LAPAROSCOPIC PERITONEAL DIALYSIS CATHETER REVISION WITH LAPAROSCOPIC OMENTOPEXY performed by Dannis Brunner, DO at [facility] Bilateral 4/15/2022    BILATERAL OPEN INGUINAL HERNI REPAIR performed by Dannis Brunner, DO at [facility] Bilateral 6/13/2022    ROBOTIC RECURRENT BILATERAL INGUINAL HERNIA REPAIR, LYSIS OF ADHESIONS, PERITONEAL DIALYSIS CATHETER REMOVAL performed by Dannis Brunner, DO at [address] IR TUNNELED [address] 5 YEARS  01/14/2022    IR TUNNELED CATHETER PLACEMENT GREATER THAN 5 YEARS 1/14/2022 [facility] SPECIAL PROCEDURES    UPPER GASTROINTESTINAL ENDOSCOPY  05/28/2016    biopsies, multiple small gastric ulcers    UPPER GASTROINTESTINAL ENDOSCOPY  09/12/2016    UPPER GASTROINTESTINAL ENDOSCOPY N/A 3/18/2022    EGD DIAGNOSTIC ONLY performed by Kris Francois MD at [place] [address] N/A 1/17/2022    LAPAROSCOPIC incarcerated VENTRAL HERNIA REPAIR performed by Dannis Brunner, DO at [address]:  Patient has no known allergies. Medications:   Home Meds  No current facility-administered medications on file prior to encounter.      Current Outpatient Medications on File Prior to Encounter   Medication Sig Dispense Refill    lidocaine (LIDODERM) 5 % Place 1 patch onto the skin daily for 15 days 12 hours on, 12 hours off. 15 patch 0    oxyCODONE (ROXICODONE) 5 MG immediate release tablet Take 1 tablet by mouth every 6 hours as needed for Pain for up to 7 days.  28 tablet 0    Folic Acid-Vit A7-PROMISE Z56 0.5-5-0.2 MG TABS Take by mouth      clotrimazole (LOTRIMIN) 1 % external solution 1 APPLICATION TO NAILS DAILY      polyethylene glycol (GLYCOLAX) 17 GM/SCOOP powder TAKE 17 G BY MOUTH 2 TIMES DAILY      NIFEdipine (ADALAT CC) 90 MG extended release tablet Take 90 mg by mouth daily      vitamin D (ERGOCALCIFEROL) 1.25 MG (10517 UT) CAPS capsule Take 1 capsule by mouth once a week 4 capsule 0    torsemide (DEMADEX) 100 MG tablet Take 1 tablet by mouth daily 90 tablet 0    calcitRIOL (ROCALTROL) 0.5 MCG capsule Take 1 capsule by mouth three times a week During HD 30 capsule 3    docusate sodium (COLACE) 100 MG capsule Take 1 capsule by mouth 2 times daily 20 capsule 0    B Complex-C-Folic Acid (DIALYVITE 263 PO) Take 1 tablet by mouth once a week       Methoxy PEG-Epoetin Beta (MIRCERA) 50 MCG/0.3ML SOSY Inject as directed Twice a  month       Iron Sucrose (VENOFER IV) Infuse intravenously every 30 days       bisacodyl (BISACODYL) 5 MG EC tablet Take 1 tablet by mouth daily as needed for Constipation 30 tablet 5    spironolactone (ALDACTONE) 50 MG tablet Take 1 tablet by mouth at bedtime 30 tablet 3    metoprolol succinate (TOPROL XL) 50 MG extended release tablet Take 1 tablet by mouth at bedtime TAKE 1 TABLET BY MOUTH EVERY DAY 30 tablet 3    atorvastatin (LIPITOR) 20 MG tablet TAKE 1 TABLET BY MOUTH EVERY DAY 30 tablet 5    nortriptyline (PAMELOR) 10 MG capsule Take 10 mg by mouth nightly       omeprazole (PRILOSEC) 40 MG delayed release capsule TAKE 1 CAPSULE BY MOUTH EVERY DAY 30 capsule 1    sildenafil (VIAGRA) 100 MG tablet Take 1 tablet by mouth as needed for Erectile Dysfunction 30 tablet 3    aspirin 81 MG tablet Take 81 mg by mouth daily         Current Meds  vancomycin (VANCOCIN) 2,000 mg in dextrose 5 % 500 mL IVPB, Once  electrolyte-A (PLASMALYTE-A) solution, Once  metronidazole (FLAGYL) 500 mg in 0.9% NaCl 100 mL IVPB premix, Once        Family History:   Family History   Problem Relation Age of Onset    Hypertension Other     Breast Cancer Mother     Colon Cancer Father     Diabetes Maternal Grandmother     Coronary Art Dis Brother        Social History:   TOBACCO:   reports that he has never smoked. He has never used smokeless tobacco.  ETOH:   reports previous alcohol use. DRUGS:   reports no history of drug use. Review of Systems:   A 14 point review of systems was conducted, significant findings as noted in HPI. All other systems negative. Physical exam:    Vitals:    07/09/22 2332 07/10/22 0030   BP: (!) 170/98 (!) 158/92   Pulse: (!) 124 (!) 125   Resp: 28 25   Temp: (!) 103 °F (39.4 °C)    TempSrc: Oral    SpO2: 93% 95%   Weight: 250 lb (113.4 kg)    Height: 6' 5\" (1.956 m)        General appearance: alert, no acute distress, grooming appropriate  Eyes: No scleral icterus, EOM grossly intact  Neck: trachea midline, no JVD, no lymphadenopathy, neck supple  Chest/Lungs: Normal effort with no accessory muscle use on 2L NC  Cardiovascular: Tachycardia, regular rhythm  Abdomen: Soft, marked tenderness in the RLQ, no rebound, guarding, or rigidity, minimal pain on the LLQ and moderate to severe pain on palpation of left testicle said to be unchanged from prior exams. Skin: warm and dry, no rashes  Extremities: no edema, no cyanosis  Neuro: A&Ox3, no focal deficits, sensation intact    Labs:    CBC:   Recent Labs     07/10/22  0020   WBC 6.0   HGB 11.3*   HCT 34.7*   MCV 83.4        BMP:   Recent Labs     07/10/22  0020      K 3.6      CO2 24   BUN 16   CREATININE 4.0*     PT/INR: No results for input(s): PROTIME, INR in the last 72 hours. APTT: No results for input(s): APTT in the last 72 hours.   Liver Profile:   Lab Results   Component Value Date/Time    AST 13 07/10/2022 12:20 AM    ALT 11 07/10/2022 12:20 AM    BILIDIR <0.2 05/12/2022 08:51 PM    BILITOT 0.4 07/10/2022 12:20 AM    ALKPHOS 68 07/10/2022 12:20 AM     Lab Results   Component Value Date/Time    CHOL 160 12/21/2021 04:49 AM    HDL 41 12/21/2021 04:49 AM    TRIG 141 12/21/2021 04:49 AM     UA:   Lab Results   Component Value Date/Time    NITRITE Negative 11/11/2019 02:48 PM    COLORU Yellow 07/06/2022 10:04 PM    PHUR 6.5 07/06/2022 10:04 PM    WBCUA 0-2 07/06/2022 10:04 PM    RBCUA 0-2 07/06/2022 10:04 PM    MUCUS 1+ 07/06/2022 10:04 PM    BACTERIA 3+ 07/06/2022 10:04 PM    CLARITYU Clear 07/06/2022 10:04 PM    SPECGRAV 1.020 07/06/2022 10:04 PM    LEUKOCYTESUR Negative 07/06/2022 10:04 PM    UROBILINOGEN 0.2 07/06/2022 10:04 PM    BILIRUBINUR Negative 07/06/2022 10:04 PM    BILIRUBINUR Negative 11/11/2019 02:48 PM    BLOODU SMALL 07/06/2022 10:04 PM    GLUCOSEU Negative 07/06/2022 10:04 PM    AMORPHOUS Rare 05/27/2022 02:47 PM       Imaging:   CT ABDOMEN PELVIS WO CONTRAST Additional Contrast? None   Final Result      1. Noncontrast CT demonstrating interval bilateral inguinal hernia repair with persistent fat with stable stranding in the right and left inguinal canal. Right greater than left fluid collections in the pelvis abutting the right and left hernia repair    mesh may represent postoperative seromas versus abscesses. 2.  Diverticulosis without evidence of diverticulitis. XR CHEST PORTABLE   Final Result      No acute pulmonary disease. Assessment/Plan: This is a 59 y.o. male with Hx of Jen Day is a 59 y.o. male with Hx of afib, HFrEF (45-50%), T2DM, gout, diverticulosis, ESRD on dialysis s/p b/l inguinal hernia repair and ventral hernia repair (with biosynthetic mesh), peritoneal catheter removal (4/15/22). Patient had later recurrence of inguinal hernia with b/l inguinal hernia repair with Progrip mesh (6/13/22).  Patient re-presents today with increased abdominal pain in the RLQ of the abdomen. The patient had a CT scan done this morning that re-demonstrates a fluid collection along bilateral abdominal walls that measure 12x7 on right (from 8.6x6. 8x9.6 on 7/6) and a left fluid collection now measuring 5.5x2cm (from 4x2.8x3.1 on 7/6). Patient currently febrile to 103, tachycardic, diaphoretic and in pain in RLQ. Patient with no leukocytosis and lactate is 0.7.      - Patient to be admitted to medical team with nephrology consultation for HD, patient normally gets HD T/Th/Sa, last HD yesterday 7/9.  - Need to rule out all sources of sepsis, will obtain blood cultures, UA, COVID  - CXR with no acute process  - IV antibiotics with cefepime, vanc and flagyl  - Given mesh placement within surgical field would need to rule out all causes for sepsis prior to drain placement within abdominal fluid collection so as to refrain from unnecessarily placing foreign object into a sterile field. - Will continue to monitor patient's vitals and discuss with staff and IR possible need for drain placement pending further sepsis work up. - Patient discussed with Dr. Alison Joseph.      Birdie Rosas DO  07/10/22  1:31 AM

## 2022-07-10 NOTE — ED PROVIDER NOTES
[id number] RESIDENT NOTE       Date of evaluation: 7/9/2022    Chief Complaint     Abdominal Pain (started around Noon Today, all over abd pain and Left Testicle, hx of hernia surgery 3 weeks ago by , U pt Tue, Ireneurs, Sat - went today) and Fever (103.0 F at ED)      History of Present Illness     Warren Waters is a 59 y.o. male with history of atrial fibrillation, ESRD on HD (T, TH, S), HFrEF, CAD presenting with acute diffuse abdominal pain and concern for sepsis. Patient reports development of diffuse sharp and stabbing abdominal pain early this morning upon awakening which he stated is been constant and progressive throughout the day. He states his pain is worse in his right lower quadrant and does not radiate into his pelvis, legs, back. Patient reports consistent chills throughout the day. He denies associated nausea/vomiting, dysuria, hematuria, diarrhea, constipation, hematochezia/melena. Patient reports mild right and left lower quadrant pain that was evaluated at [facility] 7/6 and had a CT scan performed at that time. He also reports hernia surgery 3 weeks ago for bilateral inguinal hernia. Patient states he has been able to tolerate p.o. throughout the day and last ate approximately 7 PM tonight. He did receive dialysis on Saturday 7/9. Review of Systems     Review of Systems   Constitutional: Positive for chills and fever. Negative for fatigue. HENT: Negative. Eyes: Negative. Respiratory: Negative for cough and shortness of breath. Cardiovascular: Negative for chest pain, palpitations and leg swelling. Gastrointestinal: Positive for abdominal pain. Negative for abdominal distention, blood in stool, constipation, diarrhea, nausea and vomiting. Endocrine: Negative. Genitourinary: Negative for difficulty urinating, flank pain, frequency, hematuria and urgency. Musculoskeletal: Positive for myalgias.  Negative for arthralgias, back pain, joint swelling and neck pain. Skin: Negative. Allergic/Immunologic: Negative. Neurological: Negative. Hematological: Negative. Psychiatric/Behavioral: Negative. All other systems reviewed and are negative. Past Medical, Surgical, Family, and Social History      He has a past medical history of Arthralgia of multiple joints, Arthritis, Atrial fibrillation ([address] Chronic kidney disease, Chronic systolic congestive heart failure ([address] CRI (chronic renal insufficiency), Diabetic nephropathy associated with type 2 diabetes mellitus ([address] Diverticulosis, Elevated PSA, Erectile dysfunction, ESRD (end stage renal disease) on dialysis ([address] Gout, Gout, Hemrrhoid NOS w/ complication NEC, History of MRSA infection, Hypertension, CELSA (obstructive sleep apnea), Type 2 diabetes mellitus without complication, without long-term current use of insulin ([facility] and Umbilical hernia without obstruction and without gangrene. He has a past surgical history that includes Upper gastrointestinal endoscopy (05/28/2016); Colonoscopy; Dilatation, esophagus; Upper gastrointestinal endoscopy (09/12/2016); IR TUNNELED CVC PLACE WO SQ PORT/PUMP > 5 YEARS (01/14/2022); Dialysis Catheter Insertion (N/A, 1/17/2022); ventral hernia repair (N/A, 1/17/2022); CT INSERT PERITONEAL CATHETER; Colonoscopy (N/A, 3/18/2022); Upper gastrointestinal endoscopy (N/A, 3/18/2022); hernia repair (N/A, 4/15/2022); hernia repair (Bilateral, 4/15/2022); and Inguinal hernia repair (Bilateral, 6/13/2022). His family history includes Breast Cancer in his mother; Colon Cancer in his father; Coronary Art Dis in his brother; Diabetes in his maternal grandmother; Hypertension in an other family member. He reports that he has never smoked. He has never used smokeless tobacco. He reports previous alcohol use. He reports that he does not use drugs.     Medications     Previous Medications    ASPIRIN 81 MG TABLET    Take 81 mg by Heart Rate: (!) 124, Resp: 28, SpO2: 93 %   Physical Exam  Vitals and nursing note reviewed. Constitutional:       General: He is in acute distress. Appearance: He is well-developed. He is ill-appearing. HENT:      Head: Normocephalic and atraumatic. Mouth/Throat:      Mouth: Mucous membranes are moist.      Pharynx: Oropharynx is clear. Eyes:      General: No scleral icterus. Extraocular Movements: Extraocular movements intact. Pupils: Pupils are equal, round, and reactive to light. Cardiovascular:      Rate and Rhythm: Tachycardia present. Rhythm irregular. Heart sounds: Normal heart sounds. No murmur heard. No friction rub. No gallop. Pulmonary:      Effort: Pulmonary effort is normal.      Breath sounds: Normal breath sounds. No wheezing, rhonchi or rales. Abdominal:      General: Abdomen is flat. A surgical scar is present. Bowel sounds are normal. There is no distension. Palpations: Abdomen is soft. Tenderness: There is generalized abdominal tenderness. There is no guarding or rebound. Hernia: No hernia is present. Comments: Abdominal tenderness worst in right lower quadrant   Skin:     General: Skin is warm. Capillary Refill: Capillary refill takes less than 2 seconds. Neurological:      General: No focal deficit present. Mental Status: He is alert and oriented to person, place, and time. Psychiatric:         Mood and Affect: Mood normal.         Behavior: Behavior normal.         Diagnostic Results     RADIOLOGY:  CT ABDOMEN PELVIS WO CONTRAST Additional Contrast? None   Final Result      1. Noncontrast CT demonstrating interval bilateral inguinal hernia repair with persistent fat with stable stranding in the right and left inguinal canal. Right greater than left fluid collections in the pelvis abutting the right and left hernia repair    mesh may represent postoperative seromas versus abscesses.    2.  Diverticulosis without evidence of diverticulitis. XR CHEST PORTABLE   Final Result      No acute pulmonary disease.              LABS:   Results for orders placed or performed during the hospital encounter of 07/09/22   CBC with Auto Differential   Result Value Ref Range    WBC 6.0 4.0 - 11.0 K/uL    RBC 4.17 (L) 4.20 - 5.90 M/uL    Hemoglobin 11.3 (L) 13.5 - 17.5 g/dL    Hematocrit 34.7 (L) 40.5 - 52.5 %    MCV 83.4 80.0 - 100.0 fL    MCH 27.1 26.0 - 34.0 pg    MCHC 32.6 31.0 - 36.0 g/dL    RDW 17.6 (H) 12.4 - 15.4 %    Platelets 373 423 - 793 K/uL    MPV 7.9 5.0 - 10.5 fL    Neutrophils % 88.8 %    Lymphocytes % 4.3 %    Monocytes % 6.1 %    Eosinophils % 0.4 %    Basophils % 0.4 %    Neutrophils Absolute 5.3 1.7 - 7.7 K/uL    Lymphocytes Absolute 0.3 (L) 1.0 - 5.1 K/uL    Monocytes Absolute 0.4 0.0 - 1.3 K/uL    Eosinophils Absolute 0.0 0.0 - 0.6 K/uL    Basophils Absolute 0.0 0.0 - 0.2 K/uL   Comprehensive Metabolic Panel w/ Reflex to MG   Result Value Ref Range    Sodium 136 136 - 145 mmol/L    Potassium reflex Magnesium 3.6 3.5 - 5.1 mmol/L    Chloride 100 99 - 110 mmol/L    CO2 24 21 - 32 mmol/L    Anion Gap 12 3 - 16    Glucose 118 (H) 70 - 99 mg/dL    BUN 16 7 - 20 mg/dL    CREATININE 4.0 (H) 0.8 - 1.3 mg/dL    GFR Non-African American 15 (A) >60    GFR  18 (A) >60    Calcium 8.6 8.3 - 10.6 mg/dL    Total Protein 6.8 6.4 - 8.2 g/dL    Albumin 4.3 3.4 - 5.0 g/dL    Albumin/Globulin Ratio 1.7 1.1 - 2.2    Total Bilirubin 0.4 0.0 - 1.0 mg/dL    Alkaline Phosphatase 68 40 - 129 U/L    ALT 11 10 - 40 U/L    AST 13 (L) 15 - 37 U/L   Blood gas, venous   Result Value Ref Range    pH, Gallo 7.434 7.350 - 7.450    pCO2, Gallo 41.5 41.0 - 51.0 mmHg    pO2, Agllo 48.1 (H) 25.0 - 40.0 mmHg    HCO3, Venous 27.7 24.0 - 28.0 mmol/L    Base Excess, Gallo 3.1 (H) -2.0 - 3.0 mmol/L    O2 Sat, Gallo 84 Not established %    Carboxyhemoglobin 1.6 (H) 0.0 - 1.5 %    MetHgb, Gallo 0.7 0.0 - 1.5 %    TC02 (Calc), Gallo 29 mmol/L    Hemoglobin, Gallo, Reduced 15.50 %   Lactate, Sepsis   Result Value Ref Range    Lactic Acid, Sepsis 0.7 0.4 - 1.9 mmol/L   Lipase   Result Value Ref Range    Lipase 14.0 13.0 - 60.0 U/L       ED Course     Nursing Notes, Past Medical Hx,Past Surgical Hx, Social Hx, Allergies, and Family Hx were reviewed. The patient was given the following medications:  Orders Placed This Encounter   Medications    electrolyte-A (PLASMALYTE-A) solution    cefepime (MAXIPIME) 2000 mg IVPB minibag     Order Specific Question:   Antimicrobial Indications     Answer: Other     Order Specific Question:   Other Abx Indication     Answer: Suspected Sepsis of Abdominal Origin    metronidazole (FLAGYL) 500 mg in 0.9% NaCl 100 mL IVPB premix     Order Specific Question:   Antimicrobial Indications     Answer: Other     Order Specific Question:   Other Abx Indication     Answer: Suspected Sepsis of Abdominal Origin    DISCONTD: piperacillin-tazobactam (ZOSYN) 4,500 mg in dextrose 5 % 100 mL IVPB (mini-bag)     Order Specific Question:   Antimicrobial Indications     Answer: Other     Order Specific Question:   Other Abx Indication     Answer: Suspected Sepsis of Abdominal Origin    vancomycin (VANCOCIN) 2,000 mg in dextrose 5 % 500 mL IVPB     Order Specific Question:   Antimicrobial Indications     Answer:   Bloodstream Infection    acetaminophen (TYLENOL) tablet 650 mg    OR Linked Order Group     HYDROmorphone (DILAUDID) injection 0.5 mg     HYDROmorphone (DILAUDID) injection 1 mg         SEPSIS FOCUS:  Suspected source: Intra-abdominal  Time source ofinfection suspected/identified: Known fluid collection 7/6 on CT performed at [facility]  Blood cultures collected before antibiotics:  YES/NO: Yes  Empiric Antibiotics given:  YES/NO: Yes  Fluid resuscitation (30 ml/kg) given:  YES/NO: No  Initial Lactate: 0.7    30/kg fluid bolus not given secondary to patient's ESRD and fluid present in antibiotic administration.      Focused Sepsis Exam Post Intervention  Time of Assessment: 2317  BP: (!) 131/93, Temp: 99.2 °F (37.3 °C), Heart Rate: (!) 117, Resp: 23, SpO2: 93 %   Cardiovascular exam shows regular rate and rhythm with normal S1/S2 without murmurs, rubs or gallops. Pulmonary exam shows a normalrespiratory effort, clear to auscultation, bilaterally without Rales/Wheezes/Rhonchi. Skin exam shows normal color/perfusion. Extremity exam shows brisk capillary refill. Peripheral pulses were 2+ in the lowerextremities. CONSULTS:  Acute care surgery    MEDICAL DECISION MAKING / ASSESSMENT / Fausto Divine is a 59 y.o. male with history of atrial fibrillation, ESRD on HD (T, TH, S), HFrEF, CAD, recent bilateral inguinal hernia repair presenting for concern of diffuse abdominal pain and potential sepsis. Vital signs on initial evaluation notable for tachycardia to 120s and fever of 39.4 Celsius and tachypnea to high 20s. Patient not hypotensive and stable on room air with SpO2 96-99%. Physical exam notable for soft abdomen with diffuse abdominal tenderness worse in the right lower quadrant with no guarding or rebound, no obvious palpable hernia, and low concern for peritonitis. Given significant vital sign abnormalities including high fever and tachycardia, concern for sepsis of abdominal source therefore obtained blood cultures, lactate which was normal at 0.7, initiated on vancomycin, cefepime, Flagyl for empiric broad-spectrum antibiotic coverage. Did not order 30 ml/kg fluid bolus given patient's ESRD and tenuous volume status and the volume that would be administered with antibiotics. Patient without evidence of leukocytosis on CBC with white blood cell count 6.0, creatinine improved from evaluation 7/6 to 4.0 from 5.2, no evidence of significant acidosis or anion gap.   CT abdomen pelvis w/o IV contrast notable for bilateral pelvic fluid collections concerning for seroma versus abscess slightly increased in size from CT scan performed at [facility] 7/6. Consulted acute care surgery for evaluation who evaluated patient at bedside and viewed images from this evaluation and recent evaluation and request admission to medicine service for treatment of sepsis and will follow along for any potential surgical needs of the noted fluid collections in the pelvis. Clinical Impression     1. Septicemia ([address]    2. Generalized abdominal pain        Disposition     PATIENT REFERRED TO:  No follow-up provider specified.     DISCHARGE MEDICATIONS:  New Prescriptions    No medications on file       Cecelia Garces MD  Resident  07/10/22 [id number]

## 2022-07-10 NOTE — PROGRESS NOTES
Bariatric Surg  POC    Reviewed CT scan, concern for infected seroma in RLQ. Will plan for IR drain today - discussed with Dr. Maicol Stauffer who will place drain  INR stat  Continue broad spectrum antibiotics   SQHheld. Do not restart. Will restart after IR drain  Follow up BCx, UCx.    Discussed with Dr. Alvarez Carson who is agreement     Sis Denney MD   Gen Surg PGY 4  7/10/2022  9:40 AM  [phone]

## 2022-07-10 NOTE — PROGRESS NOTES
Dr. Gideon Simmons, resident MD notified of patient's current vital signs and complaints of pain. MD currently at bedside to evaluate patient. This RN will give tylenol to treat current fever.  Electronically signed by Francisca Selby RN on 7/10/2022 at 8:03 AM

## 2022-07-10 NOTE — PROGRESS NOTES
4 Eyes Admission Assessment     I agree as the admission nurse that 2 RN's have performed a thorough Head to Toe Skin Assessment on the patient. ALL assessment sites listed below have been assessed on admission. Areas assessed by both nurses:   Holly Rodriguez   [x]   Head, Face, and Ears   [x]   Shoulders, Back, and Chest  [x]   Arms, Elbows, and Hands   [x]   Coccyx, Sacrum, and Ischium  [x]   Legs, Feet, and Heels        Does the Patient have Skin Breakdown?   No         Roshan Prevention initiated:  No   Wound Care Orders initiated:  No      C nurse consulted for Pressure Injury (Stage 3,4, Unstageable, DTI, NWPT, and Complex wounds) or Roshan score 18 or lower:  No      Nurse 1 eSignature: Electronically signed by Chicho Butterfield RN on 7/10/22 at 8:31 AM EDT    **SHARE this note so that the co-signing nurse is able to place an eSignature**    Nurse 2 eSignature: Electronically signed by Brad Agarwal RN on 7/10/22 at 8:38 AM EDT

## 2022-07-11 ENCOUNTER — APPOINTMENT (OUTPATIENT)
Dept: ULTRASOUND IMAGING | Age: 62
DRG: 862 | End: 2022-07-11
Payer: MEDICARE

## 2022-07-11 ENCOUNTER — APPOINTMENT (OUTPATIENT)
Dept: INTERVENTIONAL RADIOLOGY/VASCULAR | Age: 62
DRG: 862 | End: 2022-07-11
Payer: MEDICARE

## 2022-07-11 LAB
ALBUMIN SERPL-MCNC: 3.7 G/DL (ref 3.4–5)
ANION GAP SERPL CALCULATED.3IONS-SCNC: 14 MMOL/L (ref 3–16)
BASOPHILS ABSOLUTE: 0.1 K/UL (ref 0–0.2)
BASOPHILS RELATIVE PERCENT: 1.7 %
BUN BLDV-MCNC: 34 MG/DL (ref 7–20)
CALCIUM SERPL-MCNC: 7.9 MG/DL (ref 8.3–10.6)
CHLORIDE BLD-SCNC: 99 MMOL/L (ref 99–110)
CO2: 24 MMOL/L (ref 21–32)
CREAT SERPL-MCNC: 6 MG/DL (ref 0.8–1.3)
EOSINOPHILS ABSOLUTE: 0.2 K/UL (ref 0–0.6)
EOSINOPHILS RELATIVE PERCENT: 3.5 %
GFR AFRICAN AMERICAN: 12
GFR NON-AFRICAN AMERICAN: 10
GLUCOSE BLD-MCNC: 119 MG/DL (ref 70–99)
GLUCOSE BLD-MCNC: 126 MG/DL (ref 70–99)
GLUCOSE BLD-MCNC: 135 MG/DL (ref 70–99)
GLUCOSE BLD-MCNC: 138 MG/DL (ref 70–99)
GLUCOSE BLD-MCNC: 171 MG/DL (ref 70–99)
HCT VFR BLD CALC: 33.5 % (ref 40.5–52.5)
HEMOGLOBIN: 10.9 G/DL (ref 13.5–17.5)
LYMPHOCYTES ABSOLUTE: 0.4 K/UL (ref 1–5.1)
LYMPHOCYTES RELATIVE PERCENT: 7.2 %
MAGNESIUM: 2.5 MG/DL (ref 1.8–2.4)
MCH RBC QN AUTO: 27 PG (ref 26–34)
MCHC RBC AUTO-ENTMCNC: 32.5 G/DL (ref 31–36)
MCV RBC AUTO: 83.1 FL (ref 80–100)
MONOCYTES ABSOLUTE: 0.6 K/UL (ref 0–1.3)
MONOCYTES RELATIVE PERCENT: 10.7 %
NEUTROPHILS ABSOLUTE: 4.3 K/UL (ref 1.7–7.7)
NEUTROPHILS RELATIVE PERCENT: 76.9 %
PDW BLD-RTO: 17.7 % (ref 12.4–15.4)
PERFORMED ON: ABNORMAL
PHOSPHORUS: 3.6 MG/DL (ref 2.5–4.9)
PLATELET # BLD: 126 K/UL (ref 135–450)
PMV BLD AUTO: 8.1 FL (ref 5–10.5)
POTASSIUM SERPL-SCNC: 4 MMOL/L (ref 3.5–5.1)
RBC # BLD: 4.04 M/UL (ref 4.2–5.9)
REPORT: NORMAL
SODIUM BLD-SCNC: 137 MMOL/L (ref 136–145)
WBC # BLD: 5.6 K/UL (ref 4–11)

## 2022-07-11 PROCEDURE — 2500000003 HC RX 250 WO HCPCS: Performed by: STUDENT IN AN ORGANIZED HEALTH CARE EDUCATION/TRAINING PROGRAM

## 2022-07-11 PROCEDURE — 6360000002 HC RX W HCPCS: Performed by: STUDENT IN AN ORGANIZED HEALTH CARE EDUCATION/TRAINING PROGRAM

## 2022-07-11 PROCEDURE — 2060000000 HC ICU INTERMEDIATE R&B

## 2022-07-11 PROCEDURE — 6370000000 HC RX 637 (ALT 250 FOR IP): Performed by: STUDENT IN AN ORGANIZED HEALTH CARE EDUCATION/TRAINING PROGRAM

## 2022-07-11 PROCEDURE — 76870 US EXAM SCROTUM: CPT

## 2022-07-11 PROCEDURE — 94761 N-INVAS EAR/PLS OXIMETRY MLT: CPT

## 2022-07-11 PROCEDURE — 80069 RENAL FUNCTION PANEL: CPT

## 2022-07-11 PROCEDURE — 6360000002 HC RX W HCPCS

## 2022-07-11 PROCEDURE — 2580000003 HC RX 258: Performed by: STUDENT IN AN ORGANIZED HEALTH CARE EDUCATION/TRAINING PROGRAM

## 2022-07-11 PROCEDURE — 6360000002 HC RX W HCPCS: Performed by: SURGERY

## 2022-07-11 PROCEDURE — 87070 CULTURE OTHR SPECIMN AEROBIC: CPT

## 2022-07-11 PROCEDURE — 77001 FLUOROGUIDE FOR VEIN DEVICE: CPT

## 2022-07-11 PROCEDURE — 02PY33Z REMOVAL OF INFUSION DEVICE FROM GREAT VESSEL, PERCUTANEOUS APPROACH: ICD-10-PCS | Performed by: RADIOLOGY

## 2022-07-11 PROCEDURE — 85025 COMPLETE CBC W/AUTO DIFF WBC: CPT

## 2022-07-11 PROCEDURE — 36415 COLL VENOUS BLD VENIPUNCTURE: CPT

## 2022-07-11 PROCEDURE — 99233 SBSQ HOSP IP/OBS HIGH 50: CPT | Performed by: INTERNAL MEDICINE

## 2022-07-11 PROCEDURE — 0JPT3XZ REMOVAL OF TUNNELED VASCULAR ACCESS DEVICE FROM TRUNK SUBCUTANEOUS TISSUE AND FASCIA, PERCUTANEOUS APPROACH: ICD-10-PCS | Performed by: RADIOLOGY

## 2022-07-11 PROCEDURE — 83735 ASSAY OF MAGNESIUM: CPT

## 2022-07-11 PROCEDURE — 93975 VASCULAR STUDY: CPT

## 2022-07-11 PROCEDURE — 87077 CULTURE AEROBIC IDENTIFY: CPT

## 2022-07-11 PROCEDURE — 2500000003 HC RX 250 WO HCPCS

## 2022-07-11 PROCEDURE — 36589 REMOVAL TUNNELED CV CATH: CPT

## 2022-07-11 PROCEDURE — 87186 SC STD MICRODIL/AGAR DIL: CPT

## 2022-07-11 RX ORDER — MECOBALAMIN 5000 MCG
5 TABLET,DISINTEGRATING ORAL NIGHTLY
Status: DISCONTINUED | OUTPATIENT
Start: 2022-07-11 | End: 2022-07-14 | Stop reason: HOSPADM

## 2022-07-11 RX ORDER — HEPARIN SODIUM 5000 [USP'U]/ML
5000 INJECTION, SOLUTION INTRAVENOUS; SUBCUTANEOUS EVERY 8 HOURS SCHEDULED
Status: DISCONTINUED | OUTPATIENT
Start: 2022-07-11 | End: 2022-07-14 | Stop reason: HOSPADM

## 2022-07-11 RX ADMIN — HYDROMORPHONE HYDROCHLORIDE 0.5 MG: 1 INJECTION, SOLUTION INTRAMUSCULAR; INTRAVENOUS; SUBCUTANEOUS at 21:39

## 2022-07-11 RX ADMIN — METRONIDAZOLE 500 MG: 500 INJECTION, SOLUTION INTRAVENOUS at 02:18

## 2022-07-11 RX ADMIN — OXYCODONE 10 MG: 5 TABLET ORAL at 00:12

## 2022-07-11 RX ADMIN — OXYCODONE 10 MG: 5 TABLET ORAL at 15:19

## 2022-07-11 RX ADMIN — CEFEPIME HYDROCHLORIDE 500 MG: 1 INJECTION, POWDER, FOR SOLUTION INTRAMUSCULAR; INTRAVENOUS at 00:23

## 2022-07-11 RX ADMIN — HEPARIN SODIUM 5000 UNITS: 5000 INJECTION INTRAVENOUS; SUBCUTANEOUS at 16:48

## 2022-07-11 RX ADMIN — OXYCODONE 5 MG: 5 TABLET ORAL at 04:28

## 2022-07-11 RX ADMIN — SODIUM CHLORIDE, PRESERVATIVE FREE 10 ML: 5 INJECTION INTRAVENOUS at 19:45

## 2022-07-11 RX ADMIN — ACETAMINOPHEN 325MG 650 MG: 325 TABLET ORAL at 18:22

## 2022-07-11 RX ADMIN — METOPROLOL SUCCINATE 50 MG: 50 TABLET, EXTENDED RELEASE ORAL at 19:36

## 2022-07-11 RX ADMIN — METRONIDAZOLE 500 MG: 500 INJECTION, SOLUTION INTRAVENOUS at 16:49

## 2022-07-11 RX ADMIN — METRONIDAZOLE 500 MG: 500 INJECTION, SOLUTION INTRAVENOUS at 11:06

## 2022-07-11 RX ADMIN — OXYCODONE 10 MG: 5 TABLET ORAL at 19:36

## 2022-07-11 RX ADMIN — SODIUM CHLORIDE, PRESERVATIVE FREE 10 ML: 5 INJECTION INTRAVENOUS at 08:55

## 2022-07-11 RX ADMIN — NIFEDIPINE 90 MG: 90 TABLET, FILM COATED, EXTENDED RELEASE ORAL at 08:54

## 2022-07-11 RX ADMIN — HYDROMORPHONE HYDROCHLORIDE 0.25 MG: 1 INJECTION, SOLUTION INTRAMUSCULAR; INTRAVENOUS; SUBCUTANEOUS at 05:52

## 2022-07-11 RX ADMIN — HEPARIN SODIUM 5000 UNITS: 5000 INJECTION INTRAVENOUS; SUBCUTANEOUS at 21:30

## 2022-07-11 RX ADMIN — HYDROMORPHONE HYDROCHLORIDE 0.25 MG: 1 INJECTION, SOLUTION INTRAMUSCULAR; INTRAVENOUS; SUBCUTANEOUS at 02:21

## 2022-07-11 RX ADMIN — SPIRONOLACTONE 50 MG: 50 TABLET ORAL at 19:35

## 2022-07-11 RX ADMIN — CEFEPIME HYDROCHLORIDE 500 MG: 1 INJECTION, POWDER, FOR SOLUTION INTRAMUSCULAR; INTRAVENOUS at 12:09

## 2022-07-11 RX ADMIN — HEPARIN SODIUM 5000 UNITS: 5000 INJECTION, SOLUTION INTRAVENOUS; SUBCUTANEOUS at 05:58

## 2022-07-11 RX ADMIN — ATORVASTATIN CALCIUM 20 MG: 20 TABLET, FILM COATED ORAL at 08:55

## 2022-07-11 RX ADMIN — Medication 5 MG: at 19:35

## 2022-07-11 RX ADMIN — ACETAMINOPHEN 325MG 650 MG: 325 TABLET ORAL at 06:58

## 2022-07-11 RX ADMIN — HYDROMORPHONE HYDROCHLORIDE 0.25 MG: 1 INJECTION, SOLUTION INTRAMUSCULAR; INTRAVENOUS; SUBCUTANEOUS at 08:55

## 2022-07-11 RX ADMIN — HYDROMORPHONE HYDROCHLORIDE 0.5 MG: 1 INJECTION, SOLUTION INTRAMUSCULAR; INTRAVENOUS; SUBCUTANEOUS at 13:57

## 2022-07-11 ASSESSMENT — PAIN SCALES - GENERAL
PAINLEVEL_OUTOF10: 4
PAINLEVEL_OUTOF10: 5
PAINLEVEL_OUTOF10: 5
PAINLEVEL_OUTOF10: 6
PAINLEVEL_OUTOF10: 5
PAINLEVEL_OUTOF10: 3
PAINLEVEL_OUTOF10: 7
PAINLEVEL_OUTOF10: 4
PAINLEVEL_OUTOF10: 5
PAINLEVEL_OUTOF10: 7
PAINLEVEL_OUTOF10: 8
PAINLEVEL_OUTOF10: 8

## 2022-07-11 ASSESSMENT — PAIN DESCRIPTION - PAIN TYPE
TYPE: ACUTE PAIN

## 2022-07-11 ASSESSMENT — PAIN DESCRIPTION - LOCATION
LOCATION: ABDOMEN;NECK
LOCATION: GROIN;SCROTUM
LOCATION: ABDOMEN
LOCATION: SCROTUM
LOCATION: GROIN

## 2022-07-11 ASSESSMENT — PAIN DESCRIPTION - DESCRIPTORS
DESCRIPTORS: THROBBING;SHOOTING
DESCRIPTORS: SHARP
DESCRIPTORS: ACHING
DESCRIPTORS: SHARP
DESCRIPTORS: ACHING

## 2022-07-11 ASSESSMENT — PAIN - FUNCTIONAL ASSESSMENT
PAIN_FUNCTIONAL_ASSESSMENT: ACTIVITIES ARE NOT PREVENTED

## 2022-07-11 ASSESSMENT — PAIN DESCRIPTION - ONSET
ONSET: ON-GOING

## 2022-07-11 ASSESSMENT — PAIN DESCRIPTION - ORIENTATION
ORIENTATION: LEFT
ORIENTATION: MID
ORIENTATION: LEFT
ORIENTATION: MID;LEFT
ORIENTATION: MID

## 2022-07-11 ASSESSMENT — PAIN DESCRIPTION - FREQUENCY
FREQUENCY: CONTINUOUS

## 2022-07-11 NOTE — PROGRESS NOTES
Department of Pharmacy    Notification received from laboratory of positive blood culture results.     Organism(s) detected: Staph aureus DNA Detected    Vancomycin provides coverage    Recommendations reviewed with Dr. Yuri Jacinto

## 2022-07-11 NOTE — PROGRESS NOTES
Physician Progress Note      Vikash Christine  CSN #:                  [id number]  :                       1960  ADMIT DATE:       2022 11:17 PM  [address] [place] DATE:  RESPONDING  PROVIDER #:        Daphnie Nails MD          QUERY TEXT:    Pt admitted with intra-abdominal abscess and is s/p bilateral inguinal hernia   repair with Progrip mesh on 22.? If possible, please document in   progress notes and discharge summary:    The medical record reflects the following:  Risk Factors: 59year old male s/p bilateral inguinal hernia repair with mesh   22  Clinical Indicators: Per CT abdomen pelvis 07/10/22- Noncontrast CT   demonstrating interval bilateral inguinal hernia repair with persistent fat   with stable stranding in the right and left inguinal canal. Right greater than   left fluid collections in the pelvis abutting the right and left hernia   repair mesh may represent postoperative seromas versus abscesses. Per    general surgery progress note- Patient had later recurrence of inguinal hernia   with b/l inguinal hernia repair with Progrip mesh (22). Patient   re-presents today with increased abdominal pain in the RLQ of   Treatment: IR perc abscess drainage, imaging, labs, general surgery consult  Options provided:  -- Intra-abdominal abscess?is a postoperative complication  -- Intra-abdominal abscess is not a postoperative complication, but is due to   other incidental risk factor, Please specify other incidental risk factor. -- Other - I will add my own diagnosis  -- Disagree - Not applicable / Not valid  -- Disagree - Clinically unable to determine / Unknown  -- Refer to Clinical Documentation Reviewer    PROVIDER RESPONSE TEXT:    Patient has intra-abdominal abscess as a postoperative complication. Query created by:  Wild Cabrera on 2022 10:09 AM      Electronically signed by:  Daphnie Nails MD 2022 11:22 AM

## 2022-07-11 NOTE — PLAN OF CARE
Problem: Discharge Planning  Goal: Discharge to home or other facility with appropriate resources  Recent Flowsheet Documentation  Taken 7/11/2022 0855 by Georgia Flanagan RN  Discharge to home or other facility with appropriate resources:   Identify barriers to discharge with patient and caregiver   Arrange for needed discharge resources and transportation as appropriate     Problem: Pain  Goal: Verbalizes/displays adequate comfort level or baseline comfort level  Outcome: Progressing  Flowsheets (Taken 7/10/2022 0535 by Lonnell Sever, RN)  Verbalizes/displays adequate comfort level or baseline comfort level:   Encourage patient to monitor pain and request assistance   Assess pain using appropriate pain scale   Administer analgesics based on type and severity of pain and evaluate response   Implement non-pharmacological measures as appropriate and evaluate response     Problem: Safety - Adult  Goal: Free from fall injury  Recent Flowsheet Documentation  Taken 7/11/2022 1833 by Georgia Flanagan RN  Free From Fall Injury: Instruct family/caregiver on patient safety     Problem: ABCDS Injury Assessment  Goal: Absence of physical injury  Outcome: Progressing  Flowsheets (Taken 7/11/2022 1833)  Absence of Physical Injury: Implement safety measures based on patient assessment     Problem: Chronic Conditions and Co-morbidities  Goal: Patient's chronic conditions and co-morbidity symptoms are monitored and maintained or improved  Recent Flowsheet Documentation  Taken 7/11/2022 0855 by Mae Evans 34 - Patient's Chronic Conditions and Co-Morbidity Symptoms are Monitored and Maintained or Improved:   Monitor and assess patient's chronic conditions and comorbid symptoms for stability, deterioration, or improvement   Collaborate with multidisciplinary team to address chronic and comorbid conditions and prevent exacerbation or deterioration

## 2022-07-11 NOTE — PROGRESS NOTES
Progress Note  PGY-1    Admit Date: 7/9/2022  Day: 3  Diet: ADULT DIET; Regular; 3 carb choices (45 gm/meal); Low Fat/Low Chol/High Fiber/2 gm Na; Low Potassium (Less than 3000 mg/day); Low Phosphorus (Less than 1000 mg); 2000 ml     CC: Abdominal pain     Interval history: Patient seen and examined at bedside. IR Drain placed yesterday. Consider switching Cefepime to Zosyn. HPI: 73-year-old male with a past medical history of ESRD on hemodialysis, hypertension, diabetes, recent bilateral hernia repair on 6/13 presents today with abdominal pain.  The patient reports that sometime around this evening (day of admittance) just before he went to dialysis he had severe abdominal pain and he managed to get through with his dialysis session but immediately afterwards started feeling feverish after which he decided come to the ED. In the ED his temperature was 103, respiratory rate was 28, pulse was 124, blood pressure was 170/98.  He was in severe abdominal pain and needed Dilaudid to get relief.  Labs were mostly unremarkable including a lactic acid.  CT w/o con showed a right and a left fluid collection in the pelvis abutting the right and left hernia repair. He denies any chest pain, shortness of breath, sensory loss, motor weakness, heart palpitations.  He does report that he is able to make urine and denies any dysuria, urgency, frequency. He also reports continued testicular  pain which he has been having for the past week.  It is the same in intensity and nature however it is still very uncomfortable to him    Medications:     Scheduled Meds:   [Held by provider] aspirin  81 mg Oral Daily    atorvastatin  20 mg Oral Daily    metoprolol succinate  50 mg Oral Nightly    NIFEdipine  90 mg Oral Daily    spironolactone  50 mg Oral Nightly    sodium chloride flush  5-40 mL IntraVENous 2 times per day    cefepime  500 mg IntraVENous Q24H    vancomycin (VANCOCIN) intermittent dosing (placeholder)   Other RX Placeholder    metroNIDAZOLE  500 mg IntraVENous Q8H    lidocaine  1 patch TransDERmal Daily     Continuous Infusions:   sodium chloride 25 mL (07/10/22 1816)    dextrose       PRN Meds:sodium chloride flush, sodium chloride, ondansetron **OR** ondansetron, polyethylene glycol, acetaminophen **OR** acetaminophen, glucose, glucagon (rDNA), dextrose, labetalol, hydrALAZINE, dextrose bolus **OR** dextrose bolus, HYDROmorphone **OR** HYDROmorphone, oxyCODONE **OR** oxyCODONE    Objective:   Vitals:   T-max:  Patient Vitals for the past 8 hrs:   BP Temp Temp src Pulse Resp SpO2 Weight   07/11/22 0600 -- -- -- -- -- -- 252 lb 10.4 oz (114.6 kg)   07/11/22 0432 126/78 98.4 °F (36.9 °C) Oral 84 15 94 % --   07/11/22 0218 125/69 98.7 °F (37.1 °C) Oral 84 18 95 % --       Intake/Output Summary (Last 24 hours) at 7/11/2022 0818  Last data filed at 7/11/2022 0735  Gross per 24 hour   Intake --   Output 1125 ml   Net -1125 ml       Review of Systems As noted in the HPI    Constitutional:       General: He is in acute distress. Appearance: He is obese. He is ill-appearing. HENT:      Head: Normocephalic. Mouth/Throat:      Mouth: Mucous membranes are moist.   Eyes:      Extraocular Movements: Extraocular movements intact. Pupils: Pupils are equal, round, and reactive to light. Cardiovascular:      Rate and Rhythm: Normal rate. Pulses: Normal pulses. Heart sounds: No murmur heard. No gallop. Pulmonary:      Effort: Pulmonary effort is normal. No respiratory distress. Breath sounds: No wheezing or rales. Abdominal:      Palpations: Abdomen is soft. No guarding or rigidity. Tenderness: There is abdominal tenderness. Comments: Diffuse abd tenderness   Genitourinary:     Comments: Left testicle tender, hurts when elevated  Musculoskeletal:         General: No swelling or deformity. Right lower leg: No edema. Left lower leg: No edema. Skin:     General: Skin is warm. Capillary Refill: Capillary refill takes less than 2 seconds. Neurological:      Mental Status: He is alert and oriented to person, place, and time. Cranial Nerves: No cranial nerve deficit. Sensory: No sensory deficit. Motor: No weakness. Psychiatric:         Mood and Affect: Mood normal.         Behavior: Behavior normal.     LABS:    CBC:   Recent Labs     07/10/22  0020 07/10/22  1122 07/11/22  0530   WBC 6.0 10.1 5.6   HGB 11.3* 10.9* 10.9*   HCT 34.7* 34.1* 33.5*    137 126*   MCV 83.4 84.5 83.1     Renal:    Recent Labs     07/10/22  0020 07/10/22  0528 07/10/22  1122 07/11/22  0530    137  --  137   K 3.6 5.2* 3.7 4.0    101  --  99   CO2 24 21  --  24   BUN 16 19  --  34*   CREATININE 4.0* 4.6*  --  6.0*   GLUCOSE 118* 134*  --  135*   CALCIUM 8.6 8.3  --  7.9*   MG  --  1.50*  --  2.50*   PHOS  --  2.4*  --  3.6   ANIONGAP 12 15  --  14     Hepatic:   Recent Labs     07/10/22  0020 07/10/22  0528 07/11/22  0530   AST 13*  --   --    ALT 11  --   --    BILITOT 0.4  --   --    PROT 6.8  --   --    LABALBU 4.3 3.9 3.7   ALKPHOS 68  --   --      Troponin: No results for input(s): TROPONINI in the last 72 hours. BNP: No results for input(s): BNP in the last 72 hours. Lipids: No results for input(s): CHOL, HDL in the last 72 hours. Invalid input(s): LDLCALCU, TRIGLYCERIDE  ABGs:  No results for input(s): PHART, DZY0PGR, PO2ART, RPF6FDR, BEART, THGBART, P8YWHWTC, TIV8WUZ in the last 72 hours. INR:   Recent Labs     07/10/22  1122   INR 1.16*     Lactate: No results for input(s): LACTATE in the last 72 hours. Cultures:  -----------------------------------------------------------------  RAD:   CT ABDOMEN PELVIS WO CONTRAST Additional Contrast? None   Final Result      1.   Noncontrast CT demonstrating interval bilateral inguinal hernia repair with persistent fat with stable stranding in the right and left inguinal canal. Right greater than left fluid collections in the pelvis abutting the right and left hernia repair    mesh may represent postoperative seromas versus abscesses. 2.  Diverticulosis without evidence of diverticulitis. XR CHEST PORTABLE   Final Result      No acute pulmonary disease. US SCROTUM AND TESTICLES    (Results Pending)   CT DRAINAGE HEMATOMA/SEROMA/FLUID COLLECTION    (Results Pending)   IR ABSCESS DRAINAGE PERC    (Results Pending)       Assessment/Plan:   Sepsis  - Patient meets criteria for SIRS and suspected sources is his abdomen as he recently had invasive procedures and imaging shows what appears to be a fluid collection which may be consistent with an abscess  - GEN surge consulted however they do not recommend any acute intervention at this time  -We will Panculture the patient to look for other sources of infection and start the patient on broad-spectrum antibiotics (vanc/cefepime/flagyl) however if the patient does not get better after 24 hours than he may need drainage of that fluid  -IR placed drain      ESRD on hemodialysis  - TTS  -- follows with Dr. Asher Dave  - Nephrology consult      Hypertension  - Blood pressure initially hypertensive in the ED however got much better as patient's pain was able to be controlled with dilaudid. - Continue home nifedipine, spironolactone, metoprolol  - Hold home torsemide for now and will restart when appropriate    Code Status: Full Code   FEN: ADULT DIET; Regular; 3 carb choices (45 gm/meal); Low Fat/Low Chol/High Fiber/2 gm Na; Low Potassium (Less than 3000 mg/day);  Low Phosphorus (Less than 1000 mg); 2000 ml  PPX: Heparin  DISPO: IP  Barriers to d/c: Fever    Gillian Faria MD, PGY-1  Internal Medicine Resident  Contact via [facility] [place], [place]  07/11/22  8:18 AM    This patient has been staffed and discussed with Kalani Viera MD.

## 2022-07-11 NOTE — PROGRESS NOTES
Office : [phone]     Fax :[phone]       Nephrology  Note      Patient's Name: Leonard Ramos  4:22 PM  7/11/2022    Reason for Consult:  ESRD management       Requesting Physician:  Niesha Da Silva MD      Chief Complaint:    Chief Complaint   Patient presents with    Abdominal Pain     started around Noon Today, all over abd pain and Left Testicle, hx of hernia surgery 3 weeks ago by , HDU pt Tue, Thurs, Sat - went today    Fever     103.0 F at ED     07/11/22    Pt has bacteremia   On abx    TDC likely the source       I/O last 3 completed shifts:   In: 503.2 [I.V.:10; IV Piggyback:493.2]  Out: 950 [Urine:700; Drains:250]    Past Medical History:   Diagnosis Date    Arthralgia of multiple joints 10/27/2015    Arthritis     Atrial fibrillation (HCC)     Chronic kidney disease     stage 4    Chronic systolic congestive heart failure ([address] 8/16/2021    CRI (chronic renal insufficiency)     Diabetic nephropathy associated with type 2 diabetes mellitus ([address] 10/11/2018    Diverticulosis     Elevated PSA     Erectile dysfunction     ESRD (end stage renal disease) on dialysis ([address] 2/24/2022    Gout     Gout     Hemrrhoid NOS w/ complication NEC     History of MRSA infection     Hypertension     CELSA (obstructive sleep apnea) 07/14/2017    uses C-pap machine    Type 2 diabetes mellitus without complication, without long-term current use of insulin ([address] 2/88/5657    Umbilical hernia without obstruction and without gangrene 2/2/2016       Past Surgical History:   Procedure Laterality Date    COLONOSCOPY      COLONOSCOPY N/A 3/18/2022    COLONOSCOPY POLYPECTOMY SNARE/COLD BIOPSY performed by Contreras Bright MD at [facility] CATHETER      DIALYSIS CATHETER INSERTION N/A 1/17/2022    LAPAROSCOPIC PERITONEAL DIALYSIS CATHETER PLACEMENT and omentopexy performed by Jarad Gee DO at [facility], ESOPHAGUS     [address] N/A 4/15/2022    ROBOTIC, RECURRENT INCISIONAL HERNIA REPAIR WITH BIOSYNTHETIC MESH; LAPAROSCOPIC PERITONEAL DIALYSIS CATHETER REVISION WITH LAPAROSCOPIC OMENTOPEXY performed by Jarad Gee DO at [facility] Bilateral 4/15/2022    BILATERAL OPEN INGUINAL HERNI REPAIR performed by Jarad Gee DO at [facility] Bilateral 6/13/2022    ROBOTIC RECURRENT BILATERAL INGUINAL HERNIA REPAIR, LYSIS OF ADHESIONS, PERITONEAL DIALYSIS CATHETER REMOVAL performed by Jarad Gee DO at [address] IR TUNNELED [address] 5 YEARS  01/14/2022    IR TUNNELED CATHETER PLACEMENT GREATER THAN 5 YEARS 1/14/2022 [address] SPECIAL PROCEDURES    UPPER GASTROINTESTINAL ENDOSCOPY  05/28/2016    biopsies, multiple small gastric ulcers    UPPER GASTROINTESTINAL ENDOSCOPY  09/12/2016    UPPER GASTROINTESTINAL ENDOSCOPY N/A 3/18/2022    EGD DIAGNOSTIC ONLY performed by Ramirez Christopher MD at [facility] [address] N/A 1/17/2022    LAPAROSCOPIC incarcerated VENTRAL HERNIA REPAIR performed by Jarad Gee DO at [address] OR       Family History   Problem Relation Age of Onset    Hypertension Other     Breast Cancer Mother     Colon Cancer Father     Diabetes Maternal Grandmother     Coronary Art Dis Brother         reports that he has never smoked. He has never used smokeless tobacco. He reports previous alcohol use. He reports that he does not use drugs. Allergies:  Patient has no known allergies.     Current Medications:    melatonin disintegrating tablet 5 mg, Nightly  [START ON 7/12/2022] cefepime (MAXIPIME) 1000 mg IVPB minibag, Q24H  vancomycin (VANCOCIN) intermittent dosing (placeholder), See Admin Instructions  heparin (porcine) injection 5,000 Units, 3 times per day  [Held by provider] aspirin EC tablet 81 mg, Daily  atorvastatin (LIPITOR) tablet 20 mg, Daily  metoprolol succinate (TOPROL XL) extended release tablet 50 mg, Nightly  NIFEdipine (PROCARDIA XL) extended release tablet 90 mg, Daily  spironolactone (ALDACTONE) tablet 50 mg, Nightly  sodium chloride flush 0.9 % injection 5-40 mL, 2 times per day  sodium chloride flush 0.9 % injection 5-40 mL, PRN  0.9 % sodium chloride infusion, PRN  ondansetron (ZOFRAN-ODT) disintegrating tablet 4 mg, Q8H PRN   Or  ondansetron (ZOFRAN) injection 4 mg, Q6H PRN  polyethylene glycol (GLYCOLAX) packet 17 g, Daily PRN  acetaminophen (TYLENOL) tablet 650 mg, Q6H PRN   Or  acetaminophen (TYLENOL) suppository 650 mg, Q6H PRN  glucose chewable tablet 16 g, PRN  glucagon (rDNA) injection 1 mg, PRN  dextrose 5 % solution, PRN  labetalol (NORMODYNE;TRANDATE) injection 5 mg, Q4H PRN  hydrALAZINE (APRESOLINE) injection 5 mg, Q4H PRN  dextrose bolus 10% 125 mL, PRN   Or  dextrose bolus 10% 250 mL, PRN  metronidazole (FLAGYL) 500 mg in 0.9% NaCl 100 mL IVPB premix, Q8H  lidocaine 4 % external patch 1 patch, Daily  HYDROmorphone (DILAUDID) injection 0.25 mg, Q3H PRN   Or  HYDROmorphone (DILAUDID) injection 0.5 mg, Q3H PRN  oxyCODONE (ROXICODONE) immediate release tablet 5 mg, Q4H PRN   Or  oxyCODONE (ROXICODONE) immediate release tablet 10 mg, Q4H PRN        Review of Systems:   14 point ROS obtained but were negative except mentioned in HPI      Physical exam:     Vitals:  /79   Pulse 85   Temp 98 °F (36.7 °C) (Oral)   Resp 16   Ht 6' 5\" (1.956 m)   Wt 252 lb 10.4 oz (114.6 kg)   SpO2 94%   BMI 29.96 kg/m²   Constitutional:  OAA X3 NAD  Skin: no rash, turgor wnl  Heent:  eomi, mmm  Neck: no bruits or jvd noted  Cardiovascular:  S1, S2 without m/r/g  Respiratory: CTA B without w/r/r  Abdomen:  +bs, soft, nt, nd  Ext: no  lower extremity edema  Psychiatric: mood and affect appropriate  Musculoskeletal:  Rom, muscular strength intact    Labs:  CBC:   Recent Labs     07/10/22  0020 07/10/22  1122 07/11/22  0530   WBC 6.0 10.1 5.6   HGB 11.3* 10.9* 10.9*    137 126*     BMP:    Recent Labs     07/10/22  0020 07/10/22  0528 07/10/22  1122 07/11/22  0530    137  --  137   K 3.6 5.2* 3.7 4.0    101  --  99   CO2 24 21  --  24   BUN 16 19  --  34*   CREATININE 4.0* 4.6*  --  6.0*   GLUCOSE 118* 134*  --  135*     Ca/Mg/Phos:   Recent Labs     07/10/22  0020 07/10/22  0528 07/11/22  0530   CALCIUM 8.6 8.3 7.9*   MG  --  1.50* 2.50*   PHOS  --  2.4* 3.6     Hepatic:   Recent Labs     07/10/22  0020   AST 13*   ALT 11   BILITOT 0.4   ALKPHOS 68     Troponin: No results for input(s): TROPONINI in the last 72 hours. BNP: No results for input(s): BNP in the last 72 hours. Lipids: No results for input(s): CHOL, TRIG, HDL, LDLCALC, LABVLDL in the last 72 hours. ABGs: No results for input(s): PHART, PO2ART, ZLP9EYP in the last 72 hours. INR:   Recent Labs     07/10/22  1122   INR 1.16*     UA:No results for input(s): Theone Ground, GLUCOSEU, BILIRUBINUR, Jinx Going, BLOODU, PHUR, PROTEINU, UROBILINOGEN, NITRU, LEUKOCYTESUR, Chester Revels in the last 72 hours. Urine Microscopic: No results for input(s): LABCAST, BACTERIA, COMU, HYALCAST, WBCUA, RBCUA, EPIU in the last 72 hours. Urine Culture: No results for input(s): LABURIN in the last 72 hours. Urine Chemistry: No results for input(s): Suann Pummel, PROTEINUR, NAUR in the last 72 hours. IMAGING:  IR REMOVE TUNNELED CVAD WO SQ PORT/PUMP   Final Result   IMPRESSION : Successful removal of a right chest tunneled central venous catheter. Blood loss : minimal (less than 5 cc)   Specimens removed : central venous catheter               US SCROTUM AND TESTICLES   Final Result   IMPRESSION :      No cause for acute pain identified. US DUP ABD PEL RETRO SCROT COMPLETE   Final Result   IMPRESSION :      No cause for acute pain identified. CT ABDOMEN PELVIS WO CONTRAST Additional Contrast? None   Final Result      1. Noncontrast CT demonstrating interval bilateral inguinal hernia repair with persistent fat with stable stranding in the right and left inguinal canal. Right greater than left fluid collections in the pelvis abutting the right and left hernia repair    mesh may represent postoperative seromas versus abscesses. 2.  Diverticulosis without evidence of diverticulitis. XR CHEST PORTABLE   Final Result      No acute pulmonary disease. IR ABSCESS DRAINAGE PERC    (Results Pending)                       Assessment/Plan :      1. ESRD   HD tue/ thu/ sat   Labs reviewed   No need for HD today   Remove HD cath     2. HTN  Continue nifedipine XL   spironolactone and metoprolol     3. RLQ seroma vs abscess on CT scan . ecent suregry for hernia   - Scrotal USG - no acute etiology     4. Acid- base disorder. monitor and correct with HD     5. Electrolytes.  Monitor     HD on Wednesday likely     D/w sx team     Thank you for allowing us to participate in care of Asad Metz         Electronically signed by: Ruth Echevarria MD, 7/11/2022, 4:22 PM      [facility] of [address] [place] [address] S  Office : [phone]  Fax :[phone]

## 2022-07-11 NOTE — CARE COORDINATION
Case Management Assessment           Initial Evaluation                Date / Time of Evaluation: 7/11/2022 2:12 PM                 Assessment Completed by: Raheel Quintero RN    Patient Name: Winter Greenberg     YOB: 1960  Diagnosis: Septicemia ([facility] [address] [A41.9]  Generalized abdominal pain [R10.84]  Sepsis ([address] [A41.9]     Date / Time: 7/9/2022 11:17 PM    Patient Admission Status: Inpatient    If patient is discharged prior to next notation, then this note serves as note for discharge by case management.      Current PCP: Trey Singer MD  Clinic Patient: Yes    Chart Reviewed: Yes  Patient/ Family Interviewed: Yes    Initial assessment completed at bedside with: patient    Hospitalization in the last 30 days: No    Emergency Contacts:  Extended Emergency Contact Information  Primary Emergency Contact: Martha Frederick  Address: [address] Phone: [phone]  Mobile Phone: [phone]  Relation: Spouse    Advance Directives:   Code Status: Full Code    Healthcare Power of : No      Financial  Payor: Dante Robledo / Plan: Diana Failing / Product Type: *No Product type* /     Pre-cert required for SNF: Yes    Pharmacy    [id number] [place], [address] [place] [address] [phone] - f [phone]  [address] [place] [address]  Phone: [phone] Fax: [address] [place] [address] 53  [address] [place] [address]  Phone: [phone] Fax: [phone]    [id number] [place] - [facility]  [phone] Ruddy Rossi [phone]  [facility]  [address] [address]  Phone: [phone] Fax: [phone] [facility] [address] [phone] - F [phone]  [address]  Phone: [phone] Fax: [phone]    [id number]- Artur Magan, [facility] - [address] [phone] Samaria Viera [phone]  [address] [place] [phone]  Phone: [phone] Fax: [phone]      Potential assistance Purchasing Medications: Potential Assistance Purchasing Medications: No  Does Patient want to participate in local refill/ meds to beds program?: Yes    Meds To Beds General Rules:  1. Can ONLY be done Monday- Friday between 8:30am-5pm  2. Prescription(s) must be in pharmacy by 3pm to be filled same day  3. Copy of patient's insurance/ prescription drug card and patient face sheet must be sent along with the prescription(s)  4. Cost of Rx cannot be added to hospital bill. If financial assistance is needed, please contact unit  or ;  or  CANNOT provide pharmacy voucher for patients co-pays  5.  Patients can then  the prescription on their way out of the hospital at discharge, or pharmacy can deliver to the bedside if staff is available. (payment due at time of pick-up or delivery - cash, check, or card accepted)     Able to afford home medications/ co-pay costs: Yes    ADLS  Support Systems: Spouse/Significant Other    PT AM-PAC:   /24  OT AM-PAC:   /24    [place]: home with spouse  Steps:     Plans to RETURN to current housing: Yes  Barriers to RETURNING to current housing: none    Kailash Huerta 78  Currently ACTIVE with [address]: No  Home Care Agency: Not Applicable          Durable Medical Equipment  DME Provider: n/a  Equipment: n/a    Home Oxygen and [address] prior to admission: No  Ana [address]: Not Applicable  Other Respiratory Equipment: cpap        Dialysis  Active with HD/PD prior to admission: Yes  Nephrologist: alan    HD Center:  [facility].  Address: [address]  Phone: [phone]    DISCHARGE PLAN:  Disposition: Home- No Services

## 2022-07-11 NOTE — PROGRESS NOTES
Clinical Pharmacy Progress Note    Vancomycin - Management by Pharmacy    Consult Date(s): 7/9  Consulting Provider(s): Dr Delgado Economy / Plan    Intra-abdominal infection - Vancomycin   Concurrent Antimicrobials:   o Cefepime - Day #2  o Metronidazole - Day #2   Day of Vanc Therapy / Ordered Duration: #2   Current Dosing Method: Intermittent Dosing by Levels   Therapeutic Goal: ~ 15 mg/L   Current Dose / Frequency: Intermittent via levels   Plan / Rationale:   o Patient is ESRD on HD (home schedule T-Th-Sat). Will continue to dose vancomycin based on levels. Have entered placeholder onto [facility]  o Last dose of vancomycin was 7/10 AM (2000mg IV)  o Level yesterday = 22.9 mg/L - did not give dose as level > 20 mg/L and not scheduled for HD. o Level ordered for tomorrow AM, Tues 7/12 prior to next HD session. · Will continue to monitor clinical condition and make adjustments to regimen as appropriate. Please call with questions--  Bettie Stanley, PharmD, BCPS  Wireless: [phone]   7/11/2022 10:23 AM        Interval update: IR drain placed 7/10. Blood Cx growing Staph aureus. Discussing need for HD line removal.    Subjective/Objective: Mr. Saundra Cherry is a 59 y.o. male with a PMHx significant for ESRD on HD (T-Th-Sat), CAD, HTN, Afib, gout, DM type II with neuropathy, CELSA, and recent hernia repair (admitted 6/13-6/16). Presented from HD session with fever and pain in testicles and lower abdominal quadrants. CT scan showing fluid collection in pelvis. Surgery consulted. Broad spectrum antibiotics initiated. Pharmacy has been consulted to dose vancomycin.     Ht Readings from Last 1 Encounters:   07/09/22 6' 5\" (1.956 m)     Wt Readings from Last 1 Encounters:   07/11/22 252 lb 10.4 oz (114.6 kg)       Current & Prior Antimicrobial Regimen(s):   Cefepime (7/9-current)   Metronidazole (7/9-current)   Vancomycin - pharmacy to dose  o Intermittent via levels (7/9-current)    Date Dose Vanc Level 7/10 2000mg IV (0347) 22.9 mg/L @ 11:22   7/11 --    7/12  Ordered      Cultures & Sensitivities:    Date Site Micro Susceptibility / Result   7/10 COVID/Flu Negative    7/10 Blood x2 Staph aureus mecA not detected     Labs / Ancillary Data:    No estimate of CrCL, as the patient is ESRD on HD. Recent Labs     07/10/22  0020 07/10/22  0528 07/10/22  1122 07/11/22  0530   CREATININE 4.0* 4.6*  --  6.0*   BUN 16 19  --  34*   WBC 6.0  --  10.1 5.6       Additional Lab Values / Findings of Note:    Procalcitonin: No results for input(s): PROCAL in the last 72 hours.

## 2022-07-11 NOTE — PROGRESS NOTES
Pharmacy Note - Extended Infusion Beta-Lactam Adjustment    Cefepime ordered for treatment of Sepsis - intraabdominal fluid collection & bacteremia. Per [facility] Extended Infusion Beta-Lactam Policy, Cefepime will be changed to 1000 mg IV EI q24h. Estimated Creatinine Clearance: Estimated Creatinine Clearance: 18 mL/min (A) (based on SCr of 6 mg/dL [facility])). Dialysis Status, ANTONIO, CKD: ESRD on HD  BMI: Body mass index is 29.96 kg/m². Rationale for Adjustment: Agent is renally eliminated and demonstrates time-dependent effect on bacterial eradication. Extended-infusion dosing strategy aims to enhance microbiologic and clinical efficacy. Adjusting dose as above due to indication and ESRD on HD. Pharmacy will continue to monitor renal function and adjust dose as necessary. Please call with any questions.     Evelina Aguirre PharmD, BCPS  Wireless: [phone]   7/11/2022 10:31 AM

## 2022-07-11 NOTE — PROGRESS NOTES
Pt notified this rn that he had small amounts of new bloody drainage coming out of his urethra.   Notified md.  md recommended monitoring any additional bleeding that may occur and to notify them if it continues or worsens

## 2022-07-11 NOTE — PROGRESS NOTES
Nutrition Note    RECOMMENDATIONS  1. PO Diet: Continue current diet   2. ONS: Offer Nepro BID  3. Nutrition Support: None      NUTRITION ASSESSMENT   Pt off floor upon RD visit, all information obtained from chart review. Pt triggered positive nursing nutrition screen for MST 2, indicating decreased appetite/PO intake and weight loss. Pt admitted with abdominal pain d/t intra-abdominal infection/abscess. This has likely contributed to poor PO intake. No significant weight loss noted per hx in EMR. PO intake during admission has been 0% of meals. Will offer Nepro BID.  Nutrition Related Findings:     Wounds: None   Nutrition Education:  Education not indicated    Nutrition Goals: PO intake 50% or greater,by next RD assessment     MALNUTRITION ASSESSMENT   Malnutrition Status: Insufficient data    NUTRITION DIAGNOSIS   · Inadequate protein-energy intake related to inadequate protein-energy intake as evidenced by intake 0-25%    CURRENT NUTRITION THERAPIES  ADULT DIET; Regular; 3 carb choices (45 gm/meal); Low Fat/Low Chol/High Fiber/2 gm Na; Low Potassium (Less than 3000 mg/day); Low Phosphorus (Less than 1000 mg); 2000 ml     PO Intake: 0%   PO Supplement Intake:None Ordered    ANTHROPOMETRICS   Current Height: 6' 5\" (195.6 cm)   Current Weight: 252 lb 10.4 oz (114.6 kg)     Ideal Body Weight (IBW): 208 lbs  (95 kg)        BMI: 30     The patient will be monitored per nutrition standards of care. Consult dietitian if additional nutrition interventions are needed prior to RD reassessment.      B[place]ba [place], [address] [facility] [address],     Contact: [phone]

## 2022-07-11 NOTE — PROGRESS NOTES
Bariatric Surgery   Daily Progress Note  Patient: Osmar Menjivar      CC: abdominal abscess    SUBJECTIVE:   Patient rested well overnight. Feels much better thsi am. Tolerating reg diet. abd pain improved. Groin pains remains    ROS:   A 14 point review of systems was conducted, significant findings as noted above. All other systems negative. OBJECTIVE:    PHYSICAL EXAM:    Vitals:    07/10/22 1950 07/10/22 2054 07/10/22 2306 07/11/22 0218   BP: 129/78 125/78 120/69 125/69   Pulse: 88 91 90 84   Resp: 16 19 18   Temp: 98.2 °F (36.8 °C)  97.8 °F (36.6 °C) 98.7 °F (37.1 °C)   TempSrc: Oral  Oral Oral   SpO2: 95%  96% 95%   Weight:       Height:           General appearance: alert, no acute distress, grooming appropriate  Eyes: No scleral icterus, EOM grossly intact  Neck: trachea midline, no JVD, no lymphadenopathy, neck supple  Chest/Lungs: Normal effort with no accessory muscle use on 2L NC  Cardiovascular: Tachycardia, regular rhythm  Abdomen: Soft,non tender, no rebound, guarding, or rigidity, minimal pain on the LLQ and moderate to severe pain on palpation of left testicle   Skin: warm and dry, no rashes  Extremities: no edema, no cyanosis  Neuro: A&Ox3, no focal deficits, sensation intact    LABS:   Recent Labs     07/10/22  0020 07/10/22  1122   WBC 6.0 10.1   HGB 11.3* 10.9*   HCT 34.7* 34.1*   MCV 83.4 84.5    137        Recent Labs     07/10/22  0020 07/10/22  0528 07/10/22  1122    137  --    K 3.6 5.2* 3.7    101  --    CO2 24 21  --    PHOS  --  2.4*  --    BUN 16 19  --    CREATININE 4.0* 4.6*  --         Recent Labs     07/10/22  0020   AST 13*   ALT 11   BILITOT 0.4   ALKPHOS 68        Recent Labs     07/10/22  0020   LIPASE 14.0        Recent Labs     07/10/22  0020 07/10/22  1122   PROT 6.8  --    INR  --  1.16*      No results for input(s): CKTOTAL, CKMB, CKMBINDEX, TROPONINI in the last 72 hours.       ASSESSMENT & PLAN:   This is a 59 y.o. male with Hx of Jorden Bardales is a 59 y.o. male with Hx of afib, HFrEF (45-50%), T2DM, gout, diverticulosis, ESRD on dialysis s/p b/l inguinal hernia repair and ventral hernia repair (with biosynthetic mesh), peritoneal catheter removal (4/15/22). Patient had later recurrence of inguinal hernia with b/l inguinal hernia repair with Progrip mesh (6/13/22). Patient re-presents today with increased abdominal pain in the RLQ of the abdomen. The patient had a CT scan done this morning that re-demonstrates a fluid collection along bilateral abdominal walls. - Continue broad spectrum antibiotics   - IR drain placed yesterday; will flush drain BID   - BCx growing Gram positive cocci in clusters resembling Staphylococcus and Staph aureus DNA Detected; Cont vanco  - will discuss with Dr. Nena Cook need for possible HD line removal given bacteriemia to R/O all sources of infection as R groin collections appears to be hematoma vs infected hematoma. - Will continue to monitor for signs of sepsis    [facility]  7/11/2022  6:53 AM  kong    I have personally examined this patient, reviewed all pertinent labs and radiologic imaging, and agree with the Advanced Practice Provider note.     Long discussion with patient   Acute onset abd pain and fevers   Yesterday had IR drainage of fluid collection in pre-peritoneal space- aspirated what appeared to be hematoma with clear blood tinged minimal output from drain currently  No Fevers/chills   Staph + bacteremia from blood cultures- on broad spectrum abx  Removed tunneled HD cath  WIll await fluid cultures from IR procedure yesterday to determine further management    [place]

## 2022-07-11 NOTE — PROGRESS NOTES
Gen Surg  POC    Discussed bacteremia with Dr. Angel Favorite, will need to have tunneled HD line removed today with line holiday. Discussed with Dr. Odell MADRIGAL who will remove. Plan for replacement of tunneled HD line in a few days.     Dion Victor MD   Gen Surg PGY 5  7/11/2022  11:23 AM  [phone]

## 2022-07-12 PROBLEM — R78.81 STAPHYLOCOCCUS AUREUS BACTEREMIA: Status: ACTIVE | Noted: 2022-07-12

## 2022-07-12 PROBLEM — B95.61 STAPHYLOCOCCUS AUREUS BACTEREMIA: Status: ACTIVE | Noted: 2022-07-12

## 2022-07-12 PROBLEM — R18.8 PELVIC FLUID COLLECTION: Status: ACTIVE | Noted: 2022-07-12

## 2022-07-12 PROBLEM — T80.211A CATHETER-RELATED BLOODSTREAM INFECTION: Status: ACTIVE | Noted: 2022-07-12

## 2022-07-12 LAB
ALBUMIN SERPL-MCNC: 3.6 G/DL (ref 3.4–5)
ANION GAP SERPL CALCULATED.3IONS-SCNC: 11 MMOL/L (ref 3–16)
BASOPHILS ABSOLUTE: 0 K/UL (ref 0–0.2)
BASOPHILS RELATIVE PERCENT: 0.5 %
BLOOD CULTURE, ROUTINE: ABNORMAL
BLOOD CULTURE, ROUTINE: ABNORMAL
BUN BLDV-MCNC: 38 MG/DL (ref 7–20)
CALCIUM SERPL-MCNC: 8.1 MG/DL (ref 8.3–10.6)
CHLORIDE BLD-SCNC: 102 MMOL/L (ref 99–110)
CO2: 25 MMOL/L (ref 21–32)
CREAT SERPL-MCNC: 6.5 MG/DL (ref 0.8–1.3)
CULTURE, BLOOD 2: ABNORMAL
EOSINOPHILS ABSOLUTE: 0.5 K/UL (ref 0–0.6)
EOSINOPHILS RELATIVE PERCENT: 11.4 %
GFR AFRICAN AMERICAN: 11
GFR NON-AFRICAN AMERICAN: 9
GLUCOSE BLD-MCNC: 116 MG/DL (ref 70–99)
GLUCOSE BLD-MCNC: 144 MG/DL (ref 70–99)
GLUCOSE BLD-MCNC: 147 MG/DL (ref 70–99)
GLUCOSE BLD-MCNC: 148 MG/DL (ref 70–99)
HCT VFR BLD CALC: 32 % (ref 40.5–52.5)
HEMOGLOBIN: 10.4 G/DL (ref 13.5–17.5)
LYMPHOCYTES ABSOLUTE: 1 K/UL (ref 1–5.1)
LYMPHOCYTES RELATIVE PERCENT: 23.4 %
MAGNESIUM: 2.5 MG/DL (ref 1.8–2.4)
MCH RBC QN AUTO: 27.1 PG (ref 26–34)
MCHC RBC AUTO-ENTMCNC: 32.6 G/DL (ref 31–36)
MCV RBC AUTO: 83.2 FL (ref 80–100)
MONOCYTES ABSOLUTE: 0.8 K/UL (ref 0–1.3)
MONOCYTES RELATIVE PERCENT: 16.9 %
NEUTROPHILS ABSOLUTE: 2.1 K/UL (ref 1.7–7.7)
NEUTROPHILS RELATIVE PERCENT: 47.8 %
ORGANISM: ABNORMAL
PDW BLD-RTO: 17.5 % (ref 12.4–15.4)
PERFORMED ON: ABNORMAL
PHOSPHORUS: 3.7 MG/DL (ref 2.5–4.9)
PLATELET # BLD: 142 K/UL (ref 135–450)
PMV BLD AUTO: 8.2 FL (ref 5–10.5)
POTASSIUM SERPL-SCNC: 3.6 MMOL/L (ref 3.5–5.1)
RBC # BLD: 3.84 M/UL (ref 4.2–5.9)
SODIUM BLD-SCNC: 138 MMOL/L (ref 136–145)
VANCOMYCIN RANDOM: 12 UG/ML
WBC # BLD: 4.5 K/UL (ref 4–11)

## 2022-07-12 PROCEDURE — 80069 RENAL FUNCTION PANEL: CPT

## 2022-07-12 PROCEDURE — 6370000000 HC RX 637 (ALT 250 FOR IP): Performed by: STUDENT IN AN ORGANIZED HEALTH CARE EDUCATION/TRAINING PROGRAM

## 2022-07-12 PROCEDURE — 2060000000 HC ICU INTERMEDIATE R&B

## 2022-07-12 PROCEDURE — 85025 COMPLETE CBC W/AUTO DIFF WBC: CPT

## 2022-07-12 PROCEDURE — 87040 BLOOD CULTURE FOR BACTERIA: CPT

## 2022-07-12 PROCEDURE — 36415 COLL VENOUS BLD VENIPUNCTURE: CPT

## 2022-07-12 PROCEDURE — 2500000003 HC RX 250 WO HCPCS: Performed by: STUDENT IN AN ORGANIZED HEALTH CARE EDUCATION/TRAINING PROGRAM

## 2022-07-12 PROCEDURE — 2580000003 HC RX 258: Performed by: STUDENT IN AN ORGANIZED HEALTH CARE EDUCATION/TRAINING PROGRAM

## 2022-07-12 PROCEDURE — 99233 SBSQ HOSP IP/OBS HIGH 50: CPT | Performed by: INTERNAL MEDICINE

## 2022-07-12 PROCEDURE — 94760 N-INVAS EAR/PLS OXIMETRY 1: CPT

## 2022-07-12 PROCEDURE — 87205 SMEAR GRAM STAIN: CPT

## 2022-07-12 PROCEDURE — 6360000002 HC RX W HCPCS: Performed by: STUDENT IN AN ORGANIZED HEALTH CARE EDUCATION/TRAINING PROGRAM

## 2022-07-12 PROCEDURE — 6360000002 HC RX W HCPCS: Performed by: INTERNAL MEDICINE

## 2022-07-12 PROCEDURE — 99255 IP/OBS CONSLTJ NEW/EST HI 80: CPT | Performed by: INTERNAL MEDICINE

## 2022-07-12 PROCEDURE — 83036 HEMOGLOBIN GLYCOSYLATED A1C: CPT

## 2022-07-12 PROCEDURE — 83735 ASSAY OF MAGNESIUM: CPT

## 2022-07-12 PROCEDURE — 80202 ASSAY OF VANCOMYCIN: CPT

## 2022-07-12 PROCEDURE — 87070 CULTURE OTHR SPECIMN AEROBIC: CPT

## 2022-07-12 PROCEDURE — 2580000003 HC RX 258: Performed by: INTERNAL MEDICINE

## 2022-07-12 PROCEDURE — 87075 CULTR BACTERIA EXCEPT BLOOD: CPT

## 2022-07-12 RX ADMIN — OXYCODONE 10 MG: 5 TABLET ORAL at 00:03

## 2022-07-12 RX ADMIN — OXYCODONE 10 MG: 5 TABLET ORAL at 16:53

## 2022-07-12 RX ADMIN — METRONIDAZOLE 500 MG: 500 INJECTION, SOLUTION INTRAVENOUS at 02:15

## 2022-07-12 RX ADMIN — ATORVASTATIN CALCIUM 20 MG: 20 TABLET, FILM COATED ORAL at 08:16

## 2022-07-12 RX ADMIN — CEFAZOLIN SODIUM 1000 MG: 1 POWDER, FOR SOLUTION INTRAMUSCULAR; INTRAVENOUS at 16:52

## 2022-07-12 RX ADMIN — SPIRONOLACTONE 50 MG: 50 TABLET ORAL at 20:05

## 2022-07-12 RX ADMIN — OXYCODONE 10 MG: 5 TABLET ORAL at 20:05

## 2022-07-12 RX ADMIN — ACETAMINOPHEN 650 MG: 650 SUPPOSITORY RECTAL at 00:03

## 2022-07-12 RX ADMIN — SODIUM CHLORIDE, PRESERVATIVE FREE 10 ML: 5 INJECTION INTRAVENOUS at 20:38

## 2022-07-12 RX ADMIN — HEPARIN SODIUM 5000 UNITS: 5000 INJECTION INTRAVENOUS; SUBCUTANEOUS at 22:35

## 2022-07-12 RX ADMIN — HYDROMORPHONE HYDROCHLORIDE 0.5 MG: 1 INJECTION, SOLUTION INTRAMUSCULAR; INTRAVENOUS; SUBCUTANEOUS at 22:36

## 2022-07-12 RX ADMIN — NIFEDIPINE 90 MG: 90 TABLET, FILM COATED, EXTENDED RELEASE ORAL at 08:17

## 2022-07-12 RX ADMIN — OXYCODONE 10 MG: 5 TABLET ORAL at 12:46

## 2022-07-12 RX ADMIN — OXYCODONE 10 MG: 5 TABLET ORAL at 03:58

## 2022-07-12 RX ADMIN — HEPARIN SODIUM 5000 UNITS: 5000 INJECTION INTRAVENOUS; SUBCUTANEOUS at 06:28

## 2022-07-12 RX ADMIN — SODIUM CHLORIDE, PRESERVATIVE FREE 10 ML: 5 INJECTION INTRAVENOUS at 08:17

## 2022-07-12 RX ADMIN — OXYCODONE 10 MG: 5 TABLET ORAL at 08:18

## 2022-07-12 RX ADMIN — Medication 5 MG: at 20:05

## 2022-07-12 RX ADMIN — VANCOMYCIN HYDROCHLORIDE 1500 MG: 10 INJECTION, POWDER, LYOPHILIZED, FOR SOLUTION INTRAVENOUS at 11:41

## 2022-07-12 RX ADMIN — HYDROMORPHONE HYDROCHLORIDE 0.5 MG: 1 INJECTION, SOLUTION INTRAMUSCULAR; INTRAVENOUS; SUBCUTANEOUS at 10:32

## 2022-07-12 RX ADMIN — METRONIDAZOLE 500 MG: 500 INJECTION, SOLUTION INTRAVENOUS at 10:20

## 2022-07-12 RX ADMIN — CEFEPIME 1000 MG: 1 INJECTION, POWDER, FOR SOLUTION INTRAMUSCULAR; INTRAVENOUS at 14:10

## 2022-07-12 RX ADMIN — HEPARIN SODIUM 5000 UNITS: 5000 INJECTION INTRAVENOUS; SUBCUTANEOUS at 12:55

## 2022-07-12 RX ADMIN — METOPROLOL SUCCINATE 50 MG: 50 TABLET, EXTENDED RELEASE ORAL at 20:05

## 2022-07-12 ASSESSMENT — PAIN DESCRIPTION - DESCRIPTORS
DESCRIPTORS: ACHING
DESCRIPTORS: ACHING;DULL
DESCRIPTORS: ACHING
DESCRIPTORS: ACHING;DULL
DESCRIPTORS: ACHING

## 2022-07-12 ASSESSMENT — PAIN DESCRIPTION - LOCATION
LOCATION: ABDOMEN
LOCATION: SCROTUM
LOCATION: ABDOMEN
LOCATION: SCROTUM
LOCATION: ABDOMEN

## 2022-07-12 ASSESSMENT — PAIN SCALES - GENERAL
PAINLEVEL_OUTOF10: 6
PAINLEVEL_OUTOF10: 7
PAINLEVEL_OUTOF10: 6
PAINLEVEL_OUTOF10: 8
PAINLEVEL_OUTOF10: 6
PAINLEVEL_OUTOF10: 7

## 2022-07-12 ASSESSMENT — PAIN DESCRIPTION - ORIENTATION
ORIENTATION: LOWER;LEFT;RIGHT
ORIENTATION: LEFT
ORIENTATION: RIGHT;LEFT;MID
ORIENTATION: LEFT

## 2022-07-12 NOTE — PLAN OF CARE
Problem: Safety - Adult  Goal: Free from fall injury  Outcome: Progressing  Note: Pt is a Fall Risk. See Micah Dave Fall Risk Score. Pt bed in low position and side rails up. Call light and belongings in reach. Pt encouraged to call for assistance. Will continue with hourly rounds for PO intake, pain needs, toileting, and repositioning as needed.

## 2022-07-12 NOTE — PROGRESS NOTES
amos Christianson Fawad is a 59 y.o. male with Hx of afib, HFrEF (45-50%), T2DM, gout, diverticulosis, ESRD on dialysis s/p b/l inguinal hernia repair and ventral hernia repair (with biosynthetic mesh), peritoneal catheter removal (4/15/22). Patient had later recurrence of inguinal hernia with b/l inguinal hernia repair with Progrip mesh (6/13/22). Patient re-presents today with increased abdominal pain in the RLQ of the abdomen. The patient had a CT scan that re-demonstrates a fluid collection along bilateral abdominal walls. - IR drain placed; will flush drain BID. Awaiting IR drain fluid Cx to determine further management. Called lab, no drain cultures received   - BCx growing Gram positive cocci in clusters resembling Staphylococcus and Staph aureus DNA Detected. Continue broad spectrum antibiotics; Vanc.  Follow- up new set of BCx  - plan for replacement of tunneled HD catheter once bacteremia cleared  - Medically management per primary    Viral Hughes DO, Vinicio  PGY1, General Surgery  07/12/22  6:39 AM  [employer]  [phone]

## 2022-07-12 NOTE — PROGRESS NOTES
Progress Note  PGY-1    Admit Date: 7/9/2022  Day: 3  Diet: ADULT DIET; Regular; 3 carb choices (45 gm/meal); Low Fat/Low Chol/High Fiber/2 gm Na; Low Potassium (Less than 3000 mg/day); Low Phosphorus (Less than 1000 mg); 2000 ml  ADULT ORAL NUTRITION SUPPLEMENT; Dinner, Breakfast; Renal Oral Supplement     CC: Abdominal pain     Interval history: Patient seen and examined at bedside. HD catheter removed and on dialysis holiday. May place tomorrow pending negative cultures. HPI: 69-year-old male with a past medical history of ESRD on hemodialysis, hypertension, diabetes, recent bilateral hernia repair on 6/13 presents today with abdominal pain.  The patient reports that sometime around this evening (day of admittance) just before he went to dialysis he had severe abdominal pain and he managed to get through with his dialysis session but immediately afterwards started feeling feverish after which he decided come to the ED. In the ED his temperature was 103, respiratory rate was 28, pulse was 124, blood pressure was 170/98.  He was in severe abdominal pain and needed Dilaudid to get relief.  Labs were mostly unremarkable including a lactic acid.  CT w/o con showed a right and a left fluid collection in the pelvis abutting the right and left hernia repair. He denies any chest pain, shortness of breath, sensory loss, motor weakness, heart palpitations.  He does report that he is able to make urine and denies any dysuria, urgency, frequency. He also reports continued testicular  pain which he has been having for the past week.  It is the same in intensity and nature however it is still very uncomfortable to him    Medications:     Scheduled Meds:   vancomycin  1,500 mg IntraVENous Once    melatonin  5 mg Oral Nightly    cefepime  1,000 mg IntraVENous Q24H    vancomycin (VANCOCIN) intermittent dosing (placeholder)   Other See Admin Instructions    heparin (porcine)  5,000 Units SubCUTAneous 3 times per day    [Held by provider] aspirin  81 mg Oral Daily    atorvastatin  20 mg Oral Daily    metoprolol succinate  50 mg Oral Nightly    NIFEdipine  90 mg Oral Daily    spironolactone  50 mg Oral Nightly    sodium chloride flush  5-40 mL IntraVENous 2 times per day    metroNIDAZOLE  500 mg IntraVENous Q8H    lidocaine  1 patch TransDERmal Daily     Continuous Infusions:   sodium chloride 25 mL (07/10/22 1816)    dextrose       PRN Meds:sodium chloride flush, sodium chloride, ondansetron **OR** ondansetron, polyethylene glycol, acetaminophen **OR** acetaminophen, glucose, glucagon (rDNA), dextrose, labetalol, hydrALAZINE, dextrose bolus **OR** dextrose bolus, HYDROmorphone **OR** HYDROmorphone, oxyCODONE **OR** oxyCODONE    Objective:   Vitals:   T-max:  Patient Vitals for the past 8 hrs:   BP Temp Temp src Pulse Resp SpO2 Weight   07/12/22 0819 139/82 98.2 °F (36.8 °C) Oral 80 14 98 % --   07/12/22 0600 -- -- -- 78 -- -- 256 lb 2.8 oz (116.2 kg)   07/12/22 0500 -- -- -- 82 -- -- --   07/12/22 0400 (!) 151/132 98.2 °F (36.8 °C) Oral 80 12 98 % --   07/12/22 0300 -- -- -- 81 13 -- --       Intake/Output Summary (Last 24 hours) at 7/12/2022 1020  Last data filed at 7/12/2022 0500  Gross per 24 hour   Intake 300 ml   Output 5 ml   Net 295 ml       Review of Systems As noted in the HPI    Constitutional:       General: He is in acute distress. Appearance: He is obese. He is ill-appearing. HENT:      Head: Normocephalic. Mouth/Throat:      Mouth: Mucous membranes are moist.   Eyes:      Extraocular Movements: Extraocular movements intact. Pupils: Pupils are equal, round, and reactive to light. Cardiovascular:      Rate and Rhythm: Normal rate. Pulses: Normal pulses. Heart sounds: No murmur heard. No gallop. Pulmonary:      Effort: Pulmonary effort is normal. No respiratory distress. Breath sounds: No wheezing or rales. Abdominal:      Palpations: Abdomen is soft.  No guarding or rigidity. Tenderness: There is abdominal tenderness. Comments: Diffuse abd tenderness   Genitourinary:     Comments: Left testicle tender, hurts when elevated  Musculoskeletal:         General: No swelling or deformity. Right lower leg: No edema. Left lower leg: No edema. Skin:     General: Skin is warm. Capillary Refill: Capillary refill takes less than 2 seconds. Neurological:      Mental Status: He is alert and oriented to person, place, and time. Cranial Nerves: No cranial nerve deficit. Sensory: No sensory deficit. Motor: No weakness. Psychiatric:         Mood and Affect: Mood normal.         Behavior: Behavior normal.     LABS:    CBC:   Recent Labs     07/10/22  1122 07/11/22  0530 07/12/22  0657   WBC 10.1 5.6 4.5   HGB 10.9* 10.9* 10.4*   HCT 34.1* 33.5* 32.0*    126* 142   MCV 84.5 83.1 83.2     Renal:    Recent Labs     07/10/22  0528 07/10/22  0528 07/10/22  1122 07/11/22  0530 07/12/22  0658     --   --  137 138   K 5.2*   < > 3.7 4.0 3.6     --   --  99 102   CO2 21  --   --  24 25   BUN 19  --   --  34* 38*   CREATININE 4.6*  --   --  6.0* 6.5*   GLUCOSE 134*  --   --  135* 147*   CALCIUM 8.3  --   --  7.9* 8.1*   MG 1.50*  --   --  2.50* 2.50*   PHOS 2.4*  --   --  3.6 3.7   ANIONGAP 15  --   --  14 11    < > = values in this interval not displayed. Hepatic:   Recent Labs     07/10/22  0020 07/10/22  0020 07/10/22  0528 07/11/22  0530 07/12/22  0658   AST 13*  --   --   --   --    ALT 11  --   --   --   --    BILITOT 0.4  --   --   --   --    PROT 6.8  --   --   --   --    LABALBU 4.3   < > 3.9 3.7 3.6   ALKPHOS 68  --   --   --   --     < > = values in this interval not displayed. Troponin: No results for input(s): TROPONINI in the last 72 hours. BNP: No results for input(s): BNP in the last 72 hours. Lipids: No results for input(s): CHOL, HDL in the last 72 hours.     Invalid input(s): LDLCALCU, TRIGLYCERIDE  ABGs:  No results for input(s): PHART, AVP4DBL, PO2ART, LJG6SLJ, BEART, THGBART, T8BXRWPJ, ESE7QFR in the last 72 hours. INR:   Recent Labs     07/10/22  1122   INR 1.16*     Lactate: No results for input(s): LACTATE in the last 72 hours. Cultures:  -----------------------------------------------------------------  RAD:   IR REMOVE TUNNELED CVAD WO SQ PORT/PUMP   Final Result   IMPRESSION : Successful removal of a right chest tunneled central venous catheter. Blood loss : minimal (less than 5 cc)   Specimens removed : central venous catheter               US SCROTUM AND TESTICLES   Final Result   IMPRESSION :      No cause for acute pain identified. US DUP ABD PEL RETRO SCROT COMPLETE   Final Result   IMPRESSION :      No cause for acute pain identified. CT ABDOMEN PELVIS WO CONTRAST Additional Contrast? None   Final Result      1. Noncontrast CT demonstrating interval bilateral inguinal hernia repair with persistent fat with stable stranding in the right and left inguinal canal. Right greater than left fluid collections in the pelvis abutting the right and left hernia repair    mesh may represent postoperative seromas versus abscesses. 2.  Diverticulosis without evidence of diverticulitis. XR CHEST PORTABLE   Final Result      No acute pulmonary disease.          IR ABSCESS DRAINAGE PERC    (Results Pending)       Assessment/Plan:   Sepsis  - Patient meets criteria for SIRS and suspected sources is his abdomen as he recently had invasive procedures and imaging shows what appears to be a fluid collection which may be consistent with an abscess  - GEN surge consulted however they do not recommend any acute intervention at this time  -We will Panculture the patient to look for other sources of infection and start the patient on broad-spectrum antibiotics (vanc/cefepime/flagyl) however if the patient does not get better after 24 hours than he may need drainage of that fluid  -IR placed drain      ESRD on hemodialysis  - TTS  -- follows with Dr. Asher Dave  - Nephrology consult      Hypertension  - Blood pressure initially hypertensive in the ED however got much better as patient's pain was able to be controlled with dilaudid. - Continue home nifedipine, spironolactone, metoprolol  - Hold home torsemide for now and will restart when appropriate    Code Status: Full Code   FEN: ADULT DIET; Regular; 3 carb choices (45 gm/meal); Low Fat/Low Chol/High Fiber/2 gm Na; Low Potassium (Less than 3000 mg/day);  Low Phosphorus (Less than 1000 mg); 2000 ml  ADULT ORAL NUTRITION SUPPLEMENT; Dinner, Breakfast; Renal Oral Supplement  PPX: Heparin  DISPO: IP  Barriers to d/c: Fever    Gillian Faria MD, PGY-1  Internal Medicine Resident  Contact via [address] [place] [address]  07/12/22  10:20 AM    This patient has been staffed and discussed with Kalani Viera MD.

## 2022-07-12 NOTE — CONSULTS
Infectious Diseases Inpatient Consult Note    RESIDENT NOTE - reviewed / edited, attending note at bottom    Reason for Consult:   MSSA bacteremia, intra-abdominal vs HD line infection  Requesting Physician:   Ivette Henry MD   Primary Care Physician:  Hanna Barroso MD  History Obtained From:   Pt, EPIC    Admit Date: 7/9/2022  Hospital Day: 4    CHIEF COMPLAINT:       Chief Complaint   Patient presents with    Abdominal Pain     started around Noon Today, all over abd pain and Left Testicle, hx of hernia surgery 3 weeks ago by , HDU pt Rene Gan, Sat - went today    Fever     103.0 F at ED       HISTORY OF PRESENT ILLNESS:      Sandi Salmeron is a 59 y.o. M with PMH of ESRD on hemodialysis, HTN, DM and multiple inguinal hernia repair surgeries with most recent on 6/14 presented with abdominal pain. Abdominal pain is located in the lower abdominial quadrant, 10/10, sharp, non-radiating, relived with pain meds, worse with abdominal palpation. Abdominal pain started before dialysis on 7/9 and continued after with fever. He then presented to the ED. He also reports L scrotal pain of ten weeks duration. Has been to [facility] times now for the same pain. Scrotal pain is throbbing, 10/10, non-radiating, better with staying still and pain meds and worse with movement. He endorses chills, denies nausea, vomiting, diarrhea, chest pain, lightheadedness. In the ED, Tmax 103, RR 28, Pulse 124, /98. Labs unremarkable. Lactic acid wnl   CT Abdomen wo contrast showed R and L fluid collection in the pelvis abutting the R and L hernia repair mesh suggestive of postoperative seromas versus abscesses. Since admission, started on Vanc, Cefepime plus Flagyl (All Day #3)  Febrile 7/10 Tmax 101, tachycardic    IR removed R chest tunneled central venous catheter 7/11 and catheter tip sent for culture.   IR placed abdominal drain (7/11) draining about 20 cc serosanguinous fluid today and 250ml total.  Blood culture (7/10) S. Aureus sensitive to clindamycin, erythromycin, oxacillin, tetracycline, and TMP-SMX. SARS-CoV-2 RNA, RT PCR neg, Influenza A/B neg (7/10)  Urine culture (7/10)   Cr up tp 6.5 from 4.6 (7/12). Vitals stable. Today, 7/12 pt says he feels better. Abdominal pain is better, only reproducible with pressure. Scrotal pain in about 5/10, movement makes it worse.        Past Medical History:    Past Medical History:   Diagnosis Date    Arthralgia of multiple joints 10/27/2015    Arthritis     Atrial fibrillation (HCC)     Chronic kidney disease     stage 4    Chronic systolic congestive heart failure ([facility] 8/16/2021    CRI (chronic renal insufficiency)     Diabetic nephropathy associated with type 2 diabetes mellitus ([facility] 10/11/2018    Diverticulosis     Elevated PSA     Erectile dysfunction     ESRD (end stage renal disease) on dialysis ([facility] 2/24/2022    Gout     Gout     Hemrrhoid NOS w/ complication NEC     History of MRSA infection     Hypertension     CELSA (obstructive sleep apnea) 07/14/2017    uses C-pap machine    Type 2 diabetes mellitus without complication, without long-term current use of insulin ([facility] 9/64/9212    Umbilical hernia without obstruction and without gangrene 2/2/2016       Past Surgical History:    Past Surgical History:   Procedure Laterality Date    COLONOSCOPY      COLONOSCOPY N/A 3/18/2022    COLONOSCOPY POLYPECTOMY SNARE/COLD BIOPSY performed by Denise Ireland MD at [address] N/A 1/17/2022    LAPAROSCOPIC PERITONEAL DIALYSIS CATHETER PLACEMENT and omentopexy performed by Alexia Shepard DO at [facility], [address] [place] [address] N/A 4/15/2022    ROBOTIC, RECURRENT INCISIONAL HERNIA REPAIR WITH BIOSYNTHETIC MESH; LAPAROSCOPIC PERITONEAL DIALYSIS CATHETER REVISION WITH LAPAROSCOPIC OMENTOPEXY performed by Alexia Shepard DO at [address] [place] [address]  Bilateral 4/15/2022    BILATERAL OPEN INGUINAL HERNI REPAIR performed by Gala Clay DO at [address] Bilateral 6/13/2022    ROBOTIC RECURRENT BILATERAL INGUINAL HERNIA REPAIR, LYSIS OF ADHESIONS, PERITONEAL DIALYSIS CATHETER REMOVAL performed by Gala Clay DO at [address] TUNNELED CATHETER PLACEMENT GREATER THAN 5 YEARS  01/14/2022    IR TUNNELED CATHETER PLACEMENT GREATER THAN 5 YEARS 1/14/2022 [facility] SPECIAL PROCEDURES    UPPER GASTROINTESTINAL ENDOSCOPY  05/28/2016    biopsies, multiple small gastric ulcers    UPPER GASTROINTESTINAL ENDOSCOPY  09/12/2016    UPPER GASTROINTESTINAL ENDOSCOPY N/A 3/18/2022    EGD DIAGNOSTIC ONLY performed by Yulissa Rice MD at [address] N/A 1/17/2022    LAPAROSCOPIC incarcerated [place] performed by Gala Clay DO at [address]       Current Medications:     vancomycin  1,500 mg IntraVENous Once    melatonin  5 mg Oral Nightly    cefepime  1,000 mg IntraVENous Q24H    vancomycin (VANCOCIN) intermittent dosing (placeholder)   Other See Admin Instructions    heparin (porcine)  5,000 Units SubCUTAneous 3 times per day    [Held by provider] aspirin  81 mg Oral Daily    atorvastatin  20 mg Oral Daily    metoprolol succinate  50 mg Oral Nightly    NIFEdipine  90 mg Oral Daily    spironolactone  50 mg Oral Nightly    sodium chloride flush  5-40 mL IntraVENous 2 times per day    metroNIDAZOLE  500 mg IntraVENous Q8H    lidocaine  1 patch TransDERmal Daily       Allergies:  Patient has no known allergies. Social History:    TOBACCO:    No   ETOH:    No. Has not been drinking for the past year. Used to drink beers and liqour on the weekend  DRUGS:   No  MARITAL STATUS:      OCCUPATION:   Field accounting    Patient lives at home with wife and daughter. Family History:   No immunodeficiency    REVIEW OF SYSTEMS:    No fever / chills / sweats. No weight loss.   No visual change, eye pain, eye discharge. No oral lesion, sore throat, dysphagia. Denies cough / sputum. Denies chest pain, palpitations. Positive abd pain. No diarrhea. Denies dysuria or change in urinary function. Denies joint swelling or pain. No myalgia, arthralgia. Denies skin changes, itching  Denies focal weakness, sensory change or other neurologic symptom    Denies new / worse depression, psychiatric symptoms    PHYSICAL EXAM:      Vitals:    /82   Pulse 80   Temp 98.2 °F (36.8 °C) (Oral)   Resp 14   Ht 6' 5\" (1.956 m)   Wt 256 lb 2.8 oz (116.2 kg)   SpO2 98%   BMI 30.38 kg/m²     GENERAL: No apparent distress. HEENT: Membranes moist, no oral lesion, PERRL  NECK:  Supple, no lymphadenopathy  LUNGS: Clear b/l, no rales, no dullness  CARDIAC: RRR, no murmur appreciated  ABD:  + BS, soft / NT  UGS:   L and R scrotum not swollen. Tenderness on L scrotum, pain with L scrotal elevation. Positive suprapubic tenderness.     EXT:  No rash, no edema, no lesions  NEURO: No focal neurologic findings  PSYCH: Orientation, sensorium, mood normal  LINES:  Peripheral iv, Abdominal drain     DATA:    Lab Results   Component Value Date    WBC 4.5 07/12/2022    HGB 10.4 (L) 07/12/2022    HCT 32.0 (L) 07/12/2022    MCV 83.2 07/12/2022     07/12/2022     Lab Results   Component Value Date    CREATININE 6.5 (HH) 07/12/2022    BUN 38 (H) 07/12/2022     07/12/2022    K 3.6 07/12/2022     07/12/2022    CO2 25 07/12/2022       Hepatic Function Panel:   Lab Results   Component Value Date/Time    ALKPHOS 68 07/10/2022 12:20 AM    ALT 11 07/10/2022 12:20 AM    AST 13 07/10/2022 12:20 AM    PROT 6.8 07/10/2022 12:20 AM    PROT 7.5 10/03/2012 11:08 AM    BILITOT 0.4 07/10/2022 12:20 AM    BILIDIR <0.2 05/12/2022 08:51 PM    IBILI see below 05/12/2022 08:51 PM    LABALBU 3.6 07/12/2022 06:58 AM       Micro:  Blood culture (7/10): S. aureus   Staphylococcus aureus  Antibiotic Interpretation Microscan    clindamycin Sensitive <=0.25 mcg/mL   erythromycin Sensitive <=0.25 mcg/mL   oxacillin Sensitive 0.5 mcg/mL   tetracycline Sensitive <=1 mcg/mL   trimethoprim-sulfamethoxazole Sensitive <=10 mcg/mL     Culture, Catheter Tip (7/11): pending     Imaging:   CT ABDOMEN PELVIS WO CONTRAST  1.  Noncontrast CT demonstrating interval bilateral inguinal hernia repair with persistent fat with stable stranding in the right and left inguinal canal. Right greater than left fluid collections in the pelvis abutting the right and left hernia repair mesh may represent postoperative seromas versus abscesses. 2.  Diverticulosis without evidence of diverticulitis. US SCROTUM AND TESTICLES  Comments: Small b/l hydrocele   IMP: No cause for acute pain identified. US DUP ABD PEL RETRO SCROTUM  IMP: No cause for acute pain identified. XR CHEST PORTABLE  IMP: No acute pulmonary disease.      IMPRESSION:      Patient Active Problem List   Diagnosis    Atrial fibrillation ([address]    CRI (chronic renal insufficiency)    Gout    Erectile dysfunction    Elevated PSA    Essential hypertension, benign    Headache    Diverticulosis    Arthralgia of multiple joints    Umbilical hernia without obstruction and without gangrene    Knee pain, bilateral    Alcohol use disorder, mild, abuse    CELSA (obstructive sleep apnea)    Duodenal ulcer disease    Diabetic nephropathy associated with type 2 diabetes mellitus ([address]    Type 2 diabetes mellitus with diabetic nephropathy, without long-term current use of insulin ([facility]    Lateral epicondylitis of right elbow    Chronic bilateral low back pain without sciatica    Morbid obesity due to excess calories ([facility])    Chronic systolic congestive heart failure ([facility])    Acute kidney injury superimposed on CKD ([address]    Stroke-like symptoms    DMII (diabetes mellitus, type 2) ([facility])    Hyperlipidemia    Hypocalcemia    Hypomagnesemia    Hypokalemia    Muscle cramps    ANTONIO (acute kidney injury) ([address]    Electrolyte imbalance    Anemia due to stage 5 chronic kidney disease (HCC)    Congestive heart failure (HCC)    LV dysfunction    ESRD (end stage renal disease) ([address]    Bilateral recurrent inguinal hernia without obstruction or gangrene    Recurrent incisional hernia with incarceration    Bilateral inguinal hernia without obstruction or gangrene    Status post bilateral hernia repair    Inguinal hernia, left    Moderate malnutrition (HCC)    Arthritis    BPH (benign prostatic hyperplasia)    DDD (degenerative disc disease), cervical    Hematuria, microscopic    Prostatism    Tendinopathy    Intractable abdominal pain    Sepsis ([address]    Septicemia ([address]       Rock Sterling is a 59 y.o. M with PMH of ESRD on hemodialysis, HTN, DM and recent bilateral inguinal hernia repair on 6/13 presented with abdominal and scrotal pain. S. Aureus Bacteremia   Pt has abdominal pain on presentation, febrile Tmax 103, tachycardic 124  Abd CT suggests postoperative seromas versus abscesses. On vanc, cefepime, flagyl for broad spectrum coverage  Blood culture positive for S. aureus bacteremia   Abdominal drain placed by IR draining serosanguinous fluid, awaiting fluid culture(7/12)  HD catheter removed (7/12)  WBC 4.5 (7/12)    ESRD on HD   Sees Dr Idania Day  On Tue-Thurs-Sat Dialysis schedule. CT ESRD mgt per Nephro    RECOMMENDATIONS:  D/c Vanc, Cefepime and Flagyl. Start Cefazolin 1g IV dly  Echo (TTE)  Discuss Images with radiology   Awaiting catheter tip culture (7/12)  Awaiting f/u blood culture result (7/13)  Awaiting Body fluid culture result (7/12))    Discussed with pt, Dr Hilda Poon MD     Addendum to Resident Consult note:  Pt seen, examined and evaluated. I have reviewed the current history, physical findings, labs and assessment and plan and agree with note as documented by resident (Dr. Baron Shah).     60 yo man with hx CRF on dialysis, HTN    Pt has hx 3 abd surg --  1) 1/5/22 - PD catheter insertion, incarcerated ventral hernia repair  2) 4/15 - incisional hernia repair with biosynthetic mesh  3) 6/14 - bilateral inguinal hernia repair, PD catheter removal    Pt developed L side inguinal and scrotal pain - vague historian  Pain varies - went away for a time then returned, worse with activity, no change with urination or BM. Pain worse and with lower abd pain over few days  Worse pain prompted pt to present to hospital.  No fever / chills. In ED 7/10, T 103, wBC normal.  CT with fluid collections, R>L  BC sent - + S aureus / MSSA    7/11 HD line removed, tip sent - now cult + S aureus  7/11 R pelvic fluid drain placed, serosanguinous fluid    IMP/  Bilateral inguinal hernia surg with residual fluid collections   - R fluid appear to be UNinfected   - small L fluid collection present, has pain on LEFT   - unclear in infection at this site    MSSA bacteremia  HD line tip + S aureus    REC/  Change to Ancef  F/u [facility] ordered  Check Cardiac Echo  Surg f/u - consider aspirating L fluid collection, question abscess      Medical Decision Making: The following items were considered in medical decision making:  Discussion of patient care with other providers  Reviewed clinical lab tests  Reviewed radiology tests  Reviewed other diagnostic tests/interventions  Independent review of radiologic images - reviewed CT with Radiologist   Microbiology cultures and other micro tests reviewed      Risk of Complications/Morbidity: High   Illness(es)/ Infection present that pose threat to bodily function. There is potential for severe exacerbation of infection/side effects of treatment.   Therapy requires intensive monitoring for antimicrobial agent toxicity    Discussed with pt, RN, Radiologist  Amber Bonilla MD

## 2022-07-12 NOTE — CARE COORDINATION
Patient is from home with spouse, independent pta, goes to dialysis at [facility]. No CM needs at this time. Patient had HD line removed, has perc drain place, will need tunnel dialysis catheter before discharge, is on IV abx currently. CM will continue to follow.     Donis Veliz RN, BSN,   4th Floor Progressive Care Unit  [phone]

## 2022-07-12 NOTE — PROGRESS NOTES
Clinical Pharmacy Progress Note    Vancomycin - Management by Pharmacy    Consult Date(s): 7/9  Consulting Provider(s): Dr Randee Foreman / Plan    Intra-abdominal infection - Vancomycin   Concurrent Antimicrobials:   o Cefepime - Day #3  o Metronidazole - Day #3   Day of Vanc Therapy / Ordered Duration: #3   Current Dosing Method: Intermittent Dosing by Levels   Therapeutic Goal: ~ 15 mg/L   Current Dose / Frequency: Intermittent via levels   Plan / Rationale:   o Patient is ESRD on HD (home schedule T-Th-Sat). Will continue to dose vancomycin based on levels. Have entered placeholder onto [email or web address] [facility]  o Last dose of vancomycin was 7/10 AM (2000mg IV)  o Level today = 12 mg/L -- will give vancomycin 1500mg IV x1 today. · Will continue to monitor clinical condition and make adjustments to regimen as appropriate. Please call with questions--  Carol Massey, PharmD, BCPS  Wireless: [phone]   7/12/2022 8:06 AM        Interval update: Blood cultures with MSSA. Plan to replace tunneled HD cathter when bacteremia resolved. Subjective/Objective: Mr. Cliff Ashton is a 59 y.o. male with a PMHx significant for ESRD on HD (T-Th-Sat), CAD, HTN, Afib, gout, DM type II with neuropathy, CELSA, and recent hernia repair (admitted 6/13-6/16). Presented from HD session with fever and pain in testicles and lower abdominal quadrants. CT scan showing fluid collection in pelvis. Surgery consulted. Broad spectrum antibiotics initiated. Pharmacy has been consulted to dose vancomycin.     Ht Readings from Last 1 Encounters:   07/11/22 6' 5\" (1.956 m)     Wt Readings from Last 1 Encounters:   07/12/22 256 lb 2.8 oz (116.2 kg)       Current & Prior Antimicrobial Regimen(s):   Cefepime (7/9-current)   Metronidazole (7/9-current)   Vancomycin - pharmacy to dose  o Intermittent via levels (7/9-current)    Date Dose Vanc Level   7/10 2000mg IV (0347) 22.9 mg/L @ 11:22   7/11 --    7/12  12 mg/L     Cultures &

## 2022-07-12 NOTE — PROGRESS NOTES
Interval Progress    Sample obtained from the patient's groin drain using sterile technique. Only a small quantity of fluid was able to be aspirated from the drain. The sample was therefore transferred to a sterile specimen container and swabbed with an aerobic and anaerobic culture. An order was placed for aerobic and anaerobic culture and the patient's RN was requested to print a label and send the vial to lab for processing/culture.      Ignacio Del Angel MD, MPH  PGY-4 General Surgery  07/12/22  3:45 PM

## 2022-07-12 NOTE — PROGRESS NOTES
Clinical Pharmacy Progress Note    Vancomycin has been discontinued. Pharmacy will sign off. Please re-consult pharmacy if vancomycin dosing is wanted in the future. Please call with questions.   Wander Ulrich PharmD, BCPS  Wireless: [phone]   7/12/2022 3:11 PM

## 2022-07-12 NOTE — PROGRESS NOTES
Office : [phone]     Fax :[phone]       Nephrology  Note      Patient's Name: Olga Rosado  11:01 AM  7/12/2022    Reason for Consult:  ESRD management       Requesting Physician:  Obdulio Rosales MD      Chief Complaint:    Chief Complaint   Patient presents with    Abdominal Pain     started around Noon Today, all over abd pain and Left Testicle, hx of hernia surgery 3 weeks ago by , HDU pt Tue, [place], Sat - went today    Fever     103.0 F at ED     07/12/22    Pt has bacteremia   On abx    TDC removed     Feels better       I/O last 3 completed shifts: In: 310 [P.O.:300;  I.V.:10]  Out: 36 [Urine:875; Drains:255]    Past Medical History:   Diagnosis Date    Arthralgia of multiple joints 10/27/2015    Arthritis     Atrial fibrillation (HCC)     Chronic kidney disease     stage 4    Chronic systolic congestive heart failure ([address] 8/16/2021    CRI (chronic renal insufficiency)     Diabetic nephropathy associated with type 2 diabetes mellitus ([address] 10/11/2018    Diverticulosis     Elevated PSA     Erectile dysfunction     ESRD (end stage renal disease) on dialysis ([address] 2/24/2022    Gout     Gout     Hemrrhoid NOS w/ complication NEC     History of MRSA infection     Hypertension     CELSA (obstructive sleep apnea) 07/14/2017    uses C-pap machine    Type 2 diabetes mellitus without complication, without long-term current use of insulin ([address] 9/71/9729    Umbilical hernia without obstruction and without gangrene 2/2/2016       Past Surgical History:   Procedure Laterality Date    COLONOSCOPY      COLONOSCOPY N/A 3/18/2022    COLONOSCOPY POLYPECTOMY SNARE/COLD BIOPSY performed by Jen Shaffer MD at [facility] CATHETER      DIALYSIS CATHETER INSERTION N/A 1/17/2022    LAPAROSCOPIC PERITONEAL DIALYSIS CATHETER PLACEMENT and omentopexy performed by Jasmina Sparks DO at [facility], ESOPHAGUS     [address] N/A 4/15/2022    ROBOTIC, RECURRENT INCISIONAL HERNIA REPAIR WITH BIOSYNTHETIC MESH; LAPAROSCOPIC PERITONEAL DIALYSIS CATHETER REVISION WITH LAPAROSCOPIC OMENTOPEXY performed by Jasmina Sparks DO at [facility] Bilateral 4/15/2022    BILATERAL OPEN INGUINAL HERNI REPAIR performed by Jasmina Sparks DO at [address] Bilateral 6/13/2022    ROBOTIC RECURRENT BILATERAL INGUINAL HERNIA REPAIR, LYSIS OF ADHESIONS, PERITONEAL DIALYSIS CATHETER REMOVAL performed by Jasmina Sparks DO at [facility] IR TUNNELED [address] 5 YEARS  01/14/2022    IR TUNNELED CATHETER PLACEMENT GREATER THAN 5 YEARS 1/14/2022 [facility] SPECIAL PROCEDURES    UPPER GASTROINTESTINAL ENDOSCOPY  05/28/2016    biopsies, multiple small gastric ulcers    UPPER GASTROINTESTINAL ENDOSCOPY  09/12/2016    UPPER GASTROINTESTINAL ENDOSCOPY N/A 3/18/2022    EGD DIAGNOSTIC ONLY performed by Asif Rosales MD at [facility] [address] N/A 1/17/2022    LAPAROSCOPIC incarcerated VENTRAL HERNIA REPAIR performed by Jasmina Sparks DO at [facility] OR       Family History   Problem Relation Age of Onset    Hypertension Other     Breast Cancer Mother     Colon Cancer Father     Diabetes Maternal Grandmother     Coronary Art Dis Brother         reports that he has never smoked. He has never used smokeless tobacco. He reports previous alcohol use. He reports that he does not use drugs. Allergies:  Patient has no known allergies.     Current Medications:    vancomycin (VANCOCIN) 1,500 mg in dextrose 5 % 250 mL IVPB, Once  melatonin disintegrating tablet 5 mg, Nightly  cefepime (MAXIPIME) 1000 mg IVPB minibag, Q24H  vancomycin (VANCOCIN) intermittent dosing (placeholder), See Admin Instructions  heparin (porcine) injection 5,000 Units, 3 times per day  [Held by provider] aspirin EC tablet 81 mg, Daily  atorvastatin (LIPITOR) tablet 20 mg, Daily  metoprolol succinate (TOPROL XL) extended release tablet 50 mg, Nightly  NIFEdipine (PROCARDIA XL) extended release tablet 90 mg, Daily  spironolactone (ALDACTONE) tablet 50 mg, Nightly  sodium chloride flush 0.9 % injection 5-40 mL, 2 times per day  sodium chloride flush 0.9 % injection 5-40 mL, PRN  0.9 % sodium chloride infusion, PRN  ondansetron (ZOFRAN-ODT) disintegrating tablet 4 mg, Q8H PRN   Or  ondansetron (ZOFRAN) injection 4 mg, Q6H PRN  polyethylene glycol (GLYCOLAX) packet 17 g, Daily PRN  acetaminophen (TYLENOL) tablet 650 mg, Q6H PRN   Or  acetaminophen (TYLENOL) suppository 650 mg, Q6H PRN  glucose chewable tablet 16 g, PRN  glucagon (rDNA) injection 1 mg, PRN  dextrose 5 % solution, PRN  labetalol (NORMODYNE;TRANDATE) injection 5 mg, Q4H PRN  hydrALAZINE (APRESOLINE) injection 5 mg, Q4H PRN  dextrose bolus 10% 125 mL, PRN   Or  dextrose bolus 10% 250 mL, PRN  metronidazole (FLAGYL) 500 mg in 0.9% NaCl 100 mL IVPB premix, Q8H  lidocaine 4 % external patch 1 patch, Daily  HYDROmorphone (DILAUDID) injection 0.25 mg, Q3H PRN   Or  HYDROmorphone (DILAUDID) injection 0.5 mg, Q3H PRN  oxyCODONE (ROXICODONE) immediate release tablet 5 mg, Q4H PRN   Or  oxyCODONE (ROXICODONE) immediate release tablet 10 mg, Q4H PRN        Review of Systems:   14 point ROS obtained but were negative except mentioned in HPI      Physical exam:     Vitals:  /82   Pulse 80   Temp 98.2 °F (36.8 °C) (Oral)   Resp 14   Ht 6' 5\" (1.956 m)   Wt 256 lb 2.8 oz (116.2 kg)   SpO2 98%   BMI 30.38 kg/m²   Constitutional:  OAA X3 NAD  Skin: no rash, turgor wnl  Heent:  eomi, mmm  Neck: no bruits or jvd noted  Cardiovascular:  S1, S2 without m/r/g  Respiratory: CTA B without w/r/r  Abdomen:  +bs, soft, nt, nd  Ext: no  lower extremity edema  Psychiatric: mood and affect appropriate  Musculoskeletal:  Rom, muscular strength intact    Labs:  CBC:   Recent Labs     07/10/22  1122 07/11/22  0530 07/12/22  0657   WBC 10.1 5.6 4.5   HGB 10.9* 10.9* 10.4*    126* 142     BMP:    Recent Labs     07/10/22  0528 07/10/22  0528 07/10/22  1122 07/11/22  0530 07/12/22  0658     --   --  137 138   K 5.2*   < > 3.7 4.0 3.6     --   --  99 102   CO2 21  --   --  24 25   BUN 19  --   --  34* 38*   CREATININE 4.6*  --   --  6.0* 6.5*   GLUCOSE 134*  --   --  135* 147*    < > = values in this interval not displayed. Ca/Mg/Phos:   Recent Labs     07/10/22  0528 07/11/22  0530 07/12/22  0658   CALCIUM 8.3 7.9* 8.1*   MG 1.50* 2.50* 2.50*   PHOS 2.4* 3.6 3.7     Hepatic:   Recent Labs     07/10/22  0020   AST 13*   ALT 11   BILITOT 0.4   ALKPHOS 68     Troponin: No results for input(s): TROPONINI in the last 72 hours. BNP: No results for input(s): BNP in the last 72 hours. Lipids: No results for input(s): CHOL, TRIG, HDL, LDLCALC, LABVLDL in the last 72 hours. ABGs: No results for input(s): PHART, PO2ART, QRH2KCF in the last 72 hours. INR:   Recent Labs     07/10/22  1122   INR 1.16*     UA:No results for input(s): Lorelie Chatham, GLUCOSEU, BILIRUBINUR, Theoplis Crow, BLOODU, PHUR, PROTEINU, UROBILINOGEN, NITRU, LEUKOCYTESUR, Terrea Billet in the last 72 hours. Urine Microscopic: No results for input(s): LABCAST, BACTERIA, COMU, HYALCAST, WBCUA, RBCUA, EPIU in the last 72 hours. Urine Culture: No results for input(s): LABURIN in the last 72 hours. Urine Chemistry: No results for input(s): Magdalena Starr, DERRELL, NAUR in the last 72 hours. IMAGING:  IR REMOVE TUNNELED CVAD WO SQ PORT/PUMP   Final Result   IMPRESSION : Successful removal of a right chest tunneled central venous catheter.       Blood loss : minimal (less than 5 cc)   Specimens removed : central venous catheter               US SCROTUM AND TESTICLES   Final Result   IMPRESSION : No cause for acute pain identified. US DUP ABD PEL RETRO SCROT COMPLETE   Final Result   IMPRESSION :      No cause for acute pain identified. CT ABDOMEN PELVIS WO CONTRAST Additional Contrast? None   Final Result      1. Noncontrast CT demonstrating interval bilateral inguinal hernia repair with persistent fat with stable stranding in the right and left inguinal canal. Right greater than left fluid collections in the pelvis abutting the right and left hernia repair    mesh may represent postoperative seromas versus abscesses. 2.  Diverticulosis without evidence of diverticulitis. XR CHEST PORTABLE   Final Result      No acute pulmonary disease. IR ABSCESS DRAINAGE PERC    (Results Pending)                       Assessment/Plan :      1. ESRD   HD tue/ thu/ sat   Labs reviewed   No need for HD today   Removed HD cath   Replace [facility] tomorrow     2. HTN  Continue nifedipine XL   spironolactone and metoprolol     3. RLQ seroma vs abscess on CT scan . ecent suregry for hernia   - Scrotal USG - no acute etiology     4. Acid- base disorder. monitor and correct with HD     5. Electrolytes.  Monitor     HD on Wednesday likely         Thank you for allowing us to participate in care of Paz Parker         Electronically signed by: Bisi Dasilva MD, 7/12/2022, 11:01 AM      [facility] [address]  Office : [phone]  Fax :[phone]

## 2022-07-13 PROBLEM — D63.1 ANEMIA IN ESRD (END-STAGE RENAL DISEASE) (HCC): Status: ACTIVE | Noted: 2022-02-24

## 2022-07-13 LAB
ALBUMIN SERPL-MCNC: 3.7 G/DL (ref 3.4–5)
ANION GAP SERPL CALCULATED.3IONS-SCNC: 11 MMOL/L (ref 3–16)
BASOPHILS ABSOLUTE: 0 K/UL (ref 0–0.2)
BASOPHILS RELATIVE PERCENT: 0.4 %
BUN BLDV-MCNC: 38 MG/DL (ref 7–20)
CALCIUM SERPL-MCNC: 8.1 MG/DL (ref 8.3–10.6)
CHLORIDE BLD-SCNC: 103 MMOL/L (ref 99–110)
CO2: 25 MMOL/L (ref 21–32)
CREAT SERPL-MCNC: 6.2 MG/DL (ref 0.8–1.3)
CULTURE CATHETER TIP: ABNORMAL
EOSINOPHILS ABSOLUTE: 0.4 K/UL (ref 0–0.6)
EOSINOPHILS RELATIVE PERCENT: 10.5 %
ESTIMATED AVERAGE GLUCOSE: 119.8 MG/DL
GFR AFRICAN AMERICAN: 11
GFR NON-AFRICAN AMERICAN: 9
GLUCOSE BLD-MCNC: 102 MG/DL (ref 70–99)
GLUCOSE BLD-MCNC: 124 MG/DL (ref 70–99)
GLUCOSE BLD-MCNC: 135 MG/DL (ref 70–99)
GLUCOSE BLD-MCNC: 238 MG/DL (ref 70–99)
GLUCOSE BLD-MCNC: 89 MG/DL (ref 70–99)
HBA1C MFR BLD: 5.8 %
HCT VFR BLD CALC: 32.1 % (ref 40.5–52.5)
HEMOGLOBIN: 10.3 G/DL (ref 13.5–17.5)
LV EF: 58 %
LVEF MODALITY: NORMAL
LYMPHOCYTES ABSOLUTE: 1.1 K/UL (ref 1–5.1)
LYMPHOCYTES RELATIVE PERCENT: 31.6 %
MAGNESIUM: 2.4 MG/DL (ref 1.8–2.4)
MCH RBC QN AUTO: 26.8 PG (ref 26–34)
MCHC RBC AUTO-ENTMCNC: 32.2 G/DL (ref 31–36)
MCV RBC AUTO: 83.3 FL (ref 80–100)
MONOCYTES ABSOLUTE: 0.5 K/UL (ref 0–1.3)
MONOCYTES RELATIVE PERCENT: 15 %
NEUTROPHILS ABSOLUTE: 1.4 K/UL (ref 1.7–7.7)
NEUTROPHILS RELATIVE PERCENT: 42.5 %
ORGANISM: ABNORMAL
PDW BLD-RTO: 17.4 % (ref 12.4–15.4)
PERFORMED ON: ABNORMAL
PHOSPHORUS: 3.9 MG/DL (ref 2.5–4.9)
PLATELET # BLD: 148 K/UL (ref 135–450)
PMV BLD AUTO: 8.2 FL (ref 5–10.5)
POTASSIUM SERPL-SCNC: 3.8 MMOL/L (ref 3.5–5.1)
RBC # BLD: 3.85 M/UL (ref 4.2–5.9)
SODIUM BLD-SCNC: 139 MMOL/L (ref 136–145)
WBC # BLD: 3.4 K/UL (ref 4–11)

## 2022-07-13 PROCEDURE — 6370000000 HC RX 637 (ALT 250 FOR IP): Performed by: STUDENT IN AN ORGANIZED HEALTH CARE EDUCATION/TRAINING PROGRAM

## 2022-07-13 PROCEDURE — 2580000003 HC RX 258: Performed by: STUDENT IN AN ORGANIZED HEALTH CARE EDUCATION/TRAINING PROGRAM

## 2022-07-13 PROCEDURE — 36415 COLL VENOUS BLD VENIPUNCTURE: CPT

## 2022-07-13 PROCEDURE — 6360000002 HC RX W HCPCS: Performed by: STUDENT IN AN ORGANIZED HEALTH CARE EDUCATION/TRAINING PROGRAM

## 2022-07-13 PROCEDURE — 1200000000 HC SEMI PRIVATE

## 2022-07-13 PROCEDURE — 93306 TTE W/DOPPLER COMPLETE: CPT

## 2022-07-13 PROCEDURE — 6360000002 HC RX W HCPCS: Performed by: INTERNAL MEDICINE

## 2022-07-13 PROCEDURE — 99233 SBSQ HOSP IP/OBS HIGH 50: CPT | Performed by: INTERNAL MEDICINE

## 2022-07-13 PROCEDURE — 85025 COMPLETE CBC W/AUTO DIFF WBC: CPT

## 2022-07-13 PROCEDURE — 83735 ASSAY OF MAGNESIUM: CPT

## 2022-07-13 PROCEDURE — 80069 RENAL FUNCTION PANEL: CPT

## 2022-07-13 PROCEDURE — 2580000003 HC RX 258: Performed by: INTERNAL MEDICINE

## 2022-07-13 RX ORDER — LABETALOL HYDROCHLORIDE 5 MG/ML
5 INJECTION, SOLUTION INTRAVENOUS EVERY 4 HOURS PRN
Status: DISCONTINUED | OUTPATIENT
Start: 2022-07-13 | End: 2022-07-14 | Stop reason: HOSPADM

## 2022-07-13 RX ADMIN — HYDROMORPHONE HYDROCHLORIDE 0.25 MG: 1 INJECTION, SOLUTION INTRAMUSCULAR; INTRAVENOUS; SUBCUTANEOUS at 23:20

## 2022-07-13 RX ADMIN — HYDROMORPHONE HYDROCHLORIDE 0.5 MG: 1 INJECTION, SOLUTION INTRAMUSCULAR; INTRAVENOUS; SUBCUTANEOUS at 12:48

## 2022-07-13 RX ADMIN — CEFAZOLIN SODIUM 1000 MG: 1 POWDER, FOR SOLUTION INTRAMUSCULAR; INTRAVENOUS at 16:32

## 2022-07-13 RX ADMIN — HEPARIN SODIUM 5000 UNITS: 5000 INJECTION INTRAVENOUS; SUBCUTANEOUS at 05:46

## 2022-07-13 RX ADMIN — OXYCODONE 10 MG: 5 TABLET ORAL at 15:18

## 2022-07-13 RX ADMIN — ATORVASTATIN CALCIUM 20 MG: 20 TABLET, FILM COATED ORAL at 08:49

## 2022-07-13 RX ADMIN — HEPARIN SODIUM 5000 UNITS: 5000 INJECTION INTRAVENOUS; SUBCUTANEOUS at 12:48

## 2022-07-13 RX ADMIN — NIFEDIPINE 90 MG: 90 TABLET, FILM COATED, EXTENDED RELEASE ORAL at 08:49

## 2022-07-13 RX ADMIN — HYDRALAZINE HYDROCHLORIDE 5 MG: 20 INJECTION INTRAMUSCULAR; INTRAVENOUS at 12:58

## 2022-07-13 RX ADMIN — HYDROMORPHONE HYDROCHLORIDE 0.5 MG: 1 INJECTION, SOLUTION INTRAMUSCULAR; INTRAVENOUS; SUBCUTANEOUS at 08:49

## 2022-07-13 RX ADMIN — OXYCODONE 10 MG: 5 TABLET ORAL at 05:46

## 2022-07-13 RX ADMIN — Medication 5 MG: at 21:08

## 2022-07-13 RX ADMIN — OXYCODONE 10 MG: 5 TABLET ORAL at 21:15

## 2022-07-13 RX ADMIN — SODIUM CHLORIDE, PRESERVATIVE FREE 10 ML: 5 INJECTION INTRAVENOUS at 21:09

## 2022-07-13 RX ADMIN — OXYCODONE 10 MG: 5 TABLET ORAL at 00:45

## 2022-07-13 RX ADMIN — HYDROMORPHONE HYDROCHLORIDE 0.25 MG: 1 INJECTION, SOLUTION INTRAMUSCULAR; INTRAVENOUS; SUBCUTANEOUS at 16:28

## 2022-07-13 RX ADMIN — SODIUM CHLORIDE, PRESERVATIVE FREE 10 ML: 5 INJECTION INTRAVENOUS at 08:49

## 2022-07-13 RX ADMIN — OXYCODONE 10 MG: 5 TABLET ORAL at 10:37

## 2022-07-13 ASSESSMENT — PAIN DESCRIPTION - DESCRIPTORS
DESCRIPTORS: ACHING
DESCRIPTORS: SHARP;THROBBING
DESCRIPTORS: ACHING

## 2022-07-13 ASSESSMENT — PAIN SCALES - GENERAL
PAINLEVEL_OUTOF10: 6
PAINLEVEL_OUTOF10: 6
PAINLEVEL_OUTOF10: 8
PAINLEVEL_OUTOF10: 6
PAINLEVEL_OUTOF10: 3
PAINLEVEL_OUTOF10: 8
PAINLEVEL_OUTOF10: 6
PAINLEVEL_OUTOF10: 3
PAINLEVEL_OUTOF10: 6
PAINLEVEL_OUTOF10: 6
PAINLEVEL_OUTOF10: 5

## 2022-07-13 ASSESSMENT — PAIN DESCRIPTION - ORIENTATION
ORIENTATION: LEFT
ORIENTATION: LEFT;LOWER
ORIENTATION: RIGHT;LEFT
ORIENTATION: LEFT;LOWER
ORIENTATION: LEFT
ORIENTATION: LEFT;LOWER
ORIENTATION: LEFT

## 2022-07-13 ASSESSMENT — PAIN DESCRIPTION - FREQUENCY
FREQUENCY: INTERMITTENT

## 2022-07-13 ASSESSMENT — PAIN DESCRIPTION - LOCATION
LOCATION: ABDOMEN
LOCATION: SCROTUM
LOCATION: ABDOMEN
LOCATION: ABDOMEN
LOCATION: SCROTUM
LOCATION: SCROTUM
LOCATION: ABDOMEN

## 2022-07-13 ASSESSMENT — PAIN DESCRIPTION - PAIN TYPE
TYPE: ACUTE PAIN

## 2022-07-13 ASSESSMENT — PAIN DESCRIPTION - ONSET
ONSET: ON-GOING

## 2022-07-13 NOTE — PROGRESS NOTES
Pt admitted to room 6309 by stretcher from PCU. Alert and oriented. Fall precautions in place. Pt medicated for /91 with prn hydralazine. Tele monitor applied. Pt eating lunch.

## 2022-07-13 NOTE — PROGRESS NOTES
4 Eyes Admission Assessment     I agree as the admission nurse that 2 RN's have performed a thorough Head to Toe Skin Assessment on the patient. ALL assessment sites listed below have been assessed on admission. Areas assessed by both nurses:   [x]   Head, Face, and Ears   [x]   Shoulders, Back, and Chest  [x]   Arms, Elbows, and Hands   [x]   Coccyx, Sacrum, and Ischium  [x]   Legs, Feet, and Heels        Does the Patient have Skin Breakdown?   No         Roshan Prevention initiated:  NA   Wound Care Orders initiated:  NA      WOC nurse consulted for Pressure Injury (Stage 3,4, Unstageable, DTI, NWPT, and Complex wounds) or Roshan score 18 or lower:  NA      Nurse 1 eSignature: Electronically signed by Kanika Lopez RN on 7/13/22 at 2:34 PM EDT      Nurse 2 eSignature: Electronically signed by Cassi Nunez RN on 7/13/22 at 2:36 PM EDT

## 2022-07-13 NOTE — PROGRESS NOTES
ID Follow-up NOTE  RESIDENT NOTE - reviewed / edited, attending note at bottom    CC:   L groin pain + abdominal pain (MSSA bacteremia)   Antibiotics: Ancef     Admit Date: 7/9/2022  Hospital Day: 5    Subjective:     Patient feels better. Groin pain is still 5/10 but much better than on presentation per patient. No acute overnight events reported. R. Pelvic grain in situ draining about 50cc serosanguinous fluid. Objective:     Patient Vitals for the past 8 hrs:   BP Temp Temp src Pulse Resp SpO2   07/13/22 1313 137/87 -- -- 89 -- --   07/13/22 1242 (!) 163/91 97.3 °F (36.3 °C) Oral 83 18 97 %   07/13/22 0836 (!) 152/80 98.2 °F (36.8 °C) Oral 86 14 99 %     I/O last 3 completed shifts: In: 480 [P.O.:480]  Out: 885 [Urine:880; Drains:5]  I/O this shift: In: 720 [P.O.:720]  Out: 950 [Urine:900; Drains:50]    EXAM:  GENERAL: No apparent distress. HEENT: Membranes moist, no oral lesion  NECK:  Supple, no lymphadenopathy  LUNGS: Clear b/l, no rales, no dullness  CARDIAC: RRR, no murmur appreciated  ABD:  + BS, soft, mildly tender   EXT:  No rash, no edema, no lesions  UGS:               L and R scrotum not swollen. Tenderness on L scrotum, pain with L scrotal elevation. Positive suprapubic tenderness.   NEURO: No focal neurologic findings  PSYCH: Orientation, sensorium, mood normal  LINES:  Peripheral iv, Abdominal drain        Data Review:  Lab Results   Component Value Date    WBC 3.4 (L) 07/13/2022    HGB 10.3 (L) 07/13/2022    HCT 32.1 (L) 07/13/2022    MCV 83.3 07/13/2022     07/13/2022     Lab Results   Component Value Date    CREATININE 6.2 (HH) 07/13/2022    BUN 38 (H) 07/13/2022     07/13/2022    K 3.8 07/13/2022     07/13/2022    CO2 25 07/13/2022       Hepatic Function Panel:   Lab Results   Component Value Date/Time    ALKPHOS 68 07/10/2022 12:20 AM    ALT 11 07/10/2022 12:20 AM    AST 13 07/10/2022 12:20 AM    PROT 6.8 07/10/2022 12:20 AM    PROT 7.5 10/03/2012 11:08 AM BILITOT 0.4 07/10/2022 12:20 AM    BILIDIR <0.2 05/12/2022 08:51 PM    IBILI see below 05/12/2022 08:51 PM    LABALBU 3.7 07/13/2022 05:45 AM       MICRO:  Blood culture (7/10): S. aureus   Staphylococcus aureus  Antibiotic Interpretation Microscan     clindamycin Sensitive <=0.25 mcg/mL   erythromycin Sensitive <=0.25 mcg/mL   oxacillin Sensitive 0.5 mcg/mL   tetracycline Sensitive <=1 mcg/mL   trimethoprim-sulfamethoxazole Sensitive <=10 mcg/mL      Culture, Catheter Tip (7/11): S. aureus      IMAGING:  CT ABDOMEN PELVIS WO CONTRAST  1.  Noncontrast CT demonstrating interval bilateral inguinal hernia repair with persistent fat with stable stranding in the right and left inguinal canal. Right greater than left fluid collections in the pelvis abutting the right and left hernia repair mesh may represent postoperative seromas versus abscesses. 2.  Diverticulosis without evidence of diverticulitis.     US SCROTUM AND TESTICLES  Comments: Small b/l hydrocele   IMP: No cause for acute pain identified.      US DUP ABD PEL RETRO SCROTUM  IMP: No cause for acute pain identified.      XR CHEST PORTABLE  IMP: No acute pulmonary disease. Transthoracic Echocardiography Report (TTE) (7/13)  Summary   Left ventricular cavity size is normal. There is moderate concentric left  ventricular hypertrophy. Overall left ventricular systolic function appears normal with an estimated  ejection fraction of 55-60%. No regional wall motion abnormalities are noted. Diastolic filling  parameters suggests normal diastolic function. Estimated pulmonary artery systolic pressure is at 31 mmHg assuming a right  atrial pressure of 8 mmHg.   No evidence of vegetations noted on valve leaflets.      Scheduled Meds:   ceFAZolin  1,000 mg IntraVENous Q24H    melatonin  5 mg Oral Nightly    heparin (porcine)  5,000 Units SubCUTAneous 3 times per day    [Held by provider] aspirin  81 mg Oral Daily    atorvastatin  20 mg Oral Daily    metoprolol succinate  50 mg Oral Nightly    NIFEdipine  90 mg Oral Daily    spironolactone  50 mg Oral Nightly    sodium chloride flush  5-40 mL IntraVENous 2 times per day    lidocaine  1 patch TransDERmal Daily       Continuous Infusions:   sodium chloride 25 mL (07/10/22 1816)    dextrose         PRN Meds:  labetalol, perflutren lipid microspheres, sodium chloride flush, sodium chloride, ondansetron **OR** ondansetron, polyethylene glycol, acetaminophen **OR** acetaminophen, glucose, glucagon (rDNA), dextrose, hydrALAZINE, dextrose bolus **OR** dextrose bolus, HYDROmorphone **OR** HYDROmorphone, oxyCODONE **OR** oxyCODONE      Assessment:     Marlin Barrios is a 59 y.o. M with PMH of ESRD on hemodialysis, HTN, DM and recent bilateral inguinal hernia repair on 6/13 presented with abdominal and scrotal pain. MSSA Bacteremia   Pt has abdominal pain on presentation, febrile Tmax 103, tachycardic 124  Abd CT suggests postoperative seromas versus abscesses. On vanc, cefepime, flagyl for broad spectrum coverage - d/c 7/12  Blood culture positive for S. aureus    Abdominal drain placed by IR draining serosanguinous fluid, awaiting fluid culture(7/12)  HD catheter removed (7/12) and tip cultured - S.aureus   WBC 4.5 (7/12)  Echo (TTE) (7/13) - No evidence of vegetations noted on valve leaflets. Plan:     CT Cefazolin 1g IV dly  Echo (TTE) (7/13) - No evidence of vegetations noted on valve leaflets. Tunneled Dialysis Catheter to be replaced today (7/13) per nephro   To resume HD Thursday (7/14) per primary team    Awaiting catheter tip culture (7/12) - S.aureus   Awaiting f/u blood culture result (7/13) - S. aureus   Awaiting groin drain culture result (7/13))    Discussed with pt, Dr Margoth Barros MD     Addendum to Resident Progress note:  Pt seen, examined and evaluated.  I have reviewed the current history, physical findings, labs and assessment and plan and agree with note as documented by resident (Dr. Nat Knight).    58 yo man with hx CRF on dialysis, HTN     Pt has hx 3 abd surg --  1) 1/5/22 - PD catheter insertion, incarcerated ventral hernia repair  2) 4/15 - incisional hernia repair with biosynthetic mesh  3) 6/14 - bilateral inguinal hernia repair, PD catheter removal     Pt developed L side inguinal and scrotal pain - vague historian  Pain varies - went away for a time then returned, worse with activity, no change with urination or BM. Pain worse and with lower abd pain over few days  Worse pain prompted pt to present to hospital.  No fever / chills.     In ED 7/10, T 103, wBC normal.  CT with fluid collections, R>L  BC sent - + S aureus / MSSA     7/11 HD line removed, tip sent - now cult + S aureus / MSSA  7/11 R pelvic fluid drain placed, serosanguinous fluid   7/12 Fluid sent: GS 1+WBC, no organism seen; cult - no growth to date; f/u [facility] sent   7/13 Afeb, WBC 3.4. Drain removed     IMP/  Bilateral inguinal hernia surg with residual fluid collections   - R fluid appear to be UNinfected   - small L fluid collection present, has pain on LEFT   - unclear if infection at this site     MSSA bacteremia  HD line tip + S aureus  F/u [facility] 7/12 - no growth to date; TTE 7/9 - no evidence of vegetations     REC/  Cont Ancef  F/u BC - no growth to daet  F/u on fluid cult      Ok for tunnel HD line 7/14  Treat for 4 weeks - Ancef 2 gm at HD 3 days a week, through 8/8    Medical Decision Making:   The following items were considered in medical decision making:  Discussion of patient care with other providers  Reviewed clinical lab tests  Reviewed radiology tests  Reviewed other diagnostic tests/interventions  Independent review of radiologic images - reviewed CT with Radiologist   Microbiology cultures and other micro tests reviewed       Discussed with pt  Discussed with Dr Yohannes Ferguson by phone on 7/12  Aleks Brown MD

## 2022-07-13 NOTE — PROGRESS NOTES
Bariatric Surgery   Daily Progress Note  Patient: Carlo Abrams      CC: abdominal abscess    SUBJECTIVE:   Patient rested well overnight. Tolerating reg diet without N/V. Patient is passing gas. RLQ pain resolved. L groin pain improving. ROS:   A 14 point review of systems was conducted, significant findings as noted above. All other systems negative. OBJECTIVE:    PHYSICAL EXAM:    Vitals:    07/12/22 2003 07/13/22 0047 07/13/22 0300 07/13/22 0546   BP: (!) 148/85   123/76   Pulse: 85 82  79   Resp: 16   16   Temp: 98 °F (36.7 °C) 98.1 °F (36.7 °C)  98 °F (36.7 °C)   TempSrc: Oral Oral  Oral   SpO2: 94% 93%  93%   Weight:   255 lb 15.3 oz (116.1 kg)    Height:           General appearance: alert, no acute distress, grooming appropriate  Eyes: No scleral icterus, EOM grossly intact  Neck: trachea midline  Chest/Lungs: Normal effort with no accessory muscle use on RA  Cardiovascular: regular rate, rhythm  Abdomen: Soft, non-tender RLQ, no rebound, guarding, or rigidity minimally tender to palpation of the L testicle improved  Skin: warm and dry, no rashes  Extremities: no edema, no cyanosis  Neuro: A&Ox3, no focal deficits, sensation intact    LABS:   Recent Labs     07/11/22  0530 07/12/22  0657   WBC 5.6 4.5   HGB 10.9* 10.4*   HCT 33.5* 32.0*   MCV 83.1 83.2   * 142        Recent Labs     07/11/22  0530 07/12/22  0658    138   K 4.0 3.6   CL 99 102   CO2 24 25   PHOS 3.6 3.7   BUN 34* 38*   CREATININE 6.0* 6.5*        No results for input(s): AST, ALT, ALB, BILIDIR, BILITOT, ALKPHOS in the last 72 hours. No results for input(s): LIPASE, AMYLASE in the last 72 hours. Recent Labs     07/10/22  1122   INR 1.16*      No results for input(s): CKTOTAL, CKMB, CKMBINDEX, TROPONINI in the last 72 hours.       ASSESSMENT & PLAN:   This is a 59 y.o. male with Hx of Carlo Abrams is a 59 y.o. male with Hx of afib, HFrEF (45-50%), T2DM, gout, diverticulosis, ESRD on dialysis s/p b/l inguinal hernia repair and ventral hernia repair (with biosynthetic mesh), peritoneal catheter removal (4/15/22). Patient had later recurrence of inguinal hernia with b/l inguinal hernia repair with Progrip mesh (6/13/22). Patient re-presents today with increased abdominal pain in the RLQ of the abdomen. The patient had a CT scan that re-demonstrates a fluid collection along bilateral abdominal walls. - IR drain placed; Continue to flush drain BID.  - Culture obtained from groin drain and sent yesterday 07/12. Will follow-up  - BCx collected 07/10 and catheter tip Cx growing Staphylococcus aureus. Continue broad spectrum antibiotics;  Ancef  - Follow-up new set of BCx, collected 07/12  - Plan for replacement of tunneled HD catheter once bacteremia cleared  - Medically management per primary      Marcus Salmeron DO, Vinicio  PGY1, General Surgery  07/13/22  6:33 AM  [employer]  [phone]

## 2022-07-13 NOTE — PROGRESS NOTES
IR drain Removal:   Dressing was removed. Suture was identified and cut, all suture fragments were removed. The identified holding suture of IR drain, and cut one side. The drain was pulled without difficulty with intact suture at pigtail. Pressure was held for 2-3 minutes. A new dressing with 2x2 gauzea and tegederm was placed on the drain site. There was no bleeding or further serous drainage from the CHRIS site. Patient tolerated removal well with minimal pain.      Mika Membreno CNP  7/13/2022  12:34 PM  kong

## 2022-07-13 NOTE — PROGRESS NOTES
Office : [phone]     Fax :[phone]       Nephrology  Note      Patient's Name: Jossie Pinto  9:34 AM  7/13/2022    Reason for Consult:  ESRD management       Requesting Physician:  Yoana Jensen MD      Chief Complaint:    Chief Complaint   Patient presents with    Abdominal Pain     started around Noon Today, all over abd pain and Left Testicle, hx of hernia surgery 3 weeks ago by , HDU pt Tue, Thurs, Sat - went today    Fever     103.0 F at ED     07/13/22    Pt has bacteremia   On abx    TDC removed     Feels better       I/O last 3 completed shifts:   In: 18 [P.O.:480]  Out: 56 [Urine:880; Drains:5]    Past Medical History:   Diagnosis Date    Arthralgia of multiple joints 10/27/2015    Arthritis     Atrial fibrillation (HCC)     Chronic kidney disease     stage 4    Chronic systolic congestive heart failure ([address] 8/16/2021    CRI (chronic renal insufficiency)     Diabetic nephropathy associated with type 2 diabetes mellitus ([address] 10/11/2018    Diverticulosis     Elevated PSA     Erectile dysfunction     ESRD (end stage renal disease) on dialysis ([address] 2/24/2022    Gout     Gout     Hemrrhoid NOS w/ complication NEC     History of MRSA infection     Hypertension     CELSA (obstructive sleep apnea) 07/14/2017    uses C-pap machine    Type 2 diabetes mellitus without complication, without long-term current use of insulin ([address] 7/66/1922    Umbilical hernia without obstruction and without gangrene 2/2/2016       Past Surgical History:   Procedure Laterality Date    COLONOSCOPY      COLONOSCOPY N/A 3/18/2022    COLONOSCOPY POLYPECTOMY SNARE/COLD BIOPSY performed by Chino Lara MD at [facility] DIALYSIS CATHETER INSERTION N/A 1/17/2022    LAPAROSCOPIC PERITONEAL DIALYSIS CATHETER PLACEMENT and omentopexy performed by Cyn Zaldivar DO at [facility], ESOPHAGUS     [address] N/A 4/15/2022    ROBOTIC, RECURRENT INCISIONAL HERNIA REPAIR WITH BIOSYNTHETIC MESH; LAPAROSCOPIC PERITONEAL DIALYSIS CATHETER REVISION WITH LAPAROSCOPIC OMENTOPEXY performed by Cyn Zaldivar DO at [address] Bilateral 4/15/2022    BILATERAL OPEN INGUINAL HERNI REPAIR performed by Cyn Zaldivar DO at [address] Bilateral 6/13/2022    ROBOTIC RECURRENT BILATERAL INGUINAL HERNIA REPAIR, LYSIS OF ADHESIONS, PERITONEAL DIALYSIS CATHETER REMOVAL performed by Cyn Zaldivar DO at [address] IR TUNNELED [address] 5 YEARS  01/14/2022    IR TUNNELED CATHETER PLACEMENT GREATER THAN 5 YEARS 1/14/2022 [facility] SPECIAL PROCEDURES    UPPER GASTROINTESTINAL ENDOSCOPY  05/28/2016    biopsies, multiple small gastric ulcers    UPPER GASTROINTESTINAL ENDOSCOPY  09/12/2016    UPPER GASTROINTESTINAL ENDOSCOPY N/A 3/18/2022    EGD DIAGNOSTIC ONLY performed by Francisca Dougherty MD at [place] [address] N/A 1/17/2022    LAPAROSCOPIC incarcerated VENTRAL HERNIA REPAIR performed by Cyn Zaldivar DO at [facility] OR       Family History   Problem Relation Age of Onset    Hypertension Other     Breast Cancer Mother     Colon Cancer Father     Diabetes Maternal Grandmother     Coronary Art Dis Brother         reports that he has never smoked. He has never used smokeless tobacco. He reports previous alcohol use. He reports that he does not use drugs. Allergies:  Patient has no known allergies.     Current Medications:    ceFAZolin (ANCEF) 1,000 mg in dextrose 5 % 50 mL IVPB (mini-bag), Q24H  perflutren lipid microspheres (DEFINITY) injection 1.65 mg, ONCE PRN  melatonin disintegrating tablet 5 mg, Nightly  heparin (porcine) injection 5,000 Units, 3 times per day  [facility] [place] by provider] aspirin EC tablet 81 mg, Daily  atorvastatin (LIPITOR) tablet 20 mg, Daily  metoprolol succinate (TOPROL XL) extended release tablet 50 mg, Nightly  NIFEdipine (PROCARDIA XL) extended release tablet 90 mg, Daily  spironolactone (ALDACTONE) tablet 50 mg, Nightly  sodium chloride flush 0.9 % injection 5-40 mL, 2 times per day  sodium chloride flush 0.9 % injection 5-40 mL, PRN  0.9 % sodium chloride infusion, PRN  ondansetron (ZOFRAN-ODT) disintegrating tablet 4 mg, Q8H PRN   Or  ondansetron (ZOFRAN) injection 4 mg, Q6H PRN  polyethylene glycol (GLYCOLAX) packet 17 g, Daily PRN  acetaminophen (TYLENOL) tablet 650 mg, Q6H PRN   Or  acetaminophen (TYLENOL) suppository 650 mg, Q6H PRN  glucose chewable tablet 16 g, PRN  glucagon (rDNA) injection 1 mg, PRN  dextrose 5 % solution, PRN  labetalol (NORMODYNE;TRANDATE) injection 5 mg, Q4H PRN  hydrALAZINE (APRESOLINE) injection 5 mg, Q4H PRN  dextrose bolus 10% 125 mL, PRN   Or  dextrose bolus 10% 250 mL, PRN  lidocaine 4 % external patch 1 patch, Daily  HYDROmorphone (DILAUDID) injection 0.25 mg, Q3H PRN   Or  HYDROmorphone (DILAUDID) injection 0.5 mg, Q3H PRN  oxyCODONE (ROXICODONE) immediate release tablet 5 mg, Q4H PRN   Or  oxyCODONE (ROXICODONE) immediate release tablet 10 mg, Q4H PRN        Review of Systems:   14 point ROS obtained but were negative except mentioned in HPI      Physical exam:     Vitals:  BP (!) 152/80   Pulse 86   Temp 98.2 °F (36.8 °C) (Oral)   Resp 14   Ht 6' 5\" (1.956 m)   Wt 255 lb 15.3 oz (116.1 kg)   SpO2 99%   BMI 30.35 kg/m²   Constitutional:  OAA X3 NAD  Skin: no rash, turgor wnl  Heent:  eomi, mmm  Neck: no bruits or jvd noted  Cardiovascular:  S1, S2 without m/r/g  Respiratory: CTA B without w/r/r  Abdomen:  +bs, soft, nt, nd  Ext: no  lower extremity edema  Psychiatric: mood and affect appropriate  Musculoskeletal:  Rom, muscular strength intact    Labs:  CBC:   Recent Labs     07/11/22  0530 07/12/22  0657 07/13/22  0545   WBC 5.6 4.5 3.4*   HGB 10.9* 10.4* 10.3*   * 142 148     BMP:    Recent Labs     07/11/22  0530 07/12/22  0658 07/13/22  0545    138 139   K 4.0 3.6 3.8   CL 99 102 103   CO2 24 25 25   BUN 34* 38* 38*   CREATININE 6.0* 6.5* 6.2*   GLUCOSE 135* 147* 89     Ca/Mg/Phos:   Recent Labs     07/11/22  0530 07/12/22  0658 07/13/22  0545   CALCIUM 7.9* 8.1* 8.1*   MG 2.50* 2.50* 2.40   PHOS 3.6 3.7 3.9     Hepatic:   No results for input(s): AST, ALT, ALB, BILITOT, ALKPHOS in the last 72 hours. Troponin: No results for input(s): TROPONINI in the last 72 hours. BNP: No results for input(s): BNP in the last 72 hours. Lipids: No results for input(s): CHOL, TRIG, HDL, LDLCALC, LABVLDL in the last 72 hours. ABGs: No results for input(s): PHART, PO2ART, TAC3IAS in the last 72 hours. INR:   Recent Labs     07/10/22  1122   INR 1.16*     UA:No results for input(s): Shermon Honer, GLUCOSEU, BILIRUBINUR, Yolanda Lair, BLOODU, PHUR, PROTEINU, UROBILINOGEN, NITRU, LEUKOCYTESUR, Nicola Gianotti in the last 72 hours. Urine Microscopic: No results for input(s): LABCAST, BACTERIA, COMU, HYALCAST, WBCUA, RBCUA, EPIU in the last 72 hours. Urine Culture: No results for input(s): LABURIN in the last 72 hours. Urine Chemistry: No results for input(s): Coalton Miracle, PROTEINUR, NAUR in the last 72 hours. IMAGING:  IR REMOVE TUNNELED CVAD WO SQ PORT/PUMP   Final Result   IMPRESSION : Successful removal of a right chest tunneled central venous catheter. Blood loss : minimal (less than 5 cc)   Specimens removed : central venous catheter               US SCROTUM AND TESTICLES   Final Result   IMPRESSION :      No cause for acute pain identified. US DUP ABD PEL RETRO SCROT COMPLETE   Final Result   IMPRESSION :      No cause for acute pain identified. CT ABDOMEN PELVIS WO CONTRAST Additional Contrast? None   Final Result      1.   Noncontrast CT demonstrating interval bilateral inguinal hernia repair with persistent fat with stable stranding in the right and left inguinal canal. Right greater than left fluid collections in the pelvis abutting the right and left hernia repair    mesh may represent postoperative seromas versus abscesses. 2.  Diverticulosis without evidence of diverticulitis. XR CHEST PORTABLE   Final Result      No acute pulmonary disease. IR ABSCESS DRAINAGE PERC    (Results Pending)                       Assessment/Plan :      1. ESRD   HD tue/ thu/ sat   Labs reviewed   HD in am   Replace TDC in am       2. HTN  Continue nifedipine XL   spironolactone and metoprolol     3. RLQ seroma vs abscess on CT scan . ecent suregry for hernia   - Scrotal USG - no acute etiology     4. Acid- base disorder. monitor and correct with HD     5. Electrolytes.  Monitor             Thank you for allowing us to participate in care of Jossie Pinto         Electronically signed by: Mervin Lemons MD, 7/13/2022, 9:34 AM      [facility] [address]  Office : [phone]  Fax :[phone]

## 2022-07-13 NOTE — CARE COORDINATION
CM  following for  D/C planning:    Admitted  With Septicemia :    -  Line sepsis 2/2 infected HD catheter- blood cx + for staph as are tip cx.  -  Cont line holiday. -  Will need line replaced in am or Thursday by IR so pt can undergo HD    HD line removed  And  Perc drain placed :  7/11 HD line removed, tip sent - now cult + S aureus  7/11 R pelvic fluid drain placed, serosanguinous fluid  Cont IV atbx       [address] Dialysis   [address] [facility] [address]  Phone: [phone]    Dr Joe Walters following      DISCHARGE PLAN:  Disposition: Home- No Services Needed     Transportation PLAN for discharge: family      Factors facilitating achievement of predicted outcomes: Family support     Barriers to discharge: Medical complications      Case Management Notes:   Patient is from home with spouse, independent pta, drives self. -   No CM needs at this time. -   Spouse to transport home    Cm  Will cont to follow     Electronically signed by Yojana Lin RN on 7/13/2022 at 4:25 PM        Yojana Lin  RN Case Manager  [facility]  [address] [place] [address].   [facility] [address]  [phone]  Fax [phone]

## 2022-07-13 NOTE — PROGRESS NOTES
mg Oral Daily    metoprolol succinate  50 mg Oral Nightly    NIFEdipine  90 mg Oral Daily    spironolactone  50 mg Oral Nightly    sodium chloride flush  5-40 mL IntraVENous 2 times per day    lidocaine  1 patch TransDERmal Daily     Continuous Infusions:   sodium chloride 25 mL (07/10/22 1816)    dextrose       PRN Meds:labetalol, perflutren lipid microspheres, sodium chloride flush, sodium chloride, ondansetron **OR** ondansetron, polyethylene glycol, acetaminophen **OR** acetaminophen, glucose, glucagon (rDNA), dextrose, hydrALAZINE, dextrose bolus **OR** dextrose bolus, HYDROmorphone **OR** HYDROmorphone, oxyCODONE **OR** oxyCODONE    Objective:   Vitals:   T-max:  Patient Vitals for the past 8 hrs:   BP Temp Temp src Pulse Resp SpO2   07/13/22 1313 137/87 -- -- 89 -- --   07/13/22 1242 (!) 163/91 97.3 °F (36.3 °C) Oral 83 18 97 %   07/13/22 0836 (!) 152/80 98.2 °F (36.8 °C) Oral 86 14 99 %       Intake/Output Summary (Last 24 hours) at 7/13/2022 1355  Last data filed at 7/13/2022 1037  Gross per 24 hour   Intake 600 ml   Output 1350 ml   Net -750 ml       Review of Systems As noted in the HPI    Constitutional:       General: He is in acute distress. Appearance: He is obese. He is ill-appearing. HENT:      Head: Normocephalic. Mouth/Throat:      Mouth: Mucous membranes are moist.   Eyes:      Extraocular Movements: Extraocular movements intact. Pupils: Pupils are equal, round, and reactive to light. Cardiovascular:      Rate and Rhythm: Normal rate. Pulses: Normal pulses. Heart sounds: No murmur heard. No gallop. Pulmonary:      Effort: Pulmonary effort is normal. No respiratory distress. Breath sounds: No wheezing or rales. Abdominal:      Palpations: Abdomen is soft. No guarding or rigidity. Tenderness: There is abdominal tenderness.       Comments: Diffuse abd tenderness   Genitourinary:     Comments: Left testicle tender, hurts when elevated  Musculoskeletal:         General: No swelling or deformity. Right lower leg: No edema. Left lower leg: No edema. Skin:     General: Skin is warm. Capillary Refill: Capillary refill takes less than 2 seconds. Neurological:      Mental Status: He is alert and oriented to person, place, and time. Cranial Nerves: No cranial nerve deficit. Sensory: No sensory deficit. Motor: No weakness. Psychiatric:         Mood and Affect: Mood normal.         Behavior: Behavior normal.     LABS:    CBC:   Recent Labs     07/11/22  0530 07/12/22  0657 07/13/22  0545   WBC 5.6 4.5 3.4*   HGB 10.9* 10.4* 10.3*   HCT 33.5* 32.0* 32.1*   * 142 148   MCV 83.1 83.2 83.3     Renal:    Recent Labs     07/11/22  0530 07/12/22 0658 07/13/22  0545    138 139   K 4.0 3.6 3.8   CL 99 102 103   CO2 24 25 25   BUN 34* 38* 38*   CREATININE 6.0* 6.5* 6.2*   GLUCOSE 135* 147* 89   CALCIUM 7.9* 8.1* 8.1*   MG 2.50* 2.50* 2.40   PHOS 3.6 3.7 3.9   ANIONGAP 14 11 11     Hepatic:   Recent Labs     07/11/22 0530 07/12/22 0658 07/13/22  0545   LABALBU 3.7 3.6 3.7     Troponin: No results for input(s): TROPONINI in the last 72 hours. BNP: No results for input(s): BNP in the last 72 hours. Lipids: No results for input(s): CHOL, HDL in the last 72 hours. Invalid input(s): LDLCALCU, TRIGLYCERIDE  ABGs:  No results for input(s): PHART, XUN1WGO, PO2ART, OEY0UMO, BEART, THGBART, F7PWCZTZ, SVN9EYS in the last 72 hours. INR:   No results for input(s): INR in the last 72 hours. Lactate: No results for input(s): LACTATE in the last 72 hours. Cultures:  -----------------------------------------------------------------  RAD:   IR REMOVE TUNNELED CVAD WO SQ PORT/PUMP   Final Result   IMPRESSION : Successful removal of a right chest tunneled central venous catheter.       Blood loss : minimal (less than 5 cc)   Specimens removed : central venous catheter               US SCROTUM AND TESTICLES   Final Result   IMPRESSION :      No cause for acute pain identified. US DUP ABD PEL RETRO SCROT COMPLETE   Final Result   IMPRESSION :      No cause for acute pain identified. CT ABDOMEN PELVIS WO CONTRAST Additional Contrast? None   Final Result      1. Noncontrast CT demonstrating interval bilateral inguinal hernia repair with persistent fat with stable stranding in the right and left inguinal canal. Right greater than left fluid collections in the pelvis abutting the right and left hernia repair    mesh may represent postoperative seromas versus abscesses. 2.  Diverticulosis without evidence of diverticulitis. XR CHEST PORTABLE   Final Result      No acute pulmonary disease. IR ABSCESS DRAINAGE PERC    (Results Pending)       Assessment/Plan:   Sepsis  - Patient meets criteria for SIRS and suspected sources is his abdomen as he recently had invasive procedures and imaging shows what appears to be a fluid collection which may be consistent with an abscess  - GEN surge consulted however they do not recommend any acute intervention at this time  -We will Panculture the patient to look for other sources of infection and start the patient on broad-spectrum antibiotics (vanc/cefepime/flagyl) however if the patient does not get better after 24 hours than he may need drainage of that fluid  -IR placed drain  -ID switched antibiotics to cefazolin     ESRD on hemodialysis  - TTS  -- follows with Dr. Asher Dave  - Nephrology consult: Joseph Sierra with HD holiday and likely replacement on 7/13      Hypertension  - Blood pressure initially hypertensive in the ED however got much better as patient's pain was able to be controlled with dilaudid. - Continue home nifedipine, spironolactone, metoprolol  - Hold home torsemide for now and will restart when appropriate  hydralazine when needed     Code Status: Full Code   FEN: ADULT DIET; Regular; 3 carb choices (45 gm/meal); Low Fat/Low Chol/High Fiber/2 gm Na;  Low Potassium (Less than 3000 mg/day);  Low Phosphorus (Less than 1000 mg); 2000 ml  ADULT ORAL NUTRITION SUPPLEMENT; Dinner, Breakfast; Renal Oral Supplement  PPX: Heparin  DISPO: IP  Barriers to d/c: Fever    Josey Aguilera MD, PGY-1  Internal Medicine Resident  Contact via [facility]  07/13/22  1:55 PM    This patient has been staffed and discussed with Charles Alfredo MD.

## 2022-07-14 ENCOUNTER — APPOINTMENT (OUTPATIENT)
Dept: INTERVENTIONAL RADIOLOGY/VASCULAR | Age: 62
DRG: 862 | End: 2022-07-14
Payer: MEDICARE

## 2022-07-14 VITALS
HEART RATE: 95 BPM | RESPIRATION RATE: 17 BRPM | OXYGEN SATURATION: 96 % | TEMPERATURE: 96 F | SYSTOLIC BLOOD PRESSURE: 157 MMHG | BODY MASS INDEX: 29.88 KG/M2 | WEIGHT: 253.09 LBS | DIASTOLIC BLOOD PRESSURE: 103 MMHG | HEIGHT: 77 IN

## 2022-07-14 LAB
ALBUMIN SERPL-MCNC: 3.8 G/DL (ref 3.4–5)
ANION GAP SERPL CALCULATED.3IONS-SCNC: 15 MMOL/L (ref 3–16)
BASOPHILS ABSOLUTE: 0 K/UL (ref 0–0.2)
BASOPHILS RELATIVE PERCENT: 0.4 %
BUN BLDV-MCNC: 42 MG/DL (ref 7–20)
CALCIUM SERPL-MCNC: 8.5 MG/DL (ref 8.3–10.6)
CHLORIDE BLD-SCNC: 103 MMOL/L (ref 99–110)
CO2: 23 MMOL/L (ref 21–32)
CREAT SERPL-MCNC: 6.1 MG/DL (ref 0.8–1.3)
EOSINOPHILS ABSOLUTE: 0.3 K/UL (ref 0–0.6)
EOSINOPHILS RELATIVE PERCENT: 9.4 %
GFR AFRICAN AMERICAN: 11
GFR NON-AFRICAN AMERICAN: 9
GLUCOSE BLD-MCNC: 100 MG/DL (ref 70–99)
HCT VFR BLD CALC: 32.5 % (ref 40.5–52.5)
HEMOGLOBIN: 10.5 G/DL (ref 13.5–17.5)
LYMPHOCYTES ABSOLUTE: 1.1 K/UL (ref 1–5.1)
LYMPHOCYTES RELATIVE PERCENT: 31.3 %
MAGNESIUM: 2.3 MG/DL (ref 1.8–2.4)
MCH RBC QN AUTO: 26.9 PG (ref 26–34)
MCHC RBC AUTO-ENTMCNC: 32.2 G/DL (ref 31–36)
MCV RBC AUTO: 83.7 FL (ref 80–100)
MONOCYTES ABSOLUTE: 0.4 K/UL (ref 0–1.3)
MONOCYTES RELATIVE PERCENT: 11 %
NEUTROPHILS ABSOLUTE: 1.7 K/UL (ref 1.7–7.7)
NEUTROPHILS RELATIVE PERCENT: 47.9 %
PDW BLD-RTO: 17.8 % (ref 12.4–15.4)
PHOSPHORUS: 3.6 MG/DL (ref 2.5–4.9)
PLATELET # BLD: 171 K/UL (ref 135–450)
PMV BLD AUTO: 7.5 FL (ref 5–10.5)
POTASSIUM SERPL-SCNC: 4 MMOL/L (ref 3.5–5.1)
RBC # BLD: 3.88 M/UL (ref 4.2–5.9)
SODIUM BLD-SCNC: 141 MMOL/L (ref 136–145)
WBC # BLD: 3.6 K/UL (ref 4–11)

## 2022-07-14 PROCEDURE — 90935 HEMODIALYSIS ONE EVALUATION: CPT

## 2022-07-14 PROCEDURE — 80069 RENAL FUNCTION PANEL: CPT

## 2022-07-14 PROCEDURE — 02H633Z INSERTION OF INFUSION DEVICE INTO RIGHT ATRIUM, PERCUTANEOUS APPROACH: ICD-10-PCS | Performed by: UROLOGY

## 2022-07-14 PROCEDURE — 85025 COMPLETE CBC W/AUTO DIFF WBC: CPT

## 2022-07-14 PROCEDURE — 77001 FLUOROGUIDE FOR VEIN DEVICE: CPT

## 2022-07-14 PROCEDURE — 99152 MOD SED SAME PHYS/QHP 5/>YRS: CPT

## 2022-07-14 PROCEDURE — 6360000002 HC RX W HCPCS

## 2022-07-14 PROCEDURE — 6370000000 HC RX 637 (ALT 250 FOR IP): Performed by: STUDENT IN AN ORGANIZED HEALTH CARE EDUCATION/TRAINING PROGRAM

## 2022-07-14 PROCEDURE — 76937 US GUIDE VASCULAR ACCESS: CPT

## 2022-07-14 PROCEDURE — 2580000003 HC RX 258: Performed by: STUDENT IN AN ORGANIZED HEALTH CARE EDUCATION/TRAINING PROGRAM

## 2022-07-14 PROCEDURE — 99233 SBSQ HOSP IP/OBS HIGH 50: CPT | Performed by: INTERNAL MEDICINE

## 2022-07-14 PROCEDURE — 2500000003 HC RX 250 WO HCPCS

## 2022-07-14 PROCEDURE — 36556 INSERT NON-TUNNEL CV CATH: CPT

## 2022-07-14 PROCEDURE — 83735 ASSAY OF MAGNESIUM: CPT

## 2022-07-14 PROCEDURE — 0JH63XZ INSERTION OF TUNNELED VASCULAR ACCESS DEVICE INTO CHEST SUBCUTANEOUS TISSUE AND FASCIA, PERCUTANEOUS APPROACH: ICD-10-PCS | Performed by: UROLOGY

## 2022-07-14 PROCEDURE — 36415 COLL VENOUS BLD VENIPUNCTURE: CPT

## 2022-07-14 PROCEDURE — 6360000002 HC RX W HCPCS: Performed by: STUDENT IN AN ORGANIZED HEALTH CARE EDUCATION/TRAINING PROGRAM

## 2022-07-14 PROCEDURE — 5A1D70Z PERFORMANCE OF URINARY FILTRATION, INTERMITTENT, LESS THAN 6 HOURS PER DAY: ICD-10-PCS | Performed by: INTERNAL MEDICINE

## 2022-07-14 PROCEDURE — 36558 INSERT TUNNELED CV CATH: CPT

## 2022-07-14 RX ORDER — CELECOXIB 100 MG/1
100 CAPSULE ORAL DAILY
Qty: 30 CAPSULE | Refills: 0 | Status: SHIPPED | OUTPATIENT
Start: 2022-07-14 | End: 2022-08-03

## 2022-07-14 RX ADMIN — NIFEDIPINE 90 MG: 90 TABLET, FILM COATED, EXTENDED RELEASE ORAL at 10:57

## 2022-07-14 RX ADMIN — OXYCODONE 10 MG: 5 TABLET ORAL at 10:57

## 2022-07-14 RX ADMIN — SODIUM CHLORIDE, PRESERVATIVE FREE 10 ML: 5 INJECTION INTRAVENOUS at 10:59

## 2022-07-14 RX ADMIN — ATORVASTATIN CALCIUM 20 MG: 20 TABLET, FILM COATED ORAL at 11:01

## 2022-07-14 RX ADMIN — HYDROMORPHONE HYDROCHLORIDE 0.25 MG: 1 INJECTION, SOLUTION INTRAMUSCULAR; INTRAVENOUS; SUBCUTANEOUS at 02:36

## 2022-07-14 ASSESSMENT — PAIN SCALES - GENERAL
PAINLEVEL_OUTOF10: 7
PAINLEVEL_OUTOF10: 8

## 2022-07-14 ASSESSMENT — PAIN DESCRIPTION - ORIENTATION: ORIENTATION: RIGHT

## 2022-07-14 ASSESSMENT — PAIN DESCRIPTION - LOCATION: LOCATION: CHEST;GROIN

## 2022-07-14 NOTE — H&P
Patient:  Cordell Eugene   :   1960      Relevant clinical history, particularly as it involves the pending procedure, was reviewed and discussed. The procedure including risks and benefits was discussed at length with the patient (or designated family member) and all questions were answered. Informed consent to proceed with the procedure was given. Vital signs were monitored and documented by the Radiology nurse. Targeted physical examination  Heart : regular rate and rhythm  Lungs : clear, breathing easily  Condition : stable    [facility] nurses notes reviewed and agreed.     Past Medical History:        Diagnosis Date    Arthralgia of multiple joints 10/27/2015    Arthritis     Atrial fibrillation (HCC)     Chronic kidney disease     stage 4    Chronic systolic congestive heart failure ([facility] 2021    CRI (chronic renal insufficiency)     Diabetic nephropathy associated with type 2 diabetes mellitus ([facility] 10/11/2018    Diverticulosis     Elevated PSA     Erectile dysfunction     ESRD (end stage renal disease) on dialysis ([facility] 2022    Gout     Gout     Hemrrhoid NOS w/ complication NEC     History of MRSA infection     Hypertension     CELSA (obstructive sleep apnea) 2017    uses C-pap machine    Type 2 diabetes mellitus without complication, without long-term current use of insulin ([facility] 2898    Umbilical hernia without obstruction and without gangrene 2016       Past Surgical History:           Procedure Laterality Date    COLONOSCOPY      COLONOSCOPY N/A 3/18/2022    COLONOSCOPY POLYPECTOMY SNARE/COLD BIOPSY performed by Fabricio Cervantes MD at [address] N/A 2022    LAPAROSCOPIC PERITONEAL DIALYSIS CATHETER PLACEMENT and omentopexy performed by Martinez Borges DO at [address] [place] [address] N/A 4/15/2022    ROBOTIC, RECURRENT INCISIONAL HERNIA REPAIR WITH BIOSYNTHETIC MESH; LAPAROSCOPIC PERITONEAL DIALYSIS CATHETER REVISION WITH LAPAROSCOPIC OMENTOPEXY performed by Jeffry Baez DO at [address]  Bilateral 4/15/2022    BILATERAL OPEN INGUINAL HERNI REPAIR performed by Jeffry Baez DO at [facility] Bilateral 6/13/2022    ROBOTIC RECURRENT BILATERAL INGUINAL HERNIA REPAIR, LYSIS OF ADHESIONS, PERITONEAL DIALYSIS CATHETER REMOVAL performed by Jeffry Baez DO at [facility] IR TUNNELED [address] 5 YEARS  01/14/2022    IR TUNNELED CATHETER PLACEMENT GREATER THAN 5 YEARS 1/14/2022 [facility] SPECIAL PROCEDURES    UPPER GASTROINTESTINAL ENDOSCOPY  05/28/2016    biopsies, multiple small gastric ulcers    UPPER GASTROINTESTINAL ENDOSCOPY  09/12/2016    UPPER GASTROINTESTINAL ENDOSCOPY N/A 3/18/2022    EGD DIAGNOSTIC ONLY performed by Jim Guillen MD at [place] [id number] N/A 1/17/2022    LAPAROSCOPIC incarcerated VENTRAL HERNIA REPAIR performed by Jeffry Baez DO at [address]:  Patient has no known allergies. Medications:   Home Meds  No current facility-administered medications on file prior to encounter. Current Outpatient Medications on File Prior to Encounter   Medication Sig Dispense Refill    lidocaine (LIDODERM) 5 % Place 1 patch onto the skin daily for 15 days 12 hours on, 12 hours off. 15 patch 0    oxyCODONE (ROXICODONE) 5 MG immediate release tablet Take 1 tablet by mouth every 6 hours as needed for Pain for up to 7 days.  28 tablet 0    Folic Acid-Vit N1-MKQ K11 0.5-5-0.2 MG TABS Take by mouth      clotrimazole (LOTRIMIN) 1 % external solution 1 APPLICATION TO NAILS DAILY      polyethylene glycol (GLYCOLAX) 17 GM/SCOOP powder TAKE 17 G BY MOUTH 2 TIMES DAILY      NIFEdipine (ADALAT CC) 90 MG extended release tablet Take 90 mg by mouth daily      vitamin D (ERGOCALCIFEROL) 1.25 MG (79227 UT) CAPS capsule Take 1 capsule by mouth once a week 4 capsule 0    torsemide (DEMADEX) 100 MG tablet Take 1 tablet by mouth daily 90 tablet 0    calcitRIOL (ROCALTROL) 0.5 MCG capsule Take 1 capsule by mouth three times a week During HD 30 capsule 3    docusate sodium (COLACE) 100 MG capsule Take 1 capsule by mouth 2 times daily 20 capsule 0    B Complex-C-Folic Acid (DIALYVITE 207 PO) Take 1 tablet by mouth once a week       Methoxy PEG-Epoetin Beta (MIRCERA) 50 MCG/0.3ML SOSY Inject as directed Twice a  month       Iron Sucrose (VENOFER IV) Infuse intravenously every 30 days       bisacodyl (BISACODYL) 5 MG EC tablet Take 1 tablet by mouth daily as needed for Constipation 30 tablet 5    spironolactone (ALDACTONE) 50 MG tablet Take 1 tablet by mouth at bedtime 30 tablet 3    metoprolol succinate (TOPROL XL) 50 MG extended release tablet Take 1 tablet by mouth at bedtime TAKE 1 TABLET BY MOUTH EVERY DAY 30 tablet 3    atorvastatin (LIPITOR) 20 MG tablet TAKE 1 TABLET BY MOUTH EVERY DAY 30 tablet 5    nortriptyline (PAMELOR) 10 MG capsule Take 10 mg by mouth nightly       omeprazole (PRILOSEC) 40 MG delayed release capsule TAKE 1 CAPSULE BY MOUTH EVERY DAY 30 capsule 1    sildenafil (VIAGRA) 100 MG tablet Take 1 tablet by mouth as needed for Erectile Dysfunction 30 tablet 3    aspirin 81 MG tablet Take 81 mg by mouth daily         Current Meds  labetalol (NORMODYNE;TRANDATE) injection 5 mg, Q4H PRN  ceFAZolin (ANCEF) 1,000 mg in dextrose 5 % 50 mL IVPB (mini-bag), Q24H  perflutren lipid microspheres (DEFINITY) injection 1.65 mg, ONCE PRN  melatonin disintegrating tablet 5 mg, Nightly  [Held by provider] heparin (porcine) injection 5,000 Units, 3 times per day  [Held by provider] aspirin EC tablet 81 mg, Daily  atorvastatin (LIPITOR) tablet 20 mg, Daily  metoprolol succinate (TOPROL XL) extended release tablet 50 mg, Nightly  NIFEdipine (PROCARDIA XL) extended release tablet 90 mg, Daily  spironolactone (ALDACTONE) tablet 50 mg, Nightly  sodium chloride flush 0.9 % injection 5-40 mL, 2 times per day  sodium chloride flush 0.9 % injection 5-40 mL, PRN  0.9 % sodium chloride infusion, PRN  ondansetron (ZOFRAN-ODT) disintegrating tablet 4 mg, Q8H PRN   Or  ondansetron (ZOFRAN) injection 4 mg, Q6H PRN  polyethylene glycol (GLYCOLAX) packet 17 g, Daily PRN  acetaminophen (TYLENOL) tablet 650 mg, Q6H PRN   Or  acetaminophen (TYLENOL) suppository 650 mg, Q6H PRN  glucose chewable tablet 16 g, PRN  glucagon (rDNA) injection 1 mg, PRN  dextrose 5 % solution, PRN  hydrALAZINE (APRESOLINE) injection 5 mg, Q4H PRN  dextrose bolus 10% 125 mL, PRN   Or  dextrose bolus 10% 250 mL, PRN  lidocaine 4 % external patch 1 patch, Daily  HYDROmorphone (DILAUDID) injection 0.25 mg, Q3H PRN   Or  HYDROmorphone (DILAUDID) injection 0.5 mg, Q3H PRN  oxyCODONE (ROXICODONE) immediate release tablet 5 mg, Q4H PRN   Or  oxyCODONE (ROXICODONE) immediate release tablet 10 mg, Q4H PRN          ASA 3 - Patient with moderate systemic disease with functional limitations    II (soft palate, uvula, fauces visible)    Activity:  2 - Able to move 4 extremities voluntarily on command  Respiration:  2 - Able to breathe deeply and cough freely  Circulation:  2 - BP+/- 20mmHg of normal  Consciousness:  2 - Fully awake  Oxygen Saturation (color):  2 - Able to maintain oxygen saturation >92% on room air    Sedation : Moderate sedation planned

## 2022-07-14 NOTE — BRIEF OP NOTE
Patient:  Poppy See   :   1960    The procedure including risks and benefits was discussed at length with the patient (or designated family member) and all questions were answered. Informed consent to proceed with the procedure was given.       PROCEDURE : tunneled dialysis catheter placement without complication    BLOOD LOSS : Minimal  SPECIMENS : None  COMPLICATIONS : None  CONDITION : Stable      Raphael Daniels MD

## 2022-07-14 NOTE — DISCHARGE SUMMARY
Discharge order received. Patient being DC home with family. All paperwork explained to patient. He verbalizes understanding and denies any questions. All belongings sent with patient. IV and tele removed. Pt taken to main entrance in  [facility] where wife picked him up.

## 2022-07-14 NOTE — PROGRESS NOTES
Bariatric Surgery   Daily Progress Note  Patient: Riri Gambino      CC: postoperative hematoma and bacteremia    SUBJECTIVE:   Patient rested well overnight. Tolerating reg diet without N/V. Patient is passing gas. Had a short episode of pain yesterday now resolved. Denies fevers. ROS:   A 14 point review of systems was conducted, significant findings as noted above. All other systems negative. OBJECTIVE:    PHYSICAL EXAM:    Vitals:    07/13/22 1621 07/13/22 2107 07/14/22 0015 07/14/22 0347   BP: 134/89 (!) 139/42 (!) 144/82 (!) 148/91   Pulse: 89 81 88 87   Resp: 16 16 16 16   Temp: 97.1 °F (36.2 °C) 97.6 °F (36.4 °C) 97.8 °F (36.6 °C) 98 °F (36.7 °C)   TempSrc: Oral Oral Oral Oral   SpO2: 98%  96% 97%   Weight:       Height:           General appearance: alert, no acute distress, grooming appropriate  Eyes: No scleral icterus, EOM grossly intact  Neck: trachea midline  Chest/Lungs: Normal effort with no accessory muscle use on RA  Cardiovascular: regular rate, rhythm  Abdomen: Soft, non-tender RLQ, no rebound, guarding, or rigidity minimally tender to palpation of the L testicle improved. Drain dressing in place  Skin: warm and dry, no rashes  Extremities: no edema, no cyanosis  Neuro: A&Ox3, no focal deficits, sensation intact    LABS:   Recent Labs     07/12/22  0657 07/13/22  0545   WBC 4.5 3.4*   HGB 10.4* 10.3*   HCT 32.0* 32.1*   MCV 83.2 83.3    148        Recent Labs     07/12/22  0658 07/13/22  0545    139   K 3.6 3.8    103   CO2 25 25   PHOS 3.7 3.9   BUN 38* 38*   CREATININE 6.5* 6.2*        No results for input(s): AST, ALT, ALB, BILIDIR, BILITOT, ALKPHOS in the last 72 hours. No results for input(s): LIPASE, AMYLASE in the last 72 hours. No results for input(s): PROT, INR, APTT in the last 72 hours. No results for input(s): CKTOTAL, CKMB, CKMBINDEX, TROPONINI in the last 72 hours.       ASSESSMENT & PLAN:   This is a 59 y.o. male with Hx of Jorden TOYIN Rojas is a 59 y.o. male with Hx of afib, HFrEF (45-50%), T2DM, gout, diverticulosis, ESRD on dialysis s/p b/l inguinal hernia repair and ventral hernia repair (with biosynthetic mesh), peritoneal catheter removal (4/15/22). Patient had later recurrence of inguinal hernia with b/l inguinal hernia repair with Progrip mesh (6/13/22). Patient re-presents today with increased abdominal pain in the RLQ of the abdomen. The patient had a CT scan that re-demonstrates a fluid collection along bilateral abdominal walls. - Plan for tunneled line replacement this AM per neprhology  - ID following. Plan for outpatient Ancef with dialysis TID  - Okay for general diet  - Restart SQH once tunneled line is placed  - Okay to discharge from surgery standpoint. Follow up with Dr. Martha Schaefer in 2 weeks  - Medically management per primary    Arabella Fam DO, [address] [facility]  PGY1, General Surgery  07/14/22  6:21 AM    I am post-call today and will not be on campus. Please contact Dr. Aliza Rizzo at [phone] or Dr. Ebb Cabot at [phone] for questions or concerns regarding this patient.

## 2022-07-14 NOTE — DISCHARGE INSTR - DIET
Good nutrition is important when healing from an illness, injury, or surgery. Follow any nutrition recommendations given to you during your hospital stay. If you were given an oral nutrition supplement while in the hospital, continue to take this supplement at home. You can take it with meals, in-between meals, and/or before bedtime. These supplements can be purchased at most local grocery stores, pharmacies, and chain [employer]-stores. If you have any questions about your diet or nutrition, call the hospital and ask for the dietitian.     Resume home renal diet

## 2022-07-14 NOTE — PROGRESS NOTES
50 mg Oral Nightly    NIFEdipine  90 mg Oral Daily    spironolactone  50 mg Oral Nightly    sodium chloride flush  5-40 mL IntraVENous 2 times per day    lidocaine  1 patch TransDERmal Daily     Continuous Infusions:   sodium chloride 25 mL (07/10/22 1816)    dextrose       PRN Meds:labetalol, perflutren lipid microspheres, sodium chloride flush, sodium chloride, ondansetron **OR** ondansetron, polyethylene glycol, acetaminophen **OR** acetaminophen, glucose, glucagon (rDNA), dextrose, hydrALAZINE, dextrose bolus **OR** dextrose bolus, HYDROmorphone **OR** HYDROmorphone, oxyCODONE **OR** oxyCODONE    Objective:   Vitals:   T-max:  Patient Vitals for the past 8 hrs:   BP Temp Temp src Pulse Resp SpO2   07/14/22 0910 (!) 175/103 98.1 °F (36.7 °C) Oral 90 16 94 %   07/14/22 0347 (!) 148/91 98 °F (36.7 °C) Oral 87 16 97 %       Intake/Output Summary (Last 24 hours) at 7/14/2022 1014  Last data filed at 7/13/2022 1831  Gross per 24 hour   Intake 1080 ml   Output 0 ml   Net 1080 ml       Review of Systems As noted in the HPI    Constitutional:       General: He is in acute distress. Appearance: He is obese. He is ill-appearing. HENT:      Head: Normocephalic. Mouth/Throat:      Mouth: Mucous membranes are moist.   Eyes:      Extraocular Movements: Extraocular movements intact. Pupils: Pupils are equal, round, and reactive to light. Cardiovascular:      Rate and Rhythm: Normal rate. Pulses: Normal pulses. Heart sounds: No murmur heard. No gallop. Pulmonary:      Effort: Pulmonary effort is normal. No respiratory distress. Breath sounds: No wheezing or rales. Abdominal:      Palpations: Abdomen is soft. No guarding or rigidity. Tenderness: There is abdominal tenderness. Comments: Diffuse abd tenderness   Genitourinary:     Comments: Left testicle tender, hurts when elevated  Musculoskeletal:         General: No swelling or deformity. Right lower leg: No edema. Left lower leg: No edema. Skin:     General: Skin is warm. Capillary Refill: Capillary refill takes less than 2 seconds. Neurological:      Mental Status: He is alert and oriented to person, place, and time. Cranial Nerves: No cranial nerve deficit. Sensory: No sensory deficit. Motor: No weakness. Psychiatric:         Mood and Affect: Mood normal.         Behavior: Behavior normal.     LABS:    CBC:   Recent Labs     07/12/22  0657 07/13/22  0545 07/14/22  0750   WBC 4.5 3.4* 3.6*   HGB 10.4* 10.3* 10.5*   HCT 32.0* 32.1* 32.5*    148 171   MCV 83.2 83.3 83.7     Renal:    Recent Labs     07/12/22  0658 07/13/22  0545 07/14/22  0750    139 141   K 3.6 3.8 4.0    103 103   CO2 25 25 23   BUN 38* 38* 42*   CREATININE 6.5* 6.2* 6.1*   GLUCOSE 147* 89 100*   CALCIUM 8.1* 8.1* 8.5   MG 2.50* 2.40 2.30   PHOS 3.7 3.9 3.6   ANIONGAP 11 11 15     Hepatic:   Recent Labs     07/12/22  0658 07/13/22  0545 07/14/22  0750   LABALBU 3.6 3.7 3.8     Troponin: No results for input(s): TROPONINI in the last 72 hours. BNP: No results for input(s): BNP in the last 72 hours. Lipids: No results for input(s): CHOL, HDL in the last 72 hours. Invalid input(s): LDLCALCU, TRIGLYCERIDE  ABGs:  No results for input(s): PHART, IBF5QHG, PO2ART, CPV9CSG, BEART, THGBART, Y3XSOHCQ, NGT4DNI in the last 72 hours. INR:   No results for input(s): INR in the last 72 hours. Lactate: No results for input(s): LACTATE in the last 72 hours. Cultures:  -----------------------------------------------------------------  RAD:   IR REMOVE TUNNELED CVAD WO SQ PORT/PUMP   Final Result   IMPRESSION : Successful removal of a right chest tunneled central venous catheter. Blood loss : minimal (less than 5 cc)   Specimens removed : central venous catheter               US SCROTUM AND TESTICLES   Final Result   IMPRESSION :      No cause for acute pain identified.       US DUP ABD PEL RETRO SCROT COMPLETE   Final Result   IMPRESSION :      No cause for acute pain identified. IR ABSCESS DRAINAGE PERC   Final Result   Impression:   1. Successful image guided 10 Spanish abscess drain placement. CT ABDOMEN PELVIS WO CONTRAST Additional Contrast? None   Final Result      1. Noncontrast CT demonstrating interval bilateral inguinal hernia repair with persistent fat with stable stranding in the right and left inguinal canal. Right greater than left fluid collections in the pelvis abutting the right and left hernia repair    mesh may represent postoperative seromas versus abscesses. 2.  Diverticulosis without evidence of diverticulitis. XR CHEST PORTABLE   Final Result      No acute pulmonary disease. Assessment/Plan:   Sepsis  - Patient meets criteria for SIRS and suspected sources is his abdomen as he recently had invasive procedures and imaging shows what appears to be a fluid collection which may be consistent with an abscess  - GEN surge consulted however they do not recommend any acute intervention at this time  -We will Panculture the patient to look for other sources of infection and start the patient on broad-spectrum antibiotics (vanc/cefepime/flagyl) however if the patient does not get better after 24 hours than he may need drainage of that fluid  -IR placed drain  -ID switched antibiotics to cefazolin     ESRD on hemodialysis  - TTS  -- follows with Dr. Arpit Mayr  - Nephrology consult: Kemi Valladares with HD holiday and likely replacement on 7/13      Hypertension  - Blood pressure initially hypertensive in the ED however got much better as patient's pain was able to be controlled with dilaudid. - Continue home nifedipine, spironolactone, metoprolol  - Hold home torsemide for now and will restart when appropriate  hydralazine when needed     Code Status: Full Code   FEN: ADULT DIET; Regular; 3 carb choices (45 gm/meal); Low Fat/Low Chol/High Fiber/2 gm Na; Low Potassium (Less than 3000 mg/day);  Low Phosphorus (Less than 1000 mg); 2000 ml  ADULT ORAL NUTRITION SUPPLEMENT; Dinner, Breakfast; Renal Oral Supplement  PPX: Heparin  DISPO: IP  Barriers to d/c: Fever    Franchesca Alonso MD, PGY-1  Internal Medicine Resident  Contact via [facility]  07/14/22  10:14 AM    This patient has been staffed and discussed with Valerie Ovalle MD.

## 2022-07-14 NOTE — DISCHARGE SUMMARY
INTERNAL MEDICINE DEPARTMENT  DISCHARGE SUMMARY    Patient ID: Galindo Vincent                                             Discharge Date: 7/14/2022   Patient's PCP: Ashlyn Lopez MD                                          Discharge Physician: Josey Aguilera MD MD  Admit Date: 7/9/2022   Admitting Physician: Morales Taveras MD    PROBLEMS DURING HOSPITALIZATION:  Present on Admission:   Sepsis ([address]   Septicemia ([address]   Anemia in ESRD (end-stage renal disease) ([address]   Staphylococcus aureus bacteremia   Catheter-related bloodstream infection   Pelvic fluid collection   Chronic kidney disease-mineral and bone disorder      DISCHARGE DIAGNOSES:  1. Sepsis  2. ESRD on HD  3. HTN    Hospital Course:      HPI: HPI: 68-year-old male with a past medical history of ESRD on hemodialysis, hypertension, diabetes, recent bilateral hernia repair on 6/13 presents today with abdominal pain.  The patient reports that sometime around this evening (day of admittance) just before he went to dialysis he had severe abdominal pain and he managed to get through with his dialysis session but immediately afterwards started feeling feverish after which he decided come to the ED. In the ED his temperature was 103, respiratory rate was 28, pulse was 124, blood pressure was 170/98.  He was in severe abdominal pain and needed Dilaudid to get relief.  Labs were mostly unremarkable including a lactic acid.  CT w/o con showed a right and a left fluid collection in the pelvis abutting the right and left hernia repair. He denies any chest pain, shortness of breath, sensory loss, motor weakness, heart palpitations.  He does report that he is able to make urine and denies any dysuria, urgency, frequency. He also reports continued testicular  pain which he has been having for the past week.  It is the same in intensity and nature however it is still very uncomfortable to him    During his stay at the hospital, the patient complained of abdominal pain in both the lower left and right quadrants. Given the history of recent bilateral inguinal hernia repair, a CT was ordered to assess for post-op complications. CT 7/10/2022: Rashid Kearns has been interval bilateral inguinal hernia repair with mesh. There is residual fat in the right and left inguinal canal with stable stranding. There is a new 12 x 7 cm walled off appearing fluid collection in the right anterior pelvis abutting the    hernia repair mesh and a similar 5.5 x 2 cm walled off appearing fluid collection in the left anterior pelvis abutting the hernia mesh. \"    Blood cultures were positive for Staph Aureus x 2 BC. Patient was started on Cefepime, Metronidazole, Vancomycin for 3 days. On the third day, antibiotics were switched to Cefazolin. IR removed Tunneled CVAD 7/11/2022. Culture showed Staph Aureus on the HD catheter that was removed. US Doplex Scrotum 7/11/2022: \"Testicles : Normal echotexture and doppler flow without solid mass. 9 x 5 x 2 mm cluster of small adjacent cysts in the periphery of the right testicle. Epididymides : Bilateral epididymal head cysts or small. Hydrocele : Small bilateral   Varicocele : None \"    After having a dialysis holiday, Mr. Caterina Samuels condition rapidly improved. It was determined he was no longer in sepesis on the HD line could be re-placed. IR placed Tunneled CVC 7/14/2022. He is to receive Ancef 2 gm at HD 3 days a week, through 8/8    On the basis of discharge, patient reported feeling stable. The patient was found to not be in any acute distress, with vital signs within normal limits, and no abnormalities on physical examination. Further, the patient expressed appropriate understanding of, and agreement with, the discharge recommendations, medications and plan.     Physical Exam:  Vitals: BP (!) 161/97   Pulse 83   Temp 97.9 °F (36.6 °C) (Oral)   Resp 18   Ht 6' 5\" (1.956 m)   Wt 253 lb 1.4 oz (114.8 kg)   SpO2 94%   BMI 30.01 kg/m²      Constitutional:       General: He is NAD     Appearance: He is obese. HENT:      Head: Normocephalic.      Mouth/Throat:      Mouth: Mucous membranes are moist.   Eyes:      Extraocular Movements: Extraocular movements intact.      Pupils: Pupils are equal, round, and reactive to light. Cardiovascular:      Rate and Rhythm: Normal rate.      Pulses: Normal pulses.      Heart sounds: No murmur heard. No gallop.    Pulmonary:      Effort: Pulmonary effort is normal. No respiratory distress.      Breath sounds: No wheezing or rales. Abdominal:      Palpations: Abdomen is soft. No guarding or rigidity.     Tenderness: There is slight abdominal tenderness. Musculoskeletal:         General: No swelling or deformity.      Right lower leg: No edema.      Left lower leg: No edema. Skin:     General: Skin is warm.      Capillary Refill: Capillary refill takes less than 2 seconds. Neurological:      Mental Status: He is alert and oriented to person, place, and time.      Cranial Nerves: No cranial nerve deficit.      Sensory: No sensory deficit.      Motor: No weakness. Psychiatric:         Mood and Affect: Mood normal.         Behavior: Behavior normal.       Intake/Output Summary (Last 24 hours) at 7/14/2022 1437  Last data filed at 7/14/2022 1424  Gross per 24 hour   Intake 880 ml   Output 1400 ml   Net -520 ml       Labs:  For convenience and continuity at follow-up the following most recent labs are provided:      CBC:    Lab Results   Component Value Date/Time    WBC 3.6 07/14/2022 07:50 AM    HGB 10.5 07/14/2022 07:50 AM    HCT 32.5 07/14/2022 07:50 AM     07/14/2022 07:50 AM       Renal:    Lab Results   Component Value Date/Time     07/14/2022 07:50 AM    K 4.0 07/14/2022 07:50 AM    K 3.6 07/10/2022 12:20 AM     07/14/2022 07:50 AM    CO2 23 07/14/2022 07:50 AM    BUN 42 07/14/2022 07:50 AM    CREATININE 6.1 07/14/2022 07:50 AM    CALCIUM 8.5 07/14/2022 07:50 AM PHOS 3.6 07/14/2022 07:50 AM       Consults: ID  Significant Diagnostic Studies:    CT Abdmn  U/S Scrotum    Treatments:  Antibiotics, HD line removal and insertion, fluids  Disposition: home  Discharged Condition: Stable  Follow Up: Primary Care Physician in one week  To receive Ancef 2 gm at HD 3 days a week, through 8/8    DISCHARGE MEDICATION:     Medication List      START taking these medications    celecoxib 100 MG capsule  Commonly known as: CeleBREX  Take 1 capsule by mouth daily        CONTINUE taking these medications    aspirin 81 MG tablet     atorvastatin 20 MG tablet  Commonly known as: LIPITOR  TAKE 1 TABLET BY MOUTH EVERY DAY     bisacodyl 5 MG EC tablet  Generic drug: bisacodyl  Take 1 tablet by mouth daily as needed for Constipation     calcitRIOL 0.5 MCG capsule  Commonly known as: ROCALTROL  Take 1 capsule by mouth three times a week During HD     clotrimazole 1 % external solution  Commonly known as: LOTRIMIN     DIALYVITE 800 PO     docusate sodium 100 MG capsule  Commonly known as: Colace  Take 1 capsule by mouth 2 times daily     Folic Acid-Vit L7-JXK R85 0.5-5-0.2 MG Tabs     lidocaine 5 %  Commonly known as: LIDODERM  Place 1 patch onto the skin daily for 15 days 12 hours on, 12 hours off.     metoprolol succinate 50 MG extended release tablet  Commonly known as: TOPROL XL  Take 1 tablet by mouth at bedtime TAKE 1 TABLET BY MOUTH EVERY DAY     Mircera 50 MCG/0.3ML Sosy  Generic drug: Methoxy PEG-Epoetin Beta     NIFEdipine 90 MG extended release tablet  Commonly known as: ADALAT CC     nortriptyline 10 MG capsule  Commonly known as: PAMELOR     omeprazole 40 MG delayed release capsule  Commonly known as: PRILOSEC  TAKE 1 CAPSULE BY MOUTH EVERY DAY     oxyCODONE 5 MG immediate release tablet  Commonly known as: Roxicodone  Take 1 tablet by mouth every 6 hours as needed for Pain for up to 7 days.      polyethylene glycol 17 GM/SCOOP powder  Commonly known as: GLYCOLAX     sildenafil 100 MG tablet  Commonly known as: Viagra  Take 1 tablet by mouth as needed for Erectile Dysfunction     spironolactone 50 MG tablet  Commonly known as: Aldactone  Take 1 tablet by mouth at bedtime     torsemide 100 MG tablet  Commonly known as: DEMADEX  Take 1 tablet by mouth daily     VENOFER IV     vitamin D 1.25 MG (77731 UT) Caps capsule  Commonly known as: ERGOCALCIFEROL  Take 1 capsule by mouth once a week           Where to Get Your Medications      These medications were sent to [place], [address] Howarddayanara Marielle [phone] - F [phone]  [address] [facility] [address]    Phone: [phone]   · celecoxib 100 MG capsule       Activity: activity as tolerated  Diet: renal diet  Wound Care: as directed    Time Spent on discharge is more than 30 minutes    Signed:  Evens Skaggs MD,  PGY-1  7/14/2022

## 2022-07-14 NOTE — CARE COORDINATION
CM  Confirmed  D/c home today :    Family to transport home :    Patient:  to follow up as  Instructed;    Per  MD Documentation:   Treatments:  Antibiotics, HD line removal and insertion, fluids    Disposition: home  Discharged Condition: Stable  Follow Up: Primary Care Physician in one week  To receive Ancef 2 gm at HD 3 days a week, through 8/8    AVS  Faxed to  Dr Mary Anne John office [phone]   To confirm  IV  atbx  As  Prescribed:        New Rx:  Meds to Beds Utilized:      these medications from [place] [address] [address] [place]. [facility] Blood [phone] - F [phone]  1 Celecoxib      Electronically signed by Munir Rojas RN on 7/14/2022 at 5:06 PM        Munir Rojas RN Case Manager  [facility]  [address] [place] [address].   [facility] [id number] [phone]  Fax [phone]

## 2022-07-14 NOTE — DISCHARGE INSTR - COC
Continuity of Care Form    Patient Name: Augusta La   :  1960  MRN:  [id number]    Admit date:  2022  Discharge date:  ***    Code Status Order: Full Code   Advance Directives:      Admitting Physician:  Gus Lane MD  PCP: Keven Valadez MD    Discharging Nurse: [facility] Unit/Room#: 9834/2172-52  Discharging Unit Phone Number: ***    Emergency Contact:   Extended Emergency Contact Information  Primary Emergency Contact: Gayle Christiansen  Address: [address] Phone: [phone]  Mobile Phone: [phone]  Relation: Spouse    Past Surgical History:  Past Surgical History:   Procedure Laterality Date    COLONOSCOPY      COLONOSCOPY N/A 3/18/2022    COLONOSCOPY POLYPECTOMY SNARE/COLD BIOPSY performed by Dorothea Le MD at [facility] N/A 2022    LAPAROSCOPIC PERITONEAL DIALYSIS CATHETER PLACEMENT and omentopexy performed by Holly Freed DO at [facility], [address] [place] [address] N/A 4/15/2022    ROBOTIC, RECURRENT INCISIONAL HERNIA REPAIR WITH BIOSYNTHETIC MESH; LAPAROSCOPIC PERITONEAL DIALYSIS CATHETER REVISION WITH LAPAROSCOPIC OMENTOPEXY performed by Holly Freed DO at [facility] Bilateral 4/15/2022    BILATERAL OPEN [address] performed by Holly Freed DO at [address] Bilateral 2022    ROBOTIC RECURRENT [address], LYSIS OF ADHESIONS, PERITONEAL DIALYSIS CATHETER REMOVAL performed by Holly Freed DO at [facility] 33 TUNNELED [address] 5 YEARS  2022    IR TUNNELED [address] 5 YEARS 2022 [address] PROCEDURES    UPPER GASTROINTESTINAL ENDOSCOPY  2016    biopsies, multiple small gastric ulcers    UPPER GASTROINTESTINAL ENDOSCOPY  2016    UPPER GASTROINTESTINAL ENDOSCOPY N/A 3/18/2022    EGD DIAGNOSTIC ONLY performed by Mariana Landry MD at [place] [address] N/A 1/17/2022    LAPAROSCOPIC incarcerated VENTRAL HERNIA REPAIR performed by Ariana Estrada DO at [facility] OR       Immunization History:   Immunization History   Administered Date(s) Administered    COVID-19, MODERNA BLUE border, Primary or Immunocompromised, (age 12y+), IM, 100 mcg/0.5mL 07/17/2021, 08/19/2021       Active Problems:  Patient Active Problem List   Diagnosis Code    Atrial fibrillation (HCC) I48.91    Chronic kidney disease-mineral and bone disorder N18.9, E83.9, M89.9    Gout M10.9    Erectile dysfunction N52.9    Elevated PSA R97.20    Essential hypertension, benign I10    Headache R51.9    Diverticulosis K57.90    Arthralgia of multiple joints Y01.17    Umbilical hernia without obstruction and without gangrene K42.9    Knee pain, bilateral M25.561, M25.562    Alcohol use disorder, mild, abuse F10.10    CELSA (obstructive sleep apnea) G47.33    Duodenal ulcer disease K26.9    Diabetic nephropathy associated with type 2 diabetes mellitus ([facility]) E11.21    Type 2 diabetes mellitus with diabetic nephropathy, without long-term current use of insulin ([facility]) E11.21    Lateral epicondylitis of right elbow M77.11    Chronic bilateral low back pain without sciatica M54.50, G89.29    Morbid obesity due to excess calories ([facility]) E66.01    Chronic systolic congestive heart failure ([facility]) I50.22    Acute kidney injury superimposed on CKD ([facility]) N17.9, N18.9    Stroke-like symptoms R29.90    DMII (diabetes mellitus, type 2) ([facility]) E11.9    Hyperlipidemia E78.5    Hypocalcemia E83.51    Hypomagnesemia E83.42    Hypokalemia E87.6    Muscle cramps R25.2    ANTONIO (acute kidney injury) ([address] N17.9    Electrolyte imbalance E87.8    Anemia due to stage 5 chronic kidney disease ([address] N18.5, D63.1    Congestive heart failure (HCC) I50.9    LV dysfunction I51.9    Anemia in ESRD (end-stage renal disease) ([address] N18.6, D63.1    Bilateral recurrent inguinal hernia without obstruction or gangrene K40.21    Recurrent incisional hernia with incarceration K43.0    Bilateral inguinal hernia without obstruction or gangrene K40.20    Status post bilateral hernia repair Z98.890, Z87.19    Inguinal hernia, left K40.90    Moderate malnutrition (HCC) E44.0    Arthritis M19.90    BPH (benign prostatic hyperplasia) N40.0    DDD (degenerative disc disease), cervical M50.30    Hematuria, microscopic R31.29    Prostatism N40.0    Tendinopathy M67.90    Intractable abdominal pain R10.9    Sepsis (HCC) A41.9    Septicemia ([address] A41.9    Staphylococcus aureus bacteremia R78.81, B95.61    Catheter-related bloodstream infection T80.211A    Pelvic fluid collection R18.8       Isolation/Infection:   Isolation            No Isolation          Patient Infection Status       Infection Onset Added Last Indicated Last Indicated By Review Planned Expiration Resolved Resolved By    None active    Resolved    COVID-19 (Rule Out) 07/10/22 07/10/22 07/10/22 COVID-19 & Influenza Combo (Ordered)   07/10/22 Rule-Out Test Resulted    C-diff Rule Out 12/22/21 12/22/21 12/23/21 Clostridium difficile toxin/antigen (Ordered)   12/23/21 Rule-Out Test Resulted            Nurse Assessment:  Last Vital Signs: BP (!) 176/97   Pulse 86   Temp 97.8 °F (36.6 °C) (Oral)   Resp 20   Ht 6' 5\" (1.956 m)   Wt 255 lb 4.7 oz (115.8 kg)   SpO2 94%   BMI 30.27 kg/m²     Last documented pain score (0-10 scale): Pain Level: 7  Last Weight:   Wt Readings from Last 1 Encounters:   07/14/22 255 lb 4.7 oz (115.8 kg)     Mental Status:  {IP PT MENTAL STATUS:59644}    IV Access:  {[facility] IV ACCESS:562108329}    Nursing Mobility/ADLs:  Walking   {P DME YPAY:767719217}  Transfer  {CHP DME RWTJ:608753408}  Bathing  {P DME OVUO:485918495}  Dressing  {P DME UXUO:262841499}  Toileting  {P DME ANUE:865615664}  Feeding  {CHP DME NCDQ:134145486}  Med Admin  {NAHUN KERN OHQV:892460651}  Med Delivery   [address] ELISA MED Delivery:102700775}    Wound Care Documentation and Therapy:        Elimination:  Continence: Bowel: {YES / KP:24086}  Bladder: {YES / LQ:61330}  Urinary Catheter: {Urinary Catheter:238413658}   Colostomy/Ileostomy/Ileal Conduit: {YES / XM:39422}       Date of Last BM: ***    Intake/Output Summary (Last 24 hours) at 2022 1245  Last data filed at 2022 1831  Gross per 24 hour   Intake 840 ml   Output --   Net 840 ml     I/O last 3 completed shifts:   In: 1200 [P.O.:1200]  Out: 1350 [Urine:1300; Drains:50]    Safety Concerns:     [address] Safety Concerns:977973140}    Impairments/Disabilities:      [address] Impairments/Disabilities:608576933}    Nutrition Therapy:  Current Nutrition Therapy:   [address] Diet List:461183245}    Routes of Feeding: {[facility] DME Other Feedings:169274558}  Liquids: {Slp liquid thickness:77277}  Daily Fluid Restriction: {CHP DME Yes amt example:966281959}  Last Modified Barium Swallow with Video (Video Swallowing Test): {Done Not Done QNFK:895611451}    Treatments at the Time of Hospital Discharge:   Respiratory Treatments: ***  Oxygen Therapy:  {Therapy; copd oxygen:35597}  Ventilator:    { CC Vent EOPV:769506574}    Rehab Therapies: {THERAPEUTIC INTERVENTION:1639761710}  Weight Bearing Status/Restrictions: [address]  Weight Bearin}  Other Medical Equipment (for information only, NOT a DME order):  {EQUIPMENT:222141035}  Other Treatments: ***    Patient's personal belongings (please select all that are sent with patient):  {[facility] DME Belongings:291962669}    RN SIGNATURE:  {Esignature:685482367}    CASE MANAGEMENT/SOCIAL WORK SECTION    Inpatient Status Date: ***    Readmission Risk Assessment Score:  Readmission Risk              Risk of Unplanned Readmission:  41           Discharging to Facility/ Agency   Name:   Address:  Phone:  Fax:    Dialysis Facility (if applicable)   Name:  Address:  Dialysis Schedule:  Phone:  Fax:    / signature: {Esignature:957084072}    PHYSICIAN SECTION    Prognosis: Good    Condition at Discharge: Stable    Rehab Potential (if transferring to Rehab): Good    Recommended Labs or Other Treatments After Discharge:   Antibiotics when receiving hemodialysis:  Ancef 2 grams at HD 3 days a week, through 4/7/1272    Physician Certification: I certify the above information and transfer of Carlo Abrams  is necessary for the continuing treatment of the diagnosis listed and that he requires Antibiotic treatment during dialysis for less than 30 days.      Update Admission H&P: No change in H&P    PHYSICIAN SIGNATURE:  Electronically signed by Abraham Busch MD for Dr Stephanie Elizondo MD on 7/14/22 at 1:40 PM EDT

## 2022-07-14 NOTE — PROGRESS NOTES
CATHETER      DIALYSIS CATHETER INSERTION N/A 1/17/2022    LAPAROSCOPIC PERITONEAL DIALYSIS CATHETER PLACEMENT and omentopexy performed by Pina Sánchez DO at [facility], ESOPHAGUS     Kyle Uzmaarnulfo N/A 4/15/2022    ROBOTIC, RECURRENT INCISIONAL HERNIA REPAIR WITH BIOSYNTHETIC MESH; LAPAROSCOPIC PERITONEAL DIALYSIS CATHETER REVISION WITH LAPAROSCOPIC OMENTOPEXY performed by Pina Sánchez DO at [facility] Bilateral 4/15/2022    BILATERAL OPEN INGUINAL HERNI REPAIR performed by Pina Sánchez DO at [facility] Bilateral 6/13/2022    ROBOTIC RECURRENT BILATERAL INGUINAL HERNIA REPAIR, LYSIS OF ADHESIONS, PERITONEAL DIALYSIS CATHETER REMOVAL performed by Pina Sánchez DO at [address] IR TUNNELED [address] 5 YEARS  01/14/2022    IR TUNNELED CATHETER PLACEMENT GREATER THAN 5 YEARS 1/14/2022 [address] SPECIAL PROCEDURES    UPPER GASTROINTESTINAL ENDOSCOPY  05/28/2016    biopsies, multiple small gastric ulcers    UPPER GASTROINTESTINAL ENDOSCOPY  09/12/2016    UPPER GASTROINTESTINAL ENDOSCOPY N/A 3/18/2022    EGD DIAGNOSTIC ONLY performed by Sandra Ricardo MD at [facility] N/A 1/17/2022    LAPAROSCOPIC incarcerated VENTRAL HERNIA REPAIR performed by Pina Sánchez DO at [address] OR       Family History   Problem Relation Age of Onset    Hypertension Other     Breast Cancer Mother     Colon Cancer Father     Diabetes Maternal Grandmother     Coronary Art Dis Brother         reports that he has never smoked. He has never used smokeless tobacco. He reports previous alcohol use. He reports that he does not use drugs. Allergies:  Patient has no known allergies.     Current Medications:    labetalol (NORMODYNE;TRANDATE) injection 5 mg, Q4H PRN  ceFAZolin (ANCEF) 1,000 mg in dextrose 5 % 50 mL IVPB (mini-bag), Q24H  perflutren lipid microspheres (DEFINITY) injection 1.65 mg, ONCE PRN  melatonin disintegrating tablet 5 mg, Nightly  [Held by provider] heparin (porcine) injection 5,000 Units, 3 times per day  [Held by provider] aspirin EC tablet 81 mg, Daily  atorvastatin (LIPITOR) tablet 20 mg, Daily  metoprolol succinate (TOPROL XL) extended release tablet 50 mg, Nightly  NIFEdipine (PROCARDIA XL) extended release tablet 90 mg, Daily  spironolactone (ALDACTONE) tablet 50 mg, Nightly  sodium chloride flush 0.9 % injection 5-40 mL, 2 times per day  sodium chloride flush 0.9 % injection 5-40 mL, PRN  0.9 % sodium chloride infusion, PRN  ondansetron (ZOFRAN-ODT) disintegrating tablet 4 mg, Q8H PRN   Or  ondansetron (ZOFRAN) injection 4 mg, Q6H PRN  polyethylene glycol (GLYCOLAX) packet 17 g, Daily PRN  acetaminophen (TYLENOL) tablet 650 mg, Q6H PRN   Or  acetaminophen (TYLENOL) suppository 650 mg, Q6H PRN  glucose chewable tablet 16 g, PRN  glucagon (rDNA) injection 1 mg, PRN  dextrose 5 % solution, PRN  hydrALAZINE (APRESOLINE) injection 5 mg, Q4H PRN  dextrose bolus 10% 125 mL, PRN   Or  dextrose bolus 10% 250 mL, PRN  lidocaine 4 % external patch 1 patch, Daily  HYDROmorphone (DILAUDID) injection 0.25 mg, Q3H PRN   Or  HYDROmorphone (DILAUDID) injection 0.5 mg, Q3H PRN  oxyCODONE (ROXICODONE) immediate release tablet 5 mg, Q4H PRN   Or  oxyCODONE (ROXICODONE) immediate release tablet 10 mg, Q4H PRN        Review of Systems:   14 point ROS obtained but were negative except mentioned in HPI      Physical exam:     Vitals:  BP (!) 175/103   Pulse 90   Temp 98.1 °F (36.7 °C) (Oral)   Resp 16   Ht 6' 5\" (1.956 m)   Wt 255 lb 15.3 oz (116.1 kg)   SpO2 94%   BMI 30.35 kg/m²   Constitutional:  OAA X3 NAD  Skin: no rash, turgor wnl  Heent:  eomi, mmm  Neck: no bruits or jvd noted  Cardiovascular:  S1, S2 without m/r/g  Respiratory: CTA B without w/r/r  Abdomen:  +bs, soft, nt, nd  Ext: no  lower extremity edema  Psychiatric: mood and affect appropriate  Musculoskeletal:  Rom, muscular strength intact    Labs:  CBC:   Recent Labs 07/12/22  0657 07/13/22  0545 07/14/22  0750   WBC 4.5 3.4* 3.6*   HGB 10.4* 10.3* 10.5*    148 171     BMP:    Recent Labs     07/12/22  0658 07/13/22  0545 07/14/22  0750    139 141   K 3.6 3.8 4.0    103 103   CO2 25 25 23   BUN 38* 38* 42*   CREATININE 6.5* 6.2* 6.1*   GLUCOSE 147* 89 100*     Ca/Mg/Phos:   Recent Labs     07/12/22  0658 07/13/22  0545 07/14/22  0750   CALCIUM 8.1* 8.1* 8.5   MG 2.50* 2.40 2.30   PHOS 3.7 3.9 3.6     Hepatic:   No results for input(s): AST, ALT, ALB, BILITOT, ALKPHOS in the last 72 hours. Troponin: No results for input(s): TROPONINI in the last 72 hours. BNP: No results for input(s): BNP in the last 72 hours. Lipids: No results for input(s): CHOL, TRIG, HDL, LDLCALC, LABVLDL in the last 72 hours. ABGs: No results for input(s): PHART, PO2ART, IQM8OPC in the last 72 hours. INR:   No results for input(s): INR in the last 72 hours. UA:No results for input(s): Boylston Virgil, GLUCOSEU, BILIRUBINUR, Jerline Parks, BLOODU, PHUR, PROTEINU, UROBILINOGEN, NITRU, LEUKOCYTESUR, LABMICR, URINETYPE in the last 72 hours. Urine Microscopic: No results for input(s): LABCAST, BACTERIA, COMU, HYALCAST, WBCUA, RBCUA, EPIU in the last 72 hours. Urine Culture: No results for input(s): LABURIN in the last 72 hours. Urine Chemistry: No results for input(s): Lonie Manning, PROTEINUR, NAUR in the last 72 hours. IMAGING:  IR REMOVE TUNNELED CVAD WO SQ PORT/PUMP   Final Result   IMPRESSION : Successful removal of a right chest tunneled central venous catheter. Blood loss : minimal (less than 5 cc)   Specimens removed : central venous catheter               US SCROTUM AND TESTICLES   Final Result   IMPRESSION :      No cause for acute pain identified. US DUP ABD PEL RETRO SCROT COMPLETE   Final Result   IMPRESSION :      No cause for acute pain identified. IR ABSCESS DRAINAGE PERC   Final Result   Impression:   1.   Successful image guided [address] [place] [address] abscess drain placement. CT ABDOMEN PELVIS WO CONTRAST Additional Contrast? None   Final Result      1. Noncontrast CT demonstrating interval bilateral inguinal hernia repair with persistent fat with stable stranding in the right and left inguinal canal. Right greater than left fluid collections in the pelvis abutting the right and left hernia repair    mesh may represent postoperative seromas versus abscesses. 2.  Diverticulosis without evidence of diverticulitis. XR CHEST PORTABLE   Final Result      No acute pulmonary disease. Assessment/Plan :      1. ESRD   HD tue/ thu/ sat   Labs reviewed   HD in am   Replace [facility] today       2. HTN  Continue nifedipine XL   spironolactone and metoprolol     3. RLQ seroma vs abscess on CT scan . ecent suregry for hernia   - Scrotal USG - no acute etiology     4. Acid- base disorder. monitor and correct with HD     5. Electrolytes.  Monitor             Thank you for allowing us to participate in care of Stephen Echols         Electronically signed by: Abigail Olsen MD, 7/14/2022, 9:40 AM      [facility] [address]  Office : [phone]  Fax :[phone]

## 2022-07-14 NOTE — FLOWSHEET NOTE
Treatment time: 180 min    Net UF: 1000 mls    Pre weight: 115.8 kg  Post weight: 114.8 kg  EDW: 119 kg    Access used: RCW TDC  Access function: Good blood flow    Medications or blood products given: None    Regular outpatient schedule: [facility]    Summary of response to treatment: Pt tolerated tx.      Copy of dialysis treatment record placed in chart, to be scanned into EMR.     07/14/22 1114 07/14/22 1424   Vital Signs   BP (!) 176/97 (!) 161/97   Temp 97.8 °F (36.6 °C) 97.9 °F (36.6 °C)   Heart Rate 86 83   Resp 20 18   Weight 255 lb 4.7 oz (115.8 kg) 253 lb 1.4 oz (114.8 kg)   Weight Method Standing scale Estimated  (1000ml net UF deducted from pre-wt.)

## 2022-07-16 ENCOUNTER — APPOINTMENT (OUTPATIENT)
Dept: CT IMAGING | Age: 62
End: 2022-07-16
Payer: MEDICARE

## 2022-07-16 ENCOUNTER — HOSPITAL ENCOUNTER (EMERGENCY)
Age: 62
Discharge: HOME | End: 2022-07-16
Attending: EMERGENCY MEDICINE
Payer: MEDICARE

## 2022-07-16 VITALS
TEMPERATURE: 97.7 F | HEIGHT: 76 IN | HEART RATE: 88 BPM | DIASTOLIC BLOOD PRESSURE: 89 MMHG | RESPIRATION RATE: 16 BRPM | SYSTOLIC BLOOD PRESSURE: 159 MMHG | BODY MASS INDEX: 31.54 KG/M2 | OXYGEN SATURATION: 99 % | WEIGHT: 259 LBS

## 2022-07-16 DIAGNOSIS — G89.18 POSTOPERATIVE PAIN: Primary | ICD-10-CM

## 2022-07-16 LAB
A/G RATIO: 1.1 (ref 1.1–2.2)
ALBUMIN SERPL-MCNC: 3.9 G/DL (ref 3.4–5)
ALP BLD-CCNC: 64 U/L (ref 40–129)
ALT SERPL-CCNC: 35 U/L (ref 10–40)
ANION GAP SERPL CALCULATED.3IONS-SCNC: 13 MMOL/L (ref 3–16)
APTT: 34.9 SEC (ref 23–34.3)
AST SERPL-CCNC: 84 U/L (ref 15–37)
BACTERIA: ABNORMAL /HPF
BASE EXCESS VENOUS: -2 MMOL/L (ref -3–3)
BASOPHILS ABSOLUTE: 0 K/UL (ref 0–0.2)
BASOPHILS RELATIVE PERCENT: 0.3 %
BILIRUB SERPL-MCNC: <0.2 MG/DL (ref 0–1)
BILIRUBIN URINE: NEGATIVE
BLOOD CULTURE, ROUTINE: NORMAL
BLOOD, URINE: ABNORMAL
BUN BLDV-MCNC: 39 MG/DL (ref 7–20)
C-REACTIVE PROTEIN: 13.6 MG/L (ref 0–5.1)
CALCIUM SERPL-MCNC: 8.5 MG/DL (ref 8.3–10.6)
CARBOXYHEMOGLOBIN: 4 % (ref 0–1.5)
CHLORIDE BLD-SCNC: 100 MMOL/L (ref 99–110)
CLARITY: CLEAR
CO2: 21 MMOL/L (ref 21–32)
COLOR: YELLOW
CREAT SERPL-MCNC: 5.4 MG/DL (ref 0.8–1.3)
CULTURE, BLOOD 2: NORMAL
EOSINOPHILS ABSOLUTE: 1 K/UL (ref 0–0.6)
EOSINOPHILS RELATIVE PERCENT: 15 %
EPITHELIAL CELLS, UA: 1 /HPF (ref 0–5)
GFR AFRICAN AMERICAN: 13
GFR NON-AFRICAN AMERICAN: 11
GLUCOSE BLD-MCNC: 144 MG/DL (ref 70–99)
GLUCOSE URINE: NEGATIVE MG/DL
HCO3 VENOUS: 22.2 MMOL/L (ref 23–29)
HCT VFR BLD CALC: 35.9 % (ref 40.5–52.5)
HEMOGLOBIN: 11.4 G/DL (ref 13.5–17.5)
HYALINE CASTS: 0 /LPF (ref 0–8)
INR BLD: 1.03 (ref 0.87–1.14)
KETONES, URINE: NEGATIVE MG/DL
LACTIC ACID, SEPSIS: 1.4 MMOL/L (ref 0.4–1.9)
LEUKOCYTE ESTERASE, URINE: NEGATIVE
LIPASE: 32 U/L (ref 13–60)
LYMPHOCYTES ABSOLUTE: 1.4 K/UL (ref 1–5.1)
LYMPHOCYTES RELATIVE PERCENT: 21.7 %
MCH RBC QN AUTO: 26.9 PG (ref 26–34)
MCHC RBC AUTO-ENTMCNC: 31.9 G/DL (ref 31–36)
MCV RBC AUTO: 84.3 FL (ref 80–100)
METHEMOGLOBIN VENOUS: 0.6 %
MICROSCOPIC EXAMINATION: YES
MONOCYTES ABSOLUTE: 0.4 K/UL (ref 0–1.3)
MONOCYTES RELATIVE PERCENT: 5.9 %
NEUTROPHILS ABSOLUTE: 3.8 K/UL (ref 1.7–7.7)
NEUTROPHILS RELATIVE PERCENT: 57.1 %
NITRITE, URINE: NEGATIVE
O2 CONTENT, VEN: 17 VOL %
O2 SAT, VEN: 100 %
O2 THERAPY: ABNORMAL
PCO2, VEN: 35.2 MMHG (ref 40–50)
PDW BLD-RTO: 17.3 % (ref 12.4–15.4)
PH UA: 6.5 (ref 5–8)
PH VENOUS: 7.41 (ref 7.35–7.45)
PLATELET # BLD: 206 K/UL (ref 135–450)
PMV BLD AUTO: 7.9 FL (ref 5–10.5)
PO2, VEN: 209 MMHG (ref 25–40)
POTASSIUM REFLEX MAGNESIUM: 4.2 MMOL/L (ref 3.5–5.1)
PRO-BNP: 899 PG/ML (ref 0–124)
PROTEIN UA: 300 MG/DL
PROTHROMBIN TIME: 13.4 SEC (ref 11.7–14.5)
RBC # BLD: 4.26 M/UL (ref 4.2–5.9)
RBC UA: 0 /HPF (ref 0–4)
SEDIMENTATION RATE, ERYTHROCYTE: 39 MM/HR (ref 0–20)
SODIUM BLD-SCNC: 134 MMOL/L (ref 136–145)
SPECIFIC GRAVITY UA: 1.01 (ref 1–1.03)
TCO2 CALC VENOUS: 52 MMOL/L
TOTAL PROTEIN: 7.5 G/DL (ref 6.4–8.2)
TROPONIN: 0.01 NG/ML
URINE TYPE: ABNORMAL
UROBILINOGEN, URINE: 0.2 E.U./DL
WBC # BLD: 6.6 K/UL (ref 4–11)
WBC UA: 1 /HPF (ref 0–5)

## 2022-07-16 PROCEDURE — 82803 BLOOD GASES ANY COMBINATION: CPT

## 2022-07-16 PROCEDURE — 83880 ASSAY OF NATRIURETIC PEPTIDE: CPT

## 2022-07-16 PROCEDURE — 81001 URINALYSIS AUTO W/SCOPE: CPT

## 2022-07-16 PROCEDURE — 83690 ASSAY OF LIPASE: CPT

## 2022-07-16 PROCEDURE — 84484 ASSAY OF TROPONIN QUANT: CPT

## 2022-07-16 PROCEDURE — 85610 PROTHROMBIN TIME: CPT

## 2022-07-16 PROCEDURE — 36415 COLL VENOUS BLD VENIPUNCTURE: CPT

## 2022-07-16 PROCEDURE — 83605 ASSAY OF LACTIC ACID: CPT

## 2022-07-16 PROCEDURE — 85730 THROMBOPLASTIN TIME PARTIAL: CPT

## 2022-07-16 PROCEDURE — 96374 THER/PROPH/DIAG INJ IV PUSH: CPT

## 2022-07-16 PROCEDURE — 74176 CT ABD & PELVIS W/O CONTRAST: CPT

## 2022-07-16 PROCEDURE — 2580000003 HC RX 258: Performed by: EMERGENCY MEDICINE

## 2022-07-16 PROCEDURE — 86140 C-REACTIVE PROTEIN: CPT

## 2022-07-16 PROCEDURE — 80053 COMPREHEN METABOLIC PANEL: CPT

## 2022-07-16 PROCEDURE — 85652 RBC SED RATE AUTOMATED: CPT

## 2022-07-16 PROCEDURE — 96376 TX/PRO/DX INJ SAME DRUG ADON: CPT

## 2022-07-16 PROCEDURE — 6360000002 HC RX W HCPCS: Performed by: EMERGENCY MEDICINE

## 2022-07-16 PROCEDURE — 99284 EMERGENCY DEPT VISIT MOD MDM: CPT

## 2022-07-16 PROCEDURE — 96375 TX/PRO/DX INJ NEW DRUG ADDON: CPT

## 2022-07-16 PROCEDURE — 85025 COMPLETE CBC W/AUTO DIFF WBC: CPT

## 2022-07-16 RX ORDER — HYDROCODONE BITARTRATE AND ACETAMINOPHEN 5; 325 MG/1; MG/1
1 TABLET ORAL EVERY 6 HOURS PRN
Qty: 12 TABLET | Refills: 0 | Status: SHIPPED | OUTPATIENT
Start: 2022-07-16 | End: 2022-07-19

## 2022-07-16 RX ORDER — SODIUM CHLORIDE, SODIUM LACTATE, POTASSIUM CHLORIDE, CALCIUM CHLORIDE 600; 310; 30; 20 MG/100ML; MG/100ML; MG/100ML; MG/100ML
1000 INJECTION, SOLUTION INTRAVENOUS ONCE
Status: COMPLETED | OUTPATIENT
Start: 2022-07-16 | End: 2022-07-16

## 2022-07-16 RX ORDER — ONDANSETRON 2 MG/ML
4 INJECTION INTRAMUSCULAR; INTRAVENOUS EVERY 30 MIN PRN
Status: COMPLETED | OUTPATIENT
Start: 2022-07-16 | End: 2022-07-16

## 2022-07-16 RX ORDER — HYDROMORPHONE HYDROCHLORIDE 1 MG/ML
1 INJECTION, SOLUTION INTRAMUSCULAR; INTRAVENOUS; SUBCUTANEOUS EVERY 30 MIN PRN
Status: DISCONTINUED | OUTPATIENT
Start: 2022-07-16 | End: 2022-07-16 | Stop reason: HOSPADM

## 2022-07-16 RX ADMIN — ONDANSETRON 4 MG: 2 INJECTION INTRAMUSCULAR; INTRAVENOUS at 10:26

## 2022-07-16 RX ADMIN — HYDROMORPHONE HYDROCHLORIDE 1 MG: 1 INJECTION, SOLUTION INTRAMUSCULAR; INTRAVENOUS; SUBCUTANEOUS at 10:26

## 2022-07-16 RX ADMIN — ONDANSETRON 4 MG: 2 INJECTION INTRAMUSCULAR; INTRAVENOUS at 11:09

## 2022-07-16 RX ADMIN — HYDROMORPHONE HYDROCHLORIDE 1 MG: 1 INJECTION, SOLUTION INTRAMUSCULAR; INTRAVENOUS; SUBCUTANEOUS at 11:09

## 2022-07-16 RX ADMIN — HYDROMORPHONE HYDROCHLORIDE 1 MG: 1 INJECTION, SOLUTION INTRAMUSCULAR; INTRAVENOUS; SUBCUTANEOUS at 11:49

## 2022-07-16 RX ADMIN — SODIUM CHLORIDE, POTASSIUM CHLORIDE, SODIUM LACTATE AND CALCIUM CHLORIDE 1000 ML: 600; 310; 30; 20 INJECTION, SOLUTION INTRAVENOUS at 10:30

## 2022-07-16 ASSESSMENT — PAIN - FUNCTIONAL ASSESSMENT: PAIN_FUNCTIONAL_ASSESSMENT: 0-10

## 2022-07-16 ASSESSMENT — PAIN SCALES - GENERAL
PAINLEVEL_OUTOF10: 9
PAINLEVEL_OUTOF10: 10

## 2022-07-16 NOTE — ED PROVIDER NOTES
[address] [facility]        Pt Name: Jen Dolan  MRN: [id number]  [place] 1960  Date of evaluation: 7/16/2022  Provider: Vince Fay MD  PCP: Ana Callaway MD    This patient was seen and evaluated by the attending physician Vince Fay MD.      [address]       Chief Complaint   Patient presents with    Abdominal Pain     Since last night. Denies n/v/d   had hernia surgery 4 & 10 weeks ago       HISTORY OF PRESENT ILLNESS   (Location/Symptom, Timing/Onset, Context/Setting, Quality, Duration, Modifying Factors, Severity)  Note limiting factors. Jen Dolan is a 59 y.o. male with a history of A. fib CKD stage IV now on hemodialysis with a recent Vas-Cath placement and recent bilateral hernia repair on June 13 here with chief complaint of diffuse epigastric abdominal  pain. Was also admitted July 9 at [facility] with septicemia secondary to staph RS bacteremia and a catheter related bloodstream infection; Vasc Cath was replaced after removal on July 11. No fevers chills sweats no headaches chest pains dyspnea. Does note some nausea epigastric abdominal pain with associated vomiting. Nursing Notes were all reviewed and agreed with or any disagreements were addressed  in the HPI. REVIEW OF SYSTEMS    (2-9 systems for level 4, 10 or more for level 5)     Review of Systems    Positives and Pertinent negatives as per HPI. Except as noted abovein the ROS, all other systems were reviewed and negative.        PAST MEDICAL HISTORY     Past Medical History:   Diagnosis Date    Arthralgia of multiple joints 10/27/2015    Arthritis     Atrial fibrillation (HCC)     Chronic kidney disease     stage 4    Chronic systolic congestive heart failure ([facility] 8/16/2021    CRI (chronic renal insufficiency)     Diabetic nephropathy associated with type 2 diabetes mellitus ([facility] 10/11/2018    Diverticulosis     Elevated PSA     Erectile dysfunction     ESRD (end stage renal disease) on dialysis ([facility] 2/24/2022    Gout     Gout     Hemrrhoid NOS w/ complication NEC     History of MRSA infection     Hypertension     CELSA (obstructive sleep apnea) 07/14/2017    uses C-pap machine    Type 2 diabetes mellitus without complication, without long-term current use of insulin ([facility] 9/95/7711    Umbilical hernia without obstruction and without gangrene 2/2/2016         SURGICAL HISTORY     Past Surgical History:   Procedure Laterality Date    COLONOSCOPY      COLONOSCOPY N/A 3/18/2022    COLONOSCOPY POLYPECTOMY SNARE/COLD BIOPSY performed by Maco Torrse MD at [facility] N/A 1/17/2022    LAPAROSCOPIC PERITONEAL DIALYSIS CATHETER PLACEMENT and omentopexy performed by Roge Smith DO at [address] [facility], [address] [place] [address] N/A 4/15/2022    ROBOTIC, RECURRENT INCISIONAL HERNIA REPAIR WITH BIOSYNTHETIC MESH; LAPAROSCOPIC PERITONEAL DIALYSIS CATHETER REVISION WITH LAPAROSCOPIC OMENTOPEXY performed by Roge Smith DO at [facility] Bilateral 4/15/2022    [address] performed by Roge Smith DO at [address] Bilateral 6/13/2022    ROBOTIC RECURRENT BILATERAL INGUINAL [address], LYSIS OF ADHESIONS, PERITONEAL DIALYSIS CATHETER REMOVAL performed by Roge Smith DO at [facility] 33 TUNNELED [address] 5 YEARS  01/14/2022    IR TUNNELED CATHETER PLACEMENT GREATER THAN 5 YEARS 1/14/2022 [facility] SPECIAL PROCEDURES    IR TUNNELED CATHETER PLACEMENT GREATER THAN 5 YEARS  7/14/2022    IR TUNNELED CATHETER PLACEMENT GREATER THAN 5 YEARS 7/14/2022 [facility] SPECIAL PROCEDURES    UPPER GASTROINTESTINAL ENDOSCOPY  05/28/2016    biopsies, multiple small gastric ulcers    UPPER GASTROINTESTINAL ENDOSCOPY  09/12/2016    UPPER GASTROINTESTINAL ENDOSCOPY N/A 3/18/2022    EGD DIAGNOSTIC ONLY performed by Jadyn Shoemaker MD at [facility] 43 N/A 1/17/2022    LAPAROSCOPIC incarcerated [place] performed by Kristine Mayes DO at [address] [facility]       Discharge Medication List as of 7/16/2022 11:45 AM        CONTINUE these medications which have NOT CHANGED    Details   celecoxib (CELEBREX) 100 MG capsule Take 1 capsule by mouth daily, Disp-30 capsule, R-0Normal      lidocaine (LIDODERM) 5 % Place 1 patch onto the skin daily for 15 days 12 hours on, 12 hours off., Disp-15 patch, [id number]      Folic Acid-Vit G7-RLC N31 0.5-5-0.2 MG TABS Take by mouthHistorical Med      clotrimazole (LOTRIMIN) 1 % external solution 1 APPLICATION TO NAILS DAILY, Historical Med      polyethylene glycol (GLYCOLAX) 17 GM/SCOOP powder TAKE 17 G BY MOUTH 2 TIMES DAILYHistorical Med      NIFEdipine (ADALAT CC) 90 MG extended release tablet Take 90 mg by mouth dailyHistorical Med      vitamin D (ERGOCALCIFEROL) 1.25 MG (44100 UT) CAPS capsule Take 1 capsule by mouth once a week, Disp-4 capsule, R-0Normal      torsemide (DEMADEX) 100 MG tablet Take 1 tablet by mouth daily, Disp-90 tablet, R-0Normal      calcitRIOL (ROCALTROL) 0.5 MCG capsule Take 1 capsule by mouth three times a week During HD, Disp-30 capsule, R-3NO PRINT      docusate sodium (COLACE) 100 MG capsule Take 1 capsule by mouth 2 times daily, Disp-20 capsule, R-0Print      B Complex-C-Folic Acid (DIALYVITE 289 PO) Take 1 tablet by mouth once a week Historical Med      Methoxy PEG-Epoetin Beta (MIRCERA) 50 MCG/0.3ML SOSY Inject as directed Twice a  month Historical Med      Iron Sucrose (VENOFER IV) Infuse intravenously every 30 days Historical Med      bisacodyl (BISACODYL) 5 MG EC tablet Take 1 tablet by mouth daily as needed for Constipation, Disp-30 tablet, R-5Normal      spironolactone (ALDACTONE) 50 MG tablet Take 1 tablet by mouth at bedtime, Disp-30 tablet, R-3Normal      metoprolol succinate (TOPROL XL) 50 MG extended release tablet Take 1 tablet by mouth at bedtime TAKE 1 TABLET BY MOUTH EVERY DAY, Disp-30 tablet, R-3Normal      atorvastatin (LIPITOR) 20 MG tablet TAKE 1 TABLET BY MOUTH EVERY DAY, Disp-30 tablet, R-5Normal      nortriptyline (PAMELOR) 10 MG capsule Take 10 mg by mouth nightly Historical Med      omeprazole (PRILOSEC) 40 MG delayed release capsule TAKE 1 CAPSULE BY MOUTH EVERY DAY, Disp-30 capsule, R-1Normal      sildenafil (VIAGRA) 100 MG tablet Take 1 tablet by mouth as needed for Erectile Dysfunction, Disp-30 tablet, R-3Print      aspirin 81 MG tablet Take 81 mg by mouth dailyHistorical Med               ALLERGIES     Patient has no known allergies. FAMILYHISTORY       Family History   Problem Relation Age of Onset    Hypertension Other     Breast Cancer Mother     Colon Cancer Father     Diabetes Maternal Grandmother     Coronary Art Dis Brother           SOCIAL HISTORY       Social History     Socioeconomic History    Marital status:      Spouse name: None    Number of children: 5    Years of education: None    Highest education level: None   Occupational History    Occupation: Senior Field Cost Admin.      Employer: [employer]   Tobacco Use    Smoking status: Former     Types: Cigars    Smokeless tobacco: Never   Vaping Use    Vaping Use: Never used   Substance and Sexual Activity    Alcohol use: Not Currently     Comment: maybe a beer a weekend    Drug use: No    Sexual activity: Yes     Partners: Female     Comment:    Social History Narrative    ** Merged History Encounter **            SCREENINGS    [place] Coma Scale  Eye Opening: Spontaneous  Best Verbal Response: Oriented  Best Motor Response: Obeys commands  [place] Coma Scale Score: 15        PHYSICAL EXAM    (up to 7 for level 4, 8 or more for level 5)     ED Triage Vitals [07/16/22 0956]   BP Temp Temp Source Heart Rate Resp SpO2 Height Weight   (!) 176/90 97.7 °F (36.5 °C) Oral 88 16 100 % 6' 4\" (1.93 m) 259 lb (117.5 kg)       Physical Exam    General Appearance:  Alert, cooperative, no distress, appears stated age. Head:  Normocephalic, without obvious abnormality, atraumatic. Eyes:  conjunctiva/corneas clear, EOM's intact. Sclera anicteric. ENT: Mucous membranes moist.   Neck: Supple, symmetrical, trachea midline, no adenopathy. No jugular venous distention. Lungs:   No Respiratory Distress. Chest Wall:  Atraumatic   Heart:  RRR   Abdomen:   Soft, Diffuse TTP, no rebound   Extremities:  Full range of motion. Pulses: Symmetric x 4   Skin:  No rashes or lesions to exposed skin. Neurologic: Alert and oriented X 3. Motor grossly normal.  Speech clear.           DIAGNOSTIC RESULTS   LABS:    Labs Reviewed   CBC WITH AUTO DIFFERENTIAL - Abnormal; Notable for the following components:       Result Value    Hemoglobin 11.4 (*)     Hematocrit 35.9 (*)     RDW 17.3 (*)     Eosinophils Absolute 1.0 (*)     All other components within normal limits   COMPREHENSIVE METABOLIC PANEL W/ REFLEX TO MG FOR LOW K - Abnormal; Notable for the following components:    Sodium 134 (*)     Glucose 144 (*)     BUN 39 (*)     CREATININE 5.4 (*)     GFR Non- 11 (*)     GFR  13 (*)     AST 84 (*)     All other components within normal limits    Narrative:     CALL  [facility] tel. [phone],  Chemistry results called to and read back by Elizabeth barnett, 07/16/2022 11:20, by  [address] [place] [address] - Abnormal; Notable for the following components:    Pro- (*)     All other components within normal limits    Narrative:     CALL  [facility] tel. [phone],  Chemistry results called to and read back by Elizabeth barnett, 07/16/2022 11:20, by  [facility]    APTT - Abnormal; Notable for the following components:    aPTT 34.9 (*)     All other components within normal limits   SEDIMENTATION RATE - Abnormal; Notable for the following components:    Sed Rate 39 (*)     All other components within normal limits   C-REACTIVE PROTEIN - Abnormal; Notable for the following components:    CRP 13.6 (*)     All other components within normal limits    Narrative:     Boby Cummings  [facility] tel. [phone],  Chemistry results called to and read back by Yoana Bernheim green, 07/16/2022 11:20, by  Casandra Diaz - Abnormal; Notable for the following components:    Blood, Urine SMALL (*)     All other components within normal limits   BLOOD GAS, VENOUS - Abnormal; Notable for the following components:    pCO2, Gallo 35.2 (*)     pO2, Gallo 209.0 (*)     HCO3, Venous 22.2 (*)     Carboxyhemoglobin 4.0 (*)     All other components within normal limits   CULTURE, URINE   LIPASE    Narrative:     CALL  Chavez  [facility] tel. [phone],  Chemistry results called to and read back by Yoana Bernheim green, 07/16/2022 11:20, by  GERARD   TROPONIN    Narrative:     Boby Cummings  [facility] tel. [phone],  Chemistry results called to and read back by Yoana Bernheim green, 07/16/2022 11:20, by  Sangeetha Katz   LACTATE, SEPSIS   LACTATE, SEPSIS       All other labs were within normal range or not returned as of this dictation. EKG: All EKG's are interpreted by the Emergency Department Physician in the absence of a cardiologist.  Please see their note for interpretation of EKG. RADIOLOGY:   Non-plain film images such as CT, Ultrasound and MRI are read by the radiologist. Plain radiographic images are visualized andpreliminarily interpreted by the  ED Provider with the below findings:        Interpretation perthe Radiologist below, if available at the time of this note:    CT ABDOMEN PELVIS WO CONTRAST Additional Contrast? None   Final Result   No bowel no acute abnormality of the visualized digestive tract. Status post herniorrhaphy. Postoperative fluid collections along the lower   abdominal wall are decreased in size since 07/10/2022. There is surrounding   stranding or edema in the anterior pelvis and subcutaneous tissue.    Cellulitis is still an alternative consideration. Echo Complete    Result Date: 7/13/2022  Transthoracic Echocardiography Report (TTE)  Demographics   Patient Name      Vandana DEAL   Date of Study     07/13/2022          Gender              Male   Patient Number    [id number]          Date of Birth       1960   Visit Number      [id number]           Age                 59 year(s)   Accession Number  [id number]          Room Number         7908   Corporate ID      [id number]            Sonographer         Jaylon Mitchell,                                                            [facility]  Physician                             Physician           Georgeann Seip, MD  Procedure Type of Study   TTE procedure:ECHOCARDIOGRAM COMPLETE 2D W DOPPLER W COLOR. Procedure Date Date: 07/13/2022 Start: 09:42 AM Study Location: [address] Echo Lab Technical Quality: Limited visualization due to poor acoustical window. Indications:Endocarditis. Additional Indications:infected catheter line. Bcx + staph aureus. Patient Status: Routine Height: 77 inches Weight: 256.01 pounds BSA: 2.48 m2 BMI: 30.36 kg/m2 BP: 139/82 mmHg  Conclusions   Summary  Left ventricular cavity size is normal. There is moderate concentric left  ventricular hypertrophy. Overall left ventricular systolic function appears normal with an estimated  ejection fraction of 55-60%. No regional wall motion abnormalities are noted. Diastolic filling  parameters suggests normal diastolic function. Estimated pulmonary artery systolic pressure is at 31 mmHg assuming a right  atrial pressure of 8 mmHg. No evidence of vegetations noted on valve leaflets.    Signature   ------------------------------------------------------------------  Electronically signed by Georgeann Seip, MD (Interpreting  physician) on 07/13/2022 at 11:01 AM ------------------------------------------------------------------   Findings   Left Ventricle  Left ventricular cavity size is normal. There is moderate concentric left  ventricular hypertrophy. Overall left ventricular systolic function appears  normal with an estimated ejection fraction of 55-60%. No regional wall  motion abnormalities are noted. Diastolic filling parameters suggests normal  diastolic function. Mitral Valve  Mitral valve is structurally normal. Trace mitral regurgitation is present. No evidence of mitral valve stenosis. Left Atrium  The left atrium is normal in size. Aortic Valve  The aortic valve is structurally normal. The aortic valve appears tricuspid. Trivial aortic regurgitation is present. No evidence of aortic valve  stenosis. Aorta  The aortic root is normal in size. Right Ventricle  The right ventricle is normal in size and function. TAPSE measures: 2.24 cm. RV S velocity measures: 15.0 cm/s. Tricuspid Valve  Tricuspid valve is structurally normal. Trace tricuspid regurgitation. No  evidence of tricuspid stenosis. Right Atrium  The right atrial size is normal.   Pulmonic Valve  The pulmonic valve is structurally normal. Trace pulmonic regurgitation  present. No evidence of pulmonic valve stenosis. Pericardial Effusion  No pericardial effusion noted. Pleural Effusion  No pleural effusion. Miscellaneous  IVC size is normal (<2.1cm), but collapse is not well visualized. Estimated  pulmonary artery systolic pressure is at 31 mmHg assuming a right atrial  pressure of 8 mmHg.   M-Mode/2D Measurements (cm)   LV Diastolic Dimension: 3.81 cm LV Systolic Dimension: 0.47 cm  LV Septum Diastolic: 1.6 cm  LV PW Diastolic: 3.28 cm        AO Root Dimension: 3.4 cm  RV Diastolic Dimension: 1.63 cm LA Dimension: 5.2 cm                                  LA Area: 23.1 cm2                                  LA volume/Index: 62.1 ml /25 ml/m2  Doppler Measurements   AV Peak Velocity: 131 cm/s     MV Peak E-Wave: 64.3 cm/s  AV Peak Gradient: 6.86 mmHg    MV Peak A-Wave: 57 cm/s                                 MV E/A Ratio: 1.13   TR Velocity:240 cm/s  TR Gradient:23.04 mmHg  Estimated RAP:8 mmHg  Estimated RVSP: 31 mmHg        MV Deceleration Time: 240 msec  E' Septal Velocity: 4.35 cm/s  E' Lateral Velocity: 8.49 cm/s  E/E' ratio: 7.6  PV Peak Velocity: 145 cm/s  PV Peak Gradient: 8.41 mmHg   Aortic Valve   Peak Velocity: 131 cm/s  Peak Gradient: 6.86 mmHg  Aorta   Aortic Root: 3.4 cm      CT ABDOMEN PELVIS WO CONTRAST Additional Contrast? None    Result Date: 7/10/2022  EXAM: CT ABDOMEN AND PELVIS WITHOUT CONTRAST INDICATION: Diffuse abdominal pain, sepsis COMPARISON: 6/8/2022 TECHNIQUE: CT abdomen pelvis was performed without contrast according to standard protocol. Axial images and multiplanar reformatted images were reviewed. Up-to-date CT equipment and radiation dose reduction techniques were employed. IV Contrast: None Oral Contrast: No FINDINGS: The visualized lung bases are clear. 8 mm left lower lobe pulmonary nodule is redemonstrated. The heart size is normal without pericardial effusion. Noncontrast images of the liver appear normal without biliary ductal dilatation. Several hepatic cysts are again seen. The gallbladder appears normal. Noncontrast images of the spleen and pancreas appear normal. 1.5 cm right adrenal myolipoma and 1.5 cm left adrenal adenoma are again seen. The kidneys are mildly atrophic. No stones are identified in either kidney or along the course of either ureter. There is no hydronephrosis or hydroureter. The urinary bladder is nondistended and appears normal. The prostate gland is nonenlarged. There has been interval bilateral inguinal hernia repair with mesh. There is residual fat in the right and left inguinal canal with stable stranding.  There is a new 12 x 7 cm walled off appearing fluid collection in the right anterior pelvis abutting the  hernia repair mesh and a similar 5.5 x 2 cm walled off appearing fluid collection in the left anterior pelvis abutting the hernia mesh. No lymphadenopathy is seen. The stomach, small and large bowel are normal in caliber without indication of obstruction. There is sigmoid diverticulosis without evidence of diverticulitis. The appendix appears normal. No free intraperitoneal air is seen. No lymphadenopathy is seen. The abdominal aorta is normal in course and caliber. Bone windows demonstrate no suspicious lytic or blastic lesions. 1.  Noncontrast CT demonstrating interval bilateral inguinal hernia repair with persistent fat with stable stranding in the right and left inguinal canal. Right greater than left fluid collections in the pelvis abutting the right and left hernia repair mesh may represent postoperative seromas versus abscesses. 2.  Diverticulosis without evidence of diverticulitis. US SCROTUM AND TESTICLES    Result Date: 7/11/2022  SCROTAL ULTRASOUND HISTORY : scrotal pain PRIOR : none COMMENTS : Sonographic evaluation of the scrotum and testicles was obtained. Testicular size :        Right : 4.0 x 2.8 x 2.2 cm        Left : 3.7 x 2.6 x 2.1 cm Testicles : Normal echotexture and doppler flow without solid mass. 9 x 5 x 2 mm cluster of small adjacent cysts in the periphery of the right testicle. Epididymides : Bilateral epididymal head cysts or small. Hydrocele : Small bilateral Varicocele : None     IMPRESSION : No cause for acute pain identified. XR CHEST PORTABLE    Result Date: 7/10/2022  EXAM: PORTABLE AP CHEST X-RAY INDICATION: sepsis COMPARISON: 1/11/2022 FINDINGS: Right IJ approach dialysis catheter terminates at the cavoatrial junction. There is no focal consolidation, pleural effusion, or pneumothorax. The cardiomediastinal silhouette is normal. The aorta remains ectatic. The visible bony thorax is intact. No acute pulmonary disease.      IR TUNNELED CVC PLACE WO SQ PORT/PUMP > 5 YEARS    Result Date: 7/14/2022  EXAM:IR TUNNELED CVC PLACE WO SQ PORT/PUMP > 5 YEARS Indication: dialysis Procedure was performed by Dr. Alek Brown. Risks and benefits and informed consent obtained CONSCIOUS SEDATION: Intravenous Versed and fentanyl with hemodynamic monitoring and pulse oximetry. Independent nursing observer: Incremental administration  of Versed and fentanyl Time started: 9:41 AM Time completed: 9:59 AM Post sedation exam: No complications, stable vital signs PROCEDURE: CONSENT: signed TIME OUT: taken STERILE PREP: Full maximum sterile field/barrier technique was followed (with cap and mask, gloves and sterile gown, as well as broad field sterile drapes). Local disinfection was performed with broad field application of orange dyed chloraprep solution,  which was allowed to dry fully prior to the initiation of the procedure. Maintain sterile field at all times. Medications labeled. A sterile ultrasound probe cover and sterile ultrasound gel was used. Preliminary, ultrasound of the left internal jugular vein was performed demonstrating patency and respiratory variation with normal spectral waveform. An image was saved for the permanent record. Right neck and chest was prepped and draped in usual fashion. Ultrasound guidance with identification of the right internal jugular vein vein was used. Under sterile technique and following 2% lidocaine local anesthetic a skin incision was made. 21 gauge micropuncture needle was advanced into the venous lumen. Venous blood return was confirmed. Ultrasound hard copy imaging and confirmation of guidewire placement within the vascular lumen. Fluoroscopic guided tract dilatation and subsequent the placement of a peel-away sheath to the mid-distal superior vena cava in satisfactory position. Using a separate skin incision inferior to the venotomy  site was made below the clavicle with placement of a tunneled dialysis catheter to the venotomy site with a tunneling trocar.  The catheter,  23 cm from tip to cuff, was then placed thru the sheath to the level of the distal SVC / RA junction in good position. Free return of blood flow and no resistance to flushing was observed. The catheter was flushed with heparinized saline. Catheter was sutured sutured to skin and a sterile dressing applied. Chlorhexidine impregnated sponge applied 360 degrees around insertion site. ( Biopatch ) Hardcopy confirmation of catheter position obtained Fluoroscopy time: 0.8 minutes Number of fluoroscopic images: 25     1. Satisfactory ultrasound and fluoroscopic-guided placement and positioning of tunneled  right internal jugular dialysis catheter 2. Catheter tip in satisfactory positioning of right atrium and is ready for use. 3. Conscious sedation without complication     US DUP ABD PEL RETRO SCROT COMPLETE    Result Date: 7/11/2022  SCROTAL ULTRASOUND HISTORY : scrotal pain PRIOR : none COMMENTS : Sonographic evaluation of the scrotum and testicles was obtained. Testicular size :        Right : 4.0 x 2.8 x 2.2 cm        Left : 3.7 x 2.6 x 2.1 cm Testicles : Normal echotexture and doppler flow without solid mass. 9 x 5 x 2 mm cluster of small adjacent cysts in the periphery of the right testicle. Epididymides : Bilateral epididymal head cysts or small. Hydrocele : Small bilateral Varicocele : None     IMPRESSION : No cause for acute pain identified. IR REMOVE TUNNELED CVAD WO SQ PORT/PUMP    Result Date: 7/11/2022  Tunneled central venous catheter removal HISTORY: Bacteremia COMMENTS:  The patient's right chest central venous catheter  was prepped and draped in the usual sterile fashion. The skin was anesthetized with 2% Lidocaine. Heparin was aspirated from all lumens of the catheter. A small incision was made over the cuff of the subcutaneous tunnel. The cuff was freed from the surrounding tissue using blunt dissection. The catheter was removed in its entirety.  The incision was closed with 4-0 Vicryl and dermabond. A sterile dressing was applied. The patient tolerated the procedure well. The catheter tip was sent for culture. IMPRESSION : Successful removal of a right chest tunneled central venous catheter. Blood loss : minimal (less than 5 cc) Specimens removed : central venous catheter     IR ABSCESS DRAINAGE PERC    Result Date: 7/14/2022  EXAM: IR ABSCESS DRAINAGE PERC INDICATION: abscess : Dr. Ivette Boyer: The patient was advised of the benefits, risks, and alternatives of the procedure and informed consent was obtained. A timeout was performed with verification of the patient's name, MRN, and procedure to be performed. The patient was positioned in the supine position on the angiography table. Preliminary ultrasound was performed, demonstrating a large fluid collection the right lower quadrant. The site was marked and prepped with 2% chlorhexidine and draped with a sterile sheet. All operators wore hat, mask, and sterile gloves and utilized appropriate hand hygiene. Ultrasound was used to help localize the are of concern. A sterile probe cover and sterile ultrasound gel were used. Moderate sedation was performed by the physician including the presence of an independent trained observers that assisted in monitoring the patient's level of consciousness and physiological status. Following the administration of Midazolam and Fentanyl the physician spent continuous face-to-face time with the patient. Sedation start time: 5:11 PM Sedation end time: 5:20 PM Anticipated access site was anesthetized with lidocaine. Small skin incision was made. An 18-gauge singlewall needle was advanced under continuous ultrasound guidance into the fluid collection. A sample was then removed of what appear to be hematoma and sent for further analysis. An Amplatz wire was advanced through the 18-gauge needle into the fluid collection.  Serial dilation was then performed and a 10 Andorran locking pigtail drainage catheter was placed. The patient tolerate the procedure well and was transferred from the angiography suite in stable condition. Estimated blood loss: Less than 5 mL. Complications: None. Impression: 1. Successful image guided 10 French abscess drain placement. PROCEDURES   Unless otherwise noted below, none     Procedures    CRITICAL CARE TIME   N/A    CONSULTS:  None      EMERGENCY DEPARTMENT COURSE and DIFFERENTIAL DIAGNOSIS/MDM:   Vitals:    Vitals:    07/16/22 0956 07/16/22 1031 07/16/22 1130   BP: (!) 176/90 (!) 149/88 (!) 159/89   Pulse: 88     Resp: 16     Temp: 97.7 °F (36.5 °C)     TempSrc: Oral     SpO2: 100%  99%   Weight: 259 lb (117.5 kg)     Height: 6' 4\" (1.93 m)         Patient was given thefollowing medications:  Medications   HYDROmorphone HCl PF (DILAUDID) injection 1 mg (1 mg IntraVENous Given 7/16/22 1149)   lactated ringers infusion 1,000 mL (0 mLs IntraVENous Stopped 7/16/22 1145)   ondansetron (ZOFRAN) injection 4 mg (4 mg IntraVENous Given 7/16/22 1109)       49-year-old male in today chief complaint of epigastric abdominal pain with associated nausea without vomiting last dialysis yesterday. Had recent hernia raphe done with a CT on 7/10/2022 showing hernia mesh and wall fluid collection left anterior pelvis. Checking labs, CT to r/o SBO, postop complication. Labs benign, Renal function at baseline  No bacteremia given present labs  CT nonacute as well. Postop pain vs. Chronic abdominal pain  F/u with gen surg as needed. Is this patient to be included in the SEP-1 Core Measure? No   Exclusion criteria - the patient is NOT to be included for SEP-1 Core Measure due to: Infection is not suspected     FINAL IMPRESSION      1.  Postoperative pain          DISPOSITION/PLAN   DISPOSITION Decision To Discharge 07/16/2022 11:40:44 AM      PATIENT REFERREDTO:  Keven Valadez MD  [address] [address]  [phone]          DISCHARGE MEDICATIONS:  Discharge Medication List as of 7/16/2022 11:45 AM        START taking these medications    Details   HYDROcodone-acetaminophen (NORCO) 5-325 MG per tablet Take 1 tablet by mouth every 6 hours as needed for Pain for up to 3 days. Intended supply: 3 days.  Take lowest dose possible to manage pain, Disp-12 tablet, R-0Print             DISCONTINUED MEDICATIONS:  Discharge Medication List as of 7/16/2022 11:45 AM                 (Please note that portions ofthis note were completed with a voice recognition program.  Efforts were made to edit the dictations but occasionally words are mis-transcribed.)    Janee Melendez MD (electronically signed)           Janee Melendez MD  07/16/22 8970

## 2022-07-16 NOTE — ED NOTES
RN at bedside. Bed low and locked. Call light in reach. Meds given. Pt states no further needs at this time.      Cordell Wells RN  07/16/22 1038

## 2022-07-17 LAB
ANAEROBIC CULTURE: NORMAL
GRAM STAIN RESULT: NORMAL
WOUND/ABSCESS: NORMAL

## 2022-07-18 ENCOUNTER — CARE COORDINATION (OUTPATIENT)
Dept: CASE MANAGEMENT | Age: 62
End: 2022-07-18

## 2022-07-18 ENCOUNTER — OFFICE VISIT (OUTPATIENT)
Dept: FAMILY MEDICINE CLINIC | Age: 62
End: 2022-07-18
Payer: MEDICAID

## 2022-07-18 VITALS
WEIGHT: 262.6 LBS | TEMPERATURE: 97.9 F | HEART RATE: 88 BPM | SYSTOLIC BLOOD PRESSURE: 138 MMHG | DIASTOLIC BLOOD PRESSURE: 82 MMHG | RESPIRATION RATE: 12 BRPM | BODY MASS INDEX: 31.96 KG/M2 | OXYGEN SATURATION: 98 %

## 2022-07-18 DIAGNOSIS — M1A.09X0 IDIOPATHIC CHRONIC GOUT OF MULTIPLE SITES WITHOUT TOPHUS: ICD-10-CM

## 2022-07-18 DIAGNOSIS — N18.4 TYPE 2 DIABETES MELLITUS WITH STAGE 4 CHRONIC KIDNEY DISEASE, UNSPECIFIED WHETHER LONG TERM INSULIN USE (HCC): ICD-10-CM

## 2022-07-18 DIAGNOSIS — I50.22 CHRONIC SYSTOLIC CONGESTIVE HEART FAILURE (HCC): ICD-10-CM

## 2022-07-18 DIAGNOSIS — Z99.2 ESRD (END STAGE RENAL DISEASE) ON DIALYSIS (HCC): ICD-10-CM

## 2022-07-18 DIAGNOSIS — E11.22 TYPE 2 DIABETES MELLITUS WITH STAGE 4 CHRONIC KIDNEY DISEASE, UNSPECIFIED WHETHER LONG TERM INSULIN USE (HCC): ICD-10-CM

## 2022-07-18 DIAGNOSIS — D63.1 ANEMIA DUE TO STAGE 5 CHRONIC KIDNEY DISEASE (HCC): ICD-10-CM

## 2022-07-18 DIAGNOSIS — N18.5 ANEMIA DUE TO STAGE 5 CHRONIC KIDNEY DISEASE (HCC): ICD-10-CM

## 2022-07-18 DIAGNOSIS — K40.20 NON-RECURRENT BILATERAL INGUINAL HERNIA WITHOUT OBSTRUCTION OR GANGRENE: Primary | ICD-10-CM

## 2022-07-18 DIAGNOSIS — H90.3 SENSORINEURAL HEARING LOSS (SNHL) OF BOTH EARS: ICD-10-CM

## 2022-07-18 DIAGNOSIS — R18.8 ABDOMINAL FLUID COLLECTION: ICD-10-CM

## 2022-07-18 DIAGNOSIS — N18.6 ESRD (END STAGE RENAL DISEASE) ON DIALYSIS (HCC): ICD-10-CM

## 2022-07-18 PROCEDURE — 2022F DILAT RTA XM EVC RTNOPTHY: CPT | Performed by: FAMILY MEDICINE

## 2022-07-18 PROCEDURE — 3044F HG A1C LEVEL LT 7.0%: CPT | Performed by: FAMILY MEDICINE

## 2022-07-18 PROCEDURE — 99214 OFFICE O/P EST MOD 30 MIN: CPT | Performed by: FAMILY MEDICINE

## 2022-07-18 PROCEDURE — 1111F DSCHRG MED/CURRENT MED MERGE: CPT | Performed by: FAMILY MEDICINE

## 2022-07-18 PROCEDURE — 1036F TOBACCO NON-USER: CPT | Performed by: FAMILY MEDICINE

## 2022-07-18 PROCEDURE — G8417 CALC BMI ABV UP PARAM F/U: HCPCS | Performed by: FAMILY MEDICINE

## 2022-07-18 PROCEDURE — G8427 DOCREV CUR MEDS BY ELIG CLIN: HCPCS | Performed by: FAMILY MEDICINE

## 2022-07-18 PROCEDURE — 3017F COLORECTAL CA SCREEN DOC REV: CPT | Performed by: FAMILY MEDICINE

## 2022-07-18 RX ORDER — OXYCODONE HYDROCHLORIDE AND ACETAMINOPHEN 5; 325 MG/1; MG/1
1 TABLET ORAL EVERY 8 HOURS PRN
Qty: 90 TABLET | Refills: 0 | Status: SHIPPED | OUTPATIENT
Start: 2022-07-18 | End: 2022-08-03 | Stop reason: HOSPADM

## 2022-07-18 SDOH — ECONOMIC STABILITY: TRANSPORTATION INSECURITY
IN THE PAST 12 MONTHS, HAS LACK OF TRANSPORTATION KEPT YOU FROM MEETINGS, WORK, OR FROM GETTING THINGS NEEDED FOR DAILY LIVING?: NO

## 2022-07-18 SDOH — ECONOMIC STABILITY: FOOD INSECURITY: WITHIN THE PAST 12 MONTHS, YOU WORRIED THAT YOUR FOOD WOULD RUN OUT BEFORE YOU GOT MONEY TO BUY MORE.: NEVER TRUE

## 2022-07-18 SDOH — ECONOMIC STABILITY: TRANSPORTATION INSECURITY
IN THE PAST 12 MONTHS, HAS THE LACK OF TRANSPORTATION KEPT YOU FROM MEDICAL APPOINTMENTS OR FROM GETTING MEDICATIONS?: NO

## 2022-07-18 SDOH — ECONOMIC STABILITY: FOOD INSECURITY: WITHIN THE PAST 12 MONTHS, THE FOOD YOU BOUGHT JUST DIDN'T LAST AND YOU DIDN'T HAVE MONEY TO GET MORE.: NEVER TRUE

## 2022-07-18 ASSESSMENT — PATIENT HEALTH QUESTIONNAIRE - PHQ9
1. LITTLE INTEREST OR PLEASURE IN DOING THINGS: 0
SUM OF ALL RESPONSES TO PHQ QUESTIONS 1-9: 0
2. FEELING DOWN, DEPRESSED OR HOPELESS: 0
SUM OF ALL RESPONSES TO PHQ9 QUESTIONS 1 & 2: 0
SUM OF ALL RESPONSES TO PHQ QUESTIONS 1-9: 0

## 2022-07-18 ASSESSMENT — SOCIAL DETERMINANTS OF HEALTH (SDOH): HOW HARD IS IT FOR YOU TO PAY FOR THE VERY BASICS LIKE FOOD, HOUSING, MEDICAL CARE, AND HEATING?: NOT HARD AT ALL

## 2022-07-18 NOTE — PROGRESS NOTES
Here for f/u and recheck of blood pressure, ESRD, recent hospitalization for hernia     Pt was admitted with recurrent pain associated with bilateral inguinal hernias. Pt underwent surgery 4/2022 and needed repeat surgery 6/2022 due to recurrence. They suspected that it was related to his PD fluid related to his ESRD. Pt was back in hospital a few weeks ago for septicemia related to abd abscess after his surgery. Pt was in the hospital from 7/9 through 7/14 with treatment of fluid collection in pelvis bilaterally. They did drain the R sided fluid but not the left side. Pt had his tunneled CVAD removed with culture tip + for staph aureus. Pt was tx with abx and had new CVC placed for continued hemodialysis. Pt was treated with IV abx and will continue to receive Ancef 2gm TIW at hemodialysis    F/u CT scan done a few days ago with some recurrent abd pain issues, which he felt that it was different from prior pain, did have f/u CT that showed decrease in size of abd fluid collections s/p surgery with no s/s of infection/abscess    Pt following with vascular surgery for placement of AV fistula, to be done 7/29/2022. Pt also working with  for transplant elvauation for consideration of kidney transplant related to his ESRD. Pt has significantly decreased use of EtOH. Pt continues to be off work, is currently on STD but anticipates that in the near future, anticipates he will be retiring. Pt states company does not have LTD, and anticipates that in September would transition to disability. Pt was given nortriptyline for his mood, was given through dr. Linh Caballero for mood. Pt stopped due to some issues of twitching in mouth, side of face          Except as noted above in the history of present illness, the review of systems is  negative for headache, vision changes, chest pain, shortness of breath, abdominal pain, urinary sx, bowel changes.     Past medical, surgical, and social history reviewed and updated  Medications and allergies reviewed and updated    O: /82   Pulse 88   Temp 97.9 °F (36.6 °C) (Temporal)   Resp 12   Wt 262 lb 9.6 oz (119.1 kg)   SpO2 98%   BMI 31.96 kg/m²   GEN: No acute distress, cooperative, well nourished, alert. HEENT: PEERLA, EOMI , normocephalic/atraumatic, nares and oropharynx clear. Mucous membranes normal, Tympanic membranes clear bilaterally. Neck: soft, supple, no thyromegaly, mass, no Lymphadenopathy  CV: Regular rate and rhythm, no murmur, rubs, gallops. No edema. Resp: Clear to auscultation bilaterally good air entry bilaterally  No crackles, wheeze. Breathing comfortably. Psych: mood stable, No suicidal thoughts or ideation   Ext: no clubbing, cyanosis, edema          Wt Readings from Last 3 Encounters:   07/18/22 262 lb 9.6 oz (119.1 kg)   07/16/22 259 lb (117.5 kg)   07/14/22 253 lb 1.4 oz (114.8 kg)           ASSESSMENT / PLAN:    1. Non-recurrent bilateral inguinal hernia without obstruction or gangrene  Doing well s/p surgery  Does have post op fluid collections bilateral pelvis, s/p drainage of R sided fluid collection  Cont supportive therapy    2. ESRD (end stage renal disease) on dialysis [facility])  Recent infection of catheter tip, s/p replacement  Cont IV abx at dialysis TIW  F/u with nephrology  Having AV fistula placed in a few weeks with dr. Tye Zimmerman    3. Anemia due to stage 5 chronic kidney disease ([address]  stable    4. Type 2 diabetes mellitus with stage 4 chronic kidney disease, unspecified whether long term insulin use (HCC)  Stable @ goal  A1c controlled  No need for therapy at this time    5. Chronic systolic congestive heart failure (HCC)  stable    6. Abdominal fluid collection  S/p surgery  S/p IR drainage  The patient is reassured that these symptoms do not appear to represent a serious or threatening condition. 7. Sensorineural hearing loss (SNHL) of both ears  - [facility] [place] [facility]IR[facility], Audiology, [place]    8. Idiopathic chronic gout of multiple sites without tophus  Stable w/o progressive sx  Cont percocet TID prn  refills given as below  - oxyCODONE-acetaminophen (PERCOCET) 5-325 MG per tablet; Take 1 tablet by mouth every 8 hours as needed for Pain for up to 30 days. Dispense: 90 tablet; Refill: 0           Follow-up appointment:   Call or return to clinic prn if these symptoms worsen or fail to improve as anticipated. Discussed use, benefit, and side effects of all prescribed medications. Barriers to medication compliance addressed. All patient questions answered. Pt voiced understanding. When applicable, patient's outside records were reviewed through [facility]. The patient has signed appropriate paperworks/consents.

## 2022-07-18 NOTE — CARE COORDINATION
[facility] Transitions Initial Follow Up Call    Call within 2 business days of discharge: Yes    Patient: Osmar Menjivar Patient : 1960   MRN: [id number]  Reason for Admission: Post op pain  Discharge Date: 22 RARS: Readmission Risk Score: 27.4 ( )      Last Discharge [facility]       Date Complaint Diagnosis Description Type Department Provider    22 Abdominal Pain Postoperative pain ED (DISCHARGE) [facility] ED Shon Gomez MD             Spoke with: Marina Baugh (wife-HIPPA verified) and patient in the background. She report that patient's abdominal pain is improving not a intense. She states that patient had an appointment with PCP today and everything went well. She denies any c/o cp, sob, cough, dizziness, headache, n/v, diarrhea, fever, or chills. Patient report that appetite and fluid intake is good and denies any problems with bowel or bladder(HD). Patient is taking all medications as ordered. Patient stated that doctor reviewed medications at follow up appointment today. Writer advised on CHF management tools and ESRD fluid restrictions patient and wife v/u. Denies any needs at this time. Patient instructed to continue to monitor s/s, reporting any that may present to MD immediately for early intervention. Reminded of COVID 19 precautions. Agreeable to f/u calls. [facility] provided contact information for future needs. Facility: [facility]      Non-face-to-face services provided:  Obtained and reviewed discharge summary and/or continuity of care documents  Education of patient/family/caregiver/guardian to support self-management-s/s os Covid and when to contact the doctor  Assessment and support for treatment adherence and medication management-.    Transitions of Care Initial Call    Was this an external facility discharge?  No Discharge Facility: [facility]      Challenges to be reviewed by the provider   Additional needs identified to be addressed with provider: No  none             Method of communication with provider : none    Advance Care Planning:   Does patient have an Advance Directive: referral to internal ACP facilitator. Advance Care Planning   Healthcare Decision Maker:    Primary Decision Maker: Stephanie Pinto - [phone]       LPN Care Coordinator contacted the family by telephone to perform post hospital discharge assessment. Verified name and  with family as identifiers. Provided introduction to self, and explanation of the LPN CC role. LPN CC reviewed discharge instructions, medical action plan and red flags with family who verbalized understanding. Family given an opportunity to ask questions and does not have any further questions or concerns at this time. Were discharge instructions available to patient? Yes. Reviewed appropriate site of care based on symptoms and resources available to patient including: PCP  Specialist  When to call 911. The family agrees to contact the PCP office for questions related to their healthcare. Medication reconciliation was no performed with family, who verbalizes understanding of administration of home medications. Advised obtaining a 90-day supply of all daily and as-needed medications. Was patient discharged with a pulse oximeter? no    LPN CC provided contact information. Follow up   based on severity of symptoms and risk factors.   Plan for next call: symptom management-abdominal pain        Care Transitions 24 Hour Call    Do you have any ongoing symptoms?: No  Do you have a copy of your discharge instructions?: Yes  Do you have all of your prescriptions and are they filled?: Yes  Have you been contacted by a [address]?: No  Have you scheduled your follow up appointment?: Yes  How are you going to get to your appointment?: Car - family or friend to transport  Were you discharged with any Home Care or Post Acute Services: No  Care Transitions Interventions  No Identified Needs         Follow Up  Future Appointments   Date Time Provider Diego Francois   8/11/2022  9:00 AM MD Aditya Marshall LPN

## 2022-07-25 NOTE — PROGRESS NOTES
Place patient label inside box (if no patient label, complete below)  Name:  :  MR#:   [address] / PROCEDURE  I (we), Bevely Solid (Patient Name) authorize DASH CROOKS (Provider / Bethann Kehr) and/or such assistants as may be selected by him/her, to perform the following operation/procedure(s): LEFT RADIOCEPHALIC ARTERIOVENOUS FISTULA CREATION        Note: If unable to obtain consent prior to an emergent procedure, document the emergent reason in the medical record. This procedure has been explained to my (our) satisfaction and included in the explanation was: The intended benefit, nature, and extent of the procedure to be performed; The significant risks involved and the probability of success; Alternative procedures and methods of treatment; The dangers and probable consequences of such alternatives (including no procedure or treatment); The expected consequences of the procedure on my future health; Whether other qualified individuals would be performing important surgical tasks and/or whether  would be present to advise or support the procedure. I (we) understand that there are other risks of infection and other serious complications in the pre-operative/procedural and postoperative/procedural stages of my (our) care. I (we) have asked all of the questions which I (we) thought were important in deciding whether or not to undergo treatment or diagnosis. These questions have been answered to my (our) satisfaction. I (we) understand that no assurance can be given that the procedure will be a success, and no guarantee or warranty of success has been given to me (us). It has been explained to me (us) that during the course of the operation/procedure, unforeseen conditions may be revealed that necessitate extension of the original procedure(s) or different procedure(s) than those set forth in Paragraph 1.  I (we) authorize and request that the above-named physician, his/her assistants or his/her designees, perform procedures as necessary and desirable if deemed to be in my (our) best interest.     Revised 8/2/2021                                                                          Page 1 of 2         I acknowledge that health care personnel may be observing this procedure for the purpose of medical education or other specified purposes as may be necessary as requested and/or approved by my (our) physician. I (we) consent to the disposal by the hospital Pathologist of the removed tissue, parts or organs in accordance with hospital policy. I do ____ do not ____ consent to the use of a local infiltration pain blocking agent that will be used by my provider/surgical provider to help alleviate pain during my procedure. I do ____ do not ____ consent to an emergent blood transfusion in the case of a life-threatening situation that requires blood components to be administered. This consent is valid for 24 hours from the beginning of the procedure. This patient does ____ or does not ____ currently have a DNR status/order. If DNR order is in place, obtain Addendum to the Surgical Consent for ALL Patients with a DNR Order to address margarita-operative status for limited intervention or DNR suspension.      I have read and fully understand the above Consent for Operation/Procedure and that all blanks were completed before I signed the consent.   _____________________________       _____________________      ____/____am/pm  Signature of Patient or legal representative      Printed Name / Relationship            Date / Time   ____________________________       _____________________      ____/____am/pm  Witness to Signature                                    Printed Name                    Date / Time    If patient is unable to sign or is a minor, complete the following)  Patient is a minor, ____ years of age, or unable to sign because: ______________________________________________________________________________________________    If a phone consent is obtained, consent will be documented by using two health care professionals, each affirming that the consenting party has no questions and gives consent for the procedure discussed with the physician/provider.   _____________________          ____________________       _____/_____am/pm   2nd witness to phone consent        Printed name           Date / Time    Informed Consent:  I have provided the explanation described above in section 1 to the patient and/or legal representative.  I have provided the patient and/or legal representative with an opportunity to ask any questions about the proposed operation/procedure.   ___________________________          ____________________         ____/____am/pm  Provider / Proceduralist                            Printed name            Date / Time  Revised 8/2/2021                                                                      Page 2 of 2

## 2022-07-28 ENCOUNTER — ANESTHESIA EVENT (OUTPATIENT)
Dept: OPERATING ROOM | Age: 62
End: 2022-07-28
Payer: MEDICARE

## 2022-07-29 ENCOUNTER — ANESTHESIA (OUTPATIENT)
Dept: OPERATING ROOM | Age: 62
End: 2022-07-29
Payer: MEDICARE

## 2022-07-29 ENCOUNTER — HOSPITAL ENCOUNTER (OUTPATIENT)
Age: 62
Setting detail: OUTPATIENT SURGERY
Discharge: HOME | End: 2022-07-29
Attending: SURGERY | Admitting: SURGERY
Payer: MEDICARE

## 2022-07-29 VITALS
SYSTOLIC BLOOD PRESSURE: 156 MMHG | TEMPERATURE: 97.5 F | WEIGHT: 254.04 LBS | RESPIRATION RATE: 14 BRPM | HEART RATE: 77 BPM | BODY MASS INDEX: 30.94 KG/M2 | HEIGHT: 76 IN | DIASTOLIC BLOOD PRESSURE: 94 MMHG | OXYGEN SATURATION: 97 %

## 2022-07-29 PROBLEM — N18.6 ESRD (END STAGE RENAL DISEASE) (HCC): Status: ACTIVE | Noted: 2022-07-29

## 2022-07-29 LAB
ANION GAP SERPL CALCULATED.3IONS-SCNC: 12 MMOL/L (ref 3–16)
BASOPHILS ABSOLUTE: 0 K/UL (ref 0–0.2)
BASOPHILS RELATIVE PERCENT: 0.8 %
BUN BLDV-MCNC: 14 MG/DL (ref 7–20)
CALCIUM SERPL-MCNC: 8.4 MG/DL (ref 8.3–10.6)
CHLORIDE BLD-SCNC: 102 MMOL/L (ref 99–110)
CO2: 26 MMOL/L (ref 21–32)
CREAT SERPL-MCNC: 3.7 MG/DL (ref 0.8–1.3)
EOSINOPHILS ABSOLUTE: 0.3 K/UL (ref 0–0.6)
EOSINOPHILS RELATIVE PERCENT: 6.3 %
GFR AFRICAN AMERICAN: 20
GFR NON-AFRICAN AMERICAN: 17
GLUCOSE BLD-MCNC: 74 MG/DL (ref 70–99)
GLUCOSE BLD-MCNC: 92 MG/DL (ref 70–99)
HCT VFR BLD CALC: 35.4 % (ref 40.5–52.5)
HEMOGLOBIN: 11.3 G/DL (ref 13.5–17.5)
LYMPHOCYTES ABSOLUTE: 1.1 K/UL (ref 1–5.1)
LYMPHOCYTES RELATIVE PERCENT: 21 %
MCH RBC QN AUTO: 26.9 PG (ref 26–34)
MCHC RBC AUTO-ENTMCNC: 32 G/DL (ref 31–36)
MCV RBC AUTO: 84.2 FL (ref 80–100)
MONOCYTES ABSOLUTE: 0.5 K/UL (ref 0–1.3)
MONOCYTES RELATIVE PERCENT: 9.3 %
NEUTROPHILS ABSOLUTE: 3.3 K/UL (ref 1.7–7.7)
NEUTROPHILS RELATIVE PERCENT: 62.6 %
PDW BLD-RTO: 18.7 % (ref 12.4–15.4)
PERFORMED ON: NORMAL
PLATELET # BLD: 211 K/UL (ref 135–450)
PMV BLD AUTO: 7.8 FL (ref 5–10.5)
POTASSIUM SERPL-SCNC: 4 MMOL/L (ref 3.5–5.1)
RBC # BLD: 4.21 M/UL (ref 4.2–5.9)
SODIUM BLD-SCNC: 140 MMOL/L (ref 136–145)
WBC # BLD: 5.2 K/UL (ref 4–11)

## 2022-07-29 PROCEDURE — 2580000003 HC RX 258: Performed by: SURGERY

## 2022-07-29 PROCEDURE — 6360000002 HC RX W HCPCS: Performed by: ANESTHESIOLOGY

## 2022-07-29 PROCEDURE — 6370000000 HC RX 637 (ALT 250 FOR IP): Performed by: STUDENT IN AN ORGANIZED HEALTH CARE EDUCATION/TRAINING PROGRAM

## 2022-07-29 PROCEDURE — 6360000002 HC RX W HCPCS: Performed by: NURSE ANESTHETIST, CERTIFIED REGISTERED

## 2022-07-29 PROCEDURE — 36821 AV FUSION DIRECT ANY SITE: CPT | Performed by: SURGERY

## 2022-07-29 PROCEDURE — 7100000001 HC PACU RECOVERY - ADDTL 15 MIN: Performed by: SURGERY

## 2022-07-29 PROCEDURE — 7100000011 HC PHASE II RECOVERY - ADDTL 15 MIN: Performed by: SURGERY

## 2022-07-29 PROCEDURE — 6360000002 HC RX W HCPCS

## 2022-07-29 PROCEDURE — 3700000000 HC ANESTHESIA ATTENDED CARE: Performed by: SURGERY

## 2022-07-29 PROCEDURE — 6360000002 HC RX W HCPCS: Performed by: SURGERY

## 2022-07-29 PROCEDURE — 2720000010 HC SURG SUPPLY STERILE: Performed by: SURGERY

## 2022-07-29 PROCEDURE — 85025 COMPLETE CBC W/AUTO DIFF WBC: CPT

## 2022-07-29 PROCEDURE — 80048 BASIC METABOLIC PNL TOTAL CA: CPT

## 2022-07-29 PROCEDURE — 7100000000 HC PACU RECOVERY - FIRST 15 MIN: Performed by: SURGERY

## 2022-07-29 PROCEDURE — 2709999900 HC NON-CHARGEABLE SUPPLY: Performed by: SURGERY

## 2022-07-29 PROCEDURE — 7100000010 HC PHASE II RECOVERY - FIRST 15 MIN: Performed by: SURGERY

## 2022-07-29 PROCEDURE — 3600000004 HC SURGERY LEVEL 4 BASE: Performed by: SURGERY

## 2022-07-29 PROCEDURE — A4217 STERILE WATER/SALINE, 500 ML: HCPCS | Performed by: SURGERY

## 2022-07-29 PROCEDURE — 3600000014 HC SURGERY LEVEL 4 ADDTL 15MIN: Performed by: SURGERY

## 2022-07-29 PROCEDURE — 2580000003 HC RX 258: Performed by: NURSE ANESTHETIST, CERTIFIED REGISTERED

## 2022-07-29 PROCEDURE — 3700000001 HC ADD 15 MINUTES (ANESTHESIA): Performed by: SURGERY

## 2022-07-29 PROCEDURE — 2580000003 HC RX 258: Performed by: ANESTHESIOLOGY

## 2022-07-29 RX ORDER — SODIUM CHLORIDE 9 MG/ML
25 INJECTION, SOLUTION INTRAVENOUS PRN
Status: DISCONTINUED | OUTPATIENT
Start: 2022-07-29 | End: 2022-07-29 | Stop reason: HOSPADM

## 2022-07-29 RX ORDER — FENTANYL CITRATE 50 UG/ML
50 INJECTION, SOLUTION INTRAMUSCULAR; INTRAVENOUS EVERY 5 MIN PRN
Status: COMPLETED | OUTPATIENT
Start: 2022-07-29 | End: 2022-07-29

## 2022-07-29 RX ORDER — FENTANYL CITRATE 50 UG/ML
INJECTION, SOLUTION INTRAMUSCULAR; INTRAVENOUS
Status: COMPLETED
Start: 2022-07-29 | End: 2022-07-29

## 2022-07-29 RX ORDER — PROCHLORPERAZINE EDISYLATE 5 MG/ML
5 INJECTION INTRAMUSCULAR; INTRAVENOUS
Status: DISCONTINUED | OUTPATIENT
Start: 2022-07-29 | End: 2022-07-29 | Stop reason: HOSPADM

## 2022-07-29 RX ORDER — HEPARIN SODIUM 1000 [USP'U]/ML
INJECTION, SOLUTION INTRAVENOUS; SUBCUTANEOUS PRN
Status: DISCONTINUED | OUTPATIENT
Start: 2022-07-29 | End: 2022-07-29 | Stop reason: SDUPTHER

## 2022-07-29 RX ORDER — SODIUM CHLORIDE 9 MG/ML
INJECTION, SOLUTION INTRAVENOUS CONTINUOUS
Status: DISCONTINUED | OUTPATIENT
Start: 2022-07-29 | End: 2022-07-29 | Stop reason: HOSPADM

## 2022-07-29 RX ORDER — HYDRALAZINE HYDROCHLORIDE 20 MG/ML
10 INJECTION INTRAMUSCULAR; INTRAVENOUS
Status: DISCONTINUED | OUTPATIENT
Start: 2022-07-29 | End: 2022-07-29 | Stop reason: HOSPADM

## 2022-07-29 RX ORDER — SODIUM CHLORIDE 9 MG/ML
INJECTION, SOLUTION INTRAVENOUS CONTINUOUS PRN
Status: DISCONTINUED | OUTPATIENT
Start: 2022-07-29 | End: 2022-07-29 | Stop reason: SDUPTHER

## 2022-07-29 RX ORDER — SODIUM CHLORIDE 0.9 % (FLUSH) 0.9 %
5-40 SYRINGE (ML) INJECTION PRN
Status: DISCONTINUED | OUTPATIENT
Start: 2022-07-29 | End: 2022-07-29 | Stop reason: HOSPADM

## 2022-07-29 RX ORDER — ONDANSETRON 2 MG/ML
INJECTION INTRAMUSCULAR; INTRAVENOUS PRN
Status: DISCONTINUED | OUTPATIENT
Start: 2022-07-29 | End: 2022-07-29 | Stop reason: SDUPTHER

## 2022-07-29 RX ORDER — SODIUM CHLORIDE 0.9 % (FLUSH) 0.9 %
5-40 SYRINGE (ML) INJECTION EVERY 12 HOURS SCHEDULED
Status: DISCONTINUED | OUTPATIENT
Start: 2022-07-29 | End: 2022-07-29 | Stop reason: HOSPADM

## 2022-07-29 RX ORDER — FENTANYL CITRATE 50 UG/ML
INJECTION, SOLUTION INTRAMUSCULAR; INTRAVENOUS PRN
Status: DISCONTINUED | OUTPATIENT
Start: 2022-07-29 | End: 2022-07-29 | Stop reason: SDUPTHER

## 2022-07-29 RX ORDER — FENTANYL CITRATE 50 UG/ML
25 INJECTION, SOLUTION INTRAMUSCULAR; INTRAVENOUS EVERY 5 MIN PRN
Status: DISCONTINUED | OUTPATIENT
Start: 2022-07-29 | End: 2022-07-29 | Stop reason: HOSPADM

## 2022-07-29 RX ORDER — ACETAMINOPHEN 325 MG/1
650 TABLET ORAL
Status: DISCONTINUED | OUTPATIENT
Start: 2022-07-29 | End: 2022-07-29

## 2022-07-29 RX ORDER — SUCCINYLCHOLINE CHLORIDE 20 MG/ML
INJECTION INTRAMUSCULAR; INTRAVENOUS PRN
Status: DISCONTINUED | OUTPATIENT
Start: 2022-07-29 | End: 2022-07-29 | Stop reason: SDUPTHER

## 2022-07-29 RX ORDER — PROPOFOL 10 MG/ML
INJECTION, EMULSION INTRAVENOUS PRN
Status: DISCONTINUED | OUTPATIENT
Start: 2022-07-29 | End: 2022-07-29 | Stop reason: SDUPTHER

## 2022-07-29 RX ORDER — LIDOCAINE HYDROCHLORIDE 20 MG/ML
INJECTION, SOLUTION INTRAVENOUS PRN
Status: DISCONTINUED | OUTPATIENT
Start: 2022-07-29 | End: 2022-07-29 | Stop reason: SDUPTHER

## 2022-07-29 RX ORDER — ACETAMINOPHEN 500 MG
1000 TABLET ORAL
Status: COMPLETED | OUTPATIENT
Start: 2022-07-29 | End: 2022-07-29

## 2022-07-29 RX ADMIN — FENTANYL CITRATE 100 MCG: 50 INJECTION, SOLUTION INTRAMUSCULAR; INTRAVENOUS at 12:02

## 2022-07-29 RX ADMIN — FENTANYL CITRATE 50 MCG: 50 INJECTION, SOLUTION INTRAMUSCULAR; INTRAVENOUS at 12:30

## 2022-07-29 RX ADMIN — FENTANYL CITRATE 50 MCG: 50 INJECTION, SOLUTION INTRAMUSCULAR; INTRAVENOUS at 12:36

## 2022-07-29 RX ADMIN — HYDROMORPHONE HYDROCHLORIDE 0.25 MG: 1 INJECTION, SOLUTION INTRAMUSCULAR; INTRAVENOUS; SUBCUTANEOUS at 13:19

## 2022-07-29 RX ADMIN — ACETAMINOPHEN 1000 MG: 500 TABLET ORAL at 13:09

## 2022-07-29 RX ADMIN — FENTANYL CITRATE 100 MCG: 50 INJECTION, SOLUTION INTRAMUSCULAR; INTRAVENOUS at 10:40

## 2022-07-29 RX ADMIN — CEFAZOLIN 2000 MG: 2 INJECTION, POWDER, FOR SOLUTION INTRAMUSCULAR; INTRAVENOUS at 10:40

## 2022-07-29 RX ADMIN — SUCCINYLCHOLINE CHLORIDE 100 MG: 20 INJECTION, SOLUTION INTRAMUSCULAR; INTRAVENOUS; PARENTERAL at 10:45

## 2022-07-29 RX ADMIN — HEPARIN SODIUM 5000 UNITS: 1000 INJECTION INTRAVENOUS; SUBCUTANEOUS at 11:20

## 2022-07-29 RX ADMIN — HYDROMORPHONE HYDROCHLORIDE 0.25 MG: 1 INJECTION, SOLUTION INTRAMUSCULAR; INTRAVENOUS; SUBCUTANEOUS at 13:54

## 2022-07-29 RX ADMIN — LIDOCAINE HYDROCHLORIDE 100 MG: 20 INJECTION, SOLUTION INTRAVENOUS at 10:44

## 2022-07-29 RX ADMIN — ONDANSETRON 4 MG: 2 INJECTION INTRAMUSCULAR; INTRAVENOUS at 10:50

## 2022-07-29 RX ADMIN — SODIUM CHLORIDE: 9 INJECTION, SOLUTION INTRAVENOUS at 08:44

## 2022-07-29 RX ADMIN — FENTANYL CITRATE 25 MCG: 50 INJECTION, SOLUTION INTRAMUSCULAR; INTRAVENOUS at 14:36

## 2022-07-29 RX ADMIN — PROPOFOL 200 MG: 10 INJECTION, EMULSION INTRAVENOUS at 10:45

## 2022-07-29 RX ADMIN — SODIUM CHLORIDE: 9 INJECTION, SOLUTION INTRAVENOUS at 09:11

## 2022-07-29 ASSESSMENT — PAIN DESCRIPTION - FREQUENCY
FREQUENCY: CONTINUOUS
FREQUENCY: CONTINUOUS

## 2022-07-29 ASSESSMENT — PAIN DESCRIPTION - PAIN TYPE
TYPE: SURGICAL PAIN
TYPE: SURGICAL PAIN

## 2022-07-29 ASSESSMENT — PAIN SCALES - GENERAL
PAINLEVEL_OUTOF10: 6
PAINLEVEL_OUTOF10: 6
PAINLEVEL_OUTOF10: 7
PAINLEVEL_OUTOF10: 8

## 2022-07-29 ASSESSMENT — PAIN DESCRIPTION - LOCATION
LOCATION: WRIST
LOCATION: ARM
LOCATION: WRIST
LOCATION: WRIST

## 2022-07-29 ASSESSMENT — PAIN - FUNCTIONAL ASSESSMENT
PAIN_FUNCTIONAL_ASSESSMENT: PREVENTS OR INTERFERES SOME ACTIVE ACTIVITIES AND ADLS
PAIN_FUNCTIONAL_ASSESSMENT: 0-10

## 2022-07-29 ASSESSMENT — PAIN DESCRIPTION - ORIENTATION
ORIENTATION: LEFT

## 2022-07-29 ASSESSMENT — LIFESTYLE VARIABLES: SMOKING_STATUS: 0

## 2022-07-29 ASSESSMENT — PAIN DESCRIPTION - DESCRIPTORS
DESCRIPTORS: ACHING;SORE
DESCRIPTORS: SORE;ACHING
DESCRIPTORS: DISCOMFORT
DESCRIPTORS: ACHING;SORE
DESCRIPTORS: STABBING;THROBBING

## 2022-07-29 ASSESSMENT — PAIN DESCRIPTION - ONSET
ONSET: ON-GOING
ONSET: ON-GOING

## 2022-07-29 NOTE — OP NOTE
Operative Note      Patient: Saundra Cherry  YOB: 1960  MRN: [id number]    Date of Procedure: 7/29/2022    Pre-Op Diagnosis: [facility] disease) [facility] [N18.6]    Post-Op Diagnosis: Same       Procedure(s):  LEFT RADIOCEPHALIC ARTERIOVENOUS FISTULA CREATION    Surgeon(s):  William Trujillo MD    Assistant:   Resident: Taran Gutierrez MD    Anesthesia: General    Estimated Blood Loss (mL): less than 50     Complications: None    Specimens:   * No specimens in log *    Implants:  * No implants in log *      Drains:   [REMOVED] Open Drain 07/10/22 Right; Anterior Hip (Removed)   Site Description Unable to view 07/13/22 1037   Dressing Status Dry; Intact; Old drainage noted 07/13/22 1037   Drainage Appearance Dark Red;Brown 07/13/22 1037   Status Unclamped 07/13/22 1037   Output (ml) 0 ml 07/13/22 1037     Detailed Description of Procedure: The patient was taken to the operating room and  placed in the supine position with the Left arm outstretched, and prepped   and draped in the usual sterile fashion using Betadine solution. Time-out  was called and the patient and operative site were appropriately  identified. Patient was given IV antibiotics prior to the incision. A 5 cm  Oblique incision was made on the volar aspect of the distal right arm  centered over the path of the distal radial artery and cephalic vein. Tissue was dissected down through the subcutaneous tissue with  electrocautery. The cephalic vein was identified and was sharply mobilized  for approximately 5-6 cm. All side branches were ligated with 4-0 silk  sutures and transected. Next, the distal radial artery was identified. The fascial sheath overlying the neurovascular bundle was opened  longitudinally. The artery was identified and dissected for approximately  3 cm and then looped with vessel loops. The patient was systemically heparinized with 5000 units of IV heparin.    After appropriate circulation time, the artery was distally and then  proximally controlled, and a 1.5 cm arteriotomy was performed. The  cephalic vein was ligated distally. It was spatulated and anastomosed to  the arteriotomy using 7-0 Prolene suture in a standard running fashion. All bleeding points were controlled. The artery was flushed and then flow  was restored to the fistula first and then to the hand. The wound was  irrigated with antibiotic saline solution and closed with interrupted 2-0  Vicryl sutures, followed by the closure of the skin with 4-0 Monocryl  sutures and Skin Affix. Doppler evaluation revealed  good quality fistula flow through the cephalic  vein outflow. The hand was warm and well perfused with palpable distal,  radial and ulnar pulses. The patient was taken to the recovery room in stable  condition.      Electronically signed by Christiano Dinh MD on 7/29/2022 at 5:35 PM

## 2022-07-29 NOTE — TELEPHONE ENCOUNTER
Requested Prescriptions     Pending Prescriptions Disp Refills    NIFEdipine (ADALAT CC) 90 MG extended release tablet [Pharmacy Med Name: NIFEDIPINE ER 90 MG TABLET] 30 tablet 4     Sig: TAKE 1 TABLET BY MOUTH EVERY DAY     Last ov 7/18/2022  Last labs 7/16/22      Currently inpatient

## 2022-07-29 NOTE — H&P
4/15/2022    ROBOTIC, RECURRENT INCISIONAL HERNIA REPAIR WITH BIOSYNTHETIC MESH; LAPAROSCOPIC PERITONEAL DIALYSIS CATHETER REVISION WITH LAPAROSCOPIC OMENTOPEXY performed by Lisette Flores DO at [place] [address] Bilateral 4/15/2022    BILATERAL OPEN INGUINAL HERNI REPAIR performed by Lisette Flores DO at [place] Bilateral 6/13/2022    ROBOTIC RECURRENT BILATERAL INGUINAL HERNIA REPAIR, LYSIS OF ADHESIONS, PERITONEAL DIALYSIS CATHETER REMOVAL performed by Lisette Flores DO at [facility] TUNNELED [address] 5 YEARS  01/14/2022    IR TUNNELED CATHETER PLACEMENT GREATER THAN 5 YEARS 1/14/2022 [facility] SPECIAL PROCEDURES    IR TUNNELED CATHETER PLACEMENT GREATER THAN 5 YEARS  7/14/2022    IR TUNNELED CATHETER PLACEMENT GREATER THAN 5 YEARS 7/14/2022 [facility] SPECIAL PROCEDURES    UPPER GASTROINTESTINAL ENDOSCOPY  05/28/2016    biopsies, multiple small gastric ulcers    UPPER GASTROINTESTINAL ENDOSCOPY  09/12/2016    UPPER GASTROINTESTINAL ENDOSCOPY N/A 3/18/2022    EGD DIAGNOSTIC ONLY performed by Reyne Schlatter, MD at [place] [address] N/A 1/17/2022    LAPAROSCOPIC incarcerated VENTRAL HERNIA REPAIR performed by Lisette Flores DO at [address]       Medications Prior to Admission:      Prior to Admission medications    Medication Sig Start Date End Date Taking? Authorizing Provider   oxyCODONE-acetaminophen (PERCOCET) 5-325 MG per tablet Take 1 tablet by mouth every 8 hours as needed for Pain for up to 30 days.  7/18/22 8/17/22  Donte Goldberg MD   celecoxib (CELEBREX) 100 MG capsule Take 1 capsule by mouth daily  Patient not taking: Reported on 7/18/2022 7/14/22 8/13/22  Katerina Allan MD   Folic Acid-Vit C1-YKE N98 0.5-5-0.2 MG TABS Take by mouth  Patient not taking: Reported on 7/18/2022    Historical Provider, MD   clotrimazole (LOTRIMIN) 1 % external solution 1 APPLICATION TO NAILS DAILY 4/27/22   Historical Provider, MD   polyethylene glycol (GLYCOLAX) 17 GM/SCOOP powder TAKE 17 G BY MOUTH 2 TIMES DAILY 3/24/22   Historical Provider, MD   NIFEdipine (ADALAT CC) 90 MG extended release tablet Take 90 mg by mouth daily  Patient not taking: Reported on 7/18/2022 5/19/22   Historical Provider, MD   vitamin D (ERGOCALCIFEROL) 1.25 MG (16570 UT) CAPS capsule Take 1 capsule by mouth once a week 6/21/22   MIGUEL Bowser CNP   torsemide [facility]) 100 MG tablet Take 1 tablet by mouth daily 6/21/22   MIGUEL Bowser CNP   calcitRIOL (ROCALTROL) 0.5 MCG capsule Take 1 capsule by mouth three times a week During HD 6/22/22   MIGUEL Bowser CNP   docusate sodium (COLACE) 100 MG capsule Take 1 capsule by mouth 2 times daily 4/15/22   Marisel Abrams MD   B Complex-C-Folic Acid (DIALYVITE 224 PO) Take 1 tablet by mouth once a week     Historical Provider, MD   Methoxy PEG-Epoetin Beta (MIRCERA) 50 MCG/0.3ML SOSY Inject as directed Twice a  month   Patient not taking: Reported on 7/18/2022    Historical Provider, MD   Iron Sucrose (VENOFER IV) Infuse intravenously every 30 days   Patient not taking: Reported on 7/18/2022    Historical Provider, MD   bisacodyl (BISACODYL) 5 MG EC tablet Take 1 tablet by mouth daily as needed for Constipation 1/25/22   Mervin Lemons MD   spironolactone (ALDACTONE) 50 MG tablet Take 1 tablet by mouth at bedtime 1/25/22   Mervin Lemons MD   metoprolol succinate (TOPROL XL) 50 MG extended release tablet Take 1 tablet by mouth at bedtime TAKE 1 TABLET BY MOUTH EVERY DAY 1/25/22   Mervin Lemons MD   atorvastatin (LIPITOR) 20 MG tablet TAKE 1 TABLET BY MOUTH EVERY DAY 1/25/22   Mervin Lemons MD   nortriptyline (PAMELOR) 10 MG capsule Take 10 mg by mouth nightly   Patient not taking: Reported on 7/18/2022 12/15/21   Historical Provider, MD   omeprazole (PRILOSEC) 40 MG delayed release capsule TAKE 1 CAPSULE BY MOUTH EVERY DAY 12/16/21   Yoana Jensen MD   sildenafil (VIAGRA) 100 MG tablet Take 1 tablet by mouth as needed for Erectile Dysfunction 8/16/21   Priya Reinoso MD   aspirin 81 MG tablet Take 81 mg by mouth daily    Historical Provider, MD         Allergies:  Patient has no known allergies. PHYSICAL EXAM:      BP (!) 157/94   Pulse 85   Temp 98.7 °F (37.1 °C) (Temporal)   Resp 16   Ht 6' 4\" (1.93 m)   Wt 254 lb 0.6 oz (115.2 kg)   SpO2 99%   BMI 30.92 kg/m²      Airway:  Airway patent with no audible stridor    Heart:  Regular rate and rhythm, No murmur noted    Lungs:  No increased work of breathing, good air exchange, clear to auscultation bilaterally, no crackles or wheezing    Abdomen:  Soft, non-distended, non-tender, no rebound tenderness or guarding, and no masses palpated    ASSESSMENT AND PLAN     Patient is a 59 y.o. male with above specified procedure planned. 1.  The patients history and physical was obtained and signed off by the pre-admission testing department. Patient seen and focused exam done today- no new changes since last physical exam on 7/18/22    2. Access to ancillary services are available per request of the provider.     MIGUEL Murdock - CNP     7/29/2022

## 2022-07-29 NOTE — ANESTHESIA PRE PROCEDURE
Department of Anesthesiology  Preprocedure Note       Name:  Cliff Ashton   Age:  59 y.o.  :  1960                                          MRN:  [id number]         Date:  2022      Surgeon: Osiris Foster):  Lynne Abarca MD    Procedure: Procedure(s):  LEFT RADIOCEPHALIC ARTERIOVENOUS FISTULA CREATION    Medications prior to admission:   Prior to Admission medications    Medication Sig Start Date End Date Taking? Authorizing Provider   oxyCODONE-acetaminophen (PERCOCET) 5-325 MG per tablet Take 1 tablet by mouth every 8 hours as needed for Pain for up to 30 days.  22  Julián Ruiz MD   celecoxib (CELEBREX) 100 MG capsule Take 1 capsule by mouth daily  Patient not taking: No sig reported 22  Vernon Sierra MD   Folic Acid-Vit Y4-MNC G11 0.5-5-0.2 MG TABS Take by mouth  Patient not taking: No sig reported    Historical Provider, MD   clotrimazole (LOTRIMIN) 1 % external solution 1 APPLICATION TO NAILS DAILY  Patient not taking: Reported on 2022   Historical Provider, MD   polyethylene glycol (GLYCOLAX) 17 GM/SCOOP powder TAKE 17 G BY MOUTH 2 TIMES DAILY 3/24/22   Historical Provider, MD   NIFEdipine (ADALAT CC) 90 MG extended release tablet Take 90 mg by mouth daily  Patient not taking: No sig reported 22   Historical Provider, MD   vitamin D (ERGOCALCIFEROL) 1.25 MG (90956 UT) CAPS capsule Take 1 capsule by mouth once a week 22   MIGUEL García CNP   torsemide [facility]) 100 MG tablet Take 1 tablet by mouth daily  Patient not taking: Reported on 2022   MIGUEL García CNP   calcitRIOL (ROCALTROL) 0.5 MCG capsule Take 1 capsule by mouth three times a week During HD 22   MIGUEL García CNP   docusate sodium (COLACE) 100 MG capsule Take 1 capsule by mouth 2 times daily 4/15/22   Finn Sam MD   B Complex-C-Folic Acid (DIALYVITE 021 PO) Take 1 tablet by mouth once a week     Historical Provider, MD   Methoxy PEG-Epoetin Beta (MIRCERA) 50 MCG/0.3ML SOSY Inject as directed Twice a  month   Patient not taking: No sig reported    Historical Provider, MD   Iron Sucrose (VENOFER IV) Infuse intravenously every 30 days   Patient not taking: No sig reported    Historical Provider, MD   bisacodyl (BISACODYL) 5 MG EC tablet Take 1 tablet by mouth daily as needed for Constipation 1/25/22   Peter Llamas MD   spironolactone (ALDACTONE) 50 MG tablet Take 1 tablet by mouth at bedtime  Patient not taking: Reported on 7/29/2022 1/25/22   Peter Llamas MD   metoprolol succinate (TOPROL XL) 50 MG extended release tablet Take 1 tablet by mouth at bedtime TAKE 1 TABLET BY MOUTH EVERY DAY 1/25/22   Peter Llamas MD   atorvastatin (LIPITOR) 20 MG tablet TAKE 1 TABLET BY MOUTH EVERY DAY 1/25/22   Peter Llamas MD   nortriptyline (PAMELOR) 10 MG capsule Take 10 mg by mouth nightly   Patient not taking: No sig reported 12/15/21   Historical Provider, MD   omeprazole (PRILOSEC) 40 MG delayed release capsule TAKE 1 CAPSULE BY MOUTH EVERY DAY 12/16/21   Andra Rodriguez MD   sildenafil (VIAGRA) 100 MG tablet Take 1 tablet by mouth as needed for Erectile Dysfunction 8/16/21   Andra Rodriguez MD   aspirin 81 MG tablet Take 81 mg by mouth daily    Historical Provider, MD       Current medications:    No current facility-administered medications for this visit. No current outpatient medications on file.      Facility-Administered Medications Ordered in Other Visits   Medication Dose Route Frequency Provider Last Rate Last Admin    ceFAZolin (ANCEF) 2,000 mg in sodium chloride 0.9 % 50 mL IVPB (mini-bag)  2,000 mg IntraVENous Once Marino Jansen MD        0.9 % sodium chloride infusion   IntraVENous Continuous Ben Para,  mL/hr at 07/29/22 0911 New Bag at 07/29/22 0911    0.9 % sodium chloride infusion   IntraVENous Continuous PRN MIGUEL Gregory - CRNA   New Bag at 07/29/22 9931       Allergies:  No Known Allergies    Problem List:    Patient Active Problem List   Diagnosis Code    Atrial fibrillation ([facility]) I48.91    Chronic kidney disease-mineral and bone disorder N18.9, E83.9, M89.9    Gout M10.9    Erectile dysfunction N52.9    Elevated PSA R97.20    Essential hypertension, benign I10    Headache R51.9    Diverticulosis K57.90    Arthralgia of multiple joints C21.65    Umbilical hernia without obstruction and without gangrene K42.9    Knee pain, bilateral M25.561, M25.562    Alcohol use disorder, mild, abuse F10.10    CELSA (obstructive sleep apnea) G47.33    Duodenal ulcer disease K26.9    Diabetic nephropathy associated with type 2 diabetes mellitus ([facility]) E11.21    Type 2 diabetes mellitus with diabetic nephropathy, without long-term current use of insulin ([facility]) E11.21    Lateral epicondylitis of right elbow M77.11    Chronic bilateral low back pain without sciatica M54.50, G89.29    Morbid obesity due to excess calories ([facility]) E66.01    Chronic systolic congestive heart failure ([facility]) I50.22    Acute kidney injury superimposed on CKD ([facility]) N17.9, N18.9    Stroke-like symptoms R29.90    DMII (diabetes mellitus, type 2) ([facility]) E11.9    Hyperlipidemia E78.5    Hypocalcemia E83.51    Hypomagnesemia E83.42    Hypokalemia E87.6    Muscle cramps R25.2    ANTONIO (acute kidney injury) ([facility]) N17.9    Electrolyte imbalance E87.8    Anemia due to stage 5 chronic kidney disease ([facility]) N18.5, D63.1    Congestive heart failure ([facility]) I50.9    LV dysfunction I51.9    Anemia in ESRD (end-stage renal disease) ([facility]) N18.6, D63.1    Bilateral recurrent inguinal hernia without obstruction or gangrene K40.21    Recurrent incisional hernia with incarceration K43.0    Bilateral inguinal hernia without obstruction or gangrene K40.20    Status post bilateral hernia repair Z98.890, Z87.19    Inguinal hernia, left K40.90    Moderate malnutrition ([facility]) E44.0    Arthritis M19.90    BPH (benign prostatic hyperplasia) N40.0    DDD (degenerative disc disease), cervical M50.30    Hematuria, microscopic R31.29    Prostatism N40.0    Tendinopathy M67.90    Intractable abdominal pain R10.9    Sepsis (HCC) A41.9    Septicemia ([facility] A41.9    Staphylococcus aureus bacteremia R78.81, B95.61    Catheter-related bloodstream infection T80.211A    Pelvic fluid collection R18.8       Past Medical History:        Diagnosis Date    Arthralgia of multiple joints 10/27/2015    Arthritis     Atrial fibrillation (HCC)     Chronic kidney disease     stage 4    Chronic systolic congestive heart failure ([facility] 8/16/2021    CRI (chronic renal insufficiency)     Diabetic nephropathy associated with type 2 diabetes mellitus ([facility] 10/11/2018    Diverticulosis     Elevated PSA     Erectile dysfunction     ESRD (end stage renal disease) on dialysis ([facility] 2/24/2022    Gout     Gout     Hemrrhoid NOS w/ complication NEC     History of MRSA infection     Hypertension     CELSA (obstructive sleep apnea) 07/14/2017    uses C-pap machine    Type 2 diabetes mellitus without complication, without long-term current use of insulin ([facility] 9/40/2284    Umbilical hernia without obstruction and without gangrene 2/2/2016       Past Surgical History:        Procedure Laterality Date    COLONOSCOPY      COLONOSCOPY N/A 3/18/2022    COLONOSCOPY POLYPECTOMY SNARE/COLD BIOPSY performed by Dev Prescott MD at [address] N/A 1/17/2022    LAPAROSCOPIC PERITONEAL DIALYSIS CATHETER PLACEMENT and omentopexy performed by Jasmina Sparks DO at [address] [facility], [address] [place] [address] N/A 4/15/2022    ROBOTIC, RECURRENT INCISIONAL HERNIA REPAIR WITH BIOSYNTHETIC MESH; LAPAROSCOPIC PERITONEAL DIALYSIS CATHETER REVISION WITH LAPAROSCOPIC OMENTOPEXY performed by Jasmina Sparks DO at [address] [place]  Bilateral 4/15/2022    BILATERAL OPEN INGUINAL HERNI REPAIR performed by Kristine Mayes DO at [address] Bilateral 6/13/2022    ROBOTIC RECURRENT BILATERAL INGUINAL HERNIA REPAIR, LYSIS OF ADHESIONS, PERITONEAL DIALYSIS CATHETER REMOVAL performed by Kristine Mayes DO at [address] IR TUNNELED CATHETER PLACEMENT GREATER THAN 5 YEARS  01/14/2022    IR TUNNELED CATHETER PLACEMENT GREATER THAN 5 YEARS 1/14/2022 [facility] SPECIAL PROCEDURES    IR TUNNELED CATHETER PLACEMENT GREATER THAN 5 YEARS  7/14/2022    IR TUNNELED CATHETER PLACEMENT GREATER THAN 5 YEARS 7/14/2022 [address] SPECIAL PROCEDURES    UPPER GASTROINTESTINAL ENDOSCOPY  05/28/2016    biopsies, multiple small gastric ulcers    UPPER GASTROINTESTINAL ENDOSCOPY  09/12/2016    UPPER GASTROINTESTINAL ENDOSCOPY N/A 3/18/2022    EGD DIAGNOSTIC ONLY performed by Jen Shaffer MD at [place] [id number] N/A 1/17/2022    LAPAROSCOPIC incarcerated [place] performed by Kristine Mayes DO at [address] History:    Social History     Tobacco Use    Smoking status: Former     Types: Cigars    Smokeless tobacco: Never   Substance Use Topics    Alcohol use: Not Currently     Comment: maybe a beer a weekend                                Counseling given: Not Answered      Vital Signs (Current): There were no vitals filed for this visit.                                            BP Readings from Last 3 Encounters:   07/29/22 (!) 157/94   07/18/22 138/82   07/16/22 (!) 159/89       NPO Status:                                                                                 BMI:   Wt Readings from Last 3 Encounters:   07/29/22 254 lb 0.6 oz (115.2 kg)   07/18/22 262 lb 9.6 oz (119.1 kg)   07/16/22 259 lb (117.5 kg)     There is no height or weight on file to calculate BMI.    CBC:   Lab Results   Component Value Date/Time    WBC 6.6 07/16/2022 10:09 AM    RBC 4.26 07/16/2022 10:09 AM    HGB 11.4 07/16/2022 10:09 AM    HCT 35.9 07/16/2022 10:09 AM    MCV 84.3 07/16/2022 10:09 AM    RDW 17.3 07/16/2022 10:09 AM     07/16/2022 10:09 AM       CMP:   Lab Results   Component Value Date/Time     07/16/2022 10:09 AM    K 4.2 07/16/2022 10:09 AM     07/16/2022 10:09 AM    CO2 21 07/16/2022 10:09 AM    BUN 39 07/16/2022 10:09 AM    CREATININE 5.4 07/16/2022 10:09 AM    GFRAA 13 07/16/2022 10:09 AM    GFRAA >60 05/24/2013 04:26 PM    AGRATIO 1.1 07/16/2022 10:09 AM    LABGLOM 11 07/16/2022 10:09 AM    LABGLOM 56 04/14/2011 07:53 AM    GLUCOSE 144 07/16/2022 10:09 AM    PROT 7.5 07/16/2022 10:09 AM    PROT 7.5 10/03/2012 11:08 AM    CALCIUM 8.5 07/16/2022 10:09 AM    BILITOT <0.2 07/16/2022 10:09 AM    ALKPHOS 64 07/16/2022 10:09 AM    AST 84 07/16/2022 10:09 AM    ALT 35 07/16/2022 10:09 AM       POC Tests:   No results for input(s): POCGLU, POCNA, POCK, POCCL, POCBUN, POCHEMO, POCHCT in the last 72 hours.     Coags:   Lab Results   Component Value Date/Time    PROTIME 13.4 07/16/2022 10:09 AM    INR 1.03 07/16/2022 10:09 AM    APTT 34.9 07/16/2022 10:09 AM       HCG (If Applicable): No results found for: PREGTESTUR, PREGSERUM, HCG, HCGQUANT     ABGs: No results found for: PHART, PO2ART, WLK9KPF, VZL9VRT, BEART, K3CFESGW     Type & Screen (If Applicable):  No results found for: LABABO, LABRH    Drug/Infectious Status (If Applicable):  No results found for: HIV, HEPCAB    COVID-19 Screening (If Applicable):   Lab Results   Component Value Date/Time    COVID19 NOT DETECTED 07/10/2022 04:09 AM           Anesthesia Evaluation  Patient summary reviewed and Nursing notes reviewed no history of anesthetic complications:   Airway: Mallampati: III  TM distance: >3 FB   Neck ROM: full  Mouth opening: > = 3 FB   Dental: normal exam         Pulmonary: breath sounds clear to auscultation  (+) sleep apnea: on CPAP,      (-) not a current smoker (never)                           Cardiovascular:  Exercise tolerance: good (>4 METS),   (+) hypertension: moderate, dysrhythmias: atrial fibrillation, CHF: no interval change, systolic and diastolic, hyperlipidemia      NYHA Classification: II  ECG reviewed  Rhythm: regular  Rate: normal  Echocardiogram reviewed         Beta Blocker:  Dose within 24 Hrs      ROS comment: Left ventricular cavity size is normal. There is moderate concentric left   ventricular hypertrophy. Overall left ventricular systolic function appears normal with an estimated   ejection fraction of 55-60%. No regional wall motion abnormalities are noted. Diastolic filling   parameters suggests normal diastolic function. Estimated pulmonary artery systolic pressure is at 31 mmHg assuming a right   atrial pressure of 8 mmHg. No evidence of vegetations noted on valve leaflets. Neuro/Psych:   (+) headaches:, psychiatric history:             ROS comment: Alcohol use GI/Hepatic/Renal:   (+) PUD, renal disease: ESRD and dialysis, morbid obesity          Endo/Other:    (+) DiabetesType II DM, well controlled, , blood dyscrasia: anemia:., electrolyte abnormalities, no malignancy/cancer. (-) hypothyroidism, hyperthyroidism, no malignancy/cancer               Abdominal:   (+) obese,     Abdomen: soft. Vascular: Other Findings:             Anesthesia Plan      general     ASA 4       Induction: intravenous. MIPS: Postoperative opioids intended and Prophylactic antiemetics administered. Anesthetic plan and risks discussed with patient. Plan discussed with CRNA.                     Bryson Shirley,    7/29/2022

## 2022-07-29 NOTE — ANESTHESIA POSTPROCEDURE EVALUATION
Department of Anesthesiology  Postprocedure Note    Patient: Meghan Celestin  MRN: [id number]  YOB: 1960  Date of evaluation: 7/29/2022      Procedure Summary     Date: 07/29/22 Room / Location: [address] [place] [address] [facility] / [facility]    Anesthesia Start: 4784 Anesthesia Stop: 1207    Procedure: LEFT RADIOCEPHALIC ARTERIOVENOUS FISTULA CREATION (Left: Arm Upper) Diagnosis:       ESRD (end stage renal disease) ([address]      (ESRD (end stage renal disease) ([facility] [N18.6])    Surgeons: Catherine Nelson MD Responsible Provider: Mei Regan DO    Anesthesia Type: general ASA Status: 4          Anesthesia Type: No value filed.     Johny Phase I: Johny Score: 10    Johny Phase II:        Anesthesia Post Evaluation    Patient location during evaluation: PACU  Patient participation: complete - patient participated  Level of consciousness: awake  Pain score: 0  Airway patency: patent  Nausea & Vomiting: no nausea and no vomiting  Complications: no  Cardiovascular status: blood pressure returned to baseline  Respiratory status: acceptable  Hydration status: euvolemic  Multimodal analgesia pain management approach

## 2022-07-29 NOTE — PROGRESS NOTES
Ambulatory Surgery/Procedure Discharge Note  Pt alert and stableIV dcd, fluids taken well   Pt voided in bathroom prior to discharge  Ice to L arm incision  OR drsgs clean dry and intact  No nausea  Pain tolerable for discharge after pain med  given in SDS  Verbal and written discharge instructions given to pt and wife  R upper drsg intact with dialysis catheters in place  Dcd to car per wheelchair to car with wife present    Vitals:    07/29/22 1428   BP: (!) 156/94   Pulse: 77   Resp: 14   Temp: 97.5 °F (36.4 °C)   SpO2: 97%       In: 840 [P.O.:120; I.V.:720]  Out: -     Restroom use offered before discharge. Yes    Pain assessment:  level of pain (1-10, 10 severe),   Pain level:4        Patient discharged to home/self care.  Patient discharged via wheel chair by transporter to waiting family/S.O.       7/29/2022 3:19 PM

## 2022-07-29 NOTE — PROGRESS NOTES
Pt has received IV pain medications and PO Tylenol. Pt continues to c/o pain at 6/10 on pain scale. Dr Diaz Esters notified. No new orders. OK to discharge to home.

## 2022-07-29 NOTE — PROGRESS NOTES
PACU Transfer to [facility]    Procedure(s):  LEFT RADIOCEPHALIC ARTERIOVENOUS FISTULA CREATION    Pt's Current Allergies: Patient has no known allergies. Pt meets criteria to transfer to next phase of care per SHERRY SCORE and ASPAN standards    Recent Labs     07/29/22  1252   POCGLU 74       Vitals:    07/29/22 1415   BP: (!) 140/88   Pulse: 77   Resp: (!) 0   Temp: 97.2 °F (36.2 °C)   SpO2: 98%      BP within 20% of pt's admitting BP as per Sherry Score      Intake/Output Summary (Last 24 hours) at 7/29/2022 1427  Last data filed at 7/29/2022 1415  Gross per 24 hour   Intake 840 ml   Output --   Net 840 ml       Pain assessment:  present - adequately treated  Pain Level: 6    Patient was assessed for unknown alterations to skin integrity. There were not unknown alterations observed. Patient transferred to care of Evangelist Tapia RN.    Family updated and directed to Evangelist Tapia    7/29/2022 2:27 PM

## 2022-07-29 NOTE — PROGRESS NOTES
Call placed to patient's wife with update on POC. Wife Mervat Venegas states she missed a phone call from Dr Angelique Ryan. Dr Alin Carroll is aware.

## 2022-07-29 NOTE — PROGRESS NOTES
Procedure(s):  LEFT RADIOCEPHALIC ARTERIOVENOUS FISTULA CREATION    Current Allergies: Patient has no known allergies. No results for input(s): POCGLU in the last 72 hours. Admitted to PACU bed 18 from OR. Arrived on a stretcher . Attached to PACU monitoring system. Alarms and parameters set. Report received from anesthesia personnel. OR staff did not report skin issues that were observed while in OR  Pt arrived with oxygen per nasal cannula with oxygen at 3 liters. Athrombic wraps in place. Surgical site to left wrist is covered with Prineo and open to air.

## 2022-07-29 NOTE — DISCHARGE INSTRUCTIONS
ASPIRIN: Okay to restart aspirin regimen today 7/29/22    Diet:   - Can resume regular diet    Wound Care:   - Skin incisions are covered with mesh glued to your skin, this will wear off in 1-2 weeks. You may shower, no tub bath or soaking of incision. Activity:   - No heavy lifting greater than a milk jug until follow up. Pain management:   - No driving while on narcotics, otherwise, you can drive when you can sit comfortably behind the steering wheel and can slam on the brake without it hurting or turn the wheel sharply without it hurting. Practice this when sitting the car with it parked in your driveway before trying to drive. For mild to moderate pain:  - Please take over the counter tylenol or motrin for any post-operative pain you might have. Please take this as needed and as recommended on the label. Return if:   Call/ Return to ED for increased redness, worsening pain, drainage from wound, fevers, or any other concerns about your incision or post op course. Please follow up with Dr. Stevie Hernandez in 2 weeks. Please call [phone] to make your appointment. 7/29/2022  No Known Allergies    [facility] AMBULATORY PROCEDURE DISCHARGE INSTRUCTIONS    There are potential side effects of anesthesia or sedation you may experience for the first 24 hours. These side effects include:    Confusion or Memory loss, Dizziness, or Delayed Reaction Times   [x]A responsible person should be with you for the next 24 hours. Do not operate any vehicles (automobiles, bicycles, motorcycles) or power tools or machinery for 24 hours. Do not sign any legal documents or make any legal decisions for 24 hours. Do not drink alcohol for 24 hours or while taking narcotic pain medication. Nausea/Diet  [x] Nausea is not uncommon after having general anesthesia. Start with light diet and progress to your normal diet as you feel like eating.  However, if you experience nausea or repeated episodes of vomiting which persist beyond 12-24 hours, notify your physician. Once nausea has passed, remember to keep drinking fluids. Difficulty Passing Urine  [x]Drink extra amounts of fluid today. Notify your physician if you have not urinated within 8 hours after your procedure or you feel uncomfortable. Irritated Throat from a Breathing Tube  [x]Drink extra amounts of fluid today. Lozenges may help. Muscle Aches  [x]You may experience some generalized body aches as your muscles recover from medications used to relax them during surgery. These will gradually subside. ACTIVITY INSTRUCTIONS:  [x]Rest today. Increase activity as tolerated. [x]No heavy lifting or strenuous activity  [x]No driving until cleared by your surgeon  []No driving while on narcotic pain medications or for 24 hours. Otherwise, you can drive when you can sit comfortably behind the steering wheel and can slam on the brake without it hurting or turn the wheel sharply without it hurting. Practice this while sitting in the car with it parked in your driveway before trying to drive. POST OPERATION WOUND CARE INSTRUCTIONS:       Follow instructions as instructed verbally and written by your Surgeon. Call Doctor for any questions or concerns at their office number. Someone will answer the phone 24hrs/7 days a week. MEDICATION INSTRUCTIONS:  Prescription(S) x  0  were given to you today. Use as directed. Refer to your pharmacy's medication information sheets or your pharmacist for any questions you may have about the medicines prescribed. When taking pain medications, you may experience the side effect of dizziness or drowsiness. Do not drink alcohol or drive when taking these medications.   If no prescriptions were sent home with you, you may take over the counter medications as needed for your discomfort unless your doctor directs differently    [x] You may resume all medications you were taking before surgery  [x] Give the list of your medications to your primary care physician on your next visit. Keep your med list updated and carry it with in case of emergencies. [x] If you take any blood thinning medication, such as Coumadin or Plavix, check with your surgeon on when to re-start taking it. [x] Narcotic pain medications can cause significant constipation. You may want to add a stool softener to your postoperative medication schedule or speak to your surgeon on how best to manage this side effect. NARCOTIC SAFETY:  Your pain medicine is only for you to take. Safely store your medicines. Store pills up high and out of reach of children and pets. Ensure safety caps are snapped tightly  Keep track of how many pills you have left    Unused medication can be disposed of by taking them to a drop-off box or take-back program that is authorized by the [facility]. Access to a site near you can be found on the [facility] [employer] [facility] website ([address] [email or web address]). If you have a CPAP machine, it is very important that you use it daily during all periods of sleep and daytime rest during your recovery at home. Surgery and Anesthesia place a significant amount of stress on your body. Using your CPAP will help keep you safe and lessen the negative effects of that stress. If you smoke STOP. Smoking can interfere with healing and your respiratory health after surgery. We care about your health! Watch for these significant complications. Call physician if they or any other problems occur:  Fever over 101 F°    Redness, swelling, hardness or warmth at the operative site  Unrelieved nausea    Foul smelling or cloudy drainage at the operative site   Unrelieved pain after taking medications as prescribed by your doctor    Blood soaked dressing.  (Some oozing may be normal)  Numb, pale, blue, cold or tingling extremity  You have prolonged anesthesia/sedation side effects          FAQs  (frequently asked questions)  About Surgical Site Infections    What is a Surgical Site Infection (SSI)? A surgical site infection is an infection that occurs after surgery in the part of the body where the surgery took place. Most patients who have surgery do not develop an infection. However, infections develop in about 1 to 3 out of every 100 patients who have surgery. Some common symptoms of a surgical site infection are:   Redness and pain around the area where you had surgery  Drainage of cloudy fluid from your surgical wound   Fever    Can SSIs be treated? Yes. Most surgical site infections can be treated with antibiotics. The antibiotic given to you depends on the bacteria (germs) causing the infection. Sometimes patients with SSIs also need another surgery to treat the infection. What are some of the things that hospitals are doing to prevent SSIs? To prevent SSIs, doctors, nurses and other healthcare providers:   Clean their hands and arms up to their elbows with an antiseptic agent just before the surgery. Clean their hands with soap and water or an alcohol-based hand rub before and after caring for each patient. May remove some of your hair immediately before your surgery using electric clippers if the hair is in the same area where the procedure will occur. They should not shave you with a razor. Wear special hair covers, masks, gowns, and gloves during surgery to keep the surgery area clean. Give you antibiotics before your surgery starts. In most cases, you should get antibiotics within 60 minutes before the surgery starts and the antibiotics should be stopped within 24 hours after surgery. Clean the skin at the site of your surgery with a special soap that kills germs. What can I do to help prevent SSIs? Before you surgery:  Tell your doctor about other medical problems you may have. Health problems such as allergies, diabetes, and obesity could affect your surgery and your treatment. Quit smoking.   Patients who smoke get more infections. Talk to your doctor about how you can quit before your surgery. Do not shave near where you will have surgery. Shaving with a razor can irritate your skin and make it easier to develop an infection. At the time of your surgery:  Speak up if someone tries to shave you with a razor before surgery. Ask why you need to be shaved and talk with your surgeon if you have any concerns. Ask if you will get antibiotics before surgery. After your surgery:  Make sure that your healthcare providers clean their hands before examining you, either with soap and water or an alcohol-based hand rub. IF YOU DO NOT SEE YOUR PROVIDERS CLEAN THEIR HANDS, PLEASE ASK THEM TO DO SO. Family and friends who visit you should not touch the surgical wound or dressings. Family and friends should clean their hands with soap and water or an alcohol-based hand rub before and after visiting you. If you do not see them clean their hands, ask them to clean their hands. What do I need to do when I go home from the hospital?  Before you go home, your doctor nurses should explain everything you need to know about taking care of your wound. Make sure you understand how to care for your wound before you leave the hospital.    Always clean your hands before and after caring for your wound. Before you go home, make sure you know who to contact if you have questions or problems after you get home. If you have any symptoms of an infection, such as redness and pain at the surgery site, drainage, or fever, call your doctor immediately. If you have additional questions, please ask your doctor or nurse.

## 2022-07-30 RX ORDER — NIFEDIPINE 90 MG/1
TABLET, FILM COATED, EXTENDED RELEASE ORAL
Qty: 30 TABLET | Refills: 4 | Status: SHIPPED | OUTPATIENT
Start: 2022-07-30

## 2022-08-01 ENCOUNTER — TELEPHONE (OUTPATIENT)
Dept: VASCULAR SURGERY | Age: 62
End: 2022-08-01

## 2022-08-01 NOTE — TELEPHONE ENCOUNTER
Spoke with pt re post op concerns. I advised him these were normal and instructed him on what the s/s infection are. Pt verbalized understanding.

## 2022-08-03 ENCOUNTER — HOSPITAL ENCOUNTER (EMERGENCY)
Age: 62
Discharge: HOME | End: 2022-08-03
Attending: EMERGENCY MEDICINE
Payer: MEDICARE

## 2022-08-03 ENCOUNTER — APPOINTMENT (OUTPATIENT)
Dept: CT IMAGING | Age: 62
End: 2022-08-03
Payer: MEDICARE

## 2022-08-03 ENCOUNTER — TELEPHONE (OUTPATIENT)
Dept: OTHER | Facility: CLINIC | Age: 62
End: 2022-08-03

## 2022-08-03 VITALS
BODY MASS INDEX: 30.86 KG/M2 | TEMPERATURE: 98.4 F | RESPIRATION RATE: 18 BRPM | SYSTOLIC BLOOD PRESSURE: 171 MMHG | DIASTOLIC BLOOD PRESSURE: 97 MMHG | HEART RATE: 86 BPM | WEIGHT: 253.53 LBS | OXYGEN SATURATION: 96 %

## 2022-08-03 DIAGNOSIS — R10.9 INTRACTABLE ABDOMINAL PAIN: Primary | ICD-10-CM

## 2022-08-03 LAB
A/G RATIO: 1.7 (ref 1.1–2.2)
ALBUMIN SERPL-MCNC: 4.2 G/DL (ref 3.4–5)
ALP BLD-CCNC: 61 U/L (ref 40–129)
ALT SERPL-CCNC: <5 U/L (ref 10–40)
ANION GAP SERPL CALCULATED.3IONS-SCNC: 11 MMOL/L (ref 3–16)
AST SERPL-CCNC: 13 U/L (ref 15–37)
BASE EXCESS VENOUS: 2.6 MMOL/L (ref -2–3)
BASOPHILS ABSOLUTE: 0 K/UL (ref 0–0.2)
BASOPHILS RELATIVE PERCENT: 0.9 %
BILIRUB SERPL-MCNC: 0.3 MG/DL (ref 0–1)
BUN BLDV-MCNC: 12 MG/DL (ref 7–20)
CALCIUM SERPL-MCNC: 8.9 MG/DL (ref 8.3–10.6)
CARBOXYHEMOGLOBIN: 0.7 % (ref 0–1.5)
CHLORIDE BLD-SCNC: 101 MMOL/L (ref 99–110)
CO2: 25 MMOL/L (ref 21–32)
CREAT SERPL-MCNC: 4.2 MG/DL (ref 0.8–1.3)
EOSINOPHILS ABSOLUTE: 0.2 K/UL (ref 0–0.6)
EOSINOPHILS RELATIVE PERCENT: 4.6 %
GFR AFRICAN AMERICAN: 17
GFR NON-AFRICAN AMERICAN: 14
GLUCOSE BLD-MCNC: 106 MG/DL (ref 70–99)
HCO3 VENOUS: 26.6 MMOL/L (ref 24–28)
HCT VFR BLD CALC: 34.9 % (ref 40.5–52.5)
HEMOGLOBIN: 11.3 G/DL (ref 13.5–17.5)
LACTIC ACID: 0.7 MMOL/L (ref 0.4–2)
LIPASE: 32 U/L (ref 13–60)
LYMPHOCYTES ABSOLUTE: 0.9 K/UL (ref 1–5.1)
LYMPHOCYTES RELATIVE PERCENT: 17.8 %
MCH RBC QN AUTO: 27.1 PG (ref 26–34)
MCHC RBC AUTO-ENTMCNC: 32.4 G/DL (ref 31–36)
MCV RBC AUTO: 83.7 FL (ref 80–100)
METHEMOGLOBIN VENOUS: 0.4 % (ref 0–1.5)
MONOCYTES ABSOLUTE: 0.4 K/UL (ref 0–1.3)
MONOCYTES RELATIVE PERCENT: 8.4 %
NEUTROPHILS ABSOLUTE: 3.4 K/UL (ref 1.7–7.7)
NEUTROPHILS RELATIVE PERCENT: 68.3 %
O2 SAT, VEN: 99 %
PCO2, VEN: 38 MMHG (ref 41–51)
PDW BLD-RTO: 17.8 % (ref 12.4–15.4)
PH VENOUS: 7.45 (ref 7.35–7.45)
PLATELET # BLD: 168 K/UL (ref 135–450)
PMV BLD AUTO: 8.4 FL (ref 5–10.5)
PO2, VEN: 131 MMHG (ref 25–40)
POTASSIUM SERPL-SCNC: 3.6 MMOL/L (ref 3.5–5.1)
RBC # BLD: 4.17 M/UL (ref 4.2–5.9)
SODIUM BLD-SCNC: 137 MMOL/L (ref 136–145)
TCO2 CALC VENOUS: 28 MMOL/L
TOTAL PROTEIN: 6.7 G/DL (ref 6.4–8.2)
WBC # BLD: 5 K/UL (ref 4–11)

## 2022-08-03 PROCEDURE — 96374 THER/PROPH/DIAG INJ IV PUSH: CPT

## 2022-08-03 PROCEDURE — 83605 ASSAY OF LACTIC ACID: CPT

## 2022-08-03 PROCEDURE — 74176 CT ABD & PELVIS W/O CONTRAST: CPT

## 2022-08-03 PROCEDURE — 99284 EMERGENCY DEPT VISIT MOD MDM: CPT

## 2022-08-03 PROCEDURE — 80053 COMPREHEN METABOLIC PANEL: CPT

## 2022-08-03 PROCEDURE — 6360000002 HC RX W HCPCS: Performed by: PHYSICIAN ASSISTANT

## 2022-08-03 PROCEDURE — 85025 COMPLETE CBC W/AUTO DIFF WBC: CPT

## 2022-08-03 PROCEDURE — 82803 BLOOD GASES ANY COMBINATION: CPT

## 2022-08-03 PROCEDURE — 36415 COLL VENOUS BLD VENIPUNCTURE: CPT

## 2022-08-03 PROCEDURE — 96375 TX/PRO/DX INJ NEW DRUG ADDON: CPT

## 2022-08-03 PROCEDURE — 83690 ASSAY OF LIPASE: CPT

## 2022-08-03 PROCEDURE — 96376 TX/PRO/DX INJ SAME DRUG ADON: CPT

## 2022-08-03 RX ORDER — ONDANSETRON 2 MG/ML
4 INJECTION INTRAMUSCULAR; INTRAVENOUS ONCE
Status: COMPLETED | OUTPATIENT
Start: 2022-08-03 | End: 2022-08-03

## 2022-08-03 RX ORDER — OXYCODONE HYDROCHLORIDE AND ACETAMINOPHEN 5; 325 MG/1; MG/1
1 TABLET ORAL EVERY 6 HOURS PRN
Qty: 12 TABLET | Refills: 0 | Status: SHIPPED | OUTPATIENT
Start: 2022-08-03 | End: 2022-08-06

## 2022-08-03 RX ORDER — OXYCODONE HYDROCHLORIDE AND ACETAMINOPHEN 5; 325 MG/1; MG/1
1 TABLET ORAL EVERY 6 HOURS PRN
Qty: 28 TABLET | Refills: 0 | Status: SHIPPED | OUTPATIENT
Start: 2022-08-03 | End: 2022-08-10

## 2022-08-03 RX ORDER — CELECOXIB 100 MG/1
100 CAPSULE ORAL 2 TIMES DAILY
Qty: 30 CAPSULE | Refills: 3 | Status: SHIPPED | OUTPATIENT
Start: 2022-08-03 | End: 2022-09-01

## 2022-08-03 RX ADMIN — ONDANSETRON 4 MG: 2 INJECTION INTRAMUSCULAR; INTRAVENOUS at 10:15

## 2022-08-03 RX ADMIN — HYDROMORPHONE HYDROCHLORIDE 1 MG: 1 INJECTION, SOLUTION INTRAMUSCULAR; INTRAVENOUS; SUBCUTANEOUS at 10:15

## 2022-08-03 RX ADMIN — HYDROMORPHONE HYDROCHLORIDE 1 MG: 1 INJECTION, SOLUTION INTRAMUSCULAR; INTRAVENOUS; SUBCUTANEOUS at 11:26

## 2022-08-03 ASSESSMENT — PAIN DESCRIPTION - PAIN TYPE: TYPE: ACUTE PAIN

## 2022-08-03 ASSESSMENT — PAIN DESCRIPTION - ORIENTATION
ORIENTATION: LEFT
ORIENTATION: LEFT

## 2022-08-03 ASSESSMENT — PAIN SCALES - GENERAL
PAINLEVEL_OUTOF10: 9
PAINLEVEL_OUTOF10: 7
PAINLEVEL_OUTOF10: 9

## 2022-08-03 ASSESSMENT — PAIN - FUNCTIONAL ASSESSMENT: PAIN_FUNCTIONAL_ASSESSMENT: 0-10

## 2022-08-03 ASSESSMENT — PAIN DESCRIPTION - LOCATION
LOCATION: GROIN

## 2022-08-03 ASSESSMENT — PAIN DESCRIPTION - FREQUENCY: FREQUENCY: CONTINUOUS

## 2022-08-03 NOTE — ED NOTES
--Patient provided with discharge instructions and any prescriptions. --Instructions, dosing, and follow-up appointments reviewed with patient/family. No further questions or needs at this time. --Vital signs and patient stable upon discharge. --Patient ambulatory to lobby with wife.      Taj Mckeon RN  08/03/22 0039 Patient will perform bed mobility independently by discharge

## 2022-08-03 NOTE — DISCHARGE INSTRUCTIONS
Take medication as directed. Follow-up with Dr. Marisel Hong. Return to the ER for any emergent concerns.

## 2022-08-03 NOTE — TELEPHONE ENCOUNTER
Writer contacted ED provider Alka Kolb to inform of 30 day readmission risk. No Decision on disposition at this time. Awaiting imaging results at this time.     Call Back: If you need to call back to inform of disposition you can contact me at [phone]

## 2022-08-03 NOTE — ED PROVIDER NOTES
ED Attending Attestation Note     Date of evaluation: 8/3/2022    This patient was seen by the advance practice provider. I have seen and examined the patient, agree with the workup, evaluation, management and diagnosis. The care plan has been discussed. My assessment reveals an overall well-appearing older gentleman, who presents with increasing groin pain, in the setting of recent bilateral inguinal hernia repair, complicated by pelvic fluid collection which was drained, and ultimately found to be sterile. On examination today, he does have some tenderness to palpation in the inguinal regions, more prominent on the left, without obvious mass, or overlying skin changes. CT shows findings of increased fat stranding in the lower abdominal wall in those regions. Ultimately, physical exam and imaging findings are felt by surgery to be consistent with normal progression of the post-surgical changes.        Mike Vega MD  08/10/22 0395

## 2022-08-03 NOTE — CONSULTS
Bariatric Surgery  Resident Consult Note    Reason for Consult: inguinal pain    History of Present Illness:   Glorietta Phoenix is a 59 y.o. male with Hx of afib, CKD, T2DM, HTN, s/p dialysis fistula creation, recurrent inguinal hernia repair b/l, incisional hernia repair, ventral hernia repair, PD catheter. Patient presents with inguinal pain approximately 6 weeks after a robotic recurrent inguinal hernia repair with Yariel and removal of PD catheter. He present to [facility] ED with inguinal pain today. The pain in the inguinal region has been present off and on since the last hernia surgery, but today it was unbearable. Pain has not been relieved with tylenol or heat, made worse with moving, but was made better by laying in the fetal position. It is described as radiating from the scrotum to the inguinal ligament. He denies nausea and vomiting and reports that he has a decreased appetite due to dialysis. He denies pain on urination and is voiding appropriately. Last BM was this morning, and had one episode of diarrhea in the ED. Denies blood in either BM. He reports itching at the AV fistula site. Patient denies fever, chills, chest pain, SOB, HA, or dizziness.      Past Medical History:        Diagnosis Date    Arthralgia of multiple joints 10/27/2015    Arthritis     Atrial fibrillation (HCC)     Chronic kidney disease     stage 4    Chronic systolic congestive heart failure ([address] 8/16/2021    CRI (chronic renal insufficiency)     Diabetic nephropathy associated with type 2 diabetes mellitus ([address] 10/11/2018    Diverticulosis     Elevated PSA     Erectile dysfunction     ESRD (end stage renal disease) on dialysis ([address] 2/24/2022    Gout     Gout     Hemrrhoid NOS w/ complication NEC     History of MRSA infection     Hypertension     CELSA (obstructive sleep apnea) 07/14/2017    uses C-pap machine    Type 2 diabetes mellitus without complication, without long-term current use of insulin ([address] 5/37/7085    Umbilical hernia without obstruction and without gangrene 2/2/2016       Past Surgical History:        Procedure Laterality Date    COLONOSCOPY      COLONOSCOPY N/A 3/18/2022    COLONOSCOPY POLYPECTOMY SNARE/COLD BIOPSY performed by Cat White MD at [facility] N/A 1/17/2022    LAPAROSCOPIC PERITONEAL DIALYSIS CATHETER PLACEMENT and omentopexy performed by Stephen Victoria DO at [address] [facility] Left 7/29/2022    LEFT RADIOCEPHALIC ARTERIOVENOUS FISTULA CREATION performed by Adonay Tamayo MD at [address] [facility], [address] [facility] N/A 4/15/2022    ROBOTIC, RECURRENT INCISIONAL HERNIA REPAIR WITH BIOSYNTHETIC MESH; LAPAROSCOPIC PERITONEAL DIALYSIS CATHETER REVISION WITH LAPAROSCOPIC OMENTOPEXY performed by Stephen Victoria DO at [facility] Bilateral 4/15/2022    BILATERAL OPEN INGUINAL HERNI REPAIR performed by Stephen Victoria DO at [address] Bilateral 6/13/2022    ROBOTIC RECURRENT BILATERAL INGUINAL HERNIA REPAIR, LYSIS OF ADHESIONS, PERITONEAL DIALYSIS CATHETER REMOVAL performed by Stephen Victoria DO at [facility] [address] TUNNELED [address] 5 YEARS  01/14/2022    IR TUNNELED CATHETER PLACEMENT GREATER THAN 5 YEARS 1/14/2022 [address] PROCEDURES    IR TUNNELED CATHETER PLACEMENT GREATER THAN 5 YEARS  7/14/2022    IR TUNNELED CATHETER PLACEMENT GREATER THAN 5 YEARS 7/14/2022 [facility] SPECIAL PROCEDURES    UPPER GASTROINTESTINAL ENDOSCOPY  05/28/2016    biopsies, multiple small gastric ulcers    UPPER GASTROINTESTINAL ENDOSCOPY  09/12/2016    UPPER GASTROINTESTINAL ENDOSCOPY N/A 3/18/2022    EGD DIAGNOSTIC ONLY performed by Cat White MD at [place] [address] N/A 1/17/2022    LAPAROSCOPIC incarcerated VENTRAL HERNIA REPAIR performed by Stephen Victoria DO at [address] [facility] [address]:  Patient has no known allergies.     Medications:   Home Meds  No current facility-administered medications on file prior to encounter. Current Outpatient Medications on File Prior to Encounter   Medication Sig Dispense Refill    metoprolol succinate (TOPROL XL) 50 MG extended release tablet TAKE 1 TABLET BY MOUTH AT BEDTIME TAKE 1 TABLET BY MOUTH EVERY DAY 30 tablet 3    NIFEdipine (ADALAT CC) 90 MG extended release tablet TAKE 1 TABLET BY MOUTH EVERY DAY 30 tablet 4    oxyCODONE-acetaminophen (PERCOCET) 5-325 MG per tablet Take 1 tablet by mouth every 8 hours as needed for Pain for up to 30 days.  90 tablet 0    celecoxib (CELEBREX) 100 MG capsule Take 1 capsule by mouth daily 30 capsule 0    Folic Acid-Vit G4-KNF D21 0.5-5-0.2 MG TABS Take by mouth      clotrimazole (LOTRIMIN) 1 % external solution 1 APPLICATION TO NAILS DAILY      polyethylene glycol (GLYCOLAX) 17 GM/SCOOP powder TAKE 17 G BY MOUTH 2 TIMES DAILY      vitamin D (ERGOCALCIFEROL) 1.25 MG (25981 UT) CAPS capsule Take 1 capsule by mouth once a week 4 capsule 0    torsemide (DEMADEX) 100 MG tablet Take 1 tablet by mouth daily (Patient not taking: Reported on 7/29/2022) 90 tablet 0    calcitRIOL (ROCALTROL) 0.5 MCG capsule Take 1 capsule by mouth three times a week During HD 30 capsule 3    docusate sodium (COLACE) 100 MG capsule Take 1 capsule by mouth 2 times daily 20 capsule 0    B Complex-C-Folic Acid (DIALYVITE 110 PO) Take 1 tablet by mouth once a week       Methoxy PEG-Epoetin Beta (MIRCERA) 50 MCG/0.3ML SOSY Inject as directed Twice a  month       Iron Sucrose (VENOFER IV) Infuse intravenously every 30 days  (Patient not taking: No sig reported)      bisacodyl (BISACODYL) 5 MG EC tablet Take 1 tablet by mouth daily as needed for Constipation 30 tablet 5    spironolactone (ALDACTONE) 50 MG tablet Take 1 tablet by mouth at bedtime (Patient not taking: Reported on 7/29/2022) 30 tablet 3    atorvastatin (LIPITOR) 20 MG tablet TAKE 1 TABLET BY MOUTH EVERY DAY 30 tablet 5    nortriptyline (PAMELOR) 10 MG capsule Take 10 fistula site. Areas of increased pigmentation at previous surgical site, stable since surgery. No erythema, no fluctuance, no induration. Extremities: no edema, no cyanosis  Neuro: A&Ox3, no focal deficits, sensation intact    Labs:    CBC:   Recent Labs     08/03/22  1013   WBC 5.0   HGB 11.3*   HCT 34.9*   MCV 83.7        BMP:   Recent Labs     08/03/22  1013      K 3.6      CO2 25   BUN 12   CREATININE 4.2*     PT/INR: No results for input(s): PROTIME, INR in the last 72 hours. APTT: No results for input(s): APTT in the last 72 hours. Liver Profile:   Lab Results   Component Value Date/Time    AST 13 08/03/2022 10:13 AM    ALT <5 08/03/2022 10:13 AM    BILIDIR <0.2 05/12/2022 08:51 PM    BILITOT 0.3 08/03/2022 10:13 AM    ALKPHOS 61 08/03/2022 10:13 AM     Lab Results   Component Value Date/Time    CHOL 160 12/21/2021 04:49 AM    HDL 41 12/21/2021 04:49 AM    TRIG 141 12/21/2021 04:49 AM     UA:   Lab Results   Component Value Date/Time    NITRITE Negative 11/11/2019 02:48 PM    COLORU Yellow 07/16/2022 10:33 AM    PHUR 6.5 07/16/2022 10:33 AM    WBCUA 1 07/16/2022 10:33 AM    RBCUA 0 07/16/2022 10:33 AM    MUCUS 1+ 07/06/2022 10:04 PM    BACTERIA None Seen 07/16/2022 10:33 AM    CLARITYU Clear 07/16/2022 10:33 AM    SPECGRAV 1.010 07/16/2022 10:33 AM    LEUKOCYTESUR Negative 07/16/2022 10:33 AM    UROBILINOGEN 0.2 07/16/2022 10:33 AM    BILIRUBINUR Negative 07/16/2022 10:33 AM    BILIRUBINUR Negative 11/11/2019 02:48 PM    BLOODU SMALL 07/16/2022 10:33 AM    GLUCOSEU Negative 07/16/2022 10:33 AM    AMORPHOUS Rare 05/27/2022 02:47 PM       Imaging:   CT ABDOMEN PELVIS WO CONTRAST Additional Contrast? None   Final Result      Fat stranding and increased soft tissue density just below the anterior abdominal wall in the lower pelvis extending into bilateral inguinal hernias. Bilateral hydroceles are also noted. No well-developed abscess or drainable fluid collection is    visualized. Cellulitis is not excluded. Uncomplicated sigmoid diverticulosis and cecal diverticulosis. Stable hepatic cysts. Right adrenal adenoma. Assessment/Plan: This is a 59 y.o. male with Hx afib, CKD, T2DM, HTN s/p multiple hernia repairs and a dialysis fistula creation. He presents with unrelenting inguinal pain on the left side. Based on the patients history, CT imaging, and physical exam findings, no acute surgical intervention is indicated at this time. Previous fluid collection has resolved, no evidence of active infection, tissue changes likely post surgical in nature. The above findings indicate a most likely diagnosis of post herrniorraphy groin pain.     - Patient educated on causes of pain and treatment modalities  - Recommend multimodal pain control   - Recommend see pain specialist as outpatient   - Trial of Percocet and Celebrex for 7 days  - No indications for admission     Patient was seen with senior resident and discussed with Dr. Soha Richey DO  PGY1, General Surgery  08/03/22  12:38 PM  [employer]  Pager: [phone]

## 2022-08-03 NOTE — ED TRIAGE NOTES
Patient presents to the ED with left sided groin pain, states that it has been going on for the past couple days, states slight swelling but \"not that bad\", denies any urination issues

## 2022-08-03 NOTE — ED PROVIDER NOTES
[address] [facility] ENCOUNTER          PHYSICIAN ASSISTANT NOTE       Date of evaluation: 8/3/2022    Chief Complaint     Groin Pain      History of Present Illness     Jen Keita is a 59 y.o. male who presents for groin pain. Patient presents with left groin pain worsening for the past couple days. Patient reports history of inguinal hernias that required repair. Patient reports that they became infected and needed drainage in the past.  Patient reports he is not currently on any antibiotics. Patient reports chills at home but no fever. No nausea or vomiting. No change in bowel or bladder habits. On chart review, the patient had a bilateral hernia repair on June 13. Patient was then admitted to the hospital on July 9 for sepsis with positive cultures from his Vas-Cath. A CT of the abdomen which showed a fluid collection in the pelvis. Patient did have fluid drained from the right side. Fluid ultimately deemed to be uninfected. Review of Systems     Review of Systems   Constitutional:  Positive for chills. Respiratory: Negative. Negative for cough and shortness of breath. Cardiovascular: Negative. Gastrointestinal: Negative. Negative for abdominal pain. Genitourinary:  Negative for dysuria, hematuria and testicular pain. Pain left groin   Skin: Negative. Neurological: Negative. Psychiatric/Behavioral: Negative. All other systems reviewed and are negative.     Past Medical, Surgical, Family, and Social History     He has a past medical history of Arthralgia of multiple joints, Arthritis, Atrial fibrillation ([address] Chronic kidney disease, Chronic systolic congestive heart failure ([address] CRI (chronic renal insufficiency), Diabetic nephropathy associated with type 2 diabetes mellitus ([address] Diverticulosis, Elevated PSA, Erectile dysfunction, ESRD (end stage renal disease) on dialysis ([address] Gout, Gout, Hemrrhoid NOS w/ complication NEC, History of MRSA infection, Hypertension, CELSA (obstructive sleep apnea), Type 2 diabetes mellitus without complication, without long-term current use of insulin ([facility] and Umbilical hernia without obstruction and without gangrene. He has a past surgical history that includes Upper gastrointestinal endoscopy (05/28/2016); Colonoscopy; Dilatation, esophagus; Upper gastrointestinal endoscopy (09/12/2016); IR TUNNELED CVC PLACE WO SQ PORT/PUMP > 5 YEARS (01/14/2022); Dialysis Catheter Insertion (N/A, 1/17/2022); ventral hernia repair (N/A, 1/17/2022); CT INSERT PERITONEAL CATHETER; Colonoscopy (N/A, 3/18/2022); Upper gastrointestinal endoscopy (N/A, 3/18/2022); hernia repair (N/A, 4/15/2022); hernia repair (Bilateral, 4/15/2022); Inguinal hernia repair (Bilateral, 6/13/2022); IR TUNNELED CVC PLACE WO SQ PORT/PUMP > 5 YEARS (7/14/2022); and Dialysis fistula creation (Left, 7/29/2022). His family history includes Breast Cancer in his mother; Colon Cancer in his father; Coronary Art Dis in his brother; Diabetes in his maternal grandmother; Hypertension in an other family member. He reports that he has quit smoking. His smoking use included cigars. He has never used smokeless tobacco. He reports that he does not currently use alcohol. He reports that he does not use drugs.     Medications     Discharge Medication List as of 8/3/2022  1:14 PM        CONTINUE these medications which have NOT CHANGED    Details   metoprolol succinate (TOPROL XL) 50 MG extended release tablet TAKE 1 TABLET BY MOUTH AT BEDTIME TAKE 1 TABLET BY MOUTH EVERY DAY, Disp-30 tablet, R-3Normal      NIFEdipine (ADALAT CC) 90 MG extended release tablet TAKE 1 TABLET BY MOUTH EVERY DAY, Disp-30 tablet, [id number]      Folic Acid-Vit X4-ELT S68 0.5-5-0.2 MG TABS Take by mouthHistorical Med      clotrimazole (LOTRIMIN) 1 % external solution 1 APPLICATION TO NAILS DAILY, Historical Med      polyethylene glycol (GLYCOLAX) 17 GM/SCOOP powder TAKE 17 G BY MOUTH 2 TIMES DAILYHistorical Med      vitamin D (ERGOCALCIFEROL) 1.25 MG (91697 UT) CAPS capsule Take 1 capsule by mouth once a week, Disp-4 capsule, R-0Normal      torsemide (DEMADEX) 100 MG tablet Take 1 tablet by mouth daily, Disp-90 tablet, R-0Normal      calcitRIOL (ROCALTROL) 0.5 MCG capsule Take 1 capsule by mouth three times a week During HD, Disp-30 capsule, R-3NO PRINT      docusate sodium (COLACE) 100 MG capsule Take 1 capsule by mouth 2 times daily, Disp-20 capsule, R-0Print      B Complex-C-Folic Acid (DIALYVITE 641 PO) Take 1 tablet by mouth once a week Historical Med      Methoxy PEG-Epoetin Beta (MIRCERA) 50 MCG/0.3ML SOSY Inject as directed Twice a  month Historical Med      Iron Sucrose (VENOFER IV) Infuse intravenously every 30 days Historical Med      bisacodyl (BISACODYL) 5 MG EC tablet Take 1 tablet by mouth daily as needed for Constipation, Disp-30 tablet, R-5Normal      spironolactone (ALDACTONE) 50 MG tablet Take 1 tablet by mouth at bedtime, Disp-30 tablet, R-3Normal      atorvastatin (LIPITOR) 20 MG tablet TAKE 1 TABLET BY MOUTH EVERY DAY, Disp-30 tablet, R-5Normal      nortriptyline (PAMELOR) 10 MG capsule Take 10 mg by mouth nightly Historical Med      omeprazole (PRILOSEC) 40 MG delayed release capsule TAKE 1 CAPSULE BY MOUTH EVERY DAY, Disp-30 capsule, R-1Normal      sildenafil (VIAGRA) 100 MG tablet Take 1 tablet by mouth as needed for Erectile Dysfunction, Disp-30 tablet, R-3Print      aspirin 81 MG tablet Take 81 mg by mouth dailyHistorical Med             Allergies     He has No Known Allergies. Physical Exam     INITIAL VITALS: BP: (!) 172/107, Temp: 98.1 °F (36.7 °C), Heart Rate: 87, Resp: 21, SpO2: 100 %  Physical Exam  Vitals and nursing note reviewed. Constitutional:       General: He is not in acute distress. Appearance: He is not ill-appearing. HENT:      Head: Normocephalic and atraumatic. Cardiovascular:      Rate and Rhythm: Normal rate and regular rhythm.    Pulmonary: Effort: Pulmonary effort is normal.      Breath sounds: Normal breath sounds. Abdominal:      General: There is no distension. Palpations: Abdomen is soft. Tenderness: There is no abdominal tenderness. Genitourinary:     Comments: Tender left inguinal region without mass, erythema, warmth. No scrotal edema. No testicular tenderness. No balanitis or discharge. Musculoskeletal:         General: Normal range of motion. Cervical back: Neck supple. Skin:     General: Skin is warm and dry. Neurological:      General: No focal deficit present. Mental Status: He is alert. Psychiatric:         Mood and Affect: Mood normal.       Diagnostic Results         RADIOLOGY:  CT ABDOMEN PELVIS WO CONTRAST Additional Contrast? None   Final Result      Fat stranding and increased soft tissue density just below the anterior abdominal wall in the lower pelvis extending into bilateral inguinal hernias. Bilateral hydroceles are also noted. No well-developed abscess or drainable fluid collection is    visualized. Cellulitis is not excluded. Uncomplicated sigmoid diverticulosis and cecal diverticulosis. Stable hepatic cysts. Right adrenal adenoma.           LABS:   Results for orders placed or performed during the hospital encounter of 08/03/22   CBC with Auto Differential   Result Value Ref Range    WBC 5.0 4.0 - 11.0 K/uL    RBC 4.17 (L) 4.20 - 5.90 M/uL    Hemoglobin 11.3 (L) 13.5 - 17.5 g/dL    Hematocrit 34.9 (L) 40.5 - 52.5 %    MCV 83.7 80.0 - 100.0 fL    MCH 27.1 26.0 - 34.0 pg    MCHC 32.4 31.0 - 36.0 g/dL    RDW 17.8 (H) 12.4 - 15.4 %    Platelets 237 162 - 237 K/uL    MPV 8.4 5.0 - 10.5 fL    Neutrophils % 68.3 %    Lymphocytes % 17.8 %    Monocytes % 8.4 %    Eosinophils % 4.6 %    Basophils % 0.9 %    Neutrophils Absolute 3.4 1.7 - 7.7 K/uL    Lymphocytes Absolute 0.9 (L) 1.0 - 5.1 K/uL    Monocytes Absolute 0.4 0.0 - 1.3 K/uL    Eosinophils Absolute 0.2 0.0 - 0.6 K/uL    Basophils Absolute 0.0 0.0 - 0.2 K/uL   CMP   Result Value Ref Range    Sodium 137 136 - 145 mmol/L    Potassium 3.6 3.5 - 5.1 mmol/L    Chloride 101 99 - 110 mmol/L    CO2 25 21 - 32 mmol/L    Anion Gap 11 3 - 16    Glucose 106 (H) 70 - 99 mg/dL    BUN 12 7 - 20 mg/dL    Creatinine 4.2 (H) 0.8 - 1.3 mg/dL    GFR Non-African American 14 (A) >60    GFR  17 (A) >60    Calcium 8.9 8.3 - 10.6 mg/dL    Total Protein 6.7 6.4 - 8.2 g/dL    Albumin 4.2 3.4 - 5.0 g/dL    Albumin/Globulin Ratio 1.7 1.1 - 2.2    Total Bilirubin 0.3 0.0 - 1.0 mg/dL    Alkaline Phosphatase 61 40 - 129 U/L    ALT <5 (L) 10 - 40 U/L    AST 13 (L) 15 - 37 U/L   Lipase   Result Value Ref Range    Lipase 32.0 13.0 - 60.0 U/L   Lactic Acid   Result Value Ref Range    Lactic Acid 0.7 0.4 - 2.0 mmol/L   Blood gas, venous (Lab)   Result Value Ref Range    pH, Gallo 7.453 (H) 7.350 - 7.450    pCO2, Gallo 38.0 (L) 41.0 - 51.0 mmHg    pO2, Gallo 131.0 (H) 25.0 - 40.0 mmHg    HCO3, Venous 26.6 24.0 - 28.0 mmol/L    Base Excess, Gallo 2.6 -2.0 - 3.0 mmol/L    O2 Sat, Gallo 99 Not established %    Carboxyhemoglobin 0.7 0.0 - 1.5 %    MetHgb, Gallo 0.4 0.0 - 1.5 %    TC02 (Calc), Gallo 28 mmol/L       ED BEDSIDE ULTRASOUND:  No results found. RECENT VITALS:  BP: (!) 171/97, Temp: 98.4 °F (36.9 °C), Heart Rate: 86, Resp: 18, SpO2: 96 %     Procedures         ED Course     Nursing Notes, Past Medical Hx,Past Surgical Hx, Social Hx, Allergies, and Family Hx were reviewed. The patient was given the following medications:  Orders Placed This Encounter   Medications    HYDROmorphone (DILAUDID) injection 1 mg    ondansetron (ZOFRAN) injection 4 mg    HYDROmorphone (DILAUDID) injection 1 mg    oxyCODONE-acetaminophen (PERCOCET) 5-325 MG per tablet     Sig: Take 1 tablet by mouth every 6 hours as needed for Pain for up to 7 days. Intended supply: 3 days.  Take lowest dose possible to manage pain     Dispense:  28 tablet     Refill:  0     Reduce doses taken as pain becomes manageable    oxyCODONE-acetaminophen (PERCOCET) 5-325 MG per tablet     Sig: Take 1 tablet by mouth every 6 hours as needed for Pain for up to 3 days. Intended supply: 3 days. Take lowest dose possible to manage pain     Dispense:  12 tablet     Refill:  0    celecoxib (CELEBREX) 100 MG capsule     Sig: Take 1 capsule by mouth in the morning and 1 capsule before bedtime. Dispense:  30 capsule     Refill:  3       CONSULTS:  None    MEDICAL DECISION MAKING / ASSESSMENT / Riccardo Jose C is a 59 y.o. male with groin pain. Patient is uncomfortable but in no acute distress. Vitals normal with exception of hypertension. Abdomen benign. Pain to left inguinal region without objective signs of infection. Patient given pain medication. Stable blood counts and renal function (had dialysis today). Lactic acid normal. CT Abdomen shows decreased fluid collections with some persistent stranding. Surgery was consulted who evaluated the patient and report the images are improved and they have low suspicion for infection based on exam, labs, lack of fever - report the stranding on Ct is likely normal tissue changes following surgery. Patient to follow-up outpatient. Return precautions discussed. This patient was also evaluated by the attending physician. All care plans were discussed and agreed upon. Clinical Impression     1.  Intractable abdominal pain        Disposition     PATIENT REFERRED TO:  DO Eron De La Cruz [place] [address]  [phone]    Schedule an appointment as soon as possible for a visit       [facility] Emergency Department  [address] [place] [address]  [address]  [phone]    As needed, If symptoms worsen    DISCHARGE MEDICATIONS:  Discharge Medication List as of 8/3/2022  1:14 PM          DISPOSITION Decision To Discharge 08/03/2022 12:59:46 PM        Saud Osman PA-C  08/04/22 5009

## 2022-08-04 ENCOUNTER — CARE COORDINATION (OUTPATIENT)
Dept: CASE MANAGEMENT | Age: 62
End: 2022-08-04

## 2022-08-04 ENCOUNTER — TELEPHONE (OUTPATIENT)
Dept: OTHER | Facility: CLINIC | Age: 62
End: 2022-08-04

## 2022-08-04 ASSESSMENT — ENCOUNTER SYMPTOMS
SHORTNESS OF BREATH: 0
ABDOMINAL PAIN: 0
COUGH: 0
RESPIRATORY NEGATIVE: 1
GASTROINTESTINAL NEGATIVE: 1

## 2022-08-04 NOTE — TELEPHONE ENCOUNTER
RN access attempted to contact pt in regards to need for ed follow up appt    Attempted was not successful. Left message to call back with help scheduling ed follow up.

## 2022-08-04 NOTE — CARE COORDINATION
Date/Time:  8/4/2022 1:37 PM  Attempted to reach patient by telephone. Call within 2 business days of discharge: Yes,  Left HIPPA compliant message requesting a return call. Will attempt to reach patient again.     Thank Evelin Giang RN  Care Transition Coordinator  Contact NSXTWZ:[phone]

## 2022-08-10 ENCOUNTER — TELEPHONE (OUTPATIENT)
Dept: BARIATRICS/WEIGHT MGMT | Age: 62
End: 2022-08-10

## 2022-08-10 ENCOUNTER — CARE COORDINATION (OUTPATIENT)
Dept: CASE MANAGEMENT | Age: 62
End: 2022-08-10

## 2022-08-10 DIAGNOSIS — K43.2 INCISIONAL HERNIA, WITHOUT OBSTRUCTION OR GANGRENE: ICD-10-CM

## 2022-08-10 DIAGNOSIS — G89.29 OTHER CHRONIC PAIN: Primary | ICD-10-CM

## 2022-08-10 NOTE — CARE COORDINATION
Date/Time:  8/10/2022 11:03 AM  2ND Attempted to reach patient by telephone. Call within 2 business days of discharge: Yes,  Left HIPPA compliant message requesting a return call. CTN will close out CTN/COVID -19 Monitoring episode at this time.     Thank Juana Boss RN  Care Transition Coordinator  Contact MARTINEZP:[phone]

## 2022-08-10 NOTE — TELEPHONE ENCOUNTER
Patient had hernia surgery in May and states he has had pain on left side every since surgery. He states he was given a pain management doctor name and number but they do not take his insurance and would like another recommendation of a pain management doctor.

## 2022-08-11 ENCOUNTER — OFFICE VISIT (OUTPATIENT)
Dept: VASCULAR SURGERY | Age: 62
End: 2022-08-11

## 2022-08-11 VITALS
HEART RATE: 84 BPM | SYSTOLIC BLOOD PRESSURE: 150 MMHG | WEIGHT: 253.8 LBS | HEIGHT: 76 IN | TEMPERATURE: 98 F | DIASTOLIC BLOOD PRESSURE: 97 MMHG | BODY MASS INDEX: 30.91 KG/M2

## 2022-08-11 DIAGNOSIS — N18.6 ESRD (END STAGE RENAL DISEASE) (HCC): Primary | ICD-10-CM

## 2022-08-11 PROCEDURE — 99024 POSTOP FOLLOW-UP VISIT: CPT | Performed by: SURGERY

## 2022-08-11 NOTE — PROGRESS NOTES
2022     Liz Woods (:  1960) is a 59 y.o. male,Established patient, here for evaluation of the following chief complaint(s):  Post-Op Check (2 week L AVF )        ASSESSMENT/PLAN:  1. ESRD (end stage renal disease) ([facility] [address]  He was advised to wash the incision daily with soap and water. Do not apply any adhesives. Do not apply any creams or ointments. Reassured him regarding the thumb. This will likely improve with time. We will see him back in 4 weeks. He is progressing as expected. SUBJECTIVE/OBJECTIVE:  POV#1:  Left RC AVF creation 22  2 week post op visit. C/o itching around the incision--he has placed adhesive tape, occluding the edges of the Prineo. When removed, there is some retained trapped moisture and skin irritation noted. The incision is clean and intact. It was cleansed with Vasche  and steristrip applied. Good thrill in the AVF throughout forearm. Hand warm and well-perfused. Complains of pain and numbness in thumb. Physical Exam  Vitals:    22 0857   BP: (!) 150/97   Pulse: 84   Temp: 98 °F (36.7 °C)   Weight: 253 lb 12.8 oz (115.1 kg)   Height: 6' 4\" (1.93 m)           An electronic signature was used to authenticate this note.     --Joe Mayer MD

## 2022-08-31 ENCOUNTER — PROCEDURE VISIT (OUTPATIENT)
Dept: AUDIOLOGY | Age: 62
End: 2022-08-31
Payer: MEDICAID

## 2022-08-31 ENCOUNTER — OFFICE VISIT (OUTPATIENT)
Dept: PAIN MANAGEMENT | Age: 62
End: 2022-08-31
Payer: MEDICARE

## 2022-08-31 VITALS
DIASTOLIC BLOOD PRESSURE: 94 MMHG | OXYGEN SATURATION: 98 % | BODY MASS INDEX: 29.82 KG/M2 | WEIGHT: 245 LBS | HEART RATE: 98 BPM | SYSTOLIC BLOOD PRESSURE: 152 MMHG

## 2022-08-31 DIAGNOSIS — G57.92 ILIOINGUINAL NEURALGIA OF LEFT SIDE: ICD-10-CM

## 2022-08-31 DIAGNOSIS — G57.91 ILIOINGUINAL NEURALGIA OF RIGHT SIDE: ICD-10-CM

## 2022-08-31 DIAGNOSIS — K43.2 INCISIONAL HERNIA WITHOUT OBSTRUCTION OR GANGRENE: Primary | ICD-10-CM

## 2022-08-31 DIAGNOSIS — Z51.81 ENCOUNTER FOR THERAPEUTIC DRUG MONITORING: ICD-10-CM

## 2022-08-31 DIAGNOSIS — H90.3 SENSORINEURAL HEARING LOSS, BILATERAL: Primary | ICD-10-CM

## 2022-08-31 DIAGNOSIS — G89.4 CHRONIC PAIN SYNDROME: ICD-10-CM

## 2022-08-31 PROCEDURE — 99204 OFFICE O/P NEW MOD 45 MIN: CPT | Performed by: NURSE PRACTITIONER

## 2022-08-31 PROCEDURE — 92557 COMPREHENSIVE HEARING TEST: CPT | Performed by: AUDIOLOGIST

## 2022-08-31 PROCEDURE — 92567 TYMPANOMETRY: CPT | Performed by: AUDIOLOGIST

## 2022-08-31 PROCEDURE — G8417 CALC BMI ABV UP PARAM F/U: HCPCS | Performed by: NURSE PRACTITIONER

## 2022-08-31 PROCEDURE — G8427 DOCREV CUR MEDS BY ELIG CLIN: HCPCS | Performed by: NURSE PRACTITIONER

## 2022-08-31 RX ORDER — AMITRIPTYLINE HYDROCHLORIDE 10 MG/1
10 TABLET, FILM COATED ORAL NIGHTLY PRN
Qty: 30 TABLET | Refills: 0 | Status: SHIPPED | OUTPATIENT
Start: 2022-08-31 | End: 2022-10-26 | Stop reason: SDUPTHER

## 2022-08-31 RX ORDER — GABAPENTIN 100 MG/1
100 CAPSULE ORAL 2 TIMES DAILY
Qty: 60 CAPSULE | Refills: 0 | Status: SHIPPED | OUTPATIENT
Start: 2022-08-31 | End: 2022-10-26 | Stop reason: SDUPTHER

## 2022-08-31 RX ORDER — TERAZOSIN 5 MG/1
CAPSULE ORAL
COMMUNITY
Start: 2022-08-29

## 2022-08-31 RX ORDER — HYDROCODONE BITARTRATE AND ACETAMINOPHEN 5; 325 MG/1; MG/1
TABLET ORAL
COMMUNITY
Start: 2022-07-30 | End: 2022-09-28 | Stop reason: ALTCHOICE

## 2022-08-31 RX ORDER — NALOXONE HYDROCHLORIDE 4 MG/.1ML
1 SPRAY NASAL PRN
Qty: 1 EACH | Refills: 0 | Status: SHIPPED | OUTPATIENT
Start: 2022-08-31

## 2022-08-31 RX ORDER — OXYCODONE HYDROCHLORIDE AND ACETAMINOPHEN 5; 325 MG/1; MG/1
1 TABLET ORAL EVERY 8 HOURS PRN
Qty: 84 TABLET | Refills: 0 | Status: SHIPPED | OUTPATIENT
Start: 2022-08-31 | End: 2022-09-28 | Stop reason: SDUPTHER

## 2022-08-31 RX ORDER — BUSPIRONE HYDROCHLORIDE 5 MG/1
TABLET ORAL
COMMUNITY
Start: 2022-08-29 | End: 2022-08-31 | Stop reason: ALTCHOICE

## 2022-08-31 ASSESSMENT — ENCOUNTER SYMPTOMS
CHEST TIGHTNESS: 0
SHORTNESS OF BREATH: 0
CONSTIPATION: 1
BACK PAIN: 1

## 2022-08-31 NOTE — Clinical Note
Dr. Peter Orosco,  Thank you for your referral for audiologic testing on this patient. If you have any questions, or if there is anything else you need, please let me know.    Aris Ken, [place] Audiologist --- [address]  ENT - Audiology

## 2022-08-31 NOTE — PROGRESS NOTES
Rosina Perez   1960, 59 y.o. male   [id number]       Referring Provider: Belinda Altman MD   Referral Type: In an order in [address] [place] [address]    Reason for Visit: Evaluation of suspected change in hearing, tinnitus, or balance. ADULT AUDIOLOGIC EVALUATION      Rosina Perez is a 59 y.o. male seen today, 8/31/2022, for an initial audiologic evaluation. AUDIOLOGIC AND OTHER PERTINENT MEDICAL HISTORY:        Rosina Perez noted concern for decreased hearing in both ears, wife has noted some concern, he feels his hearing well at this time; some imbalance/dizziness, relates to other health conditions. Jorden Woods denied otalgia, aural fullness, otorrhea, tinnitus, history of occupational/recreational noise exposure, history of head trauma, history of ear surgery, and family history of hearing loss. IMPRESSIONS:       Today's results are consistent with mild high frequency sensorineural hearing loss in both ears with normal middle ear function and excellent word recognition for soft conversational speech bilaterally. Discussed good communication strategies. Recommended ear cleaning given significant cerumen in left ear today. ASSESSMENT AND FINDINGS:       Otoscopy revealed: Clear ear canal right ear; significant cerumen in left ear canal      RIGHT EAR:  Hearing Sensitivity: Within normal limits to mild high frequency sensorineural hearing loss. Speech Recognition Threshold: 15 dBHL  Word Recognition: Excellent (100%), based on NU-6 25-word list at 45 dBHL, masked, using recorded speech stimuli. Tympanometry: Normal peak pressure and compliance, Type A tympanogram, consistent with normal middle ear function. LEFT EAR:  Hearing Sensitivity: Within normal limits to mild high frequency sensorineural hearing loss. Speech Recognition Threshold: 20 dBHL  Word Recognition: Excellent (96%), based on NU-6 25-word list at 45 dBHL, masked, using recorded speech stimuli.     Tympanometry: Normal peak pressure and compliance, Type A tympanogram, consistent with normal middle ear function. Reliability: Good  Transducer: Phones    See scanned audiogram dated 8/31/2022 for results. PATIENT EDUCATION:       The following items were discussed with the patient:   - Good Communication Strategies  - Hearing Loss and Hearing Aids    Educational information was shared in the After Visit Summary. RECOMMENDATIONS:                                                                                                                                                                                                                                                                      The following items are recommended based on patient report and results from today's appointment:  - Continue medical follow-up with Ovidio Katz MD.  - Retest hearing as medically indicated and/or sooner if a change in hearing is noted. - Briefly discussed future considerations for amplification; hearing aids not strongly advised at this time given excellent word recognition for soft conversational speech in both ears. If desired, schedule a Hearing Aid Evaluation (HAE) appointment to discuss hearing aid options. - Utilize \"Good Communication Strategies\" as discussed to assist in speech understanding with communication partners.          [address] [place]  Audiologist      Chart CC'd to: Ovidio Katz MD       Degree of   Hearing Sensitivity dB Range   Within Normal Limits (WNL) 0 - 20   Mild 20 - 40   Moderate 40 - 55   Moderately-Severe 55 - 70   Severe 70 - 90   Profound 90 +

## 2022-08-31 NOTE — PROGRESS NOTES
HISTORY OF PRESENT ILLNESS:  Mr. Krystina Avila is a 59 y.o. male presents for consultation at the request of Jie Pace MD. His presenting problems are bilateral groin pain. He hasalso been evaluated by urology and vascular. Onset of pain began about 4-5 months ago post hernia repair. He rates the pain 8/10 and describes it as sharp, shooting. Pain is greaterin his travis groins and superior pubic region than any other region. Pain is made worse by: movement, walking. Activities that have been limited by pain that he otherwise tolerated well are ADLs, home exercises. Alternative therapies he haspreviously attempted are surgical repair of bilateral inguinal hernias, lidocaine, OTC creams, ice compress, repositioning. Current treatment regimen has helped relieve about 20% of the pain. Relieving factors of pain include being still. Hedenies side effects from the current pain regimen. Patient reports mood is well and happy and sleep patterns are Poor. Patient deniesneurological bowel or bladder. Patient denies misusing/abusing his narcotic pain medications or using any illegal drugs. he admits to morning stiffness, fatigue, and headaches. Patient reports that he began peritoneal dialysis this year, he had an infection of the catheter. He reports that he is now transitioning over to hemodialysis, with a right chest Vas-Cath today in place. He has recently had his fistula placed in his left lower arm. This has not been used yet. He was placed on Celebrex from his surgeon states it is not helping very much discussed with patient to discontinue this if it is not helping as it is renally offensive. He continues to describe sharp shooting pains into the groin with swelling in the pubic region near the pubic symphysis. He reports that his mood is well however pain does depress him at times when it is severe.   He was recently given postop medicine for pain and reports that he takes this 2-3 times per day and it does help him some. ROS:  Review of Systems   Constitutional:  Positive for fatigue and unexpected weight change. HENT:  Negative for congestion and dental problem. Respiratory:  Negative for chest tightness and shortness of breath. Cardiovascular:  Negative for chest pain, palpitations and leg swelling. Gastrointestinal:  Positive for constipation. Endocrine: Negative for polydipsia, polyphagia and polyuria. Genitourinary:         Pubic pain   Musculoskeletal:  Positive for arthralgias and back pain. Skin:  Negative for pallor. Neurological:  Positive for dizziness, light-headedness and headaches. Negative for speech difficulty and weakness. Psychiatric/Behavioral:  Positive for sleep disturbance. Negative for self-injury and suicidal ideas. The patient is not nervous/anxious. Physical Exam  Constitutional:       Appearance: Normal appearance. HENT:      Head: Normocephalic and atraumatic. Right Ear: External ear normal.      Left Ear: External ear normal.      Mouth/Throat:      Mouth: Mucous membranes are moist.      Pharynx: Oropharynx is clear. Eyes:      General: No scleral icterus. Extraocular Movements: Extraocular movements intact. Conjunctiva/sclera: Conjunctivae normal.   Cardiovascular:      Rate and Rhythm: Normal rate and regular rhythm. Pulses: Normal pulses. Heart sounds: Normal heart sounds. Pulmonary:      Effort: Pulmonary effort is normal.      Breath sounds: Normal breath sounds. Abdominal:      General: Abdomen is flat. Bowel sounds are normal.      Palpations: Abdomen is soft. Genitourinary:     Comments: Scrotum not swollen today. Swelling bilateral superior pubic region. Bilateral groin surgical incisions that are well healed. Tenderness to palpate swollen pubic region  Musculoskeletal:         General: Tenderness (mild lumbar facet tenderness L4/L5, L5/S1) present. Normal range of motion.       Cervical back: Normal range of motion and neck supple. No tenderness. Right lower leg: No edema. Left lower leg: No edema. Skin:     General: Skin is warm and dry. Capillary Refill: Capillary refill takes 2 to 3 seconds. Neurological:      General: No focal deficit present. Mental Status: He is alert and oriented to person, place, and time. Mental status is at baseline. Sensory: No sensory deficit. Motor: No weakness. Coordination: Coordination normal.      Gait: Gait normal.      Deep Tendon Reflexes: Reflexes normal.   Psychiatric:         Mood and Affect: Mood normal.         Behavior: Behavior normal.         Thought Content: Thought content normal.         Judgment: Judgment normal.      BP (!) 152/94   Pulse 98   Wt 245 lb (111.1 kg)   SpO2 98%   BMI 29.82 kg/m²      ASSESSMENT:    1. Incisional hernia without obstruction or gangrene    2. Chronic pain syndrome    3. Encounter for therapeutic drug monitoring    4. Ilioinguinal neuralgia of left side    5. Ilioinguinal neuralgia of right side         Lab Results   Component Value Date    WBC 5.0 08/03/2022    HGB 11.3 (L) 08/03/2022    HCT 34.9 (L) 08/03/2022    MCV 83.7 08/03/2022     08/03/2022     Lab Results   Component Value Date/Time     08/03/2022 10:13 AM    K 3.6 08/03/2022 10:13 AM    K 4.2 07/16/2022 10:09 AM     08/03/2022 10:13 AM    CO2 25 08/03/2022 10:13 AM    BUN 12 08/03/2022 10:13 AM    CREATININE 4.2 08/03/2022 10:13 AM    GLUCOSE 106 08/03/2022 10:13 AM    CALCIUM 8.9 08/03/2022 10:13 AM      Lab Results   Component Value Date    TSH 1.61 08/16/2021       IMAGING:  No imaging available. PLAN:   -Chronic opiate treatment protocol was discussed withthe patient, informed consent was obtained.   -Treatment guidelines were discussed and established  -Risks and benefits of narcotics were addressedwith the patient  - Obtainable long term and short term goals of opioid therapy were reviewed, including pain relief, sleep, psychosocial and physical functioning   -CBT techniques- relaxation therapies such as biofeedback, mindfulness based stress reduction, imagery, cognitive restructuring, problem solving discussed with patient   -Obtain urine Toxicology today  -SOAPP score:4  -ORT:0  -PHQ-9:13  -Refer to Dr. Lin Wynn for diagnostic ilioinguinal/iliofemoral NB  -Gabapentin 100mg BID-he will get permission from PCP to take low dose gabapentin  -DC celebrex as it is not helping pain  -continue percocet 5mg TID PRN pain. #84 for 28 days. Will consider wean to [place] next ov  -F/U 28 days      Controlled Substances Monitoring:   [place] was obtained and reviewed  for the last one year and no indicators of drug misuse  were found. Any other controlled substance prescriptions  seen on the record have been accounted for, I am aware of the patient receiving these medications. Sherryle Moder [place] record will be rechecked as part of office protocol.  Percocet 5mg #28 given for 7 days on 08/14/2022      RICARDO Perera

## 2022-08-31 NOTE — PATIENT INSTRUCTIONS
Good Communication Strategies    Communication can be challenging for anyone, but can be especially difficult for those with some degree of hearing loss. While we may not be able to control every factor that may lead to difficulty with communication, there are Good Communication Strategies that we can all use in our day-to-day lives. Communication takes both parties working together for it to be successful. Tips as a Listener:   Control your environment. It is important to limit the amount of background noise in the room when possible. You should also consider having a good light source in the room to best see the other person. Ask for clarification. Instead of saying \"What?\", you can use parts of what you heard to make a new question. For example, if you heard the word \"Thursday\" but not the rest of the week, you may ask \"What was that about Thursday? \" or \"What did you want to do Thursday? \". This shows the person talking that you are listening and will help them better explain what they are saying. Be an advocate for yourself. If you are hearing but not understanding, tell the other person \"I can hear you, but I need you to slow down when you speak. \"  Or if someone is facing the other direction, say \"I cannot hear you when you are not looking at me when we talk. \"       Tips as a Talker:   - Sit or stand 3 to 6 feet away to maximize audibility         -- It is unrealistic to believe someone else will fully hear your message if you are speaking from across the room or in a different room in the house   - Stay at eye level to help with visual cues   - Make sure you have the persons attention before speaking   - Use facial expressions and gestures to accentuate your message   - Raise your voice slightly (do not scream)   - Speak slowly and distinctly   - Use short, simple sentences   - Rephrase your words if the person is having a hard time understanding you    - To avoid distortion, dont speak directly into a persons ear      Some additional items that may be helpful:   - Remain patient - this is important for both parties   - Write down items that still cannot be heard/understood. You may write with pen/paper or consider typing/texting on a cell phone or smart device. - If background noise is unavoidable, try to keep yourself in a good position in the room. By sitting at a tolliver on the side of the restaurant (preferably a corner), it will be easier to communicate than if you were sitting at a table in the middle with background noise surrounding you. Keep yourself positioned away from music speakers or heavy foot traffic.   - If you have difficulty with the television, consider these options:      -- Use closed-captioning, which is a setting you can turn on that displays the spoken words in a written form on the screen. There may be a slight delay, but this can help fill in missing information. This can be especially helpful when watching programs with accented speech. -- Consider use of a sound bar or speakers that come from the front of the TV. With modern flat screen TVs, many of them have speakers that come out of the back of the device, which makes sound bounce off the wall behind it, then go into the room. Sound bars can allow the sound to go straight in your direction and can improve sound quality. -- Consider ear level devices to help improve the volume and/or sound quality of the program.  There are devices that work like headphones that you can adjust the volume for your ears while others can have the volume at a more comfortable level, such as \"TV Ears\". Most hearing aids have devices that allow them to connect directly to the TV and improve sound quality. Hearing Loss: Care Instructions  Your Care Instructions      Hearing loss is a sudden or slow decrease in how well you hear. It can range from mild to profound.  Permanent hearing loss can occur with aging, and it can happen when you are exposed long-term to loud noise. Examples include listening to loud music, riding motorcycles, or being around other loud machines. Hearing loss can affect your work and home life. It can make you feel lonely or depressed. You may feel that you have lost your independence. But hearing aids and other devices can help you hear better and feel connected to others. Follow-up care is a key part of your treatment and safety. Be sure to make and go to all appointments, and call your doctor if you are having problems. It's also a good idea to know your test results and keep a list of the medicines you take. How can you care for yourself at home? Avoid loud noises whenever possible. This helps keep your hearing from getting worse. Always wear hearing protection around loud noises. If appropriate, wear hearing aid(s) as directed. It is recommended that hearing aids are worn during all waking hours to keep your brain active and give it access to the sounds it is missing. If you are beginning your process with hearing aid(s), schedule a \"Hearing Aid Evaluation\" with an audiologist to discuss your lifestyle, features of hearing aid technology, and styles of hearing aids available. It is recommended that you contact your insurance company to determine if you have a hearing aid benefit, as this may dictate who you can see for these services. Have hearing tests as your doctor suggests. They can show whether your hearing has changed. Your hearing aid may need to be adjusted. Use other assistive devices as needed. These may include:  Telephone amplifiers and hearing aids that can connect to a television, stereo, radio, or microphone. Devices that use lights or vibrations. These alert you to the doorbell, a ringing telephone, or a baby monitor. Television closed-captioning. This shows the words at the bottom of the screen. Most new TVs can do this. TTY (text telephone).  This lets you type messages back and forth on the telephone instead of talking or listening. These devices are also called TDD. When messages are typed on the keyboard, they are sent over the phone line to a receiving TTY. The message is shown on a monitor. Use pagers, fax machines, text, and email if it is hard for you to communicate by telephone. Try to learn a listening technique called speech-reading. It is not lip-reading. You pay attention to people's gestures, expressions, posture, and tone of voice. These clues can help you understand what a person is saying. Face the person you are talking to, and have him or her face you. Make sure the lighting is good. You need to see the other person's face clearly. Think about counseling if you need help to adjust to your hearing loss. When should you call for help? Watch closely for changes in your health, and be sure to contact your doctor if:    You think your hearing is getting worse. You have new symptoms, such as dizziness or nausea.

## 2022-09-01 ENCOUNTER — OFFICE VISIT (OUTPATIENT)
Dept: FAMILY MEDICINE CLINIC | Age: 62
End: 2022-09-01
Payer: MEDICAID

## 2022-09-01 VITALS
TEMPERATURE: 97.1 F | WEIGHT: 243 LBS | RESPIRATION RATE: 16 BRPM | DIASTOLIC BLOOD PRESSURE: 70 MMHG | SYSTOLIC BLOOD PRESSURE: 132 MMHG | BODY MASS INDEX: 29.58 KG/M2 | HEART RATE: 110 BPM | OXYGEN SATURATION: 99 %

## 2022-09-01 DIAGNOSIS — R25.1 TREMOR: ICD-10-CM

## 2022-09-01 DIAGNOSIS — R97.20 ELEVATED PSA: ICD-10-CM

## 2022-09-01 DIAGNOSIS — G57.92 ILIOINGUINAL NEURALGIA OF LEFT SIDE: Primary | ICD-10-CM

## 2022-09-01 DIAGNOSIS — N18.6 ESRD (END STAGE RENAL DISEASE) ON DIALYSIS (HCC): ICD-10-CM

## 2022-09-01 DIAGNOSIS — E11.22 TYPE 2 DIABETES MELLITUS WITH STAGE 4 CHRONIC KIDNEY DISEASE, UNSPECIFIED WHETHER LONG TERM INSULIN USE (HCC): ICD-10-CM

## 2022-09-01 DIAGNOSIS — I10 ESSENTIAL HYPERTENSION, BENIGN: ICD-10-CM

## 2022-09-01 DIAGNOSIS — Z99.2 ESRD (END STAGE RENAL DISEASE) ON DIALYSIS (HCC): ICD-10-CM

## 2022-09-01 DIAGNOSIS — N18.4 TYPE 2 DIABETES MELLITUS WITH STAGE 4 CHRONIC KIDNEY DISEASE, UNSPECIFIED WHETHER LONG TERM INSULIN USE (HCC): ICD-10-CM

## 2022-09-01 DIAGNOSIS — F41.8 DEPRESSION WITH ANXIETY: ICD-10-CM

## 2022-09-01 DIAGNOSIS — I50.22 CHRONIC SYSTOLIC CONGESTIVE HEART FAILURE (HCC): ICD-10-CM

## 2022-09-01 PROCEDURE — 99214 OFFICE O/P EST MOD 30 MIN: CPT | Performed by: FAMILY MEDICINE

## 2022-09-01 PROCEDURE — G8427 DOCREV CUR MEDS BY ELIG CLIN: HCPCS | Performed by: FAMILY MEDICINE

## 2022-09-01 PROCEDURE — 3044F HG A1C LEVEL LT 7.0%: CPT | Performed by: FAMILY MEDICINE

## 2022-09-01 PROCEDURE — G8417 CALC BMI ABV UP PARAM F/U: HCPCS | Performed by: FAMILY MEDICINE

## 2022-09-01 PROCEDURE — 3017F COLORECTAL CA SCREEN DOC REV: CPT | Performed by: FAMILY MEDICINE

## 2022-09-01 PROCEDURE — 1036F TOBACCO NON-USER: CPT | Performed by: FAMILY MEDICINE

## 2022-09-01 PROCEDURE — 2022F DILAT RTA XM EVC RTNOPTHY: CPT | Performed by: FAMILY MEDICINE

## 2022-09-01 RX ORDER — AMITRIPTYLINE HYDROCHLORIDE 10 MG/1
10 TABLET, FILM COATED ORAL NIGHTLY PRN
Qty: 30 TABLET | Refills: 0 | Status: CANCELLED | OUTPATIENT
Start: 2022-09-01 | End: 2022-10-01

## 2022-09-01 RX ORDER — SILDENAFIL 100 MG/1
100 TABLET, FILM COATED ORAL DAILY PRN
Qty: 30 TABLET | Refills: 5 | Status: SHIPPED | OUTPATIENT
Start: 2022-09-01

## 2022-09-01 ASSESSMENT — PATIENT HEALTH QUESTIONNAIRE - PHQ9
2. FEELING DOWN, DEPRESSED OR HOPELESS: 0
SUM OF ALL RESPONSES TO PHQ9 QUESTIONS 1 & 2: 0
1. LITTLE INTEREST OR PLEASURE IN DOING THINGS: 0
SUM OF ALL RESPONSES TO PHQ QUESTIONS 1-9: 0

## 2022-09-01 NOTE — PROGRESS NOTES
Here for f/u and recheck of ESRD, and inguinal hernia issues    Pt did have shunt placed to L wrist for dialysis, working with dr. Donis Bradley and they are happy with how it is doing and waiting for it to mature. Pt now on HD, going 3d/wik and each session is about 4 hours. Pt is managing the stress of it ok    Pt does notice a mild tremor in hands. Pt notes for several weeks and is not finding related to any changes in meds, etc.  Pt feels sx are mild. No sx at rest but with writing. No issues with eating, grabbing things. Balance is not great but relates to his chronic back issues, no shuffling. Pt working with pain mgt now for her chronic low back pain issues. Pt will be getting pain meds through them. They did evaluation and will continue percocet TID prn. They did refer him to dr. Jackie Garcia to consider ilioinguinal nerve block. Last CT scan done 8/3 showing recurrent inguinal hernias. Pt had surgery for that 4/2022 and again 2 months later in 6/2022    Pt states that he is dealing with some stressors/depression issues. Pt has struggled with not working and his chronic medical conditions. Pt is going to probably have to retire and that bothers him. Pt feels mood is lower, and is less motivated to get out and do things    Except as noted above in the history of present illness, the review of systems is  negative for headache, vision changes, chest pain, shortness of breath, abdominal pain, urinary sx, bowel changes. Past medical, surgical, and social history reviewed and updated  Medications and allergies reviewed and updated      O: /70   Pulse (!) 110   Temp 97.1 °F (36.2 °C) (Temporal)   Resp 16   Wt 243 lb (110.2 kg)   SpO2 99%   BMI 29.58 kg/m²   GEN: No acute distress, cooperative, well nourished, alert. HEENT: PEERLA, EOMI , normocephalic/atraumatic, nares and oropharynx clear. Mucous membranes normal, Tympanic membranes clear bilaterally.   Neck: soft, supple, no thyromegaly, mass, no Lymphadenopathy  CV: Regular rate and rhythm, no murmur, rubs, gallops. No edema. Resp: Clear to auscultation bilaterally good air entry bilaterally  No crackles, wheeze. Breathing comfortably. Psych: mood stable, No suicidal thoughts or ideation   Neuro: cranial nerves II-XII intact. Gait within normal limits. Sensation intact, strength 5/5 bilateral upper extremities, bilateral lower extremities  Ext: L wrist with palpable thrill over fistula      Lab Results   Component Value Date    CREATININE 4.2 (H) 08/03/2022    BUN 12 08/03/2022     08/03/2022    K 3.6 08/03/2022     08/03/2022    CO2 25 08/03/2022         Lab Results   Component Value Date    WBC 5.0 08/03/2022    HGB 11.3 (L) 08/03/2022    HCT 34.9 (L) 08/03/2022    MCV 83.7 08/03/2022     08/03/2022             ASSESSMENT / PLAN:    1. Ilioinguinal neuralgia of left side  Recent CT nonfocal  F/u with pain mgt    2. ESRD (end stage renal disease) on dialysis ([facility] [address]  Cont HD, s/p recent fistula placement    3. Type 2 diabetes mellitus with stage 4 chronic kidney disease, unspecified whether long term insulin use (HCC)  Stable w/o progresive sx    4. Chronic systolic congestive heart failure (HCC)  Stable, monitor    5. Essential hypertension, benign  Stable @ goal    6. Tremor  Exam nonfocal  No s/s of parkinsons dz  Suspect benign essential tremor   The patient is reassured that these symptoms do not appear to represent a serious or threatening condition. Monitor for progressive sx, gait issues    7. Elevated PSA  Has appt with urology next week    8. Depression with anxiety  Moderate sx  Discussed tx options. Refer psychology for eval  Names given  - External Referral To Psychology           Follow-up appointment:   1 month/prn    Discussed use, benefit, and side effects of all prescribed medications. Barriers to medication compliance addressed. All patient questions answered. Pt voiced understanding.   When

## 2022-09-08 ENCOUNTER — OFFICE VISIT (OUTPATIENT)
Dept: VASCULAR SURGERY | Age: 62
End: 2022-09-08

## 2022-09-08 VITALS
BODY MASS INDEX: 29.59 KG/M2 | DIASTOLIC BLOOD PRESSURE: 81 MMHG | SYSTOLIC BLOOD PRESSURE: 129 MMHG | HEIGHT: 76 IN | HEART RATE: 114 BPM | WEIGHT: 243 LBS

## 2022-09-08 DIAGNOSIS — N18.6 ESRD (END STAGE RENAL DISEASE) (HCC): Primary | ICD-10-CM

## 2022-09-08 PROCEDURE — 99024 POSTOP FOLLOW-UP VISIT: CPT | Performed by: SURGERY

## 2022-09-08 NOTE — PROGRESS NOTES
2022     Dayami Woods (:  1960) is a 59 y.o. male,Established patient, here for evaluation of the following chief complaint(s):  Post-Op Check (4 wk F/U L. AVF)        ASSESSMENT/PLAN:  1. ESRD (end stage renal disease) ([facility] [address]  He is progressing very nicely. We will see him back in 4 weeks, and I expect that he will likely be able to use the fistula at that time. SUBJECTIVE/OBJECTIVE:  POV#2:  Left RC AVF creation 22  6 week post op visit. No new complaints. Arm is feeling better. Still having some numbness at the thumb. On exam: The AV fistula is excellent in size, nearly mature. Flattens with elevation as expected. Augmentation of radial pulse with brief compression of the fistula. Hand is warm and well-perfused. Good thrill and bruit throughout. Physical Exam  Vitals:    22 1108   BP: 129/81   Pulse: (!) 114   Weight: 243 lb (110.2 kg)   Height: 6' 4\" (1.93 m)           An electronic signature was used to authenticate this note.     --Maye Staley MD

## 2022-09-13 ENCOUNTER — OFFICE VISIT (OUTPATIENT)
Dept: FAMILY MEDICINE CLINIC | Age: 62
End: 2022-09-13
Payer: MEDICARE

## 2022-09-13 ENCOUNTER — HOSPITAL ENCOUNTER (OUTPATIENT)
Dept: GENERAL RADIOLOGY | Age: 62
Discharge: HOME | End: 2022-09-13
Payer: MEDICARE

## 2022-09-13 VITALS
TEMPERATURE: 97.8 F | RESPIRATION RATE: 12 BRPM | BODY MASS INDEX: 30.77 KG/M2 | WEIGHT: 252.8 LBS | SYSTOLIC BLOOD PRESSURE: 128 MMHG | OXYGEN SATURATION: 97 % | HEART RATE: 102 BPM | DIASTOLIC BLOOD PRESSURE: 70 MMHG

## 2022-09-13 DIAGNOSIS — N18.4 TYPE 2 DIABETES MELLITUS WITH STAGE 4 CHRONIC KIDNEY DISEASE, UNSPECIFIED WHETHER LONG TERM INSULIN USE (HCC): ICD-10-CM

## 2022-09-13 DIAGNOSIS — M25.512 CHRONIC LEFT SHOULDER PAIN: Primary | ICD-10-CM

## 2022-09-13 DIAGNOSIS — G89.29 CHRONIC NECK PAIN: ICD-10-CM

## 2022-09-13 DIAGNOSIS — R26.81 GAIT INSTABILITY: ICD-10-CM

## 2022-09-13 DIAGNOSIS — E11.22 TYPE 2 DIABETES MELLITUS WITH STAGE 4 CHRONIC KIDNEY DISEASE, UNSPECIFIED WHETHER LONG TERM INSULIN USE (HCC): ICD-10-CM

## 2022-09-13 DIAGNOSIS — Z99.2 ESRD (END STAGE RENAL DISEASE) ON DIALYSIS (HCC): ICD-10-CM

## 2022-09-13 DIAGNOSIS — R25.1 TREMOR: ICD-10-CM

## 2022-09-13 DIAGNOSIS — G89.29 CHRONIC LEFT SHOULDER PAIN: ICD-10-CM

## 2022-09-13 DIAGNOSIS — M25.512 CHRONIC LEFT SHOULDER PAIN: ICD-10-CM

## 2022-09-13 DIAGNOSIS — M54.2 CHRONIC NECK PAIN: ICD-10-CM

## 2022-09-13 DIAGNOSIS — G89.29 CHRONIC LEFT SHOULDER PAIN: Primary | ICD-10-CM

## 2022-09-13 DIAGNOSIS — N18.6 ESRD (END STAGE RENAL DISEASE) ON DIALYSIS (HCC): ICD-10-CM

## 2022-09-13 PROCEDURE — 2022F DILAT RTA XM EVC RTNOPTHY: CPT | Performed by: FAMILY MEDICINE

## 2022-09-13 PROCEDURE — G8417 CALC BMI ABV UP PARAM F/U: HCPCS | Performed by: FAMILY MEDICINE

## 2022-09-13 PROCEDURE — 99214 OFFICE O/P EST MOD 30 MIN: CPT | Performed by: FAMILY MEDICINE

## 2022-09-13 PROCEDURE — 1036F TOBACCO NON-USER: CPT | Performed by: FAMILY MEDICINE

## 2022-09-13 PROCEDURE — G8427 DOCREV CUR MEDS BY ELIG CLIN: HCPCS | Performed by: FAMILY MEDICINE

## 2022-09-13 PROCEDURE — 72040 X-RAY EXAM NECK SPINE 2-3 VW: CPT

## 2022-09-13 PROCEDURE — 3017F COLORECTAL CA SCREEN DOC REV: CPT | Performed by: FAMILY MEDICINE

## 2022-09-13 PROCEDURE — 3044F HG A1C LEVEL LT 7.0%: CPT | Performed by: FAMILY MEDICINE

## 2022-09-13 PROCEDURE — 73030 X-RAY EXAM OF SHOULDER: CPT

## 2022-09-13 RX ORDER — TIZANIDINE 2 MG/1
2 TABLET ORAL DAILY PRN
Qty: 30 TABLET | Refills: 0 | Status: SHIPPED | OUTPATIENT
Start: 2022-09-13 | End: 2022-09-21

## 2022-09-13 RX ORDER — METHYLPREDNISOLONE 4 MG/1
TABLET ORAL
Qty: 21 TABLET | Refills: 0 | Status: SHIPPED | OUTPATIENT
Start: 2022-09-13

## 2022-09-13 ASSESSMENT — PATIENT HEALTH QUESTIONNAIRE - PHQ9
1. LITTLE INTEREST OR PLEASURE IN DOING THINGS: 0
3. TROUBLE FALLING OR STAYING ASLEEP: 0
SUM OF ALL RESPONSES TO PHQ QUESTIONS 1-9: 0
4. FEELING TIRED OR HAVING LITTLE ENERGY: 0
SUM OF ALL RESPONSES TO PHQ QUESTIONS 1-9: 0
SUM OF ALL RESPONSES TO PHQ QUESTIONS 1-9: 0
6. FEELING BAD ABOUT YOURSELF - OR THAT YOU ARE A FAILURE OR HAVE LET YOURSELF OR YOUR FAMILY DOWN: 0
SUM OF ALL RESPONSES TO PHQ9 QUESTIONS 1 & 2: 0
10. IF YOU CHECKED OFF ANY PROBLEMS, HOW DIFFICULT HAVE THESE PROBLEMS MADE IT FOR YOU TO DO YOUR WORK, TAKE CARE OF THINGS AT HOME, OR GET ALONG WITH OTHER PEOPLE: 0
SUM OF ALL RESPONSES TO PHQ QUESTIONS 1-9: 0
8. MOVING OR SPEAKING SO SLOWLY THAT OTHER PEOPLE COULD HAVE NOTICED. OR THE OPPOSITE, BEING SO FIGETY OR RESTLESS THAT YOU HAVE BEEN MOVING AROUND A LOT MORE THAN USUAL: 0
7. TROUBLE CONCENTRATING ON THINGS, SUCH AS READING THE NEWSPAPER OR WATCHING TELEVISION: 0
2. FEELING DOWN, DEPRESSED OR HOPELESS: 0
9. THOUGHTS THAT YOU WOULD BE BETTER OFF DEAD, OR OF HURTING YOURSELF: 0
5. POOR APPETITE OR OVEREATING: 0

## 2022-09-13 NOTE — PROGRESS NOTES
Here for f/u and recheck of his chronic neck and shoulder pain on L. Pt states that this is new, with some sx that just developed a few days to 1 week ago. Pt feels sx are more in shoulder but feels some decrease in range of motion of neck. Very limited in range of motion neck. Pt can lift his arm but does have some limitation. Strength is ok. Pt has not taken anything for this, is limited on meds d/t ESRD/Dialysis. Pt does have rx for percocet for his back but that doesn't seem to help. Pt did see dr. Calin Miramontes for f/u of [place] [facility] and hopefully will be able to start use      Except as noted above in the history of present illness, the review of systems is  negative for headache, vision changes, chest pain, shortness of breath, abdominal pain, urinary sx, bowel changes. Past medical, surgical, and social history reviewed and updated  Medications and allergies reviewed and updated      O: /70   Pulse (!) 102   Temp 97.8 °F (36.6 °C) (Temporal)   Resp 12   Wt 252 lb 12.8 oz (114.7 kg)   SpO2 97%   BMI 30.77 kg/m²   GEN: No acute distress, cooperative, well nourished, alert. HEENT: PEERLA, EOMI , normocephalic/atraumatic, nares and oropharynx clear. Mucous membranes normal, Tympanic membranes clear bilaterally. Neck: soft, supple, no thyromegaly, mass, no Lymphadenopathy  CV: Regular rate and rhythm, no murmur, rubs, gallops. No edema. Resp: Clear to auscultation bilaterally good air entry bilaterally  No crackles, wheeze. Breathing comfortably.    Psych: mood stable, No suicidal thoughts or ideation   Ext: LUE with palpable thrill over fistula  Musc: full range of motion RUE, mild to moderate decrease in lateral ROM neck left, pain with internal and external rotation       Current Outpatient Medications   Medication Sig Dispense Refill    tiZANidine (ZANAFLEX) 2 MG tablet Take 1 tablet by mouth daily as needed (muscle spasm) 30 tablet 0    methylPREDNISolone (MEDROL DOSEPACK) 4 MG tablet Take by mouth as directed in pack 21 tablet 0    sildenafil (VIAGRA) 100 MG tablet Take 1 tablet by mouth daily as needed for Erectile Dysfunction 30 tablet 5    HYDROcodone-acetaminophen (NORCO) 5-325 MG per tablet TAKE 1 TABLET BY MOUTH EVERY 6 HOURS AS NEEDED FOR PAIN      terazosin (HYTRIN) 5 MG capsule       oxyCODONE-acetaminophen (PERCOCET) 5-325 MG per tablet Take 1 tablet by mouth every 8 hours as needed for Pain for up to 28 days. 84 tablet 0    amitriptyline (ELAVIL) 10 MG tablet Take 1 tablet by mouth nightly as needed for Sleep 30 tablet 0    gabapentin (NEURONTIN) 100 MG capsule Take 1 capsule by mouth 2 times daily for 30 days.  60 capsule 0    metoprolol succinate (TOPROL XL) 50 MG extended release tablet TAKE 1 TABLET BY MOUTH AT BEDTIME TAKE 1 TABLET BY MOUTH EVERY DAY 30 tablet 3    NIFEdipine (ADALAT CC) 90 MG extended release tablet TAKE 1 TABLET BY MOUTH EVERY DAY 30 tablet 4    polyethylene glycol (GLYCOLAX) 17 GM/SCOOP powder TAKE 17 G BY MOUTH 2 TIMES DAILY      vitamin D (ERGOCALCIFEROL) 1.25 MG (49994 UT) CAPS capsule Take 1 capsule by mouth once a week 4 capsule 0    calcitRIOL (ROCALTROL) 0.5 MCG capsule Take 1 capsule by mouth three times a week During HD 30 capsule 3    B Complex-C-Folic Acid (DIALYVITE 609 PO) Take 1 tablet by mouth once a week       bisacodyl (BISACODYL) 5 MG EC tablet Take 1 tablet by mouth daily as needed for Constipation 30 tablet 5    spironolactone (ALDACTONE) 50 MG tablet Take 1 tablet by mouth at bedtime 30 tablet 3    atorvastatin (LIPITOR) 20 MG tablet TAKE 1 TABLET BY MOUTH EVERY DAY 30 tablet 5    omeprazole (PRILOSEC) 40 MG delayed release capsule TAKE 1 CAPSULE BY MOUTH EVERY DAY 30 capsule 1    aspirin 81 MG tablet Take 81 mg by mouth daily      naloxone (NARCAN) 4 MG/0.1ML LIQD nasal spray 1 spray by Nasal route as needed for Opioid Reversal (Patient not taking: Reported on 3/82/0490) 1 each 0    Folic Acid-Vit T0-XFD A65 0.5-5-0.2 MG TABS Take by mouth (Patient not taking: Reported on 9/13/2022)       No current facility-administered medications for this visit. ASSESSMENT / PLAN:    1. Chronic left shoulder pain  Suspect irritation to rotator cuff, ? If some mild irritation to neck  Check xray cervical spine and L shoulder  Tizanidine 2mg QD prn  Medrol dosepak x 1  Management pending results. - XR SHOULDER LEFT (MIN 2 VIEWS); Future    2. Chronic neck pain  As above  Check xrays  - XR CERVICAL SPINE (2-3 VIEWS); Future    3. Gait instability  Exam nonfocal  Nonfocal MRI brain 11/2021  Refer neurology  - KATINA De Paz DO, Neurology, [facility]    4. Tremor  Exam nonfocal  Refer to neurology for eval  - KATINA - Jean Pierre De Paz DO, Neurology, [facility]    5. Type 2 diabetes mellitus with stage 4 chronic kidney disease, unspecified whether long term insulin use (HCC)  Stable @ goal, doing well  monitor    6. ESRD (end stage renal disease) on dialysis ([facility] [address]  Cont to f/u with nephrology  Awaiting maturation of fistula to LUE           Follow-up appointment:   Pending xray results/prn    Discussed use, benefit, and side effects of all prescribed medications. Barriers to medication compliance addressed. All patient questions answered. Pt voiced understanding. When applicable, patient's outside records were reviewed through [address]. The patient has signed appropriate paperworks/consents.

## 2022-09-20 RX ORDER — TIZANIDINE 2 MG/1
2 TABLET ORAL DAILY PRN
Qty: 30 TABLET | Refills: 0 | OUTPATIENT
Start: 2022-09-20

## 2022-09-20 NOTE — TELEPHONE ENCOUNTER
Requested Prescriptions     Pending Prescriptions Disp Refills    tiZANidine (ZANAFLEX) 2 MG tablet [Pharmacy Med Name: TIZANIDINE HCL 2 MG TABLET] 30 tablet 0     Sig: TAKE 1 TABLET BY MOUTH DAILY AS NEEDED (MUSCLE SPASM)     Last OV-9/13/2022  Labs- 8/3/22  NFOV  RX already sent

## 2022-09-21 RX ORDER — TIZANIDINE 2 MG/1
2 TABLET ORAL DAILY PRN
Qty: 30 TABLET | Refills: 0 | Status: SHIPPED | OUTPATIENT
Start: 2022-09-21 | End: 2022-10-05

## 2022-09-21 NOTE — TELEPHONE ENCOUNTER
Requested Prescriptions     Pending Prescriptions Disp Refills    tiZANidine (ZANAFLEX) 2 MG tablet [Pharmacy Med Name: TIZANIDINE HCL 2 MG TABLET] 30 tablet 0     Sig: TAKE 1 TABLET BY MOUTH DAILY AS NEEDED (MUSCLE SPASM)     Last ov 9/13/22  Last lab 8/3/22  Next ov n/a

## 2022-09-22 ENCOUNTER — TELEPHONE (OUTPATIENT)
Dept: FAMILY MEDICINE CLINIC | Age: 62
End: 2022-09-22

## 2022-09-22 NOTE — TELEPHONE ENCOUNTER
Pt wife states pt tested positive for Covid today. Symptoms started yesterday. C/O Fever: 100.4. fatigue, headache, bodyache, chills, chest congestion, dry cough. She states pt doesn't have SOB. Pt states he does not have a way to check his O2 level. Pt is suppose to do dialysis Saturday. Pt would like to know if he should be prescribed paxlovid?

## 2022-09-22 NOTE — TELEPHONE ENCOUNTER
This med is not studied in patients with ESRD/dialysis and therefore would not recommend its usage  Would treat supportively over the counter

## 2022-09-28 ENCOUNTER — OFFICE VISIT (OUTPATIENT)
Dept: PAIN MANAGEMENT | Age: 62
End: 2022-09-28
Payer: MEDICARE

## 2022-09-28 VITALS
WEIGHT: 254.2 LBS | SYSTOLIC BLOOD PRESSURE: 139 MMHG | HEART RATE: 109 BPM | BODY MASS INDEX: 30.94 KG/M2 | OXYGEN SATURATION: 97 % | DIASTOLIC BLOOD PRESSURE: 73 MMHG

## 2022-09-28 DIAGNOSIS — G57.92 ILIOINGUINAL NEURALGIA OF LEFT SIDE: ICD-10-CM

## 2022-09-28 DIAGNOSIS — G89.4 CHRONIC PAIN SYNDROME: ICD-10-CM

## 2022-09-28 DIAGNOSIS — K43.2 INCISIONAL HERNIA WITHOUT OBSTRUCTION OR GANGRENE: ICD-10-CM

## 2022-09-28 DIAGNOSIS — Z51.81 ENCOUNTER FOR THERAPEUTIC DRUG MONITORING: ICD-10-CM

## 2022-09-28 DIAGNOSIS — G57.91 ILIOINGUINAL NEURALGIA OF RIGHT SIDE: ICD-10-CM

## 2022-09-28 PROCEDURE — 1036F TOBACCO NON-USER: CPT | Performed by: NURSE PRACTITIONER

## 2022-09-28 PROCEDURE — 99214 OFFICE O/P EST MOD 30 MIN: CPT | Performed by: NURSE PRACTITIONER

## 2022-09-28 PROCEDURE — G8427 DOCREV CUR MEDS BY ELIG CLIN: HCPCS | Performed by: NURSE PRACTITIONER

## 2022-09-28 PROCEDURE — 3017F COLORECTAL CA SCREEN DOC REV: CPT | Performed by: NURSE PRACTITIONER

## 2022-09-28 PROCEDURE — G8417 CALC BMI ABV UP PARAM F/U: HCPCS | Performed by: NURSE PRACTITIONER

## 2022-09-28 RX ORDER — TIZANIDINE 2 MG/1
2 TABLET ORAL NIGHTLY PRN
Qty: 30 TABLET | Refills: 0 | Status: SHIPPED | OUTPATIENT
Start: 2022-09-28 | End: 2022-10-26 | Stop reason: SDUPTHER

## 2022-09-28 RX ORDER — OXYCODONE HYDROCHLORIDE AND ACETAMINOPHEN 5; 325 MG/1; MG/1
1 TABLET ORAL EVERY 8 HOURS PRN
Qty: 84 TABLET | Refills: 0 | Status: SHIPPED | OUTPATIENT
Start: 2022-09-28 | End: 2022-10-26 | Stop reason: SDUPTHER

## 2022-09-28 NOTE — PROGRESS NOTES
Jossie Pinto  1960  [id number]      HISTORY OF PRESENT ILLNESS: Mr. Melody Reno is a 59 y.o. male returns for a follow up visit for pain management  He has a diagnosis of   1. Incisional hernia without obstruction or gangrene    2. Encounter for therapeutic drug monitoring    3. Ilioinguinal neuralgia of right side    4. Chronic pain syndrome    5. Ilioinguinal neuralgia of left side    . As per Information Obtained from the PADT (Patient Assessment and Documentation Tool)    He complains of pain in the  bilateral groin and pubic region. He rates the pain 7/10 and describes it as aching, burning. Current treatment regimen has helped relieve about 60% of the pain. He denies any side effects from the current pain regimen. Patient reports that since the last follow up visit the physical functioning is unchanged, family/social relationships are unchanged, mood is unchanged sleep patterns are unchanged, and that the overall functioning is unchanged. Patient denies misusing/abusing his narcotic pain medications or using any illegal drugs. Upon obtaining medical history from Mr. Emma Alvarez: Patients list of allergies were reviewed     MEDICATIONS: Mr. Melody Reno list of medications were reviewed. His current medications are   Outpatient Medications Prior to Visit   Medication Sig Dispense Refill    polyethylene glycol (GLYCOLAX) 17 GM/SCOOP powder TAKE 17 G BY MOUTH 2 TIMES DAILY 850 g 1    spironolactone (ALDACTONE) 50 MG tablet TAKE 1 TABLET BY MOUTH EVERYDAY AT BEDTIME 90 tablet 1    B Complex-C-Folic Acid (DIALYVITE 166) 0.8 MG TABS Take 1 tablet by mouth daily 90 tablet 3    tiZANidine (ZANAFLEX) 2 MG tablet TAKE 1 TABLET BY MOUTH DAILY AS NEEDED (MUSCLE SPASM) 30 tablet 0    methylPREDNISolone (MEDROL DOSEPACK) 4 MG tablet Take by mouth as directed in pack 21 tablet 0    sildenafil (VIAGRA) 100 MG tablet Take 1 tablet by mouth daily as needed for Erectile Dysfunction 30 tablet 5 HYDROcodone-acetaminophen (NORCO) 5-325 MG per tablet TAKE 1 TABLET BY MOUTH EVERY 6 HOURS AS NEEDED FOR PAIN      terazosin (HYTRIN) 5 MG capsule       oxyCODONE-acetaminophen (PERCOCET) 5-325 MG per tablet Take 1 tablet by mouth every 8 hours as needed for Pain for up to 28 days. 84 tablet 0    amitriptyline (ELAVIL) 10 MG tablet Take 1 tablet by mouth nightly as needed for Sleep 30 tablet 0    gabapentin (NEURONTIN) 100 MG capsule Take 1 capsule by mouth 2 times daily for 30 days. 60 capsule 0    naloxone (NARCAN) 4 MG/0.1ML LIQD nasal spray 1 spray by Nasal route as needed for Opioid Reversal 1 each 0    metoprolol succinate (TOPROL XL) 50 MG extended release tablet TAKE 1 TABLET BY MOUTH AT BEDTIME TAKE 1 TABLET BY MOUTH EVERY DAY 30 tablet 3    NIFEdipine (ADALAT CC) 90 MG extended release tablet TAKE 1 TABLET BY MOUTH EVERY DAY 30 tablet 4    Folic Acid-Vit L7-RPJ O60 0.5-5-0.2 MG TABS Take by mouth      vitamin D (ERGOCALCIFEROL) 1.25 MG (40643 UT) CAPS capsule Take 1 capsule by mouth once a week 4 capsule 0    calcitRIOL (ROCALTROL) 0.5 MCG capsule Take 1 capsule by mouth three times a week During HD 30 capsule 3    B Complex-C-Folic Acid (DIALYVITE 546 PO) Take 1 tablet by mouth once a week       bisacodyl (BISACODYL) 5 MG EC tablet Take 1 tablet by mouth daily as needed for Constipation 30 tablet 5    atorvastatin (LIPITOR) 20 MG tablet TAKE 1 TABLET BY MOUTH EVERY DAY 30 tablet 5    omeprazole (PRILOSEC) 40 MG delayed release capsule TAKE 1 CAPSULE BY MOUTH EVERY DAY 30 capsule 1    aspirin 81 MG tablet Take 81 mg by mouth daily       No facility-administered medications prior to visit. SOCIAL/FAMILY/PAST MEDICAL HISTORY: Mr. Ashwin Antonio, family and past medical history was reviewed. REVIEW OF SYSTEMS:    Respiratory: Negative for apnea, chest tightness and shortness of breath or change in baseline breathing.     Gastrointestinal: Negative for nausea, vomiting, abdominal pain, diarrhea, constipation, blood in stool and abdominal distention. PHYSICAL EXAM:   Nursing note and vitals reviewed. /73   Pulse (!) 109   Wt 254 lb 3.2 oz (115.3 kg)   SpO2 97%   BMI 30.94 kg/m²   Constitutional: He appears well-developed and well-nourished. No acute distress. Skin: Skin is warm and dry, good turgor. No rash noted. He is not diaphoretic. Cardiovascular: Normal rate, regular rhythm, normal heart sounds, and does not have murmur. Pulmonary/Chest: Effort normal. No respiratory distress. He does not have wheezes in the lung fields. He has no rales. Neurological/Psychiatric:He is alert and oriented to person, place, and time. Coordination is  normal.  His mood isAppropriate and affect is Neutral/Euthymic(normal) . Prescription pain medication monitoring:                  MEDD current = 22.5              ORT Score = 0              Other Risk factors - stable content mood, no red flags              Date of Last Medication Agreement:08/31/2022              Date Naloxone prescribed:08/31/2022              UDT:                          Date of last UDT: 08/31/2022                          Adverse report: No              OARRS:                          Checked today: Yes                          Adverse report: No    IMPRESSION:   1. Incisional hernia without obstruction or gangrene    2. Encounter for therapeutic drug monitoring    3. Ilioinguinal neuralgia of right side    4. Chronic pain syndrome    5.  Ilioinguinal neuralgia of left side        PLAN:  Informed verbal consent was obtained:  -Continue percocet 5mg TID PRN pain   -Tizanidine 2mg tabs daily PRN   -UDS reviewed today and consistent   -He continue hemodialysis  -F/U 28 days    Analgesic Plan:              Continue present regimen:yes              Adjust dose of present analgesic:no              Switch analgesics:no              Add/Adjust concomitant therapy:yes      Current Outpatient Medications   Medication Sig Dispense Refill    polyethylene glycol (GLYCOLAX) 17 GM/SCOOP powder TAKE 17 G BY MOUTH 2 TIMES DAILY 850 g 1    spironolactone (ALDACTONE) 50 MG tablet TAKE 1 TABLET BY MOUTH EVERYDAY AT BEDTIME 90 tablet 1    B Complex-C-Folic Acid (DIALYVITE 700) 0.8 MG TABS Take 1 tablet by mouth daily 90 tablet 3    tiZANidine (ZANAFLEX) 2 MG tablet TAKE 1 TABLET BY MOUTH DAILY AS NEEDED (MUSCLE SPASM) 30 tablet 0    methylPREDNISolone (MEDROL DOSEPACK) 4 MG tablet Take by mouth as directed in pack 21 tablet 0    sildenafil (VIAGRA) 100 MG tablet Take 1 tablet by mouth daily as needed for Erectile Dysfunction 30 tablet 5    HYDROcodone-acetaminophen (NORCO) 5-325 MG per tablet TAKE 1 TABLET BY MOUTH EVERY 6 HOURS AS NEEDED FOR PAIN      terazosin (HYTRIN) 5 MG capsule       oxyCODONE-acetaminophen (PERCOCET) 5-325 MG per tablet Take 1 tablet by mouth every 8 hours as needed for Pain for up to 28 days. 84 tablet 0    amitriptyline (ELAVIL) 10 MG tablet Take 1 tablet by mouth nightly as needed for Sleep 30 tablet 0    gabapentin (NEURONTIN) 100 MG capsule Take 1 capsule by mouth 2 times daily for 30 days.  60 capsule 0    naloxone (NARCAN) 4 MG/0.1ML LIQD nasal spray 1 spray by Nasal route as needed for Opioid Reversal 1 each 0    metoprolol succinate (TOPROL XL) 50 MG extended release tablet TAKE 1 TABLET BY MOUTH AT BEDTIME TAKE 1 TABLET BY MOUTH EVERY DAY 30 tablet 3    NIFEdipine (ADALAT CC) 90 MG extended release tablet TAKE 1 TABLET BY MOUTH EVERY DAY 30 tablet 4    Folic Acid-Vit V9-NCS V93 0.5-5-0.2 MG TABS Take by mouth      vitamin D (ERGOCALCIFEROL) 1.25 MG (93708 UT) CAPS capsule Take 1 capsule by mouth once a week 4 capsule 0    calcitRIOL (ROCALTROL) 0.5 MCG capsule Take 1 capsule by mouth three times a week During HD 30 capsule 3    B Complex-C-Folic Acid (DIALYVITE 999 PO) Take 1 tablet by mouth once a week       bisacodyl (BISACODYL) 5 MG EC tablet Take 1 tablet by mouth daily as needed for Constipation 30 tablet 5 atorvastatin (LIPITOR) 20 MG tablet TAKE 1 TABLET BY MOUTH EVERY DAY 30 tablet 5    omeprazole (PRILOSEC) 40 MG delayed release capsule TAKE 1 CAPSULE BY MOUTH EVERY DAY 30 capsule 1    aspirin 81 MG tablet Take 81 mg by mouth daily       No current facility-administered medications for this visit. I will continue his current medication regimen  which is part of the above treatment schedule. It has been helping with Mr. Carla Lambert chronic  medical problems which for this visit include:   Diagnoses of Incisional hernia without obstruction or gangrene, Encounter for therapeutic drug monitoring, Ilioinguinal neuralgia of right side, Chronic pain syndrome, and Ilioinguinal neuralgia of left side were pertinent to this visit. Risks and benefits of the medications and other alternative treatments  including no treatment were discussed with the patient. The common side effects of these medications were also explained to the patient. Informed verbal consent was obtained. Goals of current treatment regimen include improvement in pain, restoration of functioning- with focus on improvement in physical performance, general activity, work or disability,emotional distress, health care utilization and  decreased medication consumption. Will continue to monitor progress towards achieving/maintaining therapeutic goals with special emphasis on  1. Improvement in perceived interfernce  of pain with ADL's. Ability to do home exercises independently. Ability to do household chores indoor and/or outdoor work and social and leisure activities. Improve psychosocial and physical functioning. PROGRESSING      He was advised against drinking alcohol with the narcotic pain medicines, advised against driving or handling machinery while adjusting the dose of medicines or if having cognitive  issues related to the current medications. Risk of overdose and death, if medicines not taken as prescribed, were also discussed.  If the patient

## 2022-10-05 RX ORDER — CALCIUM ACETATE 667 MG/1
CAPSULE ORAL
COMMUNITY
Start: 2022-09-17 | End: 2022-10-12

## 2022-10-05 RX ORDER — CELECOXIB 100 MG/1
CAPSULE ORAL
COMMUNITY
Start: 2022-09-11

## 2022-10-06 ENCOUNTER — OFFICE VISIT (OUTPATIENT)
Dept: VASCULAR SURGERY | Age: 62
End: 2022-10-06

## 2022-10-06 VITALS
WEIGHT: 253 LBS | BODY MASS INDEX: 30.81 KG/M2 | SYSTOLIC BLOOD PRESSURE: 139 MMHG | HEIGHT: 76 IN | TEMPERATURE: 98.1 F | HEART RATE: 101 BPM | DIASTOLIC BLOOD PRESSURE: 86 MMHG

## 2022-10-06 DIAGNOSIS — N18.6 ESRD (END STAGE RENAL DISEASE) (HCC): Primary | ICD-10-CM

## 2022-10-06 PROCEDURE — 99024 POSTOP FOLLOW-UP VISIT: CPT | Performed by: SURGERY

## 2022-10-06 RX ORDER — TORSEMIDE 100 MG/1
TABLET ORAL
COMMUNITY
Start: 2022-09-26 | End: 2022-10-22

## 2022-10-06 RX ORDER — BUSPIRONE HYDROCHLORIDE 5 MG/1
TABLET ORAL
COMMUNITY
Start: 2022-09-26 | End: 2022-10-22

## 2022-10-06 NOTE — PROGRESS NOTES
10/6/2022     Luciano Woods (:  1960) is a 59 y.o. male,Established patient, here for evaluation of the following chief complaint(s):  Follow-up (L avf)        ASSESSMENT/PLAN:  1. ESRD (end stage renal disease) ([facility] [address]   Can start using AVF at any time. I will see him back as needed for any problems or concerns. SUBJECTIVE/OBJECTIVE:  POV#3:  Left RC AVF creation 22  No new complaints. Still has some numbness of thumb, but better. On exam:  fistula with good caliber. Good thrill. Appears ready to use. Physical Exam  Vitals:    10/06/22 1126   BP: 139/86   Pulse: (!) 101   Temp: 98.1 °F (36.7 °C)   Weight: 253 lb (114.8 kg)   Height: 6' 4\" (1.93 m)         An electronic signature was used to authenticate this note.     --Kapil Yuen MD

## 2022-10-12 ENCOUNTER — TELEPHONE (OUTPATIENT)
Dept: FAMILY MEDICINE CLINIC | Age: 62
End: 2022-10-12

## 2022-10-12 DIAGNOSIS — G89.29 CHRONIC LEFT SHOULDER PAIN: Primary | ICD-10-CM

## 2022-10-12 DIAGNOSIS — M25.512 CHRONIC LEFT SHOULDER PAIN: Primary | ICD-10-CM

## 2022-10-12 NOTE — TELEPHONE ENCOUNTER
Patient having MRI 10/19/22 would like medication called in for claustrophobia. Please call patient. [facility] the one on file. Thanks.

## 2022-10-13 RX ORDER — DIAZEPAM 5 MG/1
TABLET ORAL
Qty: 2 TABLET | Refills: 0 | Status: SHIPPED | OUTPATIENT
Start: 2022-10-13 | End: 2022-10-23

## 2022-10-26 ENCOUNTER — OFFICE VISIT (OUTPATIENT)
Dept: PAIN MANAGEMENT | Age: 62
End: 2022-10-26
Payer: MEDICARE

## 2022-10-26 VITALS
OXYGEN SATURATION: 98 % | DIASTOLIC BLOOD PRESSURE: 82 MMHG | HEART RATE: 96 BPM | SYSTOLIC BLOOD PRESSURE: 153 MMHG | BODY MASS INDEX: 31.45 KG/M2 | WEIGHT: 258.4 LBS

## 2022-10-26 DIAGNOSIS — M25.50 ARTHRALGIA OF MULTIPLE JOINTS: ICD-10-CM

## 2022-10-26 DIAGNOSIS — G57.92 ILIOINGUINAL NEURALGIA OF LEFT SIDE: ICD-10-CM

## 2022-10-26 DIAGNOSIS — Z98.890 STATUS POST BILATERAL HERNIA REPAIR: ICD-10-CM

## 2022-10-26 DIAGNOSIS — M50.30 DDD (DEGENERATIVE DISC DISEASE), CERVICAL: ICD-10-CM

## 2022-10-26 DIAGNOSIS — G57.91 ILIOINGUINAL NEURALGIA OF RIGHT SIDE: ICD-10-CM

## 2022-10-26 DIAGNOSIS — Z51.81 ENCOUNTER FOR THERAPEUTIC DRUG MONITORING: ICD-10-CM

## 2022-10-26 DIAGNOSIS — Z87.19 STATUS POST BILATERAL HERNIA REPAIR: ICD-10-CM

## 2022-10-26 DIAGNOSIS — G89.4 CHRONIC PAIN SYNDROME: ICD-10-CM

## 2022-10-26 DIAGNOSIS — K43.2 INCISIONAL HERNIA WITHOUT OBSTRUCTION OR GANGRENE: Primary | ICD-10-CM

## 2022-10-26 DIAGNOSIS — F33.0 MAJOR DEPRESSIVE DISORDER, RECURRENT, MILD (HCC): ICD-10-CM

## 2022-10-26 PROCEDURE — G8417 CALC BMI ABV UP PARAM F/U: HCPCS | Performed by: NURSE PRACTITIONER

## 2022-10-26 PROCEDURE — 1036F TOBACCO NON-USER: CPT | Performed by: NURSE PRACTITIONER

## 2022-10-26 PROCEDURE — G8427 DOCREV CUR MEDS BY ELIG CLIN: HCPCS | Performed by: NURSE PRACTITIONER

## 2022-10-26 PROCEDURE — 3017F COLORECTAL CA SCREEN DOC REV: CPT | Performed by: NURSE PRACTITIONER

## 2022-10-26 PROCEDURE — 3074F SYST BP LT 130 MM HG: CPT | Performed by: NURSE PRACTITIONER

## 2022-10-26 PROCEDURE — G8484 FLU IMMUNIZE NO ADMIN: HCPCS | Performed by: NURSE PRACTITIONER

## 2022-10-26 PROCEDURE — 3078F DIAST BP <80 MM HG: CPT | Performed by: NURSE PRACTITIONER

## 2022-10-26 PROCEDURE — 99213 OFFICE O/P EST LOW 20 MIN: CPT | Performed by: NURSE PRACTITIONER

## 2022-10-26 RX ORDER — OXYCODONE HYDROCHLORIDE AND ACETAMINOPHEN 5; 325 MG/1; MG/1
1 TABLET ORAL EVERY 8 HOURS PRN
Qty: 84 TABLET | Refills: 0 | Status: SHIPPED | OUTPATIENT
Start: 2022-10-26 | End: 2022-11-23

## 2022-10-26 RX ORDER — AMITRIPTYLINE HYDROCHLORIDE 10 MG/1
10 TABLET, FILM COATED ORAL NIGHTLY PRN
Qty: 30 TABLET | Refills: 0 | Status: SHIPPED | OUTPATIENT
Start: 2022-10-26 | End: 2022-11-30 | Stop reason: SDUPTHER

## 2022-10-26 RX ORDER — GABAPENTIN 100 MG/1
100 CAPSULE ORAL 2 TIMES DAILY
Qty: 60 CAPSULE | Refills: 0 | Status: SHIPPED | OUTPATIENT
Start: 2022-10-26 | End: 2022-11-30 | Stop reason: SDUPTHER

## 2022-10-26 RX ORDER — TIZANIDINE 2 MG/1
2 TABLET ORAL NIGHTLY PRN
Qty: 30 TABLET | Refills: 0 | Status: SHIPPED | OUTPATIENT
Start: 2022-10-26 | End: 2022-11-25

## 2022-10-26 NOTE — PROGRESS NOTES
Cheryle Keep  1960  [id number]      HISTORY OF PRESENT ILLNESS: Mr. Sachi Marie is a 59 y.o. male returns for a follow up visit for pain management  He has a diagnosis of   1. Incisional hernia without obstruction or gangrene    2. Encounter for therapeutic drug monitoring    3. Ilioinguinal neuralgia of right side    4. Chronic pain syndrome    5. Ilioinguinal neuralgia of left side    6. Major depressive disorder, recurrent, mild    7. Status post bilateral hernia repair    8. DDD (degenerative disc disease), cervical    9. Arthralgia of multiple joints    . As per Information Obtained from the PADT (Patient Assessment and Documentation Tool)    He complains of pain in the  perineal area. He rates the pain 7/10 and describes it as aching, shooting. Current treatment regimen has helped relieve about 50% of the pain. He denies any side effects from the current pain regimen. Patient reports that since the last follow up visit the physical functioning is unchanged, family/social relationships are unchanged, mood is unchanged sleep patterns are unchanged, and that the overall functioning is unchanged. Patient denies misusing/abusing his narcotic pain medications or using any illegal drugs. He continues dialysis with no new acute concerns. Upon obtaining medical history from Mr. Bunny Gomez: Patients list of allergies were reviewed     MEDICATIONS: Mr. Sachi Marie list of medications were reviewed. His current medications are   Outpatient Medications Prior to Visit   Medication Sig Dispense Refill    busPIRone (BUSPAR) 5 MG tablet TAKE 1 TABLET BY MOUTH TWICE A DAY 60 tablet 5    metoprolol succinate (TOPROL XL) 50 MG extended release tablet TAKE 1 TABLET BY MOUTH AT BEDTIME TAKE 1 TABLET BY MOUTH EVERY DAY 30 tablet 3    torsemide (DEMADEX) 100 MG tablet TAKE 1 TABLET BY MOUTH EVERY DAY 90 tablet 1    calcium acetate (PHOSLO) 667 MG CAPS capsule TAKE 1 CAPSULE BY MOUTH THREE TIMES A DAY WITH MEALS 90 capsule 5    celecoxib (CELEBREX) 100 MG capsule TAKE ONE CAPSULE IN THE MORNING BY MOUTH AND 1 BEFORE BEDTIME      polyethylene glycol (GLYCOLAX) 17 GM/SCOOP powder TAKE 17 G BY MOUTH 2 TIMES DAILY 850 g 1    spironolactone (ALDACTONE) 50 MG tablet TAKE 1 TABLET BY MOUTH EVERYDAY AT BEDTIME 90 tablet 1    B Complex-C-Folic Acid (DIALYVITE 120) 0.8 MG TABS Take 1 tablet by mouth daily 90 tablet 3    methylPREDNISolone (MEDROL DOSEPACK) 4 MG tablet Take by mouth as directed in pack 21 tablet 0    sildenafil (VIAGRA) 100 MG tablet Take 1 tablet by mouth daily as needed for Erectile Dysfunction 30 tablet 5    terazosin (HYTRIN) 5 MG capsule       naloxone (NARCAN) 4 MG/0.1ML LIQD nasal spray 1 spray by Nasal route as needed for Opioid Reversal 1 each 0    NIFEdipine (ADALAT CC) 90 MG extended release tablet TAKE 1 TABLET BY MOUTH EVERY DAY 30 tablet 4    Folic Acid-Vit L0-VYZ R86 0.5-5-0.2 MG TABS Take by mouth      vitamin D (ERGOCALCIFEROL) 1.25 MG (54048 UT) CAPS capsule Take 1 capsule by mouth once a week 4 capsule 0    calcitRIOL (ROCALTROL) 0.5 MCG capsule Take 1 capsule by mouth three times a week During HD 30 capsule 3    bisacodyl (BISACODYL) 5 MG EC tablet Take 1 tablet by mouth daily as needed for Constipation 30 tablet 5    atorvastatin (LIPITOR) 20 MG tablet TAKE 1 TABLET BY MOUTH EVERY DAY 30 tablet 5    omeprazole (PRILOSEC) 40 MG delayed release capsule TAKE 1 CAPSULE BY MOUTH EVERY DAY 30 capsule 1    aspirin 81 MG tablet Take 81 mg by mouth daily      oxyCODONE-acetaminophen (PERCOCET) 5-325 MG per tablet Take 1 tablet by mouth every 8 hours as needed for Pain for up to 28 days. 84 tablet 0    tiZANidine (ZANAFLEX) 2 MG tablet Take 1 tablet by mouth nightly as needed (spasms) 30 tablet 0    amitriptyline (ELAVIL) 10 MG tablet Take 1 tablet by mouth nightly as needed for Sleep 30 tablet 0    gabapentin (NEURONTIN) 100 MG capsule Take 1 capsule by mouth 2 times daily for 30 days.  60 capsule 0     No facility-administered medications prior to visit. SOCIAL/FAMILY/PAST MEDICAL HISTORY: Mr. Kalani Foreman, family and past medical history was reviewed. REVIEW OF SYSTEMS:    Respiratory: Negative for apnea, chest tightness and shortness of breath or change in baseline breathing. Gastrointestinal: Negative for nausea, vomiting, abdominal pain, diarrhea, constipation, blood in stool and abdominal distention. PHYSICAL EXAM:   Nursing note and vitals reviewed. BP (!) 153/82   Pulse 96   Wt 258 lb 6.4 oz (117.2 kg)   SpO2 98%   BMI 31.45 kg/m²   Constitutional: He appears well-developed and well-nourished. No acute distress. Skin: Skin is warm and dry, good turgor. No rash noted. He is not diaphoretic. Cardiovascular: Normal rate, regular rhythm, normal heart sounds, and does not have murmur. Pulmonary/Chest: Effort normal. No respiratory distress. He does not have wheezes in the lung fields. He has no rales. Neurological/Psychiatric:He is alert and oriented to person, place, and time. Coordination is  normal.  His mood isAppropriate and affect is Neutral/Euthymic(normal) . Prescription pain medication monitoring:                  MEDD current = 22.5              ORT Score = 0              Other Risk factors - no red flags              Date of Last Medication Agreement:08/31/2022              Date Naloxone prescribed:08/31/2022              UDT:                          Date of last UDT: 08/31/2022                          Adverse report: No              OARRS:                          Checked today: Yes                          Adverse report: No    IMPRESSION:   1. Incisional hernia without obstruction or gangrene    2. Encounter for therapeutic drug monitoring    3. Ilioinguinal neuralgia of right side    4. Chronic pain syndrome    5. Ilioinguinal neuralgia of left side    6. Major depressive disorder, recurrent, mild    7. Status post bilateral hernia repair    8.  DDD (degenerative disc disease), cervical    9. Arthralgia of multiple joints        PLAN:  Informed verbal consent was obtained:    -Continue and refill Percocet 5 mg tabs 3 times a day as needed for pain  -Refill gabapentin 100 mg capsules twice a day as needed discussed with patient this can be taken on a as needed basis if he is drowsy  -Refill tizanidine for muscle spasms, he takes as needed  -Continue 10 mg amitriptyline tablet at night, patient reports this is helping him sleep, he does continue CPAP use and denies side effect  -Follow-up in 4 weeks for reassessment  -Continue opiate monitoring      Analgesic Plan:              Continue present regimen:yes              Adjust dose of present analgesic:no              Switch analgesics:no              Add/Adjust concomitant therapy:no      Current Outpatient Medications   Medication Sig Dispense Refill    oxyCODONE-acetaminophen (PERCOCET) 5-325 MG per tablet Take 1 tablet by mouth every 8 hours as needed for Pain for up to 28 days. 84 tablet 0    amitriptyline (ELAVIL) 10 MG tablet Take 1 tablet by mouth nightly as needed for Sleep 30 tablet 0    gabapentin (NEURONTIN) 100 MG capsule Take 1 capsule by mouth 2 times daily for 30 days.  60 capsule 0    tiZANidine (ZANAFLEX) 2 MG tablet Take 1 tablet by mouth nightly as needed (spasms) 30 tablet 0    busPIRone (BUSPAR) 5 MG tablet TAKE 1 TABLET BY MOUTH TWICE A DAY 60 tablet 5    metoprolol succinate (TOPROL XL) 50 MG extended release tablet TAKE 1 TABLET BY MOUTH AT BEDTIME TAKE 1 TABLET BY MOUTH EVERY DAY 30 tablet 3    torsemide (DEMADEX) 100 MG tablet TAKE 1 TABLET BY MOUTH EVERY DAY 90 tablet 1    calcium acetate (PHOSLO) 667 MG CAPS capsule TAKE 1 CAPSULE BY MOUTH THREE TIMES A DAY WITH MEALS 90 capsule 5    celecoxib (CELEBREX) 100 MG capsule TAKE ONE CAPSULE IN THE MORNING BY MOUTH AND 1 BEFORE BEDTIME      polyethylene glycol (GLYCOLAX) 17 GM/SCOOP powder TAKE 17 G BY MOUTH 2 TIMES DAILY 850 g 1 spironolactone (ALDACTONE) 50 MG tablet TAKE 1 TABLET BY MOUTH EVERYDAY AT BEDTIME 90 tablet 1    B Complex-C-Folic Acid (DIALYVITE 924) 0.8 MG TABS Take 1 tablet by mouth daily 90 tablet 3    methylPREDNISolone (MEDROL DOSEPACK) 4 MG tablet Take by mouth as directed in pack 21 tablet 0    sildenafil (VIAGRA) 100 MG tablet Take 1 tablet by mouth daily as needed for Erectile Dysfunction 30 tablet 5    terazosin (HYTRIN) 5 MG capsule       naloxone (NARCAN) 4 MG/0.1ML LIQD nasal spray 1 spray by Nasal route as needed for Opioid Reversal 1 each 0    NIFEdipine (ADALAT CC) 90 MG extended release tablet TAKE 1 TABLET BY MOUTH EVERY DAY 30 tablet 4    Folic Acid-Vit C8-BEE R61 0.5-5-0.2 MG TABS Take by mouth      vitamin D (ERGOCALCIFEROL) 1.25 MG (10056 UT) CAPS capsule Take 1 capsule by mouth once a week 4 capsule 0    calcitRIOL (ROCALTROL) 0.5 MCG capsule Take 1 capsule by mouth three times a week During HD 30 capsule 3    bisacodyl (BISACODYL) 5 MG EC tablet Take 1 tablet by mouth daily as needed for Constipation 30 tablet 5    atorvastatin (LIPITOR) 20 MG tablet TAKE 1 TABLET BY MOUTH EVERY DAY 30 tablet 5    omeprazole (PRILOSEC) 40 MG delayed release capsule TAKE 1 CAPSULE BY MOUTH EVERY DAY 30 capsule 1    aspirin 81 MG tablet Take 81 mg by mouth daily       No current facility-administered medications for this visit. I will continue his current medication regimen  which is part of the above treatment schedule. It has been helping with Mr. Troy Rueda chronic  medical problems which for this visit include: The primary encounter diagnosis was Incisional hernia without obstruction or gangrene.  Diagnoses of Encounter for therapeutic drug monitoring, Ilioinguinal neuralgia of right side, Chronic pain syndrome, Ilioinguinal neuralgia of left side, Major depressive disorder, recurrent, mild, Status post bilateral hernia repair, DDD (degenerative disc disease), cervical, and Arthralgia of multiple joints were also pertinent to this visit. Risks and benefits of the medications and other alternative treatments  including no treatment were discussed with the patient. The common side effects of these medications were also explained to the patient. Informed verbal consent was obtained. Goals of current treatment regimen include improvement in pain, restoration of functioning- with focus on improvement in physical performance, general activity, work or disability,emotional distress, health care utilization and  decreased medication consumption. Will continue to monitor progress towards achieving/maintaining therapeutic goals with special emphasis on  1. Improvement in perceived interfernce  of pain with ADL's. Ability to do home exercises independently. Ability to do household chores indoor and/or outdoor work and social and leisure activities. Improve psychosocial and physical functioning. - he is showing progression towards this treatment goal with the current regimen. He was advised against drinking alcohol with the narcotic pain medicines, advised against driving or handling machinery while adjusting the dose of medicines or if having cognitive  issues related to the current medications. Risk of overdose and death, if medicines not taken as prescribed, were also discussed. If the patient develops new symptoms or if the symptoms worsen, the patient should call the office. While transcribing every attempt was made to maintain the accuracy of the note in terms of it's contents,there may have been some errors made inadvertently. Thank you for allowing me to participate in the care of this patient.     RICARDO Mueller    Cc: Chiquita Taylor MD

## 2022-11-14 RX ORDER — TIZANIDINE 2 MG/1
2 TABLET ORAL NIGHTLY PRN
Qty: 30 TABLET | Refills: 0 | OUTPATIENT
Start: 2022-11-14 | End: 2022-12-14

## 2022-11-23 NOTE — TELEPHONE ENCOUNTER
Requested Prescriptions     Pending Prescriptions Disp Refills    tiZANidine (ZANAFLEX) 2 MG tablet [Pharmacy Med Name: TIZANIDINE HCL 2 MG TABLET] 30 tablet 0     Sig: TAKE 1 TABLET BY MOUTH DAILY AS NEEDED (MUSCLE SPASM)     Last ov 9/13/2022  Last labs 7/29/22

## 2022-11-25 RX ORDER — TIZANIDINE 2 MG/1
TABLET ORAL
Qty: 30 TABLET | Refills: 0 | Status: SHIPPED | OUTPATIENT
Start: 2022-11-25

## 2022-11-30 ENCOUNTER — OFFICE VISIT (OUTPATIENT)
Dept: PAIN MANAGEMENT | Age: 62
End: 2022-11-30

## 2022-11-30 VITALS
SYSTOLIC BLOOD PRESSURE: 150 MMHG | BODY MASS INDEX: 32.4 KG/M2 | HEART RATE: 107 BPM | WEIGHT: 266.2 LBS | OXYGEN SATURATION: 97 % | DIASTOLIC BLOOD PRESSURE: 83 MMHG

## 2022-11-30 DIAGNOSIS — G89.4 CHRONIC PAIN SYNDROME: ICD-10-CM

## 2022-11-30 DIAGNOSIS — G57.91 ILIOINGUINAL NEURALGIA OF RIGHT SIDE: ICD-10-CM

## 2022-11-30 DIAGNOSIS — K43.2 INCISIONAL HERNIA WITHOUT OBSTRUCTION OR GANGRENE: ICD-10-CM

## 2022-11-30 DIAGNOSIS — F33.0 MAJOR DEPRESSIVE DISORDER, RECURRENT, MILD (HCC): ICD-10-CM

## 2022-11-30 DIAGNOSIS — G57.92 ILIOINGUINAL NEURALGIA OF LEFT SIDE: ICD-10-CM

## 2022-11-30 DIAGNOSIS — M25.50 ARTHRALGIA OF MULTIPLE JOINTS: ICD-10-CM

## 2022-11-30 DIAGNOSIS — M50.30 DDD (DEGENERATIVE DISC DISEASE), CERVICAL: ICD-10-CM

## 2022-11-30 DIAGNOSIS — Z98.890 STATUS POST BILATERAL HERNIA REPAIR: ICD-10-CM

## 2022-11-30 DIAGNOSIS — Z51.81 ENCOUNTER FOR THERAPEUTIC DRUG MONITORING: ICD-10-CM

## 2022-11-30 DIAGNOSIS — Z87.19 STATUS POST BILATERAL HERNIA REPAIR: ICD-10-CM

## 2022-11-30 RX ORDER — AMITRIPTYLINE HYDROCHLORIDE 10 MG/1
10 TABLET, FILM COATED ORAL NIGHTLY PRN
Qty: 30 TABLET | Refills: 0 | Status: SHIPPED | OUTPATIENT
Start: 2022-11-30 | End: 2022-12-30

## 2022-11-30 RX ORDER — GABAPENTIN 100 MG/1
100 CAPSULE ORAL 2 TIMES DAILY
Qty: 60 CAPSULE | Refills: 0 | Status: SHIPPED | OUTPATIENT
Start: 2022-11-30 | End: 2022-12-30

## 2022-11-30 RX ORDER — DIAZEPAM 10 MG/1
TABLET ORAL
COMMUNITY
Start: 2022-11-14

## 2022-11-30 RX ORDER — CIPROFLOXACIN 500 MG/1
TABLET, FILM COATED ORAL
COMMUNITY
Start: 2022-11-14

## 2022-11-30 RX ORDER — OXYCODONE HYDROCHLORIDE AND ACETAMINOPHEN 5; 325 MG/1; MG/1
1 TABLET ORAL EVERY 8 HOURS PRN
Qty: 84 TABLET | Refills: 0 | Status: SHIPPED | OUTPATIENT
Start: 2022-11-30 | End: 2022-12-28

## 2022-11-30 NOTE — PROGRESS NOTES
Jose Montilla  1960  [id number]      HISTORY OF PRESENT ILLNESS: Mr. Genna Skiff is a 59 y.o. male returns for a follow up visit for pain management  He has a diagnosis of   1. Incisional hernia without obstruction or gangrene    2. Encounter for therapeutic drug monitoring    3. Ilioinguinal neuralgia of right side    4. Chronic pain syndrome    5. Ilioinguinal neuralgia of left side    6. Major depressive disorder, recurrent, mild    7. Status post bilateral hernia repair    8. DDD (degenerative disc disease), cervical    9. Arthralgia of multiple joints    . As per Information Obtained from the PADT (Patient Assessment and Documentation Tool)    He complains of pain in the  bilateral groin. He rates the pain 5/10 and describes it as dull, aching, burning. Current treatment regimen has helped relieve about 80% of the pain. He denies any side effects from the current pain regimen. Patient reports that since the last follow up visit the physical functioning is unchanged, family/social relationships are unchanged, mood is unchanged sleep patterns are unchanged, and that the overall functioning is unchanged. Patient denies misusing/abusing his narcotic pain medications or using any illegal drugs. Upon obtaining medical history from Mr. María Hutchins: Patients list of allergies were reviewed     MEDICATIONS: Mr. Genna Skiff list of medications were reviewed. His current medications are   Outpatient Medications Prior to Visit   Medication Sig Dispense Refill    diazePAM (VALIUM) 10 MG tablet TAKE 1 TABLET (10 MG TOTAL) BY MOUTH EVERY 6 HOURS AS NEEDED FOR ANXIETY FOR UP TO 1 DOSE. ciprofloxacin (CIPRO) 500 MG tablet TAKE 1 TABLET (500 MG TOTAL) BY MOUTH 2 TIMES A DAY. PLEASE START ONE DAY PRIOR TO BIOPSY.       tiZANidine (ZANAFLEX) 2 MG tablet TAKE 1 TABLET BY MOUTH DAILY AS NEEDED (MUSCLE SPASM) 30 tablet 0    busPIRone (BUSPAR) 5 MG tablet TAKE 1 TABLET BY MOUTH TWICE A DAY 60 tablet 5    metoprolol succinate (TOPROL XL) 50 MG extended release tablet TAKE 1 TABLET BY MOUTH AT BEDTIME TAKE 1 TABLET BY MOUTH EVERY DAY 30 tablet 3    torsemide (DEMADEX) 100 MG tablet TAKE 1 TABLET BY MOUTH EVERY DAY 90 tablet 1    calcium acetate (PHOSLO) 667 MG CAPS capsule TAKE 1 CAPSULE BY MOUTH THREE TIMES A DAY WITH MEALS 90 capsule 5    celecoxib (CELEBREX) 100 MG capsule TAKE ONE CAPSULE IN THE MORNING BY MOUTH AND 1 BEFORE BEDTIME      polyethylene glycol (GLYCOLAX) 17 GM/SCOOP powder TAKE 17 G BY MOUTH 2 TIMES DAILY 850 g 1    spironolactone (ALDACTONE) 50 MG tablet TAKE 1 TABLET BY MOUTH EVERYDAY AT BEDTIME 90 tablet 1    B Complex-C-Folic Acid (DIALYVITE 313) 0.8 MG TABS Take 1 tablet by mouth daily 90 tablet 3    methylPREDNISolone (MEDROL DOSEPACK) 4 MG tablet Take by mouth as directed in pack 21 tablet 0    sildenafil (VIAGRA) 100 MG tablet Take 1 tablet by mouth daily as needed for Erectile Dysfunction 30 tablet 5    terazosin (HYTRIN) 5 MG capsule       naloxone (NARCAN) 4 MG/0.1ML LIQD nasal spray 1 spray by Nasal route as needed for Opioid Reversal 1 each 0    NIFEdipine (ADALAT CC) 90 MG extended release tablet TAKE 1 TABLET BY MOUTH EVERY DAY 30 tablet 4    Folic Acid-Vit X8-TQU S15 0.5-5-0.2 MG TABS Take by mouth      vitamin D (ERGOCALCIFEROL) 1.25 MG (44844 UT) CAPS capsule Take 1 capsule by mouth once a week 4 capsule 0    calcitRIOL (ROCALTROL) 0.5 MCG capsule Take 1 capsule by mouth three times a week During HD 30 capsule 3    bisacodyl (BISACODYL) 5 MG EC tablet Take 1 tablet by mouth daily as needed for Constipation 30 tablet 5    atorvastatin (LIPITOR) 20 MG tablet TAKE 1 TABLET BY MOUTH EVERY DAY 30 tablet 5    omeprazole (PRILOSEC) 40 MG delayed release capsule TAKE 1 CAPSULE BY MOUTH EVERY DAY 30 capsule 1    aspirin 81 MG tablet Take 81 mg by mouth daily      amitriptyline (ELAVIL) 10 MG tablet Take 1 tablet by mouth nightly as needed for Sleep 30 tablet 0    gabapentin (NEURONTIN) 100 MG capsule Take 1 capsule by mouth 2 times daily for 30 days. 60 capsule 0     No facility-administered medications prior to visit. SOCIAL/FAMILY/PAST MEDICAL HISTORY: Mr. Steve Mcbride, family and past medical history was reviewed. REVIEW OF SYSTEMS:    Respiratory: Negative for apnea, chest tightness and shortness of breath or change in baseline breathing. Gastrointestinal: Negative for nausea, vomiting, abdominal pain, diarrhea, constipation, blood in stool and abdominal distention. PHYSICAL EXAM:   Nursing note and vitals reviewed. BP (!) 150/83   Pulse (!) 107   Wt 266 lb 3.2 oz (120.7 kg)   SpO2 97%   BMI 32.40 kg/m²   Constitutional: He appears well-developed and well-nourished. No acute distress. Skin: Skin is warm and dry, good turgor. No rash noted. He is not diaphoretic. Cardiovascular: Normal rate, regular rhythm, normal heart sounds, and does not have murmur. Pulmonary/Chest: Effort normal. No respiratory distress. He does not have wheezes in the lung fields. He has no rales. Neurological/Psychiatric:He is alert and oriented to person, place, and time. Coordination is  normal.  His mood isAppropriate and affect is Neutral/Euthymic(normal) . Prescription pain medication monitoring:                  MEDD current = 22.5              ORT Score = 0              Other Risk factors - stable mood              Date of Last Medication Agreement: 08/31/2022              Date Naloxone prescribed: 08/31/2022              UDT:                          Date of last UDT: 11/30/2022                          Adverse report: No              OARRS:                          Checked today: Yes                          Adverse report: No    IMPRESSION:   1. Incisional hernia without obstruction or gangrene    2. Encounter for therapeutic drug monitoring    3. Ilioinguinal neuralgia of right side    4. Chronic pain syndrome    5. Ilioinguinal neuralgia of left side    6.  Major depressive disorder, recurrent, mild    7. Status post bilateral hernia repair    8. DDD (degenerative disc disease), cervical    9. Arthralgia of multiple joints        PLAN:  Informed verbal consent was obtained:  -continue and refill percocet 5mg tabs TID PRN pain   -refill elavil this is helping sleep  -refill gabapentin 100mg BID   -random UDS today for monitoring   -f/u 28 days for reassessment     Analgesic Plan:              Continue present regimen:yes              Adjust dose of present analgesic:no              Switch analgesics:no              Add/Adjust concomitant therapy:no      Current Outpatient Medications   Medication Sig Dispense Refill    diazePAM (VALIUM) 10 MG tablet TAKE 1 TABLET (10 MG TOTAL) BY MOUTH EVERY 6 HOURS AS NEEDED FOR ANXIETY FOR UP TO 1 DOSE. ciprofloxacin (CIPRO) 500 MG tablet TAKE 1 TABLET (500 MG TOTAL) BY MOUTH 2 TIMES A DAY. PLEASE START ONE DAY PRIOR TO BIOPSY.       tiZANidine (ZANAFLEX) 2 MG tablet TAKE 1 TABLET BY MOUTH DAILY AS NEEDED (MUSCLE SPASM) 30 tablet 0    busPIRone (BUSPAR) 5 MG tablet TAKE 1 TABLET BY MOUTH TWICE A DAY 60 tablet 5    metoprolol succinate (TOPROL XL) 50 MG extended release tablet TAKE 1 TABLET BY MOUTH AT BEDTIME TAKE 1 TABLET BY MOUTH EVERY DAY 30 tablet 3    torsemide (DEMADEX) 100 MG tablet TAKE 1 TABLET BY MOUTH EVERY DAY 90 tablet 1    calcium acetate (PHOSLO) 667 MG CAPS capsule TAKE 1 CAPSULE BY MOUTH THREE TIMES A DAY WITH MEALS 90 capsule 5    celecoxib (CELEBREX) 100 MG capsule TAKE ONE CAPSULE IN THE MORNING BY MOUTH AND 1 BEFORE BEDTIME      polyethylene glycol (GLYCOLAX) 17 GM/SCOOP powder TAKE 17 G BY MOUTH 2 TIMES DAILY 850 g 1    spironolactone (ALDACTONE) 50 MG tablet TAKE 1 TABLET BY MOUTH EVERYDAY AT BEDTIME 90 tablet 1    B Complex-C-Folic Acid (DIALYVITE 496) 0.8 MG TABS Take 1 tablet by mouth daily 90 tablet 3    methylPREDNISolone (MEDROL DOSEPACK) 4 MG tablet Take by mouth as directed in pack 21 tablet 0    sildenafil (VIAGRA) 100 MG tablet Take 1 tablet by mouth daily as needed for Erectile Dysfunction 30 tablet 5    terazosin (HYTRIN) 5 MG capsule       naloxone (NARCAN) 4 MG/0.1ML LIQD nasal spray 1 spray by Nasal route as needed for Opioid Reversal 1 each 0    NIFEdipine (ADALAT CC) 90 MG extended release tablet TAKE 1 TABLET BY MOUTH EVERY DAY 30 tablet 4    Folic Acid-Vit S8-MSC F40 0.5-5-0.2 MG TABS Take by mouth      vitamin D (ERGOCALCIFEROL) 1.25 MG (47919 UT) CAPS capsule Take 1 capsule by mouth once a week 4 capsule 0    calcitRIOL (ROCALTROL) 0.5 MCG capsule Take 1 capsule by mouth three times a week During HD 30 capsule 3    bisacodyl (BISACODYL) 5 MG EC tablet Take 1 tablet by mouth daily as needed for Constipation 30 tablet 5    atorvastatin (LIPITOR) 20 MG tablet TAKE 1 TABLET BY MOUTH EVERY DAY 30 tablet 5    omeprazole (PRILOSEC) 40 MG delayed release capsule TAKE 1 CAPSULE BY MOUTH EVERY DAY 30 capsule 1    aspirin 81 MG tablet Take 81 mg by mouth daily      amitriptyline (ELAVIL) 10 MG tablet Take 1 tablet by mouth nightly as needed for Sleep 30 tablet 0    gabapentin (NEURONTIN) 100 MG capsule Take 1 capsule by mouth 2 times daily for 30 days. 60 capsule 0     No current facility-administered medications for this visit. I will continue his current medication regimen  which is part of the above treatment schedule. It has been helping with Mr. Gil Ramirez chronic  medical problems which for this visit include:   Diagnoses of Incisional hernia without obstruction or gangrene, Encounter for therapeutic drug monitoring, Ilioinguinal neuralgia of right side, Chronic pain syndrome, Ilioinguinal neuralgia of left side, Major depressive disorder, recurrent, mild, Status post bilateral hernia repair, DDD (degenerative disc disease), cervical, and Arthralgia of multiple joints were pertinent to this visit.    Risks and benefits of the medications and other alternative treatments  including no treatment were discussed with the patient. The common side effects of these medications were also explained to the patient. Informed verbal consent was obtained. Goals of current treatment regimen include improvement in pain, restoration of functioning- with focus on improvement in physical performance, general activity, work or disability,emotional distress, health care utilization and  decreased medication consumption. Will continue to monitor progress towards achieving/maintaining therapeutic goals with special emphasis on  1. Improvement in perceived interfernce  of pain with ADL's. Ability to do home exercises independently. Ability to do household chores indoor and/or outdoor work and social and leisure activities. Improve psychosocial and physical functioning. - he is maintaining his treatment goal with the current regimen. He was advised against drinking alcohol with the narcotic pain medicines, advised against driving or handling machinery while adjusting the dose of medicines or if having cognitive  issues related to the current medications. Risk of overdose and death, if medicines not taken as prescribed, were also discussed. If the patient develops new symptoms or if the symptoms worsen, the patient should call the office. While transcribing every attempt was made to maintain the accuracy of the note in terms of it's contents,there may have been some errors made inadvertently. Thank you for allowing me to participate in the care of this patient.     Layne Lazo NP-C    Cc: Cyn Corona MD

## 2022-12-01 ENCOUNTER — HOSPITAL ENCOUNTER (OUTPATIENT)
Dept: GENERAL RADIOLOGY | Age: 62
Discharge: HOME | End: 2022-12-01
Payer: MEDICARE

## 2022-12-01 ENCOUNTER — OFFICE VISIT (OUTPATIENT)
Dept: VASCULAR SURGERY | Age: 62
End: 2022-12-01
Payer: MEDICARE

## 2022-12-01 VITALS
WEIGHT: 261 LBS | TEMPERATURE: 98.1 F | SYSTOLIC BLOOD PRESSURE: 144 MMHG | HEIGHT: 76 IN | BODY MASS INDEX: 31.78 KG/M2 | HEART RATE: 119 BPM | DIASTOLIC BLOOD PRESSURE: 95 MMHG

## 2022-12-01 DIAGNOSIS — R20.2 NUMBNESS AND TINGLING IN LEFT ARM: ICD-10-CM

## 2022-12-01 DIAGNOSIS — R20.0 NUMBNESS AND TINGLING IN LEFT ARM: Primary | ICD-10-CM

## 2022-12-01 DIAGNOSIS — R20.2 NUMBNESS AND TINGLING IN LEFT ARM: Primary | ICD-10-CM

## 2022-12-01 DIAGNOSIS — R20.0 NUMBNESS AND TINGLING IN LEFT ARM: ICD-10-CM

## 2022-12-01 PROCEDURE — 73030 X-RAY EXAM OF SHOULDER: CPT

## 2022-12-01 PROCEDURE — G8417 CALC BMI ABV UP PARAM F/U: HCPCS | Performed by: SURGERY

## 2022-12-01 PROCEDURE — 3017F COLORECTAL CA SCREEN DOC REV: CPT | Performed by: SURGERY

## 2022-12-01 PROCEDURE — 72040 X-RAY EXAM NECK SPINE 2-3 VW: CPT

## 2022-12-01 PROCEDURE — 99214 OFFICE O/P EST MOD 30 MIN: CPT | Performed by: SURGERY

## 2022-12-01 PROCEDURE — 1036F TOBACCO NON-USER: CPT | Performed by: SURGERY

## 2022-12-01 PROCEDURE — G8427 DOCREV CUR MEDS BY ELIG CLIN: HCPCS | Performed by: SURGERY

## 2022-12-01 PROCEDURE — G8484 FLU IMMUNIZE NO ADMIN: HCPCS | Performed by: SURGERY

## 2022-12-01 PROCEDURE — 3078F DIAST BP <80 MM HG: CPT | Performed by: SURGERY

## 2022-12-01 PROCEDURE — 3074F SYST BP LT 130 MM HG: CPT | Performed by: SURGERY

## 2022-12-02 ENCOUNTER — TELEPHONE (OUTPATIENT)
Dept: VASCULAR SURGERY | Age: 62
End: 2022-12-02

## 2022-12-02 NOTE — TELEPHONE ENCOUNTER
----- Message from Ishan Lala MD sent at 12/2/2022  9:27 AM EST -----  Can you let him know that his cervical spine xray does show some arthritic changes. Sometimes this can cause the numbness or tingling in his arm. I would recommend his reaching out to PCP to see if this needs to be investigated further.

## 2022-12-02 NOTE — RESULT ENCOUNTER NOTE
Can you let him know that his cervical spine xray does show some arthritic changes. Sometimes this can cause the numbness or tingling in his arm. I would recommend his reaching out to PCP to see if this needs to be investigated further.

## 2022-12-08 ENCOUNTER — TELEPHONE (OUTPATIENT)
Dept: FAMILY MEDICINE CLINIC | Age: 62
End: 2022-12-08

## 2022-12-08 ENCOUNTER — PRE-PROCEDURE TELEPHONE (OUTPATIENT)
Dept: CARDIAC CATH/INVASIVE PROCEDURES | Age: 62
End: 2022-12-08

## 2022-12-08 NOTE — TELEPHONE ENCOUNTER
Lvm for pt to call back to see if they want to see [facility] tomorrow instead    Thank you

## 2022-12-08 NOTE — TELEPHONE ENCOUNTER
Patient is having abdominal pain all over. Describes as cramping. Getting worse since the last few days. He also has had a lack of appetite. He has not tried anything OTC. He was just diagnosed with prostate cancer. Wife wants to know what else he can do for the pain. Do not see any appointments. Please call Wallace Amador to advise.     Thank you

## 2022-12-09 ENCOUNTER — HOSPITAL ENCOUNTER (OUTPATIENT)
Dept: INTERVENTIONAL RADIOLOGY/VASCULAR | Age: 62
Discharge: HOME | End: 2022-12-09
Payer: MEDICARE

## 2022-12-09 VITALS — BODY MASS INDEX: 30.7 KG/M2 | HEIGHT: 77 IN | TEMPERATURE: 98.6 F | WEIGHT: 260 LBS

## 2022-12-09 DIAGNOSIS — Z99.2 DIALYSIS PATIENT (HCC): ICD-10-CM

## 2022-12-09 LAB
BASOPHILS ABSOLUTE: 0 K/UL (ref 0–0.2)
BASOPHILS RELATIVE PERCENT: 0.7 %
EOSINOPHILS ABSOLUTE: 0.1 K/UL (ref 0–0.6)
EOSINOPHILS RELATIVE PERCENT: 2.6 %
HCT VFR BLD CALC: 33.7 % (ref 40.5–52.5)
HEMOGLOBIN: 11.4 G/DL (ref 13.5–17.5)
INR BLD: 0.96 (ref 0.87–1.14)
INR BLD: 1.2 (ref 0.88–1.12)
LYMPHOCYTES ABSOLUTE: 1.3 K/UL (ref 1–5.1)
LYMPHOCYTES RELATIVE PERCENT: 23.9 %
MCH RBC QN AUTO: 29.3 PG (ref 26–34)
MCHC RBC AUTO-ENTMCNC: 33.8 G/DL (ref 31–36)
MCV RBC AUTO: 86.8 FL (ref 80–100)
MONOCYTES ABSOLUTE: 0.5 K/UL (ref 0–1.3)
MONOCYTES RELATIVE PERCENT: 9.1 %
NEUTROPHILS ABSOLUTE: 3.5 K/UL (ref 1.7–7.7)
NEUTROPHILS RELATIVE PERCENT: 63.7 %
PDW BLD-RTO: 14.2 % (ref 12.4–15.4)
PLATELET # BLD: 194 K/UL (ref 135–450)
PMV BLD AUTO: 8.3 FL (ref 5–10.5)
PROTHROMBIN TIME: 12.7 SEC (ref 11.7–14.5)
RBC # BLD: 3.88 M/UL (ref 4.2–5.9)
WBC # BLD: 5.5 K/UL (ref 4–11)

## 2022-12-09 PROCEDURE — 85610 PROTHROMBIN TIME: CPT

## 2022-12-09 PROCEDURE — 85025 COMPLETE CBC W/AUTO DIFF WBC: CPT

## 2022-12-09 PROCEDURE — 36589 REMOVAL TUNNELED CV CATH: CPT

## 2022-12-09 NOTE — DISCHARGE INSTRUCTIONS
[facility]  Cardiovascular Special Procedures  General Discharge Instructions    PROCEDURE: Line Removal    ____ You may be drowsy or lightheaded after receiving sedation. DO NOT operate a vehicle (automobile, bicycle, motorcycle, machinery, or power tools), make any important decisions or sign any important/legal documents, or drink alcoholic beverages for the next 24 hours  ____ We strongly suggest that a responsible adult be with you for the next 24 hours for your protection and safety  ____ If the intravenous catheter site is painful, apply warm wet compresses on the site until the soreness is relieved and elevate the arm above the heart. Call your physician if no improvement in 2 to 3 days    DIETARY INSTRUCTIONS:    ____ Drink extra fluids over the next 24 hours (If not contraindicated by illness or by physician order)  ____ Start with clear liquids and progress to normal diet as you feel like eating. If you experience nausea or repeated episodes of vomiting, which persist beyond 12-24 hours, notify your doctor        _x___ Resume your previous diet    ACTIVITY INSTRUCTIONS:    ____ See other instructions  ____ No special instructions  ____ Rest for 24 hours    ____ Up as tolerated  _x___ Increase activity as tolerated    Wound/Dressing Instructions:  ____ See other instructions  ____ May shower, tomorrow  _x___ Remove bandage within 24 hours    MEDICATION INSTRUCTIONS:    ____ See Medication Reconciliation Sheet      SPECIAL INSTRUCTIONS:  Take bandage off in 24- 48 hours. Place band-aid on site until completely healed. Showering is okay as long as site remains dry until healed, do not submerge under water until healed. Do not lift anything over 10 pounds for 3-5 days while site is healing.                                                                                                                                                           Please make sure that you follow-up with your doctors office for your results. FOLLOW-UP APPOINTMENT    Follow up with doctor as directed. Belongings returned to patient and/or family: Yes. The Discharge Instructions have been explained to me. I understand and can verbalize these instructions.

## 2022-12-12 NOTE — PROGRESS NOTES
2022    Melissa Woods (:  1960) is a 59 y.o. male,Established patient, here for evaluation of the following chief complaint(s):  Follow-up (Numbness and tingling in access arm)        ASSESSMENT/PLAN:  1. Numbness and tingling in left arm  -     XR CERVICAL SPINE (2-3 VIEWS); Future  -     XR SHOULDER LEFT (MIN 2 VIEWS); Future  His symptoms do not seem to be related to the fistula. He certainly does not have any evidence of steal.  He seems to have more of a neurological component and I am suspicious of perhaps something with the either the shoulder joint or the C-spine. We will check x-rays and I will call him with results. If he does have some abnormality, then would recommend that he see Dr. Natalie Amaral for possible orthopedic referral, if indicated. I will see him back as needed for any problems or concerns. SUBJECTIVE/OBJECTIVE:  POV#4:  Left RC AVF creation 22  Reports fistula has been working well. He has new and increasing numbness and tingling from the shoulder down to the arm. Symptoms sound more neurogenic in nature. Reports some arthritis in the neck. On exam: Wrist incision is nicely healed. Hand is warm and well-perfused with palpable radial pulse. There is no evidence of steal.  Normal  strength. Normal color. Fistula is widely patent with good thrill and bruit. Physical Exam  Vitals:    22 1329   BP: (!) 144/95   Pulse: (!) 119   Temp: 98.1 °F (36.7 °C)   Weight: 261 lb (118.4 kg)   Height: 6' 4\" (1.93 m)         An electronic signature was used to authenticate this note.     --Regulo Andrews MD

## 2022-12-20 RX ORDER — TIZANIDINE 2 MG/1
TABLET ORAL
Qty: 30 TABLET | Refills: 0 | Status: SHIPPED | OUTPATIENT
Start: 2022-12-20

## 2022-12-23 RX ORDER — TERAZOSIN 5 MG/1
CAPSULE ORAL
Qty: 90 CAPSULE | Refills: 6 | Status: SHIPPED | OUTPATIENT
Start: 2022-12-23

## 2022-12-28 ENCOUNTER — OFFICE VISIT (OUTPATIENT)
Dept: PAIN MANAGEMENT | Age: 62
End: 2022-12-28
Payer: MEDICARE

## 2022-12-28 VITALS
BODY MASS INDEX: 32.35 KG/M2 | SYSTOLIC BLOOD PRESSURE: 154 MMHG | HEART RATE: 97 BPM | OXYGEN SATURATION: 98 % | HEIGHT: 77 IN | DIASTOLIC BLOOD PRESSURE: 90 MMHG | WEIGHT: 274 LBS

## 2022-12-28 DIAGNOSIS — G57.91 ILIOINGUINAL NEURALGIA OF RIGHT SIDE: ICD-10-CM

## 2022-12-28 DIAGNOSIS — G57.92 ILIOINGUINAL NEURALGIA OF LEFT SIDE: ICD-10-CM

## 2022-12-28 DIAGNOSIS — F33.0 MAJOR DEPRESSIVE DISORDER, RECURRENT, MILD (HCC): ICD-10-CM

## 2022-12-28 DIAGNOSIS — M50.30 DDD (DEGENERATIVE DISC DISEASE), CERVICAL: ICD-10-CM

## 2022-12-28 DIAGNOSIS — G89.4 CHRONIC PAIN SYNDROME: ICD-10-CM

## 2022-12-28 DIAGNOSIS — M25.50 ARTHRALGIA OF MULTIPLE JOINTS: ICD-10-CM

## 2022-12-28 DIAGNOSIS — Z87.19 STATUS POST BILATERAL HERNIA REPAIR: ICD-10-CM

## 2022-12-28 DIAGNOSIS — Z98.890 STATUS POST BILATERAL HERNIA REPAIR: ICD-10-CM

## 2022-12-28 DIAGNOSIS — K43.2 INCISIONAL HERNIA WITHOUT OBSTRUCTION OR GANGRENE: Primary | ICD-10-CM

## 2022-12-28 DIAGNOSIS — Z51.81 ENCOUNTER FOR THERAPEUTIC DRUG MONITORING: ICD-10-CM

## 2022-12-28 PROCEDURE — 3017F COLORECTAL CA SCREEN DOC REV: CPT | Performed by: NURSE PRACTITIONER

## 2022-12-28 PROCEDURE — 3074F SYST BP LT 130 MM HG: CPT | Performed by: NURSE PRACTITIONER

## 2022-12-28 PROCEDURE — 99214 OFFICE O/P EST MOD 30 MIN: CPT | Performed by: NURSE PRACTITIONER

## 2022-12-28 PROCEDURE — G8417 CALC BMI ABV UP PARAM F/U: HCPCS | Performed by: NURSE PRACTITIONER

## 2022-12-28 PROCEDURE — 3078F DIAST BP <80 MM HG: CPT | Performed by: NURSE PRACTITIONER

## 2022-12-28 PROCEDURE — G8484 FLU IMMUNIZE NO ADMIN: HCPCS | Performed by: NURSE PRACTITIONER

## 2022-12-28 PROCEDURE — 1036F TOBACCO NON-USER: CPT | Performed by: NURSE PRACTITIONER

## 2022-12-28 PROCEDURE — G8427 DOCREV CUR MEDS BY ELIG CLIN: HCPCS | Performed by: NURSE PRACTITIONER

## 2022-12-28 RX ORDER — OXYCODONE HYDROCHLORIDE AND ACETAMINOPHEN 5; 325 MG/1; MG/1
1 TABLET ORAL EVERY 8 HOURS PRN
Qty: 84 TABLET | Refills: 0 | Status: SHIPPED | OUTPATIENT
Start: 2022-12-28 | End: 2023-01-25

## 2022-12-28 RX ORDER — GABAPENTIN 100 MG/1
100 CAPSULE ORAL 2 TIMES DAILY
Qty: 60 CAPSULE | Refills: 0 | Status: SHIPPED | OUTPATIENT
Start: 2022-12-28 | End: 2023-01-27

## 2022-12-28 NOTE — PROGRESS NOTES
Stephen Echols  1960  [id number]      HISTORY OF PRESENT ILLNESS: Mr. Monica Olvera is a 57 y.o. male returns for a follow up visit for pain management  He has a diagnosis of   1. Incisional hernia without obstruction or gangrene    2. Ilioinguinal neuralgia of right side    3. Chronic pain syndrome    4. Ilioinguinal neuralgia of left side    5. Major depressive disorder, recurrent, mild    6. Status post bilateral hernia repair    7. Arthralgia of multiple joints    8. DDD (degenerative disc disease), cervical    .      As per Information Obtained from the PADT (Patient Assessment and Documentation Tool)    He complains of pain in the  margarita area/groin region. He rates the pain 7/10 and describes it as sharp, aching, shooting. Current treatment regimen has helped relieve about 80% of the pain. He denies any side effects from the current pain regimen. Patient reports that since the last follow up visit the physical functioning is unchanged, family/social relationships are unchanged, mood is unchanged sleep patterns are unchanged, and that the overall functioning is unchanged. Patient denies misusing/abusing his narcotic pain medications or using any illegal drugs. Upon obtaining medical history from Mr. Jose Alfredo Boone: Patients list of allergies were reviewed     MEDICATIONS: Mr. Monica Olvera list of medications were reviewed. His current medications are   Outpatient Medications Prior to Visit   Medication Sig Dispense Refill    terazosin (HYTRIN) 5 MG capsule TAKE 1 CAPSULE BY MOUTH EVERY EVENING AS DIRECTED 90 capsule 6    tiZANidine (ZANAFLEX) 2 MG tablet TAKE 1 TABLET BY MOUTH DAILY AS NEEDED (MUSCLE SPASM) 30 tablet 0    diazePAM (VALIUM) 10 MG tablet TAKE 1 TABLET (10 MG TOTAL) BY MOUTH EVERY 6 HOURS AS NEEDED FOR ANXIETY FOR UP TO 1 DOSE. ciprofloxacin (CIPRO) 500 MG tablet TAKE 1 TABLET (500 MG TOTAL) BY MOUTH 2 TIMES A DAY. PLEASE START ONE DAY PRIOR TO BIOPSY.       oxyCODONE-acetaminophen (PERCOCET) 5-325 MG per tablet Take 1 tablet by mouth every 8 hours as needed for Pain for up to 28 days. 84 tablet 0    amitriptyline (ELAVIL) 10 MG tablet Take 1 tablet by mouth nightly as needed for Sleep 30 tablet 0    gabapentin (NEURONTIN) 100 MG capsule Take 1 capsule by mouth 2 times daily for 30 days. 60 capsule 0    busPIRone (BUSPAR) 5 MG tablet TAKE 1 TABLET BY MOUTH TWICE A DAY 60 tablet 5    metoprolol succinate (TOPROL XL) 50 MG extended release tablet TAKE 1 TABLET BY MOUTH AT BEDTIME TAKE 1 TABLET BY MOUTH EVERY DAY 30 tablet 3    torsemide (DEMADEX) 100 MG tablet TAKE 1 TABLET BY MOUTH EVERY DAY 90 tablet 1    calcium acetate (PHOSLO) 667 MG CAPS capsule TAKE 1 CAPSULE BY MOUTH THREE TIMES A DAY WITH MEALS 90 capsule 5    celecoxib (CELEBREX) 100 MG capsule TAKE ONE CAPSULE IN THE MORNING BY MOUTH AND 1 BEFORE BEDTIME      polyethylene glycol (GLYCOLAX) 17 GM/SCOOP powder TAKE 17 G BY MOUTH 2 TIMES DAILY 850 g 1    spironolactone (ALDACTONE) 50 MG tablet TAKE 1 TABLET BY MOUTH EVERYDAY AT BEDTIME 90 tablet 1    B Complex-C-Folic Acid (DIALYVITE 800) 0.8 MG TABS Take 1 tablet by mouth daily 90 tablet 3    methylPREDNISolone (MEDROL DOSEPACK) 4 MG tablet Take by mouth as directed in pack 21 tablet 0    sildenafil (VIAGRA) 100 MG tablet Take 1 tablet by mouth daily as needed for Erectile Dysfunction 30 tablet 5    naloxone (NARCAN) 4 MG/0.1ML LIQD nasal spray 1 spray by Nasal route as needed for Opioid Reversal 1 each 0    NIFEdipine (ADALAT CC) 90 MG extended release tablet TAKE 1 TABLET BY MOUTH EVERY DAY 30 tablet 4    Folic Acid-Vit B6-Vit B12 0.5-5-0.2 MG TABS Take by mouth      vitamin D (ERGOCALCIFEROL) 1.25 MG (63233 UT) CAPS capsule Take 1 capsule by mouth once a week 4 capsule 0    calcitRIOL (ROCALTROL) 0.5 MCG capsule Take 1 capsule by mouth three times a week During HD 30 capsule 3    bisacodyl (BISACODYL) 5 MG EC tablet Take 1 tablet by mouth daily as needed for Constipation 30 tablet 5  atorvastatin (LIPITOR) 20 MG tablet TAKE 1 TABLET BY MOUTH EVERY DAY 30 tablet 5    omeprazole (PRILOSEC) 40 MG delayed release capsule TAKE 1 CAPSULE BY MOUTH EVERY DAY 30 capsule 1    aspirin 81 MG tablet Take 81 mg by mouth daily       No facility-administered medications prior to visit. SOCIAL/FAMILY/PAST MEDICAL HISTORY: Mr. Yolande Lux, family and past medical history was reviewed. REVIEW OF SYSTEMS:    Respiratory: Negative for apnea, chest tightness and shortness of breath or change in baseline breathing. Gastrointestinal: Negative for nausea, vomiting, abdominal pain, diarrhea, constipation, blood in stool and abdominal distention. PHYSICAL EXAM:   Nursing note and vitals reviewed. BP (!) 154/90   Pulse 97   Ht 6' 5\" (1.956 m)   Wt 274 lb (124.3 kg)   SpO2 98%   BMI 32.49 kg/m²   Constitutional: He appears well-developed and well-nourished. No acute distress. Skin: Skin is warm and dry, good turgor. No rash noted. He is not diaphoretic. Cardiovascular: Normal rate, regular rhythm, normal heart sounds, and does not have murmur. Pulmonary/Chest: Effort normal. No respiratory distress. He does not have wheezes in the lung fields. He has no rales. Neurological/Psychiatric:He is alert and oriented to person, place, and time. Coordination is  normal.  His mood isAppropriate and affect is Neutral/Euthymic(normal) . Prescription pain medication monitoring:                  MEDD current = 22.5              ORT Score = 0              Other Risk factors - stable mood              Date of Last Medication Agreement: 08/31/2022              Date Naloxone prescribed: 08/31/2022              UDT:                          Date of last UDT: 11/30/2022                          Adverse report: No              OARRS:                          Checked today: Yes                          Adverse report: No    IMPRESSION:   1. Incisional hernia without obstruction or gangrene    2. Ilioinguinal neuralgia of right side    3. Chronic pain syndrome    4. Ilioinguinal neuralgia of left side    5. Major depressive disorder, recurrent, mild    6. Status post bilateral hernia repair    7. Arthralgia of multiple joints    8. DDD (degenerative disc disease), cervical        PLAN:  Informed verbal consent was obtained:  -November urine drug screen reviewed today and consistent with prescribed medications  -Refill Percocet 5 mg tabs 3 times daily as needed for pain  -Refill gabapentin 100 mg twice per day  -Continue hemodialysis and follow-up with renal  -Recent diagnosis of prostate cancer continue to follow-up with oncology  -Follow-up in 4 weeks for reassessment        Analgesic Plan:              Continue present regimen: Yes              Adjust dose of present analgesic: No              Switch analgesics: No              Add/Adjust concomitant therapy: No      Current Outpatient Medications   Medication Sig Dispense Refill    terazosin (HYTRIN) 5 MG capsule TAKE 1 CAPSULE BY MOUTH EVERY EVENING AS DIRECTED 90 capsule 6    tiZANidine (ZANAFLEX) 2 MG tablet TAKE 1 TABLET BY MOUTH DAILY AS NEEDED (MUSCLE SPASM) 30 tablet 0    diazePAM (VALIUM) 10 MG tablet TAKE 1 TABLET (10 MG TOTAL) BY MOUTH EVERY 6 HOURS AS NEEDED FOR ANXIETY FOR UP TO 1 DOSE. ciprofloxacin (CIPRO) 500 MG tablet TAKE 1 TABLET (500 MG TOTAL) BY MOUTH 2 TIMES A DAY. PLEASE START ONE DAY PRIOR TO BIOPSY. oxyCODONE-acetaminophen (PERCOCET) 5-325 MG per tablet Take 1 tablet by mouth every 8 hours as needed for Pain for up to 28 days. 84 tablet 0    amitriptyline (ELAVIL) 10 MG tablet Take 1 tablet by mouth nightly as needed for Sleep 30 tablet 0    gabapentin (NEURONTIN) 100 MG capsule Take 1 capsule by mouth 2 times daily for 30 days.  60 capsule 0    busPIRone (BUSPAR) 5 MG tablet TAKE 1 TABLET BY MOUTH TWICE A DAY 60 tablet 5    metoprolol succinate (TOPROL XL) 50 MG extended release tablet TAKE 1 TABLET BY MOUTH AT BEDTIME TAKE 1 TABLET BY MOUTH EVERY DAY 30 tablet 3    torsemide (DEMADEX) 100 MG tablet TAKE 1 TABLET BY MOUTH EVERY DAY 90 tablet 1    calcium acetate (PHOSLO) 667 MG CAPS capsule TAKE 1 CAPSULE BY MOUTH THREE TIMES A DAY WITH MEALS 90 capsule 5    celecoxib (CELEBREX) 100 MG capsule TAKE ONE CAPSULE IN THE MORNING BY MOUTH AND 1 BEFORE BEDTIME      polyethylene glycol (GLYCOLAX) 17 GM/SCOOP powder TAKE 17 G BY MOUTH 2 TIMES DAILY 850 g 1    spironolactone (ALDACTONE) 50 MG tablet TAKE 1 TABLET BY MOUTH EVERYDAY AT BEDTIME 90 tablet 1    B Complex-C-Folic Acid (DIALYVITE 513) 0.8 MG TABS Take 1 tablet by mouth daily 90 tablet 3    methylPREDNISolone (MEDROL DOSEPACK) 4 MG tablet Take by mouth as directed in pack 21 tablet 0    sildenafil (VIAGRA) 100 MG tablet Take 1 tablet by mouth daily as needed for Erectile Dysfunction 30 tablet 5    naloxone (NARCAN) 4 MG/0.1ML LIQD nasal spray 1 spray by Nasal route as needed for Opioid Reversal 1 each 0    NIFEdipine (ADALAT CC) 90 MG extended release tablet TAKE 1 TABLET BY MOUTH EVERY DAY 30 tablet 4    Folic Acid-Vit G0-VAN G61 0.5-5-0.2 MG TABS Take by mouth      vitamin D (ERGOCALCIFEROL) 1.25 MG (98985 UT) CAPS capsule Take 1 capsule by mouth once a week 4 capsule 0    calcitRIOL (ROCALTROL) 0.5 MCG capsule Take 1 capsule by mouth three times a week During HD 30 capsule 3    bisacodyl (BISACODYL) 5 MG EC tablet Take 1 tablet by mouth daily as needed for Constipation 30 tablet 5    atorvastatin (LIPITOR) 20 MG tablet TAKE 1 TABLET BY MOUTH EVERY DAY 30 tablet 5    omeprazole (PRILOSEC) 40 MG delayed release capsule TAKE 1 CAPSULE BY MOUTH EVERY DAY 30 capsule 1    aspirin 81 MG tablet Take 81 mg by mouth daily       No current facility-administered medications for this visit. I will continue his current medication regimen  which is part of the above treatment schedule. It has been helping with Mr. Carla Lambert chronic  medical problems which for this visit include:    The primary encounter diagnosis was Incisional hernia without obstruction or gangrene. Diagnoses of Ilioinguinal neuralgia of right side, Chronic pain syndrome, Ilioinguinal neuralgia of left side, Major depressive disorder, recurrent, mild, Status post bilateral hernia repair, Arthralgia of multiple joints, and DDD (degenerative disc disease), cervical were also pertinent to this visit. Risks and benefits of the medications and other alternative treatments  including no treatment were discussed with the patient. The common side effects of these medications were also explained to the patient. Informed verbal consent was obtained. Goals of current treatment regimen include improvement in pain, restoration of functioning- with focus on improvement in physical performance, general activity, work or disability,emotional distress, health care utilization and  decreased medication consumption. Will continue to monitor progress towards achieving/maintaining therapeutic goals with special emphasis on  1. Improvement in perceived interfernce  of pain with ADL's. Ability to do home exercises independently. Ability to do household chores indoor and/or outdoor work and social and leisure activities. Improve psychosocial and physical functioning. - he is maintaining his treatment goal with the current regimen. He was advised against drinking alcohol with the narcotic pain medicines, advised against driving or handling machinery while adjusting the dose of medicines or if having cognitive  issues related to the current medications. Risk of overdose and death, if medicines not taken as prescribed, were also discussed. If the patient develops new symptoms or if the symptoms worsen, the patient should call the office. While transcribing every attempt was made to maintain the accuracy of the note in terms of it's contents,there may have been some errors made inadvertently.   Thank you for allowing me to participate in the care of this patient.     Cher Espinal NP-KYA    Cc: Kirstie Morse MD

## 2023-01-12 RX ORDER — TIZANIDINE 2 MG/1
TABLET ORAL
Qty: 30 TABLET | Refills: 0 | Status: SHIPPED | OUTPATIENT
Start: 2023-01-12

## 2023-01-12 NOTE — TELEPHONE ENCOUNTER
Requested Prescriptions     Pending Prescriptions Disp Refills    tiZANidine (ZANAFLEX) 2 MG tablet [Pharmacy Med Name: TIZANIDINE HCL 2 MG TABLET] 30 tablet 0     Sig: TAKE 1 TABLET BY MOUTH DAILY AS NEEDED (MUSCLE SPASM)          Last Office Visit: 9/13/2022     Next Office Visit: 1/25/2023     Last Labs: 12/9/22

## 2023-01-13 ENCOUNTER — HOSPITAL ENCOUNTER (OUTPATIENT)
Dept: INTERVENTIONAL RADIOLOGY/VASCULAR | Age: 63
Discharge: HOME | End: 2023-01-13
Payer: COMMERCIAL

## 2023-01-13 VITALS
OXYGEN SATURATION: 96 % | TEMPERATURE: 97.5 F | HEART RATE: 77 BPM | HEIGHT: 77 IN | BODY MASS INDEX: 31.29 KG/M2 | SYSTOLIC BLOOD PRESSURE: 141 MMHG | RESPIRATION RATE: 18 BRPM | DIASTOLIC BLOOD PRESSURE: 85 MMHG | WEIGHT: 265 LBS

## 2023-01-13 DIAGNOSIS — T82.9XXA DIALYSIS COMPLICATION, INITIAL ENCOUNTER: ICD-10-CM

## 2023-01-13 LAB
ANION GAP SERPL CALCULATED.3IONS-SCNC: 17 MMOL/L (ref 3–16)
BUN BLDV-MCNC: 62 MG/DL (ref 7–20)
CALCIUM SERPL-MCNC: 8.5 MG/DL (ref 8.3–10.6)
CHLORIDE BLD-SCNC: 95 MMOL/L (ref 99–110)
CO2: 24 MMOL/L (ref 21–32)
CREAT SERPL-MCNC: 9.7 MG/DL (ref 0.8–1.3)
GFR SERPL CREATININE-BSD FRML MDRD: 5 ML/MIN/{1.73_M2}
GLUCOSE BLD-MCNC: 98 MG/DL (ref 70–99)
HCT VFR BLD CALC: 38.7 % (ref 40.5–52.5)
HEMOGLOBIN: 12.8 G/DL (ref 13.5–17.5)
INR BLD: 1.02 (ref 0.87–1.14)
MCH RBC QN AUTO: 30 PG (ref 26–34)
MCHC RBC AUTO-ENTMCNC: 33.2 G/DL (ref 31–36)
MCV RBC AUTO: 90.4 FL (ref 80–100)
PDW BLD-RTO: 17.4 % (ref 12.4–15.4)
PLATELET # BLD: 199 K/UL (ref 135–450)
PMV BLD AUTO: 7.7 FL (ref 5–10.5)
POTASSIUM SERPL-SCNC: 4.6 MMOL/L (ref 3.5–5.1)
POTASSIUM SERPL-SCNC: 6.2 MMOL/L (ref 3.5–5.1)
PROTHROMBIN TIME: 13.3 SEC (ref 11.7–14.5)
RBC # BLD: 4.28 M/UL (ref 4.2–5.9)
SODIUM BLD-SCNC: 136 MMOL/L (ref 136–145)
WBC # BLD: 4.6 K/UL (ref 4–11)

## 2023-01-13 PROCEDURE — 80048 BASIC METABOLIC PNL TOTAL CA: CPT

## 2023-01-13 PROCEDURE — 84132 ASSAY OF SERUM POTASSIUM: CPT

## 2023-01-13 PROCEDURE — 6360000004 HC RX CONTRAST MEDICATION: Performed by: STUDENT IN AN ORGANIZED HEALTH CARE EDUCATION/TRAINING PROGRAM

## 2023-01-13 PROCEDURE — 2500000003 HC RX 250 WO HCPCS

## 2023-01-13 PROCEDURE — 85027 COMPLETE CBC AUTOMATED: CPT

## 2023-01-13 PROCEDURE — C1894 INTRO/SHEATH, NON-LASER: HCPCS

## 2023-01-13 PROCEDURE — 85610 PROTHROMBIN TIME: CPT

## 2023-01-13 PROCEDURE — 99153 MOD SED SAME PHYS/QHP EA: CPT

## 2023-01-13 PROCEDURE — 99152 MOD SED SAME PHYS/QHP 5/>YRS: CPT

## 2023-01-13 PROCEDURE — 36901 INTRO CATH DIALYSIS CIRCUIT: CPT

## 2023-01-13 PROCEDURE — 6360000002 HC RX W HCPCS

## 2023-01-13 RX ADMIN — IOHEXOL 25 ML: 350 INJECTION, SOLUTION INTRAVENOUS at 14:40

## 2023-01-13 NOTE — DISCHARGE INSTRUCTIONS
[facility].                                        Cardiovascular Special Procedures                                                            Fistulogram                                             Patient Discharge Instructions    1) Go home and rest for the remainder of the day. 2) The dressing over the site may be removed in 12 hours, or by dialysis personnel. 3) If bleeding occurs, hold pressure on the site until the bleeding stops. If bleeding does not stop, continue to hold pressure and report to the nearest Emergency Room. DO NOT REMOVE THE DRESSING! 4) Do not take aspirin, ibuprofen, vitamin E, or blood thinning products until approved by your primary care physician. You make take Tylenol for discomfort. 5) Keep your next scheduled dialysis appointment. 6) Unless your fistula is occluded, it may be used now. [facility]  Cardiovascular Special Procedures  General Discharge Instructions      _x___ You may be drowsy or lightheaded after receiving sedation. DO NOT operate a vehicle (automobile, bicycle, motorcycle, machinery, or power tools), make any important decisions or sign any important/legal documents, or drink alcoholic beverages for the next 24 hours  _x___ We strongly suggest that a responsible adult be with you for the next 24 hours for your protection and safety  ____ If the intravenous catheter site is painful, apply warm wet compresses on the site until the soreness is relieved and elevate the arm above the heart. Call your physician if no improvement in 2 to 3 days    DIETARY INSTRUCTIONS:    ____ Drink extra fluids over the next 24 hours (If not contraindicated by illness or by                   physician order)  ____ Start with clear liquids and progress to normal diet as you feel like eating.   If you experience nausea or repeated episodes of vomiting, which persist beyond 12-24 hours, notify your doctor        _x___ Resume your previous diet    ACTIVITY INSTRUCTIONS:    ____ See other instructions  ____ No special instructions  ____ Rest for 24 hours    ____ Up as tolerated  ____ Increase activity as tolerated    Wound/Dressing Instructions:  ____ See other instructions  ____ May shower, tomorrow  ____ Remove bandage within 24 hours    MEDICATION INSTRUCTIONS:    ____ See Medication Reconciliation Sheet                                                                                                                                                      Please make sure that you follow-up with your doctors office for your results. FOLLOW-UP APPOINTMENT    Follow up with MD as directed    Belongings returned to patient and/or family: Yes. The Discharge Instructions have been explained to me. I understand and can verbalize these instructions.

## 2023-01-13 NOTE — PROCEDURES
Interventional Radiology Post Procedure    Date: 1/13/2023    Physician: Boston De La Rosa MD    Pre-op Diagnosis: ESRD, malfunctioning left radiocephalic fistula    Post-op Diagnosis: same    Variation from Planned Procedure: None       Findings: Fistula patent with good thrill. Hematoma in forearm around fistula with mild external mass effect. No stenosis of venous outflow.  Rapid washout of contrast.    Patient condition: Stable    Estimated Blood Loss: 5 cc    Specimens:  none      Signed,  Morgan Akhtar MD  3:03 PM  1/13/2023

## 2023-01-13 NOTE — H&P
Patient:  Nena Horton   :   1960      Relevant clinical history, particularly as it involves the pending procedure, was reviewed and discussed. The procedure including risks and benefits was discussed at length with the patient (or designated family member) and all questions were answered. Informed consent to proceed with the procedure was given. Vital signs were monitored and documented by the Radiology nurse. Targeted physical examination  Heart : regular rate and rhythm  Lungs : clear, breathing easily  Condition : stable    [facility] nurses notes reviewed and agreed.     Past Medical History:        Diagnosis Date    Arthralgia of multiple joints 10/27/2015    Arthritis     Atrial fibrillation (HCC)     Chronic kidney disease     stage 4    Chronic systolic congestive heart failure ([address] 2021    CRI (chronic renal insufficiency)     Diabetic nephropathy associated with type 2 diabetes mellitus ([address] 10/11/2018    Diverticulosis     Elevated PSA     Erectile dysfunction     ESRD (end stage renal disease) on dialysis [facility] 2022    Gout     Gout     Hemrrhoid NOS w/ complication NEC     History of MRSA infection     Hypertension     CELSA (obstructive sleep apnea) 2017    uses C-pap machine    Type 2 diabetes mellitus without complication, without long-term current use of insulin ([facility]     Umbilical hernia without obstruction and without gangrene 2016       Past Surgical History:           Procedure Laterality Date    COLONOSCOPY      COLONOSCOPY N/A 3/18/2022    COLONOSCOPY POLYPECTOMY SNARE/COLD BIOPSY performed by Virginia Figueroa MD at [address] [place] [address] N/A 2022    LAPAROSCOPIC PERITONEAL DIALYSIS CATHETER PLACEMENT and omentopexy performed by Lei Morrison DO at [address] [place] [address] 2022    LEFT RADIOCEPHALIC ARTERIOVENOUS FISTULA CREATION performed by Awa Chang MD at [facility], [address] N/A 4/15/2022    ROBOTIC, RECURRENT INCISIONAL HERNIA REPAIR WITH BIOSYNTHETIC MESH; LAPAROSCOPIC PERITONEAL DIALYSIS CATHETER REVISION WITH LAPAROSCOPIC OMENTOPEXY performed by Silva Yeager DO at [facility] Bilateral 4/15/2022    BILATERAL OPEN INGUINAL HERNI REPAIR performed by Silva Yeager DO at [address] Bilateral 6/13/2022    ROBOTIC RECURRENT BILATERAL INGUINAL HERNIA REPAIR, LYSIS OF ADHESIONS, PERITONEAL DIALYSIS CATHETER REMOVAL performed by Silva Yeager DO at [facility] [address] TUNNELED [address] 5 YEARS  01/14/2022    IR TUNNELED CATHETER PLACEMENT GREATER THAN 5 YEARS 1/14/2022 [id number] SPECIAL PROCEDURES    IR TUNNELED CATHETER PLACEMENT GREATER THAN 5 YEARS  7/14/2022    IR TUNNELED CATHETER PLACEMENT GREATER THAN 5 YEARS 7/14/2022 [address] SPECIAL PROCEDURES    UPPER GASTROINTESTINAL ENDOSCOPY  05/28/2016    biopsies, multiple small gastric ulcers    UPPER GASTROINTESTINAL ENDOSCOPY  09/12/2016    UPPER GASTROINTESTINAL ENDOSCOPY N/A 3/18/2022    EGD DIAGNOSTIC ONLY performed by Jean Carroll MD at [place] [address] N/A 1/17/2022    LAPAROSCOPIC incarcerated VENTRAL HERNIA REPAIR performed by Silva Yeager DO at [address]:  Patient has no known allergies. Medications:   Home Meds  Current Outpatient Medications on File Prior to Encounter   Medication Sig Dispense Refill    tiZANidine (ZANAFLEX) 2 MG tablet TAKE 1 TABLET BY MOUTH DAILY AS NEEDED (MUSCLE SPASM) 30 tablet 0    vitamin D (ERGOCALCIFEROL) 1.25 MG (97207 UT) CAPS capsule TAKE 1 CAPSULE BY MOUTH ONE TIME PER WEEK 12 capsule 0    gabapentin (NEURONTIN) 100 MG capsule Take 1 capsule by mouth 2 times daily for 30 days. 60 capsule 0    oxyCODONE-acetaminophen (PERCOCET) 5-325 MG per tablet Take 1 tablet by mouth every 8 hours as needed for Pain for up to 28 days.  84 tablet 0    terazosin (HYTRIN) 5 MG capsule TAKE 1 CAPSULE BY MOUTH EVERY EVENING AS DIRECTED 90 capsule 6    diazePAM (VALIUM) 10 MG tablet TAKE 1 TABLET (10 MG TOTAL) BY MOUTH EVERY 6 HOURS AS NEEDED FOR ANXIETY FOR UP TO 1 DOSE. (Patient not taking: Reported on 1/13/2023)      ciprofloxacin (CIPRO) 500 MG tablet TAKE 1 TABLET (500 MG TOTAL) BY MOUTH 2 TIMES A DAY. PLEASE START ONE DAY PRIOR TO BIOPSY.  (Patient not taking: Reported on 1/13/2023)      amitriptyline (ELAVIL) 10 MG tablet Take 1 tablet by mouth nightly as needed for Sleep 30 tablet 0    busPIRone (BUSPAR) 5 MG tablet TAKE 1 TABLET BY MOUTH TWICE A DAY 60 tablet 5    metoprolol succinate (TOPROL XL) 50 MG extended release tablet TAKE 1 TABLET BY MOUTH AT BEDTIME TAKE 1 TABLET BY MOUTH EVERY DAY 30 tablet 3    torsemide (DEMADEX) 100 MG tablet TAKE 1 TABLET BY MOUTH EVERY DAY 90 tablet 1    calcium acetate (PHOSLO) 667 MG CAPS capsule TAKE 1 CAPSULE BY MOUTH THREE TIMES A DAY WITH MEALS 90 capsule 5    celecoxib (CELEBREX) 100 MG capsule TAKE ONE CAPSULE IN THE MORNING BY MOUTH AND 1 BEFORE BEDTIME      polyethylene glycol (GLYCOLAX) 17 GM/SCOOP powder TAKE 17 G BY MOUTH 2 TIMES DAILY 850 g 1    spironolactone (ALDACTONE) 50 MG tablet TAKE 1 TABLET BY MOUTH EVERYDAY AT BEDTIME 90 tablet 1    B Complex-C-Folic Acid (DIALYVITE 058) 0.8 MG TABS Take 1 tablet by mouth daily 90 tablet 3    methylPREDNISolone (MEDROL DOSEPACK) 4 MG tablet Take by mouth as directed in pack (Patient not taking: Reported on 1/13/2023) 21 tablet 0    sildenafil (VIAGRA) 100 MG tablet Take 1 tablet by mouth daily as needed for Erectile Dysfunction 30 tablet 5    naloxone (NARCAN) 4 MG/0.1ML LIQD nasal spray 1 spray by Nasal route as needed for Opioid Reversal 1 each 0    NIFEdipine (ADALAT CC) 90 MG extended release tablet TAKE 1 TABLET BY MOUTH EVERY DAY 30 tablet 4    Folic Acid-Vit S8-WER V67 0.5-5-0.2 MG TABS Take by mouth      calcitRIOL (ROCALTROL) 0.5 MCG capsule Take 1 capsule by mouth three times a week During HD 30 capsule 3    bisacodyl (BISACODYL) 5 MG EC tablet Take 1 tablet by mouth daily as needed for Constipation 30 tablet 5    atorvastatin (LIPITOR) 20 MG tablet TAKE 1 TABLET BY MOUTH EVERY DAY 30 tablet 5    omeprazole (PRILOSEC) 40 MG delayed release capsule TAKE 1 CAPSULE BY MOUTH EVERY DAY 30 capsule 1    aspirin 81 MG tablet Take 81 mg by mouth daily       No current facility-administered medications on file prior to encounter. Current Meds  No current facility-administered medications for this encounter.         ASA 1 - Normal health patient    II (soft palate, uvula, fauces visible)    Activity:  2 - Able to move 4 extremities voluntarily on command  Respiration:  2 - Able to breathe deeply and cough freely  Circulation:  2 - BP+/- 20mmHg of normal  Consciousness:  2 - Fully awake  Oxygen Saturation (color):  2 - Able to maintain oxygen saturation >92% on room air    Sedation : Moderate sedation planned

## 2023-01-24 ENCOUNTER — OFFICE VISIT (OUTPATIENT)
Dept: PAIN MANAGEMENT | Age: 63
End: 2023-01-24

## 2023-01-24 VITALS
HEART RATE: 113 BPM | BODY MASS INDEX: 31.95 KG/M2 | DIASTOLIC BLOOD PRESSURE: 89 MMHG | OXYGEN SATURATION: 96 % | SYSTOLIC BLOOD PRESSURE: 140 MMHG | WEIGHT: 269.4 LBS

## 2023-01-24 DIAGNOSIS — G57.92 ILIOINGUINAL NEURALGIA OF LEFT SIDE: ICD-10-CM

## 2023-01-24 DIAGNOSIS — Z98.890 STATUS POST BILATERAL HERNIA REPAIR: ICD-10-CM

## 2023-01-24 DIAGNOSIS — M25.50 ARTHRALGIA OF MULTIPLE JOINTS: ICD-10-CM

## 2023-01-24 DIAGNOSIS — F33.0 MAJOR DEPRESSIVE DISORDER, RECURRENT, MILD (HCC): ICD-10-CM

## 2023-01-24 DIAGNOSIS — Z51.81 ENCOUNTER FOR THERAPEUTIC DRUG MONITORING: ICD-10-CM

## 2023-01-24 DIAGNOSIS — K43.2 INCISIONAL HERNIA WITHOUT OBSTRUCTION OR GANGRENE: ICD-10-CM

## 2023-01-24 DIAGNOSIS — Z87.19 STATUS POST BILATERAL HERNIA REPAIR: ICD-10-CM

## 2023-01-24 DIAGNOSIS — M50.30 DDD (DEGENERATIVE DISC DISEASE), CERVICAL: ICD-10-CM

## 2023-01-24 DIAGNOSIS — G57.91 ILIOINGUINAL NEURALGIA OF RIGHT SIDE: ICD-10-CM

## 2023-01-24 DIAGNOSIS — G89.4 CHRONIC PAIN SYNDROME: ICD-10-CM

## 2023-01-24 RX ORDER — OXYCODONE HYDROCHLORIDE AND ACETAMINOPHEN 5; 325 MG/1; MG/1
1 TABLET ORAL EVERY 8 HOURS PRN
Qty: 84 TABLET | Refills: 0 | Status: SHIPPED | OUTPATIENT
Start: 2023-01-24 | End: 2023-02-21

## 2023-01-24 NOTE — PROGRESS NOTES
[facility]  1960  [id number]      HISTORY OF PRESENT ILLNESS: Mr. Carlos Tom is a 57 y.o. male returns for a follow up visit for pain management  He has a diagnosis of   1. Incisional hernia without obstruction or gangrene    2. Ilioinguinal neuralgia of right side    3. Chronic pain syndrome    4. Ilioinguinal neuralgia of left side    5. Major depressive disorder, recurrent, mild    6. Status post bilateral hernia repair    7. Arthralgia of multiple joints    8. DDD (degenerative disc disease), cervical    9. Encounter for therapeutic drug monitoring    . As per Information Obtained from the PADT (Patient Assessment and Documentation Tool)    He complains of pain in the bilateral upper legs. He rates the pain 5/10 and describes it as aching. Current treatment regimen has helped relieve about 90% of the pain. He denies any side effects from the current pain regimen. Patient reports that since the last follow up visit the physical functioning is unchanged, family/social relationships are unchanged, mood is unchanged sleep patterns are unchanged, and that the overall functioning is unchanged. Patient denies misusing/abusing his narcotic pain medications or using any illegal drugs. Upon obtaining medical history from Mr. Dixie Palma: Patients list of allergies were reviewed     MEDICATIONS: Mr. Carlos Tom list of medications were reviewed. His current medications are   Outpatient Medications Prior to Visit   Medication Sig Dispense Refill    metoprolol succinate (TOPROL XL) 50 MG extended release tablet TAKE 1 TABLET BY MOUTH AT BEDTIME 90 tablet 1    tiZANidine (ZANAFLEX) 2 MG tablet TAKE 1 TABLET BY MOUTH DAILY AS NEEDED (MUSCLE SPASM) 30 tablet 0    vitamin D (ERGOCALCIFEROL) 1.25 MG (57694 UT) CAPS capsule TAKE 1 CAPSULE BY MOUTH ONE TIME PER WEEK 12 capsule 0    gabapentin (NEURONTIN) 100 MG capsule Take 1 capsule by mouth 2 times daily for 30 days.  60 capsule 0    oxyCODONE-acetaminophen (PERCOCET) 5-325 MG per tablet Take 1 tablet by mouth every 8 hours as needed for Pain for up to 28 days.  84 tablet 0    terazosin (HYTRIN) 5 MG capsule TAKE 1 CAPSULE BY MOUTH EVERY EVENING AS DIRECTED 90 capsule 6    diazePAM (VALIUM) 10 MG tablet       ciprofloxacin (CIPRO) 500 MG tablet       amitriptyline (ELAVIL) 10 MG tablet Take 1 tablet by mouth nightly as needed for Sleep 30 tablet 0    busPIRone (BUSPAR) 5 MG tablet TAKE 1 TABLET BY MOUTH TWICE A DAY 60 tablet 5    torsemide (DEMADEX) 100 MG tablet TAKE 1 TABLET BY MOUTH EVERY DAY 90 tablet 1    calcium acetate (PHOSLO) 667 MG CAPS capsule TAKE 1 CAPSULE BY MOUTH THREE TIMES A DAY WITH MEALS 90 capsule 5    celecoxib (CELEBREX) 100 MG capsule TAKE ONE CAPSULE IN THE MORNING BY MOUTH AND 1 BEFORE BEDTIME      polyethylene glycol (GLYCOLAX) 17 GM/SCOOP powder TAKE 17 G BY MOUTH 2 TIMES DAILY 850 g 1    spironolactone (ALDACTONE) 50 MG tablet TAKE 1 TABLET BY MOUTH EVERYDAY AT BEDTIME 90 tablet 1    B Complex-C-Folic Acid (DIALYVITE 158) 0.8 MG TABS Take 1 tablet by mouth daily 90 tablet 3    methylPREDNISolone (MEDROL DOSEPACK) 4 MG tablet Take by mouth as directed in pack 21 tablet 0    sildenafil (VIAGRA) 100 MG tablet Take 1 tablet by mouth daily as needed for Erectile Dysfunction 30 tablet 5    naloxone (NARCAN) 4 MG/0.1ML LIQD nasal spray 1 spray by Nasal route as needed for Opioid Reversal 1 each 0    NIFEdipine (ADALAT CC) 90 MG extended release tablet TAKE 1 TABLET BY MOUTH EVERY DAY 30 tablet 4    Folic Acid-Vit Q5-OGZ G81 0.5-5-0.2 MG TABS Take by mouth      calcitRIOL (ROCALTROL) 0.5 MCG capsule Take 1 capsule by mouth three times a week During HD 30 capsule 3    bisacodyl (BISACODYL) 5 MG EC tablet Take 1 tablet by mouth daily as needed for Constipation 30 tablet 5    atorvastatin (LIPITOR) 20 MG tablet TAKE 1 TABLET BY MOUTH EVERY DAY 30 tablet 5    omeprazole (PRILOSEC) 40 MG delayed release capsule TAKE 1 CAPSULE BY MOUTH EVERY DAY 30 capsule 1    aspirin 81 MG tablet Take 81 mg by mouth daily       No facility-administered medications prior to visit. SOCIAL/FAMILY/PAST MEDICAL HISTORY: Mr. Arnoldo Gilmore, family and past medical history was reviewed. REVIEW OF SYSTEMS:    Respiratory: Negative for apnea, chest tightness and shortness of breath or change in baseline breathing. Gastrointestinal: Negative for nausea, vomiting, abdominal pain, diarrhea, constipation, blood in stool and abdominal distention. PHYSICAL EXAM:   Nursing note and vitals reviewed. BP (!) 140/89   Pulse (!) 113   Wt 269 lb 6.4 oz (122.2 kg)   SpO2 96%   BMI 31.95 kg/m²   Constitutional: He appears well-developed and well-nourished. No acute distress. Skin: Skin is warm and dry, good turgor. No rash noted. He is not diaphoretic. Cardiovascular: Normal rate, regular rhythm, normal heart sounds, and does not have murmur. Pulmonary/Chest: Effort normal. No respiratory distress. He does not have wheezes in the lung fields. He has no rales. Neurological/Psychiatric:He is alert and oriented to person, place, and time. Coordination is  normal.  His mood isAppropriate and affect is Neutral/Euthymic(normal) . Prescription pain medication monitoring:                  MEDD current = 22.5              ORT Score = 0              Other Risk factors - stable mood              Date of Last Medication Agreement: 08/31/2022              Date Naloxone prescribed: 08/31/2022              UDT:                          Date of last UDT: 11/30/2022                          Adverse report: No              OARRS:                          Checked today: Yes                          Adverse report: No    IMPRESSION:   1. Incisional hernia without obstruction or gangrene    2. Ilioinguinal neuralgia of right side    3. Chronic pain syndrome    4. Ilioinguinal neuralgia of left side    5. Major depressive disorder, recurrent, mild    6.  Status post bilateral hernia repair   7. Arthralgia of multiple joints    8. DDD (degenerative disc disease), cervical    9. Encounter for therapeutic drug monitoring        PLAN:  Informed verbal consent was obtained:  -Patient continues dialysis, he has been stable  -Recent follow-up regarding prostate cancer, no surgery at this time he is considering radiation options  -Continuing refill Percocet 5 mg tabs 3 times a day as needed for pain, he denies side effects  -Follow-up in 4 weeks for reassessment    Analgesic Plan:              Continue present regimen:yes              Adjust dose of present analgesic:no              Switch analgesics:no              Add/Adjust concomitant therapy:no      Current Outpatient Medications   Medication Sig Dispense Refill    metoprolol succinate (TOPROL XL) 50 MG extended release tablet TAKE 1 TABLET BY MOUTH AT BEDTIME 90 tablet 1    tiZANidine (ZANAFLEX) 2 MG tablet TAKE 1 TABLET BY MOUTH DAILY AS NEEDED (MUSCLE SPASM) 30 tablet 0    vitamin D (ERGOCALCIFEROL) 1.25 MG (35724 UT) CAPS capsule TAKE 1 CAPSULE BY MOUTH ONE TIME PER WEEK 12 capsule 0    gabapentin (NEURONTIN) 100 MG capsule Take 1 capsule by mouth 2 times daily for 30 days. 60 capsule 0    oxyCODONE-acetaminophen (PERCOCET) 5-325 MG per tablet Take 1 tablet by mouth every 8 hours as needed for Pain for up to 28 days. 84 tablet 0    terazosin (HYTRIN) 5 MG capsule TAKE 1 CAPSULE BY MOUTH EVERY EVENING AS DIRECTED 90 capsule 6    diazePAM (VALIUM) 10 MG tablet       ciprofloxacin (CIPRO) 500 MG tablet       amitriptyline (ELAVIL) 10 MG tablet Take 1 tablet by mouth nightly as needed for Sleep 30 tablet 0    busPIRone (BUSPAR) 5 MG tablet TAKE 1 TABLET BY MOUTH TWICE A DAY 60 tablet 5    torsemide (DEMADEX) 100 MG tablet TAKE 1 TABLET BY MOUTH EVERY DAY 90 tablet 1    calcium acetate (PHOSLO) 667 MG CAPS capsule TAKE 1 CAPSULE BY MOUTH THREE TIMES A DAY WITH MEALS 90 capsule 5    celecoxib (CELEBREX) 100 MG capsule TAKE ONE CAPSULE IN THE MORNING BY  MOUTH AND 1 BEFORE BEDTIME      polyethylene glycol (GLYCOLAX) 17 GM/SCOOP powder TAKE 17 G BY MOUTH 2 TIMES DAILY 850 g 1    spironolactone (ALDACTONE) 50 MG tablet TAKE 1 TABLET BY MOUTH EVERYDAY AT BEDTIME 90 tablet 1    B Complex-C-Folic Acid (DIALYVITE 161) 0.8 MG TABS Take 1 tablet by mouth daily 90 tablet 3    methylPREDNISolone (MEDROL DOSEPACK) 4 MG tablet Take by mouth as directed in pack 21 tablet 0    sildenafil (VIAGRA) 100 MG tablet Take 1 tablet by mouth daily as needed for Erectile Dysfunction 30 tablet 5    naloxone (NARCAN) 4 MG/0.1ML LIQD nasal spray 1 spray by Nasal route as needed for Opioid Reversal 1 each 0    NIFEdipine (ADALAT CC) 90 MG extended release tablet TAKE 1 TABLET BY MOUTH EVERY DAY 30 tablet 4    Folic Acid-Vit M6-CCA C82 0.5-5-0.2 MG TABS Take by mouth      calcitRIOL (ROCALTROL) 0.5 MCG capsule Take 1 capsule by mouth three times a week During HD 30 capsule 3    bisacodyl (BISACODYL) 5 MG EC tablet Take 1 tablet by mouth daily as needed for Constipation 30 tablet 5    atorvastatin (LIPITOR) 20 MG tablet TAKE 1 TABLET BY MOUTH EVERY DAY 30 tablet 5    omeprazole (PRILOSEC) 40 MG delayed release capsule TAKE 1 CAPSULE BY MOUTH EVERY DAY 30 capsule 1    aspirin 81 MG tablet Take 81 mg by mouth daily       No current facility-administered medications for this visit. I will continue his current medication regimen  which is part of the above treatment schedule. It has been helping with Mr. Victorino Palomares chronic  medical problems which for this visit include:   Diagnoses of Incisional hernia without obstruction or gangrene, Ilioinguinal neuralgia of right side, Chronic pain syndrome, Ilioinguinal neuralgia of left side, Major depressive disorder, recurrent, mild, Status post bilateral hernia repair, Arthralgia of multiple joints, DDD (degenerative disc disease), cervical, and Encounter for therapeutic drug monitoring were pertinent to this visit.    Risks and benefits of the medications and other alternative treatments  including no treatment were discussed with the patient. The common side effects of these medications were also explained to the patient. Informed verbal consent was obtained. Goals of current treatment regimen include improvement in pain, restoration of functioning- with focus on improvement in physical performance, general activity, work or disability,emotional distress, health care utilization and  decreased medication consumption. Will continue to monitor progress towards achieving/maintaining therapeutic goals with special emphasis on  1. Improvement in perceived interfernce  of pain with ADL's. Ability to do home exercises independently. Ability to do household chores indoor and/or outdoor work and social and leisure activities. Improve psychosocial and physical functioning. - he is maintaining his treatment goal with the current regimen. He was advised against drinking alcohol with the narcotic pain medicines, advised against driving or handling machinery while adjusting the dose of medicines or if having cognitive  issues related to the current medications. Risk of overdose and death, if medicines not taken as prescribed, were also discussed. If the patient develops new symptoms or if the symptoms worsen, the patient should call the office. While transcribing every attempt was made to maintain the accuracy of the note in terms of it's contents,there may have been some errors made inadvertently. Thank you for allowing me to participate in the care of this patient.     RICARDO Araujo    Cc: Pancho Qiu MD

## 2023-01-25 ENCOUNTER — TELEMEDICINE (OUTPATIENT)
Dept: FAMILY MEDICINE CLINIC | Age: 63
End: 2023-01-25

## 2023-01-25 DIAGNOSIS — R97.20 ELEVATED PSA: ICD-10-CM

## 2023-01-25 DIAGNOSIS — F41.8 DEPRESSION WITH ANXIETY: ICD-10-CM

## 2023-01-25 DIAGNOSIS — N18.6 ESRD (END STAGE RENAL DISEASE) ON DIALYSIS (HCC): ICD-10-CM

## 2023-01-25 DIAGNOSIS — I50.22 CHRONIC SYSTOLIC CONGESTIVE HEART FAILURE (HCC): ICD-10-CM

## 2023-01-25 DIAGNOSIS — E11.22 TYPE 2 DIABETES MELLITUS WITH STAGE 4 CHRONIC KIDNEY DISEASE, UNSPECIFIED WHETHER LONG TERM INSULIN USE (HCC): ICD-10-CM

## 2023-01-25 DIAGNOSIS — F43.9 STRESS: ICD-10-CM

## 2023-01-25 DIAGNOSIS — I10 ESSENTIAL HYPERTENSION, BENIGN: ICD-10-CM

## 2023-01-25 DIAGNOSIS — C61 PROSTATE CANCER (HCC): Primary | ICD-10-CM

## 2023-01-25 DIAGNOSIS — Z99.2 ESRD (END STAGE RENAL DISEASE) ON DIALYSIS (HCC): ICD-10-CM

## 2023-01-25 DIAGNOSIS — E78.00 HYPERCHOLESTEROLEMIA: ICD-10-CM

## 2023-01-25 DIAGNOSIS — N18.4 TYPE 2 DIABETES MELLITUS WITH STAGE 4 CHRONIC KIDNEY DISEASE, UNSPECIFIED WHETHER LONG TERM INSULIN USE (HCC): ICD-10-CM

## 2023-01-25 ASSESSMENT — PATIENT HEALTH QUESTIONNAIRE - PHQ9
4. FEELING TIRED OR HAVING LITTLE ENERGY: 0
5. POOR APPETITE OR OVEREATING: 0
SUM OF ALL RESPONSES TO PHQ9 QUESTIONS 1 & 2: 0
6. FEELING BAD ABOUT YOURSELF - OR THAT YOU ARE A FAILURE OR HAVE LET YOURSELF OR YOUR FAMILY DOWN: 0
2. FEELING DOWN, DEPRESSED OR HOPELESS: 0
SUM OF ALL RESPONSES TO PHQ QUESTIONS 1-9: 0
3. TROUBLE FALLING OR STAYING ASLEEP: 0
9. THOUGHTS THAT YOU WOULD BE BETTER OFF DEAD, OR OF HURTING YOURSELF: 0
10. IF YOU CHECKED OFF ANY PROBLEMS, HOW DIFFICULT HAVE THESE PROBLEMS MADE IT FOR YOU TO DO YOUR WORK, TAKE CARE OF THINGS AT HOME, OR GET ALONG WITH OTHER PEOPLE: 0
8. MOVING OR SPEAKING SO SLOWLY THAT OTHER PEOPLE COULD HAVE NOTICED. OR THE OPPOSITE, BEING SO FIGETY OR RESTLESS THAT YOU HAVE BEEN MOVING AROUND A LOT MORE THAN USUAL: 0
SUM OF ALL RESPONSES TO PHQ QUESTIONS 1-9: 0
7. TROUBLE CONCENTRATING ON THINGS, SUCH AS READING THE NEWSPAPER OR WATCHING TELEVISION: 0
1. LITTLE INTEREST OR PLEASURE IN DOING THINGS: 0

## 2023-01-25 NOTE — PROGRESS NOTES
Here for f/u and recheck of ESRD, prostate CA    Pt has been working with nephrology for his ESRD, continues on dialysis. Pt does have fistula to LUE and is using that for dialysis,. Pt going T, Th, Sat and is tolerating that very well. Did just fistulogram that showed normal flow. Pt has been working with urology for elevation of PSA. Pts psa had continued to increase and we did refer him to urology but based delayed in getting set up. Eventually did get over to see them and underwent prostate biopsies that were positive for prostate CA. Pt had biopsy showing King 7 score prostate CA. They discussed that due to his operations with hernia and possible surgery for kidney possibly starting, they are going to err on the side of consideration of XRT vs seed implant treatment. Pt was told that either treatment would delay his ability to proceed with consideration of kidney transplant. If he did seed implants, would be 18 months + 2 years. External beam radiation would be similar. Pt did have {PSMA that showed no external spread       Except as noted above in the history of present illness, the review of systems is  negative for headache, vision changes, chest pain, shortness of breath, abdominal pain, urinary sx, bowel changes. Past medical, surgical, and social history reviewed and updated  Medications and allergies reviewed and updated    1/25/2023    TELEHEALTH EVALUATION -- Audio/Visual (During NZZSA-22 public health emergency)      Due to COVID 19 outbreak, patient's office visit was converted to a virtual visit. Patient was contacted and agreed to proceed with a virtual visit via [address] [facility]  The risks and benefits of converting to a virtual visit were discussed in light of the current infectious disease epidemic. Patient also understood that insurance coverage and co-pays are up to their individual insurance plans.       As above    Review of Systems  Except as noted above in the history of present illness, the review of systems is  negative for headache, vision changes, chest pain, shortness of breath, abdominal pain, urinary sx, bowel changes. Past medical, surgical, and social history reviewed and updated  Medications and allergies reviewed and updated        Social History     Tobacco Use    Smoking status: Former     Types: Cigars     Start date: 12/31/1979     Quit date: 1/1/2020     Years since quitting: 3.0     Passive exposure: Never    Smokeless tobacco: Never   Vaping Use    Vaping Use: Never used   Substance Use Topics    Alcohol use: Not Currently     Comment: maybe a beer a weekend    Drug use: No        Current Outpatient Medications   Medication Sig Dispense Refill    oxyCODONE-acetaminophen (PERCOCET) 5-325 MG per tablet Take 1 tablet by mouth every 8 hours as needed for Pain for up to 28 days. 84 tablet 0    metoprolol succinate (TOPROL XL) 50 MG extended release tablet TAKE 1 TABLET BY MOUTH AT BEDTIME 90 tablet 1    vitamin D (ERGOCALCIFEROL) 1.25 MG (42987 UT) CAPS capsule TAKE 1 CAPSULE BY MOUTH ONE TIME PER WEEK 12 capsule 0    gabapentin (NEURONTIN) 100 MG capsule Take 1 capsule by mouth 2 times daily for 30 days.  60 capsule 0    diazePAM (VALIUM) 10 MG tablet       busPIRone (BUSPAR) 5 MG tablet TAKE 1 TABLET BY MOUTH TWICE A DAY 60 tablet 5    torsemide (DEMADEX) 100 MG tablet TAKE 1 TABLET BY MOUTH EVERY DAY 90 tablet 1    calcium acetate (PHOSLO) 667 MG CAPS capsule TAKE 1 CAPSULE BY MOUTH THREE TIMES A DAY WITH MEALS 90 capsule 5    polyethylene glycol (GLYCOLAX) 17 GM/SCOOP powder TAKE 17 G BY MOUTH 2 TIMES DAILY 850 g 1    spironolactone (ALDACTONE) 50 MG tablet TAKE 1 TABLET BY MOUTH EVERYDAY AT BEDTIME 90 tablet 1    B Complex-C-Folic Acid (DIALYVITE 369) 0.8 MG TABS Take 1 tablet by mouth daily 90 tablet 3    sildenafil (VIAGRA) 100 MG tablet Take 1 tablet by mouth daily as needed for Erectile Dysfunction 30 tablet 5    NIFEdipine (ADALAT CC) 90 MG extended release tablet TAKE 1 TABLET BY MOUTH EVERY DAY 30 tablet 4    Folic Acid-Vit R2-WCB J36 0.5-5-0.2 MG TABS Take by mouth      calcitRIOL (ROCALTROL) 0.5 MCG capsule Take 1 capsule by mouth three times a week During HD 30 capsule 3    bisacodyl (BISACODYL) 5 MG EC tablet Take 1 tablet by mouth daily as needed for Constipation 30 tablet 5    atorvastatin (LIPITOR) 20 MG tablet TAKE 1 TABLET BY MOUTH EVERY DAY 30 tablet 5    omeprazole (PRILOSEC) 40 MG delayed release capsule TAKE 1 CAPSULE BY MOUTH EVERY DAY 30 capsule 1    aspirin 81 MG tablet Take 81 mg by mouth daily       No current facility-administered medications for this visit. No Known Allergies     PHYSICAL EXAMINATION:    All boxes checked are findings on exam, empty boxes are negative or not evaluated during the examination  Limitation in exam due to use of video conferencing software, virtual visits    Vital Signs: (As obtained by patient/caregiver or practitioner observation)  There were no vitals taken for this visit. Constitutional: [x] Appears well-developed and well-nourished [x] No apparent distress      [] Abnormal-   Mental status  [x] Alert and awake  [x] Oriented to person/place/time []Able to follow commands      Eyes:  EOM    []  Normal  [] Abnormal-  Sclera  []  Normal  [] Abnormal -         Discharge []  None visible  [] Abnormal -    HENT:   [x] Normocephalic, atraumatic.   [] Abnormal   [] Mouth/Throat: Mucous membranes are moist.     External Ears [] Normal  [] Abnormal-     Neck: [x] No visualized mass     Pulmonary/Chest: [x] Respiratory effort normal.  [x] No visualized signs of difficulty breathing or respiratory distress        [] Abnormal-      Musculoskeletal:   [] Normal gait with no signs of ataxia         [] Normal range of motion of neck        [] Abnormal-       Neurological:        [] No Facial Asymmetry (Cranial nerve 7 motor function) (limited exam to video visit)          [] No gaze palsy        [] Abnormal-         Skin:        [] No significant exanthematous lesions or discoloration noted on facial skin         [] Abnormal-            Psychiatric:       [x] Normal Affect [x] No Hallucinations        [] Abnormal-     Other pertinent observable physical exam findings-     Due to this being a TeleHealth encounter, evaluation of the following organ systems is limited: Vitals/Constitutional/EENT/Resp/CV/GI//MS/Neuro/Skin/Heme-Lymph-Imm. ASSESSMENT/PLAN:                     Discussed use, benefit, and side effects of all prescribed medications. Barriers to medication compliance addressed. All patient questions answered. Pt voiced understanding. When applicable, patient's outside records were reviewed through [facility]. The patient has signed appropriate paperworks/consents. An  electronic signature was used to authenticate this note. --Ashlyn Lopez MD on 1/25/2023 at 10:46 AM      Pursuant to the emergency declaration under the [address] [place] [address] [facility] authority and the [employer] and [employer] Act, this Virtual  Visit was conducted, with patient's consent, to reduce the patient's risk of exposure to COVID-19 and provide continuity of care for an established patient. Services were provided through a video synchronous discussion virtually to substitute for in-person clinic visit. Current Outpatient Medications   Medication Sig Dispense Refill    oxyCODONE-acetaminophen (PERCOCET) 5-325 MG per tablet Take 1 tablet by mouth every 8 hours as needed for Pain for up to 28 days. 84 tablet 0    metoprolol succinate (TOPROL XL) 50 MG extended release tablet TAKE 1 TABLET BY MOUTH AT BEDTIME 90 tablet 1    vitamin D (ERGOCALCIFEROL) 1.25 MG (76275 UT) CAPS capsule TAKE 1 CAPSULE BY MOUTH ONE TIME PER WEEK 12 capsule 0    gabapentin (NEURONTIN) 100 MG capsule Take 1 capsule by mouth 2 times daily for 30 days. 60 capsule 0    diazePAM (VALIUM) 10 MG tablet       busPIRone (BUSPAR) 5 MG tablet TAKE 1 TABLET BY MOUTH TWICE A DAY 60 tablet 5    torsemide (DEMADEX) 100 MG tablet TAKE 1 TABLET BY MOUTH EVERY DAY 90 tablet 1    calcium acetate (PHOSLO) 667 MG CAPS capsule TAKE 1 CAPSULE BY MOUTH THREE TIMES A DAY WITH MEALS 90 capsule 5    polyethylene glycol (GLYCOLAX) 17 GM/SCOOP powder TAKE 17 G BY MOUTH 2 TIMES DAILY 850 g 1    spironolactone (ALDACTONE) 50 MG tablet TAKE 1 TABLET BY MOUTH EVERYDAY AT BEDTIME 90 tablet 1    B Complex-C-Folic Acid (DIALYVITE 933) 0.8 MG TABS Take 1 tablet by mouth daily 90 tablet 3    sildenafil (VIAGRA) 100 MG tablet Take 1 tablet by mouth daily as needed for Erectile Dysfunction 30 tablet 5    NIFEdipine (ADALAT CC) 90 MG extended release tablet TAKE 1 TABLET BY MOUTH EVERY DAY 30 tablet 4    Folic Acid-Vit F5-KGQ B79 0.5-5-0.2 MG TABS Take by mouth      calcitRIOL (ROCALTROL) 0.5 MCG capsule Take 1 capsule by mouth three times a week During HD 30 capsule 3    bisacodyl (BISACODYL) 5 MG EC tablet Take 1 tablet by mouth daily as needed for Constipation 30 tablet 5    atorvastatin (LIPITOR) 20 MG tablet TAKE 1 TABLET BY MOUTH EVERY DAY 30 tablet 5    omeprazole (PRILOSEC) 40 MG delayed release capsule TAKE 1 CAPSULE BY MOUTH EVERY DAY 30 capsule 1    aspirin 81 MG tablet Take 81 mg by mouth daily       No current facility-administered medications for this visit. ASSESSMENT / PLAN:    1. Prostate cancer ([facility] [address]  Reviewed eval by urology, PSMA scan  Will f/u with rad onc to discuss treatment options  Not surgical candidate d/t hernia surgery issues  Will need to delay consideration of getting set up for kidney transplant to treat prostate CA, encouraged pt to discuss with rad onc whether EBRT would be a quicker treatment option    2.  Type 2 diabetes mellitus with stage 4 chronic kidney disease, unspecified whether long term insulin use (HCC)  ESRD  On hemodialysis  Check bloodwork as below  - Hemoglobin A1C; Future  - CBC; Future  - Lipid Panel; Future  - Comprehensive Metabolic Panel; Future    3. ESRD (end stage renal disease) on dialysis ([address]  On HD  Cont f/u with nephrology  Med list reviewed/updated with pt today    4. Chronic systolic congestive heart failure (HCC)  Stable w/o progressive sx  Cont to montor daily weights  Check bloodwork as bleow  - Hemoglobin A1C; Future  - CBC; Future  - Lipid Panel; Future  - Comprehensive Metabolic Panel; Future    5. Essential hypertension, benign  blood pressure stable, encouraged adherence to therapy    6. Elevated PSA  D/t prostate CA  F/u with urology    7. Stress  Doing ok, cont supportive therapy    8. Hypercholesterolemia  Did stop atorvastatin  Encouraged pt to restart, will have pt d/w nephrology    9. Depression with anxiety  Doing ok despite stressors  Cont supportive therapy           Follow-up appointment:   Pending bloodwork  1 month  prn    Discussed use, benefit, and side effects of all prescribed medications. Barriers to medication compliance addressed. All patient questions answered. Pt voiced understanding. When applicable, patient's outside records were reviewed through [facility]. The patient has signed appropriate paperworks/consents.

## 2023-01-26 RX ORDER — NIFEDIPINE 90 MG/1
TABLET, FILM COATED, EXTENDED RELEASE ORAL
Qty: 90 TABLET | Refills: 0 | Status: SHIPPED | OUTPATIENT
Start: 2023-01-26

## 2023-01-27 ENCOUNTER — TELEPHONE (OUTPATIENT)
Dept: FAMILY MEDICINE CLINIC | Age: 63
End: 2023-01-27

## 2023-01-27 NOTE — TELEPHONE ENCOUNTER
Rachel Saini from [facility] lab called in stating that they have a critical lab result    Creatinine 6.9    Thank you

## 2023-01-30 ENCOUNTER — APPOINTMENT (OUTPATIENT)
Dept: CT IMAGING | Age: 63
End: 2023-01-30
Payer: MEDICARE

## 2023-01-30 ENCOUNTER — HOSPITAL ENCOUNTER (EMERGENCY)
Age: 63
Discharge: HOME | End: 2023-01-30
Attending: EMERGENCY MEDICINE
Payer: MEDICARE

## 2023-01-30 VITALS
RESPIRATION RATE: 16 BRPM | SYSTOLIC BLOOD PRESSURE: 160 MMHG | OXYGEN SATURATION: 97 % | BODY MASS INDEX: 31.53 KG/M2 | HEART RATE: 80 BPM | TEMPERATURE: 98 F | HEIGHT: 77 IN | DIASTOLIC BLOOD PRESSURE: 83 MMHG | WEIGHT: 267 LBS

## 2023-01-30 DIAGNOSIS — K42.9 UMBILICAL HERNIA WITHOUT OBSTRUCTION AND WITHOUT GANGRENE: ICD-10-CM

## 2023-01-30 DIAGNOSIS — K40.20 BILATERAL INGUINAL HERNIA WITHOUT OBSTRUCTION OR GANGRENE, RECURRENCE NOT SPECIFIED: ICD-10-CM

## 2023-01-30 DIAGNOSIS — R10.33 RECURRENT PERIUMBILICAL ABDOMINAL PAIN: Primary | ICD-10-CM

## 2023-01-30 LAB
A/G RATIO: 1.7 (ref 1.1–2.2)
ALBUMIN SERPL-MCNC: 4.7 G/DL (ref 3.4–5)
ALP BLD-CCNC: 70 U/L (ref 40–129)
ALT SERPL-CCNC: 16 U/L (ref 10–40)
ANION GAP SERPL CALCULATED.3IONS-SCNC: 17 MMOL/L (ref 3–16)
AST SERPL-CCNC: 24 U/L (ref 15–37)
BASOPHILS ABSOLUTE: 0 K/UL (ref 0–0.2)
BASOPHILS RELATIVE PERCENT: 1 %
BILIRUB SERPL-MCNC: <0.2 MG/DL (ref 0–1)
BILIRUBIN URINE: NEGATIVE
BLOOD, URINE: ABNORMAL
BUN BLDV-MCNC: 52 MG/DL (ref 7–20)
CALCIUM SERPL-MCNC: 8.5 MG/DL (ref 8.3–10.6)
CHLORIDE BLD-SCNC: 93 MMOL/L (ref 99–110)
CLARITY: CLEAR
CO2: 24 MMOL/L (ref 21–32)
COLOR: YELLOW
CREAT SERPL-MCNC: 9 MG/DL (ref 0.8–1.3)
EOSINOPHILS ABSOLUTE: 0.1 K/UL (ref 0–0.6)
EOSINOPHILS RELATIVE PERCENT: 3.1 %
GFR SERPL CREATININE-BSD FRML MDRD: 6 ML/MIN/{1.73_M2}
GLUCOSE BLD-MCNC: 104 MG/DL (ref 70–99)
GLUCOSE URINE: NEGATIVE MG/DL
HCT VFR BLD CALC: 38.1 % (ref 40.5–52.5)
HEMOGLOBIN: 12.5 G/DL (ref 13.5–17.5)
KETONES, URINE: NEGATIVE MG/DL
LACTIC ACID: 0.6 MMOL/L (ref 0.4–2)
LEUKOCYTE ESTERASE, URINE: NEGATIVE
LIPASE: 30 U/L (ref 13–60)
LYMPHOCYTES ABSOLUTE: 1.1 K/UL (ref 1–5.1)
LYMPHOCYTES RELATIVE PERCENT: 26.3 %
MCH RBC QN AUTO: 29 PG (ref 26–34)
MCHC RBC AUTO-ENTMCNC: 32.9 G/DL (ref 31–36)
MCV RBC AUTO: 88.2 FL (ref 80–100)
MICROSCOPIC EXAMINATION: YES
MONOCYTES ABSOLUTE: 0.3 K/UL (ref 0–1.3)
MONOCYTES RELATIVE PERCENT: 7.2 %
NEUTROPHILS ABSOLUTE: 2.7 K/UL (ref 1.7–7.7)
NEUTROPHILS RELATIVE PERCENT: 62.4 %
NITRITE, URINE: NEGATIVE
PDW BLD-RTO: 16.4 % (ref 12.4–15.4)
PH UA: 7 (ref 5–8)
PLATELET # BLD: 163 K/UL (ref 135–450)
PMV BLD AUTO: 8.1 FL (ref 5–10.5)
POTASSIUM REFLEX MAGNESIUM: 5 MMOL/L (ref 3.5–5.1)
PROTEIN UA: 100 MG/DL
RBC # BLD: 4.31 M/UL (ref 4.2–5.9)
RBC UA: ABNORMAL /HPF (ref 0–4)
SODIUM BLD-SCNC: 134 MMOL/L (ref 136–145)
SPECIFIC GRAVITY UA: 1.01 (ref 1–1.03)
TOTAL PROTEIN: 7.5 G/DL (ref 6.4–8.2)
URINE TYPE: ABNORMAL
UROBILINOGEN, URINE: 0.2 E.U./DL
WBC # BLD: 4.3 K/UL (ref 4–11)
WBC UA: ABNORMAL /HPF (ref 0–5)

## 2023-01-30 PROCEDURE — 6360000002 HC RX W HCPCS: Performed by: EMERGENCY MEDICINE

## 2023-01-30 PROCEDURE — 99285 EMERGENCY DEPT VISIT HI MDM: CPT

## 2023-01-30 PROCEDURE — 85025 COMPLETE CBC W/AUTO DIFF WBC: CPT

## 2023-01-30 PROCEDURE — 80053 COMPREHEN METABOLIC PANEL: CPT

## 2023-01-30 PROCEDURE — 74177 CT ABD & PELVIS W/CONTRAST: CPT

## 2023-01-30 PROCEDURE — 81001 URINALYSIS AUTO W/SCOPE: CPT

## 2023-01-30 PROCEDURE — 96374 THER/PROPH/DIAG INJ IV PUSH: CPT

## 2023-01-30 PROCEDURE — 83690 ASSAY OF LIPASE: CPT

## 2023-01-30 PROCEDURE — 36415 COLL VENOUS BLD VENIPUNCTURE: CPT

## 2023-01-30 PROCEDURE — 6360000004 HC RX CONTRAST MEDICATION: Performed by: EMERGENCY MEDICINE

## 2023-01-30 PROCEDURE — 83605 ASSAY OF LACTIC ACID: CPT

## 2023-01-30 RX ORDER — AMITRIPTYLINE HYDROCHLORIDE 10 MG/1
TABLET, FILM COATED ORAL
Qty: 30 TABLET | Refills: 0 | OUTPATIENT
Start: 2023-01-30

## 2023-01-30 RX ADMIN — IOPAMIDOL 75 ML: 755 INJECTION, SOLUTION INTRAVENOUS at 08:35

## 2023-01-30 RX ADMIN — HYDROMORPHONE HYDROCHLORIDE 0.5 MG: 1 INJECTION, SOLUTION INTRAMUSCULAR; INTRAVENOUS; SUBCUTANEOUS at 09:56

## 2023-01-30 RX ADMIN — IOHEXOL 50 ML: 240 INJECTION, SOLUTION INTRATHECAL; INTRAVASCULAR; INTRAVENOUS; ORAL at 08:34

## 2023-01-30 ASSESSMENT — PAIN DESCRIPTION - FREQUENCY: FREQUENCY: CONTINUOUS

## 2023-01-30 ASSESSMENT — PAIN DESCRIPTION - DESCRIPTORS: DESCRIPTORS: SQUEEZING

## 2023-01-30 ASSESSMENT — PAIN DESCRIPTION - LOCATION: LOCATION: ABDOMEN

## 2023-01-30 ASSESSMENT — PAIN DESCRIPTION - PAIN TYPE: TYPE: ACUTE PAIN

## 2023-01-30 ASSESSMENT — PAIN SCALES - GENERAL: PAINLEVEL_OUTOF10: 7

## 2023-01-30 ASSESSMENT — PAIN - FUNCTIONAL ASSESSMENT: PAIN_FUNCTIONAL_ASSESSMENT: 0-10

## 2023-01-30 ASSESSMENT — PAIN DESCRIPTION - ORIENTATION: ORIENTATION: OTHER (COMMENT)

## 2023-01-30 NOTE — ED PROVIDER NOTES
[address] ENCOUNTER          ATTENDING PHYSICIAN NOTE       Date of evaluation: 1/30/2023      Assessment/ Medical Decion Making     MDM: Merle Rodriguez is a 57 y.o. male with history of end-stage renal disease and multiple prior abdominal surgeries presenting for evaluation of abdominal pain. I have considered multiple dangerous causes of abdominal pain including but not limited to vascular dissection, vascular aneurysm, vascular thrombosis, retroperitoneal hemorrhage, mesenteric ischemia, cholecystitis, choledocholithiasis, ascending cholangitis, pyelonephritis, perinephric abscess, obstructive urolithiasis, pancreatitis, acute hepatitis, colitis, obstruction, testicular torsion, orchitis. Etiology of his symptoms is unclear but with multiple prior abdominal surgeries will check labs and order CT abdomen pelvis with both IV and p.o. contrast.  Given parenteral narcotics for pain control. Dispo pending the results of labs and imaging. Medical Decision Making  Amount and/or Complexity of Data Reviewed  Labs: ordered. Radiology: ordered. Risk  Prescription drug management. Cassie Le MD  9:20 PM    Clinical Impression     Abdominal pain    DISPOSITION signed out to oncoming ED provider team      Chief Complaint     Abdominal Pain (Pt complaining of all over abdominal pain for the past 4 days. States he has had this pain off and on over the past few years.  )      History of Present Illness     Merle Rodriguez is a 57 y.o. male with hx ESRD on T,TH, Sa dialysis. Last session Saturday. Has been having pain in the middle of his abdomen on and off for the last few days. Worse at night. Has not taken anything for the pain. No assoc nausea or vomiting. Had a BM this morning that sis not improve the pain. Cannot describe character of pain. Reports pain is 9/10 in severity and sometimes doubles him over. No urinary symptoms.  Takes oxycodone at baseline but it has not helped this pain. Has had similar pain before about one year ago. NO clear etiology found a that time. No CP or SOB. Has had prior abdominal surgeries for hernias. .     Review of Systems     Pertinent positive and negative findings as documented in the HPI. Otherwise a complete ROS was performed and all other systems were reviewed and were negative. Physical Exam     INITIAL VITALS: BP: (!) 164/84, Temp: 98 °F (36.7 °C), Heart Rate: 91, Resp: 18, SpO2: 99 %     Nursing note and vitals reviewed. Physical Exam  Constitutional:       General: He is not in acute distress. HENT:      Head: Normocephalic and atraumatic. Nose: No rhinorrhea. Eyes:      Conjunctiva/sclera: Conjunctivae normal.   Cardiovascular:      Rate and Rhythm: Normal rate. Pulmonary:      Effort: Pulmonary effort is normal. No respiratory distress. Abdominal:      General: There is no distension. Tenderness: There is abdominal tenderness (mild, diffuse). There is no guarding or rebound. Musculoskeletal:         General: No deformity. Cervical back: No rigidity. Skin:     General: Skin is warm and dry. Neurological:      Mental Status: He is alert. Mental status is at baseline. Psychiatric:         Thought Content:  Thought content normal.       Procedures   Procedures    Past Medical, Surgical, Family, and Social History     He has a past medical history of Arthralgia of multiple joints, Arthritis, Atrial fibrillation ([address] Chronic kidney disease, Chronic systolic congestive heart failure ([address] CRI (chronic renal insufficiency), Diabetic nephropathy associated with type 2 diabetes mellitus ([address] Diverticulosis, Elevated PSA, Erectile dysfunction, ESRD (end stage renal disease) on dialysis ([address] Gout, Gout, Hemrrhoid NOS w/ complication NEC, History of MRSA infection, Hypertension, CELSA (obstructive sleep apnea), Prostate cancer ([address] Type 2 diabetes mellitus without complication, without long-term current use of insulin ([facility] and Umbilical hernia without obstruction and without gangrene. He has a past surgical history that includes Upper gastrointestinal endoscopy (05/28/2016); Colonoscopy; Dilatation, esophagus; Upper gastrointestinal endoscopy (09/12/2016); IR TUNNELED CVC PLACE WO SQ PORT/PUMP > 5 YEARS (01/14/2022); Dialysis Catheter Insertion (N/A, 1/17/2022); ventral hernia repair (N/A, 1/17/2022); CT INSERT PERITONEAL CATHETER; Colonoscopy (N/A, 3/18/2022); Upper gastrointestinal endoscopy (N/A, 3/18/2022); hernia repair (N/A, 4/15/2022); hernia repair (Bilateral, 4/15/2022); Inguinal hernia repair (Bilateral, 6/13/2022); IR TUNNELED CVC PLACE WO SQ PORT/PUMP > 5 YEARS (7/14/2022); and Dialysis fistula creation (Left, 7/29/2022). His family history includes Breast Cancer in his mother; Colon Cancer in his father; Coronary Art Dis in his brother; Diabetes in his maternal grandmother; Hypertension in an other family member. He reports that he quit smoking about 3 years ago. His smoking use included cigars. He started smoking about 43 years ago. He has never been exposed to tobacco smoke. He has never used smokeless tobacco. He reports that he does not currently use alcohol. He reports that he does not use drugs. Nursing Notes, Past Medical Hx, Past Surgical Hx, Social Hx,Allergies, and Family Hx were reviewed. Medications     Discharge Medication List as of 1/30/2023 12:33 PM        CONTINUE these medications which have NOT CHANGED    Details   NIFEdipine (ADALAT CC) 90 MG extended release tablet TAKE 1 TABLET BY MOUTH EVERY DAY, Disp-90 tablet, R-0Normal      atorvastatin (LIPITOR) 20 MG tablet Take 1 tablet by mouth daily, Disp-90 tablet, R-1Normal      oxyCODONE-acetaminophen (PERCOCET) 5-325 MG per tablet Take 1 tablet by mouth every 8 hours as needed for Pain for up to 28 days. , Disp-84 tablet, R-0Normal      metoprolol succinate (TOPROL XL) 50 MG extended release tablet TAKE 1 TABLET BY MOUTH AT BEDTIME, Disp-90 tablet, R-1Normal      vitamin D (ERGOCALCIFEROL) 1.25 MG (07099 UT) CAPS capsule TAKE 1 CAPSULE BY MOUTH ONE TIME PER WEEK, Disp-12 capsule, R-0Normal      gabapentin (NEURONTIN) 100 MG capsule Take 1 capsule by mouth 2 times daily for 30 days. , Disp-60 capsule, R-0Normal      diazePAM (VALIUM) 10 MG tablet Historical Med      busPIRone (BUSPAR) 5 MG tablet TAKE 1 TABLET BY MOUTH TWICE A DAY, Disp-60 tablet, R-5Normal      torsemide (DEMADEX) 100 MG tablet TAKE 1 TABLET BY MOUTH EVERY DAY, Disp-90 tablet, R-1Normal      calcium acetate (PHOSLO) 667 MG CAPS capsule TAKE 1 CAPSULE BY MOUTH THREE TIMES A DAY WITH MEALS, Disp-90 capsule, R-5Normal      polyethylene glycol (GLYCOLAX) 17 GM/SCOOP powder TAKE 17 G BY MOUTH 2 TIMES DAILY, Disp-850 g, R-1Normal      spironolactone (ALDACTONE) 50 MG tablet TAKE 1 TABLET BY MOUTH EVERYDAY AT BEDTIME, Disp-90 tablet, R-1Normal      B Complex-C-Folic Acid (DIALYVITE 444) 0.8 MG TABS Take 1 tablet by mouth daily, Disp-90 tablet, R-3Normal      sildenafil (VIAGRA) 100 MG tablet Take 1 tablet by mouth daily as needed for Erectile Dysfunction, Disp-30 tablet, C-4GOFQT      Folic Acid-Vit Z6-OXS M17 0.5-5-0.2 MG TABS Take by mouthHistorical Med      calcitRIOL (ROCALTROL) 0.5 MCG capsule Take 1 capsule by mouth three times a week During HD, Disp-30 capsule, R-3NO PRINT      bisacodyl (BISACODYL) 5 MG EC tablet Take 1 tablet by mouth daily as needed for Constipation, Disp-30 tablet, R-5Normal      omeprazole (PRILOSEC) 40 MG delayed release capsule TAKE 1 CAPSULE BY MOUTH EVERY DAY, Disp-30 capsule, R-1Normal      aspirin 81 MG tablet Take 81 mg by mouth dailyHistorical Med             Allergies     He has No Known Allergies.     ED Course     Patient was given the following medications:  Orders Placed This Encounter   Medications    HYDROmorphone (DILAUDID) injection 0.5 mg    iopamidol (ISOVUE-370) 76 % injection 75 mL    iohexol (OMNIPAQUE 240) IV/PO solution 50 mL       No results found. RECENT VITALS:  BP: (!) 160/83,Temp: 98 °F (36.7 °C), Heart Rate: 80, Resp: 16, SpO2: 97 %     RADIOLOGY:  CT ABDOMEN PELVIS W IV CONTRAST Additional Contrast? Oral   Final Result      1. Postoperative changes along the lower anterior pelvis which appear slightly less pronounced on the previous CT exam with persistent fat-containing inguinal hernias. 2. Small fat-containing umbilical hernia. 3. Stable bilateral adrenal adenomas. 4. 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   5. No acute abdominal or pelvic abnormality identified.                 LABS:   Results for orders placed or performed during the hospital encounter of 01/30/23   CBC with Auto Differential   Result Value Ref Range    WBC 4.3 4.0 - 11.0 K/uL    RBC 4.31 4.20 - 5.90 M/uL    Hemoglobin 12.5 (L) 13.5 - 17.5 g/dL    Hematocrit 38.1 (L) 40.5 - 52.5 %    MCV 88.2 80.0 - 100.0 fL    MCH 29.0 26.0 - 34.0 pg    MCHC 32.9 31.0 - 36.0 g/dL    RDW 16.4 (H) 12.4 - 15.4 %    Platelets 971 454 - 942 K/uL    MPV 8.1 5.0 - 10.5 fL    Neutrophils % 62.4 %    Lymphocytes % 26.3 %    Monocytes % 7.2 %    Eosinophils % 3.1 %    Basophils % 1.0 %    Neutrophils Absolute 2.7 1.7 - 7.7 K/uL    Lymphocytes Absolute 1.1 1.0 - 5.1 K/uL    Monocytes Absolute 0.3 0.0 - 1.3 K/uL    Eosinophils Absolute 0.1 0.0 - 0.6 K/uL    Basophils Absolute 0.0 0.0 - 0.2 K/uL   Comprehensive Metabolic Panel w/ Reflex to MG   Result Value Ref Range    Sodium 134 (L) 136 - 145 mmol/L    Potassium reflex Magnesium 5.0 3.5 - 5.1 mmol/L    Chloride 93 (L) 99 - 110 mmol/L    CO2 24 21 - 32 mmol/L    Anion Gap 17 (H) 3 - 16    Glucose 104 (H) 70 - 99 mg/dL    BUN 52 (H) 7 - 20 mg/dL    Creatinine 9.0 (HH) 0.8 - 1.3 mg/dL    Est, Glom Filt Rate 6 (A) >60    Calcium 8.5 8.3 - 10.6 mg/dL    Total Protein 7.5 6.4 - 8.2 g/dL    Albumin 4.7 3.4 - 5.0 g/dL    Albumin/Globulin Ratio 1.7 1.1 - 2.2 Total Bilirubin <0.2 0.0 - 1.0 mg/dL    Alkaline Phosphatase 70 40 - 129 U/L    ALT 16 10 - 40 U/L    AST 24 15 - 37 U/L   Lipase   Result Value Ref Range    Lipase 30.0 13.0 - 60.0 U/L   Lactic Acid   Result Value Ref Range    Lactic Acid 0.6 0.4 - 2.0 mmol/L   Urinalysis with Microscopic   Result Value Ref Range    Color, UA Yellow Straw/Yellow    Clarity, UA Clear Clear    Glucose, Ur Negative Negative mg/dL    Bilirubin Urine Negative Negative    Ketones, Urine Negative Negative mg/dL    Specific Gravity, UA 1.015 1.005 - 1.030    Blood, Urine SMALL (A) Negative    pH, UA 7.0 5.0 - 8.0    Protein,  (A) Negative mg/dL    Urobilinogen, Urine 0.2 <2.0 E.U./dL    Nitrite, Urine Negative Negative    Leukocyte Esterase, Urine Negative Negative    Microscopic Examination YES     Urine Type NotGiven     WBC, UA None seen 0 - 5 /HPF    RBC, UA None seen 0 - 4 /HPF       CONSULTS:  None    PATIENT REFERRED TO:  Prakash Whitt DO  [address]  [place] [address]  [phone]    Call today  to discuss your ER visit, and arrange a follow-up appointment    DISCHARGE MEDICATIONS:  Discharge Medication List as of 1/30/2023 12:33 PM              Lisandro Alanis MD  01/30/23 7924

## 2023-01-30 NOTE — ED NOTES
Discharge paperwork given to and reviewed with pt. Pt verbalized understanding and all questions answered. Pt encouraged to return if having worsening symptoms or new symptoms discussed in discharge paperwork. Pt to follow up with PCP and Surgery  IV removed with catheter intact. Pt in NAD, RR even and unlabored.  Pt off unit ambulatory with family     Hay Guerra RN  01/30/23 1154 Pt pronounced dead by Dr. Luther.

## 2023-01-30 NOTE — ED PROVIDER NOTES
[address] [facility] [address] ENCOUNTER          ATTENDING PHYSICIAN NOTE       Date of evaluation: 1/30/2023    ADDENDUM:      Care of this patient was assumed from Dr. Lai Hilliard. The patient was seen for Abdominal Pain (Pt complaining of all over abdominal pain for the past 4 days. States he has had this pain off and on over the past few years.  )  . The patient's initial evaluation and plan have been discussed with the prior provider who initially evaluated the patient. Nursing Notes, Past Medical Hx, Past Surgical Hx, Social Hx, Allergies, and Family Hx were all reviewed. Diagnostic Results     RADIOLOGY:  CT ABDOMEN PELVIS W IV CONTRAST Additional Contrast? Oral   Final Result      1. Postoperative changes along the lower anterior pelvis which appear slightly less pronounced on the previous CT exam with persistent fat-containing inguinal hernias. 2. Small fat-containing umbilical hernia. 3. Stable bilateral adrenal adenomas. 4. 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   5. No acute abdominal or pelvic abnormality identified.                 LABS:   Results for orders placed or performed during the hospital encounter of 01/30/23   CBC with Auto Differential   Result Value Ref Range    WBC 4.3 4.0 - 11.0 K/uL    RBC 4.31 4.20 - 5.90 M/uL    Hemoglobin 12.5 (L) 13.5 - 17.5 g/dL    Hematocrit 38.1 (L) 40.5 - 52.5 %    MCV 88.2 80.0 - 100.0 fL    MCH 29.0 26.0 - 34.0 pg    MCHC 32.9 31.0 - 36.0 g/dL    RDW 16.4 (H) 12.4 - 15.4 %    Platelets 114 520 - 011 K/uL    MPV 8.1 5.0 - 10.5 fL    Neutrophils % 62.4 %    Lymphocytes % 26.3 %    Monocytes % 7.2 %    Eosinophils % 3.1 %    Basophils % 1.0 %    Neutrophils Absolute 2.7 1.7 - 7.7 K/uL    Lymphocytes Absolute 1.1 1.0 - 5.1 K/uL    Monocytes Absolute 0.3 0.0 - 1.3 K/uL    Eosinophils Absolute 0.1 0.0 - 0.6 K/uL    Basophils Absolute 0.0 0.0 - 0.2 K/uL   Comprehensive Metabolic Panel w/ Reflex to MG   Result Value Ref Range    Sodium 134 (L) 136 - 145 mmol/L    Potassium reflex Magnesium 5.0 3.5 - 5.1 mmol/L    Chloride 93 (L) 99 - 110 mmol/L    CO2 24 21 - 32 mmol/L    Anion Gap 17 (H) 3 - 16    Glucose 104 (H) 70 - 99 mg/dL    BUN 52 (H) 7 - 20 mg/dL    Creatinine 9.0 (HH) 0.8 - 1.3 mg/dL    Est, Glom Filt Rate 6 (A) >60    Calcium 8.5 8.3 - 10.6 mg/dL    Total Protein 7.5 6.4 - 8.2 g/dL    Albumin 4.7 3.4 - 5.0 g/dL    Albumin/Globulin Ratio 1.7 1.1 - 2.2    Total Bilirubin <0.2 0.0 - 1.0 mg/dL    Alkaline Phosphatase 70 40 - 129 U/L    ALT 16 10 - 40 U/L    AST 24 15 - 37 U/L   Lipase   Result Value Ref Range    Lipase 30.0 13.0 - 60.0 U/L   Lactic Acid   Result Value Ref Range    Lactic Acid 0.6 0.4 - 2.0 mmol/L   Urinalysis with Microscopic   Result Value Ref Range    Color, UA Yellow Straw/Yellow    Clarity, UA Clear Clear    Glucose, Ur Negative Negative mg/dL    Bilirubin Urine Negative Negative    Ketones, Urine Negative Negative mg/dL    Specific Gravity, UA 1.015 1.005 - 1.030    Blood, Urine SMALL (A) Negative    pH, UA 7.0 5.0 - 8.0    Protein,  (A) Negative mg/dL    Urobilinogen, Urine 0.2 <2.0 E.U./dL    Nitrite, Urine Negative Negative    Leukocyte Esterase, Urine Negative Negative    Microscopic Examination YES     Urine Type NotGiven     WBC, UA None seen 0 - 5 /HPF    RBC, UA None seen 0 - 4 /HPF       RECENT VITALS:  BP: (!) 176/91, Temp: 98 °F (36.7 °C), Heart Rate: 82, Resp: 18, SpO2: 98 %     Procedures     ED Course          The patient was given the following medications:  Orders Placed This Encounter   Medications    HYDROmorphone (DILAUDID) injection 0.5 mg    iopamidol (ISOVUE-370) 76 % injection 75 mL    iohexol (OMNIPAQUE 240) IV/PO solution 50 mL       CONSULTS:  None    MEDICAL DECISION MAKING / ASSESSMENT / Marrinoah Dyer is a 57 y.o. male with a history of end-stage renal disease on hemodialysis, as well as other comorbidities, who presented to the emergency department with complaints of primarily periumbilical abdominal pain. Please see Dr. Srini Alberto initial dictation for details of the patient's history, physical examination, and initial emergency department course. The patient's care was given over to me at 0800 hrs., with results of CT of the abdomen and pelvis pending. ***    Critical Care:  Due to the immediate potential for life-threatening deterioration due to ***, I spent *** minutes providing critical care. This time excludes time spent performing procedures but includes time spent on direct patient care, history retrieval, review of the chart, and discussions with patient, family, and consultant(s). Clinical Impression     1. Recurrent periumbilical abdominal pain    2. Umbilical hernia without obstruction and without gangrene    3.  Bilateral inguinal hernia without obstruction or gangrene, recurrence not specified        Disposition     PATIENT REFERRED TO:  Maxi Peters, [address] [facility] [address] [place] [address]  [phone]    Call today  to discuss your ER visit, and arrange a follow-up appointment    DISCHARGE MEDICATIONS:  New Prescriptions    No medications on file       DISPOSITION Decision To Discharge 01/30/2023 12:24:59 PM

## 2023-01-30 NOTE — DISCHARGE INSTRUCTIONS
As discussed, the cause for your recurrent episodes of abdominal pain is not clear. Your CT scan today does show a small fat-containing umbilical hernia, and small persistent fat-containing bilateral inguinal hernias, but the overall appearance of the start of the is in the postsurgical changes appear improved from prior CT scan. These persistent hernias may or may not be related to your ongoing episodes of pain. You may follow-up with Dr. Giuliano Lombardo, your surgeon, to seek his input as to whether he believes these hernias are still contributing to her pain or not. Additionally, as discussed, it is reasonable to consider whether certain foods or timing of eating may be relating to these episodes of pain, and keeping a log of food intake leading up to each episode of abdominal pain exacerbation would be the best way to look for patterns to determine if this may be the case. Finally, please also follow-up with your pain management physician, for any ongoing pain medication needs. Please call your doctor, or return to the emergency department, for worsening symptoms or other concerns.

## 2023-01-31 ENCOUNTER — TELEPHONE (OUTPATIENT)
Dept: BARIATRICS/WEIGHT MGMT | Age: 63
End: 2023-01-31

## 2023-01-31 NOTE — TELEPHONE ENCOUNTER
Lvm for pt. Per dr Carlos Alberto Hernandez.     Stable CT from previous     Does Not need appointment with us     Follow up with pain management for chronic groin pain

## 2023-02-09 ENCOUNTER — OFFICE VISIT (OUTPATIENT)
Dept: ENT CLINIC | Age: 63
End: 2023-02-09
Payer: COMMERCIAL

## 2023-02-09 VITALS
HEIGHT: 77 IN | WEIGHT: 258 LBS | HEART RATE: 99 BPM | TEMPERATURE: 97.3 F | SYSTOLIC BLOOD PRESSURE: 121 MMHG | DIASTOLIC BLOOD PRESSURE: 71 MMHG | BODY MASS INDEX: 30.46 KG/M2

## 2023-02-09 DIAGNOSIS — H93.8X2 EAR FULLNESS, LEFT: ICD-10-CM

## 2023-02-09 DIAGNOSIS — H61.22 IMPACTED CERUMEN OF LEFT EAR: Primary | ICD-10-CM

## 2023-02-09 PROCEDURE — 3078F DIAST BP <80 MM HG: CPT | Performed by: OTOLARYNGOLOGY

## 2023-02-09 PROCEDURE — 99213 OFFICE O/P EST LOW 20 MIN: CPT | Performed by: OTOLARYNGOLOGY

## 2023-02-09 PROCEDURE — 3074F SYST BP LT 130 MM HG: CPT | Performed by: OTOLARYNGOLOGY

## 2023-02-09 RX ORDER — GABAPENTIN 100 MG/1
100 CAPSULE ORAL 3 TIMES DAILY
COMMUNITY

## 2023-02-09 NOTE — PROGRESS NOTES
impCHIEF COMPLAINT: Fullness in the left ear. HISTORY OF PRESENT ILLNESS:  57 y.o. male referred by Dr. Ramírez Hinkle who presents with fullness in the left ear. The patient was instrumenting his ears with Q-tips several months ago when he noticed that he could not get the Q-tip and on the left side. He has no pain or otorrhea on that side his hearing is subjectively not affected. PAST MEDICAL HISTORY:   Social History     Tobacco Use   Smoking Status Former    Types: Cigars    Start date: 12/31/1979    Quit date: 1/1/2020    Years since quitting: 3.1    Passive exposure: Never   Smokeless Tobacco Never                                                    Social History     Substance and Sexual Activity   Alcohol Use Not Currently    Comment: maybe a beer a weekend                                                    Current Outpatient Medications:     gabapentin (NEURONTIN) 100 MG capsule, Take 100 mg by mouth 3 times daily. , Disp: , Rfl:     NIFEdipine (ADALAT CC) 90 MG extended release tablet, TAKE 1 TABLET BY MOUTH EVERY DAY, Disp: 90 tablet, Rfl: 0    atorvastatin (LIPITOR) 20 MG tablet, Take 1 tablet by mouth daily, Disp: 90 tablet, Rfl: 1    oxyCODONE-acetaminophen (PERCOCET) 5-325 MG per tablet, Take 1 tablet by mouth every 8 hours as needed for Pain for up to 28 days. , Disp: 84 tablet, Rfl: 0    metoprolol succinate (TOPROL XL) 50 MG extended release tablet, TAKE 1 TABLET BY MOUTH AT BEDTIME, Disp: 90 tablet, Rfl: 1    vitamin D (ERGOCALCIFEROL) 1.25 MG (56372 UT) CAPS capsule, TAKE 1 CAPSULE BY MOUTH ONE TIME PER WEEK, Disp: 12 capsule, Rfl: 0    busPIRone (BUSPAR) 5 MG tablet, TAKE 1 TABLET BY MOUTH TWICE A DAY, Disp: 60 tablet, Rfl: 5    calcium acetate (PHOSLO) 667 MG CAPS capsule, TAKE 1 CAPSULE BY MOUTH THREE TIMES A DAY WITH MEALS, Disp: 90 capsule, Rfl: 5    polyethylene glycol (GLYCOLAX) 17 GM/SCOOP powder, TAKE 17 G BY MOUTH 2 TIMES DAILY, Disp: 850 g, Rfl: 1    B Complex-C-Folic Acid (DIALYVITE 800) 0.8 MG TABS, Take 1 tablet by mouth daily, Disp: 90 tablet, Rfl: 3    sildenafil (VIAGRA) 100 MG tablet, Take 1 tablet by mouth daily as needed for Erectile Dysfunction, Disp: 30 tablet, Rfl: 5    Folic Acid-Vit E5-RTJ N46 0.5-5-0.2 MG TABS, Take by mouth, Disp: , Rfl:     calcitRIOL (ROCALTROL) 0.5 MCG capsule, Take 1 capsule by mouth three times a week During HD, Disp: 30 capsule, Rfl: 3    bisacodyl (BISACODYL) 5 MG EC tablet, Take 1 tablet by mouth daily as needed for Constipation, Disp: 30 tablet, Rfl: 5    aspirin 81 MG tablet, Take 81 mg by mouth daily, Disp: , Rfl:                                                  Past Medical History:   Diagnosis Date    Arthralgia of multiple joints 10/27/2015    Arthritis     Atrial fibrillation (HCC)     Chronic kidney disease     stage 4    Chronic systolic congestive heart failure ([facility] [address] 08/16/2021    CRI (chronic renal insufficiency)     Diabetic nephropathy associated with type 2 diabetes mellitus ([facility] [address] 10/11/2018    Diverticulosis     Elevated PSA     Erectile dysfunction     ESRD (end stage renal disease) on dialysis ([facility] [address] 02/24/2022    Gout     Gout     Headache     Hearing loss     Hemrrhoid NOS w/ complication NEC     History of MRSA infection     Hypertension     CELSA (obstructive sleep apnea) 07/14/2017    uses C-pap machine    Prostate cancer ([facility] [address]     Type 2 diabetes mellitus without complication, without long-term current use of insulin ([facility] [address] 22/61/3062    Umbilical hernia without obstruction and without gangrene 02/02/2016                                                    Past Surgical History:   Procedure Laterality Date    COLONOSCOPY      COLONOSCOPY N/A 03/18/2022    COLONOSCOPY POLYPECTOMY SNARE/COLD BIOPSY performed by Candi Sever, MD at [address] [place] [address] N/A 01/17/2022    LAPAROSCOPIC PERITONEAL DIALYSIS CATHETER PLACEMENT and omentopexy performed by Enmanuel Froeman DO at [address] Left 07/29/2022    LEFT RADIOCEPHALIC ARTERIOVENOUS FISTULA CREATION performed by Haritha Lobo MD at [address] [facility], [address] N/A 04/15/2022    ROBOTIC, RECURRENT INCISIONAL HERNIA REPAIR WITH BIOSYNTHETIC MESH; LAPAROSCOPIC PERITONEAL DIALYSIS CATHETER REVISION WITH LAPAROSCOPIC OMENTOPEXY performed by Claire Cote DO at [address] Bilateral 04/15/2022    BILATERAL OPEN INGUINAL HERNI REPAIR performed by Claire Cote DO at [address] Bilateral 06/13/2022    ROBOTIC RECURRENT BILATERAL INGUINAL HERNIA REPAIR, LYSIS OF ADHESIONS, PERITONEAL DIALYSIS CATHETER REMOVAL performed by Claire Cote DO at [facility] [address] TUNNELED [address] 5 YEARS  01/14/2022    IR TUNNELED CATHETER PLACEMENT GREATER THAN 5 YEARS 1/14/2022 [facility]    IR TUNNELED CATHETER PLACEMENT GREATER THAN 5 YEARS  07/14/2022    IR TUNNELED CATHETER PLACEMENT GREATER THAN 5 YEARS 7/14/2022 [facility] PROCEDURES    UPPER GASTROINTESTINAL ENDOSCOPY  05/28/2016    biopsies, multiple small gastric ulcers    UPPER GASTROINTESTINAL ENDOSCOPY  09/12/2016    UPPER GASTROINTESTINAL ENDOSCOPY N/A 03/18/2022    EGD DIAGNOSTIC ONLY performed by Julienne Starks MD at [facility] N/A 01/17/2022    LAPAROSCOPIC incarcerated VENTRAL HERNIA REPAIR performed by Claire Cote DO at [address]: Family history reviewed. Except as noted in history of present illness, there is no pertinent family history      REVIEW OF SYSTEMS:  All pertinent positive and negative review of systems included in HPI. Otherwise, all systems are reviewed and negative.     PHYSICAL EXAMINATION:   GENERAL: wdwn- no acute distress  RESPIRATORY:  No stridor or respiratory distress  COMMUNICATION :  Normal voice  MENTAL STATUS:  Mood and affect normal, oriented X 3  HEAD AND FACE:  No abnormalities of the skin of face or head  EXTERNAL EARS AND NOSE:  Normal pinnae bilateral  FACIAL MUSCLES:  All branches of facial nerve intact  EXTRAOCULAR MUSCLES: Intact with full range of motion  FACE PALPATION:  No tenderness over sinuses. Zygomatic arches and orbital rims intact  OTOSCOPY: Right ear normal.  Left canal occluded with impacted cerumen. TUNING FORKS: Rinne ++ Bond midline at 512 Hz  INTRANASAL:  Septum midline, turbinates normal, meati clear. LIPS, TEETH, GINGIVA:  Normal mucosa  PHARYNX:  Normal  NECK:  No masses. LYMPHATIC:  No cervical adenopathy  SALIVARY GLANDS:  No swelling or masses in the parotid or submandibular salivary glands  THYROID:  No goiter or thyroid masses. IMPRESSION: Impacted cerumen left external auditory canal.    PLAN: Multiple attempts to remove the cerumen with curettes, suction, unsuccessful even with the use of hydrogen peroxide on the 3 different times to soften the cerumen. Patient will use Cortisporin otic suspension for 5 days then return. FOLLOW-UP: 5 days.

## 2023-02-14 ENCOUNTER — OFFICE VISIT (OUTPATIENT)
Dept: ENT CLINIC | Age: 63
End: 2023-02-14
Payer: MEDICARE

## 2023-02-14 VITALS
TEMPERATURE: 97.6 F | WEIGHT: 261.6 LBS | HEART RATE: 106 BPM | BODY MASS INDEX: 30.89 KG/M2 | DIASTOLIC BLOOD PRESSURE: 83 MMHG | HEIGHT: 77 IN | SYSTOLIC BLOOD PRESSURE: 134 MMHG

## 2023-02-14 DIAGNOSIS — H61.22 IMPACTED CERUMEN OF LEFT EAR: Primary | ICD-10-CM

## 2023-02-14 DIAGNOSIS — H93.8X2 EAR FULLNESS, LEFT: ICD-10-CM

## 2023-02-14 PROCEDURE — 3017F COLORECTAL CA SCREEN DOC REV: CPT | Performed by: OTOLARYNGOLOGY

## 2023-02-14 PROCEDURE — 1036F TOBACCO NON-USER: CPT | Performed by: OTOLARYNGOLOGY

## 2023-02-14 PROCEDURE — 3078F DIAST BP <80 MM HG: CPT | Performed by: OTOLARYNGOLOGY

## 2023-02-14 PROCEDURE — G8417 CALC BMI ABV UP PARAM F/U: HCPCS | Performed by: OTOLARYNGOLOGY

## 2023-02-14 PROCEDURE — G8427 DOCREV CUR MEDS BY ELIG CLIN: HCPCS | Performed by: OTOLARYNGOLOGY

## 2023-02-14 PROCEDURE — 3074F SYST BP LT 130 MM HG: CPT | Performed by: OTOLARYNGOLOGY

## 2023-02-14 PROCEDURE — 99212 OFFICE O/P EST SF 10 MIN: CPT | Performed by: OTOLARYNGOLOGY

## 2023-02-14 PROCEDURE — G8484 FLU IMMUNIZE NO ADMIN: HCPCS | Performed by: OTOLARYNGOLOGY

## 2023-02-16 ENCOUNTER — OFFICE VISIT (OUTPATIENT)
Dept: ENT CLINIC | Age: 63
End: 2023-02-16

## 2023-02-16 DIAGNOSIS — H61.22 IMPACTED CERUMEN OF LEFT EAR: Primary | ICD-10-CM

## 2023-02-17 ENCOUNTER — TELEPHONE (OUTPATIENT)
Dept: ENT CLINIC | Age: 63
End: 2023-02-17

## 2023-02-17 NOTE — TELEPHONE ENCOUNTER
Pt called said that he needs to schedule a procedure/surgery to be done on his ear at [facility]    Please give pt a call back to schedule surgery when able. Thanks!

## 2023-02-19 NOTE — PROGRESS NOTES
Persistent fullness of the left ear. He has had impacted cerumen which I have been unable to resolve even with the use of peroxide. It is still strongly impacted. We agreed to go to the operating room for removal under general anesthesia.

## 2023-02-20 NOTE — TELEPHONE ENCOUNTER
Surgery sheet was scheduled by Dr. Jyoti Mcguire and I faxed the surgery sheet to the schedulers and authorization team

## 2023-02-20 NOTE — TELEPHONE ENCOUNTER
Patient calling again regarding this. He is anxious to get his surgery scheduled as he says this has started to affect side of face.

## 2023-02-21 ENCOUNTER — TELEPHONE (OUTPATIENT)
Dept: FAMILY MEDICINE CLINIC | Age: 63
End: 2023-02-21

## 2023-02-21 ENCOUNTER — OFFICE VISIT (OUTPATIENT)
Dept: PAIN MANAGEMENT | Age: 63
End: 2023-02-21
Payer: MEDICARE

## 2023-02-21 VITALS
BODY MASS INDEX: 30.9 KG/M2 | HEART RATE: 103 BPM | WEIGHT: 260.6 LBS | SYSTOLIC BLOOD PRESSURE: 132 MMHG | OXYGEN SATURATION: 94 % | DIASTOLIC BLOOD PRESSURE: 88 MMHG

## 2023-02-21 DIAGNOSIS — G57.92 ILIOINGUINAL NEURALGIA OF LEFT SIDE: ICD-10-CM

## 2023-02-21 DIAGNOSIS — M50.30 DDD (DEGENERATIVE DISC DISEASE), CERVICAL: ICD-10-CM

## 2023-02-21 DIAGNOSIS — K43.2 INCISIONAL HERNIA WITHOUT OBSTRUCTION OR GANGRENE: ICD-10-CM

## 2023-02-21 DIAGNOSIS — Z87.19 STATUS POST BILATERAL HERNIA REPAIR: ICD-10-CM

## 2023-02-21 DIAGNOSIS — G57.91 ILIOINGUINAL NEURALGIA OF RIGHT SIDE: ICD-10-CM

## 2023-02-21 DIAGNOSIS — Z98.890 STATUS POST BILATERAL HERNIA REPAIR: ICD-10-CM

## 2023-02-21 DIAGNOSIS — Z51.81 ENCOUNTER FOR THERAPEUTIC DRUG MONITORING: ICD-10-CM

## 2023-02-21 DIAGNOSIS — F33.0 MAJOR DEPRESSIVE DISORDER, RECURRENT, MILD (HCC): ICD-10-CM

## 2023-02-21 DIAGNOSIS — G89.4 CHRONIC PAIN SYNDROME: ICD-10-CM

## 2023-02-21 DIAGNOSIS — M25.50 ARTHRALGIA OF MULTIPLE JOINTS: ICD-10-CM

## 2023-02-21 PROCEDURE — 3078F DIAST BP <80 MM HG: CPT | Performed by: NURSE PRACTITIONER

## 2023-02-21 PROCEDURE — 99213 OFFICE O/P EST LOW 20 MIN: CPT | Performed by: NURSE PRACTITIONER

## 2023-02-21 PROCEDURE — 3074F SYST BP LT 130 MM HG: CPT | Performed by: NURSE PRACTITIONER

## 2023-02-21 RX ORDER — OXYCODONE HYDROCHLORIDE AND ACETAMINOPHEN 5; 325 MG/1; MG/1
1 TABLET ORAL EVERY 8 HOURS PRN
Qty: 84 TABLET | Refills: 0 | Status: SHIPPED | OUTPATIENT
Start: 2023-02-21 | End: 2023-03-21

## 2023-02-21 NOTE — PROGRESS NOTES
Tu December 1960  [id number]      HISTORY OF PRESENT ILLNESS: Mr. Negrita Horton is a 57 y.o. male returns for a follow up visit for pain management  He has a diagnosis of   1. Incisional hernia without obstruction or gangrene    2. Ilioinguinal neuralgia of right side    3. Chronic pain syndrome    4. Ilioinguinal neuralgia of left side    5. Major depressive disorder, recurrent, mild    6. Status post bilateral hernia repair    7. Arthralgia of multiple joints    8. DDD (degenerative disc disease), cervical    9. Encounter for therapeutic drug monitoring    . As per Information Obtained from the PADT (Patient Assessment and Documentation Tool)    He complains of pain in the bilateral inner thighs. He rates the pain 6/10 and describes it as aching. Current treatment regimen has helped relieve about 80% of the pain. He denies any side effects from the current pain regimen. Patient reports that since the last follow up visit the physical functioning is unchanged, family/social relationships are unchanged, mood is unchanged sleep patterns are unchanged, and that the overall functioning is unchanged. Patient denies misusing/abusing his narcotic pain medications or using any illegal drugs. Upon obtaining medical history from Mr. Heath David: Patients list of allergies were reviewed     MEDICATIONS: Mr. Negrita Horton list of medications were reviewed. His current medications are   Outpatient Medications Prior to Visit   Medication Sig Dispense Refill    gabapentin (NEURONTIN) 100 MG capsule Take 100 mg by mouth 3 times daily. NIFEdipine (ADALAT CC) 90 MG extended release tablet TAKE 1 TABLET BY MOUTH EVERY DAY 90 tablet 0    atorvastatin (LIPITOR) 20 MG tablet Take 1 tablet by mouth daily 90 tablet 1    oxyCODONE-acetaminophen (PERCOCET) 5-325 MG per tablet Take 1 tablet by mouth every 8 hours as needed for Pain for up to 28 days.  84 tablet 0    metoprolol succinate (TOPROL XL) 50 MG extended release tablet TAKE 1 TABLET BY MOUTH AT BEDTIME 90 tablet 1    vitamin D (ERGOCALCIFEROL) 1.25 MG (07699 UT) CAPS capsule TAKE 1 CAPSULE BY MOUTH ONE TIME PER WEEK 12 capsule 0    busPIRone (BUSPAR) 5 MG tablet TAKE 1 TABLET BY MOUTH TWICE A DAY 60 tablet 5    calcium acetate (PHOSLO) 667 MG CAPS capsule TAKE 1 CAPSULE BY MOUTH THREE TIMES A DAY WITH MEALS 90 capsule 5    polyethylene glycol (GLYCOLAX) 17 GM/SCOOP powder TAKE 17 G BY MOUTH 2 TIMES DAILY 850 g 1    B Complex-C-Folic Acid (DIALYVITE 145) 0.8 MG TABS Take 1 tablet by mouth daily 90 tablet 3    sildenafil (VIAGRA) 100 MG tablet Take 1 tablet by mouth daily as needed for Erectile Dysfunction 30 tablet 5    Folic Acid-Vit E1-MFC E29 0.5-5-0.2 MG TABS Take by mouth      calcitRIOL (ROCALTROL) 0.5 MCG capsule Take 1 capsule by mouth three times a week During HD 30 capsule 3    bisacodyl (BISACODYL) 5 MG EC tablet Take 1 tablet by mouth daily as needed for Constipation 30 tablet 5    aspirin 81 MG tablet Take 81 mg by mouth daily       No facility-administered medications prior to visit. SOCIAL/FAMILY/PAST MEDICAL HISTORY: Mr. Deidre Kee, family and past medical history was reviewed. REVIEW OF SYSTEMS:    Respiratory: Negative for apnea, chest tightness and shortness of breath or change in baseline breathing. Gastrointestinal: Negative for nausea, vomiting, abdominal pain, diarrhea, constipation, blood in stool and abdominal distention. PHYSICAL EXAM:   Nursing note and vitals reviewed. /88   Pulse (!) 103   Wt 260 lb 9.6 oz (118.2 kg)   SpO2 94%   BMI 30.90 kg/m²   Constitutional: He appears well-developed and well-nourished. No acute distress. Skin: Skin is warm and dry, good turgor. No rash noted. He is not diaphoretic. Cardiovascular: Normal rate, regular rhythm, normal heart sounds, and does not have murmur. Pulmonary/Chest: Effort normal. No respiratory distress. He does not have wheezes in the lung fields.  He has no rales.     Neurological/Psychiatric:He is alert and oriented to person, place, and time. Coordination is  normal.  His mood isAppropriate and affect is Neutral/Euthymic(normal) . Prescription pain medication monitoring:                  MEDD current = 22.5              ORT Score = 0              Other Risk factors - (mood) stable              Date of Last Medication Agreement:01/24/2023              Date Naloxone prescribed: 08/31/2022              UDT:                          Date of last UDT: 11/30/2022                          Adverse report: No              OARRS:                          Checked today: Yes                          Adverse report: No    IMPRESSION:   1. Incisional hernia without obstruction or gangrene    2. Ilioinguinal neuralgia of right side    3. Chronic pain syndrome    4. Ilioinguinal neuralgia of left side    5. Major depressive disorder, recurrent, mild    6. Status post bilateral hernia repair    7. Arthralgia of multiple joints    8. DDD (degenerative disc disease), cervical    9. Encounter for therapeutic drug monitoring        PLAN:  Informed verbal consent was obtained:  -he will have radiation beads placed for prostate CA  -refill Percocet 5 mg tabs 3 times a day as needed for pain, he denies side effects  -Follow-up in 4 weeks for reassessment and continued opiate monitoring       Analgesic Plan:              Continue present regimen: yes              Adjust dose of present analgesic:no              Switch analgesics:no              Add/Adjust concomitant therapy:no      Current Outpatient Medications   Medication Sig Dispense Refill    gabapentin (NEURONTIN) 100 MG capsule Take 100 mg by mouth 3 times daily.       NIFEdipine (ADALAT CC) 90 MG extended release tablet TAKE 1 TABLET BY MOUTH EVERY DAY 90 tablet 0    atorvastatin (LIPITOR) 20 MG tablet Take 1 tablet by mouth daily 90 tablet 1    oxyCODONE-acetaminophen (PERCOCET) 5-325 MG per tablet Take 1 tablet by mouth every 8 hours as needed for Pain for up to 28 days. 84 tablet 0    metoprolol succinate (TOPROL XL) 50 MG extended release tablet TAKE 1 TABLET BY MOUTH AT BEDTIME 90 tablet 1    vitamin D (ERGOCALCIFEROL) 1.25 MG (45032 UT) CAPS capsule TAKE 1 CAPSULE BY MOUTH ONE TIME PER WEEK 12 capsule 0    busPIRone (BUSPAR) 5 MG tablet TAKE 1 TABLET BY MOUTH TWICE A DAY 60 tablet 5    calcium acetate (PHOSLO) 667 MG CAPS capsule TAKE 1 CAPSULE BY MOUTH THREE TIMES A DAY WITH MEALS 90 capsule 5    polyethylene glycol (GLYCOLAX) 17 GM/SCOOP powder TAKE 17 G BY MOUTH 2 TIMES DAILY 850 g 1    B Complex-C-Folic Acid (DIALYVITE 187) 0.8 MG TABS Take 1 tablet by mouth daily 90 tablet 3    sildenafil (VIAGRA) 100 MG tablet Take 1 tablet by mouth daily as needed for Erectile Dysfunction 30 tablet 5    Folic Acid-Vit P9-KZR I64 0.5-5-0.2 MG TABS Take by mouth      calcitRIOL (ROCALTROL) 0.5 MCG capsule Take 1 capsule by mouth three times a week During HD 30 capsule 3    bisacodyl (BISACODYL) 5 MG EC tablet Take 1 tablet by mouth daily as needed for Constipation 30 tablet 5    aspirin 81 MG tablet Take 81 mg by mouth daily       No current facility-administered medications for this visit. I will continue his current medication regimen  which is part of the above treatment schedule. It has been helping with Mr. Mickey Sen chronic  medical problems which for this visit include:   Diagnoses of Incisional hernia without obstruction or gangrene, Ilioinguinal neuralgia of right side, Chronic pain syndrome, Ilioinguinal neuralgia of left side, Major depressive disorder, recurrent, mild, Status post bilateral hernia repair, Arthralgia of multiple joints, DDD (degenerative disc disease), cervical, and Encounter for therapeutic drug monitoring were pertinent to this visit. Risks and benefits of the medications and other alternative treatments  including no treatment were discussed with the patient. The common side effects of these medications were also explained to the patient. Informed verbal consent was obtained. Goals of current treatment regimen include improvement in pain, restoration of functioning- with focus on improvement in physical performance, general activity, work or disability,emotional distress, health care utilization and  decreased medication consumption. Will continue to monitor progress towards achieving/maintaining therapeutic goals with special emphasis on  1. Improvement in perceived interfernce  of pain with ADL's. Ability to do home exercises independently. Ability to do household chores indoor and/or outdoor work and social and leisure activities. Improve psychosocial and physical functioning. - he is showing progression towards this treatment goal with the current regimen. He was advised against drinking alcohol with the narcotic pain medicines, advised against driving or handling machinery while adjusting the dose of medicines or if having cognitive  issues related to the current medications. Risk of overdose and death, if medicines not taken as prescribed, were also discussed. If the patient develops new symptoms or if the symptoms worsen, the patient should call the office. While transcribing every attempt was made to maintain the accuracy of the note in terms of it's contents,there may have been some errors made inadvertently. Thank you for allowing me to participate in the care of this patient.     Arthur Patrick, JENI-C    Cc: Ahmet Nicolas MD

## 2023-02-21 NOTE — PROGRESS NOTES
[facility] PRE-SURGICAL TESTING INSTRUCTIONS                      PRIOR TO PROCEDURE DATE:    1. PLEASE FOLLOW ANY INSTRUCTIONS GIVEN TO YOU PER YOUR SURGEON. 2. Arrange for someone to drive you home and be with you for the first 24 hours after discharge for your safety after your procedure for which you received sedation. Ensure it is someone we can share information with regarding your discharge. NOTE: At this time ONLY 2 ADULTS may accompany you   One person ENCOURAGED to stay at hospital entire time if outpatient surgery      3. You must contact your surgeon for instructions IF:  You are taking any blood thinners, aspirin, anti-inflammatory or vitamins. There is a change in your physical condition such as a cold, fever, rash, cuts, sores, or any other infection, especially near your surgical site. 4. Do not drink alcohol the day before or day of your procedure. Do not use any recreational marijuana at least 24 hours or street drugs (heroin, cocaine) at minimum 5 days prior to your procedure. 5. A Pre-Surgical History and Physical MUST be completed WITHIN 30 DAYS OR LESS prior to your procedure. by your Physician or an Urgent Care        THE DAY OF YOUR PROCEDURE:  1. Follow instructions for ARRIVAL TIME as DIRECTED BY YOUR SURGEON. 2. Enter the MAIN entrance from "[employer] and follow the signs to the free Parking [employer] (offered free of charge 7 am-5pm). 3. Enter the Main Entrance of the hospital (do not enter from the lower level of the parking garage). Upon entrance, check in with the  at the surgical information desk on your LEFT. Bring your insurance card and photo ID to register      4. DO NOT EAT ANYTHING 8 hours prior to arrival for surgery. You may have up to 8 ounces of water 4 hours prior to your arrival for surgery.    NOTE: ALL Gastric, Bariatric & Bowel surgery patients - you MUST follow your surgeon's instructions regarding eating/ drinking as you will have very specific instructions to follow. If you did not receive these, call your surgeon's office immediately. 5. MEDICATIONS:  Take the following medications with a SMALL sip of water: Metoprolol, Adalat, (Gabapentin, Pain Pill  As needed)  Use your usual dose of inhalers the morning of surgery. BRING your rescue inhaler with you to hospital.   Anesthesia does NOT want you to take insulin the morning of surgery. They will control your blood sugar while you are at the hospital. Please contact your ordering physician for instructions regarding your insulin the night before your procedure. If you have an insulin pump, please keep it set on basal rate. Bariatric patient's call your surgeon if on diabetic medications as some may need to be stopped 1 week prior to surgery    6. Do not swallow additional water when brushing teeth. No gum, candy, mints, or ice chips. Refrain from smoking or at least decrease the amount on day of surgery. 7. Morning of surgery:   Take a shower with an antibacterial soap (i.e., Safeguard or Dial) OR your physician may have instructed you to use Hibiclens. Dress in loose, comfortable clothing appropriate for redressing after your procedure. Do not wear jewelry (including body piercings), make-up (especially NO eye make-up), fingernail polish (NO toenail polish if foot/leg surgery), lotion, powders, or metal hairclips. Do not shave or wax for 72 hours prior to procedure near your operative site. Shaving with a razor can irritate your skin and make it easier to develop an infection. On the day of your procedure, any hair that needs to be removed near the surgical site will be 'clipped' by a healthcare worker using a special clipper designed to avoid skin irritation. 8. Dentures, glasses, or contacts will need to be removed before your procedure.  Bring cases for your glasses, contacts, dentures, or hearing aids to protect them while you are in surgery. 9. If you use a CPAP, please bring it with you on the day of your procedure. 10. We recommend that valuable personal belongings such as cash, cell phones, e-tablets, or jewelry, be left at home during your stay. The hospital will not be responsible for valuables that are not secured in the hospital safe. However, if your insurance requires a co-pay, you may want to bring a method of payment, i.e., Check or credit card, if you wish to pay your co-pay the day of surgery. 11. If you are to stay overnight, you may bring a bag with personal items. Please have any large items you may need brought in by your family after your arrival to your hospital room. 12. If you have a Living Will or Durable Power of , please bring a copy on the day of your procedure. How we keep you safe and work to prevent surgical site infections:   1. Health care workers should always check your ID bracelet to verify your name and birth date. You will be asked many times to state your name, date of birth, and allergies. 2. Health care workers should always clean their hands with soap or alcohol gel before providing care to you. It is okay to ask anyone if they cleaned their hands before they touch you. 3. You will be actively involved in verifying the type of procedure you are having and ensuring the correct surgical site. This will be confirmed multiple times prior to your procedure. Do NOT brian your surgery site UNLESS instructed to by your surgeon. 4. When you are in the operating room, your surgical site will be cleansed with a special soap, and in most cases, you will be given an antibiotic before the surgery begins. What to expect AFTER your procedure? 1. Immediately following your procedure, your will be taken to the PACU for the first phase of your recovery.   Your nurse will help you recover from any potential side effects of anesthesia, such as extreme drowsiness, changes in your vital signs or breathing patterns. Nausea, headache, muscle aches, or sore throat may also occur after anesthesia. Your nurse will help you manage these potential side effects. 2. For comfort and safety, arrange to have someone at home with you for the first 24 hours after discharge. 3. You and your family will be given written instructions about your diet, activity, dressing care, medications, and return visits. 4. Once at home, should issues with nausea, pain, or bleeding occur, or should you notice any signs of infection, you should call your surgeon. 5. Always clean your hands before and after caring for your wound. Do not let your family touch your surgery site without cleaning their hands. 6. Narcotic pain medications can cause significant constipation. You may want to add a stool softener to your postoperative medication schedule or speak to your surgeon on how best to manage this SIDE EFFECT. SPECIAL INSTRUCTIONS Bring CPAP with you    Thank you for allowing us to care for you. We strive to exceed your expectations in the delivery of care and service provided to you and your family. If you need to contact the [place] [address] staff for any reason, please call us at [phone]    Instructions reviewed with patient during preadmission testing phone interview.   Han Bazan RN.2/21/2023 .10:40 AM      ADDITIONAL EDUCATIONAL INFORMATION REVIEWED PER PHONE WITH YOU AND/OR YOUR FAMILY:  No Hibiclens® Bathing Instructions   Yes and No Antibacterial Soap

## 2023-02-21 NOTE — TELEPHONE ENCOUNTER
Per [facility]    pt needs call back to get pre op   appointment before 2/23, pt is having emergency ear surgery on 2/23     Do not see any available appointments except one 10 minute today at 11:30.  Ok to work in?

## 2023-02-21 NOTE — PROGRESS NOTES
Place patient label inside box (if no patient label, complete below)  Name:  :  MR#:     [facility] / PROCEDURE  I (we)Rudy (Patient Name) authorize DR. BARRON RIVAS (Provider / Sergey Hernandez) and/or such assistants as may be selected by him/her, to perform the following operation/procedure(s): REMOVE IMPACTED CERUMEN LEFT EAR        Note: If unable to obtain consent prior to an emergent procedure, document the emergent reason in the medical record. This procedure has been explained to my (our) satisfaction and included in the explanation was: The intended benefit, nature, and extent of the procedure to be performed; The significant risks involved and the probability of success; Alternative procedures and methods of treatment; The dangers and probable consequences of such alternatives (including no procedure or treatment); The expected consequences of the procedure on my future health; Whether other qualified individuals would be performing important surgical tasks and/or whether  would be present to advise or support the procedure. I (we) understand that there are other risks of infection and other serious complications in the pre-operative/procedural and postoperative/procedural stages of my (our) care. I (we) have asked all of the questions which I (we) thought were important in deciding whether or not to undergo treatment or diagnosis. These questions have been answered to my (our) satisfaction. I (we) understand that no assurance can be given that the procedure will be a success, and no guarantee or warranty of success has been given to me (us). It has been explained to me (us) that during the course of the operation/procedure, unforeseen conditions may be revealed that necessitate extension of the original procedure(s) or different procedure(s) than those set forth in Paragraph 1.  I (we) authorize and request that the above-named physician, his/her assistants or his/her designees, perform procedures as necessary and desirable if deemed to be in my (our) best interest.     Revised 8/2/2021                                                                          Page 1 of 2       I acknowledge that health care personnel may be observing this procedure for the purpose of medical education or other specified purposes as may be necessary as requested and/or approved by my (our) physician. I (we) consent to the disposal by the hospital Pathologist of the removed tissue, parts or organs in accordance with hospital policy. I do ____ do not ____ consent to the use of a local infiltration pain blocking agent that will be used by my provider/surgical provider to help alleviate pain during my procedure. I do ____ do not ____ consent to an emergent blood transfusion in the case of a life-threatening situation that requires blood components to be administered. This consent is valid for 24 hours from the beginning of the procedure. This patient does ____ or does not ____ currently have a DNR status/order. If DNR order is in place, obtain Addendum to the Surgical Consent for ALL Patients with a DNR Order to address margarita-operative status for limited intervention or DNR suspension.      I have read and fully understand the above Consent for Operation/Procedure and that all blanks were completed before I signed the consent.   _____________________________       _____________________      ____/____am/pm  Signature of Patient or legal representative      Printed Name / Relationship            Date / Time   ____________________________       _____________________      ____/____am/pm  Witness to Signature                                    Printed Name                    Date / Time    If patient is unable to sign or is a minor, complete the following)  Patient is a minor, ____ years of age, or unable to sign because: ______________________________________________________________________________________________    If a phone consent is obtained, consent will be documented by using two health care professionals, each affirming that the consenting party has no questions and gives consent for the procedure discussed with the physician/provider.   _____________________          ____________________       _____/_____am/pm   2nd witness to phone consent        Printed name           Date / Time    Informed Consent:  I have provided the explanation described above in section 1 to the patient and/or legal representative.  I have provided the patient and/or legal representative with an opportunity to ask any questions about the proposed operation/procedure.   ___________________________          ____________________         ____/____am/pm  Provider / Proceduralist                            Printed name            Date / Time  Revised 8/2/2021                                                                      Page 2 of 2

## 2023-02-22 ENCOUNTER — ANESTHESIA EVENT (OUTPATIENT)
Dept: OPERATING ROOM | Age: 63
End: 2023-02-22
Payer: MEDICARE

## 2023-02-22 ENCOUNTER — OFFICE VISIT (OUTPATIENT)
Dept: FAMILY MEDICINE CLINIC | Age: 63
End: 2023-02-22
Payer: MEDICARE

## 2023-02-22 VITALS
TEMPERATURE: 97.7 F | DIASTOLIC BLOOD PRESSURE: 76 MMHG | BODY MASS INDEX: 31.07 KG/M2 | RESPIRATION RATE: 12 BRPM | SYSTOLIC BLOOD PRESSURE: 116 MMHG | OXYGEN SATURATION: 98 % | HEART RATE: 111 BPM | WEIGHT: 262 LBS

## 2023-02-22 DIAGNOSIS — R27.9 COORDINATION DISORDER: ICD-10-CM

## 2023-02-22 DIAGNOSIS — R68.89 DIFFICULTY WRITING: ICD-10-CM

## 2023-02-22 DIAGNOSIS — R26.81 GAIT INSTABILITY: ICD-10-CM

## 2023-02-22 DIAGNOSIS — C61 PROSTATE CANCER (HCC): ICD-10-CM

## 2023-02-22 DIAGNOSIS — E11.22 TYPE 2 DIABETES MELLITUS WITH STAGE 4 CHRONIC KIDNEY DISEASE, UNSPECIFIED WHETHER LONG TERM INSULIN USE (HCC): ICD-10-CM

## 2023-02-22 DIAGNOSIS — H61.22 IMPACTED CERUMEN OF LEFT EAR: ICD-10-CM

## 2023-02-22 DIAGNOSIS — Z01.818 PRE-OP EXAMINATION: Primary | ICD-10-CM

## 2023-02-22 DIAGNOSIS — N18.6 ESRD (END STAGE RENAL DISEASE) ON DIALYSIS (HCC): ICD-10-CM

## 2023-02-22 DIAGNOSIS — N18.4 TYPE 2 DIABETES MELLITUS WITH STAGE 4 CHRONIC KIDNEY DISEASE, UNSPECIFIED WHETHER LONG TERM INSULIN USE (HCC): ICD-10-CM

## 2023-02-22 DIAGNOSIS — Z99.2 ESRD (END STAGE RENAL DISEASE) ON DIALYSIS (HCC): ICD-10-CM

## 2023-02-22 PROCEDURE — 93000 ELECTROCARDIOGRAM COMPLETE: CPT | Performed by: FAMILY MEDICINE

## 2023-02-22 PROCEDURE — 99214 OFFICE O/P EST MOD 30 MIN: CPT | Performed by: FAMILY MEDICINE

## 2023-02-22 PROCEDURE — 3078F DIAST BP <80 MM HG: CPT | Performed by: FAMILY MEDICINE

## 2023-02-22 PROCEDURE — 3074F SYST BP LT 130 MM HG: CPT | Performed by: FAMILY MEDICINE

## 2023-02-22 PROCEDURE — 3044F HG A1C LEVEL LT 7.0%: CPT | Performed by: FAMILY MEDICINE

## 2023-02-22 SDOH — ECONOMIC STABILITY: FOOD INSECURITY: WITHIN THE PAST 12 MONTHS, THE FOOD YOU BOUGHT JUST DIDN'T LAST AND YOU DIDN'T HAVE MONEY TO GET MORE.: NEVER TRUE

## 2023-02-22 SDOH — ECONOMIC STABILITY: FOOD INSECURITY: WITHIN THE PAST 12 MONTHS, YOU WORRIED THAT YOUR FOOD WOULD RUN OUT BEFORE YOU GOT MONEY TO BUY MORE.: NEVER TRUE

## 2023-02-22 SDOH — ECONOMIC STABILITY: HOUSING INSECURITY
IN THE LAST 12 MONTHS, WAS THERE A TIME WHEN YOU DID NOT HAVE A STEADY PLACE TO SLEEP OR SLEPT IN A SHELTER (INCLUDING NOW)?: NO

## 2023-02-22 NOTE — PROGRESS NOTES
Subjective:    Chief Complaint:     Samir Upton is a 57 y.o. male who presents for a preoperative physical examination. He is scheduled to have left cerumen impaction removal.  Pt states that he has been to ENT for eval and they tried to remove a few times but with persistent irritation and intol to removal in office so will have removal done by Dr. Bishop Ulrich at [facility] tomorrow    Pt will be getting dialysis tomorrow in the AM and have surgery afterwards. Pt state that HD is doing well, going T, Th, and Saturday. Pt has fistula and that has helped with IV access but still with an issues with sticking. Pt was on PD but struggled with tolerability due to his scarring from prior surgery. Pt has been having some chronic abd pain for several months, states that it comes and goes and did have CT scan done 1/30 was stable. Pts abd pain sx can be moderate and seems random. Does not relate to what he is eating. Bowels are normal, no issues of constipation. Pt working with urology for his prostate CA, and will have ultrasound done a few weeks later on 4/19 will have seed implants. Pt working with rad onc at [facility]    Pt has had some issues with balance, has been present for a long time, but has not had a fall recently.         History of Present Illness:            Past Medical History:   Diagnosis Date    Arthralgia of multiple joints 10/27/2015    Arthritis     Atrial fibrillation (HCC)     Chronic kidney disease     stage 4    Chronic systolic congestive heart failure ([address] 08/16/2021    CRI (chronic renal insufficiency)     Diabetic nephropathy associated with type 2 diabetes mellitus ([address] 10/11/2018    Diverticulosis     Elevated PSA     Erectile dysfunction     ESRD (end stage renal disease) on dialysis ([address] 02/24/2022    Gout     Gout     Headache     Hearing loss     Hemrrhoid NOS w/ complication NEC     History of MRSA infection     Hypertension     CELSA (obstructive sleep apnea) 07/14/2017    uses C-pap machine    Prostate cancer [facility]     Type 2 diabetes mellitus without complication, without long-term current use of insulin ([facility] 54/32/7576    Umbilical hernia without obstruction and without gangrene 02/02/2016        Review of patient's past surgical history indicates:     Past Surgical History:   Procedure Laterality Date    COLONOSCOPY      COLONOSCOPY N/A 03/18/2022    COLONOSCOPY POLYPECTOMY SNARE/COLD BIOPSY performed by Collette Mcgregor MD at [address] [facility] N/A 01/17/2022    LAPAROSCOPIC PERITONEAL DIALYSIS CATHETER PLACEMENT and omentopexy performed by Wayne Wesley DO at [address] Left 07/29/2022    LEFT RADIOCEPHALIC ARTERIOVENOUS FISTULA CREATION performed by Jessica Mantilla MD at [address] [facility], [address] [place] [address] N/A 04/15/2022    ROBOTIC, RECURRENT INCISIONAL HERNIA REPAIR WITH BIOSYNTHETIC MESH; LAPAROSCOPIC PERITONEAL DIALYSIS CATHETER REVISION WITH LAPAROSCOPIC OMENTOPEXY performed by Wayne Wesley DO at [place] [address] Bilateral 04/15/2022    [address] performed by Wayne Wesley DO at [address] Bilateral 06/13/2022    ROBOTIC RECURRENT BILATERAL INGUINAL HERNIA REPAIR, LYSIS OF ADHESIONS, PERITONEAL DIALYSIS CATHETER REMOVAL performed by Wayne Wesley DO at [facility] [address] TUNNELED [address] 5 YEARS  01/14/2022    IR TUNNELED CATHETER PLACEMENT GREATER THAN 5 YEARS 1/14/2022 [facility] SPECIAL PROCEDURES    IR TUNNELED CATHETER PLACEMENT GREATER THAN 5 YEARS  07/14/2022    IR TUNNELED CATHETER PLACEMENT GREATER THAN 5 YEARS 7/14/2022 [facility] SPECIAL PROCEDURES    UPPER GASTROINTESTINAL ENDOSCOPY  05/28/2016    biopsies, multiple small gastric ulcers    UPPER GASTROINTESTINAL ENDOSCOPY  09/12/2016    UPPER GASTROINTESTINAL ENDOSCOPY N/A 03/18/2022    EGD DIAGNOSTIC ONLY performed by Collette Mcgregor MD at [address] [facility] ENDOSCOPY    VENTRAL HERNIA REPAIR N/A 01/17/2022    LAPAROSCOPIC incarcerated VENTRAL HERNIA REPAIR performed by Cathie Rojo, DO at [facility] OR                                                   Current Outpatient Medications   Medication Sig Dispense Refill    oxyCODONE-acetaminophen (PERCOCET) 5-325 MG per tablet Take 1 tablet by mouth every 8 hours as needed for Pain for up to 28 days. 84 tablet 0    gabapentin (NEURONTIN) 100 MG capsule Take 100 mg by mouth 3 times daily. NIFEdipine (ADALAT CC) 90 MG extended release tablet TAKE 1 TABLET BY MOUTH EVERY DAY 90 tablet 0    atorvastatin (LIPITOR) 20 MG tablet Take 1 tablet by mouth daily 90 tablet 1    metoprolol succinate (TOPROL XL) 50 MG extended release tablet TAKE 1 TABLET BY MOUTH AT BEDTIME 90 tablet 1    vitamin D (ERGOCALCIFEROL) 1.25 MG (26260 UT) CAPS capsule TAKE 1 CAPSULE BY MOUTH ONE TIME PER WEEK 12 capsule 0    busPIRone (BUSPAR) 5 MG tablet TAKE 1 TABLET BY MOUTH TWICE A DAY 60 tablet 5    calcium acetate (PHOSLO) 667 MG CAPS capsule TAKE 1 CAPSULE BY MOUTH THREE TIMES A DAY WITH MEALS 90 capsule 5    polyethylene glycol (GLYCOLAX) 17 GM/SCOOP powder TAKE 17 G BY MOUTH 2 TIMES DAILY 850 g 1    B Complex-C-Folic Acid (DIALYVITE 788) 0.8 MG TABS Take 1 tablet by mouth daily 90 tablet 3    sildenafil (VIAGRA) 100 MG tablet Take 1 tablet by mouth daily as needed for Erectile Dysfunction 30 tablet 5    Folic Acid-Vit D8-MPZ U45 0.5-5-0.2 MG TABS Take by mouth      calcitRIOL (ROCALTROL) 0.5 MCG capsule Take 1 capsule by mouth three times a week During HD 30 capsule 3    bisacodyl (BISACODYL) 5 MG EC tablet Take 1 tablet by mouth daily as needed for Constipation 30 tablet 5    aspirin 81 MG tablet Take 81 mg by mouth daily       No current facility-administered medications for this visit.        No Known Allergies    Social History     Tobacco Use    Smoking status: Former     Types: Cigars     Start date: 12/31/1979     Quit date: 1/1/2020     Years since quitting: 3.1     Passive exposure: Never    Smokeless tobacco: Never   Vaping Use    Vaping Use: Never used   Substance Use Topics    Alcohol use: Not Currently    Drug use: No        Family History   Problem Relation Age of Onset    Hypertension Other     Breast Cancer Mother     Colon Cancer Father     Diabetes Maternal Grandmother     Coronary Art Dis Brother         Review Of Systems    Skin: no abnormal pigmentation, rash, scaling, itching, masses, hair or nail changes  Eyes: negative  Ears/Nose/Throat: negative  Respiratory: negative  Cardiovascular: negative  Gastrointestinal: negative  Genitourinary: negative  Musculoskeletal: negative  Neurologic: negative  Psychiatric: negative  Hematologic/Lymphatic/Immunologic: negative  Endocrine: negative       Objective:      /76   Pulse (!) 111   Temp 97.7 °F (36.5 °C) (Temporal)   Resp 12   Wt 262 lb (118.8 kg)   SpO2 98%   BMI 31.07 kg/m²   General appearance - healthy, alert, no distress  Skin - Skin color, texture, turgor normal. No rashes or lesions. Head - Normocephalic. No masses, lesions, tenderness or abnormalities  Eyes - conjunctivae/corneas clear. PERRL, EOM's intact. Ears - External ears normal. Canals clear. TM's normal.  Nose/Sinuses - Nares normal. Septum midline. Mucosa normal. No drainage or sinus tenderness. Oropharynx - Lips, mucosa, and tongue normal. Teeth and gums normal.   Neck - Neck supple. No adenopathy. Thyroid symmetric, normal size,  Back - Back symmetric, no curvature. ROM normal. No CVA tenderness. Lungs - Percussion normal. Good diaphragmatic excursion. Lungs clear  Heart - Regular rate and rhythm, with no rub, murmur or gallop noted. Abdomen - Abdomen soft, non-tender. BS normal. No masses, organomegaly  Extremities - Extremities normal. No deformities, edema, or skin discoloration  Musculoskeletal - Spine ROM normal. Muscular strength intact.   Peripheral pulses - radial=4/4,, femoral=4/4, popliteal=4/4, dorsalis pedis=4/4,  Neuro - Gait normal. Reflexes normal and symmetric. Sensation grossly normal.  No focal weakness    EKG: unchanged from previous tracings, normal sinus rhythm, nonspecific ST and T waves changes. ASSESSMENT / PLAN:    1. Pre-op examination  Medically cleared for surgery. - EKG 12 Lead    2. Impacted cerumen of left ear  Persistent sx, intol of procedure in office with eNT  Set for surgery  Medically cleared for surgery. 3. ESRD (end stage renal disease) on dialysis ([facility]  Cont hemodialysis    4. Prostate cancer [facility])  F/u withurology  Will be working with rad onc for surgery/seed implant  Per  urology    5. Gait instability  Chronic w/o fall  Cont supportive therapy  Check MRI brain  - MRI BRAIN W WO CONTRAST; Future    6. Type 2 diabetes mellitus with stage 4 chronic kidney disease, unspecified whether long term insulin use (HCC)  Stable @ goal    7. Coordination disorder  - MRI BRAIN W WO CONTRAST; Future    8. Difficulty writing  - MRI BRAIN W WO CONTRAST; Future           Follow-up appointment:   Pending MRI  After surgery  Prn     Discussed use, benefit, and side effects of all prescribed medications. Barriers to medication compliance addressed. All patient questions answered. Pt voiced understanding. When applicable, patient's outside records were reviewed through [facility]. The patient has signed appropriate paperworks/consents. Per encounter diagnoses   He is medically cleared for surgery and anesthesia. Avoid Aspirin, non steroidal anti inflammatory medications, including Motrin, Aleve, Ibuprofen, Advil; multi vitamins, Vitamin E, omega 3 fish oil, and glucosamine chondroitin for the 7 days prior to surgery.

## 2023-02-23 ENCOUNTER — ANESTHESIA (OUTPATIENT)
Dept: OPERATING ROOM | Age: 63
End: 2023-02-23
Payer: MEDICARE

## 2023-02-23 ENCOUNTER — HOSPITAL ENCOUNTER (OUTPATIENT)
Age: 63
Setting detail: OUTPATIENT SURGERY
Discharge: HOME | End: 2023-02-23
Attending: OTOLARYNGOLOGY | Admitting: OTOLARYNGOLOGY
Payer: MEDICARE

## 2023-02-23 VITALS
HEART RATE: 93 BPM | SYSTOLIC BLOOD PRESSURE: 155 MMHG | OXYGEN SATURATION: 93 % | TEMPERATURE: 96.8 F | BODY MASS INDEX: 32.47 KG/M2 | WEIGHT: 275 LBS | RESPIRATION RATE: 18 BRPM | DIASTOLIC BLOOD PRESSURE: 84 MMHG | HEIGHT: 77 IN

## 2023-02-23 PROBLEM — H61.22 IMPACTED CERUMEN OF LEFT EAR: Status: ACTIVE | Noted: 2023-02-23

## 2023-02-23 PROBLEM — H60.312 ACUTE DIFFUSE OTITIS EXTERNA OF LEFT EAR: Status: ACTIVE | Noted: 2023-02-23

## 2023-02-23 LAB
ANION GAP SERPL CALCULATED.3IONS-SCNC: 14 MMOL/L (ref 3–16)
BUN BLDV-MCNC: 15 MG/DL (ref 7–20)
CALCIUM SERPL-MCNC: 9.3 MG/DL (ref 8.3–10.6)
CHLORIDE BLD-SCNC: 93 MMOL/L (ref 99–110)
CO2: 28 MMOL/L (ref 21–32)
CREAT SERPL-MCNC: 4.2 MG/DL (ref 0.8–1.3)
GFR SERPL CREATININE-BSD FRML MDRD: 15 ML/MIN/{1.73_M2}
GLUCOSE BLD-MCNC: 80 MG/DL (ref 70–99)
GLUCOSE BLD-MCNC: 81 MG/DL (ref 70–99)
PERFORMED ON: NORMAL
POTASSIUM SERPL-SCNC: 3.7 MMOL/L (ref 3.5–5.1)
SODIUM BLD-SCNC: 135 MMOL/L (ref 136–145)

## 2023-02-23 PROCEDURE — 6360000002 HC RX W HCPCS: Performed by: ANESTHESIOLOGY

## 2023-02-23 PROCEDURE — 3700000000 HC ANESTHESIA ATTENDED CARE: Performed by: OTOLARYNGOLOGY

## 2023-02-23 PROCEDURE — 3600000014 HC SURGERY LEVEL 4 ADDTL 15MIN: Performed by: OTOLARYNGOLOGY

## 2023-02-23 PROCEDURE — 6370000000 HC RX 637 (ALT 250 FOR IP): Performed by: OTOLARYNGOLOGY

## 2023-02-23 PROCEDURE — 2709999900 HC NON-CHARGEABLE SUPPLY: Performed by: OTOLARYNGOLOGY

## 2023-02-23 PROCEDURE — 7100000010 HC PHASE II RECOVERY - FIRST 15 MIN: Performed by: OTOLARYNGOLOGY

## 2023-02-23 PROCEDURE — 7100000011 HC PHASE II RECOVERY - ADDTL 15 MIN: Performed by: OTOLARYNGOLOGY

## 2023-02-23 PROCEDURE — 3700000001 HC ADD 15 MINUTES (ANESTHESIA): Performed by: OTOLARYNGOLOGY

## 2023-02-23 PROCEDURE — 6360000002 HC RX W HCPCS: Performed by: NURSE ANESTHETIST, CERTIFIED REGISTERED

## 2023-02-23 PROCEDURE — 6360000002 HC RX W HCPCS

## 2023-02-23 PROCEDURE — 7100000000 HC PACU RECOVERY - FIRST 15 MIN: Performed by: OTOLARYNGOLOGY

## 2023-02-23 PROCEDURE — 80048 BASIC METABOLIC PNL TOTAL CA: CPT

## 2023-02-23 PROCEDURE — 3600000004 HC SURGERY LEVEL 4 BASE: Performed by: OTOLARYNGOLOGY

## 2023-02-23 PROCEDURE — 7100000001 HC PACU RECOVERY - ADDTL 15 MIN: Performed by: OTOLARYNGOLOGY

## 2023-02-23 RX ORDER — CIPROFLOXACIN AND DEXAMETHASONE 3; 1 MG/ML; MG/ML
SUSPENSION/ DROPS AURICULAR (OTIC) PRN
Status: DISCONTINUED | OUTPATIENT
Start: 2023-02-23 | End: 2023-02-23 | Stop reason: HOSPADM

## 2023-02-23 RX ORDER — IPRATROPIUM BROMIDE AND ALBUTEROL SULFATE 2.5; .5 MG/3ML; MG/3ML
1 SOLUTION RESPIRATORY (INHALATION)
Status: DISCONTINUED | OUTPATIENT
Start: 2023-02-23 | End: 2023-02-23 | Stop reason: HOSPADM

## 2023-02-23 RX ORDER — SODIUM CHLORIDE 0.9 % (FLUSH) 0.9 %
5-40 SYRINGE (ML) INJECTION PRN
Status: DISCONTINUED | OUTPATIENT
Start: 2023-02-23 | End: 2023-02-23 | Stop reason: HOSPADM

## 2023-02-23 RX ORDER — LABETALOL HYDROCHLORIDE 5 MG/ML
10 INJECTION, SOLUTION INTRAVENOUS
Status: DISCONTINUED | OUTPATIENT
Start: 2023-02-23 | End: 2023-02-23 | Stop reason: HOSPADM

## 2023-02-23 RX ORDER — HYDRALAZINE HYDROCHLORIDE 20 MG/ML
10 INJECTION INTRAMUSCULAR; INTRAVENOUS
Status: DISCONTINUED | OUTPATIENT
Start: 2023-02-23 | End: 2023-02-23 | Stop reason: HOSPADM

## 2023-02-23 RX ORDER — LIDOCAINE HYDROCHLORIDE 20 MG/ML
INJECTION, SOLUTION INTRAVENOUS PRN
Status: DISCONTINUED | OUTPATIENT
Start: 2023-02-23 | End: 2023-02-23 | Stop reason: SDUPTHER

## 2023-02-23 RX ORDER — PROPOFOL 10 MG/ML
INJECTION, EMULSION INTRAVENOUS PRN
Status: DISCONTINUED | OUTPATIENT
Start: 2023-02-23 | End: 2023-02-23 | Stop reason: SDUPTHER

## 2023-02-23 RX ORDER — ONDANSETRON 2 MG/ML
INJECTION INTRAMUSCULAR; INTRAVENOUS PRN
Status: DISCONTINUED | OUTPATIENT
Start: 2023-02-23 | End: 2023-02-23 | Stop reason: SDUPTHER

## 2023-02-23 RX ORDER — FENTANYL CITRATE 50 UG/ML
INJECTION, SOLUTION INTRAMUSCULAR; INTRAVENOUS
Status: COMPLETED
Start: 2023-02-23 | End: 2023-02-23

## 2023-02-23 RX ORDER — SODIUM CHLORIDE 9 MG/ML
INJECTION, SOLUTION INTRAVENOUS PRN
Status: DISCONTINUED | OUTPATIENT
Start: 2023-02-23 | End: 2023-02-23 | Stop reason: HOSPADM

## 2023-02-23 RX ORDER — FENTANYL CITRATE 50 UG/ML
25 INJECTION, SOLUTION INTRAMUSCULAR; INTRAVENOUS EVERY 5 MIN PRN
Status: COMPLETED | OUTPATIENT
Start: 2023-02-23 | End: 2023-02-23

## 2023-02-23 RX ORDER — SODIUM CHLORIDE 0.9 % (FLUSH) 0.9 %
5-40 SYRINGE (ML) INJECTION EVERY 12 HOURS SCHEDULED
Status: DISCONTINUED | OUTPATIENT
Start: 2023-02-23 | End: 2023-02-23 | Stop reason: HOSPADM

## 2023-02-23 RX ORDER — PROCHLORPERAZINE EDISYLATE 5 MG/ML
5 INJECTION INTRAMUSCULAR; INTRAVENOUS
Status: DISCONTINUED | OUTPATIENT
Start: 2023-02-23 | End: 2023-02-23 | Stop reason: HOSPADM

## 2023-02-23 RX ORDER — ONDANSETRON 2 MG/ML
4 INJECTION INTRAMUSCULAR; INTRAVENOUS
Status: DISCONTINUED | OUTPATIENT
Start: 2023-02-23 | End: 2023-02-23 | Stop reason: HOSPADM

## 2023-02-23 RX ORDER — SODIUM CHLORIDE, SODIUM LACTATE, POTASSIUM CHLORIDE, CALCIUM CHLORIDE 600; 310; 30; 20 MG/100ML; MG/100ML; MG/100ML; MG/100ML
INJECTION, SOLUTION INTRAVENOUS CONTINUOUS
Status: DISCONTINUED | OUTPATIENT
Start: 2023-02-23 | End: 2023-02-23 | Stop reason: HOSPADM

## 2023-02-23 RX ORDER — MEPERIDINE HYDROCHLORIDE 25 MG/ML
12.5 INJECTION INTRAMUSCULAR; INTRAVENOUS; SUBCUTANEOUS EVERY 5 MIN PRN
Status: DISCONTINUED | OUTPATIENT
Start: 2023-02-23 | End: 2023-02-23 | Stop reason: HOSPADM

## 2023-02-23 RX ADMIN — ONDANSETRON 4 MG: 2 INJECTION INTRAMUSCULAR; INTRAVENOUS at 13:25

## 2023-02-23 RX ADMIN — FENTANYL CITRATE 25 MCG: 0.05 INJECTION, SOLUTION INTRAMUSCULAR; INTRAVENOUS at 14:20

## 2023-02-23 RX ADMIN — PROPOFOL 250 MG: 10 INJECTION, EMULSION INTRAVENOUS at 13:25

## 2023-02-23 RX ADMIN — LIDOCAINE HYDROCHLORIDE 50 MG: 20 INJECTION, SOLUTION INTRAVENOUS at 13:25

## 2023-02-23 RX ADMIN — FENTANYL CITRATE 25 MCG: 0.05 INJECTION, SOLUTION INTRAMUSCULAR; INTRAVENOUS at 14:28

## 2023-02-23 ASSESSMENT — PAIN DESCRIPTION - LOCATION
LOCATION: EAR

## 2023-02-23 ASSESSMENT — PAIN SCALES - GENERAL
PAINLEVEL_OUTOF10: 5
PAINLEVEL_OUTOF10: 4
PAINLEVEL_OUTOF10: 5
PAINLEVEL_OUTOF10: 6
PAINLEVEL_OUTOF10: 0
PAINLEVEL_OUTOF10: 4

## 2023-02-23 ASSESSMENT — PAIN DESCRIPTION - DESCRIPTORS
DESCRIPTORS: THROBBING
DESCRIPTORS: ACHING

## 2023-02-23 ASSESSMENT — PAIN DESCRIPTION - ORIENTATION
ORIENTATION: LEFT

## 2023-02-23 ASSESSMENT — PAIN - FUNCTIONAL ASSESSMENT: PAIN_FUNCTIONAL_ASSESSMENT: ACTIVITIES ARE NOT PREVENTED

## 2023-02-23 ASSESSMENT — PAIN DESCRIPTION - PAIN TYPE
TYPE: SURGICAL PAIN
TYPE: SURGICAL PAIN
TYPE: CHRONIC PAIN
TYPE: SURGICAL PAIN

## 2023-02-23 ASSESSMENT — PAIN DESCRIPTION - ONSET
ONSET: ON-GOING

## 2023-02-23 ASSESSMENT — PAIN DESCRIPTION - FREQUENCY
FREQUENCY: CONTINUOUS

## 2023-02-23 NOTE — PROGRESS NOTES
Patient admitted to PACU bed 3 from OR s/p REMOVE IMPACTED CERUMEN LEFT EAR  Report received at bedside from CRNA and OR staff. No complications reported. Pt connected to PACU monitoring equipment, IVF infusing, no pain noted. Patient arrived not fully awake from anesthesia, on O2 @ 2L NC breathing easy and unlabored. Will continue to monitor.

## 2023-02-23 NOTE — DISCHARGE INSTRUCTIONS
Please call the office to schedule a follow up appointment for Monday to have the wick removed. Use the provided ear drops as directed: 4 drops in the left ear 2 times a day. [address]    There are potential side effects of anesthesia or sedation you may experience for the first 24 hours. These side effects include:    Confusion or Memory loss, Dizziness, or Delayed Reaction Times   [x]A responsible person should be with you for the next 24 hours. Do not operate any vehicles (automobiles, bicycles, motorcycles) or power tools or machinery for 24 hours. Do not sign any legal documents or make any legal decisions for 24 hours. Do not drink alcohol for 24 hours or while taking narcotic pain medication. Nausea    [x]Start with light diet and progress to your normal diet as you feel like eating. However, if you experience nausea or repeated episodes of vomiting which persist beyond 12-24 hours, notify your physician. Once nausea has passed, remember to keep drinking fluids. Difficulty Passing Urine  [x]Drink extra amounts of fluid today. Notify your physician if you have not urinated within 8 hours after your procedure or you feel uncomfortable. Irritated Throat from a Breathing Tube  [x]Drink extra amounts of fluid today. Lozenges may help. Muscle Aches  [x]You may experience some generalized body aches as your muscles recover from medications used to relax them during surgery. These will gradually subside. MEDICATION INSTRUCTIONS:  [x]Prescription(S) x   0  sent with you. Use as directed. When taking pain medications, you may experience the side effect of dizziness or drowsiness. Do not drink alcohol or drive when taking these medications. []Prescription(S) x          Called to Pharmacy Name and location:    [x]Give the list of your medications to your primary care physician on your next visit.  Keep your med list updated and carry it with in case of emergencies. [] Narcotic pain medications can cause the side effect of significant constipation. You may want to add a stool softener to your postoperative medication schedule or speak to your surgeon on how best to manage this side effect. NARCOTIC SAFETY:  Your pain medicine is only for you to take. Safely store your medicines. Store pills up high and out of reach of children and pets. Ensure safety caps are snapped tightly  Keep track of how many pills you have left    Unused medication can be disposed of by taking them to a drop-off box or take-back program that is authorized by the [facility]. Access to a site near you can be found on the [facility] website ([address]. [facility]). If you have a CPAP machine, it is very important that you use it daily during all periods of sleep and daytime rest during your recovery at home. Surgery and Anesthesia place a significant amount of stress on your body. Using your CPAP will help keep you safe and lessen the negative effects of that stress. FOLLOW-UP RECOVERY CARE:  [x]Call the office for follow-up appointment and problems    Watch for these possible complications, symptoms, or side effects of anesthesia. Call physician if they or any other problems occur:  Signs of INFECTION   > Fever over 101°     > Redness, swelling, hardness or warmth at the operative site   >Foul smelling or cloudy drainage at the operative site   Unrelieved PAIN  Unrelieved NAUSEA  Blood soaked dressing. (Some oozing may be normal)  Inability to urinate      Numb, pale, blue, cold or tingling extremity      Physician:  Dr. Ben Lamas    The above instructions were reviewed with patient/significant other.   The following additional patient specific information was reviewed with the patient/significant other:  [x]Procedure/physician specific instructions  []Medication information sheet(S) including potential side effects  []Jorge reese test  []Pain Ball management  []FAQ Catheter associated blood stream infections  [x]FAQ Surgical Site Infections  []Other-    I have read and understand the instructions given to me: ____________________________________________   (Patient/S.O. Signature)            Date/time 2/23/2023 2:11 PM         PACU:  [phone]   M-F 700 AM - 7 PM      SAME DAY SERVICES:  [phone] M-F 7AM-6PM        If you smoke STOP. We care about your health!

## 2023-02-23 NOTE — ANESTHESIA PRE PROCEDURE
Department of Anesthesiology  Preprocedure Note       Name:  Jorden Woods   Age:  62 y.o.  :  1960                                          MRN:  [id number]         Date:  2023      Surgeon: Surgeon(s):  Maryam Collins MD    Procedure: Procedure(s):  REMOVE IMPACTED CERUMEN LEFT EAR    Medications prior to admission:   Prior to Admission medications    Medication Sig Start Date End Date Taking? Authorizing Provider   oxyCODONE-acetaminophen (PERCOCET) 5-325 MG per tablet Take 1 tablet by mouth every 8 hours as needed for Pain for up to 28 days. 2/21/23 3/21/23  MIGUEL Figueroa CNP   gabapentin (NEURONTIN) 100 MG capsule Take 100 mg by mouth 3 times daily.    Historical Provider, MD   NIFEdipine (ADALAT CC) 90 MG extended release tablet TAKE 1 TABLET BY MOUTH EVERY DAY 23   Curt Collins MD   atorvastatin (LIPITOR) 20 MG tablet Take 1 tablet by mouth daily 23   MIGUEL Bay CNP   metoprolol succinate (TOPROL XL) 50 MG extended release tablet TAKE 1 TABLET BY MOUTH AT BEDTIME 23   Stanislav Guerra MD   vitamin D (ERGOCALCIFEROL) 1.25 MG (11555 UT) CAPS capsule TAKE 1 CAPSULE BY MOUTH ONE TIME PER WEEK 23   MIGUEL Bay CNP   busPIRone (BUSPAR) 5 MG tablet TAKE 1 TABLET BY MOUTH TWICE A DAY 10/22/22   Stanislav Guerra MD   calcium acetate (PHOSLO) 667 MG CAPS capsule TAKE 1 CAPSULE BY MOUTH THREE TIMES A DAY WITH MEALS 10/12/22   Stanislav Guerra MD   polyethylene glycol (GLYCOLAX) 17 GM/SCOOP powder TAKE 17 G BY MOUTH 2 TIMES DAILY 22   MIGUEL Bay CNP   B Complex-C-Folic Acid (DIALYVITE 800) 0.8 MG TABS Take 1 tablet by mouth daily 22   MIGUEL Bay CNP   sildenafil (VIAGRA) 100 MG tablet Take 1 tablet by mouth daily as needed for Erectile Dysfunction 22   Curt Collins MD   Folic Acid-Vit B6-Vit B12 0.5-5-0.2 MG TABS Take by mouth    Historical Provider, MD   calcitRIOL (ROCALTROL) 0.5 MCG capsule Take 1 capsule by mouth three  times a week During HD 6/22/22   Fernando Sportseben, APRN - CNP   bisacodyl (BISACODYL) 5 MG EC tablet Take 1 tablet by mouth daily as needed for Constipation 1/25/22   Maggie Chand MD   aspirin 81 MG tablet Take 81 mg by mouth daily    Historical Provider, MD       Current medications:    Current Facility-Administered Medications   Medication Dose Route Frequency Provider Last Rate Last Admin    lactated ringers IV soln infusion   IntraVENous Continuous Lily Santos MD           Allergies:  No Known Allergies    Problem List:    Patient Active Problem List   Diagnosis Code    Atrial fibrillation ([facility]) I48.91    Chronic kidney disease-mineral and bone disorder N18.9, E83.9, M89.9    Gout M10.9    Erectile dysfunction N52.9    Elevated PSA R97.20    Essential hypertension, benign I10    Headache R51.9    Diverticulosis K57.90    Arthralgia of multiple joints W14.74    Umbilical hernia without obstruction and without gangrene K42.9    Knee pain, bilateral M25.561, M25.562    Alcohol use disorder, mild, abuse F10.10    CELSA (obstructive sleep apnea) G47.33    Duodenal ulcer disease K26.9    Diabetic nephropathy associated with type 2 diabetes mellitus ([facility] E11.21    Type 2 diabetes mellitus with diabetic nephropathy, without long-term current use of insulin ([facility]) E11.21    Lateral epicondylitis of right elbow M77.11    Chronic bilateral low back pain without sciatica M54.50, G89.29    Morbid obesity due to excess calories ([facility]) E66.01    Chronic systolic congestive heart failure ([facility]) I50.22    Acute kidney injury superimposed on CKD ([facility]) N17.9, N18.9    Stroke-like symptoms R29.90    DMII (diabetes mellitus, type 2) ([facility]) E11.9    Hyperlipidemia E78.5    Hypocalcemia E83.51    Hypomagnesemia E83.42    Hypokalemia E87.6    Muscle cramps R25.2    ANTONIO (acute kidney injury) ([facility] N17.9    Electrolyte imbalance E87.8    Anemia due to stage 5 chronic kidney disease ([facility] N18.5, D63.1    Congestive heart failure (HCC) I50.9    LV dysfunction I51.9    Anemia in ESRD (end-stage renal disease) ([facility]) N18.6, D63.1    Bilateral recurrent inguinal hernia without obstruction or gangrene K40.21    Incisional hernia without obstruction or gangrene K43.2    Bilateral inguinal hernia without obstruction or gangrene K40.20    Status post bilateral hernia repair Z98.890, Z87.19    Inguinal hernia, left K40.90    Moderate malnutrition (HCC) E44.0    Arthritis M19.90    BPH (benign prostatic hyperplasia) N40.0    DDD (degenerative disc disease), cervical M50.30    Hematuria, microscopic R31.29    Prostatism N40.0    Tendinopathy M67.90    Intractable abdominal pain R10.9    Sepsis ([facility]) A41.9    Septicemia ([facility]) A41.9    Staphylococcus aureus bacteremia R78.81, B95.61    Catheter-related bloodstream infection T80.211A    Pelvic fluid collection R18.8    ESRD (end stage renal disease) ([address] N18.6    Chronic pain syndrome G89.4    Encounter for therapeutic drug monitoring Z51.81    Ilioinguinal neuralgia of right side G57.91    Major depressive disorder, recurrent, mild F33.0    Prostate cancer ([address] C61       Past Medical History:        Diagnosis Date    Arthralgia of multiple joints 10/27/2015    Arthritis     Atrial fibrillation ([facility])     Chronic kidney disease     stage 4    Chronic systolic congestive heart failure ([address] 08/16/2021    CRI (chronic renal insufficiency)     Diabetic nephropathy associated with type 2 diabetes mellitus ([address] 10/11/2018    Diverticulosis     Elevated PSA     Erectile dysfunction     ESRD (end stage renal disease) on dialysis ([address] 02/24/2022    Gout     Gout     Headache     Hearing loss     Hemrrhoid NOS w/ complication NEC     History of MRSA infection     Hypertension     CELSA (obstructive sleep apnea) 07/14/2017    uses C-pap machine    Prostate cancer ([address]     Type 2 diabetes mellitus without complication, without long-term current use of insulin ([facility] 22/50/8857    Umbilical hernia without obstruction and without gangrene 02/02/2016       Past Surgical History:        Procedure Laterality Date    COLONOSCOPY      COLONOSCOPY N/A 03/18/2022    COLONOSCOPY POLYPECTOMY SNARE/COLD BIOPSY performed by Chad English MD at [address] N/A 01/17/2022    LAPAROSCOPIC PERITONEAL DIALYSIS CATHETER PLACEMENT and omentopexy performed by Del Phelps DO at [address] Left 07/29/2022    LEFT RADIOCEPHALIC ARTERIOVENOUS FISTULA CREATION performed by Filemon Ramsay MD at [address] [facility] N/A 04/15/2022    ROBOTIC, RECURRENT INCISIONAL HERNIA REPAIR WITH BIOSYNTHETIC MESH; LAPAROSCOPIC PERITONEAL DIALYSIS CATHETER REVISION WITH LAPAROSCOPIC OMENTOPEXY performed by Del Phelps DO at [address] [facility] Bilateral 04/15/2022    BILATERAL OPEN INGUINAL HERNI REPAIR performed by Del Phelps DO at [facility] Bilateral 06/13/2022    ROBOTIC RECURRENT BILATERAL INGUINAL HERNIA REPAIR, LYSIS OF ADHESIONS, PERITONEAL DIALYSIS CATHETER REMOVAL performed by Del Phelps DO at [address] IR TUNNELED [address] 5 YEARS  01/14/2022    IR TUNNELED CATHETER PLACEMENT GREATER THAN 5 YEARS 1/14/2022 [address] SPECIAL PROCEDURES    IR TUNNELED CATHETER PLACEMENT GREATER THAN 5 YEARS  07/14/2022    IR TUNNELED CATHETER PLACEMENT GREATER THAN 5 YEARS 7/14/2022 [address] SPECIAL PROCEDURES    UPPER GASTROINTESTINAL ENDOSCOPY  05/28/2016    biopsies, multiple small gastric ulcers    UPPER GASTROINTESTINAL ENDOSCOPY  09/12/2016    UPPER GASTROINTESTINAL ENDOSCOPY N/A 03/18/2022    EGD DIAGNOSTIC ONLY performed by Chad English MD at [facility] N/A 01/17/2022    LAPAROSCOPIC incarcerated VENTRAL HERNIA REPAIR performed by Del Phelps DO at [address] [facility] [address] Social History:    Social History     Tobacco Use    Smoking status: Former     Types: Cigars     Start date: 12/31/1979     Quit date: 1/1/2020     Years since quitting: 3.1     Passive exposure: Never    Smokeless tobacco: Never   Substance Use Topics    Alcohol use: Not Currently                                Counseling given: Not Answered      Vital Signs (Current):   Vitals:    02/21/23 0957   Weight: 275 lb (124.7 kg)   Height: 6' 5\" (1.956 m)                                              BP Readings from Last 3 Encounters:   02/22/23 116/76   02/21/23 132/88   02/14/23 134/83       NPO Status:                                                                                 BMI:   Wt Readings from Last 3 Encounters:   02/21/23 275 lb (124.7 kg)   02/22/23 262 lb (118.8 kg)   02/21/23 260 lb 9.6 oz (118.2 kg)     Body mass index is 32.61 kg/m². CBC:   Lab Results   Component Value Date/Time    WBC 4.3 01/30/2023 05:13 AM    RBC 4.31 01/30/2023 05:13 AM    HGB 12.5 01/30/2023 05:13 AM    HCT 38.1 01/30/2023 05:13 AM    MCV 88.2 01/30/2023 05:13 AM    RDW 16.4 01/30/2023 05:13 AM     01/30/2023 05:13 AM       CMP:   Lab Results   Component Value Date/Time     01/30/2023 05:13 AM    K 5.0 01/30/2023 05:13 AM    CL 93 01/30/2023 05:13 AM    CO2 24 01/30/2023 05:13 AM    BUN 52 01/30/2023 05:13 AM    CREATININE 9.0 01/30/2023 05:13 AM    GFRAA 17 08/03/2022 10:13 AM    GFRAA >60 05/24/2013 04:26 PM    AGRATIO 1.7 01/30/2023 05:13 AM    LABGLOM 6 01/30/2023 05:13 AM    GLUCOSE 104 01/30/2023 05:13 AM    PROT 7.5 01/30/2023 05:13 AM    PROT 7.5 10/03/2012 11:08 AM    CALCIUM 8.5 01/30/2023 05:13 AM    BILITOT <0.2 01/30/2023 05:13 AM    ALKPHOS 70 01/30/2023 05:13 AM    AST 24 01/30/2023 05:13 AM    ALT 16 01/30/2023 05:13 AM       POC Tests: No results for input(s): POCGLU, POCNA, POCK, POCCL, POCBUN, POCHEMO, POCHCT in the last 72 hours.     Coags:   Lab Results   Component Value Date/Time PROTIME 13.3 01/13/2023 11:48 AM    INR 1.02 01/13/2023 11:48 AM    APTT 34.9 07/16/2022 10:09 AM       HCG (If Applicable): No results found for: PREGTESTUR, PREGSERUM, HCG, HCGQUANT     ABGs: No results found for: PHART, PO2ART, TVJ8NRU, RUL6KMD, BEART, V4FUUYGV     Type & Screen (If Applicable):  No results found for: LABABO, LABRH    Drug/Infectious Status (If Applicable):  No results found for: HIV, HEPCAB    COVID-19 Screening (If Applicable):   Lab Results   Component Value Date/Time    COVID19 NOT DETECTED 07/10/2022 04:09 AM           Anesthesia Evaluation  Patient summary reviewed and Nursing notes reviewed no history of anesthetic complications:   Airway: Mallampati: III  TM distance: >3 FB   Neck ROM: full  Mouth opening: > = 3 FB   Dental: normal exam         Pulmonary:normal exam    (+) sleep apnea: on CPAP,                             Cardiovascular:  Exercise tolerance: good (>4 METS),   (+) hypertension:, dysrhythmias: atrial fibrillation, CHF:,         Rhythm: regular  Rate: normal                    Neuro/Psych:   (+) headaches:, psychiatric history: stable with treatment            GI/Hepatic/Renal:   (+) PUD, renal disease: ESRD and dialysis,           Endo/Other:    (+) Diabetes, : arthritis: OA., malignancy/cancer. Abdominal:       Abdomen: soft. Vascular: negative vascular ROS. Other Findings:           Anesthesia Plan      general     ASA 4       Induction: intravenous. MIPS: Postoperative opioids intended and Prophylactic antiemetics administered. Anesthetic plan and risks discussed with patient. Plan discussed with CRNA.     Attending anesthesiologist reviewed and agrees with Preprocedure content                Tammi Kehr, DO   2/23/2023

## 2023-02-23 NOTE — BRIEF OP NOTE
Brief Postoperative Note      Patient: Amy Osullivan  YOB: 1960  MRN: [id number]    Date of Procedure: 2/23/2023    Pre-Op Diagnosis: Impacted cerumen of left ear [H61.22] Acute otitis externa, left ear [H60.512]    Post-Op Diagnosis: Same       Procedure(s):  REMOVE IMPACTED CERUMEN LEFT EAR    Surgeon(s):   Eduard Anderson MD    Assistant:  * No surgical staff found *    Anesthesia: General    Estimated Blood Loss (mL): Minimal    Complications: None    Specimens:   * No specimens in log *    Implants:  * No implants in log *      Drains: * No LDAs found *      Electronically signed by Eduard Anderson MD on 2/23/2023 at 1:40 PM

## 2023-02-23 NOTE — FLOWSHEET NOTE
Ambulatory Surgery/Procedure Discharge Note    Vitals:    02/23/23 1459   BP: (!) 155/84   Pulse: 93   Resp: 18   Temp: 96.8 °F (36 °C)   SpO2: 93%   Pt meets discharge criteria per Johny score. No intake/output data recorded. Restroom use offered before discharge. Yes    Pain assessment:  present - adequately treated  Pain Level: 5 Pt states pain is tolerable. Pt and S.O./family states \"ready to go home\". Pt alert and oriented x4. IV removed. Denies N/V. Discharge instructions given to pt and son with pt permission. Pt and son verbalized understanding of all instructions. Left with all belongings and discharge instructions. Cipro ear drops given to patient with instructions. Patient discharged to home/self care.  Patient discharged via wheel chair by transporter to waiting family/S.O.       2/23/2023 3:36 PM

## 2023-02-23 NOTE — PROGRESS NOTES
PACU Transfer to [facility]    Vitals:    02/23/23 1440   BP: (!) 153/92   Pulse: 97   Resp: 19   Temp: 97.7 °F (36.5 °C)   SpO2: 99%       No intake or output data in the 24 hours ending 02/23/23 1443    Pain assessment:  4/10 patient states tolerable  Pain Level: 4    Patient transferred to care of [facility] RN.    2/23/2023 2:43 PM

## 2023-02-23 NOTE — ANESTHESIA POSTPROCEDURE EVALUATION
Department of Anesthesiology  Postprocedure Note    Patient: James Hardin  MRN: [id number]  YOB: 1960  Date of evaluation: 2/23/2023      Procedure Summary     Date: 02/23/23 Room / Location: [facility] OR  / [facility]    Anesthesia Start: 8905 Anesthesia Stop: 6740    Procedure: REMOVE IMPACTED CERUMEN LEFT EAR (Left: Ear) Diagnosis:       Acute actinic otitis externa, left ear      Impacted cerumen of left ear      (Impacted cerumen of left ear [H61.22] Acute actinic otitis externa, left ear [M48.871])    Surgeons: Richard Figueroa MD Responsible Provider: Claudeen Mocha, DO    Anesthesia Type: general ASA Status: 4          Anesthesia Type: No value filed.     Jonhy Phase I: Johny Score: 10    Johny Phase II:        Anesthesia Post Evaluation    Patient location during evaluation: PACU  Patient participation: complete - patient participated  Level of consciousness: awake  Pain score: 0  Airway patency: patent  Nausea & Vomiting: no nausea and no vomiting  Complications: no  Cardiovascular status: hemodynamically stable  Respiratory status: acceptable  Hydration status: stable  Multimodal analgesia pain management approach

## 2023-02-24 NOTE — OP NOTE
[address]               [address]                                OPERATIVE REPORT    PATIENT NAME: Oni Horowitz                     :        1960  MED REC NO:   [id number]                          ROOM:  ACCOUNT NO:   [de-identified]                           ADMIT DATE: 2023  PROVIDER:     Bernadette Dominguez MD    DATE OF PROCEDURE:  2023    PREOPERATIVE DIAGNOSIS:  Otitis externa of the left ear with canal  closure and impacted cerumen. POSTOPERATIVE DIAGNOSIS:  Otitis externa of the left ear with canal  closure and impacted cerumen. OPERATIONS:  Examination of the left ear under general anesthesia with  removal of impacted cerumen and placement of external auditory canal  wick. SURGEON:  Bernadette Dominguez MD    ANESTHESIA:  General with LMA. DESCRIPTION OF PROCEDURE:  Under satisfactory general anesthesia, the  left ear was examined with an operating microscope. The patient had  severe otitis externa with edema of the canal wall skin and limitation  of the lumen. A speculum was inserted and a large amount of impacted  cerumen was identified deep in the canal adherent to the tympanic  membrane. It was removed using suction and curettes. The tympanic  membrane was normal.  An otic wick was placed in the external auditory  canal and Ciprodex suspension was instilled onto the wick. The patient  tolerated the procedure well and was transferred to the recovery room in  good condition.   Estimated blood loss was minimal.        Haseeb Arteaga MD    D: 2023 13:46:11       T: 2023 13:48:46     [email or web address]  Job#: [id number]     Doc#: [id number]    CC:

## 2023-02-25 NOTE — PROGRESS NOTES
Seen with impacted cerumen in the left ear. Had great difficulty in extracting all the cerumen. Patient asked to use Cortisporin otic suspension for 3 days and return. Again the ear was examined and attempts at removal of cerumen are continued. It is still impacted and I am unable to dislodge the cerumen comfortably. Patient will continue with the use of drops and return in 2 more days.

## 2023-02-27 ENCOUNTER — OFFICE VISIT (OUTPATIENT)
Dept: ENT CLINIC | Age: 63
End: 2023-02-27

## 2023-02-27 DIAGNOSIS — H61.22 IMPACTED CERUMEN OF LEFT EAR: Primary | ICD-10-CM

## 2023-02-27 DIAGNOSIS — H60.502 ACUTE OTITIS EXTERNA OF LEFT EAR, UNSPECIFIED TYPE: ICD-10-CM

## 2023-02-27 PROCEDURE — 99024 POSTOP FOLLOW-UP VISIT: CPT | Performed by: OTOLARYNGOLOGY

## 2023-02-27 NOTE — PROGRESS NOTES
Days following cleaning of the left ear in the operating room. Patient also had extensive otitis externa. I placed a wick and put him on Ciprodex drops. Removed today. There is a small amount of residual cerumen which I removed with suction. The external canal and middle ear and tympanic membranes are normal.    Follow-up: As needed.

## 2023-03-22 ENCOUNTER — OFFICE VISIT (OUTPATIENT)
Dept: PAIN MANAGEMENT | Age: 63
End: 2023-03-22
Payer: MEDICARE

## 2023-03-22 VITALS
DIASTOLIC BLOOD PRESSURE: 74 MMHG | SYSTOLIC BLOOD PRESSURE: 126 MMHG | HEART RATE: 106 BPM | WEIGHT: 259.4 LBS | OXYGEN SATURATION: 98 % | BODY MASS INDEX: 30.76 KG/M2

## 2023-03-22 DIAGNOSIS — Z98.890 STATUS POST BILATERAL HERNIA REPAIR: ICD-10-CM

## 2023-03-22 DIAGNOSIS — F33.0 MAJOR DEPRESSIVE DISORDER, RECURRENT, MILD (HCC): ICD-10-CM

## 2023-03-22 DIAGNOSIS — M25.50 ARTHRALGIA OF MULTIPLE JOINTS: ICD-10-CM

## 2023-03-22 DIAGNOSIS — G57.92 ILIOINGUINAL NEURALGIA OF LEFT SIDE: ICD-10-CM

## 2023-03-22 DIAGNOSIS — Z51.81 ENCOUNTER FOR THERAPEUTIC DRUG MONITORING: ICD-10-CM

## 2023-03-22 DIAGNOSIS — Z87.19 STATUS POST BILATERAL HERNIA REPAIR: ICD-10-CM

## 2023-03-22 DIAGNOSIS — M50.30 DDD (DEGENERATIVE DISC DISEASE), CERVICAL: ICD-10-CM

## 2023-03-22 DIAGNOSIS — G57.91 ILIOINGUINAL NEURALGIA OF RIGHT SIDE: ICD-10-CM

## 2023-03-22 DIAGNOSIS — G89.4 CHRONIC PAIN SYNDROME: ICD-10-CM

## 2023-03-22 DIAGNOSIS — K43.2 INCISIONAL HERNIA WITHOUT OBSTRUCTION OR GANGRENE: ICD-10-CM

## 2023-03-22 PROCEDURE — 3074F SYST BP LT 130 MM HG: CPT | Performed by: NURSE PRACTITIONER

## 2023-03-22 PROCEDURE — 3078F DIAST BP <80 MM HG: CPT | Performed by: NURSE PRACTITIONER

## 2023-03-22 PROCEDURE — 99213 OFFICE O/P EST LOW 20 MIN: CPT | Performed by: NURSE PRACTITIONER

## 2023-03-22 RX ORDER — TIZANIDINE 2 MG/1
TABLET ORAL
Qty: 30 TABLET | Refills: 0 | Status: SHIPPED | OUTPATIENT
Start: 2023-03-22

## 2023-03-22 RX ORDER — OXYCODONE HYDROCHLORIDE AND ACETAMINOPHEN 5; 325 MG/1; MG/1
1 TABLET ORAL EVERY 8 HOURS PRN
Qty: 84 TABLET | Refills: 0 | Status: SHIPPED | OUTPATIENT
Start: 2023-03-22 | End: 2023-04-19

## 2023-03-22 NOTE — PROGRESS NOTES
alert and oriented to person, place, and time. Coordination is  normal.  His mood isAppropriate and affect is Neutral/Euthymic(normal) . Prescription pain medication monitoring:                  MEDD current = 22.5              ORT Score = 0              Other Risk factors - (mood) stable              Date of Last Medication Agreement: 01/24/2023              Date Naloxone prescribed: 08/31/2022              UDT:                          Date of last UDT: 11/30/2022                          Adverse report: No              OARRS:                          Checked today: Yes                          Adverse report: No    IMPRESSION:   1. Incisional hernia without obstruction or gangrene    2. Ilioinguinal neuralgia of right side    3. Chronic pain syndrome    4. Ilioinguinal neuralgia of left side    5. Major depressive disorder, recurrent, mild    6. Status post bilateral hernia repair    7. Arthralgia of multiple joints    8. DDD (degenerative disc disease), cervical    9. Encounter for therapeutic drug monitoring        PLAN:  Informed verbal consent was obtained:  -he will have radiation beads placed for prostate CA-April 19th  -refill Percocet 5 mg tabs 3 times a day as needed for pain, he denies side effects  -random UDS today for opiate monitoring  -Follow-up in 4 weeks for reassessment and UDS review      Analgesic Plan:              Continue present regimen: yes              Adjust dose of present analgesic:no              Switch analgesics:no              Add/Adjust concomitant therapy:no      Current Outpatient Medications   Medication Sig Dispense Refill    omeprazole (PRILOSEC) 40 MG delayed release capsule Take 1 capsule by mouth 2 times daily 90 capsule 1    gabapentin (NEURONTIN) 100 MG capsule Take 100 mg by mouth 3 times daily.       NIFEdipine (ADALAT CC) 90 MG extended release tablet TAKE 1 TABLET BY MOUTH EVERY DAY 90 tablet 0    atorvastatin (LIPITOR) 20 MG tablet Take 1 tablet by mouth daily

## 2023-03-23 RX ORDER — NIFEDIPINE 90 MG/1
TABLET, FILM COATED, EXTENDED RELEASE ORAL
Qty: 90 TABLET | Refills: 0 | Status: SHIPPED | OUTPATIENT
Start: 2023-03-23

## 2023-04-06 ENCOUNTER — TELEPHONE (OUTPATIENT)
Dept: FAMILY MEDICINE CLINIC | Age: 63
End: 2023-04-06

## 2023-04-11 NOTE — PROGRESS NOTES
Patient ___ reached   _x____not reached-preop instructions left on voice xxta__[phone]___________      DATE_4/14/2023_______ TIME___1100_____ARRIVAL___0930______      Nothing to eat or drink after midnight the night before,except for what the prep instructions call for. If you do not have the instructions or do not understand them please contact your doctors office. Follow any instructions your doctors office has given you including what medications to take the AM of your procedure and which ones to hold. You may use your inhalers - bring rescue inhalers with you DOS. If you take a long acting insulin the vega prior please cut the dose in half and take no diabetic medications that AM.Follow specific doctors office instructions regarding blood thinners and if they want you to hold and for how long. If you are on a blood thinner and have no instructions please contact the office and ask. Dress comfortably,bring your insurance card,picture ID,and a complete list of medications, including supplements. You must have a responsible adult to stay with you during the procedure,drive you home and stay with you. [facility] phone number [phone] for any questions. OTHER INSTRUCTIONS(if applicable)_________________________________________________________        VISITOR POLICY(subject to change)    Current visitor policy is 2 visitors per patient. No children allowed. Mask at discretion of facility. Visiting hours are 8a-8p. Overnight visitors will be at the discretion of the nurse. All policies are subject to change.

## 2023-04-13 RX ORDER — SODIUM CHLORIDE 9 MG/ML
INJECTION, SOLUTION INTRAVENOUS CONTINUOUS
Status: CANCELLED | OUTPATIENT
Start: 2023-04-13

## 2023-04-14 ENCOUNTER — HOSPITAL ENCOUNTER (OUTPATIENT)
Age: 63
Setting detail: OUTPATIENT SURGERY
Discharge: HOME | End: 2023-04-14
Attending: INTERNAL MEDICINE | Admitting: INTERNAL MEDICINE
Payer: MEDICARE

## 2023-04-14 VITALS
DIASTOLIC BLOOD PRESSURE: 86 MMHG | TEMPERATURE: 98.1 F | RESPIRATION RATE: 16 BRPM | HEART RATE: 87 BPM | OXYGEN SATURATION: 100 % | SYSTOLIC BLOOD PRESSURE: 167 MMHG

## 2023-04-14 DIAGNOSIS — Z86.010 HISTORY OF COLON POLYPS: ICD-10-CM

## 2023-04-14 LAB — POTASSIUM SERPL-SCNC: 3.7 MMOL/L (ref 3.5–5.1)

## 2023-04-14 PROCEDURE — 36415 COLL VENOUS BLD VENIPUNCTURE: CPT

## 2023-04-14 PROCEDURE — 7100000010 HC PHASE II RECOVERY - FIRST 15 MIN: Performed by: INTERNAL MEDICINE

## 2023-04-14 PROCEDURE — 3609010600 HC COLONOSCOPY POLYPECTOMY SNARE/COLD BIOPSY: Performed by: INTERNAL MEDICINE

## 2023-04-14 PROCEDURE — 2580000003 HC RX 258: Performed by: ANESTHESIOLOGY

## 2023-04-14 PROCEDURE — 3700000000 HC ANESTHESIA ATTENDED CARE: Performed by: INTERNAL MEDICINE

## 2023-04-14 PROCEDURE — 3700000001 HC ADD 15 MINUTES (ANESTHESIA): Performed by: INTERNAL MEDICINE

## 2023-04-14 PROCEDURE — 6370000000 HC RX 637 (ALT 250 FOR IP): Performed by: INTERNAL MEDICINE

## 2023-04-14 PROCEDURE — 2709999900 HC NON-CHARGEABLE SUPPLY: Performed by: INTERNAL MEDICINE

## 2023-04-14 PROCEDURE — 84132 ASSAY OF SERUM POTASSIUM: CPT

## 2023-04-14 PROCEDURE — 2580000003 HC RX 258: Performed by: INTERNAL MEDICINE

## 2023-04-14 PROCEDURE — 88305 TISSUE EXAM BY PATHOLOGIST: CPT

## 2023-04-14 PROCEDURE — 7100000011 HC PHASE II RECOVERY - ADDTL 15 MIN: Performed by: INTERNAL MEDICINE

## 2023-04-14 RX ORDER — SODIUM CHLORIDE 9 MG/ML
INJECTION, SOLUTION INTRAVENOUS CONTINUOUS
Status: DISCONTINUED | OUTPATIENT
Start: 2023-04-14 | End: 2023-04-14 | Stop reason: HOSPADM

## 2023-04-14 RX ADMIN — SODIUM CHLORIDE: 9 INJECTION, SOLUTION INTRAVENOUS at 09:55

## 2023-04-14 ASSESSMENT — PAIN SCALES - GENERAL
PAINLEVEL_OUTOF10: 0

## 2023-04-14 ASSESSMENT — PAIN - FUNCTIONAL ASSESSMENT: PAIN_FUNCTIONAL_ASSESSMENT: 0-10

## 2023-04-14 NOTE — H&P
Gastroenterology Note             Pre-operative History and Physical    Patient: Cristal Dailey  : 1960  CSN:     History Obtained From:  patient and/or guardian. HISTORY OF PRESENT ILLNESS:    The patient is a 57 y.o. male with >10 polyps removed 3/18/2022 here for surveillance colonoscopy.       Past Medical History:    Past Medical History:   Diagnosis Date    Arthralgia of multiple joints 10/27/2015    Arthritis     Atrial fibrillation (HCC)     Chronic kidney disease     stage 4    Chronic systolic congestive heart failure ([facility] [address] 2021    CRI (chronic renal insufficiency)     Diverticulosis     Elevated PSA     Erectile dysfunction     ESRD (end stage renal disease) on dialysis ([facility] [address] 2022    Gout     Gout     Headache     Hearing loss     Hemrrhoid NOS w/ complication NEC     History of MRSA infection     Hypertension     CELSA (obstructive sleep apnea) 2017    uses C-pap machine    Prostate cancer ([facility] [address]     Umbilical hernia without obstruction and without gangrene 2016     Past Surgical History:    Past Surgical History:   Procedure Laterality Date    COLONOSCOPY      COLONOSCOPY N/A 2022    COLONOSCOPY POLYPECTOMY SNARE/COLD BIOPSY performed by Loy Parkinson MD at [facility] N/A 2022    LAPAROSCOPIC PERITONEAL DIALYSIS CATHETER PLACEMENT and omentopexy performed by Karli Bajwa DO at [facility] Left 2022    LEFT RADIOCEPHALIC ARTERIOVENOUS FISTULA CREATION performed by Nicky Garcia MD at [address] 2023    REMOVE IMPACTED CERUMEN LEFT EAR performed by Mariposa Palomares MD at [facility] 04/15/2022    ROBOTIC, RECURRENT INCISIONAL HERNIA REPAIR WITH BIOSYNTHETIC MESH; LAPAROSCOPIC PERITONEAL DIALYSIS CATHETER REVISION WITH LAPAROSCOPIC OMENTOPEXY performed by Karli Bajwa

## 2023-04-14 NOTE — BRIEF OP NOTE
Brief Postoperative Note - Full Note in Chart Review/Procedures tab       Patient: Nikita Coyle  YOB: 1960  MRN: [id number]    Date of Procedure: 4/14/2023    Pre-Op Diagnosis Codes:     * History of colon polyps [Z86.010]    Post-Op Diagnosis: Same       Procedure(s):  COLONOSCOPY POLYPECTOMY SNARE/COLD BIOPSY    Surgeon(s):  Sherrie Yarbrough MD    Assistant:  * No surgical staff found *    Anesthesia: Monitor Anesthesia Care    Estimated Blood Loss (mL): Minimal    Complications: None    Specimens:   ID Type Source Tests Collected by Time Destination   A : Ascending colon polyps x 2 Tissue Tissue SURGICAL PATHOLOGY Sherrie Yarbrough MD 4/14/2023 1108    B : Transverse colon polyps x 3 Tissue Tissue SURGICAL PATHOLOGY Sherrie Yarbrough MD 4/14/2023 1110    C : Sigmoid colon polyp x 1 Tissue Tissue SURGICAL PATHOLOGY Sherrie Yarbrough MD 4/14/2023 1118        Implants:  * No implants in log *      Drains: * No LDAs found *    Findings:   Diverticulosis of large intestine - K57.30  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 Await pathology results.    F/U WITH ME AS NEEDED  Colonoscopy in 3 years  Followup with referring physician as previously instucted      Electronically signed by Sherrie Yarbrough MD on 4/14/2023 at 11:21 AM

## 2023-04-14 NOTE — PROGRESS NOTES
Patient's stated that he did not take his BP meds today. Patient was encouraged to take his medication as prescribed once he is home. Patient expressed understanding.

## 2023-04-14 NOTE — DISCHARGE INSTRUCTIONS

## 2023-04-20 RX ORDER — TIZANIDINE 2 MG/1
TABLET ORAL
Qty: 30 TABLET | Refills: 0 | OUTPATIENT
Start: 2023-04-20

## 2023-04-26 ENCOUNTER — OFFICE VISIT (OUTPATIENT)
Dept: PAIN MANAGEMENT | Age: 63
End: 2023-04-26
Payer: MEDICARE

## 2023-04-26 VITALS
DIASTOLIC BLOOD PRESSURE: 87 MMHG | OXYGEN SATURATION: 98 % | BODY MASS INDEX: 30.29 KG/M2 | HEART RATE: 108 BPM | SYSTOLIC BLOOD PRESSURE: 135 MMHG | WEIGHT: 255.4 LBS

## 2023-04-26 DIAGNOSIS — M25.50 ARTHRALGIA OF MULTIPLE JOINTS: ICD-10-CM

## 2023-04-26 DIAGNOSIS — G57.92 ILIOINGUINAL NEURALGIA OF LEFT SIDE: ICD-10-CM

## 2023-04-26 DIAGNOSIS — M50.30 DDD (DEGENERATIVE DISC DISEASE), CERVICAL: ICD-10-CM

## 2023-04-26 DIAGNOSIS — Z87.19 STATUS POST BILATERAL HERNIA REPAIR: ICD-10-CM

## 2023-04-26 DIAGNOSIS — F33.0 MAJOR DEPRESSIVE DISORDER, RECURRENT, MILD (HCC): ICD-10-CM

## 2023-04-26 DIAGNOSIS — Z51.81 ENCOUNTER FOR THERAPEUTIC DRUG MONITORING: ICD-10-CM

## 2023-04-26 DIAGNOSIS — Z98.890 STATUS POST BILATERAL HERNIA REPAIR: ICD-10-CM

## 2023-04-26 DIAGNOSIS — K43.2 INCISIONAL HERNIA WITHOUT OBSTRUCTION OR GANGRENE: ICD-10-CM

## 2023-04-26 DIAGNOSIS — G89.4 CHRONIC PAIN SYNDROME: ICD-10-CM

## 2023-04-26 DIAGNOSIS — G57.91 ILIOINGUINAL NEURALGIA OF RIGHT SIDE: ICD-10-CM

## 2023-04-26 PROCEDURE — 99214 OFFICE O/P EST MOD 30 MIN: CPT | Performed by: NURSE PRACTITIONER

## 2023-04-26 PROCEDURE — 3079F DIAST BP 80-89 MM HG: CPT | Performed by: NURSE PRACTITIONER

## 2023-04-26 PROCEDURE — 3075F SYST BP GE 130 - 139MM HG: CPT | Performed by: NURSE PRACTITIONER

## 2023-04-26 RX ORDER — TIZANIDINE 2 MG/1
TABLET ORAL
Qty: 30 TABLET | Refills: 0 | Status: SHIPPED | OUTPATIENT
Start: 2023-04-26

## 2023-04-26 RX ORDER — OXYCODONE HYDROCHLORIDE AND ACETAMINOPHEN 5; 325 MG/1; MG/1
1 TABLET ORAL EVERY 8 HOURS PRN
Qty: 84 TABLET | Refills: 0 | Status: SHIPPED | OUTPATIENT
Start: 2023-04-26 | End: 2023-05-24

## 2023-04-26 NOTE — PROGRESS NOTES
Outpatient Medications   Medication Sig Dispense Refill    dicyclomine (BENTYL) 10 MG capsule Take 1 capsule by mouth every 8 hours as needed (abdominal pain) 20 capsule 0    docusate sodium (COLACE) 100 MG capsule Take 1 capsule by mouth 2 times daily 60 capsule 0    busPIRone (BUSPAR) 5 MG tablet TAKE 1 TABLET BY MOUTH TWICE A  tablet 1    NIFEdipine (ADALAT CC) 90 MG extended release tablet TAKE 1 TABLET BY MOUTH EVERY DAY 90 tablet 0    tiZANidine (ZANAFLEX) 2 MG tablet TAKE 1 TABLET BY MOUTH DAILY AS NEEDED (MUSCLE SPASM) 30 tablet 0    omeprazole (PRILOSEC) 40 MG delayed release capsule Take 1 capsule by mouth 2 times daily 90 capsule 1    gabapentin (NEURONTIN) 100 MG capsule Take 1 capsule by mouth 3 times daily. atorvastatin (LIPITOR) 20 MG tablet Take 1 tablet by mouth daily 90 tablet 1    metoprolol succinate (TOPROL XL) 50 MG extended release tablet TAKE 1 TABLET BY MOUTH AT BEDTIME 90 tablet 1    vitamin D (ERGOCALCIFEROL) 1.25 MG (64397 UT) CAPS capsule TAKE 1 CAPSULE BY MOUTH ONE TIME PER WEEK 12 capsule 0    calcium acetate (PHOSLO) 667 MG CAPS capsule TAKE 1 CAPSULE BY MOUTH THREE TIMES A DAY WITH MEALS 90 capsule 5    polyethylene glycol (GLYCOLAX) 17 GM/SCOOP powder TAKE 17 G BY MOUTH 2 TIMES DAILY 850 g 1    B Complex-C-Folic Acid (DIALYVITE 988) 0.8 MG TABS Take 1 tablet by mouth daily 90 tablet 3    sildenafil (VIAGRA) 100 MG tablet Take 1 tablet by mouth daily as needed for Erectile Dysfunction 30 tablet 5    Folic Acid-Vit F2-TXY T07 0.5-5-0.2 MG TABS Take by mouth      calcitRIOL (ROCALTROL) 0.5 MCG capsule Take 1 capsule by mouth three times a week During HD 30 capsule 3    bisacodyl (BISACODYL) 5 MG EC tablet Take 1 tablet by mouth daily as needed for Constipation 30 tablet 5    aspirin 81 MG tablet Take 1 tablet by mouth daily       No current facility-administered medications for this visit.      I will continue his current medication regimen  which is part of the above

## 2023-05-16 ENCOUNTER — APPOINTMENT (OUTPATIENT)
Dept: CT IMAGING | Age: 63
End: 2023-05-16
Payer: MEDICARE

## 2023-05-16 ENCOUNTER — APPOINTMENT (OUTPATIENT)
Dept: ULTRASOUND IMAGING | Age: 63
End: 2023-05-16
Payer: MEDICARE

## 2023-05-16 ENCOUNTER — HOSPITAL ENCOUNTER (EMERGENCY)
Age: 63
Discharge: HOME | End: 2023-05-16
Attending: EMERGENCY MEDICINE
Payer: MEDICARE

## 2023-05-16 VITALS
SYSTOLIC BLOOD PRESSURE: 163 MMHG | TEMPERATURE: 97.5 F | HEIGHT: 77 IN | WEIGHT: 274.6 LBS | HEART RATE: 85 BPM | BODY MASS INDEX: 32.42 KG/M2 | RESPIRATION RATE: 18 BRPM | OXYGEN SATURATION: 99 % | DIASTOLIC BLOOD PRESSURE: 94 MMHG

## 2023-05-16 DIAGNOSIS — K40.21 BILATERAL RECURRENT INGUINAL HERNIA WITHOUT OBSTRUCTION OR GANGRENE: Primary | ICD-10-CM

## 2023-05-16 LAB
ANION GAP SERPL CALCULATED.3IONS-SCNC: 15 MMOL/L (ref 3–16)
BASOPHILS # BLD: 0 K/UL (ref 0–0.2)
BASOPHILS NFR BLD: 0.9 %
BILIRUB UR QL STRIP.AUTO: NEGATIVE
BUN SERPL-MCNC: 46 MG/DL (ref 7–20)
CALCIUM SERPL-MCNC: 9.1 MG/DL (ref 8.3–10.6)
CHLORIDE SERPL-SCNC: 103 MMOL/L (ref 99–110)
CLARITY UR: CLEAR
CO2 SERPL-SCNC: 26 MMOL/L (ref 21–32)
COLOR UR: YELLOW
CREAT SERPL-MCNC: 8.6 MG/DL (ref 0.8–1.3)
DEPRECATED RDW RBC AUTO: 16.5 % (ref 12.4–15.4)
EOSINOPHIL # BLD: 0.2 K/UL (ref 0–0.6)
EOSINOPHIL NFR BLD: 4.7 %
EPI CELLS #/AREA URNS HPF: NORMAL /HPF (ref 0–5)
GFR SERPLBLD CREATININE-BSD FMLA CKD-EPI: 6 ML/MIN/{1.73_M2}
GLUCOSE SERPL-MCNC: 92 MG/DL (ref 70–99)
GLUCOSE UR STRIP.AUTO-MCNC: NEGATIVE MG/DL
HCT VFR BLD AUTO: 35.5 % (ref 40.5–52.5)
HGB BLD-MCNC: 11.6 G/DL (ref 13.5–17.5)
HGB UR QL STRIP.AUTO: ABNORMAL
KETONES UR STRIP.AUTO-MCNC: NEGATIVE MG/DL
LEUKOCYTE ESTERASE UR QL STRIP.AUTO: NEGATIVE
LYMPHOCYTES # BLD: 1.2 K/UL (ref 1–5.1)
LYMPHOCYTES NFR BLD: 22.5 %
MCH RBC QN AUTO: 28.7 PG (ref 26–34)
MCHC RBC AUTO-ENTMCNC: 32.8 G/DL (ref 31–36)
MCV RBC AUTO: 87.7 FL (ref 80–100)
MONOCYTES # BLD: 0.4 K/UL (ref 0–1.3)
MONOCYTES NFR BLD: 8.3 %
NEUTROPHILS # BLD: 3.3 K/UL (ref 1.7–7.7)
NEUTROPHILS NFR BLD: 63.6 %
NITRITE UR QL STRIP.AUTO: NEGATIVE
PH UR STRIP.AUTO: 7 [PH] (ref 5–8)
PLATELET # BLD AUTO: 183 K/UL (ref 135–450)
PMV BLD AUTO: 7.9 FL (ref 5–10.5)
POTASSIUM SERPL-SCNC: 4.4 MMOL/L (ref 3.5–5.1)
PROT UR STRIP.AUTO-MCNC: 100 MG/DL
RBC # BLD AUTO: 4.05 M/UL (ref 4.2–5.9)
RBC #/AREA URNS HPF: NORMAL /HPF (ref 0–4)
SODIUM SERPL-SCNC: 144 MMOL/L (ref 136–145)
SP GR UR STRIP.AUTO: 1.02 (ref 1–1.03)
UA COMPLETE W REFLEX CULTURE PNL UR: ABNORMAL
UA DIPSTICK W REFLEX MICRO PNL UR: YES
URN SPEC COLLECT METH UR: ABNORMAL
UROBILINOGEN UR STRIP-ACNC: 0.2 E.U./DL
WBC # BLD AUTO: 5.2 K/UL (ref 4–11)
WBC #/AREA URNS HPF: NORMAL /HPF (ref 0–5)

## 2023-05-16 PROCEDURE — 6360000002 HC RX W HCPCS: Performed by: EMERGENCY MEDICINE

## 2023-05-16 PROCEDURE — 96376 TX/PRO/DX INJ SAME DRUG ADON: CPT

## 2023-05-16 PROCEDURE — 96374 THER/PROPH/DIAG INJ IV PUSH: CPT

## 2023-05-16 PROCEDURE — 80048 BASIC METABOLIC PNL TOTAL CA: CPT

## 2023-05-16 PROCEDURE — 87086 URINE CULTURE/COLONY COUNT: CPT

## 2023-05-16 PROCEDURE — 74177 CT ABD & PELVIS W/CONTRAST: CPT

## 2023-05-16 PROCEDURE — 76870 US EXAM SCROTUM: CPT

## 2023-05-16 PROCEDURE — 96375 TX/PRO/DX INJ NEW DRUG ADDON: CPT

## 2023-05-16 PROCEDURE — 6360000004 HC RX CONTRAST MEDICATION: Performed by: EMERGENCY MEDICINE

## 2023-05-16 PROCEDURE — 85025 COMPLETE CBC W/AUTO DIFF WBC: CPT

## 2023-05-16 PROCEDURE — 99285 EMERGENCY DEPT VISIT HI MDM: CPT

## 2023-05-16 PROCEDURE — 81001 URINALYSIS AUTO W/SCOPE: CPT

## 2023-05-16 PROCEDURE — 36415 COLL VENOUS BLD VENIPUNCTURE: CPT

## 2023-05-16 RX ORDER — MORPHINE SULFATE 2 MG/ML
4 INJECTION, SOLUTION INTRAMUSCULAR; INTRAVENOUS ONCE
Status: COMPLETED | OUTPATIENT
Start: 2023-05-16 | End: 2023-05-16

## 2023-05-16 RX ORDER — ONDANSETRON 2 MG/ML
4 INJECTION INTRAMUSCULAR; INTRAVENOUS ONCE
Status: COMPLETED | OUTPATIENT
Start: 2023-05-16 | End: 2023-05-16

## 2023-05-16 RX ADMIN — IOPAMIDOL 75 ML: 755 INJECTION, SOLUTION INTRAVENOUS at 07:25

## 2023-05-16 RX ADMIN — ONDANSETRON 4 MG: 2 INJECTION INTRAMUSCULAR; INTRAVENOUS at 06:06

## 2023-05-16 RX ADMIN — HYDROMORPHONE HYDROCHLORIDE 1 MG: 1 INJECTION, SOLUTION INTRAMUSCULAR; INTRAVENOUS; SUBCUTANEOUS at 09:43

## 2023-05-16 RX ADMIN — HYDROMORPHONE HYDROCHLORIDE 1 MG: 1 INJECTION, SOLUTION INTRAMUSCULAR; INTRAVENOUS; SUBCUTANEOUS at 12:57

## 2023-05-16 RX ADMIN — MORPHINE SULFATE 4 MG: 2 INJECTION, SOLUTION INTRAMUSCULAR; INTRAVENOUS at 06:06

## 2023-05-16 RX ADMIN — HYDROMORPHONE HYDROCHLORIDE 1 MG: 1 INJECTION, SOLUTION INTRAMUSCULAR; INTRAVENOUS; SUBCUTANEOUS at 08:29

## 2023-05-16 ASSESSMENT — PAIN SCALES - GENERAL
PAINLEVEL_OUTOF10: 8
PAINLEVEL_OUTOF10: 8
PAINLEVEL_OUTOF10: 7
PAINLEVEL_OUTOF10: 10
PAINLEVEL_OUTOF10: 9
PAINLEVEL_OUTOF10: 9
PAINLEVEL_OUTOF10: 8
PAINLEVEL_OUTOF10: 10
PAINLEVEL_OUTOF10: 8

## 2023-05-16 ASSESSMENT — PAIN DESCRIPTION - ORIENTATION
ORIENTATION: LOWER;MID
ORIENTATION: LOWER;MID
ORIENTATION: LEFT

## 2023-05-16 ASSESSMENT — PAIN - FUNCTIONAL ASSESSMENT
PAIN_FUNCTIONAL_ASSESSMENT: 0-10
PAIN_FUNCTIONAL_ASSESSMENT: 0-10

## 2023-05-16 ASSESSMENT — PAIN DESCRIPTION - LOCATION
LOCATION: GROIN
LOCATION: ABDOMEN
LOCATION: ABDOMEN
LOCATION: GROIN

## 2023-05-16 ASSESSMENT — LIFESTYLE VARIABLES
HOW MANY STANDARD DRINKS CONTAINING ALCOHOL DO YOU HAVE ON A TYPICAL DAY: PATIENT DOES NOT DRINK
HOW OFTEN DO YOU HAVE A DRINK CONTAINING ALCOHOL: NEVER

## 2023-05-16 ASSESSMENT — PAIN DESCRIPTION - DESCRIPTORS: DESCRIPTORS: THROBBING;SHARP

## 2023-05-16 NOTE — ED PROVIDER NOTES
[facility] ENCOUNTER          ATTENDING PHYSICIAN NOTE       Date of evaluation: 5/16/2023    ADDENDUM:      Care of this patient was assumed from Dr Brigitte Mcmahon. The patient was seen for Groin Pain (Pt states he is having pain from right above his groin radiating down to his left testicle. Recently had implants placed in his prostate for radiation.)  . The patient's initial evaluation and plan have been discussed with the prior provider who initially evaluated the patient. Nursing Notes, Past Medical Hx, Past Surgical Hx, Social Hx, Allergies, and Family Hx were all reviewed. ASSESSMENT / PLAN  ([facility])     Jean-Paul Borges is a 57 y.o. male who presents with bilateral groin/testicular pain. CT imaging of the abdomen demonstrates bilateral fat-containing inguinal hernias with what may be some fat necrosis, but no obstruction, no bowel containing hernia. These were previously noted on imaging, and it appears that the patient is actually seeing pain management for bilateral inguinal pain similar to this. Ultrasound of the testicles was performed which shows grossly normal-appearing testicles without cause for pain. Did request some additional pain medicine in the emergency department, says he takes Percocet at home. I discussed with him that due to his engagement with pain management that I would not be able to prescribe him any sort of opioids or additional pain management. He will speak to both pain management and with his previous general surgeon for evaluation for potential nonemergent repair. .      Is this patient to be included in the SEP-1 core measure due to severe sepsis or septic shock? No Exclusion criteria - the patient is NOT to be included for SEP-1 Core Measure due to: Infection is not suspected    Medical Decision Making  Amount and/or Complexity of Data Reviewed  Labs: ordered. Radiology: ordered.     Risk  Prescription drug
normal vitals, afebrile. Physical exam remarkable for tenderness to palpation lower abdomen and tenderness to palpation of left testicle although with normal lie and no swelling and no obvious inguinal hernias. Patient recently had beads implanted in his prostate, could have a surgical complication from this. No obvious findings of testicular torsion. Could also have just a normal urinary tract infection especially given symptoms of increased urgency and dysuria. Labs ordered as well as urinalysis and CT of the abdomen and pelvis. At this time I am going off service and oncoming provider will have to follow-up on labs, imaging, final disposition. Patient may require ultrasound of the testicles which can also be determined by oncoming provider. Patient stable on reassessment. Ambulatory to the bathroom. .    Is this patient to be included in the SEP-1 core measure due to severe sepsis or septic shock? No Exclusion criteria - the patient is NOT to be included for SEP-1 Core Measure due to: 2+ SIRS criteria are not met      Medical Decision Making  Amount and/or Complexity of Data Reviewed  External Data Reviewed: labs and notes. Details: reviewed [facility] notes / visits for recent prostate cancer treatment  Labs: ordered. Radiology: ordered. Risk  Prescription drug management. Clinical Impression     Groin pain    Disposition     PATIENT REFERRED TO:  No follow-up provider specified.     DISCHARGE MEDICATIONS:  New Prescriptions    No medications on file       DISPOSITION    Pending reassessment         Riky Quintero MD  05/16/23 1170

## 2023-05-16 NOTE — DISCHARGE INSTRUCTIONS
Return to emergency department for worsening symptoms or other concerns.   Follow-up with pain management, as well as Dr. Carmen Chen regarding any further

## 2023-05-18 LAB — BACTERIA UR CULT: NORMAL

## 2023-05-24 ENCOUNTER — OFFICE VISIT (OUTPATIENT)
Dept: PAIN MANAGEMENT | Age: 63
End: 2023-05-24
Payer: MEDICARE

## 2023-05-24 VITALS
SYSTOLIC BLOOD PRESSURE: 136 MMHG | OXYGEN SATURATION: 97 % | WEIGHT: 253.4 LBS | DIASTOLIC BLOOD PRESSURE: 88 MMHG | BODY MASS INDEX: 30.05 KG/M2 | HEART RATE: 105 BPM

## 2023-05-24 DIAGNOSIS — Z51.81 ENCOUNTER FOR THERAPEUTIC DRUG MONITORING: ICD-10-CM

## 2023-05-24 DIAGNOSIS — Z87.19 STATUS POST BILATERAL HERNIA REPAIR: ICD-10-CM

## 2023-05-24 DIAGNOSIS — F33.0 MAJOR DEPRESSIVE DISORDER, RECURRENT, MILD (HCC): ICD-10-CM

## 2023-05-24 DIAGNOSIS — M50.30 DDD (DEGENERATIVE DISC DISEASE), CERVICAL: ICD-10-CM

## 2023-05-24 DIAGNOSIS — G57.92 ILIOINGUINAL NEURALGIA OF LEFT SIDE: ICD-10-CM

## 2023-05-24 DIAGNOSIS — G57.91 ILIOINGUINAL NEURALGIA OF RIGHT SIDE: ICD-10-CM

## 2023-05-24 DIAGNOSIS — K43.2 INCISIONAL HERNIA WITHOUT OBSTRUCTION OR GANGRENE: ICD-10-CM

## 2023-05-24 DIAGNOSIS — G89.4 CHRONIC PAIN SYNDROME: ICD-10-CM

## 2023-05-24 DIAGNOSIS — M25.50 ARTHRALGIA OF MULTIPLE JOINTS: ICD-10-CM

## 2023-05-24 DIAGNOSIS — Z98.890 STATUS POST BILATERAL HERNIA REPAIR: ICD-10-CM

## 2023-05-24 PROCEDURE — 3075F SYST BP GE 130 - 139MM HG: CPT | Performed by: NURSE PRACTITIONER

## 2023-05-24 PROCEDURE — 3079F DIAST BP 80-89 MM HG: CPT | Performed by: NURSE PRACTITIONER

## 2023-05-24 PROCEDURE — 99213 OFFICE O/P EST LOW 20 MIN: CPT | Performed by: NURSE PRACTITIONER

## 2023-05-24 RX ORDER — TIZANIDINE 2 MG/1
2 TABLET ORAL 2 TIMES DAILY PRN
Qty: 60 TABLET | Refills: 0 | Status: SHIPPED | OUTPATIENT
Start: 2023-05-24 | End: 2023-06-23

## 2023-05-24 RX ORDER — OXYCODONE HYDROCHLORIDE AND ACETAMINOPHEN 5; 325 MG/1; MG/1
1 TABLET ORAL EVERY 6 HOURS PRN
Qty: 112 TABLET | Refills: 0 | Status: SHIPPED | OUTPATIENT
Start: 2023-05-24 | End: 2023-06-21

## 2023-05-24 NOTE — PROGRESS NOTES
Ga Neia  1960  [id number]      HISTORY OF PRESENT ILLNESS: Mr. James Andrea is a 57 y.o. male returns for a follow up visit for pain management  He has a diagnosis of   1. Incisional hernia without obstruction or gangrene    2. Ilioinguinal neuralgia of right side    3. Chronic pain syndrome    4. Ilioinguinal neuralgia of left side    5. Status post bilateral hernia repair    6. Major depressive disorder, recurrent, mild    7. Arthralgia of multiple joints    8. DDD (degenerative disc disease), cervical    9. Encounter for therapeutic drug monitoring    . As per Information Obtained from the PADT (Patient Assessment and Documentation Tool)    He complains of pain in the pelvic area, bilateral knees. He rates the pain 5/10 and describes it as sharp, aching. Current treatment regimen has helped relieve about 90% of the pain. He denies any side effects from the current pain regimen. Patient reports that since the last follow up visit the physical functioning is unchanged, family/social relationships are unchanged, mood is unchanged sleep patterns are unchanged, and that the overall functioning is unchanged. Patient denies misusing/abusing his narcotic pain medications or using any illegal drugs. Upon obtaining medical history from Mr. Rivera Maricruz: Patients list of allergies were reviewed     MEDICATIONS: Mr. James Andrea list of medications were reviewed. His current medications are   Outpatient Medications Prior to Visit   Medication Sig Dispense Refill    oxyCODONE-acetaminophen (PERCOCET) 5-325 MG per tablet Take 1 tablet by mouth every 8 hours as needed for Pain for up to 28 days.  Max Daily Amount: 3 tablets 84 tablet 0    tiZANidine (ZANAFLEX) 2 MG tablet TAKE 1 TABLET BY MOUTH DAILY AS NEEDED (MUSCLE SPASM) 30 tablet 0    dicyclomine (BENTYL) 10 MG capsule Take 1 capsule by mouth every 8 hours as needed (abdominal pain) 20 capsule 0    busPIRone (BUSPAR) 5 MG tablet TAKE 1 TABLET BY MOUTH

## 2023-05-28 RX ORDER — SILDENAFIL 100 MG/1
100 TABLET, FILM COATED ORAL DAILY PRN
Qty: 30 TABLET | Refills: 0 | Status: SHIPPED | OUTPATIENT
Start: 2023-05-28

## 2023-06-05 ENCOUNTER — TELEPHONE (OUTPATIENT)
Dept: PAIN MANAGEMENT | Age: 63
End: 2023-06-05

## 2023-06-05 NOTE — TELEPHONE ENCOUNTER
Kong dee from [address] called and said that when patient came to  his PERCOCET on 5/24 their system didn't record it being sold. The patient came in again on 6/2 to  the same medication, so now he has 2 months worth of PERCOCETS.  Pharmacy just wanted to make FILI aware of the situation and to let her know to hold off on next months PERCOCET prescriptions since he already has it       Please advise

## 2023-06-21 ENCOUNTER — OFFICE VISIT (OUTPATIENT)
Dept: PAIN MANAGEMENT | Age: 63
End: 2023-06-21
Payer: MEDICARE

## 2023-06-21 VITALS
SYSTOLIC BLOOD PRESSURE: 190 MMHG | BODY MASS INDEX: 30.14 KG/M2 | WEIGHT: 254.2 LBS | HEART RATE: 107 BPM | DIASTOLIC BLOOD PRESSURE: 103 MMHG | OXYGEN SATURATION: 99 %

## 2023-06-21 DIAGNOSIS — G89.4 CHRONIC PAIN SYNDROME: ICD-10-CM

## 2023-06-21 DIAGNOSIS — M50.30 DDD (DEGENERATIVE DISC DISEASE), CERVICAL: ICD-10-CM

## 2023-06-21 DIAGNOSIS — G57.91 ILIOINGUINAL NEURALGIA OF RIGHT SIDE: ICD-10-CM

## 2023-06-21 DIAGNOSIS — F33.0 MAJOR DEPRESSIVE DISORDER, RECURRENT, MILD (HCC): ICD-10-CM

## 2023-06-21 DIAGNOSIS — M25.50 ARTHRALGIA OF MULTIPLE JOINTS: ICD-10-CM

## 2023-06-21 DIAGNOSIS — K43.2 INCISIONAL HERNIA WITHOUT OBSTRUCTION OR GANGRENE: ICD-10-CM

## 2023-06-21 DIAGNOSIS — Z51.81 ENCOUNTER FOR THERAPEUTIC DRUG MONITORING: ICD-10-CM

## 2023-06-21 DIAGNOSIS — Z87.19 STATUS POST BILATERAL HERNIA REPAIR: ICD-10-CM

## 2023-06-21 DIAGNOSIS — Z98.890 STATUS POST BILATERAL HERNIA REPAIR: ICD-10-CM

## 2023-06-21 DIAGNOSIS — G57.92 ILIOINGUINAL NEURALGIA OF LEFT SIDE: ICD-10-CM

## 2023-06-21 PROCEDURE — 1036F TOBACCO NON-USER: CPT | Performed by: NURSE PRACTITIONER

## 2023-06-21 PROCEDURE — G8427 DOCREV CUR MEDS BY ELIG CLIN: HCPCS | Performed by: NURSE PRACTITIONER

## 2023-06-21 PROCEDURE — 3017F COLORECTAL CA SCREEN DOC REV: CPT | Performed by: NURSE PRACTITIONER

## 2023-06-21 PROCEDURE — G8417 CALC BMI ABV UP PARAM F/U: HCPCS | Performed by: NURSE PRACTITIONER

## 2023-06-21 PROCEDURE — 3080F DIAST BP >= 90 MM HG: CPT | Performed by: NURSE PRACTITIONER

## 2023-06-21 PROCEDURE — 99213 OFFICE O/P EST LOW 20 MIN: CPT | Performed by: NURSE PRACTITIONER

## 2023-06-21 PROCEDURE — 3077F SYST BP >= 140 MM HG: CPT | Performed by: NURSE PRACTITIONER

## 2023-06-21 NOTE — PROGRESS NOTES
Acid-Vit B6-Vit B12 0.5-5-0.2 MG TABS Take by mouth      calcitRIOL (ROCALTROL) 0.5 MCG capsule Take 1 capsule by mouth three times a week During HD 30 capsule 3    bisacodyl (BISACODYL) 5 MG EC tablet Take 1 tablet by mouth daily as needed for Constipation 30 tablet 5    aspirin 81 MG tablet Take 1 tablet by mouth daily       No current facility-administered medications for this visit. I will continue his current medication regimen  which is part of the above treatment schedule. It has been helping with Mr. Vanita Uribe chronic  medical problems which for this visit include:   Diagnoses of Incisional hernia without obstruction or gangrene, Ilioinguinal neuralgia of right side, Chronic pain syndrome, Ilioinguinal neuralgia of left side, Status post bilateral hernia repair, Major depressive disorder, recurrent, mild, Arthralgia of multiple joints, DDD (degenerative disc disease), cervical, and Encounter for therapeutic drug monitoring were pertinent to this visit. Risks and benefits of the medications and other alternative treatments  including no treatment were discussed with the patient. The common side effects of these medications were also explained to the patient. Informed verbal consent was obtained. Goals of current treatment regimen include improvement in pain, restoration of functioning- with focus on improvement in physical performance, general activity, work or disability,emotional distress, health care utilization and  decreased medication consumption. Will continue to monitor progress towards achieving/maintaining therapeutic goals with special emphasis on  1. Improvement in perceived interfernce  of pain with ADL's. Ability to do home exercises independently. Ability to do household chores indoor and/or outdoor work and social and leisure activities. Improve psychosocial and physical functioning.  .rsm    He was advised against drinking alcohol with the narcotic pain medicines, advised against driving

## 2023-06-26 ENCOUNTER — HOSPITAL ENCOUNTER (EMERGENCY)
Age: 63
Discharge: HOME | End: 2023-06-27
Attending: EMERGENCY MEDICINE
Payer: MEDICARE

## 2023-06-26 ENCOUNTER — APPOINTMENT (OUTPATIENT)
Dept: CT IMAGING | Age: 63
End: 2023-06-26
Payer: MEDICARE

## 2023-06-26 DIAGNOSIS — R10.9 ABDOMINAL PAIN, UNSPECIFIED ABDOMINAL LOCATION: Primary | ICD-10-CM

## 2023-06-26 LAB
ALBUMIN SERPL-MCNC: 4.6 G/DL (ref 3.4–5)
ALBUMIN/GLOB SERPL: 1.7 {RATIO} (ref 1.1–2.2)
ALP SERPL-CCNC: 65 U/L (ref 40–129)
ALT SERPL-CCNC: 25 U/L (ref 10–40)
ANION GAP SERPL CALCULATED.3IONS-SCNC: 16 MMOL/L (ref 3–16)
AST SERPL-CCNC: 23 U/L (ref 15–37)
BACTERIA URNS QL MICRO: NORMAL /HPF
BASOPHILS # BLD: 0 K/UL (ref 0–0.2)
BASOPHILS NFR BLD: 0.5 %
BILIRUB SERPL-MCNC: <0.2 MG/DL (ref 0–1)
BILIRUB UR QL STRIP.AUTO: NEGATIVE
BUN SERPL-MCNC: 56 MG/DL (ref 7–20)
CALCIUM SERPL-MCNC: 9.2 MG/DL (ref 8.3–10.6)
CHLORIDE SERPL-SCNC: 102 MMOL/L (ref 99–110)
CLARITY UR: CLEAR
CO2 SERPL-SCNC: 22 MMOL/L (ref 21–32)
COLOR UR: YELLOW
CREAT SERPL-MCNC: 6.8 MG/DL (ref 0.8–1.3)
DEPRECATED RDW RBC AUTO: 14.9 % (ref 12.4–15.4)
EOSINOPHIL # BLD: 0.1 K/UL (ref 0–0.6)
EOSINOPHIL NFR BLD: 1.8 %
EPI CELLS #/AREA URNS AUTO: 0 /HPF (ref 0–5)
GFR SERPLBLD CREATININE-BSD FMLA CKD-EPI: 8 ML/MIN/{1.73_M2}
GLUCOSE SERPL-MCNC: 138 MG/DL (ref 70–99)
GLUCOSE UR STRIP.AUTO-MCNC: 100 MG/DL
HCT VFR BLD AUTO: 33.2 % (ref 40.5–52.5)
HGB BLD-MCNC: 10.6 G/DL (ref 13.5–17.5)
HGB UR QL STRIP.AUTO: ABNORMAL
HYALINE CASTS #/AREA URNS AUTO: 0 /LPF (ref 0–8)
KETONES UR STRIP.AUTO-MCNC: NEGATIVE MG/DL
LACTATE BLDV-SCNC: 1.1 MMOL/L (ref 0.4–1.9)
LEUKOCYTE ESTERASE UR QL STRIP.AUTO: NEGATIVE
LYMPHOCYTES # BLD: 0.6 K/UL (ref 1–5.1)
LYMPHOCYTES NFR BLD: 12.8 %
MCH RBC QN AUTO: 28.2 PG (ref 26–34)
MCHC RBC AUTO-ENTMCNC: 31.8 G/DL (ref 31–36)
MCV RBC AUTO: 88.8 FL (ref 80–100)
MONOCYTES # BLD: 0.5 K/UL (ref 0–1.3)
MONOCYTES NFR BLD: 10.6 %
NEUTROPHILS # BLD: 3.6 K/UL (ref 1.7–7.7)
NEUTROPHILS NFR BLD: 74.3 %
NITRITE UR QL STRIP.AUTO: NEGATIVE
PH UR STRIP.AUTO: 7.5 [PH] (ref 5–8)
PLATELET # BLD AUTO: 202 K/UL (ref 135–450)
PMV BLD AUTO: 7.2 FL (ref 5–10.5)
POTASSIUM SERPL-SCNC: 4.2 MMOL/L (ref 3.5–5.1)
PROT SERPL-MCNC: 7.3 G/DL (ref 6.4–8.2)
PROT UR STRIP.AUTO-MCNC: 300 MG/DL
RBC # BLD AUTO: 3.74 M/UL (ref 4.2–5.9)
RBC CLUMPS #/AREA URNS AUTO: 1 /HPF (ref 0–4)
SODIUM SERPL-SCNC: 140 MMOL/L (ref 136–145)
SP GR UR STRIP.AUTO: 1.01 (ref 1–1.03)
UA COMPLETE W REFLEX CULTURE PNL UR: ABNORMAL
UA DIPSTICK W REFLEX MICRO PNL UR: YES
URN SPEC COLLECT METH UR: ABNORMAL
UROBILINOGEN UR STRIP-ACNC: 0.2 E.U./DL
WBC # BLD AUTO: 4.8 K/UL (ref 4–11)
WBC #/AREA URNS AUTO: 2 /HPF (ref 0–5)

## 2023-06-26 PROCEDURE — 83605 ASSAY OF LACTIC ACID: CPT

## 2023-06-26 PROCEDURE — 81001 URINALYSIS AUTO W/SCOPE: CPT

## 2023-06-26 PROCEDURE — 6360000002 HC RX W HCPCS: Performed by: EMERGENCY MEDICINE

## 2023-06-26 PROCEDURE — 80053 COMPREHEN METABOLIC PANEL: CPT

## 2023-06-26 PROCEDURE — 85025 COMPLETE CBC W/AUTO DIFF WBC: CPT

## 2023-06-26 PROCEDURE — 96374 THER/PROPH/DIAG INJ IV PUSH: CPT

## 2023-06-26 PROCEDURE — 74176 CT ABD & PELVIS W/O CONTRAST: CPT

## 2023-06-26 PROCEDURE — 96375 TX/PRO/DX INJ NEW DRUG ADDON: CPT

## 2023-06-26 PROCEDURE — 99284 EMERGENCY DEPT VISIT MOD MDM: CPT

## 2023-06-26 RX ORDER — TIZANIDINE 2 MG/1
2 TABLET ORAL 2 TIMES DAILY PRN
Qty: 60 TABLET | Refills: 0 | OUTPATIENT
Start: 2023-06-26 | End: 2023-07-26

## 2023-06-26 RX ORDER — TIZANIDINE 2 MG/1
TABLET ORAL
Qty: 30 TABLET | Refills: 0 | OUTPATIENT
Start: 2023-06-26

## 2023-06-26 RX ORDER — ONDANSETRON 2 MG/ML
4 INJECTION INTRAMUSCULAR; INTRAVENOUS ONCE
Status: COMPLETED | OUTPATIENT
Start: 2023-06-26 | End: 2023-06-26

## 2023-06-26 RX ORDER — MORPHINE SULFATE 4 MG/ML
4 INJECTION, SOLUTION INTRAMUSCULAR; INTRAVENOUS
Status: COMPLETED | OUTPATIENT
Start: 2023-06-26 | End: 2023-06-26

## 2023-06-26 RX ADMIN — ONDANSETRON 4 MG: 2 INJECTION INTRAMUSCULAR; INTRAVENOUS at 23:24

## 2023-06-26 RX ADMIN — MORPHINE SULFATE 4 MG: 4 INJECTION, SOLUTION INTRAMUSCULAR; INTRAVENOUS at 23:24

## 2023-06-26 ASSESSMENT — PAIN DESCRIPTION - LOCATION: LOCATION: ABDOMEN

## 2023-06-26 ASSESSMENT — PAIN SCALES - GENERAL: PAINLEVEL_OUTOF10: 9

## 2023-06-27 VITALS
SYSTOLIC BLOOD PRESSURE: 149 MMHG | HEART RATE: 81 BPM | HEIGHT: 77 IN | WEIGHT: 255 LBS | BODY MASS INDEX: 30.11 KG/M2 | TEMPERATURE: 98.3 F | OXYGEN SATURATION: 95 % | DIASTOLIC BLOOD PRESSURE: 84 MMHG | RESPIRATION RATE: 13 BRPM

## 2023-06-27 RX ORDER — HYDROCODONE BITARTRATE AND ACETAMINOPHEN 5; 325 MG/1; MG/1
1 TABLET ORAL EVERY 4 HOURS PRN
Qty: 18 TABLET | Refills: 0 | Status: SHIPPED | OUTPATIENT
Start: 2023-06-27 | End: 2023-06-30

## 2023-07-06 ENCOUNTER — TELEPHONE (OUTPATIENT)
Dept: FAMILY MEDICINE CLINIC | Age: 63
End: 2023-07-06

## 2023-07-06 NOTE — TELEPHONE ENCOUNTER
I have reviewed his ER visit from last a few days ago. They did bloodwork and CT that showed no concerning findings, did show the hernias but they should not be causing pain. Is the pain all day or just at night, any urinary sx, any fever, chills, nausea vomiting?

## 2023-07-06 NOTE — TELEPHONE ENCOUNTER
Pt stopped radiation 2 weeks ago and has been in excruciating abdominal pain since then. Pt wife states he is up all night and constantly taking a bunch of pepto bismol and is still miserable. Pt wife states he had a hernia and it has returned. They went to the hospital recently for this. They also contacted the surgeon and he advised them to go to the ED and that has not helped. Pt has surgery on the 15th for the hernia but they are asking what can they do in the mean time for comfort.

## 2023-07-07 NOTE — TELEPHONE ENCOUNTER
bloodwork and urine reviewed from ER visit 6/26 and were completely normal, no s/s of UTI, and no issues on CT scan  Notify pt to monitor sx over the weekend  When is he supposed to have surgery?

## 2023-07-26 ENCOUNTER — OFFICE VISIT (OUTPATIENT)
Dept: PAIN MANAGEMENT | Age: 63
End: 2023-07-26
Payer: MEDICARE

## 2023-07-26 VITALS
HEART RATE: 110 BPM | WEIGHT: 251.8 LBS | SYSTOLIC BLOOD PRESSURE: 148 MMHG | DIASTOLIC BLOOD PRESSURE: 87 MMHG | BODY MASS INDEX: 29.86 KG/M2 | OXYGEN SATURATION: 98 %

## 2023-07-26 DIAGNOSIS — Z87.19 STATUS POST BILATERAL HERNIA REPAIR: ICD-10-CM

## 2023-07-26 DIAGNOSIS — Z51.81 ENCOUNTER FOR THERAPEUTIC DRUG MONITORING: ICD-10-CM

## 2023-07-26 DIAGNOSIS — Z98.890 STATUS POST BILATERAL HERNIA REPAIR: ICD-10-CM

## 2023-07-26 DIAGNOSIS — F33.0 MAJOR DEPRESSIVE DISORDER, RECURRENT, MILD (HCC): ICD-10-CM

## 2023-07-26 DIAGNOSIS — M25.50 ARTHRALGIA OF MULTIPLE JOINTS: ICD-10-CM

## 2023-07-26 DIAGNOSIS — M50.30 DDD (DEGENERATIVE DISC DISEASE), CERVICAL: ICD-10-CM

## 2023-07-26 DIAGNOSIS — K43.2 INCISIONAL HERNIA WITHOUT OBSTRUCTION OR GANGRENE: ICD-10-CM

## 2023-07-26 DIAGNOSIS — G57.91 ILIOINGUINAL NEURALGIA OF RIGHT SIDE: ICD-10-CM

## 2023-07-26 DIAGNOSIS — G89.4 CHRONIC PAIN SYNDROME: Primary | ICD-10-CM

## 2023-07-26 DIAGNOSIS — G57.92 ILIOINGUINAL NEURALGIA OF LEFT SIDE: ICD-10-CM

## 2023-07-26 PROCEDURE — 99213 OFFICE O/P EST LOW 20 MIN: CPT | Performed by: NURSE PRACTITIONER

## 2023-07-26 PROCEDURE — 3078F DIAST BP <80 MM HG: CPT | Performed by: NURSE PRACTITIONER

## 2023-07-26 PROCEDURE — 3074F SYST BP LT 130 MM HG: CPT | Performed by: NURSE PRACTITIONER

## 2023-07-26 RX ORDER — OXYCODONE HYDROCHLORIDE AND ACETAMINOPHEN 5; 325 MG/1; MG/1
1 TABLET ORAL EVERY 6 HOURS PRN
Qty: 112 TABLET | Refills: 0 | Status: SHIPPED | OUTPATIENT
Start: 2023-07-26 | End: 2023-08-23

## 2023-07-26 NOTE — PROGRESS NOTES
of right side, Ilioinguinal neuralgia of left side, Status post bilateral hernia repair, Major depressive disorder, recurrent, mild, Arthralgia of multiple joints, DDD (degenerative disc disease), cervical, and Encounter for therapeutic drug monitoring were also pertinent to this visit. Risks and benefits of the medications and other alternative treatments  including no treatment were discussed with the patient. The common side effects of these medications were also explained to the patient. Informed verbal consent was obtained. Goals of current treatment regimen include improvement in pain, restoration of functioning- with focus on improvement in physical performance, general activity, work or disability,emotional distress, health care utilization and  decreased medication consumption. Will continue to monitor progress towards achieving/maintaining therapeutic goals with special emphasis on  1. Improvement in perceived interfernce  of pain with ADL's. Ability to do home exercises independently. Ability to do household chores indoor and/or outdoor work and social and leisure activities. Improve psychosocial and physical functioning. - he is not showing any significant progress/or showing regression  towards this goal and reassessment and adjustment of goals/treatment have been made. He was advised against drinking alcohol with the narcotic pain medicines, advised against driving or handling machinery while adjusting the dose of medicines or if having cognitive  issues related to the current medications. Risk of overdose and death, if medicines not taken as prescribed, were also discussed. If the patient develops new symptoms or if the symptoms worsen, the patient should call the office. While transcribing every attempt was made to maintain the accuracy of the note in terms of it's contents,there may have been some errors made inadvertently.   Thank you for allowing me to participate in the care of this

## 2023-08-08 ENCOUNTER — OFFICE VISIT (OUTPATIENT)
Dept: FAMILY MEDICINE CLINIC | Age: 63
End: 2023-08-08

## 2023-08-08 VITALS
TEMPERATURE: 98.3 F | BODY MASS INDEX: 29.88 KG/M2 | WEIGHT: 252 LBS | HEART RATE: 97 BPM | RESPIRATION RATE: 14 BRPM | DIASTOLIC BLOOD PRESSURE: 68 MMHG | OXYGEN SATURATION: 97 % | SYSTOLIC BLOOD PRESSURE: 138 MMHG

## 2023-08-08 DIAGNOSIS — N18.4 TYPE 2 DIABETES MELLITUS WITH STAGE 4 CHRONIC KIDNEY DISEASE, UNSPECIFIED WHETHER LONG TERM INSULIN USE (HCC): ICD-10-CM

## 2023-08-08 DIAGNOSIS — E78.00 HYPERCHOLESTEROLEMIA: ICD-10-CM

## 2023-08-08 DIAGNOSIS — I48.0 PAROXYSMAL ATRIAL FIBRILLATION (HCC): ICD-10-CM

## 2023-08-08 DIAGNOSIS — E11.22 TYPE 2 DIABETES MELLITUS WITH STAGE 4 CHRONIC KIDNEY DISEASE, UNSPECIFIED WHETHER LONG TERM INSULIN USE (HCC): ICD-10-CM

## 2023-08-08 DIAGNOSIS — Z99.2 ESRD (END STAGE RENAL DISEASE) ON DIALYSIS (HCC): Primary | ICD-10-CM

## 2023-08-08 DIAGNOSIS — N18.6 ESRD (END STAGE RENAL DISEASE) ON DIALYSIS (HCC): Primary | ICD-10-CM

## 2023-08-08 DIAGNOSIS — I50.22 CHRONIC SYSTOLIC CONGESTIVE HEART FAILURE (HCC): ICD-10-CM

## 2023-08-08 DIAGNOSIS — F41.8 DEPRESSION WITH ANXIETY: ICD-10-CM

## 2023-08-08 DIAGNOSIS — C61 PROSTATE CANCER (HCC): ICD-10-CM

## 2023-08-08 DIAGNOSIS — I10 ESSENTIAL HYPERTENSION, BENIGN: ICD-10-CM

## 2023-08-08 PROBLEM — E44.0 MODERATE MALNUTRITION (HCC): Chronic | Status: RESOLVED | Noted: 2022-06-15 | Resolved: 2023-08-08

## 2023-08-08 SDOH — ECONOMIC STABILITY: FOOD INSECURITY: WITHIN THE PAST 12 MONTHS, YOU WORRIED THAT YOUR FOOD WOULD RUN OUT BEFORE YOU GOT MONEY TO BUY MORE.: NEVER TRUE

## 2023-08-08 SDOH — ECONOMIC STABILITY: INCOME INSECURITY: HOW HARD IS IT FOR YOU TO PAY FOR THE VERY BASICS LIKE FOOD, HOUSING, MEDICAL CARE, AND HEATING?: NOT HARD AT ALL

## 2023-08-08 SDOH — ECONOMIC STABILITY: FOOD INSECURITY: WITHIN THE PAST 12 MONTHS, THE FOOD YOU BOUGHT JUST DIDN'T LAST AND YOU DIDN'T HAVE MONEY TO GET MORE.: NEVER TRUE

## 2023-08-09 LAB
ALBUMIN SERPL-MCNC: 4.5 G/DL (ref 3.4–5)
ALBUMIN/GLOB SERPL: 2 {RATIO} (ref 1.1–2.2)
ALP SERPL-CCNC: 80 U/L (ref 40–129)
ALT SERPL-CCNC: 19 U/L (ref 10–40)
ANION GAP SERPL CALCULATED.3IONS-SCNC: 16 MMOL/L (ref 3–16)
AST SERPL-CCNC: 22 U/L (ref 15–37)
BILIRUB SERPL-MCNC: <0.2 MG/DL (ref 0–1)
BUN SERPL-MCNC: 18 MG/DL (ref 7–20)
CALCIUM SERPL-MCNC: 9.5 MG/DL (ref 8.3–10.6)
CHLORIDE SERPL-SCNC: 99 MMOL/L (ref 99–110)
CHOLEST SERPL-MCNC: 182 MG/DL (ref 0–199)
CO2 SERPL-SCNC: 25 MMOL/L (ref 21–32)
CREAT SERPL-MCNC: 4.7 MG/DL (ref 0.8–1.3)
EST. AVERAGE GLUCOSE BLD GHB EST-MCNC: 111.2 MG/DL
GFR SERPLBLD CREATININE-BSD FMLA CKD-EPI: 13 ML/MIN/{1.73_M2}
GLUCOSE SERPL-MCNC: 156 MG/DL (ref 70–99)
HBA1C MFR BLD: 5.5 %
HDLC SERPL-MCNC: 59 MG/DL (ref 40–60)
LDLC SERPL CALC-MCNC: 89 MG/DL
POTASSIUM SERPL-SCNC: 4.3 MMOL/L (ref 3.5–5.1)
PROT SERPL-MCNC: 6.8 G/DL (ref 6.4–8.2)
SODIUM SERPL-SCNC: 140 MMOL/L (ref 136–145)
TRIGL SERPL-MCNC: 171 MG/DL (ref 0–150)
VLDLC SERPL CALC-MCNC: 34 MG/DL

## 2023-08-16 ENCOUNTER — OFFICE VISIT (OUTPATIENT)
Dept: FAMILY MEDICINE CLINIC | Age: 63
End: 2023-08-16

## 2023-08-16 VITALS
DIASTOLIC BLOOD PRESSURE: 74 MMHG | RESPIRATION RATE: 16 BRPM | TEMPERATURE: 98.3 F | BODY MASS INDEX: 30.14 KG/M2 | SYSTOLIC BLOOD PRESSURE: 130 MMHG | OXYGEN SATURATION: 98 % | WEIGHT: 254.2 LBS | HEART RATE: 97 BPM

## 2023-08-16 DIAGNOSIS — E11.59 TYPE 2 DIABETES MELLITUS WITH OTHER CIRCULATORY COMPLICATIONS (HCC): ICD-10-CM

## 2023-08-16 DIAGNOSIS — M79.604 LEG PAIN, BILATERAL: Primary | ICD-10-CM

## 2023-08-16 DIAGNOSIS — M79.605 LEG PAIN, BILATERAL: Primary | ICD-10-CM

## 2023-08-16 ASSESSMENT — ENCOUNTER SYMPTOMS
WHEEZING: 0
COUGH: 0
SHORTNESS OF BREATH: 0

## 2023-08-16 NOTE — ASSESSMENT & PLAN NOTE
Diabetes Stable, controlled  No changes  Continue current treatment plan      Ordered CATHERINE for patient's current leg pain symptoms.

## 2023-08-16 NOTE — ASSESSMENT & PLAN NOTE
Pain with activity, denies pain with rest.   No erythema or swelling in bilateral LEs. Patient denies pain to the touch. No discoloration noted. CATHERINE to r/o arterial insufficiency. Await results for further treatment recommendations. Patient to follow up with PCP.

## 2023-08-17 ENCOUNTER — HOSPITAL ENCOUNTER (OUTPATIENT)
Dept: VASCULAR LAB | Age: 63
Discharge: HOME | End: 2023-08-17
Payer: MEDICARE

## 2023-08-17 DIAGNOSIS — M79.605 LEG PAIN, BILATERAL: ICD-10-CM

## 2023-08-17 DIAGNOSIS — E11.59 TYPE 2 DIABETES MELLITUS WITH OTHER CIRCULATORY COMPLICATIONS (HCC): ICD-10-CM

## 2023-08-17 DIAGNOSIS — M79.604 LEG PAIN, BILATERAL: ICD-10-CM

## 2023-08-17 PROCEDURE — 93922 UPR/L XTREMITY ART 2 LEVELS: CPT

## 2023-08-23 ENCOUNTER — OFFICE VISIT (OUTPATIENT)
Dept: PAIN MANAGEMENT | Age: 63
End: 2023-08-23
Payer: MEDICARE

## 2023-08-23 VITALS
WEIGHT: 258.8 LBS | OXYGEN SATURATION: 95 % | HEART RATE: 88 BPM | BODY MASS INDEX: 30.69 KG/M2 | DIASTOLIC BLOOD PRESSURE: 87 MMHG | SYSTOLIC BLOOD PRESSURE: 143 MMHG

## 2023-08-23 DIAGNOSIS — F33.0 MAJOR DEPRESSIVE DISORDER, RECURRENT, MILD (HCC): ICD-10-CM

## 2023-08-23 DIAGNOSIS — G57.92 ILIOINGUINAL NEURALGIA OF LEFT SIDE: ICD-10-CM

## 2023-08-23 DIAGNOSIS — K43.2 INCISIONAL HERNIA WITHOUT OBSTRUCTION OR GANGRENE: ICD-10-CM

## 2023-08-23 DIAGNOSIS — Z51.81 ENCOUNTER FOR THERAPEUTIC DRUG MONITORING: ICD-10-CM

## 2023-08-23 DIAGNOSIS — M50.30 DDD (DEGENERATIVE DISC DISEASE), CERVICAL: ICD-10-CM

## 2023-08-23 DIAGNOSIS — G57.91 ILIOINGUINAL NEURALGIA OF RIGHT SIDE: ICD-10-CM

## 2023-08-23 DIAGNOSIS — Z87.19 STATUS POST BILATERAL HERNIA REPAIR: ICD-10-CM

## 2023-08-23 DIAGNOSIS — G89.4 CHRONIC PAIN SYNDROME: Primary | ICD-10-CM

## 2023-08-23 DIAGNOSIS — M25.50 ARTHRALGIA OF MULTIPLE JOINTS: ICD-10-CM

## 2023-08-23 DIAGNOSIS — Z98.890 STATUS POST BILATERAL HERNIA REPAIR: ICD-10-CM

## 2023-08-23 PROCEDURE — 99213 OFFICE O/P EST LOW 20 MIN: CPT | Performed by: NURSE PRACTITIONER

## 2023-08-23 PROCEDURE — 3079F DIAST BP 80-89 MM HG: CPT | Performed by: NURSE PRACTITIONER

## 2023-08-23 PROCEDURE — 3077F SYST BP >= 140 MM HG: CPT | Performed by: NURSE PRACTITIONER

## 2023-08-23 RX ORDER — OXYCODONE HYDROCHLORIDE AND ACETAMINOPHEN 5; 325 MG/1; MG/1
1 TABLET ORAL EVERY 6 HOURS PRN
Qty: 112 TABLET | Refills: 0 | Status: SHIPPED | OUTPATIENT
Start: 2023-08-23 | End: 2023-09-20

## 2023-08-23 NOTE — PROGRESS NOTES
Luda [facility]  1960  [id number]      HISTORY OF PRESENT ILLNESS: Mr. Arabella Mccord is a 57 y.o. male returns for a follow up visit for pain management  He has a diagnosis of   1. Chronic pain syndrome    2. Incisional hernia without obstruction or gangrene    3. Ilioinguinal neuralgia of right side    4. Ilioinguinal neuralgia of left side    5. Status post bilateral hernia repair    6. Major depressive disorder, recurrent, mild    7. Arthralgia of multiple joints    8. DDD (degenerative disc disease), cervical    9. Encounter for therapeutic drug monitoring    . As per Information Obtained from the PADT (Patient Assessment and Documentation Tool)    He complains of pain in the groin, bilateral knees. He rates the pain 6/10 and describes it as aching. Current treatment regimen has helped relieve about 90% of the pain. He denies any side effects from the current pain regimen. Patient reports that since the last follow up visit the physical functioning is unchanged, family/social relationships are unchanged, mood is unchanged sleep patterns are unchanged, and that the overall functioning is unchanged. Patient denies misusing/abusing his narcotic pain medications or using any illegal drugs. Upon obtaining medical history from Mr. Eduardo Constantino: Patients list of allergies were reviewed     MEDICATIONS: Mr. Arabella Mccord list of medications were reviewed. His current medications are   Outpatient Medications Prior to Visit   Medication Sig Dispense Refill    atorvastatin (LIPITOR) 20 MG tablet TAKE 1 TABLET BY MOUTH EVERY DAY 90 tablet 1    oxyCODONE-acetaminophen (PERCOCET) 5-325 MG per tablet Take 1 tablet by mouth every 6 hours as needed for Pain for up to 28 days.  Max Daily Amount: 4 tablets 112 tablet 0    vitamin D (ERGOCALCIFEROL) 1.25 MG (09724 UT) CAPS capsule Take 1 capsule by mouth once a week 12 capsule 0    metoprolol succinate (TOPROL XL) 50 MG extended release tablet TAKE 1 TABLET BY MOUTH EVERYDAY

## 2023-08-28 RX ORDER — SILDENAFIL 100 MG/1
TABLET, FILM COATED ORAL
Qty: 30 TABLET | Refills: 0 | Status: SHIPPED | OUTPATIENT
Start: 2023-08-28

## 2023-08-28 NOTE — TELEPHONE ENCOUNTER
Requested Prescriptions     Pending Prescriptions Disp Refills    sildenafil (VIAGRA) 100 MG tablet [Pharmacy Med Name: Sildenafil Citrate Oral Tablet 100 MG] 30 tablet 0     Sig: take 1 tablet by mouth once daily as needed for erectile dysfunction.           Last Office Visit: 8/16/2023     Next Office Visit: Visit date not found     Last Labs: 8/8/2023

## 2023-08-29 RX ORDER — SILDENAFIL 100 MG/1
TABLET, FILM COATED ORAL
Qty: 30 TABLET | Refills: 0 | OUTPATIENT
Start: 2023-08-29

## 2023-09-06 RX ORDER — SILDENAFIL 100 MG/1
TABLET, FILM COATED ORAL
Qty: 30 TABLET | Refills: 0 | Status: SHIPPED | OUTPATIENT
Start: 2023-09-06

## 2023-09-06 NOTE — TELEPHONE ENCOUNTER
08/16/2023 LOV  No Scheduled FOV    Requested Prescriptions     Pending Prescriptions Disp Refills    sildenafil (VIAGRA) 100 MG tablet [Pharmacy Med Name: Sildenafil Citrate Oral Tablet 100 MG] 30 tablet 0     Sig: take 1 tablet by mouth once daily as needed for erectile dysfunction.

## 2023-09-20 ENCOUNTER — OFFICE VISIT (OUTPATIENT)
Dept: PAIN MANAGEMENT | Age: 63
End: 2023-09-20
Payer: MEDICARE

## 2023-09-20 VITALS
DIASTOLIC BLOOD PRESSURE: 79 MMHG | WEIGHT: 254.6 LBS | HEART RATE: 87 BPM | OXYGEN SATURATION: 98 % | SYSTOLIC BLOOD PRESSURE: 121 MMHG | BODY MASS INDEX: 30.19 KG/M2

## 2023-09-20 DIAGNOSIS — G57.91 ILIOINGUINAL NEURALGIA OF RIGHT SIDE: ICD-10-CM

## 2023-09-20 DIAGNOSIS — Z98.890 STATUS POST BILATERAL HERNIA REPAIR: ICD-10-CM

## 2023-09-20 DIAGNOSIS — Z87.19 STATUS POST BILATERAL HERNIA REPAIR: ICD-10-CM

## 2023-09-20 DIAGNOSIS — Z51.81 ENCOUNTER FOR THERAPEUTIC DRUG MONITORING: ICD-10-CM

## 2023-09-20 DIAGNOSIS — M25.50 ARTHRALGIA OF MULTIPLE JOINTS: ICD-10-CM

## 2023-09-20 DIAGNOSIS — M50.30 DDD (DEGENERATIVE DISC DISEASE), CERVICAL: ICD-10-CM

## 2023-09-20 DIAGNOSIS — F33.0 MAJOR DEPRESSIVE DISORDER, RECURRENT, MILD (HCC): ICD-10-CM

## 2023-09-20 DIAGNOSIS — K43.2 INCISIONAL HERNIA WITHOUT OBSTRUCTION OR GANGRENE: ICD-10-CM

## 2023-09-20 DIAGNOSIS — G57.92 ILIOINGUINAL NEURALGIA OF LEFT SIDE: ICD-10-CM

## 2023-09-20 DIAGNOSIS — G89.4 CHRONIC PAIN SYNDROME: Primary | ICD-10-CM

## 2023-09-20 PROCEDURE — 3074F SYST BP LT 130 MM HG: CPT | Performed by: NURSE PRACTITIONER

## 2023-09-20 PROCEDURE — 99214 OFFICE O/P EST MOD 30 MIN: CPT | Performed by: NURSE PRACTITIONER

## 2023-09-20 PROCEDURE — 3078F DIAST BP <80 MM HG: CPT | Performed by: NURSE PRACTITIONER

## 2023-09-20 RX ORDER — OXYCODONE HYDROCHLORIDE AND ACETAMINOPHEN 5; 325 MG/1; MG/1
1 TABLET ORAL EVERY 6 HOURS PRN
Qty: 112 TABLET | Refills: 0 | Status: SHIPPED | OUTPATIENT
Start: 2023-09-20 | End: 2023-10-18

## 2023-09-25 ENCOUNTER — OFFICE VISIT (OUTPATIENT)
Dept: FAMILY MEDICINE CLINIC | Age: 63
End: 2023-09-25

## 2023-09-25 VITALS
DIASTOLIC BLOOD PRESSURE: 60 MMHG | RESPIRATION RATE: 16 BRPM | TEMPERATURE: 98.1 F | HEART RATE: 94 BPM | BODY MASS INDEX: 30.78 KG/M2 | SYSTOLIC BLOOD PRESSURE: 146 MMHG | WEIGHT: 259.6 LBS | OXYGEN SATURATION: 98 %

## 2023-09-25 DIAGNOSIS — I10 ESSENTIAL HYPERTENSION, BENIGN: ICD-10-CM

## 2023-09-25 DIAGNOSIS — N30.40 RADIATION CYSTITIS: ICD-10-CM

## 2023-09-25 DIAGNOSIS — I50.22 CHRONIC SYSTOLIC CONGESTIVE HEART FAILURE (HCC): ICD-10-CM

## 2023-09-25 DIAGNOSIS — C61 PROSTATE CANCER (HCC): ICD-10-CM

## 2023-09-25 DIAGNOSIS — R35.0 URINARY FREQUENCY: Primary | ICD-10-CM

## 2023-09-25 DIAGNOSIS — E11.59 TYPE 2 DIABETES MELLITUS WITH OTHER CIRCULATORY COMPLICATIONS (HCC): ICD-10-CM

## 2023-09-25 DIAGNOSIS — N18.6 ESRD (END STAGE RENAL DISEASE) ON DIALYSIS (HCC): ICD-10-CM

## 2023-09-25 DIAGNOSIS — Z99.2 ESRD (END STAGE RENAL DISEASE) ON DIALYSIS (HCC): ICD-10-CM

## 2023-09-25 LAB
BILIRUBIN, POC: ABNORMAL
BLOOD URINE, POC: ABNORMAL
CLARITY, POC: CLEAR
COLOR, POC: YELLOW
GLUCOSE URINE, POC: ABNORMAL
KETONES, POC: ABNORMAL
LEUKOCYTE EST, POC: ABNORMAL
NITRITE, POC: ABNORMAL
PH, POC: 7.5
PROTEIN, POC: ABNORMAL
SPECIFIC GRAVITY, POC: 1.02
UROBILINOGEN, POC: 0.2

## 2023-09-25 RX ORDER — SOLIFENACIN SUCCINATE 5 MG/1
5 TABLET, FILM COATED ORAL DAILY
Qty: 30 TABLET | Refills: 5 | Status: SHIPPED | OUTPATIENT
Start: 2023-09-25

## 2023-10-17 ENCOUNTER — TELEPHONE (OUTPATIENT)
Dept: FAMILY MEDICINE CLINIC | Age: 63
End: 2023-10-17

## 2023-10-17 DIAGNOSIS — N18.6 ESRD (END STAGE RENAL DISEASE) ON DIALYSIS (HCC): Primary | ICD-10-CM

## 2023-10-17 DIAGNOSIS — Z99.2 ESRD (END STAGE RENAL DISEASE) ON DIALYSIS (HCC): Primary | ICD-10-CM

## 2023-10-18 ENCOUNTER — OFFICE VISIT (OUTPATIENT)
Dept: PAIN MANAGEMENT | Age: 63
End: 2023-10-18
Payer: MEDICARE

## 2023-10-18 VITALS
WEIGHT: 259 LBS | DIASTOLIC BLOOD PRESSURE: 70 MMHG | BODY MASS INDEX: 30.71 KG/M2 | HEART RATE: 94 BPM | SYSTOLIC BLOOD PRESSURE: 116 MMHG | OXYGEN SATURATION: 97 %

## 2023-10-18 DIAGNOSIS — Z51.81 ENCOUNTER FOR THERAPEUTIC DRUG MONITORING: ICD-10-CM

## 2023-10-18 DIAGNOSIS — Z98.890 STATUS POST BILATERAL HERNIA REPAIR: ICD-10-CM

## 2023-10-18 DIAGNOSIS — M50.30 DDD (DEGENERATIVE DISC DISEASE), CERVICAL: ICD-10-CM

## 2023-10-18 DIAGNOSIS — M25.50 ARTHRALGIA OF MULTIPLE JOINTS: ICD-10-CM

## 2023-10-18 DIAGNOSIS — K43.2 INCISIONAL HERNIA WITHOUT OBSTRUCTION OR GANGRENE: ICD-10-CM

## 2023-10-18 DIAGNOSIS — G57.92 ILIOINGUINAL NEURALGIA OF LEFT SIDE: ICD-10-CM

## 2023-10-18 DIAGNOSIS — F33.0 MAJOR DEPRESSIVE DISORDER, RECURRENT, MILD (HCC): ICD-10-CM

## 2023-10-18 DIAGNOSIS — G57.91 ILIOINGUINAL NEURALGIA OF RIGHT SIDE: ICD-10-CM

## 2023-10-18 DIAGNOSIS — G89.4 CHRONIC PAIN SYNDROME: ICD-10-CM

## 2023-10-18 DIAGNOSIS — Z87.19 STATUS POST BILATERAL HERNIA REPAIR: ICD-10-CM

## 2023-10-18 PROCEDURE — 3074F SYST BP LT 130 MM HG: CPT | Performed by: NURSE PRACTITIONER

## 2023-10-18 PROCEDURE — 99213 OFFICE O/P EST LOW 20 MIN: CPT | Performed by: NURSE PRACTITIONER

## 2023-10-18 PROCEDURE — 3078F DIAST BP <80 MM HG: CPT | Performed by: NURSE PRACTITIONER

## 2023-10-18 RX ORDER — OXYCODONE HYDROCHLORIDE AND ACETAMINOPHEN 5; 325 MG/1; MG/1
1 TABLET ORAL EVERY 6 HOURS PRN
Qty: 112 TABLET | Refills: 0 | Status: SHIPPED | OUTPATIENT
Start: 2023-10-18 | End: 2023-11-15

## 2023-10-18 NOTE — TELEPHONE ENCOUNTER
Patient called after hours with bilateral hand pain after dialysis earlier in the day. He reports cramping progressively increased as the day moved forward. He has not taken anything for his symptoms. Recommended tylenol for the pain and to get labs in the morning on 10/18 and to follow up if symptoms worsen, also advised to inform nephrology. This morning, call to follow up and he is getting labs completed at this time and he is feeling improved. No increased pain or cramping overnight.

## 2023-10-26 ENCOUNTER — APPOINTMENT (OUTPATIENT)
Dept: CT IMAGING | Age: 63
End: 2023-10-26
Payer: MEDICARE

## 2023-10-26 ENCOUNTER — HOSPITAL ENCOUNTER (EMERGENCY)
Age: 63
Discharge: HOME | End: 2023-10-26
Attending: EMERGENCY MEDICINE
Payer: MEDICARE

## 2023-10-26 VITALS
TEMPERATURE: 98.5 F | BODY MASS INDEX: 30.7 KG/M2 | RESPIRATION RATE: 14 BRPM | OXYGEN SATURATION: 93 % | HEART RATE: 79 BPM | HEIGHT: 77 IN | SYSTOLIC BLOOD PRESSURE: 130 MMHG | DIASTOLIC BLOOD PRESSURE: 73 MMHG | WEIGHT: 260 LBS

## 2023-10-26 DIAGNOSIS — N30.00 ACUTE CYSTITIS WITHOUT HEMATURIA: ICD-10-CM

## 2023-10-26 DIAGNOSIS — R10.30 LOWER ABDOMINAL PAIN: Primary | ICD-10-CM

## 2023-10-26 LAB
ALBUMIN SERPL-MCNC: 4.8 G/DL (ref 3.4–5)
ALBUMIN/GLOB SERPL: 2.2 {RATIO} (ref 1.1–2.2)
ALP SERPL-CCNC: 57 U/L (ref 40–129)
ALT SERPL-CCNC: 17 U/L (ref 10–40)
ANION GAP SERPL CALCULATED.3IONS-SCNC: 17 MMOL/L (ref 3–16)
AST SERPL-CCNC: 20 U/L (ref 15–37)
BACTERIA URNS QL MICRO: ABNORMAL /HPF
BASOPHILS # BLD: 0 K/UL (ref 0–0.2)
BASOPHILS NFR BLD: 0.7 %
BILIRUB SERPL-MCNC: <0.2 MG/DL (ref 0–1)
BILIRUB UR QL STRIP.AUTO: NEGATIVE
BUN SERPL-MCNC: 65 MG/DL (ref 7–20)
CALCIUM SERPL-MCNC: 9 MG/DL (ref 8.3–10.6)
CHLORIDE SERPL-SCNC: 98 MMOL/L (ref 99–110)
CLARITY UR: CLEAR
CO2 SERPL-SCNC: 25 MMOL/L (ref 21–32)
COLOR UR: YELLOW
CREAT SERPL-MCNC: 8.8 MG/DL (ref 0.8–1.3)
DEPRECATED RDW RBC AUTO: 15.7 % (ref 12.4–15.4)
EKG ATRIAL RATE: 81 BPM
EKG DIAGNOSIS: NORMAL
EKG P AXIS: 42 DEGREES
EKG P-R INTERVAL: 158 MS
EKG Q-T INTERVAL: 380 MS
EKG QRS DURATION: 96 MS
EKG QTC CALCULATION (BAZETT): 441 MS
EKG R AXIS: -20 DEGREES
EKG T AXIS: -1 DEGREES
EKG VENTRICULAR RATE: 81 BPM
EOSINOPHIL # BLD: 0.1 K/UL (ref 0–0.6)
EOSINOPHIL NFR BLD: 1.9 %
EPI CELLS #/AREA URNS AUTO: 0 /HPF (ref 0–5)
GFR SERPLBLD CREATININE-BSD FMLA CKD-EPI: 6 ML/MIN/{1.73_M2}
GLUCOSE SERPL-MCNC: 87 MG/DL (ref 70–99)
GLUCOSE UR STRIP.AUTO-MCNC: NEGATIVE MG/DL
HCT VFR BLD AUTO: 31.5 % (ref 40.5–52.5)
HGB BLD-MCNC: 10 G/DL (ref 13.5–17.5)
HGB UR QL STRIP.AUTO: ABNORMAL
HYALINE CASTS #/AREA URNS AUTO: 0 /LPF (ref 0–8)
KETONES UR STRIP.AUTO-MCNC: NEGATIVE MG/DL
LEUKOCYTE ESTERASE UR QL STRIP.AUTO: ABNORMAL
LIPASE SERPL-CCNC: 30 U/L (ref 13–60)
LYMPHOCYTES # BLD: 0.6 K/UL (ref 1–5.1)
LYMPHOCYTES NFR BLD: 16.5 %
MCH RBC QN AUTO: 27.9 PG (ref 26–34)
MCHC RBC AUTO-ENTMCNC: 31.7 G/DL (ref 31–36)
MCV RBC AUTO: 88.2 FL (ref 80–100)
MONOCYTES # BLD: 0.4 K/UL (ref 0–1.3)
MONOCYTES NFR BLD: 11 %
NEUTROPHILS # BLD: 2.5 K/UL (ref 1.7–7.7)
NEUTROPHILS NFR BLD: 69.9 %
NITRITE UR QL STRIP.AUTO: NEGATIVE
PH UR STRIP.AUTO: 7.5 [PH] (ref 5–8)
PLATELET # BLD AUTO: 163 K/UL (ref 135–450)
PMV BLD AUTO: 7.6 FL (ref 5–10.5)
POTASSIUM SERPL-SCNC: 5.7 MMOL/L (ref 3.5–5.1)
PROT SERPL-MCNC: 7 G/DL (ref 6.4–8.2)
PROT UR STRIP.AUTO-MCNC: 100 MG/DL
RBC # BLD AUTO: 3.57 M/UL (ref 4.2–5.9)
RBC CLUMPS #/AREA URNS AUTO: 2 /HPF (ref 0–4)
SODIUM SERPL-SCNC: 140 MMOL/L (ref 136–145)
SP GR UR STRIP.AUTO: 1.01 (ref 1–1.03)
UA COMPLETE W REFLEX CULTURE PNL UR: YES
UA DIPSTICK W REFLEX MICRO PNL UR: YES
URN SPEC COLLECT METH UR: ABNORMAL
UROBILINOGEN UR STRIP-ACNC: 0.2 E.U./DL
WBC # BLD AUTO: 3.6 K/UL (ref 4–11)
WBC #/AREA URNS AUTO: 12 /HPF (ref 0–5)

## 2023-10-26 PROCEDURE — 83690 ASSAY OF LIPASE: CPT

## 2023-10-26 PROCEDURE — 74176 CT ABD & PELVIS W/O CONTRAST: CPT

## 2023-10-26 PROCEDURE — 99284 EMERGENCY DEPT VISIT MOD MDM: CPT

## 2023-10-26 PROCEDURE — 6360000002 HC RX W HCPCS: Performed by: EMERGENCY MEDICINE

## 2023-10-26 PROCEDURE — 96365 THER/PROPH/DIAG IV INF INIT: CPT

## 2023-10-26 PROCEDURE — 93010 ELECTROCARDIOGRAM REPORT: CPT | Performed by: INTERNAL MEDICINE

## 2023-10-26 PROCEDURE — 80053 COMPREHEN METABOLIC PANEL: CPT

## 2023-10-26 PROCEDURE — 87086 URINE CULTURE/COLONY COUNT: CPT

## 2023-10-26 PROCEDURE — 96375 TX/PRO/DX INJ NEW DRUG ADDON: CPT

## 2023-10-26 PROCEDURE — 2580000003 HC RX 258: Performed by: EMERGENCY MEDICINE

## 2023-10-26 PROCEDURE — 93005 ELECTROCARDIOGRAM TRACING: CPT | Performed by: EMERGENCY MEDICINE

## 2023-10-26 PROCEDURE — 96376 TX/PRO/DX INJ SAME DRUG ADON: CPT

## 2023-10-26 PROCEDURE — 85025 COMPLETE CBC W/AUTO DIFF WBC: CPT

## 2023-10-26 PROCEDURE — 81001 URINALYSIS AUTO W/SCOPE: CPT

## 2023-10-26 RX ORDER — CEFUROXIME AXETIL 250 MG/1
250 TABLET ORAL 2 TIMES DAILY
Qty: 14 TABLET | Refills: 0 | Status: SHIPPED | OUTPATIENT
Start: 2023-10-26 | End: 2023-11-02

## 2023-10-26 RX ORDER — FENTANYL CITRATE 50 UG/ML
50 INJECTION, SOLUTION INTRAMUSCULAR; INTRAVENOUS ONCE
Status: COMPLETED | OUTPATIENT
Start: 2023-10-26 | End: 2023-10-26

## 2023-10-26 RX ORDER — ONDANSETRON 2 MG/ML
4 INJECTION INTRAMUSCULAR; INTRAVENOUS EVERY 6 HOURS PRN
Status: DISCONTINUED | OUTPATIENT
Start: 2023-10-26 | End: 2023-10-26 | Stop reason: HOSPADM

## 2023-10-26 RX ORDER — FUROSEMIDE 10 MG/ML
40 INJECTION INTRAMUSCULAR; INTRAVENOUS ONCE
Status: COMPLETED | OUTPATIENT
Start: 2023-10-26 | End: 2023-10-26

## 2023-10-26 RX ORDER — TRAMADOL HYDROCHLORIDE 50 MG/1
50 TABLET ORAL EVERY 8 HOURS PRN
Qty: 8 TABLET | Refills: 0 | Status: SHIPPED | OUTPATIENT
Start: 2023-10-26 | End: 2023-10-26

## 2023-10-26 RX ADMIN — ONDANSETRON 4 MG: 2 INJECTION INTRAMUSCULAR; INTRAVENOUS at 04:45

## 2023-10-26 RX ADMIN — FUROSEMIDE 40 MG: 10 INJECTION, SOLUTION INTRAMUSCULAR; INTRAVENOUS at 05:40

## 2023-10-26 RX ADMIN — FENTANYL CITRATE 50 MCG: 50 INJECTION INTRAMUSCULAR; INTRAVENOUS at 04:45

## 2023-10-26 RX ADMIN — FENTANYL CITRATE 50 MCG: 50 INJECTION INTRAMUSCULAR; INTRAVENOUS at 06:22

## 2023-10-26 RX ADMIN — CEFTRIAXONE SODIUM 1000 MG: 1 INJECTION, POWDER, FOR SOLUTION INTRAMUSCULAR; INTRAVENOUS at 05:43

## 2023-10-26 ASSESSMENT — PAIN SCALES - GENERAL
PAINLEVEL_OUTOF10: 9
PAINLEVEL_OUTOF10: 8

## 2023-10-26 ASSESSMENT — PAIN - FUNCTIONAL ASSESSMENT: PAIN_FUNCTIONAL_ASSESSMENT: 0-10

## 2023-10-27 LAB — BACTERIA UR CULT: NORMAL

## 2023-11-09 ENCOUNTER — TELEPHONE (OUTPATIENT)
Dept: FAMILY MEDICINE CLINIC | Age: 63
End: 2023-11-09

## 2023-11-09 NOTE — TELEPHONE ENCOUNTER
Patient dropped from to be completed by Dr. Aidee Singh. Patient asked to have a copy BEFORE it is faxed to . Please notify him when it is ready for pickup. Thanks.     [phone] (home)

## 2023-11-15 ENCOUNTER — OFFICE VISIT (OUTPATIENT)
Dept: PAIN MANAGEMENT | Age: 63
End: 2023-11-15
Payer: MEDICARE

## 2023-11-15 VITALS
HEART RATE: 91 BPM | DIASTOLIC BLOOD PRESSURE: 71 MMHG | SYSTOLIC BLOOD PRESSURE: 123 MMHG | BODY MASS INDEX: 31.31 KG/M2 | OXYGEN SATURATION: 98 % | WEIGHT: 264 LBS

## 2023-11-15 DIAGNOSIS — M25.50 ARTHRALGIA OF MULTIPLE JOINTS: ICD-10-CM

## 2023-11-15 DIAGNOSIS — G57.92 ILIOINGUINAL NEURALGIA OF LEFT SIDE: ICD-10-CM

## 2023-11-15 DIAGNOSIS — Z98.890 STATUS POST BILATERAL HERNIA REPAIR: ICD-10-CM

## 2023-11-15 DIAGNOSIS — F33.0 MAJOR DEPRESSIVE DISORDER, RECURRENT, MILD (HCC): ICD-10-CM

## 2023-11-15 DIAGNOSIS — Z87.19 STATUS POST BILATERAL HERNIA REPAIR: ICD-10-CM

## 2023-11-15 DIAGNOSIS — Z51.81 ENCOUNTER FOR THERAPEUTIC DRUG MONITORING: ICD-10-CM

## 2023-11-15 DIAGNOSIS — G89.4 CHRONIC PAIN SYNDROME: ICD-10-CM

## 2023-11-15 DIAGNOSIS — K43.2 INCISIONAL HERNIA WITHOUT OBSTRUCTION OR GANGRENE: ICD-10-CM

## 2023-11-15 DIAGNOSIS — M50.30 DDD (DEGENERATIVE DISC DISEASE), CERVICAL: ICD-10-CM

## 2023-11-15 DIAGNOSIS — G57.91 ILIOINGUINAL NEURALGIA OF RIGHT SIDE: ICD-10-CM

## 2023-11-15 PROCEDURE — 3078F DIAST BP <80 MM HG: CPT | Performed by: NURSE PRACTITIONER

## 2023-11-15 PROCEDURE — 99213 OFFICE O/P EST LOW 20 MIN: CPT | Performed by: NURSE PRACTITIONER

## 2023-11-15 PROCEDURE — 3074F SYST BP LT 130 MM HG: CPT | Performed by: NURSE PRACTITIONER

## 2023-11-15 RX ORDER — OXYCODONE HYDROCHLORIDE AND ACETAMINOPHEN 5; 325 MG/1; MG/1
1 TABLET ORAL EVERY 6 HOURS PRN
Qty: 112 TABLET | Refills: 0 | Status: SHIPPED | OUTPATIENT
Start: 2023-11-15 | End: 2023-12-13

## 2023-11-15 NOTE — PROGRESS NOTES
advised against driving or handling machinery while adjusting the dose of medicines or if having cognitive  issues related to the current medications. Risk of overdose and death, if medicines not taken as prescribed, were also discussed. If the patient develops new symptoms or if the symptoms worsen, the patient should call the office. While transcribing every attempt was made to maintain the accuracy of the note in terms of it's contents,there may have been some errors made inadvertently. Thank you for allowing me to participate in the care of this patient.     Brii Davalos, NP-C    Cc: Marcella Manzanares MD

## 2023-11-28 ENCOUNTER — APPOINTMENT (OUTPATIENT)
Dept: GENERAL RADIOLOGY | Age: 63
DRG: 313 | End: 2023-11-28
Payer: MEDICARE

## 2023-11-28 ENCOUNTER — HOSPITAL ENCOUNTER (INPATIENT)
Age: 63
LOS: 2 days | Discharge: HOME | DRG: 313 | End: 2023-11-30
Attending: EMERGENCY MEDICINE | Admitting: STUDENT IN AN ORGANIZED HEALTH CARE EDUCATION/TRAINING PROGRAM
Payer: MEDICARE

## 2023-11-28 DIAGNOSIS — R07.9 CHEST PAIN, UNSPECIFIED TYPE: Primary | ICD-10-CM

## 2023-11-28 LAB
ALBUMIN SERPL-MCNC: 4.7 G/DL (ref 3.4–5)
ALBUMIN/GLOB SERPL: 1.6 {RATIO} (ref 1.1–2.2)
ALP SERPL-CCNC: 66 U/L (ref 40–129)
ALT SERPL-CCNC: 14 U/L (ref 10–40)
ANION GAP SERPL CALCULATED.3IONS-SCNC: 12 MMOL/L (ref 3–16)
AST SERPL-CCNC: 18 U/L (ref 15–37)
BASOPHILS # BLD: 0 K/UL (ref 0–0.2)
BASOPHILS NFR BLD: 0.4 %
BILIRUB SERPL-MCNC: <0.2 MG/DL (ref 0–1)
BUN SERPL-MCNC: 26 MG/DL (ref 7–20)
CALCIUM SERPL-MCNC: 9.5 MG/DL (ref 8.3–10.6)
CHLORIDE SERPL-SCNC: 96 MMOL/L (ref 99–110)
CO2 SERPL-SCNC: 29 MMOL/L (ref 21–32)
CREAT SERPL-MCNC: 5.2 MG/DL (ref 0.8–1.3)
DEPRECATED RDW RBC AUTO: 15.9 % (ref 12.4–15.4)
EKG ATRIAL RATE: 84 BPM
EKG DIAGNOSIS: NORMAL
EKG P AXIS: 46 DEGREES
EKG P-R INTERVAL: 148 MS
EKG Q-T INTERVAL: 384 MS
EKG QRS DURATION: 94 MS
EKG QTC CALCULATION (BAZETT): 453 MS
EKG R AXIS: -7 DEGREES
EKG T AXIS: 37 DEGREES
EKG VENTRICULAR RATE: 84 BPM
EOSINOPHIL # BLD: 0.1 K/UL (ref 0–0.6)
EOSINOPHIL NFR BLD: 2.2 %
GFR SERPLBLD CREATININE-BSD FMLA CKD-EPI: 12 ML/MIN/{1.73_M2}
GLUCOSE BLD-MCNC: 95 MG/DL (ref 70–99)
GLUCOSE SERPL-MCNC: 96 MG/DL (ref 70–99)
HCT VFR BLD AUTO: 36.4 % (ref 40.5–52.5)
HGB BLD-MCNC: 12 G/DL (ref 13.5–17.5)
LYMPHOCYTES # BLD: 0.7 K/UL (ref 1–5.1)
LYMPHOCYTES NFR BLD: 14.1 %
MCH RBC QN AUTO: 28.5 PG (ref 26–34)
MCHC RBC AUTO-ENTMCNC: 32.9 G/DL (ref 31–36)
MCV RBC AUTO: 86.8 FL (ref 80–100)
MONOCYTES # BLD: 0.5 K/UL (ref 0–1.3)
MONOCYTES NFR BLD: 9.8 %
NEUTROPHILS # BLD: 3.5 K/UL (ref 1.7–7.7)
NEUTROPHILS NFR BLD: 73.5 %
NT-PROBNP SERPL-MCNC: 1239 PG/ML (ref 0–124)
PERFORMED ON: NORMAL
PLATELET # BLD AUTO: 204 K/UL (ref 135–450)
PMV BLD AUTO: 7.7 FL (ref 5–10.5)
POTASSIUM SERPL-SCNC: 4.2 MMOL/L (ref 3.5–5.1)
PROT SERPL-MCNC: 7.6 G/DL (ref 6.4–8.2)
RBC # BLD AUTO: 4.2 M/UL (ref 4.2–5.9)
SODIUM SERPL-SCNC: 137 MMOL/L (ref 136–145)
TROPONIN, HIGH SENSITIVITY: 55 NG/L (ref 0–22)
TROPONIN, HIGH SENSITIVITY: 56 NG/L (ref 0–22)
TROPONIN, HIGH SENSITIVITY: 58 NG/L (ref 0–22)
WBC # BLD AUTO: 4.7 K/UL (ref 4–11)

## 2023-11-28 PROCEDURE — 84484 ASSAY OF TROPONIN QUANT: CPT

## 2023-11-28 PROCEDURE — 93005 ELECTROCARDIOGRAM TRACING: CPT | Performed by: EMERGENCY MEDICINE

## 2023-11-28 PROCEDURE — 6370000000 HC RX 637 (ALT 250 FOR IP): Performed by: STUDENT IN AN ORGANIZED HEALTH CARE EDUCATION/TRAINING PROGRAM

## 2023-11-28 PROCEDURE — 80053 COMPREHEN METABOLIC PANEL: CPT

## 2023-11-28 PROCEDURE — 87040 BLOOD CULTURE FOR BACTERIA: CPT

## 2023-11-28 PROCEDURE — 36415 COLL VENOUS BLD VENIPUNCTURE: CPT

## 2023-11-28 PROCEDURE — 83880 ASSAY OF NATRIURETIC PEPTIDE: CPT

## 2023-11-28 PROCEDURE — 2580000003 HC RX 258: Performed by: STUDENT IN AN ORGANIZED HEALTH CARE EDUCATION/TRAINING PROGRAM

## 2023-11-28 PROCEDURE — 99285 EMERGENCY DEPT VISIT HI MDM: CPT

## 2023-11-28 PROCEDURE — 93005 ELECTROCARDIOGRAM TRACING: CPT | Performed by: STUDENT IN AN ORGANIZED HEALTH CARE EDUCATION/TRAINING PROGRAM

## 2023-11-28 PROCEDURE — 85025 COMPLETE CBC W/AUTO DIFF WBC: CPT

## 2023-11-28 PROCEDURE — 1200000000 HC SEMI PRIVATE

## 2023-11-28 PROCEDURE — 71046 X-RAY EXAM CHEST 2 VIEWS: CPT

## 2023-11-28 RX ORDER — ACETAMINOPHEN 650 MG/1
650 SUPPOSITORY RECTAL EVERY 6 HOURS PRN
Status: DISCONTINUED | OUTPATIENT
Start: 2023-11-28 | End: 2023-11-30 | Stop reason: HOSPADM

## 2023-11-28 RX ORDER — BUSPIRONE HYDROCHLORIDE 5 MG/1
5 TABLET ORAL 2 TIMES DAILY
Status: DISCONTINUED | OUTPATIENT
Start: 2023-11-28 | End: 2023-11-30 | Stop reason: HOSPADM

## 2023-11-28 RX ORDER — NIFEDIPINE 30 MG/1
90 TABLET, FILM COATED, EXTENDED RELEASE ORAL DAILY
Status: DISCONTINUED | OUTPATIENT
Start: 2023-11-29 | End: 2023-11-30 | Stop reason: HOSPADM

## 2023-11-28 RX ORDER — ATORVASTATIN CALCIUM 40 MG/1
40 TABLET, FILM COATED ORAL NIGHTLY
Status: DISCONTINUED | OUTPATIENT
Start: 2023-11-28 | End: 2023-11-30 | Stop reason: HOSPADM

## 2023-11-28 RX ORDER — GABAPENTIN 100 MG/1
100 CAPSULE ORAL 3 TIMES DAILY
Status: DISCONTINUED | OUTPATIENT
Start: 2023-11-28 | End: 2023-11-30 | Stop reason: HOSPADM

## 2023-11-28 RX ORDER — ONDANSETRON 4 MG/1
4 TABLET, ORALLY DISINTEGRATING ORAL EVERY 8 HOURS PRN
Status: DISCONTINUED | OUTPATIENT
Start: 2023-11-28 | End: 2023-11-30 | Stop reason: HOSPADM

## 2023-11-28 RX ORDER — ONDANSETRON 2 MG/ML
4 INJECTION INTRAMUSCULAR; INTRAVENOUS EVERY 6 HOURS PRN
Status: DISCONTINUED | OUTPATIENT
Start: 2023-11-28 | End: 2023-11-30 | Stop reason: HOSPADM

## 2023-11-28 RX ORDER — ENOXAPARIN SODIUM 100 MG/ML
40 INJECTION SUBCUTANEOUS DAILY
Status: DISCONTINUED | OUTPATIENT
Start: 2023-11-29 | End: 2023-11-28

## 2023-11-28 RX ORDER — ASPIRIN 81 MG/1
81 TABLET ORAL DAILY
Status: DISCONTINUED | OUTPATIENT
Start: 2023-11-29 | End: 2023-11-30 | Stop reason: HOSPADM

## 2023-11-28 RX ORDER — POLYETHYLENE GLYCOL 3350 17 G/17G
17 POWDER, FOR SOLUTION ORAL DAILY PRN
Status: DISCONTINUED | OUTPATIENT
Start: 2023-11-28 | End: 2023-11-30 | Stop reason: HOSPADM

## 2023-11-28 RX ORDER — NEPHROCAP 1 MG
1 CAP ORAL DAILY
Status: DISCONTINUED | OUTPATIENT
Start: 2023-11-29 | End: 2023-11-30 | Stop reason: HOSPADM

## 2023-11-28 RX ORDER — SODIUM CHLORIDE 0.9 % (FLUSH) 0.9 %
5-40 SYRINGE (ML) INJECTION EVERY 12 HOURS SCHEDULED
Status: DISCONTINUED | OUTPATIENT
Start: 2023-11-28 | End: 2023-11-30 | Stop reason: HOSPADM

## 2023-11-28 RX ORDER — CALCIUM ACETATE 667 MG/1
1 CAPSULE ORAL
Status: DISCONTINUED | OUTPATIENT
Start: 2023-11-29 | End: 2023-11-30 | Stop reason: HOSPADM

## 2023-11-28 RX ORDER — HEPARIN SODIUM 5000 [USP'U]/ML
5000 INJECTION, SOLUTION INTRAVENOUS; SUBCUTANEOUS EVERY 8 HOURS SCHEDULED
Status: DISCONTINUED | OUTPATIENT
Start: 2023-11-28 | End: 2023-11-30 | Stop reason: HOSPADM

## 2023-11-28 RX ORDER — ACETAMINOPHEN 325 MG/1
650 TABLET ORAL EVERY 6 HOURS PRN
Status: DISCONTINUED | OUTPATIENT
Start: 2023-11-28 | End: 2023-11-30 | Stop reason: HOSPADM

## 2023-11-28 RX ORDER — ASPIRIN 81 MG/1
81 TABLET, CHEWABLE ORAL DAILY
Status: ON HOLD | COMMUNITY
End: 2023-11-30 | Stop reason: HOSPADM

## 2023-11-28 RX ORDER — NITROGLYCERIN 0.4 MG/1
0.4 TABLET SUBLINGUAL EVERY 5 MIN PRN
Status: DISCONTINUED | OUTPATIENT
Start: 2023-11-28 | End: 2023-11-30 | Stop reason: HOSPADM

## 2023-11-28 RX ORDER — PANTOPRAZOLE SODIUM 40 MG/1
40 TABLET, DELAYED RELEASE ORAL
Status: DISCONTINUED | OUTPATIENT
Start: 2023-11-29 | End: 2023-11-30 | Stop reason: HOSPADM

## 2023-11-28 RX ORDER — SODIUM CHLORIDE 0.9 % (FLUSH) 0.9 %
5-40 SYRINGE (ML) INJECTION PRN
Status: DISCONTINUED | OUTPATIENT
Start: 2023-11-28 | End: 2023-11-30 | Stop reason: HOSPADM

## 2023-11-28 RX ORDER — SODIUM CHLORIDE 9 MG/ML
INJECTION, SOLUTION INTRAVENOUS PRN
Status: DISCONTINUED | OUTPATIENT
Start: 2023-11-28 | End: 2023-11-30 | Stop reason: HOSPADM

## 2023-11-28 RX ORDER — CALCITRIOL 0.25 UG/1
0.5 CAPSULE, LIQUID FILLED ORAL
Status: DISCONTINUED | OUTPATIENT
Start: 2023-11-29 | End: 2023-11-30 | Stop reason: HOSPADM

## 2023-11-28 RX ORDER — OXYCODONE HYDROCHLORIDE AND ACETAMINOPHEN 5; 325 MG/1; MG/1
1 TABLET ORAL EVERY 6 HOURS PRN
Status: DISCONTINUED | OUTPATIENT
Start: 2023-11-28 | End: 2023-11-30 | Stop reason: HOSPADM

## 2023-11-28 RX ADMIN — OXYCODONE HYDROCHLORIDE AND ACETAMINOPHEN 1 TABLET: 5; 325 TABLET ORAL at 22:46

## 2023-11-28 RX ADMIN — SODIUM CHLORIDE, PRESERVATIVE FREE 10 ML: 5 INJECTION INTRAVENOUS at 22:10

## 2023-11-28 RX ADMIN — BUSPIRONE HYDROCHLORIDE 5 MG: 5 TABLET ORAL at 22:46

## 2023-11-28 RX ADMIN — ATORVASTATIN CALCIUM 40 MG: 40 TABLET, FILM COATED ORAL at 22:46

## 2023-11-28 RX ADMIN — GABAPENTIN 100 MG: 100 CAPSULE ORAL at 22:46

## 2023-11-28 ASSESSMENT — PAIN SCALES - GENERAL
PAINLEVEL_OUTOF10: 5
PAINLEVEL_OUTOF10: 3
PAINLEVEL_OUTOF10: 5
PAINLEVEL_OUTOF10: 5
PAINLEVEL_OUTOF10: 3
PAINLEVEL_OUTOF10: 5
PAINLEVEL_OUTOF10: 5
PAINLEVEL_OUTOF10: 3

## 2023-11-28 ASSESSMENT — PAIN DESCRIPTION - ONSET
ONSET: ON-GOING

## 2023-11-28 ASSESSMENT — PAIN DESCRIPTION - LOCATION
LOCATION: HEAD;ABDOMEN;CHEST
LOCATION: CHEST
LOCATION: ABDOMEN
LOCATION: CHEST
LOCATION: HEAD
LOCATION: HEAD;CHEST

## 2023-11-28 ASSESSMENT — PAIN DESCRIPTION - DESCRIPTORS
DESCRIPTORS: ACHING;PRESSURE
DESCRIPTORS: PRESSURE;STABBING
DESCRIPTORS: STABBING;THROBBING
DESCRIPTORS: ACHING
DESCRIPTORS: ACHING
DESCRIPTORS: ACHING;PRESSURE

## 2023-11-28 ASSESSMENT — PAIN DESCRIPTION - FREQUENCY
FREQUENCY: INTERMITTENT

## 2023-11-28 ASSESSMENT — PAIN DESCRIPTION - ORIENTATION
ORIENTATION: RIGHT;UPPER
ORIENTATION: MID;RIGHT
ORIENTATION: MID
ORIENTATION: MID
ORIENTATION: MID;RIGHT

## 2023-11-28 ASSESSMENT — PAIN - FUNCTIONAL ASSESSMENT
PAIN_FUNCTIONAL_ASSESSMENT: ACTIVITIES ARE NOT PREVENTED
PAIN_FUNCTIONAL_ASSESSMENT: 0-10
PAIN_FUNCTIONAL_ASSESSMENT: ACTIVITIES ARE NOT PREVENTED

## 2023-11-28 ASSESSMENT — PAIN DESCRIPTION - PAIN TYPE
TYPE: ACUTE PAIN

## 2023-11-28 ASSESSMENT — HEART SCORE: ECG: 1

## 2023-11-28 NOTE — ED PROVIDER NOTES
ED Attending Attestation Note     Date of evaluation: 11/28/2023    This patient was seen by the resident. I have seen and examined the patient, agree with the workup, evaluation, management and diagnosis. The care plan has been discussed. I have reviewed the ECG and concur with the resident's interpretation. My assessment reveals ***. Critical Care:  Due to the immediate potential for life-threatening deterioration due to ***, I spent *** minutes providing critical care. This time excludes time spent performing procedures but includes time spent on direct patient care, history retrieval, review of the chart, and discussions with patient, family, and consultant(s).

## 2023-11-28 NOTE — ED NOTES
ED TO INPATIENT SBAR HANDOFF    Patient Name: Perfecot Pillai   :  1960  57 y.o. MRN:  [id number]  Preferred Name    ED Room #:  I/M15-68  Family/Caregiver Present yes   Restraints no   Sitter no   Sepsis Risk Score Sepsis Risk Score: 1.76    Situation  Code Status: Prior No additional code details. Allergies: Patient has no known allergies. Weight: Patient Vitals for the past 96 hrs (Last 3 readings):   Weight   23 1240 120.2 kg (265 lb)     Arrived from: home  Chief Complaint:   Chief Complaint   Patient presents with    Chest Pain     Patient arrived from dialysis Tx. Started having chest pain @ 12:00 in the right side. Patient stated he has SOB at first, but it has gone away. Hospital Problem/Diagnosis:  Principal Problem:    Chest pain  Resolved Problems:    * No resolved hospital problems. *    Imaging:   XR CHEST (2 VW)   Final Result   1. No evidence of acute cardiopulmonary disease.          Abnormal labs:   Abnormal Labs Reviewed   COMPREHENSIVE METABOLIC PANEL W/ REFLEX TO MG FOR LOW K - Abnormal; Notable for the following components:       Result Value    Chloride 96 (*)     BUN 26 (*)     Creatinine 5.2 (*)     Est, Glom Filt Rate 12 (*)     All other components within normal limits    Narrative:     Katie Rodgers tel. [phone],  Chemistry results called to and read back by shahriar bejarano, 2023 14:13,  by EVAN   TROPONIN - Abnormal; Notable for the following components:    Troponin, High Sensitivity 55 (*)     All other components within normal limits    Narrative:     Katie Rodgers tel. [phone],  Chemistry results called to and read back by shahriar bejarano, 2023 14:13,  by [address] - Abnormal; Notable for the following components:    Pro-BNP 1,239 (*)     All other components within normal limits    Narrative:     Katie Rodgers tel. [phone],  Chemistry results called to and read back by shahriar bejarano, 2023 14:13,  by

## 2023-11-29 LAB
ANION GAP SERPL CALCULATED.3IONS-SCNC: 12 MMOL/L (ref 3–16)
BUN SERPL-MCNC: 39 MG/DL (ref 7–20)
CALCIUM SERPL-MCNC: 9.4 MG/DL (ref 8.3–10.6)
CHLORIDE SERPL-SCNC: 97 MMOL/L (ref 99–110)
CHOLEST SERPL-MCNC: 137 MG/DL (ref 0–199)
CO2 SERPL-SCNC: 27 MMOL/L (ref 21–32)
CREAT SERPL-MCNC: 7 MG/DL (ref 0.8–1.3)
DEPRECATED RDW RBC AUTO: 15.7 % (ref 12.4–15.4)
EKG ATRIAL RATE: 74 BPM
EKG DIAGNOSIS: NORMAL
EKG P AXIS: 45 DEGREES
EKG P-R INTERVAL: 148 MS
EKG Q-T INTERVAL: 406 MS
EKG QRS DURATION: 96 MS
EKG QTC CALCULATION (BAZETT): 450 MS
EKG R AXIS: -21 DEGREES
EKG T AXIS: 16 DEGREES
EKG VENTRICULAR RATE: 74 BPM
GFR SERPLBLD CREATININE-BSD FMLA CKD-EPI: 8 ML/MIN/{1.73_M2}
GLUCOSE SERPL-MCNC: 98 MG/DL (ref 70–99)
HCT VFR BLD AUTO: 33 % (ref 40.5–52.5)
HDLC SERPL-MCNC: 46 MG/DL (ref 40–60)
HGB BLD-MCNC: 11 G/DL (ref 13.5–17.5)
LDLC SERPL CALC-MCNC: 71 MG/DL
MCH RBC QN AUTO: 28.6 PG (ref 26–34)
MCHC RBC AUTO-ENTMCNC: 33.2 G/DL (ref 31–36)
MCV RBC AUTO: 86.1 FL (ref 80–100)
PLATELET # BLD AUTO: 196 K/UL (ref 135–450)
PMV BLD AUTO: 7.5 FL (ref 5–10.5)
POTASSIUM SERPL-SCNC: 5.2 MMOL/L (ref 3.5–5.1)
RBC # BLD AUTO: 3.84 M/UL (ref 4.2–5.9)
SODIUM SERPL-SCNC: 136 MMOL/L (ref 136–145)
TRIGL SERPL-MCNC: 100 MG/DL (ref 0–150)
VLDLC SERPL CALC-MCNC: 20 MG/DL
WBC # BLD AUTO: 5 K/UL (ref 4–11)

## 2023-11-29 PROCEDURE — 99222 1ST HOSP IP/OBS MODERATE 55: CPT | Performed by: INTERNAL MEDICINE

## 2023-11-29 PROCEDURE — A9502 TC99M TETROFOSMIN: HCPCS | Performed by: INTERNAL MEDICINE

## 2023-11-29 PROCEDURE — 3430000000 HC RX DIAGNOSTIC RADIOPHARMACEUTICAL: Performed by: STUDENT IN AN ORGANIZED HEALTH CARE EDUCATION/TRAINING PROGRAM

## 2023-11-29 PROCEDURE — 6370000000 HC RX 637 (ALT 250 FOR IP): Performed by: STUDENT IN AN ORGANIZED HEALTH CARE EDUCATION/TRAINING PROGRAM

## 2023-11-29 PROCEDURE — 83036 HEMOGLOBIN GLYCOSYLATED A1C: CPT

## 2023-11-29 PROCEDURE — 93017 CV STRESS TEST TRACING ONLY: CPT

## 2023-11-29 PROCEDURE — 78452 HT MUSCLE IMAGE SPECT MULT: CPT

## 2023-11-29 PROCEDURE — A9502 TC99M TETROFOSMIN: HCPCS | Performed by: STUDENT IN AN ORGANIZED HEALTH CARE EDUCATION/TRAINING PROGRAM

## 2023-11-29 PROCEDURE — 80048 BASIC METABOLIC PNL TOTAL CA: CPT

## 2023-11-29 PROCEDURE — 85027 COMPLETE CBC AUTOMATED: CPT

## 2023-11-29 PROCEDURE — 6360000002 HC RX W HCPCS: Performed by: NURSE PRACTITIONER

## 2023-11-29 PROCEDURE — 99223 1ST HOSP IP/OBS HIGH 75: CPT | Performed by: INTERNAL MEDICINE

## 2023-11-29 PROCEDURE — 80061 LIPID PANEL: CPT

## 2023-11-29 PROCEDURE — 2500000003 HC RX 250 WO HCPCS: Performed by: STUDENT IN AN ORGANIZED HEALTH CARE EDUCATION/TRAINING PROGRAM

## 2023-11-29 PROCEDURE — C8929 TTE W OR WO FOL WCON,DOPPLER: HCPCS

## 2023-11-29 PROCEDURE — 3430000000 HC RX DIAGNOSTIC RADIOPHARMACEUTICAL: Performed by: INTERNAL MEDICINE

## 2023-11-29 PROCEDURE — 1200000000 HC SEMI PRIVATE

## 2023-11-29 PROCEDURE — 36415 COLL VENOUS BLD VENIPUNCTURE: CPT

## 2023-11-29 PROCEDURE — 2580000003 HC RX 258: Performed by: STUDENT IN AN ORGANIZED HEALTH CARE EDUCATION/TRAINING PROGRAM

## 2023-11-29 PROCEDURE — 6360000004 HC RX CONTRAST MEDICATION: Performed by: STUDENT IN AN ORGANIZED HEALTH CARE EDUCATION/TRAINING PROGRAM

## 2023-11-29 RX ORDER — MORPHINE SULFATE 2 MG/ML
2 INJECTION, SOLUTION INTRAMUSCULAR; INTRAVENOUS EVERY 4 HOURS PRN
Status: ACTIVE | OUTPATIENT
Start: 2023-11-29 | End: 2023-11-30

## 2023-11-29 RX ADMIN — GABAPENTIN 100 MG: 100 CAPSULE ORAL at 15:03

## 2023-11-29 RX ADMIN — GABAPENTIN 100 MG: 100 CAPSULE ORAL at 21:08

## 2023-11-29 RX ADMIN — CALCIUM ACETATE 667 MG: 667 CAPSULE ORAL at 17:02

## 2023-11-29 RX ADMIN — CALCITRIOL CAPSULES 0.25 MCG 0.5 MCG: 0.25 CAPSULE ORAL at 09:38

## 2023-11-29 RX ADMIN — NIFEDIPINE 90 MG: 30 TABLET, EXTENDED RELEASE ORAL at 09:38

## 2023-11-29 RX ADMIN — CALCIUM ACETATE 667 MG: 667 CAPSULE ORAL at 11:34

## 2023-11-29 RX ADMIN — TETROFOSMIN 30 MILLICURIE: 1.38 INJECTION, POWDER, LYOPHILIZED, FOR SOLUTION INTRAVENOUS at 13:35

## 2023-11-29 RX ADMIN — BUSPIRONE HYDROCHLORIDE 5 MG: 5 TABLET ORAL at 09:38

## 2023-11-29 RX ADMIN — GABAPENTIN 100 MG: 100 CAPSULE ORAL at 09:38

## 2023-11-29 RX ADMIN — ATORVASTATIN CALCIUM 40 MG: 40 TABLET, FILM COATED ORAL at 21:08

## 2023-11-29 RX ADMIN — SODIUM CHLORIDE, PRESERVATIVE FREE 10 ML: 5 INJECTION INTRAVENOUS at 09:39

## 2023-11-29 RX ADMIN — OXYCODONE HYDROCHLORIDE AND ACETAMINOPHEN 1 TABLET: 5; 325 TABLET ORAL at 17:03

## 2023-11-29 RX ADMIN — OXYCODONE HYDROCHLORIDE AND ACETAMINOPHEN 1 TABLET: 5; 325 TABLET ORAL at 11:34

## 2023-11-29 RX ADMIN — SODIUM CHLORIDE, PRESERVATIVE FREE 10 ML: 5 INJECTION INTRAVENOUS at 21:09

## 2023-11-29 RX ADMIN — HEPARIN SODIUM 5000 UNITS: 5000 INJECTION INTRAVENOUS; SUBCUTANEOUS at 07:15

## 2023-11-29 RX ADMIN — Medication 1 MG: at 09:38

## 2023-11-29 RX ADMIN — CALCIUM ACETATE 667 MG: 667 CAPSULE ORAL at 09:38

## 2023-11-29 RX ADMIN — HEPARIN SODIUM 5000 UNITS: 5000 INJECTION INTRAVENOUS; SUBCUTANEOUS at 21:07

## 2023-11-29 RX ADMIN — ASPIRIN 81 MG: 81 TABLET, COATED ORAL at 09:38

## 2023-11-29 RX ADMIN — PANTOPRAZOLE SODIUM 40 MG: 40 TABLET, DELAYED RELEASE ORAL at 09:38

## 2023-11-29 RX ADMIN — BUSPIRONE HYDROCHLORIDE 5 MG: 5 TABLET ORAL at 21:08

## 2023-11-29 RX ADMIN — TETROFOSMIN 10 MILLICURIE: 1.38 INJECTION, POWDER, LYOPHILIZED, FOR SOLUTION INTRAVENOUS at 12:27

## 2023-11-29 RX ADMIN — PERFLUTREN 1.5 ML: 6.52 INJECTION, SUSPENSION INTRAVENOUS at 09:13

## 2023-11-29 ASSESSMENT — PAIN SCALES - GENERAL
PAINLEVEL_OUTOF10: 0
PAINLEVEL_OUTOF10: 0

## 2023-11-29 NOTE — CONSULTS
Reason for Consultation/Chief Complaint: Chest pain    History of Present Illness:  Riri Baeza is a 57 y.o. patient whom we were asked to see for chest pain. ESRD, DM, prostate ca, hx afib, HTN. Presents to ER with chest pain. After dialysis noted onset of toña pain. Mid sternal.  Pain was pressure like. Lasted total of about hr.  Some diaphoresis and nausea. No pnd or orthopnea. No palp/tachy/syncope. Past Medical History:   has a past medical history of Arthralgia of multiple joints, Arthritis, Atrial fibrillation ([address]), Chronic kidney disease, Chronic systolic congestive heart failure ([address]), CRI (chronic renal insufficiency), Diverticulosis, DM (diabetes mellitus) ([address]), Elevated PSA, Erectile dysfunction, ESRD (end stage renal disease) on dialysis ([address]), Gout, Headache, Hearing loss, Hemrrhoid NOS w/ complication NEC, History of MRSA infection, History of radiation therapy, Hypertension, CELSA (obstructive sleep apnea), Prostate cancer ([address]), and Umbilical hernia without obstruction and without gangrene. Surgical History:   has a past surgical history that includes Upper gastrointestinal endoscopy (05/28/2016); Colonoscopy; Dilatation, esophagus; Upper gastrointestinal endoscopy (09/12/2016); IR TUNNELED CVC PLACE WO SQ PORT/PUMP > 5 YEARS (01/14/2022); Dialysis Catheter Insertion (N/A, 01/17/2022); ventral hernia repair (N/A, 01/17/2022); CT INSERT PERITONEAL CATHETER; Colonoscopy (N/A, 03/18/2022); Upper gastrointestinal endoscopy (N/A, 03/18/2022); hernia repair (N/A, 04/15/2022); hernia repair (Bilateral, 04/15/2022); Inguinal hernia repair (Bilateral, 06/13/2022); IR TUNNELED CVC PLACE WO SQ PORT/PUMP > 5 YEARS (07/14/2022); Dialysis fistula creation (Left, 07/29/2022); External ear surgery (Left, 02/23/2023); Colonoscopy (N/A, 04/14/2023); and Prostate surgery (05/2023). Social History:   reports that he quit smoking about 3 years ago. His smoking use included cigars.  He started smoking pain         Plan:    Chest pain. Elevated trop but steady state with renal failure. Likely due to RF. Stress myoview. Echo.

## 2023-11-29 NOTE — H&P
V2.0  History and Physical      Name:  Roxy Patricia /Age/Sex: 1960  (57 y.o. male)   MRN & CSN:  [id number] & [id number] Encounter Date/Time: 2023 10:53 PM EST   Location:   PCP: Fran Vergara MD       Hospital Day: 1    Assessment and Plan:   Mr. Bryon Noguera is a 24-year-old male with past medical history of ESRD on hemodialysis, CHF, prostate cancer status post therapy, hypertension who presents to the ED with complaints of chest pain    Hospital Problems             Last Modified POA    * (Principal) Chest pain 2023 Yes       Chest pain  ESRD on hemodialysis  Hyperlipidemia  Hypertension  Neuropathy  CHF  Prostate cancer  - Patient presenting with central, non-radiating, pressure-like chest pain following hemodialysis, lasted over an hour and spontaneously resolved. Also reports low-grade fevers. Troponins slightly elevated but stable, EKG with no acute ischemic changes. - blood cultures, Lipid panel, A1c ordered, pending, will obtain exercise stress test with nuclear study, echocardiogram. Start on aspirin 81 mg daily, atorvastatin 40 mg daily, as needed sublingual nitroglycerin [patient with sildenafil on med list, but states he has not been taking this.], Monitor on telemetry, n.p.o. after midnight.  - Consult nephrology, follow-up recommendations  - Continue rest of chronic medications    Disposition:   Current Living situation: Home  Expected Disposition: Home  Estimated D/C: 3 days    Diet ADULT DIET; Regular;  No Caffeine  Diet NPO   DVT Prophylaxis [] Lovenox, []  Heparin, [] SCDs, [] Ambulation,  [] Eliquis, [] Xarelto, [] Coumadin   Code Status Full Code   Surrogate Decision Maker/ POA Wife     Personally reviewed Lab Studies and Imaging     Discussed management of the case with ED provider    EKG interpreted personally shows normal sinus rhythm, T wave inversions in lead V1    Imaging that was interpreted personally includes chest x-ray and shows no acute

## 2023-11-29 NOTE — PLAN OF CARE
Problem: Pain  Goal: Verbalizes/displays adequate comfort level or baseline comfort level  11/29/2023 1053 by Dottie Floyd RN  Outcome: Progressing  Flowsheets (Taken 11/29/2023 0502 by Dawna Snellen, RN)  Verbalizes/displays adequate comfort level or baseline comfort level: Encourage patient to monitor pain and request assistance  Note: Pt reports pain is controlled with current regimen. 11/28/2023 2356 by Dawna Snellen, RN  Outcome: Progressing  Flowsheets (Taken 11/28/2023 2211)  Verbalizes/displays adequate comfort level or baseline comfort level: Encourage patient to monitor pain and request assistance     Problem: Safety - Adult  Goal: Free from fall injury  11/29/2023 1053 by Dottie Floyd RN  Outcome: Progressing  Note: Pt independent at baseline. 11/28/2023 2356 by Dawna Snellen, RN  Outcome: Progressing     Problem: ABCDS Injury Assessment  Goal: Absence of physical injury  Outcome: Progressing  Note: Pt independent at baseline.

## 2023-11-29 NOTE — DISCHARGE INSTRUCTIONS
Extra Heart Failure sites:     https://Bizweb.vn. com/publication/?c=359625   --- this is American Heart Association interactive Healthier Living with Heart Failure guidebook. Please click hyperlink or copy / paste link into search bar. Use your mouse to scroll through the pages. Lots of information about weight monitoring, diet tips, activity, meds, etc    HF Orlando pedrito  -- this is a free smart phone pedrito available for iPhone and Android download. Use your phone to track sodium / fluid intake, zone tool symptom tracking, weights, medications, etc. Click on this hyperlink  HF [place] Pedrito   for QR code for easy download. DASH (Dietary Approach to Stop Hypertension) diet --  SeekAlumni.no -- this diet is a flexible eating plan that promotes heart healthy eating style. Click on hyperlink or copy / paste link into search bar. Lots of low sodium recipes and tips.     CigarRepair.ca  -- more free recipes

## 2023-11-30 VITALS
TEMPERATURE: 97.6 F | WEIGHT: 257.94 LBS | HEART RATE: 94 BPM | SYSTOLIC BLOOD PRESSURE: 151 MMHG | DIASTOLIC BLOOD PRESSURE: 79 MMHG | HEIGHT: 77 IN | OXYGEN SATURATION: 99 % | BODY MASS INDEX: 30.46 KG/M2 | RESPIRATION RATE: 16 BRPM

## 2023-11-30 LAB
EST. AVERAGE GLUCOSE BLD GHB EST-MCNC: 131.2 MG/DL
HBA1C MFR BLD: 6.2 %

## 2023-11-30 PROCEDURE — 90935 HEMODIALYSIS ONE EVALUATION: CPT

## 2023-11-30 PROCEDURE — 6370000000 HC RX 637 (ALT 250 FOR IP): Performed by: STUDENT IN AN ORGANIZED HEALTH CARE EDUCATION/TRAINING PROGRAM

## 2023-11-30 PROCEDURE — 99233 SBSQ HOSP IP/OBS HIGH 50: CPT | Performed by: INTERNAL MEDICINE

## 2023-11-30 PROCEDURE — 2580000003 HC RX 258: Performed by: STUDENT IN AN ORGANIZED HEALTH CARE EDUCATION/TRAINING PROGRAM

## 2023-11-30 PROCEDURE — 5A1D70Z PERFORMANCE OF URINARY FILTRATION, INTERMITTENT, LESS THAN 6 HOURS PER DAY: ICD-10-PCS | Performed by: STUDENT IN AN ORGANIZED HEALTH CARE EDUCATION/TRAINING PROGRAM

## 2023-11-30 PROCEDURE — 2500000003 HC RX 250 WO HCPCS: Performed by: STUDENT IN AN ORGANIZED HEALTH CARE EDUCATION/TRAINING PROGRAM

## 2023-11-30 PROCEDURE — 6360000002 HC RX W HCPCS: Performed by: NURSE PRACTITIONER

## 2023-11-30 RX ORDER — LIDOCAINE AND PRILOCAINE 25; 25 MG/G; MG/G
CREAM TOPICAL ONCE
Status: DISCONTINUED | OUTPATIENT
Start: 2023-11-30 | End: 2023-11-30 | Stop reason: HOSPADM

## 2023-11-30 RX ADMIN — OXYCODONE HYDROCHLORIDE AND ACETAMINOPHEN 1 TABLET: 5; 325 TABLET ORAL at 08:09

## 2023-11-30 RX ADMIN — CALCIUM ACETATE 667 MG: 667 CAPSULE ORAL at 08:10

## 2023-11-30 RX ADMIN — PANTOPRAZOLE SODIUM 40 MG: 40 TABLET, DELAYED RELEASE ORAL at 06:53

## 2023-11-30 RX ADMIN — NIFEDIPINE 90 MG: 30 TABLET, EXTENDED RELEASE ORAL at 08:09

## 2023-11-30 RX ADMIN — SODIUM CHLORIDE, PRESERVATIVE FREE 10 ML: 5 INJECTION INTRAVENOUS at 08:11

## 2023-11-30 RX ADMIN — ASPIRIN 81 MG: 81 TABLET, COATED ORAL at 08:09

## 2023-11-30 RX ADMIN — OXYCODONE HYDROCHLORIDE AND ACETAMINOPHEN 1 TABLET: 5; 325 TABLET ORAL at 00:38

## 2023-11-30 RX ADMIN — OXYCODONE HYDROCHLORIDE AND ACETAMINOPHEN 1 TABLET: 5; 325 TABLET ORAL at 15:21

## 2023-11-30 RX ADMIN — GABAPENTIN 100 MG: 100 CAPSULE ORAL at 15:21

## 2023-11-30 RX ADMIN — BUSPIRONE HYDROCHLORIDE 5 MG: 5 TABLET ORAL at 08:10

## 2023-11-30 RX ADMIN — GABAPENTIN 100 MG: 100 CAPSULE ORAL at 08:09

## 2023-11-30 RX ADMIN — HEPARIN SODIUM 5000 UNITS: 5000 INJECTION INTRAVENOUS; SUBCUTANEOUS at 06:53

## 2023-11-30 RX ADMIN — Medication 1 MG: at 08:13

## 2023-11-30 ASSESSMENT — PAIN SCALES - GENERAL: PAINLEVEL_OUTOF10: 8

## 2023-11-30 NOTE — CONSULTS
Nephrology Progress Note                                                                                                                                                                                                                                                                                                                                                               Office : [phone]     Fax :[phone]    Patient's Name: Danii Velasquez  9:11 AM  11/30/2023    Reason for Consult:  Chest pain   Requesting Physician:  Urban Muniz MD  Chief Complaint:    Chief Complaint   Patient presents with    Chest Pain     Patient arrived from dialysis Tx. Started having chest pain @ 12:00 in the right side. Patient stated he has SOB at first, but it has gone away. Assessment/Plan     # ESRD on HD  - Dialyzes on a TTS schedule   - Anticipate HD today  - Renally dose meds for ESRD  - Labs in AM     # HTN  - BP controlled on Nifedipine     # Anemia  - Hgb at goal  - No need for BALTAZAR     # MBD management   - Continue calcitriol. Gets it 3 x weekly at HD  - Continue Phoslo with meals     # Chest pain   - Cardiology consulted  - CXR non-acute. Elevated troponin but likely d/t ESRD  - Echo with mod LVH, EF , grade II DD, mild TR   - Stress test 11/29 - low risk scan       History of Present Ilness:    Danii Velasquez is a 57 y.o. male with a PMH of ESRD on HD, CHF, prostate cancer s/p therapy, HTN, who presents with complaints of chest pain. Chest pain occurred following his hemodialysis session on 11/28. Chest pain was central, nonradiating, pressure-like, severe, lasted for over an hour, with associated nausea, lightheadedness, diaphoresis. He reports he had some right-sided chest pain last week, which radiated to the back, but was not as severe as this. He also reports recent low-grade fevers, but denies chills, cough, leg swelling, vomiting, diarrhea, shortness of breath.   Denies smoking, personal or HERNIA REPAIR N/A 04/15/2022    ROBOTIC, RECURRENT INCISIONAL HERNIA REPAIR WITH BIOSYNTHETIC MESH; LAPAROSCOPIC PERITONEAL DIALYSIS CATHETER REVISION WITH LAPAROSCOPIC OMENTOPEXY performed by Christi Nelson DO at [address] Bilateral 04/15/2022    BILATERAL OPEN INGUINAL HERNI REPAIR performed by Christi Nelsno DO at [place] Bilateral 06/13/2022    ROBOTIC RECURRENT BILATERAL INGUINAL HERNIA REPAIR, LYSIS OF ADHESIONS, PERITONEAL DIALYSIS CATHETER REMOVAL performed by Christi Nelson DO at [facility]    IR TUNNELED CATHETER PLACEMENT GREATER THAN 5 YEARS  01/14/2022    IR TUNNELED CATHETER PLACEMENT GREATER THAN 5 YEARS 1/14/2022 [facility] PROCEDURES    IR TUNNELED CATHETER PLACEMENT GREATER THAN 5 YEARS  07/14/2022    IR TUNNELED CATHETER PLACEMENT GREATER THAN 5 YEARS 7/14/2022 [address] SPECIAL PROCEDURES    PROSTATE SURGERY  05/2023    radiation seed implant + XRT x 23    UPPER GASTROINTESTINAL ENDOSCOPY  05/28/2016    biopsies, multiple small gastric ulcers    UPPER GASTROINTESTINAL ENDOSCOPY  09/12/2016    UPPER GASTROINTESTINAL ENDOSCOPY N/A 03/18/2022    EGD DIAGNOSTIC ONLY performed by Miguelina Juarez MD at [facility] [address] N/A 01/17/2022    LAPAROSCOPIC incarcerated [address] performed by Christi Nelson DO at [address] OR       Family History   Problem Relation Age of Onset    Hypertension Other     Breast Cancer Mother     Colon Cancer Father     Diabetes Maternal Grandmother     Coronary Art Dis Brother         reports that he quit smoking about 3 years ago. His smoking use included cigars. He started smoking about 43 years ago. He has never been exposed to tobacco smoke. He has never used smokeless tobacco. He reports that he does not currently use alcohol. He reports that he does not use drugs. Allergies:  Patient has no known allergies.     Current Medications:    morphine (PF) injection 2 mg, Q4H PRN  aspirin EC tablet 81 mg,

## 2023-11-30 NOTE — PLAN OF CARE
Problem: Discharge Planning  Goal: Discharge to home or other facility with appropriate resources  Outcome: Progressing     Problem: Pain  Goal: Verbalizes/displays adequate comfort level or baseline comfort level  11/29/2023 2235 by Susie Barrow RN  Outcome: Progressing  11/29/2023 1053 by Mary Shelley RN  Outcome: Progressing  Flowsheets (Taken 11/29/2023 0502 by Carmelina Brown RN)  Verbalizes/displays adequate comfort level or baseline comfort level: Encourage patient to monitor pain and request assistance  Note: Pt reports pain is controlled with current regimen. Problem: Safety - Adult  Goal: Free from fall injury  11/29/2023 2235 by Susie Barrow RN  Outcome: Progressing  11/29/2023 1053 by Mary Shelley RN  Outcome: Progressing  Note: Pt independent at baseline. Problem: ABCDS Injury Assessment  Goal: Absence of physical injury  11/29/2023 2235 by Susie Barrow RN  Outcome: Progressing  11/29/2023 1053 by Mary Shelley RN  Outcome: Progressing  Note: Pt independent at baseline.      Problem: Chronic Conditions and Co-morbidities  Goal: Patient's chronic conditions and co-morbidity symptoms are monitored and maintained or improved  Outcome: Progressing     Problem: Respiratory - Adult  Goal: Achieves optimal ventilation and oxygenation  Outcome: Progressing     Problem: Cardiovascular - Adult  Goal: Maintains optimal cardiac output and hemodynamic stability  Outcome: Progressing  Goal: Absence of cardiac dysrhythmias or at baseline  Outcome: Progressing     Problem: Metabolic/Fluid and Electrolytes - Adult  Goal: Electrolytes maintained within normal limits  Outcome: Progressing  Goal: Hemodynamic stability and optimal renal function maintained  Outcome: Progressing

## 2023-11-30 NOTE — CARE COORDINATION
Case Management Assessment            Discharge Note                    Date / Time of Note: 11/30/2023 2:10 PM                  Discharge Note Completed by: Leonardo Boyer RN    Patient Name: Leann Bernal   YOB: 1960  Diagnosis: Chest pain [R07.9]  Chest pain, unspecified type [R07.9]   Date / Time: 11/28/2023  2:01 PM    Current PCP: Eusebia Spencer MD  Clinic patient: No    Hospitalization in the last 30 days: No       Advance Directives:  Code Status: Full Code  [place] DNR form completed and on chart: No    Financial:  Payor: Carmen Dasilva / Plan: Ishaan Sarkar HMO / Product Type: Medicare /      Pharmacy:    [facility] Me, [address] [facility] [phone] - F [phone]  [address] [address]  Phone: [phone] Fax: [address] [facility] 6[address] [facility], [address] [facility],[address] [address]  Phone: [phone] Fax: [phone]    [id number] [place] - [address]  [place] [address]  Phone: [phone] Fax: [phone]    [id number] [address] [facility] [phone] - F [phone]  [address]  [address] [facility] [address]  Phone: [phone] Fax: [phone]    [id number] [address], [address] [facility] [address] [facility] [address] [phone] - f [phone]  [address] [facility] 22 612  [address] [facility] [address]  Phone: [phone] Fax: [phone]    [address] [address] [facility] [phone] [facility] [phone]  [facility] [address] [address]  Phone: [phone] Fax: [phone]    [facility] - [address] [phone] - F [phone]  [address] [place] [address] [place] [address]  Phone: [phone] Fax: [phone]      Assistance purchasing medications?: Potential Assistance Purchasing Medications: No  Assistance provided by Case Management: None at this time    Does patient want to participate in local refill/ meds to beds program?:      Meds To Beds General Rules:  1. Can ONLY be done Monday- Friday between 8:30am-5pm  2. Prescription(s) must be in pharmacy by 3pm to be filled same day  3. Copy of patient's insurance/ prescription drug card and patient face sheet must be sent along with the prescription(s)  4. Cost of Rx cannot be added to hospital bill. If financial assistance is needed, please contact unit  or ;  or  CANNOT provide pharmacy voucher for patients co-pays  5.  Patients can then  the prescription on their way out of the hospital at discharge, or pharmacy can deliver to the bedside if staff is available. (payment due at time of pick-up or delivery - cash, check, or card accepted)     Able to afford home medications/ co-pay costs: Yes    ADLS:  Current PT AM-PAC Score:   /24  Current OT AM-PAC Score:   /24      DISCHARGE Disposition: Home- No Services Needed    LOC at discharge: Skilled  [address] [facility] [address] Completed: No    Notification completed in HENS/PAS?:  Not Applicable    IMM Completed:   No         Transportation:  Transportation PLAN for discharge: family   Mode of Transport: Private Car  Reason for medical transport: Not Applicable  Name of [address] [place] [address] Road: Not Applicable  Time of Transport: when family available  after  HD      Transport form completed: No    Home Care:  [address] [place] ordered at discharge: No  [address]: Not Applicable  Orders faxed: No    Durable Medical Equipment:  DME Provider: NA  Equipment obtained during hospitalization: NA    Home Oxygen and Respiratory Equipment:  Oxygen needed at discharge?: No  [address]: Not Applicable  Portable tank available for discharge?: No    Dialysis:  Dialysis patient: Yes    [address] [place] [address] Dialysis  Address: [address]

## 2023-11-30 NOTE — PLAN OF CARE
Problem: Safety - Adult  Goal: Free from fall injury  11/30/2023 0107 by Kaylynn Suresh RN  Outcome: Progressing  11/29/2023 2235 by Abiel Alves RN  Outcome: Progressing

## 2023-12-01 ENCOUNTER — HOSPITAL ENCOUNTER (OUTPATIENT)
Age: 63
Discharge: HOME | End: 2023-12-01
Payer: MEDICARE

## 2023-12-01 ENCOUNTER — CARE COORDINATION (OUTPATIENT)
Dept: CASE MANAGEMENT | Age: 63
End: 2023-12-01

## 2023-12-01 LAB
BACTERIA URNS QL MICRO: ABNORMAL /HPF
BILIRUB UR QL STRIP.AUTO: NEGATIVE
CLARITY UR: ABNORMAL
COLOR UR: YELLOW
EPI CELLS #/AREA URNS AUTO: 0 /HPF (ref 0–5)
GLUCOSE UR STRIP.AUTO-MCNC: NEGATIVE MG/DL
HGB UR QL STRIP.AUTO: ABNORMAL
HYALINE CASTS #/AREA URNS AUTO: 1 /LPF (ref 0–8)
KETONES UR STRIP.AUTO-MCNC: NEGATIVE MG/DL
LEUKOCYTE ESTERASE UR QL STRIP.AUTO: ABNORMAL
NITRITE UR QL STRIP.AUTO: NEGATIVE
PH UR STRIP.AUTO: 7 [PH] (ref 5–8)
PROT UR STRIP.AUTO-MCNC: 100 MG/DL
RBC CLUMPS #/AREA URNS AUTO: 18 /HPF (ref 0–4)
SP GR UR STRIP.AUTO: 1.01 (ref 1–1.03)
UA DIPSTICK W REFLEX MICRO PNL UR: YES
URN SPEC COLLECT METH UR: ABNORMAL
UROBILINOGEN UR STRIP-ACNC: 0.2 E.U./DL
WBC #/AREA URNS AUTO: 278 /HPF (ref 0–5)

## 2023-12-01 PROCEDURE — 87077 CULTURE AEROBIC IDENTIFY: CPT

## 2023-12-01 PROCEDURE — 87086 URINE CULTURE/COLONY COUNT: CPT

## 2023-12-01 PROCEDURE — 81001 URINALYSIS AUTO W/SCOPE: CPT

## 2023-12-01 PROCEDURE — 87186 SC STD MICRODIL/AGAR DIL: CPT

## 2023-12-01 NOTE — CARE COORDINATION
[facility] Transitions Initial Follow Up Call    Call within 2 business days of discharge: Yes    Patient:  Aristides Joel   Patient :  1960  MRN:  [id number]    Reason for Admission: Chest Pain, ESRD with HD  Discharge Date:  23   RARS:       Transitions of Care Initial Call    Was this an external facility discharge? Discharge Facility: [address] [place] [address] to be reviewed by the provider   Additional needs identified to be addressed with provider:    no    AMB CC Provider Discharge Needs: none            Non-face-to-face services provided:    1ST CTC attempt to reach Pt regarding recent hospital discharge. CTC left voice recording with call back number requesting a call back.     Follow up appointments:    Future Appointments   Date Time Provider [address]   2023  4:00 PM Thao Steve MD [address] [place] [address],[address] - D   2023  3:20 PM Cherylene Clear, APRN - CNP [facility]       Thank Rufino Hernandez RN  Care Transition Coordinator  Contact NEERAJ:[phone]

## 2023-12-02 LAB
BACTERIA BLD CULT ORG #2: NORMAL
BACTERIA BLD CULT: NORMAL

## 2023-12-02 RX ORDER — NALOXEGOL OXALATE 12.5 MG/1
12.5 TABLET, FILM COATED ORAL DAILY
Qty: 40 TABLET | Refills: 0 | Status: SHIPPED | OUTPATIENT
Start: 2023-12-02 | End: 2024-01-15

## 2023-12-03 LAB
BACTERIA UR CULT: ABNORMAL
ORGANISM: ABNORMAL

## 2023-12-04 ENCOUNTER — CARE COORDINATION (OUTPATIENT)
Dept: CASE MANAGEMENT | Age: 63
End: 2023-12-04

## 2023-12-04 LAB
BACTERIA UR CULT: ABNORMAL
ORGANISM: ABNORMAL

## 2023-12-05 DIAGNOSIS — R53.83 FATIGUE, UNSPECIFIED TYPE: ICD-10-CM

## 2023-12-05 PROBLEM — T80.211A CATHETER-RELATED BLOODSTREAM INFECTION: Status: RESOLVED | Noted: 2022-07-12 | Resolved: 2023-12-05

## 2023-12-06 LAB
25(OH)D3 SERPL-MCNC: 90.3 NG/ML
FOLATE SERPL-MCNC: 15.37 NG/ML (ref 4.78–24.2)
T4 FREE SERPL-MCNC: 1.2 NG/DL (ref 0.9–1.8)
TSH SERPL DL<=0.005 MIU/L-ACNC: 1.03 UIU/ML (ref 0.27–4.2)
VIT B12 SERPL-MCNC: 729 PG/ML (ref 211–911)

## 2023-12-12 ENCOUNTER — OFFICE VISIT (OUTPATIENT)
Dept: PAIN MANAGEMENT | Age: 63
End: 2023-12-12
Payer: MEDICARE

## 2023-12-12 VITALS
HEART RATE: 98 BPM | DIASTOLIC BLOOD PRESSURE: 84 MMHG | OXYGEN SATURATION: 98 % | BODY MASS INDEX: 31.19 KG/M2 | WEIGHT: 263 LBS | SYSTOLIC BLOOD PRESSURE: 136 MMHG

## 2023-12-12 DIAGNOSIS — Z51.81 ENCOUNTER FOR THERAPEUTIC DRUG MONITORING: ICD-10-CM

## 2023-12-12 DIAGNOSIS — G57.91 ILIOINGUINAL NEURALGIA OF RIGHT SIDE: ICD-10-CM

## 2023-12-12 DIAGNOSIS — M50.30 DDD (DEGENERATIVE DISC DISEASE), CERVICAL: ICD-10-CM

## 2023-12-12 DIAGNOSIS — F33.0 MAJOR DEPRESSIVE DISORDER, RECURRENT, MILD (HCC): ICD-10-CM

## 2023-12-12 DIAGNOSIS — M25.50 ARTHRALGIA OF MULTIPLE JOINTS: ICD-10-CM

## 2023-12-12 DIAGNOSIS — G89.4 CHRONIC PAIN SYNDROME: ICD-10-CM

## 2023-12-12 DIAGNOSIS — K43.2 INCISIONAL HERNIA WITHOUT OBSTRUCTION OR GANGRENE: ICD-10-CM

## 2023-12-12 DIAGNOSIS — G57.92 ILIOINGUINAL NEURALGIA OF LEFT SIDE: ICD-10-CM

## 2023-12-12 DIAGNOSIS — Z98.890 STATUS POST BILATERAL HERNIA REPAIR: ICD-10-CM

## 2023-12-12 DIAGNOSIS — Z87.19 STATUS POST BILATERAL HERNIA REPAIR: ICD-10-CM

## 2023-12-12 DIAGNOSIS — F11.20 OPIOID DEPENDENCE WITH CURRENT USE (HCC): Primary | ICD-10-CM

## 2023-12-12 PROCEDURE — 3078F DIAST BP <80 MM HG: CPT | Performed by: NURSE PRACTITIONER

## 2023-12-12 PROCEDURE — 99213 OFFICE O/P EST LOW 20 MIN: CPT | Performed by: NURSE PRACTITIONER

## 2023-12-12 PROCEDURE — 3074F SYST BP LT 130 MM HG: CPT | Performed by: NURSE PRACTITIONER

## 2023-12-12 RX ORDER — OXYCODONE HYDROCHLORIDE AND ACETAMINOPHEN 5; 325 MG/1; MG/1
1 TABLET ORAL EVERY 6 HOURS PRN
Qty: 112 TABLET | Refills: 0 | Status: SHIPPED | OUTPATIENT
Start: 2023-12-12 | End: 2024-01-09

## 2023-12-12 NOTE — PROGRESS NOTES
if the symptoms worsen, the patient should call the office. While transcribing every attempt was made to maintain the accuracy of the note in terms of it's contents,there may have been some errors made inadvertently. Thank you for allowing me to participate in the care of this patient.     Vonda Dorado, NP-C    Cc: Curt Zhu MD

## 2023-12-26 ENCOUNTER — HOSPITAL ENCOUNTER (EMERGENCY)
Age: 63
Discharge: HOME | End: 2023-12-26
Attending: EMERGENCY MEDICINE
Payer: MEDICARE

## 2023-12-26 ENCOUNTER — APPOINTMENT (OUTPATIENT)
Dept: CT IMAGING | Age: 63
End: 2023-12-26
Payer: MEDICARE

## 2023-12-26 VITALS
TEMPERATURE: 98.1 F | DIASTOLIC BLOOD PRESSURE: 76 MMHG | OXYGEN SATURATION: 96 % | RESPIRATION RATE: 18 BRPM | HEART RATE: 86 BPM | SYSTOLIC BLOOD PRESSURE: 132 MMHG

## 2023-12-26 DIAGNOSIS — N30.00 ACUTE CYSTITIS WITHOUT HEMATURIA: Primary | ICD-10-CM

## 2023-12-26 LAB
ALBUMIN SERPL-MCNC: 4.3 G/DL (ref 3.4–5)
ALBUMIN/GLOB SERPL: 1.5 {RATIO} (ref 1.1–2.2)
ALP SERPL-CCNC: 62 U/L (ref 40–129)
ALT SERPL-CCNC: 15 U/L (ref 10–40)
ANION GAP SERPL CALCULATED.3IONS-SCNC: 10 MMOL/L (ref 3–16)
AST SERPL-CCNC: 16 U/L (ref 15–37)
BACTERIA URNS QL MICRO: NORMAL /HPF
BASOPHILS # BLD: 0 K/UL (ref 0–0.2)
BASOPHILS NFR BLD: 0.5 %
BILIRUB SERPL-MCNC: <0.2 MG/DL (ref 0–1)
BILIRUB UR QL STRIP.AUTO: NEGATIVE
BUN SERPL-MCNC: 57 MG/DL (ref 7–20)
CALCIUM SERPL-MCNC: 8.9 MG/DL (ref 8.3–10.6)
CHLORIDE SERPL-SCNC: 103 MMOL/L (ref 99–110)
CLARITY UR: CLEAR
CO2 SERPL-SCNC: 26 MMOL/L (ref 21–32)
COLOR UR: YELLOW
CREAT SERPL-MCNC: 7.5 MG/DL (ref 0.8–1.3)
DEPRECATED RDW RBC AUTO: 16.7 % (ref 12.4–15.4)
EKG ATRIAL RATE: 85 BPM
EKG DIAGNOSIS: NORMAL
EKG P AXIS: 47 DEGREES
EKG P-R INTERVAL: 154 MS
EKG Q-T INTERVAL: 368 MS
EKG QRS DURATION: 94 MS
EKG QTC CALCULATION (BAZETT): 437 MS
EKG R AXIS: -2 DEGREES
EKG T AXIS: 46 DEGREES
EKG VENTRICULAR RATE: 85 BPM
EOSINOPHIL # BLD: 0.1 K/UL (ref 0–0.6)
EOSINOPHIL NFR BLD: 3.2 %
EPI CELLS #/AREA URNS AUTO: 0 /HPF (ref 0–5)
GFR SERPLBLD CREATININE-BSD FMLA CKD-EPI: 7 ML/MIN/{1.73_M2}
GLUCOSE SERPL-MCNC: 127 MG/DL (ref 70–99)
GLUCOSE UR STRIP.AUTO-MCNC: NEGATIVE MG/DL
HCT VFR BLD AUTO: 29.7 % (ref 40.5–52.5)
HGB BLD-MCNC: 9.7 G/DL (ref 13.5–17.5)
HGB UR QL STRIP.AUTO: ABNORMAL
HYALINE CASTS #/AREA URNS AUTO: 0 /LPF (ref 0–8)
KETONES UR STRIP.AUTO-MCNC: NEGATIVE MG/DL
LEUKOCYTE ESTERASE UR QL STRIP.AUTO: NEGATIVE
LIPASE SERPL-CCNC: 47 U/L (ref 13–60)
LYMPHOCYTES # BLD: 0.6 K/UL (ref 1–5.1)
LYMPHOCYTES NFR BLD: 13.7 %
MCH RBC QN AUTO: 29.3 PG (ref 26–34)
MCHC RBC AUTO-ENTMCNC: 32.8 G/DL (ref 31–36)
MCV RBC AUTO: 89.5 FL (ref 80–100)
MONOCYTES # BLD: 0.6 K/UL (ref 0–1.3)
MONOCYTES NFR BLD: 13.7 %
NEUTROPHILS # BLD: 2.9 K/UL (ref 1.7–7.7)
NEUTROPHILS NFR BLD: 68.9 %
NITRITE UR QL STRIP.AUTO: NEGATIVE
PH UR STRIP.AUTO: 8 [PH] (ref 5–8)
PLATELET # BLD AUTO: 173 K/UL (ref 135–450)
PMV BLD AUTO: 7.4 FL (ref 5–10.5)
POTASSIUM SERPL-SCNC: 6.7 MMOL/L (ref 3.5–5.1)
PROT SERPL-MCNC: 7.1 G/DL (ref 6.4–8.2)
PROT UR STRIP.AUTO-MCNC: 100 MG/DL
RBC # BLD AUTO: 3.31 M/UL (ref 4.2–5.9)
RBC CLUMPS #/AREA URNS AUTO: 1 /HPF (ref 0–4)
SODIUM SERPL-SCNC: 139 MMOL/L (ref 136–145)
SP GR UR STRIP.AUTO: 1.01 (ref 1–1.03)
TROPONIN, HIGH SENSITIVITY: 38 NG/L (ref 0–22)
UA COMPLETE W REFLEX CULTURE PNL UR: ABNORMAL
UA DIPSTICK W REFLEX MICRO PNL UR: YES
URN SPEC COLLECT METH UR: ABNORMAL
UROBILINOGEN UR STRIP-ACNC: 0.2 E.U./DL
WBC # BLD AUTO: 4.3 K/UL (ref 4–11)
WBC #/AREA URNS AUTO: 1 /HPF (ref 0–5)

## 2023-12-26 PROCEDURE — 6360000002 HC RX W HCPCS: Performed by: EMERGENCY MEDICINE

## 2023-12-26 PROCEDURE — 93005 ELECTROCARDIOGRAM TRACING: CPT | Performed by: EMERGENCY MEDICINE

## 2023-12-26 PROCEDURE — 96365 THER/PROPH/DIAG IV INF INIT: CPT

## 2023-12-26 PROCEDURE — 96375 TX/PRO/DX INJ NEW DRUG ADDON: CPT

## 2023-12-26 PROCEDURE — 85025 COMPLETE CBC W/AUTO DIFF WBC: CPT

## 2023-12-26 PROCEDURE — 81001 URINALYSIS AUTO W/SCOPE: CPT

## 2023-12-26 PROCEDURE — 6370000000 HC RX 637 (ALT 250 FOR IP): Performed by: EMERGENCY MEDICINE

## 2023-12-26 PROCEDURE — 6360000004 HC RX CONTRAST MEDICATION: Performed by: EMERGENCY MEDICINE

## 2023-12-26 PROCEDURE — 83690 ASSAY OF LIPASE: CPT

## 2023-12-26 PROCEDURE — 74177 CT ABD & PELVIS W/CONTRAST: CPT

## 2023-12-26 PROCEDURE — 80053 COMPREHEN METABOLIC PANEL: CPT

## 2023-12-26 PROCEDURE — 2580000003 HC RX 258: Performed by: EMERGENCY MEDICINE

## 2023-12-26 PROCEDURE — 93010 ELECTROCARDIOGRAM REPORT: CPT | Performed by: INTERNAL MEDICINE

## 2023-12-26 PROCEDURE — 84484 ASSAY OF TROPONIN QUANT: CPT

## 2023-12-26 PROCEDURE — 99285 EMERGENCY DEPT VISIT HI MDM: CPT

## 2023-12-26 RX ORDER — ONDANSETRON 2 MG/ML
4 INJECTION INTRAMUSCULAR; INTRAVENOUS EVERY 6 HOURS PRN
Status: DISCONTINUED | OUTPATIENT
Start: 2023-12-26 | End: 2023-12-26 | Stop reason: HOSPADM

## 2023-12-26 RX ORDER — FENTANYL CITRATE 50 UG/ML
50 INJECTION, SOLUTION INTRAMUSCULAR; INTRAVENOUS ONCE
Status: COMPLETED | OUTPATIENT
Start: 2023-12-26 | End: 2023-12-26

## 2023-12-26 RX ORDER — METHOCARBAMOL 500 MG/1
1500 TABLET, FILM COATED ORAL ONCE
Status: COMPLETED | OUTPATIENT
Start: 2023-12-26 | End: 2023-12-26

## 2023-12-26 RX ORDER — TIZANIDINE 4 MG/1
4 TABLET ORAL EVERY 6 HOURS PRN
Qty: 20 TABLET | Refills: 0 | Status: SHIPPED | OUTPATIENT
Start: 2023-12-26

## 2023-12-26 RX ORDER — CEFUROXIME AXETIL 250 MG/1
250 TABLET ORAL DAILY
Qty: 10 TABLET | Refills: 0 | Status: SHIPPED | OUTPATIENT
Start: 2023-12-26 | End: 2024-01-05

## 2023-12-26 RX ADMIN — METHOCARBAMOL 1500 MG: 500 TABLET ORAL at 05:27

## 2023-12-26 RX ADMIN — IOPAMIDOL 75 ML: 755 INJECTION, SOLUTION INTRAVENOUS at 04:54

## 2023-12-26 RX ADMIN — ONDANSETRON 4 MG: 2 INJECTION INTRAMUSCULAR; INTRAVENOUS at 04:06

## 2023-12-26 RX ADMIN — FENTANYL CITRATE 50 MCG: 50 INJECTION INTRAMUSCULAR; INTRAVENOUS at 04:06

## 2023-12-26 RX ADMIN — CEFTRIAXONE SODIUM 1000 MG: 1 INJECTION, POWDER, FOR SOLUTION INTRAMUSCULAR; INTRAVENOUS at 05:33

## 2023-12-27 ENCOUNTER — CARE COORDINATION (OUTPATIENT)
Dept: CARE COORDINATION | Age: 63
End: 2023-12-27

## 2023-12-27 NOTE — ED PROVIDER NOTES
Diagnosis: Kidney Stone, Pyelonephritis, Renal Artery Aneurysm,  Abdominal Aortic Aneurysm, Metastases to back, Mechanical Back Pain, Cauda Equina Syndrome    77-year-old presents ED for evaluation of right-sided flank pain. On presentation vital signs within normal limits. Patient not have any reproducible tenderness or CVA tenderness. Laboratory workup is unremarkable. Renal function consistent with ESRD. CT obtained and did show what appeared to be cystitis although urinalysis not appear to be infected. Due to CT findings patient was started on oral antibiotics and was provided with additional medications for musculoskeletal treatment of pain. He reports improvement on reevaluation is amenable to discharge home. He does have dialysis later in the day, and contrast dye will be dialyzed. CONSULTS: (Who and What was discussed)  None          Chronic Conditions:   Past Medical History:   Diagnosis Date    Arthralgia of multiple joints 10/27/2015    Arthritis     Atrial fibrillation (HCC)     Chronic kidney disease     stage 4    Chronic systolic congestive heart failure ([address]) 08/16/2021    CRI (chronic renal insufficiency)     Diverticulosis     DM (diabetes mellitus) (HCC)     Elevated PSA     Erectile dysfunction     ESRD (end stage renal disease) on dialysis ([address]) 02/24/2022    Gout     Headache     Hearing loss     Hemrrhoid NOS w/ complication NEC     History of MRSA infection     History of radiation therapy     Hypertension     CELSA (obstructive sleep apnea) 07/14/2017    uses C-pap machine    Prostate cancer ([address])     Umbilical hernia without obstruction and without gangrene 02/02/2016         Records Reviewed (External and source): Reviewed patient's most recent PCP note. Disposition Considerations (include 1 Tests not done, Admit vs D/C, Shared Decision Making, Pt Expectation of Test or Tx.): Consider obtaining chest x-ray patient denies respiratory symptoms or chest wall pains.        I am
none

## 2023-12-27 NOTE — CARE COORDINATION
Assigned by analytics following ER visit on 12/26  Call to patient, LM on VM with this ACM name and number to please call with questions/ concerns

## 2023-12-28 ENCOUNTER — CARE COORDINATION (OUTPATIENT)
Dept: CARE COORDINATION | Age: 63
End: 2023-12-28

## 2024-01-01 NOTE — PROGRESS NOTES
Here for f/u of blood pressure, nevus, blood sugars     Pt with lesion on inside of thigh that has been present for years, but feels that it got irritated, inflammed. No discharge or drainage but soreness. Bad location and irrates easily. Just got irritated a few days ago. Pt here for follow up of blood pressure. Pt states doing great with adherence to therapy and feels well. No issues of chest pain, shortness of breath. No vision changes, headache, swelling in legs. Pt is not tracking blood pressure regularly but states that when he checks, has been ok. Pt taking meds regularly. pts sodium intake has been better and does try and monitor. Pt has made better food choices. Pt is back work, will be back starting Monday full time. Pt will be travelling, states that he will be in [facility]. No urinary sx, no abd pain and no n/v. Except as noted above in the history of present illness, the review of systems is  negative for headache, vision changes, chest pain, shortness of breath, abdominal pain, urinary sx, bowel changes. Past medical, surgical, and social history reviewed and updated  Medications and allergies reviewed and updated         O: /78   Pulse 104   Temp 98.2 °F (36.8 °C) (Oral)   Resp 8   Ht 6' 4\" (1.93 m)   Wt 298 lb (135.2 kg)   SpO2 93%   BMI 36.27 kg/m²   GEN: No acute distress, cooperative, well nourished, alert. HEENT: PEERLA, EOMI , normocephalic/atraumatic, nares and oropharynx clear. Mucous membranes normal, Tympanic membranes clear bilaterally. Neck: soft, supple, no thyromegaly, mass, no Lymphadenopathy  CV: Regular rate and rhythm, no murmur, rubs, gallops. No edema. Resp: Clear to auscultation bilaterally good air entry bilaterally  No crackles, wheeze. Breathing comfortably. Psych: mood stable, No suicidal thoughts or ideation  Skin: inflammed/irritated skin tag to L inner thigh, no discharge, warmth.           Current Outpatient Medications   Medication Sig Dispense Refill    butalbital-acetaminophen-caffeine (FIORICET, ESGIC) -40 MG per tablet Take 1 tablet by mouth every 6 hours as needed for Headaches 30 tablet 0    carvedilol (COREG) 6.25 MG tablet Take 1 tablet by mouth 2 times daily 60 tablet 5    chlorthalidone (HYGROTON) 25 MG tablet Take 1 tablet by mouth daily 30 tablet 5    cyclobenzaprine (FLEXERIL) 10 MG tablet TAKE 1 TABLET BY MOUTH THREE TIMES DAILY AS NEEDED FOR MUSCLE SPASMS 30 tablet 0    meloxicam (MOBIC) 15 MG tablet TAKE 1 TABLET BY MOUTH EVERYDAY 30 tablet 2    NIFEdipine (ADALAT CC) 90 MG extended release tablet Take 1 tablet by mouth daily 30 tablet 5    olmesartan (BENICAR) 40 MG tablet TAKE 1 TABLET BY MOUTH EVERY DAY 30 tablet 5    terazosin (HYTRIN) 2 MG capsule TAKE 1 CAPSULE BY MOUTH EVERY EVENING 30 capsule 5    [START ON 5/11/2019] oxyCODONE-acetaminophen (PERCOCET) 5-325 MG per tablet Take 1 tablet by mouth every 6 hours as needed for Pain for up to 60 days. 90 tablet 0    sulfamethoxazole-trimethoprim (BACTRIM DS) 800-160 MG per tablet Take 1 tablet by mouth 2 times daily for 7 days 14 tablet 0    colchicine (COLCRYS) 0.6 MG tablet Take 1 tablet by mouth daily as needed for Pain 30 tablet 5    allopurinol (ZYLOPRIM) 300 MG tablet TAKE 1 TABLET BY MOUTH DAILY 30 tablet 4    lisinopril (PRINIVIL;ZESTRIL) 20 MG tablet Take 1 tablet by mouth daily 30 tablet 5    lidocaine (LIDODERM) 5 % Place 1 patch onto the skin daily 12 hours on, 12 hours off. 30 patch 0    ranitidine (ZANTAC) 150 MG tablet TAKE 1 TABLET BY MOUTH TWICE A DAY 60 tablet 4    Elastic Bandages & Supports (TENNIS ELBOW STRAP/SUPPORT PAD) MISC 1 Units by Does not apply route daily 1 each 0    atorvastatin (LIPITOR) 20 MG tablet TAKE 1 TABLET BY MOUTH EVERY DAY 30 tablet 5    hydrocortisone (ANUSOL-HC) 2.5 % rectal cream Place rectally 2 times daily.  1 Tube 2    aspirin 81 MG tablet Take 81 mg by mouth daily      omeprazole (PRILOSEC) 40 MG delayed release capsule TAKE 1 CAPSULE BY MOUTH DAILY 30 capsule 5    atorvastatin (LIPITOR) 20 MG tablet TAKE 1 TABLET BY MOUTH EVERY DAY 30 tablet 5    furosemide (LASIX) 20 MG tablet Take 20 mg by mouth 2 times daily       No current facility-administered medications for this visit. ASSESSMENT / PLAN:    1. Idiopathic chronic gout of multiple sites without tophus  Stable w/o flare  Cont prophy therapy, check uric acid  Cont percocet prn  refills given as below  See CSM  - oxyCODONE-acetaminophen (PERCOCET) 5-325 MG per tablet; Take 1 tablet by mouth every 6 hours as needed for Pain for up to 60 days. Dispense: 90 tablet; Refill: 0    2. Type 2 diabetes mellitus with diabetic nephropathy, without long-term current use of insulin (HCC)  Cont to monitor with lifestyle mgt, diet/exercise  Check fasting bloodwork for:  - Hemoglobin A1C; Future  - Comprehensive Metabolic Panel; Future  - Lipid Panel; Future  - TSH without Reflex; Future  - Microalbumin / Creatinine Urine Ratio; Future    3. Diabetic nephropathy associated with type 2 diabetes mellitus ([facility] [address]  Check f/u urine  Cont ACE therapy    4. Non morbid obesity, unspecified obesity type  Encouraged diet/exercise and weight loss     5. Essential hypertension, benign  blood pressure stable @ goal, controlled    6. CELSA (obstructive sleep apnea)  Cont CPAP therapy  Encouraged weight loss    7. Duodenal ulcer disease  Cont PPI therapy  Reviewed EGD from 8/2018    8. Chronic renal impairment, stage 3 (moderate) (HCC)  Asymptomatic  F/u with nephrology prn  Check fasting bloodwork for:  - Comprehensive Metabolic Panel; Future    9. Inflamed skin tag  L inner groin. No discharge   Bactrim DS BID x 7d  Warm compresses  F/u increased sx and consider shave of lesion    10.  Screen for colon cancer  Due colonoscopy  Will call to set up Dr. Melissa Cunningham MD, Gastroenterology, [facility]           Follow-up appointment:   Pending bloodwork results/prn    Discussed use, benefit, and side effects of all prescribed medications. Barriers to medication compliance addressed. All patient questions answered. Pt voiced understanding. When applicable, patient's outside records were reviewed through [facility]. The patient has signed appropriate paperworks/consents. 34

## 2024-01-02 ENCOUNTER — TELEPHONE (OUTPATIENT)
Dept: FAMILY MEDICINE CLINIC | Age: 64
End: 2024-01-02

## 2024-01-02 NOTE — TELEPHONE ENCOUNTER
Patient would like to get in asap for a f/u; nothing available for 2 weeks. Patient wanting to be worked in this week if possible. Thanks

## 2024-01-08 ENCOUNTER — TELEPHONE (OUTPATIENT)
Dept: PAIN MANAGEMENT | Age: 64
End: 2024-01-08

## 2024-01-08 DIAGNOSIS — G57.91 ILIOINGUINAL NEURALGIA OF RIGHT SIDE: ICD-10-CM

## 2024-01-08 DIAGNOSIS — Z98.890 STATUS POST BILATERAL HERNIA REPAIR: ICD-10-CM

## 2024-01-08 DIAGNOSIS — Z87.19 STATUS POST BILATERAL HERNIA REPAIR: ICD-10-CM

## 2024-01-08 DIAGNOSIS — F33.0 MAJOR DEPRESSIVE DISORDER, RECURRENT, MILD (HCC): ICD-10-CM

## 2024-01-08 DIAGNOSIS — G57.92 ILIOINGUINAL NEURALGIA OF LEFT SIDE: ICD-10-CM

## 2024-01-08 DIAGNOSIS — M25.50 ARTHRALGIA OF MULTIPLE JOINTS: ICD-10-CM

## 2024-01-08 DIAGNOSIS — M50.30 DDD (DEGENERATIVE DISC DISEASE), CERVICAL: ICD-10-CM

## 2024-01-08 DIAGNOSIS — K43.2 INCISIONAL HERNIA WITHOUT OBSTRUCTION OR GANGRENE: ICD-10-CM

## 2024-01-08 DIAGNOSIS — Z51.81 ENCOUNTER FOR THERAPEUTIC DRUG MONITORING: ICD-10-CM

## 2024-01-08 DIAGNOSIS — G89.4 CHRONIC PAIN SYNDROME: ICD-10-CM

## 2024-01-08 RX ORDER — OXYCODONE HYDROCHLORIDE AND ACETAMINOPHEN 5; 325 MG/1; MG/1
1 TABLET ORAL EVERY 6 HOURS PRN
Qty: 28 TABLET | Refills: 0 | Status: SHIPPED | OUTPATIENT
Start: 2024-01-08 | End: 2024-01-15 | Stop reason: ALTCHOICE

## 2024-01-09 ENCOUNTER — APPOINTMENT (OUTPATIENT)
Dept: CT IMAGING | Age: 64
End: 2024-01-09
Payer: MEDICARE

## 2024-01-09 ENCOUNTER — APPOINTMENT (OUTPATIENT)
Dept: MRI IMAGING | Age: 64
End: 2024-01-09
Payer: MEDICARE

## 2024-01-09 ENCOUNTER — HOSPITAL ENCOUNTER (EMERGENCY)
Age: 64
Discharge: HOME | End: 2024-01-09
Attending: EMERGENCY MEDICINE
Payer: MEDICARE

## 2024-01-09 VITALS
DIASTOLIC BLOOD PRESSURE: 96 MMHG | WEIGHT: 265 LBS | TEMPERATURE: 98.1 F | SYSTOLIC BLOOD PRESSURE: 153 MMHG | HEIGHT: 77 IN | HEART RATE: 74 BPM | RESPIRATION RATE: 15 BRPM | OXYGEN SATURATION: 95 % | BODY MASS INDEX: 31.29 KG/M2

## 2024-01-09 DIAGNOSIS — M54.50 ACUTE RIGHT-SIDED LOW BACK PAIN WITHOUT SCIATICA: ICD-10-CM

## 2024-01-09 DIAGNOSIS — M54.6 RIGHT-SIDED THORACIC BACK PAIN, UNSPECIFIED CHRONICITY: Primary | ICD-10-CM

## 2024-01-09 DIAGNOSIS — N18.6 ESRD ON HEMODIALYSIS (HCC): ICD-10-CM

## 2024-01-09 DIAGNOSIS — Z99.2 ESRD ON HEMODIALYSIS (HCC): ICD-10-CM

## 2024-01-09 DIAGNOSIS — M51.36 LUMBAR DEGENERATIVE DISC DISEASE: ICD-10-CM

## 2024-01-09 LAB
ALBUMIN SERPL-MCNC: 4.1 G/DL (ref 3.4–5)
ALBUMIN/GLOB SERPL: 1.4 {RATIO} (ref 1.1–2.2)
ALP SERPL-CCNC: 68 U/L (ref 40–129)
ALT SERPL-CCNC: 12 U/L (ref 10–40)
ANION GAP SERPL CALCULATED.3IONS-SCNC: 20 MMOL/L (ref 3–16)
AST SERPL-CCNC: 16 U/L (ref 15–37)
BACTERIA URNS QL MICRO: NORMAL /HPF
BASOPHILS # BLD: 0 K/UL (ref 0–0.2)
BASOPHILS NFR BLD: 0.8 %
BILIRUB SERPL-MCNC: <0.2 MG/DL (ref 0–1)
BILIRUB UR QL STRIP.AUTO: NEGATIVE
BUN SERPL-MCNC: 50 MG/DL (ref 7–20)
CALCIUM SERPL-MCNC: 9.2 MG/DL (ref 8.3–10.6)
CHLORIDE SERPL-SCNC: 103 MMOL/L (ref 99–110)
CLARITY UR: CLEAR
CO2 SERPL-SCNC: 17 MMOL/L (ref 21–32)
COLOR UR: YELLOW
CREAT SERPL-MCNC: 6.9 MG/DL (ref 0.8–1.3)
DEPRECATED RDW RBC AUTO: 17.5 % (ref 12.4–15.4)
EKG ATRIAL RATE: 78 BPM
EKG DIAGNOSIS: NORMAL
EKG P AXIS: 40 DEGREES
EKG P-R INTERVAL: 172 MS
EKG Q-T INTERVAL: 400 MS
EKG QRS DURATION: 100 MS
EKG QTC CALCULATION (BAZETT): 456 MS
EKG R AXIS: -20 DEGREES
EKG T AXIS: 14 DEGREES
EKG VENTRICULAR RATE: 78 BPM
EOSINOPHIL # BLD: 0.1 K/UL (ref 0–0.6)
EOSINOPHIL NFR BLD: 2.8 %
EPI CELLS #/AREA URNS AUTO: 0 /HPF (ref 0–5)
ERYTHROCYTE [SEDIMENTATION RATE] IN BLOOD BY WESTERGREN METHOD: 15 MM/HR (ref 0–20)
GFR SERPLBLD CREATININE-BSD FMLA CKD-EPI: 8 ML/MIN/{1.73_M2}
GLUCOSE SERPL-MCNC: 120 MG/DL (ref 70–99)
GLUCOSE UR STRIP.AUTO-MCNC: 100 MG/DL
HCT VFR BLD AUTO: 35.3 % (ref 40.5–52.5)
HGB BLD-MCNC: 11.4 G/DL (ref 13.5–17.5)
HGB UR QL STRIP.AUTO: ABNORMAL
HYALINE CASTS #/AREA URNS AUTO: 0 /LPF (ref 0–8)
KETONES UR STRIP.AUTO-MCNC: NEGATIVE MG/DL
LEUKOCYTE ESTERASE UR QL STRIP.AUTO: NEGATIVE
LIPASE SERPL-CCNC: 43 U/L (ref 13–60)
LYMPHOCYTES # BLD: 0.6 K/UL (ref 1–5.1)
LYMPHOCYTES NFR BLD: 13.7 %
MCH RBC QN AUTO: 29.7 PG (ref 26–34)
MCHC RBC AUTO-ENTMCNC: 32.4 G/DL (ref 31–36)
MCV RBC AUTO: 91.8 FL (ref 80–100)
MONOCYTES # BLD: 0.5 K/UL (ref 0–1.3)
MONOCYTES NFR BLD: 11.2 %
NEUTROPHILS # BLD: 3.2 K/UL (ref 1.7–7.7)
NEUTROPHILS NFR BLD: 71.5 %
NITRITE UR QL STRIP.AUTO: NEGATIVE
PH UR STRIP.AUTO: 7.5 [PH] (ref 5–8)
PLATELET # BLD AUTO: 163 K/UL (ref 135–450)
PLATELET BLD QL SMEAR: ADEQUATE
PMV BLD AUTO: 7.4 FL (ref 5–10.5)
POTASSIUM SERPL-SCNC: 4.9 MMOL/L (ref 3.5–5.1)
PROCALCITONIN SERPL IA-MCNC: 0.31 NG/ML (ref 0–0.15)
PROT SERPL-MCNC: 7.1 G/DL (ref 6.4–8.2)
PROT UR STRIP.AUTO-MCNC: 300 MG/DL
RBC # BLD AUTO: 3.84 M/UL (ref 4.2–5.9)
RBC CLUMPS #/AREA URNS AUTO: 3 /HPF (ref 0–4)
SLIDE REVIEW: ABNORMAL
SODIUM SERPL-SCNC: 140 MMOL/L (ref 136–145)
SP GR UR STRIP.AUTO: 1.01 (ref 1–1.03)
TROPONIN, HIGH SENSITIVITY: 40 NG/L (ref 0–22)
TROPONIN, HIGH SENSITIVITY: 40 NG/L (ref 0–22)
UA DIPSTICK W REFLEX MICRO PNL UR: YES
URN SPEC COLLECT METH UR: ABNORMAL
UROBILINOGEN UR STRIP-ACNC: 0.2 E.U./DL
WBC # BLD AUTO: 4.4 K/UL (ref 4–11)
WBC #/AREA URNS AUTO: 1 /HPF (ref 0–5)

## 2024-01-09 PROCEDURE — A4216 STERILE WATER/SALINE, 10 ML: HCPCS | Performed by: EMERGENCY MEDICINE

## 2024-01-09 PROCEDURE — 2580000003 HC RX 258: Performed by: EMERGENCY MEDICINE

## 2024-01-09 PROCEDURE — 85652 RBC SED RATE AUTOMATED: CPT

## 2024-01-09 PROCEDURE — 71260 CT THORAX DX C+: CPT

## 2024-01-09 PROCEDURE — 87040 BLOOD CULTURE FOR BACTERIA: CPT

## 2024-01-09 PROCEDURE — 96375 TX/PRO/DX INJ NEW DRUG ADDON: CPT

## 2024-01-09 PROCEDURE — 6360000004 HC RX CONTRAST MEDICATION: Performed by: EMERGENCY MEDICINE

## 2024-01-09 PROCEDURE — 99285 EMERGENCY DEPT VISIT HI MDM: CPT

## 2024-01-09 PROCEDURE — 96376 TX/PRO/DX INJ SAME DRUG ADON: CPT

## 2024-01-09 PROCEDURE — 84484 ASSAY OF TROPONIN QUANT: CPT

## 2024-01-09 PROCEDURE — 85025 COMPLETE CBC W/AUTO DIFF WBC: CPT

## 2024-01-09 PROCEDURE — 81001 URINALYSIS AUTO W/SCOPE: CPT

## 2024-01-09 PROCEDURE — 84145 PROCALCITONIN (PCT): CPT

## 2024-01-09 PROCEDURE — 72148 MRI LUMBAR SPINE W/O DYE: CPT

## 2024-01-09 PROCEDURE — 80053 COMPREHEN METABOLIC PANEL: CPT

## 2024-01-09 PROCEDURE — 6360000002 HC RX W HCPCS: Performed by: EMERGENCY MEDICINE

## 2024-01-09 PROCEDURE — 83690 ASSAY OF LIPASE: CPT

## 2024-01-09 PROCEDURE — 74177 CT ABD & PELVIS W/CONTRAST: CPT

## 2024-01-09 PROCEDURE — 93005 ELECTROCARDIOGRAM TRACING: CPT | Performed by: EMERGENCY MEDICINE

## 2024-01-09 PROCEDURE — 93010 ELECTROCARDIOGRAM REPORT: CPT | Performed by: INTERNAL MEDICINE

## 2024-01-09 PROCEDURE — 72146 MRI CHEST SPINE W/O DYE: CPT

## 2024-01-09 PROCEDURE — 6370000000 HC RX 637 (ALT 250 FOR IP): Performed by: EMERGENCY MEDICINE

## 2024-01-09 PROCEDURE — 96374 THER/PROPH/DIAG INJ IV PUSH: CPT

## 2024-01-09 RX ORDER — FENTANYL CITRATE 50 UG/ML
25 INJECTION, SOLUTION INTRAMUSCULAR; INTRAVENOUS
Status: DISCONTINUED | OUTPATIENT
Start: 2024-01-09 | End: 2024-01-09 | Stop reason: HOSPADM

## 2024-01-09 RX ORDER — LIDOCAINE 50 MG/G
1 PATCH TOPICAL DAILY
Qty: 10 PATCH | Refills: 0 | Status: SHIPPED | OUTPATIENT
Start: 2024-01-09 | End: 2024-01-19

## 2024-01-09 RX ORDER — ACETAMINOPHEN 500 MG
1000 TABLET ORAL EVERY 6 HOURS
Status: DISCONTINUED | OUTPATIENT
Start: 2024-01-09 | End: 2024-01-09 | Stop reason: HOSPADM

## 2024-01-09 RX ORDER — HYDROMORPHONE HYDROCHLORIDE 1 MG/ML
1 INJECTION, SOLUTION INTRAMUSCULAR; INTRAVENOUS; SUBCUTANEOUS ONCE
Status: COMPLETED | OUTPATIENT
Start: 2024-01-09 | End: 2024-01-09

## 2024-01-09 RX ORDER — ONDANSETRON 2 MG/ML
4 INJECTION INTRAMUSCULAR; INTRAVENOUS EVERY 4 HOURS PRN
Status: DISCONTINUED | OUTPATIENT
Start: 2024-01-09 | End: 2024-01-09 | Stop reason: HOSPADM

## 2024-01-09 RX ORDER — OXYCODONE HYDROCHLORIDE 5 MG/1
5 TABLET ORAL EVERY 4 HOURS PRN
Status: DISCONTINUED | OUTPATIENT
Start: 2024-01-09 | End: 2024-01-09 | Stop reason: HOSPADM

## 2024-01-09 RX ORDER — LIDOCAINE 4 G/G
2 PATCH TOPICAL DAILY
Status: DISCONTINUED | OUTPATIENT
Start: 2024-01-09 | End: 2024-01-09

## 2024-01-09 RX ORDER — TIZANIDINE 4 MG/1
4 TABLET ORAL EVERY 6 HOURS PRN
Qty: 20 TABLET | Refills: 0 | Status: SHIPPED | OUTPATIENT
Start: 2024-01-09

## 2024-01-09 RX ORDER — ACETAMINOPHEN 500 MG
1000 TABLET ORAL ONCE
Status: COMPLETED | OUTPATIENT
Start: 2024-01-09 | End: 2024-01-09

## 2024-01-09 RX ORDER — FENTANYL CITRATE 50 UG/ML
75 INJECTION, SOLUTION INTRAMUSCULAR; INTRAVENOUS ONCE
Status: COMPLETED | OUTPATIENT
Start: 2024-01-09 | End: 2024-01-09

## 2024-01-09 RX ORDER — LIDOCAINE 4 G/G
2 PATCH TOPICAL ONCE
Status: DISCONTINUED | OUTPATIENT
Start: 2024-01-09 | End: 2024-01-09 | Stop reason: HOSPADM

## 2024-01-09 RX ADMIN — ACETAMINOPHEN 1000 MG: 500 TABLET ORAL at 09:35

## 2024-01-09 RX ADMIN — IOPAMIDOL 75 ML: 755 INJECTION, SOLUTION INTRAVENOUS at 05:09

## 2024-01-09 RX ADMIN — SODIUM CHLORIDE 1 MG: 9 INJECTION INTRAMUSCULAR; INTRAVENOUS; SUBCUTANEOUS at 09:35

## 2024-01-09 RX ADMIN — FENTANYL CITRATE 75 MCG: 50 INJECTION INTRAMUSCULAR; INTRAVENOUS at 07:30

## 2024-01-09 RX ADMIN — ACETAMINOPHEN 1000 MG: 500 TABLET ORAL at 04:56

## 2024-01-09 RX ADMIN — HYDROMORPHONE HYDROCHLORIDE 1 MG: 1 INJECTION, SOLUTION INTRAMUSCULAR; INTRAVENOUS; SUBCUTANEOUS at 04:55

## 2024-01-09 RX ADMIN — HYDROMORPHONE HYDROCHLORIDE 1 MG: 1 INJECTION, SOLUTION INTRAMUSCULAR; INTRAVENOUS; SUBCUTANEOUS at 06:30

## 2024-01-09 ASSESSMENT — PAIN SCALES - GENERAL
PAINLEVEL_OUTOF10: 9
PAINLEVEL_OUTOF10: 8

## 2024-01-09 ASSESSMENT — ENCOUNTER SYMPTOMS
SHORTNESS OF BREATH: 0
BACK PAIN: 1
NAUSEA: 0
ABDOMINAL PAIN: 1
COUGH: 0
VOMITING: 0

## 2024-01-09 ASSESSMENT — PAIN DESCRIPTION - LOCATION
LOCATION: BACK;FLANK
LOCATION: BACK;FLANK
LOCATION: BACK

## 2024-01-09 ASSESSMENT — PAIN DESCRIPTION - ORIENTATION
ORIENTATION: RIGHT
ORIENTATION: RIGHT

## 2024-01-09 ASSESSMENT — LIFESTYLE VARIABLES: HOW OFTEN DO YOU HAVE A DRINK CONTAINING ALCOHOL: NEVER

## 2024-01-09 ASSESSMENT — PAIN - FUNCTIONAL ASSESSMENT: PAIN_FUNCTIONAL_ASSESSMENT: 0-10

## 2024-01-09 ASSESSMENT — PAIN DESCRIPTION - FREQUENCY: FREQUENCY: CONTINUOUS

## 2024-01-09 NOTE — ED NOTES
Discharge and education instructions reviewed. Patient verbalized understanding, teach-back successful. Patient denied questions at this time. No acute distress noted. Patient instructed to follow-up as noted - return to emergency department if symptoms worsen. Patient verbalized understanding. Discharged per EDMD with discharged instructions. Patient wheeled to lobby and ambulated independently without assistance to car.

## 2024-01-09 NOTE — ED NOTES
MRI called, states patient is claustrophobic and is unwilling to try MRI without being medicated. Medicated per MAR.

## 2024-01-10 NOTE — ED PROVIDER NOTES
pain and comorbidities, discussed observation in the hospital which patient declined.  Feels comfortable returning home and states that he is able to sufficiently take care of himself, will be able to return easily should his symptoms change or worsen.  Given his much improved symptoms and reassuring laboratory testing/imaging, do feel initial outpatient treatment plan is very reasonable.  Patient discharged to self-care, strict return precautions were discussed prior to discharge.      I Dr. Sky am the primary clinician of record      During the patient's ED course, the patient was given:  Medications   acetaminophen (TYLENOL) tablet 1,000 mg (1,000 mg Oral Given 1/9/24 0456)   HYDROmorphone HCl PF (DILAUDID) injection 1 mg (1 mg IntraVENous Given 1/9/24 4135)   iopamidol (ISOVUE-370) 76 % injection 75 mL (75 mLs IntraVENous Given 1/9/24 6869)   HYDROmorphone HCl PF (DILAUDID) injection 1 mg (1 mg IntraVENous Given 1/9/24 0630)   fentaNYL (SUBLIMAZE) injection 75 mcg (75 mcg IntraVENous Given 1/9/24 9330)   LORazepam (ATIVAN) 1 mg in sodium chloride (PF) 0.9 % 10 mL injection (1 mg IntraVENous Given 1/9/24 0935)        CLINICAL IMPRESSION  1. Right-sided thoracic back pain, unspecified chronicity    2. Acute right-sided low back pain without sciatica    3. Lumbar degenerative disc disease    4. ESRD on hemodialysis (HCC)        Blood pressure (!) 153/96, pulse 74, temperature 98.1 °F (36.7 °C), temperature source Oral, resp. rate 15, height 1.956 m (6' 5\"), weight 120.2 kg (265 lb), SpO2 95 %.    DISPOSITION  Jorden Woods was discharged to home in good condition.        Patient was given scripts for the following medications. I counseled patient how to take these medications.   Discharge Medication List as of 1/9/2024 12:32 PM        START taking these medications    Details   lidocaine (LIDODERM) 5 % Place 1 patch onto the skin daily for 10 days 12 hours on, 12 hours off., Disp-10 patch, R-0Normal       
nonobstructing calculus in the inferior left kidney. No ureteral   calculi or hydroureteronephrosis.   2. Moderate bladder wall thickening and adjacent stranding which appears   slightly stable compared to prior examination suggesting chronic post   radiation changes. Prostate radiation seeds are noted.   3. Colonic diverticulosis without evidence of diverticulitis.   4. Moderate stool in the colon consistent with constipation.   5. Moderate bilateral inguinal hernias again identified.         CT CHEST PULMONARY EMBOLISM W CONTRAST   Final Result   No evidence of pulmonary embolism or acute pulmonary abnormality.         MRI LUMBAR SPINE WO CONTRAST    (Results Pending)   MRI THORACIC SPINE WO CONTRAST    (Results Pending)     EKG My Reading:  Rate: 78  Rhythm: sinus  ST Segments: T wave inversion lead III  STEMI: no  QTc: 456 (minimally prolonged)  Comparison to prior: QTc has increased, otherwise not substantially changed when compared to study from 12/26/2023  _____________________________________________________________________    MEDICAL DECISION MAKING & PLANS:    I reviewed the patient's recent and/or pertinent records, current medications, triage notes, allergies, and vital signs.    Most recent VS:  BP: (!) 161/90,Temp: 98.1 °F (36.7 °C), Pulse: 90, Respirations: 15, SpO2: 100 %     Treatments:  Medications   lidocaine 4 % external patch 2 patch (2 patches TransDERmal Patch Applied 1/9/24 2734)   oxyCODONE (ROXICODONE) immediate release tablet 5 mg (has no administration in time range)   fentaNYL (SUBLIMAZE) injection 25 mcg (has no administration in time range)   ondansetron (ZOFRAN) injection 4 mg (has no administration in time range)   acetaminophen (TYLENOL) tablet 1,000 mg (1,000 mg Oral Given 1/9/24 0272)   HYDROmorphone HCl PF (DILAUDID) injection 1 mg (1 mg IntraVENous Given 1/9/24 0770)   iopamidol (ISOVUE-370) 76 % injection 75 mL (75 mLs IntraVENous Given 1/9/24 6691)   HYDROmorphone HCl PF

## 2024-01-11 ENCOUNTER — OFFICE VISIT (OUTPATIENT)
Dept: FAMILY MEDICINE CLINIC | Age: 64
End: 2024-01-11

## 2024-01-11 ENCOUNTER — TELEPHONE (OUTPATIENT)
Dept: FAMILY MEDICINE CLINIC | Age: 64
End: 2024-01-11

## 2024-01-11 VITALS
SYSTOLIC BLOOD PRESSURE: 108 MMHG | HEART RATE: 96 BPM | WEIGHT: 257.8 LBS | BODY MASS INDEX: 30.57 KG/M2 | DIASTOLIC BLOOD PRESSURE: 68 MMHG | OXYGEN SATURATION: 98 % | RESPIRATION RATE: 16 BRPM | TEMPERATURE: 97.8 F

## 2024-01-11 DIAGNOSIS — R25.1 TREMOR: ICD-10-CM

## 2024-01-11 DIAGNOSIS — R53.83 FATIGUE, UNSPECIFIED TYPE: ICD-10-CM

## 2024-01-11 DIAGNOSIS — K59.09 CHRONIC CONSTIPATION: ICD-10-CM

## 2024-01-11 DIAGNOSIS — E11.59 TYPE 2 DIABETES MELLITUS WITH OTHER CIRCULATORY COMPLICATIONS (HCC): ICD-10-CM

## 2024-01-11 DIAGNOSIS — N30.90 CYSTITIS: ICD-10-CM

## 2024-01-11 DIAGNOSIS — C61 PROSTATE CANCER (HCC): ICD-10-CM

## 2024-01-11 DIAGNOSIS — Z23 NEED FOR VACCINATION FOR PNEUMOCOCCUS: ICD-10-CM

## 2024-01-11 DIAGNOSIS — R10.9 RIGHT FLANK PAIN: Primary | ICD-10-CM

## 2024-01-11 DIAGNOSIS — D63.8 ANEMIA, CHRONIC DISEASE: ICD-10-CM

## 2024-01-11 DIAGNOSIS — I48.0 PAROXYSMAL ATRIAL FIBRILLATION (HCC): ICD-10-CM

## 2024-01-11 DIAGNOSIS — D63.1 ANEMIA DUE TO STAGE 5 CHRONIC KIDNEY DISEASE (HCC): ICD-10-CM

## 2024-01-11 DIAGNOSIS — N18.5 ANEMIA DUE TO STAGE 5 CHRONIC KIDNEY DISEASE (HCC): ICD-10-CM

## 2024-01-11 DIAGNOSIS — N18.6 ESRD (END STAGE RENAL DISEASE) ON DIALYSIS (HCC): ICD-10-CM

## 2024-01-11 DIAGNOSIS — I50.22 CHRONIC SYSTOLIC CONGESTIVE HEART FAILURE (HCC): ICD-10-CM

## 2024-01-11 DIAGNOSIS — Z29.11 NEED FOR RSV IMMUNIZATION: ICD-10-CM

## 2024-01-11 DIAGNOSIS — F11.20 OPIOID DEPENDENCE WITH CURRENT USE (HCC): ICD-10-CM

## 2024-01-11 DIAGNOSIS — Z99.2 ESRD (END STAGE RENAL DISEASE) ON DIALYSIS (HCC): ICD-10-CM

## 2024-01-11 DIAGNOSIS — F33.0 MAJOR DEPRESSIVE DISORDER, RECURRENT, MILD (HCC): ICD-10-CM

## 2024-01-11 LAB
BASOPHILS # BLD: 0 K/UL (ref 0–0.2)
BASOPHILS NFR BLD: 0.4 %
DEPRECATED RDW RBC AUTO: 17.3 % (ref 12.4–15.4)
EOSINOPHIL # BLD: 0.2 K/UL (ref 0–0.6)
EOSINOPHIL NFR BLD: 3.5 %
FERRITIN SERPL IA-MCNC: 1768 NG/ML (ref 30–400)
FOLATE SERPL-MCNC: 8.26 NG/ML (ref 4.78–24.2)
HCT VFR BLD AUTO: 39.3 % (ref 40.5–52.5)
HGB BLD-MCNC: 12.9 G/DL (ref 13.5–17.5)
IRON SATN MFR SERPL: 35 % (ref 20–50)
IRON SERPL-MCNC: 73 UG/DL (ref 59–158)
LYMPHOCYTES # BLD: 0.9 K/UL (ref 1–5.1)
LYMPHOCYTES NFR BLD: 18.5 %
MAGNESIUM SERPL-MCNC: 2 MG/DL (ref 1.8–2.4)
MCH RBC QN AUTO: 29.1 PG (ref 26–34)
MCHC RBC AUTO-ENTMCNC: 32.8 G/DL (ref 31–36)
MCV RBC AUTO: 88.7 FL (ref 80–100)
MONOCYTES # BLD: 0.5 K/UL (ref 0–1.3)
MONOCYTES NFR BLD: 10.5 %
NEUTROPHILS # BLD: 3.4 K/UL (ref 1.7–7.7)
NEUTROPHILS NFR BLD: 67.1 %
PHOSPHATE SERPL-MCNC: 2.9 MG/DL (ref 2.5–4.9)
PLATELET # BLD AUTO: 175 K/UL (ref 135–450)
PMV BLD AUTO: 8.5 FL (ref 5–10.5)
RBC # BLD AUTO: 4.43 M/UL (ref 4.2–5.9)
TIBC SERPL-MCNC: 211 UG/DL (ref 260–445)
VIT B12 SERPL-MCNC: 801 PG/ML (ref 211–911)
WBC # BLD AUTO: 5.1 K/UL (ref 4–11)

## 2024-01-11 ASSESSMENT — PATIENT HEALTH QUESTIONNAIRE - PHQ9
1. LITTLE INTEREST OR PLEASURE IN DOING THINGS: 0
SUM OF ALL RESPONSES TO PHQ QUESTIONS 1-9: 0
SUM OF ALL RESPONSES TO PHQ9 QUESTIONS 1 & 2: 0
SUM OF ALL RESPONSES TO PHQ QUESTIONS 1-9: 0
8. MOVING OR SPEAKING SO SLOWLY THAT OTHER PEOPLE COULD HAVE NOTICED. OR THE OPPOSITE, BEING SO FIGETY OR RESTLESS THAT YOU HAVE BEEN MOVING AROUND A LOT MORE THAN USUAL: 0
6. FEELING BAD ABOUT YOURSELF - OR THAT YOU ARE A FAILURE OR HAVE LET YOURSELF OR YOUR FAMILY DOWN: 0
2. FEELING DOWN, DEPRESSED OR HOPELESS: 0
3. TROUBLE FALLING OR STAYING ASLEEP: 0
9. THOUGHTS THAT YOU WOULD BE BETTER OFF DEAD, OR OF HURTING YOURSELF: 0
5. POOR APPETITE OR OVEREATING: 0
SUM OF ALL RESPONSES TO PHQ QUESTIONS 1-9: 0
4. FEELING TIRED OR HAVING LITTLE ENERGY: 0
SUM OF ALL RESPONSES TO PHQ QUESTIONS 1-9: 0
10. IF YOU CHECKED OFF ANY PROBLEMS, HOW DIFFICULT HAVE THESE PROBLEMS MADE IT FOR YOU TO DO YOUR WORK, TAKE CARE OF THINGS AT HOME, OR GET ALONG WITH OTHER PEOPLE: 0
7. TROUBLE CONCENTRATING ON THINGS, SUCH AS READING THE NEWSPAPER OR WATCHING TELEVISION: 0

## 2024-01-11 NOTE — TELEPHONE ENCOUNTER
Prior Authorization for CPT4- unlisted special service, procedure or report has been APPROVED by [facility] [employer] for time period 01/01/2024 to 12/30/2024. Please see attached scanned approval letter for more information

## 2024-01-11 NOTE — PROGRESS NOTES
Here for f/u, pt was in the hospital a fwe times for some back pain issues, pt was at [facility] a few days ago for back pain.  The first time, he was dx with UTI, but the second time he was dx with progressive degenerative disc disease.  Pt was evaluated with MRI of thoracic and lumbar spine, as well as CT chest / abd / pelvis.  MRI showed some moderate degenerative disc disease w/o acute findings and no significant neural compromise    Pt states that the biggest issue is lower back and side pain issues.  ER visit was just 2 days ago with testing as above.  Pt was supposed to be on a med to help with bowels but has not been able to get (movantik).  Has been waiting for that to come in to pharmacy    Pt working with dr. Guerra for his CKD/ESRD.  Pt continues on dialysis TIW and that is stable.  Pt has meeting with  1/19 to consider transplantation.  Pt seeing urology and nephrology      Except as noted above in the history of present illness, the review of systems is  negative for headache, vision changes, chest pain, shortness of breath, abdominal pain, urinary sx, bowel changes.    Past medical, surgical, and social history reviewed and updated  Medications and allergies reviewed and updated      O: /68   Pulse 96   Temp 97.8 °F (36.6 °C) (Temporal)   Resp 16   Wt 116.9 kg (257 lb 12.8 oz)   SpO2 98%   BMI 30.57 kg/m²   GEN: No acute distress, cooperative, well nourished, alert.   HEENT: PEERLA, EOMI , normocephalic/atraumatic, nares and oropharynx clear.  Mucous membranes normal, Tympanic membranes clear bilaterally.  Neck: soft, supple, no thyromegaly, mass, no Lymphadenopathy  CV: Regular rate and rhythm, no murmur, rubs, gallops. No edema.  Resp: Clear to auscultation bilaterally good air entry bilaterally  No crackles, wheeze. Breathing comfortably.   Psych: mood stable, No suicidal thoughts or ideation         ASSESSMENT / PLAN:    1. Right flank pain  Reviewed hospitalization, imaging including CT  The patient is a 30 y/o male who came in to ED for complaint of dysuria. Pt reports that he had "unprotected sex and how has burning upon urination" Pt denies any other symptoms.

## 2024-01-12 ENCOUNTER — CARE COORDINATION (OUTPATIENT)
Dept: CARE COORDINATION | Age: 64
End: 2024-01-12

## 2024-01-12 LAB
EST. AVERAGE GLUCOSE BLD GHB EST-MCNC: 108.3 MG/DL
HBA1C MFR BLD: 5.4 %

## 2024-01-12 NOTE — CARE COORDINATION
Ambulatory Care Coordination (ACC)    MALDONADO Vargas [facility]   Ambulatory Care Manager (ACM)      Situation: PCP referral for RPM/ACC    Action: Left HIPAA compliant vm requesting return callback.    Provided & repeated direct callback to reach this ACM.    MyChart communication also sent    Plan: engage for RPM/ACC    MALDONADO VargasN, [facility] Ambulatory Care Manager  [phone]

## 2024-01-13 LAB
BACTERIA BLD CULT ORG #2: NORMAL
BACTERIA BLD CULT: NORMAL

## 2024-01-14 DIAGNOSIS — R79.89 ELEVATED FERRITIN: Primary | ICD-10-CM

## 2024-01-15 ENCOUNTER — CARE COORDINATION (OUTPATIENT)
Dept: PRIMARY CARE CLINIC | Age: 64
End: 2024-01-15

## 2024-01-15 ENCOUNTER — CARE COORDINATION (OUTPATIENT)
Dept: CARE COORDINATION | Age: 64
End: 2024-01-15

## 2024-01-15 ENCOUNTER — TELEPHONE (OUTPATIENT)
Dept: PHARMACY | Facility: CLINIC | Age: 64
End: 2024-01-15

## 2024-01-15 DIAGNOSIS — N18.9 CHRONIC KIDNEY DISEASE-MINERAL AND BONE DISORDER: ICD-10-CM

## 2024-01-15 DIAGNOSIS — I50.9 CONGESTIVE HEART FAILURE, UNSPECIFIED HF CHRONICITY, UNSPECIFIED HEART FAILURE TYPE (HCC): Primary | ICD-10-CM

## 2024-01-15 DIAGNOSIS — M89.9 CHRONIC KIDNEY DISEASE-MINERAL AND BONE DISORDER: ICD-10-CM

## 2024-01-15 DIAGNOSIS — I10 HYPERTENSION, UNCONTROLLED: ICD-10-CM

## 2024-01-15 DIAGNOSIS — E83.9 CHRONIC KIDNEY DISEASE-MINERAL AND BONE DISORDER: ICD-10-CM

## 2024-01-15 DIAGNOSIS — R79.89 ELEVATED FERRITIN: ICD-10-CM

## 2024-01-15 RX ORDER — TORSEMIDE 100 MG/1
100 TABLET ORAL DAILY
COMMUNITY

## 2024-01-15 RX ORDER — SPIRONOLACTONE 50 MG/1
50 TABLET, FILM COATED ORAL DAILY
COMMUNITY

## 2024-01-15 RX ORDER — TAMSULOSIN HYDROCHLORIDE 0.4 MG/1
0.4 CAPSULE ORAL DAILY
COMMUNITY

## 2024-01-15 ASSESSMENT — PATIENT HEALTH QUESTIONNAIRE - PHQ9
2. FEELING DOWN, DEPRESSED OR HOPELESS: 0
SUM OF ALL RESPONSES TO PHQ QUESTIONS 1-9: 0
SUM OF ALL RESPONSES TO PHQ9 QUESTIONS 1 & 2: 0
SUM OF ALL RESPONSES TO PHQ QUESTIONS 1-9: 0
1. LITTLE INTEREST OR PLEASURE IN DOING THINGS: 0
SUM OF ALL RESPONSES TO PHQ QUESTIONS 1-9: 0
SUM OF ALL RESPONSES TO PHQ QUESTIONS 1-9: 0

## 2024-01-15 NOTE — CARE COORDINATION
Ambulatory Care Coordination Note  1/15/2024    Patient Current Location:  Home: [address]   [place] [address]    ACM contacted the patient by telephone. Verified name and  with patient as identifiers. Provided introduction to self, and explanation of the ACM role.     ACM: Stacia Bojorquez RN    Challenges to be reviewed by the provider   Additional needs identified to be addressed with provider: Yes  Enrollment to RPM per PCP referral  Pt will self outreach direct to PCP to schedule futur/return visit w/PCP as appropriate             Method of communication with provider: chart routing.      Offered patient enrollment in the Remote Patient Monitoring (RPM) program for in-home monitoring: Yes, patient enrolled:     Remote Patient Monitoring Enrollment Note      Date/Time: 1/15/2024 2:39 PM    Offered patient enrollment in the [facility] Remote Patient Monitoring (RPM) program for in home monitoring for Kidney Disease, CHF, and HTN.  Patient accepted RPM services.    Patient will be monitoring the following daily:  blood pressure reading, pulse ox heart rate, and weight    ACM reviewed the information below with patient:    Emergency Contact (name and contact number): Tamiko Woods, pt's wife [phone]    [x] A member from the care coordination team will reach out to notify the patient once the RPM kit is ordered.   [x] Once the kit is delivered, the HRS team will contact the patient after UPS deliver to assist with set up.            [x] Determined BP cuff size: large (13.8\"-19.68\")      [x] Determined weight scale: regular (<330lbs)                                                [x] Hours of ACM monitoring - Monday-Friday 2763-1362                     All questions about RPM program answered at this time.  .  Outreach per PCP referral for RPM  Pt reports he is currently in review @ [facility] transplant list for kidney. He is newly Dx prostate CA, which caused delay for transplant list. He is continuing

## 2024-01-15 NOTE — PROGRESS NOTES
Remote Patient Monitoring Treatment Plan    Received request from AC/Stacia Maurice RN  to order remote patient monitoring for in home monitoring of Kidney Disease , CHF, and HTN and order completed.     Patient will be monitoring blood pressure   pulse ox   weight.      Patient will engage in Remote Patient Monitoring each day to develop the skills necessary for self management.       RPM Care Team Responsibilities:   Alerts will be reviewed daily and addressed within 2-4 hours during operational hours (Monday -Friday 9 am-4 pm)  Alert response and intervention documented in patient medical record  Alert response escalated to PCP per protocol and documented in patient medical record  Patient monitored over approximately  days  Discharge from program based on self-management readiness    See care coordination encounters for additional details.

## 2024-01-15 NOTE — CARE COORDINATION
RPM Kit Order    Remote Patient Kit Ordering Note      Date/Time:  1/15/2024 3:27 PM      [] CCSS confirmed patient shipping address  [x] Patient will receive package over the next 1-3 business days. Someone 21 years or older must be present to sign for UPS delivery.  [x] HRS will contact patient within 24 hours, an HRS  will call the patient directly: If the patient does not answer, HRS will follow up with the clinical team notifying them about the unsuccessful attempt to contact the patient. HRS will make three call attempts to the patient.Provide patient with [facility] Virtual install number is: [phone].  [x] ACM will contact patient once equipment is active to welcome them to the program.                                                         [x] Hours of RPM monitoring - Monday-Friday 0704-1381; encourage patient to get vitals entered by Noon each day to have the alert addressed same day.  [x][facility] mailed RPM Patient flyer to patient.                     All questions answered at this time. ACM made aware the RPM kit has been ordered.      [facility] notified patient of RPM equipment order.

## 2024-01-15 NOTE — TELEPHONE ENCOUNTER
Pharmacy team - pt would like comprehensive review of meds. ACM did spend considerable time in review & updated according to what pt reports on hand/taking, etc- but pt hoping for potential to 'scale down' amt of meds (if possible) and welcomes review by pharmacy.  See note, pt understands necessity for him to review w/prescribers & endorses intent to do so. Still, would like pharmacy review.  Thanks.       Received a referral:  from Care Coordinator  to review patient’s medications. Called patient to schedule a time to speak with a pharmacist over the telephone.     No answer left VM: Please contact us at  [phone] to schedule this appointment. Pharmacists are available Monday thru Friday 7:30 AM till 5:30 PM.      Doreen Felix CPhT.   Population Health Clinical   [facility]  Toll free: [phone]

## 2024-01-16 RX ORDER — ERGOCALCIFEROL 1.25 MG/1
50000 CAPSULE ORAL
Qty: 6 CAPSULE | Refills: 0 | Status: SHIPPED | OUTPATIENT
Start: 2024-01-16

## 2024-01-17 ENCOUNTER — OFFICE VISIT (OUTPATIENT)
Dept: PAIN MANAGEMENT | Age: 64
End: 2024-01-17
Payer: MEDICARE

## 2024-01-17 VITALS
BODY MASS INDEX: 31.54 KG/M2 | WEIGHT: 266 LBS | DIASTOLIC BLOOD PRESSURE: 82 MMHG | HEART RATE: 83 BPM | SYSTOLIC BLOOD PRESSURE: 154 MMHG | OXYGEN SATURATION: 97 %

## 2024-01-17 DIAGNOSIS — F33.0 MAJOR DEPRESSIVE DISORDER, RECURRENT, MILD (HCC): ICD-10-CM

## 2024-01-17 DIAGNOSIS — F11.20 OPIOID DEPENDENCE WITH CURRENT USE (HCC): ICD-10-CM

## 2024-01-17 DIAGNOSIS — Z98.890 STATUS POST BILATERAL HERNIA REPAIR: ICD-10-CM

## 2024-01-17 DIAGNOSIS — G89.4 CHRONIC PAIN SYNDROME: ICD-10-CM

## 2024-01-17 DIAGNOSIS — Z87.19 STATUS POST BILATERAL HERNIA REPAIR: ICD-10-CM

## 2024-01-17 DIAGNOSIS — G57.92 ILIOINGUINAL NEURALGIA OF LEFT SIDE: ICD-10-CM

## 2024-01-17 DIAGNOSIS — Z51.81 ENCOUNTER FOR THERAPEUTIC DRUG MONITORING: ICD-10-CM

## 2024-01-17 DIAGNOSIS — K43.2 INCISIONAL HERNIA WITHOUT OBSTRUCTION OR GANGRENE: ICD-10-CM

## 2024-01-17 DIAGNOSIS — M50.30 DDD (DEGENERATIVE DISC DISEASE), CERVICAL: ICD-10-CM

## 2024-01-17 DIAGNOSIS — M25.50 ARTHRALGIA OF MULTIPLE JOINTS: ICD-10-CM

## 2024-01-17 DIAGNOSIS — G57.91 ILIOINGUINAL NEURALGIA OF RIGHT SIDE: ICD-10-CM

## 2024-01-17 PROCEDURE — 3077F SYST BP >= 140 MM HG: CPT | Performed by: NURSE PRACTITIONER

## 2024-01-17 PROCEDURE — 3079F DIAST BP 80-89 MM HG: CPT | Performed by: NURSE PRACTITIONER

## 2024-01-17 PROCEDURE — G8427 DOCREV CUR MEDS BY ELIG CLIN: HCPCS | Performed by: NURSE PRACTITIONER

## 2024-01-17 PROCEDURE — 3017F COLORECTAL CA SCREEN DOC REV: CPT | Performed by: NURSE PRACTITIONER

## 2024-01-17 PROCEDURE — G8417 CALC BMI ABV UP PARAM F/U: HCPCS | Performed by: NURSE PRACTITIONER

## 2024-01-17 PROCEDURE — 1123F ACP DISCUSS/DSCN MKR DOCD: CPT | Performed by: NURSE PRACTITIONER

## 2024-01-17 PROCEDURE — G8484 FLU IMMUNIZE NO ADMIN: HCPCS | Performed by: NURSE PRACTITIONER

## 2024-01-17 PROCEDURE — 99213 OFFICE O/P EST LOW 20 MIN: CPT | Performed by: NURSE PRACTITIONER

## 2024-01-17 PROCEDURE — 1036F TOBACCO NON-USER: CPT | Performed by: NURSE PRACTITIONER

## 2024-01-17 RX ORDER — OXYCODONE HYDROCHLORIDE AND ACETAMINOPHEN 5; 325 MG/1; MG/1
1 TABLET ORAL EVERY 6 HOURS PRN
Qty: 112 TABLET | Refills: 0 | Status: SHIPPED | OUTPATIENT
Start: 2024-01-17 | End: 2024-02-14

## 2024-01-17 RX ORDER — TIZANIDINE 4 MG/1
4 TABLET ORAL EVERY 8 HOURS PRN
Qty: 90 TABLET | Refills: 0 | Status: SHIPPED | OUTPATIENT
Start: 2024-01-17 | End: 2024-02-16

## 2024-01-17 NOTE — PROGRESS NOTES
Jorden Woods  1960  [id number]      HISTORY OF PRESENT ILLNESS: Mr. Woods is a 63 y.o. male returns for a follow up visit for pain management  He has a diagnosis of   1. Incisional hernia without obstruction or gangrene    2. Ilioinguinal neuralgia of right side    3. Chronic pain syndrome    4. Ilioinguinal neuralgia of left side    5. Status post bilateral hernia repair    6. Major depressive disorder, recurrent, mild    7. Arthralgia of multiple joints    8. DDD (degenerative disc disease), cervical    9. Encounter for therapeutic drug monitoring    10. Opioid dependence with current use (HCC)    .      New Medications since Last Office visit have been reviewed with patient.     As per Information Obtained from the PADT (Patient Assessment and Documentation Tool)    He complains of pain in the groin, bilateral knees. He rates the pain 5/10 and describes it as aching. Current treatment regimen has helped relieve about 90% of the pain since beginning treatment plan.  He denies any side effects from the current pain regimen. Patient reports that since implementation of their treatment plan; their physical functioning is unchanged, family/social relationships are unchanged, mood is unchanged sleep patterns are unchanged, and that the overall functioning is unchanged.  Patient denies/admits that any of the above have changed since last office visit. Patient denies misusing/abusing his narcotic pain medications or using any illegal drugs.      Upon obtaining medical history from Mr. Woods    ALLERGIES: Patients list of allergies were reviewed     MEDICATIONS: Mr. Woods list of medications were reviewed.His current medications are   Outpatient Medications Prior to Visit   Medication Sig Dispense Refill    vitamin D (ERGOCALCIFEROL) 1.25 MG (72534 UT) CAPS capsule Take 1 capsule by mouth every 30 days 6 capsule 0    tamsulosin (FLOMAX) 0.4 MG capsule Take 1 capsule by mouth daily      torsemide (DEMADEX) 100 MG

## 2024-01-21 LAB
HFE GENE MUT ANL BLD/T: NORMAL
HFE P.C282Y BLD/T QL: NEGATIVE
HFE P.H63D BLD/T QL: NEGATIVE
HFE P.S65C BLD/T QL: NEGATIVE
SPECIMEN SOURCE: NORMAL

## 2024-01-22 ENCOUNTER — CARE COORDINATION (OUTPATIENT)
Dept: CARE COORDINATION | Age: 64
End: 2024-01-22

## 2024-01-22 NOTE — TELEPHONE ENCOUNTER
Reached patient and explained purpose of med review and potential to \"scale down\" his medications.  Patient is no longer interested in review.  Offered multiple times however patient declined.  \"I don't see a need for this\"    Doreen Felix CPhT.   Population Health Clinical   [facility]  Toll free: [phone] Option 2     For Pharmacy Admin Tracking Only    Program: Bou[facility]  CPA in place:  No  Recommendation Provided To: Patient/Caregiver: 1 via Telephone  Gap Closed?: No   Time Spent (min): 15

## 2024-01-22 NOTE — CARE COORDINATION
Ambulatory Care Coordination (ACC)    MALDONADO VargasN [facility]   Ambulatory Care Manager (ACM)      Situation: ACC program initiated 1.15.24      HD Tues, Thur, & Sat  Enrolled for RPM 1.15.24 - CKD, CHF, HTN  Aimed to review with pt outcome RE:    Per ACC Initial - identified Rx refill needs:   Rx gabapentin, nifidipine, & glycolax    Action: Left vm requesting return callback for review.    Provided & repeated direct callback to reach this ACM and also direct number to contact .[phone] Rx refill request, if needed.      Plan (unchanged): ACC outreach weekly x2 more weeks, then will transition to routine ACC outreach calls q 2-4 weeks, per pt preference -  for health coaching, care collaboration, support w/community resources, and help with any newly presenting concerns as needed.  Employ support of fellow ACC team nurse for higher frequency ACC outreach, if indicated.  Pt will continue direct self reporting for any concerning symptoms - new onset or worsening of any pre-existing concerns.  Review of Zone Mgmt guidance, SDOH, ACP throughout current ACC episode as appropriate.  Pt agrees to outreach direct to ACM, as needed, between routine follow up outreach initiated by AC     Stacia Bojorquez RN BSN, [facility] Ambulatory Care Manager  [phone]

## 2024-01-23 ENCOUNTER — CARE COORDINATION (OUTPATIENT)
Dept: PRIMARY CARE CLINIC | Age: 64
End: 2024-01-23

## 2024-01-23 ENCOUNTER — CARE COORDINATION (OUTPATIENT)
Dept: CARE COORDINATION | Age: 64
End: 2024-01-23

## 2024-01-23 DIAGNOSIS — N18.9 CHRONIC KIDNEY DISEASE-MINERAL AND BONE DISORDER: ICD-10-CM

## 2024-01-23 DIAGNOSIS — M89.9 CHRONIC KIDNEY DISEASE-MINERAL AND BONE DISORDER: ICD-10-CM

## 2024-01-23 DIAGNOSIS — I50.9 CONGESTIVE HEART FAILURE, UNSPECIFIED HF CHRONICITY, UNSPECIFIED HEART FAILURE TYPE (HCC): ICD-10-CM

## 2024-01-23 DIAGNOSIS — E83.9 CHRONIC KIDNEY DISEASE-MINERAL AND BONE DISORDER: ICD-10-CM

## 2024-01-23 DIAGNOSIS — I10 HYPERTENSION, UNCONTROLLED: Primary | ICD-10-CM

## 2024-01-23 NOTE — CARE COORDINATION
Per message from HRS install team regarding RPM patient Jorden Woods  56 MRN [id number]    The patient is no longer interested in RPM. Please send a discontinue message to the pool and we will begin the return process.    Remote Patient Monitoring Disenrollment      Date/Time:  2024 8:32 AM  Patient Current Location: Home: [address] [place] [address]  Patient has been dis-enrolled from Remote Patient Monitoring program on 2024 due to no longer Interested. Patient has been provided instruction on process to return RPM equipment and RPM has been deactivated.     Patient has AC's contact information for any further questions, concerns, or needs.         Of note: ACM already has vm placed out to pt yesterday. As pt goes to dialysis on  - ACM will not outreach again today (Tuesday) -  will await pt return callback from existing message & continue w/routine ACC outreach as appropriate.  Submitting disenrollment note at request of RPM team for logistical purposes only.

## 2024-01-23 NOTE — PROGRESS NOTES
Remote Patient Order Discontinued    Received request from Stacia Bojorquez RN  to discontinue order for remote patient monitoring of Kidney Disease , CHF, and HTN and order completed.

## 2024-01-23 NOTE — CARE COORDINATION
RPM Kit Return    CCSS placed call to patient to arrange RPM kit  through [employer].     Reviewed with patient how to pack equipment in original packing. N/A, LVM    Verified patient's availability to schedule [employer]  time. N/A, LVM    [employer]  time requested. Anticipated  date range 2-5 business days.

## 2024-01-29 ENCOUNTER — CARE COORDINATION (OUTPATIENT)
Dept: CARE COORDINATION | Age: 64
End: 2024-01-29

## 2024-01-29 NOTE — CARE COORDINATION
Ambulatory Care Coordination (ACC)    MALDONADO VargasN [facility]   Ambulatory Care Manager (ACM)    Situation: ACC program initiated 1.15.24      HD Tues, Thur, & Sat  Enrolled for RPM 1.15.24 - CKD, CHF, HTN  DISENROLLED RPM 1.23.24  ACM still aiming review with pt outcome RE:    Pt previously identified Rx refill needs:   Rx gabapentin, nifidipine, & glycolax     Action: Left vm requesting return callback for review.     Provided & repeated direct callback to reach this ACM and also direct number to contact [phone]   1) scheduling future/return visit w/PCP; and  2) Rx refill request, pt previously identified 1.15.24.        Plan (unchanged): ACC outreach weekly until able to review with pt; then will transition to routine ACC outreach calls q 2-4 weeks, (this, per pt preference stated on ACC initial 1.15.24) -  for health coaching, care collaboration, support w/community resources, and help with any newly presenting concerns as needed.  Employ support of fellow ACC team nurse for higher frequency ACC outreach, if indicated.  Pt will continue direct self reporting for any concerning symptoms - new onset or worsening of any pre-existing concerns.  Review of Zone Mgmt guidance, SDOH, ACP throughout current ACC episode as appropriate.  Pt agrees to outreach direct to ACM, as needed, between routine follow up outreach initiated by ACM       MALDONADO VargasN, [facility] Ambulatory Care Manager  [phone]

## 2024-02-05 ENCOUNTER — CARE COORDINATION (OUTPATIENT)
Dept: CARE COORDINATION | Age: 64
End: 2024-02-05

## 2024-02-05 NOTE — CARE COORDINATION
Ambulatory Care Coordination (ACC)    MALDONADO VargasN [facility]   Ambulatory Care Manager (ACM)      Situation: ACC program initiated 1.15.24      HD Tues, Thur, & Sat  Enrolled for RPM 1.15.24 - CKD, CHF, HTN  DISENROLLED RPM 1.23.24  Last successful ACC review w/pt 1.15.24 (initial ACC)  ACM still aiming review with pt outcome RE:    Pt previously identified Rx refill needs:   Rx gabapentin, nifidipine, & glycolax     Action: Left vm requesting return callback for review.     Provided & repeated direct callback to reach this ACM and also direct number to contact [phone]   1) scheduling future/return visit w/PCP; and  2) Rx refill request, pt previously identified 1.15.24.        Plan (unchanged): ACC outreach weekly until able to review with pt; then will transition to routine ACC outreach calls q 2-4 weeks, (this, per pt preference stated on ACC initial 1.15.24) -  for health coaching, care collaboration, support w/community resources, and help with any newly presenting concerns as needed.         ------ OR ------   if unable to reach by 2.15.24-conclude ACC program d/t disengaged.    IF able to reach pt for ongoing ACC- will continue ACC with above frequency established w/pt and also -     Employ support of fellow ACC team nurse for higher frequency ACC outreach, if indicated.  Pt will continue direct self reporting for any concerning symptoms - new onset or worsening of any pre-existing concerns.  Review of Zone Mgmt guidance, SDOH, ACP throughout current ACC episode as appropriate.  Pt agrees to outreach direct to ACM, as needed, between routine follow up outreach initiated by ACM     Stacia Bojorquez RN BSN, [facility] Ambulatory Care Manager  [phone]

## 2024-02-12 ENCOUNTER — CARE COORDINATION (OUTPATIENT)
Dept: CARE COORDINATION | Age: 64
End: 2024-02-12

## 2024-02-12 ENCOUNTER — TELEPHONE (OUTPATIENT)
Dept: PAIN MANAGEMENT | Age: 64
End: 2024-02-12

## 2024-02-12 NOTE — TELEPHONE ENCOUNTER
Pt said Jacqui was supposed to check his side at Wed's appt, however, pt does not think he can wait that long.

## 2024-02-12 NOTE — CARE COORDINATION
Ambulatory Care Coordination (ACC)    MALDONADO Vargas [facility]   Ambulatory Care Manager (ACM)    Situation: ACC program initiated 1.15.24      HD Tues, Ireneur, & Sat  Enrolled for RPM 1.15.24 - CKD, CHF, HTN  DISENROLLED RPM 1.23.24  Last, and ONLY, successful ACC review w/pt 1.15.24 (initial ACC)  ACM unable to reach pt for review for pt outcome RE: pt previously identified Rx refill needs 1.15.24:   Rx gabapentin, nifidipine, & glycolax     Action: Left vm requesting return callback for review.     Provided & repeated direct callback to reach this ACM and also direct number to contact [phone]   1) scheduling future/return visit w/PCP; and  2) Rx refill request, pt previously identified 1.15.24.        Plan (unchanged): conclude ACC program d/t disengaged.        Patient Excluded from Care Coordination? Yes     The patient will be excluded from Care Coordination for the following reason: Patient not engaged       MALDONADO Vargas, [facility] Ambulatory Care Manager  [phone]

## 2024-02-13 NOTE — TELEPHONE ENCOUNTER
Until I see him he could try putting lidocaine patches or topicals, like voltaren or aspercreme, on his pain over his ribs.

## 2024-02-13 NOTE — TELEPHONE ENCOUNTER
I called patient, explained previous notes - He voiced understanding, but said he's tried all of that and nothing helps.    He said he will see KAK tomorrow, and if he's not better he will go to the Hospital.

## 2024-02-14 ENCOUNTER — OFFICE VISIT (OUTPATIENT)
Dept: PAIN MANAGEMENT | Age: 64
End: 2024-02-14
Payer: MEDICARE

## 2024-02-14 VITALS
BODY MASS INDEX: 31.9 KG/M2 | DIASTOLIC BLOOD PRESSURE: 73 MMHG | WEIGHT: 269 LBS | SYSTOLIC BLOOD PRESSURE: 123 MMHG | OXYGEN SATURATION: 98 % | HEART RATE: 79 BPM

## 2024-02-14 DIAGNOSIS — Z51.81 ENCOUNTER FOR THERAPEUTIC DRUG MONITORING: ICD-10-CM

## 2024-02-14 DIAGNOSIS — G58.8 INTERCOSTAL NEURALGIA: ICD-10-CM

## 2024-02-14 DIAGNOSIS — M25.50 ARTHRALGIA OF MULTIPLE JOINTS: ICD-10-CM

## 2024-02-14 DIAGNOSIS — K43.2 INCISIONAL HERNIA WITHOUT OBSTRUCTION OR GANGRENE: ICD-10-CM

## 2024-02-14 DIAGNOSIS — G57.92 ILIOINGUINAL NEURALGIA OF LEFT SIDE: ICD-10-CM

## 2024-02-14 DIAGNOSIS — F33.0 MAJOR DEPRESSIVE DISORDER, RECURRENT, MILD (HCC): ICD-10-CM

## 2024-02-14 DIAGNOSIS — Z87.19 STATUS POST BILATERAL HERNIA REPAIR: ICD-10-CM

## 2024-02-14 DIAGNOSIS — F11.20 OPIOID DEPENDENCE WITH CURRENT USE (HCC): ICD-10-CM

## 2024-02-14 DIAGNOSIS — M50.30 DDD (DEGENERATIVE DISC DISEASE), CERVICAL: Primary | ICD-10-CM

## 2024-02-14 DIAGNOSIS — G89.4 CHRONIC PAIN SYNDROME: ICD-10-CM

## 2024-02-14 DIAGNOSIS — G57.91 ILIOINGUINAL NEURALGIA OF RIGHT SIDE: ICD-10-CM

## 2024-02-14 DIAGNOSIS — Z98.890 STATUS POST BILATERAL HERNIA REPAIR: ICD-10-CM

## 2024-02-14 PROCEDURE — G8484 FLU IMMUNIZE NO ADMIN: HCPCS | Performed by: NURSE PRACTITIONER

## 2024-02-14 PROCEDURE — 99213 OFFICE O/P EST LOW 20 MIN: CPT | Performed by: NURSE PRACTITIONER

## 2024-02-14 PROCEDURE — 1123F ACP DISCUSS/DSCN MKR DOCD: CPT | Performed by: NURSE PRACTITIONER

## 2024-02-14 PROCEDURE — G8427 DOCREV CUR MEDS BY ELIG CLIN: HCPCS | Performed by: NURSE PRACTITIONER

## 2024-02-14 PROCEDURE — 3074F SYST BP LT 130 MM HG: CPT | Performed by: NURSE PRACTITIONER

## 2024-02-14 PROCEDURE — G8417 CALC BMI ABV UP PARAM F/U: HCPCS | Performed by: NURSE PRACTITIONER

## 2024-02-14 PROCEDURE — 3017F COLORECTAL CA SCREEN DOC REV: CPT | Performed by: NURSE PRACTITIONER

## 2024-02-14 PROCEDURE — 1036F TOBACCO NON-USER: CPT | Performed by: NURSE PRACTITIONER

## 2024-02-14 PROCEDURE — 3078F DIAST BP <80 MM HG: CPT | Performed by: NURSE PRACTITIONER

## 2024-02-14 RX ORDER — OXYCODONE HYDROCHLORIDE AND ACETAMINOPHEN 5; 325 MG/1; MG/1
1 TABLET ORAL EVERY 6 HOURS PRN
Qty: 112 TABLET | Refills: 0 | Status: SHIPPED | OUTPATIENT
Start: 2024-02-14 | End: 2024-03-13

## 2024-02-14 RX ORDER — TIZANIDINE 4 MG/1
4 TABLET ORAL EVERY 8 HOURS PRN
Qty: 90 TABLET | Refills: 0 | Status: SHIPPED | OUTPATIENT
Start: 2024-02-14 | End: 2024-03-15

## 2024-02-14 NOTE — PROGRESS NOTES
shortness of breath or change in baseline breathing.    Gastrointestinal: Negative for nausea, vomiting, abdominal pain, diarrhea, constipation, blood in stool and abdominal distention.       PHYSICAL EXAM:   Nursing note and vitals reviewed. /73   Pulse 79   Wt 122 kg (269 lb)   SpO2 98%   BMI 31.90 kg/m²   Constitutional: He appears well-developed and well-nourished. No acute distress.   Skin: Skin is warm and dry, good turgor. No rash noted. He is not diaphoretic.  Cardiovascular: Normal rate, regular rhythm, normal heart sounds, and does not have murmur.     Pulmonary/Chest: Effort normal. No respiratory distress. He does not have wheezes in the lung fields. He has no rales.     Neurological/Psychiatric:He is alert and oriented to person, place, and time. Coordination is  normal.  His mood isAppropriate and affect is Neutral/Euthymic(normal) .     Prescription pain medication monitoring:                  MEDD current = 30              ORT Score = 0              Other Risk factors - (mood) stable              Date of Last Medication Agreement: 01/17/2024              Date Naloxone prescribed: 02/16/2024-pt has at home              UDT:                          Date of last UDT: 02/14/2024                          Adverse report: pending              OARRS:                          Checked today: Yes                          Adverse report: No    IMPRESSION:   1. DDD (degenerative disc disease), cervical    2. Incisional hernia without obstruction or gangrene    3. Ilioinguinal neuralgia of right side    4. Chronic pain syndrome    5. Ilioinguinal neuralgia of left side    6. Status post bilateral hernia repair    7. Major depressive disorder, recurrent, mild    8. Arthralgia of multiple joints    9. Encounter for therapeutic drug monitoring    10. Opioid dependence with current use (HCC)    11. Intercostal neuralgia        PLAN:  Informed verbal consent was obtained:  -random UDS today for opiate

## 2024-02-16 PROBLEM — G58.8 INTERCOSTAL NEURALGIA: Status: ACTIVE | Noted: 2023-11-28

## 2024-02-24 RX ORDER — NIFEDIPINE 90 MG/1
TABLET, FILM COATED, EXTENDED RELEASE ORAL
Qty: 90 TABLET | Refills: 0 | Status: SHIPPED | OUTPATIENT
Start: 2024-02-24

## 2024-02-24 RX ORDER — TERAZOSIN 5 MG/1
CAPSULE ORAL
Qty: 90 CAPSULE | Refills: 6 | Status: SHIPPED | OUTPATIENT
Start: 2024-02-24

## 2024-03-04 ENCOUNTER — APPOINTMENT (OUTPATIENT)
Dept: CT IMAGING | Age: 64
DRG: 304 | End: 2024-03-04
Payer: MEDICARE

## 2024-03-04 ENCOUNTER — HOSPITAL ENCOUNTER (INPATIENT)
Age: 64
LOS: 1 days | Discharge: HOME | DRG: 304 | End: 2024-03-05
Attending: EMERGENCY MEDICINE | Admitting: INTERNAL MEDICINE
Payer: MEDICARE

## 2024-03-04 ENCOUNTER — APPOINTMENT (OUTPATIENT)
Dept: GENERAL RADIOLOGY | Age: 64
DRG: 304 | End: 2024-03-04
Payer: MEDICARE

## 2024-03-04 DIAGNOSIS — R51.9 SEVERE HEADACHE: Primary | ICD-10-CM

## 2024-03-04 DIAGNOSIS — I16.0 HYPERTENSIVE URGENCY: ICD-10-CM

## 2024-03-04 LAB
ALBUMIN SERPL-MCNC: 4 G/DL (ref 3.4–5)
ALBUMIN SERPL-MCNC: 4.3 G/DL (ref 3.4–5)
ALBUMIN/GLOB SERPL: 1.5 {RATIO} (ref 1.1–2.2)
ALBUMIN/GLOB SERPL: 1.6 {RATIO} (ref 1.1–2.2)
ALP SERPL-CCNC: 67 U/L (ref 40–129)
ALP SERPL-CCNC: 70 U/L (ref 40–129)
ALT SERPL-CCNC: 10 U/L (ref 10–40)
ALT SERPL-CCNC: 11 U/L (ref 10–40)
ANION GAP SERPL CALCULATED.3IONS-SCNC: 13 MMOL/L (ref 3–16)
ANION GAP SERPL CALCULATED.3IONS-SCNC: 15 MMOL/L (ref 3–16)
AST SERPL-CCNC: 12 U/L (ref 15–37)
AST SERPL-CCNC: 17 U/L (ref 15–37)
BASOPHILS # BLD: 0 K/UL (ref 0–0.2)
BASOPHILS NFR BLD: 0.6 %
BILIRUB SERPL-MCNC: <0.2 MG/DL (ref 0–1)
BILIRUB SERPL-MCNC: <0.2 MG/DL (ref 0–1)
BUN SERPL-MCNC: 48 MG/DL (ref 7–20)
BUN SERPL-MCNC: 48 MG/DL (ref 7–20)
CALCIUM SERPL-MCNC: 8.7 MG/DL (ref 8.3–10.6)
CALCIUM SERPL-MCNC: 8.9 MG/DL (ref 8.3–10.6)
CHLORIDE SERPL-SCNC: 97 MMOL/L (ref 99–110)
CHLORIDE SERPL-SCNC: 98 MMOL/L (ref 99–110)
CO2 SERPL-SCNC: 24 MMOL/L (ref 21–32)
CO2 SERPL-SCNC: 24 MMOL/L (ref 21–32)
CREAT SERPL-MCNC: 7 MG/DL (ref 0.8–1.3)
CREAT SERPL-MCNC: 7.1 MG/DL (ref 0.8–1.3)
DEPRECATED RDW RBC AUTO: 16.4 % (ref 12.4–15.4)
EOSINOPHIL # BLD: 0.1 K/UL (ref 0–0.6)
EOSINOPHIL NFR BLD: 2.7 %
GFR SERPLBLD CREATININE-BSD FMLA CKD-EPI: 8 ML/MIN/{1.73_M2}
GFR SERPLBLD CREATININE-BSD FMLA CKD-EPI: 8 ML/MIN/{1.73_M2}
GLUCOSE SERPL-MCNC: 90 MG/DL (ref 70–99)
GLUCOSE SERPL-MCNC: 97 MG/DL (ref 70–99)
HCT VFR BLD AUTO: 34.7 % (ref 40.5–52.5)
HGB BLD-MCNC: 11.1 G/DL (ref 13.5–17.5)
LYMPHOCYTES # BLD: 0.8 K/UL (ref 1–5.1)
LYMPHOCYTES NFR BLD: 18.9 %
MCH RBC QN AUTO: 27.8 PG (ref 26–34)
MCHC RBC AUTO-ENTMCNC: 32 G/DL (ref 31–36)
MCV RBC AUTO: 86.9 FL (ref 80–100)
MONOCYTES # BLD: 0.5 K/UL (ref 0–1.3)
MONOCYTES NFR BLD: 11.6 %
NEUTROPHILS # BLD: 2.7 K/UL (ref 1.7–7.7)
NEUTROPHILS NFR BLD: 66.2 %
PLATELET # BLD AUTO: 103 K/UL (ref 135–450)
PLATELET BLD QL SMEAR: ABNORMAL
PMV BLD AUTO: 8.3 FL (ref 5–10.5)
POTASSIUM SERPL-SCNC: 5.2 MMOL/L (ref 3.5–5.1)
POTASSIUM SERPL-SCNC: 5.6 MMOL/L (ref 3.5–5.1)
PROT SERPL-MCNC: 6.6 G/DL (ref 6.4–8.2)
PROT SERPL-MCNC: 7 G/DL (ref 6.4–8.2)
RBC # BLD AUTO: 3.99 M/UL (ref 4.2–5.9)
REASON FOR REJECTION: NORMAL
REJECTED TEST: NORMAL
SLIDE REVIEW: ABNORMAL
SODIUM SERPL-SCNC: 135 MMOL/L (ref 136–145)
SODIUM SERPL-SCNC: 136 MMOL/L (ref 136–145)
WBC # BLD AUTO: 4.1 K/UL (ref 4–11)

## 2024-03-04 PROCEDURE — 70450 CT HEAD/BRAIN W/O DYE: CPT

## 2024-03-04 PROCEDURE — 2100000000 HC CCU R&B

## 2024-03-04 PROCEDURE — 99285 EMERGENCY DEPT VISIT HI MDM: CPT

## 2024-03-04 PROCEDURE — 36415 COLL VENOUS BLD VENIPUNCTURE: CPT

## 2024-03-04 PROCEDURE — 80053 COMPREHEN METABOLIC PANEL: CPT

## 2024-03-04 PROCEDURE — 96374 THER/PROPH/DIAG INJ IV PUSH: CPT

## 2024-03-04 PROCEDURE — 85025 COMPLETE CBC W/AUTO DIFF WBC: CPT

## 2024-03-04 PROCEDURE — 70498 CT ANGIOGRAPHY NECK: CPT

## 2024-03-04 PROCEDURE — 6360000002 HC RX W HCPCS: Performed by: EMERGENCY MEDICINE

## 2024-03-04 PROCEDURE — 2000000000 HC ICU R&B

## 2024-03-04 PROCEDURE — 6370000000 HC RX 637 (ALT 250 FOR IP): Performed by: INTERNAL MEDICINE

## 2024-03-04 PROCEDURE — 74018 RADEX ABDOMEN 1 VIEW: CPT

## 2024-03-04 PROCEDURE — 2580000003 HC RX 258: Performed by: INTERNAL MEDICINE

## 2024-03-04 PROCEDURE — 6360000002 HC RX W HCPCS: Performed by: PHYSICIAN ASSISTANT

## 2024-03-04 PROCEDURE — 4A03X5D MEASUREMENT OF ARTERIAL FLOW, INTRACRANIAL, EXTERNAL APPROACH: ICD-10-PCS | Performed by: INTERNAL MEDICINE

## 2024-03-04 PROCEDURE — 6360000004 HC RX CONTRAST MEDICATION: Performed by: PHYSICIAN ASSISTANT

## 2024-03-04 PROCEDURE — 6360000002 HC RX W HCPCS: Performed by: INTERNAL MEDICINE

## 2024-03-04 RX ORDER — HEPARIN SODIUM 5000 [USP'U]/ML
5000 INJECTION, SOLUTION INTRAVENOUS; SUBCUTANEOUS EVERY 8 HOURS SCHEDULED
Status: DISCONTINUED | OUTPATIENT
Start: 2024-03-04 | End: 2024-03-05 | Stop reason: HOSPADM

## 2024-03-04 RX ORDER — CALCIUM ACETATE 667 MG/1
1 CAPSULE ORAL
Status: DISCONTINUED | OUTPATIENT
Start: 2024-03-05 | End: 2024-03-05 | Stop reason: HOSPADM

## 2024-03-04 RX ORDER — PENTOXIFYLLINE 400 MG/1
400 TABLET, EXTENDED RELEASE ORAL
COMMUNITY

## 2024-03-04 RX ORDER — METOCLOPRAMIDE HYDROCHLORIDE 5 MG/ML
5 INJECTION INTRAMUSCULAR; INTRAVENOUS EVERY 6 HOURS
Status: DISCONTINUED | OUTPATIENT
Start: 2024-03-04 | End: 2024-03-05 | Stop reason: HOSPADM

## 2024-03-04 RX ORDER — SPIRONOLACTONE 25 MG/1
50 TABLET ORAL NIGHTLY
Status: DISCONTINUED | OUTPATIENT
Start: 2024-03-04 | End: 2024-03-05 | Stop reason: HOSPADM

## 2024-03-04 RX ORDER — ONDANSETRON 2 MG/ML
4 INJECTION INTRAMUSCULAR; INTRAVENOUS ONCE
Status: COMPLETED | OUTPATIENT
Start: 2024-03-04 | End: 2024-03-04

## 2024-03-04 RX ORDER — SODIUM CHLORIDE 0.9 % (FLUSH) 0.9 %
5-40 SYRINGE (ML) INJECTION EVERY 12 HOURS SCHEDULED
Status: DISCONTINUED | OUTPATIENT
Start: 2024-03-04 | End: 2024-03-05 | Stop reason: HOSPADM

## 2024-03-04 RX ORDER — POLYETHYLENE GLYCOL 3350 17 G/17G
17 POWDER, FOR SOLUTION ORAL DAILY PRN
Status: DISCONTINUED | OUTPATIENT
Start: 2024-03-04 | End: 2024-03-05 | Stop reason: HOSPADM

## 2024-03-04 RX ORDER — DIAZEPAM 5 MG/ML
5 INJECTION, SOLUTION INTRAMUSCULAR; INTRAVENOUS EVERY 6 HOURS
Status: DISCONTINUED | OUTPATIENT
Start: 2024-03-04 | End: 2024-03-05 | Stop reason: HOSPADM

## 2024-03-04 RX ORDER — POLYETHYLENE GLYCOL 3350 17 G/17G
17 POWDER, FOR SOLUTION ORAL 2 TIMES DAILY
Status: DISCONTINUED | OUTPATIENT
Start: 2024-03-04 | End: 2024-03-04

## 2024-03-04 RX ORDER — DOXAZOSIN MESYLATE 4 MG/1
4 TABLET ORAL DAILY
Status: DISCONTINUED | OUTPATIENT
Start: 2024-03-05 | End: 2024-03-04

## 2024-03-04 RX ORDER — PENTOXIFYLLINE 400 MG/1
400 TABLET, EXTENDED RELEASE ORAL DAILY
Status: DISCONTINUED | OUTPATIENT
Start: 2024-03-05 | End: 2024-03-05 | Stop reason: HOSPADM

## 2024-03-04 RX ORDER — KETOROLAC TROMETHAMINE 30 MG/ML
30 INJECTION, SOLUTION INTRAMUSCULAR; INTRAVENOUS EVERY 6 HOURS
Status: DISCONTINUED | OUTPATIENT
Start: 2024-03-04 | End: 2024-03-04

## 2024-03-04 RX ORDER — METOPROLOL SUCCINATE 50 MG/1
50 TABLET, EXTENDED RELEASE ORAL
Status: DISCONTINUED | OUTPATIENT
Start: 2024-03-04 | End: 2024-03-05 | Stop reason: HOSPADM

## 2024-03-04 RX ORDER — TAMSULOSIN HYDROCHLORIDE 0.4 MG/1
0.4 CAPSULE ORAL DAILY
Status: DISCONTINUED | OUTPATIENT
Start: 2024-03-05 | End: 2024-03-05 | Stop reason: HOSPADM

## 2024-03-04 RX ORDER — NIFEDIPINE 30 MG/1
90 TABLET, EXTENDED RELEASE ORAL DAILY
Status: DISCONTINUED | OUTPATIENT
Start: 2024-03-04 | End: 2024-03-05 | Stop reason: HOSPADM

## 2024-03-04 RX ORDER — PANTOPRAZOLE SODIUM 40 MG/1
40 TABLET, DELAYED RELEASE ORAL
Status: DISCONTINUED | OUTPATIENT
Start: 2024-03-05 | End: 2024-03-05 | Stop reason: HOSPADM

## 2024-03-04 RX ORDER — ACETAMINOPHEN 650 MG/1
650 SUPPOSITORY RECTAL EVERY 6 HOURS PRN
Status: DISCONTINUED | OUTPATIENT
Start: 2024-03-04 | End: 2024-03-05 | Stop reason: HOSPADM

## 2024-03-04 RX ORDER — ERGOCALCIFEROL 1.25 MG/1
50000 CAPSULE ORAL
Status: DISCONTINUED | OUTPATIENT
Start: 2024-03-04 | End: 2024-03-04

## 2024-03-04 RX ORDER — NEPHROCAP 1 MG
1 CAP ORAL DAILY
Status: DISCONTINUED | OUTPATIENT
Start: 2024-03-05 | End: 2024-03-05 | Stop reason: HOSPADM

## 2024-03-04 RX ORDER — SODIUM CHLORIDE 0.9 % (FLUSH) 0.9 %
5-40 SYRINGE (ML) INJECTION PRN
Status: DISCONTINUED | OUTPATIENT
Start: 2024-03-04 | End: 2024-03-05 | Stop reason: HOSPADM

## 2024-03-04 RX ORDER — ACETAMINOPHEN 325 MG/1
650 TABLET ORAL EVERY 6 HOURS PRN
Status: DISCONTINUED | OUTPATIENT
Start: 2024-03-04 | End: 2024-03-05 | Stop reason: HOSPADM

## 2024-03-04 RX ORDER — HYDROMORPHONE HYDROCHLORIDE 1 MG/ML
0.5 INJECTION, SOLUTION INTRAMUSCULAR; INTRAVENOUS; SUBCUTANEOUS ONCE
Status: COMPLETED | OUTPATIENT
Start: 2024-03-04 | End: 2024-03-04

## 2024-03-04 RX ORDER — OXYCODONE HYDROCHLORIDE AND ACETAMINOPHEN 5; 325 MG/1; MG/1
1 TABLET ORAL EVERY 6 HOURS PRN
Status: DISCONTINUED | OUTPATIENT
Start: 2024-03-04 | End: 2024-03-05 | Stop reason: HOSPADM

## 2024-03-04 RX ORDER — TIZANIDINE 4 MG/1
4 TABLET ORAL EVERY 8 HOURS PRN
Status: DISCONTINUED | OUTPATIENT
Start: 2024-03-04 | End: 2024-03-05 | Stop reason: HOSPADM

## 2024-03-04 RX ORDER — ONDANSETRON 4 MG/1
4 TABLET, ORALLY DISINTEGRATING ORAL EVERY 8 HOURS PRN
Status: DISCONTINUED | OUTPATIENT
Start: 2024-03-04 | End: 2024-03-05 | Stop reason: HOSPADM

## 2024-03-04 RX ORDER — BISACODYL 5 MG/1
5 TABLET, DELAYED RELEASE ORAL DAILY PRN
Status: DISCONTINUED | OUTPATIENT
Start: 2024-03-04 | End: 2024-03-05 | Stop reason: HOSPADM

## 2024-03-04 RX ORDER — SODIUM CHLORIDE 9 MG/ML
INJECTION, SOLUTION INTRAVENOUS PRN
Status: DISCONTINUED | OUTPATIENT
Start: 2024-03-04 | End: 2024-03-05 | Stop reason: HOSPADM

## 2024-03-04 RX ORDER — ATORVASTATIN CALCIUM 20 MG/1
20 TABLET, FILM COATED ORAL NIGHTLY
Status: DISCONTINUED | OUTPATIENT
Start: 2024-03-04 | End: 2024-03-05 | Stop reason: HOSPADM

## 2024-03-04 RX ORDER — TORSEMIDE 100 MG/1
100 TABLET ORAL DAILY
Status: DISCONTINUED | OUTPATIENT
Start: 2024-03-05 | End: 2024-03-05 | Stop reason: HOSPADM

## 2024-03-04 RX ORDER — BUSPIRONE HYDROCHLORIDE 5 MG/1
5 TABLET ORAL 2 TIMES DAILY
Status: DISCONTINUED | OUTPATIENT
Start: 2024-03-04 | End: 2024-03-05 | Stop reason: HOSPADM

## 2024-03-04 RX ORDER — ONDANSETRON 2 MG/ML
4 INJECTION INTRAMUSCULAR; INTRAVENOUS EVERY 6 HOURS PRN
Status: DISCONTINUED | OUTPATIENT
Start: 2024-03-04 | End: 2024-03-05 | Stop reason: HOSPADM

## 2024-03-04 RX ORDER — HYDROMORPHONE HYDROCHLORIDE 1 MG/ML
1 INJECTION, SOLUTION INTRAMUSCULAR; INTRAVENOUS; SUBCUTANEOUS ONCE
Status: COMPLETED | OUTPATIENT
Start: 2024-03-04 | End: 2024-03-04

## 2024-03-04 RX ADMIN — HEPARIN SODIUM 5000 UNITS: 5000 INJECTION INTRAVENOUS; SUBCUTANEOUS at 21:51

## 2024-03-04 RX ADMIN — NIFEDIPINE 90 MG: 30 TABLET, EXTENDED RELEASE ORAL at 21:32

## 2024-03-04 RX ADMIN — BUSPIRONE HYDROCHLORIDE 5 MG: 5 TABLET ORAL at 21:32

## 2024-03-04 RX ADMIN — METOPROLOL SUCCINATE 50 MG: 50 TABLET, EXTENDED RELEASE ORAL at 21:32

## 2024-03-04 RX ADMIN — IOPAMIDOL 75 ML: 755 INJECTION, SOLUTION INTRAVENOUS at 16:09

## 2024-03-04 RX ADMIN — HYDROMORPHONE HYDROCHLORIDE 0.5 MG: 1 INJECTION, SOLUTION INTRAMUSCULAR; INTRAVENOUS; SUBCUTANEOUS at 19:25

## 2024-03-04 RX ADMIN — HYDROMORPHONE HYDROCHLORIDE 1 MG: 1 INJECTION, SOLUTION INTRAMUSCULAR; INTRAVENOUS; SUBCUTANEOUS at 16:40

## 2024-03-04 RX ADMIN — OXYCODONE HYDROCHLORIDE AND ACETAMINOPHEN 1 TABLET: 5; 325 TABLET ORAL at 21:32

## 2024-03-04 RX ADMIN — SODIUM CHLORIDE, PRESERVATIVE FREE 10 ML: 5 INJECTION INTRAVENOUS at 21:54

## 2024-03-04 RX ADMIN — ATORVASTATIN CALCIUM 20 MG: 20 TABLET, FILM COATED ORAL at 21:32

## 2024-03-04 RX ADMIN — BISACODYL 5 MG: 5 TABLET, COATED ORAL at 21:32

## 2024-03-04 RX ADMIN — SPIRONOLACTONE 50 MG: 25 TABLET ORAL at 21:51

## 2024-03-04 RX ADMIN — DIAZEPAM 5 MG: 5 INJECTION, SOLUTION INTRAMUSCULAR; INTRAVENOUS at 21:54

## 2024-03-04 RX ADMIN — ONDANSETRON 4 MG: 2 INJECTION INTRAMUSCULAR; INTRAVENOUS at 16:38

## 2024-03-04 RX ADMIN — METOCLOPRAMIDE 5 MG: 5 INJECTION, SOLUTION INTRAMUSCULAR; INTRAVENOUS at 21:51

## 2024-03-04 ASSESSMENT — PAIN SCALES - GENERAL
PAINLEVEL_OUTOF10: 10
PAINLEVEL_OUTOF10: 9
PAINLEVEL_OUTOF10: 7
PAINLEVEL_OUTOF10: 10

## 2024-03-04 ASSESSMENT — PAIN DESCRIPTION - DESCRIPTORS
DESCRIPTORS: ACHING
DESCRIPTORS: ACHING;DISCOMFORT

## 2024-03-04 ASSESSMENT — ENCOUNTER SYMPTOMS
WHEEZING: 0
RHINORRHEA: 0
NAUSEA: 0
SHORTNESS OF BREATH: 0
ABDOMINAL PAIN: 0
COUGH: 0
PHOTOPHOBIA: 1
VOMITING: 0
DIARRHEA: 0

## 2024-03-04 ASSESSMENT — PAIN DESCRIPTION - LOCATION
LOCATION: HEAD
LOCATION: HEAD

## 2024-03-04 ASSESSMENT — LIFESTYLE VARIABLES
HOW OFTEN DO YOU HAVE A DRINK CONTAINING ALCOHOL: NEVER
HOW MANY STANDARD DRINKS CONTAINING ALCOHOL DO YOU HAVE ON A TYPICAL DAY: PATIENT DOES NOT DRINK

## 2024-03-05 ENCOUNTER — APPOINTMENT (OUTPATIENT)
Dept: MRI IMAGING | Age: 64
DRG: 304 | End: 2024-03-05
Payer: MEDICARE

## 2024-03-05 VITALS
TEMPERATURE: 98.2 F | WEIGHT: 267.86 LBS | HEART RATE: 92 BPM | RESPIRATION RATE: 16 BRPM | DIASTOLIC BLOOD PRESSURE: 75 MMHG | SYSTOLIC BLOOD PRESSURE: 137 MMHG | OXYGEN SATURATION: 99 % | HEIGHT: 77 IN | BODY MASS INDEX: 31.63 KG/M2

## 2024-03-05 PROBLEM — I16.0 HYPERTENSIVE URGENCY: Status: ACTIVE | Noted: 2024-03-05

## 2024-03-05 PROBLEM — G44.53 THUNDERCLAP HEADACHE: Status: ACTIVE | Noted: 2024-03-05

## 2024-03-05 LAB
ANION GAP SERPL CALCULATED.3IONS-SCNC: 13 MMOL/L (ref 3–16)
BASOPHILS # BLD: 0 K/UL (ref 0–0.2)
BASOPHILS NFR BLD: 0.6 %
BUN SERPL-MCNC: 53 MG/DL (ref 7–20)
CALCIUM SERPL-MCNC: 8.6 MG/DL (ref 8.3–10.6)
CHLORIDE SERPL-SCNC: 100 MMOL/L (ref 99–110)
CO2 SERPL-SCNC: 23 MMOL/L (ref 21–32)
CREAT SERPL-MCNC: 7 MG/DL (ref 0.8–1.3)
DEPRECATED RDW RBC AUTO: 15.9 % (ref 12.4–15.4)
EOSINOPHIL # BLD: 0.1 K/UL (ref 0–0.6)
EOSINOPHIL NFR BLD: 2.2 %
EST. AVERAGE GLUCOSE BLD GHB EST-MCNC: 142.7 MG/DL
GFR SERPLBLD CREATININE-BSD FMLA CKD-EPI: 8 ML/MIN/{1.73_M2}
GLUCOSE BLD-MCNC: 148 MG/DL (ref 70–99)
GLUCOSE BLD-MCNC: 92 MG/DL (ref 70–99)
GLUCOSE SERPL-MCNC: 95 MG/DL (ref 70–99)
HBA1C MFR BLD: 6.6 %
HCT VFR BLD AUTO: 32.8 % (ref 40.5–52.5)
HGB BLD-MCNC: 10.4 G/DL (ref 13.5–17.5)
LYMPHOCYTES # BLD: 0.7 K/UL (ref 1–5.1)
LYMPHOCYTES NFR BLD: 17.4 %
MCH RBC QN AUTO: 27.5 PG (ref 26–34)
MCHC RBC AUTO-ENTMCNC: 31.7 G/DL (ref 31–36)
MCV RBC AUTO: 86.7 FL (ref 80–100)
MONOCYTES # BLD: 0.4 K/UL (ref 0–1.3)
MONOCYTES NFR BLD: 10.2 %
NEUTROPHILS # BLD: 2.6 K/UL (ref 1.7–7.7)
NEUTROPHILS NFR BLD: 69.6 %
PERFORMED ON: ABNORMAL
PERFORMED ON: NORMAL
PLATELET # BLD AUTO: 126 K/UL (ref 135–450)
PMV BLD AUTO: 8 FL (ref 5–10.5)
POTASSIUM SERPL-SCNC: 5 MMOL/L (ref 3.5–5.1)
RBC # BLD AUTO: 3.78 M/UL (ref 4.2–5.9)
SODIUM SERPL-SCNC: 136 MMOL/L (ref 136–145)
WBC # BLD AUTO: 3.8 K/UL (ref 4–11)

## 2024-03-05 PROCEDURE — 97161 PT EVAL LOW COMPLEX 20 MIN: CPT

## 2024-03-05 PROCEDURE — 97530 THERAPEUTIC ACTIVITIES: CPT

## 2024-03-05 PROCEDURE — 6360000002 HC RX W HCPCS: Performed by: INTERNAL MEDICINE

## 2024-03-05 PROCEDURE — APPSS60 APP SPLIT SHARED TIME 46-60 MINUTES

## 2024-03-05 PROCEDURE — 97165 OT EVAL LOW COMPLEX 30 MIN: CPT

## 2024-03-05 PROCEDURE — 5A1D70Z PERFORMANCE OF URINARY FILTRATION, INTERMITTENT, LESS THAN 6 HOURS PER DAY: ICD-10-PCS | Performed by: INTERNAL MEDICINE

## 2024-03-05 PROCEDURE — 90935 HEMODIALYSIS ONE EVALUATION: CPT

## 2024-03-05 PROCEDURE — 94760 N-INVAS EAR/PLS OXIMETRY 1: CPT

## 2024-03-05 PROCEDURE — 99221 1ST HOSP IP/OBS SF/LOW 40: CPT | Performed by: INTERNAL MEDICINE

## 2024-03-05 PROCEDURE — 2580000003 HC RX 258: Performed by: INTERNAL MEDICINE

## 2024-03-05 PROCEDURE — 80048 BASIC METABOLIC PNL TOTAL CA: CPT

## 2024-03-05 PROCEDURE — 99222 1ST HOSP IP/OBS MODERATE 55: CPT | Performed by: PSYCHIATRY & NEUROLOGY

## 2024-03-05 PROCEDURE — 70551 MRI BRAIN STEM W/O DYE: CPT

## 2024-03-05 PROCEDURE — 85025 COMPLETE CBC W/AUTO DIFF WBC: CPT

## 2024-03-05 PROCEDURE — 83036 HEMOGLOBIN GLYCOSYLATED A1C: CPT

## 2024-03-05 PROCEDURE — APPNB30 APP NON BILLABLE TIME 0-30 MINS

## 2024-03-05 PROCEDURE — 2500000003 HC RX 250 WO HCPCS: Performed by: INTERNAL MEDICINE

## 2024-03-05 PROCEDURE — 6370000000 HC RX 637 (ALT 250 FOR IP): Performed by: INTERNAL MEDICINE

## 2024-03-05 RX ORDER — INSULIN LISPRO 100 [IU]/ML
0-4 INJECTION, SOLUTION INTRAVENOUS; SUBCUTANEOUS NIGHTLY
Status: DISCONTINUED | OUTPATIENT
Start: 2024-03-05 | End: 2024-03-05 | Stop reason: HOSPADM

## 2024-03-05 RX ORDER — GLUCAGON 1 MG/ML
1 KIT INJECTION PRN
Status: DISCONTINUED | OUTPATIENT
Start: 2024-03-05 | End: 2024-03-05 | Stop reason: HOSPADM

## 2024-03-05 RX ORDER — INSULIN LISPRO 100 [IU]/ML
0-8 INJECTION, SOLUTION INTRAVENOUS; SUBCUTANEOUS
Status: DISCONTINUED | OUTPATIENT
Start: 2024-03-05 | End: 2024-03-05 | Stop reason: HOSPADM

## 2024-03-05 RX ORDER — DEXTROSE MONOHYDRATE 100 MG/ML
INJECTION, SOLUTION INTRAVENOUS CONTINUOUS PRN
Status: DISCONTINUED | OUTPATIENT
Start: 2024-03-05 | End: 2024-03-05 | Stop reason: HOSPADM

## 2024-03-05 RX ORDER — POLYETHYLENE GLYCOL 3350 17 G/17G
17 POWDER, FOR SOLUTION ORAL 2 TIMES DAILY
Status: DISCONTINUED | OUTPATIENT
Start: 2024-03-05 | End: 2024-03-05 | Stop reason: HOSPADM

## 2024-03-05 RX ADMIN — TAMSULOSIN HYDROCHLORIDE 0.4 MG: 0.4 CAPSULE ORAL at 08:52

## 2024-03-05 RX ADMIN — NIFEDIPINE 90 MG: 30 TABLET, EXTENDED RELEASE ORAL at 08:52

## 2024-03-05 RX ADMIN — CALCIUM ACETATE 667 MG: 667 CAPSULE ORAL at 12:00

## 2024-03-05 RX ADMIN — HEPARIN SODIUM 5000 UNITS: 5000 INJECTION INTRAVENOUS; SUBCUTANEOUS at 05:44

## 2024-03-05 RX ADMIN — DIAZEPAM 5 MG: 5 INJECTION, SOLUTION INTRAMUSCULAR; INTRAVENOUS at 10:14

## 2024-03-05 RX ADMIN — PANTOPRAZOLE SODIUM 40 MG: 40 TABLET, DELAYED RELEASE ORAL at 05:45

## 2024-03-05 RX ADMIN — Medication 1 MG: at 08:51

## 2024-03-05 RX ADMIN — NALOXEGOL OXALATE 12.5 MG: 25 TABLET, FILM COATED ORAL at 05:45

## 2024-03-05 RX ADMIN — OXYCODONE HYDROCHLORIDE AND ACETAMINOPHEN 1 TABLET: 5; 325 TABLET ORAL at 08:51

## 2024-03-05 RX ADMIN — BUSPIRONE HYDROCHLORIDE 5 MG: 5 TABLET ORAL at 08:53

## 2024-03-05 RX ADMIN — POLYETHYLENE GLYCOL 3350 17 G: 17 POWDER, FOR SOLUTION ORAL at 08:53

## 2024-03-05 RX ADMIN — METOCLOPRAMIDE 5 MG: 5 INJECTION, SOLUTION INTRAMUSCULAR; INTRAVENOUS at 05:44

## 2024-03-05 RX ADMIN — CALCIUM ACETATE 667 MG: 667 CAPSULE ORAL at 08:51

## 2024-03-05 RX ADMIN — METOCLOPRAMIDE 5 MG: 5 INJECTION, SOLUTION INTRAMUSCULAR; INTRAVENOUS at 08:52

## 2024-03-05 RX ADMIN — DICLOFENAC SODIUM 4 G: 10 GEL TOPICAL at 12:01

## 2024-03-05 RX ADMIN — TORSEMIDE 100 MG: 100 TABLET ORAL at 08:51

## 2024-03-05 RX ADMIN — ACETAMINOPHEN 650 MG: 325 TABLET ORAL at 01:28

## 2024-03-05 RX ADMIN — TIZANIDINE 4 MG: 4 TABLET ORAL at 01:28

## 2024-03-05 RX ADMIN — SODIUM CHLORIDE, PRESERVATIVE FREE 10 ML: 5 INJECTION INTRAVENOUS at 08:53

## 2024-03-05 RX ADMIN — DIAZEPAM 5 MG: 5 INJECTION, SOLUTION INTRAMUSCULAR; INTRAVENOUS at 05:44

## 2024-03-05 ASSESSMENT — ENCOUNTER SYMPTOMS
COUGH: 0
ABDOMINAL PAIN: 0
WHEEZING: 0
VOMITING: 0
BLOOD IN STOOL: 0
NAUSEA: 0
SHORTNESS OF BREATH: 0
BACK PAIN: 0
DIARRHEA: 0

## 2024-03-05 ASSESSMENT — PAIN SCALES - GENERAL
PAINLEVEL_OUTOF10: 8
PAINLEVEL_OUTOF10: 7

## 2024-03-05 ASSESSMENT — PAIN DESCRIPTION - LOCATION
LOCATION: HEAD;RIB CAGE
LOCATION: HEAD

## 2024-03-05 ASSESSMENT — PAIN DESCRIPTION - DESCRIPTORS
DESCRIPTORS: ACHING
DESCRIPTORS: ACHING

## 2024-03-05 ASSESSMENT — PAIN DESCRIPTION - ORIENTATION: ORIENTATION: RIGHT

## 2024-03-05 NOTE — ED NOTES
ED TO INPATIENT SBAR HANDOFF    Patient Name: Alea Woods   :  1960  63 y.o.   MRN:  [id number]  Preferred Name  alea  ED Room #:  ED-0010/10  Family/Caregiver Present no   Restraints no   Sitter no   Sepsis Risk Score Sepsis Risk Score: 2.58    Situation  Code Status: Prior No additional code details.    Allergies: Patient has no known allergies.  Weight: Patient Vitals for the past 96 hrs (Last 3 readings):   Weight   24 1543 121.1 kg (267 lb)     Arrived from: home  Chief Complaint:   Chief Complaint   Patient presents with    Post-op Problem     Pt via wife from home, c/o severe right head pain after cardiac cath this morning, started two hours ago      Hospital Problem/Diagnosis:  Principal Problem:    Malignant hypertension  Active Problems:    ESRD (end stage renal disease) ([facility])    Prostate cancer ([facility])    Intractable headache    CELSA (obstructive sleep apnea)    DMII (diabetes mellitus, type 2) ([facility])    Type 2 diabetes mellitus with other circulatory complications ([facility])    Opioid dependence with current use ([facility])  Resolved Problems:    * No resolved hospital problems. *    Imaging:   CTA HEAD NECK W CONTRAST   Final Result   1. No acute intracranial abnormality.   2.  No apparent arterial high grade stenosis, occlusion or aneurysm within   the head or neck.         CT HEAD WO CONTRAST   Final Result   1. No acute intracranial abnormality.   2.  No apparent arterial high grade stenosis, occlusion or aneurysm within   the head or neck.         XR ABDOMEN (KUB) (SINGLE AP VIEW)    (Results Pending)     Abnormal labs:   Abnormal Labs Reviewed   CBC WITH AUTO DIFFERENTIAL - Abnormal; Notable for the following components:       Result Value    RBC 3.99 (*)     Hemoglobin 11.1 (*)     Hematocrit 34.7 (*)     RDW 16.4 (*)     Platelets 103 (*)     Lymphocytes Absolute 0.8 (*)     All other components within normal limits   COMPREHENSIVE METABOLIC PANEL - Abnormal; Notable for the following

## 2024-03-05 NOTE — PLAN OF CARE
Data- discharge order received, pt verbalized agreement to discharge, disposition to previous residence, no needs for HHC/DME.     Action- discharge instructions prepared and given to pt, pt verbalized understanding. Medication information packet given r/t NEW and/or CHANGED prescriptions emphasizing name/purpose/side effects, pt verbalized understanding. Discharge instruction summary: Diet- general, Activity- as tolerated, Primary Care Physician as follows: Curt Collins [phone] f/u appointment in one week, immunizations reviewed, medications reviewed. Inpatient surgical procedure precautions reviewed.  1. WEIGHT: Admit Weight - Scale: 121.1 kg (267 lb) (03/04/24 1543)        Today  Weight - Scale: 123.5 kg (272 lb 4.3 oz) (03/05/24 1327)       2. O2 SAT.: SpO2: 99 % (03/05/24 1157)    Response- Pt belongings gathered, IV removed. Disposition is home (no HHC/DME needs), transported with RN, taken to lobby via w/c w/ RN, no complications.

## 2024-03-05 NOTE — CARE COORDINATION
Chart reviewed at this time. Pt is from home.     Therapy has no further recommendations on discharge.     Pt has nephrology, neurology, cardiology consults pending.     Lucila Santillan RN, BSN  [phone]

## 2024-03-05 NOTE — H&P
HOSPITALISTS HISTORY AND PHYSICAL    3/4/2024 7:03 PM    Patient Information:  AILYN WOODS is a 63 y.o. male [id number]  PCP:  Curt Collins MD (Tel: [phone] )    Chief complaint:    Chief Complaint   Patient presents with    Post-op Problem     Pt via wife from home, c/o severe right head pain after cardiac cath this morning, started two hours ago         History of Present Illness:  Ailyn Woods is a 63 y.o. male with history of ESRD on HD, DM 2, HTN underwent LHC at [facility] in preparation for kidney transplant.  Patient was driving home from [facility] when he developed severe R sided HA.  It became persistent and severe.  Not associated with change in speech, vision changes, focal weakness.  He has been fighting opiate induced constipation with alternating diarrhea.  Upon my entry to room, he wanted to go to bathroom to try to have BM.  No CP, SOB, dizziness, LH, palpitations, syncope, fevers, chills or NS.  R wrist was used for LHC.  Otherwise complete ROS is negative unless listed above.      REVIEW OF SYSTEMS:   Pertinent positives as noted in HPI.  All other systems were reviewed and are negative.    Past Medical History:   has a past medical history of Atrial fibrillation (HCC), Chronic prescription opiate use, Chronic systolic congestive heart failure (HCC), Class 1 obesity, Diverticulosis, DM (diabetes mellitus) type 2, ESRD (end stage renal disease), Gout, Hearing impairment, Hypertension, CELSA (obstructive sleep apnea), Prostate cancer (HCC), and Umbilical hernia.     Past Surgical History:   has a past surgical history that includes Upper gastrointestinal endoscopy (05/28/2016); Colonoscopy; Dilatation, esophagus; Upper gastrointestinal endoscopy (09/12/2016); IR TUNNELED CVC PLACE WO SQ PORT/PUMP > 5 YEARS (01/14/2022); Dialysis Catheter Insertion (N/A, 01/17/2022); ventral hernia repair (N/A,

## 2024-03-05 NOTE — ED PROVIDER NOTES
This patient was seen by the Mid-Level Provider. I have seen and examined the patient, agree with the workup, evaluation, management and diagnosis. Care plan has been discussed. My assessment reveals a 63-year-old male presents with a headache.  This is a 63-year male who presents with severe right-sided facial pain and around his eye.  The patient states he initially got a cardiac catheterization this morning and while driving home developed a sudden onset of right-sided facial pain around his eye on the right side of his cheek and up to his scalp.  He describes it as severe.  He denies any numbness or paresthesias.  He denies any visual disturbances.  I saw the patient here and received some analgesics, is feeling better          Radiology results:    CTA HEAD NECK W CONTRAST   Final Result   1. No acute intracranial abnormality.   2.  No apparent arterial high grade stenosis, occlusion or aneurysm within   the head or neck.         CT HEAD WO CONTRAST   Final Result   1. No acute intracranial abnormality.   2.  No apparent arterial high grade stenosis, occlusion or aneurysm within   the head or neck.         XR ABDOMEN (KUB) (SINGLE AP VIEW)    (Results Pending)         LABS:    Labs Reviewed   CBC WITH AUTO DIFFERENTIAL - Abnormal; Notable for the following components:       Result Value    RBC 3.99 (*)     Hemoglobin 11.1 (*)     Hematocrit 34.7 (*)     RDW 16.4 (*)     Platelets 103 (*)     Lymphocytes Absolute 0.8 (*)     All other components within normal limits   COMPREHENSIVE METABOLIC PANEL - Abnormal; Notable for the following components:    Potassium 5.2 (*)     Chloride 97 (*)     BUN 48 (*)     Creatinine 7.0 (*)     Est, Glom Filt Rate 8 (*)     All other components within normal limits    Narrative:     CALL  [facility] tel. [phone],  Chemistry results called to and read back by cecy macdonald, 03/04/2024  16:45, by RUBEN   SPECIMEN REJECTION    Narrative:     CALL  [facility] tel. 
pain, diarrhea, nausea and vomiting.   Genitourinary:  Negative for difficulty urinating, dysuria and hematuria.   Musculoskeletal:  Negative for neck pain and neck stiffness.   Skin:  Negative for rash.   Neurological:  Positive for dizziness and headaches. Negative for syncope, weakness and light-headedness.       Positives and Pertinent negatives as per HPI.     SURGICAL HISTORY     Past Surgical History:   Procedure Laterality Date    COLONOSCOPY      COLONOSCOPY N/A 03/18/2022    COLONOSCOPY POLYPECTOMY SNARE/COLD BIOPSY performed by Huang Bradshaw MD at [facility] ENDOSCOPY    COLONOSCOPY N/A 04/14/2023    COLONOSCOPY POLYPECTOMY SNARE/COLD BIOPSY performed by Huang Bradshaw MD at [facility] ENDOSCOPY    CT INSERT PERITONEAL CATHETER      DIALYSIS CATHETER INSERTION N/A 01/17/2022    LAPAROSCOPIC PERITONEAL DIALYSIS CATHETER PLACEMENT and omentopexy performed by Dar Garcia DO at [facility] OR    DIALYSIS FISTULA CREATION Left 07/29/2022    LEFT RADIOCEPHALIC ARTERIOVENOUS FISTULA CREATION performed by Lindsey Cutler MD at [facility] OR    DILATATION, ESOPHAGUS      patient does not recall having this done    EXTERNAL EAR SURGERY Left 02/23/2023    REMOVE IMPACTED CERUMEN LEFT EAR performed by Maryam Collins MD at [facility] OR    HEMORRHOID SURGERY      HERNIA REPAIR N/A 04/15/2022    ROBOTIC, RECURRENT INCISIONAL HERNIA REPAIR WITH BIOSYNTHETIC MESH; LAPAROSCOPIC PERITONEAL DIALYSIS CATHETER REVISION WITH LAPAROSCOPIC OMENTOPEXY performed by Dar Garcia DO at [facility] OR    HERNIA REPAIR Bilateral 04/15/2022    BILATERAL OPEN INGUINAL HERNI REPAIR performed by Dar Garcia DO at [facility] OR    INGUINAL HERNIA REPAIR Bilateral 06/13/2022    ROBOTIC RECURRENT BILATERAL INGUINAL HERNIA REPAIR, LYSIS OF ADHESIONS, PERITONEAL DIALYSIS CATHETER REMOVAL performed by Dar Garcia DO at [facility] OR    IR TUNNELED CATHETER PLACEMENT GREATER THAN 5 YEARS  01/14/2022    IR TUNNELED CATHETER PLACEMENT GREATER THAN 5 YEARS 1/14/2022 [facility] SPECIAL

## 2024-03-05 NOTE — PROGRESS NOTES
[facility] - Inpatient Rehabilitation Department   Phone: [phone]    Physical Therapy    [x] Initial Evaluation            [] Daily Treatment Note         [x] Discharge Summary      Patient: Jorden Woods   : 1960   MRN: [id number]   Date of Service:  3/5/2024  Admitting Diagnosis: Malignant hypertension  Current Admission Summary: Jorden Woods is a 63 y.o. male who presents for evaluation of severe right-sided headache.  Patient had an angiogram this morning in preparation of receiving a kidney transplant.  He is ESRD on dialysis at this time.  He was on his way home from the procedure when he started developing a headache and it has progressively gotten worse.  States that he has never had a headache like this in the past.  Was not sudden onset or thunderclap in nature, however.  No neck pain or stiffness.  No fevers or chills.  States he does feel little dizzy when he stands up and does have photophobia.  No lightheadedness, weakness, visual disturbances, facial asymmetry, speech difficulty or syncope.  No chest pain or shortness of breath.  No other complaints or concerns at this time.  He is not anticoagulated.   Past Medical History:  has a past medical history of Atrial fibrillation (HCC), Chronic prescription opiate use, Chronic systolic congestive heart failure (HCC), Class 1 obesity, Diverticulosis, DM (diabetes mellitus) type 2, ESRD (end stage renal disease), Gout, Hearing impairment, Hypertension, CELSA (obstructive sleep apnea), Prostate cancer (HCC), Sickle cell trait (HCC), and Umbilical hernia.  Past Surgical History:  has a past surgical history that includes Upper gastrointestinal endoscopy (2016); Colonoscopy; Dilatation, esophagus; Upper gastrointestinal endoscopy (2016); IR TUNNELED CVC PLACE WO SQ PORT/PUMP > 5 YEARS (2022); Dialysis Catheter Insertion (N/A, 2022); ventral hernia repair (N/A, 2022); CT INSERT PERITONEAL CATHETER; 
Patient admitted to CVU 2914 at this time room air alert and oriented. Able to ambulate independently complains of headache at this time pain meds given. Oriented to room call light within reach  HD dependent patient T,Th, S last run on Saturday with a full run.   
Patient seen in ED, room 10.  Admission completed with the following exceptions:  4 Eyes Assessment, Immunizations, Vaccines, Rights and Responsibilities, Orientation to room, Plan of Care, Education/Learning Assessment and Education Plan, white board, height and weight, pain assessment and head to toe assessment.  Patient is alert and oriented X 4.  Patient lives in a house with his spouse and is being admitted for Malignant Hypertension.  Home Medications as well as Outside Sources have been verbally reviewed with patient and updated if appropriate.  Medication reconciliation is now Completed per pharmacy technician Thao.  All questions answered. Wife is at bedside.  Patient reports he had a cardiac catheterization this morning.    No B/P or IV sticks left arm due to dialysis access.  Patient receives dialysis on Tuesday, Thursday and Saturdays.   
Pharmacy Home Medication Reconciliation Note    A medication reconciliation has been completed for Jorden Woods 1960    Pharmacy: [facility] [id number] [place]  Information provided by: patient    The patient's home medication list is as follows:  No current facility-administered medications on file prior to encounter.     Current Outpatient Medications on File Prior to Encounter   Medication Sig Dispense Refill    pentoxifylline (TRENTAL) 400 MG extended release tablet Take 1 tablet by mouth 3 times daily (with meals)      busPIRone (BUSPAR) 5 MG tablet TAKE 1 TABLET BY MOUTH TWICE A DAY (Patient taking differently: Take 1 tablet by mouth 2 times daily) 180 tablet 1    terazosin (HYTRIN) 5 MG capsule TAKE 1 CAPSULE BY MOUTH EVERY EVENING AS DIRECTED (Patient taking differently: Take 1 capsule by mouth nightly) 90 capsule 6    NIFEdipine (ADALAT CC) 90 MG extended release tablet TAKE 1 TABLET BY MOUTH EVERY DAY (Patient taking differently: Take 1 tablet by mouth daily) 90 tablet 0    oxyCODONE-acetaminophen (PERCOCET) 5-325 MG per tablet Take 1 tablet by mouth every 6 hours as needed for Pain for up to 28 days. Max Daily Amount: 4 tablets 112 tablet 0    tiZANidine (ZANAFLEX) 4 MG tablet Take 1 tablet by mouth every 8 hours as needed (spasms) 90 tablet 0    vitamin D (ERGOCALCIFEROL) 1.25 MG (09036 UT) CAPS capsule Take 1 capsule by mouth every 30 days 6 capsule 0    tamsulosin (FLOMAX) 0.4 MG capsule Take 1 capsule by mouth daily      torsemide (DEMADEX) 100 MG tablet Take 1 tablet by mouth daily      spironolactone (ALDACTONE) 50 MG tablet Take 1 tablet by mouth nightly      naloxegol (MOVANTIK) 12.5 MG TABS tablet Take 1 tablet by mouth every morning (before breakfast) 30 tablet 5    diclofenac sodium (VOLTAREN) 1 % GEL Apply 4 g topically 4 times daily 100 g 0    metoprolol succinate (TOPROL XL) 50 MG extended release tablet Take 1 tablet by mouth nightly 90 tablet 1    atorvastatin 
Treatment time: 3.5 hrs    Net UF: 2000 ml    Pre weight: 123.5 kg  Post weight: 121.5 kg    Access used: Lt LE AVF  Access function: Well tolerated, 350 BFR    Medications or blood products given: none    Regular outpatient schedule: TTS    Summary of response to treatment: Pt tolerated well. Pt remained stable throughout entire treatment and upon exiting the hemodialysis suite       
CATHETER; Colonoscopy (N/A, 03/18/2022); Upper gastrointestinal endoscopy (N/A, 03/18/2022); hernia repair (N/A, 04/15/2022); hernia repair (Bilateral, 04/15/2022); Inguinal hernia repair (Bilateral, 06/13/2022); IR TUNNELED CVC PLACE WO SQ PORT/PUMP > 5 YEARS (07/14/2022); Dialysis fistula creation (Left, 07/29/2022); External ear surgery (Left, 02/23/2023); Colonoscopy (N/A, 04/14/2023); Prostate surgery (05/2023); and Hemorrhoid surgery.    Discharge Recommendations: Jorden Woods scored a 24/24 on the AM-PAC ADL Inpatient form.  At this time, no further OT is recommended upon discharge due to patient at independent level.  Recommend patient returns to prior setting with prior services.      DME Required For Discharge: No DME required    Precautions/Restrictions: no restrictions  Positional Restrictions:no positional restrictions    Pre-Admission Information   Social/Functional History  Lives With: Spouse  Type of Home: House (Bi-level home)  Home Layout: Two level,Bed/Bath upstairs  Home Access: Stairs to enter with rails (4-5)  Bathroom Shower/Tub: Tub/Shower unit  Bathroom Toilet: Standard  Bathroom Equipment: Shower chair  Home Equipment:  (None)  ADL Assistance: Independent  Homemaking Assistance: Independent  Ambulation Assistance: Independent  Transfer Assistance: Independent  Active : Yes  Occupation: Retired  Additional Comments: denies recent falls.     Examination   Vision:   Vision Gross Assessment: Impaired and Vision Corrective Device: wears glasses at all times  Hearing:   WFL  Observation:   General Observation:  none  Posture:   good  Sensation:   WFL  ROM:   (B) UE ROM WFL  Strength:   (B) UE gross strength WFL    Therapist Clinical Decision Making (Complexity): low complexity  Clinical Presentation: stable      Subjective  General: Pt sitting in recliner upon entry. He is pleasant and agreeable to OT evaluation.   Pain: 0/10 and 8/10.  Location: headache  Pain Interventions: RN notified

## 2024-03-05 NOTE — CONSULTS
Nephrology Consult Note  [phone]  [phone]   [email or web address]           Reason for Consult: ESRD    HISTORY OF PRESENT ILLNESS:                The patient is a 63 y.o.male with significant past medical history of ESRD, prostate cancer s/p XRT, DM II, HTN, atrial fibrillation, CHF, diverticulosis, CELSA came into the ER with severe right-sided headache after he had a left heart cath in preparation for kidney transplant  Headache was not associated with vision changes or sensorimotor weakness  His blood pressures ranged 140s to 180s over 80s to 100s in the ER.  He was admitted to CVICU for possible need of IV antihypertensives, however was subsequently controlled.   We are consulted for ESRD management  He follows with Dr. Arreola gets dialysis on a TTS schedule  His last dialysis was as per schedule on 3/2  Patient seen and examined  Claims compliance with antihypertensives, states he did not take his medications yesterday in anticipation of cardiac cath      Past Medical History:        Diagnosis Date    Atrial fibrillation (HCC)     Chronic prescription opiate use     Oxycodone 20 mg/day    Chronic systolic congestive heart failure (HCC) 08/16/2021    Class 1 obesity     Diverticulosis     DM (diabetes mellitus) type 2     per patient pre-diabetic: no medications    ESRD (end stage renal disease) 02/24/2022 Tuesday, Thursday, Saturday    Gout     Hearing impairment     Hypertension     CELSA (obstructive sleep apnea) 07/14/2017    does not use a cpap    Prostate cancer (HCC)     Sickle cell trait (HCC)     also family history of sickle cell    Umbilical hernia 02/02/2016       Past Surgical History:        Procedure Laterality Date    COLONOSCOPY      COLONOSCOPY N/A 03/18/2022    COLONOSCOPY POLYPECTOMY SNARE/COLD BIOPSY performed by Huang Bradshaw MD at [facility] ENDOSCOPY    COLONOSCOPY N/A 04/14/2023    COLONOSCOPY POLYPECTOMY SNARE/COLD BIOPSY performed by Huang Bradshaw MD at [facility] ENDOSCOPY    CT 
2)  [] Moderate (any 1)    C. Data (any 2 for high and any 1 for moderate)  [] Discussed management of the case with:    [x] Imaging personally reviewed and interpreted, includes: CT head, CTA head and neck  [x] Data Review (any 3)  [] Collateral history obtained from:    [x] All available Consultant notes from yesterday/today were reviewed  [x] All current labs were reviewed and interpreted for clinical significance   [x] Appropriate follow-up labs were ordered    Data: reviewed   LABS:   Lab Results   Component Value Date/Time     03/05/2024 06:03 AM    K 5.0 03/05/2024 06:03 AM     03/05/2024 06:03 AM    CO2 23 03/05/2024 06:03 AM    BUN 53 03/05/2024 06:03 AM    CREATININE 7.0 03/05/2024 06:03 AM    GFRAA 17 08/03/2022 10:13 AM    GFRAA >60 05/24/2013 04:26 PM    LABGLOM 8 03/05/2024 06:03 AM    GLUCOSE 95 03/05/2024 06:03 AM    PHOS 2.9 01/11/2024 12:48 PM    MG 2.00 01/11/2024 12:48 PM    CALCIUM 8.6 03/05/2024 06:03 AM     Lab Results   Component Value Date/Time    WBC 3.8 03/05/2024 06:03 AM    RBC 3.78 03/05/2024 06:03 AM    HGB 10.4 03/05/2024 06:03 AM    HCT 32.8 03/05/2024 06:03 AM    MCV 86.7 03/05/2024 06:03 AM    RDW 15.9 03/05/2024 06:03 AM     03/05/2024 06:03 AM     Lab Results   Component Value Date    INR 1.0 03/04/2024    PROTIME 13.5 03/04/2024       Neuroimaging was independently reviewed by myself and discussed results with the patient  Reviewed notes from different physicians including H&P and ED notes.  Reviewed lab and blood testing    Impression:     Acute onset headache, improving.  Likely due to hypertensive urgency.    Hypertensive urgency  Recent cardiac catheterization  ESRD on HD  Diabetes    Recommendation:     Continue supportive care  Monitor and control blood pressure.  Avoid fluctuation in blood pressure and hypotension.  Monitor renal function  Dialysis  Glycemic control  Follow-up A1c  Telemetry  Discussed with Dr. Palmer, further recommendations to 
health)  No images available  Report shows nonobstructive CAD     Stress 11/29/23  Summary  Overall findings represent a low risk scan.  There is normal isotope uptake at stress and rest. There is no evidence of myocardial ischemia or scar.  Normal LV size and systolic function.  Normal myocardial perfusion study.  Normal EKG response.    Echo 11/29/23  Summary  Normal left ventricle size, moderate hypertrophy, and normal systolic function with an estimated ejection fraction of 55-60%.  No regional wall motion abnormalities are seen.  Grade II diastolic dysfunction with elevated LV filling pressures.  The right ventricle is normal in size and function.  The tricuspid valve is moderately thickened.  Mild tricuspid regurgitation.  Estimated pulmonary artery systolic pressure is normal at 31 mmHg assuming a right atrial pressure of 3 mmHg.  Definity contrast agent was used to help visualize endocardial borders.    Echo 7/13/22  Summary  Left ventricular cavity size is normal. There is moderate concentric left ventricular hypertrophy.  Overall left ventricular systolic function appears normal with an estimated ejection fraction of 55-60%.  No regional wall motion abnormalities are noted. Diastolic filling  parameters suggests normal diastolic function.  Estimated pulmonary artery systolic pressure is at 31 mmHg assuming a right atrial pressure of 8 mmHg.  No evidence of vegetations noted on valve leaflets.    Assessment/Plan:  Principal Problem:       CAD   C 3/4/24 at [facility]  Non obst  Radial site looks good  Plan: Medical management and risk factor modification      Malignant Htn  On Cardene drip  Plan: Cardene drip weaned off      Intractable HA  Neuro consult   CT head negative   Plan: Per primary service      ESRD on HD  Nephrology consult  Awaiting kidney transplant      DM 2   Per primary team         Plan: Stable from a cardiac standpoint.  Plan is for HD then he is OK to d/c from our standpoint.

## 2024-03-05 NOTE — ACP (ADVANCE CARE PLANNING)
Advanced Care Planning Note.    Purpose of Encounter: Advanced care planning in light of ESRD on HD  Parties In Attendance: Patient, wife  Decisional Capacity: Yes  Subjective: Patient with HA and constipation  Objective: Cr 7.0  Goals of Care Determination: Patient wants full support (CPR, vent, surgery, HD, trach, PEG)  Plan:  Cardene gtt, IV Valium and Reglan, molasses enema, Cardio/Renal/Neuro consults  Code Status: Full code   Time spent on Advanced care Plannin minutes  Advanced Care Planning Documents: Completed advanced directives on chart, wife is the POA.    Bryon Fernández MD  3/4/2024 7:03 PM

## 2024-03-06 ENCOUNTER — CARE COORDINATION (OUTPATIENT)
Dept: CASE MANAGEMENT | Age: 64
End: 2024-03-06

## 2024-03-06 NOTE — CARE COORDINATION
Care Transitions Outreach Attempt    Call within 2 business days of discharge: Yes     Attempted to reach patient for transitions of care follow up. Unable to reach patient.    Patient: Jorden Woods Patient : 1960 MRN: [id number]    Last Discharge Facility       Date Complaint Diagnosis Description Type Department Provider    3/4/24 Post-op Problem Severe headache ... ED to Hosp-Admission (Discharged) (ADMITTED) Ron Abrams MD; Contreras Marie...              Was this an external facility discharge? No Discharge Facility Name:     Noted following upcoming appointments from discharge chart review:   [facility] follow up appointment(s):   Future Appointments   Date Time Provider Department Center   3/13/2024 11:20 AM Jacqui Brasher APRN - CNP [facility]     Non-[facility]  follow up appointment(s):

## 2024-03-07 ENCOUNTER — CARE COORDINATION (OUTPATIENT)
Dept: CASE MANAGEMENT | Age: 64
End: 2024-03-07

## 2024-03-07 DIAGNOSIS — I16.0 HYPERTENSIVE URGENCY: Primary | ICD-10-CM

## 2024-03-07 DIAGNOSIS — G44.53 THUNDERCLAP HEADACHE: ICD-10-CM

## 2024-03-07 PROCEDURE — 1111F DSCHRG MED/CURRENT MED MERGE: CPT | Performed by: FAMILY MEDICINE

## 2024-03-07 NOTE — CARE COORDINATION
Care Transitions Initial Follow Up Call    Call within 2 business days of discharge: Yes    Patient Current Location:  Home: [address] [place] [address] [place] [address]    Care Transition Nurse contacted the patient by telephone to perform post hospital discharge assessment. Verified name and  with patient as identifiers. Provided introduction to self, and explanation of the Care Transition Nurse role.     Patient: Jorden Woods Patient : 1960   MRN: [id number]  Reason for Admission:   Discharge Date: 3/5/24 RARS: Readmission Risk Score: 21      Last Discharge Facility       Date Complaint Diagnosis Description Type Department Provider    3/4/24 Post-op Problem Severe headache ... ED to Hosp-Admission (Discharged) (ADMITTED) [facility] Ron Lowe MD; Contreras Marie...            Was this an external facility discharge? No Discharge Facility:     Challenges to be reviewed by the provider   Additional needs identified to be addressed with provider: No  none               Method of communication with provider: none.    Pt states doing pretty well, still has a headache at times, BP good at dialysis this morning. Since pt was at dialysis a f/u appt with his PCP could not be made so this nurse will send message to PCP office to schedule a hospital f/u visit. Agreed to more CTC f/u calls.      Care Transition Nurse reviewed discharge instructions with patient who verbalized understanding. The patient was given an opportunity to ask questions and does not have any further questions or concerns at this time. Were discharge instructions available to patient? Yes. Reviewed appropriate site of care based on symptoms and resources available to patient including: When to call 911. The patient agrees to contact the PCP office for questions related to their healthcare.     Advance Care Planning:   Does patient have an Advance Directive: reviewed and current.    Medication reconciliation was performed with patient, who

## 2024-03-11 ENCOUNTER — TELEPHONE (OUTPATIENT)
Dept: PAIN MANAGEMENT | Age: 64
End: 2024-03-11

## 2024-03-11 ENCOUNTER — CARE COORDINATION (OUTPATIENT)
Dept: CASE MANAGEMENT | Age: 64
End: 2024-03-11

## 2024-03-11 NOTE — CARE COORDINATION
Care Transitions Outreach Attempt    Attempted to reach patient for transitions of care follow up. Unable to reach patient.Noted to have a f/u appt with PCP within 10 days of d/c.    Patient: Jorden Woods Patient : 1960 MRN: [id number]    Last Discharge Facility       Date Complaint Diagnosis Description Type Department Provider    3/4/24 Post-op Problem Severe headache ... ED to Hosp-Admission (Discharged) (ADMITTED) [facility] Ron Lowe MD; Contreras Marie...                Noted following upcoming appointments from discharge chart review:   [facility] follow up appointment(s):   Future Appointments   Date Time Provider Department Center   3/13/2024 11:20 AM Jacqui Brasher APRN - CNP [facility]   3/18/2024 11:00 AM Curt Collins MD [facility]     Non-[facility]  follow up appointment(s):

## 2024-03-11 NOTE — TELEPHONE ENCOUNTER
I called patient back. He said he does not want to go back to Dr. Gasca's office because they have an office dog and doesn't think that's right.    He said he wants any shot that [facility] can do!

## 2024-03-11 NOTE — TELEPHONE ENCOUNTER
If he is referring to trigger point injection that is fine. We discussed nerve blocks for his rib pain but that was with Dr. Gasca  and I referred him last office visit.

## 2024-03-13 ENCOUNTER — OFFICE VISIT (OUTPATIENT)
Dept: PAIN MANAGEMENT | Age: 64
End: 2024-03-13
Payer: MEDICARE

## 2024-03-13 ENCOUNTER — CARE COORDINATION (OUTPATIENT)
Dept: CASE MANAGEMENT | Age: 64
End: 2024-03-13

## 2024-03-13 VITALS
HEART RATE: 86 BPM | OXYGEN SATURATION: 96 % | WEIGHT: 269 LBS | BODY MASS INDEX: 31.9 KG/M2 | DIASTOLIC BLOOD PRESSURE: 70 MMHG | SYSTOLIC BLOOD PRESSURE: 105 MMHG

## 2024-03-13 DIAGNOSIS — M50.30 DDD (DEGENERATIVE DISC DISEASE), CERVICAL: ICD-10-CM

## 2024-03-13 DIAGNOSIS — G58.8 INTERCOSTAL NEURALGIA: Primary | ICD-10-CM

## 2024-03-13 DIAGNOSIS — K43.2 INCISIONAL HERNIA WITHOUT OBSTRUCTION OR GANGRENE: ICD-10-CM

## 2024-03-13 DIAGNOSIS — Z87.19 STATUS POST BILATERAL HERNIA REPAIR: ICD-10-CM

## 2024-03-13 DIAGNOSIS — F33.0 MAJOR DEPRESSIVE DISORDER, RECURRENT, MILD (HCC): ICD-10-CM

## 2024-03-13 DIAGNOSIS — Z51.81 ENCOUNTER FOR THERAPEUTIC DRUG MONITORING: ICD-10-CM

## 2024-03-13 DIAGNOSIS — M25.50 ARTHRALGIA OF MULTIPLE JOINTS: ICD-10-CM

## 2024-03-13 DIAGNOSIS — F11.20 OPIOID DEPENDENCE WITH CURRENT USE (HCC): ICD-10-CM

## 2024-03-13 DIAGNOSIS — G57.92 ILIOINGUINAL NEURALGIA OF LEFT SIDE: ICD-10-CM

## 2024-03-13 DIAGNOSIS — G57.91 ILIOINGUINAL NEURALGIA OF RIGHT SIDE: ICD-10-CM

## 2024-03-13 DIAGNOSIS — Z98.890 STATUS POST BILATERAL HERNIA REPAIR: ICD-10-CM

## 2024-03-13 DIAGNOSIS — G89.4 CHRONIC PAIN SYNDROME: ICD-10-CM

## 2024-03-13 PROCEDURE — G8484 FLU IMMUNIZE NO ADMIN: HCPCS | Performed by: NURSE PRACTITIONER

## 2024-03-13 PROCEDURE — 1036F TOBACCO NON-USER: CPT | Performed by: NURSE PRACTITIONER

## 2024-03-13 PROCEDURE — 99214 OFFICE O/P EST MOD 30 MIN: CPT | Performed by: NURSE PRACTITIONER

## 2024-03-13 PROCEDURE — G8417 CALC BMI ABV UP PARAM F/U: HCPCS | Performed by: NURSE PRACTITIONER

## 2024-03-13 PROCEDURE — 3078F DIAST BP <80 MM HG: CPT | Performed by: NURSE PRACTITIONER

## 2024-03-13 PROCEDURE — 1111F DSCHRG MED/CURRENT MED MERGE: CPT | Performed by: NURSE PRACTITIONER

## 2024-03-13 PROCEDURE — 3074F SYST BP LT 130 MM HG: CPT | Performed by: NURSE PRACTITIONER

## 2024-03-13 PROCEDURE — 3017F COLORECTAL CA SCREEN DOC REV: CPT | Performed by: NURSE PRACTITIONER

## 2024-03-13 PROCEDURE — 1123F ACP DISCUSS/DSCN MKR DOCD: CPT | Performed by: NURSE PRACTITIONER

## 2024-03-13 PROCEDURE — G8427 DOCREV CUR MEDS BY ELIG CLIN: HCPCS | Performed by: NURSE PRACTITIONER

## 2024-03-13 RX ORDER — TIZANIDINE 4 MG/1
4 TABLET ORAL EVERY 8 HOURS PRN
Qty: 90 TABLET | Refills: 0 | Status: SHIPPED | OUTPATIENT
Start: 2024-03-13 | End: 2024-04-12

## 2024-03-13 RX ORDER — TRIAMCINOLONE ACETONIDE 0.25 MG/G
OINTMENT TOPICAL
Qty: 15 G | Refills: 1 | Status: SHIPPED | OUTPATIENT
Start: 2024-03-13 | End: 2024-03-20

## 2024-03-13 RX ORDER — OXYCODONE HYDROCHLORIDE AND ACETAMINOPHEN 5; 325 MG/1; MG/1
1 TABLET ORAL EVERY 6 HOURS PRN
Qty: 112 TABLET | Refills: 0 | Status: SHIPPED | OUTPATIENT
Start: 2024-03-13 | End: 2024-04-10

## 2024-03-13 NOTE — CARE COORDINATION
Care Transitions Outreach Attempt    Per [facility], pt had an MD appt  today, this nurse will follow up at a later date.      Patient: Jorden Woods Patient : 1960 MRN: [id number]    Last Discharge Facility       Date Complaint Diagnosis Description Type Department Provider    3/4/24 Post-op Problem Severe headache ... ED to Hosp-Admission (Discharged) (ADMITTED) [facility] Ron Lowe MD; Contreras Marie...              Noted following upcoming appointments from discharge chart review:   [facility] follow up appointment(s):   Future Appointments   Date Time Provider Department Center   3/18/2024 11:00 AM Curt Collins MD [facility] [place] - DY[facility]   4/10/2024 11:20 AM Jacqui Brasher APRN - CNP [facility]     Non-[facility]  follow up appointment(s):

## 2024-03-13 NOTE — PROGRESS NOTES
release tablet Take 1 tablet by mouth 3 times daily (with meals)      busPIRone (BUSPAR) 5 MG tablet TAKE 1 TABLET BY MOUTH TWICE A DAY (Patient taking differently: Take 1 tablet by mouth 2 times daily) 180 tablet 1    terazosin (HYTRIN) 5 MG capsule TAKE 1 CAPSULE BY MOUTH EVERY EVENING AS DIRECTED (Patient taking differently: Take 1 capsule by mouth nightly) 90 capsule 6    NIFEdipine (ADALAT CC) 90 MG extended release tablet TAKE 1 TABLET BY MOUTH EVERY DAY (Patient taking differently: Take 1 tablet by mouth daily) 90 tablet 0    oxyCODONE-acetaminophen (PERCOCET) 5-325 MG per tablet Take 1 tablet by mouth every 6 hours as needed for Pain for up to 28 days. Max Daily Amount: 4 tablets 112 tablet 0    tiZANidine (ZANAFLEX) 4 MG tablet Take 1 tablet by mouth every 8 hours as needed (spasms) 90 tablet 0    vitamin D (ERGOCALCIFEROL) 1.25 MG (76674 UT) CAPS capsule Take 1 capsule by mouth every 30 days 6 capsule 0    tamsulosin (FLOMAX) 0.4 MG capsule Take 1 capsule by mouth daily      torsemide (DEMADEX) 100 MG tablet Take 1 tablet by mouth daily      spironolactone (ALDACTONE) 50 MG tablet Take 1 tablet by mouth nightly      naloxegol (MOVANTIK) 12.5 MG TABS tablet Take 1 tablet by mouth every morning (before breakfast) 30 tablet 5    diclofenac sodium (VOLTAREN) 1 % GEL Apply 4 g topically 4 times daily 100 g 0    metoprolol succinate (TOPROL XL) 50 MG extended release tablet Take 1 tablet by mouth nightly 90 tablet 1    atorvastatin (LIPITOR) 20 MG tablet TAKE 1 TABLET BY MOUTH EVERY DAY (Patient taking differently: Take 1 tablet by mouth nightly) 90 tablet 1    B Complex-C-Folic Acid (DIALYVITE 800) 0.8 MG TABS TAKE 1 TABLET BY MOUTH EVERY DAY 90 tablet 3    calcium acetate (PHOSLO) 667 MG CAPS capsule TAKE 1 CAPSULE BY MOUTH THREE TIMES A DAY WITH MEALS (Patient taking differently: Take 1 capsule by mouth 3 times daily (with meals)) 270 capsule 2    polyethylene glycol (GLYCOLAX) 17 GM/SCOOP powder TAKE 17 G BY

## 2024-03-14 ENCOUNTER — CARE COORDINATION (OUTPATIENT)
Dept: CASE MANAGEMENT | Age: 64
End: 2024-03-14

## 2024-03-14 NOTE — CARE COORDINATION
Care Transitions Outreach Attempt      Attempted to reach patient for transitions of care follow up. Unable to reach patient.    Patient: Jorden Woods Patient : 1960 MRN: [id number]    Last Discharge Facility       Date Complaint Diagnosis Description Type Department Provider    3/4/24 Post-op Problem Severe headache ... [facility]...              Noted following upcoming appointments from discharge chart review:   [facility] follow up appointment(s):   Future Appointments   Date Time Provider Department Center   3/18/2024 11:00 AM Curt Collins MD [facility] FP [place] - D[facility]   4/10/2024 11:20 AM Jacqui Brasher APRN - CNP [facility]     Non-[facility]  follow up appointment(s):

## 2024-03-15 ENCOUNTER — CARE COORDINATION (OUTPATIENT)
Dept: CASE MANAGEMENT | Age: 64
End: 2024-03-15

## 2024-03-15 NOTE — CARE COORDINATION
Care Transitions Outreach Attempt    Attempted to reach patient for transitions of care follow up. Unable to reach patient x 2. F/U appt with PCP listed below.      Patient: Jorden Woods Patient : 1960 MRN: [id number]    Last Discharge Facility       Date Complaint Diagnosis Description Type Department Provider    3/4/24 Post-op Problem Severe headache ... ED to Hosp-Admission (Discharged) (ADMITTED) [facility] Ron Lowe MD; Contreras Marie...                  Noted following upcoming appointments from discharge chart review:   [facility] follow up appointment(s):   Future Appointments   Date Time Provider Department Center   3/18/2024 11:00 AM Curt Collins MD [facility] FP [place] - [facility]   4/10/2024  2:00 PM Jacqui Brasher APRN - CNP [facility]     Non-[facility]  follow up appointment(s):

## 2024-03-18 ENCOUNTER — OFFICE VISIT (OUTPATIENT)
Dept: FAMILY MEDICINE CLINIC | Age: 64
End: 2024-03-18
Payer: MEDICARE

## 2024-03-18 ENCOUNTER — HOSPITAL ENCOUNTER (OUTPATIENT)
Dept: GENERAL RADIOLOGY | Age: 64
Discharge: HOME | End: 2024-03-18
Payer: MEDICARE

## 2024-03-18 VITALS
DIASTOLIC BLOOD PRESSURE: 70 MMHG | RESPIRATION RATE: 18 BRPM | OXYGEN SATURATION: 98 % | HEART RATE: 105 BPM | WEIGHT: 270.6 LBS | SYSTOLIC BLOOD PRESSURE: 122 MMHG | BODY MASS INDEX: 32.09 KG/M2 | TEMPERATURE: 97.7 F

## 2024-03-18 DIAGNOSIS — N18.6 ESRD (END STAGE RENAL DISEASE) (HCC): ICD-10-CM

## 2024-03-18 DIAGNOSIS — I50.22 CHRONIC SYSTOLIC CONGESTIVE HEART FAILURE (HCC): Primary | ICD-10-CM

## 2024-03-18 DIAGNOSIS — G89.29 CHRONIC PAIN OF BOTH KNEES: ICD-10-CM

## 2024-03-18 DIAGNOSIS — Z99.2 ESRD (END STAGE RENAL DISEASE) ON DIALYSIS (HCC): ICD-10-CM

## 2024-03-18 DIAGNOSIS — N18.6 ESRD (END STAGE RENAL DISEASE) ON DIALYSIS (HCC): ICD-10-CM

## 2024-03-18 DIAGNOSIS — M25.562 CHRONIC PAIN OF BOTH KNEES: ICD-10-CM

## 2024-03-18 DIAGNOSIS — R25.1 TREMOR: ICD-10-CM

## 2024-03-18 DIAGNOSIS — M25.561 CHRONIC PAIN OF BOTH KNEES: ICD-10-CM

## 2024-03-18 DIAGNOSIS — R79.89 ELEVATED FERRITIN: ICD-10-CM

## 2024-03-18 DIAGNOSIS — F33.0 MAJOR DEPRESSIVE DISORDER, RECURRENT, MILD (HCC): ICD-10-CM

## 2024-03-18 DIAGNOSIS — I48.0 PAROXYSMAL ATRIAL FIBRILLATION (HCC): ICD-10-CM

## 2024-03-18 DIAGNOSIS — I16.0 HYPERTENSIVE URGENCY: ICD-10-CM

## 2024-03-18 DIAGNOSIS — D69.6 THROMBOCYTOPENIA, UNSPECIFIED (HCC): ICD-10-CM

## 2024-03-18 DIAGNOSIS — E11.59 TYPE 2 DIABETES MELLITUS WITH OTHER CIRCULATORY COMPLICATIONS (HCC): ICD-10-CM

## 2024-03-18 DIAGNOSIS — I25.10 NONOCCLUSIVE CORONARY ATHEROSCLEROSIS OF NATIVE CORONARY ARTERY: ICD-10-CM

## 2024-03-18 PROCEDURE — G8427 DOCREV CUR MEDS BY ELIG CLIN: HCPCS | Performed by: FAMILY MEDICINE

## 2024-03-18 PROCEDURE — 1123F ACP DISCUSS/DSCN MKR DOCD: CPT | Performed by: FAMILY MEDICINE

## 2024-03-18 PROCEDURE — 3074F SYST BP LT 130 MM HG: CPT | Performed by: FAMILY MEDICINE

## 2024-03-18 PROCEDURE — G8484 FLU IMMUNIZE NO ADMIN: HCPCS | Performed by: FAMILY MEDICINE

## 2024-03-18 PROCEDURE — 3078F DIAST BP <80 MM HG: CPT | Performed by: FAMILY MEDICINE

## 2024-03-18 PROCEDURE — 1036F TOBACCO NON-USER: CPT | Performed by: FAMILY MEDICINE

## 2024-03-18 PROCEDURE — 3017F COLORECTAL CA SCREEN DOC REV: CPT | Performed by: FAMILY MEDICINE

## 2024-03-18 PROCEDURE — 2022F DILAT RTA XM EVC RTNOPTHY: CPT | Performed by: FAMILY MEDICINE

## 2024-03-18 PROCEDURE — 3044F HG A1C LEVEL LT 7.0%: CPT | Performed by: FAMILY MEDICINE

## 2024-03-18 PROCEDURE — G8417 CALC BMI ABV UP PARAM F/U: HCPCS | Performed by: FAMILY MEDICINE

## 2024-03-18 PROCEDURE — 99214 OFFICE O/P EST MOD 30 MIN: CPT | Performed by: FAMILY MEDICINE

## 2024-03-18 PROCEDURE — 1111F DSCHRG MED/CURRENT MED MERGE: CPT | Performed by: FAMILY MEDICINE

## 2024-03-18 PROCEDURE — 73562 X-RAY EXAM OF KNEE 3: CPT

## 2024-03-18 RX ORDER — POLYETHYLENE GLYCOL 3350 17 G/17G
POWDER, FOR SOLUTION ORAL
Qty: 850 G | Refills: 1 | Status: SHIPPED | OUTPATIENT
Start: 2024-03-18 | End: 2024-03-23 | Stop reason: CLARIF

## 2024-03-18 NOTE — PROGRESS NOTES
tablet 0    vitamin D (ERGOCALCIFEROL) 1.25 MG (58940 UT) CAPS capsule Take 1 capsule by mouth every 30 days 6 capsule 0    tamsulosin (FLOMAX) 0.4 MG capsule Take 1 capsule by mouth daily      torsemide (DEMADEX) 100 MG tablet Take 1 tablet by mouth daily      spironolactone (ALDACTONE) 50 MG tablet Take 1 tablet by mouth nightly      naloxegol (MOVANTIK) 12.5 MG TABS tablet Take 1 tablet by mouth every morning (before breakfast) 30 tablet 5    diclofenac sodium (VOLTAREN) 1 % GEL Apply 4 g topically 4 times daily 100 g 0    metoprolol succinate (TOPROL XL) 50 MG extended release tablet Take 1 tablet by mouth nightly 90 tablet 1    atorvastatin (LIPITOR) 20 MG tablet TAKE 1 TABLET BY MOUTH EVERY DAY (Patient taking differently: Take 1 tablet by mouth nightly) 90 tablet 1    B Complex-C-Folic Acid (DIALYVITE 800) 0.8 MG TABS TAKE 1 TABLET BY MOUTH EVERY DAY 90 tablet 3    calcium acetate (PHOSLO) 667 MG CAPS capsule TAKE 1 CAPSULE BY MOUTH THREE TIMES A DAY WITH MEALS (Patient taking differently: Take 1 capsule by mouth 3 times daily (with meals)) 270 capsule 2    bisacodyl (BISACODYL) 5 MG EC tablet Take 1 tablet by mouth daily as needed for Constipation 30 tablet 5    pentoxifylline (TRENTAL) 400 MG extended release tablet Take 1 tablet by mouth 3 times daily (with meals) (Patient not taking: Reported on 3/18/2024)       No current facility-administered medications for this visit.     Lab Results   Component Value Date    CREATININE 7.0 (HH) 03/05/2024    BUN 53 (H) 03/05/2024     03/05/2024    K 5.0 03/05/2024     03/05/2024    CO2 23 03/05/2024         Lab Results   Component Value Date    WBC 3.8 (L) 03/05/2024    HGB 10.4 (L) 03/05/2024    HCT 32.8 (L) 03/05/2024    MCV 86.7 03/05/2024     (L) 03/05/2024         Lab Results   Component Value Date    FERRITIN 1,768.0 (H) 01/11/2024          ASSESSMENT / PLAN:    1. Hypertensive urgency  Resolved  Was off meds for his cath which caused sig

## 2024-03-19 DIAGNOSIS — M25.562 CHRONIC PAIN OF BOTH KNEES: Primary | ICD-10-CM

## 2024-03-19 DIAGNOSIS — M25.561 CHRONIC PAIN OF BOTH KNEES: Primary | ICD-10-CM

## 2024-03-19 DIAGNOSIS — G89.29 CHRONIC PAIN OF BOTH KNEES: Primary | ICD-10-CM

## 2024-03-23 ENCOUNTER — APPOINTMENT (OUTPATIENT)
Dept: GENERAL RADIOLOGY | Age: 64
End: 2024-03-23
Payer: MEDICARE

## 2024-03-23 ENCOUNTER — HOSPITAL ENCOUNTER (OUTPATIENT)
Age: 64
Setting detail: OBSERVATION
Discharge: HOME | End: 2024-03-26
Attending: EMERGENCY MEDICINE | Admitting: HOSPITALIST
Payer: MEDICARE

## 2024-03-23 DIAGNOSIS — I95.1 ORTHOSTATIC HYPOTENSION: ICD-10-CM

## 2024-03-23 DIAGNOSIS — R42 ORTHOSTATIC LIGHTHEADEDNESS: Primary | ICD-10-CM

## 2024-03-23 PROBLEM — B02.23 POSTHERPETIC POLYNEUROPATHY: Status: ACTIVE | Noted: 2024-03-23

## 2024-03-23 PROBLEM — F33.0 MAJOR DEPRESSIVE DISORDER, RECURRENT, MILD (HCC): Status: RESOLVED | Noted: 2022-10-26 | Resolved: 2024-03-23

## 2024-03-23 PROBLEM — I50.32 CHRONIC DIASTOLIC HEART FAILURE (HCC): Status: ACTIVE | Noted: 2021-08-16

## 2024-03-23 LAB
ALBUMIN SERPL-MCNC: 4.7 G/DL (ref 3.4–5)
ALBUMIN/GLOB SERPL: 1.6 {RATIO} (ref 1.1–2.2)
ALP SERPL-CCNC: 80 U/L (ref 40–129)
ALT SERPL-CCNC: 13 U/L (ref 10–40)
ANION GAP SERPL CALCULATED.3IONS-SCNC: 10 MMOL/L (ref 3–16)
AST SERPL-CCNC: 13 U/L (ref 15–37)
BACTERIA URNS QL MICRO: NORMAL /HPF
BASOPHILS # BLD: 0 K/UL (ref 0–0.2)
BASOPHILS NFR BLD: 0.9 %
BILIRUB SERPL-MCNC: <0.2 MG/DL (ref 0–1)
BILIRUB UR QL STRIP.AUTO: NEGATIVE
BUN SERPL-MCNC: 19 MG/DL (ref 7–20)
CALCIUM SERPL-MCNC: 9.4 MG/DL (ref 8.3–10.6)
CHLORIDE SERPL-SCNC: 97 MMOL/L (ref 99–110)
CLARITY UR: CLEAR
CO2 SERPL-SCNC: 30 MMOL/L (ref 21–32)
COLOR UR: YELLOW
CREAT SERPL-MCNC: 4.1 MG/DL (ref 0.8–1.3)
DEPRECATED RDW RBC AUTO: 16 % (ref 12.4–15.4)
EOSINOPHIL # BLD: 0.1 K/UL (ref 0–0.6)
EOSINOPHIL NFR BLD: 2.2 %
EPI CELLS #/AREA URNS AUTO: 1 /HPF (ref 0–5)
GFR SERPLBLD CREATININE-BSD FMLA CKD-EPI: 15 ML/MIN/{1.73_M2}
GLUCOSE BLD-MCNC: 98 MG/DL (ref 70–99)
GLUCOSE SERPL-MCNC: 123 MG/DL (ref 70–99)
GLUCOSE UR STRIP.AUTO-MCNC: NEGATIVE MG/DL
HCT VFR BLD AUTO: 32.1 % (ref 40.5–52.5)
HGB BLD-MCNC: 10.7 G/DL (ref 13.5–17.5)
HGB UR QL STRIP.AUTO: ABNORMAL
HYALINE CASTS #/AREA URNS AUTO: 1 /LPF (ref 0–8)
KETONES UR STRIP.AUTO-MCNC: NEGATIVE MG/DL
LEUKOCYTE ESTERASE UR QL STRIP.AUTO: NEGATIVE
LYMPHOCYTES # BLD: 0.7 K/UL (ref 1–5.1)
LYMPHOCYTES NFR BLD: 19 %
MCH RBC QN AUTO: 28.3 PG (ref 26–34)
MCHC RBC AUTO-ENTMCNC: 33.2 G/DL (ref 31–36)
MCV RBC AUTO: 85.1 FL (ref 80–100)
MONOCYTES # BLD: 0.5 K/UL (ref 0–1.3)
MONOCYTES NFR BLD: 13.6 %
NEUTROPHILS # BLD: 2.3 K/UL (ref 1.7–7.7)
NEUTROPHILS NFR BLD: 64.3 %
NITRITE UR QL STRIP.AUTO: NEGATIVE
NT-PROBNP SERPL-MCNC: 1370 PG/ML (ref 0–124)
PERFORMED ON: NORMAL
PH UR STRIP.AUTO: 8 [PH] (ref 5–8)
PLATELET # BLD AUTO: 156 K/UL (ref 135–450)
PMV BLD AUTO: 7.6 FL (ref 5–10.5)
POTASSIUM SERPL-SCNC: 4.3 MMOL/L (ref 3.5–5.1)
PROT SERPL-MCNC: 7.6 G/DL (ref 6.4–8.2)
PROT UR STRIP.AUTO-MCNC: 100 MG/DL
RBC # BLD AUTO: 3.77 M/UL (ref 4.2–5.9)
RBC CLUMPS #/AREA URNS AUTO: 0 /HPF (ref 0–4)
SODIUM SERPL-SCNC: 137 MMOL/L (ref 136–145)
SP GR UR STRIP.AUTO: <=1.005 (ref 1–1.03)
UA COMPLETE W REFLEX CULTURE PNL UR: ABNORMAL
UA DIPSTICK W REFLEX MICRO PNL UR: YES
URN SPEC COLLECT METH UR: ABNORMAL
UROBILINOGEN UR STRIP-ACNC: 0.2 E.U./DL
WBC # BLD AUTO: 3.6 K/UL (ref 4–11)
WBC #/AREA URNS AUTO: 1 /HPF (ref 0–5)

## 2024-03-23 PROCEDURE — 83036 HEMOGLOBIN GLYCOSYLATED A1C: CPT

## 2024-03-23 PROCEDURE — 80053 COMPREHEN METABOLIC PANEL: CPT

## 2024-03-23 PROCEDURE — 6370000000 HC RX 637 (ALT 250 FOR IP): Performed by: HOSPITALIST

## 2024-03-23 PROCEDURE — 85025 COMPLETE CBC W/AUTO DIFF WBC: CPT

## 2024-03-23 PROCEDURE — 83880 ASSAY OF NATRIURETIC PEPTIDE: CPT

## 2024-03-23 PROCEDURE — G0378 HOSPITAL OBSERVATION PER HR: HCPCS

## 2024-03-23 PROCEDURE — 71045 X-RAY EXAM CHEST 1 VIEW: CPT

## 2024-03-23 PROCEDURE — 96361 HYDRATE IV INFUSION ADD-ON: CPT

## 2024-03-23 PROCEDURE — 99285 EMERGENCY DEPT VISIT HI MDM: CPT

## 2024-03-23 PROCEDURE — 93005 ELECTROCARDIOGRAM TRACING: CPT | Performed by: EMERGENCY MEDICINE

## 2024-03-23 PROCEDURE — 81001 URINALYSIS AUTO W/SCOPE: CPT

## 2024-03-23 PROCEDURE — 2580000003 HC RX 258: Performed by: EMERGENCY MEDICINE

## 2024-03-23 PROCEDURE — 96360 HYDRATION IV INFUSION INIT: CPT

## 2024-03-23 RX ORDER — INSULIN LISPRO 100 [IU]/ML
0-4 INJECTION, SOLUTION INTRAVENOUS; SUBCUTANEOUS NIGHTLY
Status: DISCONTINUED | OUTPATIENT
Start: 2024-03-23 | End: 2024-03-26 | Stop reason: HOSPADM

## 2024-03-23 RX ORDER — ACETAMINOPHEN 325 MG/1
650 TABLET ORAL EVERY 6 HOURS PRN
Status: DISCONTINUED | OUTPATIENT
Start: 2024-03-23 | End: 2024-03-26 | Stop reason: HOSPADM

## 2024-03-23 RX ORDER — DEXTROSE MONOHYDRATE 100 MG/ML
INJECTION, SOLUTION INTRAVENOUS CONTINUOUS PRN
Status: DISCONTINUED | OUTPATIENT
Start: 2024-03-23 | End: 2024-03-26 | Stop reason: HOSPADM

## 2024-03-23 RX ORDER — INSULIN LISPRO 100 [IU]/ML
0-4 INJECTION, SOLUTION INTRAVENOUS; SUBCUTANEOUS
Status: DISCONTINUED | OUTPATIENT
Start: 2024-03-24 | End: 2024-03-26 | Stop reason: HOSPADM

## 2024-03-23 RX ORDER — POLYETHYLENE GLYCOL 3350 17 G/17G
17 POWDER, FOR SOLUTION ORAL DAILY PRN
Status: DISCONTINUED | OUTPATIENT
Start: 2024-03-23 | End: 2024-03-26 | Stop reason: HOSPADM

## 2024-03-23 RX ORDER — ATORVASTATIN CALCIUM 20 MG/1
20 TABLET, FILM COATED ORAL NIGHTLY
Status: DISCONTINUED | OUTPATIENT
Start: 2024-03-23 | End: 2024-03-26 | Stop reason: HOSPADM

## 2024-03-23 RX ORDER — TAMSULOSIN HYDROCHLORIDE 0.4 MG/1
0.4 CAPSULE ORAL DAILY
Status: DISCONTINUED | OUTPATIENT
Start: 2024-03-24 | End: 2024-03-26 | Stop reason: HOSPADM

## 2024-03-23 RX ORDER — 0.9 % SODIUM CHLORIDE 0.9 %
500 INTRAVENOUS SOLUTION INTRAVENOUS ONCE
Status: COMPLETED | OUTPATIENT
Start: 2024-03-23 | End: 2024-03-23

## 2024-03-23 RX ORDER — BUSPIRONE HYDROCHLORIDE 5 MG/1
5 TABLET ORAL 2 TIMES DAILY
Status: DISCONTINUED | OUTPATIENT
Start: 2024-03-23 | End: 2024-03-26 | Stop reason: HOSPADM

## 2024-03-23 RX ORDER — TORSEMIDE 100 MG/1
100 TABLET ORAL DAILY
Status: DISCONTINUED | OUTPATIENT
Start: 2024-03-24 | End: 2024-03-24

## 2024-03-23 RX ORDER — TIZANIDINE 4 MG/1
4 TABLET ORAL EVERY 8 HOURS PRN
Status: DISCONTINUED | OUTPATIENT
Start: 2024-03-23 | End: 2024-03-26 | Stop reason: HOSPADM

## 2024-03-23 RX ORDER — PANTOPRAZOLE SODIUM 40 MG/1
40 TABLET, DELAYED RELEASE ORAL
Status: DISCONTINUED | OUTPATIENT
Start: 2024-03-24 | End: 2024-03-26 | Stop reason: HOSPADM

## 2024-03-23 RX ORDER — METOPROLOL SUCCINATE 25 MG/1
25 TABLET, EXTENDED RELEASE ORAL
Status: DISCONTINUED | OUTPATIENT
Start: 2024-03-23 | End: 2024-03-26 | Stop reason: HOSPADM

## 2024-03-23 RX ORDER — SODIUM CHLORIDE 9 MG/ML
INJECTION, SOLUTION INTRAVENOUS PRN
Status: DISCONTINUED | OUTPATIENT
Start: 2024-03-23 | End: 2024-03-26 | Stop reason: HOSPADM

## 2024-03-23 RX ORDER — SODIUM CHLORIDE 0.9 % (FLUSH) 0.9 %
5-40 SYRINGE (ML) INJECTION PRN
Status: DISCONTINUED | OUTPATIENT
Start: 2024-03-23 | End: 2024-03-26 | Stop reason: HOSPADM

## 2024-03-23 RX ORDER — OXYCODONE HYDROCHLORIDE AND ACETAMINOPHEN 5; 325 MG/1; MG/1
1 TABLET ORAL EVERY 6 HOURS PRN
Status: DISCONTINUED | OUTPATIENT
Start: 2024-03-23 | End: 2024-03-26 | Stop reason: HOSPADM

## 2024-03-23 RX ORDER — ACETAMINOPHEN 650 MG/1
650 SUPPOSITORY RECTAL EVERY 6 HOURS PRN
Status: DISCONTINUED | OUTPATIENT
Start: 2024-03-23 | End: 2024-03-26 | Stop reason: HOSPADM

## 2024-03-23 RX ORDER — ONDANSETRON 2 MG/ML
4 INJECTION INTRAMUSCULAR; INTRAVENOUS EVERY 6 HOURS PRN
Status: DISCONTINUED | OUTPATIENT
Start: 2024-03-23 | End: 2024-03-26 | Stop reason: HOSPADM

## 2024-03-23 RX ORDER — SODIUM CHLORIDE 0.9 % (FLUSH) 0.9 %
5-40 SYRINGE (ML) INJECTION EVERY 12 HOURS SCHEDULED
Status: DISCONTINUED | OUTPATIENT
Start: 2024-03-23 | End: 2024-03-26 | Stop reason: HOSPADM

## 2024-03-23 RX ORDER — GLUCAGON 1 MG/ML
1 KIT INJECTION PRN
Status: DISCONTINUED | OUTPATIENT
Start: 2024-03-23 | End: 2024-03-26 | Stop reason: HOSPADM

## 2024-03-23 RX ORDER — INSULIN LISPRO 100 [IU]/ML
0.03 INJECTION, SOLUTION INTRAVENOUS; SUBCUTANEOUS
Status: DISCONTINUED | OUTPATIENT
Start: 2024-03-24 | End: 2024-03-26 | Stop reason: HOSPADM

## 2024-03-23 RX ORDER — ONDANSETRON 4 MG/1
4 TABLET, ORALLY DISINTEGRATING ORAL EVERY 8 HOURS PRN
Status: DISCONTINUED | OUTPATIENT
Start: 2024-03-23 | End: 2024-03-26 | Stop reason: HOSPADM

## 2024-03-23 RX ORDER — CALCIUM ACETATE 667 MG/1
1 CAPSULE ORAL
Status: DISCONTINUED | OUTPATIENT
Start: 2024-03-24 | End: 2024-03-26 | Stop reason: HOSPADM

## 2024-03-23 RX ADMIN — SODIUM CHLORIDE 500 ML: 9 INJECTION, SOLUTION INTRAVENOUS at 18:20

## 2024-03-23 RX ADMIN — ATORVASTATIN CALCIUM 20 MG: 20 TABLET, FILM COATED ORAL at 22:15

## 2024-03-23 RX ADMIN — METOPROLOL SUCCINATE 25 MG: 25 TABLET, EXTENDED RELEASE ORAL at 22:15

## 2024-03-23 RX ADMIN — OXYCODONE HYDROCHLORIDE AND ACETAMINOPHEN 1 TABLET: 5; 325 TABLET ORAL at 22:19

## 2024-03-23 RX ADMIN — SODIUM CHLORIDE 500 ML: 9 INJECTION, SOLUTION INTRAVENOUS at 17:21

## 2024-03-23 RX ADMIN — BUSPIRONE HYDROCHLORIDE 5 MG: 5 TABLET ORAL at 22:15

## 2024-03-23 ASSESSMENT — PAIN DESCRIPTION - LOCATION: LOCATION: KNEE

## 2024-03-23 ASSESSMENT — PAIN SCALES - GENERAL: PAINLEVEL_OUTOF10: 2

## 2024-03-23 ASSESSMENT — PAIN DESCRIPTION - ORIENTATION: ORIENTATION: RIGHT

## 2024-03-23 ASSESSMENT — PAIN DESCRIPTION - DESCRIPTORS: DESCRIPTORS: ACHING;THROBBING

## 2024-03-23 NOTE — ED PROVIDER NOTES
history of diastolic CHF and ESRD presented to the emergency department with orthostatic hypotension and tachycardia on standing with suspected hypovolemia.  Patient last had dialysis this morning.  Reported beginning to feel lightheaded last evening with much more significant symptoms today.  At this point, patient states he stands up, he becomes incredibly weak on his feet and feels like he is going to fall over.  Despite his symptoms he has not passed out or injured his head. I attempted two 500 mL IV fluid boluses, rechecking orthostatic vitals after each. Despite fluid, patient would become incredibly lightheaded and weak on his feet when standing, with associated 15-20 bpm increase in HR ( most recently). I still believe his symptoms do be due to hypovolemia, particularly since he just had dialysis today. However, I am concerned about causing a delayed hypervolemia and pulmonary edema if I aggressively rehydrate beyond this. However, the patient's lightheadedness and weakness is severe enough on standing that he cannot stand without assistance or tolerate walking.    I consulted Dr. Dangelo through [facility] for admission. He reviewed the patient's history, physical exam, labs, imaging studies, and emergency department course and has decided to admit Jorden Woods for further evaluation and treatment.      The total Critical Care time is 30 minutes which excludes separately billable procedures.    Final Impression  1. Orthostatic lightheadedness    2. Orthostatic hypotension        Blood pressure 114/72, pulse 89, temperature 98.2 °F (36.8 °C), resp. rate 16, height 1.956 m (6' 5\"), weight 123.4 kg (272 lb), SpO2 100 %.     Disposition:  DISPOSITION Decision To Admit 03/23/2024 07:30:29 PM    This chart was generated using the Dragon dictation system. I created this record but it may contain dictation errors given the limitations of this technology.        Raymond Haley MD  03/23/24 1933

## 2024-03-24 ENCOUNTER — APPOINTMENT (OUTPATIENT)
Dept: GENERAL RADIOLOGY | Age: 64
End: 2024-03-24
Payer: MEDICARE

## 2024-03-24 LAB
ALBUMIN SERPL-MCNC: 4.3 G/DL (ref 3.4–5)
ALBUMIN/GLOB SERPL: 1.7 {RATIO} (ref 1.1–2.2)
ALP SERPL-CCNC: 74 U/L (ref 40–129)
ALT SERPL-CCNC: 11 U/L (ref 10–40)
ANION GAP SERPL CALCULATED.3IONS-SCNC: 10 MMOL/L (ref 3–16)
AST SERPL-CCNC: 10 U/L (ref 15–37)
BASOPHILS # BLD: 0 K/UL (ref 0–0.2)
BASOPHILS NFR BLD: 0.3 %
BILIRUB SERPL-MCNC: <0.2 MG/DL (ref 0–1)
BUN SERPL-MCNC: 25 MG/DL (ref 7–20)
CALCIUM SERPL-MCNC: 8.9 MG/DL (ref 8.3–10.6)
CHLORIDE SERPL-SCNC: 101 MMOL/L (ref 99–110)
CO2 SERPL-SCNC: 27 MMOL/L (ref 21–32)
CREAT SERPL-MCNC: 5.3 MG/DL (ref 0.8–1.3)
DEPRECATED RDW RBC AUTO: 16 % (ref 12.4–15.4)
EKG ATRIAL RATE: 84 BPM
EKG DIAGNOSIS: NORMAL
EKG P AXIS: 49 DEGREES
EKG P-R INTERVAL: 156 MS
EKG Q-T INTERVAL: 398 MS
EKG QRS DURATION: 98 MS
EKG QTC CALCULATION (BAZETT): 470 MS
EKG R AXIS: -15 DEGREES
EKG T AXIS: 8 DEGREES
EKG VENTRICULAR RATE: 84 BPM
EOSINOPHIL # BLD: 0.1 K/UL (ref 0–0.6)
EOSINOPHIL NFR BLD: 2.2 %
EST. AVERAGE GLUCOSE BLD GHB EST-MCNC: 154.2 MG/DL
GFR SERPLBLD CREATININE-BSD FMLA CKD-EPI: 11 ML/MIN/{1.73_M2}
GLUCOSE BLD-MCNC: 115 MG/DL (ref 70–99)
GLUCOSE BLD-MCNC: 122 MG/DL (ref 70–99)
GLUCOSE BLD-MCNC: 124 MG/DL (ref 70–99)
GLUCOSE BLD-MCNC: 124 MG/DL (ref 70–99)
GLUCOSE BLD-MCNC: 144 MG/DL (ref 70–99)
GLUCOSE SERPL-MCNC: 112 MG/DL (ref 70–99)
HBA1C MFR BLD: 7 %
HCT VFR BLD AUTO: 29.6 % (ref 40.5–52.5)
HGB BLD-MCNC: 9.7 G/DL (ref 13.5–17.5)
LYMPHOCYTES # BLD: 0.8 K/UL (ref 1–5.1)
LYMPHOCYTES NFR BLD: 15.1 %
MCH RBC QN AUTO: 28.1 PG (ref 26–34)
MCHC RBC AUTO-ENTMCNC: 32.9 G/DL (ref 31–36)
MCV RBC AUTO: 85.2 FL (ref 80–100)
MONOCYTES # BLD: 0.5 K/UL (ref 0–1.3)
MONOCYTES NFR BLD: 10.1 %
NEUTROPHILS # BLD: 3.7 K/UL (ref 1.7–7.7)
NEUTROPHILS NFR BLD: 72.3 %
PERFORMED ON: ABNORMAL
PHOSPHATE SERPL-MCNC: 3 MG/DL (ref 2.5–4.9)
PLATELET # BLD AUTO: 159 K/UL (ref 135–450)
PMV BLD AUTO: 7.7 FL (ref 5–10.5)
POTASSIUM SERPL-SCNC: 4.7 MMOL/L (ref 3.5–5.1)
PROT SERPL-MCNC: 6.8 G/DL (ref 6.4–8.2)
RBC # BLD AUTO: 3.47 M/UL (ref 4.2–5.9)
SODIUM SERPL-SCNC: 138 MMOL/L (ref 136–145)
WBC # BLD AUTO: 5.1 K/UL (ref 4–11)

## 2024-03-24 PROCEDURE — 84100 ASSAY OF PHOSPHORUS: CPT

## 2024-03-24 PROCEDURE — 6360000002 HC RX W HCPCS: Performed by: HOSPITALIST

## 2024-03-24 PROCEDURE — 6360000002 HC RX W HCPCS: Performed by: NURSE PRACTITIONER

## 2024-03-24 PROCEDURE — 73562 X-RAY EXAM OF KNEE 3: CPT

## 2024-03-24 PROCEDURE — 96374 THER/PROPH/DIAG INJ IV PUSH: CPT

## 2024-03-24 PROCEDURE — G0378 HOSPITAL OBSERVATION PER HR: HCPCS

## 2024-03-24 PROCEDURE — 85025 COMPLETE CBC W/AUTO DIFF WBC: CPT

## 2024-03-24 PROCEDURE — 2580000003 HC RX 258: Performed by: INTERNAL MEDICINE

## 2024-03-24 PROCEDURE — 2500000003 HC RX 250 WO HCPCS: Performed by: HOSPITALIST

## 2024-03-24 PROCEDURE — 96372 THER/PROPH/DIAG INJ SC/IM: CPT

## 2024-03-24 PROCEDURE — 96376 TX/PRO/DX INJ SAME DRUG ADON: CPT

## 2024-03-24 PROCEDURE — 80053 COMPREHEN METABOLIC PANEL: CPT

## 2024-03-24 PROCEDURE — 96361 HYDRATE IV INFUSION ADD-ON: CPT

## 2024-03-24 PROCEDURE — 2580000003 HC RX 258: Performed by: HOSPITALIST

## 2024-03-24 PROCEDURE — 93010 ELECTROCARDIOGRAM REPORT: CPT | Performed by: INTERNAL MEDICINE

## 2024-03-24 PROCEDURE — 6370000000 HC RX 637 (ALT 250 FOR IP): Performed by: HOSPITALIST

## 2024-03-24 PROCEDURE — 36415 COLL VENOUS BLD VENIPUNCTURE: CPT

## 2024-03-24 RX ORDER — MORPHINE SULFATE 2 MG/ML
2 INJECTION, SOLUTION INTRAMUSCULAR; INTRAVENOUS EVERY 4 HOURS PRN
Status: DISCONTINUED | OUTPATIENT
Start: 2024-03-24 | End: 2024-03-26 | Stop reason: HOSPADM

## 2024-03-24 RX ORDER — 0.9 % SODIUM CHLORIDE 0.9 %
500 INTRAVENOUS SOLUTION INTRAVENOUS ONCE
Status: COMPLETED | OUTPATIENT
Start: 2024-03-24 | End: 2024-03-24

## 2024-03-24 RX ORDER — HEPARIN SODIUM 5000 [USP'U]/ML
5000 INJECTION, SOLUTION INTRAVENOUS; SUBCUTANEOUS EVERY 8 HOURS SCHEDULED
Status: DISCONTINUED | OUTPATIENT
Start: 2024-03-24 | End: 2024-03-26 | Stop reason: HOSPADM

## 2024-03-24 RX ADMIN — BUSPIRONE HYDROCHLORIDE 5 MG: 5 TABLET ORAL at 09:59

## 2024-03-24 RX ADMIN — TORSEMIDE 100 MG: 100 TABLET ORAL at 09:58

## 2024-03-24 RX ADMIN — BUSPIRONE HYDROCHLORIDE 5 MG: 5 TABLET ORAL at 20:01

## 2024-03-24 RX ADMIN — PANTOPRAZOLE SODIUM 40 MG: 40 TABLET, DELAYED RELEASE ORAL at 05:43

## 2024-03-24 RX ADMIN — POLYETHYLENE GLYCOL 3350 17 G: 17 POWDER, FOR SOLUTION ORAL at 23:43

## 2024-03-24 RX ADMIN — NALOXEGOL OXALATE 12.5 MG: 25 TABLET, FILM COATED ORAL at 05:43

## 2024-03-24 RX ADMIN — TIZANIDINE 4 MG: 4 TABLET ORAL at 20:01

## 2024-03-24 RX ADMIN — OXYCODONE HYDROCHLORIDE AND ACETAMINOPHEN 1 TABLET: 5; 325 TABLET ORAL at 04:19

## 2024-03-24 RX ADMIN — CALCIUM ACETATE 667 MG: 667 CAPSULE ORAL at 09:58

## 2024-03-24 RX ADMIN — HEPARIN SODIUM 5000 UNITS: 5000 INJECTION INTRAVENOUS; SUBCUTANEOUS at 13:47

## 2024-03-24 RX ADMIN — ATORVASTATIN CALCIUM 20 MG: 20 TABLET, FILM COATED ORAL at 20:01

## 2024-03-24 RX ADMIN — OXYCODONE HYDROCHLORIDE AND ACETAMINOPHEN 1 TABLET: 5; 325 TABLET ORAL at 20:01

## 2024-03-24 RX ADMIN — OXYCODONE HYDROCHLORIDE AND ACETAMINOPHEN 1 TABLET: 5; 325 TABLET ORAL at 09:59

## 2024-03-24 RX ADMIN — MORPHINE SULFATE 2 MG: 2 INJECTION, SOLUTION INTRAMUSCULAR; INTRAVENOUS at 17:52

## 2024-03-24 RX ADMIN — CALCIUM ACETATE 667 MG: 667 CAPSULE ORAL at 17:53

## 2024-03-24 RX ADMIN — TAMSULOSIN HYDROCHLORIDE 0.4 MG: 0.4 CAPSULE ORAL at 09:59

## 2024-03-24 RX ADMIN — MORPHINE SULFATE 2 MG: 2 INJECTION, SOLUTION INTRAMUSCULAR; INTRAVENOUS at 23:31

## 2024-03-24 RX ADMIN — Medication 10 ML: at 20:09

## 2024-03-24 RX ADMIN — CALCIUM ACETATE 667 MG: 667 CAPSULE ORAL at 13:47

## 2024-03-24 RX ADMIN — HEPARIN SODIUM 5000 UNITS: 5000 INJECTION INTRAVENOUS; SUBCUTANEOUS at 21:52

## 2024-03-24 RX ADMIN — Medication 10 ML: at 09:59

## 2024-03-24 RX ADMIN — SODIUM CHLORIDE 500 ML: 9 INJECTION, SOLUTION INTRAVENOUS at 21:52

## 2024-03-24 RX ADMIN — METOPROLOL SUCCINATE 25 MG: 25 TABLET, EXTENDED RELEASE ORAL at 20:01

## 2024-03-24 RX ADMIN — Medication 10 ML: at 03:56

## 2024-03-24 ASSESSMENT — PAIN SCALES - GENERAL
PAINLEVEL_OUTOF10: 2
PAINLEVEL_OUTOF10: 2
PAINLEVEL_OUTOF10: 4
PAINLEVEL_OUTOF10: 9
PAINLEVEL_OUTOF10: 3

## 2024-03-24 ASSESSMENT — PAIN DESCRIPTION - ONSET: ONSET: ON-GOING

## 2024-03-24 ASSESSMENT — PAIN DESCRIPTION - FREQUENCY: FREQUENCY: CONTINUOUS

## 2024-03-24 ASSESSMENT — PAIN DESCRIPTION - DESCRIPTORS
DESCRIPTORS: ACHING
DESCRIPTORS: ACHING;SHARP
DESCRIPTORS: DISCOMFORT
DESCRIPTORS: DISCOMFORT;ACHING

## 2024-03-24 ASSESSMENT — PAIN DESCRIPTION - LOCATION
LOCATION: KNEE

## 2024-03-24 ASSESSMENT — PAIN DESCRIPTION - PAIN TYPE: TYPE: ACUTE PAIN

## 2024-03-24 ASSESSMENT — PAIN DESCRIPTION - ORIENTATION
ORIENTATION: MID
ORIENTATION: RIGHT

## 2024-03-24 ASSESSMENT — PAIN - FUNCTIONAL ASSESSMENT: PAIN_FUNCTIONAL_ASSESSMENT: PREVENTS OR INTERFERES SOME ACTIVE ACTIVITIES AND ADLS

## 2024-03-24 NOTE — ASSESSMENT & PLAN NOTE
- Known history of impaired diastolic filling (Grade II) w/o evidence of acute HF present  - Continue reduced BB and diuretic tx w/ additional volume management per HD

## 2024-03-24 NOTE — ASSESSMENT & PLAN NOTE
- Chronic back and MSK pain with established home regimen resumed  - Monitor bowel fxn and symptom control

## 2024-03-24 NOTE — ASSESSMENT & PLAN NOTE
- Positional dizziness reported w/o evidence of nystagmus or clear vestibular etiology present  - Low suspicion for ischemic etiology given onset s/p HD  - Iatrogenic etiology suspected with CCM and Alpha blocker therapy adjusted  - Repeat Orthostatics in AM and monitor

## 2024-03-24 NOTE — ASSESSMENT & PLAN NOTE
- Known hx w/ HD compliance confirmed  - Meds adjusted for <CrCl w/ Additional inpt Nephro eval if hospitalization prolonged for HD management  - Avoid IVF support

## 2024-03-24 NOTE — ED NOTES
limits     Critical values: no     Abnormal Assessment Findings: see chart    Background  History:   Past Medical History:   Diagnosis Date    Anemia in ESRD (end-stage renal disease) ([facility]) 02/24/2022    Arthralgia of multiple joints 10/27/2015    Atrial fibrillation ([facility])     Chronic bilateral low back pain without sciatica 12/12/2018    Chronic diastolic heart failure ([facility]) 08/16/2021    Chronic pain syndrome 08/31/2022    Chronic prescription opiate use     Oxycodone 20 mg/day    Chronic systolic congestive heart failure ([facility]) 08/16/2021    Class 1 obesity     Diverticulosis large intestine w/o perforation or abscess w/o bleeding     Duodenal ulcer disease     ESRD on hemodialysis ([facility]) 02/24/2022 Tuesday, Thursday, Saturday    Essential hypertension     Gout     Hearing impairment     History of prostate cancer     Inguinal hernia, left 06/14/2022    Major depressive disorder, recurrent, mild 10/26/2022    Opioid dependence with current use ([facility]) 12/12/2023    CELSA (obstructive sleep apnea) with CPAP non compliance 07/14/2017    Postherpetic polyneuropathy 03/23/2024    Sickle cell trait ([facility])     Staphylococcus aureus bacteremia 07/12/2022    Status post bilateral hernia repair 06/13/2022    Thrombocytopenia, unspecified ([facility]) 03/18/2024    Umbilical hernia 02/02/2016       Assessment    Vitals/MEWS: MEWS Score: 1  Level of Consciousness: Alert (0)   Vitals:    03/23/24 1930 03/23/24 1945 03/23/24 2045 03/23/24 2100   BP: 134/76 138/77 (!) 143/76 98/86   Pulse: 97 97 88 95   Resp: 13 11 13 15   Temp:       SpO2: 96% 97% 97% 96%   Weight:       Height:         FiO2 (%):   O2 Flow Rate:      Cardiac Rhythm: Cardiac Rhythm: Sinus rhythm  Pain Assessment: [x] Verbal [] Aquino Barbosa Scale  Pain Scale:    Last documented pain score (0-10 scale)    Last documented pain medication administered:   Mental Status: oriented  Orientation Level:    NIH Score:    C-SSRS: Risk of Suicide: No Risk  Bedside swallow:    Diamante

## 2024-03-24 NOTE — H&P
V2.0  History and Physical      Name:  Jorden Woods /Age/Sex: 1960  (63 y.o. male)   MRN & CSN:  [id number] & [id number] Encounter Date/Time: 3/23/24   Location:  [facility] PCP: Curt Collins MD       Hospital Day: 1    Assessment and Plan:   Jorden Woods is a 63 y.o. male with a pmh of HTN, ESRD and chronic pain who presents with Orthostasis    Hospital Problems             Last Modified POA    * (Principal) Orthostasis 3/23/2024 Yes    Chronic diastolic heart failure (HCC) 3/23/2024 Yes    ESRD (end stage renal disease) (HCC) 3/23/2024 Yes    Chronic pain syndrome 3/23/2024 Yes       Plan:  Chronic diastolic heart failure (HCC)  - Known history of impaired diastolic filling (Grade II) w/o evidence of acute HF present  - Continue reduced BB and diuretic tx w/ additional volume management per HD    Chronic pain syndrome  - Chronic back and MSK pain with established home regimen resumed  - Monitor bowel fxn and symptom control    ESRD (end stage renal disease) (HCC)  - Known hx w/ HD compliance confirmed  - Meds adjusted for <CrCl w/ Additional inpt Nephro eval if hospitalization prolonged for HD management  - Avoid IVF support    Orthostasis  - Positional dizziness reported w/o evidence of nystagmus or clear vestibular etiology present  - Low suspicion for ischemic etiology given onset s/p HD  - Iatrogenic etiology suspected with CCM and Alpha blocker therapy adjusted  - Repeat Orthostatics in AM and monitor    Disposition:   Current Living situation: Home  Expected Disposition: Home  Estimated D/C: TBD    Diet No diet orders on file   DVT Prophylaxis [] Lovenox, []  Heparin, [] SCDs, [] Ambulation,  [] Eliquis, [] Xarelto, [] Coumadin   Code Status Prior   Surrogate Decision Maker/ POA      Personally reviewed Lab Studies and Imaging     History from:     patient, electronic medical record    History of Present Illness:     Chief Complaint:   Jorden Woods is a 63 y.o. male with a hx of ESRD,

## 2024-03-25 ENCOUNTER — APPOINTMENT (OUTPATIENT)
Dept: CT IMAGING | Age: 64
End: 2024-03-25
Payer: MEDICARE

## 2024-03-25 PROBLEM — R42 ORTHOSTATIC LIGHTHEADEDNESS: Status: ACTIVE | Noted: 2024-03-23

## 2024-03-25 PROBLEM — E87.8 ELECTROLYTE IMBALANCE: Status: ACTIVE | Noted: 2024-03-25

## 2024-03-25 LAB
ANION GAP SERPL CALCULATED.3IONS-SCNC: 11 MMOL/L (ref 3–16)
BUN SERPL-MCNC: 37 MG/DL (ref 7–20)
CALCIUM SERPL-MCNC: 9.2 MG/DL (ref 8.3–10.6)
CHLORIDE SERPL-SCNC: 99 MMOL/L (ref 99–110)
CO2 SERPL-SCNC: 26 MMOL/L (ref 21–32)
CREAT SERPL-MCNC: 6.9 MG/DL (ref 0.8–1.3)
DEPRECATED RDW RBC AUTO: 16 % (ref 12.4–15.4)
GFR SERPLBLD CREATININE-BSD FMLA CKD-EPI: 8 ML/MIN/{1.73_M2}
GLUCOSE BLD-MCNC: 104 MG/DL (ref 70–99)
GLUCOSE BLD-MCNC: 121 MG/DL (ref 70–99)
GLUCOSE BLD-MCNC: 126 MG/DL (ref 70–99)
GLUCOSE BLD-MCNC: 155 MG/DL (ref 70–99)
GLUCOSE SERPL-MCNC: 149 MG/DL (ref 70–99)
HCT VFR BLD AUTO: 32.1 % (ref 40.5–52.5)
HGB BLD-MCNC: 10.3 G/DL (ref 13.5–17.5)
MCH RBC QN AUTO: 27.7 PG (ref 26–34)
MCHC RBC AUTO-ENTMCNC: 32 G/DL (ref 31–36)
MCV RBC AUTO: 86.4 FL (ref 80–100)
PERFORMED ON: ABNORMAL
PLATELET # BLD AUTO: 183 K/UL (ref 135–450)
PMV BLD AUTO: 7.5 FL (ref 5–10.5)
POTASSIUM SERPL-SCNC: 4.5 MMOL/L (ref 3.5–5.1)
RBC # BLD AUTO: 3.71 M/UL (ref 4.2–5.9)
SODIUM SERPL-SCNC: 136 MMOL/L (ref 136–145)
WBC # BLD AUTO: 3.9 K/UL (ref 4–11)

## 2024-03-25 PROCEDURE — G0378 HOSPITAL OBSERVATION PER HR: HCPCS

## 2024-03-25 PROCEDURE — 2580000003 HC RX 258: Performed by: HOSPITALIST

## 2024-03-25 PROCEDURE — 99223 1ST HOSP IP/OBS HIGH 75: CPT | Performed by: NURSE PRACTITIONER

## 2024-03-25 PROCEDURE — 6370000000 HC RX 637 (ALT 250 FOR IP): Performed by: HOSPITALIST

## 2024-03-25 PROCEDURE — 96376 TX/PRO/DX INJ SAME DRUG ADON: CPT

## 2024-03-25 PROCEDURE — 2500000003 HC RX 250 WO HCPCS: Performed by: HOSPITALIST

## 2024-03-25 PROCEDURE — 6370000000 HC RX 637 (ALT 250 FOR IP): Performed by: NURSE PRACTITIONER

## 2024-03-25 PROCEDURE — 85027 COMPLETE CBC AUTOMATED: CPT

## 2024-03-25 PROCEDURE — 70450 CT HEAD/BRAIN W/O DYE: CPT

## 2024-03-25 PROCEDURE — 6360000002 HC RX W HCPCS: Performed by: NURSE PRACTITIONER

## 2024-03-25 PROCEDURE — 80048 BASIC METABOLIC PNL TOTAL CA: CPT

## 2024-03-25 PROCEDURE — 96372 THER/PROPH/DIAG INJ SC/IM: CPT

## 2024-03-25 PROCEDURE — 6360000002 HC RX W HCPCS: Performed by: HOSPITALIST

## 2024-03-25 PROCEDURE — 99222 1ST HOSP IP/OBS MODERATE 55: CPT | Performed by: INTERNAL MEDICINE

## 2024-03-25 PROCEDURE — 36415 COLL VENOUS BLD VENIPUNCTURE: CPT

## 2024-03-25 RX ORDER — BISACODYL 10 MG
10 SUPPOSITORY, RECTAL RECTAL DAILY PRN
Status: DISCONTINUED | OUTPATIENT
Start: 2024-03-25 | End: 2024-03-26 | Stop reason: HOSPADM

## 2024-03-25 RX ORDER — NIFEDIPINE 30 MG/1
60 TABLET, EXTENDED RELEASE ORAL DAILY
Qty: 30 TABLET | Refills: 0 | Status: SHIPPED | OUTPATIENT
Start: 2024-03-25

## 2024-03-25 RX ORDER — METOPROLOL SUCCINATE 50 MG/1
25 TABLET, EXTENDED RELEASE ORAL
Qty: 90 TABLET | Refills: 1 | Status: SHIPPED
Start: 2024-03-25

## 2024-03-25 RX ORDER — NIFEDIPINE 30 MG/1
30 TABLET, EXTENDED RELEASE ORAL DAILY
Status: DISCONTINUED | OUTPATIENT
Start: 2024-03-25 | End: 2024-03-25

## 2024-03-25 RX ORDER — NIFEDIPINE 30 MG/1
60 TABLET, EXTENDED RELEASE ORAL DAILY
Status: DISCONTINUED | OUTPATIENT
Start: 2024-03-25 | End: 2024-03-26 | Stop reason: HOSPADM

## 2024-03-25 RX ORDER — LIDOCAINE 4 G/G
1 PATCH TOPICAL DAILY
Status: DISCONTINUED | OUTPATIENT
Start: 2024-03-25 | End: 2024-03-26 | Stop reason: HOSPADM

## 2024-03-25 RX ORDER — LIDOCAINE 4 G/G
1 PATCH TOPICAL DAILY
Qty: 7 PATCH | Refills: 0 | Status: SHIPPED | OUTPATIENT
Start: 2024-03-26

## 2024-03-25 RX ADMIN — BUSPIRONE HYDROCHLORIDE 5 MG: 5 TABLET ORAL at 08:55

## 2024-03-25 RX ADMIN — OXYCODONE HYDROCHLORIDE AND ACETAMINOPHEN 1 TABLET: 5; 325 TABLET ORAL at 20:13

## 2024-03-25 RX ADMIN — Medication 10 ML: at 08:54

## 2024-03-25 RX ADMIN — HEPARIN SODIUM 5000 UNITS: 5000 INJECTION INTRAVENOUS; SUBCUTANEOUS at 12:25

## 2024-03-25 RX ADMIN — METOPROLOL SUCCINATE 25 MG: 25 TABLET, EXTENDED RELEASE ORAL at 20:12

## 2024-03-25 RX ADMIN — Medication 10 ML: at 20:13

## 2024-03-25 RX ADMIN — OXYCODONE HYDROCHLORIDE AND ACETAMINOPHEN 1 TABLET: 5; 325 TABLET ORAL at 10:58

## 2024-03-25 RX ADMIN — PANTOPRAZOLE SODIUM 40 MG: 40 TABLET, DELAYED RELEASE ORAL at 05:00

## 2024-03-25 RX ADMIN — MORPHINE SULFATE 2 MG: 2 INJECTION, SOLUTION INTRAMUSCULAR; INTRAVENOUS at 23:05

## 2024-03-25 RX ADMIN — MORPHINE SULFATE 2 MG: 2 INJECTION, SOLUTION INTRAMUSCULAR; INTRAVENOUS at 17:01

## 2024-03-25 RX ADMIN — TAMSULOSIN HYDROCHLORIDE 0.4 MG: 0.4 CAPSULE ORAL at 08:55

## 2024-03-25 RX ADMIN — NIFEDIPINE 60 MG: 30 TABLET, EXTENDED RELEASE ORAL at 17:02

## 2024-03-25 RX ADMIN — CALCIUM ACETATE 667 MG: 667 CAPSULE ORAL at 12:25

## 2024-03-25 RX ADMIN — BUSPIRONE HYDROCHLORIDE 5 MG: 5 TABLET ORAL at 20:12

## 2024-03-25 RX ADMIN — CALCIUM ACETATE 667 MG: 667 CAPSULE ORAL at 17:01

## 2024-03-25 RX ADMIN — MORPHINE SULFATE 2 MG: 2 INJECTION, SOLUTION INTRAMUSCULAR; INTRAVENOUS at 13:01

## 2024-03-25 RX ADMIN — ONDANSETRON 4 MG: 4 TABLET, ORALLY DISINTEGRATING ORAL at 01:37

## 2024-03-25 RX ADMIN — NALOXEGOL OXALATE 12.5 MG: 25 TABLET, FILM COATED ORAL at 05:00

## 2024-03-25 RX ADMIN — MORPHINE SULFATE 2 MG: 2 INJECTION, SOLUTION INTRAMUSCULAR; INTRAVENOUS at 08:55

## 2024-03-25 RX ADMIN — HEPARIN SODIUM 5000 UNITS: 5000 INJECTION INTRAVENOUS; SUBCUTANEOUS at 21:45

## 2024-03-25 RX ADMIN — MORPHINE SULFATE 2 MG: 2 INJECTION, SOLUTION INTRAMUSCULAR; INTRAVENOUS at 18:46

## 2024-03-25 RX ADMIN — HEPARIN SODIUM 5000 UNITS: 5000 INJECTION INTRAVENOUS; SUBCUTANEOUS at 04:57

## 2024-03-25 RX ADMIN — CALCIUM ACETATE 667 MG: 667 CAPSULE ORAL at 08:55

## 2024-03-25 RX ADMIN — OXYCODONE HYDROCHLORIDE AND ACETAMINOPHEN 1 TABLET: 5; 325 TABLET ORAL at 01:37

## 2024-03-25 RX ADMIN — ATORVASTATIN CALCIUM 20 MG: 20 TABLET, FILM COATED ORAL at 20:12

## 2024-03-25 ASSESSMENT — PAIN DESCRIPTION - DESCRIPTORS: DESCRIPTORS: DISCOMFORT

## 2024-03-25 ASSESSMENT — PAIN SCALES - GENERAL
PAINLEVEL_OUTOF10: 7

## 2024-03-25 ASSESSMENT — PAIN DESCRIPTION - LOCATION
LOCATION: KNEE
LOCATION: KNEE

## 2024-03-25 ASSESSMENT — PAIN DESCRIPTION - ORIENTATION: ORIENTATION: MID

## 2024-03-25 NOTE — FLOWSHEET NOTE
03/25/24 1203   IMM Letter   Observation Status Letter date given: 03/25/24   Observation Status Letter time given: 1143   Observation Status Letter given to Patient/Family/Significant other/Guardian/POA/by: Given to patient by Social Work/Case Management student Darlene

## 2024-03-25 NOTE — DISCHARGE SUMMARY
tablets     tamsulosin 0.4 MG capsule  Commonly known as: FLOMAX     tiZANidine 4 MG tablet  Commonly known as: ZANAFLEX  Take 1 tablet by mouth every 8 hours as needed (spasms)     vitamin D 1.25 MG (07089 UT) Caps capsule  Commonly known as: ERGOCALCIFEROL  Take 1 capsule by mouth every 30 days            STOP taking these medications      bisacodyl 5 MG EC tablet  Generic drug: bisacodyl     diclofenac sodium 1 % Gel  Commonly known as: VOLTAREN     NIFEdipine 90 MG extended release tablet  Commonly known as: ADALAT CC  Replaced by: NIFEdipine 30 MG extended release tablet     terazosin 5 MG capsule  Commonly known as: HYTRIN     torsemide 100 MG tablet  Commonly known as: DEMADEX               Where to Get Your Medications        These medications were sent to [facility]/pharmacy #[id number] - [place], [place] - [address] - P [phone] - F [phone] [address] [place] [address]      Phone: [phone]   lidocaine 4 % external patch  NIFEdipine 30 MG extended release tablet       Information about where to get these medications is not yet available    Ask your nurse or doctor about these medications  metoprolol succinate 50 MG extended release tablet       Spent 35 minutes in discharge process.    Signed:  MIGUEL Mejía CNP     3/25/2024 3:42 PM

## 2024-03-25 NOTE — FLOWSHEET NOTE
03/25/24 1144   Readmission Assessment   Number of Days since last admission? 8-30 days   Previous Disposition Home with Family   Who is being Interviewed Patient   What was the patient's/caregiver's perception as to why they think they needed to return back to the hospital? Other (Comment)  (Two different reasons for admission. Previous admission was for pain on side of head and this admission was for orthostasis.)   Did you visit your Primary Care Physician after you left the hospital, before you returned this time? Yes   Did you see a specialist, such as Cardiac, Pulmonary, Orthopedic Physician, etc. after you left the hospital? Yes   Who advised the patient to return to the hospital? Physician  (Patient reported his kidney doctor advised him to come to the hospital.)   Does the patient report anything that got in the way of taking their medications? No   In our efforts to provide the best possible care to you and others like you, can you think of anything that we could have done to help you after you left the hospital the first time, so that you might not have needed to return so soon? Other (Comment)  (None)     .Electronically signed by ZA MONET on 3/25/2024 at 11:47 AM

## 2024-03-25 NOTE — CARE COORDINATION
Case Management Assessment  Initial Evaluation    Date/Time of Evaluation: 3/25/2024 11:59 AM  Assessment Completed by: ZA MONET    If patient is discharged prior to next notation, then this note serves as note for discharge by case management.    Patient Name: Jorden Woods                   YOB: 1960  Diagnosis: Orthostatic hypotension [I95.1]  Orthostasis [I95.1]  Orthostatic lightheadedness [R42]                   Date / Time: 3/23/2024  3:25 PM    Patient Admission Status: Observation   Readmission Risk (Low < 19, Mod (19-27), High > 27): Readmission Risk Score: 21    Current PCP: Curt Collins MD  PCP verified by CM? Yes    Chart Reviewed: Yes      History Provided by: Patient  Patient Orientation: Alert and Oriented    Patient Cognition: Alert    Hospitalization in the last 30 days (Readmission):  Yes    If yes, Readmission Assessment in CM Navigator will be completed.    Advance Directives:      Code Status: Full Code   Patient's Primary Decision Maker is: Legal Next of Kin    Primary Decision Maker: Tamiko Woods - Spouse - [phone]    Discharge Planning:    Patient lives with: Spouse/Significant Other Type of Home: House  Primary Care Giver: Self (Lives at home with wife.)  Patient Support Systems include: Family Members, Spouse/Significant Other   Current Financial resources: Medicaid, Medicare  Current community resources: None  Current services prior to admission: Other (Comment) (dialysis)            Current DME:              Type of Home Care services:  None    ADLS  Prior functional level: Independent in ADLs/IADLs  Current functional level: Independent in ADLs/IADLs    PT AM-PAC:   /24  OT AM-PAC:   /24    Family can provide assistance at DC: Yes (Wife can assist with care, if needed.)  Would you like Case Management to discuss the discharge plan with any other family members/significant others, and if so, who? No  Plans to Return to Present Housing: Yes  Other

## 2024-03-25 NOTE — CONSULTS
PLACEMENT GREATER THAN 5 YEARS  01/14/2022    IR TUNNELED CATHETER PLACEMENT GREATER THAN 5 YEARS 1/14/2022 [facility] SPECIAL PROCEDURES    IR TUNNELED CATHETER PLACEMENT GREATER THAN 5 YEARS  07/14/2022    IR TUNNELED CATHETER PLACEMENT GREATER THAN 5 YEARS 7/14/2022 [facility] SPECIAL PROCEDURES    PROSTATE SURGERY  05/2023    UPPER GASTROINTESTINAL ENDOSCOPY  05/28/2016    UPPER GASTROINTESTINAL ENDOSCOPY  09/12/2016    UPPER GASTROINTESTINAL ENDOSCOPY N/A 03/18/2022    EGD DIAGNOSTIC ONLY performed by Huang Bradshaw MD at [facility] ENDOSCOPY    VENTRAL HERNIA REPAIR N/A 01/17/2022    LAPAROSCOPIC incarcerated VENTRAL HERNIA REPAIR performed by Dar Garcia DO at [facility] OR       Social History:    reports that he quit smoking about 4 years ago. His smoking use included cigars. He started smoking about 44 years ago. He has never been exposed to tobacco smoke. He has never used smokeless tobacco.    Family History:        Problem Relation Age of Onset    Breast Cancer Mother     Cancer Father     Coronary Art Dis Brother     Sickle Cell Anemia Brother     Diabetes Maternal Grandmother     Hypertension Other        Medications:  ALL MEDICATIONS HAVE BEEN REVIEWED:  Scheduled:   heparin (porcine)  5,000 Units SubCUTAneous 3 times per day    atorvastatin  20 mg Oral Nightly    busPIRone  5 mg Oral BID    calcium acetate  1 capsule Oral TID WC    metoprolol succinate  25 mg Oral QHS    naloxegol  12.5 mg Oral QAM AC    pantoprazole  40 mg Oral QAM AC    tamsulosin  0.4 mg Oral Daily    sodium chloride flush  5-40 mL IntraVENous 2 times per day    insulin lispro  0.03 Units/kg SubCUTAneous TID WC    insulin lispro  0-4 Units SubCUTAneous TID WC    insulin lispro  0-4 Units SubCUTAneous Nightly     Continuous:   dextrose      sodium chloride       PRN:morphine, oxyCODONE-acetaminophen, tiZANidine, dextrose bolus **OR** dextrose bolus, glucagon (rDNA), dextrose, sodium chloride flush, sodium chloride, ondansetron **OR** ondansetron, 
input(s): \"LABURIN\" in the last 72 hours.  Urine Chemistry: No results for input(s): \"CLUR\", \"LABCREA\", \"PROTEINUR\", \"NAUR\" in the last 72 hours.      IMAGING:  CT HEAD WO CONTRAST   Final Result   No acute intracranial abnormality.         XR KNEE RIGHT (3 VIEWS)   Final Result   Bony fragments noted adjacent to the medial femoral condyle, suggesting   avulsion of the medial collateral ligament.         XR CHEST PORTABLE   Final Result   No abnormalities noted.               Medical Decision Making:  The following items were considered in medical decision making:  Discussion of patient care with other providers  Reviewed clinical lab tests  Reviewed radiology tests  Reviewed other diagnostic tests/interventions    Will be discussed w/  Primary team     Thank you for allowing us to participate in care of Jorden TOYIN Woods   Feel free to contact me,     Stanislav Guerra MD   [facility]  Office : [phone] or through [employer]  Fax :[phone]

## 2024-03-26 VITALS
HEART RATE: 86 BPM | TEMPERATURE: 97.9 F | OXYGEN SATURATION: 95 % | DIASTOLIC BLOOD PRESSURE: 78 MMHG | RESPIRATION RATE: 14 BRPM | BODY MASS INDEX: 32.07 KG/M2 | HEIGHT: 77 IN | SYSTOLIC BLOOD PRESSURE: 149 MMHG | WEIGHT: 271.61 LBS

## 2024-03-26 LAB
GLUCOSE BLD-MCNC: 120 MG/DL (ref 70–99)
GLUCOSE BLD-MCNC: 136 MG/DL (ref 70–99)
PERFORMED ON: ABNORMAL
PERFORMED ON: ABNORMAL

## 2024-03-26 PROCEDURE — 96372 THER/PROPH/DIAG INJ SC/IM: CPT

## 2024-03-26 PROCEDURE — 2580000003 HC RX 258: Performed by: HOSPITALIST

## 2024-03-26 PROCEDURE — 96376 TX/PRO/DX INJ SAME DRUG ADON: CPT

## 2024-03-26 PROCEDURE — 6360000002 HC RX W HCPCS: Performed by: HOSPITALIST

## 2024-03-26 PROCEDURE — G0378 HOSPITAL OBSERVATION PER HR: HCPCS

## 2024-03-26 PROCEDURE — 6360000002 HC RX W HCPCS: Performed by: NURSE PRACTITIONER

## 2024-03-26 PROCEDURE — 90935 HEMODIALYSIS ONE EVALUATION: CPT

## 2024-03-26 PROCEDURE — 2500000003 HC RX 250 WO HCPCS: Performed by: HOSPITALIST

## 2024-03-26 PROCEDURE — 6370000000 HC RX 637 (ALT 250 FOR IP): Performed by: NURSE PRACTITIONER

## 2024-03-26 PROCEDURE — 6370000000 HC RX 637 (ALT 250 FOR IP): Performed by: HOSPITALIST

## 2024-03-26 RX ADMIN — OXYCODONE HYDROCHLORIDE AND ACETAMINOPHEN 1 TABLET: 5; 325 TABLET ORAL at 01:08

## 2024-03-26 RX ADMIN — NIFEDIPINE 60 MG: 30 TABLET, EXTENDED RELEASE ORAL at 08:40

## 2024-03-26 RX ADMIN — BUSPIRONE HYDROCHLORIDE 5 MG: 5 TABLET ORAL at 08:40

## 2024-03-26 RX ADMIN — PANTOPRAZOLE SODIUM 40 MG: 40 TABLET, DELAYED RELEASE ORAL at 06:09

## 2024-03-26 RX ADMIN — OXYCODONE HYDROCHLORIDE AND ACETAMINOPHEN 1 TABLET: 5; 325 TABLET ORAL at 06:09

## 2024-03-26 RX ADMIN — MORPHINE SULFATE 2 MG: 2 INJECTION, SOLUTION INTRAMUSCULAR; INTRAVENOUS at 03:59

## 2024-03-26 RX ADMIN — NALOXEGOL OXALATE 12.5 MG: 25 TABLET, FILM COATED ORAL at 06:10

## 2024-03-26 RX ADMIN — TAMSULOSIN HYDROCHLORIDE 0.4 MG: 0.4 CAPSULE ORAL at 08:41

## 2024-03-26 RX ADMIN — MORPHINE SULFATE 2 MG: 2 INJECTION, SOLUTION INTRAMUSCULAR; INTRAVENOUS at 08:41

## 2024-03-26 RX ADMIN — CALCIUM ACETATE 667 MG: 667 CAPSULE ORAL at 08:40

## 2024-03-26 RX ADMIN — Medication 10 ML: at 08:40

## 2024-03-26 RX ADMIN — HEPARIN SODIUM 5000 UNITS: 5000 INJECTION INTRAVENOUS; SUBCUTANEOUS at 06:10

## 2024-03-26 ASSESSMENT — PAIN DESCRIPTION - ORIENTATION
ORIENTATION: RIGHT

## 2024-03-26 ASSESSMENT — PAIN DESCRIPTION - LOCATION
LOCATION: KNEE

## 2024-03-26 ASSESSMENT — PAIN SCALES - GENERAL
PAINLEVEL_OUTOF10: 8
PAINLEVEL_OUTOF10: 6
PAINLEVEL_OUTOF10: 6

## 2024-03-26 NOTE — PLAN OF CARE
Problem: Pain  Goal: Verbalizes/displays adequate comfort level or baseline comfort level  3/24/2024 0408 by Jessica Salamanca, RN  Outcome: Progressing  3/24/2024 0320 by Regina Ocampo, RN  Outcome: Not Progressing     
  Problem: Pain  Goal: Verbalizes/displays adequate comfort level or baseline comfort level  Outcome: Not Progressing     Problem: Safety - Adult  Goal: Free from fall injury  Outcome: Progressing     Problem: ABCDS Injury Assessment  Goal: Absence of physical injury  Outcome: Progressing     Problem: Pain  Goal: Verbalizes/displays adequate comfort level or baseline comfort level  Outcome: Not Progressing     
  Problem: Pain  Goal: Verbalizes/displays adequate comfort level or baseline comfort level  Outcome: Not Progressing     Problem: Safety - Adult  Goal: Free from fall injury  Outcome: Progressing     Problem: Pain  Goal: Verbalizes/displays adequate comfort level or baseline comfort level  Outcome: Not Progressing     
  Problem: Safety - Adult  Goal: Free from fall injury  3/24/2024 1306 by Viry Cummins, RN  Outcome: Progressing     Problem: Pain  Goal: Verbalizes/displays adequate comfort level or baseline comfort level  3/24/2024 1306 by Viry Cummins, RN  Outcome: Progressing    
  Problem: Safety - Adult  Goal: Free from fall injury  3/25/2024 0852 by Michelle Antonio RN  Outcome: Progressing  3/25/2024 0549 by Regina Ocampo RN  Outcome: Progressing     Problem: Pain  Goal: Verbalizes/displays adequate comfort level or baseline comfort level  3/25/2024 0852 by Michelle Antonio RN  Outcome: Progressing  3/25/2024 0549 by Regina Ocampo RN  Outcome: Not Progressing     Problem: ABCDS Injury Assessment  Goal: Absence of physical injury  3/25/2024 0852 by Michelle Antonio RN  Outcome: Progressing  3/25/2024 0549 by Regina Ocampo RN  Outcome: Progressing     Problem: Neurosensory - Adult  Goal: Achieves stable or improved neurological status  Outcome: Progressing  Goal: Remains free of injury related to seizures activity  Outcome: Progressing  Goal: Achieves maximal functionality and self care  Outcome: Progressing     Problem: Respiratory - Adult  Goal: Achieves optimal ventilation and oxygenation  Outcome: Progressing     Problem: Cardiovascular - Adult  Goal: Maintains optimal cardiac output and hemodynamic stability  Outcome: Progressing  Goal: Absence of cardiac dysrhythmias or at baseline  Outcome: Progressing     Problem: Skin/Tissue Integrity - Adult  Goal: Skin integrity remains intact  Outcome: Progressing  Goal: Incisions, wounds, or drain sites healing without S/S of infection  Outcome: Progressing  Goal: Oral mucous membranes remain intact  Outcome: Progressing     Problem: Musculoskeletal - Adult  Goal: Return mobility to safest level of function  Outcome: Progressing  Goal: Maintain proper alignment of affected body part  Outcome: Progressing  Goal: Return ADL status to a safe level of function  Outcome: Progressing     Problem: Gastrointestinal - Adult  Goal: Minimal or absence of nausea and vomiting  Outcome: Progressing  Goal: Maintains or returns to baseline bowel function  Outcome: Progressing  Goal: Maintains adequate nutritional intake  Outcome: Progressing     Problem: 
  Problem: Safety - Adult  Goal: Free from fall injury  3/25/2024 2243 by Norma Gee RN  Outcome: Progressing     Problem: Pain  Goal: Verbalizes/displays adequate comfort level or baseline comfort level  3/25/2024 2243 by Norma Gee, RN  Outcome: Progressing       Problem: ABCDS Injury Assessment  Goal: Absence of physical injury  3/25/2024 2243 by Norma Gee RN  Outcome: Progressing     Problem: Neurosensory - Adult  Goal: Achieves stable or improved neurological status  3/25/2024 2243 by Norma Gee RN  Outcome: Progressing    Goal: Remains free of injury related to seizures activity  3/25/2024 2243 by Norma Gee RN  Outcome: Progressing    Goal: Achieves maximal functionality and self care  3/25/2024 2243 by Norma Gee RN  Outcome: Progressing       Problem: Respiratory - Adult  Goal: Achieves optimal ventilation and oxygenation  3/25/2024 2243 by Norma Gee RN  Outcome: Progressing       Problem: Cardiovascular - Adult  Goal: Maintains optimal cardiac output and hemodynamic stability  3/25/2024 2243 by Norma Gee RN  Outcome: Progressing    Goal: Absence of cardiac dysrhythmias or at baseline  3/25/2024 2243 by Norma Gee RN  Outcome: Progressing       Problem: Skin/Tissue Integrity - Adult  Goal: Skin integrity remains intact  3/25/2024 2243 by Norma Gee RN  Outcome: Progressing    Goal: Incisions, wounds, or drain sites healing without S/S of infection  3/25/2024 2243 by Norma Gee, RN  Outcome: Progressing    Goal: Oral mucous membranes remain intact  3/25/2024 2243 by Norma Gee RN  Outcome: Progressing       Problem: Musculoskeletal - Adult  Goal: Return mobility to safest level of function  3/25/2024 2243 by Norma Gee RN  Outcome: Progressing    Goal: Maintain proper alignment of affected body part  3/25/2024 2243 by Norma Gee RN  Outcome: Progressing    Goal: Return ADL status to a safe level 
  Problem: Safety - Adult  Goal: Free from fall injury  3/26/2024 0839 by Michelle Antonio RN  Outcome: Progressing  3/25/2024 2243 by Norma Gee RN  Outcome: Progressing     Problem: Pain  Goal: Verbalizes/displays adequate comfort level or baseline comfort level  3/26/2024 0839 by Michelle Antonio RN  Outcome: Progressing  3/25/2024 2243 by Norma Gee RN  Outcome: Progressing     Problem: ABCDS Injury Assessment  Goal: Absence of physical injury  3/26/2024 0839 by Michelle Antonio RN  Outcome: Progressing  3/25/2024 2243 by Norma Gee RN  Outcome: Progressing     Problem: Neurosensory - Adult  Goal: Achieves stable or improved neurological status  3/26/2024 0839 by Michelle Antonio RN  Outcome: Progressing  3/25/2024 2243 by Norma Gee RN  Outcome: Progressing  Goal: Remains free of injury related to seizures activity  3/26/2024 0839 by Michelle Antonio RN  Outcome: Progressing  3/25/2024 2243 by Norma Gee RN  Outcome: Progressing  Goal: Achieves maximal functionality and self care  3/26/2024 0839 by Michelle Antonio RN  Outcome: Progressing  3/25/2024 2243 by Norma Gee RN  Outcome: Progressing     Problem: Respiratory - Adult  Goal: Achieves optimal ventilation and oxygenation  3/26/2024 0839 by Michelle Antonio RN  Outcome: Progressing  3/25/2024 2243 by Norma Gee RN  Outcome: Progressing     Problem: Cardiovascular - Adult  Goal: Maintains optimal cardiac output and hemodynamic stability  3/26/2024 0839 by Michelle Antnoio RN  Outcome: Progressing  3/25/2024 2243 by Norma Gee RN  Outcome: Progressing  Goal: Absence of cardiac dysrhythmias or at baseline  3/26/2024 0839 by Michelle Antonio, RN  Outcome: Progressing  3/25/2024 2243 by Norma Gee RN  Outcome: Progressing     Problem: Skin/Tissue Integrity - Adult  Goal: Skin integrity remains intact  3/26/2024 0839 by Michelle Antonio, RN  Outcome: Progressing  3/25/2024 2243 by Norma Gee, 
RN  Outcome: Progressing     Problem: Metabolic/Fluid and Electrolytes - Adult  Goal: Electrolytes maintained within normal limits  3/26/2024 1733 by Michelle Antonio RN  Outcome: Completed  3/26/2024 0839 by Michelle Antonio RN  Outcome: Progressing  Goal: Hemodynamic stability and optimal renal function maintained  3/26/2024 1733 by Michelle Antonio RN  Outcome: Completed  3/26/2024 0839 by Michelle Antonio RN  Outcome: Progressing  Goal: Glucose maintained within prescribed range  3/26/2024 1733 by Michelle Antonio RN  Outcome: Completed  3/26/2024 0839 by Michelle Antonio RN  Outcome: Progressing     Problem: Hematologic - Adult  Goal: Maintains hematologic stability  3/26/2024 1733 by Michelle Antonio RN  Outcome: Completed  3/26/2024 0839 by Michelle Antonio RN  Outcome: Progressing     Problem: Chronic Conditions and Co-morbidities  Goal: Patient's chronic conditions and co-morbidity symptoms are monitored and maintained or improved  3/26/2024 1733 by Michelle Antonio RN  Outcome: Completed  3/26/2024 0839 by Michelle Antonio RN  Outcome: Progressing

## 2024-03-27 ENCOUNTER — CARE COORDINATION (OUTPATIENT)
Dept: CASE MANAGEMENT | Age: 64
End: 2024-03-27

## 2024-03-27 NOTE — CARE COORDINATION
Care Transitions Initial Follow Up Call    Call within 2 business days of discharge: Yes    First attempt at 24 hour discharge call, no answer at home & mobile numbers, CTN left VMs  with contact information and request for return call.  CTN will continue with outreach call attempts.    Follow Up  Future Appointments   Date Time Provider Department Center   3/29/2024  2:00 PM Moshe Nettles PA-C [facility]   4/10/2024  2:00 PM Jacqui Brasher, MIGUEL - CNP [facility]       ROBERT VILLARREAL RN

## 2024-03-28 ENCOUNTER — CARE COORDINATION (OUTPATIENT)
Dept: CASE MANAGEMENT | Age: 64
End: 2024-03-28

## 2024-03-28 NOTE — CARE COORDINATION
Care Transitions Initial Follow Up Call    Call within 2 business days of discharge: Yes    Second & final attempt at 24 hour discharge call, no answer, CTN left  with contact information and request for return call.  CTN will send message to patient in My Chart and route message to PCP requesting outreach to schedule a follow up appointment.  CTN will close program & remain available.      Follow Up  Future Appointments   Date Time Provider Department Center   3/29/2024  2:00 PM Moshe Nettles PA-C [facility]   4/10/2024  2:00 PM Jacqui Brasher, MIGUEL - CNP [facility]     ROBERT VILLARREAL, MALDONADO

## 2024-03-29 ENCOUNTER — OFFICE VISIT (OUTPATIENT)
Dept: ORTHOPEDIC SURGERY | Age: 64
End: 2024-03-29
Payer: MEDICARE

## 2024-03-29 VITALS — HEIGHT: 77 IN | BODY MASS INDEX: 32 KG/M2 | WEIGHT: 271 LBS

## 2024-03-29 DIAGNOSIS — E11.69 TYPE 2 DIABETES MELLITUS WITH OTHER SPECIFIED COMPLICATION, WITHOUT LONG-TERM CURRENT USE OF INSULIN (HCC): ICD-10-CM

## 2024-03-29 DIAGNOSIS — F11.20 OPIOID DEPENDENCE WITH CURRENT USE (HCC): ICD-10-CM

## 2024-03-29 DIAGNOSIS — M17.12 LOCALIZED OSTEOARTHRITIS OF LEFT KNEE: ICD-10-CM

## 2024-03-29 DIAGNOSIS — R52 PAIN: Primary | ICD-10-CM

## 2024-03-29 DIAGNOSIS — M17.11 PRIMARY OSTEOARTHRITIS OF RIGHT KNEE: ICD-10-CM

## 2024-03-29 PROBLEM — E11.9 TYPE 2 DIABETES MELLITUS (HCC): Status: ACTIVE | Noted: 2024-03-29

## 2024-03-29 PROCEDURE — G8417 CALC BMI ABV UP PARAM F/U: HCPCS | Performed by: PHYSICIAN ASSISTANT

## 2024-03-29 PROCEDURE — 2022F DILAT RTA XM EVC RTNOPTHY: CPT | Performed by: PHYSICIAN ASSISTANT

## 2024-03-29 PROCEDURE — 1123F ACP DISCUSS/DSCN MKR DOCD: CPT | Performed by: PHYSICIAN ASSISTANT

## 2024-03-29 PROCEDURE — 3017F COLORECTAL CA SCREEN DOC REV: CPT | Performed by: PHYSICIAN ASSISTANT

## 2024-03-29 PROCEDURE — 20611 DRAIN/INJ JOINT/BURSA W/US: CPT | Performed by: PHYSICIAN ASSISTANT

## 2024-03-29 PROCEDURE — 3051F HG A1C>EQUAL 7.0%<8.0%: CPT | Performed by: PHYSICIAN ASSISTANT

## 2024-03-29 PROCEDURE — 1036F TOBACCO NON-USER: CPT | Performed by: PHYSICIAN ASSISTANT

## 2024-03-29 PROCEDURE — G8428 CUR MEDS NOT DOCUMENT: HCPCS | Performed by: PHYSICIAN ASSISTANT

## 2024-03-29 PROCEDURE — G8484 FLU IMMUNIZE NO ADMIN: HCPCS | Performed by: PHYSICIAN ASSISTANT

## 2024-03-29 PROCEDURE — 1111F DSCHRG MED/CURRENT MED MERGE: CPT | Performed by: PHYSICIAN ASSISTANT

## 2024-03-29 PROCEDURE — 99203 OFFICE O/P NEW LOW 30 MIN: CPT | Performed by: PHYSICIAN ASSISTANT

## 2024-03-29 RX ORDER — BUPIVACAINE HYDROCHLORIDE 2.5 MG/ML
30 INJECTION, SOLUTION INFILTRATION; PERINEURAL ONCE
Status: COMPLETED | OUTPATIENT
Start: 2024-03-29 | End: 2024-03-29

## 2024-03-29 RX ORDER — LIDOCAINE HYDROCHLORIDE 10 MG/ML
5 INJECTION, SOLUTION INFILTRATION; PERINEURAL ONCE
Status: COMPLETED | OUTPATIENT
Start: 2024-03-29 | End: 2024-03-29

## 2024-03-29 RX ORDER — TRIAMCINOLONE ACETONIDE 40 MG/ML
40 INJECTION, SUSPENSION INTRA-ARTICULAR; INTRAMUSCULAR ONCE
Status: COMPLETED | OUTPATIENT
Start: 2024-03-29 | End: 2024-03-29

## 2024-03-29 RX ADMIN — LIDOCAINE HYDROCHLORIDE 5 ML: 10 INJECTION, SOLUTION INFILTRATION; PERINEURAL at 13:45

## 2024-03-29 RX ADMIN — BUPIVACAINE HYDROCHLORIDE 75 MG: 2.5 INJECTION, SOLUTION INFILTRATION; PERINEURAL at 13:44

## 2024-03-29 RX ADMIN — TRIAMCINOLONE ACETONIDE 40 MG: 40 INJECTION, SUSPENSION INTRA-ARTICULAR; INTRAMUSCULAR at 13:45

## 2024-03-29 NOTE — PROGRESS NOTES
Dr Camden Adamson      Date /Time 3/29/2024       1:28 PM EDT  Name Jorden Woods             1960   [facility]  [facility]  MRN [id number]                Chief Complaint   Patient presents with    Knee Pain     N Bilateral Knees        History of Present Illness  Jorden Woods is a 63 y.o. male who presents with  bilateral knee pain, .    Sent in consultation by Curt Collins MD, .      Injury Mechanism:  fall.  Worker's Comp. & legal issues:   none.  Previous Treatments: Ice, Heat, and NSAIDs    Patient originally developed pain in bilateral knees without injury or trauma.  Then approximately a week ago he did trip and fall on his right knee.  Both knees are painful mostly around the patellofemoral joint.  He does have increased pain especially with stairs.  He denies any locking or instability.    Patient is a chronic pain patient and does take Percocet on a regular basis.    Past History  Past Medical History:   Diagnosis Date    Anemia in ESRD (end-stage renal disease) ([facility]) 02/24/2022    Arthralgia of multiple joints 10/27/2015    Atrial fibrillation ([facility])     Chronic bilateral low back pain without sciatica 12/12/2018    Chronic diastolic heart failure ([facility]) 08/16/2021    Chronic pain syndrome 08/31/2022    Chronic prescription opiate use     Oxycodone 20 mg/day    Chronic systolic congestive heart failure ([facility]) 08/16/2021    Class 1 obesity     Diverticulosis large intestine w/o perforation or abscess w/o bleeding     Duodenal ulcer disease     ESRD on hemodialysis ([facility]) 02/24/2022 Tuesday, Thursday, Saturday    Essential hypertension     Gout     Hearing impairment     History of prostate cancer     Inguinal hernia, left 06/14/2022    Major depressive disorder, recurrent, mild 10/26/2022    Opioid dependence with current use ([facility]) 12/12/2023    CELSA (obstructive sleep apnea) with CPAP non compliance 07/14/2017    Postherpetic polyneuropathy 03/23/2024    Sickle cell trait ([facility])

## 2024-04-03 ENCOUNTER — TELEPHONE (OUTPATIENT)
Dept: ORTHOPEDIC SURGERY | Age: 64
End: 2024-04-03

## 2024-04-03 ENCOUNTER — TELEPHONE (OUTPATIENT)
Dept: CARDIOLOGY CLINIC | Age: 64
End: 2024-04-03

## 2024-04-03 ENCOUNTER — OFFICE VISIT (OUTPATIENT)
Dept: ORTHOPEDIC SURGERY | Age: 64
End: 2024-04-03
Payer: MEDICARE

## 2024-04-03 VITALS — WEIGHT: 271 LBS | BODY MASS INDEX: 32 KG/M2 | HEIGHT: 77 IN

## 2024-04-03 DIAGNOSIS — M17.12 LOCALIZED OSTEOARTHRITIS OF LEFT KNEE: Primary | ICD-10-CM

## 2024-04-03 PROCEDURE — 20611 DRAIN/INJ JOINT/BURSA W/US: CPT | Performed by: PHYSICIAN ASSISTANT

## 2024-04-03 RX ORDER — TRIAMCINOLONE ACETONIDE 40 MG/ML
40 INJECTION, SUSPENSION INTRA-ARTICULAR; INTRAMUSCULAR ONCE
Status: COMPLETED | OUTPATIENT
Start: 2024-04-03 | End: 2024-04-03

## 2024-04-03 RX ORDER — LIDOCAINE HYDROCHLORIDE 10 MG/ML
5 INJECTION, SOLUTION INFILTRATION; PERINEURAL ONCE
Status: COMPLETED | OUTPATIENT
Start: 2024-04-03 | End: 2024-04-03

## 2024-04-03 RX ORDER — BUPIVACAINE HYDROCHLORIDE 2.5 MG/ML
30 INJECTION, SOLUTION INFILTRATION; PERINEURAL ONCE
Status: COMPLETED | OUTPATIENT
Start: 2024-04-03 | End: 2024-04-03

## 2024-04-03 RX ADMIN — LIDOCAINE HYDROCHLORIDE 5 ML: 10 INJECTION, SOLUTION INFILTRATION; PERINEURAL at 10:47

## 2024-04-03 RX ADMIN — BUPIVACAINE HYDROCHLORIDE 75 MG: 2.5 INJECTION, SOLUTION INFILTRATION; PERINEURAL at 10:47

## 2024-04-03 RX ADMIN — TRIAMCINOLONE ACETONIDE 40 MG: 40 INJECTION, SUSPENSION INTRA-ARTICULAR; INTRAMUSCULAR at 10:48

## 2024-04-03 NOTE — PROGRESS NOTES
Diagnosis: Left knee osteoarthritis    Procedure: Left knee cortisone injection    I discussed in detail the risks, benefits and complications of aninjection which included but are not limited to infection, skin reactions, hot swollen joint, and anaphylaxis with the patient. The patient verbalized understanding and gave informed consent for the left knee injection. The patient is placed supine on table with a bolster underneath knee.  Knee prepped with Betadine/Sterile alcohol solution. A sterile 22-gauge needle was inserted into the knee and the mixture of 2ml knealog 40mg/cc, 4 mL of 1% plain lidocaine, and 4 cc .25% bupivacaine was injected under sterile technique. The needle was withdrawn and the puncture site sealed with a Band-Aid.     Technique: Under sterile conditions a [employer] ultrasound unit with a variable frequency (6.0-15.0 MHz) linear transducer was used to localize the placement of a 22-gauge needle into the knee joint.    Findings: Successful needle placement for knee injection.  Final images were taken and saved for permanent record.       The patient tolerated the injection well. The patient was instructed to call the office immediately if there is any pain, redness, warmth, fever, or chills.

## 2024-04-03 NOTE — TELEPHONE ENCOUNTER
General Question     Subject: SOONER APPT  Patient and /or Facility Request: Jorden Woods   Contact Number: [phone]     PT CLLED TO REQ A SOONER APPT FOR TODAY AFTER 10:30AM WITH BRODIE FREEMAN THE 2PM HE HAS PLEASE CLL IF SOMETHING BECOMES AVAILABLE

## 2024-04-10 ENCOUNTER — OFFICE VISIT (OUTPATIENT)
Dept: PAIN MANAGEMENT | Age: 64
End: 2024-04-10
Payer: MEDICARE

## 2024-04-10 VITALS
BODY MASS INDEX: 32.37 KG/M2 | WEIGHT: 273 LBS | DIASTOLIC BLOOD PRESSURE: 76 MMHG | HEART RATE: 106 BPM | OXYGEN SATURATION: 95 % | SYSTOLIC BLOOD PRESSURE: 158 MMHG

## 2024-04-10 DIAGNOSIS — G89.4 CHRONIC PAIN SYNDROME: ICD-10-CM

## 2024-04-10 DIAGNOSIS — Z87.19 STATUS POST BILATERAL HERNIA REPAIR: ICD-10-CM

## 2024-04-10 DIAGNOSIS — G57.92 ILIOINGUINAL NEURALGIA OF LEFT SIDE: ICD-10-CM

## 2024-04-10 DIAGNOSIS — F33.0 MAJOR DEPRESSIVE DISORDER, RECURRENT, MILD (HCC): ICD-10-CM

## 2024-04-10 DIAGNOSIS — M50.30 DDD (DEGENERATIVE DISC DISEASE), CERVICAL: ICD-10-CM

## 2024-04-10 DIAGNOSIS — G58.8 INTERCOSTAL NEURALGIA: ICD-10-CM

## 2024-04-10 DIAGNOSIS — M25.50 ARTHRALGIA OF MULTIPLE JOINTS: ICD-10-CM

## 2024-04-10 DIAGNOSIS — K43.2 INCISIONAL HERNIA WITHOUT OBSTRUCTION OR GANGRENE: ICD-10-CM

## 2024-04-10 DIAGNOSIS — F11.20 OPIOID DEPENDENCE WITH CURRENT USE (HCC): ICD-10-CM

## 2024-04-10 DIAGNOSIS — Z51.81 ENCOUNTER FOR THERAPEUTIC DRUG MONITORING: ICD-10-CM

## 2024-04-10 DIAGNOSIS — Z98.890 STATUS POST BILATERAL HERNIA REPAIR: ICD-10-CM

## 2024-04-10 DIAGNOSIS — G57.91 ILIOINGUINAL NEURALGIA OF RIGHT SIDE: ICD-10-CM

## 2024-04-10 PROCEDURE — 1123F ACP DISCUSS/DSCN MKR DOCD: CPT | Performed by: NURSE PRACTITIONER

## 2024-04-10 PROCEDURE — 1036F TOBACCO NON-USER: CPT | Performed by: NURSE PRACTITIONER

## 2024-04-10 PROCEDURE — 99213 OFFICE O/P EST LOW 20 MIN: CPT | Performed by: NURSE PRACTITIONER

## 2024-04-10 PROCEDURE — 3017F COLORECTAL CA SCREEN DOC REV: CPT | Performed by: NURSE PRACTITIONER

## 2024-04-10 PROCEDURE — G8417 CALC BMI ABV UP PARAM F/U: HCPCS | Performed by: NURSE PRACTITIONER

## 2024-04-10 PROCEDURE — G8427 DOCREV CUR MEDS BY ELIG CLIN: HCPCS | Performed by: NURSE PRACTITIONER

## 2024-04-10 RX ORDER — TIZANIDINE 4 MG/1
4 TABLET ORAL EVERY 8 HOURS PRN
Qty: 90 TABLET | Refills: 0 | Status: SHIPPED | OUTPATIENT
Start: 2024-04-10 | End: 2024-05-10

## 2024-04-10 RX ORDER — PENTOXIFYLLINE 400 MG/1
400 TABLET, EXTENDED RELEASE ORAL
COMMUNITY
Start: 2024-04-10

## 2024-04-10 RX ORDER — OXYCODONE HYDROCHLORIDE AND ACETAMINOPHEN 5; 325 MG/1; MG/1
1 TABLET ORAL EVERY 6 HOURS PRN
Qty: 112 TABLET | Refills: 0 | Status: SHIPPED | OUTPATIENT
Start: 2024-04-10 | End: 2024-05-08

## 2024-04-10 NOTE — PROGRESS NOTES
stool and abdominal distention.       PHYSICAL EXAM:   Nursing note and vitals reviewed. BP (!) 166/95   Pulse (!) 106   Wt 123.8 kg (273 lb)   SpO2 95%   BMI 32.37 kg/m²   Constitutional: He appears well-developed and well-nourished. No acute distress.   Skin: Skin is warm and dry, good turgor. No rash noted. He is not diaphoretic.  Cardiovascular: tachycardic, regular rhythm, normal heart sounds, and does not have murmur.     Pulmonary/Chest: Effort normal. No respiratory distress. He does not have wheezes in the lung fields. He has no rales.     Neurological/Psychiatric:He is alert and oriented to person, place, and time. Coordination is  normal.  His mood isAppropriate and affect is Neutral/Euthymic(normal) .     Prescription pain medication monitoring:                 MEDD current = 30              ORT Score = 0              Other Risk factors - (mood) stable              Date of Last Medication Agreement: 01/17/2024              Date Naloxone prescribed: 02/16/2024-pt has at home              UDT:                          Date of last UDT: 02/14/2024                          Adverse report: no              OARRS:                          Checked today: Yes                          Adverse report: No    IMPRESSION:   1. Incisional hernia without obstruction or gangrene    2. Arthralgia of multiple joints    3. Ilioinguinal neuralgia of right side    4. DDD (degenerative disc disease), cervical    5. Chronic pain syndrome    6. Encounter for therapeutic drug monitoring    7. Ilioinguinal neuralgia of left side    8. Opioid dependence with current use (HCC)    9. Status post bilateral hernia repair    10. Intercostal neuralgia    11. Major depressive disorder, recurrent, mild        PLAN:  Informed verbal consent was obtained:  -he did have right sided intercostal NB and pain improved on right side up to 80% and still helping  -new medication, Trental (phosphodiesterase inhibitor)  -cont and refill percocet

## 2024-04-11 RX ORDER — TIZANIDINE 4 MG/1
TABLET ORAL
Qty: 90 TABLET | Refills: 0 | OUTPATIENT
Start: 2024-04-11

## 2024-04-11 RX ORDER — TIZANIDINE 2 MG/1
2 TABLET ORAL 2 TIMES DAILY PRN
Qty: 60 TABLET | Refills: 0 | OUTPATIENT
Start: 2024-04-11 | End: 2024-05-11

## 2024-04-11 RX ORDER — NIFEDIPINE 90 MG/1
90 TABLET, FILM COATED, EXTENDED RELEASE ORAL DAILY
COMMUNITY

## 2024-04-11 RX ORDER — METOPROLOL SUCCINATE 50 MG/1
50 TABLET, EXTENDED RELEASE ORAL DAILY
COMMUNITY
End: 2024-04-12

## 2024-04-11 RX ORDER — TORSEMIDE 100 MG/1
100 TABLET ORAL DAILY
COMMUNITY

## 2024-04-30 ENCOUNTER — HOSPITAL ENCOUNTER (INPATIENT)
Age: 64
LOS: 7 days | Discharge: HOME | DRG: 391 | End: 2024-05-08
Attending: EMERGENCY MEDICINE | Admitting: INTERNAL MEDICINE
Payer: MEDICARE

## 2024-04-30 ENCOUNTER — APPOINTMENT (OUTPATIENT)
Dept: CT IMAGING | Age: 64
DRG: 391 | End: 2024-04-30
Payer: MEDICARE

## 2024-04-30 ENCOUNTER — APPOINTMENT (OUTPATIENT)
Dept: GENERAL RADIOLOGY | Age: 64
DRG: 391 | End: 2024-04-30
Payer: MEDICARE

## 2024-04-30 DIAGNOSIS — K57.32 DIVERTICULITIS OF COLON: Primary | ICD-10-CM

## 2024-04-30 DIAGNOSIS — N18.6 END STAGE CHRONIC KIDNEY DISEASE (HCC): ICD-10-CM

## 2024-04-30 PROBLEM — R55 SYNCOPE: Status: ACTIVE | Noted: 2024-03-22

## 2024-04-30 PROBLEM — N30.40 IRRADIATION CYSTITIS: Status: ACTIVE | Noted: 2024-04-10

## 2024-04-30 PROBLEM — K57.92 DIVERTICULITIS: Status: ACTIVE | Noted: 2024-04-30

## 2024-04-30 PROBLEM — R51.9 SEVERE FRONTAL HEADACHES: Chronic | Status: ACTIVE | Noted: 2024-04-30

## 2024-04-30 PROBLEM — R51.9 SEVERE FRONTAL HEADACHES: Status: ACTIVE | Noted: 2024-04-30

## 2024-04-30 LAB
ALBUMIN SERPL-MCNC: 4.5 G/DL (ref 3.4–5)
ALBUMIN/GLOB SERPL: 1.4 {RATIO} (ref 1.1–2.2)
ALP SERPL-CCNC: 70 U/L (ref 40–129)
ALT SERPL-CCNC: 24 U/L (ref 10–40)
ANION GAP SERPL CALCULATED.3IONS-SCNC: 19 MMOL/L (ref 3–16)
AST SERPL-CCNC: 18 U/L (ref 15–37)
BACTERIA URNS QL MICRO: NORMAL /HPF
BASOPHILS # BLD: 0 K/UL (ref 0–0.2)
BASOPHILS NFR BLD: 0.5 %
BILIRUB SERPL-MCNC: <0.2 MG/DL (ref 0–1)
BILIRUB UR QL STRIP.AUTO: NEGATIVE
BUN SERPL-MCNC: 59 MG/DL (ref 7–20)
CALCIUM SERPL-MCNC: 9.2 MG/DL (ref 8.3–10.6)
CHLORIDE SERPL-SCNC: 98 MMOL/L (ref 99–110)
CLARITY UR: CLEAR
CO2 SERPL-SCNC: 21 MMOL/L (ref 21–32)
COLOR UR: YELLOW
CREAT SERPL-MCNC: 7.3 MG/DL (ref 0.8–1.3)
DEPRECATED RDW RBC AUTO: 17.5 % (ref 12.4–15.4)
EKG ATRIAL RATE: 81 BPM
EKG DIAGNOSIS: NORMAL
EKG P AXIS: 51 DEGREES
EKG P-R INTERVAL: 144 MS
EKG Q-T INTERVAL: 382 MS
EKG QRS DURATION: 94 MS
EKG QTC CALCULATION (BAZETT): 443 MS
EKG R AXIS: -30 DEGREES
EKG T AXIS: 20 DEGREES
EKG VENTRICULAR RATE: 81 BPM
EOSINOPHIL # BLD: 0.1 K/UL (ref 0–0.6)
EOSINOPHIL NFR BLD: 1.2 %
EPI CELLS #/AREA URNS AUTO: 0 /HPF (ref 0–5)
FLUAV RNA RESP QL NAA+PROBE: NOT DETECTED
FLUBV RNA RESP QL NAA+PROBE: NOT DETECTED
GFR SERPLBLD CREATININE-BSD FMLA CKD-EPI: 8 ML/MIN/{1.73_M2}
GLUCOSE SERPL-MCNC: 133 MG/DL (ref 70–99)
GLUCOSE UR STRIP.AUTO-MCNC: NEGATIVE MG/DL
HCT VFR BLD AUTO: 34.9 % (ref 40.5–52.5)
HGB BLD-MCNC: 11.4 G/DL (ref 13.5–17.5)
HGB UR QL STRIP.AUTO: ABNORMAL
HYALINE CASTS #/AREA URNS AUTO: 0 /LPF (ref 0–8)
KETONES UR STRIP.AUTO-MCNC: NEGATIVE MG/DL
LACTATE BLDV-SCNC: 0.6 MMOL/L (ref 0.4–2)
LEUKOCYTE ESTERASE UR QL STRIP.AUTO: NEGATIVE
LIPASE SERPL-CCNC: 41 U/L (ref 13–60)
LYMPHOCYTES # BLD: 0.6 K/UL (ref 1–5.1)
LYMPHOCYTES NFR BLD: 13.7 %
MCH RBC QN AUTO: 28.9 PG (ref 26–34)
MCHC RBC AUTO-ENTMCNC: 32.6 G/DL (ref 31–36)
MCV RBC AUTO: 88.6 FL (ref 80–100)
MONOCYTES # BLD: 0.4 K/UL (ref 0–1.3)
MONOCYTES NFR BLD: 9.5 %
NEUTROPHILS # BLD: 3.5 K/UL (ref 1.7–7.7)
NEUTROPHILS NFR BLD: 75.1 %
NITRITE UR QL STRIP.AUTO: NEGATIVE
PH UR STRIP.AUTO: 8 [PH] (ref 5–8)
PLATELET # BLD AUTO: 137 K/UL (ref 135–450)
PMV BLD AUTO: 8.1 FL (ref 5–10.5)
POTASSIUM SERPL-SCNC: 4.9 MMOL/L (ref 3.5–5.1)
PROT SERPL-MCNC: 7.7 G/DL (ref 6.4–8.2)
PROT UR STRIP.AUTO-MCNC: 100 MG/DL
RBC # BLD AUTO: 3.94 M/UL (ref 4.2–5.9)
RBC CLUMPS #/AREA URNS AUTO: 0 /HPF (ref 0–4)
SARS-COV-2 RNA RESP QL NAA+PROBE: NOT DETECTED
SODIUM SERPL-SCNC: 138 MMOL/L (ref 136–145)
SP GR UR STRIP.AUTO: 1.01 (ref 1–1.03)
TROPONIN, HIGH SENSITIVITY: 47 NG/L (ref 0–22)
TROPONIN, HIGH SENSITIVITY: 51 NG/L (ref 0–22)
UA COMPLETE W REFLEX CULTURE PNL UR: ABNORMAL
UA DIPSTICK W REFLEX MICRO PNL UR: YES
URN SPEC COLLECT METH UR: ABNORMAL
UROBILINOGEN UR STRIP-ACNC: 0.2 E.U./DL
WBC # BLD AUTO: 4.6 K/UL (ref 4–11)
WBC #/AREA URNS AUTO: 0 /HPF (ref 0–5)

## 2024-04-30 PROCEDURE — 99285 EMERGENCY DEPT VISIT HI MDM: CPT

## 2024-04-30 PROCEDURE — 96375 TX/PRO/DX INJ NEW DRUG ADDON: CPT

## 2024-04-30 PROCEDURE — 6360000002 HC RX W HCPCS: Performed by: EMERGENCY MEDICINE

## 2024-04-30 PROCEDURE — 6360000002 HC RX W HCPCS: Performed by: HOSPITALIST

## 2024-04-30 PROCEDURE — 70450 CT HEAD/BRAIN W/O DYE: CPT

## 2024-04-30 PROCEDURE — 2580000003 HC RX 258: Performed by: HOSPITALIST

## 2024-04-30 PROCEDURE — 71046 X-RAY EXAM CHEST 2 VIEWS: CPT

## 2024-04-30 PROCEDURE — APPNB30 APP NON BILLABLE TIME 0-30 MINS: Performed by: NURSE PRACTITIONER

## 2024-04-30 PROCEDURE — 87636 SARSCOV2 & INF A&B AMP PRB: CPT

## 2024-04-30 PROCEDURE — 99223 1ST HOSP IP/OBS HIGH 75: CPT | Performed by: HOSPITALIST

## 2024-04-30 PROCEDURE — 99222 1ST HOSP IP/OBS MODERATE 55: CPT | Performed by: SURGERY

## 2024-04-30 PROCEDURE — G0378 HOSPITAL OBSERVATION PER HR: HCPCS

## 2024-04-30 PROCEDURE — 96365 THER/PROPH/DIAG IV INF INIT: CPT

## 2024-04-30 PROCEDURE — 93010 ELECTROCARDIOGRAM REPORT: CPT | Performed by: INTERNAL MEDICINE

## 2024-04-30 PROCEDURE — 2500000003 HC RX 250 WO HCPCS: Performed by: HOSPITALIST

## 2024-04-30 PROCEDURE — 83690 ASSAY OF LIPASE: CPT

## 2024-04-30 PROCEDURE — 93005 ELECTROCARDIOGRAM TRACING: CPT | Performed by: EMERGENCY MEDICINE

## 2024-04-30 PROCEDURE — 81001 URINALYSIS AUTO W/SCOPE: CPT

## 2024-04-30 PROCEDURE — 90935 HEMODIALYSIS ONE EVALUATION: CPT

## 2024-04-30 PROCEDURE — 6360000002 HC RX W HCPCS: Performed by: INTERNAL MEDICINE

## 2024-04-30 PROCEDURE — 85025 COMPLETE CBC W/AUTO DIFF WBC: CPT

## 2024-04-30 PROCEDURE — 2580000003 HC RX 258: Performed by: INTERNAL MEDICINE

## 2024-04-30 PROCEDURE — 84484 ASSAY OF TROPONIN QUANT: CPT

## 2024-04-30 PROCEDURE — 80053 COMPREHEN METABOLIC PANEL: CPT

## 2024-04-30 PROCEDURE — 83605 ASSAY OF LACTIC ACID: CPT

## 2024-04-30 PROCEDURE — 74176 CT ABD & PELVIS W/O CONTRAST: CPT

## 2024-04-30 PROCEDURE — 5A1D70Z PERFORMANCE OF URINARY FILTRATION, INTERMITTENT, LESS THAN 6 HOURS PER DAY: ICD-10-PCS | Performed by: INTERNAL MEDICINE

## 2024-04-30 PROCEDURE — 6370000000 HC RX 637 (ALT 250 FOR IP): Performed by: HOSPITALIST

## 2024-04-30 RX ORDER — ONDANSETRON 4 MG/1
4 TABLET, ORALLY DISINTEGRATING ORAL EVERY 8 HOURS PRN
Status: DISCONTINUED | OUTPATIENT
Start: 2024-04-30 | End: 2024-05-08 | Stop reason: HOSPADM

## 2024-04-30 RX ORDER — ONDANSETRON 2 MG/ML
4 INJECTION INTRAMUSCULAR; INTRAVENOUS EVERY 6 HOURS PRN
Status: DISCONTINUED | OUTPATIENT
Start: 2024-04-30 | End: 2024-05-08 | Stop reason: HOSPADM

## 2024-04-30 RX ORDER — KETOROLAC TROMETHAMINE 15 MG/ML
15 INJECTION, SOLUTION INTRAMUSCULAR; INTRAVENOUS ONCE
Status: COMPLETED | OUTPATIENT
Start: 2024-04-30 | End: 2024-04-30

## 2024-04-30 RX ORDER — HEPARIN SODIUM 5000 [USP'U]/ML
5000 INJECTION, SOLUTION INTRAVENOUS; SUBCUTANEOUS EVERY 8 HOURS SCHEDULED
Status: DISCONTINUED | OUTPATIENT
Start: 2024-04-30 | End: 2024-05-08 | Stop reason: HOSPADM

## 2024-04-30 RX ORDER — HYDROMORPHONE HYDROCHLORIDE 1 MG/ML
1 INJECTION, SOLUTION INTRAMUSCULAR; INTRAVENOUS; SUBCUTANEOUS EVERY 4 HOURS PRN
Status: DISCONTINUED | OUTPATIENT
Start: 2024-04-30 | End: 2024-05-06

## 2024-04-30 RX ORDER — SODIUM CHLORIDE 0.9 % (FLUSH) 0.9 %
5-40 SYRINGE (ML) INJECTION PRN
Status: DISCONTINUED | OUTPATIENT
Start: 2024-04-30 | End: 2024-05-08 | Stop reason: HOSPADM

## 2024-04-30 RX ORDER — HYDROMORPHONE HYDROCHLORIDE 1 MG/ML
0.5 INJECTION, SOLUTION INTRAMUSCULAR; INTRAVENOUS; SUBCUTANEOUS EVERY 4 HOURS PRN
Status: DISCONTINUED | OUTPATIENT
Start: 2024-04-30 | End: 2024-04-30

## 2024-04-30 RX ORDER — ACETAMINOPHEN 650 MG/1
650 SUPPOSITORY RECTAL EVERY 6 HOURS PRN
Status: DISCONTINUED | OUTPATIENT
Start: 2024-04-30 | End: 2024-05-08 | Stop reason: HOSPADM

## 2024-04-30 RX ORDER — LEVOFLOXACIN 5 MG/ML
750 INJECTION, SOLUTION INTRAVENOUS EVERY 24 HOURS
Status: DISCONTINUED | OUTPATIENT
Start: 2024-04-30 | End: 2024-04-30 | Stop reason: DRUGHIGH

## 2024-04-30 RX ORDER — MAGNESIUM HYDROXIDE/ALUMINUM HYDROXICE/SIMETHICONE 120; 1200; 1200 MG/30ML; MG/30ML; MG/30ML
30 SUSPENSION ORAL EVERY 6 HOURS PRN
Status: DISCONTINUED | OUTPATIENT
Start: 2024-04-30 | End: 2024-05-08 | Stop reason: HOSPADM

## 2024-04-30 RX ORDER — HYDROMORPHONE HYDROCHLORIDE 2 MG/1
0.5 TABLET ORAL EVERY 4 HOURS PRN
Status: DISCONTINUED | OUTPATIENT
Start: 2024-04-30 | End: 2024-04-30

## 2024-04-30 RX ORDER — NIFEDIPINE 30 MG/1
90 TABLET, EXTENDED RELEASE ORAL DAILY
Status: DISCONTINUED | OUTPATIENT
Start: 2024-04-30 | End: 2024-05-08 | Stop reason: HOSPADM

## 2024-04-30 RX ORDER — SODIUM CHLORIDE 9 MG/ML
INJECTION, SOLUTION INTRAVENOUS CONTINUOUS
Status: DISCONTINUED | OUTPATIENT
Start: 2024-04-30 | End: 2024-04-30 | Stop reason: ALTCHOICE

## 2024-04-30 RX ORDER — PRIMIDONE 50 MG/1
100 TABLET ORAL NIGHTLY
COMMUNITY
Start: 2024-04-03

## 2024-04-30 RX ORDER — BUSPIRONE HYDROCHLORIDE 5 MG/1
5 TABLET ORAL 2 TIMES DAILY
Status: DISCONTINUED | OUTPATIENT
Start: 2024-04-30 | End: 2024-05-08 | Stop reason: HOSPADM

## 2024-04-30 RX ORDER — ACETAMINOPHEN 325 MG/1
650 TABLET ORAL EVERY 6 HOURS PRN
Status: DISCONTINUED | OUTPATIENT
Start: 2024-04-30 | End: 2024-05-08 | Stop reason: HOSPADM

## 2024-04-30 RX ORDER — SODIUM CHLORIDE 0.9 % (FLUSH) 0.9 %
5-40 SYRINGE (ML) INJECTION EVERY 12 HOURS SCHEDULED
Status: DISCONTINUED | OUTPATIENT
Start: 2024-04-30 | End: 2024-05-08 | Stop reason: HOSPADM

## 2024-04-30 RX ORDER — MORPHINE SULFATE 4 MG/ML
4 INJECTION, SOLUTION INTRAMUSCULAR; INTRAVENOUS ONCE
Status: COMPLETED | OUTPATIENT
Start: 2024-04-30 | End: 2024-04-30

## 2024-04-30 RX ORDER — METRONIDAZOLE 500 MG/100ML
500 INJECTION, SOLUTION INTRAVENOUS EVERY 8 HOURS
Status: DISCONTINUED | OUTPATIENT
Start: 2024-04-30 | End: 2024-05-03

## 2024-04-30 RX ORDER — CALCIUM ACETATE 667 MG/1
1 CAPSULE ORAL
Status: DISCONTINUED | OUTPATIENT
Start: 2024-04-30 | End: 2024-05-08 | Stop reason: HOSPADM

## 2024-04-30 RX ORDER — SODIUM CHLORIDE 9 MG/ML
INJECTION, SOLUTION INTRAVENOUS CONTINUOUS
Status: DISCONTINUED | OUTPATIENT
Start: 2024-04-30 | End: 2024-04-30

## 2024-04-30 RX ORDER — PANTOPRAZOLE SODIUM 40 MG/1
40 TABLET, DELAYED RELEASE ORAL
Status: DISCONTINUED | OUTPATIENT
Start: 2024-04-30 | End: 2024-05-08 | Stop reason: HOSPADM

## 2024-04-30 RX ORDER — SODIUM CHLORIDE 9 MG/ML
INJECTION, SOLUTION INTRAVENOUS PRN
Status: DISCONTINUED | OUTPATIENT
Start: 2024-04-30 | End: 2024-05-08 | Stop reason: HOSPADM

## 2024-04-30 RX ORDER — METOPROLOL SUCCINATE 50 MG/1
50 TABLET, EXTENDED RELEASE ORAL NIGHTLY
Status: DISCONTINUED | OUTPATIENT
Start: 2024-04-30 | End: 2024-05-08 | Stop reason: HOSPADM

## 2024-04-30 RX ORDER — OXYCODONE HYDROCHLORIDE AND ACETAMINOPHEN 5; 325 MG/1; MG/1
2 TABLET ORAL EVERY 4 HOURS PRN
Status: DISCONTINUED | OUTPATIENT
Start: 2024-04-30 | End: 2024-05-08

## 2024-04-30 RX ORDER — LEVOFLOXACIN 5 MG/ML
500 INJECTION, SOLUTION INTRAVENOUS
Status: DISCONTINUED | OUTPATIENT
Start: 2024-05-02 | End: 2024-05-03

## 2024-04-30 RX ORDER — ATORVASTATIN CALCIUM 20 MG/1
20 TABLET, FILM COATED ORAL NIGHTLY
Status: DISCONTINUED | OUTPATIENT
Start: 2024-04-30 | End: 2024-05-08 | Stop reason: HOSPADM

## 2024-04-30 RX ORDER — LANOLIN ALCOHOL/MO/W.PET/CERES
3 CREAM (GRAM) TOPICAL NIGHTLY PRN
Status: DISCONTINUED | OUTPATIENT
Start: 2024-04-30 | End: 2024-05-08 | Stop reason: HOSPADM

## 2024-04-30 RX ADMIN — KETOROLAC TROMETHAMINE 15 MG: 15 INJECTION, SOLUTION INTRAMUSCULAR; INTRAVENOUS at 03:39

## 2024-04-30 RX ADMIN — HEPARIN SODIUM 5000 UNITS: 5000 INJECTION INTRAVENOUS; SUBCUTANEOUS at 21:34

## 2024-04-30 RX ADMIN — MORPHINE SULFATE 4 MG: 4 INJECTION, SOLUTION INTRAMUSCULAR; INTRAVENOUS at 05:17

## 2024-04-30 RX ADMIN — METRONIDAZOLE 500 MG: 500 INJECTION, SOLUTION INTRAVENOUS at 05:19

## 2024-04-30 RX ADMIN — HEPARIN SODIUM 5000 UNITS: 5000 INJECTION INTRAVENOUS; SUBCUTANEOUS at 14:46

## 2024-04-30 RX ADMIN — BUSPIRONE HYDROCHLORIDE 5 MG: 5 TABLET ORAL at 21:33

## 2024-04-30 RX ADMIN — METRONIDAZOLE 500 MG: 500 INJECTION, SOLUTION INTRAVENOUS at 11:01

## 2024-04-30 RX ADMIN — SODIUM CHLORIDE, PRESERVATIVE FREE 10 ML: 5 INJECTION INTRAVENOUS at 21:42

## 2024-04-30 RX ADMIN — NIFEDIPINE 90 MG: 30 TABLET, FILM COATED, EXTENDED RELEASE ORAL at 10:58

## 2024-04-30 RX ADMIN — ATORVASTATIN CALCIUM 20 MG: 20 TABLET, FILM COATED ORAL at 21:33

## 2024-04-30 RX ADMIN — LEVOFLOXACIN 750 MG: 5 INJECTION, SOLUTION INTRAVENOUS at 06:25

## 2024-04-30 RX ADMIN — METRONIDAZOLE 500 MG: 500 INJECTION, SOLUTION INTRAVENOUS at 21:42

## 2024-04-30 RX ADMIN — BUSPIRONE HYDROCHLORIDE 5 MG: 5 TABLET ORAL at 10:56

## 2024-04-30 RX ADMIN — SODIUM CHLORIDE: 9 INJECTION, SOLUTION INTRAVENOUS at 10:55

## 2024-04-30 RX ADMIN — SODIUM CHLORIDE: 9 INJECTION, SOLUTION INTRAVENOUS at 15:01

## 2024-04-30 RX ADMIN — PANTOPRAZOLE SODIUM 40 MG: 40 TABLET, DELAYED RELEASE ORAL at 14:46

## 2024-04-30 RX ADMIN — METOPROLOL SUCCINATE 50 MG: 50 TABLET, EXTENDED RELEASE ORAL at 21:32

## 2024-04-30 RX ADMIN — HYDROMORPHONE HYDROCHLORIDE 1 MG: 1 INJECTION, SOLUTION INTRAMUSCULAR; INTRAVENOUS; SUBCUTANEOUS at 10:55

## 2024-04-30 RX ADMIN — HYDROMORPHONE HYDROCHLORIDE 1 MG: 1 INJECTION, SOLUTION INTRAMUSCULAR; INTRAVENOUS; SUBCUTANEOUS at 21:31

## 2024-04-30 RX ADMIN — HYDROMORPHONE HYDROCHLORIDE 1 MG: 1 INJECTION, SOLUTION INTRAMUSCULAR; INTRAVENOUS; SUBCUTANEOUS at 14:46

## 2024-04-30 RX ADMIN — CALCIUM ACETATE 667 MG: 667 CAPSULE ORAL at 10:56

## 2024-04-30 RX ADMIN — SODIUM CHLORIDE, PRESERVATIVE FREE 10 ML: 5 INJECTION INTRAVENOUS at 10:56

## 2024-04-30 ASSESSMENT — PAIN DESCRIPTION - LOCATION
LOCATION: HEAD
LOCATION: ABDOMEN
LOCATION: HEAD
LOCATION: ABDOMEN
LOCATION: ABDOMEN
LOCATION: ABDOMEN;HEAD
LOCATION: ABDOMEN;HEAD
LOCATION: HEAD

## 2024-04-30 ASSESSMENT — PAIN DESCRIPTION - DESCRIPTORS
DESCRIPTORS: ACHING;SHARP
DESCRIPTORS: ACHING
DESCRIPTORS: ACHING
DESCRIPTORS: TIGHTNESS
DESCRIPTORS: TIGHTNESS
DESCRIPTORS: ACHING

## 2024-04-30 ASSESSMENT — PAIN SCALES - GENERAL
PAINLEVEL_OUTOF10: 7
PAINLEVEL_OUTOF10: 7
PAINLEVEL_OUTOF10: 8
PAINLEVEL_OUTOF10: 8
PAINLEVEL_OUTOF10: 5
PAINLEVEL_OUTOF10: 9
PAINLEVEL_OUTOF10: 0
PAINLEVEL_OUTOF10: 2
PAINLEVEL_OUTOF10: 8
PAINLEVEL_OUTOF10: 9

## 2024-04-30 ASSESSMENT — PAIN DESCRIPTION - ONSET
ONSET: ON-GOING

## 2024-04-30 ASSESSMENT — PAIN DESCRIPTION - PAIN TYPE
TYPE: ACUTE PAIN

## 2024-04-30 ASSESSMENT — PAIN - FUNCTIONAL ASSESSMENT
PAIN_FUNCTIONAL_ASSESSMENT: ACTIVITIES ARE NOT PREVENTED

## 2024-04-30 ASSESSMENT — PAIN DESCRIPTION - FREQUENCY
FREQUENCY: INTERMITTENT

## 2024-04-30 NOTE — H&P
Occupational History    Occupation: Senior Field Cost Admin.     Employer: [employer]   Tobacco Use    Smoking status: Former     Types: Cigars     Start date: 1979     Quit date: 2020     Years since quittin.3     Passive exposure: Never    Smokeless tobacco: Never    Tobacco comments:     Cigar smoking was never daily    Vaping Use    Vaping Use: Never used   Substance and Sexual Activity    Alcohol use: Not Currently    Drug use: No    Sexual activity: Yes     Partners: Female   Social History Narrative    ** Merged History Encounter **          Social Determinants of Health     Financial Resource Strain: Low Risk  (2023)    Overall Financial Resource Strain (CARDIA)     Difficulty of Paying Living Expenses: Not hard at all   Food Insecurity: No Food Insecurity (3/4/2024)    Hunger Vital Sign     Worried About Running Out of Food in the Last Year: Never true     Ran Out of Food in the Last Year: Never true   Transportation Needs: No Transportation Needs (3/4/2024)    PRAPARE - Transportation     Lack of Transportation (Medical): No     Lack of Transportation (Non-Medical): No   Housing Stability: Low Risk  (3/4/2024)    Housing Stability Vital Sign     Unable to Pay for Housing in the Last Year: No     Number of Places Lived in the Last Year: 1     Unstable Housing in the Last Year: No       Home Medications:   Current Outpatient Medications   Medication Instructions    atorvastatin (LIPITOR) 20 MG tablet TAKE 1 TABLET BY MOUTH EVERY DAY    busPIRone (BUSPAR) 5 MG tablet TAKE 1 TABLET BY MOUTH TWICE A DAY    calcium acetate (PHOSLO) 667 MG CAPS capsule TAKE 1 CAPSULE BY MOUTH THREE TIMES A DAY WITH MEALS    lidocaine 4 % external patch 1 patch, TransDERmal, DAILY, Right knee    metoprolol succinate (TOPROL XL) 50 mg, Oral, NIGHTLY    naloxegol (MOVANTIK) 12.5 mg, Oral, DAILY BEFORE BREAKFAST    NIFEdipine (ADALAT CC) 90 mg, Oral, DAILY    omeprazole (PRILOSEC) 40 MG delayed

## 2024-04-30 NOTE — ED PROVIDER NOTES
[facility] EMERGENCY DEPARTMENT  EMERGENCY DEPARTMENT ENCOUNTER      Pt Name: Jorden Woods  MRN: [id number]  Birthdate 1960  Date of evaluation: 4/30/2024  Provider: Elayne Pete MD    CHIEF COMPLAINT       Chief Complaint   Patient presents with    Abdominal Pain     Pt to ED c/o headache and lower abd pain throughout the day unrelieved by tylenol and rest, Hx hernia surgeries but denies any other pertinent history. AAOx4, NAD, resp e/u on RA. Last bowel movement this morning. Dialysis Tue/Thur/Sat to fistula in Left arm         HISTORY OF PRESENT ILLNESS   (Location/Symptom, Timing/Onset, Context/Setting, Quality, Duration, Modifying Factors, Severity)  Note limiting factors.   Jorden Woods is a 63 y.o. male who presents to the emergency department with frontal headache and lower abdominal pain that started earlier in the day.  He tried Tylenol without relief.  He thinks that it may be better when he lays down.  Last bowel movement was earlier in the day.  No diarrhea or constipation.  No nausea or vomiting.  He did have about 45 minutes of anterior nonradiating chest pain earlier today but no current pain.  No fevers or cough.  No sore throat.  He is on dialysis with last dialysis on Saturday but does still urinate.  He does report urinary frequency but denies sensation of incomplete emptying.  He has had prior radiation to his prostate.      This patient is at risk for a communicable infection. Therefore, personal protection equipment consisting of a mask and gloves worn for the exam.     Nursing Notes were reviewed.    REVIEW OF SYSTEMS    (2-9 systems for level 4, 10 or more for level 5)     As per HPI    Except as noted above the remainder of the review of systems was reviewed and negative.       PAST MEDICAL HISTORY     Past Medical History:   Diagnosis Date    Anemia in ESRD (end-stage renal disease) (HCC) 02/24/2022    Arthralgia of multiple joints 10/27/2015    Atrial

## 2024-04-30 NOTE — ED NOTES
How does patient ambulate?   [x]Low Fall Risk (ambulates by themselves without support)  []Stand by assist   []Contact Guard   []Front wheel walker  []Wheelchair   []Steady  []Bed bound  []History of Lower Extremity Amputation  []Unknown, did not assess in the emergency department   How does patient take pills?  [x]Whole with Water  []Crushed in applesauce  []Crushed in pudding  []Other  []Unknown no oral medications were given in the ED  Is patient alert?   [x]Alert  []Drowsy but responds to voice  []Doesn't respond to voice but responds to painful stimuli  []Unresponsive  Is patient oriented?   [x]To person  [x]To place  [x]To time  [x]To situation  []Confused  []Agitated  []Follows commands  If patient is disoriented or from a Skill Nursing Facility has family been notified of admission?   []Yes   [x]No  Patient belongings?   [x]Cell phone  [x]Wallet   []Dentures  [x]Clothing  Any specific patient or family belongings/needs/dynamics?   N/A  Miscellaneous comments/pending orders?  N/A     If there are any additional questions please reach out to the Emergency Department.

## 2024-05-01 PROBLEM — K57.92 ACUTE DIVERTICULITIS: Status: ACTIVE | Noted: 2024-05-01

## 2024-05-01 LAB
ALBUMIN SERPL-MCNC: 4.1 G/DL (ref 3.4–5)
ALBUMIN/GLOB SERPL: 1.3 {RATIO} (ref 1.1–2.2)
ALP SERPL-CCNC: 65 U/L (ref 40–129)
ALT SERPL-CCNC: 18 U/L (ref 10–40)
ANION GAP SERPL CALCULATED.3IONS-SCNC: 13 MMOL/L (ref 3–16)
APTT BLD: 26.3 SEC (ref 22.1–36.4)
AST SERPL-CCNC: 15 U/L (ref 15–37)
BASOPHILS # BLD: 0 K/UL (ref 0–0.2)
BASOPHILS NFR BLD: 0.6 %
BILIRUB SERPL-MCNC: 0.3 MG/DL (ref 0–1)
BUN SERPL-MCNC: 30 MG/DL (ref 7–20)
CALCIUM SERPL-MCNC: 9.2 MG/DL (ref 8.3–10.6)
CHLORIDE SERPL-SCNC: 102 MMOL/L (ref 99–110)
CO2 SERPL-SCNC: 24 MMOL/L (ref 21–32)
CREAT SERPL-MCNC: 4.7 MG/DL (ref 0.8–1.3)
DEPRECATED RDW RBC AUTO: 17.4 % (ref 12.4–15.4)
EOSINOPHIL # BLD: 0.1 K/UL (ref 0–0.6)
EOSINOPHIL NFR BLD: 1.3 %
GFR SERPLBLD CREATININE-BSD FMLA CKD-EPI: 13 ML/MIN/{1.73_M2}
GLUCOSE BLD-MCNC: 101 MG/DL (ref 70–99)
GLUCOSE SERPL-MCNC: 91 MG/DL (ref 70–99)
HCT VFR BLD AUTO: 36.5 % (ref 40.5–52.5)
HGB BLD-MCNC: 11.9 G/DL (ref 13.5–17.5)
INR PPP: 1 (ref 0.85–1.15)
LACTATE BLDV-SCNC: 1 MMOL/L (ref 0.4–2)
LYMPHOCYTES # BLD: 0.7 K/UL (ref 1–5.1)
LYMPHOCYTES NFR BLD: 17.3 %
MAGNESIUM SERPL-MCNC: 1.9 MG/DL (ref 1.8–2.4)
MCH RBC QN AUTO: 29.1 PG (ref 26–34)
MCHC RBC AUTO-ENTMCNC: 32.7 G/DL (ref 31–36)
MCV RBC AUTO: 89 FL (ref 80–100)
MONOCYTES # BLD: 0.4 K/UL (ref 0–1.3)
MONOCYTES NFR BLD: 9.2 %
NEUTROPHILS # BLD: 2.7 K/UL (ref 1.7–7.7)
NEUTROPHILS NFR BLD: 71.6 %
PERFORMED ON: ABNORMAL
PHOSPHATE SERPL-MCNC: 4 MG/DL (ref 2.5–4.9)
PLATELET # BLD AUTO: 129 K/UL (ref 135–450)
PMV BLD AUTO: 8 FL (ref 5–10.5)
POTASSIUM SERPL-SCNC: 5.1 MMOL/L (ref 3.5–5.1)
PREALB SERPL-MCNC: 30 MG/DL (ref 20–40)
PROT SERPL-MCNC: 7.3 G/DL (ref 6.4–8.2)
PROTHROMBIN TIME: 13.4 SEC (ref 11.9–14.9)
RBC # BLD AUTO: 4.1 M/UL (ref 4.2–5.9)
SODIUM SERPL-SCNC: 139 MMOL/L (ref 136–145)
TRANSFERRIN SERPL-MCNC: 139 MG/DL (ref 200–360)
WBC # BLD AUTO: 3.8 K/UL (ref 4–11)

## 2024-05-01 PROCEDURE — 99232 SBSQ HOSP IP/OBS MODERATE 35: CPT | Performed by: SURGERY

## 2024-05-01 PROCEDURE — 84100 ASSAY OF PHOSPHORUS: CPT

## 2024-05-01 PROCEDURE — 1200000000 HC SEMI PRIVATE

## 2024-05-01 PROCEDURE — 2580000003 HC RX 258: Performed by: HOSPITALIST

## 2024-05-01 PROCEDURE — 84466 ASSAY OF TRANSFERRIN: CPT

## 2024-05-01 PROCEDURE — 80053 COMPREHEN METABOLIC PANEL: CPT

## 2024-05-01 PROCEDURE — 85025 COMPLETE CBC W/AUTO DIFF WBC: CPT

## 2024-05-01 PROCEDURE — 6370000000 HC RX 637 (ALT 250 FOR IP): Performed by: HOSPITALIST

## 2024-05-01 PROCEDURE — 85730 THROMBOPLASTIN TIME PARTIAL: CPT

## 2024-05-01 PROCEDURE — 2500000003 HC RX 250 WO HCPCS: Performed by: HOSPITALIST

## 2024-05-01 PROCEDURE — 83735 ASSAY OF MAGNESIUM: CPT

## 2024-05-01 PROCEDURE — 6360000002 HC RX W HCPCS: Performed by: INTERNAL MEDICINE

## 2024-05-01 PROCEDURE — APPSS15 APP SPLIT SHARED TIME 0-15 MINUTES: Performed by: NURSE PRACTITIONER

## 2024-05-01 PROCEDURE — G0378 HOSPITAL OBSERVATION PER HR: HCPCS

## 2024-05-01 PROCEDURE — 6360000002 HC RX W HCPCS: Performed by: HOSPITALIST

## 2024-05-01 PROCEDURE — 83036 HEMOGLOBIN GLYCOSYLATED A1C: CPT

## 2024-05-01 PROCEDURE — 36415 COLL VENOUS BLD VENIPUNCTURE: CPT

## 2024-05-01 PROCEDURE — 85610 PROTHROMBIN TIME: CPT

## 2024-05-01 PROCEDURE — APPNB30 APP NON BILLABLE TIME 0-30 MINS: Performed by: NURSE PRACTITIONER

## 2024-05-01 PROCEDURE — 84134 ASSAY OF PREALBUMIN: CPT

## 2024-05-01 PROCEDURE — 6360000002 HC RX W HCPCS: Performed by: EMERGENCY MEDICINE

## 2024-05-01 PROCEDURE — 83605 ASSAY OF LACTIC ACID: CPT

## 2024-05-01 RX ORDER — DEXTROSE MONOHYDRATE 100 MG/ML
INJECTION, SOLUTION INTRAVENOUS CONTINUOUS PRN
Status: DISCONTINUED | OUTPATIENT
Start: 2024-05-01 | End: 2024-05-08 | Stop reason: HOSPADM

## 2024-05-01 RX ORDER — INSULIN LISPRO 100 [IU]/ML
0-4 INJECTION, SOLUTION INTRAVENOUS; SUBCUTANEOUS NIGHTLY
Status: DISCONTINUED | OUTPATIENT
Start: 2024-05-01 | End: 2024-05-08 | Stop reason: HOSPADM

## 2024-05-01 RX ORDER — GLUCAGON 1 MG/ML
1 KIT INJECTION PRN
Status: DISCONTINUED | OUTPATIENT
Start: 2024-05-01 | End: 2024-05-08 | Stop reason: HOSPADM

## 2024-05-01 RX ORDER — INSULIN LISPRO 100 [IU]/ML
0-8 INJECTION, SOLUTION INTRAVENOUS; SUBCUTANEOUS
Status: DISCONTINUED | OUTPATIENT
Start: 2024-05-01 | End: 2024-05-08 | Stop reason: HOSPADM

## 2024-05-01 RX ADMIN — PANTOPRAZOLE SODIUM 40 MG: 40 TABLET, DELAYED RELEASE ORAL at 06:46

## 2024-05-01 RX ADMIN — METOPROLOL SUCCINATE 50 MG: 50 TABLET, EXTENDED RELEASE ORAL at 20:45

## 2024-05-01 RX ADMIN — OXYCODONE AND ACETAMINOPHEN 2 TABLET: 5; 325 TABLET ORAL at 19:45

## 2024-05-01 RX ADMIN — METRONIDAZOLE 500 MG: 500 INJECTION, SOLUTION INTRAVENOUS at 04:59

## 2024-05-01 RX ADMIN — HEPARIN SODIUM 5000 UNITS: 5000 INJECTION INTRAVENOUS; SUBCUTANEOUS at 22:01

## 2024-05-01 RX ADMIN — METRONIDAZOLE 500 MG: 500 INJECTION, SOLUTION INTRAVENOUS at 12:21

## 2024-05-01 RX ADMIN — NALOXEGOL OXALATE 12.5 MG: 25 TABLET, FILM COATED ORAL at 06:45

## 2024-05-01 RX ADMIN — METRONIDAZOLE 500 MG: 500 INJECTION, SOLUTION INTRAVENOUS at 20:49

## 2024-05-01 RX ADMIN — NIFEDIPINE 90 MG: 30 TABLET, FILM COATED, EXTENDED RELEASE ORAL at 08:45

## 2024-05-01 RX ADMIN — HEPARIN SODIUM 5000 UNITS: 5000 INJECTION INTRAVENOUS; SUBCUTANEOUS at 06:43

## 2024-05-01 RX ADMIN — HEPARIN SODIUM 5000 UNITS: 5000 INJECTION INTRAVENOUS; SUBCUTANEOUS at 14:02

## 2024-05-01 RX ADMIN — CALCIUM ACETATE 667 MG: 667 CAPSULE ORAL at 08:46

## 2024-05-01 RX ADMIN — HYDROMORPHONE HYDROCHLORIDE 1 MG: 1 INJECTION, SOLUTION INTRAMUSCULAR; INTRAVENOUS; SUBCUTANEOUS at 01:54

## 2024-05-01 RX ADMIN — OXYCODONE AND ACETAMINOPHEN 2 TABLET: 5; 325 TABLET ORAL at 14:12

## 2024-05-01 RX ADMIN — HYDROMORPHONE HYDROCHLORIDE 1 MG: 1 INJECTION, SOLUTION INTRAMUSCULAR; INTRAVENOUS; SUBCUTANEOUS at 22:01

## 2024-05-01 RX ADMIN — ATORVASTATIN CALCIUM 20 MG: 20 TABLET, FILM COATED ORAL at 20:45

## 2024-05-01 RX ADMIN — OXYCODONE AND ACETAMINOPHEN 2 TABLET: 5; 325 TABLET ORAL at 08:45

## 2024-05-01 RX ADMIN — HYDROMORPHONE HYDROCHLORIDE 1 MG: 1 INJECTION, SOLUTION INTRAMUSCULAR; INTRAVENOUS; SUBCUTANEOUS at 10:27

## 2024-05-01 RX ADMIN — CALCIUM ACETATE 667 MG: 667 CAPSULE ORAL at 12:19

## 2024-05-01 RX ADMIN — SODIUM CHLORIDE, PRESERVATIVE FREE 10 ML: 5 INJECTION INTRAVENOUS at 08:46

## 2024-05-01 RX ADMIN — BUSPIRONE HYDROCHLORIDE 5 MG: 5 TABLET ORAL at 08:46

## 2024-05-01 RX ADMIN — HYDROMORPHONE HYDROCHLORIDE 1 MG: 1 INJECTION, SOLUTION INTRAMUSCULAR; INTRAVENOUS; SUBCUTANEOUS at 15:23

## 2024-05-01 RX ADMIN — CALCIUM ACETATE 667 MG: 667 CAPSULE ORAL at 16:52

## 2024-05-01 RX ADMIN — PANTOPRAZOLE SODIUM 40 MG: 40 TABLET, DELAYED RELEASE ORAL at 16:52

## 2024-05-01 RX ADMIN — BUSPIRONE HYDROCHLORIDE 5 MG: 5 TABLET ORAL at 20:45

## 2024-05-01 ASSESSMENT — PAIN DESCRIPTION - LOCATION
LOCATION: ABDOMEN;HEAD
LOCATION: ABDOMEN
LOCATION: ABDOMEN
LOCATION: ABDOMEN;BACK
LOCATION: ABDOMEN;HEAD
LOCATION: ABDOMEN
LOCATION: ABDOMEN

## 2024-05-01 ASSESSMENT — PAIN DESCRIPTION - ORIENTATION
ORIENTATION: MID
ORIENTATION: MID;LOWER
ORIENTATION: MID;LOWER
ORIENTATION: MID

## 2024-05-01 ASSESSMENT — PAIN SCALES - WONG BAKER: WONGBAKER_NUMERICALRESPONSE: NO HURT

## 2024-05-01 ASSESSMENT — PAIN DESCRIPTION - DESCRIPTORS
DESCRIPTORS: ACHING;SORE
DESCRIPTORS: ACHING;SORE
DESCRIPTORS: TIGHTNESS
DESCRIPTORS: ACHING
DESCRIPTORS: ACHING;SORE
DESCRIPTORS: ACHING
DESCRIPTORS: TIGHTNESS

## 2024-05-01 ASSESSMENT — PAIN DESCRIPTION - FREQUENCY
FREQUENCY: INTERMITTENT
FREQUENCY: CONTINUOUS

## 2024-05-01 ASSESSMENT — PAIN DESCRIPTION - ONSET
ONSET: ON-GOING

## 2024-05-01 ASSESSMENT — PAIN - FUNCTIONAL ASSESSMENT
PAIN_FUNCTIONAL_ASSESSMENT: ACTIVITIES ARE NOT PREVENTED

## 2024-05-01 ASSESSMENT — PAIN SCALES - GENERAL
PAINLEVEL_OUTOF10: 5
PAINLEVEL_OUTOF10: 4
PAINLEVEL_OUTOF10: 2
PAINLEVEL_OUTOF10: 7
PAINLEVEL_OUTOF10: 6
PAINLEVEL_OUTOF10: 6

## 2024-05-01 ASSESSMENT — PAIN DESCRIPTION - PAIN TYPE
TYPE: ACUTE PAIN

## 2024-05-01 NOTE — ACP (ADVANCE CARE PLANNING)
Advanced Care Planning Note.    Purpose of Encounter: Advanced care planning in light of ESRD  Parties In Attendance: Patient  Decisional Capacity: Yes  Subjective: Patient with lower abdominal pain  Objective: Cr 4.7  Goals of Care Determination: Patient wants full support (CPR, vent, surgery, HD, trach, PEG)  Plan:  IVF, IV Abx, Surgery/Renal/Urology consults  Code Status: Full code   Time spent on Advanced care Plannin minutes  Advanced Care Planning Documents: Completed advanced directives on chart, wife is the POA.    Bryon Fernández MD  2024 5:56 PM

## 2024-05-02 LAB
ALBUMIN SERPL-MCNC: 4.5 G/DL (ref 3.4–5)
ALBUMIN/GLOB SERPL: 1.6 {RATIO} (ref 1.1–2.2)
ALP SERPL-CCNC: 67 U/L (ref 40–129)
ALT SERPL-CCNC: 14 U/L (ref 10–40)
ANION GAP SERPL CALCULATED.3IONS-SCNC: 16 MMOL/L (ref 3–16)
AST SERPL-CCNC: 12 U/L (ref 15–37)
BASOPHILS # BLD: 0 K/UL (ref 0–0.2)
BASOPHILS NFR BLD: 0.9 %
BILIRUB SERPL-MCNC: 0.3 MG/DL (ref 0–1)
BUN SERPL-MCNC: 41 MG/DL (ref 7–20)
CALCIUM SERPL-MCNC: 9.3 MG/DL (ref 8.3–10.6)
CHLORIDE SERPL-SCNC: 95 MMOL/L (ref 99–110)
CO2 SERPL-SCNC: 22 MMOL/L (ref 21–32)
CREAT SERPL-MCNC: 6 MG/DL (ref 0.8–1.3)
DEPRECATED RDW RBC AUTO: 17 % (ref 12.4–15.4)
EOSINOPHIL # BLD: 0 K/UL (ref 0–0.6)
EOSINOPHIL NFR BLD: 1.1 %
GFR SERPLBLD CREATININE-BSD FMLA CKD-EPI: 10 ML/MIN/{1.73_M2}
GLUCOSE BLD-MCNC: 100 MG/DL (ref 70–99)
GLUCOSE BLD-MCNC: 102 MG/DL (ref 70–99)
GLUCOSE BLD-MCNC: 109 MG/DL (ref 70–99)
GLUCOSE BLD-MCNC: 109 MG/DL (ref 70–99)
GLUCOSE BLD-MCNC: 85 MG/DL (ref 70–99)
GLUCOSE SERPL-MCNC: 115 MG/DL (ref 70–99)
HCT VFR BLD AUTO: 34.5 % (ref 40.5–52.5)
HGB BLD-MCNC: 11.5 G/DL (ref 13.5–17.5)
LYMPHOCYTES # BLD: 0.6 K/UL (ref 1–5.1)
LYMPHOCYTES NFR BLD: 12.9 %
MCH RBC QN AUTO: 29 PG (ref 26–34)
MCHC RBC AUTO-ENTMCNC: 33.3 G/DL (ref 31–36)
MCV RBC AUTO: 87.1 FL (ref 80–100)
MONOCYTES # BLD: 0.5 K/UL (ref 0–1.3)
MONOCYTES NFR BLD: 10.5 %
NEUTROPHILS # BLD: 3.3 K/UL (ref 1.7–7.7)
NEUTROPHILS NFR BLD: 74.6 %
PERFORMED ON: ABNORMAL
PERFORMED ON: NORMAL
PLATELET # BLD AUTO: 151 K/UL (ref 135–450)
PMV BLD AUTO: 7.8 FL (ref 5–10.5)
POTASSIUM SERPL-SCNC: 4.7 MMOL/L (ref 3.5–5.1)
PROT SERPL-MCNC: 7.4 G/DL (ref 6.4–8.2)
RBC # BLD AUTO: 3.96 M/UL (ref 4.2–5.9)
SODIUM SERPL-SCNC: 133 MMOL/L (ref 136–145)
WBC # BLD AUTO: 4.4 K/UL (ref 4–11)

## 2024-05-02 PROCEDURE — 36415 COLL VENOUS BLD VENIPUNCTURE: CPT

## 2024-05-02 PROCEDURE — 90935 HEMODIALYSIS ONE EVALUATION: CPT

## 2024-05-02 PROCEDURE — 85025 COMPLETE CBC W/AUTO DIFF WBC: CPT

## 2024-05-02 PROCEDURE — 2500000003 HC RX 250 WO HCPCS: Performed by: HOSPITALIST

## 2024-05-02 PROCEDURE — 6360000002 HC RX W HCPCS: Performed by: EMERGENCY MEDICINE

## 2024-05-02 PROCEDURE — APPNB30 APP NON BILLABLE TIME 0-30 MINS: Performed by: NURSE PRACTITIONER

## 2024-05-02 PROCEDURE — 80053 COMPREHEN METABOLIC PANEL: CPT

## 2024-05-02 PROCEDURE — 1200000000 HC SEMI PRIVATE

## 2024-05-02 PROCEDURE — 6370000000 HC RX 637 (ALT 250 FOR IP): Performed by: HOSPITALIST

## 2024-05-02 PROCEDURE — 99232 SBSQ HOSP IP/OBS MODERATE 35: CPT | Performed by: SURGERY

## 2024-05-02 PROCEDURE — 6360000002 HC RX W HCPCS: Performed by: INTERNAL MEDICINE

## 2024-05-02 PROCEDURE — 6360000002 HC RX W HCPCS: Performed by: HOSPITALIST

## 2024-05-02 PROCEDURE — 6370000000 HC RX 637 (ALT 250 FOR IP): Performed by: INTERNAL MEDICINE

## 2024-05-02 PROCEDURE — APPSS15 APP SPLIT SHARED TIME 0-15 MINUTES: Performed by: NURSE PRACTITIONER

## 2024-05-02 RX ORDER — POLYETHYLENE GLYCOL 3350 17 G/17G
17 POWDER, FOR SOLUTION ORAL 2 TIMES DAILY PRN
Status: DISCONTINUED | OUTPATIENT
Start: 2024-05-02 | End: 2024-05-08 | Stop reason: HOSPADM

## 2024-05-02 RX ADMIN — LEVOFLOXACIN 500 MG: 5 INJECTION, SOLUTION INTRAVENOUS at 06:10

## 2024-05-02 RX ADMIN — OXYCODONE AND ACETAMINOPHEN 2 TABLET: 5; 325 TABLET ORAL at 17:48

## 2024-05-02 RX ADMIN — METOPROLOL SUCCINATE 50 MG: 50 TABLET, EXTENDED RELEASE ORAL at 20:10

## 2024-05-02 RX ADMIN — HEPARIN SODIUM 5000 UNITS: 5000 INJECTION INTRAVENOUS; SUBCUTANEOUS at 21:50

## 2024-05-02 RX ADMIN — HYDROMORPHONE HYDROCHLORIDE 1 MG: 1 INJECTION, SOLUTION INTRAMUSCULAR; INTRAVENOUS; SUBCUTANEOUS at 03:37

## 2024-05-02 RX ADMIN — CALCIUM ACETATE 667 MG: 667 CAPSULE ORAL at 13:09

## 2024-05-02 RX ADMIN — HEPARIN SODIUM 5000 UNITS: 5000 INJECTION INTRAVENOUS; SUBCUTANEOUS at 13:09

## 2024-05-02 RX ADMIN — METRONIDAZOLE 500 MG: 500 INJECTION, SOLUTION INTRAVENOUS at 04:28

## 2024-05-02 RX ADMIN — METRONIDAZOLE 500 MG: 500 INJECTION, SOLUTION INTRAVENOUS at 20:13

## 2024-05-02 RX ADMIN — HEPARIN SODIUM 5000 UNITS: 5000 INJECTION INTRAVENOUS; SUBCUTANEOUS at 06:17

## 2024-05-02 RX ADMIN — CALCIUM ACETATE 667 MG: 667 CAPSULE ORAL at 16:43

## 2024-05-02 RX ADMIN — ATORVASTATIN CALCIUM 20 MG: 20 TABLET, FILM COATED ORAL at 20:10

## 2024-05-02 RX ADMIN — BUSPIRONE HYDROCHLORIDE 5 MG: 5 TABLET ORAL at 20:10

## 2024-05-02 RX ADMIN — POLYETHYLENE GLYCOL 3350 17 G: 17 POWDER, FOR SOLUTION ORAL at 17:46

## 2024-05-02 RX ADMIN — METRONIDAZOLE 500 MG: 500 INJECTION, SOLUTION INTRAVENOUS at 13:12

## 2024-05-02 RX ADMIN — PANTOPRAZOLE SODIUM 40 MG: 40 TABLET, DELAYED RELEASE ORAL at 16:43

## 2024-05-02 ASSESSMENT — PAIN DESCRIPTION - LOCATION
LOCATION: ABDOMEN

## 2024-05-02 ASSESSMENT — PAIN DESCRIPTION - DESCRIPTORS
DESCRIPTORS: ACHING
DESCRIPTORS: ACHING
DESCRIPTORS: ACHING;SORE;THROBBING

## 2024-05-02 ASSESSMENT — PAIN DESCRIPTION - ORIENTATION
ORIENTATION: UPPER
ORIENTATION: UPPER
ORIENTATION: LEFT;RIGHT;MID

## 2024-05-02 ASSESSMENT — PAIN SCALES - GENERAL
PAINLEVEL_OUTOF10: 10
PAINLEVEL_OUTOF10: 2
PAINLEVEL_OUTOF10: 4

## 2024-05-02 ASSESSMENT — PAIN - FUNCTIONAL ASSESSMENT: PAIN_FUNCTIONAL_ASSESSMENT: ACTIVITIES ARE NOT PREVENTED

## 2024-05-02 NOTE — CARE COORDINATION
IMM Letter       05/02/24 1410   IMM Letter   IMM Letter given to Patient/Family/Significant other/Guardian/POA/by: Patient   IMM Letter date given: 05/02/24   IMM Letter time given: 1410     PHILIPP Santillan RN    [facility]  Phone: [phone]

## 2024-05-03 ENCOUNTER — APPOINTMENT (OUTPATIENT)
Dept: CT IMAGING | Age: 64
DRG: 391 | End: 2024-05-03
Payer: MEDICARE

## 2024-05-03 PROBLEM — E66.09 CLASS 1 OBESITY DUE TO EXCESS CALORIES WITH BODY MASS INDEX (BMI) OF 31.0 TO 31.9 IN ADULT: Status: ACTIVE | Noted: 2024-05-03

## 2024-05-03 PROBLEM — G47.33 OSA (OBSTRUCTIVE SLEEP APNEA): Status: ACTIVE | Noted: 2024-05-03

## 2024-05-03 PROBLEM — R79.82 CRP ELEVATED: Status: ACTIVE | Noted: 2024-05-03

## 2024-05-03 PROBLEM — D57.3 SICKLE CELL TRAIT (HCC): Status: ACTIVE | Noted: 2024-05-03

## 2024-05-03 PROBLEM — R70.0 ELEVATED SED RATE: Status: ACTIVE | Noted: 2024-05-03

## 2024-05-03 PROBLEM — E66.811 CLASS 1 OBESITY DUE TO EXCESS CALORIES WITH BODY MASS INDEX (BMI) OF 31.0 TO 31.9 IN ADULT: Status: ACTIVE | Noted: 2024-05-03

## 2024-05-03 PROBLEM — Z87.39 HISTORY OF GOUT: Status: ACTIVE | Noted: 2024-05-03

## 2024-05-03 LAB
ALBUMIN SERPL-MCNC: 4.1 G/DL (ref 3.4–5)
ALBUMIN/GLOB SERPL: 1.5 {RATIO} (ref 1.1–2.2)
ALP SERPL-CCNC: 64 U/L (ref 40–129)
ALT SERPL-CCNC: 12 U/L (ref 10–40)
ANION GAP SERPL CALCULATED.3IONS-SCNC: 13 MMOL/L (ref 3–16)
AST SERPL-CCNC: 12 U/L (ref 15–37)
BASOPHILS # BLD: 0 K/UL (ref 0–0.2)
BASOPHILS NFR BLD: 0.6 %
BILIRUB SERPL-MCNC: 0.3 MG/DL (ref 0–1)
BUN SERPL-MCNC: 28 MG/DL (ref 7–20)
CALCIUM SERPL-MCNC: 9.2 MG/DL (ref 8.3–10.6)
CHLORIDE SERPL-SCNC: 101 MMOL/L (ref 99–110)
CO2 SERPL-SCNC: 23 MMOL/L (ref 21–32)
CREAT SERPL-MCNC: 5.1 MG/DL (ref 0.8–1.3)
CRP SERPL-MCNC: 33.4 MG/L (ref 0–5.1)
DEPRECATED RDW RBC AUTO: 16.9 % (ref 12.4–15.4)
EOSINOPHIL # BLD: 0.1 K/UL (ref 0–0.6)
EOSINOPHIL NFR BLD: 1.4 %
ERYTHROCYTE [SEDIMENTATION RATE] IN BLOOD BY WESTERGREN METHOD: 33 MM/HR (ref 0–20)
EST. AVERAGE GLUCOSE BLD GHB EST-MCNC: 122.6 MG/DL
GFR SERPLBLD CREATININE-BSD FMLA CKD-EPI: 12 ML/MIN/{1.73_M2}
GLUCOSE BLD-MCNC: 103 MG/DL (ref 70–99)
GLUCOSE BLD-MCNC: 112 MG/DL (ref 70–99)
GLUCOSE BLD-MCNC: 93 MG/DL (ref 70–99)
GLUCOSE SERPL-MCNC: 100 MG/DL (ref 70–99)
HBA1C MFR BLD: 5.9 %
HCT VFR BLD AUTO: 35.2 % (ref 40.5–52.5)
HGB BLD-MCNC: 11.6 G/DL (ref 13.5–17.5)
LYMPHOCYTES # BLD: 0.5 K/UL (ref 1–5.1)
LYMPHOCYTES NFR BLD: 13.2 %
MCH RBC QN AUTO: 28.8 PG (ref 26–34)
MCHC RBC AUTO-ENTMCNC: 33 G/DL (ref 31–36)
MCV RBC AUTO: 87.3 FL (ref 80–100)
MONOCYTES # BLD: 0.4 K/UL (ref 0–1.3)
MONOCYTES NFR BLD: 11.1 %
NEUTROPHILS # BLD: 2.7 K/UL (ref 1.7–7.7)
NEUTROPHILS NFR BLD: 73.7 %
PERFORMED ON: ABNORMAL
PERFORMED ON: ABNORMAL
PERFORMED ON: NORMAL
PLATELET # BLD AUTO: 137 K/UL (ref 135–450)
PMV BLD AUTO: 7.9 FL (ref 5–10.5)
POTASSIUM SERPL-SCNC: 4.8 MMOL/L (ref 3.5–5.1)
PROT SERPL-MCNC: 6.9 G/DL (ref 6.4–8.2)
RBC # BLD AUTO: 4.03 M/UL (ref 4.2–5.9)
SODIUM SERPL-SCNC: 137 MMOL/L (ref 136–145)
WBC # BLD AUTO: 3.7 K/UL (ref 4–11)

## 2024-05-03 PROCEDURE — 6360000002 HC RX W HCPCS: Performed by: INTERNAL MEDICINE

## 2024-05-03 PROCEDURE — 87040 BLOOD CULTURE FOR BACTERIA: CPT

## 2024-05-03 PROCEDURE — 80053 COMPREHEN METABOLIC PANEL: CPT

## 2024-05-03 PROCEDURE — 6360000002 HC RX W HCPCS: Performed by: EMERGENCY MEDICINE

## 2024-05-03 PROCEDURE — APPNB30 APP NON BILLABLE TIME 0-30 MINS: Performed by: NURSE PRACTITIONER

## 2024-05-03 PROCEDURE — 99232 SBSQ HOSP IP/OBS MODERATE 35: CPT | Performed by: SURGERY

## 2024-05-03 PROCEDURE — 2500000003 HC RX 250 WO HCPCS: Performed by: HOSPITALIST

## 2024-05-03 PROCEDURE — 74176 CT ABD & PELVIS W/O CONTRAST: CPT

## 2024-05-03 PROCEDURE — 99223 1ST HOSP IP/OBS HIGH 75: CPT | Performed by: INTERNAL MEDICINE

## 2024-05-03 PROCEDURE — 6360000004 HC RX CONTRAST MEDICATION: Performed by: SURGERY

## 2024-05-03 PROCEDURE — 2580000003 HC RX 258: Performed by: INTERNAL MEDICINE

## 2024-05-03 PROCEDURE — 86140 C-REACTIVE PROTEIN: CPT

## 2024-05-03 PROCEDURE — 85025 COMPLETE CBC W/AUTO DIFF WBC: CPT

## 2024-05-03 PROCEDURE — APPSS15 APP SPLIT SHARED TIME 0-15 MINUTES: Performed by: NURSE PRACTITIONER

## 2024-05-03 PROCEDURE — 2580000003 HC RX 258: Performed by: HOSPITALIST

## 2024-05-03 PROCEDURE — 36415 COLL VENOUS BLD VENIPUNCTURE: CPT

## 2024-05-03 PROCEDURE — 6360000002 HC RX W HCPCS: Performed by: HOSPITALIST

## 2024-05-03 PROCEDURE — 85652 RBC SED RATE AUTOMATED: CPT

## 2024-05-03 PROCEDURE — 1200000000 HC SEMI PRIVATE

## 2024-05-03 PROCEDURE — 6370000000 HC RX 637 (ALT 250 FOR IP): Performed by: HOSPITALIST

## 2024-05-03 RX ORDER — HYDRALAZINE HYDROCHLORIDE 20 MG/ML
10 INJECTION INTRAMUSCULAR; INTRAVENOUS EVERY 4 HOURS PRN
Status: DISCONTINUED | OUTPATIENT
Start: 2024-05-03 | End: 2024-05-08 | Stop reason: HOSPADM

## 2024-05-03 RX ADMIN — METRONIDAZOLE 500 MG: 500 INJECTION, SOLUTION INTRAVENOUS at 04:47

## 2024-05-03 RX ADMIN — ATORVASTATIN CALCIUM 20 MG: 20 TABLET, FILM COATED ORAL at 20:55

## 2024-05-03 RX ADMIN — HEPARIN SODIUM 5000 UNITS: 5000 INJECTION INTRAVENOUS; SUBCUTANEOUS at 22:00

## 2024-05-03 RX ADMIN — CALCIUM ACETATE 667 MG: 667 CAPSULE ORAL at 09:23

## 2024-05-03 RX ADMIN — OXYCODONE AND ACETAMINOPHEN 2 TABLET: 5; 325 TABLET ORAL at 09:23

## 2024-05-03 RX ADMIN — PANTOPRAZOLE SODIUM 40 MG: 40 TABLET, DELAYED RELEASE ORAL at 07:27

## 2024-05-03 RX ADMIN — BUSPIRONE HYDROCHLORIDE 5 MG: 5 TABLET ORAL at 20:55

## 2024-05-03 RX ADMIN — NIFEDIPINE 90 MG: 30 TABLET, FILM COATED, EXTENDED RELEASE ORAL at 09:23

## 2024-05-03 RX ADMIN — DIATRIZOATE MEGLUMINE AND DIATRIZOATE SODIUM 20 ML: 660; 100 LIQUID ORAL; RECTAL at 10:01

## 2024-05-03 RX ADMIN — BUSPIRONE HYDROCHLORIDE 5 MG: 5 TABLET ORAL at 09:23

## 2024-05-03 RX ADMIN — HYDROMORPHONE HYDROCHLORIDE 1 MG: 1 INJECTION, SOLUTION INTRAMUSCULAR; INTRAVENOUS; SUBCUTANEOUS at 11:48

## 2024-05-03 RX ADMIN — PANTOPRAZOLE SODIUM 40 MG: 40 TABLET, DELAYED RELEASE ORAL at 16:59

## 2024-05-03 RX ADMIN — SODIUM CHLORIDE, PRESERVATIVE FREE 10 ML: 5 INJECTION INTRAVENOUS at 20:55

## 2024-05-03 RX ADMIN — NALOXEGOL OXALATE 12.5 MG: 25 TABLET, FILM COATED ORAL at 07:27

## 2024-05-03 RX ADMIN — PIPERACILLIN AND TAZOBACTAM 3375 MG: 3; .375 INJECTION, POWDER, LYOPHILIZED, FOR SOLUTION INTRAVENOUS at 17:00

## 2024-05-03 RX ADMIN — OXYCODONE AND ACETAMINOPHEN 2 TABLET: 5; 325 TABLET ORAL at 14:51

## 2024-05-03 RX ADMIN — HEPARIN SODIUM 5000 UNITS: 5000 INJECTION INTRAVENOUS; SUBCUTANEOUS at 07:27

## 2024-05-03 RX ADMIN — CALCIUM ACETATE 667 MG: 667 CAPSULE ORAL at 16:59

## 2024-05-03 RX ADMIN — METOPROLOL SUCCINATE 50 MG: 50 TABLET, EXTENDED RELEASE ORAL at 20:55

## 2024-05-03 RX ADMIN — HEPARIN SODIUM 5000 UNITS: 5000 INJECTION INTRAVENOUS; SUBCUTANEOUS at 14:51

## 2024-05-03 RX ADMIN — METRONIDAZOLE 500 MG: 500 INJECTION, SOLUTION INTRAVENOUS at 15:01

## 2024-05-03 ASSESSMENT — PAIN SCALES - GENERAL
PAINLEVEL_OUTOF10: 3
PAINLEVEL_OUTOF10: 4
PAINLEVEL_OUTOF10: 4
PAINLEVEL_OUTOF10: 8
PAINLEVEL_OUTOF10: 7
PAINLEVEL_OUTOF10: 4
PAINLEVEL_OUTOF10: 7

## 2024-05-03 ASSESSMENT — PAIN - FUNCTIONAL ASSESSMENT
PAIN_FUNCTIONAL_ASSESSMENT: ACTIVITIES ARE NOT PREVENTED

## 2024-05-03 ASSESSMENT — PAIN DESCRIPTION - DESCRIPTORS
DESCRIPTORS: PRESSURE
DESCRIPTORS: ACHING;SHARP
DESCRIPTORS: SHARP;PRESSURE
DESCRIPTORS: PRESSURE;DISCOMFORT

## 2024-05-03 ASSESSMENT — PAIN DESCRIPTION - LOCATION
LOCATION: ABDOMEN

## 2024-05-03 ASSESSMENT — PAIN DESCRIPTION - ORIENTATION
ORIENTATION: RIGHT;LEFT;MID
ORIENTATION: RIGHT;LEFT;MID
ORIENTATION: RIGHT;LOWER

## 2024-05-03 ASSESSMENT — PAIN DESCRIPTION - PAIN TYPE
TYPE: ACUTE PAIN

## 2024-05-03 NOTE — CONSULTS
Infectious Diseases   Consult Note        Admission Date: 4/30/2024  Hospital Day: Hospital Day: 4   Attending: Bryon Fernández MD  Date of service: 5/3/24     Reason for admission: Diverticulitis [K57.92]  Diverticulitis of colon [K57.32]  End stage chronic kidney disease (HCC) [N18.6]  Acute diverticulitis [K57.92]    Chief complaint/ Reason for consult:   Worsening diverticulitis    Microbiology:        I have reviewed allavailable micro lab data and cultures        Antibiotics and immunizations:       Current antibiotics: All antibiotics and their doses were reviewed by me    Recent Abx Admin                     metroNIDAZOLE (FLAGYL) 500 mg in 0.9% NaCl 100 mL IVPB premix (mg) 500 mg New Bag 05/03/24 1501     500 mg New Bag  0447     500 mg New Bag 05/02/24 2013                      Immunization History: All immunization history was reviewed by me today.    Immunization History   Administered Date(s) Administered    COVID-19, MODERNA BLUE border, Primary or Immunocompromised, (age 12y+), IM, 100 mcg/0.5mL 07/17/2021, 08/19/2021    COVID-19, PFIZER Bivalent, DO NOT Dilute, (age 12y+), IM, 30 mcg/0.3 mL 10/19/2022    Pneumococcal, PCV20, PREVNAR 20, (age 6w+), IM, 0.5mL 01/11/2024       Known drug allergies:     All allergies were reviewed and updated    No Known Allergies    Social history:     Social History:  All social andepidemiologic history was reviewed and updated by me today as needed.     Tobacco use:   reports that he quit smoking about 4 years ago. His smoking use included cigars. He started smoking about 44 years ago. He has never been exposed to tobacco smoke. He has never used smokeless tobacco.  Alcohol use:   reports that he does not currently use alcohol.  Currently lives in: [place] [id number]   reports no history of drug use.     COVID VACCINATION AND LAB RESULT RECORDS:     Internal Administration   First Dose COVID-19, MODERNA BLUE border, Primary or Immunocompromised, (age 12y+), IM, 100 
  Nephrology Consult Note  [phone]  [phone]   [employer]           Reason for Consult: ESRD    HISTORY OF PRESENT ILLNESS:                The patient is a 63 y.o.male with significant past medical history of ESRD, hypertension, DM2, CHF, chronic pain syndrome came in with complaints of abdominal pain for the past 2 days.  Describes it as a constant throbbing yovanny, states it gets worse with movement  CT of the A/P showed short segment of acute diverticulitis in the proximal sigmoid colon  Has been on antibiotics  We are consulted for ESRD management  He is on TTS schedule for dialysis  His last dialysis was as per schedule as outpatient  Patient seen and examined  No history of nausea or emesis or diarrhea  No history of fever or chills    Past Medical History:        Diagnosis Date    Anemia in ESRD (end-stage renal disease) ([facility]) 02/24/2022    Arthralgia of multiple joints 10/27/2015    Atrial fibrillation ([facility])     Chronic bilateral low back pain without sciatica 12/12/2018    Chronic diastolic heart failure ([facility]) 08/16/2021    Chronic pain syndrome 08/31/2022    Chronic prescription opiate use     Oxycodone 20 mg/day    Chronic systolic congestive heart failure ([facility]) 08/16/2021    Class 1 obesity     Diverticulosis large intestine w/o perforation or abscess w/o bleeding     Duodenal ulcer disease     ESRD on hemodialysis ([facility]) 02/24/2022 Tuesday, Thursday, Saturday    Essential hypertension     Gout     Hearing impairment     patient denies    History of prostate cancer     Inguinal hernia, left 06/14/2022    Major depressive disorder, recurrent, mild 10/26/2022    Opioid dependence with current use ([facility]) 12/12/2023    CELSA (obstructive sleep apnea) with CPAP non compliance 07/14/2017    Postherpetic polyneuropathy 03/23/2024    Sickle cell trait ([facility])     Staphylococcus aureus bacteremia 07/12/2022    Status post bilateral hernia repair 06/13/2022    Thrombocytopenia, unspecified ([facility]) 03/18/2024    
     Spouse name: Tamiko    Number of children: 5    Years of education: 14    Highest education level: None   Occupational History    Occupation: Senior Field Cost Admin.     Employer: [employer]   Tobacco Use    Smoking status: Former     Types: Cigars     Start date: 1979     Quit date: 2020     Years since quittin.3     Passive exposure: Never    Smokeless tobacco: Never    Tobacco comments:     Cigar smoking was never daily    Vaping Use    Vaping Use: Never used   Substance and Sexual Activity    Alcohol use: Not Currently    Drug use: No    Sexual activity: Not Currently     Partners: Female   Social History Narrative    ** Merged History Encounter **          Social Determinants of Health     Financial Resource Strain: Low Risk  (2023)    Overall Financial Resource Strain (CARDIA)     Difficulty of Paying Living Expenses: Not hard at all   Food Insecurity: No Food Insecurity (2024)    Hunger Vital Sign     Worried About Running Out of Food in the Last Year: Never true     Ran Out of Food in the Last Year: Never true   Transportation Needs: No Transportation Needs (2024)    PRAPARE - Transportation     Lack of Transportation (Medical): No     Lack of Transportation (Non-Medical): No   Housing Stability: Low Risk  (2024)    Housing Stability Vital Sign     Unable to Pay for Housing in the Last Year: No     Number of Places Lived in the Last Year: 1     Unstable Housing in the Last Year: No       Family History:    Family History   Problem Relation Age of Onset    Breast Cancer Mother     Cancer Father     No Known Problems Sister     No Known Problems Sister     Sickle Cell Anemia Brother     Diabetes Maternal Grandmother     Cancer Paternal Grandmother     Cancer Paternal Grandfather        Review of Systems:  CONSTITUTIONAL:  Negative except as above  HEENT:  negative  RESPIRATORY:  negative  CARDIOVASCULAR:  negative  GASTROINTESTINAL:  negative 
NITRU Negative 04/30/2024 03:14 AM    LEUKOCYTESUR Negative 04/30/2024 03:14 AM     Urine culture: No results for input(s): \"LABURIN\" in the last 72 hours.  PSA:   Lab Results   Component Value Date    PSA 20.25 (H) 11/30/2021    PSA 9.15 (H) 02/26/2021    PSA 9.99 (H) 09/04/2020         IMAGING     CT Head W/O Contrast    Result Date: 4/30/2024  EXAMINATION: CT OF THE HEAD WITHOUT CONTRAST  4/30/2024 1:28 am TECHNIQUE: CT of the head was performed without the administration of intravenous contrast. Automated exposure control, iterative reconstruction, and/or weight based adjustment of the mA/kV was utilized to reduce the radiation dose to as low as reasonably achievable. COMPARISON: Noncontrast CT scan of the head on 03/04/2024. HISTORY: ORDERING SYSTEM PROVIDED HISTORY: Headache TECHNOLOGIST PROVIDED HISTORY: Reason for exam:->Headache Has a \"code stroke\" or \"stroke alert\" been called?->No Decision Support Exception - unselect if not a suspected or confirmed emergency medical condition->Emergency Medical Condition (MA) Reason for Exam: headache FINDINGS: BRAIN/VENTRICLES: There is no acute intracranial hemorrhage, mass effect or midline shift.  No abnormal extra-axial fluid collection.  The gray-white differentiation is maintained without evidence of an acute infarct.  There is no evidence of hydrocephalus. ORBITS: The visualized portion of the orbits demonstrate no acute abnormality. SINUSES: The visualized paranasal sinuses and mastoid air cells demonstrate no acute abnormality. SOFT TISSUES/SKULL:  No acute abnormality of the visualized skull or soft tissues. No interval change, as compared to previous CT scan of head on 03/04/2024.     No acute intracranial abnormality.     CT ABDOMEN PELVIS WO CONTRAST Additional Contrast? None    Result Date: 4/30/2024  EXAMINATION: CT OF THE ABDOMEN AND PELVIS WITHOUT CONTRAST 4/30/2024 2:28 am TECHNIQUE: CT of the abdomen and pelvis was performed without the administration

## 2024-05-04 LAB
ANION GAP SERPL CALCULATED.3IONS-SCNC: 15 MMOL/L (ref 3–16)
APTT BLD: 37.4 SEC (ref 22.1–36.4)
BASOPHILS # BLD: 0 K/UL (ref 0–0.2)
BASOPHILS NFR BLD: 0.5 %
BUN SERPL-MCNC: 35 MG/DL (ref 7–20)
CALCIUM SERPL-MCNC: 9.5 MG/DL (ref 8.3–10.6)
CHLORIDE SERPL-SCNC: 99 MMOL/L (ref 99–110)
CO2 SERPL-SCNC: 19 MMOL/L (ref 21–32)
CREAT SERPL-MCNC: 6.7 MG/DL (ref 0.8–1.3)
DEPRECATED RDW RBC AUTO: 16.9 % (ref 12.4–15.4)
EOSINOPHIL # BLD: 0.1 K/UL (ref 0–0.6)
EOSINOPHIL NFR BLD: 1.3 %
GFR SERPLBLD CREATININE-BSD FMLA CKD-EPI: 9 ML/MIN/{1.73_M2}
GLUCOSE BLD-MCNC: 105 MG/DL (ref 70–99)
GLUCOSE BLD-MCNC: 136 MG/DL (ref 70–99)
GLUCOSE BLD-MCNC: 142 MG/DL (ref 70–99)
GLUCOSE BLD-MCNC: 98 MG/DL (ref 70–99)
GLUCOSE SERPL-MCNC: 105 MG/DL (ref 70–99)
HCT VFR BLD AUTO: 36.4 % (ref 40.5–52.5)
HGB BLD-MCNC: 11.9 G/DL (ref 13.5–17.5)
INR PPP: 1.03 (ref 0.85–1.15)
LACTATE BLDV-SCNC: 0.8 MMOL/L (ref 0.4–2)
LYMPHOCYTES # BLD: 0.6 K/UL (ref 1–5.1)
LYMPHOCYTES NFR BLD: 14.5 %
MCH RBC QN AUTO: 28.9 PG (ref 26–34)
MCHC RBC AUTO-ENTMCNC: 32.8 G/DL (ref 31–36)
MCV RBC AUTO: 88 FL (ref 80–100)
MONOCYTES # BLD: 0.4 K/UL (ref 0–1.3)
MONOCYTES NFR BLD: 8.3 %
NEUTROPHILS # BLD: 3.2 K/UL (ref 1.7–7.7)
NEUTROPHILS NFR BLD: 75.4 %
PERFORMED ON: ABNORMAL
PERFORMED ON: NORMAL
PLATELET # BLD AUTO: 141 K/UL (ref 135–450)
PMV BLD AUTO: 7.8 FL (ref 5–10.5)
POTASSIUM SERPL-SCNC: 4.6 MMOL/L (ref 3.5–5.1)
PROTHROMBIN TIME: 13.7 SEC (ref 11.9–14.9)
RBC # BLD AUTO: 4.13 M/UL (ref 4.2–5.9)
SODIUM SERPL-SCNC: 133 MMOL/L (ref 136–145)
WBC # BLD AUTO: 4.3 K/UL (ref 4–11)

## 2024-05-04 PROCEDURE — 80048 BASIC METABOLIC PNL TOTAL CA: CPT

## 2024-05-04 PROCEDURE — 90935 HEMODIALYSIS ONE EVALUATION: CPT

## 2024-05-04 PROCEDURE — 85730 THROMBOPLASTIN TIME PARTIAL: CPT

## 2024-05-04 PROCEDURE — 2580000003 HC RX 258: Performed by: INTERNAL MEDICINE

## 2024-05-04 PROCEDURE — 6360000002 HC RX W HCPCS: Performed by: INTERNAL MEDICINE

## 2024-05-04 PROCEDURE — 85025 COMPLETE CBC W/AUTO DIFF WBC: CPT

## 2024-05-04 PROCEDURE — 2500000003 HC RX 250 WO HCPCS: Performed by: HOSPITALIST

## 2024-05-04 PROCEDURE — 6370000000 HC RX 637 (ALT 250 FOR IP): Performed by: HOSPITALIST

## 2024-05-04 PROCEDURE — 85610 PROTHROMBIN TIME: CPT

## 2024-05-04 PROCEDURE — 99232 SBSQ HOSP IP/OBS MODERATE 35: CPT | Performed by: SURGERY

## 2024-05-04 PROCEDURE — 83605 ASSAY OF LACTIC ACID: CPT

## 2024-05-04 PROCEDURE — 6360000002 HC RX W HCPCS: Performed by: HOSPITALIST

## 2024-05-04 PROCEDURE — 2580000003 HC RX 258: Performed by: HOSPITALIST

## 2024-05-04 PROCEDURE — 6370000000 HC RX 637 (ALT 250 FOR IP): Performed by: INTERNAL MEDICINE

## 2024-05-04 PROCEDURE — 1200000000 HC SEMI PRIVATE

## 2024-05-04 RX ADMIN — PIPERACILLIN AND TAZOBACTAM 3375 MG: 3; .375 INJECTION, POWDER, LYOPHILIZED, FOR SOLUTION INTRAVENOUS at 04:33

## 2024-05-04 RX ADMIN — OXYCODONE AND ACETAMINOPHEN 2 TABLET: 5; 325 TABLET ORAL at 13:14

## 2024-05-04 RX ADMIN — ACETAMINOPHEN 650 MG: 325 TABLET ORAL at 04:56

## 2024-05-04 RX ADMIN — ATORVASTATIN CALCIUM 20 MG: 20 TABLET, FILM COATED ORAL at 20:18

## 2024-05-04 RX ADMIN — POLYETHYLENE GLYCOL 3350 17 G: 17 POWDER, FOR SOLUTION ORAL at 21:37

## 2024-05-04 RX ADMIN — PIPERACILLIN AND TAZOBACTAM 3375 MG: 3; .375 INJECTION, POWDER, LYOPHILIZED, FOR SOLUTION INTRAVENOUS at 15:58

## 2024-05-04 RX ADMIN — SODIUM CHLORIDE, PRESERVATIVE FREE 10 ML: 5 INJECTION INTRAVENOUS at 13:15

## 2024-05-04 RX ADMIN — PANTOPRAZOLE SODIUM 40 MG: 40 TABLET, DELAYED RELEASE ORAL at 05:01

## 2024-05-04 RX ADMIN — HYDRALAZINE HYDROCHLORIDE 10 MG: 20 INJECTION, SOLUTION INTRAMUSCULAR; INTRAVENOUS at 04:28

## 2024-05-04 RX ADMIN — HEPARIN SODIUM 5000 UNITS: 5000 INJECTION INTRAVENOUS; SUBCUTANEOUS at 20:19

## 2024-05-04 RX ADMIN — PANTOPRAZOLE SODIUM 40 MG: 40 TABLET, DELAYED RELEASE ORAL at 15:53

## 2024-05-04 RX ADMIN — HEPARIN SODIUM 5000 UNITS: 5000 INJECTION INTRAVENOUS; SUBCUTANEOUS at 04:56

## 2024-05-04 RX ADMIN — NIFEDIPINE 90 MG: 30 TABLET, FILM COATED, EXTENDED RELEASE ORAL at 13:14

## 2024-05-04 RX ADMIN — CALCIUM ACETATE 667 MG: 667 CAPSULE ORAL at 13:14

## 2024-05-04 RX ADMIN — OXYCODONE AND ACETAMINOPHEN 2 TABLET: 5; 325 TABLET ORAL at 07:42

## 2024-05-04 RX ADMIN — SODIUM CHLORIDE, PRESERVATIVE FREE 10 ML: 5 INJECTION INTRAVENOUS at 20:25

## 2024-05-04 RX ADMIN — NALOXEGOL OXALATE 12.5 MG: 25 TABLET, FILM COATED ORAL at 05:01

## 2024-05-04 RX ADMIN — CALCIUM ACETATE 667 MG: 667 CAPSULE ORAL at 15:53

## 2024-05-04 RX ADMIN — HYDROMORPHONE HYDROCHLORIDE 1 MG: 1 INJECTION, SOLUTION INTRAMUSCULAR; INTRAVENOUS; SUBCUTANEOUS at 21:37

## 2024-05-04 RX ADMIN — METOPROLOL SUCCINATE 50 MG: 50 TABLET, EXTENDED RELEASE ORAL at 20:18

## 2024-05-04 RX ADMIN — BUSPIRONE HYDROCHLORIDE 5 MG: 5 TABLET ORAL at 20:18

## 2024-05-04 RX ADMIN — OXYCODONE AND ACETAMINOPHEN 2 TABLET: 5; 325 TABLET ORAL at 20:16

## 2024-05-04 RX ADMIN — HYDROMORPHONE HYDROCHLORIDE 1 MG: 1 INJECTION, SOLUTION INTRAMUSCULAR; INTRAVENOUS; SUBCUTANEOUS at 15:51

## 2024-05-04 RX ADMIN — HEPARIN SODIUM 5000 UNITS: 5000 INJECTION INTRAVENOUS; SUBCUTANEOUS at 13:15

## 2024-05-04 ASSESSMENT — PAIN DESCRIPTION - FREQUENCY: FREQUENCY: INTERMITTENT

## 2024-05-04 ASSESSMENT — PAIN DESCRIPTION - ORIENTATION
ORIENTATION: MID;LOWER
ORIENTATION: RIGHT;LOWER
ORIENTATION: RIGHT;LOWER
ORIENTATION: MID;LOWER
ORIENTATION: RIGHT

## 2024-05-04 ASSESSMENT — PAIN SCALES - WONG BAKER: WONGBAKER_NUMERICALRESPONSE: HURTS A LITTLE BIT

## 2024-05-04 ASSESSMENT — PAIN - FUNCTIONAL ASSESSMENT
PAIN_FUNCTIONAL_ASSESSMENT: ACTIVITIES ARE NOT PREVENTED

## 2024-05-04 ASSESSMENT — PAIN DESCRIPTION - PAIN TYPE
TYPE: ACUTE PAIN

## 2024-05-04 ASSESSMENT — PAIN SCALES - GENERAL
PAINLEVEL_OUTOF10: 2
PAINLEVEL_OUTOF10: 2
PAINLEVEL_OUTOF10: 8
PAINLEVEL_OUTOF10: 6
PAINLEVEL_OUTOF10: 3
PAINLEVEL_OUTOF10: 7
PAINLEVEL_OUTOF10: 8
PAINLEVEL_OUTOF10: 2
PAINLEVEL_OUTOF10: 7
PAINLEVEL_OUTOF10: 1

## 2024-05-04 ASSESSMENT — PAIN DESCRIPTION - DESCRIPTORS
DESCRIPTORS: ACHING
DESCRIPTORS: ACHING
DESCRIPTORS: ACHING;CRAMPING
DESCRIPTORS: DULL
DESCRIPTORS: ACHING

## 2024-05-04 ASSESSMENT — PAIN DESCRIPTION - LOCATION
LOCATION: ABDOMEN

## 2024-05-04 ASSESSMENT — PAIN DESCRIPTION - ONSET: ONSET: ON-GOING

## 2024-05-05 LAB
ANION GAP SERPL CALCULATED.3IONS-SCNC: 15 MMOL/L (ref 3–16)
BASOPHILS # BLD: 0 K/UL (ref 0–0.2)
BASOPHILS NFR BLD: 0.8 %
BUN SERPL-MCNC: 25 MG/DL (ref 7–20)
CALCIUM SERPL-MCNC: 9.2 MG/DL (ref 8.3–10.6)
CHLORIDE SERPL-SCNC: 93 MMOL/L (ref 99–110)
CO2 SERPL-SCNC: 24 MMOL/L (ref 21–32)
CREAT SERPL-MCNC: 5.7 MG/DL (ref 0.8–1.3)
DEPRECATED RDW RBC AUTO: 17 % (ref 12.4–15.4)
EOSINOPHIL # BLD: 0.1 K/UL (ref 0–0.6)
EOSINOPHIL NFR BLD: 1.6 %
GFR SERPLBLD CREATININE-BSD FMLA CKD-EPI: 10 ML/MIN/{1.73_M2}
GLUCOSE BLD-MCNC: 110 MG/DL (ref 70–99)
GLUCOSE BLD-MCNC: 119 MG/DL (ref 70–99)
GLUCOSE BLD-MCNC: 128 MG/DL (ref 70–99)
GLUCOSE BLD-MCNC: 147 MG/DL (ref 70–99)
GLUCOSE SERPL-MCNC: 115 MG/DL (ref 70–99)
HCT VFR BLD AUTO: 35.7 % (ref 40.5–52.5)
HGB BLD-MCNC: 11.9 G/DL (ref 13.5–17.5)
LYMPHOCYTES # BLD: 0.7 K/UL (ref 1–5.1)
LYMPHOCYTES NFR BLD: 18 %
MCH RBC QN AUTO: 28.9 PG (ref 26–34)
MCHC RBC AUTO-ENTMCNC: 33.4 G/DL (ref 31–36)
MCV RBC AUTO: 86.5 FL (ref 80–100)
MONOCYTES # BLD: 0.5 K/UL (ref 0–1.3)
MONOCYTES NFR BLD: 13.2 %
NEUTROPHILS # BLD: 2.5 K/UL (ref 1.7–7.7)
NEUTROPHILS NFR BLD: 66.4 %
PERFORMED ON: ABNORMAL
PLATELET # BLD AUTO: 154 K/UL (ref 135–450)
PMV BLD AUTO: 7.8 FL (ref 5–10.5)
POTASSIUM SERPL-SCNC: 4.3 MMOL/L (ref 3.5–5.1)
RBC # BLD AUTO: 4.12 M/UL (ref 4.2–5.9)
SODIUM SERPL-SCNC: 132 MMOL/L (ref 136–145)
WBC # BLD AUTO: 3.7 K/UL (ref 4–11)

## 2024-05-05 PROCEDURE — 80048 BASIC METABOLIC PNL TOTAL CA: CPT

## 2024-05-05 PROCEDURE — 2580000003 HC RX 258: Performed by: HOSPITALIST

## 2024-05-05 PROCEDURE — 6370000000 HC RX 637 (ALT 250 FOR IP): Performed by: INTERNAL MEDICINE

## 2024-05-05 PROCEDURE — 99232 SBSQ HOSP IP/OBS MODERATE 35: CPT | Performed by: SURGERY

## 2024-05-05 PROCEDURE — 1200000000 HC SEMI PRIVATE

## 2024-05-05 PROCEDURE — 2580000003 HC RX 258: Performed by: INTERNAL MEDICINE

## 2024-05-05 PROCEDURE — 6360000002 HC RX W HCPCS: Performed by: INTERNAL MEDICINE

## 2024-05-05 PROCEDURE — 85025 COMPLETE CBC W/AUTO DIFF WBC: CPT

## 2024-05-05 PROCEDURE — 6360000002 HC RX W HCPCS: Performed by: HOSPITALIST

## 2024-05-05 PROCEDURE — 2500000003 HC RX 250 WO HCPCS: Performed by: HOSPITALIST

## 2024-05-05 PROCEDURE — 6370000000 HC RX 637 (ALT 250 FOR IP): Performed by: HOSPITALIST

## 2024-05-05 PROCEDURE — 36415 COLL VENOUS BLD VENIPUNCTURE: CPT

## 2024-05-05 RX ORDER — TRAMADOL HYDROCHLORIDE 50 MG/1
50 TABLET ORAL 3 TIMES DAILY
Status: DISCONTINUED | OUTPATIENT
Start: 2024-05-05 | End: 2024-05-08 | Stop reason: HOSPADM

## 2024-05-05 RX ADMIN — CALCIUM ACETATE 667 MG: 667 CAPSULE ORAL at 17:24

## 2024-05-05 RX ADMIN — BUSPIRONE HYDROCHLORIDE 5 MG: 5 TABLET ORAL at 09:14

## 2024-05-05 RX ADMIN — MELATONIN TAB 3 MG 3 MG: 3 TAB at 22:30

## 2024-05-05 RX ADMIN — TRAMADOL HYDROCHLORIDE 50 MG: 50 TABLET, FILM COATED ORAL at 09:14

## 2024-05-05 RX ADMIN — HEPARIN SODIUM 5000 UNITS: 5000 INJECTION INTRAVENOUS; SUBCUTANEOUS at 21:20

## 2024-05-05 RX ADMIN — TRAMADOL HYDROCHLORIDE 50 MG: 50 TABLET, FILM COATED ORAL at 14:31

## 2024-05-05 RX ADMIN — ATORVASTATIN CALCIUM 20 MG: 20 TABLET, FILM COATED ORAL at 21:19

## 2024-05-05 RX ADMIN — SODIUM CHLORIDE, PRESERVATIVE FREE 10 ML: 5 INJECTION INTRAVENOUS at 21:23

## 2024-05-05 RX ADMIN — OXYCODONE AND ACETAMINOPHEN 2 TABLET: 5; 325 TABLET ORAL at 05:28

## 2024-05-05 RX ADMIN — PIPERACILLIN AND TAZOBACTAM 3375 MG: 3; .375 INJECTION, POWDER, LYOPHILIZED, FOR SOLUTION INTRAVENOUS at 17:26

## 2024-05-05 RX ADMIN — HEPARIN SODIUM 5000 UNITS: 5000 INJECTION INTRAVENOUS; SUBCUTANEOUS at 14:30

## 2024-05-05 RX ADMIN — HEPARIN SODIUM 5000 UNITS: 5000 INJECTION INTRAVENOUS; SUBCUTANEOUS at 05:29

## 2024-05-05 RX ADMIN — BUSPIRONE HYDROCHLORIDE 5 MG: 5 TABLET ORAL at 21:20

## 2024-05-05 RX ADMIN — HYDROMORPHONE HYDROCHLORIDE 1 MG: 1 INJECTION, SOLUTION INTRAMUSCULAR; INTRAVENOUS; SUBCUTANEOUS at 22:30

## 2024-05-05 RX ADMIN — CALCIUM ACETATE 667 MG: 667 CAPSULE ORAL at 09:14

## 2024-05-05 RX ADMIN — PIPERACILLIN AND TAZOBACTAM 3375 MG: 3; .375 INJECTION, POWDER, LYOPHILIZED, FOR SOLUTION INTRAVENOUS at 03:59

## 2024-05-05 RX ADMIN — PANTOPRAZOLE SODIUM 40 MG: 40 TABLET, DELAYED RELEASE ORAL at 17:24

## 2024-05-05 RX ADMIN — PANTOPRAZOLE SODIUM 40 MG: 40 TABLET, DELAYED RELEASE ORAL at 05:28

## 2024-05-05 RX ADMIN — NALOXEGOL OXALATE 12.5 MG: 25 TABLET, FILM COATED ORAL at 05:29

## 2024-05-05 RX ADMIN — METOPROLOL SUCCINATE 50 MG: 50 TABLET, EXTENDED RELEASE ORAL at 21:19

## 2024-05-05 RX ADMIN — CALCIUM ACETATE 667 MG: 667 CAPSULE ORAL at 13:00

## 2024-05-05 RX ADMIN — TRAMADOL HYDROCHLORIDE 50 MG: 50 TABLET, FILM COATED ORAL at 21:20

## 2024-05-05 RX ADMIN — NIFEDIPINE 90 MG: 30 TABLET, FILM COATED, EXTENDED RELEASE ORAL at 09:14

## 2024-05-05 RX ADMIN — SODIUM CHLORIDE: 9 INJECTION, SOLUTION INTRAVENOUS at 03:57

## 2024-05-05 RX ADMIN — HYDROMORPHONE HYDROCHLORIDE 1 MG: 1 INJECTION, SOLUTION INTRAMUSCULAR; INTRAVENOUS; SUBCUTANEOUS at 06:42

## 2024-05-05 ASSESSMENT — PAIN DESCRIPTION - ONSET: ONSET: ON-GOING

## 2024-05-05 ASSESSMENT — PAIN DESCRIPTION - ORIENTATION
ORIENTATION: RIGHT;LOWER
ORIENTATION: LOWER
ORIENTATION: LOWER;MID
ORIENTATION: RIGHT

## 2024-05-05 ASSESSMENT — PAIN SCALES - GENERAL
PAINLEVEL_OUTOF10: 0
PAINLEVEL_OUTOF10: 6
PAINLEVEL_OUTOF10: 7
PAINLEVEL_OUTOF10: 7
PAINLEVEL_OUTOF10: 8
PAINLEVEL_OUTOF10: 7

## 2024-05-05 ASSESSMENT — PAIN DESCRIPTION - LOCATION
LOCATION: ABDOMEN

## 2024-05-05 ASSESSMENT — PAIN DESCRIPTION - DESCRIPTORS
DESCRIPTORS: ACHING
DESCRIPTORS: ACHING
DESCRIPTORS: SQUEEZING;PRESSURE
DESCRIPTORS: ACHING
DESCRIPTORS: ACHING

## 2024-05-05 ASSESSMENT — PAIN SCALES - WONG BAKER: WONGBAKER_NUMERICALRESPONSE: HURTS A LITTLE BIT

## 2024-05-05 ASSESSMENT — PAIN DESCRIPTION - FREQUENCY: FREQUENCY: INTERMITTENT

## 2024-05-05 ASSESSMENT — PAIN - FUNCTIONAL ASSESSMENT: PAIN_FUNCTIONAL_ASSESSMENT: ACTIVITIES ARE NOT PREVENTED

## 2024-05-06 LAB
ANION GAP SERPL CALCULATED.3IONS-SCNC: 18 MMOL/L (ref 3–16)
BASOPHILS # BLD: 0 K/UL (ref 0–0.2)
BASOPHILS NFR BLD: 0.9 %
BUN SERPL-MCNC: 39 MG/DL (ref 7–20)
CALCIUM SERPL-MCNC: 8.8 MG/DL (ref 8.3–10.6)
CHLORIDE SERPL-SCNC: 93 MMOL/L (ref 99–110)
CO2 SERPL-SCNC: 21 MMOL/L (ref 21–32)
CREAT SERPL-MCNC: 7.5 MG/DL (ref 0.8–1.3)
DEPRECATED RDW RBC AUTO: 17 % (ref 12.4–15.4)
EOSINOPHIL # BLD: 0.1 K/UL (ref 0–0.6)
EOSINOPHIL NFR BLD: 2 %
GFR SERPLBLD CREATININE-BSD FMLA CKD-EPI: 7 ML/MIN/{1.73_M2}
GLUCOSE BLD-MCNC: 118 MG/DL (ref 70–99)
GLUCOSE BLD-MCNC: 137 MG/DL (ref 70–99)
GLUCOSE BLD-MCNC: 156 MG/DL (ref 70–99)
GLUCOSE BLD-MCNC: 172 MG/DL (ref 70–99)
GLUCOSE SERPL-MCNC: 110 MG/DL (ref 70–99)
HCT VFR BLD AUTO: 37.1 % (ref 40.5–52.5)
HGB BLD-MCNC: 11.9 G/DL (ref 13.5–17.5)
LYMPHOCYTES # BLD: 0.7 K/UL (ref 1–5.1)
LYMPHOCYTES NFR BLD: 14.8 %
MCH RBC QN AUTO: 28 PG (ref 26–34)
MCHC RBC AUTO-ENTMCNC: 31.9 G/DL (ref 31–36)
MCV RBC AUTO: 87.6 FL (ref 80–100)
MONOCYTES # BLD: 0.6 K/UL (ref 0–1.3)
MONOCYTES NFR BLD: 11.8 %
NEUTROPHILS # BLD: 3.3 K/UL (ref 1.7–7.7)
NEUTROPHILS NFR BLD: 70.5 %
PERFORMED ON: ABNORMAL
PLATELET # BLD AUTO: 174 K/UL (ref 135–450)
PMV BLD AUTO: 8 FL (ref 5–10.5)
POTASSIUM SERPL-SCNC: 4.2 MMOL/L (ref 3.5–5.1)
RBC # BLD AUTO: 4.24 M/UL (ref 4.2–5.9)
SODIUM SERPL-SCNC: 132 MMOL/L (ref 136–145)
WBC # BLD AUTO: 4.7 K/UL (ref 4–11)

## 2024-05-06 PROCEDURE — 80048 BASIC METABOLIC PNL TOTAL CA: CPT

## 2024-05-06 PROCEDURE — 2500000003 HC RX 250 WO HCPCS: Performed by: HOSPITALIST

## 2024-05-06 PROCEDURE — 6360000002 HC RX W HCPCS: Performed by: HOSPITALIST

## 2024-05-06 PROCEDURE — 99233 SBSQ HOSP IP/OBS HIGH 50: CPT | Performed by: INTERNAL MEDICINE

## 2024-05-06 PROCEDURE — 6360000002 HC RX W HCPCS: Performed by: INTERNAL MEDICINE

## 2024-05-06 PROCEDURE — 85025 COMPLETE CBC W/AUTO DIFF WBC: CPT

## 2024-05-06 PROCEDURE — 6370000000 HC RX 637 (ALT 250 FOR IP): Performed by: HOSPITALIST

## 2024-05-06 PROCEDURE — 6370000000 HC RX 637 (ALT 250 FOR IP): Performed by: PHYSICIAN ASSISTANT

## 2024-05-06 PROCEDURE — 99232 SBSQ HOSP IP/OBS MODERATE 35: CPT | Performed by: SURGERY

## 2024-05-06 PROCEDURE — 2580000003 HC RX 258: Performed by: INTERNAL MEDICINE

## 2024-05-06 PROCEDURE — 36415 COLL VENOUS BLD VENIPUNCTURE: CPT

## 2024-05-06 PROCEDURE — 1200000000 HC SEMI PRIVATE

## 2024-05-06 PROCEDURE — 6360000002 HC RX W HCPCS: Performed by: STUDENT IN AN ORGANIZED HEALTH CARE EDUCATION/TRAINING PROGRAM

## 2024-05-06 PROCEDURE — APPSS15 APP SPLIT SHARED TIME 0-15 MINUTES: Performed by: NURSE PRACTITIONER

## 2024-05-06 PROCEDURE — APPNB30 APP NON BILLABLE TIME 0-30 MINS: Performed by: NURSE PRACTITIONER

## 2024-05-06 PROCEDURE — 2580000003 HC RX 258: Performed by: HOSPITALIST

## 2024-05-06 PROCEDURE — 6370000000 HC RX 637 (ALT 250 FOR IP): Performed by: INTERNAL MEDICINE

## 2024-05-06 PROCEDURE — 6370000000 HC RX 637 (ALT 250 FOR IP): Performed by: SURGERY

## 2024-05-06 PROCEDURE — 6370000000 HC RX 637 (ALT 250 FOR IP): Performed by: NURSE PRACTITIONER

## 2024-05-06 RX ORDER — AMOXICILLIN AND CLAVULANATE POTASSIUM 500; 125 MG/1; MG/1
1 TABLET, FILM COATED ORAL EVERY 12 HOURS SCHEDULED
Status: DISCONTINUED | OUTPATIENT
Start: 2024-05-06 | End: 2024-05-06 | Stop reason: DRUGHIGH

## 2024-05-06 RX ORDER — AMOXICILLIN AND CLAVULANATE POTASSIUM 500; 125 MG/1; MG/1
1 TABLET, FILM COATED ORAL
Status: DISCONTINUED | OUTPATIENT
Start: 2024-05-06 | End: 2024-05-06

## 2024-05-06 RX ORDER — HYDROMORPHONE HYDROCHLORIDE 1 MG/ML
0.5 INJECTION, SOLUTION INTRAMUSCULAR; INTRAVENOUS; SUBCUTANEOUS ONCE
Status: COMPLETED | OUTPATIENT
Start: 2024-05-06 | End: 2024-05-06

## 2024-05-06 RX ORDER — DOCUSATE SODIUM 100 MG/1
100 CAPSULE, LIQUID FILLED ORAL 2 TIMES DAILY
Status: DISCONTINUED | OUTPATIENT
Start: 2024-05-06 | End: 2024-05-08 | Stop reason: HOSPADM

## 2024-05-06 RX ORDER — POLYETHYLENE GLYCOL 3350 17 G/17G
17 POWDER, FOR SOLUTION ORAL DAILY
Status: DISCONTINUED | OUTPATIENT
Start: 2024-05-06 | End: 2024-05-06

## 2024-05-06 RX ORDER — AMOXICILLIN AND CLAVULANATE POTASSIUM 500; 125 MG/1; MG/1
1 TABLET, FILM COATED ORAL EVERY 12 HOURS SCHEDULED
Qty: 14 TABLET | Refills: 0 | Status: SHIPPED | OUTPATIENT
Start: 2024-05-06 | End: 2024-05-06 | Stop reason: HOSPADM

## 2024-05-06 RX ORDER — POLYETHYLENE GLYCOL 3350 17 G/17G
17 POWDER, FOR SOLUTION ORAL 2 TIMES DAILY
Status: DISCONTINUED | OUTPATIENT
Start: 2024-05-06 | End: 2024-05-08 | Stop reason: HOSPADM

## 2024-05-06 RX ADMIN — PANTOPRAZOLE SODIUM 40 MG: 40 TABLET, DELAYED RELEASE ORAL at 05:43

## 2024-05-06 RX ADMIN — CALCIUM ACETATE 667 MG: 667 CAPSULE ORAL at 18:25

## 2024-05-06 RX ADMIN — HYDROMORPHONE HYDROCHLORIDE 0.5 MG: 1 INJECTION, SOLUTION INTRAMUSCULAR; INTRAVENOUS; SUBCUTANEOUS at 18:25

## 2024-05-06 RX ADMIN — BUSPIRONE HYDROCHLORIDE 5 MG: 5 TABLET ORAL at 20:51

## 2024-05-06 RX ADMIN — CALCIUM ACETATE 667 MG: 667 CAPSULE ORAL at 10:28

## 2024-05-06 RX ADMIN — NIFEDIPINE 90 MG: 30 TABLET, FILM COATED, EXTENDED RELEASE ORAL at 10:28

## 2024-05-06 RX ADMIN — NALOXEGOL OXALATE 12.5 MG: 25 TABLET, FILM COATED ORAL at 05:42

## 2024-05-06 RX ADMIN — TRAMADOL HYDROCHLORIDE 50 MG: 50 TABLET, FILM COATED ORAL at 14:21

## 2024-05-06 RX ADMIN — SODIUM CHLORIDE, PRESERVATIVE FREE 10 ML: 5 INJECTION INTRAVENOUS at 20:53

## 2024-05-06 RX ADMIN — AMOXICILLIN AND CLAVULANATE POTASSIUM 1 TABLET: 500; 125 TABLET, FILM COATED ORAL at 15:46

## 2024-05-06 RX ADMIN — POLYETHYLENE GLYCOL 3350 17 G: 17 POWDER, FOR SOLUTION ORAL at 20:53

## 2024-05-06 RX ADMIN — SODIUM CHLORIDE, PRESERVATIVE FREE 10 ML: 5 INJECTION INTRAVENOUS at 10:28

## 2024-05-06 RX ADMIN — PANTOPRAZOLE SODIUM 40 MG: 40 TABLET, DELAYED RELEASE ORAL at 14:21

## 2024-05-06 RX ADMIN — TRAMADOL HYDROCHLORIDE 50 MG: 50 TABLET, FILM COATED ORAL at 20:51

## 2024-05-06 RX ADMIN — DOCUSATE SODIUM 100 MG: 100 CAPSULE, LIQUID FILLED ORAL at 14:27

## 2024-05-06 RX ADMIN — OXYCODONE AND ACETAMINOPHEN 2 TABLET: 5; 325 TABLET ORAL at 22:15

## 2024-05-06 RX ADMIN — ATORVASTATIN CALCIUM 20 MG: 20 TABLET, FILM COATED ORAL at 20:51

## 2024-05-06 RX ADMIN — BUSPIRONE HYDROCHLORIDE 5 MG: 5 TABLET ORAL at 10:28

## 2024-05-06 RX ADMIN — CALCIUM ACETATE 667 MG: 667 CAPSULE ORAL at 14:27

## 2024-05-06 RX ADMIN — HEPARIN SODIUM 5000 UNITS: 5000 INJECTION INTRAVENOUS; SUBCUTANEOUS at 14:21

## 2024-05-06 RX ADMIN — POLYETHYLENE GLYCOL 3350 17 G: 17 POWDER, FOR SOLUTION ORAL at 14:27

## 2024-05-06 RX ADMIN — METOPROLOL SUCCINATE 50 MG: 50 TABLET, EXTENDED RELEASE ORAL at 20:51

## 2024-05-06 RX ADMIN — OXYCODONE AND ACETAMINOPHEN 2 TABLET: 5; 325 TABLET ORAL at 15:45

## 2024-05-06 RX ADMIN — DOCUSATE SODIUM 100 MG: 100 CAPSULE, LIQUID FILLED ORAL at 20:52

## 2024-05-06 RX ADMIN — PIPERACILLIN AND TAZOBACTAM 3375 MG: 3; .375 INJECTION, POWDER, LYOPHILIZED, FOR SOLUTION INTRAVENOUS at 03:44

## 2024-05-06 RX ADMIN — HEPARIN SODIUM 5000 UNITS: 5000 INJECTION INTRAVENOUS; SUBCUTANEOUS at 20:52

## 2024-05-06 ASSESSMENT — PAIN SCALES - GENERAL
PAINLEVEL_OUTOF10: 7
PAINLEVEL_OUTOF10: 8
PAINLEVEL_OUTOF10: 7
PAINLEVEL_OUTOF10: 7
PAINLEVEL_OUTOF10: 2

## 2024-05-06 ASSESSMENT — PAIN DESCRIPTION - LOCATION
LOCATION: ABDOMEN

## 2024-05-06 ASSESSMENT — ENCOUNTER SYMPTOMS
SHORTNESS OF BREATH: 0
EYE DISCHARGE: 0
EYE REDNESS: 0
ABDOMINAL PAIN: 1
DIARRHEA: 0
SORE THROAT: 0
NAUSEA: 0
BACK PAIN: 0
SINUS PAIN: 0
COUGH: 0
CONSTIPATION: 0
RHINORRHEA: 0
SINUS PRESSURE: 0
WHEEZING: 0

## 2024-05-06 ASSESSMENT — PAIN DESCRIPTION - ORIENTATION: ORIENTATION: RIGHT;LOWER

## 2024-05-06 ASSESSMENT — PAIN DESCRIPTION - DESCRIPTORS: DESCRIPTORS: ACHING

## 2024-05-06 ASSESSMENT — PAIN DESCRIPTION - ONSET: ONSET: ON-GOING

## 2024-05-06 ASSESSMENT — PAIN DESCRIPTION - PAIN TYPE: TYPE: ACUTE PAIN

## 2024-05-06 ASSESSMENT — PAIN - FUNCTIONAL ASSESSMENT: PAIN_FUNCTIONAL_ASSESSMENT: ACTIVITIES ARE NOT PREVENTED

## 2024-05-07 LAB
ALBUMIN SERPL-MCNC: 4.1 G/DL (ref 3.4–5)
ALBUMIN/GLOB SERPL: 1.5 {RATIO} (ref 1.1–2.2)
ALP SERPL-CCNC: 62 U/L (ref 40–129)
ALT SERPL-CCNC: 11 U/L (ref 10–40)
ANION GAP SERPL CALCULATED.3IONS-SCNC: 16 MMOL/L (ref 3–16)
APTT BLD: 34.3 SEC (ref 22.1–36.4)
AST SERPL-CCNC: 10 U/L (ref 15–37)
BACTERIA BLD CULT ORG #2: NORMAL
BACTERIA BLD CULT: NORMAL
BASOPHILS # BLD: 0 K/UL (ref 0–0.2)
BASOPHILS NFR BLD: 0.3 %
BILIRUB SERPL-MCNC: <0.2 MG/DL (ref 0–1)
BUN SERPL-MCNC: 43 MG/DL (ref 7–20)
CALCIUM SERPL-MCNC: 9.1 MG/DL (ref 8.3–10.6)
CHLORIDE SERPL-SCNC: 99 MMOL/L (ref 99–110)
CO2 SERPL-SCNC: 21 MMOL/L (ref 21–32)
CREAT SERPL-MCNC: 8.3 MG/DL (ref 0.8–1.3)
CRP SERPL-MCNC: 8.4 MG/L (ref 0–5.1)
DEPRECATED RDW RBC AUTO: 16.6 % (ref 12.4–15.4)
EOSINOPHIL # BLD: 0.1 K/UL (ref 0–0.6)
EOSINOPHIL NFR BLD: 2.5 %
ERYTHROCYTE [SEDIMENTATION RATE] IN BLOOD BY WESTERGREN METHOD: 28 MM/HR (ref 0–20)
GFR SERPLBLD CREATININE-BSD FMLA CKD-EPI: 7 ML/MIN/{1.73_M2}
GLUCOSE BLD-MCNC: 112 MG/DL (ref 70–99)
GLUCOSE BLD-MCNC: 155 MG/DL (ref 70–99)
GLUCOSE BLD-MCNC: 99 MG/DL (ref 70–99)
GLUCOSE SERPL-MCNC: 104 MG/DL (ref 70–99)
HCT VFR BLD AUTO: 34.6 % (ref 40.5–52.5)
HGB BLD-MCNC: 11.3 G/DL (ref 13.5–17.5)
INR PPP: 1.03 (ref 0.85–1.15)
LYMPHOCYTES # BLD: 0.6 K/UL (ref 1–5.1)
LYMPHOCYTES NFR BLD: 19 %
MAGNESIUM SERPL-MCNC: 1.8 MG/DL (ref 1.8–2.4)
MCH RBC QN AUTO: 28.7 PG (ref 26–34)
MCHC RBC AUTO-ENTMCNC: 32.7 G/DL (ref 31–36)
MCV RBC AUTO: 87.8 FL (ref 80–100)
MONOCYTES # BLD: 0.4 K/UL (ref 0–1.3)
MONOCYTES NFR BLD: 12.4 %
NEUTROPHILS # BLD: 2.2 K/UL (ref 1.7–7.7)
NEUTROPHILS NFR BLD: 65.8 %
PERFORMED ON: ABNORMAL
PERFORMED ON: ABNORMAL
PERFORMED ON: NORMAL
PHOSPHATE SERPL-MCNC: 6.2 MG/DL (ref 2.5–4.9)
PLATELET # BLD AUTO: 159 K/UL (ref 135–450)
PMV BLD AUTO: 7.9 FL (ref 5–10.5)
POTASSIUM SERPL-SCNC: 4.3 MMOL/L (ref 3.5–5.1)
PREALB SERPL-MCNC: 30.9 MG/DL (ref 20–40)
PROT SERPL-MCNC: 6.8 G/DL (ref 6.4–8.2)
PROTHROMBIN TIME: 13.7 SEC (ref 11.9–14.9)
RBC # BLD AUTO: 3.94 M/UL (ref 4.2–5.9)
SODIUM SERPL-SCNC: 136 MMOL/L (ref 136–145)
TRANSFERRIN SERPL-MCNC: 126 MG/DL (ref 200–360)
WBC # BLD AUTO: 3.4 K/UL (ref 4–11)

## 2024-05-07 PROCEDURE — 6370000000 HC RX 637 (ALT 250 FOR IP): Performed by: HOSPITALIST

## 2024-05-07 PROCEDURE — 6370000000 HC RX 637 (ALT 250 FOR IP): Performed by: NURSE PRACTITIONER

## 2024-05-07 PROCEDURE — 80053 COMPREHEN METABOLIC PANEL: CPT

## 2024-05-07 PROCEDURE — 99232 SBSQ HOSP IP/OBS MODERATE 35: CPT | Performed by: INTERNAL MEDICINE

## 2024-05-07 PROCEDURE — 1200000000 HC SEMI PRIVATE

## 2024-05-07 PROCEDURE — 6360000002 HC RX W HCPCS: Performed by: HOSPITALIST

## 2024-05-07 PROCEDURE — 84134 ASSAY OF PREALBUMIN: CPT

## 2024-05-07 PROCEDURE — 2500000003 HC RX 250 WO HCPCS: Performed by: HOSPITALIST

## 2024-05-07 PROCEDURE — 83735 ASSAY OF MAGNESIUM: CPT

## 2024-05-07 PROCEDURE — 2580000003 HC RX 258: Performed by: PHYSICIAN ASSISTANT

## 2024-05-07 PROCEDURE — 90935 HEMODIALYSIS ONE EVALUATION: CPT

## 2024-05-07 PROCEDURE — 85652 RBC SED RATE AUTOMATED: CPT

## 2024-05-07 PROCEDURE — APPSS15 APP SPLIT SHARED TIME 0-15 MINUTES: Performed by: NURSE PRACTITIONER

## 2024-05-07 PROCEDURE — 6370000000 HC RX 637 (ALT 250 FOR IP): Performed by: SURGERY

## 2024-05-07 PROCEDURE — 85025 COMPLETE CBC W/AUTO DIFF WBC: CPT

## 2024-05-07 PROCEDURE — 86140 C-REACTIVE PROTEIN: CPT

## 2024-05-07 PROCEDURE — 83605 ASSAY OF LACTIC ACID: CPT

## 2024-05-07 PROCEDURE — 6360000002 HC RX W HCPCS: Performed by: PHYSICIAN ASSISTANT

## 2024-05-07 PROCEDURE — 6370000000 HC RX 637 (ALT 250 FOR IP): Performed by: INTERNAL MEDICINE

## 2024-05-07 PROCEDURE — 84100 ASSAY OF PHOSPHORUS: CPT

## 2024-05-07 PROCEDURE — 85730 THROMBOPLASTIN TIME PARTIAL: CPT

## 2024-05-07 PROCEDURE — 85610 PROTHROMBIN TIME: CPT

## 2024-05-07 PROCEDURE — APPNB30 APP NON BILLABLE TIME 0-30 MINS: Performed by: NURSE PRACTITIONER

## 2024-05-07 PROCEDURE — 99232 SBSQ HOSP IP/OBS MODERATE 35: CPT | Performed by: SURGERY

## 2024-05-07 PROCEDURE — 84466 ASSAY OF TRANSFERRIN: CPT

## 2024-05-07 RX ADMIN — CALCIUM ACETATE 667 MG: 667 CAPSULE ORAL at 12:51

## 2024-05-07 RX ADMIN — METOPROLOL SUCCINATE 50 MG: 50 TABLET, EXTENDED RELEASE ORAL at 20:20

## 2024-05-07 RX ADMIN — HEPARIN SODIUM 5000 UNITS: 5000 INJECTION INTRAVENOUS; SUBCUTANEOUS at 05:48

## 2024-05-07 RX ADMIN — OXYCODONE AND ACETAMINOPHEN 2 TABLET: 5; 325 TABLET ORAL at 16:29

## 2024-05-07 RX ADMIN — TRAMADOL HYDROCHLORIDE 50 MG: 50 TABLET, FILM COATED ORAL at 20:21

## 2024-05-07 RX ADMIN — CALCIUM ACETATE 667 MG: 667 CAPSULE ORAL at 16:30

## 2024-05-07 RX ADMIN — BUSPIRONE HYDROCHLORIDE 5 MG: 5 TABLET ORAL at 20:20

## 2024-05-07 RX ADMIN — OXYCODONE AND ACETAMINOPHEN 2 TABLET: 5; 325 TABLET ORAL at 21:09

## 2024-05-07 RX ADMIN — NALOXEGOL OXALATE 12.5 MG: 25 TABLET, FILM COATED ORAL at 05:48

## 2024-05-07 RX ADMIN — PIPERACILLIN AND TAZOBACTAM 3375 MG: 3; .375 INJECTION, POWDER, LYOPHILIZED, FOR SOLUTION INTRAVENOUS at 15:53

## 2024-05-07 RX ADMIN — POLYETHYLENE GLYCOL 3350 17 G: 17 POWDER, FOR SOLUTION ORAL at 20:21

## 2024-05-07 RX ADMIN — DOCUSATE SODIUM 100 MG: 100 CAPSULE, LIQUID FILLED ORAL at 20:20

## 2024-05-07 RX ADMIN — HEPARIN SODIUM 5000 UNITS: 5000 INJECTION INTRAVENOUS; SUBCUTANEOUS at 20:20

## 2024-05-07 RX ADMIN — PANTOPRAZOLE SODIUM 40 MG: 40 TABLET, DELAYED RELEASE ORAL at 16:30

## 2024-05-07 RX ADMIN — OXYCODONE AND ACETAMINOPHEN 2 TABLET: 5; 325 TABLET ORAL at 03:58

## 2024-05-07 RX ADMIN — PANTOPRAZOLE SODIUM 40 MG: 40 TABLET, DELAYED RELEASE ORAL at 05:48

## 2024-05-07 RX ADMIN — NIFEDIPINE 90 MG: 30 TABLET, FILM COATED, EXTENDED RELEASE ORAL at 12:53

## 2024-05-07 RX ADMIN — ATORVASTATIN CALCIUM 20 MG: 20 TABLET, FILM COATED ORAL at 20:20

## 2024-05-07 RX ADMIN — HEPARIN SODIUM 5000 UNITS: 5000 INJECTION INTRAVENOUS; SUBCUTANEOUS at 12:57

## 2024-05-07 RX ADMIN — OXYCODONE AND ACETAMINOPHEN 2 TABLET: 5; 325 TABLET ORAL at 12:50

## 2024-05-07 RX ADMIN — TRAMADOL HYDROCHLORIDE 50 MG: 50 TABLET, FILM COATED ORAL at 15:51

## 2024-05-07 ASSESSMENT — PAIN DESCRIPTION - LOCATION
LOCATION: ABDOMEN

## 2024-05-07 ASSESSMENT — ENCOUNTER SYMPTOMS
RHINORRHEA: 0
BACK PAIN: 0
NAUSEA: 0
SORE THROAT: 0
SINUS PAIN: 0
COUGH: 0
EYE DISCHARGE: 0
CONSTIPATION: 0
EYE REDNESS: 0
WHEEZING: 0
ABDOMINAL PAIN: 0
SINUS PRESSURE: 0
DIARRHEA: 0
SHORTNESS OF BREATH: 0

## 2024-05-07 ASSESSMENT — PAIN DESCRIPTION - ORIENTATION
ORIENTATION: RIGHT
ORIENTATION: RIGHT

## 2024-05-07 ASSESSMENT — PAIN SCALES - GENERAL
PAINLEVEL_OUTOF10: 5
PAINLEVEL_OUTOF10: 7
PAINLEVEL_OUTOF10: 7
PAINLEVEL_OUTOF10: 4
PAINLEVEL_OUTOF10: 7
PAINLEVEL_OUTOF10: 7
PAINLEVEL_OUTOF10: 2

## 2024-05-07 ASSESSMENT — PAIN DESCRIPTION - DESCRIPTORS
DESCRIPTORS: ACHING

## 2024-05-07 NOTE — CARE COORDINATION
IMM Letter       05/07/24 1527   IMM Letter   IMM Letter given to Patient/Family/Significant other/Guardian/POA/by: Patient   IMM Letter date given: 05/07/24   IMM Letter time given: 1520     PHILIPP Santillan RN    [facility]  Phone: [phone]

## 2024-05-08 VITALS
DIASTOLIC BLOOD PRESSURE: 85 MMHG | HEART RATE: 78 BPM | BODY MASS INDEX: 31.22 KG/M2 | HEIGHT: 77 IN | TEMPERATURE: 98.5 F | WEIGHT: 264.4 LBS | SYSTOLIC BLOOD PRESSURE: 132 MMHG | OXYGEN SATURATION: 96 % | RESPIRATION RATE: 18 BRPM

## 2024-05-08 LAB
ANION GAP SERPL CALCULATED.3IONS-SCNC: 14 MMOL/L (ref 3–16)
BASOPHILS # BLD: 0 K/UL (ref 0–0.2)
BASOPHILS NFR BLD: 0.7 %
BUN SERPL-MCNC: 26 MG/DL (ref 7–20)
CALCIUM SERPL-MCNC: 9.1 MG/DL (ref 8.3–10.6)
CHLORIDE SERPL-SCNC: 96 MMOL/L (ref 99–110)
CO2 SERPL-SCNC: 24 MMOL/L (ref 21–32)
CREAT SERPL-MCNC: 5.8 MG/DL (ref 0.8–1.3)
DEPRECATED RDW RBC AUTO: 16.6 % (ref 12.4–15.4)
EOSINOPHIL # BLD: 0.1 K/UL (ref 0–0.6)
EOSINOPHIL NFR BLD: 2.2 %
GFR SERPLBLD CREATININE-BSD FMLA CKD-EPI: 10 ML/MIN/{1.73_M2}
GLUCOSE BLD-MCNC: 104 MG/DL (ref 70–99)
GLUCOSE BLD-MCNC: 97 MG/DL (ref 70–99)
GLUCOSE SERPL-MCNC: 112 MG/DL (ref 70–99)
HCT VFR BLD AUTO: 36 % (ref 40.5–52.5)
HGB BLD-MCNC: 12 G/DL (ref 13.5–17.5)
LYMPHOCYTES # BLD: 0.7 K/UL (ref 1–5.1)
LYMPHOCYTES NFR BLD: 19 %
MCH RBC QN AUTO: 29.1 PG (ref 26–34)
MCHC RBC AUTO-ENTMCNC: 33.4 G/DL (ref 31–36)
MCV RBC AUTO: 87.1 FL (ref 80–100)
MONOCYTES # BLD: 0.5 K/UL (ref 0–1.3)
MONOCYTES NFR BLD: 12.6 %
NEUTROPHILS # BLD: 2.4 K/UL (ref 1.7–7.7)
NEUTROPHILS NFR BLD: 65.5 %
PERFORMED ON: ABNORMAL
PERFORMED ON: NORMAL
PLATELET # BLD AUTO: 188 K/UL (ref 135–450)
PMV BLD AUTO: 7.7 FL (ref 5–10.5)
POTASSIUM SERPL-SCNC: 4.6 MMOL/L (ref 3.5–5.1)
RBC # BLD AUTO: 4.13 M/UL (ref 4.2–5.9)
SODIUM SERPL-SCNC: 134 MMOL/L (ref 136–145)
WBC # BLD AUTO: 3.7 K/UL (ref 4–11)

## 2024-05-08 PROCEDURE — 99232 SBSQ HOSP IP/OBS MODERATE 35: CPT | Performed by: INTERNAL MEDICINE

## 2024-05-08 PROCEDURE — 2580000003 HC RX 258: Performed by: PHYSICIAN ASSISTANT

## 2024-05-08 PROCEDURE — APPSS15 APP SPLIT SHARED TIME 0-15 MINUTES: Performed by: NURSE PRACTITIONER

## 2024-05-08 PROCEDURE — 6370000000 HC RX 637 (ALT 250 FOR IP): Performed by: INTERNAL MEDICINE

## 2024-05-08 PROCEDURE — 6360000002 HC RX W HCPCS: Performed by: HOSPITALIST

## 2024-05-08 PROCEDURE — APPNB30 APP NON BILLABLE TIME 0-30 MINS: Performed by: NURSE PRACTITIONER

## 2024-05-08 PROCEDURE — 80048 BASIC METABOLIC PNL TOTAL CA: CPT

## 2024-05-08 PROCEDURE — 99232 SBSQ HOSP IP/OBS MODERATE 35: CPT | Performed by: SURGERY

## 2024-05-08 PROCEDURE — 85025 COMPLETE CBC W/AUTO DIFF WBC: CPT

## 2024-05-08 PROCEDURE — 6370000000 HC RX 637 (ALT 250 FOR IP): Performed by: SURGERY

## 2024-05-08 PROCEDURE — 2500000003 HC RX 250 WO HCPCS: Performed by: HOSPITALIST

## 2024-05-08 PROCEDURE — 6370000000 HC RX 637 (ALT 250 FOR IP): Performed by: NURSE PRACTITIONER

## 2024-05-08 PROCEDURE — 6370000000 HC RX 637 (ALT 250 FOR IP): Performed by: HOSPITALIST

## 2024-05-08 PROCEDURE — 6360000002 HC RX W HCPCS: Performed by: PHYSICIAN ASSISTANT

## 2024-05-08 RX ORDER — AMOXICILLIN AND CLAVULANATE POTASSIUM 500; 125 MG/1; MG/1
1 TABLET, FILM COATED ORAL 2 TIMES DAILY
Qty: 14 TABLET | Refills: 0 | Status: SHIPPED | OUTPATIENT
Start: 2024-05-08 | End: 2024-05-15

## 2024-05-08 RX ORDER — AMOXICILLIN AND CLAVULANATE POTASSIUM 875; 125 MG/1; MG/1
1 TABLET, FILM COATED ORAL 2 TIMES DAILY
Qty: 14 TABLET | Refills: 0 | Status: SHIPPED | OUTPATIENT
Start: 2024-05-08 | End: 2024-05-08 | Stop reason: HOSPADM

## 2024-05-08 RX ADMIN — TRAMADOL HYDROCHLORIDE 50 MG: 50 TABLET, FILM COATED ORAL at 10:02

## 2024-05-08 RX ADMIN — NALOXEGOL OXALATE 12.5 MG: 25 TABLET, FILM COATED ORAL at 06:01

## 2024-05-08 RX ADMIN — POLYETHYLENE GLYCOL 3350 17 G: 17 POWDER, FOR SOLUTION ORAL at 10:01

## 2024-05-08 RX ADMIN — CALCIUM ACETATE 667 MG: 667 CAPSULE ORAL at 10:02

## 2024-05-08 RX ADMIN — NIFEDIPINE 90 MG: 30 TABLET, FILM COATED, EXTENDED RELEASE ORAL at 10:02

## 2024-05-08 RX ADMIN — BUSPIRONE HYDROCHLORIDE 5 MG: 5 TABLET ORAL at 10:02

## 2024-05-08 RX ADMIN — OXYCODONE AND ACETAMINOPHEN 2 TABLET: 5; 325 TABLET ORAL at 04:01

## 2024-05-08 RX ADMIN — DOCUSATE SODIUM 100 MG: 100 CAPSULE, LIQUID FILLED ORAL at 10:02

## 2024-05-08 RX ADMIN — PIPERACILLIN AND TAZOBACTAM 3375 MG: 3; .375 INJECTION, POWDER, LYOPHILIZED, FOR SOLUTION INTRAVENOUS at 03:10

## 2024-05-08 RX ADMIN — CALCIUM ACETATE 667 MG: 667 CAPSULE ORAL at 11:49

## 2024-05-08 RX ADMIN — PANTOPRAZOLE SODIUM 40 MG: 40 TABLET, DELAYED RELEASE ORAL at 06:01

## 2024-05-08 RX ADMIN — HEPARIN SODIUM 5000 UNITS: 5000 INJECTION INTRAVENOUS; SUBCUTANEOUS at 06:02

## 2024-05-08 ASSESSMENT — ENCOUNTER SYMPTOMS
CONSTIPATION: 0
SORE THROAT: 0
RHINORRHEA: 0
WHEEZING: 0
SINUS PRESSURE: 0
BACK PAIN: 0
SHORTNESS OF BREATH: 0
COUGH: 0
NAUSEA: 0
DIARRHEA: 0
EYE REDNESS: 0
ABDOMINAL PAIN: 0
SINUS PAIN: 0
EYE DISCHARGE: 0

## 2024-05-08 ASSESSMENT — PAIN SCALES - GENERAL: PAINLEVEL_OUTOF10: 7

## 2024-05-08 NOTE — PROGRESS NOTES
[facility]        Progress Note    Patient Name: Jorden Woods  MRN: [id number]  YOB: 1960  Date of Evaluation: 2024    Chief Complaint: Abdominal pain    Subjective:  No acute events overnight  Reports to me increased pain again with intake of full liquids  No nausea or vomiting  Passing flatus and stool, denies bloating  Resting in bed in HD at this time, just finished treatment      Vital Signs:  Patient Vitals for the past 24 hrs:   BP Temp Temp src Pulse Resp SpO2 Weight   24 1300 (!) 152/91 98.3 °F (36.8 °C) Oral 95 16 98 % --   24 0812 -- -- -- -- 18 -- --   24 0811 (!) 155/70 98.6 °F (37 °C) -- 62 18 -- 118.5 kg (261 lb 3.9 oz)   24 0645 -- -- -- -- -- -- 118.5 kg (261 lb 3.2 oz)   24 0445 (!) 159/84 98 °F (36.7 °C) Oral 82 18 98 % --   24 0428 (!) 163/88 -- -- -- -- -- --   24 0015 (!) 160/84 97.5 °F (36.4 °C) Oral 77 18 96 % --   24 (!) 163/97 97.9 °F (36.6 °C) Oral 93 16 96 % --        TEMPERATURE HISTORY 24H: Temp (24hrs), Av.1 °F (36.7 °C), Min:97.5 °F (36.4 °C), Max:98.6 °F (37 °C)    BLOOD PRESSURE HISTORY: Systolic (36hrs), Avg:160 , Min:143 , Max:196    Diastolic (36hrs), Av, Min:70, Max:112      Intake/Output:  I/O last 3 completed shifts:  In: 460 [P.O.:460]  Out: 480 [Urine:480]  No intake/output data recorded.  Drain/tube Output:       Physical Exam:  General: awake, alert, oriented to person, place, time  Cardiovascular:  regular rate and rhythm and no murmur noted  Lungs: clear to auscultation  Abdomen: soft, non-distended, mild tenderness in right lower and left lower quadrant, bowel sounds present    Labs:  CBC:    Recent Labs     24   WBC 4.4 3.7* 4.3   HGB 11.5* 11.6* 11.9*   HCT 34.5* 35.2* 36.4*    137 141       BMP:    Recent Labs     24   * 137 133*   K 4.7 4.8 4.6 
               [facility]        Progress Note    Patient Name: Jorden Woods  MRN: [id number]  YOB: 1960  Date of Evaluation: 2024    Chief Complaint: Abdominal pain    Subjective:  No acute events overnight  Pain controlled and improved  No nausea or vomiting, tolerating low fiber diet  Passing flatus  Resting in bed at this time      Vital Signs:  Patient Vitals for the past 24 hrs:   BP Temp Temp src Pulse Resp SpO2 Weight   24 1124 121/75 97.3 °F (36.3 °C) Oral 85 16 97 % --   24 1015 115/71 98.3 °F (36.8 °C) Oral 75 16 96 % --   24 0700 -- -- -- -- -- -- 117 kg (258 lb)   24 0530 111/68 97.7 °F (36.5 °C) Oral 74 16 94 % --   24 0115 134/66 98.7 °F (37.1 °C) Oral 77 16 95 % --   24 2300 -- -- -- -- 16 -- --   24 2230 -- -- -- -- 16 -- --   24 2150 -- -- -- -- 16 -- --   24 2119 (!) 158/69 -- -- 85 -- -- --   24 1945 (!) 158/69 98 °F (36.7 °C) Oral 85 18 94 % --   24 1617 (!) 154/76 98.5 °F (36.9 °C) Oral 82 18 96 % --   24 1501 -- -- -- -- 16 -- --        TEMPERATURE HISTORY 24H: Temp (24hrs), Av.1 °F (36.7 °C), Min:97.3 °F (36.3 °C), Max:98.7 °F (37.1 °C)    BLOOD PRESSURE HISTORY: Systolic (36hrs), Av , Min:111 , Max:158    Diastolic (36hrs), Av, Min:66, Max:83      Intake/Output:  I/O last 3 completed shifts:  In: 573.3 [P.O.:341; I.V.:21.9; IV Piggyback:210.4]  Out: 300 [Urine:300]  I/O this shift:  In: 360 [P.O.:360]  Out: 300 [Urine:300]  Drain/tube Output:       Physical Exam:  General: awake, alert, oriented to person, place, time  Cardiovascular:  regular rate and rhythm and no murmur noted  Lungs: clear to auscultation  Abdomen: soft, non-distended, mild tenderness in right lower quadrant only, bowel sounds present    Labs:  CBC:    Recent Labs     24  0524 24  0459 24  0803   WBC 4.3 3.7* 4.7   HGB 11.9* 11.9* 11.9*   HCT 36.4* 35.7* 37.1*    
               [facility]        Progress Note    Patient Name: Jorden Woods  MRN: [id number]  YOB: 1960  Date of Evaluation: 2024    Chief Complaint: Abdominal pain    Subjective:  No acute events overnight  Reports increased pain overnight but reports as different from pain that he came in with, describes as a \"stomach ache\", better after walking and none at present  No nausea or vomiting, tolerated clear liquid diet but reports that he had stopped drinking due to the pain, would like to try more to eat today  Passing flatus, no stool, denies bloating  Resting in bed at this time, seen in dialysis      Vital Signs:  Patient Vitals for the past 24 hrs:   BP Temp Temp src Pulse Resp SpO2 Weight   24 0835 (!) 155/94 -- -- 80 18 -- --   24 0823 (!) 166/89 98 °F (36.7 °C) -- 80 18 -- 120.5 kg (265 lb 10.5 oz)   24 0800 (!) 169/77 97.7 °F (36.5 °C) Oral 84 18 98 % --   24 0449 (!) 163/93 97.4 °F (36.3 °C) Oral 85 18 95 % 119.9 kg (264 lb 4.8 oz)   24 0407 -- -- -- -- 18 -- --   24 0337 -- -- -- -- 18 -- --   24 2347 (!) 155/87 97.8 °F (36.6 °C) Oral 79 18 96 % --   24 -- -- -- -- 18 -- --   24 -- -- -- -- 18 -- --   24 (!) 158/86 -- -- 87 -- -- --   24 -- -- -- -- 18 -- --   24 (!) 169/97 98.1 °F (36.7 °C) Oral 83 18 95 % --   24 1633 (!) 150/83 98.1 °F (36.7 °C) Oral 80 18 95 % --   24 1412 -- -- -- -- 18 -- --        TEMPERATURE HISTORY 24H: Temp (24hrs), Av.9 °F (36.6 °C), Min:97.4 °F (36.3 °C), Max:98.1 °F (36.7 °C)    BLOOD PRESSURE HISTORY: Systolic (36hrs), Av , Min:150 , Max:169    Diastolic (36hrs), Av, Min:77, Max:97      Intake/Output:  I/O last 3 completed shifts:  In: 738.2 [P.O.:100; I.V.:115.8; IV Piggyback:522.4]  Out: 375 [Urine:375]  No intake/output data recorded.  Drain/tube Output:       Physical Exam:  General: awake, alert, 
               [facility]        Progress Note    Patient Name: Jorden Woods  MRN: [id number]  YOB: 1960  Date of Evaluation: 5/3/2024    Chief Complaint: Abdominal pain    Subjective:  No acute events overnight  Reports increased pain again with intake of full liquids, and then more pain following BM in the right lower quadrant, reports epigastric region is only tender with palpation  No nausea or vomiting  Passing flatus and stool, denies bloating  Resting in bed at this time      Vital Signs:  Patient Vitals for the past 24 hrs:   BP Temp Temp src Pulse Resp SpO2 Weight   24 0915 (!) 143/94 98.4 °F (36.9 °C) Oral 84 16 99 % --   24 0509 (!) 147/88 98.2 °F (36.8 °C) Oral 79 16 95 % 119.8 kg (264 lb 1.6 oz)   24 2006 (!) 152/81 98.2 °F (36.8 °C) Oral 86 16 94 % --   24 1818 -- -- -- -- 18 -- --   24 1748 -- -- -- -- 18 -- --   24 1645 (!) 161/97 98.4 °F (36.9 °C) Oral 92 18 97 % --   24 1244 (!) 156/91 98.8 °F (37.1 °C) Oral 85 18 96 % --        TEMPERATURE HISTORY 24H: Temp (24hrs), Av.4 °F (36.9 °C), Min:98.2 °F (36.8 °C), Max:98.8 °F (37.1 °C)    BLOOD PRESSURE HISTORY: Systolic (36hrs), Av , Min:143 , Max:169    Diastolic (36hrs), Av, Min:77, Max:97      Intake/Output:  No intake/output data recorded.  No intake/output data recorded.  Drain/tube Output:       Physical Exam:  General: awake, alert, oriented to person, place, time  Cardiovascular:  regular rate and rhythm and no murmur noted  Lungs: clear to auscultation  Abdomen: soft, non-distended, mild tenderness in right lower quadrant and epigastric region only, bowel sounds present    Labs:  CBC:    Recent Labs     24  0526 24  0544 24  0522   WBC 3.8* 4.4 3.7*   HGB 11.9* 11.5* 11.6*   HCT 36.5* 34.5* 35.2*   * 151 137       BMP:    Recent Labs     24  0526 24  0544 24  0522    133* 137   K 5.1 4.7 4.8   CL 
               [facility]        Progress Note    Patient Name: Jorden Woods  MRN: [id number]  YOB: 1960  Date of Evaluation: 2024    Chief Complaint: Abdominal pain    Subjective:  No acute events overnight  Pain controlled and improving, no pain at rest currently  No nausea or vomiting, tolerating full liquids  Passing flatus and stool  Resting in bed at this time      Vital Signs:  Patient Vitals for the past 24 hrs:   BP Temp Temp src Pulse Resp SpO2 Weight   24 0507 115/75 98.1 °F (36.7 °C) Oral 74 16 97 % 119.9 kg (264 lb 6.4 oz)   24 2330 109/69 97.9 °F (36.6 °C) Oral 81 16 97 % --   24 125/77 97.8 °F (36.6 °C) Oral 83 18 94 % --   24 1629 -- -- -- -- 16 -- --   24 1551 -- -- -- -- 16 -- --   24 1250 -- -- -- -- 16 -- --   24 1140 (!) 163/91 -- -- 81 16 -- --   24 1136 (!) 135/103 (!) 96.3 °F (35.7 °C) -- 78 16 -- 115.5 kg (254 lb 10.1 oz)        TEMPERATURE HISTORY 24H: Temp (24hrs), Av.5 °F (36.4 °C), Min:96.3 °F (35.7 °C), Max:98.1 °F (36.7 °C)    BLOOD PRESSURE HISTORY: Systolic (36hrs), Av , Min:109 , Max:163    Diastolic (36hrs), Av, Min:69, Max:125      Intake/Output:  I/O last 3 completed shifts:  In: 480 [P.O.:480]  Out: 550 [Urine:550]  No intake/output data recorded.  Drain/tube Output:       Physical Exam:  General: awake, alert, oriented to person, place, time  Cardiovascular:  regular rate and rhythm and no murmur noted  Lungs: clear to auscultation  Abdomen: soft, non-distended, mild right lower quadrant tenderness only, bowel sounds present    Labs:  CBC:    Recent Labs     24  0803 24  0518 24  0514   WBC 4.7 3.4* 3.7*   HGB 11.9* 11.3* 12.0*   HCT 37.1* 34.6* 36.0*    159 188       BMP:    Recent Labs     24  0803 24  0518 2414   * 136 134*   K 4.2 4.3 4.6   CL 93* 99 96*   CO2 21 21 24   BUN 39* 43* 26*   CREATININE 7.5* 
        Hospitalist Progress Note      PCP: Curt Collins MD    Date of Admission: 4/30/2024    Chief Complaint: Abdominal pain    Subjective:   Has some suprapubic/RLQ pain which he rates a 2/10. Feeling better than yesterday. Tolerating clears. Wants to advance diet to solid food (he did have some increased pain with full liquids)      Medications:  Reviewed      Exam:    BP (!) 148/81   Pulse 83   Temp 98.2 °F (36.8 °C) (Oral)   Resp 16   Ht 1.956 m (6' 5\")   Wt 114.8 kg (253 lb)   SpO2 97%   BMI 30.00 kg/m²     General appearance: No apparent distress, appears stated age and cooperative.  HEENT: Pupils equal, round, and reactive to light. Conjunctivae/corneas clear.  Neck: Supple, with full range of motion. No jugular venous distention. Trachea midline.  Respiratory:  Normal respiratory effort. Clear to auscultation, bilaterally without RALES/WHEEZES/Rhonchi.  Cardiovascular: Regular rate and rhythm with normal S1/S2 without MURMURS, rubs or gallops.  Abdomen: Mild tenderness to palpation in the right lower quadrant with no guarding no rigidity  Musculoskeletal: No clubbing, cyanosis or EDEMA bilaterally.  Full range of motion without deformity.  Skin: Skin color, texture, turgor normal.  No rashes or lesions.  Neurologic:  Neurovascularly intact without any focal sensory/motor deficits. Cranial nerves: II-XII intact, grossly non-focal.      Labs:   Recent Labs     05/03/24 0522 05/04/24 0524 05/05/24 0459   WBC 3.7* 4.3 3.7*   HGB 11.6* 11.9* 11.9*   HCT 35.2* 36.4* 35.7*    141 154       Recent Labs     05/03/24 0522 05/04/24 0524 05/05/24 0459    133* 132*   K 4.8 4.6 4.3    99 93*   CO2 23 19* 24   BUN 28* 35* 25*   CREATININE 5.1* 6.7* 5.7*   CALCIUM 9.2 9.5 9.2       Recent Labs     05/03/24 0522   AST 12*   ALT 12   BILITOT 0.3   ALKPHOS 64       Recent Labs     05/04/24 0524   INR 1.03       No results for input(s): \"CKTOTAL\", \"TROPONINI\" in the last 72 
        Hospitalist Progress Note      PCP: Curt Collins MD    Date of Admission: 4/30/2024    Chief Complaint: Abdominal pain-admitted for diverticulitis    Subjective:   Patient seen this am and after lunch. He was feeling well after breakfast. I reassessed him after lunch in preparation for discharge however he was then complaining of 6/10 abdominal pain.      Medications:  Reviewed      Exam:    BP (!) 146/80   Pulse 76   Temp 97.5 °F (36.4 °C) (Oral)   Resp 17   Ht 1.956 m (6' 5\")   Wt 117 kg (258 lb)   SpO2 96%   BMI 30.59 kg/m²     General appearance: No apparent distress, appears stated age and cooperative.  HEENT: Pupils equal, round, and reactive to light. Conjunctivae/corneas clear.  Neck: Supple, with full range of motion. No jugular venous distention. Trachea midline.  Respiratory:  Normal respiratory effort. Clear to auscultation, bilaterally without RALES/WHEEZES/Rhonchi.  Cardiovascular: Regular rate and rhythm with normal S1/S2 without MURMURS, rubs or gallops.  Abdomen: Mild tenderness to palpation in the right lower quadrant with no guarding no rigidity  Musculoskeletal: No clubbing, cyanosis or EDEMA bilaterally.  Full range of motion without deformity.  Skin: Skin color, texture, turgor normal.  No rashes or lesions.  Neurologic:  Neurovascularly intact without any focal sensory/motor deficits. Cranial nerves: II-XII intact, grossly non-focal.      Labs:   Recent Labs     05/04/24 0524 05/05/24  0459 05/06/24  0803   WBC 4.3 3.7* 4.7   HGB 11.9* 11.9* 11.9*   HCT 36.4* 35.7* 37.1*    154 174       Recent Labs     05/04/24  0524 05/05/24  0459 05/06/24  0803   * 132* 132*   K 4.6 4.3 4.2   CL 99 93* 93*   CO2 19* 24 21   BUN 35* 25* 39*   CREATININE 6.7* 5.7* 7.5*   CALCIUM 9.5 9.2 8.8       No results for input(s): \"AST\", \"ALT\", \"BILIDIR\", \"BILITOT\", \"ALKPHOS\" in the last 72 hours.    Recent Labs     05/04/24  0524   INR 1.03       No results for input(s): \"CKTOTAL\", 
        Hospitalist Progress Note      PCP: Curt Collins MD    Date of Admission: 4/30/2024    Chief Complaint: Abdominal pain-admitted for diverticulitis    Subjective:   Patient seen this am while on HD.    Reports belly pain is improved on the clear liquid diet.  Has mild right sided abd pain with palpation  Reports loose stools.          Medications:  Reviewed      Exam:    /76   Pulse 76   Temp 97.9 °F (36.6 °C) (Oral)   Resp 16   Ht 1.956 m (6' 5\")   Wt 117.2 kg (258 lb 4.8 oz)   SpO2 96%   BMI 30.63 kg/m²     General appearance: No apparent distress, appears stated age and cooperative.  HEENT: Pupils equal, round, and reactive to light. Conjunctivae/corneas clear.  Neck: Supple, with full range of motion. No jugular venous distention. Trachea midline.  Respiratory:  Normal respiratory effort. Clear to auscultation, bilaterally without RALES/WHEEZES/Rhonchi.  Cardiovascular: Regular rate and rhythm with normal S1/S2 without MURMURS, rubs or gallops.  Abdomen: Mild tenderness to palpation in the right lower quadrant with no guarding no rigidity  Musculoskeletal: No clubbing, cyanosis or edema, bilaterally.  Full range of motion without deformity.  Skin: Skin color, texture, turgor normal.  No rashes or lesions.  Neurologic:  Neurovascularly intact without any focal sensory/motor deficits. Cranial nerves: II-XII intact, grossly non-focal.      Labs:   Recent Labs     05/05/24  0459 05/06/24  0803 05/07/24  0518   WBC 3.7* 4.7 3.4*   HGB 11.9* 11.9* 11.3*   HCT 35.7* 37.1* 34.6*    174 159       Recent Labs     05/05/24  0459 05/06/24  0803 05/07/24  0518   * 132* 136   K 4.3 4.2 4.3   CL 93* 93* 99   CO2 24 21 21   BUN 25* 39* 43*   CREATININE 5.7* 7.5* 8.3*   CALCIUM 9.2 8.8 9.1   PHOS  --   --  6.2*       Recent Labs     05/07/24  0518   AST 10*   ALT 11   BILITOT <0.2   ALKPHOS 62       Recent Labs     05/07/24  0518   INR 1.03       No results for input(s): \"CKTOTAL\", 
      Hospitalist Progress Note      PCP: Curt Collins MD    Date of Admission: 4/30/2024    Chief Complaint: Abdominal pain    Hospital Course: 62 yo M with ESRD on HD, DM 2, HTN, chronic pain syndrome, obesity who came to ER with abdominal pain.  Admitted as inpatient for acute diverticulitis.  Started on IV Abx.  Followed by Renal, Surgery and Urology.     Subjective:  Patient seen at HD.  Patient with 3-4/10 lower abdominal pain.  Tolerated clears.  No CP, SOB, HA or fevers.  No diarrhea or vomiting.       Medications:  Reviewed    Infusion Medications    dextrose      sodium chloride       Scheduled Medications    insulin lispro  0-8 Units SubCUTAneous TID WC    insulin lispro  0-4 Units SubCUTAneous Nightly    metroNIDAZOLE  500 mg IntraVENous Q8H    atorvastatin  20 mg Oral Nightly    busPIRone  5 mg Oral BID    calcium acetate  1 capsule Oral TID WC    metoprolol succinate  50 mg Oral Nightly    naloxegol  12.5 mg Oral QAM AC    NIFEdipine  90 mg Oral Daily    pantoprazole  40 mg Oral BID AC    sodium chloride flush  5-40 mL IntraVENous 2 times per day    heparin (porcine)  5,000 Units SubCUTAneous 3 times per day    levofloxacin  500 mg IntraVENous Q48H     PRN Meds: dextrose bolus **OR** dextrose bolus, glucagon (rDNA), dextrose, oxyCODONE-acetaminophen, sodium chloride flush, sodium chloride, ondansetron **OR** ondansetron, aluminum & magnesium hydroxide-simethicone, acetaminophen **OR** acetaminophen, melatonin, HYDROmorphone      Intake/Output Summary (Last 24 hours) at 5/2/2024 1039  Last data filed at 5/1/2024 1636  Gross per 24 hour   Intake 633.23 ml   Output 150 ml   Net 483.23 ml         Physical Exam Performed:    BP (!) 155/94   Pulse 80   Temp 98 °F (36.7 °C)   Resp 18   Ht 1.956 m (6' 5\")   Wt 120.5 kg (265 lb 10.5 oz)   SpO2 98%   BMI 31.50 kg/m²     General appearance: No apparent distress, appears stated age and cooperative.  HEENT: Pupils equal, round, and reactive to light. 
      Hospitalist Progress Note      PCP: Curt Collins MD    Date of Admission: 4/30/2024    Chief Complaint: Abdominal pain    Hospital Course: 62 yo M with ESRD on HD, DM 2, HTN, chronic pain syndrome, obesity who came to ER with abdominal pain.  Admitted as inpatient for acute diverticulitis.  Started on IV Abx.  Followed by Renal, Surgery and Urology.     Subjective:  Patient with 4/10 lower abdominal pain.  No CP, SOB, HA or fevers.  No diarrhea or vomiting.       Medications:  Reviewed    Infusion Medications    sodium chloride       Scheduled Medications    metroNIDAZOLE  500 mg IntraVENous Q8H    atorvastatin  20 mg Oral Nightly    busPIRone  5 mg Oral BID    calcium acetate  1 capsule Oral TID WC    metoprolol succinate  50 mg Oral Nightly    naloxegol  12.5 mg Oral QAM AC    NIFEdipine  90 mg Oral Daily    pantoprazole  40 mg Oral BID AC    sodium chloride flush  5-40 mL IntraVENous 2 times per day    heparin (porcine)  5,000 Units SubCUTAneous 3 times per day    [START ON 5/2/2024] levofloxacin  500 mg IntraVENous Q48H     PRN Meds: oxyCODONE-acetaminophen, sodium chloride flush, sodium chloride, ondansetron **OR** ondansetron, aluminum & magnesium hydroxide-simethicone, acetaminophen **OR** acetaminophen, melatonin, HYDROmorphone      Intake/Output Summary (Last 24 hours) at 5/1/2024 1749  Last data filed at 5/1/2024 1636  Gross per 24 hour   Intake 738.23 ml   Output 600 ml   Net 138.23 ml       Physical Exam Performed:    BP (!) 150/83   Pulse 80   Temp 98.1 °F (36.7 °C) (Oral)   Resp 18   Ht 1.956 m (6' 5\")   Wt 119.8 kg (264 lb 1.6 oz)   SpO2 95%   BMI 31.32 kg/m²     General appearance: No apparent distress, appears stated age and cooperative.  HEENT: Pupils equal, round, and reactive to light. Conjunctivae/corneas clear.  Neck: Supple, with full range of motion. No jugular venous distention. Trachea midline.  Respiratory:  Normal respiratory effort. Clear to auscultation, bilaterally 
    Infectious Diseases   Progress Note      Admission Date: 4/30/2024  Hospital Day: Hospital Day: 8   Attending: Miguel Olievr MD  Date of service: 5/7/2024     Chief complaint/ Reason for consult:     Worsening abdominal diverticulitis  End-stage renal disease on hemodialysis  AV fistula in place  Elevated CRP of 33.4  Elevated ESR of 33    Microbiology:        I have reviewed allavailable micro lab data and cultures    Blood culture (2/2) - collected on 4/30/2024: Negative      Antibiotics and immunizations:       Current antibiotics: All antibiotics and their doses were reviewed by me    Recent Abx Admin                     piperacillin-tazobactam (ZOSYN) 3,375 mg in sodium chloride 0.9 % 50 mL IVPB (mini-bag) (mg) 3,375 mg New Bag 05/07/24 1553                      Immunization History: All immunization history was reviewed by me today.    Immunization History   Administered Date(s) Administered    COVID-19, MODERNA KASH border, Primary or Immunocompromised, (age 12y+), IM, 100 mcg/0.5mL 07/17/2021, 08/19/2021    COVID-19, PFIZER Bivalent, DO NOT Dilute, (age 12y+), IM, 30 mcg/0.3 mL 10/19/2022    Pneumococcal, PCV20, PREVNAR 20, (age 6w+), IM, 0.5mL 01/11/2024       Known drug allergies:     All allergies were reviewed and updated    No Known Allergies    Social history:     Social History:  All social andepidemiologic history was reviewed and updated by me today as needed.     Tobacco use:   reports that he quit smoking about 4 years ago. His smoking use included cigars. He started smoking about 44 years ago. He has never been exposed to tobacco smoke. He has never used smokeless tobacco.  Alcohol use:   reports that he does not currently use alcohol.  Currently lives in: [place] [id number]   reports no history of drug use.     COVID VACCINATION AND LAB RESULT RECORDS:     Internal Administration   First Dose COVID-19, MODERNA KASH border, Primary or Immunocompromised, (age 12y+), IM, 100 
  Nephrology progress Note  [phone]  [phone]   [email or web address]       Patient:  Jorden Woods   : 1960    Brief HPI    The patient is a 63 y.o.male with significant past medical history of ESRD, hypertension, DM2, CHF, chronic pain syndrome came in with complaints of abdominal pain for the past 2 days.  Describes it as a constant throbbing yovanny, states it gets worse with movement  CT of the A/P showed short segment of acute diverticulitis in the proximal sigmoid colon  Has been on antibiotics  We are consulted for ESRD management  He is on TTS schedule for dialysis  His last dialysis was as per schedule as outpatient    Subjective/interval history  Patient seen and examined on dialysis  Tolerating well  Diet being advanced today  Denies abdominal pain or nausea or emesis  Blood pressures better controlled    Meds:  Scheduled Meds:   insulin lispro  0-8 Units SubCUTAneous TID WC    insulin lispro  0-4 Units SubCUTAneous Nightly    metroNIDAZOLE  500 mg IntraVENous Q8H    atorvastatin  20 mg Oral Nightly    busPIRone  5 mg Oral BID    calcium acetate  1 capsule Oral TID WC    metoprolol succinate  50 mg Oral Nightly    naloxegol  12.5 mg Oral QAM AC    NIFEdipine  90 mg Oral Daily    pantoprazole  40 mg Oral BID AC    sodium chloride flush  5-40 mL IntraVENous 2 times per day    heparin (porcine)  5,000 Units SubCUTAneous 3 times per day    levofloxacin  500 mg IntraVENous Q48H     Continuous Infusions:   dextrose      sodium chloride       PRN Meds:.dextrose bolus **OR** dextrose bolus, glucagon (rDNA), dextrose, oxyCODONE-acetaminophen, sodium chloride flush, sodium chloride, ondansetron **OR** ondansetron, aluminum & magnesium hydroxide-simethicone, acetaminophen **OR** acetaminophen, melatonin, HYDROmorphone      Vitals:  BP (!) 155/94   Pulse 80   Temp 98 °F (36.7 °C)   Resp 18   Ht 1.956 m (6' 5\")   Wt 120.5 kg (265 lb 10.5 oz)   SpO2 98%   BMI 31.50 kg/m²       Physical Exam  General : 
  Nephrology progress Note  [phone]  [phone]   [email or web address]       Patient:  Jorden Woods   : 1960    Brief HPI    The patient is a 63 y.o.male with significant past medical history of ESRD, hypertension, DM2, CHF, chronic pain syndrome came in with complaints of abdominal pain for the past 2 days.  Describes it as a constant throbbing yovanny, states it gets worse with movement  CT of the A/P showed short segment of acute diverticulitis in the proximal sigmoid colon  Has been on antibiotics  We are consulted for ESRD management  He is on TTS schedule for dialysis  His last dialysis was as per schedule as outpatient    Subjective/interval history  Patient seen and examined  De to HD today    Meds:  Scheduled Meds:   piperacillin-tazobactam  3,375 mg IntraVENous Q12H    insulin lispro  0-8 Units SubCUTAneous TID WC    insulin lispro  0-4 Units SubCUTAneous Nightly    atorvastatin  20 mg Oral Nightly    busPIRone  5 mg Oral BID    calcium acetate  1 capsule Oral TID WC    metoprolol succinate  50 mg Oral Nightly    naloxegol  12.5 mg Oral QAM AC    NIFEdipine  90 mg Oral Daily    pantoprazole  40 mg Oral BID AC    sodium chloride flush  5-40 mL IntraVENous 2 times per day    heparin (porcine)  5,000 Units SubCUTAneous 3 times per day     Continuous Infusions:   dextrose      sodium chloride       PRN Meds:.diatrizoate meglumine-sodium, hydrALAZINE, polyethylene glycol, dextrose bolus **OR** dextrose bolus, glucagon (rDNA), dextrose, oxyCODONE-acetaminophen, sodium chloride flush, sodium chloride, ondansetron **OR** ondansetron, aluminum & magnesium hydroxide-simethicone, acetaminophen **OR** acetaminophen, melatonin, HYDROmorphone      Vitals:  BP (!) 159/84   Pulse 82   Temp 98 °F (36.7 °C) (Oral)   Resp 18   Ht 1.956 m (6' 5\")   Wt 118.5 kg (261 lb 3.2 oz)   SpO2 98%   BMI 30.97 kg/m²         General : AAOx3, not in pain or respiratory distress, on dialysis  HEENT : normocephalic, atraumatic, 
  Nephrology progress Note  [phone]  [phone]   [employer]       Patient:  Jorden Woods   : 1960    Brief HPI    The patient is a 63 y.o.male with significant past medical history of ESRD, hypertension, DM2, CHF, chronic pain syndrome came in with complaints of abdominal pain for the past 2 days.  Describes it as a constant throbbing yovanny, states it gets worse with movement  CT of the A/P showed short segment of acute diverticulitis in the proximal sigmoid colon  Has been on antibiotics  We are consulted for ESRD management  He is on TTS schedule for dialysis  His last dialysis was as per schedule as outpatient    Subjective/interval history  Patient seen and examined  Had dialysis yesterday  Abdominal pain worse  Plans for CT of A/P noted  No nausea/emesis    Meds:  Scheduled Meds:   insulin lispro  0-8 Units SubCUTAneous TID WC    insulin lispro  0-4 Units SubCUTAneous Nightly    metroNIDAZOLE  500 mg IntraVENous Q8H    atorvastatin  20 mg Oral Nightly    busPIRone  5 mg Oral BID    calcium acetate  1 capsule Oral TID WC    metoprolol succinate  50 mg Oral Nightly    naloxegol  12.5 mg Oral QAM AC    NIFEdipine  90 mg Oral Daily    pantoprazole  40 mg Oral BID AC    sodium chloride flush  5-40 mL IntraVENous 2 times per day    heparin (porcine)  5,000 Units SubCUTAneous 3 times per day    levofloxacin  500 mg IntraVENous Q48H     Continuous Infusions:   dextrose      sodium chloride       PRN Meds:.diatrizoate meglumine-sodium, hydrALAZINE, polyethylene glycol, dextrose bolus **OR** dextrose bolus, glucagon (rDNA), dextrose, oxyCODONE-acetaminophen, sodium chloride flush, sodium chloride, ondansetron **OR** ondansetron, aluminum & magnesium hydroxide-simethicone, acetaminophen **OR** acetaminophen, melatonin, HYDROmorphone      Vitals:  BP (!) 196/112   Pulse 83   Temp 97.5 °F (36.4 °C) (Oral)   Resp 16   Ht 1.956 m (6' 5\")   Wt 119.8 kg (264 lb 1.6 oz)   SpO2 97%   BMI 31.32 kg/m² 
  Pharmacy Note - Renal Dosing    Amoxicillin/clavulanate  500-125 mg q12h  for treatment of Intra-abdominal Infection. Per [facility] Renal Dose Adjustment Policy, amoxicillin/clavulanate will be changed to  500-125 mg q24h.    Estimated Creatinine Clearance: Estimated Creatinine Clearance: 14 mL/min (A) (based on SCr of 7.5 mg/dL (HH)).    BMI: Body mass index is 30.59 kg/m².    Please call with any questions.    Thank you,    Sara Delgadillo, [facility]    
  Pharmacy Note - Renal Dosing    Levofloxacin  750 mg IV q24h  for treatment of Intra-abdominal Infection. Per [facility] Renal Dose Adjustment Policy, levofloxacin will be changed to  500 mg IV q48h.    Estimated Creatinine Clearance: Estimated Creatinine Clearance: 15 mL/min (A) (based on SCr of 7.3 mg/dL (HH)).    BMI: Body mass index is 32.16 kg/m².    Please call with any questions.    Thank you,    Sara Delgadillo, [facility]    
  [facility]   Nephrology progress Note  [phone]  [phone]   [email or web address]         SUMMARY :  We are following this patient for ESRD on maintenance hemodialysis  63-year-old male with past medical history of ESRD, T2DM, carcinoma prostate s/p radiotherapy hypertension, congestive heart failure, chronic pain syndrome presented to the hospital with 2 days history of abdominal pain which was central abdominal location and a constant throbbing pain which worsens with movement.  CT abdomen pelvis showed acute diverticulitis in the proximal sigmoid colon and he was started on antibiotics.  He dialyzes  at [facility]  Nephrologist Dr Lange    Interval History :   24: Continues to have abdominal pain, no fever or chills  24 : Seen on hemodialysis, abdominal pain much better    Physical Exam:    VITALS:  BP (!) 158/89   Pulse 77   Temp (!) 96.1 °F (35.6 °C)   Resp 16   Ht 1.956 m (6' 5\")   Wt 117.7 kg (259 lb 7.7 oz)   SpO2 96%   BMI 30.77 kg/m²   BLOOD PRESSURE RANGE:  Systolic (24hrs), Av , Min:121 , Max:158   ; Diastolic (24hrs), Av, Min:69, Max:125    24HR INTAKE/OUTPUT:    Intake/Output Summary (Last 24 hours) at 2024 1021  Last data filed at 2024 0513  Gross per 24 hour   Intake 89.33 ml   Output 400 ml   Net -310.67 ml         Gen:  alert, oriented x 3  PERRL , EOM +  Pallor +, No icterus  JVP not raised   CV: RRR no murmur or rub .  Lungs:B/ L air entry, Normal breath sounds   Abd: soft, bowel sounds + , No organomegaly   Ext: No edema, no cyanosis  Skin: Warm.  No rash  Neuro: nonfocal.  Access: Left forearm radiocephalic fistula with a good thrill,  no signs of infection    DATA:    CBC with Differential:    Lab Results   Component Value Date/Time    WBC 3.4 2024 05:18 AM    RBC 3.94 2024 05:18 AM    HGB 11.3 2024 05:18 AM    HCT 34.6 2024 05:18 AM     2024 05:18 AM    MCV 87.8 
  [facility]   Nephrology progress Note  [phone]  [phone]   [email or web address]         SUMMARY :  We are following this patient for ESRD on maintenance hemodialysis  63-year-old male with past medical history of ESRD, T2DM, carcinoma prostate s/p radiotherapy hypertension, congestive heart failure, chronic pain syndrome presented to the hospital with 2 days history of abdominal pain which was central abdominal location and a constant throbbing pain which worsens with movement.  CT abdomen pelvis showed acute diverticulitis in the proximal sigmoid colon and he was started on antibiotics.  He dialyzes  at [facility]  Nephrologist Dr Lange    Interval History :   24: Continues to have abdominal pain, no fever or chills  24 : Seen on hemodialysis, abdominal pain much better  24:still has abdominal pain . No fever,no diarrhea    Physical Exam:    VITALS:  BP (!) 152/72   Pulse 64   Temp 98.5 °F (36.9 °C) (Oral)   Resp 16   Ht 1.956 m (6' 5\")   Wt 119.9 kg (264 lb 6.4 oz)   SpO2 95%   BMI 31.35 kg/m²   BLOOD PRESSURE RANGE:  Systolic (24hrs), Av , Min:109 , Max:163   ; Diastolic (24hrs), Av, Min:69, Max:103    24HR INTAKE/OUTPUT:    Intake/Output Summary (Last 24 hours) at 2024 1059  Last data filed at 2024 1243  Gross per 24 hour   Intake 480 ml   Output 150 ml   Net 330 ml         Gen:  alert, oriented x 3  PERRL , EOM +  Pallor +, No icterus  JVP not raised   CV: RRR no murmur or rub .  Lungs:B/ L air entry, Normal breath sounds   Abd: soft, bowel sounds + , No organomegaly , tender Left Lowerquadrant  Ext: No edema, no cyanosis  Skin: Warm.  No rash  Neuro: nonfocal.  Access: Left forearm radiocephalic fistula with a good thrill,  no signs of infection    DATA:    CBC with Differential:    Lab Results   Component Value Date/Time    WBC 3.7 2024 05:14 AM    RBC 4.13 2024 05:14 AM    HGB 12.0 2024 05:14 
  Urology Progress Note  [facility]     Patient: Jorden Woods MRN: [id number]  Room[facility]Bed: [facility]4478/4478-01   YOB: 1960  Age/Sex: 63 y.o.male  Admission Date: 4/30/2024     Date of Service:  5/1/2024    ASSESSMENT/PLAN     64 yo male   PROSTATE CANCER (GS 3+5), follows with  urology  -S/p brachy therapy boost 04/19/2023 and EBRT 06/2023  -Last ADT Injection 04/12/2024  -Ct shows diffuse symmetric bladder wall thickening  -UA appears sterile  -PVR was 60cc's.  No hydro on CT.   ==  Afebrile, HTN, Cr 4.7 (down from 7.3), WBC 3.8  Patient doing well today. Voiding without issue. He denies any dysuria, frequency or GH    Recommendations:  Follow up with  urology for LUTS, may be impart related to diverticulitis. He does not have much bother at this time. Defer management of diverticulitis to general surgery.   Will sign off at this time, Please contact us with any change in condition or if new urologic issue arises    All patient questions were answered. He understands the plan as listed above.    SUBJECTIVE     Chief Complaint:   Chief Complaint   Patient presents with    Abdominal Pain     Pt to ED c/o headache and lower abd pain throughout the day unrelieved by tylenol and rest, Hx hernia surgeries but denies any other pertinent history. AAOx4, NAD, resp e/u on RA. Last bowel movement this morning. Dialysis Tue/Thur/Sat to fistula in Left arm       24 Hour Events: Today patient reports no difficulties in voiding. Discussed follow up with his urologist at .     OBJECTIVE     Hospital Problem List:  Principal Problem:    Diverticulitis of colon  Active Problems:    ESRD (end stage renal disease) (HCC)    Chronic pain syndrome    Orthostatic hypotension    Type 2 diabetes mellitus    Opioid dependence with current use ([facility])    Severe frontal headaches    Diverticulitis  Resolved Problems:    * No resolved hospital problems. *      Physical Exam:  Vitals:    05/01/24 0830   BP: (!) 
0330: Patient is complaining of having pain in his mid upper quadrant abdomen. He claims it is a different kind of pain compared to when he was admitted. Initially, it was relieved by drinking ginger ale with ice. But now he said it is getting worse 10/10. He has hypoactive bowel sounds and last BM was Tuesday morning. He is refusing ma-alox PRN as he said it is not indigestion as he has not been eating a lot since yesterday; MD notified; instructed to keep NPO for now and ambulate; patient agreed.    0514: ambulating in the hallway right now, he is getting some relief by walking as verbalized by the patient.    0630: received a call from lab to say that his creatinine is 5.97; this patient is due for dialysis today; MD notified.  
20:16 Assessment complete. Vss. The care plan and education has been reviewed and mutually agreed upon with the patient.   Christian Sosa RN    
CLINICAL PHARMACY NOTE: MEDS TO BEDS    Total # of Prescriptions Filled: 1   The following medications were delivered to the patient:  AMOXICILLIN - [id number] - 125 TABS    Additional Documentation: Yesica OKEEFE approved to deliver medications to patient room=signed  [facility]  
Patient admitted this morning by Dr Pandya.    64 yo M with ESRD on HD, chronic pain syndrome, obesity, prostate cancer s/p radiotherapy who came to ER with abdominal pain and HA.    Admitted as inpatient for acute sigmoid diverticulitis.    Keep NPO, IVF, continue IV Levaquin and Flagyl.  Surgery consult requested.  Increase Dilaudid 1 mg IV PRN, stop PO Dilaudid.  Diet per Surgery.    Dr Guerra is primary nephrologist.  [facility] consult requested for ESRD.    Will ask Urology to see given distended bladder with cystitis.    Discussed with nursing.    Bryon Fernández MD  
Patient feeling well, minimal lower abdominal pain1-2/10.Tolerating regular diet. Dc home if ok with consultants.       Guerita GODWIN  
Patient seen in ED, room 08.  Admission completed through personal Belongings.  IP nurse will need to begin with Psychosocial Review and complete the admission including the 4 Eyes Assessment, Immunizations, Vaccines, Rights and Responsibilities, Orientation to room, Plan of Care, Education/Learning Assessment and Education Plan, white board, height and weight, pain assessment and head to toe assessment.  Patient is alert and is being admitted for Diverticulitis of sigmoid colon.  Home Medications as well as Outside Sources have been verbally reviewed with patient and updated if appropriate.  Medication reconciliation is now Completed.  All questions answered.    He has advance directives at home and his wife is to bring them in.  
Pt back to floor from HD. Awake and alert. C/o 7/10 abdomen and headache pain. Gave x 2 percocet for pain. Gave nifedipine that was held prior to HD. VSS. Pt denies other needs at this time. Pt is axox4 and able to answer questions and follow commands throughout assessment.   
Pt to HD  
Shift assessment complete, A&OX4, VSS. Pain managed well, voiding uses urinal, low urine output. Dialysis today, administered all night medications.  The care plan and education has been reviewed and mutually agreed upon with the patient.     
Shift assessment complete, A&OX4, VSS. Pain managed well, voiding uses urinal, low urine output. Had dialysis done yesterday, administered all night medications.  The care plan and education has been reviewed and mutually agreed upon with the patient.   
Shift assessment complete, VSS, pt A&Ox4 in bed, denies any pain at this time. Scheduled med given per MAR, POC and education reviewed with the pt. All needs met a this time, call light in reach, will continue to monitor.     1240: Discharge instructions given, all questions answered.     1310: Patient discharged via w/c, family member at side for .   
Shift assessment completed, VSS, alert and oriented. The care plan and education has been reviewed and mutually agreed upon with the patient. Patient transported to dialysis     
Shift assessment completed. Neuro WNL. Denies any pain at this time. AM meds administered per MAR. The care plan and education has been reviewed and mutually agreed upon with the patient. Standard safety measures in place.    
Shift assessment completed. Neuro WNL. Reports pain 98/10 at this time. AM meds administered per MAR. The care plan and education has been reviewed and mutually agreed upon with the patient. Standard safety measures in place.    
Shift assessment done, A&OX4, tolerating regular diet, denied for n/v at this time, independent in the room, pain managed with prn oxycodone and morphine, all his evening medications given as per mar, standard safety precautions applied, care plan reviewed with the patient, patient resting In the bed.  Lonny Donahue RN    
Treatment time: 3.5 hours    Net UF: 3000ml    Pre weight: 118.5kg  Post weight: 115.0kg  EDW: TBD    Access used: L AVF  Access function: well    Medications or blood products given: NA    Regular outpatient schedule: NA    Summary of response to treatment: Patient completed 3.5 hour hemodialysis treatment . He tolerated treatment well and was able to removed 3000ml. Patient was returned to hospital room per bed.    Copy of dialysis treatment record placed in chart, to be scanned into EMR.  
Treatment time: 3.5 hours  Net UF: 2000 ml     Pre weight: 117.7 kg  Post weight:115.5 kg      Access used: LTAVF    Access function: well with  ml/min     Medications or blood products given: N/A     Regular outpatient schedule: TTS     Summary of response to treatment: Patient tolerated treatment well and without any complications. Patient remained stable throughout entire treatment and upon the exiting the dialysis suite via transport.     Report given to MALDONADO FERRARI and copy of dialysis treatment record placed in chart, to be scanned into EMR.  
Treatment time: 3.5 hrs    Net UF:  2500 ml     Pre weight: 120.5 kg  Post weight: 118.0 kg     Access used: LFA AVF  Access function:  tolerated well,  BFR 350ml/min     Medications or blood products given: none     Regular outpatient schedule: TTHS     Summary of response to treatment: Pt tolerated well. Pt remained stable throughout entire treatment and upon exiting the hemodialysis suite.      Copy of dialysis treatment record placed in chart, to be scanned into EMR.      
Vitals, POCt monitored, tolerating clear liquid , family at bedside, pain manageable at this time, all his evening medications given as per mar, no N/V noted, care plan reviewed and manually agreed upon with the patient, standard safety precautions applied.  Lonny Donahue RN    
Piggyback:522.4]  Out: -   Drain/tube Output:       Physical Exam:  General: awake, alert, oriented to person, place, time  Cardiovascular:  regular rate and rhythm and no murmur noted  Lungs: clear to auscultation  Abdomen: soft, non-distended, mild right lower quadrant and epigastric region tenderness only, bowel sounds present    Labs:  CBC:    Recent Labs     04/30/24 0314 05/01/24 0526   WBC 4.6 3.8*   HGB 11.4* 11.9*   HCT 34.9* 36.5*    129*     BMP:    Recent Labs     04/30/24 0314 05/01/24 0526    139   K 4.9 5.1   CL 98* 102   CO2 21 24   BUN 59* 30*   CREATININE 7.3* 4.7*   GLUCOSE 133* 91     Hepatic:    Recent Labs     04/30/24 0314 05/01/24 0526   AST 18 15   ALT 24 18   BILITOT <0.2 0.3   ALKPHOS 70 65     Amylase:    Lab Results   Component Value Date/Time    AMYLASE 80 09/12/2016 01:07 PM     Lipase:    Lab Results   Component Value Date/Time    LIPASE 41.0 04/30/2024 03:14 AM    LIPASE 43.0 01/09/2024 04:58 AM    LIPASE 47.0 12/26/2023 04:07 AM      Mag:    Lab Results   Component Value Date/Time    MG 1.90 05/01/2024 05:26 AM    MG 2.00 01/11/2024 12:48 PM     Phos:     Lab Results   Component Value Date/Time    PHOS 4.0 05/01/2024 05:26 AM    PHOS 3.0 03/24/2024 06:19 AM      Coags:   Lab Results   Component Value Date/Time    PROTIME 13.4 05/01/2024 05:26 AM    INR 1.00 05/01/2024 05:26 AM    APTT 26.3 05/01/2024 05:26 AM       Cultures:  Anaerobic culture  Lab Results   Component Value Date/Time    LABANAE No anaerobes isolated 07/12/2022 03:47 PM     Fungus stain  No results found for requested labs within last 30 days.     Gram stain  No results found for requested labs within last 30 days.     Organism  Lab Results   Component Value Date/Time    ORG Escherichia coli ESBL (A) 12/01/2023 04:22 PM     Surgical culture  No results found for: \"CXSURG\"  Blood culture 1  No results found for requested labs within last 30 days.     Blood culture 2  No results found for requested 
rubs.  Lungs: Clear, no wheezing or crackles.  Abd: Soft, bowel sounds normal, non-tender.  Ext: No edema, no cyanosis  Skin: Warm.  No rashes appreciated.     Access : Left arm AV fistula +, thrill +      Labs:  CBC with Differential:    Lab Results   Component Value Date/Time    WBC 3.8 05/01/2024 05:26 AM    RBC 4.10 05/01/2024 05:26 AM    HGB 11.9 05/01/2024 05:26 AM    HCT 36.5 05/01/2024 05:26 AM     05/01/2024 05:26 AM    MCV 89.0 05/01/2024 05:26 AM    MCH 29.1 05/01/2024 05:26 AM    MCHC 32.7 05/01/2024 05:26 AM    RDW 17.4 05/01/2024 05:26 AM    NRBC CANCELED 04/14/2011 07:53 AM    NRBC CANCELED 04/14/2011 07:53 AM    BANDSPCT CANCELED 04/14/2011 07:53 AM    BLASTSPCT CANCELED 04/14/2011 07:53 AM    METASPCT CANCELED 04/14/2011 07:53 AM    LYMPHOPCT 17.3 05/01/2024 05:26 AM    PROMYELOPCT CANCELED 04/14/2011 07:53 AM    MONOPCT 9.2 05/01/2024 05:26 AM    MYELOPCT CANCELED 04/14/2011 07:53 AM    EOSPCT 1.3 05/01/2024 05:26 AM    BASOPCT 0.6 05/01/2024 05:26 AM    MONOSABS 0.4 05/01/2024 05:26 AM    EOSABS 0.1 05/01/2024 05:26 AM    BASOSABS 0.0 05/01/2024 05:26 AM    DIFFTYPE Auto 03/26/2011 11:00 AM     BMP:    Lab Results   Component Value Date/Time     05/01/2024 05:26 AM    K 5.1 05/01/2024 05:26 AM    K 4.9 04/30/2024 03:14 AM     05/01/2024 05:26 AM    CO2 24 05/01/2024 05:26 AM    BUN 30 05/01/2024 05:26 AM    CREATININE 4.7 05/01/2024 05:26 AM    CALCIUM 9.2 05/01/2024 05:26 AM    GFRAA 17 08/03/2022 10:13 AM    GFRAA >60 05/24/2013 04:26 PM    LABGLOM 14 08/03/2022 10:13 AM    GLUCOSE 91 05/01/2024 05:26 AM     Ionized Calcium:  No results found for: \"IONCA\"  Magnesium:    Lab Results   Component Value Date/Time    MG 1.90 05/01/2024 05:26 AM     Phosphorus:    Lab Results   Component Value Date/Time    PHOS 4.0 05/01/2024 05:26 AM       Assessment/Plan:      ESRD  Has been on TTS schedule  Next HD tomorrow as per schedule     Hypertension uncontrolled  Continue current 
      Assessment/Plan:      ESRD  Has been on TTS schedule  Next HD per schedule     Hypertension uncontrolled  Has been on nifedipine and metoprolol, losartan     Hyperphosphatemia  Continue calcium acetate    Acute diverticulitis  On antibiotics  Seen by general surgery    Piter Hubbard DO  5/5/2024  Office Phone : [phone]    Thank you for allowing us to participate in the care of this pt. I willcontinue to follow along. Please call with questions or concerns.   
AM    BILIRUBINUR Neg 09/25/2023 03:06 PM    BLOODU SMALL 04/30/2024 03:14 AM    GLUCOSEU Negative 04/30/2024 03:14 AM    AMORPHOUS Rare 05/27/2022 02:47 PM         IMPRESSION/RECOMMENDATIONS:      Diagnosis and Plan     ESRD  TTS, will arrange for dialysis tomorrow  Hypertension  Controlled  Renal osteodystrophy  Phosphorus 4,ca 8.8  Anemia of chronic kidney disease  Target hemoglobin 9-11  Acute diverticulitis  On antibiotics: Augmentin  WBC 4700  Carcinoma prostate  S/p radiotherapy      Jean Marie Camara MD  
Contrast? None   Final Result   Short segment acute diverticulitis in the proximal sigmoid colon.  No   evidence for gross perforation, abscess or bowel obstruction.  Follow-up with   colonoscopy or imaging is recommended to ensure resolution.      Additional stable chronic and benign findings, as described.         CT Head W/O Contrast   Final Result   No acute intracranial abnormality.         XR CHEST (2 VW)   Final Result   No acute airspace disease identified.         CT ABDOMEN PELVIS WO CONTRAST Additional Contrast? Oral    (Results Pending)       IP CONSULT TO HOSPITALIST  IP CONSULT TO NEPHROLOGY  IP CONSULT TO GENERAL SURGERY  IP CONSULT TO UROLOGY    Assessment/Plan:    Active Hospital Problems    Diagnosis     Chronic pain syndrome [G89.4]      Priority: Medium    ESRD (end stage renal disease) ([facility]) [N18.6]      Priority: Medium    Acute diverticulitis [K57.92]     Diverticulitis of colon [K57.32]     Severe frontal headaches [R51.9]     Diverticulitis [K57.92]     Opioid dependence with current use ([facility]) [F11.20]     Type 2 diabetes mellitus [E11.9]     Orthostatic hypotension [I95.1]      Acute diverticulitis  CLD  Repeat CT Ab/P today  Continue Dilaudid IV PRN  Continue Levaquin and Flagyl IV  Surgery input appreciated  Diet per Surgery  ESRD on HD  Renal consult appreciated  HD per Renal  DM 2  Continue SSI  Hypoglycemia orders  F/U A1C  HTN  Continue Nifedipine  Chronic pain syndrome  Continue Percocet PRN  Continue Movantik    DVT Prophylaxis: Hep SQ  Diet: ADULT DIET; Clear Liquid  ADULT ORAL NUTRITION SUPPLEMENT; Breakfast, Lunch, Dinner; Clear Liquid Oral Supplement  Code Status: Full Code  PT/OT Eval Status: Not needed    Dispo - Home    Discussed with patient, nursing and CM.    Has gone backwards clinically.    Discussed with Izabella Boswell (Surgery NP).  Repeat CT Ab/P today.    Bryon Fernández MD    
PCR  No results found for requested labs within last 30 days.     C. difficile  No results found for requested labs within last 30 days.     Urine culture  Lab Results   Component Value Date/Time    LABURIN  12/01/2023 04:22 PM     50,000 CFU/ml  CONTACT PRECAUTIONS INDICATED  Carbapenem antibiotics are the best option for infections caused  by ESBL producing organisms.  Other antibiotic classes are  likely to result in treatment failure, even for organisms  demonstrating in vitro susceptibility.         Pathology:  No relevant pathology     Imaging:  I have personally reviewed the following films:    No results found.    Scheduled Meds:   traMADol  50 mg Oral TID    piperacillin-tazobactam  3,375 mg IntraVENous Q12H    insulin lispro  0-8 Units SubCUTAneous TID WC    insulin lispro  0-4 Units SubCUTAneous Nightly    atorvastatin  20 mg Oral Nightly    busPIRone  5 mg Oral BID    calcium acetate  1 capsule Oral TID WC    metoprolol succinate  50 mg Oral Nightly    naloxegol  12.5 mg Oral QAM AC    NIFEdipine  90 mg Oral Daily    pantoprazole  40 mg Oral BID AC    sodium chloride flush  5-40 mL IntraVENous 2 times per day    heparin (porcine)  5,000 Units SubCUTAneous 3 times per day     Continuous Infusions:   dextrose      sodium chloride 25 mL/hr at 05/05/24 0357     PRN Meds:.diatrizoate meglumine-sodium, hydrALAZINE, polyethylene glycol, dextrose bolus **OR** dextrose bolus, glucagon (rDNA), dextrose, oxyCODONE-acetaminophen, sodium chloride flush, sodium chloride, ondansetron **OR** ondansetron, aluminum & magnesium hydroxide-simethicone, acetaminophen **OR** acetaminophen, melatonin, HYDROmorphone      Assessment:  63 y.o. male admitted with   1. Diverticulitis of colon    2. End stage chronic kidney disease (HCC)        Acute uncomplicated sigmoid diverticulitis  ESRD on hemodialysis  CHF  Diabetes  Chronic pain syndrome       Plan:    Pt improved  Adv to low fiber diet today  Plan for discharge tomorrow if 
[E11.9] 03/29/2024    Orthostatic hypotension [I95.1] 03/23/2024       62 yo M with ESRD on HD, DM 2, HTN, chronic pain syndrome, obesity who came to ER with abdominal pain. Admitted as inpatient for acute diverticulitis. Started on IV Abx. Followed by Renal, Surgery and Urology.     Acute diverticulitis  On full liquid diet  Still having some abdominal pain  Repeat CT abdomen yesterday showed evidence of acute diverticulitis with no free air no abscess  Antibiotics changed to IV Zosyn per infectious disease  Will await some clinical improvement before advancing diet  General surgery following    End-stage renal disease on hemodialysis  Dialysis as scheduled nephrology following    Type 2 diabetes mellitus  Sliding scale insulin    Hypertension  Continue nifedipine    Chronic pain syndrome  As needed Percocet and continue Movantik      DVT Prophylaxis: Subcu hep  Diet: ADULT DIET; Clear Liquid  ADULT ORAL NUTRITION SUPPLEMENT; Breakfast, Lunch, Dinner; Clear Liquid Oral Supplement  Code Status: Full Code      Dispo - once acute medical processes have resolved    Jamie Aguero MD    
occult  No results found for requested labs within last 30 days.     GI bacterial pathogens by PCR  No results found for requested labs within last 30 days.     C. difficile  No results found for requested labs within last 30 days.     Urine culture  Lab Results   Component Value Date/Time    LABURIN  12/01/2023 04:22 PM     50,000 CFU/ml  CONTACT PRECAUTIONS INDICATED  Carbapenem antibiotics are the best option for infections caused  by ESBL producing organisms.  Other antibiotic classes are  likely to result in treatment failure, even for organisms  demonstrating in vitro susceptibility.         Pathology:  No relevant pathology     Imaging:  I have personally reviewed the following films:    No results found.    Scheduled Meds:   docusate sodium  100 mg Oral BID    polyethylene glycol  17 g Oral BID    piperacillin-tazobactam  3,375 mg IntraVENous Q12H    traMADol  50 mg Oral TID    insulin lispro  0-8 Units SubCUTAneous TID WC    insulin lispro  0-4 Units SubCUTAneous Nightly    atorvastatin  20 mg Oral Nightly    busPIRone  5 mg Oral BID    calcium acetate  1 capsule Oral TID WC    metoprolol succinate  50 mg Oral Nightly    naloxegol  12.5 mg Oral QAM AC    NIFEdipine  90 mg Oral Daily    pantoprazole  40 mg Oral BID AC    sodium chloride flush  5-40 mL IntraVENous 2 times per day    heparin (porcine)  5,000 Units SubCUTAneous 3 times per day     Continuous Infusions:   dextrose      sodium chloride Stopped (05/05/24 0909)     PRN Meds:.diatrizoate meglumine-sodium, hydrALAZINE, polyethylene glycol, dextrose bolus **OR** dextrose bolus, glucagon (rDNA), dextrose, oxyCODONE-acetaminophen, sodium chloride flush, sodium chloride, ondansetron **OR** ondansetron, aluminum & magnesium hydroxide-simethicone, acetaminophen **OR** acetaminophen, melatonin      Assessment:  63 y.o. male admitted with   1. Diverticulitis of colon    2. End stage chronic kidney disease (HCC)        Acute uncomplicated sigmoid 
WITH BIOSYNTHETIC MESH; LAPAROSCOPIC PERITONEAL DIALYSIS CATHETER REVISION WITH LAPAROSCOPIC OMENTOPEXY performed by Dar Garcia DO at [facility] [place]    HERNIA REPAIR Bilateral 04/15/2022    BILATERAL OPEN INGUINAL HERNI REPAIR performed by Dar Garcia DO at [facility] [place]    INGUINAL HERNIA REPAIR Bilateral 06/13/2022    ROBOTIC RECURRENT BILATERAL INGUINAL HERNIA REPAIR, LYSIS OF ADHESIONS, PERITONEAL DIALYSIS CATHETER REMOVAL performed by Dar Garcia DO at [facility] [place]    IR TUNNELED CATHETER PLACEMENT GREATER THAN 5 YEARS  01/14/2022    IR TUNNELED CATHETER PLACEMENT GREATER THAN 5 YEARS 1/14/2022 [facility] PROC[facility]    IR TUNNELED CATHETER PLACEMENT GREATER THAN 5 YEARS  07/14/2022    IR TUNNELED CATHETER PLACEMENT GREATER THAN 5 YEARS 7/14/2022 [facility] PROC[facility]    PROSTATE SURGERY  05/2023    UPPER GASTROINTESTINAL ENDOSCOPY  05/28/2016    UPPER GASTROINTESTINAL ENDOSCOPY  09/12/2016    UPPER GASTROINTESTINAL ENDOSCOPY N/A 03/18/2022    EGD DIAGNOSTIC ONLY performed by Huang Bradshaw MD at [facility] ENDOSCOPY    VENTRAL HERNIA REPAIR N/A 01/17/2022    LAPAROSCOPIC incarcerated VENTRAL HERNIA REPAIR performed by Dar Garcia DO at [facility] [place]       Family History: All family history was reviewed today.        Problem Relation Age of Onset    Breast Cancer Mother     Cancer Father     No Known Problems Sister     No Known Problems Sister     Sickle Cell Anemia Brother     Diabetes Maternal Grandmother     Cancer Paternal Grandmother     Cancer Paternal Grandfather        Objective:       PHYSICAL EXAM:      Vitals:   Vitals:    05/08/24 0507 05/08/24 0945 05/08/24 1032 05/08/24 1107   BP: 115/75 (!) 152/72  132/85   Pulse: 74 64  78   Resp: 16 16 18 18   Temp: 98.1 °F (36.7 °C) 98.5 °F (36.9 °C)     TempSrc: Oral Oral     SpO2: 97% 95%  96%   Weight: 119.9 kg (264 lb 6.4 oz)      Height:           Physical Exam  Vitals and nursing note reviewed.   Constitutional:       Appearance: He is well-developed. He is not 
25* 39*   CREATININE 6.7* 5.7* 7.5*   CALCIUM 9.5 9.2 8.8   GLUCOSE 105* 115* 110*        Hepatic Function Panel:   Lab Results   Component Value Date/Time    ALKPHOS 64 05/03/2024 05:22 AM    ALT 12 05/03/2024 05:22 AM    AST 12 05/03/2024 05:22 AM    PROT 7.5 10/03/2012 11:08 AM    BILITOT 0.3 05/03/2024 05:22 AM    BILIDIR <0.2 05/12/2022 08:51 PM    IBILI see below 05/12/2022 08:51 PM       CPK:   Lab Results   Component Value Date    CKTOTAL 866 (H) 01/13/2022     ESR:   Lab Results   Component Value Date    SEDRATE 33 (H) 05/03/2024     CRP:   Lab Results   Component Value Date    CRP 33.4 (H) 05/03/2024           Imaging:    All pertinent images and reports for the current visit were reviewed by me during this visit.  I reviewed the chest x-ray/CT scan/MRI images and independently interpreted the findings and results today.    CT ABDOMEN PELVIS WO CONTRAST Additional Contrast? Oral   Final Result   Colonic diverticulosis with redemonstration of inflammatory changes involving   the proximal sigmoid colon with slight increase free fluid but no evidence of   free air or abscess.         CT ABDOMEN PELVIS WO CONTRAST Additional Contrast? None   Final Result   Short segment acute diverticulitis in the proximal sigmoid colon.  No   evidence for gross perforation, abscess or bowel obstruction.  Follow-up with   colonoscopy or imaging is recommended to ensure resolution.      Additional stable chronic and benign findings, as described.         CT Head W/O Contrast   Final Result   No acute intracranial abnormality.         XR CHEST (2 VW)   Final Result   No acute airspace disease identified.             Medications: All current and past medications were reviewed.     docusate sodium  100 mg Oral BID    polyethylene glycol  17 g Oral Daily    amoxicillin-clavulanate  1 tablet Oral Daily    traMADol  50 mg Oral TID    insulin lispro  0-8 Units SubCUTAneous TID WC    insulin lispro  0-4 Units SubCUTAneous Nightly

## 2024-05-08 NOTE — PLAN OF CARE
Problem: Discharge Planning  Goal: Discharge to home or other facility with appropriate resources  5/1/2024 0839 by Edith Quintana, RN  Outcome: Progressing     Problem: Pain  Goal: Verbalizes/displays adequate comfort level or baseline comfort level  5/1/2024 0839 by Edith Quintana, RN  Outcome: Progressing     Problem: ABCDS Injury Assessment  Goal: Absence of physical injury  5/1/2024 0839 by Edith Quintana, RN  Outcome: Progressing     
  Problem: Discharge Planning  Goal: Discharge to home or other facility with appropriate resources  5/2/2024 0809 by Edith Quintana RN  Outcome: Progressing     Problem: Pain  Goal: Verbalizes/displays adequate comfort level or baseline comfort level  5/2/2024 0809 by Edith Quintana RN  Outcome: Progressing     Problem: ABCDS Injury Assessment  Goal: Absence of physical injury  5/2/2024 0809 by Edith Quintana RN  Outcome: Progressing     Problem: Gastrointestinal - Adult  Goal: Minimal or absence of nausea and vomiting  5/2/2024 0809 by Edith Quintana RN  Outcome: Progressing     Problem: Gastrointestinal - Adult  Goal: Maintains or returns to baseline bowel function  5/2/2024 0809 by Edith Quintana RN  Outcome: Progressing     Problem: Genitourinary - Adult  Goal: Absence of urinary retention  5/2/2024 0809 by Edith Quintana RN  Outcome: Progressing     Problem: Infection - Adult  Goal: Absence of infection at discharge  5/2/2024 0809 by Edith Quintana RN  Outcome: Progressing     
  Problem: Discharge Planning  Goal: Discharge to home or other facility with appropriate resources  5/3/2024 1138 by Thao Lan RN  Outcome: Progressing  Flowsheets (Taken 5/3/2024 1138)  Discharge to home or other facility with appropriate resources:   Identify barriers to discharge with patient and caregiver   Arrange for needed discharge resources and transportation as appropriate   Identify discharge learning needs (meds, wound care, etc)   Arrange for interpreters to assist at discharge as needed     Problem: Pain  Goal: Verbalizes/displays adequate comfort level or baseline comfort level  5/3/2024 1138 by Thao Lan RN  Outcome: Progressing  Flowsheets (Taken 5/3/2024 1138)  Verbalizes/displays adequate comfort level or baseline comfort level:   Encourage patient to monitor pain and request assistance   Assess pain using appropriate pain scale   Administer analgesics based on type and severity of pain and evaluate response     Problem: ABCDS Injury Assessment  Goal: Absence of physical injury  5/3/2024 1138 by Thao Lan RN  Outcome: Progressing  Flowsheets (Taken 5/3/2024 1138)  Absence of Physical Injury: Implement safety measures based on patient assessment     Problem: Gastrointestinal - Adult  Goal: Minimal or absence of nausea and vomiting  5/3/2024 1138 by Thao Lan RN  Outcome: Progressing  Flowsheets (Taken 5/3/2024 1138)  Minimal or absence of nausea and vomiting:   Administer IV fluids as ordered to ensure adequate hydration   Administer ordered antiemetic medications as needed     Problem: Gastrointestinal - Adult  Goal: Maintains or returns to baseline bowel function  5/3/2024 1138 by Thao Lan RN  Outcome: Progressing  Flowsheets (Taken 5/3/2024 1138)  Maintains or returns to baseline bowel function:   Assess bowel function   Encourage oral fluids to ensure adequate hydration   Administer IV fluids as ordered to ensure adequate 
  Problem: Discharge Planning  Goal: Discharge to home or other facility with appropriate resources  5/3/2024 2347 by Lonny Donahue RN  Outcome: Progressing     Problem: Pain  Goal: Verbalizes/displays adequate comfort level or baseline comfort level  5/3/2024 2347 by Lonny Donahue RN  Outcome: Progressing     Problem: ABCDS Injury Assessment  Goal: Absence of physical injury  5/3/2024 2347 by Lonny Donahue RN  Outcome: Progressing     Problem: Gastrointestinal - Adult  Goal: Minimal or absence of nausea and vomiting  5/3/2024 2347 by Lonny Donahue RN  Outcome: Progressing     Problem: Gastrointestinal - Adult  Goal: Maintains or returns to baseline bowel function  5/3/2024 2347 by Lonny Donahue RN  Outcome: Progressing     Problem: Genitourinary - Adult  Goal: Absence of urinary retention  5/3/2024 2347 by Lonny Donahue RN  Outcome: Progressing     Problem: Infection - Adult  Goal: Absence of infection at discharge  5/3/2024 2347 by Lonny Donahue RN  Outcome: Progressing  Flowsheets (Taken 5/3/2024 2015)  Absence of infection at discharge: Assess and monitor for signs and symptoms of infection     Problem: Infection - Adult  Goal: Absence of infection during hospitalization  5/3/2024 2347 by Lonny Donahue RN  Outcome: Progressing  Flowsheets (Taken 5/3/2024 2015)  Absence of infection during hospitalization: Assess and monitor for signs and symptoms of infection     Problem: Metabolic/Fluid and Electrolytes - Adult  Goal: Electrolytes maintained within normal limits  5/3/2024 2347 by Lonny Donahue RN  Outcome: Progressing  Flowsheets (Taken 5/3/2024 2015)  Electrolytes maintained within normal limits: Monitor labs and assess patient for signs and symptoms of electrolyte imbalances     Problem: Metabolic/Fluid and Electrolytes - Adult  Goal: Hemodynamic stability and optimal renal function maintained  5/3/2024 2347 by Lonny Donahue RN  Outcome: 
  Problem: Discharge Planning  Goal: Discharge to home or other facility with appropriate resources  5/8/2024 1301 by Yesica Stuart RN  Outcome: Completed  5/8/2024 1150 by Yesica Stuart RN  Outcome: Progressing  5/8/2024 0408 by Jigna Be RN  Outcome: Progressing     Problem: Pain  Goal: Verbalizes/displays adequate comfort level or baseline comfort level  5/8/2024 1301 by Yesica Stuart RN  Outcome: Completed  5/8/2024 1150 by Yesica Stuart RN  Outcome: Progressing  5/8/2024 0408 by Jigna Be RN  Outcome: Progressing     Problem: ABCDS Injury Assessment  Goal: Absence of physical injury  5/8/2024 1301 by Yesica Stuart RN  Outcome: Completed  5/8/2024 1150 by Yesica Stuart RN  Outcome: Progressing  5/8/2024 0408 by Jigna Be RN  Outcome: Progressing     Problem: Gastrointestinal - Adult  Goal: Minimal or absence of nausea and vomiting  5/8/2024 1301 by Yesica Stuart RN  Outcome: Completed  5/8/2024 1150 by Yesica Stuart RN  Outcome: Progressing  5/8/2024 0408 by Jigna Be RN  Outcome: Progressing  Goal: Maintains or returns to baseline bowel function  5/8/2024 1301 by Yesica Stuart RN  Outcome: Completed  5/8/2024 1150 by Yesica Stuart RN  Outcome: Progressing  5/8/2024 0408 by Jigna Be RN  Outcome: Progressing     Problem: Genitourinary - Adult  Goal: Absence of urinary retention  5/8/2024 1301 by Yesica Stuart RN  Outcome: Completed  5/8/2024 1150 by Yesica Stuart RN  Outcome: Progressing  5/8/2024 0408 by Jigna Be RN  Outcome: Progressing     Problem: Infection - Adult  Goal: Absence of infection at discharge  5/8/2024 1301 by Yesica Stuart RN  Outcome: Completed  5/8/2024 1150 by Yesica Stuart RN  Outcome: Progressing  5/8/2024 0408 by Jigna Be RN  Outcome: Progressing  Goal: Absence of infection during hospitalization  5/8/2024 1301 by Yesica Stuart RN  Outcome: Completed  5/8/2024 1150 by Yesica Stuart, 
  Problem: Discharge Planning  Goal: Discharge to home or other facility with appropriate resources  Outcome: Progressing     Problem: Pain  Goal: Verbalizes/displays adequate comfort level or baseline comfort level  Outcome: Progressing     Problem: ABCDS Injury Assessment  Goal: Absence of physical injury  Outcome: Progressing     Problem: Gastrointestinal - Adult  Goal: Minimal or absence of nausea and vomiting  Outcome: Progressing  Goal: Maintains or returns to baseline bowel function  Outcome: Progressing     Problem: Genitourinary - Adult  Goal: Absence of urinary retention  Outcome: Progressing     Problem: Infection - Adult  Goal: Absence of infection at discharge  Outcome: Progressing  Goal: Absence of infection during hospitalization  Outcome: Progressing     Problem: Metabolic/Fluid and Electrolytes - Adult  Goal: Electrolytes maintained within normal limits  Outcome: Progressing  Goal: Hemodynamic stability and optimal renal function maintained  Outcome: Progressing     
  Problem: Discharge Planning  Goal: Discharge to home or other facility with appropriate resources  Outcome: Progressing     Problem: Pain  Goal: Verbalizes/displays adequate comfort level or baseline comfort level  Outcome: Progressing     Problem: ABCDS Injury Assessment  Goal: Absence of physical injury  Outcome: Progressing     Problem: Gastrointestinal - Adult  Goal: Minimal or absence of nausea and vomiting  Outcome: Progressing  Goal: Maintains or returns to baseline bowel function  Outcome: Progressing     Problem: Genitourinary - Adult  Goal: Absence of urinary retention  Outcome: Progressing     Problem: Infection - Adult  Goal: Absence of infection at discharge  Outcome: Progressing  Goal: Absence of infection during hospitalization  Outcome: Progressing     Problem: Metabolic/Fluid and Electrolytes - Adult  Goal: Electrolytes maintained within normal limits  Outcome: Progressing  Goal: Hemodynamic stability and optimal renal function maintained  Outcome: Progressing     
  Problem: Discharge Planning  Goal: Discharge to home or other facility with appropriate resources  Outcome: Progressing     Problem: Pain  Goal: Verbalizes/displays adequate comfort level or baseline comfort level  Outcome: Progressing     Problem: ABCDS Injury Assessment  Goal: Absence of physical injury  Outcome: Progressing     Problem: Gastrointestinal - Adult  Goal: Minimal or absence of nausea and vomiting  Outcome: Progressing  Goal: Maintains or returns to baseline bowel function  Outcome: Progressing     Problem: Infection - Adult  Goal: Absence of infection at discharge  Outcome: Progressing  Goal: Absence of infection during hospitalization  Outcome: Progressing     Problem: Metabolic/Fluid and Electrolytes - Adult  Goal: Electrolytes maintained within normal limits  Outcome: Progressing  Goal: Hemodynamic stability and optimal renal function maintained  Outcome: Progressing     Problem: Safety - Adult  Goal: Free from fall injury  Outcome: Progressing     
  Problem: Discharge Planning  Goal: Discharge to home or other facility with appropriate resources  Outcome: Progressing     Problem: Pain  Goal: Verbalizes/displays adequate comfort level or baseline comfort level  Outcome: Progressing  Flowsheets (Taken 5/4/2024 1300 by Beatrice Wood RN)  Verbalizes/displays adequate comfort level or baseline comfort level:   Encourage patient to monitor pain and request assistance   Assess pain using appropriate pain scale   Administer analgesics based on type and severity of pain and evaluate response     Problem: ABCDS Injury Assessment  Goal: Absence of physical injury  Outcome: Progressing     Problem: Gastrointestinal - Adult  Goal: Minimal or absence of nausea and vomiting  Outcome: Progressing  Goal: Maintains or returns to baseline bowel function  Outcome: Progressing     Problem: Genitourinary - Adult  Goal: Absence of urinary retention  Outcome: Progressing     Problem: Infection - Adult  Goal: Absence of infection at discharge  Outcome: Progressing  Goal: Absence of infection during hospitalization  Outcome: Progressing     Problem: Metabolic/Fluid and Electrolytes - Adult  Goal: Electrolytes maintained within normal limits  Outcome: Progressing  Goal: Hemodynamic stability and optimal renal function maintained  Outcome: Progressing     Problem: Safety - Adult  Goal: Free from fall injury  Outcome: Progressing     
  Problem: Discharge Planning  Goal: Discharge to home or other facility with appropriate resources  Outcome: Progressing  Flowsheets (Taken 4/30/2024 1015 by Beatrice Wood RN)  Discharge to home or other facility with appropriate resources: Identify barriers to discharge with patient and caregiver     Problem: Pain  Goal: Verbalizes/displays adequate comfort level or baseline comfort level  Outcome: Progressing     Problem: ABCDS Injury Assessment  Goal: Absence of physical injury  Outcome: Progressing     Problem: Gastrointestinal - Adult  Goal: Minimal or absence of nausea and vomiting  4/30/2024 2340 by Keyonna Snyder RN  Outcome: Progressing  4/30/2024 1149 by Beatrice Wood RN  Outcome: Progressing  Goal: Maintains or returns to baseline bowel function  4/30/2024 2340 by Keyonna Snyder RN  Outcome: Progressing  4/30/2024 1149 by Beatrice Wood RN  Outcome: Progressing     Problem: Genitourinary - Adult  Goal: Absence of urinary retention  4/30/2024 2340 by Keyonna Snyder RN  Outcome: Progressing  4/30/2024 1149 by Beatrice Wood RN  Outcome: Progressing     Problem: Infection - Adult  Goal: Absence of infection at discharge  4/30/2024 2340 by Keyonna Snyder RN  Outcome: Progressing  4/30/2024 1149 by Beatrice Wood RN  Outcome: Progressing  Goal: Absence of infection during hospitalization  4/30/2024 2340 by Keyonna Snyder RN  Outcome: Progressing  4/30/2024 1149 by Beatrice Wood RN  Outcome: Progressing     Problem: Metabolic/Fluid and Electrolytes - Adult  Goal: Electrolytes maintained within normal limits  4/30/2024 2340 by Keyonna Snyder RN  Outcome: Progressing  4/30/2024 1149 by Beatrice Wood RN  Outcome: Progressing  Goal: Hemodynamic stability and optimal renal function maintained  4/30/2024 2340 by Keyonna Snyder RN  Outcome: Progressing  4/30/2024 1149 by Beatrice Wood RN  Outcome: Progressing     
  Problem: Discharge Planning  Goal: Discharge to home or other facility with appropriate resources  Outcome: Progressing  Flowsheets (Taken 5/5/2024 0900 by Beatrice Wood, RN)  Discharge to home or other facility with appropriate resources: Identify barriers to discharge with patient and caregiver     Problem: Pain  Goal: Verbalizes/displays adequate comfort level or baseline comfort level  Outcome: Progressing  Flowsheets (Taken 5/5/2024 0900 by Beatrice Wood, RN)  Verbalizes/displays adequate comfort level or baseline comfort level:   Encourage patient to monitor pain and request assistance   Assess pain using appropriate pain scale   Administer analgesics based on type and severity of pain and evaluate response     Problem: ABCDS Injury Assessment  Goal: Absence of physical injury  Outcome: Progressing     Problem: Genitourinary - Adult  Goal: Absence of urinary retention  5/5/2024 2255 by Lonny Donahue RN  Outcome: Progressing     Problem: Infection - Adult  Goal: Absence of infection during hospitalization  5/5/2024 2255 by Lonny Donahue RN  Outcome: Progressing     Problem: Metabolic/Fluid and Electrolytes - Adult  Goal: Electrolytes maintained within normal limits  5/5/2024 2255 by Lonny Donahue RN  Outcome: Progressing     Problem: Metabolic/Fluid and Electrolytes - Adult  Goal: Hemodynamic stability and optimal renal function maintained  5/5/2024 2255 by Lonny Donahue RN  Outcome: Progressing     Problem: Safety - Adult  Goal: Free from fall injury  5/5/2024 2255 by Lonny Donahue RN  Outcome: Progressing     
  Problem: Discharge Planning  Goal: Discharge to home or other facility with appropriate resources  Outcome: Progressing  Flowsheets (Taken 5/6/2024 1015 by Beatrice Wood RN)  Discharge to home or other facility with appropriate resources: Identify barriers to discharge with patient and caregiver     Problem: Pain  Goal: Verbalizes/displays adequate comfort level or baseline comfort level  Outcome: Progressing  Flowsheets (Taken 5/6/2024 1015 by Beatrice Wood RN)  Verbalizes/displays adequate comfort level or baseline comfort level:   Encourage patient to monitor pain and request assistance   Assess pain using appropriate pain scale   Administer analgesics based on type and severity of pain and evaluate response     Problem: ABCDS Injury Assessment  Goal: Absence of physical injury  Outcome: Progressing     Problem: Gastrointestinal - Adult  Goal: Minimal or absence of nausea and vomiting  5/6/2024 2149 by Jigna Be RN  Outcome: Progressing  5/6/2024 1137 by Beatrice Wood RN  Outcome: Progressing  Flowsheets (Taken 5/6/2024 1015)  Minimal or absence of nausea and vomiting: Provide nonpharmacologic comfort measures as appropriate  Goal: Maintains or returns to baseline bowel function  5/6/2024 2149 by Jigna Be RN  Outcome: Progressing  5/6/2024 1137 by Beatrice Wood RN  Outcome: Progressing  Flowsheets (Taken 5/6/2024 1015)  Maintains or returns to baseline bowel function: Encourage oral fluids to ensure adequate hydration     Problem: Genitourinary - Adult  Goal: Absence of urinary retention  5/6/2024 2149 by Jigna Be RN  Outcome: Progressing  5/6/2024 1137 by Beatrice Wood RN  Outcome: Progressing  Flowsheets (Taken 5/6/2024 1015)  Absence of urinary retention: Assess patient’s ability to void and empty bladder     Problem: Infection - Adult  Goal: Absence of infection at discharge  5/6/2024 2149 by Jigna Be RN  Outcome: Progressing  5/6/2024 1137 by 
  Problem: Discharge Planning  Goal: Discharge to home or other facility with appropriate resources  Recent Flowsheet Documentation  Taken 4/30/2024 1015 by Beatrice Wood RN  Discharge to home or other facility with appropriate resources: Identify barriers to discharge with patient and caregiver     Problem: Gastrointestinal - Adult  Goal: Minimal or absence of nausea and vomiting  Outcome: Progressing  Goal: Maintains or returns to baseline bowel function  Outcome: Progressing     Problem: Genitourinary - Adult  Goal: Absence of urinary retention  Outcome: Progressing     Problem: Infection - Adult  Goal: Absence of infection at discharge  Outcome: Progressing  Goal: Absence of infection during hospitalization  Outcome: Progressing     Problem: Metabolic/Fluid and Electrolytes - Adult  Goal: Electrolytes maintained within normal limits  Outcome: Progressing  Goal: Hemodynamic stability and optimal renal function maintained  Outcome: Progressing     
  Problem: Discharge Planning  Goal: Discharge to home or other facility with appropriate resources  Recent Flowsheet Documentation  Taken 5/5/2024 0900 by Beatrice Wood RN  Discharge to home or other facility with appropriate resources: Identify barriers to discharge with patient and caregiver  5/4/2024 2205 by Christian Sosa RN  Outcome: Progressing     Problem: Pain  Goal: Verbalizes/displays adequate comfort level or baseline comfort level  Recent Flowsheet Documentation  Taken 5/5/2024 0900 by Beatrice Wood RN  Verbalizes/displays adequate comfort level or baseline comfort level:   Encourage patient to monitor pain and request assistance   Assess pain using appropriate pain scale   Administer analgesics based on type and severity of pain and evaluate response  5/4/2024 2205 by Christian Sosa RN  Outcome: Progressing  Flowsheets (Taken 5/4/2024 1300 by Beatrice Wood RN)  Verbalizes/displays adequate comfort level or baseline comfort level:   Encourage patient to monitor pain and request assistance   Assess pain using appropriate pain scale   Administer analgesics based on type and severity of pain and evaluate response     Problem: ABCDS Injury Assessment  Goal: Absence of physical injury  5/4/2024 2205 by Chirstian Sosa RN  Outcome: Progressing     Problem: Gastrointestinal - Adult  Goal: Minimal or absence of nausea and vomiting  5/5/2024 0931 by Beatrice Wood RN  Outcome: Progressing  Flowsheets (Taken 5/5/2024 0900)  Minimal or absence of nausea and vomiting: Provide nonpharmacologic comfort measures as appropriate  5/4/2024 2205 by Christian Sosa RN  Outcome: Progressing  Goal: Maintains or returns to baseline bowel function  5/5/2024 0931 by Beatrice Wood RN  Outcome: Progressing  Flowsheets (Taken 5/5/2024 0900)  Maintains or returns to baseline bowel function: Encourage oral fluids to ensure adequate hydration  5/4/2024 2205 by Christian Sosa RN  Outcome: Progressing   
  Problem: Genitourinary - Adult  Goal: Absence of urinary retention  5/6/2024 1137 by Beatrice Wood, RN  Outcome: Progressing  Flowsheets (Taken 5/6/2024 1015)  Absence of urinary retention: Assess patient’s ability to void and empty bladder  5/5/2024 2255 by Lonny Donahue, RN  Outcome: Progressing     
0408 by Jigna Be, RN  Outcome: Progressing     Problem: Safety - Adult  Goal: Free from fall injury  5/8/2024 1150 by Yesica Stuart RN  Outcome: Progressing  5/8/2024 0408 by Jigna Be, RN  Outcome: Progressing

## 2024-05-08 NOTE — DISCHARGE SUMMARY
[facility]    Patient Demographics    Patient. Jorden Woods  Date of Birth. 1960  MRN. [id number]     Primary care provider. Curt Collins MD  (Tel: [phone])    Admit date: 4/30/2024    Discharge date 5/8/2024  Note Date: 5/8/2024     Reason for Hospitalization.   Chief Complaint   Patient presents with    Abdominal Pain     Pt to ED c/o headache and lower abd pain throughout the day unrelieved by tylenol and rest, Hx hernia surgeries but denies any other pertinent history. AAOx4, NAD, resp e/u on RA. Last bowel movement this morning. Dialysis Tue/Thur/Sat to fistula in Left arm         Significant Findings.   Principal Problem:    Diverticulitis of colon  Active Problems:    ESRD (end stage renal disease) ([facility])    Chronic pain syndrome    Orthostatic hypotension    Type 2 diabetes mellitus    Opioid dependence with current use ([facility])    Severe frontal headaches    Diverticulitis    Acute diverticulitis    CRP elevated    Elevated sed rate    Class 1 obesity due to excess calories with body mass index (BMI) of 31.0 to 31.9 in adult    History of gout    Sickle cell trait ([facility])    CELSA (obstructive sleep apnea)  Resolved Problems:    * No resolved hospital problems. *       Problems and results from this hospitalization that need follow up.  Follow up with PCP    Significant test results and incidental findings.  Radiology:  CT ABDOMEN PELVIS WO CONTRAST Additional Contrast? Oral   Final Result   Colonic diverticulosis with redemonstration of inflammatory changes involving   the proximal sigmoid colon with slight increase free fluid but no evidence of   free air or abscess.           CT ABDOMEN PELVIS WO CONTRAST Additional Contrast? None   Final Result   Short segment acute diverticulitis in the proximal sigmoid colon.  No   evidence for gross perforation, abscess or bowel

## 2024-05-09 ENCOUNTER — CARE COORDINATION (OUTPATIENT)
Dept: CASE MANAGEMENT | Age: 64
End: 2024-05-09

## 2024-05-09 NOTE — CARE COORDINATION
Care Transitions Initial Follow Up Call    Call within 2 business days of discharge: Yes    First attempt at 24 hour discharge call, patient answered and said \"I am driving\".  CTN explained that another call would be placed at a later time, CTN does not want to speak with patient while he is driving.  He politely agreed to an outreach call at a later time.  CTN will continue wit outreach call attempts.    Follow Up  No future appointments.    ROBERT VILLARREAL RN

## 2024-05-10 ENCOUNTER — CARE COORDINATION (OUTPATIENT)
Dept: CASE MANAGEMENT | Age: 64
End: 2024-05-10

## 2024-05-10 ENCOUNTER — CARE COORDINATION (OUTPATIENT)
Dept: CARE COORDINATION | Age: 64
End: 2024-05-10

## 2024-05-10 NOTE — CARE COORDINATION
Patient Excluded from Care Coordination? Yes     The patient will be excluded from Care Coordination for the following reason: Patient had recent episode of care coordination <90 days    Most recent [facility] program - enrolled RPM 1.15.24           Pt elected to DISenroll RPM 1.23.24     Of NOTE: (for readers considering outreach to pt) - historically, pt had HD Tues, Thurs, & Sat.

## 2024-05-10 NOTE — TELEPHONE ENCOUNTER
[phone] ([place])   Call to the patient about a [facility] FUP  No answer. LMOM for the patient to call the Office and set up an Appointment.    Also sent patient a [employer] message to the Patient to call our Office.

## 2024-05-10 NOTE — CARE COORDINATION
Care Transitions Initial Follow Up Call    Call within 2 business days of discharge: Yes      Second & final attempt at 24 hour discharge call, no answer, CTN left VM with contact information and request for return call.  CTN will send patient a message in My Chart, route a message to [facility] PCP office requesting outreach to schedule a follow up, close program & remain available.    Follow Up  No future appointments.    ROBERT VILLARREAL RN

## 2024-05-30 ENCOUNTER — TELEPHONE (OUTPATIENT)
Dept: PAIN MANAGEMENT | Age: 64
End: 2024-05-30

## 2024-05-30 DIAGNOSIS — M50.30 DDD (DEGENERATIVE DISC DISEASE), CERVICAL: ICD-10-CM

## 2024-05-30 DIAGNOSIS — G58.8 INTERCOSTAL NEURALGIA: ICD-10-CM

## 2024-05-30 DIAGNOSIS — Z51.81 ENCOUNTER FOR THERAPEUTIC DRUG MONITORING: ICD-10-CM

## 2024-05-30 DIAGNOSIS — M25.50 ARTHRALGIA OF MULTIPLE JOINTS: ICD-10-CM

## 2024-05-30 DIAGNOSIS — F11.20 OPIOID DEPENDENCE WITH CURRENT USE (HCC): ICD-10-CM

## 2024-05-30 NOTE — TELEPHONE ENCOUNTER
Who's asking for the refill request: the pt      Reason for refill request: no refills      Why is patient out of medication?: missed last appointment       Name of medication: tizanidine 4mg      Pharmacy name: [id number] [place] [place] [facility] [address]       Phone number: [phone]       Last refill: 4/10      Next appointment: 6/17    Patient confirmed the above pharmacy has their medication in stock

## 2024-05-31 RX ORDER — TIZANIDINE 4 MG/1
4 TABLET ORAL EVERY 8 HOURS PRN
Qty: 90 TABLET | Refills: 0 | Status: SHIPPED | OUTPATIENT
Start: 2024-05-31 | End: 2024-06-30

## 2024-06-01 ENCOUNTER — HOSPITAL ENCOUNTER (EMERGENCY)
Age: 64
Discharge: HOME | End: 2024-06-01
Payer: MEDICARE

## 2024-06-01 VITALS
HEIGHT: 77 IN | HEART RATE: 97 BPM | OXYGEN SATURATION: 94 % | RESPIRATION RATE: 18 BRPM | DIASTOLIC BLOOD PRESSURE: 85 MMHG | BODY MASS INDEX: 29.64 KG/M2 | WEIGHT: 251 LBS | TEMPERATURE: 98.9 F | SYSTOLIC BLOOD PRESSURE: 147 MMHG

## 2024-06-01 DIAGNOSIS — R10.9 FLANK PAIN: Primary | ICD-10-CM

## 2024-06-01 PROCEDURE — 96372 THER/PROPH/DIAG INJ SC/IM: CPT

## 2024-06-01 PROCEDURE — 6370000000 HC RX 637 (ALT 250 FOR IP): Performed by: PHYSICIAN ASSISTANT

## 2024-06-01 PROCEDURE — 6360000002 HC RX W HCPCS: Performed by: PHYSICIAN ASSISTANT

## 2024-06-01 PROCEDURE — 99284 EMERGENCY DEPT VISIT MOD MDM: CPT

## 2024-06-01 RX ORDER — ORPHENADRINE CITRATE 100 MG/1
100 TABLET, EXTENDED RELEASE ORAL 2 TIMES DAILY
Qty: 20 TABLET | Refills: 0 | Status: SHIPPED | OUTPATIENT
Start: 2024-06-01 | End: 2024-06-11

## 2024-06-01 RX ORDER — ORPHENADRINE CITRATE 30 MG/ML
30 INJECTION INTRAMUSCULAR; INTRAVENOUS ONCE
Status: COMPLETED | OUTPATIENT
Start: 2024-06-01 | End: 2024-06-01

## 2024-06-01 RX ORDER — ONDANSETRON 4 MG/1
4 TABLET, ORALLY DISINTEGRATING ORAL ONCE
Status: COMPLETED | OUTPATIENT
Start: 2024-06-01 | End: 2024-06-01

## 2024-06-01 RX ORDER — LIDOCAINE 4 G/G
1 PATCH TOPICAL ONCE
Status: DISCONTINUED | OUTPATIENT
Start: 2024-06-01 | End: 2024-06-01 | Stop reason: HOSPADM

## 2024-06-01 RX ORDER — LIDOCAINE 50 MG/G
1 PATCH TOPICAL DAILY
Qty: 10 PATCH | Refills: 0 | Status: SHIPPED | OUTPATIENT
Start: 2024-06-01 | End: 2024-06-11

## 2024-06-01 RX ORDER — OXYCODONE HYDROCHLORIDE AND ACETAMINOPHEN 5; 325 MG/1; MG/1
2 TABLET ORAL ONCE
Status: COMPLETED | OUTPATIENT
Start: 2024-06-01 | End: 2024-06-01

## 2024-06-01 RX ADMIN — ONDANSETRON 4 MG: 4 TABLET, ORALLY DISINTEGRATING ORAL at 18:00

## 2024-06-01 RX ADMIN — OXYCODONE HYDROCHLORIDE AND ACETAMINOPHEN 2 TABLET: 5; 325 TABLET ORAL at 18:00

## 2024-06-01 RX ADMIN — ORPHENADRINE CITRATE 30 MG: 60 INJECTION INTRAMUSCULAR; INTRAVENOUS at 18:00

## 2024-06-01 ASSESSMENT — PAIN DESCRIPTION - LOCATION: LOCATION: FLANK

## 2024-06-01 ASSESSMENT — ENCOUNTER SYMPTOMS
RHINORRHEA: 0
DIARRHEA: 0
VOMITING: 0
SHORTNESS OF BREATH: 0
NAUSEA: 0
ABDOMINAL PAIN: 0
COUGH: 0

## 2024-06-01 ASSESSMENT — LIFESTYLE VARIABLES: HOW OFTEN DO YOU HAVE A DRINK CONTAINING ALCOHOL: NEVER

## 2024-06-01 ASSESSMENT — PAIN DESCRIPTION - ORIENTATION: ORIENTATION: RIGHT

## 2024-06-01 ASSESSMENT — PAIN SCALES - GENERAL: PAINLEVEL_OUTOF10: 8

## 2024-06-01 ASSESSMENT — PAIN - FUNCTIONAL ASSESSMENT: PAIN_FUNCTIONAL_ASSESSMENT: 0-10

## 2024-06-01 NOTE — ED NOTES
Patient discharged  to home in stable condition via private car.  Discharge instructions and prescriptions reviewed with patient.   Patient verbalized understanding.   Patient advised not to drive, drink ETOH or operate machinery while taking muscle relaxer medications at home.  Patient verbalized understanding.   All belongings in tow including discharge paperwork.

## 2024-06-02 NOTE — ED PROVIDER NOTES
perforation or abscess w/o bleeding, Duodenal ulcer disease, ESRD on hemodialysis (HCC) (02/24/2022), Essential hypertension, Gout, Hearing impairment, History of prostate cancer, Inguinal hernia, left (06/14/2022), Major depressive disorder, recurrent, mild (10/26/2022), Opioid dependence with current use ([facility]) (12/12/2023), CELSA (obstructive sleep apnea) with CPAP non compliance (07/14/2017), Postherpetic polyneuropathy (03/23/2024), Sickle cell trait ([facility]), Staphylococcus aureus bacteremia (07/12/2022), Status post bilateral hernia repair (06/13/2022), Thrombocytopenia, unspecified ([facility]) (03/18/2024), and Umbilical hernia (02/02/2016).    CONSULTS: (Who and What was discussed)  None      Social Determinants Significantly Affecting Health : None    Records Reviewed (External and Source) previous visits from     CC/HPI Summary, DDx, ED Course, and Reassessment: All briefly, this is a 63-year-old male who presents to the emergency department today for evaluation for concerns of right-sided flank pain.  Patient states he does have a history of chronic pain to his right flank and right low back.  He actually reports that he had an injection for this, and this improved his pain.  He reports that his pain is returning.  The patient reports that he is in pain management how ever is not a pain medication.  Patient reports that he has not fallen or injured himself in any way, he states that his pain is where his chronic pain is.    On examination there is tenderness palpation to the paraspinous muscles of the right lumbar spine.  No midline tenderness.  Abdomen is benign.    Disposition Considerations (tests considered but not done, Admit vs D/C, Shared Decision Making, Pt Expectation of Test or Tx.):    I did consider doing lab work and imaging however he was recently admitted and had this done.  Patient is telling me that this is simply his chronic pain returning and otherwise I do not feel that this would change

## 2024-06-03 ENCOUNTER — TELEPHONE (OUTPATIENT)
Dept: FAMILY MEDICINE CLINIC | Age: 64
End: 2024-06-03

## 2024-06-03 ENCOUNTER — CARE COORDINATION (OUTPATIENT)
Dept: CARE COORDINATION | Age: 64
End: 2024-06-03

## 2024-06-03 ASSESSMENT — PATIENT HEALTH QUESTIONNAIRE - PHQ9
SUM OF ALL RESPONSES TO PHQ QUESTIONS 1-9: 0
2. FEELING DOWN, DEPRESSED OR HOPELESS: NOT AT ALL
1. LITTLE INTEREST OR PLEASURE IN DOING THINGS: NOT AT ALL
SUM OF ALL RESPONSES TO PHQ QUESTIONS 1-9: 0
SUM OF ALL RESPONSES TO PHQ9 QUESTIONS 1 & 2: 0

## 2024-06-03 NOTE — TELEPHONE ENCOUNTER
Patient needs a ER Follow Up.   No Openings with Dr. Collins, okay to place with the NP?    Thank you!    Please call the patient back to make Appointment.

## 2024-06-03 NOTE — CARE COORDINATION
Ambulatory Care Coordination (ACC)    Stacia Bojorquez RN BSN [facility]   Ambulatory Care Manager (ACM)      Situation: ED visit over weekend (6.1.24) for flank pain    HD on Tue, Thurs, Sat    Of note - chart reflects pt had recently ran out of Rx d/t missed visit w/Pain Mgmt. Pt did reach out to Pain Mgmt by phone 5.30.24 & Rx refill was sent in    Hx RPM enrollment - 1.15.24; disenrolled 1.23.24  Hx ACC enrollment -  ACC program concluded 2.12.24 d/t pt not engaged    Action: Left HIPAA compliant  requesting return callback.  Advised ACM available through end of business day (until 4pm) this Wednesday 6.5.24, will be on PTO 6.6.24-6.11.24 - hoping to reach pt for review of ACC enrollment before Wednesday  Further advised, in absence of reaching for this phone outreach - encouraged pt in scheduling with PCP for timely follow up, per instructions given to him over weekend. Provided PCP number [phone] for scheduling    Provided & repeated direct callback to reach this ACM.      Plan: engage for RPM/ACC support    Stacia Bojorquez RN BSN, [facility] Ambulatory Care Manager  [phone]                                           
investigation?: No identified areas of uncertainly or problems already being investigated   Are the patient’s physical health problems impacting on their mental well-being?: No identified areas of concern   Are there any problems with your patient’s lifestyle behaviors (alcohol, drugs, diet, exercise) that are impacting on physical or mental well-being?: No identified areas of concern   Do you have any other concerns about your patient’s mental well-being? How would you rate their severity and impact on the patient?: No identified areas of concern   How would you rate their home environment in terms of safety and stability (including domestic violence, insecure housing, neighbor harassment)?: Consistently safe, supportive, stable, no identified problems   How do daily activities impact on the patient's well-being? (include current or anticipated unemployment, work, caregiving, access to transportation or other): No identified problems or perceived positive benefits   How would you rate their social network (family, work, friends)?: Good participation with social networks   How would you rate their financial resources (including ability to afford all required medical care)?: Financially secure, resources adequate, no identified problems   How wells does the patient now understand their health and well-being (symptoms, signs or risk factors) and what they need to do to manage their health?: Reasonable to good understanding and already engages in managing health or is willing to undertake better management   How well do you think your patient can engage in healthcare discussions? (Barriers include language, deafness, aphasia, alcohol or drug problems, learning difficulties, concentration): Clear and open communication, no identified barriers   Do other services need to be involved to help this patient?: Other care/services not required at this time   Are current services involved with this patient well-coordinated?

## 2024-06-04 NOTE — TELEPHONE ENCOUNTER
Future Appointments   Date Time Provider Department Center   6/5/2024 11:45 AM Curt Collins MD [facility] FP [place] - [facility]   6/17/2024  9:20 AM Jacqui Brasher APRN - [facility] Sp [facility]

## 2024-06-05 ENCOUNTER — OFFICE VISIT (OUTPATIENT)
Dept: FAMILY MEDICINE CLINIC | Age: 64
End: 2024-06-05

## 2024-06-05 VITALS
TEMPERATURE: 97.9 F | RESPIRATION RATE: 14 BRPM | BODY MASS INDEX: 30.31 KG/M2 | DIASTOLIC BLOOD PRESSURE: 70 MMHG | WEIGHT: 255.6 LBS | HEART RATE: 93 BPM | SYSTOLIC BLOOD PRESSURE: 136 MMHG | OXYGEN SATURATION: 97 %

## 2024-06-05 DIAGNOSIS — K64.4 HEMORRHOIDS, EXTERNAL: ICD-10-CM

## 2024-06-05 DIAGNOSIS — E11.69 TYPE 2 DIABETES MELLITUS WITH OTHER SPECIFIED COMPLICATION, WITHOUT LONG-TERM CURRENT USE OF INSULIN (HCC): ICD-10-CM

## 2024-06-05 DIAGNOSIS — M51.36 DDD (DEGENERATIVE DISC DISEASE), LUMBAR: Primary | ICD-10-CM

## 2024-06-05 DIAGNOSIS — T40.2X5A THERAPEUTIC OPIOID INDUCED CONSTIPATION: ICD-10-CM

## 2024-06-05 DIAGNOSIS — M79.672 BILATERAL FOOT PAIN: ICD-10-CM

## 2024-06-05 DIAGNOSIS — M79.671 BILATERAL FOOT PAIN: ICD-10-CM

## 2024-06-05 DIAGNOSIS — N18.6 END STAGE CHRONIC KIDNEY DISEASE (HCC): ICD-10-CM

## 2024-06-05 DIAGNOSIS — K59.03 THERAPEUTIC OPIOID INDUCED CONSTIPATION: ICD-10-CM

## 2024-06-05 PROBLEM — M51.369 DDD (DEGENERATIVE DISC DISEASE), LUMBAR: Status: ACTIVE | Noted: 2024-06-05

## 2024-06-05 PROBLEM — K57.92 DIVERTICULITIS: Status: RESOLVED | Noted: 2024-04-30 | Resolved: 2024-06-05

## 2024-06-05 PROBLEM — E87.8 ELECTROLYTE IMBALANCE: Status: RESOLVED | Noted: 2024-03-25 | Resolved: 2024-06-05

## 2024-06-05 PROBLEM — K57.32 DIVERTICULITIS OF COLON: Status: RESOLVED | Noted: 2024-04-30 | Resolved: 2024-06-05

## 2024-06-05 RX ORDER — HYDROCORTISONE 25 MG/G
CREAM TOPICAL
Qty: 28 G | Refills: 5 | Status: SHIPPED | OUTPATIENT
Start: 2024-06-05

## 2024-06-05 NOTE — PROGRESS NOTES
Here for f/u and recheck after recent ER visit, pt presented to [facility] ER 6/1 for c/o some acute Right flank pain. He did not have any issues of n/v/d associated with symptoms.  History shows it was flank and lower back pain.  Pt does follow regularly with pain mgt for his chronic degenerative disc disease, last injection was about 3 months ago (with dr. Gasca).   ER felt that this pain was consistent with his chronic degenerative disc disease lumbar spine, did not perform any imaging or bloodwork, but sent him home with pain relief with norflex BID and lidoderm patches.   Pt working with NP at pain management, seeing her monthly on average and will see her again on the 17th.  Normally gets muscle relaxants as well as pain managements.  Pt does not remember any trigger for flare of symptoms.      Pts ESRD is stable, cotinues on dialysis, TIW.  It is doing well, T, TH, sat across the parking lot.  Pt is in the process of getting working with UC for consideration of potential transplant.  Will see psychology down there     Pt here for follow up of blood pressure.  Pt states doing great with adherence to therapy and feels well.  No issues of chest pain, shortness of breath.  No vision changes, headache, swelling in legs.     Pt working with urology for history of prostate CA s/p radiation and seed implants and they are monitoring serial psa at this time.      Pt dealing with some exxternal hemorrhoid issues.  Bowels are normal, but painful with bowels.  No bleeding.  They have been present on and off for 1-2 weeks.  Not treating.        Except as noted above in the history of present illness, the review of systems is  negative for headache, vision changes, chest pain, shortness of breath, abdominal pain, urinary sx, bowel changes.    Past medical, surgical, and social history reviewed and updated  Medications and allergies reviewed and updated      O: /70   Pulse 93   Temp 97.9 °F (36.6 °C) (Temporal)   Resp 14

## 2024-06-12 ENCOUNTER — CARE COORDINATION (OUTPATIENT)
Dept: CARE COORDINATION | Age: 64
End: 2024-06-12

## 2024-06-12 NOTE — CARE COORDINATION
Ambulatory Care Coordination (ACC)    MALDONADO VargasN [facility]   Ambulatory Care Manager (ACM)      Situation: ACC program initiated 6.3.24  HD [place], Thurs, & Sat  Has follow up scheduled w/Pain Mgmt 6.17.24    Formerly enrolled for RPM 1.15.24, disenrolled 1.23.24  - not willing to enroll in RPM this ACC program    Action: Left HIPAA compliant vm requesting return callback.    Provided & repeated direct callback to reach this ACM.      Plan (unchanged): ACC outreach for health coaching, care collaboration, support w/community resources, and help with any newly presenting concerns as needed.  Employ support of fellow ACC team nurse for higher frequency ACC outreach, if indicated.  Pt will continue direct self reporting for any concerning symptoms - new onset or worsening of any pre-existing concerns.  Review information re palliative care (v hospice) handout mailed; ongoing review of SDOH, ACP throughout current ACC episode, as appropriate.  Pt agrees to outreach direct to ACM, as needed, between routine follow up outreach initiated by ACM     MALDONADO Vargas, [facility] Ambulatory Care Manager  [phone]

## 2024-06-17 ENCOUNTER — OFFICE VISIT (OUTPATIENT)
Dept: PAIN MANAGEMENT | Age: 64
End: 2024-06-17
Payer: MEDICARE

## 2024-06-17 VITALS
HEART RATE: 89 BPM | SYSTOLIC BLOOD PRESSURE: 158 MMHG | OXYGEN SATURATION: 95 % | WEIGHT: 256 LBS | BODY MASS INDEX: 30.36 KG/M2 | DIASTOLIC BLOOD PRESSURE: 80 MMHG

## 2024-06-17 DIAGNOSIS — M50.30 DDD (DEGENERATIVE DISC DISEASE), CERVICAL: ICD-10-CM

## 2024-06-17 DIAGNOSIS — K43.2 INCISIONAL HERNIA WITHOUT OBSTRUCTION OR GANGRENE: ICD-10-CM

## 2024-06-17 DIAGNOSIS — M25.50 ARTHRALGIA OF MULTIPLE JOINTS: ICD-10-CM

## 2024-06-17 DIAGNOSIS — M17.0 BILATERAL PRIMARY OSTEOARTHRITIS OF KNEE: ICD-10-CM

## 2024-06-17 DIAGNOSIS — F33.0 MAJOR DEPRESSIVE DISORDER, RECURRENT, MILD (HCC): ICD-10-CM

## 2024-06-17 DIAGNOSIS — Z98.890 STATUS POST BILATERAL HERNIA REPAIR: ICD-10-CM

## 2024-06-17 DIAGNOSIS — G89.4 CHRONIC PAIN SYNDROME: ICD-10-CM

## 2024-06-17 DIAGNOSIS — G57.92 ILIOINGUINAL NEURALGIA OF LEFT SIDE: Primary | ICD-10-CM

## 2024-06-17 DIAGNOSIS — G57.91 ILIOINGUINAL NEURALGIA OF RIGHT SIDE: ICD-10-CM

## 2024-06-17 DIAGNOSIS — Z51.81 ENCOUNTER FOR THERAPEUTIC DRUG MONITORING: ICD-10-CM

## 2024-06-17 DIAGNOSIS — Z87.19 STATUS POST BILATERAL HERNIA REPAIR: ICD-10-CM

## 2024-06-17 DIAGNOSIS — F11.20 OPIOID DEPENDENCE WITH CURRENT USE (HCC): ICD-10-CM

## 2024-06-17 DIAGNOSIS — G58.8 INTERCOSTAL NEURALGIA: ICD-10-CM

## 2024-06-17 PROCEDURE — G8427 DOCREV CUR MEDS BY ELIG CLIN: HCPCS | Performed by: NURSE PRACTITIONER

## 2024-06-17 PROCEDURE — 1123F ACP DISCUSS/DSCN MKR DOCD: CPT | Performed by: NURSE PRACTITIONER

## 2024-06-17 PROCEDURE — 3017F COLORECTAL CA SCREEN DOC REV: CPT | Performed by: NURSE PRACTITIONER

## 2024-06-17 PROCEDURE — 1036F TOBACCO NON-USER: CPT | Performed by: NURSE PRACTITIONER

## 2024-06-17 PROCEDURE — 99213 OFFICE O/P EST LOW 20 MIN: CPT | Performed by: NURSE PRACTITIONER

## 2024-06-17 PROCEDURE — G8417 CALC BMI ABV UP PARAM F/U: HCPCS | Performed by: NURSE PRACTITIONER

## 2024-06-17 RX ORDER — OXYCODONE HYDROCHLORIDE AND ACETAMINOPHEN 5; 325 MG/1; MG/1
1 TABLET ORAL EVERY 6 HOURS PRN
Qty: 112 TABLET | Refills: 0 | Status: SHIPPED | OUTPATIENT
Start: 2024-06-17 | End: 2024-07-15

## 2024-06-17 RX ORDER — TIZANIDINE 4 MG/1
4 TABLET ORAL EVERY 8 HOURS PRN
Qty: 90 TABLET | Refills: 0 | Status: SHIPPED | OUTPATIENT
Start: 2024-06-17 | End: 2024-07-17

## 2024-06-17 NOTE — PROGRESS NOTES
medications.Risk of overdose and death, if medicines not taken as prescribed, were also discussed. If the patient develops new symptoms or if the symptoms worsen, the patient should call the office.    While transcribing every attempt was made to maintain the accuracy of the note in terms of it's contents,there may have been some errors made inadvertently.  Thank you for allowing me to participate in the care of this patient.    Jacqui Brasher, NP-C    Cc: , Curt BRODERICK MD

## 2024-06-19 ENCOUNTER — CARE COORDINATION (OUTPATIENT)
Dept: CARE COORDINATION | Age: 64
End: 2024-06-19

## 2024-06-19 NOTE — CARE COORDINATION
Ambulatory Care Coordination Note     6/19/2024 2:57 PM     patient outreach attempt by this ACM today to perform care management follow up . AC was unable to reach the patient by telephone today; left voice message requesting a return phone call to this ACM.     ACM: Stacia Bojorquez, RN     Care Summary Note:  Left detailed vm, in review of this routine/planned ACC outreach.   Inquired pt status, and any newly presenting resource needs or concerns of which ACM may assist at this time.    Offered quick review of time remaining in current ACC program & hoping for timely support to pt/family as appropriate to any potential needs.  With mention also re need to schedule future/return visit w/PCP as appropriate.    Invited pt/family to return callback at soonest convenience.  Provided & repeated ACM callback number.     PCP/Specialist follow up:   Future Appointments         Provider Specialty Dept Phone    7/22/2024 9:20 AM Jacqui Brasher APRN - CNP Pain Management [phone]            Follow Up:   Plan for next AC outreach in approximately 1 week to complete:  - disease specific assessments  - goal progression  - follow-up appointment with PCP - needs follow up visit scheduled .

## 2024-06-21 ENCOUNTER — CARE COORDINATION (OUTPATIENT)
Dept: CARE COORDINATION | Age: 64
End: 2024-06-21

## 2024-06-21 RX ORDER — NIFEDIPINE 30 MG/1
60 TABLET, EXTENDED RELEASE ORAL DAILY
Qty: 30 TABLET | Refills: 0 | OUTPATIENT
Start: 2024-06-21

## 2024-06-21 NOTE — CARE COORDINATION
Ambulatory Care Coordination Note     2024 12:49 PM     Patient Current Location:  Home: [address] [place]   [place] [address]     ACM contacted the patient by telephone. Verified name and  with patient as identifiers.         ACM: Stacia Bojorquez RN     Challenges to be reviewed by the provider   Additional needs identified to be addressed with provider No  none               Method of communication with provider: none.    Care Summary Note: pt denies any newly presenting concerns or needs at this time. Reviewed need to schedule return/follow up visit w/PCP. Pt denies need for assistance w/scheduling, states he will self-outreach direct to PCP for scheduling as appropriate.  Reviewed extra precautions as r/t excessive heat, interventions.  Pt denies need/want for higher frequency ACC outreach. Notes willingness to self-outreach to ACM as needed.      Offered patient enrollment in the Remote Patient Monitoring (RPM) program for in-home monitoring: Yes, but did not enroll at this time: previously enrolled 1.15.24, disenrolled 24 .     Assessments Completed:   Diabetes Assessment    Medic Alert ID: No  Meal Planning: Carb counting, Plate Method, Avoidance of concentrated sweets   How often do you test your blood sugar?: Daily   Do you have barriers with adherence to non-pharmacologic self-management interventions? (Nutrition/Exercise/Self-Monitoring): No   Have you ever had to go to the ED for symptoms of low blood sugar?: No       No patient-reported symptoms         ,   Congestive Heart Failure Assessment    Are you currently restricting fluids?: No Restriction  Do you understand a low sodium diet?: Yes  Do you understand how to read food labels?: Yes  How many restaurant meals do you eat per week?: 0  Do you salt your food before tasting it?: No     No patient-reported symptoms      Symptoms:  None: Yes      Symptom course: stable  Weight trend: stable  Salt intake watch compared to last visit: stable

## 2024-06-26 ENCOUNTER — TELEPHONE (OUTPATIENT)
Dept: FAMILY MEDICINE CLINIC | Age: 64
End: 2024-06-26

## 2024-06-26 DIAGNOSIS — G89.29 CHRONIC ABDOMINAL PAIN: Primary | ICD-10-CM

## 2024-06-26 DIAGNOSIS — R10.9 CHRONIC ABDOMINAL PAIN: Primary | ICD-10-CM

## 2024-06-26 NOTE — TELEPHONE ENCOUNTER
----- Message from Xiomara Santos sent at 6/24/2024  9:45 AM EDT -----  Regarding: ECC Referral Request  ECC Referral Request    Reason for referral request: Specialty Provider    Specialist/Lab/Test patient is requesting (if known): Stomach Specialist.    Specialist Phone Number (if applicable): N/A    Additional Information. Patient wife called in to request a Refferal for the Stomach Specialist for the condition of this patient that Dr. Curt Collins is already known.  --------------------------------------------------------------------------------------------------------------------------    Relationship to Patient: Spouse/Partner named Tamiko Woods.    Call Back Information: OK to leave message on voicemail  Preferred Call Back Number: Phone [phone]

## 2024-06-27 NOTE — TELEPHONE ENCOUNTER
Left message for pt or spouse to return call to the office. Please give Dr. Smith information.     Dr Miguel Smith  [address] [id number] [phone]

## 2024-07-01 ENCOUNTER — TELEPHONE (OUTPATIENT)
Dept: PAIN MANAGEMENT | Age: 64
End: 2024-07-01

## 2024-07-01 ENCOUNTER — OFFICE VISIT (OUTPATIENT)
Dept: SURGERY | Age: 64
End: 2024-07-01
Payer: MEDICARE

## 2024-07-01 VITALS — WEIGHT: 259 LBS | SYSTOLIC BLOOD PRESSURE: 142 MMHG | BODY MASS INDEX: 30.71 KG/M2 | DIASTOLIC BLOOD PRESSURE: 82 MMHG

## 2024-07-01 DIAGNOSIS — K57.92 ACUTE DIVERTICULITIS: Primary | ICD-10-CM

## 2024-07-01 PROCEDURE — 99213 OFFICE O/P EST LOW 20 MIN: CPT | Performed by: SURGERY

## 2024-07-01 PROCEDURE — 1123F ACP DISCUSS/DSCN MKR DOCD: CPT | Performed by: SURGERY

## 2024-07-01 PROCEDURE — 1036F TOBACCO NON-USER: CPT | Performed by: SURGERY

## 2024-07-01 PROCEDURE — G8417 CALC BMI ABV UP PARAM F/U: HCPCS | Performed by: SURGERY

## 2024-07-01 PROCEDURE — 3017F COLORECTAL CA SCREEN DOC REV: CPT | Performed by: SURGERY

## 2024-07-01 PROCEDURE — G8427 DOCREV CUR MEDS BY ELIG CLIN: HCPCS | Performed by: SURGERY

## 2024-07-01 RX ORDER — AMOXICILLIN AND CLAVULANATE POTASSIUM 500; 125 MG/1; MG/1
1 TABLET, FILM COATED ORAL 2 TIMES DAILY
Qty: 20 TABLET | Refills: 0 | Status: SHIPPED | OUTPATIENT
Start: 2024-07-01 | End: 2024-07-11

## 2024-07-01 NOTE — PROGRESS NOTES
tablet TAKE 1 TABLET BY MOUTH EVERY DAY (Patient taking differently: Take 1 tablet by mouth nightly) 90 tablet 1    calcium acetate (PHOSLO) 667 MG CAPS capsule TAKE 1 CAPSULE BY MOUTH THREE TIMES A DAY WITH MEALS (Patient taking differently: Take 1 capsule by mouth 3 times daily (with meals)) 270 capsule 2     No current facility-administered medications for this visit.      No Known Allergies     Review of Systems:  Review of systems performed and negative with the exception of the above findings    OBJECTIVE:  BP (!) 142/82   Wt 117.5 kg (259 lb)   BMI 30.71 kg/m²      Physical Exam:  General appearance: alert, appears stated age, cooperative, and no distress  Head: Normocephalic, without obvious abnormality, atraumatic  Lungs: clear to auscultation bilaterally  Heart: regular rate and rhythm, S1, S2 normal, no murmur, click, rub or gallop  Abdomen: soft, non-tender (although has right lower quadrant pain, just not to palpation), non-distended    No visits with results within 6 Week(s) from this visit.   Latest known visit with results is:   No results displayed because visit has over 200 results.          No results found.    ASSESSMENT:  Acute uncomplicated sigmoid diverticulitis  ESRD on HD  CHF  Diabetes  Chronic pain syndrome    PLAN:  Restart oral antibiotics  Repeat CT with PO contrast, monitor sigmoid diverticulitis  Encourage PO intake both food and fluids   Criteria to avoid hospital admission include ability to maintain hydration and nutrition, reasonable pain control. If not, needs ED evaluation for expedited care and possible hospital admission  Follow in office 2 weeks to discuss progress and further options    Hermilo Patricia MD, FACS  7/1/2024  11:03 AM

## 2024-07-01 NOTE — PATIENT INSTRUCTIONS
Restart oral antibiotics  Repeat CT with PO contrast, monitor sigmoid diverticulitis  Encourage PO intake both food and fluids   Criteria to avoid hospital admission include ability to maintain hydration and nutrition, reasonable pain control. If not, needs ED evaluation for expedited care and possible hospital admission  Follow in office 2 weeks to discuss progress and further options

## 2024-07-03 ENCOUNTER — TELEPHONE (OUTPATIENT)
Dept: PAIN MANAGEMENT | Age: 64
End: 2024-07-03

## 2024-07-03 DIAGNOSIS — G57.91 ILIOINGUINAL NEURALGIA OF RIGHT SIDE: ICD-10-CM

## 2024-07-03 DIAGNOSIS — Z87.19 STATUS POST BILATERAL HERNIA REPAIR: ICD-10-CM

## 2024-07-03 DIAGNOSIS — M50.30 DDD (DEGENERATIVE DISC DISEASE), CERVICAL: ICD-10-CM

## 2024-07-03 DIAGNOSIS — M25.50 ARTHRALGIA OF MULTIPLE JOINTS: ICD-10-CM

## 2024-07-03 DIAGNOSIS — G57.92 ILIOINGUINAL NEURALGIA OF LEFT SIDE: ICD-10-CM

## 2024-07-03 DIAGNOSIS — K43.2 INCISIONAL HERNIA WITHOUT OBSTRUCTION OR GANGRENE: ICD-10-CM

## 2024-07-03 DIAGNOSIS — G89.4 CHRONIC PAIN SYNDROME: ICD-10-CM

## 2024-07-03 DIAGNOSIS — Z98.890 STATUS POST BILATERAL HERNIA REPAIR: ICD-10-CM

## 2024-07-03 DIAGNOSIS — F33.0 MAJOR DEPRESSIVE DISORDER, RECURRENT, MILD (HCC): ICD-10-CM

## 2024-07-03 DIAGNOSIS — Z51.81 ENCOUNTER FOR THERAPEUTIC DRUG MONITORING: ICD-10-CM

## 2024-07-03 RX ORDER — OXYCODONE HYDROCHLORIDE AND ACETAMINOPHEN 5; 325 MG/1; MG/1
1 TABLET ORAL EVERY 6 HOURS PRN
Qty: 28 TABLET | Refills: 0 | Status: SHIPPED | OUTPATIENT
Start: 2024-07-14 | End: 2024-07-21

## 2024-07-05 ENCOUNTER — CARE COORDINATION (OUTPATIENT)
Dept: CARE COORDINATION | Age: 64
End: 2024-07-05

## 2024-07-05 NOTE — CARE COORDINATION
Ambulatory Care Coordination Note     2024 2:17 PM     Patient Current Location:  Home: [address]   [place] [address]     ACM contacted the patient by telephone. Verified name and  with patient as identifiers.         ACM: Stacai Bojorquez RN     Challenges to be reviewed by the provider   Additional needs identified to be addressed with provider No  none               Method of communication with provider: none.    Care Summary Note: pt's voice presented as such this writer suspected/inquired re pt emotional state. (ie sounded either just wakening, or perhaps tearful/emotion)  Pt confirmed he is slightly emotional, but quickly adds, \"but I'm fine, I'm just dealing with some personal stuff is all...\"   Denies thoughts of self harm or SI.  ACM attempted to further engage to inquire any support ACM may offer.  Pt denied 3x during ACC phone review.    Encouraged pt in ability to outreach direct to PCP during ACM absence - reminded ACM taking PTO in coming week.  Denies belief he requires sooner/higher frequency ACC outreach by fellow ACM nurse.  Assures this writer he will remain proactive and seek for sooner return visit w/PCP, or any other providers of whom he is following, as appropriate if needed.  Offered patient enrollment in the Remote Patient Monitoring (RPM) program for in-home monitoring: Yes, but did not enroll at this time: previously enrolled 1.15.24, disenrolled 24; declined revisiting RPM 6.3.24, not interested .     Assessments Completed:   Congestive Heart Failure Assessment    Are you currently restricting fluids?: No Restriction  Do you understand a low sodium diet?: Yes  Do you understand how to read food labels?: Yes  How many restaurant meals do you eat per week?: 0  Do you salt your food before tasting it?: No     No patient-reported symptoms      Symptoms:  None: Yes      Symptom course: stable  Weight trend: stable  Salt intake watch compared to last visit: stable       and

## 2024-07-08 ENCOUNTER — HOSPITAL ENCOUNTER (EMERGENCY)
Age: 64
Discharge: HOME | End: 2024-07-08
Attending: EMERGENCY MEDICINE
Payer: MEDICARE

## 2024-07-08 ENCOUNTER — APPOINTMENT (OUTPATIENT)
Dept: CT IMAGING | Age: 64
End: 2024-07-08
Payer: MEDICARE

## 2024-07-08 ENCOUNTER — APPOINTMENT (OUTPATIENT)
Dept: GENERAL RADIOLOGY | Age: 64
End: 2024-07-08
Payer: MEDICARE

## 2024-07-08 VITALS
TEMPERATURE: 97.6 F | RESPIRATION RATE: 18 BRPM | HEIGHT: 77 IN | HEART RATE: 68 BPM | WEIGHT: 260 LBS | BODY MASS INDEX: 30.7 KG/M2 | DIASTOLIC BLOOD PRESSURE: 93 MMHG | SYSTOLIC BLOOD PRESSURE: 162 MMHG | OXYGEN SATURATION: 100 %

## 2024-07-08 VITALS
WEIGHT: 264.3 LBS | HEIGHT: 77 IN | TEMPERATURE: 97.6 F | HEART RATE: 76 BPM | DIASTOLIC BLOOD PRESSURE: 89 MMHG | RESPIRATION RATE: 10 BRPM | OXYGEN SATURATION: 100 % | SYSTOLIC BLOOD PRESSURE: 183 MMHG | BODY MASS INDEX: 31.21 KG/M2

## 2024-07-08 DIAGNOSIS — R41.82 ALTERED MENTAL STATUS, UNSPECIFIED ALTERED MENTAL STATUS TYPE: Primary | ICD-10-CM

## 2024-07-08 DIAGNOSIS — R51.9 ACUTE NONINTRACTABLE HEADACHE, UNSPECIFIED HEADACHE TYPE: Primary | ICD-10-CM

## 2024-07-08 LAB
ALBUMIN SERPL-MCNC: 4.1 G/DL (ref 3.4–5)
ALBUMIN/GLOB SERPL: 1.7 {RATIO} (ref 1.1–2.2)
ALP SERPL-CCNC: 67 U/L (ref 40–129)
ALT SERPL-CCNC: 15 U/L (ref 10–40)
ANION GAP SERPL CALCULATED.3IONS-SCNC: 14 MMOL/L (ref 3–16)
AST SERPL-CCNC: 19 U/L (ref 15–37)
BASOPHILS # BLD: 0 K/UL (ref 0–0.2)
BASOPHILS NFR BLD: 0.5 %
BILIRUB SERPL-MCNC: <0.2 MG/DL (ref 0–1)
BUN SERPL-MCNC: 40 MG/DL (ref 7–20)
CALCIUM SERPL-MCNC: 8.1 MG/DL (ref 8.3–10.6)
CHLORIDE SERPL-SCNC: 102 MMOL/L (ref 99–110)
CO2 SERPL-SCNC: 20 MMOL/L (ref 21–32)
CREAT SERPL-MCNC: 6.1 MG/DL (ref 0.8–1.3)
DEPRECATED RDW RBC AUTO: 15.6 % (ref 12.4–15.4)
EKG ATRIAL RATE: 75 BPM
EKG DIAGNOSIS: NORMAL
EKG P AXIS: 63 DEGREES
EKG P-R INTERVAL: 152 MS
EKG Q-T INTERVAL: 404 MS
EKG QRS DURATION: 90 MS
EKG QTC CALCULATION (BAZETT): 451 MS
EKG R AXIS: -15 DEGREES
EKG T AXIS: 39 DEGREES
EKG VENTRICULAR RATE: 75 BPM
EOSINOPHIL # BLD: 0 K/UL (ref 0–0.6)
EOSINOPHIL NFR BLD: 0.5 %
GFR SERPLBLD CREATININE-BSD FMLA CKD-EPI: 10 ML/MIN/{1.73_M2}
GLUCOSE BLD-MCNC: 139 MG/DL (ref 70–99)
GLUCOSE SERPL-MCNC: 123 MG/DL (ref 70–99)
HCT VFR BLD AUTO: 35.8 % (ref 40.5–52.5)
HGB BLD-MCNC: 11.2 G/DL (ref 13.5–17.5)
INR PPP: 1.05 (ref 0.85–1.15)
LYMPHOCYTES # BLD: 0.3 K/UL (ref 1–5.1)
LYMPHOCYTES NFR BLD: 8.5 %
MCH RBC QN AUTO: 27.9 PG (ref 26–34)
MCHC RBC AUTO-ENTMCNC: 31.2 G/DL (ref 31–36)
MCV RBC AUTO: 89.3 FL (ref 80–100)
MONOCYTES # BLD: 0.1 K/UL (ref 0–1.3)
MONOCYTES NFR BLD: 2.6 %
NEUTROPHILS # BLD: 3.5 K/UL (ref 1.7–7.7)
NEUTROPHILS NFR BLD: 87.9 %
PERFORMED ON: ABNORMAL
PLATELET # BLD AUTO: 153 K/UL (ref 135–450)
PMV BLD AUTO: 8.4 FL (ref 5–10.5)
POTASSIUM SERPL-SCNC: 4.9 MMOL/L (ref 3.5–5.1)
PROT SERPL-MCNC: 6.5 G/DL (ref 6.4–8.2)
PROTHROMBIN TIME: 14 SEC (ref 11.9–14.9)
RBC # BLD AUTO: 4.01 M/UL (ref 4.2–5.9)
REASON FOR REJECTION: NORMAL
REJECTED TEST: NORMAL
SODIUM SERPL-SCNC: 136 MMOL/L (ref 136–145)
TROPONIN, HIGH SENSITIVITY: 33 NG/L (ref 0–22)
WBC # BLD AUTO: 4 K/UL (ref 4–11)

## 2024-07-08 PROCEDURE — 70496 CT ANGIOGRAPHY HEAD: CPT

## 2024-07-08 PROCEDURE — 84484 ASSAY OF TROPONIN QUANT: CPT

## 2024-07-08 PROCEDURE — 85610 PROTHROMBIN TIME: CPT

## 2024-07-08 PROCEDURE — 85025 COMPLETE CBC W/AUTO DIFF WBC: CPT

## 2024-07-08 PROCEDURE — 70450 CT HEAD/BRAIN W/O DYE: CPT

## 2024-07-08 PROCEDURE — 93010 ELECTROCARDIOGRAM REPORT: CPT | Performed by: INTERNAL MEDICINE

## 2024-07-08 PROCEDURE — 71045 X-RAY EXAM CHEST 1 VIEW: CPT

## 2024-07-08 PROCEDURE — 6360000002 HC RX W HCPCS: Performed by: EMERGENCY MEDICINE

## 2024-07-08 PROCEDURE — 93005 ELECTROCARDIOGRAM TRACING: CPT | Performed by: EMERGENCY MEDICINE

## 2024-07-08 PROCEDURE — 99285 EMERGENCY DEPT VISIT HI MDM: CPT

## 2024-07-08 PROCEDURE — 6360000004 HC RX CONTRAST MEDICATION: Performed by: EMERGENCY MEDICINE

## 2024-07-08 PROCEDURE — 80053 COMPREHEN METABOLIC PANEL: CPT

## 2024-07-08 RX ORDER — DROPERIDOL 2.5 MG/ML
0.62 INJECTION, SOLUTION INTRAMUSCULAR; INTRAVENOUS ONCE
Status: COMPLETED | OUTPATIENT
Start: 2024-07-08 | End: 2024-07-08

## 2024-07-08 RX ORDER — PROCHLORPERAZINE EDISYLATE 5 MG/ML
5 INJECTION INTRAMUSCULAR; INTRAVENOUS ONCE
Status: COMPLETED | OUTPATIENT
Start: 2024-07-08 | End: 2024-07-08

## 2024-07-08 RX ORDER — DIPHENHYDRAMINE HYDROCHLORIDE 50 MG/ML
12.5 INJECTION INTRAMUSCULAR; INTRAVENOUS ONCE
Status: COMPLETED | OUTPATIENT
Start: 2024-07-08 | End: 2024-07-08

## 2024-07-08 RX ORDER — PROCHLORPERAZINE MALEATE 10 MG
10 TABLET ORAL EVERY 8 HOURS PRN
Qty: 5 TABLET | Refills: 0 | Status: SHIPPED | OUTPATIENT
Start: 2024-07-08

## 2024-07-08 RX ORDER — METOCLOPRAMIDE 5 MG/1
5 TABLET ORAL EVERY 12 HOURS PRN
Qty: 5 TABLET | Refills: 0 | Status: SHIPPED | OUTPATIENT
Start: 2024-07-08

## 2024-07-08 RX ORDER — HYDROMORPHONE HYDROCHLORIDE 1 MG/ML
1 INJECTION, SOLUTION INTRAMUSCULAR; INTRAVENOUS; SUBCUTANEOUS ONCE
Status: COMPLETED | OUTPATIENT
Start: 2024-07-08 | End: 2024-07-08

## 2024-07-08 RX ORDER — DEXAMETHASONE 4 MG/1
4 TABLET ORAL ONCE
Status: COMPLETED | OUTPATIENT
Start: 2024-07-08 | End: 2024-07-08

## 2024-07-08 RX ADMIN — IOPAMIDOL 75 ML: 755 INJECTION, SOLUTION INTRAVENOUS at 11:31

## 2024-07-08 RX ADMIN — DIPHENHYDRAMINE HYDROCHLORIDE 12.5 MG: 50 INJECTION INTRAMUSCULAR; INTRAVENOUS at 05:06

## 2024-07-08 RX ADMIN — HYDROMORPHONE HYDROCHLORIDE 1 MG: 1 INJECTION, SOLUTION INTRAMUSCULAR; INTRAVENOUS; SUBCUTANEOUS at 06:23

## 2024-07-08 RX ADMIN — PROCHLORPERAZINE EDISYLATE 5 MG: 5 INJECTION INTRAMUSCULAR; INTRAVENOUS at 05:06

## 2024-07-08 RX ADMIN — DROPERIDOL 0.62 MG: 2.5 INJECTION, SOLUTION INTRAMUSCULAR; INTRAVENOUS at 06:24

## 2024-07-08 RX ADMIN — DEXAMETHASONE 4 MG: 4 TABLET ORAL at 07:39

## 2024-07-08 ASSESSMENT — PAIN SCALES - GENERAL
PAINLEVEL_OUTOF10: 9
PAINLEVEL_OUTOF10: 0
PAINLEVEL_OUTOF10: 10
PAINLEVEL_OUTOF10: 10

## 2024-07-08 ASSESSMENT — PAIN DESCRIPTION - DESCRIPTORS
DESCRIPTORS: ACHING

## 2024-07-08 ASSESSMENT — PAIN DESCRIPTION - LOCATION
LOCATION: HEAD

## 2024-07-08 ASSESSMENT — PAIN DESCRIPTION - ORIENTATION: ORIENTATION: ANTERIOR

## 2024-07-08 ASSESSMENT — PAIN - FUNCTIONAL ASSESSMENT
PAIN_FUNCTIONAL_ASSESSMENT: 0-10
PAIN_FUNCTIONAL_ASSESSMENT: 0-10

## 2024-07-08 NOTE — DISCHARGE INSTRUCTIONS
Your prescription has been sent to [facility].    You can also continue to use acetaminophen (Tylenol) to help with headache pain.    Be sure to contact your primary care provider's office to arrange for a follow up visit.    If you have any new or worsening issues after going home don't hesitate to return here for reevaluation at any time 24/7!

## 2024-07-08 NOTE — ED PROVIDER NOTES
team with Dr. Frank Vázquez.         Final   No acute intracranial abnormality.      The findings were sent to the Radiology Results Communication Center at 11:48   am on 7/8/2024 to be communicated to a licensed caregiver.               ED COURSE/MDM  Old records reviewed. Labs and imaging reviewed.  Patient seen and evaluated by myself.  Patient presents for evaluation of confusion.  Patient was here earlier and received medications for severe headache.  That is since resolved.  Was feeling normal when he went home however soon after getting home started to not feel himself and felt confused.  Waited a bit to see if it got better however it persisted eventually came back.  Patient's stroke scale is negative though a stroke alert was called in triage.  I did speak to stroke neurology is also in agreement that this likely is not a stroke.  He did receive CT and CTA as well as labs.  His labs are fairly baseline for him.  CT and CTA are negative.  He will require his dialysis tomorrow as he did get contrast with this.  He is observed for few hours during which time he comes back to his normal mental status.  I do suspect this probably delayed reaction and probably from the droperidol he received as the symptoms can be associated with it.  He otherwise looks well and I do feel comfortable with him going home and he and his wife both feel comfortable going home at this time.  He is to follow-up with his doctor and return for any concern.    Consults:  Stroke neurology    History obtained from:   Patient and Significant other    Pertinent social determinants of health:   None applicable    Review of other records:  None    Medications given:  Medications   iopamidol (ISOVUE-370) 76 % injection 75 mL (75 mLs IntraVENous Given 7/8/24 1131)        Hospitalization considered?:  Considered however workup reassuring and patient back to his baseline    Additional testing considered?:  None    Sepsis

## 2024-07-08 NOTE — ED NOTES
Attempted IV access two times, 1st in right AC, line blew, pressure held and bandage applied, 2nd right hand, no access, bandage applied.  Patient tolerated well.  Wife remains at bedside, updated wife and patient on plan of care.  Taken to Ct scan by tech.  Will have another RN attempt lab collection/IV at that time.

## 2024-07-08 NOTE — ED NOTES
Pt states he started feeling \"off\" when he was discharged. Pt states he went home and tried to rest but was feeling uneasy and not like himself and was unable to sleep. Pt wife states she thinks it is from the medication he was given before leaving here. Per chart, pt received benadryl, compazine, dilaudid, droperidol, and decadron. Per wife, pt has never taken those medications before.

## 2024-07-10 ENCOUNTER — HOSPITAL ENCOUNTER (OUTPATIENT)
Dept: CT IMAGING | Age: 64
Discharge: HOME | End: 2024-07-10
Attending: SURGERY
Payer: MEDICARE

## 2024-07-10 DIAGNOSIS — K57.92 ACUTE DIVERTICULITIS: ICD-10-CM

## 2024-07-10 PROCEDURE — 6360000004 HC RX CONTRAST MEDICATION: Performed by: SURGERY

## 2024-07-10 PROCEDURE — 74176 CT ABD & PELVIS W/O CONTRAST: CPT

## 2024-07-10 RX ADMIN — IOHEXOL 25 ML: 350 INJECTION, SOLUTION INTRAVENOUS at 16:45

## 2024-07-17 ENCOUNTER — OFFICE VISIT (OUTPATIENT)
Dept: SURGERY | Age: 64
End: 2024-07-17
Payer: MEDICARE

## 2024-07-17 VITALS — DIASTOLIC BLOOD PRESSURE: 80 MMHG | BODY MASS INDEX: 30.59 KG/M2 | SYSTOLIC BLOOD PRESSURE: 150 MMHG | WEIGHT: 258 LBS

## 2024-07-17 DIAGNOSIS — K57.92 ACUTE DIVERTICULITIS: Primary | ICD-10-CM

## 2024-07-17 PROCEDURE — 3017F COLORECTAL CA SCREEN DOC REV: CPT | Performed by: SURGERY

## 2024-07-17 PROCEDURE — 1036F TOBACCO NON-USER: CPT | Performed by: SURGERY

## 2024-07-17 PROCEDURE — 99213 OFFICE O/P EST LOW 20 MIN: CPT | Performed by: SURGERY

## 2024-07-17 PROCEDURE — G8417 CALC BMI ABV UP PARAM F/U: HCPCS | Performed by: SURGERY

## 2024-07-17 PROCEDURE — G8427 DOCREV CUR MEDS BY ELIG CLIN: HCPCS | Performed by: SURGERY

## 2024-07-17 PROCEDURE — 1123F ACP DISCUSS/DSCN MKR DOCD: CPT | Performed by: SURGERY

## 2024-07-17 NOTE — PATIENT INSTRUCTIONS
Reassuring CT, no need for surgical intervention  Recommend journal of symptoms to determine provocating and alleviating factors, possible medicine related  Encourage PO intake both food and fluids   Follow with general surgery as needed

## 2024-07-17 NOTE — PROGRESS NOTES
evaluation. FINDINGS: CTA NECK: AORTIC ARCH/ARCH VESSELS: No dissection or arterial injury.  No significant stenosis of the brachiocephalic or subclavian arteries. CAROTID ARTERIES: No dissection, arterial injury, or hemodynamically significant stenosis by NASCET criteria. VERTEBRAL ARTERIES: No dissection, arterial injury, or significant stenosis. SOFT TISSUES: No focal consolidation within the visualized lung apices.  No acute abnormality within the visualized superior mediastinum. BONES: No acute osseous abnormality. CTA HEAD: ANTERIOR CIRCULATION: No significant stenosis of the intracranial internal carotid, anterior cerebral, or middle cerebral arteries. No aneurysm. POSTERIOR CIRCULATION: No significant stenosis of the vertebral, basilar, or posterior cerebral arteries. No aneurysm. OTHER: No dural venous sinus thrombosis on this non-dedicated study. BRAIN: No mass effect or midline shift.     1. No flow limiting stenosis or dissection of the cervical carotid/vertebral arteries. 2. No significant stenosis or large vessel occlusion of the [id number].     CT HEAD WO CONTRAST    Result Date: 7/8/2024  EXAMINATION: CT OF THE HEAD WITHOUT CONTRAST  7/8/2024 5:11 am TECHNIQUE: CT of the head was performed without the administration of intravenous contrast. Automated exposure control, iterative reconstruction, and/or weight based adjustment of the mA/kV was utilized to reduce the radiation dose to as low as reasonably achievable. COMPARISON: 04/30/2024 HISTORY: ORDERING SYSTEM PROVIDED HISTORY: New onset intense frontal headache started early this AM TECHNOLOGIST PROVIDED HISTORY: Reason for exam:->New onset intense frontal headache started early this AM Has a \"code stroke\" or \"stroke alert\" been called?->No Decision Support Exception - unselect if not a suspected or confirmed emergency medical condition->Emergency Medical Condition (MA) Reason for Exam: New onset intense frontal headache started early this AM

## 2024-07-19 ENCOUNTER — CARE COORDINATION (OUTPATIENT)
Dept: CARE COORDINATION | Age: 64
End: 2024-07-19

## 2024-07-19 NOTE — CARE COORDINATION
Ambulatory Care Coordination Note     7/19/2024 2:23 PM     Patient outreach attempt by this ACM today to perform care management follow up . AC was unable to reach the patient by telephone today; left voice message requesting a return phone call to this ACM.     ACM: Stacia Bojorquez RN     Care Summary Note: routine ACC outreach as planned. Upon review of chart, appears pt had ED visits x2 week of this writer's PTO (last week).  Left detailed vm inquiring pt status ahead of weekend. Urged pt to return callback today, or at pt's soonest convenience, with noted mention of pt's appt scheduled w/Pain Mgmt on Monday 7.22.24.    Provided & repeated ACM callback number.     Since it is Friday - also made mention via quick reminder for options for access to care as appropriate and need to schedule follow up w/PCP    PCP/Specialist follow up:   Future Appointments         Provider Specialty Dept Phone    7/22/2024 9:20 AM Jacqui Brasher APRN - CNP Pain Management [phone]            Follow Up:   Plan for next AC outreach in approximately 1 week to complete:  - disease specific assessments  - goal progression  - follow-up appointment with PCP .

## 2024-07-22 ENCOUNTER — OFFICE VISIT (OUTPATIENT)
Dept: PAIN MANAGEMENT | Age: 64
End: 2024-07-22
Payer: MEDICARE

## 2024-07-22 VITALS
DIASTOLIC BLOOD PRESSURE: 96 MMHG | WEIGHT: 268 LBS | HEART RATE: 83 BPM | OXYGEN SATURATION: 96 % | SYSTOLIC BLOOD PRESSURE: 167 MMHG | BODY MASS INDEX: 31.78 KG/M2

## 2024-07-22 DIAGNOSIS — G89.4 CHRONIC PAIN SYNDROME: ICD-10-CM

## 2024-07-22 DIAGNOSIS — Z87.19 STATUS POST BILATERAL HERNIA REPAIR: ICD-10-CM

## 2024-07-22 DIAGNOSIS — M25.50 ARTHRALGIA OF MULTIPLE JOINTS: ICD-10-CM

## 2024-07-22 DIAGNOSIS — K43.2 INCISIONAL HERNIA WITHOUT OBSTRUCTION OR GANGRENE: ICD-10-CM

## 2024-07-22 DIAGNOSIS — Z98.890 STATUS POST BILATERAL HERNIA REPAIR: ICD-10-CM

## 2024-07-22 DIAGNOSIS — M50.30 DDD (DEGENERATIVE DISC DISEASE), CERVICAL: ICD-10-CM

## 2024-07-22 DIAGNOSIS — G58.8 INTERCOSTAL NEURALGIA: ICD-10-CM

## 2024-07-22 DIAGNOSIS — F11.20 OPIOID DEPENDENCE WITH CURRENT USE (HCC): ICD-10-CM

## 2024-07-22 DIAGNOSIS — F33.0 MAJOR DEPRESSIVE DISORDER, RECURRENT, MILD (HCC): ICD-10-CM

## 2024-07-22 DIAGNOSIS — G57.91 ILIOINGUINAL NEURALGIA OF RIGHT SIDE: Primary | ICD-10-CM

## 2024-07-22 DIAGNOSIS — M17.0 BILATERAL PRIMARY OSTEOARTHRITIS OF KNEE: ICD-10-CM

## 2024-07-22 DIAGNOSIS — G57.92 ILIOINGUINAL NEURALGIA OF LEFT SIDE: ICD-10-CM

## 2024-07-22 DIAGNOSIS — Z51.81 ENCOUNTER FOR THERAPEUTIC DRUG MONITORING: ICD-10-CM

## 2024-07-22 PROCEDURE — 1036F TOBACCO NON-USER: CPT | Performed by: NURSE PRACTITIONER

## 2024-07-22 PROCEDURE — 3017F COLORECTAL CA SCREEN DOC REV: CPT | Performed by: NURSE PRACTITIONER

## 2024-07-22 PROCEDURE — 1123F ACP DISCUSS/DSCN MKR DOCD: CPT | Performed by: NURSE PRACTITIONER

## 2024-07-22 PROCEDURE — G8427 DOCREV CUR MEDS BY ELIG CLIN: HCPCS | Performed by: NURSE PRACTITIONER

## 2024-07-22 PROCEDURE — 99214 OFFICE O/P EST MOD 30 MIN: CPT | Performed by: NURSE PRACTITIONER

## 2024-07-22 PROCEDURE — G8417 CALC BMI ABV UP PARAM F/U: HCPCS | Performed by: NURSE PRACTITIONER

## 2024-07-22 RX ORDER — OXYCODONE HYDROCHLORIDE AND ACETAMINOPHEN 5; 325 MG/1; MG/1
1 TABLET ORAL EVERY 8 HOURS PRN
Qty: 84 TABLET | Refills: 0 | Status: SHIPPED | OUTPATIENT
Start: 2024-07-22 | End: 2024-08-19

## 2024-07-22 RX ORDER — METHOCARBAMOL 500 MG/1
500 TABLET, FILM COATED ORAL DAILY PRN
Qty: 30 TABLET | Refills: 0 | Status: SHIPPED | OUTPATIENT
Start: 2024-07-22 | End: 2024-08-21

## 2024-07-22 NOTE — PROGRESS NOTES
Jorden Woods  1960  [id number]      HISTORY OF PRESENT ILLNESS: Mr. Woods is a 63 y.o. male returns for a follow up visit for pain management  He has a diagnosis of   1. Ilioinguinal neuralgia of right side    2. Arthralgia of multiple joints    3. Status post bilateral hernia repair    4. Major depressive disorder, recurrent, mild    5. DDD (degenerative disc disease), cervical    6. Incisional hernia without obstruction or gangrene    7. Chronic pain syndrome    8. Opioid dependence with current use (HCC)    9. Encounter for therapeutic drug monitoring    10. Intercostal neuralgia    11. Ilioinguinal neuralgia of left side    12. Bilateral primary osteoarthritis of knee    .      New Medications since Last Office visit have been reviewed with patient.     As per Information Obtained from the PADT (Patient Assessment and Documentation Tool)    He complains of pain in the right hip, bilateral knees. He rates the pain 7/10 and describes it as sharp, aching. Current treatment regimen has helped relieve about 90% of the pain since beginning treatment plan.  He denies any side effects from the current pain regimen. Patient reports that since implementation of their treatment plan; their physical functioning is unchanged, family/social relationships are unchanged, mood is unchanged sleep patterns are unchanged, and that the overall functioning is unchanged.  Patient denies/admits that any of the above have changed since last office visit. Patient denies misusing/abusing his narcotic pain medications or using any illegal drugs.      Upon obtaining medical history from Mr. Woods    ALLERGIES: Patients list of allergies were reviewed     MEDICATIONS: Mr. Woods list of medications were reviewed.His current medications are   Outpatient Medications Prior to Visit   Medication Sig Dispense Refill    prochlorperazine (COMPAZINE) 10 MG tablet Take 1 tablet by mouth every 8 hours as needed (Headache note relieved by

## 2024-07-24 ENCOUNTER — CARE COORDINATION (OUTPATIENT)
Dept: CARE COORDINATION | Age: 64
End: 2024-07-24

## 2024-07-24 DIAGNOSIS — G57.91 ILIOINGUINAL NEURALGIA OF RIGHT SIDE: ICD-10-CM

## 2024-07-24 DIAGNOSIS — M50.30 DDD (DEGENERATIVE DISC DISEASE), CERVICAL: ICD-10-CM

## 2024-07-24 DIAGNOSIS — Z98.890 STATUS POST BILATERAL HERNIA REPAIR: ICD-10-CM

## 2024-07-24 DIAGNOSIS — M25.50 ARTHRALGIA OF MULTIPLE JOINTS: ICD-10-CM

## 2024-07-24 DIAGNOSIS — Z87.19 STATUS POST BILATERAL HERNIA REPAIR: ICD-10-CM

## 2024-07-24 DIAGNOSIS — F33.0 MAJOR DEPRESSIVE DISORDER, RECURRENT, MILD (HCC): ICD-10-CM

## 2024-07-24 RX ORDER — NIFEDIPINE 30 MG/1
60 TABLET, EXTENDED RELEASE ORAL DAILY
Qty: 30 TABLET | Refills: 0 | OUTPATIENT
Start: 2024-07-24

## 2024-07-24 RX ORDER — METHOCARBAMOL 500 MG/1
500 TABLET, FILM COATED ORAL DAILY PRN
Qty: 30 TABLET | Refills: 0 | OUTPATIENT
Start: 2024-07-24 | End: 2024-08-23

## 2024-07-24 NOTE — CARE COORDINATION
symptoms      Symptoms:  None: Yes      Symptom course: stable  Weight trend: stable  Salt intake watch compared to last visit: stable      , and   General Assessment    Do you have any symptoms that are causing concern?: No       Hypertension - Encounter Level    Symptom course: no change (Comment: reports awaiting word from [facility] for update re plan/referral for further Tx)           Medications Reviewed:   Patient denies any changes with medications and reports taking all medications as prescribed.    Advance Care Planning:   Health Care Decision maker confirmed and Reviewed during previous call      Care Planning:    Goals Addressed                      This Visit's Progress      Conditions and Symptoms (pt-stated)   On track      I will schedule office visits, as directed by my provider.  I will keep my appointment or reschedule if I have to cancel.  I will notify my provider of any barriers to my plan of care.  I will follow my Zone Management tool to seek urgent or emergent care.  I will notify my provider of any symptoms that indicate a worsening of my condition.    Barriers: impairment:  physical: chronic pain  Plan for overcoming my barriers: lack of motivation  Confidence: 10/10  Anticipated Goal Completion Date: 9.2.24                 PCP/Specialist follow up:   Future Appointments         Provider Specialty Dept Phone    8/19/2024 9:00 AM Jacqui Brasher APRN - CNP Pain Management [phone]            Follow Up:   Plan for next ACM outreach in approximately 3 weeks to complete:  - disease specific assessments  - goal progression  - follow up appointment with PCP - pt assures he will self schedule for annual visit; following w/[facility] & Pain Mgmt .   - next routine ACC outreach - review readiness to graduate ACC circa end of September  Patient  is agreeable to this plan.

## 2024-07-30 ENCOUNTER — TELEPHONE (OUTPATIENT)
Dept: FAMILY MEDICINE CLINIC | Age: 64
End: 2024-07-30

## 2024-08-01 NOTE — TELEPHONE ENCOUNTER
Letter written. LMOM notifying pt and gave options on how to receive letter.    Letter placed upfront for pt to  for now unless pt calls back requesting otherwise.

## 2024-08-19 ENCOUNTER — OFFICE VISIT (OUTPATIENT)
Dept: PAIN MANAGEMENT | Age: 64
End: 2024-08-19
Payer: MEDICARE

## 2024-08-19 VITALS
HEART RATE: 84 BPM | BODY MASS INDEX: 31.9 KG/M2 | DIASTOLIC BLOOD PRESSURE: 96 MMHG | OXYGEN SATURATION: 96 % | SYSTOLIC BLOOD PRESSURE: 205 MMHG | WEIGHT: 269 LBS

## 2024-08-19 DIAGNOSIS — G57.92 ILIOINGUINAL NEURALGIA OF LEFT SIDE: ICD-10-CM

## 2024-08-19 DIAGNOSIS — Z51.81 ENCOUNTER FOR THERAPEUTIC DRUG MONITORING: ICD-10-CM

## 2024-08-19 DIAGNOSIS — Z87.19 STATUS POST BILATERAL HERNIA REPAIR: Primary | ICD-10-CM

## 2024-08-19 DIAGNOSIS — G89.4 CHRONIC PAIN SYNDROME: ICD-10-CM

## 2024-08-19 DIAGNOSIS — M17.0 BILATERAL PRIMARY OSTEOARTHRITIS OF KNEE: ICD-10-CM

## 2024-08-19 DIAGNOSIS — M25.50 ARTHRALGIA OF MULTIPLE JOINTS: ICD-10-CM

## 2024-08-19 DIAGNOSIS — G58.8 INTERCOSTAL NEURALGIA: ICD-10-CM

## 2024-08-19 DIAGNOSIS — Z98.890 STATUS POST BILATERAL HERNIA REPAIR: Primary | ICD-10-CM

## 2024-08-19 DIAGNOSIS — F33.0 MAJOR DEPRESSIVE DISORDER, RECURRENT, MILD (HCC): ICD-10-CM

## 2024-08-19 DIAGNOSIS — K43.2 INCISIONAL HERNIA WITHOUT OBSTRUCTION OR GANGRENE: ICD-10-CM

## 2024-08-19 DIAGNOSIS — F11.20 OPIOID DEPENDENCE WITH CURRENT USE (HCC): ICD-10-CM

## 2024-08-19 DIAGNOSIS — M50.30 DDD (DEGENERATIVE DISC DISEASE), CERVICAL: ICD-10-CM

## 2024-08-19 DIAGNOSIS — G57.91 ILIOINGUINAL NEURALGIA OF RIGHT SIDE: ICD-10-CM

## 2024-08-19 PROCEDURE — 3017F COLORECTAL CA SCREEN DOC REV: CPT | Performed by: NURSE PRACTITIONER

## 2024-08-19 PROCEDURE — 1123F ACP DISCUSS/DSCN MKR DOCD: CPT | Performed by: NURSE PRACTITIONER

## 2024-08-19 PROCEDURE — G8417 CALC BMI ABV UP PARAM F/U: HCPCS | Performed by: NURSE PRACTITIONER

## 2024-08-19 PROCEDURE — 99214 OFFICE O/P EST MOD 30 MIN: CPT | Performed by: NURSE PRACTITIONER

## 2024-08-19 PROCEDURE — G8427 DOCREV CUR MEDS BY ELIG CLIN: HCPCS | Performed by: NURSE PRACTITIONER

## 2024-08-19 PROCEDURE — 1036F TOBACCO NON-USER: CPT | Performed by: NURSE PRACTITIONER

## 2024-08-19 RX ORDER — OXYCODONE HYDROCHLORIDE AND ACETAMINOPHEN 5; 325 MG/1; MG/1
1 TABLET ORAL EVERY 8 HOURS PRN
Qty: 84 TABLET | Refills: 0 | Status: SHIPPED | OUTPATIENT
Start: 2024-08-19 | End: 2024-09-16

## 2024-08-19 NOTE — PROGRESS NOTES
PROCEDURE:  Patient seen on hemodialysis     PHYSICIAN:  Stanislav Guerra MD    INDICATION:  End-stage renal disease    Wt Readings from Last 3 Encounters:   03/26/24 123.2 kg (271 lb 9.7 oz)   03/18/24 122.7 kg (270 lb 9.6 oz)   03/13/24 122 kg (269 lb)     Temp Readings from Last 3 Encounters:   03/26/24 97.9 °F (36.6 °C)   03/18/24 97.7 °F (36.5 °C) (Cerebral)   03/05/24 98.2 °F (36.8 °C)     BP Readings from Last 3 Encounters:   03/26/24 (!) 149/78   03/18/24 122/70   03/13/24 105/70     Pulse Readings from Last 3 Encounters:   03/26/24 86   03/18/24 (!) 105   03/13/24 86      In: 240 [P.O.:240]  Out: 350 [Urine:350]     CBC:   Recent Labs     03/24/24  0619 03/25/24  0854   WBC 5.1 3.9*   HGB 9.7* 10.3*    183     BMP:    Recent Labs     03/24/24  0619 03/25/24  0854    136   K 4.7 4.5    99   CO2 27 26   BUN 25* 37*   CREATININE 5.3* 6.9*   GLUCOSE 112* 149*     BMD:   Lab Results   Component Value Date    .1 (H) 05/28/2021    CALCIUM 9.2 03/25/2024    CAION 0.74 (L) 04/16/2022    PHOS 3.0 03/24/2024       Iron:     Lab Results   Component Value Date    IRON 73 01/11/2024    TIBC 211 (L) 01/11/2024    FERRITIN 1,768.0 (H) 01/11/2024            RX:  See dialysis flowsheet for specifics on access, blood flow rate, dialysate baths, duration of dialysis, anticoagulation and other technical information.    COMMENTS:      Stanislav Guerra MD, MD  Cell - [phone]  Pager -  [phone]   
----- Message from Makenzie FRIAS sent at 8/5/2024  1:04 PM EDT -----   TICKLE to call her back in 2 weeks and see how the pressure is doing.  
4 Eyes Skin Assessment     NAME:  Jorden Woods  YOB: 1960  MEDICAL RECORD NUMBER:  [id number]    The patient is being assessed for  Admission    I agree that at least one RN has performed a thorough Head to Toe Skin Assessment on the patient. ALL assessment sites listed below have been assessed.      Areas assessed by both nurses:    Head, Face, Ears, Shoulders, Back, Chest, Arms, Elbows, Hands, Sacrum. Buttock, Coccyx, Ischium, and Legs. Feet and Heels        Does the Patient have a Wound? No noted wound(s)       Roshan Prevention initiated by RN: Yes  Wound Care Orders initiated by RN: No    Pressure Injury (Stage 3,4, Unstageable, DTI, NWPT, and Complex wounds) if present, place Wound referral order by RN under : No    New Ostomies, if present place, Ostomy referral order under : No     Nurse 1 eSignature: Electronically signed by LORRAINE LUCAS RN on 3/25/24 at 3:16 AM EDT    **SHARE this note so that the co-signing nurse can place an eSignature**    Nurse 2 eSignature: Electronically signed by ILSA OCAMPO RN on 3/25/24 at 4:02 AM EDT   
Andrea from lab called with a panic lab result, creatinine 5.26  
Assessment completed, see doc flowsheets. Pt is A&O X4. Lung sounds are clear. VSS. Tele on. Medication given per MAR. Patient has no needs at this time. Call light within in reach, will continue to monitor.     
Consult received   Ok to discharge if orthostats normal   Stop Terazosin at discharge   
Patient A&Ox4, VSS with exception to an elevated BP. Pt denies dizziness/lightheadedness while changing positions or ambulating. Pt reports pain in the right knee, medicated per MAR and ice packs applied for comfort. Pt denies nausea, tolerating diet well. Pt ambulating with standby assistance. Pt instructed to call out for needs. Fall precautions in place, will continue to monitor for changes.   
Patient off unit, transported to Xray.   
Patient returned to room.   
Pharmacy Home Medication Reconciliation Note    A medication reconciliation has been completed for Jorden Woods 1960    Pharmacy: [facility] [id number] [place], [place]  Information provided by: patient    The patient's home medication list is as follows:  No current facility-administered medications on file prior to encounter.     Current Outpatient Medications on File Prior to Encounter   Medication Sig Dispense Refill    polyethylene glycol (GLYCOLAX) 17 GM/SCOOP powder TAKE 17 G BY MOUTH 2 TIMES DAILY (Patient not taking: Reported on 3/23/2024) 850 g 1    omeprazole (PRILOSEC) 40 MG delayed release capsule TAKE 1 CAPSULE BY MOUTH TWICE A DAY (Patient taking differently: Take 1 capsule by mouth 2 times daily) 180 capsule 2    tiZANidine (ZANAFLEX) 4 MG tablet Take 1 tablet by mouth every 8 hours as needed (spasms) 90 tablet 0    oxyCODONE-acetaminophen (PERCOCET) 5-325 MG per tablet Take 1 tablet by mouth every 6 hours as needed for Pain for up to 28 days. Max Daily Amount: 4 tablets 112 tablet 0    pentoxifylline (TRENTAL) 400 MG extended release tablet Take 1 tablet by mouth 3 times daily (with meals) (Patient not taking: Reported on 3/18/2024)      busPIRone (BUSPAR) 5 MG tablet TAKE 1 TABLET BY MOUTH TWICE A DAY (Patient taking differently: Take 1 tablet by mouth 2 times daily) 180 tablet 1    terazosin (HYTRIN) 5 MG capsule TAKE 1 CAPSULE BY MOUTH EVERY EVENING AS DIRECTED (Patient taking differently: Take 1 capsule by mouth every evening) 90 capsule 6    NIFEdipine (ADALAT CC) 90 MG extended release tablet TAKE 1 TABLET BY MOUTH EVERY DAY (Patient taking differently: Take 1 tablet by mouth daily) 90 tablet 0    vitamin D (ERGOCALCIFEROL) 1.25 MG (89212 UT) CAPS capsule Take 1 capsule by mouth every 30 days (Patient taking differently: Take 1 capsule by mouth every 30 days 1st of every month.) 6 capsule 0    tamsulosin (FLOMAX) 0.4 MG capsule Take 1 capsule by mouth daily      torsemide 
RN discharge summary from [facility] to home.     This patient has had a discharge order placed. They are returning home and being picked up in the lobby. Discharge paperwork has been printed, highlighted, and gone over with the patient by this RN. Patient understands teaching and has no further questions at this time. IV has been removed with no complications. Telemetry has been removed. Pt has all belongings present.  Pt.'s wife is at bedside and also has no further questions regarding discharge. Pt stated he is staying to eat his left over meal from lunch and then he will be leaving.   
Shift assessment completed. Routine vitals obtained. Scheduled medications given. Patient is awake and eating breakfast.  Call light within reach.   
Shift assessment completed. Routine vitals obtained. Scheduled medications given. Pt refused insulin 4 units and stated that he has never taken insulin before and doesn't want it. PRN morphine given for 7 out of 10 knee pain. Patient is awake and just finished eating breakfast. Call light within reach.   
This RN received report from dialysis 03/26/2024 at 1725 stating pt. Is done receiving dialysis  and will be returning to [facility]. This RN called patients spouse at 1730 to have her come to  patient for discharge and she stated she was on her way.  
Treatment time: 3.5 hours  Net UF 0  NS bolus of 500ml   Pre weight: 122.7kg  Post weight:123.2 kg  EDW: TBD kg     Access used: LLA AVF    Access function: well with  ml/min     Medications or blood products given: NA     Regular outpatient schedule: TTS     Summary of response to treatment: Patient tolerated treatment well and without any complications. Patient remained stable throughout entire treatment.  
    Urine Cultures:   Lab Results   Component Value Date/Time    LABURIN  12/01/2023 04:22 PM     50,000 CFU/ml  CONTACT PRECAUTIONS INDICATED  Carbapenem antibiotics are the best option for infections caused  by ESBL producing organisms.  Other antibiotic classes are  likely to result in treatment failure, even for organisms  demonstrating in vitro susceptibility.       Blood Cultures:   Lab Results   Component Value Date/Time    BC No Growth after 4 days of incubation. 01/09/2024 05:26 AM     Lab Results   Component Value Date/Time    BLOODCULT2 No Growth after 4 days of incubation. 01/09/2024 05:26 AM     Organism:   Lab Results   Component Value Date/Time    ORG Escherichia coli ESBL 12/01/2023 04:22 PM       Personally reviewed labs, diagnostic, device, and imaging results reviewed as a part of this visit    Electronically signed by MIGUEL Mejía CNP on 3/24/2024 at 7:57 AM

## 2024-08-19 NOTE — PROGRESS NOTES
Jorden Woods  1960  [id number]      HISTORY OF PRESENT ILLNESS: Mr. Woods is a 63 y.o. male returns for a follow up visit for pain management  He has a diagnosis of   1. Status post bilateral hernia repair    2. Arthralgia of multiple joints    3. Ilioinguinal neuralgia of right side    4. Major depressive disorder, recurrent, mild    5. DDD (degenerative disc disease), cervical    6. Incisional hernia without obstruction or gangrene    7. Chronic pain syndrome    8. Opioid dependence with current use (HCC)    9. Encounter for therapeutic drug monitoring    10. Intercostal neuralgia    11. Ilioinguinal neuralgia of left side    12. Bilateral primary osteoarthritis of knee    .      New Medications since Last Office visit have been reviewed with patient.     As per Information Obtained from the PADT (Patient Assessment and Documentation Tool)    He complains of pain in the right hip, bilateral knees. He rates the pain 5/10 and describes it as sharp, aching. Current treatment regimen has helped relieve about 90% of the pain since beginning treatment plan.  He denies any side effects from the current pain regimen. Patient reports that since implementation of their treatment plan; their physical functioning is unchanged, family/social relationships are unchanged, mood is unchanged sleep patterns are unchanged, and that the overall functioning is better.  Patient denies/admits that any of the above have changed since last office visit. Patient denies misusing/abusing his narcotic pain medications or using any illegal drugs.      Upon obtaining medical history from Mr. Woods    ALLERGIES: Patients list of allergies were reviewed     MEDICATIONS: Mr. Woods list of medications were reviewed.His current medications are   Outpatient Medications Prior to Visit   Medication Sig Dispense Refill    methocarbamol (ROBAXIN) 500 MG tablet Take 1 tablet by mouth daily as needed (muscle spasms) 30 tablet 0

## 2024-08-21 ENCOUNTER — CARE COORDINATION (OUTPATIENT)
Dept: CARE COORDINATION | Age: 64
End: 2024-08-21

## 2024-08-21 ENCOUNTER — TELEPHONE (OUTPATIENT)
Dept: PAIN MANAGEMENT | Age: 64
End: 2024-08-21

## 2024-08-21 DIAGNOSIS — M25.50 ARTHRALGIA OF MULTIPLE JOINTS: ICD-10-CM

## 2024-08-21 DIAGNOSIS — M50.30 DDD (DEGENERATIVE DISC DISEASE), CERVICAL: ICD-10-CM

## 2024-08-21 DIAGNOSIS — Z51.81 ENCOUNTER FOR THERAPEUTIC DRUG MONITORING: ICD-10-CM

## 2024-08-21 DIAGNOSIS — G58.8 INTERCOSTAL NEURALGIA: ICD-10-CM

## 2024-08-21 DIAGNOSIS — F11.20 OPIOID DEPENDENCE WITH CURRENT USE (HCC): ICD-10-CM

## 2024-08-21 NOTE — TELEPHONE ENCOUNTER
Who's asking for the refill request: pt      Reason for refill request: pt states KAK forgot to send      Why is patient out of medication?:       Name of medication: tizanidine      Pharmacy name: [facility]pharm[facility] #[id number] - [facility] [address] [place] [phone] - F [phone]       Phone number:       Last refill:       Next appointment: 9/16/24    Patient confirmed the above pharmacy has their medication in stock

## 2024-08-21 NOTE — CARE COORDINATION
Ambulatory Care Coordination Note     2024 1:25 PM     Patient Current Location:  Home: [facility] [address]   [place] [address]     ACM contacted the patient by telephone. Verified name and  with patient as identifiers.         ACM: Stacia Bojorquez RN     Challenges to be reviewed by the provider   Additional needs identified to be addressed with provider No  none               Method of communication with provider: none.    Care Summary Note: denies any newly presenting concerns/needs. Reviewed duration of current ACC program, remaining time anticipated. Pt endorses likeliness he will remain on pace/ready to graduate ACC by end of 2024.  Notes outreach to PM for Rx refill - awaiting word on that, but no other identified needs at this time.  Quickly reviewed availability of ACM for support as needed. Pt acknowledges he has ACM contact information saved in his contacts.  Denies need for higher frequency ACC outreach - including week of , when ACM will be on PTO. Denies need for outreach by alternate ACC team nurse during this writer's absence week of . Agrees to direct outreach to provider(s) office(s) as appropriate.  Offered patient enrollment in the Remote Patient Monitoring (RPM) program for in-home monitoring: Yes, but did not enroll at this time: declined to enroll in the program becausepreviously enrolled 1.52.24, disenrolled ..24. not interested to revisit RPM .     Assessments Completed:   Congestive Heart Failure Assessment    Are you currently restricting fluids?: No Restriction  Do you understand a low sodium diet?: Yes  Do you understand how to read food labels?: Yes  How many restaurant meals do you eat per week?: 0  Do you salt your food before tasting it?: No     No patient-reported symptoms      Symptoms:  None: Yes      Symptom course: stable  Weight trend: stable  Salt intake watch compared to last visit: stable       and   General Assessment    Do you have any symptoms that

## 2024-08-22 RX ORDER — TIZANIDINE 4 MG/1
4 TABLET ORAL NIGHTLY
Qty: 30 TABLET | Refills: 0 | Status: SHIPPED | OUTPATIENT
Start: 2024-08-22 | End: 2024-09-21

## 2024-08-27 DIAGNOSIS — Z87.19 STATUS POST BILATERAL HERNIA REPAIR: ICD-10-CM

## 2024-08-27 DIAGNOSIS — M50.30 DDD (DEGENERATIVE DISC DISEASE), CERVICAL: ICD-10-CM

## 2024-08-27 DIAGNOSIS — F11.20 OPIOID DEPENDENCE WITH CURRENT USE (HCC): ICD-10-CM

## 2024-08-27 DIAGNOSIS — G57.91 ILIOINGUINAL NEURALGIA OF RIGHT SIDE: ICD-10-CM

## 2024-08-27 DIAGNOSIS — F33.0 MAJOR DEPRESSIVE DISORDER, RECURRENT, MILD (HCC): ICD-10-CM

## 2024-08-27 DIAGNOSIS — Z98.890 STATUS POST BILATERAL HERNIA REPAIR: ICD-10-CM

## 2024-08-27 DIAGNOSIS — G58.8 INTERCOSTAL NEURALGIA: ICD-10-CM

## 2024-08-27 DIAGNOSIS — M25.50 ARTHRALGIA OF MULTIPLE JOINTS: ICD-10-CM

## 2024-08-27 DIAGNOSIS — Z51.81 ENCOUNTER FOR THERAPEUTIC DRUG MONITORING: ICD-10-CM

## 2024-08-27 RX ORDER — NIFEDIPINE 30 MG/1
60 TABLET, EXTENDED RELEASE ORAL DAILY
Qty: 30 TABLET | Refills: 0 | OUTPATIENT
Start: 2024-08-27

## 2024-08-27 RX ORDER — METHOCARBAMOL 500 MG/1
500 TABLET, FILM COATED ORAL DAILY PRN
Qty: 30 TABLET | Refills: 0 | OUTPATIENT
Start: 2024-08-27 | End: 2024-09-26

## 2024-09-10 NOTE — TELEPHONE ENCOUNTER
[facility]/[employer] prior auth submitted for codes 01592, [id number]    DOS 4/15/22    Auth date range: 4/15/22-7/14/22  Pending auth: [id number]    ED notes faxed for clinical info in case of review to: [phone] [Patient Intake Form Reviewed] : Patient intake form was reviewed

## 2024-09-16 ENCOUNTER — CARE COORDINATION (OUTPATIENT)
Dept: CARE COORDINATION | Age: 64
End: 2024-09-16

## 2024-09-20 ENCOUNTER — TELEPHONE (OUTPATIENT)
Dept: PAIN MANAGEMENT | Age: 64
End: 2024-09-20

## 2024-10-24 LAB
ESTIMATED AVERAGE GLUCOSE: NORMAL
HBA1C MFR BLD: 6.6 %

## 2024-10-28 ENCOUNTER — OFFICE VISIT (OUTPATIENT)
Dept: FAMILY MEDICINE CLINIC | Age: 64
End: 2024-10-28

## 2024-10-28 VITALS
DIASTOLIC BLOOD PRESSURE: 62 MMHG | HEART RATE: 92 BPM | HEIGHT: 77 IN | OXYGEN SATURATION: 98 % | SYSTOLIC BLOOD PRESSURE: 114 MMHG | TEMPERATURE: 96.8 F | WEIGHT: 245 LBS | RESPIRATION RATE: 20 BRPM | BODY MASS INDEX: 28.93 KG/M2

## 2024-10-28 DIAGNOSIS — N18.6 END STAGE CHRONIC KIDNEY DISEASE (HCC): ICD-10-CM

## 2024-10-28 DIAGNOSIS — G47.33 OSA (OBSTRUCTIVE SLEEP APNEA): ICD-10-CM

## 2024-10-28 DIAGNOSIS — K64.9 BLEEDING HEMORRHOID: ICD-10-CM

## 2024-10-28 DIAGNOSIS — E11.69 TYPE 2 DIABETES MELLITUS WITH OTHER SPECIFIED COMPLICATION, WITHOUT LONG-TERM CURRENT USE OF INSULIN (HCC): ICD-10-CM

## 2024-10-28 DIAGNOSIS — I50.32 CHRONIC DIASTOLIC HEART FAILURE (HCC): ICD-10-CM

## 2024-10-28 DIAGNOSIS — Z00.00 ENCOUNTER FOR WELL ADULT EXAM WITHOUT ABNORMAL FINDINGS: ICD-10-CM

## 2024-10-28 DIAGNOSIS — Z94.0 KIDNEY TRANSPLANTED: Primary | ICD-10-CM

## 2024-10-28 DIAGNOSIS — C61 PROSTATE CANCER (HCC): ICD-10-CM

## 2024-10-28 DIAGNOSIS — E83.42 HYPOMAGNESEMIA: ICD-10-CM

## 2024-10-28 DIAGNOSIS — R25.1 TREMOR: ICD-10-CM

## 2024-10-28 DIAGNOSIS — F43.9 STRESS: ICD-10-CM

## 2024-10-28 DIAGNOSIS — Z23 NEED FOR VACCINATION: ICD-10-CM

## 2024-10-28 DIAGNOSIS — I50.22 CHRONIC SYSTOLIC CONGESTIVE HEART FAILURE (HCC): ICD-10-CM

## 2024-10-28 PROBLEM — R70.0 ELEVATED SED RATE: Status: RESOLVED | Noted: 2024-05-03 | Resolved: 2024-10-28

## 2024-10-28 PROBLEM — K57.92 ACUTE DIVERTICULITIS: Status: RESOLVED | Noted: 2024-05-01 | Resolved: 2024-10-28

## 2024-10-28 PROBLEM — I95.1 ORTHOSTATIC HYPOTENSION: Status: RESOLVED | Noted: 2024-03-23 | Resolved: 2024-10-28

## 2024-10-28 PROBLEM — R55 SYNCOPE: Status: RESOLVED | Noted: 2024-03-22 | Resolved: 2024-10-28

## 2024-10-28 PROBLEM — R51.9 SEVERE FRONTAL HEADACHES: Chronic | Status: RESOLVED | Noted: 2024-04-30 | Resolved: 2024-10-28

## 2024-10-28 PROBLEM — R79.82 CRP ELEVATED: Status: RESOLVED | Noted: 2024-05-03 | Resolved: 2024-10-28

## 2024-10-28 RX ORDER — TACROLIMUS 5 MG/1
5 CAPSULE ORAL 2 TIMES DAILY
COMMUNITY
Start: 2024-10-25

## 2024-10-28 RX ORDER — MYCOPHENOLIC ACID 180 MG/1
540 TABLET, DELAYED RELEASE ORAL 2 TIMES DAILY
COMMUNITY
Start: 2024-10-18

## 2024-10-28 RX ORDER — TACROLIMUS 1 MG/1
3 CAPSULE ORAL 2 TIMES DAILY
COMMUNITY
Start: 2024-10-25

## 2024-10-28 SDOH — ECONOMIC STABILITY: FOOD INSECURITY: WITHIN THE PAST 12 MONTHS, THE FOOD YOU BOUGHT JUST DIDN'T LAST AND YOU DIDN'T HAVE MONEY TO GET MORE.: NEVER TRUE

## 2024-10-28 SDOH — ECONOMIC STABILITY: FOOD INSECURITY: WITHIN THE PAST 12 MONTHS, YOU WORRIED THAT YOUR FOOD WOULD RUN OUT BEFORE YOU GOT MONEY TO BUY MORE.: NEVER TRUE

## 2024-10-28 SDOH — ECONOMIC STABILITY: INCOME INSECURITY: HOW HARD IS IT FOR YOU TO PAY FOR THE VERY BASICS LIKE FOOD, HOUSING, MEDICAL CARE, AND HEATING?: NOT HARD AT ALL

## 2024-10-28 NOTE — PATIENT INSTRUCTIONS
Well Visit, Over 65: Care Instructions  Well visits can help you stay healthy. Your doctor has checked your overall health and may have suggested ways to take good care of yourself. Your doctor also may have recommended tests. You can help prevent illness with healthy eating, good sleep, vaccinations, regular exercise, and other steps.    Get the tests that you and your doctor decide on. Depending on your age and risks, examples might include hearing tests as well as screening for colon, breast, and lung cancer. Screening helps find diseases before any symptoms appear.   Eat healthy foods. Choose fruits, vegetables, whole grains, lean protein, and low-fat dairy foods. Limit saturated fat, and reduce salt.     Limit alcohol. Men should have no more than 2 drinks a day. Women should have no more than 1. For some people, no alcohol is the best choice.   Exercise. It can help prevent falls. Get at least 30 minutes of exercise on most days of the week. Walking, yoga, and lizz chi can be good choices.     Reach and stay at your healthy weight. This will lower your risk for many health problems.   Take care of your mental health. Try to stay connected with friends, family, and community, and find ways to manage stress.     If you're feeling depressed or hopeless, talk to someone. A counselor can help. If you don't have a counselor, talk to your doctor.   Talk to your doctor if you think you may have a problem with alcohol or drug use. This includes prescription medicines and illegal drugs.     Avoid tobacco and nicotine: Don't smoke, vape, or chew. If you need help quitting, talk to your doctor.   Practice safer sex. Getting tested, using condoms or dental dams, and limiting sex partners can help prevent STIs.     Make an advance directive. This is a legal way to tell your family and doctor what you want to happen at the end of your life or when you can't speak for yourself.   Prevent problems where you can. Protect

## 2024-10-28 NOTE — PROGRESS NOTES
Since last visit, pt was a recipient of a new kidney from kidney transplant d/t ESRD from diabetes mellitus and HTN.  Pt working closely with nephrology and with transplant surgery at  and overall has been doing quite well.   Most recent creatinine was 2.26.  pt states that he is adjusting but at times can feel moderately weak, they told him that was to be expected.  Can get drained easily and can drop quickly.  Pt is able to get up and about, and can get out of the house but endurance is very low.  Pts diet has changed completely after transplant.  Pt urinating well on his own doing well to stay hydrated.  Pt is not getting much in terms of home health/PT/OT    Pt was in the hospital overnight due to a significant decrease in potassium.  Pt was seen in clinic and they did bloodwork and they called him and told him to go to ER due to a significantly low magnesium and calcium  His creatinine was ok and they supplemented while in hospital.  Pt remains on supplement of magnesium and      congestive heart failure seems stable w/o any flare of symptoms, no change in weight and no evidence of any progressive lower extremity edema.  No calf pain, no shortness of breath, wheezing.  No paroxysmal nocturnal dyspnea or orthopnea.  Consistent with therapy and monitoring weight regularly      Pt here for follow up of blood pressure.  Pt states doing great with adherence to therapy and feels well.  No issues of chest pain, shortness of breath.  No vision changes, headache, swelling in legs.     Prostate cancer doing very well overall, working with urology at  and they are tracking his psa serially.  Pt will monitor with serial bloodwork at this time.      Pt has noted some mild issues of tremors in hands, worse with trying to do things.  Not dropping things.  Issues with writing. No balance issues and gait is ok.  No falls.        Well Adult Note  Name: Jordne Woods Today’s Date: 10/28/2024   MRN: [id number] Sex: Male

## 2024-10-28 NOTE — PROGRESS NOTES
Here for AWV, physical    Since last visit, pt was a recipient of a new kidney from kidney transplant d/t ESRD from diabetes mellitus and HTN.  Pt working closely with nephrology and with transplant surgery at  and overall has been doing quite well.   Most recent creatinine was 2.26    congestive heart failure seems stable w/o any flare of symptoms, no change in weight and no evidence of any progressive lower extremity edema.  No calf pain, no shortness of breath, wheezing.  No paroxysmal nocturnal dyspnea or orthopnea.  Consistent with therapy and monitoring weight regularly     Pt here for follow up of blood pressure.  Pt states doing great with adherence to therapy and feels well.  No issues of chest pain, shortness of breath.  No vision changes, headache, swelling in legs.    Prostate cancer doing very well overall,

## 2024-12-04 ENCOUNTER — TELEPHONE (OUTPATIENT)
Dept: FAMILY MEDICINE CLINIC | Age: 64
End: 2024-12-04

## 2024-12-04 NOTE — TELEPHONE ENCOUNTER
Patient is requesting a refill on his sildenfil and wants to know if it is safe to take with all the other medications that he is currently taking. Please let him know if it is and when it will be sent to the pharmacy.     sildenafil (VIAGRA) 100 MG tablet [[id number]]  DISCONTINUED    Order Details  Dose, Route, Frequency: As Directed   Dispense Quantity: 30 tablet Refills: 0          Sig: take 1 tablet by mouth once daily as needed for erectile dysfunction.         Start Date: 08/28/23 End Date: 09/06/23     [id number] - [place], [place] - [address] - P [phone] - F [phone]       10/28/24  10/24/24

## 2024-12-17 ENCOUNTER — TELEPHONE (OUTPATIENT)
Dept: FAMILY MEDICINE CLINIC | Age: 64
End: 2024-12-17

## 2024-12-17 DIAGNOSIS — F33.0 MAJOR DEPRESSIVE DISORDER, RECURRENT, MILD (HCC): Primary | ICD-10-CM

## 2024-12-17 NOTE — TELEPHONE ENCOUNTER
Pt has been seeing a mental health therapist since May, 2024. He says that the insurance has not received a referral and is hoping to have one sent that is dated back to May 1st, 2024.     The therapists name is Parag Rodriguez.   F: [phone]    Patient is also wanting a copy of this referral to be sent to his home address.     [address] [place] [address]

## 2024-12-20 NOTE — TELEPHONE ENCOUNTER
Spoke with patient and gave information that referral cannot be backdated for the mental health therapist. Patient was upset that a referral cannot be backdated. MA explained that the last referral for mental health was placed in  and they  after one year. MA explained that legally a doctor's office cannot backdate a referral if there was not one for the previous year. Patient was upset but understood.

## 2024-12-20 NOTE — TELEPHONE ENCOUNTER
Referral in system  I can't back date a referral, it is automatically stamped with the date I order

## 2025-01-18 DIAGNOSIS — Z51.81 ENCOUNTER FOR THERAPEUTIC DRUG MONITORING: ICD-10-CM

## 2025-01-18 DIAGNOSIS — F11.20 OPIOID DEPENDENCE WITH CURRENT USE (HCC): ICD-10-CM

## 2025-01-18 DIAGNOSIS — G58.8 INTERCOSTAL NEURALGIA: ICD-10-CM

## 2025-01-18 DIAGNOSIS — F33.0 MAJOR DEPRESSIVE DISORDER, RECURRENT, MILD: ICD-10-CM

## 2025-01-18 DIAGNOSIS — G57.91 ILIOINGUINAL NEURALGIA OF RIGHT SIDE: ICD-10-CM

## 2025-01-18 DIAGNOSIS — Z87.19 STATUS POST BILATERAL HERNIA REPAIR: ICD-10-CM

## 2025-01-18 DIAGNOSIS — Z98.890 STATUS POST BILATERAL HERNIA REPAIR: ICD-10-CM

## 2025-01-18 DIAGNOSIS — M50.30 DDD (DEGENERATIVE DISC DISEASE), CERVICAL: ICD-10-CM

## 2025-01-18 DIAGNOSIS — M25.50 ARTHRALGIA OF MULTIPLE JOINTS: ICD-10-CM

## 2025-01-20 RX ORDER — METHOCARBAMOL 500 MG/1
500 TABLET, FILM COATED ORAL DAILY PRN
Qty: 30 TABLET | Refills: 0 | OUTPATIENT
Start: 2025-01-20 | End: 2025-02-19

## 2025-02-14 ENCOUNTER — TELEPHONE (OUTPATIENT)
Dept: FAMILY MEDICINE CLINIC | Age: 65
End: 2025-02-14

## 2025-02-14 NOTE — TELEPHONE ENCOUNTER
MA spoke with patient. They indicated that the counseling service did not send the paperwork over for patient. Patient said to disregard message.

## 2025-02-14 NOTE — TELEPHONE ENCOUNTER
Pt called asking about paperwork that was supposed to be sent to the office from [facility]. He states that he had an appointment today but it was canceled by the facility due to them not hearing back about authorization from this office.

## 2025-02-28 ENCOUNTER — TELEPHONE (OUTPATIENT)
Dept: FAMILY MEDICINE CLINIC | Age: 65
End: 2025-02-28

## 2025-02-28 NOTE — TELEPHONE ENCOUNTER
Patient is in office with therapist, Parag Rodriguez [phone]. Provider states that they spoke with Ambreen in 4/2024 to set up a referral for behavioral health services. Patient started services on 5/13/24 with provider. Patient states they are no longer on [employer], but [employer]. MA researched and did not find documentation of the encounter from 4/2024. Provider's question is, can we back date a referral to him from 5/1/24 for 1 year? They meet monthly for counseling.

## 2025-03-10 ENCOUNTER — OFFICE VISIT (OUTPATIENT)
Dept: FAMILY MEDICINE CLINIC | Age: 65
End: 2025-03-10

## 2025-03-10 VITALS
OXYGEN SATURATION: 97 % | BODY MASS INDEX: 30.53 KG/M2 | WEIGHT: 258.6 LBS | SYSTOLIC BLOOD PRESSURE: 136 MMHG | TEMPERATURE: 96.8 F | HEIGHT: 77 IN | RESPIRATION RATE: 20 BRPM | DIASTOLIC BLOOD PRESSURE: 74 MMHG | HEART RATE: 98 BPM

## 2025-03-10 DIAGNOSIS — I50.32 CHRONIC DIASTOLIC HEART FAILURE (HCC): ICD-10-CM

## 2025-03-10 DIAGNOSIS — Z23 NEED FOR SHINGLES VACCINE: ICD-10-CM

## 2025-03-10 DIAGNOSIS — E11.69 TYPE 2 DIABETES MELLITUS WITH OTHER SPECIFIED COMPLICATION, WITHOUT LONG-TERM CURRENT USE OF INSULIN (HCC): ICD-10-CM

## 2025-03-10 DIAGNOSIS — Z00.00 ROUTINE GENERAL MEDICAL EXAMINATION AT A HEALTH CARE FACILITY: Primary | ICD-10-CM

## 2025-03-10 DIAGNOSIS — F33.0 MAJOR DEPRESSIVE DISORDER, RECURRENT, MILD: ICD-10-CM

## 2025-03-10 DIAGNOSIS — N52.36 ERECTILE DYSFUNCTION FOLLOWING INTERSTITIAL SEED THERAPY: ICD-10-CM

## 2025-03-10 DIAGNOSIS — Z94.0 KIDNEY TRANSPLANTED: ICD-10-CM

## 2025-03-10 DIAGNOSIS — Z00.00 WELCOME TO MEDICARE PREVENTIVE VISIT: ICD-10-CM

## 2025-03-10 DIAGNOSIS — N18.6 END STAGE CHRONIC KIDNEY DISEASE (HCC): ICD-10-CM

## 2025-03-10 DIAGNOSIS — C61 PROSTATE CANCER (HCC): ICD-10-CM

## 2025-03-10 PROBLEM — F11.20 OPIOID DEPENDENCE WITH CURRENT USE (HCC): Status: RESOLVED | Noted: 2024-03-29 | Resolved: 2025-03-10

## 2025-03-10 RX ORDER — CALCIUM ACETATE 667 MG/1
CAPSULE ORAL
Qty: 270 CAPSULE | Refills: 2 | Status: SHIPPED | OUTPATIENT
Start: 2025-03-10

## 2025-03-10 RX ORDER — DULOXETIN HYDROCHLORIDE 20 MG/1
20 CAPSULE, DELAYED RELEASE ORAL DAILY
COMMUNITY

## 2025-03-10 RX ORDER — LOSARTAN POTASSIUM 25 MG/1
50 TABLET ORAL DAILY
COMMUNITY
Start: 2025-02-13

## 2025-03-10 RX ORDER — SILDENAFIL 100 MG/1
100 TABLET, FILM COATED ORAL DAILY PRN
Qty: 30 TABLET | Refills: 3 | Status: SHIPPED | OUTPATIENT
Start: 2025-03-10

## 2025-03-10 RX ORDER — OMEPRAZOLE 40 MG/1
40 CAPSULE, DELAYED RELEASE ORAL 2 TIMES DAILY
Qty: 180 CAPSULE | Refills: 2 | Status: SHIPPED | OUTPATIENT
Start: 2025-03-10

## 2025-03-10 RX ORDER — ATORVASTATIN CALCIUM 20 MG/1
20 TABLET, FILM COATED ORAL NIGHTLY
Qty: 90 TABLET | Refills: 3 | Status: SHIPPED | OUTPATIENT
Start: 2025-03-10

## 2025-03-10 SDOH — ECONOMIC STABILITY: FOOD INSECURITY: WITHIN THE PAST 12 MONTHS, YOU WORRIED THAT YOUR FOOD WOULD RUN OUT BEFORE YOU GOT MONEY TO BUY MORE.: NEVER TRUE

## 2025-03-10 SDOH — ECONOMIC STABILITY: FOOD INSECURITY: WITHIN THE PAST 12 MONTHS, THE FOOD YOU BOUGHT JUST DIDN'T LAST AND YOU DIDN'T HAVE MONEY TO GET MORE.: NEVER TRUE

## 2025-03-10 ASSESSMENT — PATIENT HEALTH QUESTIONNAIRE - PHQ9
4. FEELING TIRED OR HAVING LITTLE ENERGY: NOT AT ALL
1. LITTLE INTEREST OR PLEASURE IN DOING THINGS: MORE THAN HALF THE DAYS
6. FEELING BAD ABOUT YOURSELF - OR THAT YOU ARE A FAILURE OR HAVE LET YOURSELF OR YOUR FAMILY DOWN: NOT AT ALL
2. FEELING DOWN, DEPRESSED OR HOPELESS: MORE THAN HALF THE DAYS
5. POOR APPETITE OR OVEREATING: NOT AT ALL
SUM OF ALL RESPONSES TO PHQ QUESTIONS 1-9: 4
9. THOUGHTS THAT YOU WOULD BE BETTER OFF DEAD, OR OF HURTING YOURSELF: NOT AT ALL
10. IF YOU CHECKED OFF ANY PROBLEMS, HOW DIFFICULT HAVE THESE PROBLEMS MADE IT FOR YOU TO DO YOUR WORK, TAKE CARE OF THINGS AT HOME, OR GET ALONG WITH OTHER PEOPLE: NOT DIFFICULT AT ALL
SUM OF ALL RESPONSES TO PHQ QUESTIONS 1-9: 4
SUM OF ALL RESPONSES TO PHQ QUESTIONS 1-9: 4
8. MOVING OR SPEAKING SO SLOWLY THAT OTHER PEOPLE COULD HAVE NOTICED. OR THE OPPOSITE, BEING SO FIGETY OR RESTLESS THAT YOU HAVE BEEN MOVING AROUND A LOT MORE THAN USUAL: NOT AT ALL
SUM OF ALL RESPONSES TO PHQ QUESTIONS 1-9: 4
7. TROUBLE CONCENTRATING ON THINGS, SUCH AS READING THE NEWSPAPER OR WATCHING TELEVISION: NOT AT ALL
3. TROUBLE FALLING OR STAYING ASLEEP: NOT AT ALL

## 2025-03-10 NOTE — PROGRESS NOTES
BELATACEPT IV Infuse intravenously every 28 days  Brant Agarwal MD   DULoxetine (CYMBALTA) 20 MG extended release capsule Take 1 capsule by mouth daily  Brant Agarwal MD   busPIRone (BUSPAR) 5 MG tablet TAKE 1 TABLET BY MOUTH TWICE A DAY  Stanislav Guerra MD   metoprolol succinate (TOPROL XL) 50 MG extended release tablet TAKE 1 TABLET BY MOUTH EVERY DAY AT NIGHT  Stanislav Guerra MD   magnesium cl-calcium carbonate (SLOW-MAG) 71.5-119 MG TBEC tablet Take 143 mg by mouth 2 times daily  Brant Agarwal MD   mycophenolate (MYFORTIC) 180 MG DR tablet Take 3 tablets by mouth 2 times daily  Brant Agarwal MD   prochlorperazine (COMPAZINE) 10 MG tablet Take 1 tablet by mouth every 8 hours as needed (Headache note relieved by over-the-counter medication)  Stephen Schulz MD   metoclopramide (REGLAN) 5 MG tablet Take 1 tablet by mouth every 12 hours as needed (Headache not relieved by prochlorperazine (Compazine))  Stephen Schulz MD   vitamin D (ERGOCALCIFEROL) 1.25 MG (50768 UT) CAPS capsule TAKE 1 CAPSULE BY MOUTH ONE TIME PER WEEK  Stefanie Saab, APRN - CNP   naloxegol (MOVANTIK) 12.5 MG TABS tablet Take 1 tablet by mouth every morning (before breakfast)  Curt Collins MD   hydrocortisone (ANUSOL-HC) 2.5 % CREA rectal cream Apply to affected area BID-TID as directed  Curt Collins MD   primidone (MYSOLINE) 50 MG tablet Take 2 tablets by mouth nightly  Brant Agarwal MD   torsemide (DEMADEX) 100 MG tablet Take 1 tablet by mouth daily Pt never stopped taking  Brant Agarwal MD   NIFEdipine (ADALAT CC) 90 MG extended release tablet Take 1 tablet by mouth daily  Brant Agarwal MD   pentoxifylline (TRENTAL) 400 MG extended release tablet Take 1 tablet by mouth 3 times daily (with meals)  Brant Agarwal MD CareTeam (Including outside providers/suppliers regularly involved in providing care):   Patient Care Team:  Curt Collins MD as PCP -

## 2025-03-10 NOTE — PATIENT INSTRUCTIONS
you sleep better.  Muscle-strengthening.  This type of activity can help maintain muscle and strengthen bones.  Includes climbing stairs, using resistance bands, and lifting or carrying heavy loads.  Aim for at least twice a week.  It can help protect the knees and other joints.  Stretching.  Stretching gives you better range of motion in joints and muscles.  Includes upper arm stretches, calf stretches, and gentle yoga.  Aim for at least twice a week, preferably after your muscles are warmed up from other activities.  It can help you function better in daily life.  Balancing.  This helps you stay coordinated and have good posture.  Includes heel-to-toe walking, lizz chi, and certain types of yoga.  Aim for at least 3 days a week.  It can reduce your risk of falling.  Even if you have a hard time meeting the recommendations, it's better to be more active than less active. All activity done in each category counts toward your weekly total. You'd be surprised how daily things like carrying groceries, keeping up with grandchildren, and taking the stairs can add up.  What keeps you from being active?  If you've had a hard time being more active, you're not alone. Maybe you remember being able to do more. Or maybe you've never thought of yourself as being active. It's frustrating when you can't do the things you want. Being more active can help. What's holding you back?  Getting started.  Have a goal, but break it into easy tasks. Small steps build into big accomplishments.  Staying motivated.  If you feel like skipping your activity, remember your goal. Maybe you want to move better and stay independent. Every activity gets you one step closer.  Not feeling your best.  Start with 5 minutes of an activity you enjoy. Prove to yourself you can do it. As you get comfortable, increase your time.  You may not be where you want to be. But you're in the process of getting there. Everyone starts somewhere.  How can you find safe

## 2025-03-19 LAB
ALBUMIN: 4.5 G/DL (ref 3.5–5.7)
ALP BLD-CCNC: 65 U/L (ref 36–125)
ALT SERPL-CCNC: 10 U/L (ref 7–52)
ANION GAP SERPL CALCULATED.3IONS-SCNC: 11 MMOL/L (ref 3–16)
AST SERPL-CCNC: 11 U/L (ref 13–39)
BILIRUB SERPL-MCNC: 0.4 MG/DL (ref 0–1.5)
BUN BLDV-MCNC: 23 MG/DL (ref 7–25)
CALCIUM SERPL-MCNC: 8.2 MG/DL (ref 8.6–10.3)
CHLORIDE BLD-SCNC: 109 MMOL/L (ref 98–110)
CHOLESTEROL, TOTAL: 196 MG/DL (ref 0–200)
CO2: 22 MMOL/L (ref 21–33)
CREAT SERPL-MCNC: 1.53 MG/DL (ref 0.6–1.3)
CREATININE URINE: 110.5 MG/DL
ESTIMATED GLOMERULAR FILTRATION RATE CREATININE EQUATION: 50
GLUCOSE BLD-MCNC: 157 MG/DL (ref 70–100)
HBA1C MFR BLD: 6.2 % (ref 4–5.6)
HDLC SERPL-MCNC: 81 MG/DL (ref 60–92)
LDL CHOLESTEROL: 91 MG/DL
MICROALBUMIN/CREAT 24H UR: 2757.8 MG/L (ref 0–17)
MICROALBUMIN/CREAT UR-RTO: 2495.7 MG/G (ref 0–30)
NON-HDL CHOLESTEROL: 115 MG/DL (ref 0–129)
OSMOLALITY CALCULATION: 301 MOSM/KG (ref 278–305)
POTASSIUM SERPL-SCNC: 3.7 MMOL/L (ref 3.5–5.3)
PROSTATE SPECIFIC ANTIGEN: 0.02 NG/ML (ref 0–4)
SODIUM BLD-SCNC: 142 MMOL/L (ref 133–146)
TOTAL PROTEIN: 6.7 G/DL (ref 6.4–8.9)
TRIGL SERPL-MCNC: 120 MG/DL (ref 10–149)

## 2025-03-20 ENCOUNTER — RESULTS FOLLOW-UP (OUTPATIENT)
Dept: FAMILY MEDICINE CLINIC | Age: 65
End: 2025-03-20

## 2025-04-17 ENCOUNTER — TELEPHONE (OUTPATIENT)
Dept: FAMILY MEDICINE CLINIC | Age: 65
End: 2025-04-17

## 2025-04-17 NOTE — TELEPHONE ENCOUNTER
the patient called to complain pain in his toe that he says feels like gout flair up. He does not remember the name of the medication but is requesting a refill. All he remembers is it is oval and purplish blue in color.     [facility]ARMACY #[id number] - [place], [place] - [address] [phone] - F [phone] [[id number]]

## 2025-04-18 ENCOUNTER — TELEPHONE (OUTPATIENT)
Dept: FAMILY MEDICINE CLINIC | Age: 65
End: 2025-04-18

## 2025-04-18 RX ORDER — COLCHICINE 0.6 MG/1
0.6 TABLET ORAL DAILY PRN
Qty: 30 TABLET | Refills: 5 | Status: SHIPPED | OUTPATIENT
Start: 2025-04-18

## 2025-04-18 NOTE — TELEPHONE ENCOUNTER
[facility] pharmacy has question about the dosage on the medication colchicine (COLCRYS) 0.6 MG tablet . ,because of his renal function levels; .[phone]

## 2025-04-28 PROBLEM — I50.32 CHRONIC DIASTOLIC HEART FAILURE (HCC): Chronic | Status: ACTIVE | Noted: 2021-08-16

## 2025-04-28 PROBLEM — E11.69 TYPE 2 DIABETES MELLITUS WITH OTHER SPECIFIED COMPLICATION, WITHOUT LONG-TERM CURRENT USE OF INSULIN (HCC): Chronic | Status: ACTIVE | Noted: 2024-03-29

## 2025-04-28 PROBLEM — Z94.0 KIDNEY TRANSPLANTED: Chronic | Status: ACTIVE | Noted: 2024-09-01

## 2025-04-28 PROBLEM — E66.09 CLASS 1 OBESITY DUE TO EXCESS CALORIES WITH SERIOUS COMORBIDITY AND BODY MASS INDEX (BMI) OF 30.0 TO 30.9 IN ADULT: Chronic | Status: ACTIVE | Noted: 2024-05-03

## 2025-04-28 PROBLEM — C61 PROSTATE CANCER (HCC): Chronic | Status: ACTIVE | Noted: 2024-10-28

## 2025-04-28 PROBLEM — E66.811 CLASS 1 OBESITY DUE TO EXCESS CALORIES WITH SERIOUS COMORBIDITY AND BODY MASS INDEX (BMI) OF 30.0 TO 30.9 IN ADULT: Chronic | Status: ACTIVE | Noted: 2024-05-03

## 2025-04-30 ENCOUNTER — TELEPHONE (OUTPATIENT)
Dept: SLEEP CENTER | Age: 65
End: 2025-04-30

## 2025-04-30 NOTE — TELEPHONE ENCOUNTER
Called to schedule a split night per Pradeep Pina  for the pt to return my call     [employer] medic[employer] insurance

## 2025-05-13 ENCOUNTER — OFFICE VISIT (OUTPATIENT)
Dept: FAMILY MEDICINE CLINIC | Age: 65
End: 2025-05-13
Payer: MEDICARE

## 2025-05-13 VITALS
OXYGEN SATURATION: 98 % | HEART RATE: 86 BPM | SYSTOLIC BLOOD PRESSURE: 132 MMHG | WEIGHT: 272.2 LBS | DIASTOLIC BLOOD PRESSURE: 68 MMHG | BODY MASS INDEX: 32.14 KG/M2 | TEMPERATURE: 96.9 F | HEIGHT: 77 IN

## 2025-05-13 DIAGNOSIS — G47.33 OSA (OBSTRUCTIVE SLEEP APNEA): ICD-10-CM

## 2025-05-13 DIAGNOSIS — M79.89 LEG SWELLING: ICD-10-CM

## 2025-05-13 DIAGNOSIS — F33.0 MAJOR DEPRESSIVE DISORDER, RECURRENT, MILD: ICD-10-CM

## 2025-05-13 DIAGNOSIS — I50.32 CHRONIC DIASTOLIC HEART FAILURE (HCC): ICD-10-CM

## 2025-05-13 DIAGNOSIS — Z94.0 KIDNEY TRANSPLANTED: ICD-10-CM

## 2025-05-13 DIAGNOSIS — R26.81 GAIT INSTABILITY: ICD-10-CM

## 2025-05-13 DIAGNOSIS — E11.69 TYPE 2 DIABETES MELLITUS WITH OTHER SPECIFIED COMPLICATION, WITHOUT LONG-TERM CURRENT USE OF INSULIN (HCC): ICD-10-CM

## 2025-05-13 DIAGNOSIS — N18.6 END STAGE CHRONIC KIDNEY DISEASE (HCC): ICD-10-CM

## 2025-05-13 DIAGNOSIS — R63.5 WEIGHT GAIN: ICD-10-CM

## 2025-05-13 DIAGNOSIS — R63.5 WEIGHT GAIN: Primary | ICD-10-CM

## 2025-05-13 DIAGNOSIS — M51.360 DEGENERATION OF INTERVERTEBRAL DISC OF LUMBAR REGION WITH DISCOGENIC BACK PAIN: ICD-10-CM

## 2025-05-13 DIAGNOSIS — I50.22 CHRONIC SYSTOLIC CONGESTIVE HEART FAILURE (HCC): ICD-10-CM

## 2025-05-13 PROCEDURE — G8417 CALC BMI ABV UP PARAM F/U: HCPCS | Performed by: FAMILY MEDICINE

## 2025-05-13 PROCEDURE — 2022F DILAT RTA XM EVC RTNOPTHY: CPT | Performed by: FAMILY MEDICINE

## 2025-05-13 PROCEDURE — 99214 OFFICE O/P EST MOD 30 MIN: CPT | Performed by: FAMILY MEDICINE

## 2025-05-13 PROCEDURE — 1123F ACP DISCUSS/DSCN MKR DOCD: CPT | Performed by: FAMILY MEDICINE

## 2025-05-13 PROCEDURE — 3017F COLORECTAL CA SCREEN DOC REV: CPT | Performed by: FAMILY MEDICINE

## 2025-05-13 PROCEDURE — 1159F MED LIST DOCD IN RCRD: CPT | Performed by: FAMILY MEDICINE

## 2025-05-13 PROCEDURE — G8427 DOCREV CUR MEDS BY ELIG CLIN: HCPCS | Performed by: FAMILY MEDICINE

## 2025-05-13 PROCEDURE — 1036F TOBACCO NON-USER: CPT | Performed by: FAMILY MEDICINE

## 2025-05-13 PROCEDURE — 3044F HG A1C LEVEL LT 7.0%: CPT | Performed by: FAMILY MEDICINE

## 2025-05-13 PROCEDURE — G2211 COMPLEX E/M VISIT ADD ON: HCPCS | Performed by: FAMILY MEDICINE

## 2025-05-13 ASSESSMENT — PATIENT HEALTH QUESTIONNAIRE - PHQ9
6. FEELING BAD ABOUT YOURSELF - OR THAT YOU ARE A FAILURE OR HAVE LET YOURSELF OR YOUR FAMILY DOWN: SEVERAL DAYS
SUM OF ALL RESPONSES TO PHQ QUESTIONS 1-9: 9
1. LITTLE INTEREST OR PLEASURE IN DOING THINGS: NEARLY EVERY DAY
SUM OF ALL RESPONSES TO PHQ QUESTIONS 1-9: 9
SUM OF ALL RESPONSES TO PHQ QUESTIONS 1-9: 9
4. FEELING TIRED OR HAVING LITTLE ENERGY: NOT AT ALL
10. IF YOU CHECKED OFF ANY PROBLEMS, HOW DIFFICULT HAVE THESE PROBLEMS MADE IT FOR YOU TO DO YOUR WORK, TAKE CARE OF THINGS AT HOME, OR GET ALONG WITH OTHER PEOPLE: SOMEWHAT DIFFICULT
8. MOVING OR SPEAKING SO SLOWLY THAT OTHER PEOPLE COULD HAVE NOTICED. OR THE OPPOSITE, BEING SO FIGETY OR RESTLESS THAT YOU HAVE BEEN MOVING AROUND A LOT MORE THAN USUAL: NOT AT ALL
3. TROUBLE FALLING OR STAYING ASLEEP: SEVERAL DAYS
2. FEELING DOWN, DEPRESSED OR HOPELESS: NEARLY EVERY DAY
SUM OF ALL RESPONSES TO PHQ QUESTIONS 1-9: 9
7. TROUBLE CONCENTRATING ON THINGS, SUCH AS READING THE NEWSPAPER OR WATCHING TELEVISION: NOT AT ALL
9. THOUGHTS THAT YOU WOULD BE BETTER OFF DEAD, OR OF HURTING YOURSELF: NOT AT ALL
5. POOR APPETITE OR OVEREATING: SEVERAL DAYS

## 2025-05-13 NOTE — PROGRESS NOTES
Here for f/u and eval of some swelling in legs, pt has noted in the past few days and are usually better in the AM, but as the day goes on, they do tend to swell more.  Sx are in the ankles bilaterally.  It is not painful, but with some mild discomfort.  No issues of shortness of breath, no weight gain that he has noticed.      Per our chart, his weight has increased, in 3/2025 his weight was 258# and now is 272#.  No issues of shortness of breath, wheezing.  Pt does not lay flat but on his sides, no issues of PND or orthopnea.  No dyspnea on exertion.      Pt is 7 months out from his kidney transplant and so far things are stable, feels urine is doing ok.  Pt does have hx of obstructive sleep apnea and is supposed to be on therapy, and has been using regularly recently and that has helped his nocturia.  Last renal fxn at  was creatinine of 1.54 and GFR was 49    congestive heart failure seems stable w/o any flare of symptoms, no change in weight and no evidence of any progressive lower extremity edema.  No calf pain, no shortness of breath, wheezing.  No paroxysmal nocturnal dyspnea or orthopnea.  Consistent with therapy    Pts balance has been off a little bit, can have some times with walking where he can stumble.  No vision changes, no weakness in bilateral lower extremities.  Pt does have known degenerative disc disease but no increase in low back pain.  Pt did also have CT and CTA head that was negative/nonfocal    Pt feels mood is doing ok but sometimes struggles with motivation to do things.  Pt finds that a lot of that is related to how he likes to do things vs how his wife likes to do things, and so he has to be more careful with what he says or how he says.  Pt was given information for counseling/therapy and was working with them but with insurance issues did not continue, waiting to hear from insurance on other options for counselors that are in network      Except as noted above in the history of

## 2025-05-14 LAB
ALBUMIN SERPL-MCNC: 4.3 G/DL (ref 3.4–5)
ALBUMIN/GLOB SERPL: 2.3 {RATIO} (ref 1.1–2.2)
ALP SERPL-CCNC: 75 U/L (ref 40–129)
ALT SERPL-CCNC: 15 U/L (ref 10–40)
ANION GAP SERPL CALCULATED.3IONS-SCNC: 9 MMOL/L (ref 3–16)
AST SERPL-CCNC: 22 U/L (ref 15–37)
BASOPHILS # BLD: 0 K/UL (ref 0–0.2)
BASOPHILS NFR BLD: 0.3 %
BILIRUB SERPL-MCNC: <0.2 MG/DL (ref 0–1)
BUN SERPL-MCNC: 17 MG/DL (ref 7–20)
CALCIUM SERPL-MCNC: 8.9 MG/DL (ref 8.3–10.6)
CHLORIDE SERPL-SCNC: 107 MMOL/L (ref 99–110)
CO2 SERPL-SCNC: 24 MMOL/L (ref 21–32)
CREAT SERPL-MCNC: 1.6 MG/DL (ref 0.8–1.3)
DEPRECATED RDW RBC AUTO: 16.5 % (ref 12.4–15.4)
EOSINOPHIL # BLD: 0 K/UL (ref 0–0.6)
EOSINOPHIL NFR BLD: 1.8 %
GFR SERPLBLD CREATININE-BSD FMLA CKD-EPI: 47 ML/MIN/{1.73_M2}
GLUCOSE SERPL-MCNC: 93 MG/DL (ref 70–99)
HCT VFR BLD AUTO: 27.6 % (ref 40.5–52.5)
HGB BLD-MCNC: 9 G/DL (ref 13.5–17.5)
LYMPHOCYTES # BLD: 0 K/UL (ref 1–5.1)
LYMPHOCYTES NFR BLD: 3.2 %
MCH RBC QN AUTO: 27.7 PG (ref 26–34)
MCHC RBC AUTO-ENTMCNC: 32.5 G/DL (ref 31–36)
MCV RBC AUTO: 85.2 FL (ref 80–100)
MONOCYTES # BLD: 0.3 K/UL (ref 0–1.3)
MONOCYTES NFR BLD: 20.5 %
NEUTROPHILS # BLD: 1.1 K/UL (ref 1.7–7.7)
NEUTROPHILS NFR BLD: 74.2 %
NT-PROBNP SERPL-MCNC: 184 PG/ML (ref 0–124)
PATH INTERP BLD-IMP: YES
PLATELET # BLD AUTO: 221 K/UL (ref 135–450)
PMV BLD AUTO: 8.4 FL (ref 5–10.5)
POTASSIUM SERPL-SCNC: 4.2 MMOL/L (ref 3.5–5.1)
PROT SERPL-MCNC: 6.2 G/DL (ref 6.4–8.2)
RBC # BLD AUTO: 3.24 M/UL (ref 4.2–5.9)
SODIUM SERPL-SCNC: 140 MMOL/L (ref 136–145)
WBC # BLD AUTO: 1.5 K/UL (ref 4–11)

## 2025-05-15 ENCOUNTER — RESULTS FOLLOW-UP (OUTPATIENT)
Dept: FAMILY MEDICINE CLINIC | Age: 65
End: 2025-05-15

## 2025-05-15 LAB
ALBUMIN: 4.3 G/DL
ALP BLD-CCNC: 75 U/L
ALT SERPL-CCNC: 15 U/L
ANION GAP SERPL CALCULATED.3IONS-SCNC: 9 MMOL/L
AST SERPL-CCNC: 22 U/L
BASOPHILS ABSOLUTE: 0 /ΜL
BASOPHILS RELATIVE PERCENT: 0.3 %
BILIRUB SERPL-MCNC: 0.2 MG/DL (ref 0.1–1.4)
BUN BLDV-MCNC: 17 MG/DL
CALCIUM SERPL-MCNC: 8.9 MG/DL
CHLORIDE BLD-SCNC: 107 MMOL/L
CO2: 24 MMOL/L
CREAT SERPL-MCNC: 1.6 MG/DL
EOSINOPHILS ABSOLUTE: 0 /ΜL
EOSINOPHILS RELATIVE PERCENT: 1.8 %
GFR, ESTIMATED: 47
GLUCOSE BLD-MCNC: 93 MG/DL
HCT VFR BLD CALC: 27.6 % (ref 41–53)
HEMOGLOBIN: 9 G/DL (ref 13.5–17.5)
LYMPHOCYTES ABSOLUTE: 0 /ΜL
LYMPHOCYTES RELATIVE PERCENT: 3.2 %
MCH RBC QN AUTO: 27.7 PG
MCHC RBC AUTO-ENTMCNC: 32.5 G/DL
MCV RBC AUTO: 85.2 FL
MONOCYTES ABSOLUTE: 0.3 /ΜL
MONOCYTES RELATIVE PERCENT: 20.5 %
NEUTROPHILS ABSOLUTE: 1.1 /ΜL
NEUTROPHILS RELATIVE PERCENT: 74.2 %
PATH INTERP BLD-IMP: NORMAL
PLATELET # BLD: 221 K/ΜL
PMV BLD AUTO: 8.4 FL
POTASSIUM SERPL-SCNC: 4.2 MMOL/L
RBC # BLD: 3.24 10^6/ΜL
SODIUM BLD-SCNC: 140 MMOL/L
TOTAL PROTEIN: 6.2 G/DL (ref 6.4–8.2)
WBC # BLD: 1.5 10^3/ML

## 2025-05-19 ENCOUNTER — APPOINTMENT (OUTPATIENT)
Dept: GENERAL RADIOLOGY | Age: 65
End: 2025-05-19
Payer: MEDICARE

## 2025-05-19 ENCOUNTER — HOSPITAL ENCOUNTER (EMERGENCY)
Age: 65
Discharge: HOME | End: 2025-05-19
Attending: STUDENT IN AN ORGANIZED HEALTH CARE EDUCATION/TRAINING PROGRAM
Payer: MEDICARE

## 2025-05-19 VITALS
RESPIRATION RATE: 18 BRPM | DIASTOLIC BLOOD PRESSURE: 88 MMHG | HEART RATE: 85 BPM | HEIGHT: 77 IN | SYSTOLIC BLOOD PRESSURE: 209 MMHG | OXYGEN SATURATION: 96 % | BODY MASS INDEX: 25.39 KG/M2 | TEMPERATURE: 98.4 F | WEIGHT: 215 LBS

## 2025-05-19 DIAGNOSIS — R60.0 PEDAL EDEMA: ICD-10-CM

## 2025-05-19 DIAGNOSIS — N18.9 ACUTE KIDNEY INJURY SUPERIMPOSED ON CKD: Primary | ICD-10-CM

## 2025-05-19 DIAGNOSIS — N17.9 ACUTE KIDNEY INJURY SUPERIMPOSED ON CKD: Primary | ICD-10-CM

## 2025-05-19 LAB
ALBUMIN SERPL-MCNC: 3.9 G/DL (ref 3.4–5)
ALBUMIN/GLOB SERPL: 1.7 {RATIO} (ref 1.1–2.2)
ALP SERPL-CCNC: 79 U/L (ref 40–129)
ALT SERPL-CCNC: 12 U/L (ref 10–40)
ANION GAP SERPL CALCULATED.3IONS-SCNC: 10 MMOL/L (ref 3–16)
ANISOCYTOSIS BLD QL SMEAR: ABNORMAL
AST SERPL-CCNC: 22 U/L (ref 15–37)
BASOPHILS # BLD: 0 K/UL (ref 0–0.2)
BASOPHILS NFR BLD: 0.6 %
BILIRUB SERPL-MCNC: <0.2 MG/DL (ref 0–1)
BUN SERPL-MCNC: 21 MG/DL (ref 7–20)
BURR CELLS BLD QL SMEAR: ABNORMAL
CALCIUM SERPL-MCNC: 8.9 MG/DL (ref 8.3–10.6)
CHLORIDE SERPL-SCNC: 109 MMOL/L (ref 99–110)
CO2 SERPL-SCNC: 24 MMOL/L (ref 21–32)
CREAT SERPL-MCNC: 1.8 MG/DL (ref 0.8–1.3)
D-DIMER QUANTITATIVE: 0.3 UG/ML FEU (ref 0–0.6)
DACRYOCYTES BLD QL SMEAR: ABNORMAL
DEPRECATED RDW RBC AUTO: 16.4 % (ref 12.4–15.4)
EOSINOPHIL # BLD: 0 K/UL (ref 0–0.6)
EOSINOPHIL NFR BLD: 2.3 %
GFR SERPLBLD CREATININE-BSD FMLA CKD-EPI: 41 ML/MIN/{1.73_M2}
GLUCOSE SERPL-MCNC: 152 MG/DL (ref 70–99)
HCT VFR BLD AUTO: 26.7 % (ref 40.5–52.5)
HGB BLD-MCNC: 8.8 G/DL (ref 13.5–17.5)
LYMPHOCYTES # BLD: 0.1 K/UL (ref 1–5.1)
LYMPHOCYTES NFR BLD: 3.7 %
MACROCYTES BLD QL SMEAR: ABNORMAL
MAGNESIUM SERPL-MCNC: 1.94 MG/DL (ref 1.8–2.4)
MCH RBC QN AUTO: 27.7 PG (ref 26–34)
MCHC RBC AUTO-ENTMCNC: 33 G/DL (ref 31–36)
MCV RBC AUTO: 83.9 FL (ref 80–100)
MICROCYTES BLD QL SMEAR: ABNORMAL
MONOCYTES # BLD: 0.4 K/UL (ref 0–1.3)
MONOCYTES NFR BLD: 27 %
NEUTROPHILS # BLD: 1 K/UL (ref 1.7–7.7)
NEUTROPHILS NFR BLD: 66.4 %
NT-PROBNP SERPL-MCNC: 281 PG/ML (ref 0–124)
PATH INTERP BLD-IMP: YES
PLATELET # BLD AUTO: 214 K/UL (ref 135–450)
PLATELET BLD QL SMEAR: ADEQUATE
PMV BLD AUTO: 7.8 FL (ref 5–10.5)
POTASSIUM SERPL-SCNC: 3.7 MMOL/L (ref 3.5–5.1)
PROT SERPL-MCNC: 6.2 G/DL (ref 6.4–8.2)
RBC # BLD AUTO: 3.19 M/UL (ref 4.2–5.9)
SCHISTOCYTES BLD QL SMEAR: ABNORMAL
SLIDE REVIEW: ABNORMAL
SODIUM SERPL-SCNC: 143 MMOL/L (ref 136–145)
TROPONIN, HIGH SENSITIVITY: 18 NG/L (ref 0–22)
WBC # BLD AUTO: 1.6 K/UL (ref 4–11)

## 2025-05-19 PROCEDURE — 85025 COMPLETE CBC W/AUTO DIFF WBC: CPT

## 2025-05-19 PROCEDURE — 99285 EMERGENCY DEPT VISIT HI MDM: CPT

## 2025-05-19 PROCEDURE — 80053 COMPREHEN METABOLIC PANEL: CPT

## 2025-05-19 PROCEDURE — 85379 FIBRIN DEGRADATION QUANT: CPT

## 2025-05-19 PROCEDURE — 83880 ASSAY OF NATRIURETIC PEPTIDE: CPT

## 2025-05-19 PROCEDURE — 83735 ASSAY OF MAGNESIUM: CPT

## 2025-05-19 PROCEDURE — 93005 ELECTROCARDIOGRAM TRACING: CPT | Performed by: STUDENT IN AN ORGANIZED HEALTH CARE EDUCATION/TRAINING PROGRAM

## 2025-05-19 PROCEDURE — 6370000000 HC RX 637 (ALT 250 FOR IP): Performed by: STUDENT IN AN ORGANIZED HEALTH CARE EDUCATION/TRAINING PROGRAM

## 2025-05-19 PROCEDURE — 84484 ASSAY OF TROPONIN QUANT: CPT

## 2025-05-19 PROCEDURE — 71045 X-RAY EXAM CHEST 1 VIEW: CPT

## 2025-05-19 RX ORDER — FUROSEMIDE 10 MG/ML
40 INJECTION INTRAMUSCULAR; INTRAVENOUS ONCE
Status: DISCONTINUED | OUTPATIENT
Start: 2025-05-19 | End: 2025-05-19

## 2025-05-19 RX ORDER — FUROSEMIDE 40 MG/1
60 TABLET ORAL ONCE
Status: COMPLETED | OUTPATIENT
Start: 2025-05-19 | End: 2025-05-19

## 2025-05-19 RX ADMIN — FUROSEMIDE 60 MG: 40 TABLET ORAL at 23:04

## 2025-05-19 ASSESSMENT — PAIN DESCRIPTION - PAIN TYPE: TYPE: ACUTE PAIN

## 2025-05-19 ASSESSMENT — PAIN DESCRIPTION - ORIENTATION: ORIENTATION: RIGHT;LEFT

## 2025-05-19 ASSESSMENT — PAIN - FUNCTIONAL ASSESSMENT: PAIN_FUNCTIONAL_ASSESSMENT: 0-10

## 2025-05-19 ASSESSMENT — PAIN DESCRIPTION - LOCATION: LOCATION: LEG

## 2025-05-20 ENCOUNTER — CARE COORDINATION (OUTPATIENT)
Dept: CARE COORDINATION | Age: 65
End: 2025-05-20

## 2025-05-20 DIAGNOSIS — I50.32 CHRONIC DIASTOLIC HEART FAILURE (HCC): Primary | Chronic | ICD-10-CM

## 2025-05-20 LAB
EKG ATRIAL RATE: 83 BPM
EKG DIAGNOSIS: NORMAL
EKG P AXIS: 45 DEGREES
EKG P-R INTERVAL: 170 MS
EKG Q-T INTERVAL: 396 MS
EKG QRS DURATION: 114 MS
EKG QTC CALCULATION (BAZETT): 465 MS
EKG R AXIS: -17 DEGREES
EKG T AXIS: 30 DEGREES
EKG VENTRICULAR RATE: 83 BPM
PATH INTERP BLD-IMP: NORMAL

## 2025-05-20 PROCEDURE — 1111F DSCHRG MED/CURRENT MED MERGE: CPT | Performed by: FAMILY MEDICINE

## 2025-05-20 PROCEDURE — 93010 ELECTROCARDIOGRAM REPORT: CPT | Performed by: INTERNAL MEDICINE

## 2025-05-20 RX ORDER — TORSEMIDE 20 MG/1
20 TABLET ORAL DAILY
COMMUNITY

## 2025-05-20 NOTE — CARE COORDINATION
Ambulatory Care Coordination Note     2025 4:53 PM     Patient Current Location:  Home[address] [facility] [address] [place] [address]     This patient was received as a referral from [facility] report .    ACM contacted the patient by telephone. Verified name and  with patient as identifiers. Provided introduction to self, and explanation of the ACM role.   Patient accepted care management services at this time.          ACM: Stacia Bojorquez RN     Challenges to be reviewed by the provider   Additional needs identified to be addressed with provider Yes  BLE edema - no significant improvement    Has est care visit scheduled w/Endo 25; says he also recently reviewed concern w/PCP (preceded ED visit) - prefers to self outreach as appropriate/as needed    Reviewed options for timely review w/PCP.   Next OV - 6 mo f/u 9.10.25           Method of communication with provider: chart routing.    Utilization: Initial Call - Discharge Date: 25   Discharge Facility: [facility]  Reason for ED Visit: leg pain  Visit Diagnosis: acute kidney injury superimposed on CKD    Number of ED visits in the last 6 months: 1      Do you have any ongoing symptoms? Yes, there has been no change in my symptoms.   Current symptoms: BLE edema, pain.    Did you call your PCP prior to going to the ED? No, did not call the PCP office.     Review of Discharge Instructions:   [x] AVS discharge instructions  [x] Right Care, Right Place, Right Time document  [x] Medication changes  [x] Follow up appointments  [x] Referral follow up  - est care scheduled w/Endo tomorrow  [x] RPM, ACC - accepts latter, but declines RPM enrollment       Care Summary Note: ACC enrollment. Pt denies need for ACM to assist w/scheduling. Notes he has already reviewed concern w/PCP within past week, intends to see Endo for est care visit tomorrow - reviews he continues also to follow w/NADIYA @ [facility] s/p kidney transplant, receives Inf tx q28d.

## 2025-05-20 NOTE — DISCHARGE INSTRUCTIONS
Follow-up with your  doctors as discussed    You may continue taking your home medications as they are prescribed.    If you have been prescribed medications please make sure to monitor how to take them accordingly    Return if develop any new or worsening symptoms

## 2025-05-20 NOTE — ED PROVIDER NOTES
21 (*)     Creatinine 1.8 (*)     Est, Glom Filt Rate 41 (*)     Total Protein 6.2 (*)     All other components within normal limits   BRAIN NATRIURETIC PEPTIDE - Abnormal; Notable for the following components:    NT Pro- (*)     All other components within normal limits   TROPONIN   D-DIMER, QUANTITATIVE   MAGNESIUM   TROPONIN       (Any cultures that may have been sent were not resulted at the time of this patient visit)    MEDICAL DECISION MAKING / ED COURSE:     External Documentation Reviewed: Previous patient encounter documents & history available on EMR was reviewed:   Record from: .  Patient seen on 5/13/2025 for weight gain of her medicine physician.    Decision Rules/Clinical Scores utilized:          ED Reassessment:  See ED course    ED Course as of 05/19/25 2257   Mon May 19, 2025   2111 D-Dimer, Quant: 0.30 [AL]   2120 WBC(!!): 1.6  Based on [AL]   2121 Hemoglobin Quant(!): 8.8  Your baseline for patient [AL]      ED Course User Index  [AL] Erick Bhat MD       Is this patient to be included in the SEP-1 core measure? No Exclusion criteria - the patient is NOT to be included for SEP-1 Core Measure due to: 2+ SIRS criteria are not met     MDM Summary:  History was obtained from: Patient and chart review      This is a 4-year-old male with a history of renal transplant who comes in with lower extremity edema over the last week or so significantly worse, no recent traumatic injuries or falls.  No fevers chills no chest pain or shortness of breath.  His lungs are clear to auscultation he is in no respiratory distress.  He has a mild ANTONIO on his CKD with his creatinine baseline being 1.6 now 1.8 normal urine output.  He has leukopenia which is chronic for him as he is on immunosuppressive therapy.  Hemoglobin is at his baseline.  D-dimer is negative as he is moderate risk on [place].  Feel comfortable at this point that suspicion for PE and DVT is lower.  BNP minimally elevated, chest x-ray

## 2025-05-21 ENCOUNTER — OFFICE VISIT (OUTPATIENT)
Dept: ENDOCRINOLOGY | Age: 65
End: 2025-05-21
Payer: MEDICARE

## 2025-05-21 DIAGNOSIS — E11.69 TYPE 2 DIABETES MELLITUS WITH OTHER SPECIFIED COMPLICATION, WITHOUT LONG-TERM CURRENT USE OF INSULIN (HCC): Primary | Chronic | ICD-10-CM

## 2025-05-21 PROCEDURE — G8427 DOCREV CUR MEDS BY ELIG CLIN: HCPCS

## 2025-05-21 PROCEDURE — 97802 MEDICAL NUTRITION INDIV IN: CPT

## 2025-05-21 PROCEDURE — G8417 CALC BMI ABV UP PARAM F/U: HCPCS

## 2025-05-21 PROCEDURE — 3044F HG A1C LEVEL LT 7.0%: CPT

## 2025-05-21 NOTE — PROGRESS NOTES
Medical Nutrition Therapy for Diabetes  [facility]    Jorden Woods  May 21, 2025    Patient Care Team:  Curt Collins MD as PCP - General  [place] HillCurt foster MD as PCP - Empaneled Provider  Pietro Fernández MD as Consulting Physician (Sleep Medicine Family Practice)  Michelle Mascorro, RN as Diabetic Educator  Kehrt, Tiffanie R, APRN - CNP as Nurse Practitioner (Nurse Practitioner)  Stacia Bojorquez, RN as Ambulatory Care Manager    Reason for visit: 1. Type 2 diabetes mellitus with other specified complication, without long-term current use of insulin (HCC)     Initial     ASSESSMENT/PLAN:   NUTRITION DIAGNOSIS    #1 Problem: Altered Nutrition-Related Laboratory Values (NC-2.2)  Related to: Endocrine/Diabetes   As Evidenced by: Elevated Plasma glucose and/or HgbA1c levels         #2 Problem: Knowledge and Beliefs-NB-3.1                       Food and nutrition deficits    #3 Problem: Excessive Carbohydrate Intake (NI-5.8.2)  Related to: Food-and nutrition-related knowledge deficit concerning appropriate amount of carbohydrate intake  As evidenced by: Estimated carbohydrate intake that is consistently more than the recommended amounts    NUTRITION INTERVENTION  Nutrition Prescription: 45 grams carbohydrate per meal with protein and non-starch vegetables  15 gram carbohydrate snacks     Diabetes Education/Counseling included:  Here for assistance with lower A1c.   Diabetes disease process:  Explained HgbA1c ranges, reviewed normal/at risk for diabetes/and diabetes diagnosis ranges.    Nutrition Management:  Carbohydrate control-ADA plate method for pairing carbohydrate and non-carbohydrate foods  Impacts of fiber, fats, and proteins on blood glucose trending   Benefit of eating protein with each meal/snack reviewed  Reviewed sugar sweetened beverage intake  Label reading  Activity/Exercise-Encouraged 30 minutes of physical activity daily     Monitoring: Does not test  Medication

## 2025-05-27 ENCOUNTER — CARE COORDINATION (OUTPATIENT)
Dept: CARE COORDINATION | Age: 65
End: 2025-05-27

## 2025-05-27 NOTE — CARE COORDINATION
Ambulatory Care Coordination Note     5/27/2025 4:10 PM     Patient outreach attempt by this ACM today to perform care management follow up . ACM was unable to reach the patient by telephone today;   voicemail full and unable to leave a message.   [employer] message sent requesting patient to contact this ACM.     ACM: Stacia Bojorquez RN     Care Summary Note: routine ACC outreach, sooner than planned (per pt preference q2-4 wks) for sooner subsequent follow up outreach, to follow initial ACC enrollment outreach on 5.20.25  Unable to leave vm d/t vm full. Will send [employer] communication.  PCP/Specialist follow up:   Future Appointments         Provider Specialty Dept Phone    6/3/2025 8:00 PM [facility] SLEEP LAB ROOM 2 Sleep Center [phone]    9/10/2025 10:30 AM Curt Collins MD Family Medicine [phone]            Follow Up:   Plan for next AC outreach in approximately 1 week to complete:  - disease specific assessments  - goal progression  - routine review - remind/review next visit w/PCP scheduled 9.10.25 .

## 2025-06-03 ENCOUNTER — HOSPITAL ENCOUNTER (OUTPATIENT)
Dept: SLEEP CENTER | Age: 65
Discharge: HOME | End: 2025-06-03
Payer: MEDICARE

## 2025-06-03 ENCOUNTER — CARE COORDINATION (OUTPATIENT)
Dept: CARE COORDINATION | Age: 65
End: 2025-06-03

## 2025-06-03 DIAGNOSIS — G47.33 OBSTRUCTIVE SLEEP APNEA SYNDROME: ICD-10-CM

## 2025-06-03 PROCEDURE — 95811 POLYSOM 6/>YRS CPAP 4/> PARM: CPT

## 2025-06-03 NOTE — CARE COORDINATION
Ambulatory Care Coordination Note     6/3/2025 2:25 PM     Patient outreach attempt by this ACM today to perform care management follow up . ACM was unable to reach the patient by telephone today;   voicemail full and unable to leave a message.      ACM: Stacia Bojorquez RN     Care Summary Note: unable to reach or leave vm - voice mailbox full.  Last/only successful ACC outreach to date, this current ACC program - was on initial enrollment 5.20.25.  Unable to leave vm either today, or previous ACC outreach (5.27.25) d/t voice mailbox remains full.  On 5.27.25 - ACM did send [employer] communication, but review of [employer] shows 5.27.25 communication has not been read by pt, as yet.  Of note - pt has Sleep Study appt scheduled at 8pm this evening.  Will continue effort to reach for ACC review in coming days/week.    PCP/Specialist follow up:   Future Appointments         Provider Specialty Dept Phone    6/3/2025  8:00 PM [facility] SLEEP LAB ROOM 2 Sleep Center [phone]    9/10/2025 10:30 AM Curt Collins MD Family Medicine [phone]            Follow Up:   Plan for next ACM outreach in approximately 1 week to complete:  - disease specific assessments  - goal progression  - education   - follow-up appointment with PCP scheduled 9.10.25  -review outcome of Sleep Study appt scheduled 6.3.25 .   - review w/pt options to optimize successful ACC outreaches.

## 2025-06-03 NOTE — CARE COORDINATION
Ambulatory Care Coordination Note     6/3/2025 2:32 PM     Patient Current Location:  [place] [facility] [address] [place] [address]     ACM contacted the spouse/partner by telephone. Verified name and  with spouse/partner as identifiers.         ACM: Stacia Bojorquez RN     Challenges to be reviewed by the provider   Additional needs identified to be addressed with provider No  none               Method of communication with provider: none.    Utilization: Patient has not had any utilization since our last call.     Care Summary Note: reviewed w/Tamiko, pt's spouse, ACM unable to reach or leave vm d/t voice mailbox is full. Quickly reviewed/reminded spouse re noted appt pt has tonight 8pm @ Sleep Medicine.  Tamiko verbalizes awareness & confirms pt does intend to keep appt as scheduled.  Invited Tamiko to [facility] - for routine [facility] review.  [facility] caller ID display, agrees to convey message about this outreach.    Offered patient enrollment in the Remote Patient Monitoring (RPM) program for in-home monitoring: Yes, but did not enroll at this time: already monitoring with home equipment.     Assessments Completed:   Diabetes Assessment    Medic Alert ID: No  Meal Planning: Carb counting, Plate Method, Avoidance of concentrated sweets   How often do you test your blood sugar?: Daily   Do you have barriers with adherence to non-pharmacologic self-management interventions? (Nutrition/Exercise/Self-Monitoring): No   Have you ever had to go to the ED for symptoms of low blood sugar?: No       No patient-reported symptoms         ,   Congestive Heart Failure Assessment    Are you currently restricting fluids?: No Restriction  Do you understand a low sodium diet?: Yes  Do you understand how to read food labels?: Yes  How many restaurant meals do you eat per week?: 0  Do you salt your food before tasting it?: No     No

## 2025-06-04 ENCOUNTER — CARE COORDINATION (OUTPATIENT)
Dept: CARE COORDINATION | Age: 65
End: 2025-06-04

## 2025-06-04 NOTE — CARE COORDINATION
Ambulatory Care Coordination Note     2025 3:33 PM     Patient Current Location:  Home: [address]   [facility] [address]     Patient contacted the AC by telephone. Verified name and  with patient as identifiers.         ACM: Stacia Bojorquez RN     Challenges to be reviewed by the provider   Additional needs identified to be addressed with provider No  none               Method of communication with provider: none.    Utilization: Patient has not had any utilization since our last call.     Care Summary Note: pt returned callback to [facility]. Denies any newly presenting s/s or concerns, beyond every psychologist he has called from listing sent to him from his insurance - has advised pt that they are 'not in network'. Pt says the list was sent to him directly from his network - so he doesn't understand why none of the providers he contacted accept his insurance. Pt inquires if ACM able to offer name(s) of clinical psychologists in network.  Advised pt to contact his Customer Service number on his insurance card, explain to them exactly as pt states to this writer. Explained that ACM must defer pt to check directly w/his insurance company to ascertain providers in network, as ACM unable to name without consulting pt's plan network, anyway.  Pt states willingness to self outreach direct to plan network to obtain name(s) of clinical psychologists in network.  No other needs at this time. States he had Sleep Study done last evening, went well - anticipates knowing more in next 1-2 weeks.  Mutually agree, with this will be on bereavement leave, followed by other planned PTO - and as pt is planning further review w/provider(s) pending results of Sleep Study, while also working to contact/schedule est care visit w/psychologist - next planned ACC outreach in 3-4 weeks sufficient for pt; with understanding pt can outreach for sooner ACC support, if needed. Pt understands name(s) of fellow ACC team nurses are provided to

## 2025-06-26 ENCOUNTER — CARE COORDINATION (OUTPATIENT)
Dept: CARE COORDINATION | Age: 65
End: 2025-06-26

## 2025-06-26 NOTE — CARE COORDINATION
Ambulatory Care Coordination Note     2025 1:54 PM     Patient Current Location:  Home: [address] [place] [address]   [facility] [address]     ACM contacted the patient by telephone. Verified name and  with patient as identifiers.         ACM: Stacia Bojorquez RN     Challenges to be reviewed by the provider   Additional needs identified to be addressed with provider No  none               Method of communication with provider: none.    Utilization: Patient has not had any utilization since our last call.     Care Summary Note: routine ACC outreach. Reviewed precautions as r/t extreme temperatures, along w/general overview of Zone Mgmt guidance.. Pt verbalized understanding. Denies requiring higher frequency ACC outreach. Endorses preference to continue routine ACC outreach q3-4 weeks for remainder of ACC program. Pt further agrees to self outreach direct to ACM as needed, between routine/planned ACC outreaches as well.    Offered patient enrollment in the Remote Patient Monitoring (RPM) program for in-home monitoring: Yes, but did not enroll at this time: already monitoring with home equipment.     Assessments Completed:   Diabetes Assessment    Medic Alert ID: No  Meal Planning: Carb counting, Plate Method, Avoidance of concentrated sweets   How often do you test your blood sugar?: Daily   Do you have barriers with adherence to non-pharmacologic self-management interventions? (Nutrition/Exercise/Self-Monitoring): No   Have you ever had to go to the ED for symptoms of low blood sugar?: No       No patient-reported symptoms         ,   Congestive Heart Failure Assessment    Are you currently restricting fluids?: No Restriction  Do you understand a low sodium diet?: Yes  Do you understand how to read food labels?: Yes  How many restaurant meals do you eat per week?: 0  Do you salt your food before tasting it?: No     No patient-reported symptoms      Symptoms:  None: Yes      Symptom course: stable  Weight trend:

## 2025-06-30 ENCOUNTER — CARE COORDINATION (OUTPATIENT)
Dept: CARE COORDINATION | Age: 65
End: 2025-06-30

## 2025-06-30 NOTE — CARE COORDINATION
Ambulatory Care Coordination Note     2025 12:18 PM     Patient Current Location:  Home: [facility] [address]   [facility] [address]     Patient contacted the [facility] by telephone. Verified name and  with patient as identifiers.         ACM: Stacia Bojorquez RN     Challenges to be reviewed by the provider   Additional needs identified to be addressed with provider No  none               Method of communication with provider: none.    Utilization: Patient has not had any utilization since our last call.     Care Summary Note: denies any newly presenting concerns.  Problem identified: needs scale for monitoring daily weights.  [facility] submitted request per [facility] trell-funding for scale for pt. Pending review/supply.  Denies requiring higher frequency ACC outreach; endorses readiness to graduate ACC by 9.17.25 as planned and agrees to continue to self outreach for sooner ACC support, if any newly presenting concerns or identified needs. Mutually agree next ACC outreach in 3-4 weeks.  Offered patient enrollment in the Remote Patient Monitoring (RPM) program for in-home monitoring: Yes, but did not enroll at this time: already monitoring with home equipment.     Assessments Completed:   Congestive Heart Failure Assessment    Are you currently restricting fluids?: No Restriction  Do you understand a low sodium diet?: Yes  Do you understand how to read food labels?: Yes  How many restaurant meals do you eat per week?: 0  Do you salt your food before tasting it?: No     No patient-reported symptoms      Symptoms:  None: Yes      Symptom course: stable  Weight trend: stable  Salt intake watch compared to last visit: stable          General Assessment    Do you have any symptoms that are causing concern?: No          Medications Reviewed:   Patient denies any changes with medications and reports taking all medications as prescribed.    Advance Care Planning:   Health Care Decision maker confirmed    Primary Decision Maker:

## 2025-07-08 ENCOUNTER — HOSPITAL ENCOUNTER (OUTPATIENT)
Dept: VASCULAR LAB | Age: 65
Discharge: HOME | End: 2025-07-10
Payer: MEDICARE

## 2025-07-08 ENCOUNTER — OFFICE VISIT (OUTPATIENT)
Dept: FAMILY MEDICINE CLINIC | Age: 65
End: 2025-07-08
Payer: MEDICARE

## 2025-07-08 ENCOUNTER — HOSPITAL ENCOUNTER (OUTPATIENT)
Dept: GENERAL RADIOLOGY | Age: 65
Discharge: HOME | End: 2025-07-08
Payer: MEDICARE

## 2025-07-08 VITALS
HEART RATE: 80 BPM | BODY MASS INDEX: 32.12 KG/M2 | HEIGHT: 77 IN | DIASTOLIC BLOOD PRESSURE: 70 MMHG | WEIGHT: 272 LBS | OXYGEN SATURATION: 98 % | SYSTOLIC BLOOD PRESSURE: 134 MMHG

## 2025-07-08 DIAGNOSIS — Z94.0 KIDNEY TRANSPLANTED: ICD-10-CM

## 2025-07-08 DIAGNOSIS — R07.9 CHEST PAIN, UNSPECIFIED TYPE: ICD-10-CM

## 2025-07-08 DIAGNOSIS — M79.89 LEFT LEG SWELLING: Primary | ICD-10-CM

## 2025-07-08 DIAGNOSIS — M25.572 ACUTE LEFT ANKLE PAIN: ICD-10-CM

## 2025-07-08 DIAGNOSIS — N18.6 END STAGE CHRONIC KIDNEY DISEASE (HCC): ICD-10-CM

## 2025-07-08 DIAGNOSIS — E11.69 TYPE 2 DIABETES MELLITUS WITH OTHER SPECIFIED COMPLICATION, WITHOUT LONG-TERM CURRENT USE OF INSULIN (HCC): ICD-10-CM

## 2025-07-08 DIAGNOSIS — I50.32 CHRONIC DIASTOLIC HEART FAILURE (HCC): ICD-10-CM

## 2025-07-08 LAB — ECHO BSA: 2.59 M2

## 2025-07-08 PROCEDURE — 2022F DILAT RTA XM EVC RTNOPTHY: CPT | Performed by: FAMILY MEDICINE

## 2025-07-08 PROCEDURE — 1123F ACP DISCUSS/DSCN MKR DOCD: CPT | Performed by: FAMILY MEDICINE

## 2025-07-08 PROCEDURE — 3017F COLORECTAL CA SCREEN DOC REV: CPT | Performed by: FAMILY MEDICINE

## 2025-07-08 PROCEDURE — G8417 CALC BMI ABV UP PARAM F/U: HCPCS | Performed by: FAMILY MEDICINE

## 2025-07-08 PROCEDURE — 93971 EXTREMITY STUDY: CPT | Performed by: SURGERY

## 2025-07-08 PROCEDURE — 99214 OFFICE O/P EST MOD 30 MIN: CPT | Performed by: FAMILY MEDICINE

## 2025-07-08 PROCEDURE — 93971 EXTREMITY STUDY: CPT

## 2025-07-08 PROCEDURE — 1036F TOBACCO NON-USER: CPT | Performed by: FAMILY MEDICINE

## 2025-07-08 PROCEDURE — 1159F MED LIST DOCD IN RCRD: CPT | Performed by: FAMILY MEDICINE

## 2025-07-08 PROCEDURE — 3044F HG A1C LEVEL LT 7.0%: CPT | Performed by: FAMILY MEDICINE

## 2025-07-08 PROCEDURE — G2211 COMPLEX E/M VISIT ADD ON: HCPCS | Performed by: FAMILY MEDICINE

## 2025-07-08 PROCEDURE — G8427 DOCREV CUR MEDS BY ELIG CLIN: HCPCS | Performed by: FAMILY MEDICINE

## 2025-07-08 PROCEDURE — 73610 X-RAY EXAM OF ANKLE: CPT

## 2025-07-08 NOTE — PROGRESS NOTES
Here for f/u and recheck of ESRD, and some concerns of persistent swelling to LLE.  Pt has noted some pain and difficulty with ambulate.  Pain is in front of ankle.  Initially was just swelling, but with pain.  Those pain sx started about 1 week ago.  Pt was in ER a few weeks ago and they did evaluation with d dimer, BNP, and CXR.  Swelling is bilaterally but L > R.  Does get worse during the day.  No chest pain,shortness of breath.  Pt did have uric acid test yesterday that was negative.      Has noted some bruising to multiple area for months.  No blood in bowels and no issues of epistaxis, no blood in urine     CRI is stable, with creatinine at 1.6.      Pt has had some intermittent chest pain, occurring randomly.  Not associated with activity or exercise.  No shortness of breath,       Except as noted above in the history of present illness, the review of systems is  negative for headache, vision changes, chest pain, shortness of breath, abdominal pain, urinary sx, bowel changes.    Past medical, surgical, and social history reviewed and updated  Medications and allergies reviewed and updated      O: BP (!) 146/72   Pulse 80   Ht 1.956 m (6' 5\")   Wt 123.4 kg (272 lb)   SpO2 98%   BMI 32.25 kg/m²   GEN: No acute distress, cooperative, well nourished, alert.   HEENT: PEERLA, EOMI , normocephalic/atraumatic, nares and oropharynx clear.  Mucous membranes normal, Tympanic membranes clear bilaterally.  Neck: soft, supple, no thyromegaly, mass, no Lymphadenopathy  CV: Regular rate and rhythm, no murmur, rubs, gallops. No edema.  Resp: Clear to auscultation bilaterally good air entry bilaterally  No crackles, wheeze. Breathing comfortably.   Psych: mood stable, No suicidal thoughts or ideation   Ext: L sided swelling to leg up to midcalf, no erythema, warmth.  Mild tender to palpation anterior ankle w/o mass, lesion.  +/- homans.  .  No palpable cord      Current Outpatient Medications   Medication Sig Dispense

## 2025-07-10 DIAGNOSIS — S89.102A NONDISPLACED PHYSEAL FRACTURE OF DISTAL END OF LEFT TIBIA, INITIAL ENCOUNTER: Primary | ICD-10-CM

## 2025-07-11 ENCOUNTER — TELEPHONE (OUTPATIENT)
Dept: FAMILY MEDICINE CLINIC | Age: 65
End: 2025-07-11

## 2025-07-11 NOTE — TELEPHONE ENCOUNTER
Patient called about Left Foot and Ankle Pain.   Saw Dr. Collins on 07/08/2025    See's Ortho on 07/24/2025.   Would like Dr. Collins to call something in for Pain until he is able to be seen in Ortho.     [facility] [phone] [address]    Thank you!

## 2025-07-17 ENCOUNTER — HOSPITAL ENCOUNTER (OUTPATIENT)
Dept: CT IMAGING | Age: 65
Discharge: HOME | End: 2025-07-17
Payer: MEDICARE

## 2025-07-17 ENCOUNTER — HOSPITAL ENCOUNTER (OUTPATIENT)
Age: 65
Discharge: HOME | End: 2025-07-19
Payer: MEDICARE

## 2025-07-17 VITALS — HEIGHT: 77 IN | WEIGHT: 273 LBS | BODY MASS INDEX: 32.23 KG/M2

## 2025-07-17 DIAGNOSIS — I50.32 CHRONIC DIASTOLIC HEART FAILURE (HCC): ICD-10-CM

## 2025-07-17 DIAGNOSIS — R07.9 CHEST PAIN, UNSPECIFIED TYPE: ICD-10-CM

## 2025-07-17 LAB
ECHO AO ARCH DIAM: 2.6 CM
ECHO AO ASC DIAM: 3.5 CM
ECHO AO ASCENDING AORTA INDEX: 1.37 CM/M2
ECHO AO ROOT DIAM: 3.7 CM
ECHO AO ROOT INDEX: 1.45 CM/M2
ECHO AV AREA PEAK VELOCITY: 3.3 CM2
ECHO AV AREA VTI: 3.2 CM2
ECHO AV AREA/BSA PEAK VELOCITY: 1.3 CM2/M2
ECHO AV AREA/BSA VTI: 1.3 CM2/M2
ECHO AV MEAN GRADIENT: 2 MMHG
ECHO AV MEAN VELOCITY: 0.7 M/S
ECHO AV PEAK GRADIENT: 4 MMHG
ECHO AV PEAK VELOCITY: 1 M/S
ECHO AV VELOCITY RATIO: 0.8
ECHO AV VTI: 20.5 CM
ECHO BSA: 2.59 M2
ECHO EST RA PRESSURE: 8 MMHG
ECHO IVC EXP: 1.9 CM
ECHO IVC INSP: 1.4 CM
ECHO LA AREA 2C: 27.3 CM2
ECHO LA AREA 4C: 26 CM2
ECHO LA MAJOR AXIS: 6.8 CM
ECHO LA MINOR AXIS: 6.5 CM
ECHO LA VOL BP: 87 ML (ref 18–58)
ECHO LA VOL MOD A2C: 91 ML (ref 18–58)
ECHO LA VOL MOD A4C: 79 ML (ref 18–58)
ECHO LA VOL/BSA BIPLANE: 34 ML/M2 (ref 16–34)
ECHO LA VOLUME INDEX MOD A2C: 36 ML/M2 (ref 16–34)
ECHO LA VOLUME INDEX MOD A4C: 31 ML/M2 (ref 16–34)
ECHO LV E' LATERAL VELOCITY: 7.29 CM/S
ECHO LV E' SEPTAL VELOCITY: 6.26 CM/S
ECHO LV EDV 3D: 200 ML
ECHO LV EDV A2C: 96 ML
ECHO LV EDV A4C: 137 ML
ECHO LV EDV INDEX 3D: 78 ML/M2
ECHO LV EDV INDEX A4C: 54 ML/M2
ECHO LV EDV NDEX A2C: 38 ML/M2
ECHO LV EJECTION FRACTION 3D: 55 %
ECHO LV EJECTION FRACTION A2C: 57 %
ECHO LV EJECTION FRACTION A4C: 57 %
ECHO LV EJECTION FRACTION BIPLANE: 57 % (ref 55–100)
ECHO LV ESV 3D: 90 ML
ECHO LV ESV A2C: 42 ML
ECHO LV ESV A4C: 59 ML
ECHO LV ESV INDEX 3D: 35 ML/M2
ECHO LV ESV INDEX A2C: 16 ML/M2
ECHO LV ESV INDEX A4C: 23 ML/M2
ECHO LV FRACTIONAL SHORTENING: 33 % (ref 28–44)
ECHO LV GLOBAL LONGITUDINAL STRAIN (GLS): -14.5 %
ECHO LV INTERNAL DIMENSION DIASTOLE INDEX: 1.88 CM/M2
ECHO LV INTERNAL DIMENSION DIASTOLIC: 4.8 CM (ref 4.2–5.9)
ECHO LV INTERNAL DIMENSION SYSTOLIC INDEX: 1.25 CM/M2
ECHO LV INTERNAL DIMENSION SYSTOLIC: 3.2 CM
ECHO LV IVSD: 1.7 CM (ref 0.6–1)
ECHO LV MASS 2D: 350.7 G (ref 88–224)
ECHO LV MASS 3D INDEX: 83.9 G/M2
ECHO LV MASS 3D: 214 G
ECHO LV MASS INDEX 2D: 137.5 G/M2 (ref 49–115)
ECHO LV POSTERIOR WALL DIASTOLIC: 1.6 CM (ref 0.6–1)
ECHO LV RELATIVE WALL THICKNESS RATIO: 0.67
ECHO LVOT AREA: 4.5 CM2
ECHO LVOT AV VTI INDEX: 0.72
ECHO LVOT DIAM: 2.4 CM
ECHO LVOT MEAN GRADIENT: 1 MMHG
ECHO LVOT PEAK GRADIENT: 2 MMHG
ECHO LVOT PEAK VELOCITY: 0.8 M/S
ECHO LVOT STROKE VOLUME INDEX: 26.1 ML/M2
ECHO LVOT SV: 66.5 ML
ECHO LVOT VTI: 14.7 CM
ECHO MV A VELOCITY: 0.74 M/S
ECHO MV AREA VTI: 2.9 CM2
ECHO MV E DECELERATION TIME (DT): 217 MS
ECHO MV E VELOCITY: 0.78 M/S
ECHO MV E/A RATIO: 1.05
ECHO MV E/E' LATERAL: 10.7
ECHO MV E/E' RATIO (AVERAGED): 11.58
ECHO MV E/E' SEPTAL: 12.46
ECHO MV LVOT VTI INDEX: 1.58
ECHO MV MAX VELOCITY: 0.8 M/S
ECHO MV MEAN GRADIENT: 1 MMHG
ECHO MV MEAN VELOCITY: 0.6 M/S
ECHO MV PEAK GRADIENT: 3 MMHG
ECHO MV VTI: 23.2 CM
ECHO PULMONARY ARTERY END DIASTOLIC PRESSURE: 3 MMHG
ECHO PV ACCELERATION TIME (AT): 53 MS
ECHO PV MAX VELOCITY: 1.5 M/S
ECHO PV MEAN GRADIENT: 5 MMHG
ECHO PV MEAN VELOCITY: 1 M/S
ECHO PV PEAK GRADIENT: 8 MMHG
ECHO PV REGURGITANT MAX VELOCITY: 0.9 M/S
ECHO PV VTI: 29.3 CM
ECHO RA AREA 4C: 23 CM2
ECHO RA END SYSTOLIC VOLUME APICAL 4 CHAMBER INDEX BSA: 27 ML/M2
ECHO RA VOLUME: 69 ML
ECHO RIGHT VENTRICULAR SYSTOLIC PRESSURE (RVSP): 33 MMHG
ECHO RV BASAL DIMENSION: 3.9 CM
ECHO RV FREE WALL PEAK S': 13.4 CM/S
ECHO RV LONGITUDINAL DIMENSION: 9.2 CM
ECHO RV MID DIMENSION: 3.9 CM
ECHO RV TAPSE: 2 CM (ref 1.7–?)
ECHO TV REGURGITANT MAX VELOCITY: 2.52 M/S
ECHO TV REGURGITANT PEAK GRADIENT: 25 MMHG

## 2025-07-17 PROCEDURE — 93306 TTE W/DOPPLER COMPLETE: CPT | Performed by: INTERNAL MEDICINE

## 2025-07-17 PROCEDURE — 76376 3D RENDER W/INTRP POSTPROCES: CPT | Performed by: INTERNAL MEDICINE

## 2025-07-17 PROCEDURE — 93356 MYOCRD STRAIN IMG SPCKL TRCK: CPT

## 2025-07-17 PROCEDURE — 93356 MYOCRD STRAIN IMG SPCKL TRCK: CPT | Performed by: INTERNAL MEDICINE

## 2025-07-17 PROCEDURE — 75571 CT HRT W/O DYE W/CA TEST: CPT

## 2025-07-21 ENCOUNTER — CARE COORDINATION (OUTPATIENT)
Dept: CARE COORDINATION | Age: 65
End: 2025-07-21

## 2025-07-21 NOTE — CARE COORDINATION
Ambulatory Care Coordination Note     2025 3:58 PM     Patient Current Location:  Home: [facility] [address] [place]   [facility] [address]     ACM contacted the patient by telephone. Verified name and  with patient as identifiers.     Patient graduated from the High Risk Care Management program on 2025.  Patient verbalizes confidence in the ability to self-manage at this time. has the ability to self-manage at this time. reinforced resources provided during this care transition period..  Care management goals have been completed. No further Ambulatory Care Manager follow up scheduled.      ACM: Stacia Bojorquez RN     Challenges to be reviewed by the provider   Additional needs identified to be addressed with provider No  none               Method of communication with provider: none.    Utilization: Patient has not had any utilization since our last call.     Care Summary Note: final ACC.    Offered patient enrollment in the Remote Patient Monitoring (RPM) program for in-home monitoring: Patient is not eligible for RPM program because: graduated ACC.     Assessments Completed:   General Assessment    Do you have any symptoms that are causing concern?: No          Medications Reviewed:   Patient denies any changes with medications and reports taking all medications as prescribed.    Advance Care Planning:   Reviewed during previous call   Not reviewed during this call     Care Planning:   Graduate ACC    PCP/Specialist follow up:   Future Appointments         Provider Specialty Dept Phone    2025 8:30 AM Tate Hassan MD Orthopedic Surgery [phone]    2025 10:00 AM Curt Collins MD Family Medicine [phone]    9/10/2025 10:30 AM Curt Collins MD Family Medicine [phone]    10/7/2025 10:20 AM Marybeth Lopez APRN - CNP Sleep Medicine [phone]            Follow Up:   No further Ambulatory Care Management follow-up scheduled at this time.  Patient  has Ambulatory Care Manager's

## 2025-07-24 ENCOUNTER — OFFICE VISIT (OUTPATIENT)
Dept: ORTHOPEDIC SURGERY | Age: 65
End: 2025-07-24
Payer: MEDICARE

## 2025-07-24 VITALS — BODY MASS INDEX: 32.23 KG/M2 | HEART RATE: 85 BPM | OXYGEN SATURATION: 99 % | WEIGHT: 273 LBS | HEIGHT: 77 IN

## 2025-07-24 DIAGNOSIS — M79.89 SWELLING OF LOWER EXTREMITY: ICD-10-CM

## 2025-07-24 DIAGNOSIS — M25.572 LEFT ANKLE PAIN, UNSPECIFIED CHRONICITY: ICD-10-CM

## 2025-07-24 DIAGNOSIS — S82.899A FRACTURE OF ANKLE, CLOSED, UNSPECIFIED LATERALITY, INITIAL ENCOUNTER: ICD-10-CM

## 2025-07-24 DIAGNOSIS — M25.572 LEFT ANKLE PAIN, UNSPECIFIED CHRONICITY: Primary | ICD-10-CM

## 2025-07-24 LAB
BASOPHILS # BLD: 0 K/UL (ref 0–0.2)
BASOPHILS NFR BLD: 1.1 %
CRP SERPL-MCNC: <3 MG/L (ref 0–5.1)
DEPRECATED RDW RBC AUTO: 16.1 % (ref 12.4–15.4)
EOSINOPHIL # BLD: 0.1 K/UL (ref 0–0.6)
EOSINOPHIL NFR BLD: 2.5 %
ERYTHROCYTE [SEDIMENTATION RATE] IN BLOOD BY WESTERGREN METHOD: <1 MM/HR (ref 0–20)
HCT VFR BLD AUTO: 22.9 % (ref 40.5–52.5)
HGB BLD-MCNC: 7.5 G/DL (ref 13.5–17.5)
LYMPHOCYTES # BLD: 0.2 K/UL (ref 1–5.1)
LYMPHOCYTES NFR BLD: 4.2 %
MCH RBC QN AUTO: 27 PG (ref 26–34)
MCHC RBC AUTO-ENTMCNC: 32.8 G/DL (ref 31–36)
MCV RBC AUTO: 82.3 FL (ref 80–100)
MONOCYTES # BLD: 0.5 K/UL (ref 0–1.3)
MONOCYTES NFR BLD: 13.2 %
NEUTROPHILS # BLD: 3.3 K/UL (ref 1.7–7.7)
NEUTROPHILS NFR BLD: 79 %
PLATELET # BLD AUTO: 295 K/UL (ref 135–450)
PMV BLD AUTO: 7.7 FL (ref 5–10.5)
RBC # BLD AUTO: 2.78 M/UL (ref 4.2–5.9)
WBC # BLD AUTO: 4.1 K/UL (ref 4–11)

## 2025-07-24 PROCEDURE — 1123F ACP DISCUSS/DSCN MKR DOCD: CPT | Performed by: ORTHOPAEDIC SURGERY

## 2025-07-24 PROCEDURE — 1036F TOBACCO NON-USER: CPT | Performed by: ORTHOPAEDIC SURGERY

## 2025-07-24 PROCEDURE — 3017F COLORECTAL CA SCREEN DOC REV: CPT | Performed by: ORTHOPAEDIC SURGERY

## 2025-07-24 PROCEDURE — G8417 CALC BMI ABV UP PARAM F/U: HCPCS | Performed by: ORTHOPAEDIC SURGERY

## 2025-07-24 PROCEDURE — 99204 OFFICE O/P NEW MOD 45 MIN: CPT | Performed by: ORTHOPAEDIC SURGERY

## 2025-07-24 PROCEDURE — G8428 CUR MEDS NOT DOCUMENT: HCPCS | Performed by: ORTHOPAEDIC SURGERY

## 2025-07-24 RX ORDER — LIDOCAINE 4 G/G
PATCH TOPICAL
Qty: 14 PATCH | Refills: 0 | Status: SHIPPED | OUTPATIENT
Start: 2025-07-24

## 2025-07-24 NOTE — PROGRESS NOTES
[facility] PHYSIC[facility] [place] SPECIALTY CARE [facility] ORTHOPEDIC AND SPORTS MEDICINE [place], [place] [address]  Dept: [phone]  Loc: [phone]    Ambulatory Orthopedic Consult      CHIEF COMPLAINT:    Chief Complaint   Patient presents with    Ankle Pain     Left       HISTORY OF PRESENT ILLNESS:      The patient is a 64 y.o. male who is being seen for evaluation of the above, which began around 1/24/2025 atraumatically  . At today's visit, he is using no brace/assistive device.     History is obtained today from:   [x]  the patient     [x]  EMR     [x]  one family member/friend    []  multiple family members/friends    []  other:      At today's visit, the patient localizes pain to the left ankle/hindfoot/dorsal foot diffusely.  The patient is here today with his wife.  He reports he has had diffuse swelling throughout his foot/ankle for the past 6 months.    REVIEW OF SYSTEMS:  Musculoskeletal: See HPI for pertinent positives     Past Medical History:    He  has a past medical history of Anemia in ESRD (end-stage renal disease) ([facility]) (02/24/2022), Arthralgia of multiple joints (10/27/2015), Atrial fibrillation ([facility]), Chronic bilateral low back pain without sciatica (12/12/2018), Chronic diastolic heart failure ([facility]) (08/16/2021), Chronic pain syndrome (08/31/2022), Chronic prescription opiate use, Chronic systolic congestive heart failure ([facility]) (08/16/2021), Class 1 obesity, Diverticulosis large intestine w/o perforation or abscess w/o bleeding, Duodenal ulcer disease, ESRD on hemodialysis ([facility]) (02/24/2022), Essential hypertension, Gout, Hearing impairment, History of prostate cancer, Inguinal hernia, left (06/14/2022), Kidney transplanted (09/2024), Major depressive disorder, recurrent, mild (10/26/2022), Opioid dependence with current use ([facility]) (12/12/2023), CELSA (obstructive sleep apnea) with CPAP non compliance (07/14/2017),

## 2025-07-25 LAB
EST. AVERAGE GLUCOSE BLD GHB EST-MCNC: 108.3 MG/DL
HBA1C MFR BLD: 5.4 %

## 2025-07-29 ENCOUNTER — OFFICE VISIT (OUTPATIENT)
Dept: FAMILY MEDICINE CLINIC | Age: 65
End: 2025-07-29
Payer: MEDICARE

## 2025-07-29 VITALS
DIASTOLIC BLOOD PRESSURE: 78 MMHG | HEART RATE: 80 BPM | WEIGHT: 280 LBS | SYSTOLIC BLOOD PRESSURE: 125 MMHG | HEIGHT: 77 IN | OXYGEN SATURATION: 98 % | BODY MASS INDEX: 33.06 KG/M2

## 2025-07-29 DIAGNOSIS — Z94.0 KIDNEY TRANSPLANTED: ICD-10-CM

## 2025-07-29 DIAGNOSIS — C61 PROSTATE CANCER (HCC): ICD-10-CM

## 2025-07-29 DIAGNOSIS — D63.8 ANEMIA, CHRONIC DISEASE: ICD-10-CM

## 2025-07-29 DIAGNOSIS — M25.572 ACUTE LEFT ANKLE PAIN: Primary | ICD-10-CM

## 2025-07-29 DIAGNOSIS — I50.32 CHRONIC DIASTOLIC HEART FAILURE (HCC): ICD-10-CM

## 2025-07-29 DIAGNOSIS — E11.69 TYPE 2 DIABETES MELLITUS WITH OTHER SPECIFIED COMPLICATION, WITHOUT LONG-TERM CURRENT USE OF INSULIN (HCC): ICD-10-CM

## 2025-07-29 DIAGNOSIS — N18.6 END STAGE CHRONIC KIDNEY DISEASE (HCC): ICD-10-CM

## 2025-07-29 DIAGNOSIS — M79.89 LEFT LEG SWELLING: ICD-10-CM

## 2025-07-29 PROCEDURE — G8417 CALC BMI ABV UP PARAM F/U: HCPCS | Performed by: FAMILY MEDICINE

## 2025-07-29 PROCEDURE — G2211 COMPLEX E/M VISIT ADD ON: HCPCS | Performed by: FAMILY MEDICINE

## 2025-07-29 PROCEDURE — 3044F HG A1C LEVEL LT 7.0%: CPT | Performed by: FAMILY MEDICINE

## 2025-07-29 PROCEDURE — 2022F DILAT RTA XM EVC RTNOPTHY: CPT | Performed by: FAMILY MEDICINE

## 2025-07-29 PROCEDURE — 1159F MED LIST DOCD IN RCRD: CPT | Performed by: FAMILY MEDICINE

## 2025-07-29 PROCEDURE — 3017F COLORECTAL CA SCREEN DOC REV: CPT | Performed by: FAMILY MEDICINE

## 2025-07-29 PROCEDURE — 1036F TOBACCO NON-USER: CPT | Performed by: FAMILY MEDICINE

## 2025-07-29 PROCEDURE — G8427 DOCREV CUR MEDS BY ELIG CLIN: HCPCS | Performed by: FAMILY MEDICINE

## 2025-07-29 PROCEDURE — 99214 OFFICE O/P EST MOD 30 MIN: CPT | Performed by: FAMILY MEDICINE

## 2025-07-29 PROCEDURE — 1123F ACP DISCUSS/DSCN MKR DOCD: CPT | Performed by: FAMILY MEDICINE

## 2025-07-29 RX ORDER — MIRABEGRON 50 MG/1
50 TABLET, FILM COATED, EXTENDED RELEASE ORAL DAILY
COMMUNITY
Start: 2025-07-25

## 2025-07-29 RX ORDER — MYCOPHENOLIC ACID 180 MG/1
180 TABLET, DELAYED RELEASE ORAL 2 TIMES DAILY
COMMUNITY
Start: 2025-07-22

## 2025-07-29 SDOH — ECONOMIC STABILITY: FOOD INSECURITY: WITHIN THE PAST 12 MONTHS, YOU WORRIED THAT YOUR FOOD WOULD RUN OUT BEFORE YOU GOT MONEY TO BUY MORE.: NEVER TRUE

## 2025-07-29 SDOH — ECONOMIC STABILITY: FOOD INSECURITY: WITHIN THE PAST 12 MONTHS, THE FOOD YOU BOUGHT JUST DIDN'T LAST AND YOU DIDN'T HAVE MONEY TO GET MORE.: NEVER TRUE

## 2025-07-29 NOTE — PROGRESS NOTES
Here for f/u and recheck of swelling of L > R foot    Pt working with ortho upstairs for persistent L foot pain.  We evaluated pt and did xray that was suggestive of fracture.  Pt was referred to ortho who is recommending MRI to further evaluate.  They did put him in a boot but he didn't tolerate and at this time, is treating with lidocaine patch.  Pt will be set for MRI on 8/4 with follow up with ortho pending results.  Pt can ambulate but with pain.  Pt taking nothing otherwise for the pain.  Trying tylenol prn .  He did have bloodwork showing normal crp, ESR,     Pt did have some bloodwork done at  a few weeks ago for f/u of his prostate CA, and it was stable with normal .  Of note, his hgb was low at 7.5.  pt does not feel overly tired, fatigued.  That bloodwork showed normal iron and mild increase in ferritin.  No blood in bowels, no blood in urine.  No abd pain.        Except as noted above in the history of present illness, the review of systems is  negative for headache, vision changes, chest pain, shortness of breath, abdominal pain, urinary sx, bowel changes.    Past medical, surgical, and social history reviewed and updated  Medications and allergies reviewed and updated      O: /78   Pulse 80   Ht 1.956 m (6' 5\")   Wt 127 kg (280 lb)   SpO2 98%   BMI 33.20 kg/m²   GEN: No acute distress, cooperative, well nourished, alert.   HEENT: PEERLA, EOMI , normocephalic/atraumatic, nares and oropharynx clear.  Mucous membranes normal, Tympanic membranes clear bilaterally.  Neck: soft, supple, no thyromegaly, mass, no Lymphadenopathy  CV: Regular rate and rhythm, no murmur, rubs, gallops. No edema.  Resp: Clear to auscultation bilaterally good air entry bilaterally  No crackles, wheeze. Breathing comfortably.   Psych: mood stable, No suicidal thoughts or ideation   Ext: persistent L foot swelling, no erythema.         Current Outpatient Medications   Medication Sig Dispense Refill    [email or web address]

## 2025-07-30 ENCOUNTER — OFFICE VISIT (OUTPATIENT)
Dept: FAMILY MEDICINE CLINIC | Age: 65
End: 2025-07-30
Payer: MEDICARE

## 2025-07-30 VITALS
TEMPERATURE: 98.5 F | SYSTOLIC BLOOD PRESSURE: 136 MMHG | HEIGHT: 77 IN | HEART RATE: 80 BPM | WEIGHT: 278 LBS | OXYGEN SATURATION: 97 % | BODY MASS INDEX: 32.82 KG/M2 | DIASTOLIC BLOOD PRESSURE: 84 MMHG

## 2025-07-30 DIAGNOSIS — N18.6 END STAGE CHRONIC KIDNEY DISEASE (HCC): Primary | ICD-10-CM

## 2025-07-30 DIAGNOSIS — M79.89 LEG SWELLING: ICD-10-CM

## 2025-07-30 DIAGNOSIS — N18.6 END STAGE CHRONIC KIDNEY DISEASE (HCC): ICD-10-CM

## 2025-07-30 LAB
ANION GAP SERPL CALCULATED.3IONS-SCNC: 9 MMOL/L (ref 3–16)
BUN SERPL-MCNC: 20 MG/DL (ref 7–20)
CALCIUM SERPL-MCNC: 9.3 MG/DL (ref 8.3–10.6)
CHLORIDE SERPL-SCNC: 107 MMOL/L (ref 99–110)
CO2 SERPL-SCNC: 24 MMOL/L (ref 21–32)
CREAT SERPL-MCNC: 1.5 MG/DL (ref 0.8–1.3)
GFR SERPLBLD CREATININE-BSD FMLA CKD-EPI: 51 ML/MIN/{1.73_M2}
GLUCOSE SERPL-MCNC: 106 MG/DL (ref 70–99)
POTASSIUM SERPL-SCNC: 3.9 MMOL/L (ref 3.5–5.1)
SODIUM SERPL-SCNC: 140 MMOL/L (ref 136–145)

## 2025-07-30 PROCEDURE — 99214 OFFICE O/P EST MOD 30 MIN: CPT | Performed by: NURSE PRACTITIONER

## 2025-07-30 PROCEDURE — 1159F MED LIST DOCD IN RCRD: CPT | Performed by: NURSE PRACTITIONER

## 2025-07-30 PROCEDURE — 1036F TOBACCO NON-USER: CPT | Performed by: NURSE PRACTITIONER

## 2025-07-30 PROCEDURE — 1123F ACP DISCUSS/DSCN MKR DOCD: CPT | Performed by: NURSE PRACTITIONER

## 2025-07-30 PROCEDURE — G8417 CALC BMI ABV UP PARAM F/U: HCPCS | Performed by: NURSE PRACTITIONER

## 2025-07-30 PROCEDURE — G8427 DOCREV CUR MEDS BY ELIG CLIN: HCPCS | Performed by: NURSE PRACTITIONER

## 2025-07-30 PROCEDURE — 3017F COLORECTAL CA SCREEN DOC REV: CPT | Performed by: NURSE PRACTITIONER

## 2025-07-30 ASSESSMENT — ENCOUNTER SYMPTOMS
SHORTNESS OF BREATH: 0
SINUS PAIN: 0
BACK PAIN: 0
COUGH: 0
CONSTIPATION: 0
COLOR CHANGE: 0
SINUS PRESSURE: 0
ABDOMINAL PAIN: 0
DIARRHEA: 0
WHEEZING: 0

## 2025-07-30 NOTE — PROGRESS NOTES
Neurological:  Negative for dizziness, syncope, weakness, light-headedness and headaches.   Psychiatric/Behavioral:  Negative for behavioral problems, confusion and sleep disturbance. The patient is not nervous/anxious.        Vitals:    25 0933   BP: 136/84   BP Site: Right Upper Arm   Patient Position: Sitting   BP Cuff Size: Medium Adult   Pulse: 80   Temp: 98.5 °F (36.9 °C)   SpO2: 97%   Weight: 126.1 kg (278 lb)   Height: 1.956 m (6' 5\")       Physical Exam  Constitutional:       Appearance: He is well-developed.   HENT:      Head: Normocephalic and atraumatic.   Eyes:      Conjunctiva/sclera: Conjunctivae normal.      Pupils: Pupils are equal, round, and reactive to light.   Neck:      Thyroid: No thyromegaly.      Vascular: No JVD.   Cardiovascular:      Rate and Rhythm: Normal rate and regular rhythm.      Heart sounds: Normal heart sounds.   Pulmonary:      Effort: Pulmonary effort is normal. No respiratory distress.      Breath sounds: Normal breath sounds. No wheezing.   Musculoskeletal:         General: No deformity. Normal range of motion.      Cervical back: Normal range of motion and neck supple.      Right lower le+ Edema present.      Left lower leg: 3+ Edema present.   Skin:     General: Skin is warm and dry.      Capillary Refill: Capillary refill takes less than 2 seconds.   Neurological:      Mental Status: He is alert and oriented to person, place, and time.   Psychiatric:         Behavior: Behavior normal.           The patient (or guardian, if applicable) and other individuals in attendance with the patient were advised that Artificial Intelligence will be utilized during this visit to record, process the conversation to generate a clinical note, and support improvement of the AI technology. The patient (or guardian, if applicable) and other individuals in attendance at the appointment consented to the use of AI, including the recording.          An electronic signature was used

## 2025-07-30 NOTE — ASSESSMENT & PLAN NOTE
Following with nephrology.  Torsemide was recently stopped due to ANTONIO.  Has not had repeat labs.  Will obtain BMP today.

## 2025-08-04 ENCOUNTER — HOSPITAL ENCOUNTER (OUTPATIENT)
Dept: MRI IMAGING | Age: 65
Discharge: HOME | End: 2025-08-04
Attending: ORTHOPAEDIC SURGERY
Payer: MEDICARE

## 2025-08-04 ENCOUNTER — TELEPHONE (OUTPATIENT)
Dept: ORTHOPEDIC SURGERY | Age: 65
End: 2025-08-04

## 2025-08-04 DIAGNOSIS — S82.899A FRACTURE OF ANKLE, CLOSED, UNSPECIFIED LATERALITY, INITIAL ENCOUNTER: ICD-10-CM

## 2025-08-04 DIAGNOSIS — M79.89 SWELLING OF LOWER EXTREMITY: ICD-10-CM

## 2025-08-04 DIAGNOSIS — M25.572 LEFT ANKLE PAIN, UNSPECIFIED CHRONICITY: ICD-10-CM

## 2025-08-04 PROCEDURE — 73721 MRI JNT OF LWR EXTRE W/O DYE: CPT

## 2025-08-07 ENCOUNTER — TELEPHONE (OUTPATIENT)
Dept: ORTHOPEDIC SURGERY | Age: 65
End: 2025-08-07

## 2025-08-12 ENCOUNTER — OFFICE VISIT (OUTPATIENT)
Dept: ORTHOPEDIC SURGERY | Age: 65
End: 2025-08-12
Payer: MEDICARE

## 2025-08-12 VITALS — HEART RATE: 86 BPM | HEIGHT: 77 IN | BODY MASS INDEX: 33.06 KG/M2 | OXYGEN SATURATION: 99 % | WEIGHT: 280 LBS

## 2025-08-12 DIAGNOSIS — M25.572 LEFT ANKLE PAIN, UNSPECIFIED CHRONICITY: Primary | ICD-10-CM

## 2025-08-12 DIAGNOSIS — M79.89 SWELLING OF LOWER EXTREMITY: ICD-10-CM

## 2025-08-12 PROCEDURE — G8417 CALC BMI ABV UP PARAM F/U: HCPCS | Performed by: ORTHOPAEDIC SURGERY

## 2025-08-12 PROCEDURE — 1123F ACP DISCUSS/DSCN MKR DOCD: CPT | Performed by: ORTHOPAEDIC SURGERY

## 2025-08-12 PROCEDURE — 99213 OFFICE O/P EST LOW 20 MIN: CPT | Performed by: ORTHOPAEDIC SURGERY

## 2025-08-12 PROCEDURE — 1036F TOBACCO NON-USER: CPT | Performed by: ORTHOPAEDIC SURGERY

## 2025-08-12 PROCEDURE — 3017F COLORECTAL CA SCREEN DOC REV: CPT | Performed by: ORTHOPAEDIC SURGERY

## 2025-08-12 PROCEDURE — G8428 CUR MEDS NOT DOCUMENT: HCPCS | Performed by: ORTHOPAEDIC SURGERY

## 2025-08-13 ENCOUNTER — CARE COORDINATION (OUTPATIENT)
Dept: CARE COORDINATION | Age: 65
End: 2025-08-13

## 2025-08-16 ENCOUNTER — APPOINTMENT (OUTPATIENT)
Dept: GENERAL RADIOLOGY | Age: 65
End: 2025-08-16
Payer: MEDICARE

## 2025-08-16 ENCOUNTER — HOSPITAL ENCOUNTER (EMERGENCY)
Age: 65
Discharge: HOME | End: 2025-08-16
Attending: EMERGENCY MEDICINE
Payer: MEDICARE

## 2025-08-16 VITALS
OXYGEN SATURATION: 99 % | BODY MASS INDEX: 33.06 KG/M2 | WEIGHT: 280 LBS | HEIGHT: 77 IN | HEART RATE: 73 BPM | DIASTOLIC BLOOD PRESSURE: 87 MMHG | SYSTOLIC BLOOD PRESSURE: 164 MMHG | TEMPERATURE: 98.1 F | RESPIRATION RATE: 16 BRPM

## 2025-08-16 DIAGNOSIS — R07.9 CHEST PAIN, UNSPECIFIED TYPE: ICD-10-CM

## 2025-08-16 DIAGNOSIS — R60.0 BILATERAL LEG EDEMA: Primary | ICD-10-CM

## 2025-08-16 LAB
ANION GAP SERPL CALCULATED.3IONS-SCNC: 10 MMOL/L (ref 3–16)
BASE EXCESS BLDV CALC-SCNC: -1.3 MMOL/L (ref -3–3)
BASOPHILS # BLD: 0 K/UL (ref 0–0.2)
BASOPHILS NFR BLD: 0.7 %
BUN SERPL-MCNC: 21 MG/DL (ref 7–20)
CALCIUM SERPL-MCNC: 8.5 MG/DL (ref 8.3–10.6)
CHLORIDE SERPL-SCNC: 108 MMOL/L (ref 99–110)
CO2 BLDV-SCNC: 57 MMOL/L
CO2 SERPL-SCNC: 21 MMOL/L (ref 21–32)
COHGB MFR BLDV: 4.6 % (ref 0–1.5)
CREAT SERPL-MCNC: 1.6 MG/DL (ref 0.8–1.3)
DEPRECATED RDW RBC AUTO: 16.9 % (ref 12.4–15.4)
DO-HGB MFR BLDV: 4 %
EKG ATRIAL RATE: 76 BPM
EKG DIAGNOSIS: NORMAL
EKG P AXIS: 66 DEGREES
EKG P-R INTERVAL: 160 MS
EKG Q-T INTERVAL: 384 MS
EKG QRS DURATION: 102 MS
EKG QTC CALCULATION (BAZETT): 432 MS
EKG R AXIS: -11 DEGREES
EKG T AXIS: 38 DEGREES
EKG VENTRICULAR RATE: 76 BPM
EOSINOPHIL # BLD: 0.1 K/UL (ref 0–0.6)
EOSINOPHIL NFR BLD: 3.2 %
GFR SERPLBLD CREATININE-BSD FMLA CKD-EPI: 47 ML/MIN/{1.73_M2}
GLUCOSE SERPL-MCNC: 106 MG/DL (ref 70–99)
HCO3 BLDV-SCNC: 24 MMOL/L (ref 23–29)
HCT VFR BLD AUTO: 21.1 % (ref 40.5–52.5)
HGB BLD-MCNC: 7.1 G/DL (ref 13.5–17.5)
LYMPHOCYTES # BLD: 0.2 K/UL (ref 1–5.1)
LYMPHOCYTES NFR BLD: 5.3 %
MAGNESIUM SERPL-MCNC: 2.09 MG/DL (ref 1.8–2.4)
MCH RBC QN AUTO: 27.3 PG (ref 26–34)
MCHC RBC AUTO-ENTMCNC: 33.7 G/DL (ref 31–36)
MCV RBC AUTO: 81 FL (ref 80–100)
METHGB MFR BLDV: 1.7 %
MONOCYTES # BLD: 0.4 K/UL (ref 0–1.3)
MONOCYTES NFR BLD: 11.4 %
NEUTROPHILS # BLD: 2.9 K/UL (ref 1.7–7.7)
NEUTROPHILS NFR BLD: 79.4 %
NT-PROBNP SERPL-MCNC: 269 PG/ML (ref 0–124)
O2 CT VFR BLDV CALC: 10 VOL %
O2 THERAPY: ABNORMAL
PCO2 BLDV: 41.8 MMHG (ref 40–50)
PH BLDV: 7.37 [PH] (ref 7.35–7.45)
PLATELET # BLD AUTO: 302 K/UL (ref 135–450)
PMV BLD AUTO: 7.3 FL (ref 5–10.5)
PO2 BLDV: 79.7 MMHG (ref 25–40)
POTASSIUM SERPL-SCNC: 4.5 MMOL/L (ref 3.5–5.1)
RBC # BLD AUTO: 2.61 M/UL (ref 4.2–5.9)
SAO2 % BLDV: 95 %
SODIUM SERPL-SCNC: 139 MMOL/L (ref 136–145)
TROPONIN, HIGH SENSITIVITY: 23 NG/L (ref 0–22)
TROPONIN, HIGH SENSITIVITY: 25 NG/L (ref 0–22)
WBC # BLD AUTO: 3.7 K/UL (ref 4–11)

## 2025-08-16 PROCEDURE — 80048 BASIC METABOLIC PNL TOTAL CA: CPT

## 2025-08-16 PROCEDURE — 83735 ASSAY OF MAGNESIUM: CPT

## 2025-08-16 PROCEDURE — 6360000002 HC RX W HCPCS: Performed by: EMERGENCY MEDICINE

## 2025-08-16 PROCEDURE — 6360000002 HC RX W HCPCS: Performed by: INTERNAL MEDICINE

## 2025-08-16 PROCEDURE — 96375 TX/PRO/DX INJ NEW DRUG ADDON: CPT

## 2025-08-16 PROCEDURE — 96374 THER/PROPH/DIAG INJ IV PUSH: CPT

## 2025-08-16 PROCEDURE — 84484 ASSAY OF TROPONIN QUANT: CPT

## 2025-08-16 PROCEDURE — 71045 X-RAY EXAM CHEST 1 VIEW: CPT

## 2025-08-16 PROCEDURE — 99285 EMERGENCY DEPT VISIT HI MDM: CPT

## 2025-08-16 PROCEDURE — 93005 ELECTROCARDIOGRAM TRACING: CPT | Performed by: EMERGENCY MEDICINE

## 2025-08-16 PROCEDURE — 93010 ELECTROCARDIOGRAM REPORT: CPT | Performed by: INTERNAL MEDICINE

## 2025-08-16 PROCEDURE — 82803 BLOOD GASES ANY COMBINATION: CPT

## 2025-08-16 PROCEDURE — 85025 COMPLETE CBC W/AUTO DIFF WBC: CPT

## 2025-08-16 PROCEDURE — 83880 ASSAY OF NATRIURETIC PEPTIDE: CPT

## 2025-08-16 RX ORDER — FENTANYL CITRATE 50 UG/ML
25 INJECTION, SOLUTION INTRAMUSCULAR; INTRAVENOUS ONCE
Refills: 0 | Status: COMPLETED | OUTPATIENT
Start: 2025-08-16 | End: 2025-08-16

## 2025-08-16 RX ORDER — ONDANSETRON 2 MG/ML
4 INJECTION INTRAMUSCULAR; INTRAVENOUS ONCE
Status: COMPLETED | OUTPATIENT
Start: 2025-08-16 | End: 2025-08-16

## 2025-08-16 RX ORDER — HYDROMORPHONE HYDROCHLORIDE 1 MG/ML
0.5 INJECTION, SOLUTION INTRAMUSCULAR; INTRAVENOUS; SUBCUTANEOUS ONCE
Status: COMPLETED | OUTPATIENT
Start: 2025-08-16 | End: 2025-08-16

## 2025-08-16 RX ADMIN — ONDANSETRON 4 MG: 2 INJECTION, SOLUTION INTRAMUSCULAR; INTRAVENOUS at 04:40

## 2025-08-16 RX ADMIN — FENTANYL CITRATE 25 MCG: 50 INJECTION INTRAMUSCULAR; INTRAVENOUS at 06:27

## 2025-08-16 RX ADMIN — HYDROMORPHONE HYDROCHLORIDE 0.5 MG: 1 INJECTION, SOLUTION INTRAMUSCULAR; INTRAVENOUS; SUBCUTANEOUS at 04:40

## 2025-08-16 ASSESSMENT — PAIN - FUNCTIONAL ASSESSMENT
PAIN_FUNCTIONAL_ASSESSMENT: 0-10

## 2025-08-16 ASSESSMENT — PAIN DESCRIPTION - LOCATION
LOCATION: LEG
LOCATION: CHEST

## 2025-08-16 ASSESSMENT — PAIN SCALES - GENERAL
PAINLEVEL_OUTOF10: 9
PAINLEVEL_OUTOF10: 10
PAINLEVEL_OUTOF10: 9

## 2025-08-16 ASSESSMENT — ENCOUNTER SYMPTOMS
SHORTNESS OF BREATH: 0
EYE PAIN: 0
RHINORRHEA: 0
VOMITING: 0
COUGH: 0
EYE REDNESS: 0
NAUSEA: 0
CONSTIPATION: 0
BACK PAIN: 0
DIARRHEA: 0
ABDOMINAL PAIN: 0

## 2025-08-16 ASSESSMENT — PAIN DESCRIPTION - DESCRIPTORS
DESCRIPTORS: ACHING

## 2025-08-16 ASSESSMENT — PAIN DESCRIPTION - PAIN TYPE: TYPE: ACUTE PAIN

## 2025-08-16 ASSESSMENT — PAIN DESCRIPTION - ORIENTATION
ORIENTATION: RIGHT;LEFT

## (undated) DEVICE — SUTURE VCRL SZ 0 L27IN ABSRB UD L26MM CT-2 1/2 CIR J270H

## (undated) DEVICE — TOWEL,STOP FLAG GOLD N-W: Brand: MEDLINE

## (undated) DEVICE — SOLUTION IV 1000ML 0.9% SOD CHL PH 5 INJ USP VIAFLX PLAS

## (undated) DEVICE — GLOVE SURG SZ 65 L12IN FNGR THK79MIL GRN LTX FREE

## (undated) DEVICE — Device

## (undated) DEVICE — Z INACTIVE USE 2855128 SPONGE GZ 16 PLY WVN COT 4INX4IN  HHH

## (undated) DEVICE — NEEDLE HYPO 22GA L1.5IN BLK POLYPR HUB S STL REG BVL STR

## (undated) DEVICE — E-Z CLEAN, NON-STICK, PTFE COATED, ELECTROSURGICAL BLADE ELECTRODE, MODIFIED EXTENDED INSULATION, 2.5 INCH (6.35 CM): Brand: MEGADYNE

## (undated) DEVICE — Z DISCONTINUED NO SUB IDED SET GRAV VENT NVENT CK VLV 3 NDL FREE PRT 10 GTT

## (undated) DEVICE — SUTURE MCRYL SZ 4-0 L27IN ABSRB UD L19MM PS-2 1/2 CIR PRIM Y426H

## (undated) DEVICE — PROCEDURE KIT ENDOSCP CUST

## (undated) DEVICE — GENERAL: Brand: MEDLINE INDUSTRIES, INC.

## (undated) DEVICE — APPLICATOR MEDICATED 26 CC SOLUTION HI LT ORNG CHLORAPREP

## (undated) DEVICE — INTENDED FOR TISSUE SEPARATION, AND OTHER PROCEDURES THAT REQUIRE A SHARP SURGICAL BLADE TO PUNCTURE OR CUT.: Brand: BARD-PARKER ® CARBON RIB-BACK BLADES

## (undated) DEVICE — SEALANT TISS 10 CC FIBRIN VISTASEAL

## (undated) DEVICE — STRL PENROSE DRAIN 18" X 1/2": Brand: CARDINAL HEALTH

## (undated) DEVICE — MEDI-VAC NON-CONDUCTIVE SUCTION TUBING: Brand: CARDINAL HEALTH

## (undated) DEVICE — NEEDLE INSUF L150MM DIA2MM DISP FOR PNEUMOPERI ENDOPATH

## (undated) DEVICE — SUTURE VCRL SZ 4-0 L18IN ABSRB UD L19MM PS-2 3/8 CIR PRIM J496H

## (undated) DEVICE — BW-412T DISP COMBO CLEANING BRUSH: Brand: SINGLE USE COMBINATION CLEANING BRUSH

## (undated) DEVICE — ARM DRAPE

## (undated) DEVICE — ADHESIVE SKIN CLSR 0.7ML TOP DERMBND ADV

## (undated) DEVICE — Device: Brand: DISPOSABLE ELECTROSURGICAL SNARE

## (undated) DEVICE — SPONGE,DRAIN,NONWVN,4"X4",6PLY,STRL,LF: Brand: MEDLINE

## (undated) DEVICE — SEALER/DIVIDER LAP SHFT L44CM JAW APER 11.4MM 315DEG ROT

## (undated) DEVICE — TRAP SPEC RETRV CLR PLAS POLYP IN LN SUCT QUIK CTCH

## (undated) DEVICE — FLUID TRAP FOR MINIVAC ES EQUIP FLD TRAP

## (undated) DEVICE — 3M™ TEGADERM™ TRANSPARENT FILM DRESSING FRAME STYLE, 1624W, 2-3/8 IN X 2-3/4 IN (6 CM X 7 CM), 100/CT 4CT/CASE: Brand: 3M™ TEGADERM™

## (undated) DEVICE — SUTURE VCRL SZ 0 L54IN ABSRB VLT W/O NDL POLYGLACTIN 910 J616H

## (undated) DEVICE — SOLUTION ANTIFOG VIS SYS CLEARIFY LAPSCP

## (undated) DEVICE — PENCIL ES ULT VAC W TELSCP NOSE EZ CLN BLDE 10FT

## (undated) DEVICE — SPONGE,PEANUT,XRAY,ST,SM,3/8",5/CARD: Brand: MEDLINE INDUSTRIES, INC.

## (undated) DEVICE — TROCAR: Brand: KII OPTICAL ACCESS SYSTEM

## (undated) DEVICE — SYRINGE MED 10ML SLIP TIP BLNT FILL AND LUERLOCK DISP

## (undated) DEVICE — Z INACTIVE USE 2735373 APPLICATOR FBR LAIN COT WOOD TIP ECONOMICAL

## (undated) DEVICE — 3M™ TEGADERM™ TRANSPARENT FILM DRESSING FRAME STYLE, 1628, 6 IN X 8 IN (15 CM X 20 CM), 10/CT 8CT/CASE: Brand: 3M™ TEGADERM™

## (undated) DEVICE — GLOVE SURG SZ 75 L12IN THK75MIL DK GRN LTX FREE

## (undated) DEVICE — SET ADMIN PRIMING 7ML L30IN 7.35LB 20 GTT 2ND RLER CLMP

## (undated) DEVICE — NEEDLE INSUF L120MM DIA2MM DISP FOR PNEUMOPERI ENDOPATH

## (undated) DEVICE — LOOP,VESSEL,MAXI,BLUE,2/PK,STERILE: Brand: MEDLINE

## (undated) DEVICE — GLOVE ORANGE PI 7   MSG9070

## (undated) DEVICE — 3M™ IOBAN™ 2 ANTIMICROBIAL INCISE DRAPE 6648EZ: Brand: IOBAN™ 2

## (undated) DEVICE — PAD PREP L 2 PLY 70% ISO ALC NONWOVEN SFT ABSRB TOP ANTISEP

## (undated) DEVICE — SUTURE VCRL SZ 2-0 L18IN ABSRB VLT L26MM SH 1/2 CIR J775D

## (undated) DEVICE — COVER LT HNDL BLU PLAS

## (undated) DEVICE — 40583 XL ADVANCED TRENDELENBURG POSITIONING KIT: Brand: 40583 XL ADVANCED TRENDELENBURG POSITIONING KIT

## (undated) DEVICE — ROBOTIC: Brand: MEDLINE INDUSTRIES, INC.

## (undated) DEVICE — SPONGE,LAP,18"X18",DLX,XR,ST,5/PK,40/PK: Brand: MEDLINE

## (undated) DEVICE — DRAPE,LAP,CHOLE,W/TROUGHS,STERILE: Brand: MEDLINE

## (undated) DEVICE — SYRINGE MED 10ML LUERLOCK TIP W/O SFTY DISP

## (undated) DEVICE — TOTAL TRAY, 16FR 10ML SIL FOLEY, URN: Brand: MEDLINE

## (undated) DEVICE — AGENT HEMSTAT 3GM OXIDIZED REGENERATED CELOS ABSRB FOR CONT (ORDER MULTIPLES OF 5EA)

## (undated) DEVICE — CANNULA SEAL

## (undated) DEVICE — SUTURE ABSRB L30CM 2-0 VLT SPRL PDS + STRATAFIX SXPP1B410

## (undated) DEVICE — SUTURE PDS II SZ 0 L27IN ABSRB VLT L36MM CT-1 1/2 CIR Z340H

## (undated) DEVICE — SYSTEM SMK EVAC LAP TBNG FILTER HSNG BENT STYL PNK SEE CLR

## (undated) DEVICE — TROCAR: Brand: KII FIOS FIRST ENTRY

## (undated) DEVICE — SOLUTION IV IRRIG WATER 500ML POUR BRL ST 2F7113

## (undated) DEVICE — BLADELESS OBTURATOR, LONG: Brand: WECK VISTA

## (undated) DEVICE — GLOVE SURG SZ 6 L11.2IN FNGR THK9.8MIL STRW LTX POLYMER

## (undated) DEVICE — CAUTERY TIP POLISHER: Brand: DEVON

## (undated) DEVICE — SOLUTION IRRIG 3000ML 0.9% SOD CHL USP UROMATIC PLAS CONT

## (undated) DEVICE — AIR/WATER CLEANING ADAPTER FOR OLYMPUS® GI ENDOSCOPE: Brand: BULLDOG®

## (undated) DEVICE — CANNULA PERF L1.3IN TIP L2MM S STL POLYUR TB ARTOTMY BLB

## (undated) DEVICE — PROGRASP FORCEPS: Brand: ENDOWRIST

## (undated) DEVICE — SPONGE GZ W4XL8IN COT WVN 12 PLY

## (undated) DEVICE — THIS ADAPTER IS A DOUBLE SEALING FEMALE LUER LOCK ADAPTER WITH A 2-PIECE, COMBINATION COMPRESSION FIT/BARBED CATHETER CONNECTOR. THE ADAPTER IS USED TO CONNECT THE PD CATHETER TO A SOLUTION TRANSFER SET WITH LOCKING CONNECTOR.: Brand: LOCKING TITANIUM ADAPTER FOR PERITONEAL DIALYSIS CATHETER

## (undated) DEVICE — TOWEL,OR,DSP,ST,BLUE,DLX,8/PK,10PK/CS: Brand: MEDLINE

## (undated) DEVICE — VALVE SUCTION AIR H2O SET ORCA POD + DISP

## (undated) DEVICE — SNARE COLD DIAMOND 15MM THIN

## (undated) DEVICE — SOLUTION IV 1000ML LAC RINGERS PH 6.5 INJ USP VIAFLX PLAS

## (undated) DEVICE — CATHETER PERI DLYS L62CM 2 CUF CURLED ARGY

## (undated) DEVICE — MOUTHPIECE ENDOSCP L CTRL OPN AND SIDE PORTS DISP

## (undated) DEVICE — SUTURE VCRL SZ 3-0 L27IN ABSRB UD L26MM SH 1/2 CIR J416H

## (undated) DEVICE — SUTURE NONABSORBABLE MONOFILAMENT 7-0 BV-1 1X24 IN PROLENE 8702H

## (undated) DEVICE — SPONGE,LAP,4"X18",XR,ST,5/PK,40PK/CS: Brand: MEDLINE INDUSTRIES, INC.

## (undated) DEVICE — SYRINGE MED 50ML LUERLOCK TIP

## (undated) DEVICE — SEALER/DIVIDER LAP SHFT L37CM JAW APER 11.4MM 315DEG ROT

## (undated) DEVICE — TISSUE RETRIEVAL SYSTEM: Brand: INZII RETRIEVAL SYSTEM

## (undated) DEVICE — SCISSORS SURG DIA8MM MPLR CRV ENDOWRIST

## (undated) DEVICE — LARGE SUTURE CUT NEEDLE DRIVER: Brand: ENDOWRIST

## (undated) DEVICE — SUTURE STRATAFIX SPRL PDS + SZ 2-0 L6IN ABSRB VLT L36MM SXPP1B409

## (undated) DEVICE — TUBING IRRIG L77IN DIA0.241IN L BOR FOR CYSTO W/ NVENT

## (undated) DEVICE — TIP COVER ACCESSORY

## (undated) DEVICE — 3M™ IOBAN™ 2 ANTIMICROBIAL INCISE DRAPE 6640EZ: Brand: IOBAN™ 2

## (undated) DEVICE — STRAP SFTY W5XL72IN 1 PC

## (undated) DEVICE — THIS DEVICE IS A DOUBLE SEALING MALE LUER LOCK INTENDED FOR USE WITH THE BAXTER LOCKING TITANIUM ADAPTER FOR PERITONEAL DIALYSIS.: Brand: LOCKING CAP FOR PERITONEAL DIALYSIS CATHETER ADAPTER

## (undated) DEVICE — CATHETER IV 20GA L1.25IN PNK FEP SFTY STR HUB RADPQ DISP

## (undated) DEVICE — SUTURE PDS II SZ 0 L27IN ABSRB VLT L26MM CT-2 1/2 CIR Z334H

## (undated) DEVICE — APPLICATOR LAP 35 CM 2 RIGID VISTASEAL

## (undated) DEVICE — SUTURE PDS II SZ 1 L96IN ABSRB VLT TP-1 L65MM 1/2 CIR Z880G

## (undated) DEVICE — SOLUTION IV 1000ML 0.9% SOD CHL

## (undated) DEVICE — TROCARS: Brand: KII® OPTICAL ACCESS SYSTEM

## (undated) DEVICE — SUTURE VCRL SZ 2-0 L27IN ABSRB UD L26MM SH 1/2 CIR J417H

## (undated) DEVICE — SYRINGE MED 10ML TRNSLUC BRL PLUNG BLK MRK POLYPR CTRL

## (undated) DEVICE — YANKAUER SUCTION INSTRUMENT NO CONTROL VENT, BULB TIP, CLEAR: Brand: YANKAUER

## (undated) DEVICE — LAPAROSCOPIC SCISSORS: Brand: EPIX LAPAROSCOPIC SCISSORS

## (undated) DEVICE — ELECTRODE PT RET AD L9FT HI MOIST COND ADH HYDRGEL CORDED

## (undated) DEVICE — COVER,MAYO STAND,XL,STERILE: Brand: MEDLINE

## (undated) DEVICE — SUTURE ABSORBABLE BRAIDED 2-0 CT-1 27 IN UD VICRYL J259H

## (undated) DEVICE — FENESTRATED BIPOLAR FORCEPS: Brand: ENDOWRIST

## (undated) DEVICE — SNARES COLD OVAL 10MM THIN

## (undated) DEVICE — BINDER ABD UNIV H9IN WAIST 45-62IN E SFT COT PREM 3 PNL

## (undated) DEVICE — AV FISTULA: Brand: MEDLINE INDUSTRIES, INC.

## (undated) DEVICE — GENERAL LAPAROSCOPIC: Brand: MEDLINE INDUSTRIES, INC.

## (undated) DEVICE — SUTURE VCRL SZ 2-0 L12X18IN ABSRB UD POLYGLACTIN 910 BRAID J911T

## (undated) DEVICE — ENDOSCOPIC KIT 6X3/16 FT COLON W/ 1.1 OZ 2 GWN W/O BRSH

## (undated) DEVICE — FOGARTY - HYDRAGRIP SURGICAL - CLAMP INSERTS: Brand: FOGARTY SOFTJAW

## (undated) DEVICE — SUTURE PERMAHAND SZ 2-0 L18IN NONABSORBABLE BLK L26MM SH C012D

## (undated) DEVICE — GOWN,SIRUS,POLYRNF,BRTHSLV,LG,30/CS: Brand: MEDLINE

## (undated) DEVICE — KIT,ANTI FOG,W/SPONGE & FLUID,SOFT PACK: Brand: MEDLINE